# Patient Record
Sex: MALE | Race: BLACK OR AFRICAN AMERICAN | Employment: OTHER | URBAN - METROPOLITAN AREA
[De-identification: names, ages, dates, MRNs, and addresses within clinical notes are randomized per-mention and may not be internally consistent; named-entity substitution may affect disease eponyms.]

---

## 2020-06-03 ENCOUNTER — HOSPITAL ENCOUNTER (EMERGENCY)
Age: 35
Discharge: HOME | End: 2020-06-03
Attending: EMERGENCY MEDICINE
Payer: COMMERCIAL

## 2020-06-03 ENCOUNTER — APPOINTMENT (OUTPATIENT)
Dept: GENERAL RADIOLOGY | Age: 35
End: 2020-06-03
Payer: COMMERCIAL

## 2020-06-03 VITALS
BODY MASS INDEX: 39.33 KG/M2 | SYSTOLIC BLOOD PRESSURE: 117 MMHG | WEIGHT: 296.74 LBS | HEART RATE: 106 BPM | TEMPERATURE: 99.9 F | HEIGHT: 73 IN | RESPIRATION RATE: 18 BRPM | OXYGEN SATURATION: 95 % | DIASTOLIC BLOOD PRESSURE: 75 MMHG

## 2020-06-03 LAB
A/G RATIO: 0.8 (ref 1.1–2.2)
ALBUMIN SERPL-MCNC: 2.8 G/DL (ref 3.4–5)
ALP BLD-CCNC: 91 U/L (ref 40–129)
ALT SERPL-CCNC: 17 U/L (ref 10–40)
ANION GAP SERPL CALCULATED.3IONS-SCNC: 13 MMOL/L (ref 3–16)
AST SERPL-CCNC: 30 U/L (ref 15–37)
BASOPHILS ABSOLUTE: 0 K/UL (ref 0–0.2)
BASOPHILS RELATIVE PERCENT: 0.4 %
BILIRUB SERPL-MCNC: 0.3 MG/DL (ref 0–1)
BUN BLDV-MCNC: 14 MG/DL (ref 7–20)
C-REACTIVE PROTEIN: 62.4 MG/L (ref 0–5.1)
CALCIUM SERPL-MCNC: 8.2 MG/DL (ref 8.3–10.6)
CHLORIDE BLD-SCNC: 99 MMOL/L (ref 99–110)
CO2: 21 MMOL/L (ref 21–32)
CREAT SERPL-MCNC: 1.5 MG/DL (ref 0.9–1.3)
EKG ATRIAL RATE: 128 BPM
EKG DIAGNOSIS: NORMAL
EKG P AXIS: 55 DEGREES
EKG P-R INTERVAL: 168 MS
EKG Q-T INTERVAL: 300 MS
EKG QRS DURATION: 84 MS
EKG QTC CALCULATION (BAZETT): 438 MS
EKG R AXIS: -30 DEGREES
EKG T AXIS: 42 DEGREES
EKG VENTRICULAR RATE: 128 BPM
EOSINOPHILS ABSOLUTE: 0 K/UL (ref 0–0.6)
EOSINOPHILS RELATIVE PERCENT: 0 %
FERRITIN: 1288 NG/ML (ref 30–400)
GFR AFRICAN AMERICAN: >60
GFR NON-AFRICAN AMERICAN: 54
GLOBULIN: 3.7 G/DL
GLUCOSE BLD-MCNC: 335 MG/DL (ref 70–99)
HCT VFR BLD CALC: 45.4 % (ref 40.5–52.5)
HEMOGLOBIN: 14.7 G/DL (ref 13.5–17.5)
LACTATE DEHYDROGENASE: 533 U/L (ref 100–190)
LACTIC ACID: 1.3 MMOL/L (ref 0.4–2)
LYMPHOCYTES ABSOLUTE: 1.1 K/UL (ref 1–5.1)
LYMPHOCYTES RELATIVE PERCENT: 21.2 %
MCH RBC QN AUTO: 25.9 PG (ref 26–34)
MCHC RBC AUTO-ENTMCNC: 32.4 G/DL (ref 31–36)
MCV RBC AUTO: 79.9 FL (ref 80–100)
MONOCYTES ABSOLUTE: 0.5 K/UL (ref 0–1.3)
MONOCYTES RELATIVE PERCENT: 9.9 %
NEUTROPHILS ABSOLUTE: 3.4 K/UL (ref 1.7–7.7)
NEUTROPHILS RELATIVE PERCENT: 68.5 %
PDW BLD-RTO: 12.4 % (ref 12.4–15.4)
PLATELET # BLD: 170 K/UL (ref 135–450)
PMV BLD AUTO: 9.2 FL (ref 5–10.5)
POTASSIUM REFLEX MAGNESIUM: 4.8 MMOL/L (ref 3.5–5.1)
PROCALCITONIN: 0.12 NG/ML (ref 0–0.15)
RBC # BLD: 5.67 M/UL (ref 4.2–5.9)
SODIUM BLD-SCNC: 133 MMOL/L (ref 136–145)
TOTAL PROTEIN: 6.5 G/DL (ref 6.4–8.2)
TROPONIN: 0.03 NG/ML
WBC # BLD: 5 K/UL (ref 4–11)

## 2020-06-03 PROCEDURE — 80053 COMPREHEN METABOLIC PANEL: CPT

## 2020-06-03 PROCEDURE — 83615 LACTATE (LD) (LDH) ENZYME: CPT

## 2020-06-03 PROCEDURE — 86140 C-REACTIVE PROTEIN: CPT

## 2020-06-03 PROCEDURE — 2580000003 HC RX 258: Performed by: PHYSICIAN ASSISTANT

## 2020-06-03 PROCEDURE — 83605 ASSAY OF LACTIC ACID: CPT

## 2020-06-03 PROCEDURE — 6360000002 HC RX W HCPCS: Performed by: PHYSICIAN ASSISTANT

## 2020-06-03 PROCEDURE — 93005 ELECTROCARDIOGRAM TRACING: CPT | Performed by: PHYSICIAN ASSISTANT

## 2020-06-03 PROCEDURE — 96365 THER/PROPH/DIAG IV INF INIT: CPT

## 2020-06-03 PROCEDURE — 84145 PROCALCITONIN (PCT): CPT

## 2020-06-03 PROCEDURE — 6370000000 HC RX 637 (ALT 250 FOR IP): Performed by: PHYSICIAN ASSISTANT

## 2020-06-03 PROCEDURE — 87040 BLOOD CULTURE FOR BACTERIA: CPT

## 2020-06-03 PROCEDURE — 84484 ASSAY OF TROPONIN QUANT: CPT

## 2020-06-03 PROCEDURE — 82728 ASSAY OF FERRITIN: CPT

## 2020-06-03 PROCEDURE — 85025 COMPLETE CBC W/AUTO DIFF WBC: CPT

## 2020-06-03 PROCEDURE — 71045 X-RAY EXAM CHEST 1 VIEW: CPT

## 2020-06-03 PROCEDURE — 99283 EMERGENCY DEPT VISIT LOW MDM: CPT

## 2020-06-03 PROCEDURE — 93010 ELECTROCARDIOGRAM REPORT: CPT | Performed by: INTERNAL MEDICINE

## 2020-06-03 RX ORDER — SODIUM CHLORIDE, SODIUM LACTATE, POTASSIUM CHLORIDE, CALCIUM CHLORIDE 600; 310; 30; 20 MG/100ML; MG/100ML; MG/100ML; MG/100ML
500 INJECTION, SOLUTION INTRAVENOUS ONCE
Status: COMPLETED | OUTPATIENT
Start: 2020-06-03 | End: 2020-06-03

## 2020-06-03 RX ORDER — AZITHROMYCIN 250 MG/1
250 TABLET, FILM COATED ORAL SEE ADMIN INSTRUCTIONS
Qty: 6 TABLET | Refills: 0 | Status: SHIPPED | OUTPATIENT
Start: 2020-06-03 | End: 2020-06-08

## 2020-06-03 RX ORDER — IBUPROFEN 600 MG/1
600 TABLET ORAL ONCE
Status: COMPLETED | OUTPATIENT
Start: 2020-06-03 | End: 2020-06-03

## 2020-06-03 RX ORDER — ACETAMINOPHEN 325 MG/1
650 TABLET ORAL EVERY 6 HOURS PRN
Qty: 60 TABLET | Refills: 0 | Status: SHIPPED | OUTPATIENT
Start: 2020-06-03 | End: 2022-05-17

## 2020-06-03 RX ORDER — ACETAMINOPHEN 325 MG/1
650 TABLET ORAL ONCE
Status: COMPLETED | OUTPATIENT
Start: 2020-06-03 | End: 2020-06-03

## 2020-06-03 RX ADMIN — IBUPROFEN 600 MG: 600 TABLET, FILM COATED ORAL at 17:45

## 2020-06-03 RX ADMIN — AZITHROMYCIN MONOHYDRATE 500 MG: 500 INJECTION, POWDER, LYOPHILIZED, FOR SOLUTION INTRAVENOUS at 15:24

## 2020-06-03 RX ADMIN — SODIUM CHLORIDE, SODIUM LACTATE, POTASSIUM CHLORIDE, AND CALCIUM CHLORIDE 500 ML: .6; .31; .03; .02 INJECTION, SOLUTION INTRAVENOUS at 17:45

## 2020-06-03 RX ADMIN — ACETAMINOPHEN 650 MG: 325 TABLET ORAL at 14:18

## 2020-06-03 RX ADMIN — SODIUM CHLORIDE, POTASSIUM CHLORIDE, SODIUM LACTATE AND CALCIUM CHLORIDE 500 ML: 600; 310; 30; 20 INJECTION, SOLUTION INTRAVENOUS at 14:31

## 2020-06-03 ASSESSMENT — PAIN DESCRIPTION - PAIN TYPE: TYPE: ACUTE PAIN

## 2020-06-03 ASSESSMENT — PAIN SCALES - GENERAL
PAINLEVEL_OUTOF10: 0
PAINLEVEL_OUTOF10: 6

## 2020-06-03 ASSESSMENT — ENCOUNTER SYMPTOMS
VOMITING: 0
COUGH: 1
ABDOMINAL PAIN: 0
NAUSEA: 1
SHORTNESS OF BREATH: 0

## 2020-06-03 ASSESSMENT — PAIN DESCRIPTION - DESCRIPTORS: DESCRIPTORS: ACHING

## 2020-06-03 ASSESSMENT — PAIN DESCRIPTION - LOCATION: LOCATION: NECK

## 2020-06-03 NOTE — ED TRIAGE NOTES
Pt states he is unable to get fever down, Pt is COVID +. last dose tylenol 0100 today.  no ibuprofen. +cough

## 2020-06-03 NOTE — ED PROVIDER NOTES
with a low-grade temp at 99.5. Labs as above. Chest x-ray showed findings consistent with COVID-19. Patient given IV azithromycin in the ED. Patient well-appearing. He is talking complete sentences. He has no evidence of respiratory distress on exam at any point. He denies any shortness of breath, chest pain. He is only complaining of fever, body aches and mild nausea. Given tachycardia is improving, he is not hypoxic and he is well-appearing with no evidence of respiratory distress or chest pain/shortness of breath to believe he is safe for discharge home. Patient understands should he have any worsening of symptoms, trouble breathing, chest pain is to return promptly to the ED for reevaluation. He voiced understanding. He was educated extensively on contact precautions. Management decisions relating to this patient encounter were made during the FISTT-20 public health emergency. I engaged in a shared decision-making conversation with the patient. The patient agrees and wishes to go home. The patient was counseled to closely monitor their symptoms, and to return immediately to the Emergency Department for any difficulty breathing, confusion, dizziness or passing out, vomiting, chest pain, high fevers, weakness, or any other new or concerning symptoms. There is no evidence of emergent medical condition at this time, and the patient will be discharged in good condition. Patient understand strict return precautions. He is comfortable plan. Patient discharged home. The patient tolerated their visit well. They were seen and evaluated by the attending physician, Dr. Robby Parker who agreed with the assessment and plan. The patient and / or the family were informed of the results of any tests, a time was given to answer questions, a plan was proposed and they agreed with plan.           CONSULTS:  None    PROCEDURES:  Procedures    FINAL IMPRESSION      1. COVID-19          DISPOSITION/PLAN

## 2020-06-03 NOTE — ED PROVIDER NOTES
I independently evaluated and obtained a history and physical on Bill Pederson. All diagnostic, treatment, and disposition assistants were made to myself in conjunction the advanced practice provider. For further details of this patient's emergency department encounter, please see the advanced practice provider's documentation. History: Patient is a 29-year-old came by private vehicle to the ER with positive COVID-19. Was tested last Sunday. Patient has a dry cough body aches fever and nausea. Denies any shortness of breath chest pain. Presents with fever and some tachycardia. Is generalized body ache. No other exposure noted. Has past history includes hypertension patient is obese. Physician Exam: Examination reveals tachycardia but vital signs normal with a pulse ox in the 94 percentile. Lungs were clear to auscultation neurologically is intact. Moving all extremities. Heart some tachycardia but regular rhythm.     Labs Reviewed   CBC WITH AUTO DIFFERENTIAL - Abnormal; Notable for the following components:       Result Value    MCV 79.9 (*)     MCH 25.9 (*)     All other components within normal limits    Narrative:     Performed at:  [facility]  [address], [place] [facility] [employer] [address]   Phone [phone]   COMPREHENSIVE METABOLIC PANEL W/ REFLEX TO MG FOR LOW K - Abnormal; Notable for the following components:    Sodium 133 (*)     Glucose 335 (*)     CREATININE 1.5 (*)     GFR Non-African American 54 (*)     Calcium 8.2 (*)     Alb 2.8 (*)     Albumin/Globulin Ratio 0.8 (*)     All other components within normal limits    Narrative:     Performed at:  [facility]  [address], [place] [facility] [employer] [address]   Phone [phone]   LACTATE DEHYDROGENASE - Abnormal; Notable for the following components:     (*)     All other components within normal limits    Narrative:     Performed at:  [facility]  [address], [facility] [employer] [address]   Phone [phone]   TROPONIN - Abnormal; Notable for the following components:    Troponin 0.03 (*)     All other components within normal limits    Narrative:     Performed at:  [facility]  [address] [facility] [employer] [address]   Phone [phone]   C-REACTIVE PROTEIN - Abnormal; Notable for the following components:    CRP 62.4 (*)     All other components within normal limits    Narrative:     Performed at:  [facility]  [address] [facility] [employer] [address]   Phone [phone]   FERRITIN - Abnormal; Notable for the following components:    Ferritin 1,288.0 (*)     All other components within normal limits    Narrative:     Performed at:  [facility]  [address] [facility] [employer] [address]   Phone [phone]   CULTURE, BLOOD 1    Narrative:     ORDER#: [id number]                          ORDERED BY: BERTRAM STEIN  SOURCE: Blood                              COLLECTED:  06/03/20 14:37  ANTIBIOTICS AT MONIE.:                      RECEIVED :  06/03/20 14:47  If child <=2 yrs old please draw pediatric bottle. ~Blood Culture #1  Performed at:  [facility]  [address] [facility] [employer] [address]   Phone [phone]   LACTIC ACID, PLASMA    Narrative:     Performed at:  [facility]  [address] [facility] [employer] [address]   Phone [phone]   PROCALCITONIN    Narrative:     Performed at:  [facility]  [address] [place] [address] [employer] [address]   Phone [phone]          XR CHEST PORTABLE   Final Result   Ground-glass opacities and interstitial thickening within the mid to lower   lungs bilaterally compatible with provided history of viral pneumonitis. No   pleural effusion or pneumothorax. Treated in the ED.

## 2020-06-04 ENCOUNTER — CARE COORDINATION (OUTPATIENT)
Dept: CARE COORDINATION | Age: 35
End: 2020-06-04

## 2020-06-05 NOTE — CARE COORDINATION
for [facility] Loop and agrees to enroll patient declined. Patient's preferred e-mail:    Patient's preferred phone number:   Based on Loop alert triggers, patient will be contacted by nurse care manager for worsening symptoms. Plan for follow-up call in 5-7 days based on severity of symptoms and risk factors.

## 2020-06-07 LAB — BLOOD CULTURE, ROUTINE: NORMAL

## 2020-06-11 ENCOUNTER — CARE COORDINATION (OUTPATIENT)
Dept: CARE COORDINATION | Age: 35
End: 2020-06-11

## 2020-06-11 NOTE — CARE COORDINATION
symptoms and risk factors. ACM offered to assist patient with scheduling an appointment with a [facility] provider. Patient declines at this time.

## 2020-06-15 ENCOUNTER — NURSE TRIAGE (OUTPATIENT)
Dept: OTHER | Facility: CLINIC | Age: 35
End: 2020-06-15

## 2020-06-18 ENCOUNTER — CARE COORDINATION (OUTPATIENT)
Dept: CARE COORDINATION | Age: 35
End: 2020-06-18

## 2020-06-24 ENCOUNTER — CARE COORDINATION (OUTPATIENT)
Dept: CARE COORDINATION | Age: 35
End: 2020-06-24

## 2021-06-28 ENCOUNTER — HOSPITAL ENCOUNTER (OUTPATIENT)
Dept: ULTRASOUND IMAGING | Age: 36
Discharge: HOME | End: 2021-06-28

## 2021-06-28 DIAGNOSIS — R80.9 PROTEINURIA, UNSPECIFIED TYPE: ICD-10-CM

## 2021-06-28 DIAGNOSIS — E55.9 AVITAMINOSIS D: ICD-10-CM

## 2021-06-28 DIAGNOSIS — I10 HYPERTENSION, ESSENTIAL: ICD-10-CM

## 2021-06-28 DIAGNOSIS — N18.31 ANEMIA IN STAGE 3A CHRONIC KIDNEY DISEASE (HCC): ICD-10-CM

## 2021-06-28 DIAGNOSIS — D63.1 ANEMIA IN STAGE 3A CHRONIC KIDNEY DISEASE (HCC): ICD-10-CM

## 2021-06-28 DIAGNOSIS — N18.31 STAGE 3A CHRONIC KIDNEY DISEASE (HCC): ICD-10-CM

## 2021-06-28 PROCEDURE — 76770 US EXAM ABDO BACK WALL COMP: CPT

## 2022-04-20 ENCOUNTER — HOSPITAL ENCOUNTER (EMERGENCY)
Age: 37
Discharge: HOME | End: 2022-04-20
Payer: MEDICAID

## 2022-04-20 VITALS
RESPIRATION RATE: 18 BRPM | DIASTOLIC BLOOD PRESSURE: 106 MMHG | HEART RATE: 95 BPM | WEIGHT: 275.35 LBS | BODY MASS INDEX: 36.49 KG/M2 | OXYGEN SATURATION: 99 % | SYSTOLIC BLOOD PRESSURE: 166 MMHG | TEMPERATURE: 97.3 F | HEIGHT: 73 IN

## 2022-04-20 DIAGNOSIS — B35.6 TINEA CRURIS: Primary | ICD-10-CM

## 2022-04-20 DIAGNOSIS — I10 ELEVATED BLOOD PRESSURE READING WITH DIAGNOSIS OF HYPERTENSION: ICD-10-CM

## 2022-04-20 PROCEDURE — 99283 EMERGENCY DEPT VISIT LOW MDM: CPT

## 2022-04-20 PROCEDURE — 6370000000 HC RX 637 (ALT 250 FOR IP): Performed by: PHYSICIAN ASSISTANT

## 2022-04-20 RX ORDER — KETOCONAZOLE 20 MG/G
CREAM TOPICAL
Qty: 30 G | Refills: 1 | Status: SHIPPED | OUTPATIENT
Start: 2022-04-20 | End: 2022-06-06

## 2022-04-20 RX ORDER — LISINOPRIL 5 MG/1
10 TABLET ORAL ONCE
Status: COMPLETED | OUTPATIENT
Start: 2022-04-20 | End: 2022-04-20

## 2022-04-20 RX ORDER — HYDROXYZINE HYDROCHLORIDE 25 MG/1
25 TABLET, FILM COATED ORAL EVERY 8 HOURS PRN
Qty: 15 TABLET | Refills: 0 | Status: SHIPPED | OUTPATIENT
Start: 2022-04-20 | End: 2022-04-28

## 2022-04-20 RX ADMIN — LISINOPRIL 10 MG: 5 TABLET ORAL at 08:12

## 2022-04-20 ASSESSMENT — ENCOUNTER SYMPTOMS
SHORTNESS OF BREATH: 0
BACK PAIN: 0
NAUSEA: 0
ABDOMINAL PAIN: 0
COUGH: 0
SORE THROAT: 0
EYE PAIN: 0
VOMITING: 0

## 2022-04-20 ASSESSMENT — PAIN DESCRIPTION - PAIN TYPE: TYPE: ACUTE PAIN

## 2022-04-20 ASSESSMENT — PAIN DESCRIPTION - ORIENTATION: ORIENTATION: RIGHT;LEFT;UPPER

## 2022-04-20 ASSESSMENT — PAIN SCALES - GENERAL: PAINLEVEL_OUTOF10: 7

## 2022-04-20 ASSESSMENT — PAIN - FUNCTIONAL ASSESSMENT: PAIN_FUNCTIONAL_ASSESSMENT: 0-10

## 2022-04-20 NOTE — ED PROVIDER NOTES
[address] [facility]        Pt Name: Kole Alvarez  MRN: [id number]  [place] 1985  Date of evaluation: 4/20/2022  Provider: Kim Ascencio PA-C  PCP: Gama Koehler MD, MD  Note Started: 7:49 AM EDT     This patient was seen and evaluated by ABIGAIL only. CHIEF COMPLAINT       Chief Complaint   Patient presents with    Rash     upper bilateral inner thighs with rash that started about 3 days ago. HISTORY OF PRESENT ILLNESS   (Location, Timing/Onset, Context/Setting, Quality, Duration, Modifying Factors, Severity, Associated Signs and Symptoms)  Note limiting factors. Chief Complaint: Rash in his inguinal area. Been very itchy. He has been scratching at it. Started about 2 to 3 days ago. Denies fever, he has been using some antibiotic ointment over the area. No other complaint. Denies any new environmental exposure. Kole Alvarez is a 29 y.o. male who presents to emergency room with the above complaint. Nursing Notes were all reviewed and agreed with or any disagreements were addressed in the HPI. REVIEW OF SYSTEMS    (2-9 systems for level 4, 10 or more for level 5)     Review of Systems   Constitutional: Negative for chills and fever. HENT: Negative for congestion and sore throat. Eyes: Negative for pain and visual disturbance. Respiratory: Negative for cough and shortness of breath. Cardiovascular: Negative for chest pain and leg swelling. Gastrointestinal: Negative for abdominal pain, nausea and vomiting. Genitourinary: Negative for dysuria and frequency. Musculoskeletal: Negative for back pain and neck pain. Skin: Positive for rash. Negative for wound. Neurological: Negative for dizziness and light-headedness. Positives and Pertinent negatives as per HPI. Except as noted above in the ROS, all other systems were reviewed and negative.        PAST MEDICAL HISTORY Past Medical History:   Diagnosis Date    COVID-19     Diabetes mellitus, type II ([facility] [address]     Hypertension     Protein in urine     Seizure ([facility] [address]          SURGICAL HISTORY   History reviewed. No pertinent surgical history. CURRENTMEDICATIONS       Previous Medications    ACETAMINOPHEN (AMINOFEN) 325 MG TABLET    Take 2 tablets by mouth every 6 hours as needed for Pain    LISINOPRIL (PRINIVIL;ZESTRIL) 10 MG TABLET    Take 10 mg by mouth daily          ALLERGIES     Patient has no known allergies. FAMILYHISTORY     History reviewed. No pertinent family history. SOCIAL HISTORY       Social History     Tobacco Use    Smoking status: Never Smoker    Smokeless tobacco: Never Used   Substance Use Topics    Alcohol use: Yes     Comment: rare    Drug use: Not Currently       SCREENINGS    [place] Coma Scale  Eye Opening: Spontaneous  Best Verbal Response: Oriented  Best Motor Response: Obeys commands  Jannie Coma Scale Score: 15        PHYSICAL EXAM    (up to 7 for level 4, 8 or more for level 5)     ED Triage Vitals    BP Temp Temp Source Pulse Resp SpO2 Height Weight   (!) 180/120 97.3 °F (36.3 °C) Temporal 89 18 99 % 6' 1\" (1.854 m) 275 lb 5.7 oz (124.9 kg)       Physical Exam  Vitals and nursing note reviewed. Cardiovascular:      Rate and Rhythm: Normal rate and regular rhythm. Heart sounds: Normal heart sounds. No murmur heard. No friction rub. No gallop. Pulmonary:      Effort: Pulmonary effort is normal. No respiratory distress. Breath sounds: Normal breath sounds. No wheezing or rales. Chest:      Chest wall: No tenderness. Abdominal:      General: Bowel sounds are normal. There is no distension. Palpations: There is no mass. Tenderness: There is no abdominal tenderness. There is no guarding or rebound. Skin:     Findings: Rash present. Rash is macular. Neurological:      General: No focal deficit present.          DIAGNOSTIC RESULTS   LABS:    Labs Reviewed - No data to display    When ordered only abnormal lab results are displayed. All other labs were within normal range or not returned as of this dictation. EKG: When ordered, EKG's are interpreted by the Emergency Department Physician in the absence of a cardiologist.  Please see their note for interpretation of EKG. RADIOLOGY:   Non-plain film images such as CT, Ultrasound and MRI are read by the radiologist. Plain radiographic images are visualized and preliminarily interpreted by the ED Provider with the below findings:        Interpretation per the Radiologist below, if available at the time of this note:    No orders to display     No results found. PROCEDURES   Unless otherwise noted below, none     Procedures    CRITICAL CARE TIME       CONSULTS:  None      EMERGENCY DEPARTMENT COURSE and DIFFERENTIAL DIAGNOSIS/MDM:   Vitals:    Vitals:    04/20/22 0714 04/20/22 0812   BP: (!) 180/120 (!) 166/106   Pulse: 89 95   Resp: 18 18   Temp: 97.3 °F (36.3 °C)    TempSrc: Temporal    SpO2: 99%    Weight: 275 lb 5.7 oz (124.9 kg)    Height: 6' 1\" (1.854 m)        Patient was given the following medications:  Medications   lisinopril (PRINIVIL;ZESTRIL) tablet 10 mg (10 mg Oral Given 4/20/22 8942)         Emergency records: Patient on exam cardiovascular regular rhythm, lungs are clear. No wheeze rales or rhonchi noted. Patient abdomen is soft nontender. Normal bowel sounds all 4 quadrants. Suprapubically patient has a hyperpigmented macular rash consistent with tinea cruris. There is some hypopigmented areas scattered around the scrotum. No swelling noted. Full range of motion all extremities. Alert oriented x4. Does not appear to be in acute distress. Discussed patient discharge plan. His blood pressure is elevated. Has not taken his blood pressure medication last 2 days. Said he does have it at home. I offered to give it to him here and he was okay with it.   He is on lisinopril 10 mg p.o.      Patient will be placed on ketoconazole cream and shampoo. Use as directed. Also given dermatology referral if worsens or no improvement. Otherwise follow-up primary care physician for blood pressure. He was given his blood pressure medication here in the ED. He will be discharged stable condition. He was okay with this plan. FINAL IMPRESSION      1. Tinea cruris    2. Elevated blood pressure reading with diagnosis of hypertension          DISPOSITION/PLAN   DISPOSITION  DISPOSITION Decision To Discharge 04/20/2022 08:17:11 AM          PATIENT REFERRED TO:  MD Kulwinder Bennettjob [facility] [address]  [phone]    Call in 1 day      [facility]  [address] [facility]. [address] [place] [address]  [phone]  Call   If symptoms worsen      DISCHARGE MEDICATIONS:  New Prescriptions    KETOCONAZOLE (NIZORAL) 2 % CREAM    Apply topically daily. KETOCONAZOLE, TOPICAL, 1 % SHAM    Apply quarter size amount and lather in affected area. Leave on for 5 minutes. Then shower off.        DISCONTINUED MEDICATIONS:  Discontinued Medications    No medications on file              (Please note that portions of this note were completed with a voice recognition program.  Efforts were made to edit the dictations but occasionally words are mis-transcribed.)    Davon Rodarte PA-C (electronically signed)           Davon Rodarte PA-C  04/20/22 0829       Davon Rodarte PA-C  04/20/22 0830

## 2022-04-20 NOTE — ED NOTES
Discharge and education instructions reviewed. Patient verbalized understanding, teach-back successful. Patient denied questions at this time. No acute distress noted. Patient instructed to follow-up as noted - return to emergency department if symptoms worsen. Patient verbalized understanding. Discharged per EDMD with discharge instructions.           Cale Coronel RN  04/20/22 9508

## 2022-05-05 ENCOUNTER — HOSPITAL ENCOUNTER (EMERGENCY)
Age: 37
Discharge: HOME | End: 2022-05-05
Attending: EMERGENCY MEDICINE
Payer: MEDICAID

## 2022-05-05 VITALS
HEIGHT: 73 IN | HEART RATE: 112 BPM | WEIGHT: 275.57 LBS | BODY MASS INDEX: 36.52 KG/M2 | TEMPERATURE: 99.1 F | SYSTOLIC BLOOD PRESSURE: 151 MMHG | RESPIRATION RATE: 18 BRPM | DIASTOLIC BLOOD PRESSURE: 106 MMHG | OXYGEN SATURATION: 97 %

## 2022-05-05 DIAGNOSIS — I10 ESSENTIAL HYPERTENSION: ICD-10-CM

## 2022-05-05 DIAGNOSIS — H20.00 ACUTE ANTERIOR UVEITIS OF LEFT EYE: Primary | ICD-10-CM

## 2022-05-05 PROCEDURE — 6370000000 HC RX 637 (ALT 250 FOR IP): Performed by: EMERGENCY MEDICINE

## 2022-05-05 PROCEDURE — 99283 EMERGENCY DEPT VISIT LOW MDM: CPT

## 2022-05-05 RX ORDER — INSULIN LISPRO 100 [IU]/ML
20-30 INJECTION, SOLUTION INTRAVENOUS; SUBCUTANEOUS
Status: ON HOLD | COMMUNITY
Start: 2021-03-09 | End: 2022-05-19 | Stop reason: SDUPTHER

## 2022-05-05 RX ORDER — CARVEDILOL 25 MG/1
25 TABLET ORAL 2 TIMES DAILY
COMMUNITY
Start: 2021-01-13 | End: 2022-07-19 | Stop reason: SDUPTHER

## 2022-05-05 RX ORDER — ASPIRIN 81 MG/1
81 TABLET, CHEWABLE ORAL DAILY
COMMUNITY
Start: 2021-06-01 | End: 2022-07-19 | Stop reason: SDUPTHER

## 2022-05-05 RX ORDER — ACETAMINOPHEN 500 MG
1000 TABLET ORAL ONCE
Status: COMPLETED | OUTPATIENT
Start: 2022-05-05 | End: 2022-05-05

## 2022-05-05 RX ORDER — INSULIN DEGLUDEC 200 U/ML
55 INJECTION, SOLUTION SUBCUTANEOUS DAILY
Qty: 1 PEN | Refills: 2 | Status: ON HOLD | OUTPATIENT
Start: 2022-05-05 | End: 2022-05-19

## 2022-05-05 RX ORDER — DULOXETIN HYDROCHLORIDE 30 MG/1
30 CAPSULE, DELAYED RELEASE ORAL DAILY
COMMUNITY
Start: 2021-04-07 | End: 2022-07-19 | Stop reason: SDUPTHER

## 2022-05-05 RX ORDER — GABAPENTIN 300 MG/1
300 CAPSULE ORAL 3 TIMES DAILY
COMMUNITY
Start: 2022-05-03 | End: 2022-07-19 | Stop reason: SDUPTHER

## 2022-05-05 RX ORDER — DICYCLOMINE HYDROCHLORIDE 10 MG/1
10 CAPSULE ORAL
COMMUNITY
Start: 2021-01-13 | End: 2022-07-19 | Stop reason: SDUPTHER

## 2022-05-05 RX ORDER — CHLORTHALIDONE 25 MG/1
25 TABLET ORAL DAILY
COMMUNITY
Start: 2021-04-07 | End: 2022-07-19 | Stop reason: SDUPTHER

## 2022-05-05 RX ORDER — IBUPROFEN 400 MG/1
400 TABLET ORAL ONCE
Status: COMPLETED | OUTPATIENT
Start: 2022-05-05 | End: 2022-05-05

## 2022-05-05 RX ORDER — ERYTHROMYCIN 5 MG/G
OINTMENT OPHTHALMIC
Qty: 3.5 G | Refills: 0 | Status: SHIPPED | OUTPATIENT
Start: 2022-05-05 | End: 2022-05-15

## 2022-05-05 RX ORDER — TETRACAINE HYDROCHLORIDE 5 MG/ML
2 SOLUTION OPHTHALMIC ONCE
Status: COMPLETED | OUTPATIENT
Start: 2022-05-05 | End: 2022-05-05

## 2022-05-05 RX ADMIN — FLUORESCEIN SODIUM 1 MG: 1 STRIP OPHTHALMIC at 15:16

## 2022-05-05 RX ADMIN — TETRACAINE HYDROCHLORIDE 2 DROP: 5 SOLUTION OPHTHALMIC at 15:14

## 2022-05-05 RX ADMIN — ACETAMINOPHEN 1000 MG: 500 TABLET ORAL at 15:15

## 2022-05-05 RX ADMIN — IBUPROFEN 400 MG: 400 TABLET, FILM COATED ORAL at 15:15

## 2022-05-05 ASSESSMENT — PAIN DESCRIPTION - FREQUENCY
FREQUENCY: CONTINUOUS
FREQUENCY: CONTINUOUS

## 2022-05-05 ASSESSMENT — PAIN SCALES - GENERAL
PAINLEVEL_OUTOF10: 3
PAINLEVEL_OUTOF10: 9

## 2022-05-05 ASSESSMENT — PAIN DESCRIPTION - DESCRIPTORS
DESCRIPTORS: THROBBING
DESCRIPTORS: THROBBING

## 2022-05-05 ASSESSMENT — VISUAL ACUITY
OU: 20/13
OS: 20/15
OD: 20/13

## 2022-05-05 ASSESSMENT — PAIN - FUNCTIONAL ASSESSMENT: PAIN_FUNCTIONAL_ASSESSMENT: 0-10

## 2022-05-05 ASSESSMENT — PAIN DESCRIPTION - ORIENTATION
ORIENTATION: LEFT
ORIENTATION: LEFT

## 2022-05-05 ASSESSMENT — PAIN DESCRIPTION - PAIN TYPE
TYPE: ACUTE PAIN
TYPE: ACUTE PAIN

## 2022-05-05 ASSESSMENT — PAIN DESCRIPTION - LOCATION
LOCATION: EYE
LOCATION: EYE

## 2022-05-05 NOTE — ED PROVIDER NOTES
EMERGENCY DEPARTMENT PROVIDER NOTE    Patient Identification  Pt Name: Jessica Robledo  MRN: [id number]  [place] 1985  Date of evaluation: 5/5/2022  Provider: Rui Huitron DO  PCP: Mariangel Gomez MD, MD    Chief Complaint  Eye Pain (Left eye pain x24hr. No injury to eye. No swelling. )      HPI  (History provided by patient)  This is a 29 y.o. male with pertinent past medical history of CKD, HTN, DM who was brought in by self for left eye pain ongoing for the past 24 hours. Pain is worsened with light, patient also states light in his right eye worsens his left eye pain. Nothing seems to make it any better. 10 out of 10 in severity. He denies any trauma or injury to the eye. Denies any vision changes. No fevers. Wears glasses, never wears contact lenses. No history of similar symptoms. .     ROS    Const:  No fevers, no chills, no generalized weakness  Skin:  No rash, no lesions  Eyes:  No visual changes, no blurry or double vision, +pain, +redness  ENT:  No sore throat, no difficulty swallowing, no ear pain, no sinus pain or congestion  Card:  No chest pain, no palpitations, no edema  Resp:  No shortness of breath, no cough, no wheezing  Abd:  No abdominal pain, no nausea, no vomiting, no diarrhea  MSK:  No joint pain, no myalgia  Neuro:  No focal weakness, no headache, no paresthesia    All other systems reviewed and negative unless otherwise noted in HPI        I have reviewed the following nursing documentation:  Allergies: Patient has no known allergies. Past medical history:   Past Medical History:   Diagnosis Date    COVID-19     Diabetes mellitus, type II ([facility] [address]     Hypertension     Protein in urine     Seizure [facility])      Past surgical history: History reviewed. No pertinent surgical history.     Home medications:   Discharge Medication List as of 5/5/2022  5:03 PM      CONTINUE these medications which have NOT CHANGED    Details   gabapentin (NEURONTIN) 300 MG capsule Take 300 mg by mouth 3 times daily. Historical Med      insulin lispro, 1 Unit Dial, (HUMALOG KWIKPEN) 100 UNIT/ML SOPN Historical Med      metFORMIN (GLUCOPHAGE) 500 MG tablet Historical Med      DULoxetine (CYMBALTA) 30 MG extended release capsule Take 30 mg by mouth dailyHistorical Med      dicyclomine (BENTYL) 10 MG capsule Historical Med      carvedilol (COREG) 25 MG tablet Historical Med      chlorthalidone (HYGROTON) 25 MG tablet Take 25 mg by mouth dailyHistorical Med      aspirin 81 MG chewable tablet Historical Med      ketoconazole (NIZORAL) 2 % cream Apply topically daily. , Disp-30 g, R-1, Print      KETOCONAZOLE, TOPICAL, 1 % SHAM Apply quarter size amount and lather in affected area. Leave on for 5 minutes. Then shower off., Disp-200 mL, R-0Print      acetaminophen (AMINOFEN) 325 MG tablet Take 2 tablets by mouth every 6 hours as needed for Pain, Disp-60 tablet, R-0Print      lisinopril (PRINIVIL;ZESTRIL) 10 MG tablet Take 10 mg by mouth daily Historical Med             Social history:  reports that he has never smoked. He has never used smokeless tobacco. He reports current alcohol use. He reports previous drug use. Family history:  History reviewed. No pertinent family history. Exam  ED Triage Vitals [05/05/22 1404]   BP Temp Temp Source Pulse Resp SpO2 Height Weight   (!) 172/117 99 °F (37.2 °C) Oral 118 18 98 % 6' 1\" (1.854 m) 275 lb 9.2 oz (125 kg)     Nursing note and vitals reviewed. Constitutional: Well developed, well nourished. Non-toxic in appearance. Appears uncomfortable however in no catherine distress. BMI 36.36. HENT:      Head: Normocephalic and atraumatic. Ears: External ears normal.      Nose: Nose normal.     Mouth: Membrane mucosa moist and pink. Eyes: Anicteric sclera. No discharge. Left sclera and conjunctiva injected. Corneas clear. PERRL. No obvious hyphema or hypopyon. EOMIx6 without worsening pain or vision changes. No periorbital edema or erythema. Fluorescein exam without obvious uptake, negative [place]. IOP OS 15-16-15. Neck: Supple. Trachea midline. Cardiovascular: Tachycardia, regular rhythm; no murmurs, rubs, or gallops. Pulmonary/Chest: Effort normal. No respiratory distress. CTAB. No stridor. No wheezes. No rales. Abdominal: Soft. No distension. Musculoskeletal: Moves all extremities. No gross deformity. Neurological: Alert and oriented. Face symmetric. Speech is clear. Skin: Warm and dry. No rash. Psychiatric: Normal mood and affect. Behavior is normal.        Radiology  No orders to display       Labs  No results found for this visit on 05/05/22. Screenings   [place]annie Coma Scale  Eye Opening: Spontaneous  Best Verbal Response: Oriented  Best Motor Response: Obeys commands  [place] Coma Scale Score: 15       MDM and ED Course    Patient afebrile and nontoxic. He appears uncomfortable from left eye pain however in no catherine distress. Nothing to suggest orbital cellulitis or preseptal cellulitis on exam.  Visual acuity normal.  Fluorescein exam without obvious corneal abrasion or ulcer, no evidence of globe rupture. Intraocular pressure normal, not consistent with glaucoma. No foreign bodies are appreciated. Patient did report about 50% pain improvement after tetracaine. Suspect patient's symptoms most likely related to iritis/uveitis. I discussed case with Dr. Arely Wilhelm for ophthalmology at [facility], agrees with current management plan, recommends to defer any steroid eyedrops until further evaluation. May use ibuprofen and Tylenol as needed for pain. Will be able to follow-up in ophthalmology clinic tomorrow morning and Dr. Arely Wilhelm reached out to patient to schedule this appointment. Patient requested refills of his insulin which were provided. Will also prescribe erythromycin ointment out of abundance of caution and low risk of adverse effect.   Of note, patient mildly tachycardic to emergency department, states this is typical for him, on review of past healthcare encounters over the past 2 years patient has had multiple instances of tachycardia noted. Tachycardia was modestly improved after pain control. Patient felt safe for discharge to self-care with very close PCP and ophthalmology follow-up. He is agreeable with plan and feels comfortable returning to home. Strict return precautions were discussed at length. Final Impression  1. Acute anterior uveitis of left eye    2. Essential hypertension        Blood pressure (!) 151/106, pulse 112, temperature 99.1 °F (37.3 °C), temperature source Oral, resp. rate 18, height 6' 1\" (1.854 m), weight 275 lb 9.2 oz (125 kg), SpO2 97 %. Disposition:  DISPOSITION Decision To Discharge 05/05/2022 05:01:28 PM      Patient Referrals:  Dimitris Bond MD  [place] [id number]    In 3 days      [address]    On 5/6/2022        Discharge Medications:  Discharge Medication List as of 5/5/2022  5:03 PM      START taking these medications    Details   erythromycin (ROMYCIN) 5 MG/GM ophthalmic ointment Apply thin ribbon to inside of left lower eyelid every 4-6 hours while awake, Disp-3.5 g, R-0, Print      Insulin Degludec (TRESIBA FLEXTOUCH) 200 UNIT/ML SOPN Inject 55 Units into the skin daily, Disp-1 pen, R-2Print             Discontinued Medications:  Discharge Medication List as of 5/5/2022  5:03 PM          This chart was generated using the [address] [place] [address] dictation system. I created this record but it may contain dictation errors given the limitations of this technology.     Socorro Rodriguez DO (electronically signed)  Attending Emergency Physician       Socorro Rodriguez DO  05/06/22 8974

## 2022-05-17 ENCOUNTER — APPOINTMENT (OUTPATIENT)
Dept: GENERAL RADIOLOGY | Age: 37
DRG: 420 | End: 2022-05-17
Payer: MEDICAID

## 2022-05-17 ENCOUNTER — HOSPITAL ENCOUNTER (INPATIENT)
Age: 37
LOS: 2 days | Discharge: HOME | DRG: 420 | End: 2022-05-19
Attending: EMERGENCY MEDICINE | Admitting: STUDENT IN AN ORGANIZED HEALTH CARE EDUCATION/TRAINING PROGRAM
Payer: MEDICAID

## 2022-05-17 DIAGNOSIS — R77.8 ELEVATED TROPONIN: ICD-10-CM

## 2022-05-17 DIAGNOSIS — R73.9 HYPERGLYCEMIA: ICD-10-CM

## 2022-05-17 DIAGNOSIS — R07.9 CHEST PAIN, UNSPECIFIED TYPE: ICD-10-CM

## 2022-05-17 DIAGNOSIS — N18.9 ACUTE KIDNEY INJURY SUPERIMPOSED ON CHRONIC KIDNEY DISEASE (HCC): Primary | ICD-10-CM

## 2022-05-17 DIAGNOSIS — N17.9 ACUTE KIDNEY INJURY SUPERIMPOSED ON CHRONIC KIDNEY DISEASE (HCC): Primary | ICD-10-CM

## 2022-05-17 PROBLEM — E11.9 DM2 (DIABETES MELLITUS, TYPE 2) (HCC): Status: ACTIVE | Noted: 2022-05-17

## 2022-05-17 PROBLEM — R79.89 ELEVATED TROPONIN: Status: ACTIVE | Noted: 2022-05-17

## 2022-05-17 LAB
A/G RATIO: 1.1 (ref 1.1–2.2)
ALBUMIN SERPL-MCNC: 3.7 G/DL (ref 3.4–5)
ALP BLD-CCNC: 121 U/L (ref 40–129)
ALT SERPL-CCNC: 26 U/L (ref 10–40)
ANION GAP SERPL CALCULATED.3IONS-SCNC: 15 MMOL/L (ref 3–16)
AST SERPL-CCNC: 16 U/L (ref 15–37)
BACTERIA: NORMAL /HPF
BASE EXCESS VENOUS: 0.2 MMOL/L
BASOPHILS ABSOLUTE: 0.1 K/UL (ref 0–0.2)
BASOPHILS RELATIVE PERCENT: 1.3 %
BETA-HYDROXYBUTYRATE: 0.19 MMOL/L (ref 0–0.27)
BILIRUB SERPL-MCNC: <0.2 MG/DL (ref 0–1)
BILIRUBIN URINE: NEGATIVE
BLOOD, URINE: NEGATIVE
BUN BLDV-MCNC: 30 MG/DL (ref 7–20)
CALCIUM SERPL-MCNC: 9.6 MG/DL (ref 8.3–10.6)
CARBOXYHEMOGLOBIN: 1.2 %
CHLORIDE BLD-SCNC: 97 MMOL/L (ref 99–110)
CLARITY: CLEAR
CO2: 21 MMOL/L (ref 21–32)
COLOR: YELLOW
CREAT SERPL-MCNC: 1.8 MG/DL (ref 0.9–1.3)
EOSINOPHILS ABSOLUTE: 0.2 K/UL (ref 0–0.6)
EOSINOPHILS RELATIVE PERCENT: 2.9 %
EPITHELIAL CELLS, UA: 2 /HPF (ref 0–5)
GFR AFRICAN AMERICAN: 52
GFR NON-AFRICAN AMERICAN: 43
GLUCOSE BLD-MCNC: 285 MG/DL (ref 70–99)
GLUCOSE BLD-MCNC: 348 MG/DL (ref 70–99)
GLUCOSE BLD-MCNC: 418 MG/DL (ref 70–99)
GLUCOSE URINE: >=1000 MG/DL
HCO3 VENOUS: 26 MMOL/L (ref 23–29)
HCT VFR BLD CALC: 46.6 % (ref 40.5–52.5)
HEMOGLOBIN: 15.1 G/DL (ref 13.5–17.5)
HYALINE CASTS: 3 /LPF (ref 0–8)
KETONES, URINE: ABNORMAL MG/DL
LEUKOCYTE ESTERASE, URINE: NEGATIVE
LYMPHOCYTES ABSOLUTE: 2.2 K/UL (ref 1–5.1)
LYMPHOCYTES RELATIVE PERCENT: 37.7 %
MCH RBC QN AUTO: 25.5 PG (ref 26–34)
MCHC RBC AUTO-ENTMCNC: 32.5 G/DL (ref 31–36)
MCV RBC AUTO: 78.6 FL (ref 80–100)
METHEMOGLOBIN VENOUS: 0.5 %
MICROSCOPIC EXAMINATION: YES
MONOCYTES ABSOLUTE: 0.5 K/UL (ref 0–1.3)
MONOCYTES RELATIVE PERCENT: 9.3 %
NEUTROPHILS ABSOLUTE: 2.9 K/UL (ref 1.7–7.7)
NEUTROPHILS RELATIVE PERCENT: 48.8 %
NITRITE, URINE: NEGATIVE
O2 SAT, VEN: 77 %
O2 THERAPY: NORMAL
PCO2, VEN: 44.3 MMHG (ref 40–50)
PDW BLD-RTO: 12.4 % (ref 12.4–15.4)
PERFORMED ON: ABNORMAL
PERFORMED ON: ABNORMAL
PH UA: 5 (ref 5–8)
PH VENOUS: 7.38 (ref 7.35–7.45)
PLATELET # BLD: 295 K/UL (ref 135–450)
PMV BLD AUTO: 9.1 FL (ref 5–10.5)
PO2, VEN: 41 MMHG
POTASSIUM REFLEX MAGNESIUM: 4 MMOL/L (ref 3.5–5.1)
PROTEIN UA: 300 MG/DL
RAPID INFLUENZA  B AGN: NEGATIVE
RAPID INFLUENZA A AGN: NEGATIVE
RBC # BLD: 5.93 M/UL (ref 4.2–5.9)
RBC UA: 1 /HPF (ref 0–4)
SARS-COV-2, NAAT: NOT DETECTED
SODIUM BLD-SCNC: 133 MMOL/L (ref 136–145)
SPECIFIC GRAVITY UA: 1.03 (ref 1–1.03)
TCO2 CALC VENOUS: 27 MMOL/L
TOTAL PROTEIN: 7 G/DL (ref 6.4–8.2)
TROPONIN: 0.04 NG/ML
TROPONIN: 0.05 NG/ML
URINE REFLEX TO CULTURE: ABNORMAL
URINE TYPE: ABNORMAL
UROBILINOGEN, URINE: 1 E.U./DL
WBC # BLD: 5.9 K/UL (ref 4–11)
WBC UA: 1 /HPF (ref 0–5)

## 2022-05-17 PROCEDURE — 80053 COMPREHEN METABOLIC PANEL: CPT

## 2022-05-17 PROCEDURE — 87804 INFLUENZA ASSAY W/OPTIC: CPT

## 2022-05-17 PROCEDURE — 82803 BLOOD GASES ANY COMBINATION: CPT

## 2022-05-17 PROCEDURE — 2580000003 HC RX 258: Performed by: EMERGENCY MEDICINE

## 2022-05-17 PROCEDURE — 93005 ELECTROCARDIOGRAM TRACING: CPT | Performed by: EMERGENCY MEDICINE

## 2022-05-17 PROCEDURE — 82570 ASSAY OF URINE CREATININE: CPT

## 2022-05-17 PROCEDURE — 36415 COLL VENOUS BLD VENIPUNCTURE: CPT

## 2022-05-17 PROCEDURE — 99285 EMERGENCY DEPT VISIT HI MDM: CPT

## 2022-05-17 PROCEDURE — 6370000000 HC RX 637 (ALT 250 FOR IP): Performed by: EMERGENCY MEDICINE

## 2022-05-17 PROCEDURE — 1200000000 HC SEMI PRIVATE

## 2022-05-17 PROCEDURE — 81001 URINALYSIS AUTO W/SCOPE: CPT

## 2022-05-17 PROCEDURE — 84484 ASSAY OF TROPONIN QUANT: CPT

## 2022-05-17 PROCEDURE — 84156 ASSAY OF PROTEIN URINE: CPT

## 2022-05-17 PROCEDURE — 85025 COMPLETE CBC W/AUTO DIFF WBC: CPT

## 2022-05-17 PROCEDURE — 82010 KETONE BODYS QUAN: CPT

## 2022-05-17 PROCEDURE — 71046 X-RAY EXAM CHEST 2 VIEWS: CPT

## 2022-05-17 PROCEDURE — 87635 SARS-COV-2 COVID-19 AMP PRB: CPT

## 2022-05-17 RX ORDER — 0.9 % SODIUM CHLORIDE 0.9 %
1000 INTRAVENOUS SOLUTION INTRAVENOUS ONCE
Status: COMPLETED | OUTPATIENT
Start: 2022-05-17 | End: 2022-05-17

## 2022-05-17 RX ORDER — NIFEDIPINE 60 MG/1
60 TABLET, EXTENDED RELEASE ORAL DAILY PRN
COMMUNITY
End: 2022-09-14 | Stop reason: ALTCHOICE

## 2022-05-17 RX ORDER — ASPIRIN 81 MG/1
324 TABLET, CHEWABLE ORAL ONCE
Status: COMPLETED | OUTPATIENT
Start: 2022-05-17 | End: 2022-05-17

## 2022-05-17 RX ORDER — ACETAMINOPHEN 500 MG
1000 TABLET ORAL ONCE
Status: COMPLETED | OUTPATIENT
Start: 2022-05-17 | End: 2022-05-17

## 2022-05-17 RX ORDER — ASPIRIN 81 MG/1
81 TABLET, CHEWABLE ORAL DAILY
Status: CANCELLED | OUTPATIENT
Start: 2022-05-18

## 2022-05-17 RX ORDER — ROSUVASTATIN CALCIUM 20 MG/1
20 TABLET, COATED ORAL DAILY
Status: ON HOLD | COMMUNITY
End: 2022-05-19 | Stop reason: HOSPADM

## 2022-05-17 RX ORDER — IBUPROFEN 400 MG/1
400 TABLET ORAL ONCE
Status: COMPLETED | OUTPATIENT
Start: 2022-05-17 | End: 2022-05-17

## 2022-05-17 RX ADMIN — IBUPROFEN 400 MG: 400 TABLET, FILM COATED ORAL at 17:48

## 2022-05-17 RX ADMIN — SODIUM CHLORIDE 1000 ML: 9 INJECTION, SOLUTION INTRAVENOUS at 17:50

## 2022-05-17 RX ADMIN — ACETAMINOPHEN 1000 MG: 500 TABLET ORAL at 17:48

## 2022-05-17 RX ADMIN — ASPIRIN 81 MG 324 MG: 81 TABLET ORAL at 20:31

## 2022-05-17 RX ADMIN — SODIUM CHLORIDE 1000 ML: 9 INJECTION, SOLUTION INTRAVENOUS at 18:53

## 2022-05-17 ASSESSMENT — PAIN - FUNCTIONAL ASSESSMENT: PAIN_FUNCTIONAL_ASSESSMENT: NONE - DENIES PAIN

## 2022-05-17 ASSESSMENT — PAIN DESCRIPTION - LOCATION
LOCATION: SHOULDER
LOCATION: SHOULDER

## 2022-05-17 ASSESSMENT — PAIN SCALES - GENERAL
PAINLEVEL_OUTOF10: 7
PAINLEVEL_OUTOF10: 7

## 2022-05-17 ASSESSMENT — PAIN DESCRIPTION - ORIENTATION
ORIENTATION: LEFT
ORIENTATION: LEFT

## 2022-05-17 NOTE — ED NOTES
Pt BP dropped to 84/56, repositioned and switched arms and went back up to 114/78. Dr Cheatham advised.       Skye Steele, RN  05/17/22 8072

## 2022-05-17 NOTE — ED TRIAGE NOTES
Pt arrives to ED with c/o High BS. States he had blood sugar checked today and was over 600 at [address] . was given 40 units of humalog there and sent here for eval. Pt states he has not been able to take his meds correctly for over a year caro to loosing his insurance.  BS checked in

## 2022-05-17 NOTE — ED PROVIDER NOTES
[address] [facility]      Pt Name: Aiyana Alanis  MRN: [id number]  [place] 1985  Date of evaluation: 5/17/2022  Provider: Wali Solis, [address] [place] [address]       Chief Complaint   Patient presents with    Hyperglycemia     had blood sugar checked today and was over 600 at [facility]. was given 40 units of humalog         HISTORY OF PRESENT ILLNESS   (Location/Symptom, Timing/Onset, Context/Setting, Quality, Duration, Modifying Factors, Severity)  Note limiting factors. Aiyana Alanis is a 29 y.o. male who presents to the emergency department with a complaint of elevated blood sugar. The patient states that he went to [address] [place] [address] today for an appointment to discuss obtaining his medications free of charge. He states that he has not been able to afford his medications and therefore has not been taking his insulin and medicines for a few weeks. While there, his blood sugar was checked and found to read \"high\". He was given a total of Humalog 40 units subcutaneously and his blood sugar still read high. He was advised to come to the emergency department for further treatment and evaluation. He states that he had a little bit of shortness of breath over the last few days. He reported some slight left upper chest discomfort. No current chest pain at the time of exam.  He said a slight cough but no productive sputum. He denies any current chest pain heaviness pressure or tightness. He denies any diaphoresis. He denies any nausea vomiting or diarrhea. He denies any abdominal pain back pain or flank pain. He does report some polyuria and polydipsia. He denies any exposure to illness. He denies any documented fever or chills. He denies any headache earache sore throat or sinus drainage. Nursing Notes were reviewed.     HPI        REVIEW OF SYSTEMS    (2-9 systems for level 4, 10 or more for level 5) Constitutional: Negative for fever or chills. HENT: Negative for rhinorrhea and sore throat. Eyes: Negative for redness or drainage. Respiratory: Negative for shortness of breath or dyspnea on exertion. Cardiovascular: Negative for chest pain. Gastrointestinal: Negative for abdominal pain. Negative for vomiting or diarrhea. Genitourinary: Negative for flank pain. Negative for dysuria. Negative for hematuria. Neurological: Negative for headache. Musculoskeletal:  Negative edema. Hematological: Negative for adenopathy. All systems are reviewed and are negative except for those listed above in the history of present illness and ROS. PAST MEDICAL HISTORY     Past Medical History:   Diagnosis Date    COVID-19     Diabetes mellitus, type II ([facility] [address]     Hypertension     Protein in urine     [facility]          SURGICAL HISTORY     History reviewed. No pertinent surgical history. CURRENT MEDICATIONS       Previous Medications    ACETAMINOPHEN (AMINOFEN) 325 MG TABLET    Take 2 tablets by mouth every 6 hours as needed for Pain    ASPIRIN 81 MG CHEWABLE TABLET        CARVEDILOL (COREG) 25 MG TABLET        CHLORTHALIDONE (HYGROTON) 25 MG TABLET    Take 25 mg by mouth daily    DICYCLOMINE (BENTYL) 10 MG CAPSULE        DULOXETINE (CYMBALTA) 30 MG EXTENDED RELEASE CAPSULE    Take 30 mg by mouth daily    GABAPENTIN (NEURONTIN) 300 MG CAPSULE    Take 300 mg by mouth 3 times daily. INSULIN DEGLUDEC (TRESIBA FLEXTOUCH) 200 UNIT/ML SOPN    Inject 55 Units into the skin daily    INSULIN LISPRO, 1 UNIT DIAL, (HUMALOG KWIKPEN) 100 UNIT/ML SOPN        KETOCONAZOLE (NIZORAL) 2 % CREAM    Apply topically daily. KETOCONAZOLE, TOPICAL, 1 % SHAM    Apply quarter size amount and lather in affected area. Leave on for 5 minutes. Then shower off.     LISINOPRIL (PRINIVIL;ZESTRIL) 10 MG TABLET    Take 10 mg by mouth daily     METFORMIN (GLUCOPHAGE) 500 MG TABLET           ALLERGIES Patient has no known allergies. FAMILY HISTORY     History reviewed. No pertinent family history. SOCIAL HISTORY       Social History     Socioeconomic History    Marital status: Single     Spouse name: None    Number of children: None    Years of education: None    Highest education level: None   Occupational History    None   Tobacco Use    Smoking status: Never Smoker    Smokeless tobacco: Never Used   Substance and Sexual Activity    Alcohol use: Yes     Comment: rare    Drug use: Not Currently    Sexual activity: Yes     Partners: Female   Other Topics Concern    None   Social History Narrative    None     Social Determinants of Health     Financial Resource Strain:     Difficulty of Paying Living Expenses: Not on file   Food Insecurity:     Worried About Running Out of Food in the Last Year: Not on file    Justin of Food in the Last Year: Not on file   Transportation Needs:     Lack of Transportation (Medical): Not on file    Lack of Transportation (Non-Medical):  Not on file   Physical Activity:     Days of Exercise per Week: Not on file    Minutes of Exercise per Session: Not on file   Stress:     Feeling of Stress : Not on file   Social Connections:     Frequency of Communication with Friends and Family: Not on file    Frequency of Social Gatherings with Friends and Family: Not on file    Attends Jewish Services: Not on file    Active Member of [address] [place] [address] or Organizations: Not on file    Attends Club or Organization Meetings: Not on file    Marital Status: Not on file   Intimate Partner Violence:     Fear of Current or Ex-Partner: Not on file    Emotionally Abused: Not on file    Physically Abused: Not on file    Sexually Abused: Not on file   Housing Stability:     Unable to Pay for Housing in the Last Year: Not on file    Number of [place] in the Last Year: Not on file    Unstable Housing in the Last Year: Not on file       SCREENINGS    Jannie Coma Scale  Eye Opening: Spontaneous  Best Verbal Response: Oriented  Best Motor Response: Obeys commands  Jannie Coma Scale Score: 15        PHYSICAL EXAM    (up to 7 for level 4, 8 or more for level 5)     ED Triage Vitals [05/17/22 1620]   BP Temp Temp Source Pulse Resp SpO2 Height Weight   105/70 100 °F (37.8 °C) Oral 115 16 96 % 6' 1\" (1.854 m) 268 lb (121.6 kg)         Physical Exam   Constitutional: Awake and alert. Nontoxic. Not ill-appearing. Head: No visible evidence of trauma. Normocephalic. Eyes: Pupils equal and reactive. No photophobia. Conjunctiva normal.    HENT: Oral mucosa moist.  Airway patent. Pharynx without erythema. Nares were clear. Neck:  Soft and supple. Nontender. Heart: Sinus tachycardia. No murmur. Rate 100. Lungs:  Clear to auscultation. No wheezes, rales, or ronchi. No conversational dyspnea or accessory muscle use. No tachypnea. Chest: Chest wall non-tender. No evidence of trauma. Abdomen:  Soft, nondistended, bowel sounds present. Nontender. No guarding rigidity or rebound. No masses. Musculoskeletal: Extremities non-tender with full range of motion. Radial and dorsalis pedis pulses were intact. No calf tenderness erythema or edema. Neurological: Alert and oriented x 3. Speech clear. Cranial nerves II-XII intact. No facial droop. No acute focal motor or sensory deficits. Skin: Skin is warm and dry. No rash. Lymphatic:  No lympadenopathy. Psychiatric: Normal mood and affect. Behavior is normal.         DIAGNOSTIC RESULTS     EKG: All EKG's are interpreted by the Emergency Department Physician who either signs or Co-signs this chart in the absence of a cardiologist.    Sinus tachycardia. Rate 112. LA interval 188 ms. QRS duration 88 ms. QTc 458 ms. R axis -30 degrees. No ST elevation. EKG is unchanged from Meghan 3, 2020.     RADIOLOGY:   Non-plain film images such as CT, Ultrasound and MRI are read by the radiologist. Plain radiographic images are visualized and preliminarily interpreted by the emergency physician with the below findings:        Interpretation per the Radiologist below, if available at the time of this note:    XR CHEST (2 VW)   Final Result   No acute cardiopulmonary disease. ED BEDSIDE ULTRASOUND:   Performed by ED Physician - none    LABS:  Labs Reviewed   CBC WITH AUTO DIFFERENTIAL - Abnormal; Notable for the following components:       Result Value    RBC 5.93 (*)     MCV 78.6 (*)     MCH 25.5 (*)     All other components within normal limits   COMPREHENSIVE METABOLIC PANEL W/ REFLEX TO MG FOR LOW K - Abnormal; Notable for the following components:    Sodium 133 (*)     Chloride 97 (*)     Glucose 285 (*)     BUN 30 (*)     CREATININE 1.8 (*)     GFR Non- 43 (*)     GFR  52 (*)     All other components within normal limits   URINALYSIS WITH REFLEX TO CULTURE - Abnormal; Notable for the following components:    Glucose, Ur >=1000 (*)     Ketones, Urine TRACE (*)     All other components within normal limits   TROPONIN - Abnormal; Notable for the following components:    Troponin 0.05 (*)     All other components within normal limits   POCT GLUCOSE - Abnormal; Notable for the following components:    POC Glucose 418 (*)     All other components within normal limits   RAPID INFLUENZA A/B ANTIGENS   COVID-19, RAPID   BLOOD GAS, VENOUS   BETA-HYDROXYBUTYRATE   MICROSCOPIC URINALYSIS       All other labs were within normal range or not returned as of this dictation. EMERGENCY DEPARTMENT COURSE and DIFFERENTIAL DIAGNOSIS/MDM:   Vitals:    Vitals:    05/17/22 1842 05/17/22 1844 05/17/22 1906 05/17/22 1921   BP: (!) 84/56 114/78 115/86 109/85   Pulse: 108 107 102 98   Resp: 19 14 24 17   Temp:       TempSrc:       SpO2: 96% 97% 99% 99%   Weight:       Height:             MDM      The patient presents with hyperglycemia. Accu-Chek is 418.   He did receive 40 units of Humalog subcutaneously earlier this afternoon at [facility]. He is mildly tachycardic with a heart rate of 115 and is mildly febrile with a temp of 100.0. He was given Tylenol 1000 mg p.o. He was given normal saline 2 L bolus. Laboratory studies were obtained as well as chest x-ray. EKG reveals sinus tachycardia. REASSESSMENT          7:45 PM: White blood cell count is normal.  COVID and influenza screens are negative. Chest x-ray is negative. No radiographic evidence of pneumonia. Creatinine is elevated at 1.8. This is increased from baseline. BUN is 30. He may be somewhat volume depleted. Glucose is now 285 with a bicarb of 21 and an anion gap of 15. No evidence of diabetic ketoacidosis. Urine is positive for trace ketones. No evidence of urinary tract infection. pH is normal at 7.37. Beta hydroxybutyrate is normal at 0.19. The patient's troponin is 0.05, slightly elevated. EKG reveals no acute findings. Given the presence of some chest discomfort and elevated troponin he will be admitted for further treatment and evaluation. A call was placed to the hospitalist on-call for admission. CRITICAL CARE TIME   Total Critical Care time was 0 minutes, excluding separately reportable procedures. There was a high probability of clinically significant/life threatening deterioration in the patient's condition which required my urgent intervention. CONSULTS:  None    PROCEDURES:  Unless otherwise noted below, none     Procedures        FINAL IMPRESSION      1. Acute kidney injury superimposed on chronic kidney disease ([facility] [address]    2. Hyperglycemia    3. Chest pain, unspecified type    4. Elevated troponin          DISPOSITION/PLAN   DISPOSITION        PATIENT REFERRED TO:  No follow-up provider specified. DISCHARGE MEDICATIONS:  New Prescriptions    No medications on file     Controlled Substances Monitoring:     No flowsheet data found.     (Please note that portions of this note were completed with a voice recognition program.  Efforts were made to edit the dictations but occasionally words are mis-transcribed. )    1859 Abhinav Cartagena DO (electronically signed)  Attending Emergency Physician          Gilma Nevarez DO  05/17/22 1956

## 2022-05-18 PROBLEM — N18.9 CKD (CHRONIC KIDNEY DISEASE): Status: ACTIVE | Noted: 2022-05-18

## 2022-05-18 PROBLEM — R06.02 SOB (SHORTNESS OF BREATH): Status: ACTIVE | Noted: 2022-05-18

## 2022-05-18 LAB
ANION GAP SERPL CALCULATED.3IONS-SCNC: 13 MMOL/L (ref 3–16)
BASOPHILS ABSOLUTE: 0 K/UL (ref 0–0.2)
BASOPHILS RELATIVE PERCENT: 0.7 %
BUN BLDV-MCNC: 22 MG/DL (ref 7–20)
CALCIUM SERPL-MCNC: 8.5 MG/DL (ref 8.3–10.6)
CHLORIDE BLD-SCNC: 101 MMOL/L (ref 99–110)
CO2: 19 MMOL/L (ref 21–32)
CREAT SERPL-MCNC: 1.1 MG/DL (ref 0.9–1.3)
CREATININE URINE: 155.8 MG/DL (ref 39–259)
EKG ATRIAL RATE: 110 BPM
EKG ATRIAL RATE: 112 BPM
EKG DIAGNOSIS: NORMAL
EKG DIAGNOSIS: NORMAL
EKG P AXIS: 52 DEGREES
EKG P AXIS: 57 DEGREES
EKG P-R INTERVAL: 188 MS
EKG P-R INTERVAL: 192 MS
EKG Q-T INTERVAL: 336 MS
EKG Q-T INTERVAL: 350 MS
EKG QRS DURATION: 88 MS
EKG QRS DURATION: 88 MS
EKG QTC CALCULATION (BAZETT): 458 MS
EKG QTC CALCULATION (BAZETT): 473 MS
EKG R AXIS: -38 DEGREES
EKG R AXIS: -43 DEGREES
EKG T AXIS: 24 DEGREES
EKG T AXIS: 55 DEGREES
EKG VENTRICULAR RATE: 110 BPM
EKG VENTRICULAR RATE: 112 BPM
EOSINOPHILS ABSOLUTE: 0.2 K/UL (ref 0–0.6)
EOSINOPHILS RELATIVE PERCENT: 2.9 %
GFR AFRICAN AMERICAN: >60
GFR NON-AFRICAN AMERICAN: >60
GLUCOSE BLD-MCNC: 126 MG/DL (ref 70–99)
GLUCOSE BLD-MCNC: 284 MG/DL (ref 70–99)
GLUCOSE BLD-MCNC: 291 MG/DL (ref 70–99)
GLUCOSE BLD-MCNC: 295 MG/DL (ref 70–99)
GLUCOSE BLD-MCNC: 296 MG/DL (ref 70–99)
GLUCOSE BLD-MCNC: 325 MG/DL (ref 70–99)
GLUCOSE BLD-MCNC: 398 MG/DL (ref 70–99)
HCT VFR BLD CALC: 41 % (ref 40.5–52.5)
HEMOGLOBIN: 13.4 G/DL (ref 13.5–17.5)
LV EF: 43 %
LVEF MODALITY: NORMAL
LYMPHOCYTES ABSOLUTE: 2.1 K/UL (ref 1–5.1)
LYMPHOCYTES RELATIVE PERCENT: 39.1 %
MAGNESIUM: 2.2 MG/DL (ref 1.8–2.4)
MCH RBC QN AUTO: 25.6 PG (ref 26–34)
MCHC RBC AUTO-ENTMCNC: 32.8 G/DL (ref 31–36)
MCV RBC AUTO: 78.3 FL (ref 80–100)
MONOCYTES ABSOLUTE: 0.4 K/UL (ref 0–1.3)
MONOCYTES RELATIVE PERCENT: 8.4 %
NEUTROPHILS ABSOLUTE: 2.6 K/UL (ref 1.7–7.7)
NEUTROPHILS RELATIVE PERCENT: 48.9 %
PDW BLD-RTO: 12.4 % (ref 12.4–15.4)
PERFORMED ON: ABNORMAL
PLATELET # BLD: 249 K/UL (ref 135–450)
PMV BLD AUTO: 8.7 FL (ref 5–10.5)
POTASSIUM SERPL-SCNC: 3.7 MMOL/L (ref 3.5–5.1)
PROTEIN PROTEIN: 189 MG/DL
PROTEIN/CREAT RATIO: 1.2 MG/DL
RBC # BLD: 5.23 M/UL (ref 4.2–5.9)
SODIUM BLD-SCNC: 133 MMOL/L (ref 136–145)
TROPONIN: 0.04 NG/ML
WBC # BLD: 5.3 K/UL (ref 4–11)

## 2022-05-18 PROCEDURE — 83735 ASSAY OF MAGNESIUM: CPT

## 2022-05-18 PROCEDURE — 84484 ASSAY OF TROPONIN QUANT: CPT

## 2022-05-18 PROCEDURE — 2580000003 HC RX 258: Performed by: STUDENT IN AN ORGANIZED HEALTH CARE EDUCATION/TRAINING PROGRAM

## 2022-05-18 PROCEDURE — 6370000000 HC RX 637 (ALT 250 FOR IP): Performed by: STUDENT IN AN ORGANIZED HEALTH CARE EDUCATION/TRAINING PROGRAM

## 2022-05-18 PROCEDURE — 93005 ELECTROCARDIOGRAM TRACING: CPT | Performed by: STUDENT IN AN ORGANIZED HEALTH CARE EDUCATION/TRAINING PROGRAM

## 2022-05-18 PROCEDURE — 80048 BASIC METABOLIC PNL TOTAL CA: CPT

## 2022-05-18 PROCEDURE — 1200000000 HC SEMI PRIVATE

## 2022-05-18 PROCEDURE — 36415 COLL VENOUS BLD VENIPUNCTURE: CPT

## 2022-05-18 PROCEDURE — 83036 HEMOGLOBIN GLYCOSYLATED A1C: CPT

## 2022-05-18 PROCEDURE — 6360000002 HC RX W HCPCS: Performed by: STUDENT IN AN ORGANIZED HEALTH CARE EDUCATION/TRAINING PROGRAM

## 2022-05-18 PROCEDURE — 93306 TTE W/DOPPLER COMPLETE: CPT

## 2022-05-18 PROCEDURE — 93010 ELECTROCARDIOGRAM REPORT: CPT | Performed by: INTERNAL MEDICINE

## 2022-05-18 PROCEDURE — 85025 COMPLETE CBC W/AUTO DIFF WBC: CPT

## 2022-05-18 RX ORDER — DEXTROSE MONOHYDRATE 50 MG/ML
100 INJECTION, SOLUTION INTRAVENOUS PRN
Status: DISCONTINUED | OUTPATIENT
Start: 2022-05-18 | End: 2022-05-19 | Stop reason: HOSPADM

## 2022-05-18 RX ORDER — SODIUM CHLORIDE 0.9 % (FLUSH) 0.9 %
5-40 SYRINGE (ML) INJECTION EVERY 12 HOURS SCHEDULED
Status: DISCONTINUED | OUTPATIENT
Start: 2022-05-18 | End: 2022-05-19 | Stop reason: HOSPADM

## 2022-05-18 RX ORDER — INSULIN LISPRO 100 [IU]/ML
4 INJECTION, SOLUTION INTRAVENOUS; SUBCUTANEOUS ONCE
Status: COMPLETED | OUTPATIENT
Start: 2022-05-18 | End: 2022-05-18

## 2022-05-18 RX ORDER — INSULIN LISPRO 100 [IU]/ML
0-12 INJECTION, SOLUTION INTRAVENOUS; SUBCUTANEOUS
Status: DISCONTINUED | OUTPATIENT
Start: 2022-05-18 | End: 2022-05-19 | Stop reason: HOSPADM

## 2022-05-18 RX ORDER — ACETAMINOPHEN 650 MG/1
650 SUPPOSITORY RECTAL EVERY 6 HOURS PRN
Status: DISCONTINUED | OUTPATIENT
Start: 2022-05-18 | End: 2022-05-19 | Stop reason: HOSPADM

## 2022-05-18 RX ORDER — ACETAMINOPHEN 325 MG/1
650 TABLET ORAL EVERY 6 HOURS PRN
Status: DISCONTINUED | OUTPATIENT
Start: 2022-05-18 | End: 2022-05-19 | Stop reason: HOSPADM

## 2022-05-18 RX ORDER — ENOXAPARIN SODIUM 100 MG/ML
40 INJECTION SUBCUTANEOUS DAILY
Status: DISCONTINUED | OUTPATIENT
Start: 2022-05-18 | End: 2022-05-18 | Stop reason: DRUGHIGH

## 2022-05-18 RX ORDER — ENOXAPARIN SODIUM 100 MG/ML
30 INJECTION SUBCUTANEOUS 2 TIMES DAILY
Status: DISCONTINUED | OUTPATIENT
Start: 2022-05-18 | End: 2022-05-19 | Stop reason: HOSPADM

## 2022-05-18 RX ORDER — INSULIN LISPRO 100 [IU]/ML
0-3 INJECTION, SOLUTION INTRAVENOUS; SUBCUTANEOUS NIGHTLY
Status: DISCONTINUED | OUTPATIENT
Start: 2022-05-18 | End: 2022-05-18

## 2022-05-18 RX ORDER — INSULIN LISPRO 100 [IU]/ML
0-6 INJECTION, SOLUTION INTRAVENOUS; SUBCUTANEOUS NIGHTLY
Status: DISCONTINUED | OUTPATIENT
Start: 2022-05-18 | End: 2022-05-19 | Stop reason: HOSPADM

## 2022-05-18 RX ORDER — ROSUVASTATIN CALCIUM 40 MG/1
40 TABLET, COATED ORAL NIGHTLY
Status: DISCONTINUED | OUTPATIENT
Start: 2022-05-18 | End: 2022-05-19 | Stop reason: HOSPADM

## 2022-05-18 RX ORDER — ONDANSETRON 2 MG/ML
4 INJECTION INTRAMUSCULAR; INTRAVENOUS EVERY 6 HOURS PRN
Status: DISCONTINUED | OUTPATIENT
Start: 2022-05-18 | End: 2022-05-19 | Stop reason: HOSPADM

## 2022-05-18 RX ORDER — SODIUM CHLORIDE 9 MG/ML
INJECTION, SOLUTION INTRAVENOUS PRN
Status: DISCONTINUED | OUTPATIENT
Start: 2022-05-18 | End: 2022-05-19 | Stop reason: HOSPADM

## 2022-05-18 RX ORDER — SODIUM CHLORIDE 0.9 % (FLUSH) 0.9 %
5-40 SYRINGE (ML) INJECTION PRN
Status: DISCONTINUED | OUTPATIENT
Start: 2022-05-18 | End: 2022-05-19 | Stop reason: HOSPADM

## 2022-05-18 RX ORDER — LISINOPRIL 20 MG/1
20 TABLET ORAL DAILY
Status: DISCONTINUED | OUTPATIENT
Start: 2022-05-18 | End: 2022-05-19 | Stop reason: HOSPADM

## 2022-05-18 RX ORDER — NIFEDIPINE 60 MG/1
60 TABLET, EXTENDED RELEASE ORAL DAILY
Status: DISCONTINUED | OUTPATIENT
Start: 2022-05-18 | End: 2022-05-19 | Stop reason: HOSPADM

## 2022-05-18 RX ORDER — INSULIN LISPRO 100 [IU]/ML
0-6 INJECTION, SOLUTION INTRAVENOUS; SUBCUTANEOUS
Status: DISCONTINUED | OUTPATIENT
Start: 2022-05-18 | End: 2022-05-18

## 2022-05-18 RX ORDER — DULOXETIN HYDROCHLORIDE 30 MG/1
30 CAPSULE, DELAYED RELEASE ORAL DAILY
Status: DISCONTINUED | OUTPATIENT
Start: 2022-05-18 | End: 2022-05-19 | Stop reason: HOSPADM

## 2022-05-18 RX ORDER — ASPIRIN 81 MG/1
81 TABLET, CHEWABLE ORAL DAILY
Status: DISCONTINUED | OUTPATIENT
Start: 2022-05-18 | End: 2022-05-19 | Stop reason: HOSPADM

## 2022-05-18 RX ORDER — INSULIN GLARGINE 100 [IU]/ML
15 INJECTION, SOLUTION SUBCUTANEOUS DAILY
Status: DISCONTINUED | OUTPATIENT
Start: 2022-05-18 | End: 2022-05-19 | Stop reason: HOSPADM

## 2022-05-18 RX ORDER — GABAPENTIN 300 MG/1
300 CAPSULE ORAL 3 TIMES DAILY
Status: DISCONTINUED | OUTPATIENT
Start: 2022-05-18 | End: 2022-05-19 | Stop reason: HOSPADM

## 2022-05-18 RX ADMIN — LISINOPRIL 20 MG: 20 TABLET ORAL at 09:58

## 2022-05-18 RX ADMIN — INSULIN LISPRO 4 UNITS: 100 INJECTION, SOLUTION INTRAVENOUS; SUBCUTANEOUS at 02:55

## 2022-05-18 RX ADMIN — DULOXETINE HYDROCHLORIDE 30 MG: 30 CAPSULE, DELAYED RELEASE ORAL at 09:58

## 2022-05-18 RX ADMIN — ROSUVASTATIN CALCIUM 40 MG: 40 TABLET, FILM COATED ORAL at 01:17

## 2022-05-18 RX ADMIN — ROSUVASTATIN CALCIUM 40 MG: 40 TABLET, FILM COATED ORAL at 21:39

## 2022-05-18 RX ADMIN — NIFEDIPINE 60 MG: 60 TABLET, EXTENDED RELEASE ORAL at 09:58

## 2022-05-18 RX ADMIN — INSULIN LISPRO 10 UNITS: 100 INJECTION, SOLUTION INTRAVENOUS; SUBCUTANEOUS at 10:00

## 2022-05-18 RX ADMIN — INSULIN LISPRO 6 UNITS: 100 INJECTION, SOLUTION INTRAVENOUS; SUBCUTANEOUS at 12:30

## 2022-05-18 RX ADMIN — ASPIRIN 81 MG 81 MG: 81 TABLET ORAL at 09:58

## 2022-05-18 RX ADMIN — GABAPENTIN 300 MG: 300 CAPSULE ORAL at 09:58

## 2022-05-18 RX ADMIN — SODIUM CHLORIDE, PRESERVATIVE FREE 10 ML: 5 INJECTION INTRAVENOUS at 21:39

## 2022-05-18 RX ADMIN — GABAPENTIN 300 MG: 300 CAPSULE ORAL at 16:51

## 2022-05-18 RX ADMIN — GABAPENTIN 300 MG: 300 CAPSULE ORAL at 21:39

## 2022-05-18 RX ADMIN — INSULIN LISPRO 2 UNITS: 100 INJECTION, SOLUTION INTRAVENOUS; SUBCUTANEOUS at 01:18

## 2022-05-18 RX ADMIN — INSULIN GLARGINE 15 UNITS: 100 INJECTION, SOLUTION SUBCUTANEOUS at 10:00

## 2022-05-18 RX ADMIN — INSULIN LISPRO 4 UNITS: 100 INJECTION, SOLUTION INTRAVENOUS; SUBCUTANEOUS at 21:39

## 2022-05-18 RX ADMIN — ENOXAPARIN SODIUM 30 MG: 100 INJECTION SUBCUTANEOUS at 01:17

## 2022-05-18 RX ADMIN — SODIUM CHLORIDE, PRESERVATIVE FREE 10 ML: 5 INJECTION INTRAVENOUS at 09:58

## 2022-05-18 ASSESSMENT — PAIN SCALES - GENERAL
PAINLEVEL_OUTOF10: 0
PAINLEVEL_OUTOF10: 0

## 2022-05-18 ASSESSMENT — LIFESTYLE VARIABLES: HOW OFTEN DO YOU HAVE A DRINK CONTAINING ALCOHOL: NEVER

## 2022-05-18 NOTE — CONSULTS
[facility]  Phone: [phone]  Fax: [phone] [address] [facility]         Reason for Consult:  CKD   Requesting Physician:  Dr. Michaela Antunez    History Obtained From:  patient, electronic medical record    History of Present Ilness: 30 y/o AAM with h/o CKD stage 3 , proteinuria suspected to have D Nephropathy follows with Dr Sabas Browne but last office visit was in 6/21  His Cr has been ~ 1.7 mg   He had proteinuria and underwent renal BX at age 26  Admitted with SOB, weakness  and having run out of his meds  He apparently lost his medical insurance and has been stretching his meds including insulin He has been unable to see Dr Sabas Browne since 6/21  Noted to have BS of 285 on admission  Notes SOB has been going on for 2 years since he had Covid pneumonia but lately has been getting worse Denies cough, fever chills, CP or palpitations  Denies dysuria hematuria but has foaming of urine  Denies swelling LE's        Past Medical History:        Diagnosis Date    COVID-19     Diabetes mellitus, type II ([facility] [address] [facility]     History of blood transfusion     Hypertension     Protein in urine     Seizure [facility])        Past Surgical History:    History reviewed. No pertinent surgical history. Home Medications:    No current facility-administered medications on file prior to encounter. Current Outpatient Medications on File Prior to Encounter   Medication Sig Dispense Refill    empagliflozin (JARDIANCE) 10 MG tablet Take 10 mg by mouth daily      NIFEdipine (PROCARDIA XL) 60 MG extended release tablet Take 60 mg by mouth daily as needed      rosuvastatin (CRESTOR) 20 MG tablet Take 20 mg by mouth daily (Patient not taking: Reported on 5/17/2022)      gabapentin (NEURONTIN) 300 MG capsule Take 300 mg by mouth 3 times daily.  2-3 QD      insulin lispro, 1 Unit Dial, (HUMALOG KWIKPEN) 100 UNIT/ML SOPN Inject 20-30 Units into the skin 3 times daily (before meals)       metFORMIN (GLUCOPHAGE) 500 MG tablet Take 1,000 mg by mouth       DULoxetine (CYMBALTA) 30 MG extended release capsule Take 30 mg by mouth daily      dicyclomine (BENTYL) 10 MG capsule Take 10 mg by mouth 4 times daily (before meals and nightly)       carvedilol (COREG) 25 MG tablet Take 25 mg by mouth 2 times daily       chlorthalidone (HYGROTON) 25 MG tablet Take 25 mg by mouth daily      aspirin 81 MG chewable tablet Take 81 mg by mouth daily       Insulin Degludec (TRESIBA FLEXTOUCH) 200 UNIT/ML SOPN Inject 55 Units into the skin daily 1 pen 2    ketoconazole (NIZORAL) 2 % cream Apply topically daily. 30 g 1    KETOCONAZOLE, TOPICAL, 1 % SHAM Apply quarter size amount and lather in affected area. Leave on for 5 minutes. Then shower off. 200 mL 0    lisinopril (PRINIVIL;ZESTRIL) 10 MG tablet Take 20 mg by mouth daily          Allergies:  Patient has no known allergies. Social History:    Social History     Socioeconomic History    Marital status: Single     Spouse name: Not on file    Number of children: Not on file    Years of education: Not on file    Highest education level: Not on file   Occupational History    Not on file   Tobacco Use    Smoking status: Never Smoker    Smokeless tobacco: Never Used   Substance and Sexual Activity    Alcohol use: Yes     Comment: rare    Drug use: Not Currently    Sexual activity: Yes     Partners: Female   Other Topics Concern    Not on file   Social History Narrative    Not on file     Social Determinants of Health     Financial Resource Strain:     Difficulty of Paying Living Expenses: Not on file   Food Insecurity:     Worried About Running Out of Food in the Last Year: Not on file    Justin of Food in the Last Year: Not on file   Transportation Needs:     Lack of Transportation (Medical): Not on file    Lack of Transportation (Non-Medical):  Not on file   Physical Activity:     Days of Exercise per Week: Not on file    Minutes of Exercise per Session: Not on file   Stress:     Feeling of Stress : Not on file   Social Connections:     Frequency of Communication with Friends and Family: Not on file    Frequency of Social Gatherings with Friends and Family: Not on file    Attends Nondenominational Services: Not on file    Active Member of Clubs or Organizations: Not on file    Attends Club or Organization Meetings: Not on file    Marital Status: Not on file   Intimate Partner Violence:     Fear of Current or Ex-Partner: Not on file    Emotionally Abused: Not on file    Physically Abused: Not on file    Sexually Abused: Not on file   Housing Stability:     Unable to Pay for Housing in the Last Year: Not on file    Number of [place] in the Last Year: Not on file    Unstable Housing in the Last Year: Not on file       Family History:   History reviewed. No pertinent family history. Review of Systems:   Pertinent positives stated above in HPI. All other systems were reviewed and were negative.     Physical exam:   Constitutional:  VITALS:  BP (!) 131/94   Pulse 120   Temp 98 °F (36.7 °C) (Oral)   Resp 16   Ht 6' 1\" (1.854 m)   Wt 272 lb 14.9 oz (123.8 kg)   SpO2 97%   BMI 36.01 kg/m²   Gen: alert, awake, nad  Skin: no rash, turgor wnl  Heent:  eomi, mmm  Neck: no bruits or jvd noted, thyroid normal  Cardiovascular:  S1, S2 without m/r/g  Respiratory: CTA B without w/r/r; respiratory effort normal  Abdomen:  +bs, soft, nt, nd, no hepatosplenomegaly  Ext: no lower extremity edema  Psychiatric: mood and affect appropriate; judgement and insight intact  Musculoskeletal:  Rom, muscular strength intact; digits, nails normal    Data/  CBC:   Lab Results   Component Value Date    WBC 5.3 05/18/2022    RBC 5.23 05/18/2022    HGB 13.4 05/18/2022    HCT 41.0 05/18/2022    MCV 78.3 05/18/2022    MCH 25.6 05/18/2022    MCHC 32.8 05/18/2022    RDW 12.4 05/18/2022     05/18/2022    MPV 8.7 05/18/2022     BMP:    Lab Results   Component Value Date     05/18/2022    K 3.7 05/18/2022    K 4.0 05/17/2022     05/18/2022    CO2 19 05/18/2022    BUN 22 05/18/2022    LABALBU 3.7 05/17/2022    CREATININE 1.1 05/18/2022    CALCIUM 8.5 05/18/2022    GFRAA >60 05/18/2022    GFRAA >60 01/02/2011    LABGLOM >60 05/18/2022    GLUCOSE 291 05/18/2022         Assessment/  1-CKD stage 3 baseline Cr ~ 1.7 mg as of 2021 follows with Dr Justice Bright suspected D Nephropathy Cr improved to 1.1 mg today  2-Proteinuria sub nephrotic suspected to be D Nephropathy S/P Renal BX at age 26 No records available UPC ratio 1.2 had been on Lisinopril  3-HTN initially hypotensive BP higher now  4 DM uncontrolled  5 SOB no evidence of fluid overload ECHO pending Troponin flat      Plan/  1 Resume BP meds including ACE-I Monitor renal function  2-Avoid NSAID's (received Ibuprofen)  3-OK to continue Jardiance  4 Monitor BP response on coreg Nifedipine and Lisinopril  5 DM per primary team     Thank you for the consultation. Please do not hesitate to call with questions.     Sydney Malik MD, [address]

## 2022-05-18 NOTE — DISCHARGE SUMMARY
Hospital Medicine Discharge Summary    Patient ID: Dolph Siemens      Patient's PCP: Christina Hayes MD, MD    Admit Date: 5/17/2022     Discharge Date:   05/19/22    Admitting Physician: Ian Suh DO     Discharge Physician: Tai Sosa NP     Discharge Diagnoses: Active Hospital Problems    Diagnosis     SOB (shortness of breath) [R06.02]      Priority: Medium    CKD (chronic kidney disease) [N18.9]      Priority: Medium    DM2 (diabetes mellitus, type 2) ([address] [E11.9]      Priority: Medium    Elevated troponin [R77.8]      Priority: Medium    Hypertension [I10]      Priority: Medium       The patient was seen and examined on day of discharge and this discharge summary is in conjunction with any daily progress note from day of discharge. Hospital Course: The patient is a 35 y. o. male with past [place] of type 2 diabetes, previous COVID-19 virus infection about a year ago, CKD with some component of diabetic nephropathy with significant proteinuria previously diagnosed and did see a nephrologist in the past, and hypertension who presents to [facility] with main concern was initially to come here because he was having difficulty with managing getting his insulins as outpatient and unfortunately was stretching multiple of his medications including his diabetic and hypertensive medications over the course of months since he was becoming unable to care for himself and keep one of his main jobs for income. Castillo Qureshi was seeing an outpatient medical facility to help get his medications obtained and unfortunately he was noted to have very high blood sugar at that facility and was given 40 units of subcutaneous insulin and sent to the ED here for further evaluation. Castillo Qureshi does also remark however for the last week to at least 2 weeks he has had increasing dyspnea on exertion and has had increasing fatigue from this and unfortunately his sugars have continued to run high in the 200s to 300s but sometimes even in the low 200s even when he is managing to take his insulin and he does try to keep a strict tab on his diet and blood glucose checks but he has limited resources to do so.  Apart from the shortness of breath mainly on exertion, patient denies other recent symptoms of chest pain, dizziness, syncope, dysuria, blood in urine/stool/sputum, nausea/vomiting/diarrhea/abdominal pain, headache, fever, chills. Hyperglycemia in setting of DM2. Noncompliance due to inability to obtain medications outpatient. - HgbA1c >18  - Adjusted patient's dosing with help of DM educator - will have patient begin taking 30 units of lantus nightly starting tomorrow. Also take humalog 10 units with each meal.   - Close follow up with PCP at discharge  - Carb control diet  - Diabetes educator consulted, appreciate input     Elevated troponin, normal LHC. 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 Cristian Rincon - normal coronaries  - Follow up with Dr. Cristian Rincon in 2-4 weeks     CHELE on CKD, likely prerenal, baseline Cr ~1.5. Improved  - Nephrology consulted  - Resolving  - Follow up with them     Essential HTN, well controlled. - Continue home meds     Obesity -  With Body mass index is 36.01 kg/m². Complicating assessment and treatment. Placing patient at risk for multiple co-morbidities as well as early death and contributing to the patient's presentation. Counseled on weight loss      Physical Exam Performed:     BP (!) 134/96   Pulse 130   Temp 98.1 °F (36.7 °C) (Oral)   Resp 14   Ht 6' 1\" (1.854 m)   Wt 272 lb 11.3 oz (123.7 kg)   SpO2 98%   BMI 35.98 kg/m²       General appearance:  No apparent distress, appears stated age and cooperative. HEENT:  Normal cephalic, atraumatic without obvious deformity. Pupils equal, round, and reactive to light. Extra ocular muscles intact.  Conjunctivae/corneas clear.  Neck: Supple, with full range of motion. No jugular venous distention. Trachea midline. Respiratory:  Normal respiratory effort. Clear to auscultation, bilaterally without Rales/Wheezes/Rhonchi. Cardiovascular:  Regular rate and rhythm with normal S1/S2 without murmurs, rubs or gallops. Abdomen: Soft, non-tender, non-distended with normal bowel sounds. Musculoskeletal:  No clubbing, cyanosis or edema bilaterally. Full range of motion without deformity. Skin: Skin color, texture, turgor normal.  No rashes or lesions. Neurologic:  Neurovascularly intact without any focal sensory/motor deficits. Cranial nerves: II-XII intact, grossly non-focal.  Psychiatric:  Alert and oriented, thought content appropriate, normal insight  Capillary Refill: Brisk,< 3 seconds   Peripheral Pulses: +2 palpable, equal bilaterally       Labs: For convenience and continuity at follow-up the following most recent labs are provided:      CBC:    Lab Results   Component Value Date    WBC 5.2 05/19/2022    HGB 13.6 05/19/2022    HCT 41.5 05/19/2022     05/19/2022       Renal:    Lab Results   Component Value Date     05/19/2022    K 4.1 05/19/2022    K 4.0 05/17/2022     05/19/2022    CO2 19 05/19/2022    BUN 16 05/19/2022    CREATININE 1.2 05/19/2022    CALCIUM 8.9 05/19/2022    PHOS 3.8 06/24/2021         Significant Diagnostic Studies    Radiology:   XR CHEST (2 VW)   Final Result   No acute cardiopulmonary disease. Consults:     IP CONSULT TO SOCIAL WORK  IP CONSULT TO CARDIOLOGY  IP CONSULT TO NEPHROLOGY  IP CONSULT TO SOCIAL WORK  IP CONSULT TO DIABETES EDUCATOR    Disposition:  Home     Condition at Discharge: Stable    Discharge Instructions/Follow-up:      Follow up with PCP in 1 week for hospital follow up    Follow up with Dr. Nola Russ in 2-4 weeks    Follow up with Dr. Abena Diaz     Take medications as prescribed until cardiology appointment. Weigh yourself daily.     Lantus 30 units nightly, Humalog 10 units with every meal - follow up closely with PCP as A1C was >18%    Code Status:  Full Code     Activity: activity as tolerated    Diet: diabetic diet      Discharge Medications:     Current Discharge Medication List           Details   spironolactone (ALDACTONE) 25 MG tablet Take 1 tablet by mouth daily  Qty: 30 tablet, Refills: 3              Details   insulin lispro, 1 Unit Dial, (HUMALOG KWIKPEN) 100 UNIT/ML SOPN Inject 10 Units into the skin 3 times daily (before meals)  Qty: 1 pen, Refills: 1      rosuvastatin (CRESTOR) 40 MG tablet Take 1 tablet by mouth nightly  Qty: 30 tablet, Refills: 3      insulin glargine (LANTUS SOLOSTAR) 100 UNIT/ML injection pen Inject 30 Units into the skin at bedtime  Qty: 5 pen, Refills: 3              Details   empagliflozin (JARDIANCE) 10 MG tablet Take 10 mg by mouth daily      NIFEdipine (PROCARDIA XL) 60 MG extended release tablet Take 60 mg by mouth daily as needed      gabapentin (NEURONTIN) 300 MG capsule Take 300 mg by mouth 3 times daily. 2-3 QD      metFORMIN (GLUCOPHAGE) 500 MG tablet Take 1,000 mg by mouth       DULoxetine (CYMBALTA) 30 MG extended release capsule Take 30 mg by mouth daily      dicyclomine (BENTYL) 10 MG capsule Take 10 mg by mouth 4 times daily (before meals and nightly)       carvedilol (COREG) 25 MG tablet Take 25 mg by mouth 2 times daily       chlorthalidone (HYGROTON) 25 MG tablet Take 25 mg by mouth daily      aspirin 81 MG chewable tablet Take 81 mg by mouth daily       ketoconazole (NIZORAL) 2 % cream Apply topically daily. Qty: 30 g, Refills: 1      KETOCONAZOLE, TOPICAL, 1 % SHAM Apply quarter size amount and lather in affected area. Leave on for 5 minutes. Then shower off.   Qty: 200 mL, Refills: 0      lisinopril (PRINIVIL;ZESTRIL) 10 MG tablet Take 20 mg by mouth daily              Time Spent on discharge is more than 30 minutes in the examination, evaluation, counseling and review of medications and discharge plan.      Signed:    Agustin Spence. Tim Roque - JOY   5/19/2022      Thank you Choco Ruiz MD, MD for the opportunity to be involved in this patient's care. If you have any questions or concerns please feel free to contact me at [phone].

## 2022-05-18 NOTE — ED NOTES
Keiko LEE unavailable for report, states to call back in 15 minutes     Magdiel Jacques RN  05/17/22 7209

## 2022-05-18 NOTE — PLAN OF CARE
Problem: Discharge Planning  Goal: Discharge to home or other facility with appropriate resources  Outcome: Progressing  Flowsheets  Taken 5/18/2022 0301  Discharge to home or other facility with appropriate resources:   Identify barriers to discharge with patient and caregiver   Arrange for needed discharge resources and transportation as appropriate   Identify discharge learning needs (meds, wound care, etc)   Refer to discharge planning if patient needs post-hospital services based on physician order or complex needs related to functional status, cognitive ability or social support system  Taken 5/18/2022 0014  Discharge to home or other facility with appropriate resources:   Identify barriers to discharge with patient and caregiver   Identify discharge learning needs (meds, wound care, etc)   Arrange for needed discharge resources and transportation as appropriate   Refer to discharge planning if patient needs post-hospital services based on physician order or complex needs related to functional status, cognitive ability or social support system     Problem: Pain  Goal: Verbalizes/displays adequate comfort level or baseline comfort level  Outcome: Progressing  Flowsheets  Taken 5/18/2022 0301  Verbalizes/displays adequate comfort level or baseline comfort level:   Encourage patient to monitor pain and request assistance   Assess pain using appropriate pain scale   Notify Licensed Independent Practitioner if interventions unsuccessful or patient reports new pain   Implement non-pharmacological measures as appropriate and evaluate response   Administer analgesics based on type and severity of pain and evaluate response  Taken 5/17/2022 2339  Verbalizes/displays adequate comfort level or baseline comfort level:   Encourage patient to monitor pain and request assistance   Assess pain using appropriate pain scale  Taken 5/17/2022 2334  Verbalizes/displays adequate comfort level or baseline comfort level: Encourage patient to monitor pain and request assistance   Assess pain using appropriate pain scale     Problem: Safety - Adult  Goal: Free from fall injury  Outcome: Progressing  Flowsheets (Taken 5/18/2022 0301)  Free From Fall Injury: Instruct family/caregiver on patient safety     Problem: ABCDS Injury Assessment  Goal: Absence of physical injury  Outcome: Progressing  Flowsheets (Taken 5/18/2022 0301)  Absence of Physical Injury: Implement safety measures based on patient assessment

## 2022-05-18 NOTE — CONSULTS
Cardiology Consultation     Samantha Sanchez  1985    PCP: Marcial Salgado MD, MD  Referring Physician: ***  Reason for Referral: ***  Chief Complaint:   Chief Complaint   Patient presents with    Hyperglycemia     had blood sugar checked today and was over 600 at [facility]. was given 40 units of humalog       Subjective:   {Location/Quality/Severity/Duration/Timing/Modifying factors/Associated signs and symptoms:[id number]}  History of Present Illness: The patient is 29 y.o. male with a past medical history significant for *** who presents with ***          Past Medical History:   Diagnosis Date    COVID-19     Diabetes mellitus, type II ([facility] [address]     History of blood transfusion     Hypertension     Protein in urine     [facility]      History reviewed. No pertinent surgical history. History reviewed. No pertinent family history.   Social History     Tobacco Use    Smoking status: Never Smoker    Smokeless tobacco: Never Used   Substance Use Topics    Alcohol use: Yes     Comment: rare    Drug use: Not Currently       No Known Allergies  Current Facility-Administered Medications   Medication Dose Route Frequency Provider Last Rate Last Admin    aspirin chewable tablet 81 mg  81 mg Oral Daily [facility]   81 mg at 05/18/22 0958    DULoxetine (CYMBALTA) extended release capsule 30 mg  30 mg Oral Daily [facility]   30 mg at 05/18/22 0958    empagliflozin (JARDIANCE) tablet 10 mg  10 mg Oral Daily [facility]   10 mg at 05/18/22 0959    gabapentin (NEURONTIN) capsule 300 mg  300 mg Oral TID [facility]   300 mg at 05/18/22 1651    lisinopril (PRINIVIL;ZESTRIL) tablet 20 mg  20 mg Oral Daily [facility]   20 mg at 05/18/22 0958    NIFEdipine (PROCARDIA XL) extended release tablet 60 mg  60 mg Oral Daily [facility], DO   60 mg at 05/18/22 0958    sodium chloride flush 0.9 % injection 5-40 mL  5-40 mL IntraVENous 2 times per day [facility]   10 mL at 05/18/22 0958    sodium chloride flush 0.9 % injection 5-40 mL  5-40 mL IntraVENous PRN [facility], DO        0.9 % sodium chloride infusion   IntraVENous PRN [facility], [facility]        acetaminophen (TYLENOL) tablet 650 mg  650 mg Oral Q6H [facility]N [facility]        Or    acetaminophen (TYLENOL) suppository 650 mg  650 mg Rectal Q6H PRN [facility], DO        perflutren lipid microspheres (DEFINITY) injection 1.65 mg  1.5 mL IntraVENous ONCE PRN [facility], DO        ondansetron (ZOFRAN) injection 4 mg  4 mg IntraVENous Q6H PRN [facility], DO        rosuvastatin (CRESTOR) tablet 40 mg  40 mg Oral Nightly [facility]   40 mg at 05/18/22 0117    glucose chewable tablet 16 g  4 tablet Oral PRN [facility], DO        dextrose bolus 10% 125 mL  125 mL IntraVENous PRN [facility], DO        Or    dextrose bolus 10% 250 mL  250 mL IntraVENous PRN [facility], DO        glucagon (rDNA) injection 1 mg  1 mg IntraMUSCular PRN [facility], [facility]        dextrose 5 % solution  100 mL/hr IntraVENous PRN [facility], DO        enoxaparin Sodium (LOVENOX) injection 30 mg  30 mg SubCUTAneous BID [facility], DO   30 mg at 05/18/22 0117    insulin lispro (HUMALOG) injection vial 0-12 Units  0-12 Units SubCUTAneous TID WC [facility], DO   6 Units at 05/18/22 1230    insulin lispro (HUMALOG) injection vial 0-6 Units  0-6 Units SubCUTAneous Nightly [facility], DO        insulin glargine (LANTUS) injection vial 15 Units  15 Units SubCUTAneous Daily [facility], DO   15 Units at 05/18/22 1000       Review of Systems:  · Constitutional: No unanticipated weight loss. There's been no change in energy level, sleep pattern, or activity level. No fevers, chills. · Eyes: No visual changes or diplopia. No scleral icterus. · ENT: No Headaches, hearing loss or vertigo.  No mouth sores or sore throat. · Cardiovascular: as reviewed in HPI  · Respiratory: No cough or wheezing, no sputum production. No hemoptysis. · Gastrointestinal: No abdominal pain, appetite loss, blood in stools. No change in bowel or bladder habits. · Genitourinary: No dysuria, trouble voiding, or hematuria. · Musculoskeletal:  No gait disturbance, no joint complaints. · Integumentary: No rash or pruritis. · Neurological: No headache, diplopia, change in muscle strength, numbness or tingling. · Psychiatric: No anxiety or depression. · Endocrine: No temperature intolerance. No excessive thirst, fluid intake, or urination. No tremor. · Hematologic/Lymphatic: No abnormal bruising or bleeding, blood clots or swollen lymph nodes. · Allergic/Immunologic: No nasal congestion or hives. Physical Exam:   /87   Pulse 106   Temp 98.1 °F (36.7 °C) (Oral)   Resp 16   Ht 6' 1\" (1.854 m)   Wt 272 lb 14.9 oz (123.8 kg)   SpO2 97%   BMI 36.01 kg/m²   Wt Readings from Last 3 Encounters:   05/18/22 272 lb 14.9 oz (123.8 kg)   05/05/22 275 lb 9.2 oz (125 kg)   04/20/22 275 lb 5.7 oz (124.9 kg)     Constitutional: He is oriented to person, place, and time. He appears well-developed and well-nourished. In no acute distress. Head: Normocephalic and atraumatic. Pupils equal and round. Neck: Neck supple. No JVP or carotid bruit appreciated. No mass and no thyromegaly present. No lymphadenopathy present. Cardiovascular: Normal rate. Normal heart sounds. Exam reveals no gallop and no friction rub. No murmur heard. Pulmonary/Chest: Effort normal and breath sounds normal. No respiratory distress. He has no wheezes, rhonchi or rales. Abdominal: Soft, non-tender. Bowel sounds are normal. He exhibits no organomegaly, mass or bruit. Extremities: No edema. No cyanosis or clubbing. Pulses are 2+ radial/carotid bilaterally. Neurological: No gross cranial nerve deficit. Coordination normal.   Skin: Skin is warm and dry.  There is no rash or diaphoresis. Psychiatric: He has a normal mood and affect. His speech is normal and behavior is normal.     Lab Review:   FLP:  No results found for: TRIG, HDL, LDLCALC, LDLDIRECT, LABVLDL  BUN/Creatinine:    Lab Results   Component Value Date    BUN 22 05/18/2022    CREATININE 1.1 05/18/2022     PT/INR, TNI, HGB A1C:   Lab Results   Component Value Date/Time    TROPONINI 0.04 (H) 05/18/2022 11:18 AM      No results found for: CBCAUTODIF    EKG Interpretation: ***    Echo:     Stress Test:     Cath:     CT:    Doppler:     All above diagnostic testing and laboratory data was independently visualized and reviewed by me (not simply review of report)       Assessment and Plan   1)         Overall, the problems requiring hospitalization are high in severity     Thank you very much for allowing me to participate in the care of your patient. Please do not hesitate to contact me if you have any questions.       Deepa Delgado MD [address] [facility], Interventional Cardiology, and Peripheral Vascular Disease   [facility]   Ph: [phone]  Fax: [phone]

## 2022-05-18 NOTE — PROGRESS NOTES
Seen and examined   +troponin   Echo with reduced systolic function   Recommend coronary angiogram tomorrow afternoon   Liquid breakfast, NPO after     Full consult to follow    Jese Hough MD [address], Interventional Cardiology, and Peripheral Vascular [address]   (O): [phone]  (F): [phone]

## 2022-05-18 NOTE — PROGRESS NOTES
Spoke to Dr. Luis Miguel Fernando with regards to patient, updated him on patient's status, sugar checked- 295 mg/dl. New orders made and carried out.  Electronically signed by Lacho Wooten RN on 5/18/2022 at 2:17 AM

## 2022-05-18 NOTE — PROGRESS NOTES
[facility]  Diabetes Education   Progress Note       NAME:  Conner Valera RECORD NUMBER:  [id number]  AGE: 29 y.o. GENDER: male  : 1985  TODAY'S DATE:  2022    Subjective   Reason for Diabetic Education Evaluation and Assessment: general diabetes support    Virgie Odell reports difficulty with consistent medication taking including insulin access issues. He has established with [facility] yesterday and new PCP on 5/3. He is frustrated that his blood sugars remain high even with a limited meal intake. Visit Type: evaluation      Maicol Ponce is a 29 y.o. male referred by:     [x] [de-identified]   [] Nursing  [] Chart Review   [] Other:     PAST MEDICAL HISTORY        Diagnosis Date    COVID-19     Diabetes mellitus, type II ([facility] [address]     History of blood transfusion     Hypertension     Protein in urine     Seizure ([facility] [address]        PAST SURGICAL HISTORY    History reviewed. No pertinent surgical history. FAMILY HISTORY    History reviewed. No pertinent family history.     SOCIAL HISTORY    Social History     Tobacco Use    Smoking status: Never Smoker    Smokeless tobacco: Never Used   Substance Use Topics    Alcohol use: Yes     Comment: rare    Drug use: Not Currently       ALLERGIES    No Known Allergies    MEDICATIONS     aspirin  81 mg Oral Daily    DULoxetine  30 mg Oral Daily    empagliflozin  10 mg Oral Daily    gabapentin  300 mg Oral TID    lisinopril  20 mg Oral Daily    NIFEdipine  60 mg Oral Daily    sodium chloride flush  5-40 mL IntraVENous 2 times per day    rosuvastatin  40 mg Oral Nightly    enoxaparin  30 mg SubCUTAneous BID    insulin lispro  0-12 Units SubCUTAneous TID WC    insulin lispro  0-6 Units SubCUTAneous Nightly    insulin glargine  15 Units SubCUTAneous Daily       Objective        Patient Active Problem List   Diagnosis Code    DM2 (diabetes mellitus, type 2) ([facility]) E11.9    Elevated troponin R77.8    Hypertension I10    SOB (shortness of breath) R06.02    CKD (chronic kidney disease) N18.9        BP (!) 131/94   Pulse 120   Temp 98 °F (36.7 °C) (Oral)   Resp 16   Ht 6' 1\" (1.854 m)   Wt 272 lb 14.9 oz (123.8 kg)   SpO2 97%   BMI 36.01 kg/m²     HgBA1c:  No results found for: LABA1C    Recent Labs     05/18/22  0211 05/18/22  0538 05/18/22  0900 05/18/22  1143   POCGLU 295* 296* 398* 284*       BUN/Creatinine:    Lab Results   Component Value Date    BUN 22 05/18/2022    CREATININE 1.1 05/18/2022       Assessment        Diabetes Management and Education    Does the patient have a Primary Care Physician? Yes, Marcial Salgado MD, MD       Does the patient require new medication instruction? Yes  Reviewed basal, prandial and correction insulin concepts. Person responsible for administration of Insulin/Medication:        [x] Self     [] Caregiver       [] Spouse       [] Other Family Member   []  Other    Level of patient/caregiver understanding able to:       [] Verbalized Understanding   [] Demonstrated Understanding       [] Teach Back       [x] Needs Reinforcement     []  Other:        Does the patient/caregiver monitor Blood Glucoses? Yes - Received meter and supplies from [facility] yesterday. Reviewed glycemic control targets, testing frequency and when to call PCP. Encouraged blood sugar logging. Level of patient/caregiver understanding able to:        [x] Verbalized Understanding   [] Demonstrated Understanding       [] Teach Back       [] Needs Reinforcement     []  Other:      Does the patient/caregiver follow a Meal Plan? No: He is able to identify common carbs. Skips meals. Recommend making water, unsweetened tea or coffee primary drinks. Reinforced common carbs and portion sizes. Reviewed importance of eating three meals per day and plate method for consistent carb intake.       Level of patient/caregiver understanding able to:       [x] Verbalized Understanding   [] Demonstrated Understanding       [] Teach Back       [] Needs Reinforcement     []  Other:      Does the patient/caregiver understand S/S of Hypoglycemia? No:   Reviewed symptoms, prevention and treatment. Level of patient/caregiver understanding able to:       [x] Verbalized Understanding   [] Demonstrated Understanding       [] Teach Back       [] Needs Reinforcement     [x]  Other:  Agrees to notify staff if he has symptoms. Does the patient/caregiver understand S/S of Hyperglycemia? No    Reviewed symptoms, prevention and treatment. Level of patient/caregiver understanding able to:        [] Verbalized Understanding   [] Demonstrated Understanding       [] Teach Back       [x] Needs Reinforcement     []  Other:           Plan        Ongoing diabetes education and blood glucose monitoring. Social Service Consult Made:  Yes    Recommend increasing Lantus and adding Humalog when eating. He is requesting his diet advanced. Notified CHICO Brower.       The following educational and support materials were provided:  · My contact information     · The Diabetes Education Program:  Planning Healthy Meals   · Academy of Nutrition and Dietetics handout - Carbohydrate Counting for People with Diabetes  · Blood Glucose Log Book                                           Discharge Plan:  Discharge needs: no prescription needs identified       Teaching Time Diabetes Education:  40 minutes     Electronically signed by Sarthak Craig on 5/18/2022 at 12:30 PM

## 2022-05-18 NOTE — PROGRESS NOTES
Physician Progress Note      PATIENT:               Teresa Snyder  CSN #:                  [id number]  :                       1985  ADMIT DATE:       2022 4:35 PM  [address] DATE:  RESPONDING  PROVIDER #:        Santos Sosa NP        QUERY TEXT:    Stage of Chronic Kidney Disease: Please provide further specificity, if known. Clinical indicators include: bun, creatinine, gfr, chronic kidney disease, ckd  Options provided:  -- Chronic kidney disease stage 1  -- Chronic kidney disease stage 2  -- Chronic kidney disease stage 3  -- Chronic kidney disease stage 3a  -- Chronic kidney disease stage 3b  -- Chronic kidney disease stage 4  -- Chronic kidney disease stage 5  -- Chronic kidney disease stage 5, requiring dialysis  -- End stage renal disease  -- Other - I will add my own diagnosis  -- Disagree - Not applicable / Not valid  -- Disagree - Clinically Unable to determine / Unknown        PROVIDER RESPONSE TEXT:    The patient has chronic kidney disease stage 3.       Electronically signed by:  Santos Sosa NP 2022 12:25 PM

## 2022-05-18 NOTE — PROGRESS NOTES
Pharmacy Medication Reconciliation Note     List of medications Adis Spear is currently taking is complete. Source of information:   1. Conversation with patient and family at bedside  2. EMR    Notes regarding home medications:   1. Patient reports taking most of his AM meds PTA in the ED. Reports he is in process of getting all meds switched to SVDP and has been taking a few QOD or Q couple of days. 2. Patient reports Tresiba 55 units is taken QOD or Q few days as well as duloxetine. Reports all other medications are being taken daily. 3. Reports bolus insulin is usually around 90 units total (30 units TID) and is adjusted based on sliding scale. Patient also reports only using 1-2X QD and not TID. 4. Nifedipine is PRN per patient, and he does not take 20 mg.       Patient denies taking any OTC or herbal medications    Janet Au, Pharmacy Intern  5/17/2022  8:17 PM

## 2022-05-18 NOTE — H&P
Hospital Medicine History & Physical      PCP: Mariangel Gomez MD, MD    Date of Admission: 5/17/2022    Date of Service: Pt seen/examined on 5/17/2022 and admitted to inpatient    Chief Complaint: Hyperglycemia, shortness of breath mainly on exertion      History Of Present Illness: The patient is a 29 y.o. male with past [place] of type 2 diabetes, previous COVID-19 virus infection about a year ago, CKD with some component of diabetic nephropathy with significant proteinuria previously diagnosed and did see a nephrologist in the past, and hypertension who presents to [facility] with main concern was initially to come here because he was having difficulty with managing getting his insulins as outpatient and unfortunately was stretching multiple of his medications including his diabetic and hypertensive medications over the course of months since he was becoming unable to care for himself and keep one of his main jobs for income. He was seeing an outpatient medical facility to help get his medications obtained and unfortunately he was noted to have very high blood sugar at that facility and was given 40 units of subcutaneous insulin and sent to the ED here for further evaluation. He does also remark however for the last week to at least 2 weeks he has had increasing dyspnea on exertion and has had increasing fatigue from this and unfortunately his sugars have continued to run high in the 200s to 300s but sometimes even in the low 200s even when he is managing to take his insulin and he does try to keep a strict tab on his diet and blood glucose checks but he has limited resources to do so.   Apart from the shortness of breath mainly on exertion, patient denies other recent symptoms of chest pain, dizziness, syncope, dysuria, blood in urine/stool/sputum, nausea/vomiting/diarrhea/abdominal pain, headache, fever, chills. Past Medical History:        Diagnosis Date    COVID-19     Diabetes mellitus, type II ([address]     History of blood transfusion     Hypertension     Protein in urine     Seizure [facility])        Past Surgical History:    History reviewed. No pertinent surgical history. Medications Prior to Admission:    Prior to Admission medications    Medication Sig Start Date End Date Taking? Authorizing Provider   empagliflozin (JARDIANCE) 10 MG tablet Take 10 mg by mouth daily   Yes Historical Provider, MD   NIFEdipine (PROCARDIA XL) 60 MG extended release tablet Take 60 mg by mouth daily as needed   Yes Historical Provider, MD   rosuvastatin (CRESTOR) 20 MG tablet Take 20 mg by mouth daily  Patient not taking: Reported on 5/17/2022    Historical Provider, MD   gabapentin (NEURONTIN) 300 MG capsule Take 300 mg by mouth 3 times daily. 2-3 QD 5/3/22 8/1/22  Historical Provider, MD   insulin lispro, 1 Unit Dial, (HUMALOG KWIKPEN) 100 UNIT/ML SOPN Inject 20-30 Units into the skin 3 times daily (before meals)  3/9/21   Historical Provider, MD   metFORMIN (GLUCOPHAGE) 500 MG tablet Take 1,000 mg by mouth  1/13/21   Historical Provider, MD   DULoxetine (CYMBALTA) 30 MG extended release capsule Take 30 mg by mouth daily 4/7/21   Historical Provider, MD   dicyclomine (BENTYL) 10 MG capsule Take 10 mg by mouth 4 times daily (before meals and nightly)  1/13/21   Historical Provider, MD   carvedilol (COREG) 25 MG tablet Take 25 mg by mouth 2 times daily  1/13/21   Historical Provider, MD   chlorthalidone (HYGROTON) 25 MG tablet Take 25 mg by mouth daily 4/7/21   Historical Provider, MD   aspirin 81 MG chewable tablet Take 81 mg by mouth daily  6/1/21   Historical Provider, MD   Insulin Degludec (TRESIBA FLEXTOUCH) 200 UNIT/ML SOPN Inject 55 Units into the skin daily 5/5/22   Vishal Gonsalez,    ketoconazole (NIZORAL) 2 % cream Apply topically daily.  4/20/22   Ami Hollins HELEN Guo   KETOCONAZOLE, TOPICAL, 1 % SHAM Apply quarter size amount and lather in affected area. Leave on for 5 minutes. Then shower off. 4/20/22   Raffaele Hernandez PA-C   lisinopril (PRINIVIL;ZESTRIL) 10 MG tablet Take 20 mg by mouth daily     Historical Provider, MD       Allergies:  Patient has no known allergies. Social History:  The patient currently lives home    TOBACCO:   reports that he has never smoked. He has never used smokeless tobacco.  ETOH:   reports current alcohol use. Family History:  Reviewed in detail and negative for DM, Early CAD, Cancer, CVA. Positive as follows:    History reviewed. No pertinent family history. REVIEW OF SYSTEMS:    as noted in the HPI. All other systems reviewed and negative. PHYSICAL EXAM:    BP (!) 159/104   Pulse 110   Temp 98.6 °F (37 °C) (Oral)   Resp 25   Ht 6' 1\" (1.854 m)   Wt 272 lb 14.9 oz (123.8 kg)   SpO2 92%   BMI 36.01 kg/m²     General appearance: No acute respiratory stress, pleasant and conversational, alert and oriented x4, somewhat fatigued appearing  HEENT Normal cephalic, atraumatic without obvious deformity. Pupils equal, round, and reactive to light. Extra ocular muscles intact. Conjunctivae/corneas clear. Dry mucous membrane  Neck: Supple, no JVD  Lungs: Diminished breath sounds but overall clear  Heart: Regular rate and rhythm, no murmurs noted  Abdomen: Soft, nontender, nondistended, and active bowel sounds  Extremities: No edema noted bilaterally to lower extremities  Skin: No rashes  Neurologic: Grossly intact neurologically, strength is 5 of 5 to all extremities  Mental status: Alert, oriented, thought content appropriate. Capillary Refill: Acceptable  < 3 seconds  Peripheral Pulses: +3 Easily felt, not easily obliterated with pressure      05/17/22 1913  XR CHEST (2 VW)   Performed: 05/17/22 1830  Final  Specimen: Chest        Impression: No acute cardiopulmonary disease.              CBC   Recent Labs 05/17/22 1741 05/18/22  0715   WBC 5.9 5.3   HGB 15.1 13.4*   HCT 46.6 41.0    249      RENAL  Recent Labs     05/17/22 1741 05/18/22  0714   * 133*   K 4.0 3.7   CL 97* 101   CO2 21 19*   BUN 30* 22*   CREATININE 1.8* 1.1     LFT'S  Recent Labs     05/17/22 1741   AST 16   ALT 26   BILITOT <0.2   ALKPHOS 121     COAG  No results for input(s): INR in the last 72 hours.   CARDIAC ENZYMES  Recent Labs     05/17/22 2044 05/18/22  0132 05/18/22  0714   TROPONINI 0.04* 0.04* 0.04*       U/A:    Lab Results   Component Value Date    COLORU Yellow 05/17/2022    WBCUA 1 05/17/2022    RBCUA 1 05/17/2022    BACTERIA None Seen 05/17/2022    CLARITYU Clear 05/17/2022    SPECGRAV 1.029 05/17/2022    LEUKOCYTESUR Negative 05/17/2022    BLOODU Negative 05/17/2022    GLUCOSEU >=1000 05/17/2022    GLUCOSEU NEGATIVE 01/02/2011       ABG  No results found for: DAQ9RRT, BEART, M3ZIUQWW, PHART, THGBART, RDW7ITS, PO2ART, AOO3MVC        Active Hospital Problems    Diagnosis Date Noted    SOB (shortness of breath) [R06.02] 05/18/2022     Priority: Medium    CKD (chronic kidney disease) [N18.9] 05/18/2022     Priority: Medium    DM2 (diabetes mellitus, type 2) ([facility] [address] [E11.9] 05/17/2022     Priority: Medium    Elevated troponin [R77.8] 05/17/2022     Priority: Medium    Hypertension [I10]      Priority: Medium         PHYSICIANS CERTIFICATION:    I certify that Gearline Gitelman is expected to be hospitalized for greater than 2 midnights based on the following assessment and plan:      ASSESSMENT/PLAN:  · Shortness of breath  · Elevated troponin  · Type 2 diabetes  · CKD  · Hypertension    Plan:  · Mainly patient came for concern of elevated blood sugars and has had difficulty getting his insulins as outpatient but patient also does have elevated troponin of uncertain etiology and uncertain if there is a true cardiac process at play but he does remark as to having intermittent increasing shortness of breath mainly on exertion recently. · Initial troponin 0.05, repeat troponin came down to 0.04, will continue to trend  · Treating patient for hyperglycemia with sliding scale insulin as well as Lantus, may need more aggressive blood sugar control  · Patient currently kept on clear liquid diet  · Cardiology consult for elevated troponin some component for high cardiac risk score  · Obtaining echo for further cardiac work-up  · Nephrology consult for CKD  · Restart patient's Procardia and lisinopril, holding thiazide and beta-blocker, possible need for stress test if cardiology deems necessary  · Repeat labs in the morning and trending troponins    DVT Prophylaxis: Heparin DVT prophylaxis  Diet: ADULT DIET; Clear Liquid; No Caffeine  Code Status: Full Code  PT/OT Eval Status: Ambulatory    Dispo -pending clinical course       Brown Bruno,     Thank you Martine Sauceda MD, MD for the opportunity to be involved in this patient's care. If you have any questions or concerns please feel free to contact me at [phone].

## 2022-05-18 NOTE — ED NOTES
Blood work collected and sent to lab     [place]  05/17/22 2049       Barrett Shearer RN  05/17/22 2049

## 2022-05-18 NOTE — PLAN OF CARE
Problem: Discharge Planning  Goal: Discharge to home or other facility with appropriate resources  5/18/2022 1338 by Eloise Muir RN  Outcome: Progressing  5/18/2022 0301 by Korina King RN  Outcome: Progressing  Flowsheets  Taken 5/18/2022 0301  Discharge to home or other facility with appropriate resources:   Identify barriers to discharge with patient and caregiver   Arrange for needed discharge resources and transportation as appropriate   Identify discharge learning needs (meds, wound care, etc)   Refer to discharge planning if patient needs post-hospital services based on physician order or complex needs related to functional status, cognitive ability or social support system  Taken 5/18/2022 0014  Discharge to home or other facility with appropriate resources:   Identify barriers to discharge with patient and caregiver   Identify discharge learning needs (meds, wound care, etc)   Arrange for needed discharge resources and transportation as appropriate   Refer to discharge planning if patient needs post-hospital services based on physician order or complex needs related to functional status, cognitive ability or social support system  Taken 5/17/2022 2330  Discharge to home or other facility with appropriate resources:   Identify barriers to discharge with patient and caregiver   Arrange for needed discharge resources and transportation as appropriate   Identify discharge learning needs (meds, wound care, etc)   Refer to discharge planning if patient needs post-hospital services based on physician order or complex needs related to functional status, cognitive ability or social support system     Problem: Pain  Goal: Verbalizes/displays adequate comfort level or baseline comfort level  5/18/2022 1338 by Eloise Muir RN  Outcome: Progressing  5/18/2022 0301 by Korina King RN  Outcome: Progressing  Flowsheets  Taken 5/18/2022 0301  Verbalizes/displays adequate comfort level or baseline comfort level:   Encourage patient to monitor pain and request assistance   Assess pain using appropriate pain scale   Notify Licensed Independent Practitioner if interventions unsuccessful or patient reports new pain   Implement non-pharmacological measures as appropriate and evaluate response   Administer analgesics based on type and severity of pain and evaluate response  Taken 5/17/2022 2339  Verbalizes/displays adequate comfort level or baseline comfort level:   Encourage patient to monitor pain and request assistance   Assess pain using appropriate pain scale  Taken 5/17/2022 2334  Verbalizes/displays adequate comfort level or baseline comfort level:   Encourage patient to monitor pain and request assistance   Assess pain using appropriate pain scale     Problem: Safety - Adult  Goal: Free from fall injury  5/18/2022 1338 by Justin Trujillo RN  Outcome: Progressing  5/18/2022 0301 by Sergey Hu RN  Outcome: Progressing  Flowsheets (Taken 5/18/2022 0301)  Free From Fall Injury: Instruct family/caregiver on patient safety     Problem: ABCDS Injury Assessment  Goal: Absence of physical injury  5/18/2022 1338 by Justin Trujillo RN  Outcome: Progressing  Flowsheets (Taken 5/18/2022 [address] by Sergey Hu RN)  Absence of Physical Injury: Implement safety measures based on patient assessment  5/18/2022 0301 by Sergey Hu RN  Outcome: Progressing  Flowsheets (Taken 5/18/2022 0301)  Absence of Physical Injury: Implement safety measures based on patient assessment

## 2022-05-18 NOTE — PROGRESS NOTES
Patient asleep, new order insulin lispro 4 units administered once due to high blood sugar 295 on Lt arm per order  Dr Fausto Franco .call light within reach .

## 2022-05-18 NOTE — PROGRESS NOTES
Hospitalist Progress Note      PCP: Oumou Shannon MD, MD    Date of Admission: 5/17/2022    Chief Complaint:   Chief Complaint   Patient presents with    Hyperglycemia     had blood sugar checked today and was over 600 at [facility]. was given 40 units of humalog     Hospital Course: The patient is a 29 y.o. male with past [place] of type 2 diabetes, previous COVID-19 virus infection about a year ago, CKD with some component of diabetic nephropathy with significant proteinuria previously diagnosed and did see a nephrologist in the past, and hypertension who presents to [facility] with main concern was initially to come here because he was having difficulty with managing getting his insulins as outpatient and unfortunately was stretching multiple of his medications including his diabetic and hypertensive medications over the course of months since he was becoming unable to care for himself and keep one of his main jobs for income. He was seeing an outpatient medical facility to help get his medications obtained and unfortunately he was noted to have very high blood sugar at that facility and was given 40 units of subcutaneous insulin and sent to the ED here for further evaluation. He does also remark however for the last week to at least 2 weeks he has had increasing dyspnea on exertion and has had increasing fatigue from this and unfortunately his sugars have continued to run high in the 200s to 300s but sometimes even in the low 200s even when he is managing to take his insulin and he does try to keep a strict tab on his diet and blood glucose checks but he has limited resources to do so. Apart from the shortness of breath mainly on exertion, patient denies other recent symptoms of chest pain, dizziness, syncope, dysuria, blood in urine/stool/sputum, nausea/vomiting/diarrhea/abdominal pain, headache, fever, chills.     Subjective:     Patient is sitting up on the side of the bed during my exam with a friend at bedside. He tells me that yesterday, he was at [facility] at an outpatient clinic when he suddenly became very short of breath and experienced some pain in his left shoulder. He was at this facility to try and get his medications that he had previously been prescribed. He states that during this episode, they checked his blood sugar which was noted to be extremely high, he was given 40 units of Humalog, and was told to go straight to the ER for further evaluation. He states that he has not been able to get all of his insulin as prescribed due to financial difficulties. Today, the patient still endorses having some shortness of breath. He denies any chest pain. Denies any headache, nausea, vomiting, fever, chills, dysuria, or dizziness.     Medications:  Reviewed    Infusion Medications    sodium chloride      dextrose       Scheduled Medications    aspirin  81 mg Oral Daily    DULoxetine  30 mg Oral Daily    empagliflozin  10 mg Oral Daily    gabapentin  300 mg Oral TID    lisinopril  20 mg Oral Daily    NIFEdipine  60 mg Oral Daily    sodium chloride flush  5-40 mL IntraVENous 2 times per day    rosuvastatin  40 mg Oral Nightly    enoxaparin  30 mg SubCUTAneous BID    insulin lispro  0-12 Units SubCUTAneous TID WC    insulin lispro  0-6 Units SubCUTAneous Nightly    insulin glargine  15 Units SubCUTAneous Daily     PRN Meds: sodium chloride flush, sodium chloride, acetaminophen **OR** acetaminophen, perflutren lipid microspheres, ondansetron, glucose, dextrose bolus **OR** dextrose bolus, glucagon (rDNA), dextrose      Intake/Output Summary (Last 24 hours) at 5/18/2022 0903  Last data filed at 5/18/2022 0857  Gross per 24 hour   Intake 200 ml   Output --   Net 200 ml       Physical Exam Performed:    BP (!) 159/104   Pulse 110   Temp 98.6 °F (37 °C) (Oral)   Resp 25   Ht 6' 1\" (1.854 m)   Wt 272 lb 14.9 oz (123.8 kg)   SpO2 92%   BMI 36.01 kg/m²     General appearance: No apparent distress, appears stated age and cooperative. HEENT: Pupils equal, round, and reactive to light. Conjunctivae/corneas clear. Neck: Supple, with full range of motion. No jugular venous distention. Trachea midline. Respiratory:  Normal respiratory effort. Clear to auscultation, bilaterally without Rales/Wheezes/Rhonchi. Cardiovascular: Regular rate and rhythm with normal S1/S2 without murmurs, rubs or gallops. Abdomen: Soft, non-tender, non-distended with normal bowel sounds. Musculoskeletal: No clubbing, cyanosis or edema bilaterally. Full range of motion without deformity. Skin: Skin color, texture, turgor normal.  No rashes or lesions. Neurologic:  Neurovascularly intact without any focal sensory/motor deficits. Cranial nerves: II-XII intact, grossly non-focal.  Psychiatric: Alert and oriented, thought content appropriate, normal insight  Capillary Refill: Brisk,< 3 seconds   Peripheral Pulses: +2 palpable, equal bilaterally       Labs:   Recent Labs     05/17/22  1741 05/18/22  0715   WBC 5.9 5.3   HGB 15.1 13.4*   HCT 46.6 41.0    249     Recent Labs     05/17/22  1741 05/18/22  0714   * 133*   K 4.0 3.7   CL 97* 101   CO2 21 19*   BUN 30* 22*   CREATININE 1.8* 1.1   CALCIUM 9.6 8.5     Recent Labs     05/17/22  1741   AST 16   ALT 26   BILITOT <0.2   ALKPHOS 121     No results for input(s): INR in the last 72 hours. Recent Labs     05/17/22  2044 05/18/22  0132 05/18/22  0714   TROPONINI 0.04* 0.04* 0.04*       Urinalysis:      Lab Results   Component Value Date    NITRU Negative 05/17/2022    WBCUA 1 05/17/2022    BACTERIA None Seen 05/17/2022    RBCUA 1 05/17/2022    BLOODU Negative 05/17/2022    SPECGRAV 1.029 05/17/2022    GLUCOSEU >=1000 05/17/2022    GLUCOSEU NEGATIVE 01/02/2011       Radiology:  XR CHEST (2 VW)   Final Result   No acute cardiopulmonary disease.                  Assessment/Plan:    Active Hospital Problems    Diagnosis     SOB (shortness of breath) [R06.02]      Priority: Medium    CKD (chronic kidney disease) [N18.9]      Priority: Medium    DM2 (diabetes mellitus, type 2) (HCC) [E11.9]      Priority: Medium    Elevated troponin [R77.8]      Priority: Medium    Hypertension [I10]      Priority: Medium     Hyperglycemia in setting of DM2. Noncompliance due to inability to obtain medications outpatient. - HgbA1c pending  - MDSSI  - Carb control diet  - Hypoglycemia protocol pending  - Diabetes educator consulted, appreciate input    Elevated troponin, unclear etiology. Suspect r/t CKD. - Initial 0.05 on admission --> 0.04 --> 0.04  - TTE pending  - EKG without ischemia  - Cardiology consult pending  - Heart score 5    CHELE on CKD, likely prerenal, baseline Cr ~1.5. Improved  - Nephrology consult pending    Essential HTN, well controlled. - Continue home meds  - Telemetry monitoring    Obesity -  With Body mass index is 36.01 kg/m². Complicating assessment and treatment. Placing patient at risk for multiple co-morbidities as well as early death and contributing to the patient's presentation. Counseled on weight loss      DVT Prophylaxis: lovenox  Diet: ADULT DIET; Clear Liquid; No Caffeine  Code Status: Full Code    PT/OT Eval Status: not indicated    Dispo - pending clinical improvement    Tuyet N. [place] - JOY

## 2022-05-19 ENCOUNTER — APPOINTMENT (OUTPATIENT)
Dept: CARDIAC CATH/INVASIVE PROCEDURES | Age: 37
DRG: 420 | End: 2022-05-19
Payer: MEDICAID

## 2022-05-19 VITALS
DIASTOLIC BLOOD PRESSURE: 72 MMHG | HEART RATE: 105 BPM | OXYGEN SATURATION: 97 % | SYSTOLIC BLOOD PRESSURE: 118 MMHG | HEIGHT: 73 IN | BODY MASS INDEX: 36.14 KG/M2 | RESPIRATION RATE: 14 BRPM | WEIGHT: 272.71 LBS | TEMPERATURE: 98.4 F

## 2022-05-19 LAB
ANION GAP SERPL CALCULATED.3IONS-SCNC: 14 MMOL/L (ref 3–16)
BASOPHILS ABSOLUTE: 0.1 K/UL (ref 0–0.2)
BASOPHILS RELATIVE PERCENT: 1.2 %
BUN BLDV-MCNC: 16 MG/DL (ref 7–20)
CALCIUM SERPL-MCNC: 8.9 MG/DL (ref 8.3–10.6)
CHLORIDE BLD-SCNC: 101 MMOL/L (ref 99–110)
CO2: 19 MMOL/L (ref 21–32)
CREAT SERPL-MCNC: 1.2 MG/DL (ref 0.9–1.3)
EOSINOPHILS ABSOLUTE: 0.2 K/UL (ref 0–0.6)
EOSINOPHILS RELATIVE PERCENT: 2.9 %
ESTIMATED AVERAGE GLUCOSE: 469.9 MG/DL
GFR AFRICAN AMERICAN: >60
GFR NON-AFRICAN AMERICAN: >60
GLUCOSE BLD-MCNC: 206 MG/DL (ref 70–99)
GLUCOSE BLD-MCNC: 239 MG/DL (ref 70–99)
HBA1C MFR BLD: >18 %
HCT VFR BLD CALC: 41.5 % (ref 40.5–52.5)
HEMOGLOBIN: 13.6 G/DL (ref 13.5–17.5)
LYMPHOCYTES ABSOLUTE: 2 K/UL (ref 1–5.1)
LYMPHOCYTES RELATIVE PERCENT: 39.3 %
MAGNESIUM: 2.3 MG/DL (ref 1.8–2.4)
MCH RBC QN AUTO: 25.9 PG (ref 26–34)
MCHC RBC AUTO-ENTMCNC: 32.8 G/DL (ref 31–36)
MCV RBC AUTO: 78.8 FL (ref 80–100)
MONOCYTES ABSOLUTE: 0.5 K/UL (ref 0–1.3)
MONOCYTES RELATIVE PERCENT: 10.1 %
NEUTROPHILS ABSOLUTE: 2.4 K/UL (ref 1.7–7.7)
NEUTROPHILS RELATIVE PERCENT: 46.5 %
PDW BLD-RTO: 12.2 % (ref 12.4–15.4)
PERFORMED ON: ABNORMAL
PLATELET # BLD: 254 K/UL (ref 135–450)
PMV BLD AUTO: 8.5 FL (ref 5–10.5)
POTASSIUM SERPL-SCNC: 4.1 MMOL/L (ref 3.5–5.1)
RBC # BLD: 5.26 M/UL (ref 4.2–5.9)
SODIUM BLD-SCNC: 134 MMOL/L (ref 136–145)
WBC # BLD: 5.2 K/UL (ref 4–11)

## 2022-05-19 PROCEDURE — 6360000004 HC RX CONTRAST MEDICATION: Performed by: HOSPITALIST

## 2022-05-19 PROCEDURE — 4A023N7 MEASUREMENT OF CARDIAC SAMPLING AND PRESSURE, LEFT HEART, PERCUTANEOUS APPROACH: ICD-10-PCS | Performed by: INTERNAL MEDICINE

## 2022-05-19 PROCEDURE — B2151ZZ FLUOROSCOPY OF LEFT HEART USING LOW OSMOLAR CONTRAST: ICD-10-PCS | Performed by: INTERNAL MEDICINE

## 2022-05-19 PROCEDURE — 6360000002 HC RX W HCPCS

## 2022-05-19 PROCEDURE — 93458 L HRT ARTERY/VENTRICLE ANGIO: CPT | Performed by: INTERNAL MEDICINE

## 2022-05-19 PROCEDURE — 99153 MOD SED SAME PHYS/QHP EA: CPT

## 2022-05-19 PROCEDURE — 93458 L HRT ARTERY/VENTRICLE ANGIO: CPT

## 2022-05-19 PROCEDURE — 2500000003 HC RX 250 WO HCPCS

## 2022-05-19 PROCEDURE — 6370000000 HC RX 637 (ALT 250 FOR IP): Performed by: STUDENT IN AN ORGANIZED HEALTH CARE EDUCATION/TRAINING PROGRAM

## 2022-05-19 PROCEDURE — 85025 COMPLETE CBC W/AUTO DIFF WBC: CPT

## 2022-05-19 PROCEDURE — 80048 BASIC METABOLIC PNL TOTAL CA: CPT

## 2022-05-19 PROCEDURE — 2580000003 HC RX 258: Performed by: STUDENT IN AN ORGANIZED HEALTH CARE EDUCATION/TRAINING PROGRAM

## 2022-05-19 PROCEDURE — B2111ZZ FLUOROSCOPY OF MULTIPLE CORONARY ARTERIES USING LOW OSMOLAR CONTRAST: ICD-10-PCS | Performed by: INTERNAL MEDICINE

## 2022-05-19 PROCEDURE — 83735 ASSAY OF MAGNESIUM: CPT

## 2022-05-19 PROCEDURE — C1894 INTRO/SHEATH, NON-LASER: HCPCS

## 2022-05-19 PROCEDURE — 99152 MOD SED SAME PHYS/QHP 5/>YRS: CPT | Performed by: INTERNAL MEDICINE

## 2022-05-19 PROCEDURE — C1769 GUIDE WIRE: HCPCS

## 2022-05-19 PROCEDURE — 2709999900 HC NON-CHARGEABLE SUPPLY

## 2022-05-19 PROCEDURE — 36415 COLL VENOUS BLD VENIPUNCTURE: CPT

## 2022-05-19 PROCEDURE — 99152 MOD SED SAME PHYS/QHP 5/>YRS: CPT

## 2022-05-19 RX ORDER — INSULIN LISPRO 100 [IU]/ML
10 INJECTION, SOLUTION INTRAVENOUS; SUBCUTANEOUS
Qty: 1 PEN | Refills: 1 | Status: SHIPPED | OUTPATIENT
Start: 2022-05-19 | End: 2022-07-19 | Stop reason: SDUPTHER

## 2022-05-19 RX ORDER — SODIUM CHLORIDE 0.9 % (FLUSH) 0.9 %
5-40 SYRINGE (ML) INJECTION PRN
Status: DISCONTINUED | OUTPATIENT
Start: 2022-05-19 | End: 2022-05-19 | Stop reason: HOSPADM

## 2022-05-19 RX ORDER — SODIUM CHLORIDE 9 MG/ML
INJECTION, SOLUTION INTRAVENOUS PRN
Status: DISCONTINUED | OUTPATIENT
Start: 2022-05-19 | End: 2022-05-19 | Stop reason: HOSPADM

## 2022-05-19 RX ORDER — INSULIN GLARGINE 100 [IU]/ML
30 INJECTION, SOLUTION SUBCUTANEOUS NIGHTLY
Qty: 5 PEN | Refills: 3 | Status: SHIPPED | OUTPATIENT
Start: 2022-05-19 | End: 2022-06-06

## 2022-05-19 RX ORDER — SPIRONOLACTONE 25 MG/1
25 TABLET ORAL DAILY
Status: DISCONTINUED | OUTPATIENT
Start: 2022-05-19 | End: 2022-05-19 | Stop reason: HOSPADM

## 2022-05-19 RX ORDER — SPIRONOLACTONE 25 MG/1
25 TABLET ORAL DAILY
Qty: 30 TABLET | Refills: 3 | Status: SHIPPED | OUTPATIENT
Start: 2022-05-19 | End: 2022-09-14

## 2022-05-19 RX ORDER — SODIUM CHLORIDE 0.9 % (FLUSH) 0.9 %
5-40 SYRINGE (ML) INJECTION EVERY 12 HOURS SCHEDULED
Status: DISCONTINUED | OUTPATIENT
Start: 2022-05-19 | End: 2022-05-19 | Stop reason: HOSPADM

## 2022-05-19 RX ORDER — ROSUVASTATIN CALCIUM 40 MG/1
40 TABLET, COATED ORAL NIGHTLY
Qty: 30 TABLET | Refills: 3 | Status: SHIPPED | OUTPATIENT
Start: 2022-05-19 | End: 2022-07-19 | Stop reason: SDUPTHER

## 2022-05-19 RX ORDER — INSULIN GLARGINE 100 [IU]/ML
30 INJECTION, SOLUTION SUBCUTANEOUS NIGHTLY
Qty: 10 ML | Refills: 3 | Status: CANCELLED | OUTPATIENT
Start: 2022-05-19

## 2022-05-19 RX ORDER — INSULIN GLARGINE 100 [IU]/ML
15 INJECTION, SOLUTION SUBCUTANEOUS NIGHTLY
Status: ON HOLD | COMMUNITY
Start: 2021-05-03 | End: 2022-05-19 | Stop reason: SDUPTHER

## 2022-05-19 RX ADMIN — IOPAMIDOL 40 ML: 755 INJECTION, SOLUTION INTRAVENOUS at 10:57

## 2022-05-19 RX ADMIN — NIFEDIPINE 60 MG: 60 TABLET, EXTENDED RELEASE ORAL at 08:53

## 2022-05-19 RX ADMIN — SODIUM CHLORIDE, PRESERVATIVE FREE 10 ML: 5 INJECTION INTRAVENOUS at 08:54

## 2022-05-19 RX ADMIN — LISINOPRIL 20 MG: 20 TABLET ORAL at 08:54

## 2022-05-19 RX ADMIN — ASPIRIN 81 MG 81 MG: 81 TABLET ORAL at 08:53

## 2022-05-19 NOTE — PROGRESS NOTES
Pharmacy Note     Pt's home meds were being stored in Pharmacy: Tresiba and Humalog were returned to patient prior to discharge.     Sin Lieebrman, [facility]

## 2022-05-19 NOTE — PROGRESS NOTES
Pt back in room from procedure. Pt A&Ox4, VSS. Pt denies pain at cath site. Cath site clean, dry, intact with no signs of hematoma. Pt with no other complaints at this time.     Electronically signed by Geno Pacheco RN on 5/19/22 at 1:34 PM EDT

## 2022-05-19 NOTE — PLAN OF CARE
Problem: Discharge Planning  Goal: Discharge to home or other facility with appropriate resources  5/19/2022 1518 by Pawan Pierre RN  Outcome: Completed  5/19/2022 1028 by Pawan Pierre RN  Outcome: Progressing     Problem: Pain  Goal: Verbalizes/displays adequate comfort level or baseline comfort level  5/19/2022 1518 by Pawan Pierre RN  Outcome: Completed  5/19/2022 1028 by Pawan Pierre RN  Outcome: Progressing     Problem: Safety - Adult  Goal: Free from fall injury  5/19/2022 1518 by Pawan Pierre RN  Outcome: Completed  5/19/2022 1028 by Pawan Pierre RN  Outcome: Progressing     Problem: ABCDS Injury Assessment  Goal: Absence of physical injury  5/19/2022 1518 by Pawan Pierre RN  Outcome: Completed  5/19/2022 1028 by Pawan Pierre RN  Outcome: Progressing

## 2022-05-19 NOTE — CARE COORDINATION
SW consult noted for assistance with medications and possible disabilities. SW will meet with patient momentarily, complete a full assessment and offer resources based on eligibility. Respectfully submitted,    AMY Renae-S  [facility]   [phone]    Electronically signed by Arcelia Baumgarten, LISW-S on 5/19/2022 at 9:32 AM

## 2022-05-19 NOTE — PROGRESS NOTES
[facility]  Phone: [phone]  Fax: [phone]  [email or web address]       We are following the patient for CKD  28 y/o AAM with h/o CKD stage 3 , proteinuria suspected to have D Nephropathy follows with Dr Valorie Browning but last office visit was in   His Cr has been ~ 1.7 mg   He had proteinuria and underwent renal BX at age 26  Admitted with SOB, weakness  and having run out of his meds  He apparently lost his medical insurance and has been stretching his meds including insulin He has been unable to see Dr Valorie Browning since   Noted to have BS of 285 on admission  Notes SOB has been going on for 2 years since he had Covid pneumonia but lately has been getting worse Denies cough, fever chills, CP or palpitations  Denies dysuria hematuria but has foaming of urine  SUBJECTIVE:  FEELS WELL SOB better   S/P LHC findings noted LVEDP 12  Normal coronaries 40 ml dye used    OBJECTIVE:     PHYSICAL:    TEMPERATURE:  Current - Temp: 98.4 °F (36.9 °C);  Max - Temp  Av.2 °F (36.8 °C)  Min: 97.8 °F (36.6 °C)  Max: 98.4 °F (36.9 °C)  RESPIRATIONS RANGE: Resp  Avg: 15.3  Min: 14  Max: 18  PULSE RANGE: Pulse  Av.2  Min: 95  Max: 130  BLOOD PRESSURE RANGE:  Systolic (49GEQ), YAW:517 , Min:115 , QJL:600   ; Diastolic (54QKC), PYS:89, Min:72, Max:96    PULSE OXIMETRY RANGE: SpO2  Av.2 %  Min: 96 %  Max: 98 %  24HR INTAKE/OUTPUT:  No intake or output data in the 24 hours ending 22 1442    CONSTITUTIONAL:  awake, alert, cooperative, no apparent distress, and appears stated age  HEENT:  Lids and lashes normal, pupils equal, round and reactive to light  NECK:  Supple, symmetrical, trachea midline, no adenopathy  LUNGS:  No increased work of breathing, good air exchange, clear to auscultation bilaterally, no crackles or wheezing  CARDIOVASCULAR:  Normal apical impulse, regular rate and rhythm, normal S1 and S2  ABDOMEN:  No scars, normal bowel sounds, soft, non-distended, non-tender, no masses palpated, no hepatosplenomegally  NEUROLOGIC:  alert, oriented, normal speech, no focal findings or movement disorder noted  SKIN: no bruising or bleeding  EXTREMITIES: trace edema     Medications     sodium chloride      sodium chloride      dextrose          Data      CBC:   Recent Labs     05/17/22  1741 05/18/22  0715 05/19/22  0808   WBC 5.9 5.3 5.2   RBC 5.93* 5.23 5.26   HGB 15.1 13.4* 13.6   HCT 46.6 41.0 41.5    249 254     BMP:    Recent Labs     05/17/22 1741 05/18/22  0714 05/19/22  0808   * 133* 134*   K 4.0 3.7 4.1   CL 97* 101 101   CO2 21 19* 19*   BUN 30* 22* 16   CREATININE 1.8* 1.1 1.2   CALCIUM 9.6 8.5 8.9   GLUCOSE 285* 291* 206*     Phosphorus:  No results for input(s): PHOS in the last 72 hours.   Magnesium:    Recent Labs     05/18/22  0714 05/19/22  0808   MG 2.20 2.30         ASSESSMENT     Patient Active Problem List   Diagnosis    DM2 (diabetes mellitus, type 2) (HCC)    Elevated troponin    Hypertension    SOB (shortness of breath)    CKD (chronic kidney disease)       PLAN    1-CKD stage 3 baseline Cr ~ 1.7 mg as of 2021 follows with Dr Sabas Browne suspected D Nephropathy Cr stable at 1.2mg today  2-Proteinuria sub nephrotic suspected to be D Nephropathy S/P Renal BX at age 26 No records available UPC ratio 1.2 had been on Lisinopril continue Needs f/u with Dr Sabas Browne D/W pt   3-HTN initially hypotensive BP well controlled now  4 DM uncontrolled A1C > 18   5 SOB no evidence of fluid overload EF 45% LHC findings noted Normal coronaries     Roula Treviño MD, [facility]

## 2022-05-19 NOTE — CARE COORDINATION
INITIAL CASE MANAGEMENT ASSESSMENT     Reviewed chart, met with patient to assess possible discharge needs. Explained Case Management role/services. SW interviewed patient alone at bedside today.      Living Situation: Patient resides in a single family home alone with one dog. There is one stair leading up to the front door. Prior to medical admission patient reports that he had no trouble getting in and out of the property.      ADLs: Prior to medical admission patient was reportedly independent. He stated that he doesn't anticipate any needs at discharge.      DME: Prior to medical admission patient reports that he used a cane from time to time to ambulate. He stated that he doesn't anticipate any needs at discharge.      PT/OT Recs: Not ordered.      Active Services: Prior to medical admission patient reports that he had no active services in place. He stated that doesn't anticipate any discharge needs.        Transportation: Prior to medical admission patient reports that he was an active . He stated that his friend will transport him home at discharge.      Medications: Patient receives medications from [address] [place] [address]. He is also pending for medicaid. At this time there are no issues with access or affordability.      PCP: Sae Garcia MD, [phone] (phone) and [phone] (fax number).        HD/PD: N/A     PLAN/COMMENTS:   1) Cardiology clearance. 2) Divina care meds. 3) Discharge to home with family.      SW provided contact information for patient or family to call with any questions. SW will follow and assist as needed.     Respectfully submitted,     HODAN Oleary  [facility]   [phone]     Electronically signed by HODAN Rodriguez on 5/19/2022 at 2:08 PM

## 2022-05-19 NOTE — CARE COORDINATION
SW attempted to meet with patient at bedside today, however, he was away for testing. ALYX will try again later if time permits. Respectfully submitted,    HODAN Wilson  [facility]   [phone]    Electronically signed by HODAN Garcia on 5/19/2022 at 10:09 AM

## 2022-05-19 NOTE — CARE COORDINATION
SW attempted to meet with patient but he is still away for testing. ALYX will try again momentarily. Respectfully submitted,    Jaylene MCLAUGHLIN, AMY-S  [facility]   [phone]    Electronically signed by HODAN Palm on 5/19/2022 at 11:57 AM

## 2022-05-19 NOTE — PRE SEDATION
Sedation Pre-Procedure Note    Patient Name: Inocencio Vela   YOB: 1985  Room/Bed: J2N-9476/4484-94  Medical Record Number: [id number]  Date: 5/19/2022   Time: 10:59 AM       Indication:  NSTEMI    Consent: I have discussed with the patient and/or the patient representative the indication, alternatives, and the possible risks and/or complications of the planned procedure and the anesthesia methods. The patient and/or patient representative appear to understand and agree to proceed. Vital Signs:   Vitals:    05/19/22 0822   BP: (!) 134/96   Pulse: 130   Resp: 14   Temp: 98.1 °F (36.7 °C)   SpO2: 98%       Past Medical History:   has a past medical history of COVID-19, Diabetes mellitus, type II ([facility] [place] [address] History of blood transfusion, Hypertension, Protein in urine, and Seizure ([facility] [place] [address] Past Surgical History:   has no past surgical history on file. Medications:   Scheduled Meds:    sodium chloride flush  5-40 mL IntraVENous 2 times per day    spironolactone  25 mg Oral Daily    aspirin  81 mg Oral Daily    DULoxetine  30 mg Oral Daily    empagliflozin  10 mg Oral Daily    gabapentin  300 mg Oral TID    lisinopril  20 mg Oral Daily    NIFEdipine  60 mg Oral Daily    sodium chloride flush  5-40 mL IntraVENous 2 times per day    rosuvastatin  40 mg Oral Nightly    enoxaparin  30 mg SubCUTAneous BID    insulin lispro  0-12 Units SubCUTAneous TID WC    insulin lispro  0-6 Units SubCUTAneous Nightly    insulin glargine  15 Units SubCUTAneous Daily     Continuous Infusions:    sodium chloride      sodium chloride      dextrose       PRN Meds: sodium chloride flush, sodium chloride, sodium chloride flush, sodium chloride, acetaminophen **OR** acetaminophen, perflutren lipid microspheres, ondansetron, glucose, dextrose bolus **OR** dextrose bolus, glucagon (rDNA), dextrose  Home Meds:   Prior to Admission medications    Medication Sig Start Date End Date Taking?  Authorizing Provider empagliflozin (JARDIANCE) 10 MG tablet Take 10 mg by mouth daily   Yes Historical Provider, MD   NIFEdipine (PROCARDIA XL) 60 MG extended release tablet Take 60 mg by mouth daily as needed   Yes Historical Provider, MD   rosuvastatin (CRESTOR) 20 MG tablet Take 20 mg by mouth daily  Patient not taking: Reported on 5/17/2022    Historical Provider, MD   gabapentin (NEURONTIN) 300 MG capsule Take 300 mg by mouth 3 times daily. 2-3 QD 5/3/22 8/1/22  Historical Provider, MD   insulin lispro, 1 Unit Dial, (HUMALOG KWIKPEN) 100 UNIT/ML SOPN Inject 20-30 Units into the skin 3 times daily (before meals)  3/9/21   Historical Provider, MD   metFORMIN (GLUCOPHAGE) 500 MG tablet Take 1,000 mg by mouth  1/13/21   Historical Provider, MD   DULoxetine (CYMBALTA) 30 MG extended release capsule Take 30 mg by mouth daily 4/7/21   Historical Provider, MD   dicyclomine (BENTYL) 10 MG capsule Take 10 mg by mouth 4 times daily (before meals and nightly)  1/13/21   Historical Provider, MD   carvedilol (COREG) 25 MG tablet Take 25 mg by mouth 2 times daily  1/13/21   Historical Provider, MD   chlorthalidone (HYGROTON) 25 MG tablet Take 25 mg by mouth daily 4/7/21   Historical Provider, MD   aspirin 81 MG chewable tablet Take 81 mg by mouth daily  6/1/21   Historical Provider, MD   Insulin Degludec (TRESIBA FLEXTOUCH) 200 UNIT/ML SOPN Inject 55 Units into the skin daily 5/5/22   Jacquelyn CAMPO Pendtyrone,    ketoconazole (NIZORAL) 2 % cream Apply topically daily. 4/20/22   Mariluz Morris PA-C   KETOCONAZOLE, TOPICAL, 1 % SHAM Apply quarter size amount and lather in affected area. Leave on for 5 minutes.   Then shower off. 4/20/22   Mariluz Morris PA-C   lisinopril (PRINIVIL;ZESTRIL) 10 MG tablet Take 20 mg by mouth daily     Historical Provider, MD     Coumadin Use Last 7 Days:  no  Antiplatelet drug therapy use last 7 days: yes -  Other anticoagulant use last 7 days: no  Additional Medication Information:  n/a      Pre-Sedation Documentation and Exam:   I have personally completed a history, physical exam & review of systems for this patient (see notes).     Mallampati Airway Assessment:  Mallampati Class I - (soft palate, fauces, uvula & anterior/posterior tonsillar pillars are visible)    Prior History of Anesthesia Complications:   none    ASA Classification:  Class 3 - A patient with severe systemic disease that limits activity but is not incapacitating    Sedation/ Anesthesia Plan:   intravenous sedation    Medications Planned:   midazolam (Versed) intravenously    Patient is an appropriate candidate for plan of sedation: yes    Electronically signed by Cookie Sandhoff, MD on 5/19/2022 at 10:59 AM

## 2022-05-19 NOTE — OP NOTE
[address]   Procedure Note    CLINICAL HISTORY:       Libertad Brown is a 29 y.o. male with a history of type II diabetes. He was admitted with complaints of dyspnea. Cardiac markers were positive. EKG showed nonspecific ST-T wave abnormality. Patient Active Problem List   Diagnosis    DM2 (diabetes mellitus, type 2) ([facility] [address]    Elevated troponin    Hypertension    SOB (shortness of breath)    CKD (chronic kidney disease)       Prior to Admission medications    Medication Sig Start Date End Date Taking? Authorizing Provider   empagliflozin (JARDIANCE) 10 MG tablet Take 10 mg by mouth daily   Yes Historical Provider, MD   NIFEdipine (PROCARDIA XL) 60 MG extended release tablet Take 60 mg by mouth daily as needed   Yes Historical Provider, MD   rosuvastatin (CRESTOR) 20 MG tablet Take 20 mg by mouth daily  Patient not taking: Reported on 5/17/2022    Historical Provider, MD   gabapentin (NEURONTIN) 300 MG capsule Take 300 mg by mouth 3 times daily.  2-3 QD 5/3/22 8/1/22  Historical Provider, MD   insulin lispro, 1 Unit Dial, (HUMALOG KWIKPEN) 100 UNIT/ML SOPN Inject 20-30 Units into the skin 3 times daily (before meals)  3/9/21   Historical Provider, MD   metFORMIN (GLUCOPHAGE) 500 MG tablet Take 1,000 mg by mouth  1/13/21   Historical Provider, MD   DULoxetine (CYMBALTA) 30 MG extended release capsule Take 30 mg by mouth daily 4/7/21   Historical Provider, MD   dicyclomine (BENTYL) 10 MG capsule Take 10 mg by mouth 4 times daily (before meals and nightly)  1/13/21   Historical Provider, MD   carvedilol (COREG) 25 MG tablet Take 25 mg by mouth 2 times daily  1/13/21   Historical Provider, MD   chlorthalidone (HYGROTON) 25 MG tablet Take 25 mg by mouth daily 4/7/21   Historical Provider, MD   aspirin 81 MG chewable tablet Take 81 mg by mouth daily  6/1/21   Historical Provider, MD   Insulin Degludec (TRESIBA FLEXTOUCH) 200 UNIT/ML SOPN Inject 55 Units into the skin daily 5/5/22   Charmayne Coward Pendl, DO   ketoconazole (NIZORAL) 2 % cream Apply topically daily. 4/20/22   Kong Rucker PA-C   KETOCONAZOLE, TOPICAL, 1 % SHAM Apply quarter size amount and lather in affected area. Leave on for 5 minutes. Then shower off. 4/20/22   Kong Rucker PA-C   lisinopril (PRINIVIL;ZESTRIL) 10 MG tablet Take 20 mg by mouth daily     Historical Provider, MD        The risks, benefits, and details of the procedure were explained to the patient. The patient verbalized understanding and wanted to proceed. Informed written consent was obtained. INDICATION:  NSTEMI  Systolic heart failure     PROCEDURES PERFORMED:   [facility]     PROCEDURE TECHNIQUE:  The patient was approached from the right radial artery using a 5-6  Persian slender sheath. Left coronary angiography was done using a Bonita L3.5 diagnostic catheter. Right coronary angiography was done using a Bonita R4 guide catheter. Left ventriculography was done using a pigtail catheter. CONTRAST:  Total of 40 cc. COMPLICATIONS:  None. At the end of the procedure a TR device was used for hemostasis. EBL: 5 cc    Moderate Sedation:  Start time: 1039  Stop time: 1055  2 mg versed   100 mcg fentanyl   An independent trained observer pushed medications at my direction. We monitored the patient's level of consciousness and vital signs/physiologic status throughout the procedure duration (see start and start times above). HEMODYNAMICS:  Aortic pressure was 120/74;  LVEDP 12. There was no gradient between the left ventricle and aorta. ANGIOGRAM/CORONARY ARTERIOGRAM:       The left main coronary artery is normal .    The left anterior descending artery is normal .    The left circumflex artery is normal .    The right coronary artery is normal .    INTERVENTION  1.  None     SUMMARY:   Normal coronary arteries       RECOMMENDATION:    - medical therapy for nonischemic CMP   - outpatient follow up in 2-4 weeks   - cleared for d/c from cardiology Lyssa Vaca MD [address] [facility], Interventional Cardiology, and Peripheral Vascular Disease   [facility]   (C): [phone]  Efraín Dozier): [phone]

## 2022-05-29 ENCOUNTER — HOSPITAL ENCOUNTER (EMERGENCY)
Age: 37
Discharge: HOME | End: 2022-05-29
Payer: MEDICAID

## 2022-05-29 ENCOUNTER — APPOINTMENT (OUTPATIENT)
Dept: CT IMAGING | Age: 37
End: 2022-05-29
Payer: MEDICAID

## 2022-05-29 ENCOUNTER — APPOINTMENT (OUTPATIENT)
Dept: GENERAL RADIOLOGY | Age: 37
End: 2022-05-29
Payer: MEDICAID

## 2022-05-29 VITALS
WEIGHT: 266.54 LBS | HEART RATE: 89 BPM | HEIGHT: 73 IN | RESPIRATION RATE: 16 BRPM | OXYGEN SATURATION: 98 % | DIASTOLIC BLOOD PRESSURE: 81 MMHG | SYSTOLIC BLOOD PRESSURE: 105 MMHG | TEMPERATURE: 97.2 F | BODY MASS INDEX: 35.33 KG/M2

## 2022-05-29 DIAGNOSIS — N17.0 ACUTE KIDNEY INJURY (AKI) WITH ACUTE TUBULAR NECROSIS (ATN) (HCC): ICD-10-CM

## 2022-05-29 DIAGNOSIS — E86.0 DEHYDRATION: ICD-10-CM

## 2022-05-29 DIAGNOSIS — J18.9 PNEUMONIA OF RIGHT UPPER LOBE DUE TO INFECTIOUS ORGANISM: Primary | ICD-10-CM

## 2022-05-29 LAB
ANION GAP SERPL CALCULATED.3IONS-SCNC: 18 MMOL/L (ref 3–16)
BASOPHILS ABSOLUTE: 0.1 K/UL (ref 0–0.2)
BASOPHILS RELATIVE PERCENT: 1.3 %
BUN BLDV-MCNC: 34 MG/DL (ref 7–20)
CALCIUM SERPL-MCNC: 9.5 MG/DL (ref 8.3–10.6)
CHLORIDE BLD-SCNC: 97 MMOL/L (ref 99–110)
CO2: 19 MMOL/L (ref 21–32)
CREAT SERPL-MCNC: 1.8 MG/DL (ref 0.9–1.3)
EOSINOPHILS ABSOLUTE: 0.1 K/UL (ref 0–0.6)
EOSINOPHILS RELATIVE PERCENT: 1.3 %
GFR AFRICAN AMERICAN: 52
GFR NON-AFRICAN AMERICAN: 43
GLUCOSE BLD-MCNC: 164 MG/DL (ref 70–99)
GLUCOSE BLD-MCNC: 167 MG/DL (ref 70–99)
HCT VFR BLD CALC: 43.3 % (ref 40.5–52.5)
HEMOGLOBIN: 14.2 G/DL (ref 13.5–17.5)
LYMPHOCYTES ABSOLUTE: 1.6 K/UL (ref 1–5.1)
LYMPHOCYTES RELATIVE PERCENT: 23.1 %
MCH RBC QN AUTO: 25.8 PG (ref 26–34)
MCHC RBC AUTO-ENTMCNC: 32.8 G/DL (ref 31–36)
MCV RBC AUTO: 78.8 FL (ref 80–100)
MONOCYTES ABSOLUTE: 0.8 K/UL (ref 0–1.3)
MONOCYTES RELATIVE PERCENT: 11.5 %
NEUTROPHILS ABSOLUTE: 4.4 K/UL (ref 1.7–7.7)
NEUTROPHILS RELATIVE PERCENT: 62.8 %
PDW BLD-RTO: 12.1 % (ref 12.4–15.4)
PERFORMED ON: ABNORMAL
PLATELET # BLD: 356 K/UL (ref 135–450)
PMV BLD AUTO: 8.6 FL (ref 5–10.5)
POTASSIUM SERPL-SCNC: 4.5 MMOL/L (ref 3.5–5.1)
RBC # BLD: 5.5 M/UL (ref 4.2–5.9)
SODIUM BLD-SCNC: 134 MMOL/L (ref 136–145)
WBC # BLD: 7.1 K/UL (ref 4–11)

## 2022-05-29 PROCEDURE — 70450 CT HEAD/BRAIN W/O DYE: CPT

## 2022-05-29 PROCEDURE — 71260 CT THORAX DX C+: CPT

## 2022-05-29 PROCEDURE — 85025 COMPLETE CBC W/AUTO DIFF WBC: CPT

## 2022-05-29 PROCEDURE — 6360000004 HC RX CONTRAST MEDICATION: Performed by: PHYSICIAN ASSISTANT

## 2022-05-29 PROCEDURE — 80048 BASIC METABOLIC PNL TOTAL CA: CPT

## 2022-05-29 PROCEDURE — 2500000003 HC RX 250 WO HCPCS: Performed by: PHYSICIAN ASSISTANT

## 2022-05-29 PROCEDURE — 96361 HYDRATE IV INFUSION ADD-ON: CPT

## 2022-05-29 PROCEDURE — 2580000003 HC RX 258: Performed by: PHYSICIAN ASSISTANT

## 2022-05-29 PROCEDURE — 36415 COLL VENOUS BLD VENIPUNCTURE: CPT

## 2022-05-29 PROCEDURE — 6360000002 HC RX W HCPCS: Performed by: PHYSICIAN ASSISTANT

## 2022-05-29 PROCEDURE — 99285 EMERGENCY DEPT VISIT HI MDM: CPT

## 2022-05-29 PROCEDURE — 6370000000 HC RX 637 (ALT 250 FOR IP): Performed by: PHYSICIAN ASSISTANT

## 2022-05-29 PROCEDURE — 71046 X-RAY EXAM CHEST 2 VIEWS: CPT

## 2022-05-29 PROCEDURE — 96374 THER/PROPH/DIAG INJ IV PUSH: CPT

## 2022-05-29 RX ORDER — ONDANSETRON 2 MG/ML
4 INJECTION INTRAMUSCULAR; INTRAVENOUS ONCE
Status: COMPLETED | OUTPATIENT
Start: 2022-05-29 | End: 2022-05-29

## 2022-05-29 RX ORDER — PROPARACAINE HYDROCHLORIDE 5 MG/ML
1 SOLUTION/ DROPS OPHTHALMIC ONCE
Status: DISCONTINUED | OUTPATIENT
Start: 2022-05-29 | End: 2022-05-29 | Stop reason: HOSPADM

## 2022-05-29 RX ORDER — ACETAMINOPHEN 500 MG
1000 TABLET ORAL ONCE
Status: COMPLETED | OUTPATIENT
Start: 2022-05-29 | End: 2022-05-29

## 2022-05-29 RX ORDER — 0.9 % SODIUM CHLORIDE 0.9 %
1000 INTRAVENOUS SOLUTION INTRAVENOUS ONCE
Status: COMPLETED | OUTPATIENT
Start: 2022-05-29 | End: 2022-05-29

## 2022-05-29 RX ORDER — AZITHROMYCIN 250 MG/1
TABLET, FILM COATED ORAL
Qty: 6 TABLET | Refills: 0 | Status: SHIPPED | OUTPATIENT
Start: 2022-05-29 | End: 2022-06-06

## 2022-05-29 RX ORDER — BALANCED SALT SOLUTION ENRICHED WITH BICARBONATE, DEXTROSE, AND GLUTATHIONE
KIT INTRAOCULAR ONCE
Status: DISCONTINUED | OUTPATIENT
Start: 2022-05-29 | End: 2022-05-29 | Stop reason: HOSPADM

## 2022-05-29 RX ADMIN — ONDANSETRON 4 MG: 2 INJECTION INTRAMUSCULAR; INTRAVENOUS at 10:49

## 2022-05-29 RX ADMIN — IOPAMIDOL 75 ML: 755 INJECTION, SOLUTION INTRAVENOUS at 12:17

## 2022-05-29 RX ADMIN — ACETAMINOPHEN 1000 MG: 500 TABLET ORAL at 12:02

## 2022-05-29 RX ADMIN — SODIUM CHLORIDE 1000 ML: 9 INJECTION, SOLUTION INTRAVENOUS at 14:08

## 2022-05-29 ASSESSMENT — VISUAL ACUITY
OS: 20/50
OU: 20/40
OD: 20/30

## 2022-05-29 ASSESSMENT — ENCOUNTER SYMPTOMS
BACK PAIN: 0
NAUSEA: 0
EYE ITCHING: 0
ABDOMINAL PAIN: 0
EYE REDNESS: 0
VOMITING: 0
SORE THROAT: 0
SHORTNESS OF BREATH: 0
EYE DISCHARGE: 0
EYE PAIN: 0
COUGH: 0

## 2022-05-29 ASSESSMENT — PAIN SCALES - GENERAL
PAINLEVEL_OUTOF10: 8
PAINLEVEL_OUTOF10: 4

## 2022-05-29 ASSESSMENT — PAIN DESCRIPTION - LOCATION
LOCATION: HEAD
LOCATION: HEAD;NECK

## 2022-05-29 ASSESSMENT — LIFESTYLE VARIABLES: HOW OFTEN DO YOU HAVE A DRINK CONTAINING ALCOHOL: NEVER

## 2022-05-29 ASSESSMENT — PAIN - FUNCTIONAL ASSESSMENT: PAIN_FUNCTIONAL_ASSESSMENT: 0-10

## 2022-05-29 NOTE — ED PROVIDER NOTES
**ADVANCED PRACTICE PROVIDER, I HAVE EVALUATED THIS PATIENT**        [address]      Pt Name: Ender Flores  [facility]:[id number]  Birthdate 1985  Date of evaluation: 5/29/2022  Provider: Royal Stoner PA-C      Chief Complaint:    Chief Complaint   Patient presents with    Eye Problem     pt states that over the past two weeks he has a multitude of complaints states he had a left corneal abrasion now states both eyes are hurting with a head ache no new injury states that the eyes hurt, his vision goes blurry just like it did wtith the abrasion and he loses balance.  Neck Pain     over the past two weeks, no new injury    Other     states feels like he has chest congestion, with intermittent coughing wheezing and feeling like he cant get phlegm up out of chest          Nursing Notes, Past Medical Hx, Past Surgical Hx, Social Hx, Allergies, and Family Hx were all reviewed and agreed with or any disagreements were addressed in the HPI.    HPI: (Location, Duration, Timing, Severity, Quality, Assoc Sx, Context, Modifying factors)    Chief Complaint of eye problem. Patient states he has had some blurred vision in his eyes. He was seen by Samantha Johnson. And this was since he had a scratch to his. And they noticed that he had a little bleeding on the retina or low spot and they wanted to take a look at he has an appointment on Meghan 3 for follow-up. Also complaining of his neuropathy pain. Is on gabapentin for. He has an appointment with his neurologist June 6 as well. He complain of left side neck pain goes into his left shoulder. Denies injury. Sherry Nelsonu has been going on for a while but is never gotten better. Is been taking Tylenol with no relief. He does use a cane for walking. He has no other motor or sensory deficit noted alert oriented x4. Does not appear to be in acute distress.   Complain of cough or congestion in his chest.  He does have a history of asthma said he has not had asthma in a long time. He is not on any inhalers at this time. This is a  29 y.o. male who presents to the emergency room with the above complaint. PastMedical/Surgical History:      Diagnosis Date    COVID-19     Diabetes mellitus, type II ([facility]     History of blood transfusion     Hypertension     Protein in urine     Seizure ([facility]      History reviewed. No pertinent surgical history. Medications:  Previous Medications    ASPIRIN 81 MG CHEWABLE TABLET    Take 81 mg by mouth daily     CARVEDILOL (COREG) 25 MG TABLET    Take 25 mg by mouth 2 times daily     CHLORTHALIDONE (HYGROTON) 25 MG TABLET    Take 25 mg by mouth daily    DICYCLOMINE (BENTYL) 10 MG CAPSULE    Take 10 mg by mouth 4 times daily (before meals and nightly)     DULOXETINE (CYMBALTA) 30 MG EXTENDED RELEASE CAPSULE    Take 30 mg by mouth daily    EMPAGLIFLOZIN (JARDIANCE) 10 MG TABLET    Take 10 mg by mouth daily    GABAPENTIN (NEURONTIN) 300 MG CAPSULE    Take 300 mg by mouth 3 times daily. 2-3 QD    INSULIN GLARGINE (LANTUS SOLOSTAR) 100 UNIT/ML INJECTION PEN    Inject 30 Units into the skin at bedtime    INSULIN LISPRO, 1 UNIT DIAL, (HUMALOG KWIKPEN) 100 UNIT/ML SOPN    Inject 10 Units into the skin 3 times daily (before meals)    KETOCONAZOLE (NIZORAL) 2 % CREAM    Apply topically daily. KETOCONAZOLE, TOPICAL, 1 % SHAM    Apply quarter size amount and lather in affected area. Leave on for 5 minutes. Then shower off.     LISINOPRIL (PRINIVIL;ZESTRIL) 10 MG TABLET    Take 20 mg by mouth daily     METFORMIN (GLUCOPHAGE) 500 MG TABLET    Take 1,000 mg by mouth     NIFEDIPINE (PROCARDIA XL) 60 MG EXTENDED RELEASE TABLET    Take 60 mg by mouth daily as needed    ROSUVASTATIN (CRESTOR) 40 MG TABLET    Take 1 tablet by mouth nightly    SPIRONOLACTONE (ALDACTONE) 25 MG TABLET    Take 1 tablet by mouth daily         Review of Systems:  (2-9 systems needed)  Review of Systems   Constitutional: Negative for chills and fever. HENT: Negative for congestion and sore throat. Eyes: Positive for visual disturbance. Negative for pain, discharge, redness and itching. Respiratory: Negative for cough and shortness of breath. Cardiovascular: Negative for chest pain and leg swelling. Gastrointestinal: Negative for abdominal pain, nausea and vomiting. Genitourinary: Negative for dysuria and frequency. Musculoskeletal: Negative for back pain and neck pain. Skin: Negative for rash and wound. Neurological: Positive for headaches. Negative for dizziness and light-headedness. \"Positives and Pertinent negatives as per HPI\"    Physical Exam:  Physical Exam  Vitals and nursing note reviewed. Constitutional:       Appearance: He is well-developed. He is not diaphoretic. HENT:      Head: Normocephalic and atraumatic. Nose: Nose normal.      Mouth/Throat:      Mouth: Mucous membranes are moist.      Pharynx: Oropharynx is clear. No oropharyngeal exudate or posterior oropharyngeal erythema. Eyes:      General: No scleral icterus. Right eye: No discharge. Left eye: No discharge. Extraocular Movements: Extraocular movements intact. Conjunctiva/sclera: Conjunctivae normal.      Pupils: Pupils are equal, round, and reactive to light. Neck:        Comments: No nuchal rigidity  Cardiovascular:      Rate and Rhythm: Normal rate and regular rhythm. Heart sounds: Normal heart sounds. No murmur heard. No friction rub. No gallop. Pulmonary:      Effort: Pulmonary effort is normal. No respiratory distress. Breath sounds: Normal breath sounds. No wheezing or rales. Chest:      Chest wall: No tenderness. Abdominal:      General: Abdomen is flat. Bowel sounds are normal. There is no distension. Palpations: Abdomen is soft. There is no mass. Tenderness: There is no abdominal tenderness. There is no guarding or rebound. Musculoskeletal:         General: Normal range of motion. Cervical back: Normal range of motion and neck supple. Pain with movement and muscular tenderness present. No spinous process tenderness. Normal range of motion. Skin:     General: Skin is warm and dry. Neurological:      General: No focal deficit present. Mental Status: He is alert and oriented to person, place, and time. He is not disoriented. GCS: GCS eye subscore is 4. GCS verbal subscore is 5. GCS motor subscore is 6. Cranial Nerves: No cranial nerve deficit. Sensory: No sensory deficit. Motor: No tremor, abnormal muscle tone or seizure activity. Coordination: Coordination normal.      Comments: Finger to nose normal   Psychiatric:         Behavior: Behavior normal.         MEDICAL DECISION MAKING    Vitals:    Vitals:    05/29/22 1207 05/29/22 1252 05/29/22 1300 05/29/22 1400   BP:  105/74 98/71 98/70   Pulse:  93 93 90   Resp: 16      Temp:       SpO2:  97% 92% 98%   Weight:       Height:           LABS:  Labs Reviewed   CBC WITH AUTO DIFFERENTIAL - Abnormal; Notable for the following components:       Result Value    MCV 78.8 (*)     MCH 25.8 (*)     RDW 12.1 (*)     All other components within normal limits   BASIC METABOLIC PANEL - Abnormal; Notable for the following components:    Sodium 134 (*)     Chloride 97 (*)     CO2 19 (*)     Anion Gap 18 (*)     Glucose 167 (*)     BUN 34 (*)     CREATININE 1.8 (*)     GFR Non- 43 (*)     GFR  52 (*)     All other components within normal limits   POCT GLUCOSE - Abnormal; Notable for the following components:    POC Glucose 164 (*)     All other components within normal limits        Remainder of labs reviewed and were negative at this time or not returned at the time of this note.     RADIOLOGY:   Non-plain film images such as CT, Ultrasound and MRI are read by the radiologist. Megan Conklin PA-C have directly visualized the radiologic plain film image(s) with the below findings:      Interpretation per the Radiologist below, if available at the time of this note:    CT CHEST W CONTRAST   Final Result   1. Dense opacification of the perihilar right upper lobe suspected to   represent acute pneumonia. Correlate with any clinical signs of infection. 2. Probable right hilar lymphadenopathy in association. 3. Malignancy is considered less likely given appearance and patient age/lack   of history. Recommend follow-up imaging after a course of therapy to ensure   resolution. XR CHEST (2 VW)   Final Result   Ill-defined opacity in the perihilar right upper lobe potentially due to   pneumonia or malignancy. Recommend further evaluation with a   contrast-enhanced chest CT. CT HEAD WO CONTRAST   Final Result   No acute intracranial abnormality. Echo Complete    Result Date: 5/18/2022  Transthoracic Echocardiography Report (TTE)  Demographics   Patient Name        Yvan Aguayo   Date of Study       05/18/2022  Gender                 Male   Patient Number      [id number]  Date of Birth          1985   Visit Number        [id number]   Age                    29 year(s)   Accession Number    [id number]  Room Number            6417   Corporate ID        [id number]     Valerio Humphrey RD,                                                         [facility]   Ordering Physician              Interpreting Physician Ari Chandler MD  Procedure Type of Study   TTE procedure:ECHOCARDIOGRAM COMPLETE 2D W DOPPLER W COLOR. Procedure Date Date: 05/18/2022 Start: 01:17 PM Study Location: [facility] Lab Technical Quality: Adequate visualization Indications:Chest pain. Additional Indications:SOB.  Patient Status: Routine Height: 73 inches Weight: 268.01 pounds BSA: 2.44 m2 BMI: 35.36 kg/m2 BP: 131/94 mmHg  Conclusions Summary  Left ventricle size is normal.  Normal left ventricle wall thickness is present. Global left ventricular function is mildly decreased with ejection fraction  estimated from 40 % to 45 %. Indeterminate diastolic function. The mitral valve leaflets are mildly thickened with normal opening. The right ventricle is normal in size and function. Signature   ------------------------------------------------------------------  Electronically signed by Ry Hernández MD (Interpreting  physician) on 05/18/2022 at 02:43 PM  ------------------------------------------------------------------   Findings   Left Ventricle  Left ventricle size is normal.  Normal left ventricle wall thickness is present. Global left ventricular function is mildly decreased with ejection fraction  estimated from 40 % to 45 %. Indeterminate diastolic function. Mitral Valve  The mitral valve leaflets are mildly thickened with normal opening. No evidence of mitral regurgitation or stenosis. Left Atrium  The left atrium is normal in size. Aortic Valve  The aortic valve is structurally normal. There is no significant aortic  valve regurgitation or stenosis. Aorta  The aortic root is normal in size. Right Ventricle  The right ventricle is normal in size and function. Tricuspid Valve  The tricuspid valve is normal in structure and function. There is no  significant tricuspid valve regurgitation or stenosis. Right Atrium  The right atrial size is normal.   Pulmonic Valve  The pulmonic valve is not well visualized. There is no evidence of pulmonic valve regurgitation or stenosis. Pericardial Effusion  No pericardial effusion noted. Pleural Effusion  There is a possible pleural effusion seen. Miscellaneous  The inferior vena cava appears normal in size with normal respiratory  variation.   M-Mode/2D Measurements (cm)   LV Diastolic Dimension: 4.85 cm LV Systolic Dimension: 2.16 cm  LV Septum Diastolic: 4.71 cm  LV PW Diastolic: 1.2 cm         AO Root Dimension: 3.3 cm                                   LA Area: 19.1 cm2  LVOT: 2.8 cm                    LA volume/Index: 58.3 ml /24 ml/m2  Doppler Measurements   AV Peak Velocity: 113 cm/s     MV Peak A-Wave: 84 cm/s  AV Peak Gradient: 5.11 mmHg  LVOT Peak Velocity: 84.8 cm/s   E' Septal Velocity: 7.4 cm/s  E' Lateral Velocity: 6.85 cm/s  PV Peak Velocity: 73.7 cm/s    A' Septal Velocity: 12.1 cm/s  PV Peak Gradient: 2.17 mmHg    A' Lateral Velocity: 9.46 cm/s   Aortic Valve   Peak Velocity: 113 cm/s  Peak Gradient: 5.11 mmHg  Aorta   Aortic Root: 3.3 cm  Ascending Aorta: 3.3 cm  LVOT Diameter: 2.8 cm      XR CHEST (2 VW)    Result Date: 5/17/2022  EXAMINATION: TWO XRAY VIEWS OF THE CHEST 5/17/2022 6:29 pm COMPARISON: 06/03/2020 HISTORY: ORDERING SYSTEM PROVIDED HISTORY: fever TECHNOLOGIST PROVIDED HISTORY: Reason for exam:->fever FINDINGS: Heart size and configuration are normal.  The lungs are clear. No pneumothorax or pleural fluid. No acute bone finding. No acute cardiopulmonary disease. MEDICAL DECISION MAKING / ED COURSE:      PROCEDURES:   Procedures    None    Patient was given:  Medications   balanced salts plus (BSS) ophthalmic solution (300 drops Ophthalmic Not Given 5/29/22 1049)   fluorescein ophthalmic strip 1 mg (1 mg Ophthalmic Not Given 5/29/22 1049)   proparacaine (ALCAINE) 0.5 % ophthalmic solution 1 drop (1 drop Ophthalmic Not Given 5/29/22 1049)   0.9 % sodium chloride bolus (1,000 mLs IntraVENous New Bag 5/29/22 1408)   ondansetron (ZOFRAN) injection 4 mg (4 mg IntraVENous Given 5/29/22 1049)   acetaminophen (TYLENOL) tablet 1,000 mg (1,000 mg Oral Given 5/29/22 1202)   iopamidol (ISOVUE-370) 76 % injection 75 mL (75 mLs IntraVENous Given 5/29/22 1217)     Emergency room course: Patient on exam pupils are equal round and reactive to light extraocular movement is intact. There is no erythema noted. There is no hyphema noted.   Cannot appreciate any photophobia when using the right. Neck shows no midline tenderness cervical, thoracic or lumbar spine. Does have some mild tenderness off to the left lateral side extended to the left shoulder. Full range of motion left shoulder. Clavicle scapular region show no tenderness. Cardiovascular regular rate rhythm, lungs are clear. No wheeze rales or rhonchi noted. Patient has full range of motion all extremity. Alert oriented x4. Does not appear to be in acute distress. Lab result today shows:  CBC within normal limits with a white count 7.1. BMP sodium 134, potassium 4.5, chloride 97, glucose 167, BUN 34 creatinine 1.8. CT head showed no acute intracranial abnormality     X-ray chest still ill-defined opacity in the perihilar right upper lobe posterior due to pneumonia or mass    CT of chest with contrast shows dense opacification of the perihilar right upper lobe suspected to represent acute pneumonia. Malignancy is considered less likely. See above. Discussed patient CT and lab results from today with him. Discussed treatment plan. He is still had no cough no fever. I will put him on a Z-Balbir. He has appointment to see his eye doctor [id number] and informed him to keep that appointment. I explained to him that his creatinine is elevated. He was given a liter of fluid here in the ED. Informed him that he needs to drink more water. Follow back up with his primary care physician to have his renal function reevaluated within the next week or so. Him and his mom are okay with this plan. Patient will be discharged stable condition instructed return for any worsening. The patient tolerated their visit well. I evaluated the patient. The physician was available for consultation as needed. The patient and / or the family were informed of the results of any tests, a time was given to answer questions, a plan was proposed and they agreed with plan.       CLINICAL IMPRESSION:  1. Pneumonia of right upper lobe due to infectious organism    2.  Acute kidney injury (CHELE) with acute tubular necrosis (ATN) (HCC)    3. Dehydration        DISPOSITION  DISPOSITION Decision To Discharge 05/29/2022 02:29:01 PM      PATIENT REFERRED TO:  Sae Garcia, [address] [place] [address]  [phone]    Call in 2 days        DISCHARGE MEDICATIONS:  New Prescriptions    AZITHROMYCIN (ZITHROMAX) 250 MG TABLET    2 TABS DAY 1 THEN 1 TAB DAYS 2-5       DISCONTINUED MEDICATIONS:  Discontinued Medications    No medications on file              (Please note the MDM and HPI sections of this note were completed with a voice recognition program.  Efforts were made to edit the dictations but occasionally words are mis-transcribed.)    Electronically signed, Ludwin Osman PA-C,          Ludwin Osman PA-C  05/30/22 1932

## 2022-05-29 NOTE — ED TRIAGE NOTES
Pt states now that he has neuropathy and his vision also has changed since his recent admission with his diabetes.   States since that admission his readings have been normal.

## 2022-06-06 ENCOUNTER — TELEPHONE (OUTPATIENT)
Dept: OTHER | Facility: CLINIC | Age: 37
End: 2022-06-06

## 2022-06-06 ENCOUNTER — APPOINTMENT (OUTPATIENT)
Dept: GENERAL RADIOLOGY | Age: 37
DRG: 137 | End: 2022-06-06
Payer: MEDICAID

## 2022-06-06 ENCOUNTER — HOSPITAL ENCOUNTER (INPATIENT)
Age: 37
LOS: 17 days | Discharge: HOME | DRG: 137 | End: 2022-06-23
Attending: INTERNAL MEDICINE | Admitting: INTERNAL MEDICINE
Payer: MEDICAID

## 2022-06-06 DIAGNOSIS — E66.09 CLASS 1 OBESITY DUE TO EXCESS CALORIES WITH SERIOUS COMORBIDITY AND BODY MASS INDEX (BMI) OF 34.0 TO 34.9 IN ADULT: ICD-10-CM

## 2022-06-06 DIAGNOSIS — J18.9 COMMUNITY ACQUIRED PNEUMONIA OF RIGHT UPPER LOBE OF LUNG: ICD-10-CM

## 2022-06-06 DIAGNOSIS — Z79.4 TYPE 2 DIABETES MELLITUS TREATED WITH INSULIN (HCC): ICD-10-CM

## 2022-06-06 DIAGNOSIS — G40.909 SEIZURE DISORDER (HCC): ICD-10-CM

## 2022-06-06 DIAGNOSIS — E11.65 POORLY CONTROLLED TYPE 2 DIABETES MELLITUS (HCC): ICD-10-CM

## 2022-06-06 DIAGNOSIS — R06.02 SOB (SHORTNESS OF BREATH): ICD-10-CM

## 2022-06-06 DIAGNOSIS — J18.9 PNEUMONIA OF RIGHT UPPER LOBE DUE TO INFECTIOUS ORGANISM: ICD-10-CM

## 2022-06-06 DIAGNOSIS — A49.1 STREPTOCOCCAL INFECTION: ICD-10-CM

## 2022-06-06 DIAGNOSIS — Z71.89 ENCOUNTER FOR MEDICATION COUNSELING: ICD-10-CM

## 2022-06-06 DIAGNOSIS — R04.2 HEMOPTYSIS: ICD-10-CM

## 2022-06-06 DIAGNOSIS — I10 ESSENTIAL HYPERTENSION: ICD-10-CM

## 2022-06-06 DIAGNOSIS — J18.9 PNEUMONIA DUE TO INFECTIOUS ORGANISM, UNSPECIFIED LATERALITY, UNSPECIFIED PART OF LUNG: ICD-10-CM

## 2022-06-06 DIAGNOSIS — E11.9 TYPE 2 DIABETES MELLITUS TREATED WITH INSULIN (HCC): ICD-10-CM

## 2022-06-06 DIAGNOSIS — N17.9 ACUTE RENAL FAILURE, UNSPECIFIED ACUTE RENAL FAILURE TYPE (HCC): Primary | ICD-10-CM

## 2022-06-06 DIAGNOSIS — R93.89 ABNORMAL CT OF THE CHEST: ICD-10-CM

## 2022-06-06 DIAGNOSIS — Z98.890 S/P BRONCHOSCOPY: ICD-10-CM

## 2022-06-06 DIAGNOSIS — R91.8 ENDOBRONCHIAL MASS: ICD-10-CM

## 2022-06-06 LAB
A/G RATIO: 0.9 (ref 1.1–2.2)
ALBUMIN SERPL-MCNC: 3.7 G/DL (ref 3.4–5)
ALP BLD-CCNC: 120 U/L (ref 40–129)
ALT SERPL-CCNC: 16 U/L (ref 10–40)
ANION GAP SERPL CALCULATED.3IONS-SCNC: 14 MMOL/L (ref 3–16)
AST SERPL-CCNC: 13 U/L (ref 15–37)
BASE EXCESS VENOUS: -0.4 MMOL/L
BASOPHILS ABSOLUTE: 0.1 K/UL (ref 0–0.2)
BASOPHILS RELATIVE PERCENT: 1.6 %
BETA-HYDROXYBUTYRATE: 0.23 MMOL/L (ref 0–0.27)
BILIRUB SERPL-MCNC: <0.2 MG/DL (ref 0–1)
BUN BLDV-MCNC: 43 MG/DL (ref 7–20)
CALCIUM SERPL-MCNC: 9.9 MG/DL (ref 8.3–10.6)
CARBOXYHEMOGLOBIN: 1.1 %
CHLORIDE BLD-SCNC: 98 MMOL/L (ref 99–110)
CO2: 22 MMOL/L (ref 21–32)
CREAT SERPL-MCNC: 2.4 MG/DL (ref 0.9–1.3)
EOSINOPHILS ABSOLUTE: 0.1 K/UL (ref 0–0.6)
EOSINOPHILS RELATIVE PERCENT: 2.2 %
GFR AFRICAN AMERICAN: 37
GFR NON-AFRICAN AMERICAN: 31
GLUCOSE BLD-MCNC: 156 MG/DL (ref 70–99)
GLUCOSE BLD-MCNC: 158 MG/DL (ref 70–99)
HCO3 VENOUS: 26 MMOL/L (ref 23–29)
HCT VFR BLD CALC: 44.5 % (ref 40.5–52.5)
HEMOGLOBIN: 14.5 G/DL (ref 13.5–17.5)
LIPASE: 43 U/L (ref 13–60)
LYMPHOCYTES ABSOLUTE: 1.2 K/UL (ref 1–5.1)
LYMPHOCYTES RELATIVE PERCENT: 20.6 %
MCH RBC QN AUTO: 25.5 PG (ref 26–34)
MCHC RBC AUTO-ENTMCNC: 32.5 G/DL (ref 31–36)
MCV RBC AUTO: 78.4 FL (ref 80–100)
METHEMOGLOBIN VENOUS: 0.4 %
MONOCYTES ABSOLUTE: 0.6 K/UL (ref 0–1.3)
MONOCYTES RELATIVE PERCENT: 10.6 %
NEUTROPHILS ABSOLUTE: 3.9 K/UL (ref 1.7–7.7)
NEUTROPHILS RELATIVE PERCENT: 65 %
O2 SAT, VEN: 61 %
O2 THERAPY: ABNORMAL
PCO2, VEN: 47.3 MMHG (ref 40–50)
PDW BLD-RTO: 12 % (ref 12.4–15.4)
PERFORMED ON: ABNORMAL
PH VENOUS: 7.35 (ref 7.35–7.45)
PLATELET # BLD: 386 K/UL (ref 135–450)
PMV BLD AUTO: 8.2 FL (ref 5–10.5)
PO2, VEN: 33 MMHG
POTASSIUM REFLEX MAGNESIUM: 4.9 MMOL/L (ref 3.5–5.1)
RAPID INFLUENZA  B AGN: NEGATIVE
RAPID INFLUENZA A AGN: NEGATIVE
RBC # BLD: 5.68 M/UL (ref 4.2–5.9)
SARS-COV-2, NAAT: NOT DETECTED
SODIUM BLD-SCNC: 134 MMOL/L (ref 136–145)
TCO2 CALC VENOUS: 27 MMOL/L
TOTAL PROTEIN: 7.8 G/DL (ref 6.4–8.2)
TROPONIN: 0.05 NG/ML
WBC # BLD: 6 K/UL (ref 4–11)

## 2022-06-06 PROCEDURE — 80053 COMPREHEN METABOLIC PANEL: CPT

## 2022-06-06 PROCEDURE — 1200000000 HC SEMI PRIVATE

## 2022-06-06 PROCEDURE — 2580000003 HC RX 258: Performed by: INTERNAL MEDICINE

## 2022-06-06 PROCEDURE — 82803 BLOOD GASES ANY COMBINATION: CPT

## 2022-06-06 PROCEDURE — 6370000000 HC RX 637 (ALT 250 FOR IP): Performed by: INTERNAL MEDICINE

## 2022-06-06 PROCEDURE — 2580000003 HC RX 258: Performed by: NURSE PRACTITIONER

## 2022-06-06 PROCEDURE — 87804 INFLUENZA ASSAY W/OPTIC: CPT

## 2022-06-06 PROCEDURE — 87635 SARS-COV-2 COVID-19 AMP PRB: CPT

## 2022-06-06 PROCEDURE — 96361 HYDRATE IV INFUSION ADD-ON: CPT

## 2022-06-06 PROCEDURE — 71045 X-RAY EXAM CHEST 1 VIEW: CPT

## 2022-06-06 PROCEDURE — 93005 ELECTROCARDIOGRAM TRACING: CPT | Performed by: NURSE PRACTITIONER

## 2022-06-06 PROCEDURE — 96375 TX/PRO/DX INJ NEW DRUG ADDON: CPT

## 2022-06-06 PROCEDURE — 87040 BLOOD CULTURE FOR BACTERIA: CPT

## 2022-06-06 PROCEDURE — 6360000002 HC RX W HCPCS: Performed by: NURSE PRACTITIONER

## 2022-06-06 PROCEDURE — 99285 EMERGENCY DEPT VISIT HI MDM: CPT

## 2022-06-06 PROCEDURE — 83690 ASSAY OF LIPASE: CPT

## 2022-06-06 PROCEDURE — 83036 HEMOGLOBIN GLYCOSYLATED A1C: CPT

## 2022-06-06 PROCEDURE — 85025 COMPLETE CBC W/AUTO DIFF WBC: CPT

## 2022-06-06 PROCEDURE — 84484 ASSAY OF TROPONIN QUANT: CPT

## 2022-06-06 PROCEDURE — 36415 COLL VENOUS BLD VENIPUNCTURE: CPT

## 2022-06-06 PROCEDURE — 82010 KETONE BODYS QUAN: CPT

## 2022-06-06 PROCEDURE — 96365 THER/PROPH/DIAG IV INF INIT: CPT

## 2022-06-06 RX ORDER — ONDANSETRON 2 MG/ML
4 INJECTION INTRAMUSCULAR; INTRAVENOUS EVERY 6 HOURS PRN
Status: DISCONTINUED | OUTPATIENT
Start: 2022-06-06 | End: 2022-06-23 | Stop reason: HOSPADM

## 2022-06-06 RX ORDER — GABAPENTIN 300 MG/1
300 CAPSULE ORAL 3 TIMES DAILY
Status: DISCONTINUED | OUTPATIENT
Start: 2022-06-06 | End: 2022-06-23 | Stop reason: HOSPADM

## 2022-06-06 RX ORDER — SODIUM CHLORIDE 9 MG/ML
INJECTION, SOLUTION INTRAVENOUS PRN
Status: DISCONTINUED | OUTPATIENT
Start: 2022-06-06 | End: 2022-06-23 | Stop reason: HOSPADM

## 2022-06-06 RX ORDER — ROSUVASTATIN CALCIUM 40 MG/1
40 TABLET, COATED ORAL NIGHTLY
Status: DISCONTINUED | OUTPATIENT
Start: 2022-06-06 | End: 2022-06-23 | Stop reason: HOSPADM

## 2022-06-06 RX ORDER — INSULIN LISPRO 100 [IU]/ML
0-12 INJECTION, SOLUTION INTRAVENOUS; SUBCUTANEOUS
Status: DISCONTINUED | OUTPATIENT
Start: 2022-06-07 | End: 2022-06-23 | Stop reason: HOSPADM

## 2022-06-06 RX ORDER — ALBUTEROL SULFATE 90 UG/1
2 AEROSOL, METERED RESPIRATORY (INHALATION) EVERY 6 HOURS PRN
Status: DISCONTINUED | OUTPATIENT
Start: 2022-06-06 | End: 2022-06-23 | Stop reason: HOSPADM

## 2022-06-06 RX ORDER — ACETAMINOPHEN 325 MG/1
650 TABLET ORAL EVERY 6 HOURS PRN
Status: DISCONTINUED | OUTPATIENT
Start: 2022-06-06 | End: 2022-06-23 | Stop reason: HOSPADM

## 2022-06-06 RX ORDER — CARVEDILOL 25 MG/1
25 TABLET ORAL 2 TIMES DAILY WITH MEALS
Status: DISCONTINUED | OUTPATIENT
Start: 2022-06-06 | End: 2022-06-23 | Stop reason: HOSPADM

## 2022-06-06 RX ORDER — LEVOFLOXACIN 5 MG/ML
750 INJECTION, SOLUTION INTRAVENOUS EVERY 24 HOURS
Status: COMPLETED | OUTPATIENT
Start: 2022-06-07 | End: 2022-06-11

## 2022-06-06 RX ORDER — SODIUM CHLORIDE 0.9 % (FLUSH) 0.9 %
10 SYRINGE (ML) INJECTION EVERY 12 HOURS SCHEDULED
Status: DISCONTINUED | OUTPATIENT
Start: 2022-06-06 | End: 2022-06-23 | Stop reason: HOSPADM

## 2022-06-06 RX ORDER — ACETAMINOPHEN 650 MG/1
650 SUPPOSITORY RECTAL EVERY 6 HOURS PRN
Status: DISCONTINUED | OUTPATIENT
Start: 2022-06-06 | End: 2022-06-23 | Stop reason: HOSPADM

## 2022-06-06 RX ORDER — INSULIN LISPRO 100 [IU]/ML
0-6 INJECTION, SOLUTION INTRAVENOUS; SUBCUTANEOUS NIGHTLY
Status: DISCONTINUED | OUTPATIENT
Start: 2022-06-06 | End: 2022-06-23 | Stop reason: HOSPADM

## 2022-06-06 RX ORDER — PROMETHAZINE HYDROCHLORIDE 25 MG/1
12.5 TABLET ORAL EVERY 6 HOURS PRN
Status: DISCONTINUED | OUTPATIENT
Start: 2022-06-06 | End: 2022-06-23 | Stop reason: HOSPADM

## 2022-06-06 RX ORDER — SODIUM CHLORIDE, SODIUM LACTATE, POTASSIUM CHLORIDE, AND CALCIUM CHLORIDE .6; .31; .03; .02 G/100ML; G/100ML; G/100ML; G/100ML
1000 INJECTION, SOLUTION INTRAVENOUS ONCE
Status: COMPLETED | OUTPATIENT
Start: 2022-06-06 | End: 2022-06-06

## 2022-06-06 RX ORDER — SODIUM CHLORIDE 9 MG/ML
INJECTION, SOLUTION INTRAVENOUS CONTINUOUS
Status: DISCONTINUED | OUTPATIENT
Start: 2022-06-06 | End: 2022-06-09

## 2022-06-06 RX ORDER — ENOXAPARIN SODIUM 100 MG/ML
30 INJECTION SUBCUTANEOUS 2 TIMES DAILY
Status: DISCONTINUED | OUTPATIENT
Start: 2022-06-07 | End: 2022-06-23 | Stop reason: HOSPADM

## 2022-06-06 RX ORDER — DEXTROSE MONOHYDRATE 50 MG/ML
100 INJECTION, SOLUTION INTRAVENOUS PRN
Status: DISCONTINUED | OUTPATIENT
Start: 2022-06-06 | End: 2022-06-23 | Stop reason: HOSPADM

## 2022-06-06 RX ORDER — ENOXAPARIN SODIUM 100 MG/ML
40 INJECTION SUBCUTANEOUS DAILY
Status: DISCONTINUED | OUTPATIENT
Start: 2022-06-06 | End: 2022-06-06 | Stop reason: DRUGHIGH

## 2022-06-06 RX ORDER — INSULIN GLARGINE 100 [IU]/ML
35 INJECTION, SOLUTION SUBCUTANEOUS DAILY
Status: DISCONTINUED | OUTPATIENT
Start: 2022-06-07 | End: 2022-06-13

## 2022-06-06 RX ORDER — AMOXICILLIN AND CLAVULANATE POTASSIUM 875; 125 MG/1; MG/1
1 TABLET, FILM COATED ORAL 2 TIMES DAILY
Status: ON HOLD | COMMUNITY
Start: 2022-06-06 | End: 2022-06-23 | Stop reason: HOSPADM

## 2022-06-06 RX ORDER — DULOXETIN HYDROCHLORIDE 30 MG/1
30 CAPSULE, DELAYED RELEASE ORAL DAILY
Status: DISCONTINUED | OUTPATIENT
Start: 2022-06-07 | End: 2022-06-23 | Stop reason: HOSPADM

## 2022-06-06 RX ORDER — ONDANSETRON 2 MG/ML
4 INJECTION INTRAMUSCULAR; INTRAVENOUS ONCE
Status: COMPLETED | OUTPATIENT
Start: 2022-06-06 | End: 2022-06-06

## 2022-06-06 RX ORDER — ASPIRIN 81 MG/1
81 TABLET, CHEWABLE ORAL DAILY
Status: DISCONTINUED | OUTPATIENT
Start: 2022-06-07 | End: 2022-06-23 | Stop reason: HOSPADM

## 2022-06-06 RX ORDER — ALBUTEROL SULFATE 90 UG/1
2 AEROSOL, METERED RESPIRATORY (INHALATION) EVERY 6 HOURS PRN
COMMUNITY

## 2022-06-06 RX ORDER — NIFEDIPINE 60 MG/1
60 TABLET, EXTENDED RELEASE ORAL DAILY
Status: DISCONTINUED | OUTPATIENT
Start: 2022-06-07 | End: 2022-06-23 | Stop reason: HOSPADM

## 2022-06-06 RX ORDER — LEVOFLOXACIN 5 MG/ML
500 INJECTION, SOLUTION INTRAVENOUS ONCE
Status: COMPLETED | OUTPATIENT
Start: 2022-06-06 | End: 2022-06-06

## 2022-06-06 RX ORDER — SODIUM CHLORIDE 0.9 % (FLUSH) 0.9 %
10 SYRINGE (ML) INJECTION PRN
Status: DISCONTINUED | OUTPATIENT
Start: 2022-06-06 | End: 2022-06-23 | Stop reason: HOSPADM

## 2022-06-06 RX ORDER — INSULIN DEGLUDEC 200 U/ML
30 INJECTION, SOLUTION SUBCUTANEOUS DAILY
COMMUNITY
End: 2022-07-19 | Stop reason: SDUPTHER

## 2022-06-06 RX ORDER — DICYCLOMINE HYDROCHLORIDE 10 MG/1
10 CAPSULE ORAL
Status: DISCONTINUED | OUTPATIENT
Start: 2022-06-06 | End: 2022-06-23 | Stop reason: HOSPADM

## 2022-06-06 RX ADMIN — CARVEDILOL 25 MG: 25 TABLET, FILM COATED ORAL at 21:50

## 2022-06-06 RX ADMIN — ROSUVASTATIN CALCIUM 40 MG: 40 TABLET, FILM COATED ORAL at 21:50

## 2022-06-06 RX ADMIN — SODIUM CHLORIDE, POTASSIUM CHLORIDE, SODIUM LACTATE AND CALCIUM CHLORIDE 1000 ML: 600; 310; 30; 20 INJECTION, SOLUTION INTRAVENOUS at 15:17

## 2022-06-06 RX ADMIN — ONDANSETRON 4 MG: 2 INJECTION INTRAMUSCULAR; INTRAVENOUS at 15:16

## 2022-06-06 RX ADMIN — SODIUM CHLORIDE: 9 INJECTION, SOLUTION INTRAVENOUS at 21:50

## 2022-06-06 RX ADMIN — INSULIN LISPRO 1 UNITS: 100 INJECTION, SOLUTION INTRAVENOUS; SUBCUTANEOUS at 21:50

## 2022-06-06 RX ADMIN — SODIUM CHLORIDE, PRESERVATIVE FREE 10 ML: 5 INJECTION INTRAVENOUS at 21:50

## 2022-06-06 RX ADMIN — LEVOFLOXACIN 500 MG: 5 INJECTION, SOLUTION INTRAVENOUS at 17:35

## 2022-06-06 RX ADMIN — DICYCLOMINE HYDROCHLORIDE 10 MG: 10 CAPSULE ORAL at 21:50

## 2022-06-06 RX ADMIN — GABAPENTIN 300 MG: 300 CAPSULE ORAL at 21:50

## 2022-06-06 ASSESSMENT — ENCOUNTER SYMPTOMS
COUGH: 0
COLOR CHANGE: 0
BACK PAIN: 0
ABDOMINAL PAIN: 0
DIARRHEA: 0
VOMITING: 0
NAUSEA: 1
WHEEZING: 0
SHORTNESS OF BREATH: 0

## 2022-06-06 ASSESSMENT — PAIN - FUNCTIONAL ASSESSMENT: PAIN_FUNCTIONAL_ASSESSMENT: 0-10

## 2022-06-06 ASSESSMENT — PAIN SCALES - GENERAL
PAINLEVEL_OUTOF10: 0
PAINLEVEL_OUTOF10: 0

## 2022-06-06 NOTE — TELEPHONE ENCOUNTER
Writer contacted [facility] Kovacs to inform of 30 day readmission risk. Writer's attempt to contact [facility] Kovacs was unsuccessful.      Call Back: If you need to call back to inform of disposition you can contact me at [phone]

## 2022-06-06 NOTE — ED NOTES
This RN attempted to call report on pt, receiving RN states she is not ready for report at this time and will call back.       Semaj Pimentel RN  06/06/22 [id number]

## 2022-06-06 NOTE — ED PROVIDER NOTES
**ADVANCED PRACTICE PROVIDER, I HAVE EVALUATED THIS PATIENT**        [address]      Pt Name: Amber Baron  YTM:[id number]  Briannagfsherie 1985  Date of evaluation: 6/6/2022  Provider: HANS Underwood CNP      Chief Complaint:    Chief Complaint   Patient presents with    Dizziness     x 2 weeks. evaluated here, referred to eye doctor. has not followed up yet. saw pcp today, stated it was due to flu. Nursing Notes, Past Medical Hx, Past Surgical Hx, Social Hx, Allergies, and Family Hx were all reviewed and agreed with or any disagreements were addressed in the HPI.    HPI: (Location, Duration, Timing, Severity, Quality, Assoc Sx, Context, Modifying factors)    Chief Complaint of dizziness, nausea and not feeling well    This is a  29 y.o. male who presents today with dizziness that he has been dealing with over two weeks, when he was seen in the ED on 5/29/22 he was diagnosed with pneumonia and completed all his antibiotics, was seen by his family doctor today and then sent to the ER to start a new round of antibiotics however he states that he is not feeling any better, he still nauseous, he still feeling dizzy. He states that he has a headache in the frontal region of his head that started this morning, has been dealing with off-and-on headaches for the past 2 weeks. He does report being type II diabetic however he is on insulin, his blood sugars have been in the 200s which she states is normal for him. He denies any abdominal pain, no diarrhea or blood in stool, no urinary symptoms. He is not having any chest pain pleuritic chest pain or shortness of breath. He states he feels rundown, fatigue, lack of energy, dizzy however, he has not fallen or had any trauma or injury. He does report that for the past 2 weeks has been walking with a cane to help with his balance.   He denies any specific pain on exam, no additional complaints. No additional aggravating relieving factors. Patient presents awake, alert and in no acute respiratory distress or toxic appearance. PastMedical/Surgical History:      Diagnosis Date    COVID-19     Diabetes mellitus, type II ([facility]     History of blood transfusion     Hypertension     Protein in urine     [facility]      History reviewed. No pertinent surgical history. Medications:  Previous Medications    ALBUTEROL SULFATE  (90 BASE) MCG/ACT INHALER    Inhale 2 puffs into the lungs every 6 hours as needed for Wheezing    AMOXICILLIN-CLAVULANATE (AUGMENTIN) 875-125 MG PER TABLET    Take 1 tablet by mouth 2 times daily    ASPIRIN 81 MG CHEWABLE TABLET    Take 81 mg by mouth daily     CARVEDILOL (COREG) 25 MG TABLET    Take 25 mg by mouth 2 times daily     CHLORTHALIDONE (HYGROTON) 25 MG TABLET    Take 25 mg by mouth daily    DICYCLOMINE (BENTYL) 10 MG CAPSULE    Take 10 mg by mouth 4 times daily (before meals and nightly)     DULOXETINE (CYMBALTA) 30 MG EXTENDED RELEASE CAPSULE    Take 30 mg by mouth daily    EMPAGLIFLOZIN (JARDIANCE) 10 MG TABLET    Take 10 mg by mouth daily    GABAPENTIN (NEURONTIN) 300 MG CAPSULE    Take 300 mg by mouth 3 times daily.  2-3 QD    INSULIN DEGLUDEC (TRESIBA FLEXTOUCH) 200 UNIT/ML SOPN    Inject 55 Units into the skin daily    INSULIN LISPRO, 1 UNIT DIAL, (HUMALOG KWIKPEN) 100 UNIT/ML SOPN    Inject 10 Units into the skin 3 times daily (before meals)    LISINOPRIL (PRINIVIL;ZESTRIL) 10 MG TABLET    Take 20 mg by mouth daily     METFORMIN (GLUCOPHAGE) 500 MG TABLET    Take 1,000 mg by mouth 2 times daily     NIFEDIPINE (PROCARDIA XL) 60 MG EXTENDED RELEASE TABLET    Take 60 mg by mouth daily as needed    ROSUVASTATIN (CRESTOR) 40 MG TABLET    Take 1 tablet by mouth nightly    SPIRONOLACTONE (ALDACTONE) 25 MG TABLET    Take 1 tablet by mouth daily         Review of Systems:  (2-9 systems needed)  Review of Systems   Constitutional: Negative for chills and fever. HENT: Negative for congestion. Respiratory: Negative for cough, shortness of breath and wheezing. Cardiovascular: Negative for chest pain. Gastrointestinal: Positive for nausea. Negative for abdominal pain, diarrhea and vomiting. He has been feeling nauseated however he only vomiting or diarrhea, no abdominal pain or tenderness on exam today   Genitourinary: Negative for dysuria, frequency and hematuria. Musculoskeletal: Positive for myalgias. Negative for back pain. Patient does complain of some generalized body aches and a slight headache, denies worst headache of life, states she just feels overall rundown   Skin: Negative for color change. Neurological: Positive for dizziness, weakness and headaches. Negative for numbness. Patient complains of dizziness that he has been dealing with over two weeks, when he was seen in the ED on 5/29/22 he was diagnosed with pneumonia and completed all his antibiotics, was seen by his family doctor today and then sent to the ER to start a new round of antibiotics however he states that he is not feeling any better, he still nauseous, he still feeling dizzy.   He states he feels like in general he is just weak       \"Positives and Pertinent negatives as per HPI\"    Physical Exam:  Physical Exam    MEDICAL DECISION MAKING    Vitals:    Vitals:    06/06/22 1657 06/06/22 1749 06/06/22 1759 06/06/22 1819   BP: 118/87 94/79 120/80 (!) 129/95   Pulse: 87 78 80 84   Resp: 18 16 16 16   Temp:       TempSrc:       SpO2: 98% 94% 98% 99%       LABS:  Labs Reviewed   CBC WITH AUTO DIFFERENTIAL - Abnormal; Notable for the following components:       Result Value    MCV 78.4 (*)     MCH 25.5 (*)     RDW 12.0 (*)     All other components within normal limits   COMPREHENSIVE METABOLIC PANEL W/ REFLEX TO MG FOR LOW K - Abnormal; Notable for the following components:    Sodium 134 (*)     Chloride 98 (*)     Glucose 156 (*)     BUN 43 (*) CREATININE 2.4 (*)     GFR Non- 31 (*)     GFR African American 37 (*)     Albumin/Globulin Ratio 0.9 (*)     AST 13 (*)     All other components within normal limits   TROPONIN - Abnormal; Notable for the following components:    Troponin 0.05 (*)     All other components within normal limits   BLOOD GAS, VENOUS - Abnormal; Notable for the following components:    pH, Romel 7.347 (*)     All other components within normal limits   RAPID INFLUENZA A/B ANTIGENS   COVID-19, RAPID   CULTURE, BLOOD 1   CULTURE, BLOOD 2   LIPASE   BETA-HYDROXYBUTYRATE   URINALYSIS WITH REFLEX TO CULTURE   URINE DRUG SCREEN        Remainder of labs reviewed and were negative at this time or not returned at the time of this note. RADIOLOGY:   Non-plain film images such as CT, Ultrasound and MRI are read by the radiologist. Julianna CAMACHO, HANS - CNP have directly visualized the radiologic plain film image(s) with the below findings:      Interpretation per the Radiologist below, if available at the time of this note:    XR CHEST PORTABLE   Final Result   Redemonstration of right upper lobe infiltrate which is likely pneumonic. It   is slightly larger compared to the previous evaluation. MEDICAL DECISION MAKING / ED COURSE:    Because of high probability of sudden clinical deterioration of the patient's condition and risk of further deterioration, critical care time required my full attention to the patient's condition; which included chart data review, documentation, medication ordering, reviewing the patient's old records, reevaluation patient's cardiac, pulmonary and neurological status. Reevaluation of vital signs. Consultations with ED attending and admitting physician. Ordering, interpreting reviewing diagnostic testing. Therefore a critical care time was 35 minutes of direct attention to the patient's condition did not include time spent on procedures.     Is this patient to be included in the SEP-1 Core Measure due to severe sepsis or septic shock? No   Exclusion criteria - the patient is NOT to be included for SEP-1 Core Measure due to:  May have criteria for sepsis, but does not meet criteria for severe sepsis or septic shock    PROCEDURES:   Procedures    None    Patient was given:  Medications   levoFLOXacin (LEVAQUIN) 500 MG/100ML infusion 500 mg (500 mg IntraVENous New Bag 6/6/22 1735)   lactated ringers bolus (0 mLs IntraVENous Stopped 6/6/22 1736)   ondansetron (ZOFRAN) injection 4 mg (4 mg IntraVENous Given 6/6/22 1516)     Patient presents with dizziness that he has been dealing with over two weeks, when he was seen in the ED on 5/29/22 he was diagnosed with pneumonia and completed all his antibiotics, was seen by his family doctor today and then sent to the ER to start a new round of antibiotics however he states that he is not feeling any better, he still nauseous, he still feeling dizzy. He states that he has a headache in the frontal region of his head that started this morning, has been dealing with off-and-on headaches for the past 2 weeks. After evaluation and examination patient I do believe he could still have pneumonia also considered with his diabetes that he is having some metabolic disturbances, I ordered IV access, blood work, urinalysis, antiemetics, chest x-ray, EKG and fluids. EKG shows sinus rhythm rate of 87 bpm.  No acute ST elevation, please see attending physician documentation for EKG interpretation note. I did evaluate the patient's chart since he has been in the emergency department a few times over the past several weeks. On 5/29/22 CT Head was unremarkable no acute intracranial abnormality. 5/29/22 CT Chest concerns for right UL pneumonia    He has had ECHO on 5/18/22 which showed:   Left Ventricle  Left ventricle size is normal.  Normal left ventricle wall thickness is present.   Global left ventricular function is mildly decreased with ejection fraction estimated from 40 % to 45 %. Indeterminate diastolic function. Mitral Valve  The mitral valve leaflets are mildly thickened with normal opening. No evidence of mitral regurgitation or stenosis. Left Atrium  The left atrium is normal in size. Aortic Valve  The aortic valve is structurally normal. There is no significant aortic  valve regurgitation or stenosis. Aorta  The aortic root is normal in size. Right Ventricle  The right ventricle is normal in size and function. Tricuspid Valve  The tricuspid valve is normal in structure and function. There is no  significant tricuspid valve regurgitation or stenosis. Right Atrium  The right atrial size is normal.   Pulmonic Valve  The pulmonic valve is not well visualized. There is no evidence of pulmonic valve regurgitation or stenosis. Pericardial Effusion  No pericardial effusion noted. Pleural Effusion  There is a possible pleural effusion seen. However, today his CBC shows no acute sepsis or anemia, metabolic panel shows a new acute renal failure versus CHELE as his BUN is not elevated at 43, creatinine 2.4 and GFR of 37, this all has gotten worse since last month when he was seen in the department however his liver functions are normal.  Troponin is 0.05 however, I do believe this is related to his renal function as his EKG is sinus rhythm, he is not having any chest pain, he trends relatively elevated when I compare to previous blood work. Liver functions and lipase are normal.  COVID and flu are negative. Chest x-ray shows redemonstration of right upper lobe infiltrate most likely pneumonic which is slightly larger compared to previous study, patient has already been on antibiotics, started a second antibiotic, I do believe he is failing outpatient treatment. Wound IV antibiotics. I spoke with the patient and his mother at length about being admitted to the hospital secondary to failing outpatient treatment.     Therefore, shared medical decision was made between the patient, his mother and myself and we agreed that he should be admitted to the hospital, hospitalist was paged via [facility] for admission. The patient tolerated their visit well. I evaluated the patient. The physician was available for consultation as needed. The patient and / or the family were informed of the results of any tests, a time was given to answer questions, a plan was proposed and they agreed with plan. Patient will be admitted to hospital for further evaluation management care. CLINICAL IMPRESSION:  1. Acute renal failure, unspecified acute renal failure type ([facility] [address]    2. Pneumonia of right upper lobe due to infectious organism    3. Type 2 diabetes mellitus treated with insulin ([facility]        DISPOSITION Decision To Admit 06/06/2022 05:12:55 PM      PATIENT REFERRED TO:  No follow-up provider specified. DISCHARGE MEDICATIONS:  New Prescriptions    No medications on file       DISCONTINUED MEDICATIONS:  Discontinued Medications    AZITHROMYCIN (ZITHROMAX) 250 MG TABLET    2 TABS DAY 1 THEN 1 TAB DAYS 2-5    INSULIN GLARGINE (LANTUS SOLOSTAR) 100 UNIT/ML INJECTION PEN    Inject 30 Units into the skin at bedtime    KETOCONAZOLE (NIZORAL) 2 % CREAM    Apply topically daily. KETOCONAZOLE, TOPICAL, 1 % SHAM    Apply quarter size amount and lather in affected area. Leave on for 5 minutes. Then shower off.               (Please note the MDM and HPI sections of this note were completed with a voice recognition program.  Efforts were made to edit the dictations but occasionally words are mis-transcribed.)    Electronically signed, HANS Underwood CNP,          HANS Underwood CNP  06/06/22 3738

## 2022-06-06 NOTE — ED NOTES
Report given to [facility] receiving RN.      Chichi Bowser, RN  06/06/22 0405

## 2022-06-06 NOTE — PROGRESS NOTES
Pt arrived to room 4277. Gait steady. States uses a cane only for dizziness while he is outside of his home. A&OX4. Oriented to room and call light. Bed wheels locked, in lowest position, call light within reach.     Electronically signed by Drew Burks RN on 6/6/2022 at 8:11 PM

## 2022-06-06 NOTE — H&P
Hospital Medicine History & Physical      PCP: Shirin Back MD, MD    Date of Admission: 6/6/2022    Chief Complaint: Dizziness, not feeling well    History Of Present Illness:    Patient is a 60-year-old male with past medical history of diabetes mellitus hypertension seizure disorder who presents to the hospital for nausea, dizziness and not feeling well. According to patient his symptoms has been the for around 2 weeks, not improving, he has been seen by his PCP today who started initially him on antibiotics, patient mentions he has completed course of antibiotics without much improvement. He also denies fevers chills chest pain. On further evaluation in the emergency department he was found to have elevated creatinine and was admitted for further evaluation. Past Medical History:          Diagnosis Date    COVID-19     Diabetes mellitus, type II ([facility]     History of blood transfusion     Hypertension     Protein in urine     Seizure [facility])        Past Surgical History:      History reviewed. No pertinent surgical history. Medications Prior to Admission:      Prior to Admission medications    Medication Sig Start Date End Date Taking?  Authorizing Provider   albuterol sulfate  (90 Base) MCG/ACT inhaler Inhale 2 puffs into the lungs every 6 hours as needed for Wheezing   Yes Historical Provider, MD   amoxicillin-clavulanate (AUGMENTIN) 875-125 MG per tablet Take 1 tablet by mouth 2 times daily 6/6/22 6/11/22 Yes Historical Provider, MD   Insulin Degludec (TRESIBA FLEXTOUCH) 200 UNIT/ML SOPN Inject 55 Units into the skin daily   Yes Historical Provider, MD   insulin lispro, 1 Unit Dial, (HUMALOG KWIKPEN) 100 UNIT/ML SOPN Inject 10 Units into the skin 3 times daily (before meals) 5/19/22   HANS Urban NP   spironolactone (ALDACTONE) 25 MG tablet Take 1 tablet by mouth daily 5/19/22   HANS Urban NP   rosuvastatin (CRESTOR) 40 MG tablet Take 1 tablet by mouth nightly 5/19/22   Joy Yang, APRN - NP   empagliflozin (JARDIANCE) 10 MG tablet Take 10 mg by mouth daily    Historical Provider, MD   NIFEdipine (PROCARDIA XL) 60 MG extended release tablet Take 60 mg by mouth daily as needed    Historical Provider, MD   gabapentin (NEURONTIN) 300 MG capsule Take 300 mg by mouth 3 times daily. 2-3 QD 5/3/22 8/1/22  Historical Provider, MD   metFORMIN (GLUCOPHAGE) 500 MG tablet Take 1,000 mg by mouth 2 times daily  1/13/21   Historical Provider, MD   DULoxetine (CYMBALTA) 30 MG extended release capsule Take 30 mg by mouth daily 4/7/21   Historical Provider, MD   dicyclomine (BENTYL) 10 MG capsule Take 10 mg by mouth 4 times daily (before meals and nightly)  1/13/21   Historical Provider, MD   carvedilol (COREG) 25 MG tablet Take 25 mg by mouth 2 times daily  1/13/21   Historical Provider, MD   chlorthalidone (HYGROTON) 25 MG tablet Take 25 mg by mouth daily 4/7/21   Historical Provider, MD   aspirin 81 MG chewable tablet Take 81 mg by mouth daily  6/1/21   Historical Provider, MD   lisinopril (PRINIVIL;ZESTRIL) 10 MG tablet Take 20 mg by mouth daily     Historical Provider, MD       Allergies:  Patient has no known allergies. Social History:      TOBACCO:   reports that he has never smoked. He has never used smokeless tobacco.  ETOH:   reports current alcohol use. Family History:       Reviewed in detail and non contributory      History reviewed. No pertinent family history. REVIEW OF SYSTEMS:   Pertinent positives as noted in the HPI. All other systems reviewed and negative. PHYSICAL EXAM PERFORMED:    BP (!) 129/95   Pulse 84   Temp 96.9 °F (36.1 °C) (Temporal)   Resp 16   SpO2 99%     General appearance:  No apparent distress, cooperative. HEENT:  Normal cephalic, atraumatic without obvious deformity. Conjunctivae/corneas clear. Neck: Supple, with full range of motion. No cervical lymphadenopathy  Respiratory:  Normal respiratory effort.  Clear to He also denies fevers chills chest pain. On further evaluation in the emergency department he was found to have elevated creatinine and was admitted for further evaluation. Assessment  Acute kidney injury likely prerenal  Dizziness likely secondary to hypovolemia  Right upper lung pneumonia-cannot rule out bacterial pneumonia-unspecified organism  Mild hyponatremia  Elevated troponin-patient is chest pain-free, likely due to CHELE  Diabetes mellitus  Hypertension  Seizure disorder    Plan  Gentle IV fluid therapy with normal saline  Consult nephrology  Check orthostatic vitals  We will start IV Levaquin  Check procalcitonin  Check blood cultures  Monitor and replace electrolytes  Insulin sliding scale  Resume home medications  DVT prophylaxis-on Lovenox  Diet: No diet orders on file  Code Status: Prior    PT/OT Eval Status: ordered    Dispo - pending clinical improvement       Lou Shultz MD    The note was completed using EMR and Dragon dictation system. Every effort was made to ensure accuracy; however, inadvertent computerized transcription errors may be present. Thank you Yesenia Lennon MD, MD for the opportunity to be involved in this patient's care. If you have any questions or concerns please feel free to contact me at [phone].     Lou Shultz MD

## 2022-06-06 NOTE — PROGRESS NOTES
Medication Reconciliation     List of medications patient is currently taking is complete. Source of information: 1. Conversation with patient at bedside                                      2. EPIC records      Allergies  Patient has no known allergies. Notes regarding home medications:  1. Patient has received most of his morning medications prior to arrival. Still is working on switching prescriptions to [facility]P. 2. Pt reports meds have not changed since last visit on 5/17. 3. MD added albuterol inhaler and Augmentin during an office visit earlier today. He has not started either of these meds.      Dennise Haywood, Pharmacy Intern  6/6/2022 6:20 PM

## 2022-06-06 NOTE — TELEPHONE ENCOUNTER
Writer contacted ED provider to inform of 30 day readmission risk. No Decision on disposition at this time. Pt in the process of being worked up. Still awaiting lab/test results. Unable to determine at this time.      Call Back: If you need to call back to inform of disposition you can contact me at [phone]

## 2022-06-07 ENCOUNTER — APPOINTMENT (OUTPATIENT)
Dept: CT IMAGING | Age: 37
DRG: 137 | End: 2022-06-07
Payer: MEDICAID

## 2022-06-07 LAB
AMPHETAMINE SCREEN, URINE: NORMAL
ANION GAP SERPL CALCULATED.3IONS-SCNC: 12 MMOL/L (ref 3–16)
BACTERIA: NORMAL /HPF
BARBITURATE SCREEN URINE: NORMAL
BASOPHILS ABSOLUTE: 0.1 K/UL (ref 0–0.2)
BASOPHILS RELATIVE PERCENT: 2 %
BENZODIAZEPINE SCREEN, URINE: NORMAL
BILIRUBIN URINE: NEGATIVE
BLOOD, URINE: ABNORMAL
BUN BLDV-MCNC: 39 MG/DL (ref 7–20)
CALCIUM SERPL-MCNC: 9.2 MG/DL (ref 8.3–10.6)
CANNABINOID SCREEN URINE: NORMAL
CHLORIDE BLD-SCNC: 102 MMOL/L (ref 99–110)
CLARITY: CLEAR
CO2: 21 MMOL/L (ref 21–32)
COCAINE METABOLITE SCREEN URINE: NORMAL
COLOR: ABNORMAL
CREAT SERPL-MCNC: 1.9 MG/DL (ref 0.9–1.3)
EKG ATRIAL RATE: 87 BPM
EKG DIAGNOSIS: NORMAL
EKG P AXIS: 60 DEGREES
EKG P-R INTERVAL: 198 MS
EKG Q-T INTERVAL: 378 MS
EKG QRS DURATION: 84 MS
EKG QTC CALCULATION (BAZETT): 454 MS
EKG R AXIS: -12 DEGREES
EKG T AXIS: 33 DEGREES
EKG VENTRICULAR RATE: 87 BPM
EOSINOPHILS ABSOLUTE: 0.2 K/UL (ref 0–0.6)
EOSINOPHILS RELATIVE PERCENT: 2.5 %
EPITHELIAL CELLS, UA: 3 /HPF (ref 0–5)
ESTIMATED AVERAGE GLUCOSE: 438.3 MG/DL
GFR AFRICAN AMERICAN: 49
GFR NON-AFRICAN AMERICAN: 40
GLUCOSE BLD-MCNC: 146 MG/DL (ref 70–99)
GLUCOSE BLD-MCNC: 148 MG/DL (ref 70–99)
GLUCOSE BLD-MCNC: 184 MG/DL (ref 70–99)
GLUCOSE BLD-MCNC: 188 MG/DL (ref 70–99)
GLUCOSE BLD-MCNC: 238 MG/DL (ref 70–99)
GLUCOSE URINE: >=1000 MG/DL
HBA1C MFR BLD: 16.9 %
HCT VFR BLD CALC: 39 % (ref 40.5–52.5)
HEMOGLOBIN: 12.8 G/DL (ref 13.5–17.5)
HYALINE CASTS: 2 /LPF (ref 0–8)
KETONES, URINE: NEGATIVE MG/DL
LEUKOCYTE ESTERASE, URINE: NEGATIVE
LYMPHOCYTES ABSOLUTE: 1.4 K/UL (ref 1–5.1)
LYMPHOCYTES RELATIVE PERCENT: 22.6 %
Lab: NORMAL
MCH RBC QN AUTO: 25.4 PG (ref 26–34)
MCHC RBC AUTO-ENTMCNC: 32.8 G/DL (ref 31–36)
MCV RBC AUTO: 77.5 FL (ref 80–100)
METHADONE SCREEN, URINE: NORMAL
MICROSCOPIC EXAMINATION: YES
MONOCYTES ABSOLUTE: 0.5 K/UL (ref 0–1.3)
MONOCYTES RELATIVE PERCENT: 9 %
NEUTROPHILS ABSOLUTE: 3.9 K/UL (ref 1.7–7.7)
NEUTROPHILS RELATIVE PERCENT: 63.9 %
NITRITE, URINE: NEGATIVE
OPIATE SCREEN URINE: NORMAL
OXYCODONE URINE: NORMAL
PDW BLD-RTO: 12 % (ref 12.4–15.4)
PERFORMED ON: ABNORMAL
PH UA: 5
PH UA: 5.5 (ref 5–8)
PHENCYCLIDINE SCREEN URINE: NORMAL
PLATELET # BLD: 325 K/UL (ref 135–450)
PMV BLD AUTO: 7.8 FL (ref 5–10.5)
POTASSIUM REFLEX MAGNESIUM: 4.5 MMOL/L (ref 3.5–5.1)
PROPOXYPHENE SCREEN: NORMAL
PROTEIN UA: 100 MG/DL
RBC # BLD: 5.04 M/UL (ref 4.2–5.9)
RBC UA: 1 /HPF (ref 0–4)
SODIUM BLD-SCNC: 135 MMOL/L (ref 136–145)
SPECIFIC GRAVITY UA: >=1.03 (ref 1–1.03)
TROPONIN: 0.03 NG/ML
TROPONIN: 0.04 NG/ML
URINE REFLEX TO CULTURE: ABNORMAL
URINE TYPE: ABNORMAL
UROBILINOGEN, URINE: 0.2 E.U./DL
WBC # BLD: 6.1 K/UL (ref 4–11)
WBC UA: 1 /HPF (ref 0–5)

## 2022-06-07 PROCEDURE — 2580000003 HC RX 258: Performed by: INTERNAL MEDICINE

## 2022-06-07 PROCEDURE — 97530 THERAPEUTIC ACTIVITIES: CPT

## 2022-06-07 PROCEDURE — 6360000002 HC RX W HCPCS: Performed by: INTERNAL MEDICINE

## 2022-06-07 PROCEDURE — 36415 COLL VENOUS BLD VENIPUNCTURE: CPT

## 2022-06-07 PROCEDURE — 71250 CT THORAX DX C-: CPT

## 2022-06-07 PROCEDURE — 97161 PT EVAL LOW COMPLEX 20 MIN: CPT

## 2022-06-07 PROCEDURE — 6370000000 HC RX 637 (ALT 250 FOR IP): Performed by: INTERNAL MEDICINE

## 2022-06-07 PROCEDURE — 1200000000 HC SEMI PRIVATE

## 2022-06-07 PROCEDURE — 80048 BASIC METABOLIC PNL TOTAL CA: CPT

## 2022-06-07 PROCEDURE — 84484 ASSAY OF TROPONIN QUANT: CPT

## 2022-06-07 PROCEDURE — 85025 COMPLETE CBC W/AUTO DIFF WBC: CPT

## 2022-06-07 PROCEDURE — 97165 OT EVAL LOW COMPLEX 30 MIN: CPT

## 2022-06-07 PROCEDURE — 93010 ELECTROCARDIOGRAM REPORT: CPT | Performed by: INTERNAL MEDICINE

## 2022-06-07 PROCEDURE — 80307 DRUG TEST PRSMV CHEM ANLYZR: CPT

## 2022-06-07 PROCEDURE — 94760 N-INVAS EAR/PLS OXIMETRY 1: CPT

## 2022-06-07 PROCEDURE — 81001 URINALYSIS AUTO W/SCOPE: CPT

## 2022-06-07 RX ADMIN — DICYCLOMINE HYDROCHLORIDE 10 MG: 10 CAPSULE ORAL at 21:58

## 2022-06-07 RX ADMIN — GABAPENTIN 300 MG: 300 CAPSULE ORAL at 14:52

## 2022-06-07 RX ADMIN — ROSUVASTATIN CALCIUM 40 MG: 40 TABLET, FILM COATED ORAL at 21:58

## 2022-06-07 RX ADMIN — ENOXAPARIN SODIUM 30 MG: 100 INJECTION SUBCUTANEOUS at 21:58

## 2022-06-07 RX ADMIN — DULOXETINE HYDROCHLORIDE 30 MG: 30 CAPSULE, DELAYED RELEASE ORAL at 08:44

## 2022-06-07 RX ADMIN — DICYCLOMINE HYDROCHLORIDE 10 MG: 10 CAPSULE ORAL at 16:33

## 2022-06-07 RX ADMIN — DICYCLOMINE HYDROCHLORIDE 10 MG: 10 CAPSULE ORAL at 11:13

## 2022-06-07 RX ADMIN — DICYCLOMINE HYDROCHLORIDE 10 MG: 10 CAPSULE ORAL at 06:31

## 2022-06-07 RX ADMIN — SODIUM CHLORIDE: 9 INJECTION, SOLUTION INTRAVENOUS at 06:32

## 2022-06-07 RX ADMIN — CARVEDILOL 25 MG: 25 TABLET, FILM COATED ORAL at 08:44

## 2022-06-07 RX ADMIN — INSULIN LISPRO 2 UNITS: 100 INJECTION, SOLUTION INTRAVENOUS; SUBCUTANEOUS at 08:47

## 2022-06-07 RX ADMIN — NIFEDIPINE 60 MG: 60 TABLET, EXTENDED RELEASE ORAL at 08:44

## 2022-06-07 RX ADMIN — SODIUM CHLORIDE: 9 INJECTION, SOLUTION INTRAVENOUS at 22:02

## 2022-06-07 RX ADMIN — INSULIN LISPRO 2 UNITS: 100 INJECTION, SOLUTION INTRAVENOUS; SUBCUTANEOUS at 16:33

## 2022-06-07 RX ADMIN — ENOXAPARIN SODIUM 30 MG: 100 INJECTION SUBCUTANEOUS at 08:46

## 2022-06-07 RX ADMIN — ACETAMINOPHEN 650 MG: 325 TABLET ORAL at 22:03

## 2022-06-07 RX ADMIN — ASPIRIN 81 MG: 81 TABLET, CHEWABLE ORAL at 08:44

## 2022-06-07 RX ADMIN — SODIUM CHLORIDE, PRESERVATIVE FREE 10 ML: 5 INJECTION INTRAVENOUS at 22:00

## 2022-06-07 RX ADMIN — INSULIN LISPRO 4 UNITS: 100 INJECTION, SOLUTION INTRAVENOUS; SUBCUTANEOUS at 12:29

## 2022-06-07 RX ADMIN — GABAPENTIN 300 MG: 300 CAPSULE ORAL at 21:58

## 2022-06-07 RX ADMIN — CARVEDILOL 25 MG: 25 TABLET, FILM COATED ORAL at 16:33

## 2022-06-07 RX ADMIN — INSULIN LISPRO 1 UNITS: 100 INJECTION, SOLUTION INTRAVENOUS; SUBCUTANEOUS at 22:03

## 2022-06-07 RX ADMIN — INSULIN GLARGINE 35 UNITS: 100 INJECTION, SOLUTION SUBCUTANEOUS at 08:47

## 2022-06-07 RX ADMIN — LEVOFLOXACIN 750 MG: 5 INJECTION, SOLUTION INTRAVENOUS at 16:36

## 2022-06-07 RX ADMIN — GABAPENTIN 300 MG: 300 CAPSULE ORAL at 08:44

## 2022-06-07 ASSESSMENT — PAIN DESCRIPTION - PAIN TYPE: TYPE: ACUTE PAIN

## 2022-06-07 ASSESSMENT — PAIN DESCRIPTION - DESCRIPTORS: DESCRIPTORS: ACHING

## 2022-06-07 ASSESSMENT — PAIN SCALES - GENERAL
PAINLEVEL_OUTOF10: 3
PAINLEVEL_OUTOF10: 0
PAINLEVEL_OUTOF10: 1

## 2022-06-07 ASSESSMENT — PAIN DESCRIPTION - FREQUENCY: FREQUENCY: INTERMITTENT

## 2022-06-07 ASSESSMENT — PAIN DESCRIPTION - LOCATION: LOCATION: HEAD

## 2022-06-07 ASSESSMENT — PAIN - FUNCTIONAL ASSESSMENT: PAIN_FUNCTIONAL_ASSESSMENT: ACTIVITIES ARE NOT PREVENTED

## 2022-06-07 ASSESSMENT — PAIN DESCRIPTION - ONSET: ONSET: GRADUAL

## 2022-06-07 NOTE — PROGRESS NOTES
Physical Therapy  Facility/Department: [facility]  Physical Therapy Initial Assessment    Name: Marlene Rodarte  : 1985  MRN: [id number]  Date of Service: 2022    Discharge Recommendations:  Home with assist PRN,Outpatient PT     Marlene Rodarte scored a 23/24 on the AM-PAC short mobility form. Current research shows that an AM-PAC score of 18 or greater is typically associated with a discharge to the patient's home setting. Based on the patient's AM-PAC score and their current functional mobility deficits, it is recommended that the patient have 2-3 sessions per week of Physical Therapy at d/c to increase the patient's independence. At this time, this patient demonstrates the endurance and safety to discharge home with OPPT and a follow up treatment frequency of 2-3x/wk. Please see assessment section for further patient specific details. If patient discharges prior to next session this note will serve as a discharge summary. Please see below for the latest assessment towards goals. Patient Diagnosis(es): The primary encounter diagnosis was Acute renal failure, unspecified acute renal failure type ([address] Diagnoses of Pneumonia of right upper lobe due to infectious organism and Type 2 diabetes mellitus treated with insulin ([address] were also pertinent to this visit. Past Medical History:  has a past medical history of COVID-19, Diabetes mellitus, type II ([address] History of blood transfusion, Hypertension, Protein in urine, and Seizure ([address] Past Surgical History:  has no past surgical history on file. Assessment   Assessment: Patient is a 64-year-old male with past medical history of diabetes mellitus hypertension seizure disorder who presents to the hospital on 22 for nausea, dizziness and not feeling well. Prior to admission, pt living with friend in home setting, independent with ADLs and ambulation with SPC. Pt currently functioning near baseline.  Anticipate return home Within Functional Limits  Cognition  Overall Cognitive Status: WFL     Objective   Gross Assessment  Strength: Generally decreased, functional     Bed mobility  Supine to Sit: Unable to assess  Sit to Supine: Unable to assess  Bed Mobility Comments: in recliner at start and end of session  Transfers  Sit to Stand: Supervision  Stand to sit: Supervision  Comment: Initial LOB that the pt reaches for arm rest of chair to steady self.   Ambulation  Surface: level tile  Device:  (holding onto IV pole)  Assistance: Supervision  Gait Deviations: Slow Donya  Distance: 50'  Comments: no overt LOB     Balance  Posture: Good  Sitting - Static: Good  Sitting - Dynamic: Good  Standing - Static: Good  Standing - Dynamic: Fair           AM-PAC Score  AM-PAC Inpatient Mobility Raw Score : 23 (06/07/22 0822)  AM-PAC Inpatient T-Scale Score : 56.93 (06/07/22 2896)  Mobility Inpatient CMS 0-100% Score: 11.2 (06/07/22 9554)  Mobility Inpatient CMS G-Code Modifier : CI (06/07/22 6987)          Education  Patient Education  Education Given To: Patient  Education Provided: Role of Therapy;Plan of Care  Education Method: Verbal  Barriers to Learning: None  Education Outcome: Verbalized understanding      Therapy Time   Individual Concurrent Group Co-treatment   Time In 0750         Time Out 0815         Minutes 25         Timed Code Treatment Minutes: 10 Minutes       Selma Shipman PT

## 2022-06-07 NOTE — CONSULTS
Neurology Consult Note  Reason for Consult: dizziness, light sensitivity    Chief complaint: multiple complaints    Dr Dayna Kramer MD asked me to see Michelle Bonds in consultation for evaluation of dizziness, light sensitivity    History of Present Illness:  Michelle Bonds is a 29 y.o. male who presents with multiple complaints. I obtained my information via interview w/ the patient and his mother at the bedside, supplemented by chart review. The patient tells me that 3-4 weeks ago he felt like he got a scratch in his L eye. Around the time though he had been having some blurry vision of both of his eyes. He says that he saw [facility] and was told that he had either some fluid/blood leaking from his retina and has follow up planned on the 14th of this month. He doesn't really endorse eye pain they just feel sore to touch. He has been having headaches which is not common over the past month or so. They usually involved his temples and mid occipital region. He has been taking Tylenol PRN. Over the past 2.5 weeks or so he says that his eyes have been very sensitive to light. For example, if he were to go outside in the sun this would make his feel sort of dizzy and off balance. For at least the past 6 months he has been more and more off balance. He attributes this to his uncontrolled diabetes and neuropathy (HgA1c last month was 19.4). He has been using a can for stability over the past 3-4 months. He was having trouble w/ insurance recently and was trying to stretch his medication out. He was evaluated in the ED on 5/29 and found to have pneumonia. CT head at the time was w/out any acute findings. He came back to the ED for ongoing dizziness, shortness of breath, and still feeling unwell despite treatment for his pneumonia.       Medical History:  Past Medical History:   Diagnosis Date    COVID-19     Diabetes mellitus, type II ([address]     History of blood transfusion  Hypertension     Protein in urine     Seizure [facility])      History reviewed. No pertinent surgical history. Scheduled Meds:   sodium chloride flush  10 mL IntraVENous 2 times per day    aspirin  81 mg Oral Daily    carvedilol  25 mg Oral BID WC    dicyclomine  10 mg Oral 4x Daily AC & HS    DULoxetine  30 mg Oral Daily    gabapentin  300 mg Oral TID    insulin glargine  35 Units SubCUTAneous Daily    NIFEdipine  60 mg Oral Daily    rosuvastatin  40 mg Oral Nightly    levofloxacin  750 mg IntraVENous Q24H    insulin lispro  0-12 Units SubCUTAneous TID WC    insulin lispro  0-6 Units SubCUTAneous Nightly    enoxaparin  30 mg SubCUTAneous BID     Continuous Infusions:   sodium chloride 100 mL/hr at 06/07/22 3209     Medications Prior to Admission:   albuterol sulfate  (90 Base) MCG/ACT inhaler, Inhale 2 puffs into the lungs every 6 hours as needed for Wheezing  amoxicillin-clavulanate (AUGMENTIN) 875-125 MG per tablet, Take 1 tablet by mouth 2 times daily  Insulin Degludec (TRESIBA FLEXTOUCH) 200 UNIT/ML SOPN, Inject 55 Units into the skin daily  insulin lispro, 1 Unit Dial, (HUMALOG KWIKPEN) 100 UNIT/ML SOPN, Inject 10 Units into the skin 3 times daily (before meals)  spironolactone (ALDACTONE) 25 MG tablet, Take 1 tablet by mouth daily  rosuvastatin (CRESTOR) 40 MG tablet, Take 1 tablet by mouth nightly  empagliflozin (JARDIANCE) 10 MG tablet, Take 10 mg by mouth daily  NIFEdipine (PROCARDIA XL) 60 MG extended release tablet, Take 60 mg by mouth daily as needed  gabapentin (NEURONTIN) 300 MG capsule, Take 300 mg by mouth 3 times daily.  2-3 QD  metFORMIN (GLUCOPHAGE) 500 MG tablet, Take 1,000 mg by mouth 2 times daily   DULoxetine (CYMBALTA) 30 MG extended release capsule, Take 30 mg by mouth daily  dicyclomine (BENTYL) 10 MG capsule, Take 10 mg by mouth 4 times daily (before meals and nightly)   carvedilol (COREG) 25 MG tablet, Take 25 mg by mouth 2 times daily   chlorthalidone (HYGROTON) 25 MG tablet, Take 25 mg by mouth daily  aspirin 81 MG chewable tablet, Take 81 mg by mouth daily   lisinopril (PRINIVIL;ZESTRIL) 10 MG tablet, Take 20 mg by mouth daily     No Known Allergies    History reviewed. No pertinent family history. Social History     Tobacco Use   Smoking Status Never Smoker   Smokeless Tobacco Never Used     Social History     Substance and Sexual Activity   Drug Use Not Currently     Social History     Substance and Sexual Activity   Alcohol Use Yes    Comment: rare     ROS  Constitutional- No weight loss or fevers  Eyes- No diplopia. + photophobia. Ears/nose/throat- No dysphagia. No Dysarthria  Cardiovascular- No palpitations. No chest pain  Respiratory- No dyspnea. No Cough  Gastrointestinal- No Abdominal pain. No Vomiting. Genitourinary- No incontinence. No urinary retention  Musculoskeletal- No myalgia. No arthralgia  Skin- No rash. No easy bruising. Psychiatric- No depression. No anxiety  Endocrine- + diabetes. No thyroid issues. Hematologic- No bleeding difficulty. No fatigue  Neurologic- No weakness. + Headache. Exam  Blood pressure 100/61, pulse 96, temperature 98.8 °F (37.1 °C), temperature source Oral, resp. rate 18, weight 263 lb 7.2 oz (119.5 kg), SpO2 96 %. Constitutional    Vital signs: BP, HR, and RR reviewed   General alert, no distress. Mild bilateral temporal tenderness. Eyes: unable to visualize the fundi  Cardiovascular: no peripheral edema. Psychiatric: cooperative with examination, no psychotic behavior noted. Neurologic  Mental status:   orientation to person, place, time. General fund of knowledge grossly intact   Memory grossly intact   Attention intact as able to attend well to the exam     Language fluent in conversation   Comprehension intact; follows simple commands  Cranial nerves:   CN2: visual fields full  CN 3,4,6: extraocular muscles intact. Pupils are equal, round, reactive bilaterally.     CN5: facial sensation

## 2022-06-07 NOTE — PROGRESS NOTES
Clinical Pharmacy Note  Subcutaneous Anticoagulant Adjustment     Enoxaparin has been adjusted to 30 mg BID based on [facility] policy. Recent Labs     06/06/22  1506   CREATININE 2.4*     Recent Labs     06/06/22  1506   HGB 14.5   HCT 44.5        Pharmacist Review of Appropriate Use and Automatic Dose Adjustment of Subcutaneous Anticoagulants (Adult)    The guidance below is to provide initial recommendations for dosing. If recommended dose does not align well with patient's current clinical picture, communications with the care team will occur to determine most appropriate medication and dose. TABLE 1.   ENOXAPARIN ROUTINE PROPHYLAXIS DOSING (Medically ill, routine surgery)   Patient Weight (kg)     50.9 and below 51 - 100.9 101 - 150.9 151 - 174.9 175 or greater         Estimated CrCl  (ml/min) 30 or greater   30 mg SUBQ daily   40 mg SUBQ daily 30 mg SUBQ BID  40 mg SUBQ BID 60mg SUBQ BID      15-29 UFH 5000 units SUBQ BID   30 mg SUBQ daily 30 mg SUBQ daily 40 mg SUBQ daily   60 mg SUBQ daily      Less than 15 or Dialysis UFH 5000 units SUBQ BID   UFH 5000 units SUBQ TID UFH 7500 units SUBQ TID     LEONORA [facility] 6/6/2022 8:08 PM

## 2022-06-07 NOTE — CARE COORDINATION
06/07/22 1518   Readmission Assessment   Number of Days since last admission? 8-30 days   Previous Disposition Home with Family   Who is being Ermias Parry   (per chart review)   What was the patient's/caregiver's perception as to why they think they needed to return back to the hospital? Other (Comment)  (Acute kidney injury, dizziness)   Did you visit your Primary Care Physician after you left the hospital, before you returned this time? Yes   Did you see a specialist, such as Cardiac, Pulmonary, Orthopedic Physician, etc. after you left the hospital? No   Who advised the patient to return to the hospital? Self-referral   Does the patient report anything that got in the way of taking their medications? No   In our efforts to provide the best possible care to you and others like you, can you think of anything that we could have done to help you after you left the hospital the first time, so that you might not have needed to return so soon?  Arrange for more help when leaving the hospital  (MD has recommended patient follow up with an eye doctor.)

## 2022-06-07 NOTE — PLAN OF CARE
Problem: Discharge Planning  Goal: Discharge to home or other facility with appropriate resources  Outcome: Progressing  Flowsheets (Taken 6/7/2022 0138)  Discharge to home or other facility with appropriate resources:   Identify discharge learning needs (meds, wound care, etc)   Refer to discharge planning if patient needs post-hospital services based on physician order or complex needs related to functional status, cognitive ability or social support system   Identify barriers to discharge with patient and caregiver   Arrange for needed discharge resources and transportation as appropriate     Problem: Pain  Goal: Verbalizes/displays adequate comfort level or baseline comfort level  Outcome: Progressing

## 2022-06-07 NOTE — PROGRESS NOTES
Hospitalist Progress Note      PCP: Dave Fitzgerald MD, MD    Chief Complaint. Presented to hospital for dizziness    Date of Admission: 6/6/2022    Subjective:  He felt dizzy again today and had headache, denies chest pain, nausea, vomiting, shortness of breath, fever or chills. mention feels overall better    Medications:  Reviewed    Infusion Medications    sodium chloride      sodium chloride 100 mL/hr at 06/07/22 6709    dextrose       Scheduled Medications    sodium chloride flush  10 mL IntraVENous 2 times per day    aspirin  81 mg Oral Daily    carvedilol  25 mg Oral BID WC    dicyclomine  10 mg Oral 4x Daily AC & HS    DULoxetine  30 mg Oral Daily    gabapentin  300 mg Oral TID    insulin glargine  35 Units SubCUTAneous Daily    NIFEdipine  60 mg Oral Daily    rosuvastatin  40 mg Oral Nightly    levofloxacin  750 mg IntraVENous Q24H    insulin lispro  0-12 Units SubCUTAneous TID WC    insulin lispro  0-6 Units SubCUTAneous Nightly    enoxaparin  30 mg SubCUTAneous BID     PRN Meds: sodium chloride flush, sodium chloride, promethazine **OR** ondansetron, magnesium hydroxide, acetaminophen **OR** acetaminophen, albuterol sulfate HFA, glucose, dextrose bolus **OR** dextrose bolus, glucagon (rDNA), dextrose      Intake/Output Summary (Last 24 hours) at 6/7/2022 1718  Last data filed at 6/7/2022 1048  Gross per 24 hour   Intake 300 ml   Output --   Net 300 ml       Physical Exam Performed:    /72   Pulse 96   Temp 98.4 °F (36.9 °C) (Oral)   Resp 18   Wt 263 lb 7.2 oz (119.5 kg)   SpO2 95%   BMI 34.76 kg/m²     General appearance: No apparent distress,   HEENT:  Conjunctivae/corneas clear. Neck: Supple, with full range of motion. Respiratory:  Normal respiratory effort. Clear to auscultation, bilaterally without Rales/Wheezes/Rhonchi.   Cardiovascular: Regular rate and rhythm with normal S1/S2 without murmurs or rubs  Abdomen: Soft, non-tender, non-distended, normal bowel sounds. Musculoskeletal: No cyanosis or edema bilaterally  Neurologic:  without any focal sensory/motor deficits. grossly non-focal.  Psychiatric: Alert and oriented, Normal mood  Peripheral Pulses: +2 palpable, equal bilaterally       Labs:   Recent Labs     06/06/22  1506 06/07/22  0726   WBC 6.0 6.1   HGB 14.5 12.8*   HCT 44.5 39.0*    325     Recent Labs     06/06/22  1506 06/07/22  0726   * 135*   K 4.9 4.5   CL 98* 102   CO2 22 21   BUN 43* 39*   CREATININE 2.4* 1.9*   CALCIUM 9.9 9.2     Recent Labs     06/06/22  1506   AST 13*   ALT 16   BILITOT <0.2   ALKPHOS 120     No results for input(s): INR in the last 72 hours. Recent Labs     06/06/22  1506   TROPONINI 0.05*       Urinalysis:      Lab Results   Component Value Date    NITRU Negative 06/07/2022    WBCUA 1 06/07/2022    BACTERIA None Seen 06/07/2022    RBCUA 1 06/07/2022    BLOODU TRACE-INTACT 06/07/2022    SPECGRAV >=1.030 06/07/2022    GLUCOSEU >=1000 06/07/2022    GLUCOSEU NEGATIVE 01/02/2011       Radiology:  XR CHEST PORTABLE   Final Result   Redemonstration of right upper lobe infiltrate which is likely pneumonic. It   is slightly larger compared to the previous evaluation. MRI BRAIN WO CONTRAST    (Results Pending)   CT CHEST WO CONTRAST    (Results Pending)         Assessment/Plan:    Active Hospital Problems    Diagnosis     CHELE (acute kidney injury) ([facility] [address] [N17.9]      Priority: Medium     Patient is a 50-year-old male with past medical history of diabetes mellitus hypertension seizure disorder who presents to the hospital for nausea, dizziness and not feeling well. According to patient his symptoms has been the for around 2 weeks, not improving, he has been seen by his PCP today who started initially him on antibiotics, patient mentions he has completed course of antibiotics without much improvement. He also denies fevers chills chest pain.   On further evaluation in the emergency department he was found to have elevated creatinine and was admitted for further evaluation.     Assessment  Acute kidney injury likely prerenal  Dizziness likely secondary to hypovolemia  Right upper lung pneumonia-cannot rule out bacterial pneumonia-unspecified organism  Mild hyponatremia  Elevated troponin-patient is chest pain-free, likely due to CHELE  Diabetes mellitus  Hypertension  Seizure disorder     Plan  IV fluid therapy with normal saline  Consult nephrology - following, monitor BMP  Check orthostatic vitals - ordered  Order CT chest to get better lung pathology  We will start IV Levaquin  Check procalcitonin  Check blood cultures  Monitor and replace electrolytes  Insulin sliding scale  Resume home medications  DVT prophylaxis-on Lovenox  Diet: ADULT DIET;  Regular; 4 carb choices (60 gm/meal)  Code Status: Full Code    PT/OT Eval Status: ordered    Dispo/Plan of care - CT chest today, orthostatic vitals, neurology consulted for Zuleyka Aguilera MD

## 2022-06-07 NOTE — PROGRESS NOTES
Occupational Therapy  Facility/Department: [facility] MED SURG  Occupational Therapy Initial Assessment/Discharge Summary    Name: Kylah Valderrama  : 1985  MRN: [id number]  Date of Service: 2022    Discharge Recommendations:  Home with assist PRN     Kylah Valderrama scored a 23/24 on the AM-PAC ADL Inpatient form. At this time, no further OT is recommended upon discharge due to pt near baseline. Recommend patient returns to prior setting with prior services. Patient Diagnosis(es): The primary encounter diagnosis was Acute renal failure, unspecified acute renal failure type ([facility] [address] Diagnoses of Pneumonia of right upper lobe due to infectious organism and Type 2 diabetes mellitus treated with insulin ([facility] [address] were also pertinent to this visit. Past Medical History:  has a past medical history of COVID-19, Diabetes mellitus, type II ([facility] [address] History of blood transfusion, Hypertension, Protein in urine, and Seizure ([facility] [address] Past Surgical History:  has no past surgical history on file. Assessment   Assessment: Pt is a 28 yo M admitted with nausea, dizziness; found to have CHELE. Pt has also been experiencing dizziness for past 2 weeks. PTA, pt lives at home w/ a roommate where he is independent in self-care, fxl mobility using SPC PRN (d/t dizziness), and homemaking. He is near his baseline at this time. He completed sit<>stand transfers independently and fxl mobility w/ supervision while using IV pole for balance. He demonstrated ability to reach his feet to manage LB ADLs. Will sign off at this time. Anticipate safe return home w/ assist PRN when medically stable.   Decision Making: Low Complexity  REQUIRES OT FOLLOW-UP: No  Activity Tolerance  Activity Tolerance: Patient Tolerated treatment well        Plan   Plan  Times per Week: d/c OT     Restrictions  Restrictions/Precautions  Restrictions/Precautions: Fall Risk    Subjective   General  Chart Reviewed: Yes  Patient assessed for rehabilitation services?: Yes  Additional Pertinent Hx: per H&P: \"Patient is a 18-year-old male with past medical history of diabetes mellitus hypertension seizure disorder who presents to the hospital for nausea, dizziness and not feeling well. According to patient his symptoms has been the for around 2 weeks, not improving, he has been seen by his PCP today who started initially him on antibiotics, patient mentions he has completed course of antibiotics without much improvement. He also denies fevers chills chest pain. On further evaluation in the emergency department he was found to have elevated creatinine and was admitted for further evaluation. \"  Family / Caregiver Present: No  Referring Practitioner: Jermaine  Diagnosis: CHELE  Subjective  Subjective: Pt met b/s for OT eval/tx w/ PT. Pt in recliner, agreeable to therapy. C/o pain in calves with weight bearing, but did not rate  General Comment  Comments: Per RN ok to see     Social/Functional History  Social/Functional History  Lives With: Friend(s)  Type of Home: House  Home Layout: One level  Home Access: Stairs to enter with rails  Entrance Stairs - Number of Steps: 1  Bathroom Shower/Tub: Tub/Shower unit  Bathroom Toilet: Standard  Home Equipment: Cane  Has the patient had two or more falls in the past year or any fall with injury in the past year?: No  ADL Assistance: [facility] [address]: Independent (friend or mom does grocery shopping)  Ambulation Assistance: Independent (uses SPC PRN)  Transfer Assistance: Independent  Active : Yes (only when needing to, friends/family usually drive)       Objective   Vision Exceptions: Wears glasses for distance  Hearing: Within functional limits          Safety Devices  Type of Devices: Call light within reach; Left in chair;Patient at risk for falls  Balance  Sitting: Intact  Standing: Intact  Gait  Overall Level of Assistance: Supervision (Pt completed fxl mobility several laps in room w/ supervision, pushed IV pole for balance. No overt LOB)     AROM: Within functional limits  PROM: Within functional limits  Strength: Within functional limits  Coordination: Within functional limits  ADL  LE Dressing Skilled Clinical Factors: Demonstrated ability to reach feet to manage socks  Additional Comments: Anticipate pt would be independent w/ ADLs based on ROM, strength, endurance this session     Activity Tolerance  Activity Tolerance: Patient tolerated treatment well  Bed mobility  Bed Mobility Comments: in recliner at start and end of session  Transfers  Sit to stand: Independent  Stand to sit:  Independent     Cognition  Overall Cognitive Status: WFL                  Education Given To: Patient  Education Provided: Role of Therapy  Barriers to Learning: None  Education Outcome: Verbalized understanding  LUE AROM (degrees)  LUE AROM : WFL  RUE AROM (degrees)  RUE AROM : [facility]              AM-PAC Score  AM-Located within [facility] Inpatient Daily Activity Raw Score: 23 (06/07/22 0824)  AM-PAC Inpatient ADL T-Scale Score : 51.12 (06/07/22 0824)  ADL Inpatient CMS 0-100% Score: 15.86 (06/07/22 0824)  ADL Inpatient CMS G-Code Modifier : CI (06/07/22 0824)    Goals  Short Term Goals  Time Frame for Short term goals: No acute care OT goals identified, pt functioning near baseline  Patient Goals   Patient goals : did not state       Therapy Time   Individual Concurrent Group Co-treatment   Time In 0750         Time Out 0815         Minutes 25         Timed Code Treatment Minutes: 10 Minutes (15 min eval)       Yamileth Rhodes OTR/L [id number]

## 2022-06-07 NOTE — CONSULTS
Nephrology ([facility]) Consult Note    Esthela Moscoso is a 29 y.o. male whom we were asked to see for CHELE on CKD. He was just discharged from the hospital ~ 3 weeks ago. He now has Medicaid and has been taking his medications; he resumed his jardiance. He presents with N/V and not feeling well. He was given a course of azithromycin by his PCP. He had CXR and chest CT (with contrast) on 05/29/22 that showed a RUL infiltrate. He reports a 50 lbs weight loss over 6 months. He was noted to have elevated Cr in the ER. He was given IVF overnight with improvement. Past Medical History:  CKD   - sees Dr. Mendez Riff   - 05/17/22 Cr 1.2   - kidney biopsy showed DMN  DM2  HTN  seizure disorder    Review of System:  Otherwise unremarkable    Allergies:  Patient has no known allergies. Medications:  Current medications reviewed. Social History:  no tobacco  Family Medical History:  Negative for kidney disase    Physical Exam:  Blood pressure 100/61, pulse 96, temperature 98.8 °F (37.1 °C), temperature source Oral, resp. rate 18, weight 263 lb 7.2 oz (119.5 kg), SpO2 96 %.     General:  NAD, A+Ox3, ill-appearing, normal body habitus  HEENT:  PERRL, EOMI  Neck:  Supple, normal range of movement, thyroid unremarkable  Chest:  CTAB, good respiratory effort, good air movement  CV:  RRR, no murmurs or rubs, no carotid bruit, no abdominal bruits  Abdomen:  NTND, soft, +BS, no hepatosplenomegaly  Extremities:  No peripheral edema  Neurological:  Moving all four extremities, CN II-XII grossly intact  Lymphatics:  No palpable lymph nodes  Skin:  No rash, no jaundice  Psychiatric:  moderate insight and judgement, moderate recall    Laboratory Studies:  Lab Results   Component Value Date/Time     (L) 06/07/2022 07:26 AM    K 4.5 06/07/2022 07:26 AM     06/07/2022 07:26 AM    CO2 21 06/07/2022 07:26 AM    BUN 39 (H) 06/07/2022 07:26 AM    CREATININE 1.9 (H) 06/07/2022 07:26 AM    CALCIUM

## 2022-06-08 ENCOUNTER — APPOINTMENT (OUTPATIENT)
Dept: MRI IMAGING | Age: 37
DRG: 137 | End: 2022-06-08
Payer: MEDICAID

## 2022-06-08 LAB
ANION GAP SERPL CALCULATED.3IONS-SCNC: 13 MMOL/L (ref 3–16)
BUN BLDV-MCNC: 26 MG/DL (ref 7–20)
CALCIUM SERPL-MCNC: 7.8 MG/DL (ref 8.3–10.6)
CHLORIDE BLD-SCNC: 102 MMOL/L (ref 99–110)
CO2: 21 MMOL/L (ref 21–32)
CREAT SERPL-MCNC: 1.6 MG/DL (ref 0.9–1.3)
CREATININE URINE: 84.8 MG/DL (ref 39–259)
GFR AFRICAN AMERICAN: 60
GFR NON-AFRICAN AMERICAN: 49
GLUCOSE BLD-MCNC: 146 MG/DL (ref 70–99)
GLUCOSE BLD-MCNC: 151 MG/DL (ref 70–99)
GLUCOSE BLD-MCNC: 156 MG/DL (ref 70–99)
GLUCOSE BLD-MCNC: 169 MG/DL (ref 70–99)
GLUCOSE BLD-MCNC: 174 MG/DL (ref 70–99)
PERFORMED ON: ABNORMAL
POTASSIUM REFLEX MAGNESIUM: 4.3 MMOL/L (ref 3.5–5.1)
PROCALCITONIN: 0.11 NG/ML (ref 0–0.15)
SODIUM BLD-SCNC: 136 MMOL/L (ref 136–145)
SODIUM URINE: 78 MMOL/L
UREA NITROGEN, UR: 559 MG/DL (ref 800–1666)

## 2022-06-08 PROCEDURE — 2580000003 HC RX 258: Performed by: INTERNAL MEDICINE

## 2022-06-08 PROCEDURE — 1200000000 HC SEMI PRIVATE

## 2022-06-08 PROCEDURE — 6370000000 HC RX 637 (ALT 250 FOR IP): Performed by: INTERNAL MEDICINE

## 2022-06-08 PROCEDURE — 82570 ASSAY OF URINE CREATININE: CPT

## 2022-06-08 PROCEDURE — 84300 ASSAY OF URINE SODIUM: CPT

## 2022-06-08 PROCEDURE — 94760 N-INVAS EAR/PLS OXIMETRY 1: CPT

## 2022-06-08 PROCEDURE — 99223 1ST HOSP IP/OBS HIGH 75: CPT | Performed by: INTERNAL MEDICINE

## 2022-06-08 PROCEDURE — 84540 ASSAY OF URINE/UREA-N: CPT

## 2022-06-08 PROCEDURE — 36415 COLL VENOUS BLD VENIPUNCTURE: CPT

## 2022-06-08 PROCEDURE — 6360000002 HC RX W HCPCS: Performed by: INTERNAL MEDICINE

## 2022-06-08 PROCEDURE — 70551 MRI BRAIN STEM W/O DYE: CPT

## 2022-06-08 PROCEDURE — 94640 AIRWAY INHALATION TREATMENT: CPT

## 2022-06-08 PROCEDURE — 84145 PROCALCITONIN (PCT): CPT

## 2022-06-08 PROCEDURE — 80048 BASIC METABOLIC PNL TOTAL CA: CPT

## 2022-06-08 RX ORDER — ACETAMINOPHEN 500 MG
TABLET ORAL
Status: DISPENSED
Start: 2022-06-08 | End: 2022-06-08

## 2022-06-08 RX ADMIN — NIFEDIPINE 60 MG: 60 TABLET, EXTENDED RELEASE ORAL at 08:40

## 2022-06-08 RX ADMIN — GABAPENTIN 300 MG: 300 CAPSULE ORAL at 20:51

## 2022-06-08 RX ADMIN — DICYCLOMINE HYDROCHLORIDE 10 MG: 10 CAPSULE ORAL at 20:52

## 2022-06-08 RX ADMIN — ACETAMINOPHEN 650 MG: 325 TABLET ORAL at 20:56

## 2022-06-08 RX ADMIN — DULOXETINE HYDROCHLORIDE 30 MG: 30 CAPSULE, DELAYED RELEASE ORAL at 08:40

## 2022-06-08 RX ADMIN — CARVEDILOL 25 MG: 25 TABLET, FILM COATED ORAL at 08:40

## 2022-06-08 RX ADMIN — GABAPENTIN 300 MG: 300 CAPSULE ORAL at 08:40

## 2022-06-08 RX ADMIN — ALBUTEROL SULFATE 2 PUFF: 90 AEROSOL, METERED RESPIRATORY (INHALATION) at 00:39

## 2022-06-08 RX ADMIN — GABAPENTIN 300 MG: 300 CAPSULE ORAL at 15:59

## 2022-06-08 RX ADMIN — INSULIN LISPRO 2 UNITS: 100 INJECTION, SOLUTION INTRAVENOUS; SUBCUTANEOUS at 17:27

## 2022-06-08 RX ADMIN — INSULIN GLARGINE 35 UNITS: 100 INJECTION, SOLUTION SUBCUTANEOUS at 08:42

## 2022-06-08 RX ADMIN — SODIUM CHLORIDE: 9 INJECTION, SOLUTION INTRAVENOUS at 05:52

## 2022-06-08 RX ADMIN — DICYCLOMINE HYDROCHLORIDE 10 MG: 10 CAPSULE ORAL at 17:25

## 2022-06-08 RX ADMIN — ROSUVASTATIN CALCIUM 40 MG: 40 TABLET, FILM COATED ORAL at 20:51

## 2022-06-08 RX ADMIN — DICYCLOMINE HYDROCHLORIDE 10 MG: 10 CAPSULE ORAL at 12:00

## 2022-06-08 RX ADMIN — INSULIN LISPRO 1 UNITS: 100 INJECTION, SOLUTION INTRAVENOUS; SUBCUTANEOUS at 21:48

## 2022-06-08 RX ADMIN — INSULIN LISPRO 2 UNITS: 100 INJECTION, SOLUTION INTRAVENOUS; SUBCUTANEOUS at 12:02

## 2022-06-08 RX ADMIN — ASPIRIN 81 MG: 81 TABLET, CHEWABLE ORAL at 08:40

## 2022-06-08 RX ADMIN — INSULIN LISPRO 2 UNITS: 100 INJECTION, SOLUTION INTRAVENOUS; SUBCUTANEOUS at 08:42

## 2022-06-08 RX ADMIN — DICYCLOMINE HYDROCHLORIDE 10 MG: 10 CAPSULE ORAL at 05:51

## 2022-06-08 RX ADMIN — LEVOFLOXACIN 750 MG: 5 INJECTION, SOLUTION INTRAVENOUS at 17:27

## 2022-06-08 RX ADMIN — CARVEDILOL 25 MG: 25 TABLET, FILM COATED ORAL at 17:25

## 2022-06-08 RX ADMIN — ENOXAPARIN SODIUM 30 MG: 100 INJECTION SUBCUTANEOUS at 08:40

## 2022-06-08 ASSESSMENT — PAIN SCALES - GENERAL
PAINLEVEL_OUTOF10: 0
PAINLEVEL_OUTOF10: 6

## 2022-06-08 ASSESSMENT — PAIN DESCRIPTION - DESCRIPTORS: DESCRIPTORS: ACHING

## 2022-06-08 ASSESSMENT — PAIN DESCRIPTION - LOCATION: LOCATION: HEAD

## 2022-06-08 NOTE — CARE COORDINATION
INITIAL CASE MANAGEMENT ASSESSMENT     Reviewed chart, spoke with patient to assess possible discharge needs. Explained Case Management role/services. Living Situation: verified demographics. Patient reports they live alone in a house. Patient reports no concerns regarding mobility in the home. ADLs: Independent     DME: None; no anticipated needs. PT/OT Recs: Home with assist PRN,Outpatient PT   Discussed with patient. Patient agreeable and requested [address] [facility] near [facility]. Per review, [facility] is the closest location. Active Services: none      Transportation: Family      Medications: [address]     PCP: Michael Yates MD      HD/PD: n/a     PLAN/COMMENTS: Return home alone with family support. 1) will need outpatient PT orders for [facility] location. SW/CM provided contact information for patient or family to call with any questions. SW/CM will follow and assist as needed.     LISSETTE Russell, JORGE, Social Work/Case Management   [phone]  Electronically signed by LISSETTE Russell LSW on 6/8/2022 at 1:49 PM

## 2022-06-08 NOTE — PROGRESS NOTES
Neurology Progress Note    Updates  No significant changes clinically from yesterday. Primarily complaining of his head feeling heavy and his vision lagging. Medical History:  Past Medical History:   Diagnosis Date    COVID-19     Diabetes mellitus, type II ([facility] [address]     History of blood transfusion     Hypertension     Protein in urine     [facility] ([email or web address]      History reviewed. No pertinent surgical history.      Current Facility-Administered Medications:   sodium chloride flush 0.9 % injection 10 mL, 10 mL, IntraVENous, 2 times per day  sodium chloride flush 0.9 % injection 10 mL, 10 mL, IntraVENous, PRN  0.9 % sodium chloride infusion, , IntraVENous, PRN  promethazine (PHENERGAN) tablet 12.5 mg, 12.5 mg, Oral, Q6H PRN **OR** ondansetron (ZOFRAN) injection 4 mg, 4 mg, IntraVENous, Q6H PRN  magnesium hydroxide (MILK OF MAGNESIA) 400 MG/5ML suspension 30 mL, 30 mL, Oral, Daily PRN  acetaminophen (TYLENOL) tablet 650 mg, 650 mg, Oral, Q6H PRN **OR** acetaminophen (TYLENOL) suppository 650 mg, 650 mg, Rectal, Q6H PRN  albuterol sulfate  (90 Base) MCG/ACT inhaler 2 puff, 2 puff, Inhalation, Q6H PRN  aspirin chewable tablet 81 mg, 81 mg, Oral, Daily  carvedilol (COREG) tablet 25 mg, 25 mg, Oral, BID WC  dicyclomine (BENTYL) capsule 10 mg, 10 mg, Oral, 4x Daily AC & HS  DULoxetine (CYMBALTA) extended release capsule 30 mg, 30 mg, Oral, Daily  gabapentin (NEURONTIN) capsule 300 mg, 300 mg, Oral, TID  insulin glargine (LANTUS) injection vial 35 Units, 35 Units, SubCUTAneous, Daily  NIFEdipine (PROCARDIA XL) extended release tablet 60 mg, 60 mg, Oral, Daily  rosuvastatin (CRESTOR) tablet 40 mg, 40 mg, Oral, Nightly  levoFLOXacin (LEVAQUIN) 750 MG/150ML infusion 750 mg, 750 mg, IntraVENous, Q24H  0.9 % sodium chloride infusion, , IntraVENous, Continuous  glucose chewable tablet 16 g, 4 tablet, Oral, PRN  dextrose bolus 10% 125 mL, 125 mL, IntraVENous, PRN **OR** dextrose bolus 10% 250 mL, 250 mL, IntraVENous, PRN  glucagon (rDNA) injection 1 mg, 1 mg, IntraMUSCular, PRN  dextrose 5 % solution, 100 mL/hr, IntraVENous, PRN  insulin lispro (HUMALOG) injection vial 0-12 Units, 0-12 Units, SubCUTAneous, TID WC  insulin lispro (HUMALOG) injection vial 0-6 Units, 0-6 Units, SubCUTAneous, Nightly  enoxaparin Sodium (LOVENOX) injection 30 mg, 30 mg, SubCUTAneous, BID    Medications Prior to Admission:   albuterol sulfate  (90 Base) MCG/ACT inhaler, Inhale 2 puffs into the lungs every 6 hours as needed for Wheezing  amoxicillin-clavulanate (AUGMENTIN) 875-125 MG per tablet, Take 1 tablet by mouth 2 times daily  Insulin Degludec (TRESIBA FLEXTOUCH) 200 UNIT/ML SOPN, Inject 55 Units into the skin daily  insulin lispro, 1 Unit Dial, (HUMALOG KWIKPEN) 100 UNIT/ML SOPN, Inject 10 Units into the skin 3 times daily (before meals)  spironolactone (ALDACTONE) 25 MG tablet, Take 1 tablet by mouth daily  rosuvastatin (CRESTOR) 40 MG tablet, Take 1 tablet by mouth nightly  empagliflozin (JARDIANCE) 10 MG tablet, Take 10 mg by mouth daily  NIFEdipine (PROCARDIA XL) 60 MG extended release tablet, Take 60 mg by mouth daily as needed  gabapentin (NEURONTIN) 300 MG capsule, Take 300 mg by mouth 3 times daily. 2-3 QD  metFORMIN (GLUCOPHAGE) 500 MG tablet, Take 1,000 mg by mouth 2 times daily   DULoxetine (CYMBALTA) 30 MG extended release capsule, Take 30 mg by mouth daily  dicyclomine (BENTYL) 10 MG capsule, Take 10 mg by mouth 4 times daily (before meals and nightly)   carvedilol (COREG) 25 MG tablet, Take 25 mg by mouth 2 times daily   chlorthalidone (HYGROTON) 25 MG tablet, Take 25 mg by mouth daily  aspirin 81 MG chewable tablet, Take 81 mg by mouth daily   lisinopril (PRINIVIL;ZESTRIL) 10 MG tablet, Take 20 mg by mouth daily     No Known Allergies    ROS  Constitutional- No weight loss or fevers  Eyes- No diplopia. + photophobia. Ears/nose/throat- No dysphagia. No Dysarthria  Cardiovascular- No palpitations.  No chest pain  Respiratory- No dyspnea. No Cough  Gastrointestinal- No Abdominal pain. No Vomiting. Genitourinary- No incontinence. No urinary retention  Musculoskeletal- No myalgia. No arthralgia  Skin- No rash. No easy bruising. Psychiatric- No depression. No anxiety  Endocrine- + diabetes. No thyroid issues. Hematologic- No bleeding difficulty. No fatigue  Neurologic- No weakness. + Headache. Exam  Blood pressure 101/64, pulse 92, temperature 99 °F (37.2 °C), temperature source Oral, resp. rate 18, weight 263 lb 7.2 oz (119.5 kg), SpO2 94 %. Constitutional    Vital signs: BP, HR, and RR reviewed   General alert, no distress. Eyes: unable to visualize the fundi  Cardiovascular: no peripheral edema. Psychiatric: cooperative with examination, no psychotic behavior noted. Neurologic  Mental status:   orientation to person, place   Attention intact as able to attend well to the exam     Language fluent in conversation   Comprehension intact; follows simple commands  Cranial nerves:   CN2: visual fields full  CN 3,4,6: extraocular muscles intact. Pupils are equal, round, reactive bilaterally. CN7: face symmetric without dysarthria  CN8: hearing grossly intact  CN12: tongue midline with protrusion  Strength: good strength in all 4 extremities   Sensory: light touch intact in all 4 extremities. + peripheral neuropathy. Cerebellar/coordination: finger nose finger normal without ataxia  Tone: normal in all 4 extremities  Gait: deferred at this time for safety. Labs  Glucose 156  Na 136  K 4.3  BUN 26  Cr 1.6    ALT 16  AST 13    WBC 6.1K  Hg 12.8  Platelets 260    DZP6G 16.9    Drug screen negative    Flu negative  COVID negative  UA negative  Blood cultures NG    Studies  CT head w/o 5/29/22  No acute findings. Impression  Multiple complaints - imbalance, chronic peripheral neuropathy, subacute retinopathy, photophobia, headache. It's likely that many of his issues stem from his poorly controlled DM.       He is currently being treated for CHELE and pneumonia as well. Recommendations  1. We will follow brain MRI results and comment PRN. 2.  Management of other medical issues underway. 3.  PT/OT. Please call w/ any additional questions or concerns. Thank you.      Breanne Donahue NP  [address] [place] [address] [id number] Neurology    A copy of this note was provided for Dr Lona Rinne, MD

## 2022-06-08 NOTE — PROGRESS NOTES
Patient is alert and oriented x4, UAL, call light within reach, no c/o dizziness this AM.  AM meds complete, patient tolerated well. VSS and WDL. No s/s of distress, no further needs noted at this time.  Electronically signed by Carolyn Michaels RN on 6/8/2022 at 11:47 AM

## 2022-06-08 NOTE — PLAN OF CARE
Problem: Discharge Planning  Goal: Discharge to home or other facility with appropriate resources  6/8/2022 1146 by Donna Guo RN  Outcome: Progressing  Flowsheets (Taken 6/8/2022 0840)  Discharge to home or other facility with appropriate resources: Identify barriers to discharge with patient and caregiver     Problem: Pain  Goal: Verbalizes/displays adequate comfort level or baseline comfort level  6/8/2022 1146 by Donna Guo RN  Outcome: Progressing   Pain /discomfort being managed with PRN analgesics per MD orders. Patient able to express presence and absence of pain and rate pain appropriately using numerical scale.      Problem: Metabolic/Fluid and Electrolytes - Adult  Goal: Electrolytes maintained within normal limits  6/8/2022 1146 by Donna Guo RN  Outcome: Progressing  Flowsheets (Taken 6/8/2022 0840)  Electrolytes maintained within normal limits: Monitor labs and assess patient for signs and symptoms of electrolyte imbalances     Problem: Safety - Adult  Goal: Free from fall injury  6/8/2022 1146 by Donna Guo RN  Outcome: Progressing  Flowsheets (Taken 6/8/2022 1145)  Free From Fall Injury: Instruct family/caregiver on patient safety     Problem: ABCDS Injury Assessment  Goal: Absence of physical injury  6/8/2022 1146 by Donna Guo RN  Outcome: Progressing  Flowsheets (Taken 6/8/2022 1145)  Absence of Physical Injury: Implement safety measures based on patient assessment

## 2022-06-08 NOTE — CONSULTS
REASON FOR CONSULTATION/CC: Abnormal radiograph      Consult at request of Nidhi Minaya MD for abnormal radiograph  PCP: Elizabeth Kumar MD, MD  Established Pulmonologist: None    HISTORY OF PRESENT ILLNESS: Julee Martinez is a 29y.o. year old male with a history of uncontrolled diabetes who presents with     Cough with shortness of breath for the last several days its been progressing with vision changes    This note may have been  transcribed using [id number] [employer]. Please disregard any translational errors. Assessment:     Acute on chronic kidney disease  Diabetes, poorly controlled hemoglobin A1c 16.5    Plan:      Hospital Day 2     Abnormal radiograph  *Agree with strong concern for pneumonia given appearance. Procalcitonin 0.12. Given his immunosuppression from poorly controlled diabetes, fungal infection of strong consideration. Planning for bronchoscopy with transbronchial biopsies tomorrow. Send for fungal antibodies and 1 3 beta glucan. *Sputum culture if able  *Send for HIV screen and QuantiFERON gold despite no known risk factor  *Continue Levaquin for now. Failed azithromycin. *Plan for biopsy from right upper lobe, anterior segment    Vision changes  *Vision changes is likely secondary to hyperglycemia with diabetic retinopathy. However, if fungal infection is confirmed, need to consider further ocular exam.      Acute on chronic kidney injury  *Per nephrology. Improving. This note was transcribed using [id number] [employer]. Please disregard any translational errors. Thank you for the consult    N[facility]  [phone]             Data:     PAST MEDICAL HISTORY:  Past Medical History:   Diagnosis Date    COVID-19     Diabetes mellitus, type II ([facility] [address]     History of blood transfusion     Hypertension     Protein in urine     Seizure ([facility] [address]        PAST SURGICAL HISTORY:  History reviewed.  No pertinent surgical history. FAMILY HISTORY:  family history is not on file. SOCIAL HISTORY:   reports that he has never smoked. He has never used smokeless tobacco.    Scheduled Meds:   acetaminophen        sodium chloride flush  10 mL IntraVENous 2 times per day    aspirin  81 mg Oral Daily    carvedilol  25 mg Oral BID WC    dicyclomine  10 mg Oral 4x Daily AC & HS    DULoxetine  30 mg Oral Daily    gabapentin  300 mg Oral TID    insulin glargine  35 Units SubCUTAneous Daily    NIFEdipine  60 mg Oral Daily    rosuvastatin  40 mg Oral Nightly    levofloxacin  750 mg IntraVENous Q24H    insulin lispro  0-12 Units SubCUTAneous TID WC    insulin lispro  0-6 Units SubCUTAneous Nightly    [Held by provider] enoxaparin  30 mg SubCUTAneous BID       Continuous Infusions:   sodium chloride      sodium chloride 100 mL/hr at 06/08/22 0552    dextrose         PRN Meds:  sodium chloride flush, sodium chloride, promethazine **OR** ondansetron, magnesium hydroxide, acetaminophen **OR** acetaminophen, albuterol sulfate HFA, glucose, dextrose bolus **OR** dextrose bolus, glucagon (rDNA), dextrose    ALLERGIES:  Patient has No Known Allergies. REVIEW OF SYSTEMS:  Constitutional: Negative for fever    HENT: Negative for sore throat  Eyes: Negative for redness   Respiratory: Negative for dyspnea, + cough  Cardiovascular: Negative for chest pain  Gastrointestinal: Negative for vomiting, diarrhea    Genitourinary: Negative for hematuria   Musculoskeletal: Negative for arthralgias   Skin: Negative for rash  Neurological: Negative for syncope  Hematological: Negative for adenopathy  Psychiatric/Behavorial: Negative for anxiety    Objective:   PHYSICAL EXAM:  Blood pressure 116/82, pulse 98, temperature 99.3 °F (37.4 °C), temperature source Oral, resp. rate 18, weight 263 lb 7.2 oz (119.5 kg), SpO2 94 %.'  Gen: No distress. Eyes: PERRL. No sclera icterus. No conjunctival injection. ENT: No discharge.  Pharynx clear. External appearance of ears and nose normal.  Neck: Trachea midline. No obvious mass. Resp: No accessory muscle use. No crackles. No wheezes. No rhonchi. CV: Regular rate. Regular rhythm. No murmur or rub. No edema. GI: Non-tender. Non-distended. No hernia. Skin: Warm, dry, normal texture and turgor. No nodule on exposed extremities. Lymph: No cervical LAD. No supraclavicular LAD. M/S: No cyanosis. No clubbing. No joint deformity. Neuro: Moves all four extremities. Psych: Oriented x 3. No anxiety. Awake. Alert. Intact judgement and insight. Data Reviewed:   LABS:  CBC:   Recent Labs     06/06/22  1506 06/07/22  0726   WBC 6.0 6.1   HGB 14.5 12.8*   HCT 44.5 39.0*   MCV 78.4* 77.5*    325     BMP:   Recent Labs     06/06/22  1506 06/07/22  0726 06/08/22  0816   * 135* 136   K 4.9 4.5 4.3   CL 98* 102 102   CO2 22 21 21   BUN 43* 39* 26*   CREATININE 2.4* 1.9* 1.6*     LIVER PROFILE:   Recent Labs     06/06/22  1506   AST 13*   ALT 16   LIPASE 43.0   BILITOT <0.2   ALKPHOS 120     PT/INR: No results for input(s): PROTIME, INR in the last 72 hours. APTT: No results for input(s): APTT in the last 72 hours. UA:  Recent Labs     06/07/22  1120   COLORU Straw   PHUR 5.0  5.5   WBCUA 1   RBCUA 1   BACTERIA None Seen   CLARITYU Clear   SPECGRAV >=1.030   LEUKOCYTESUR Negative   UROBILINOGEN 0.2   BILIRUBINUR Negative   BLOODU TRACE-INTACT*   GLUCOSEU >=1000*     No results for input(s): PHART, OVF8SYQ, PO2ART in the last 72 hours. Radiology Review:  Pertinent images / reports were reviewed as a part of this visit. CT Chest w/ contrast: Results for orders placed during the hospital encounter of 05/29/22    CT CHEST W CONTRAST    Narrative  EXAMINATION:  CT OF THE CHEST WITH CONTRAST 5/29/2022 10:26 am    TECHNIQUE:  CT of the chest was performed with the administration of intravenous  contrast. Multiplanar reformatted images are provided for review. Automated  exposure control, iterative reconstruction, and/or weight based adjustment of  the mA/kV was utilized to reduce the radiation dose to as low as reasonably  achievable. COMPARISON:  Chest radiograph on the same date    HISTORY:  ORDERING SYSTEM PROVIDED HISTORY: Abnormal x-ray. Pneumonia versus mass  TECHNOLOGIST PROVIDED HISTORY:  Reason for exam:->Abnormal x-ray. Pneumonia versus mass  Decision Support Exception - unselect if not a suspected or confirmed  emergency medical condition->Emergency Medical Condition (MA)  Reason for Exam: Abnormal x-ray. Pneumonia versus mass    FINDINGS:  Mediastinum: The heart, aorta and pulmonary arteries are normal.  Thyroid and  esophagus normal.  No mediastinal lymph node enlargement. Bulky soft tissue  is observed in the right pulmonary hilum extending into the right upper lobe. Lymphadenopathy is suspected. Measurements approximately 2.7 x 2.7 cm. Lungs/pleura: The airways are patent. No pleural fluid. Dense airspace  opacification in the anterior segment of the right upper lobe peripheral to  hilar findings above. Scattered mild nodular and ground-glass opacities  peripherally suggesting pneumonitis. The lungs appear clear otherwise. Upper Abdomen: Adrenal glands are normal.  No significant findings in the  visualized upper abdomen. Soft Tissues/Bones: No skeletal abnormalities are appreciated within the  chest.    Impression  1. Dense opacification of the perihilar right upper lobe suspected to  represent acute pneumonia. Correlate with any clinical signs of infection. 2. Probable right hilar lymphadenopathy in association. 3. Malignancy is considered less likely given appearance and patient age/lack  of history. Recommend follow-up imaging after a course of therapy to ensure  resolution.       CT Chest w/o contrast: Results for orders placed during the hospital encounter of 06/06/22    CT CHEST WO CONTRAST    Narrative  EXAMINATION:  CT OF THE CHEST WITHOUT CONTRAST 6/7/2022 6:29 pm    TECHNIQUE:  CT of the chest was performed without the administration of intravenous  contrast. Multiplanar reformatted images are provided for review. Automated  exposure control, iterative reconstruction, and/or weight based adjustment of  the mA/kV was utilized to reduce the radiation dose to as low as reasonably  achievable. COMPARISON:  CT 05/29/2022. Chest x-ray 06/06/2022. HISTORY:  ORDERING SYSTEM PROVIDED HISTORY: cavity? PNA, TB? viral PNA? TECHNOLOGIST PROVIDED HISTORY:  Reason for exam:->cavity? PNA, TB? viral PNA? Reason for Exam: cavity? PNA, TB? viral PNA? FINDINGS:  Mediastinum: The thoracic aorta is normal in caliber. Mildly enlarged main  pulmonary artery at 3.6 cm, stable. The heart is not enlarged with no  pericardial effusion. Multiple small mediastinal nodes are stable. The  enlarged right hilar node is not evaluated due to the lack of intravenous  contrast.  The esophagus is unremarkable. Lungs/pleura: There is progressive consolidation in the anterior segment of  the right upper lobe consistent with pneumonia. There is no cavitation. No  significant extension to other portions of the right upper lobe. The  remainder of the lungs are clear. There is no pleural fluid. The central  airways are patent. Upper Abdomen: The visualized upper abdominal viscera are unremarkable. Stable mild splenomegaly. Soft Tissues/Bones: No acute osseous findings. Impression  1. Progressive consolidation in the anterior segment of the right upper lobe  consistent with pneumonia. There is no evidence of cavitation. The findings  are nonspecific but most consistent with bacterial etiology. 2. The remainder of the lungs are clear. No pleural fluid. 3. Stable small mediastinal nodes.   The right hilar node noted previously is  not evaluated due to the lack of intravenous contrast.      CTPA: No results found for this or any previous

## 2022-06-08 NOTE — PROGRESS NOTES
Hospitalist Progress Note      PCP: David Nunez MD, MD    Date of Admission: 6/6/2022    Chief Complaint:   Dizziness, not feeling well    Hospital Course:    72-year-old male with past medical history of diabetes mellitus hypertension seizure disorder who presents to the hospital for nausea, dizziness and not feeling well. According to patient his symptoms has been the for around 2 weeks, not improving, he has been seen by his PCP today he has completed zpack from ER 5/30/22 without improvement. denies fevers chills chest pain. Was just hospitalized 5/17-5/19/22. Had nl [facility] 5/19/22. Seen ER 5/30/22 for eye pain. has had some blurred vision in his eyes. He was seen by [facility].  had a scratch to eye. And they noticed that he had a little bleeding on the retina had an appointment on Meghan 3 for follow-up. Has peripheral neuropathy walks with cane , takes gabapentin. office visit 6/6 PCP Rx augmentin 875mg filled it has it at bedside but hadnt started it yet and albuterol inhaler  . Instead sent to ER  On and off headaches x2 week as as well. 5/29/22 head CT wo NEG     On evaluation in the emergency department found to have elevated creatinine BUN 43, cr 2.4 , baseline about cr 1.5, and redomenstration /progression of RUL infiltrate. Flu/CV19 neg, WBC nl,  Bld cx neg, UA neg for infx was admitted for further evaluation. Started IV levaquin. Hydrating NS 100cc/hr. Holding spironolactone, nephrology consulted.      Subjective: up in chair fully dressed no distress on RA states occasional dry cough, lungs are clear, no leg edema afebrile all other systems neg       Medications:  Reviewed    Infusion Medications    sodium chloride      sodium chloride 100 mL/hr at 06/08/22 0552    dextrose       Scheduled Medications    sodium chloride flush  10 mL IntraVENous 2 times per day    aspirin  81 mg Oral Daily    carvedilol  25 mg Oral BID WC    dicyclomine  10 mg Oral 4x Daily AC & HS  DULoxetine  30 mg Oral Daily    gabapentin  300 mg Oral TID    insulin glargine  35 Units SubCUTAneous Daily    NIFEdipine  60 mg Oral Daily    rosuvastatin  40 mg Oral Nightly    levofloxacin  750 mg IntraVENous Q24H    insulin lispro  0-12 Units SubCUTAneous TID     insulin lispro  0-6 Units SubCUTAneous Nightly    enoxaparin  30 mg SubCUTAneous BID     PRN Meds: sodium chloride flush, sodium chloride, promethazine **OR** ondansetron, magnesium hydroxide, acetaminophen **OR** acetaminophen, albuterol sulfate HFA, glucose, dextrose bolus **OR** dextrose bolus, glucagon (rDNA), dextrose      Intake/Output Summary (Last 24 hours) at 6/8/2022 1111  Last data filed at 6/8/2022 0753  Gross per 24 hour   Intake 3171.95 ml   Output 500 ml   Net 2671.95 ml       Physical Exam Performed:    /64   Pulse 92   Temp 99 °F (37.2 °C) (Oral)   Resp 18   Wt 263 lb 7.2 oz (119.5 kg)   SpO2 94%   BMI 34.76 kg/m²     General appearance: No  Distress up in chair is dressed in street clothes speaks full sentences   HEENT: Pupils equal, round, and reactive to light. beard mucosa moist PERRL EOMI   Neck: Supple, full range of motion. No jugular venous distention. Trachea midline. Respiratory:  Normal  effort. Clear to auscultation  Cardiovascular: RRR S1/S2 without murmurs, rubs or gallops. Abdomen: Soft, non-tender, non-distended with normal bowel sounds. Musculoskeletal: No clubbing, cyanosis or edema bilaterally. Full range of motion without deformity. Skin: Skin color, texture, turgor normal.  No rashes or lesions. Neurologic:  Neurovascularly intact without any focal sensory/motor deficits.  Cranial nerves: II-XII intact, grossly non-focal.  Psychiatric: Alert and oriented, thought content appropriate  Peripheral Pulses: +2 palpable, equal bilaterally       Labs:   Recent Labs     06/06/22  1506 06/07/22  0726   WBC 6.0 6.1   HGB 14.5 12.8*   HCT 44.5 39.0*    325     Recent Labs 06/06/22  1506 06/07/22  0726 06/08/22  0816   * 135* 136   K 4.9 4.5 4.3   CL 98* 102 102   CO2 22 21 21   BUN 43* 39* 26*   CREATININE 2.4* 1.9* 1.6*   CALCIUM 9.9 9.2 7.8*     Recent Labs     06/06/22  1506   AST 13*   ALT 16   BILITOT <0.2   ALKPHOS 120     No results for input(s): INR in the last 72 hours. Recent Labs     06/06/22  1506 06/07/22  1724 06/07/22  2313   TROPONINI 0.05* 0.04* 0.03*       Urinalysis:      Lab Results   Component Value Date    NITRU Negative 06/07/2022    WBCUA 1 06/07/2022    BACTERIA None Seen 06/07/2022    RBCUA 1 06/07/2022    BLOODU TRACE-INTACT 06/07/2022    SPECGRAV >=1.030 06/07/2022    GLUCOSEU >=1000 06/07/2022    GLUCOSEU NEGATIVE 01/02/2011       Radiology:  CT CHEST WO CONTRAST   Final Result   1. Progressive consolidation in the anterior segment of the right upper lobe   consistent with pneumonia. There is no evidence of cavitation. The findings   are nonspecific but most consistent with bacterial etiology. 2. The remainder of the lungs are clear. No pleural fluid. 3. Stable small mediastinal nodes. The right hilar node noted previously is   not evaluated due to the lack of intravenous contrast.         XR CHEST PORTABLE   Final Result   Redemonstration of right upper lobe infiltrate which is likely pneumonic. It   is slightly larger compared to the previous evaluation. MRI BRAIN WO CONTRAST    (Results Pending)       Echo 5/18/22  Summary   Left ventricle size is normal.   Normal left ventricle wall thickness is present. Global left ventricular function is mildly decreased with ejection fraction   estimated from 40 % to 45 %. Indeterminate diastolic function. The mitral valve leaflets are mildly thickened with normal opening.    The right ventricle is normal in size and function    Assessment/Plan:    Active Hospital Problems    Diagnosis     CHELE (acute kidney injury) ([facility] [address] [N17.9]      Priority: Medium     RUL infiltrate  --completed zpack from ER 5/30  levaquin   CT chest wo (due to RF) progressive consolidation anterior segment RUL c/w pneumonia no cavitation most c/w bacterial etiology , stable mediatinal nodes   utox neg   Will consult pulmonology see if agree with tx plan, consider follow with imaging to resolution, ? Any need bronch     Acute renal failure on CKD stage III   Diabetic nephropathy   --seen by nephrology    Seizure disorder  -states had seizure as child, none recent     Persistent c/o dizziness, c/o off balance   -neurology consulted , FU MRI CT head wo neg   ? dehydration contributing   nifedpine can cause dizziness. .. ? Cause     DM type 2 with neuropathy  --gabapentin   --accucheck ac and HS med dose SS  lantus 35 units subq daily  Home med list:  jardiance 10mg daily/metformin 1000mg BID/glargine 30 units HS /tresiba 55units daily       DVT Prophylaxis: lovenox  Diet: ADULT DIET;  Regular; 4 carb choices (60 gm/meal)  Code Status: Full Code    PT/OT Eval Status: independent cane     Dispo - return to home setting     Velasquez Carson MD

## 2022-06-08 NOTE — PLAN OF CARE
Problem: Pain  Goal: Verbalizes/displays adequate comfort level or baseline comfort level  Outcome: Progressing    Pain/discomfort being managed with PRN analgesics per MD orders. Pt able to express presence and absence of pain and rate pain appropriately using numerical scale. Non-pharmacologic comfort measures implemented. Rest and comfort promoted. Problem: Safety - Adult  Goal: Free from fall injury  Outcome: Progressing     Problem: ABCDS Injury Assessment  Goal: Absence of physical injury  Outcome: Progressing     Patient uses call light appropriately to express needs. Bed to lowest position with door open and call light in reach. All fall precautions implemented at this time. Siderails up x2. Non skid footwear in place. Seen at intervals to ensure safety. All needs attended.       Problem: Metabolic/Fluid and Electrolytes - Adult  Goal: Electrolytes maintained within normal limits  Outcome: Progressing     Problem: Metabolic/Fluid and Electrolytes - Adult  Goal: Hemodynamic stability and optimal renal function maintained  Outcome: Progressing     Problem: Metabolic/Fluid and Electrolytes - Adult  Goal: Glucose maintained within prescribed range  Outcome: Progressing     Problem: Discharge Planning  Goal: Discharge to home or other facility with appropriate resources  Outcome: Progressing

## 2022-06-08 NOTE — PROGRESS NOTES
Nephrology ([facility]) Progress Note    CC: CHELE on CKD    Subjective:    HPI:  Breathing unchanged. No CP. Renal function better. Repeat chest CT noted. ROS:  In bed. No fever. [address]:  medications reviewed. Objective:  Blood pressure 116/82, pulse 98, temperature 99.3 °F (37.4 °C), temperature source Oral, resp. rate 18, weight 263 lb 7.2 oz (119.5 kg), SpO2 94 %. Intake/Output Summary (Last 24 hours) at 6/8/2022 1251  Last data filed at 6/8/2022 0753  Gross per 24 hour   Intake 3171.95 ml   Output 500 ml   Net 2671.95 ml     General:  NAD, A+OX3  Chest:  CTAB  CVS:  RRR  Abdominal:  NTND, soft, +BS  Extremities:  no edema  Skin:  no rash    Labs:  Renal panel:  Lab Results   Component Value Date/Time     06/08/2022 08:16 AM    K 4.3 06/08/2022 08:16 AM    CO2 21 06/08/2022 08:16 AM    BUN 26 (H) 06/08/2022 08:16 AM    CREATININE 1.6 (H) 06/08/2022 08:16 AM    CALCIUM 7.8 (L) 06/08/2022 08:16 AM    PHOS 3.8 06/24/2021 03:00 PM    MG 2.30 05/19/2022 08:08 AM     CBC:  Lab Results   Component Value Date/Time    WBC 6.1 06/07/2022 07:26 AM    HGB 12.8 (L) 06/07/2022 07:26 AM    HCT 39.0 (L) 06/07/2022 07:26 AM     06/07/2022 07:26 AM       Assessment/Plan:  Reviewed old records and labs.     1) CHELE on CKD              - ddx:  pre-renal vs. ATN vs. contrast nephropathy                          - I don't think Jardiance played a role   - renal function slightly better              - on empiric IVF     2) RUL infiltrate              - why would a 29 y.o. male have a RUL infiltrate that also failed a Z-gloria?              - on empiric levaquin   - repeat chest CT shows further consolidation of infiltrate              - patient agreeable to HIV screen              - less likely TB                          - no evidence of cavitation                          - no hemoptysis               - should we consider cancer as more likely?                          - he has no leukocytosis - suspected lymphadenopathy on chest CT                          - he has some possible neurologic symptoms - paraneoplastic?                          - borderline hyponatremia                          - biopsy?   - appreciate pulmonary's input with bronchoscopy and transbronchial biopsy tomorrow     3) neuro              - neurology consulted              - brain MRI pending     4) FEN              - hyponatremia                          - this has been persistent issue                          - monitor

## 2022-06-09 ENCOUNTER — APPOINTMENT (OUTPATIENT)
Dept: GENERAL RADIOLOGY | Age: 37
DRG: 137 | End: 2022-06-09
Payer: MEDICAID

## 2022-06-09 LAB
ANION GAP SERPL CALCULATED.3IONS-SCNC: 14 MMOL/L (ref 3–16)
BUN BLDV-MCNC: 25 MG/DL (ref 7–20)
CALCIUM SERPL-MCNC: 7.8 MG/DL (ref 8.3–10.6)
CHLORIDE BLD-SCNC: 103 MMOL/L (ref 99–110)
CO2: 21 MMOL/L (ref 21–32)
CREAT SERPL-MCNC: 1.5 MG/DL (ref 0.9–1.3)
GFR AFRICAN AMERICAN: >60
GFR NON-AFRICAN AMERICAN: 53
GLUCOSE BLD-MCNC: 120 MG/DL (ref 70–99)
GLUCOSE BLD-MCNC: 129 MG/DL (ref 70–99)
GLUCOSE BLD-MCNC: 129 MG/DL (ref 70–99)
GLUCOSE BLD-MCNC: 176 MG/DL (ref 70–99)
GLUCOSE BLD-MCNC: 94 MG/DL (ref 70–99)
HIV AG/AB: NORMAL
HIV ANTIGEN: NORMAL
HIV-1 ANTIBODY: NORMAL
HIV-2 AB: NORMAL
PERFORMED ON: ABNORMAL
PERFORMED ON: NORMAL
POTASSIUM REFLEX MAGNESIUM: 4.1 MMOL/L (ref 3.5–5.1)
PROCALCITONIN: 0.13 NG/ML (ref 0–0.15)
SODIUM BLD-SCNC: 138 MMOL/L (ref 136–145)

## 2022-06-09 PROCEDURE — 94760 N-INVAS EAR/PLS OXIMETRY 1: CPT

## 2022-06-09 PROCEDURE — 86606 ASPERGILLUS ANTIBODY: CPT

## 2022-06-09 PROCEDURE — 3609011300 HC BRONCHOSCOPY BRONCHIAL/ENDOBRNCL BX 1+ SITES: Performed by: INTERNAL MEDICINE

## 2022-06-09 PROCEDURE — 87102 FUNGUS ISOLATION CULTURE: CPT

## 2022-06-09 PROCEDURE — 86612 BLASTOMYCES ANTIBODY: CPT

## 2022-06-09 PROCEDURE — 6370000000 HC RX 637 (ALT 250 FOR IP): Performed by: INTERNAL MEDICINE

## 2022-06-09 PROCEDURE — 0BJ08ZZ INSPECTION OF TRACHEOBRONCHIAL TREE, VIA NATURAL OR ARTIFICIAL OPENING ENDOSCOPIC: ICD-10-PCS | Performed by: INTERNAL MEDICINE

## 2022-06-09 PROCEDURE — 87070 CULTURE OTHR SPECIMN AEROBIC: CPT

## 2022-06-09 PROCEDURE — 87390 HIV-1 AG IA: CPT

## 2022-06-09 PROCEDURE — 1200000000 HC SEMI PRIVATE

## 2022-06-09 PROCEDURE — 2580000003 HC RX 258: Performed by: INTERNAL MEDICINE

## 2022-06-09 PROCEDURE — 99152 MOD SED SAME PHYS/QHP 5/>YRS: CPT | Performed by: INTERNAL MEDICINE

## 2022-06-09 PROCEDURE — 36415 COLL VENOUS BLD VENIPUNCTURE: CPT

## 2022-06-09 PROCEDURE — 88312 SPECIAL STAINS GROUP 1: CPT

## 2022-06-09 PROCEDURE — 6360000002 HC RX W HCPCS: Performed by: INTERNAL MEDICINE

## 2022-06-09 PROCEDURE — 87185 SC STD ENZYME DETCJ PER NZM: CPT

## 2022-06-09 PROCEDURE — 86618 LYME DISEASE ANTIBODY: CPT

## 2022-06-09 PROCEDURE — 99232 SBSQ HOSP IP/OBS MODERATE 35: CPT | Performed by: INTERNAL MEDICINE

## 2022-06-09 PROCEDURE — 31623 DX BRONCHOSCOPE/BRUSH: CPT | Performed by: INTERNAL MEDICINE

## 2022-06-09 PROCEDURE — 88112 CYTOPATH CELL ENHANCE TECH: CPT

## 2022-06-09 PROCEDURE — 87077 CULTURE AEROBIC IDENTIFY: CPT

## 2022-06-09 PROCEDURE — 87186 SC STD MICRODIL/AGAR DIL: CPT

## 2022-06-09 PROCEDURE — 86635 COCCIDIOIDES ANTIBODY: CPT

## 2022-06-09 PROCEDURE — 87205 SMEAR GRAM STAIN: CPT

## 2022-06-09 PROCEDURE — 71045 X-RAY EXAM CHEST 1 VIEW: CPT

## 2022-06-09 PROCEDURE — 86702 HIV-2 ANTIBODY: CPT

## 2022-06-09 PROCEDURE — 86698 HISTOPLASMA ANTIBODY: CPT

## 2022-06-09 PROCEDURE — 84145 PROCALCITONIN (PCT): CPT

## 2022-06-09 PROCEDURE — 87116 MYCOBACTERIA CULTURE: CPT

## 2022-06-09 PROCEDURE — 99153 MOD SED SAME PHYS/QHP EA: CPT | Performed by: INTERNAL MEDICINE

## 2022-06-09 PROCEDURE — 31628 BRONCHOSCOPY/LUNG BX EACH: CPT | Performed by: INTERNAL MEDICINE

## 2022-06-09 PROCEDURE — 3609027000 HC BRONCHOSCOPY: Performed by: INTERNAL MEDICINE

## 2022-06-09 PROCEDURE — 88305 TISSUE EXAM BY PATHOLOGIST: CPT

## 2022-06-09 PROCEDURE — 87206 SMEAR FLUORESCENT/ACID STAI: CPT

## 2022-06-09 PROCEDURE — 3609011100 HC BRONCHOSCOPY BRUSHINGS: Performed by: INTERNAL MEDICINE

## 2022-06-09 PROCEDURE — 3209999900 FLUORO FOR SURGICAL PROCEDURES

## 2022-06-09 PROCEDURE — 2709999900 HC NON-CHARGEABLE SUPPLY: Performed by: INTERNAL MEDICINE

## 2022-06-09 PROCEDURE — 86701 HIV-1ANTIBODY: CPT

## 2022-06-09 PROCEDURE — 87449 NOS EACH ORGANISM AG IA: CPT

## 2022-06-09 PROCEDURE — 87015 SPECIMEN INFECT AGNT CONCNTJ: CPT

## 2022-06-09 PROCEDURE — 2500000003 HC RX 250 WO HCPCS: Performed by: INTERNAL MEDICINE

## 2022-06-09 PROCEDURE — 80048 BASIC METABOLIC PNL TOTAL CA: CPT

## 2022-06-09 RX ORDER — FENTANYL CITRATE 50 UG/ML
INJECTION, SOLUTION INTRAMUSCULAR; INTRAVENOUS PRN
Status: DISCONTINUED | OUTPATIENT
Start: 2022-06-09 | End: 2022-06-09 | Stop reason: ALTCHOICE

## 2022-06-09 RX ORDER — LIDOCAINE HYDROCHLORIDE 20 MG/ML
INJECTION, SOLUTION EPIDURAL; INFILTRATION; INTRACAUDAL; PERINEURAL PRN
Status: DISCONTINUED | OUTPATIENT
Start: 2022-06-09 | End: 2022-06-09 | Stop reason: ALTCHOICE

## 2022-06-09 RX ORDER — MIDAZOLAM HYDROCHLORIDE 1 MG/ML
INJECTION INTRAMUSCULAR; INTRAVENOUS PRN
Status: DISCONTINUED | OUTPATIENT
Start: 2022-06-09 | End: 2022-06-09 | Stop reason: ALTCHOICE

## 2022-06-09 RX ORDER — EPINEPHRINE 1 MG/ML(1)
AMPUL (ML) INJECTION PRN
Status: DISCONTINUED | OUTPATIENT
Start: 2022-06-09 | End: 2022-06-09 | Stop reason: ALTCHOICE

## 2022-06-09 RX ORDER — LIDOCAINE HYDROCHLORIDE 20 MG/ML
JELLY TOPICAL PRN
Status: DISCONTINUED | OUTPATIENT
Start: 2022-06-09 | End: 2022-06-09 | Stop reason: ALTCHOICE

## 2022-06-09 RX ADMIN — LEVOFLOXACIN 750 MG: 5 INJECTION, SOLUTION INTRAVENOUS at 17:50

## 2022-06-09 RX ADMIN — INSULIN LISPRO 1 UNITS: 100 INJECTION, SOLUTION INTRAVENOUS; SUBCUTANEOUS at 20:38

## 2022-06-09 RX ADMIN — SODIUM CHLORIDE, PRESERVATIVE FREE 10 ML: 5 INJECTION INTRAVENOUS at 20:37

## 2022-06-09 RX ADMIN — ENOXAPARIN SODIUM 30 MG: 100 INJECTION SUBCUTANEOUS at 20:36

## 2022-06-09 RX ADMIN — GABAPENTIN 300 MG: 300 CAPSULE ORAL at 20:36

## 2022-06-09 RX ADMIN — SODIUM CHLORIDE: 9 INJECTION, SOLUTION INTRAVENOUS at 17:49

## 2022-06-09 RX ADMIN — DICYCLOMINE HYDROCHLORIDE 10 MG: 10 CAPSULE ORAL at 20:36

## 2022-06-09 RX ADMIN — SODIUM CHLORIDE: 9 INJECTION, SOLUTION INTRAVENOUS at 05:33

## 2022-06-09 RX ADMIN — DICYCLOMINE HYDROCHLORIDE 10 MG: 10 CAPSULE ORAL at 17:46

## 2022-06-09 RX ADMIN — ROSUVASTATIN CALCIUM 40 MG: 40 TABLET, FILM COATED ORAL at 20:36

## 2022-06-09 RX ADMIN — CARVEDILOL 25 MG: 25 TABLET, FILM COATED ORAL at 17:46

## 2022-06-09 ASSESSMENT — PAIN SCALES - GENERAL: PAINLEVEL_OUTOF10: 0

## 2022-06-09 NOTE — PROGRESS NOTES
Physician Progress Note      PATIENT:               Dragan Vidal  CSN #:                  [id number]  :                       1985  ADMIT DATE:       2022 2:28 PM  [address] [place] DATE:  RESPONDING  PROVIDER #:        Chito Gómez MD          QUERY TEXT:    Pt admitted with CHELE. Pt noted to have pneumonia . If possible, please   document in the progress notes and discharge summary if you are evaluating   and/or treating any of the following: The medical record reflects the following:  Risk Factors: history of pneumonia  Clinical Indicators: Documentation per ED was seen in the ED on 22 he was   diagnosed with pneumonia and completed all his antibiotics, was seen by his   family doctor today and then sent to the ER to start a new round of   antibiotics however he states that he is not feeling any better, he still   nauseous, he still feeling dizzy. He states he feels like in general he is   just weak. CXR-Redemonstration of right upper lobe infiltrate which is likely   pneumonic. ? It  is slightly larger compared to the previous evaluation. Treatment: iv levaquin, monitor labs    Thank you ,Anatoly Baird RN CDS CRCR CCDS  [email or web address]  Options provided:  -- Treating a possible Gram negative pneumonia  -- Other - I will add my own diagnosis  -- Disagree - Not applicable / Not valid  -- Disagree - Clinically unable to determine / Unknown  -- Refer to Clinical Documentation Reviewer    PROVIDER RESPONSE TEXT:    This patient is being treated for a possible gram negative pneumonia. Query created by:  Maciej Baird on 2022 10:12 AM      Electronically signed by:  Chito Gómez MD 2022 7:26 AM

## 2022-06-09 NOTE — PROGRESS NOTES
Pulmonary Progress Note    Date of Admission: 6/6/2022   LOS: 3 days       CC:  pneumonia    Subjective:  No change in symptoms. Ready for bronchoscopy     ROS:   No nausea  No Vomiting  No chest pain      Assessment:     Acute on chronic kidney disease  Diabetes, poorly controlled hemoglobin A1c 16.5    Plan: This note may have been transcribed using [id number]. Please disregard any translational errors. Hospital Day: 3     Abnormal radiograph  *bronchoscopy today   *pending HIV screen and QuantiFERON gold despite no known risk factor  *Continue Levaquin for now. Failed azithromycin. *Plan for biopsy from right upper lobe, anterior segment    Bronchoscopy risk:  bronchoscopy was explained including benefit, planned procedure and risk including, bleeding, lung collapse and death ~ 1:10,000. Alternatives to bronchoscopy in the relative pros and cons discussed. Patient would like to proceed with the risk of bronchoscopy. The patient expressed understanding for all.        Vision changes  *Vision changes is likely secondary to hyperglycemia with diabetic retinopathy. However, if fungal infection is confirmed, need to consider further ocular exam.        Acute on chronic kidney injury  *Per nephrology. Improving. Data:        PHYSICAL EXAM:   Blood pressure 117/84, pulse 99, temperature 98.2 °F (36.8 °C), temperature source Oral, resp. rate 18, weight 264 lb 15.9 oz (120.2 kg), SpO2 98 %.'  Body mass index is 34.96 kg/m². Gen: No distress. ENT:   Resp: No accessory muscle use. No crackles. No wheezes. No rhonchi. CV: Regular rate. Regular rhythm. No murmur or rub. No edema. Skin: Warm, dry, normal texture and turgor. No nodule on exposed extremities. M/S: No cyanosis. No clubbing. No joint deformity. Psych: Oriented x 3. No anxiety. Awake. Alert. Intact judgement and insight. Good Mood / Affect.   Memory appears in tact       Medications:    Scheduled Meds:   sodium chloride flush  10 mL IntraVENous 2 times per day    aspirin  81 mg Oral Daily    carvedilol  25 mg Oral BID WC    dicyclomine  10 mg Oral 4x Daily AC & HS    DULoxetine  30 mg Oral Daily    gabapentin  300 mg Oral TID    insulin glargine  35 Units SubCUTAneous Daily    NIFEdipine  60 mg Oral Daily    rosuvastatin  40 mg Oral Nightly    levofloxacin  750 mg IntraVENous Q24H    insulin lispro  0-12 Units SubCUTAneous TID WC    insulin lispro  0-6 Units SubCUTAneous Nightly    [Held by provider] enoxaparin  30 mg SubCUTAneous BID       Continuous Infusions:   sodium chloride      sodium chloride 100 mL/hr at 06/09/22 0533    dextrose         PRN Meds:  sodium chloride flush, sodium chloride, promethazine **OR** ondansetron, magnesium hydroxide, acetaminophen **OR** acetaminophen, albuterol sulfate HFA, glucose, dextrose bolus **OR** dextrose bolus, glucagon (rDNA), dextrose    Labs reviewed:  CBC:   Recent Labs     06/06/22  1506 06/07/22  0726   WBC 6.0 6.1   HGB 14.5 12.8*   HCT 44.5 39.0*   MCV 78.4* 77.5*    325     BMP:   Recent Labs     06/06/22  1506 06/07/22  0726 06/08/22  0816   * 135* 136   K 4.9 4.5 4.3   CL 98* 102 102   CO2 22 21 21   BUN 43* 39* 26*   CREATININE 2.4* 1.9* 1.6*     LIVER PROFILE:   Recent Labs     06/06/22  1506   AST 13*   ALT 16   LIPASE 43.0   BILITOT <0.2   ALKPHOS 120     PT/INR: No results for input(s): PROTIME, INR in the last 72 hours. APTT: No results for input(s): APTT in the last 72 hours. UA:  Recent Labs     06/07/22  1120   COLORU Straw   PHUR 5.0  5.5   WBCUA 1   RBCUA 1   BACTERIA None Seen   CLARITYU Clear   SPECGRAV >=1.030   LEUKOCYTESUR Negative   UROBILINOGEN 0.2   BILIRUBINUR Negative   BLOODU TRACE-INTACT*   GLUCOSEU >=1000*     No results for input(s): PH, PCO2, PO2 in the last 72 hours. Cx:      Films:  Radiology Review:  Pertinent images / reports were reviewed as a part of this visit.     CT Chest w/ contrast: Results for orders placed during the hospital encounter of 05/29/22    CT CHEST W CONTRAST    Narrative  EXAMINATION:  CT OF THE CHEST WITH CONTRAST 5/29/2022 10:26 am    TECHNIQUE:  CT of the chest was performed with the administration of intravenous  contrast. Multiplanar reformatted images are provided for review. Automated  exposure control, iterative reconstruction, and/or weight based adjustment of  the mA/kV was utilized to reduce the radiation dose to as low as reasonably  achievable. COMPARISON:  Chest radiograph on the same date    HISTORY:  ORDERING SYSTEM PROVIDED HISTORY: Abnormal x-ray. Pneumonia versus mass  TECHNOLOGIST PROVIDED HISTORY:  Reason for exam:->Abnormal x-ray. Pneumonia versus mass  Decision Support Exception - unselect if not a suspected or confirmed  emergency medical condition->Emergency Medical Condition (MA)  Reason for Exam: Abnormal x-ray. Pneumonia versus mass    FINDINGS:  Mediastinum: The heart, aorta and pulmonary arteries are normal.  Thyroid and  esophagus normal.  No mediastinal lymph node enlargement. Bulky soft tissue  is observed in the right pulmonary hilum extending into the right upper lobe. Lymphadenopathy is suspected. Measurements approximately 2.7 x 2.7 cm. Lungs/pleura: The airways are patent. No pleural fluid. Dense airspace  opacification in the anterior segment of the right upper lobe peripheral to  hilar findings above. Scattered mild nodular and ground-glass opacities  peripherally suggesting pneumonitis. The lungs appear clear otherwise. Upper Abdomen: Adrenal glands are normal.  No significant findings in the  visualized upper abdomen. Soft Tissues/Bones: No skeletal abnormalities are appreciated within the  chest.    Impression  1. Dense opacification of the perihilar right upper lobe suspected to  represent acute pneumonia. Correlate with any clinical signs of infection. 2. Probable right hilar lymphadenopathy in association.   3. Malignancy is considered less likely given appearance and patient age/lack  of history. Recommend follow-up imaging after a course of therapy to ensure  resolution. CT Chest w/o contrast: Results for orders placed during the hospital encounter of 06/06/22    CT CHEST WO CONTRAST    Narrative  EXAMINATION:  CT OF THE CHEST WITHOUT CONTRAST 6/7/2022 6:29 pm    TECHNIQUE:  CT of the chest was performed without the administration of intravenous  contrast. Multiplanar reformatted images are provided for review. Automated  exposure control, iterative reconstruction, and/or weight based adjustment of  the mA/kV was utilized to reduce the radiation dose to as low as reasonably  achievable. COMPARISON:  CT 05/29/2022. Chest x-ray 06/06/2022. HISTORY:  ORDERING SYSTEM PROVIDED HISTORY: cavity? PNA, TB? viral PNA? TECHNOLOGIST PROVIDED HISTORY:  Reason for exam:->cavity? PNA, TB? viral PNA? Reason for Exam: cavity? PNA, TB? viral PNA? FINDINGS:  Mediastinum: The thoracic aorta is normal in caliber. Mildly enlarged main  pulmonary artery at 3.6 cm, stable. The heart is not enlarged with no  pericardial effusion. Multiple small mediastinal nodes are stable. The  enlarged right hilar node is not evaluated due to the lack of intravenous  contrast.  The esophagus is unremarkable. Lungs/pleura: There is progressive consolidation in the anterior segment of  the right upper lobe consistent with pneumonia. There is no cavitation. No  significant extension to other portions of the right upper lobe. The  remainder of the lungs are clear. There is no pleural fluid. The central  airways are patent. Upper Abdomen: The visualized upper abdominal viscera are unremarkable. Stable mild splenomegaly. Soft Tissues/Bones: No acute osseous findings. Impression  1. Progressive consolidation in the anterior segment of the right upper lobe  consistent with pneumonia. There is no evidence of cavitation.   The

## 2022-06-09 NOTE — PLAN OF CARE
Problem: Pain  Goal: Verbalizes/displays adequate comfort level or baseline comfort level  Outcome: Progressing     Problem: Metabolic/Fluid and Electrolytes - Adult  Goal: Glucose maintained within prescribed range  Outcome: Progressing     Problem: Safety - Adult  Goal: Free from fall injury  Outcome: Progressing     Problem: ABCDS Injury Assessment  Goal: Absence of physical injury  Outcome: Progressing     Problem: Chronic Conditions and Co-morbidities  Goal: Patient's chronic conditions and co-morbidity symptoms are monitored and maintained or improved  Outcome: Progressing

## 2022-06-09 NOTE — PROGRESS NOTES
mg Oral Daily    rosuvastatin  40 mg Oral Nightly    levofloxacin  750 mg IntraVENous Q24H    insulin lispro  0-12 Units SubCUTAneous TID     insulin lispro  0-6 Units SubCUTAneous Nightly    [Held by provider] enoxaparin  30 mg SubCUTAneous BID     PRN Meds: sodium chloride flush, sodium chloride, promethazine **OR** ondansetron, magnesium hydroxide, acetaminophen **OR** acetaminophen, albuterol sulfate HFA, glucose, dextrose bolus **OR** dextrose bolus, glucagon (rDNA), dextrose      Intake/Output Summary (Last 24 hours) at 6/9/2022 1132  Last data filed at 6/8/2022 1835  Gross per 24 hour   Intake 1215 ml   Output --   Net 1215 ml       Physical Exam Performed:    /84   Pulse 99   Temp 98.2 °F (36.8 °C) (Oral)   Resp 18   Wt 264 lb 15.9 oz (120.2 kg)   SpO2 97%   BMI 34.96 kg/m²     General appearance: No Distress up in chair is dressed speaks full sentences   HEENT: .beard mucosa moist PERRL EOMI   Neck: Supple, full range of motion. No JVD Trachea midline. Respiratory:  Normal  effort. Clear to auscultation  Cardiovascular: RRR S1/S2 without murmurs, rubs or gallops. Abdomen: Soft, non-tender, non-distended with normal bowel sounds. Musculoskeletal: No clubbing, cyanosis or edema bilaterally. Full range of motion without deformity. Skin: Skin color, texture, turgor normal.  No rashes or lesions.   Neurologic:  Cranial nerves: II-XII intact, grossly non-focal.  Psychiatric: Alert and oriented, thought content appropriate  Peripheral Pulses: +2 palpable, equal bilaterally       Labs:   Recent Labs     06/06/22  1506 06/07/22  0726   WBC 6.0 6.1   HGB 14.5 12.8*   HCT 44.5 39.0*    325     Recent Labs     06/07/22  0726 06/08/22  0816 06/09/22  0745   * 136 138   K 4.5 4.3 4.1    102 103   CO2 21 21 21   BUN 39* 26* 25*   CREATININE 1.9* 1.6* 1.5*   CALCIUM 9.2 7.8* 7.8*     Recent Labs     06/06/22  1506   AST 13*   ALT 16   BILITOT <0.2   ALKPHOS 120     No results for input(s): INR in the last 72 hours. Recent Labs     06/06/22  1506 06/07/22  1724 06/07/22  2313   TROPONINI 0.05* 0.04* 0.03*       Urinalysis:      Lab Results   Component Value Date    NITRU Negative 06/07/2022    WBCUA 1 06/07/2022    BACTERIA None Seen 06/07/2022    RBCUA 1 06/07/2022    BLOODU TRACE-INTACT 06/07/2022    SPECGRAV >=1.030 06/07/2022    GLUCOSEU >=1000 06/07/2022    GLUCOSEU NEGATIVE 01/02/2011       Radiology:  MRI BRAIN WO CONTRAST   Preliminary Result   There are few areas of high signal in the periventricular white matter. These are nonspecific. Demyelination could be considered in the appropriate   clinical setting. Chronic small vessel ischemic changes, vasculitis, or   migraines can cause a similar finding. No other brain parenchymal   abnormality. CT CHEST WO CONTRAST   Final Result   1. Progressive consolidation in the anterior segment of the right upper lobe   consistent with pneumonia. There is no evidence of cavitation. The findings   are nonspecific but most consistent with bacterial etiology. 2. The remainder of the lungs are clear. No pleural fluid. 3. Stable small mediastinal nodes. The right hilar node noted previously is   not evaluated due to the lack of intravenous contrast.         XR CHEST PORTABLE   Final Result   Redemonstration of right upper lobe infiltrate which is likely pneumonic. It   is slightly larger compared to the previous evaluation. Echo 5/18/22  Left ventricle size is normal.  Normal left ventricle wall thickness is present. Global left ventricular function is mildly decreased with ejection fraction estimated from 40 % to 45 %. Indeterminate diastolic function. The mitral valve leaflets are mildly thickened with normal opening.   The right ventricle is normal in size and function    Assessment/Plan:    Active Hospital Problems    Diagnosis     CHELE (acute kidney injury) ([facility] [address] [N17.9]      Priority: Medium     RUL infiltrate/pneumonia   completed zpack from ER 5/30  On levaquin   CT chest wo (due to RF) progressive consolidation anterior segment RUL c/w pneumonia no cavitation most c/w bacterial etiology , stable mediatinal nodes 9? Why would otherwise young male not have improved on abx for bacterial pneumonia)   utox neg   consult pulmonology plan bronchoscopy today     HIV neg  MRI brain: few punctate areas of high signal periventricular WM , nonspecific , r/o demyelination (he has no other signs of that physically) chronic small vessel ischemic changes, vasculitis, or migraines, or Lyme dz    Ordered Lyme ab )borrelia burgdorferi  Fungal ab   1,3 beta d-glucan   Quantiferon r/o TB     Headaches  -neuro saw, MRI abn    Acute renal failure on CKD stage III   Diabetic nephropathy   seen by nephrology  Prerenal vs ATN vs contrast nephropathy   Hydrated IVF -HL     Seizure disorder  -states had seizure as child, none recent     Persistent c/o dizziness, c/o off balance   -neurology consulted , FU MRI CT head wo neg   ? dehydration contributing   nifedpine can cause dizziness. .. ?  Cause     DM type 2 with neuropathy  --gabapentin   --accucheck ac and HS med dose SS  lantus 35 units subq daily  Home med list:  jardiance 10mg daily/metformin 1000mg BID/glargine 30 units HS /tresiba 55units daily       DVT Prophylaxis: lovenox  Diet: Diet NPO Exceptions are: Ice Chips  Code Status: Full Code    PT/OT Eval Status: independent cane     Dispo - return to home setting     Merari Ruvalcaba MD

## 2022-06-09 NOTE — PROCEDURES
lobe primarily of the apical segment. Bronchoscopy was unable to be passed through the anterior segment and attempt of transbronchial biopsy with fluoroscopy was difficult to pass more than 3 cm or so. Biopsy was completed of the airway with mild bleeding. .  Next, forceps was advanced to apical segment with more successful biopsy. After this biopsy, significant bleeding occurred. Saline was applied to continue bleeding. Subsequently, milligram of epinephrine was diluted and applied to anterior and apical segments. Bleeding slowed but continued. Another milligram of epinephrine was also applied. After this second round of epinephrine, bleeding stopped. This was monitored with bronchoscopy. Breast was subsequently completed of the posterior aspect of the right upper lobe     Images of right upper lobe prior to biopsy        Images after biopsy     Specimens:    1.    2. Wash of lungs  3. Trans-bronchial biopsy of right upper lobe x2  4. Brush Biopsy of right upper lobe    The Patient was taken to the Endoscopy Recovery area in satisfactory condition. Dennie Molt, MD      Type of sedation used:  Moderate Sedation  Physician/patient face-to-face sedation start time: 1245    Physician/patient face-to-face sedation stop time: 110 pm    Total moderate sedation time in minutes: > 15 minutes  Patient was monitored continuously 1:1 throughout the entire procedure while sedation was administered        [id number] - Moderate sedation services provided by the same physician or other qualified health care professional performing the diagnostic or therapeutic service that the sedation supports, requiring the presence of an independent trained observer to assist in the monitoring of the patients level of consciousness and physiological status; initial 15 minutes of intraservice time, patient age 11 years or older    5 - Moderate sedation services provided by the same physician or other qualified health care professional performing the diagnostic or therapeutic service that the sedation supports, requiring the presence of an independent trained observer to assist in the monitoring of the patients level of consciousness and physiological status; each additional 15 minutes intraservice time (List separately in addition to code for primary service)

## 2022-06-09 NOTE — PLAN OF CARE
Problem: Discharge Planning  Goal: Discharge to home or other facility with appropriate resources  6/9/2022 1039 by Margy Gilbert RN  Outcome: Progressing  6/9/2022 0205 by Kwan Espitia RN  Outcome: Progressing     Problem: Pain  Goal: Verbalizes/displays adequate comfort level or baseline comfort level  6/9/2022 1039 by Margy Gilbert RN  Outcome: Progressing  6/9/2022 0205 by Kwan Espitia RN  Outcome: Progressing     Problem: Metabolic/Fluid and Electrolytes - Adult  Goal: Electrolytes maintained within normal limits  6/9/2022 1039 by Margy Gilbert RN  Outcome: Progressing  6/9/2022 0205 by Kwan Espitia RN  Outcome: Progressing  Goal: Hemodynamic stability and optimal renal function maintained  6/9/2022 1039 by Margy Gilbert RN  Outcome: Progressing  6/9/2022 0205 by Kwan Espitia RN  Outcome: Progressing  Goal: Glucose maintained within prescribed range  6/9/2022 1039 by Margy Gilbert RN  Outcome: Progressing  6/9/2022 0205 by Kwan Espitia RN  Outcome: Progressing     Problem: Safety - Adult  Goal: Free from fall injury  6/9/2022 1039 by Margy Gilbert RN  Outcome: Progressing  Flowsheets (Taken 6/9/2022 1038)  Free From Fall Injury: Instruct family/caregiver on patient safety  6/9/2022 0205 by Kwan Espitia RN  Outcome: Progressing     Problem: ABCDS Injury Assessment  Goal: Absence of physical injury  6/9/2022 1039 by Margy Gilbert RN  Outcome: Progressing  Flowsheets (Taken 6/9/2022 1038)  Absence of Physical Injury: Implement safety measures based on patient assessment  6/9/2022 0205 by Kwan Espitia RN  Outcome: Progressing     Problem: Chronic Conditions and Co-morbidities  Goal: Patient's chronic conditions and co-morbidity symptoms are monitored and maintained or improved  6/9/2022 1039 by Margy Gilbert RN  Outcome: Progressing  6/9/2022 0205 by Kwan Espitia RN  Outcome: Progressing

## 2022-06-09 NOTE — PROGRESS NOTES
Nephrology ([facility]) Progress Note    CC: CHELE on CKD    Subjective:    HPI:  Breathing unchanged. No CP. Renal function better. Repeat chest CT noted. ROS:  In bed. No fever. [address]:  medications reviewed. Objective:  Blood pressure 137/88, pulse 98, temperature 98.8 °F (37.1 °C), temperature source Oral, resp. rate 18, weight 264 lb 15.9 oz (120.2 kg), SpO2 95 %. Intake/Output Summary (Last 24 hours) at 6/9/2022 1206  Last data filed at 6/8/2022 1835  Gross per 24 hour   Intake 1215 ml   Output --   Net 1215 ml     General:  NAD, A+OX3  Chest:  CTAB  CVS:  RRR  Abdominal:  NTND, soft, +BS  Extremities:  no edema  Skin:  no rash    Labs:  Renal panel:  Lab Results   Component Value Date/Time     06/09/2022 07:45 AM    K 4.1 06/09/2022 07:45 AM    CO2 21 06/09/2022 07:45 AM    BUN 25 (H) 06/09/2022 07:45 AM    CREATININE 1.5 (H) 06/09/2022 07:45 AM    CALCIUM 7.8 (L) 06/09/2022 07:45 AM    PHOS 3.8 06/24/2021 03:00 PM    MG 2.30 05/19/2022 08:08 AM     CBC:  Lab Results   Component Value Date/Time    WBC 6.1 06/07/2022 07:26 AM    HGB 12.8 (L) 06/07/2022 07:26 AM    HCT 39.0 (L) 06/07/2022 07:26 AM     06/07/2022 07:26 AM       Assessment/Plan:  Reviewed old records and labs.     1) CHELE on CKD              - ddx:  pre-renal vs. ATN vs. contrast nephropathy                          - I don't think Jardiance played a role   - renal function slightly better              - d/c IVF     2) RUL infiltrate              - why would a 29 y.o. male have a RUL infiltrate that also failed a Z-gloria?              - on empiric levaquin   - repeat chest CT shows further consolidation of infiltrate              - patient agreeable to HIV screen              - less likely TB                          - no evidence of cavitation                          - no hemoptysis               - should we consider cancer as more likely?                          - he has no leukocytosis - suspected lymphadenopathy on chest CT                          - he has some possible neurologic symptoms - paraneoplastic?                          - borderline hyponatremia                          - biopsy?   - appreciate pulmonary's input with bronchoscopy and transbronchial biopsy today    3) neuro              - neurology consulted              - brain MRI shows non-specific high signal in periventricular white matter     4) FEN              - hyponatremia                          - this has been persistent issue                          - monitor

## 2022-06-10 LAB
ANION GAP SERPL CALCULATED.3IONS-SCNC: 14 MMOL/L (ref 3–16)
BLOOD CULTURE, ROUTINE: NORMAL
BUN BLDV-MCNC: 23 MG/DL (ref 7–20)
CALCIUM SERPL-MCNC: 9.2 MG/DL (ref 8.3–10.6)
CHLORIDE BLD-SCNC: 104 MMOL/L (ref 99–110)
CO2: 19 MMOL/L (ref 21–32)
CREAT SERPL-MCNC: 1.4 MG/DL (ref 0.9–1.3)
CULTURE, BLOOD 2: NORMAL
GFR AFRICAN AMERICAN: >60
GFR NON-AFRICAN AMERICAN: 58
GLUCOSE BLD-MCNC: 131 MG/DL (ref 70–99)
GLUCOSE BLD-MCNC: 167 MG/DL (ref 70–99)
GLUCOSE BLD-MCNC: 179 MG/DL (ref 70–99)
GLUCOSE BLD-MCNC: 181 MG/DL (ref 70–99)
GLUCOSE BLD-MCNC: 197 MG/DL (ref 70–99)
PERFORMED ON: ABNORMAL
POTASSIUM REFLEX MAGNESIUM: 4.1 MMOL/L (ref 3.5–5.1)
PROCALCITONIN: 0.13 NG/ML (ref 0–0.15)
SODIUM BLD-SCNC: 137 MMOL/L (ref 136–145)

## 2022-06-10 PROCEDURE — 99233 SBSQ HOSP IP/OBS HIGH 50: CPT | Performed by: INTERNAL MEDICINE

## 2022-06-10 PROCEDURE — 6360000002 HC RX W HCPCS: Performed by: INTERNAL MEDICINE

## 2022-06-10 PROCEDURE — 2580000003 HC RX 258: Performed by: INTERNAL MEDICINE

## 2022-06-10 PROCEDURE — 80048 BASIC METABOLIC PNL TOTAL CA: CPT

## 2022-06-10 PROCEDURE — 1200000000 HC SEMI PRIVATE

## 2022-06-10 PROCEDURE — 6370000000 HC RX 637 (ALT 250 FOR IP): Performed by: INTERNAL MEDICINE

## 2022-06-10 PROCEDURE — 84145 PROCALCITONIN (PCT): CPT

## 2022-06-10 PROCEDURE — 36415 COLL VENOUS BLD VENIPUNCTURE: CPT

## 2022-06-10 RX ADMIN — INSULIN LISPRO 1 UNITS: 100 INJECTION, SOLUTION INTRAVENOUS; SUBCUTANEOUS at 21:01

## 2022-06-10 RX ADMIN — ENOXAPARIN SODIUM 30 MG: 100 INJECTION SUBCUTANEOUS at 09:41

## 2022-06-10 RX ADMIN — ASPIRIN 81 MG: 81 TABLET, CHEWABLE ORAL at 09:42

## 2022-06-10 RX ADMIN — DICYCLOMINE HYDROCHLORIDE 10 MG: 10 CAPSULE ORAL at 17:57

## 2022-06-10 RX ADMIN — GABAPENTIN 300 MG: 300 CAPSULE ORAL at 14:22

## 2022-06-10 RX ADMIN — NIFEDIPINE 60 MG: 60 TABLET, EXTENDED RELEASE ORAL at 09:42

## 2022-06-10 RX ADMIN — DICYCLOMINE HYDROCHLORIDE 10 MG: 10 CAPSULE ORAL at 09:42

## 2022-06-10 RX ADMIN — LEVOFLOXACIN 750 MG: 5 INJECTION, SOLUTION INTRAVENOUS at 18:05

## 2022-06-10 RX ADMIN — ENOXAPARIN SODIUM 30 MG: 100 INJECTION SUBCUTANEOUS at 21:01

## 2022-06-10 RX ADMIN — CARVEDILOL 25 MG: 25 TABLET, FILM COATED ORAL at 09:42

## 2022-06-10 RX ADMIN — GABAPENTIN 300 MG: 300 CAPSULE ORAL at 09:42

## 2022-06-10 RX ADMIN — DICYCLOMINE HYDROCHLORIDE 10 MG: 10 CAPSULE ORAL at 05:57

## 2022-06-10 RX ADMIN — ROSUVASTATIN CALCIUM 40 MG: 40 TABLET, FILM COATED ORAL at 21:00

## 2022-06-10 RX ADMIN — DEXTROSE MONOHYDRATE 200 MG: 50 INJECTION, SOLUTION INTRAVENOUS at 11:49

## 2022-06-10 RX ADMIN — ACETAMINOPHEN 650 MG: 325 TABLET ORAL at 03:27

## 2022-06-10 RX ADMIN — SODIUM CHLORIDE: 9 INJECTION, SOLUTION INTRAVENOUS at 11:48

## 2022-06-10 RX ADMIN — SODIUM CHLORIDE, PRESERVATIVE FREE 10 ML: 5 INJECTION INTRAVENOUS at 09:43

## 2022-06-10 RX ADMIN — INSULIN LISPRO 2 UNITS: 100 INJECTION, SOLUTION INTRAVENOUS; SUBCUTANEOUS at 12:26

## 2022-06-10 RX ADMIN — DULOXETINE HYDROCHLORIDE 30 MG: 30 CAPSULE, DELAYED RELEASE ORAL at 09:42

## 2022-06-10 RX ADMIN — INSULIN LISPRO 2 UNITS: 100 INJECTION, SOLUTION INTRAVENOUS; SUBCUTANEOUS at 09:15

## 2022-06-10 RX ADMIN — INSULIN GLARGINE 35 UNITS: 100 INJECTION, SOLUTION SUBCUTANEOUS at 09:15

## 2022-06-10 RX ADMIN — DICYCLOMINE HYDROCHLORIDE 10 MG: 10 CAPSULE ORAL at 21:00

## 2022-06-10 RX ADMIN — ACETAMINOPHEN 650 MG: 325 TABLET ORAL at 21:00

## 2022-06-10 RX ADMIN — GABAPENTIN 300 MG: 300 CAPSULE ORAL at 21:00

## 2022-06-10 RX ADMIN — CARVEDILOL 25 MG: 25 TABLET, FILM COATED ORAL at 17:57

## 2022-06-10 ASSESSMENT — PAIN DESCRIPTION - FREQUENCY
FREQUENCY: INTERMITTENT

## 2022-06-10 ASSESSMENT — PAIN SCALES - GENERAL
PAINLEVEL_OUTOF10: 3
PAINLEVEL_OUTOF10: 0
PAINLEVEL_OUTOF10: 0
PAINLEVEL_OUTOF10: 8
PAINLEVEL_OUTOF10: 0

## 2022-06-10 ASSESSMENT — PAIN - FUNCTIONAL ASSESSMENT
PAIN_FUNCTIONAL_ASSESSMENT: ACTIVITIES ARE NOT PREVENTED

## 2022-06-10 ASSESSMENT — PAIN DESCRIPTION - PAIN TYPE
TYPE: ACUTE PAIN

## 2022-06-10 ASSESSMENT — PAIN DESCRIPTION - ORIENTATION
ORIENTATION: MID
ORIENTATION: ANTERIOR
ORIENTATION: MID

## 2022-06-10 ASSESSMENT — PAIN DESCRIPTION - ONSET
ONSET: ON-GOING
ONSET: ON-GOING
ONSET: OTHER (COMMENT)

## 2022-06-10 ASSESSMENT — PAIN DESCRIPTION - DESCRIPTORS
DESCRIPTORS: ACHING;DISCOMFORT;DULL
DESCRIPTORS: OTHER (COMMENT)
DESCRIPTORS: ACHING
DESCRIPTORS: OTHER (COMMENT)
DESCRIPTORS: ACHING;DISCOMFORT;DULL

## 2022-06-10 ASSESSMENT — PAIN DESCRIPTION - DIRECTION: RADIATING_TOWARDS: MID

## 2022-06-10 ASSESSMENT — PAIN DESCRIPTION - LOCATION
LOCATION: HEAD
LOCATION: HEAD
LOCATION: ABDOMEN
LOCATION: HEAD
LOCATION: HEAD

## 2022-06-10 NOTE — PLAN OF CARE
Problem: Discharge Planning  Goal: Discharge to home or other facility with appropriate resources  Outcome: Progressing   Continuing to work with patient and health care team on discharge plan. Discharge instructions and medication management will be reviewed prior to discharge. Problem: Pain  Goal: Verbalizes/displays adequate comfort level or baseline comfort level  Outcome: Progressing   Pt able to express presence/absence of pain and rate pain appropriately using numerical scale. Pain/discomfort being managed with PRN analgesics per MD orders (see MAR). Pain assessed every shift and after interventions. Problem: Metabolic/Fluid and Electrolytes - Adult  Goal: Electrolytes maintained within normal limits  Outcome: Progressing  Goal: Hemodynamic stability and optimal renal function maintained  Outcome: Progressing  Goal: Glucose maintained within prescribed range  Outcome: Progressing     Problem: ABCDS Injury Assessment  Goal: Absence of physical injury  Outcome: Progressing     Problem: Safety - Adult  Goal: Free from fall injury  Outcome: Progressing   Pt able to express presence/absence of pain and rate pain appropriately using numerical scale. Pain/discomfort being managed with PRN analgesics per MD orders (see MAR). Pain assessed every shift and after interventions.     Problem: Chronic Conditions and Co-morbidities  Goal: Patient's chronic conditions and co-morbidity symptoms are monitored and maintained or improved  Outcome: Progressing

## 2022-06-10 NOTE — PROGRESS NOTES
Pulmonary Progress Note    Date of Admission: 6/6/2022   LOS: 4 days       CC:  pneumonia    Subjective:  No hemoptysis yesterday. Status post bronchoscopy with bleeding    ROS:   No nausea  No Vomiting  No chest pain      Assessment:     Acute on chronic kidney disease  Diabetes, poorly controlled hemoglobin A1c 16.5    Plan: This note may have been transcribed using [id number] [employer]. Please disregard any translational errors. Hospital Day: 4     Abnormal radiograph  *bronchoscopy today   *Negative HIV screen   * QuantiFERON gold pending. AFB smears negative x2 agree with removal of airborne isolation. *Procalcitonin negative. Bronchoscopy abnormal with biopsies pending. *Fungal antibodies pending  *Start empiric Eraxis. If planning on discharge, consider fluconazole until pathology/cultures return  *Significant abnormality of the right upper lobe area. S-10 bronchoscopy report, significant bleeding but able to stop with epinephrine. No further bleeding.         Vision changes  *Vision changes is likely secondary to hyperglycemia with diabetic retinopathy. However, if fungal infection is confirmed, need to consider further ocular exam.        Acute on chronic kidney injury  *Per nephrology. Improving. Data:        PHYSICAL EXAM:   Blood pressure (!) 143/96, pulse (!) 109, temperature 98.5 °F (36.9 °C), temperature source Oral, resp. rate 16, weight 265 lb (120.2 kg), SpO2 94 %.'  Body mass index is 34.96 kg/m². Gen: No distress. ENT:   Resp: No accessory muscle use. No crackles. No wheezes. No rhonchi. CV: Regular rate. Regular rhythm. No murmur or rub. No edema. Skin: Warm, dry, normal texture and turgor. No nodule on exposed extremities. M/S: No cyanosis. No clubbing. No joint deformity. Psych: Oriented x 3. No anxiety. Awake. Alert. Intact judgement and insight. Good Mood / Affect.   Memory appears in tact       Medications:    Scheduled Meds:   sodium chloride flush  10 mL IntraVENous 2 times per day    aspirin  81 mg Oral Daily    carvedilol  25 mg Oral BID WC    dicyclomine  10 mg Oral 4x Daily AC & HS    DULoxetine  30 mg Oral Daily    gabapentin  300 mg Oral TID    insulin glargine  35 Units SubCUTAneous Daily    NIFEdipine  60 mg Oral Daily    rosuvastatin  40 mg Oral Nightly    levofloxacin  750 mg IntraVENous Q24H    insulin lispro  0-12 Units SubCUTAneous TID WC    insulin lispro  0-6 Units SubCUTAneous Nightly    enoxaparin  30 mg SubCUTAneous BID       Continuous Infusions:   sodium chloride 10 mL/hr at 06/09/22 1749    dextrose         PRN Meds:  sodium chloride flush, sodium chloride, promethazine **OR** ondansetron, magnesium hydroxide, acetaminophen **OR** acetaminophen, albuterol sulfate HFA, glucose, dextrose bolus **OR** dextrose bolus, glucagon (rDNA), dextrose    Labs reviewed:  CBC:   No results for input(s): WBC, HGB, HCT, MCV, PLT in the last 72 hours. BMP:   Recent Labs     06/08/22  0816 06/09/22  0745 06/10/22  0722    138 137   K 4.3 4.1 4.1    103 104   CO2 21 21 19*   BUN 26* 25* 23*   CREATININE 1.6* 1.5* 1.4*     LIVER PROFILE:   No results for input(s): AST, ALT, LIPASE, BILIDIR, BILITOT, ALKPHOS in the last 72 hours. Invalid input(s): AMYLASE,  ALB  PT/INR: No results for input(s): PROTIME, INR in the last 72 hours. APTT: No results for input(s): APTT in the last 72 hours. UA:  Recent Labs     06/07/22  1120   COLORU Straw   PHUR 5.0  5.5   WBCUA 1   RBCUA 1   BACTERIA None Seen   CLARITYU Clear   SPECGRAV >=1.030   LEUKOCYTESUR Negative   UROBILINOGEN 0.2   BILIRUBINUR Negative   BLOODU TRACE-INTACT*   GLUCOSEU >=1000*     No results for input(s): PH, PCO2, PO2 in the last 72 hours. Cx:      Films: This note was transcribed using [id number] [employer]. Please disregard any translational errors.       Nidia Vidal [facility] [phone]

## 2022-06-10 NOTE — PROGRESS NOTES
Nephrology ([facility]) Progress Note    CC: CHELE on CKD    Subjective:    HPI:  Breathing unchanged. No CP. Renal function better. Repeat chest CT noted. ROS:  In bed. No fever. [address]:  medications reviewed. Objective:  Blood pressure (!) 131/90, pulse 94, temperature 99.1 °F (37.3 °C), temperature source Oral, resp. rate 16, weight 265 lb (120.2 kg), SpO2 97 %. Intake/Output Summary (Last 24 hours) at 6/10/2022 1618  Last data filed at 6/10/2022 0951  Gross per 24 hour   Intake 1995 ml   Output --   Net 1995 ml     General:  NAD, A+OX3  Chest:  CTAB  CVS:  RRR  Abdominal:  NTND, soft, +BS  Extremities:  no edema  Skin:  no rash    Labs:  Renal panel:  Lab Results   Component Value Date/Time     06/10/2022 07:22 AM    K 4.1 06/10/2022 07:22 AM    CO2 19 (L) 06/10/2022 07:22 AM    BUN 23 (H) 06/10/2022 07:22 AM    CREATININE 1.4 (H) 06/10/2022 07:22 AM    CALCIUM 9.2 06/10/2022 07:22 AM    PHOS 3.8 06/24/2021 03:00 PM    MG 2.30 05/19/2022 08:08 AM     CBC:  Lab Results   Component Value Date/Time    WBC 6.1 06/07/2022 07:26 AM    HGB 12.8 (L) 06/07/2022 07:26 AM    HCT 39.0 (L) 06/07/2022 07:26 AM     06/07/2022 07:26 AM       Assessment/Plan:  Reviewed old records and labs.     1) CHELE on CKD   - baseline 05/17/22 Cr 1.2, sees Dr. España March              - ddx:  pre-renal vs. ATN vs. contrast nephropathy                          - I don't think Jardiance played a role   - renal function stable     2) RUL infiltrate              - why would a 29 y.o. male have a RUL infiltrate that also failed a Z-gloria?              - on empiric levaquin and eraxis   - repeat chest CT shows further consolidation of infiltrate              - HIV negative              - less likely TB                          - no evidence of cavitation                          - no hemoptysis    - s/p bronchoscopy with RUL biopsy with some bleeding 06/09/22    3) neuro              - neurology consulted - brain MRI shows non-specific high signal in periventricular white matter     4) FEN              - hyponatremia                          - this has been persistent issue                          - monitor

## 2022-06-10 NOTE — PROGRESS NOTES
Hospitalist Progress Note      PCP: Dwain Barr MD, MD    Date of Admission: 6/6/2022    Chief Complaint:   Dizziness, not feeling well    Hospital Course:    71-year-old male PMH  diabetes mellitus, HTN seizure disorder  presents to hospital for nausea, dizziness and not feeling well x 2 weeks, not improving, seen by PCP today he has completed zpack from ER 5/30/22 without improvement. denies fevers chills chest pain. Was just hospitalized 5/17-5/19/22. Had nl [facility] 5/19/22. Seen ER 5/30/22 for eye pain. has had some blurred vision in his eyes. He was seen by [facility].  had a scratch to eye. And they noticed that he had a little bleeding on the retina had an appointment on Meghan 3 for follow-up. Has peripheral neuropathy walks with cane , takes gabapentin. office visit 6/6 PCP Rx augmentin 875mg ( filled it has it at bedside but hadnt started it yet) and albuterol inhaler  Instead sent to ER  On and off headaches x2 week as as well. 5/29/22 head CT wo NEG     in the emergency department found acute renal failure BUN 43, cr 2.4 , baseline cr 1.5, and redomenstration/progression of RUL infiltrate. Flu/CV19 neg, WBC nl,  Bld cx neg, UA neg for infx was admitted for further evaluation. Started IV levaquin. Hydrating NS 100cc/hr. Holding spironolactone, nephrology and pulmonology consulted. Underwent bronchoscopy 6/9/22 with RUL mass biopsied minor bleeding after biopsy treated with epinephrine.  Started eraxis antifungal      Subjective: T 100.1  ->180 BP borderline 141/81 yesterday was 161/111 c/o dizziness and headache still although is not requiring oxygen all other systems neg       Medications:  Reviewed    Infusion Medications    sodium chloride 10 mL/hr at 06/09/22 1749    dextrose       Scheduled Medications    sodium chloride flush  10 mL IntraVENous 2 times per day    aspirin  81 mg Oral Daily    carvedilol  25 mg Oral BID WC    dicyclomine  10 mg Oral 4x Daily AC & HS    DULoxetine  30 mg Oral Daily    gabapentin  300 mg Oral TID    insulin glargine  35 Units SubCUTAneous Daily    NIFEdipine  60 mg Oral Daily    rosuvastatin  40 mg Oral Nightly    levofloxacin  750 mg IntraVENous Q24H    insulin lispro  0-12 Units SubCUTAneous TID WC    insulin lispro  0-6 Units SubCUTAneous Nightly    enoxaparin  30 mg SubCUTAneous BID     PRN Meds: sodium chloride flush, sodium chloride, promethazine **OR** ondansetron, magnesium hydroxide, acetaminophen **OR** acetaminophen, albuterol sulfate HFA, glucose, dextrose bolus **OR** dextrose bolus, glucagon (rDNA), dextrose      Intake/Output Summary (Last 24 hours) at 6/10/2022 0731  Last data filed at 6/10/2022 0039  Gross per 24 hour   Intake 1865 ml   Output --   Net 1865 ml       Physical Exam Performed:    BP (!) 141/81   Pulse (!) 104   Temp 100.1 °F (37.8 °C) (Oral)   Resp 18   Wt 265 lb (120.2 kg)   SpO2 97%   BMI 34.96 kg/m²     General appearance: No Distress  HEENT: beard mucosa moist PERRL EOMI   Neck: Supple, full range of motion. No JVD Trachea midline. Respiratory:  Normal  effort. Clear to auscultation  Cardiovascular: RRR S1/S2 without murmurs, rubs or gallops. Abdomen: Soft, non-tender, non-distended with normal bowel sounds. Musculoskeletal: No clubbing, cyanosis or edema bilaterally. Full range of motion without deformity. Skin: Skin color, texture, turgor normal.  No rashes or lesions. Neurologic:  Cranial nerves: II-XII intact, grossly non-focal.  Psychiatric: Alert and oriented, thought content appropriate  Peripheral Pulses: +2 palpable, equal bilaterally       Labs:   No results for input(s): WBC, HGB, HCT, PLT in the last 72 hours. Recent Labs     06/08/22  0816 06/09/22  0745    138   K 4.3 4.1    103   CO2 21 21   BUN 26* 25*   CREATININE 1.6* 1.5*   CALCIUM 7.8* 7.8*     No results for input(s): AST, ALT, BILIDIR, BILITOT, ALKPHOS in the last 72 hours.   No results for input(s): INR in the last 72 hours. Recent Labs     06/07/22  1724 06/07/22  2313   TROPONINI 0.04* 0.03*       Urinalysis:      Lab Results   Component Value Date    NITRU Negative 06/07/2022    WBCUA 1 06/07/2022    BACTERIA None Seen 06/07/2022    RBCUA 1 06/07/2022    BLOODU TRACE-INTACT 06/07/2022    SPECGRAV >=1.030 06/07/2022    GLUCOSEU >=1000 06/07/2022    GLUCOSEU NEGATIVE 01/02/2011       Radiology:  XR CHEST 1 VIEW   Final Result      FLUORO FOR SURGICAL PROCEDURES   Final Result      MRI BRAIN WO CONTRAST   Preliminary Result   There are few areas of high signal in the periventricular white matter. These are nonspecific. Demyelination could be considered in the appropriate   clinical setting. Chronic small vessel ischemic changes, vasculitis, or   migraines can cause a similar finding. No other brain parenchymal   abnormality. CT CHEST WO CONTRAST   Final Result   1. Progressive consolidation in the anterior segment of the right upper lobe   consistent with pneumonia. There is no evidence of cavitation. The findings   are nonspecific but most consistent with bacterial etiology. 2. The remainder of the lungs are clear. No pleural fluid. 3. Stable small mediastinal nodes. The right hilar node noted previously is   not evaluated due to the lack of intravenous contrast.         XR CHEST PORTABLE   Final Result   Redemonstration of right upper lobe infiltrate which is likely pneumonic. It   is slightly larger compared to the previous evaluation. Echo 5/18/22  Left ventricle size is normal.  Normal left ventricle wall thickness is present. Global left ventricular function is mildly decreased with ejection fraction estimated from 40 % to 45 %. Indeterminate diastolic function. The mitral valve leaflets are mildly thickened with normal opening.   The right ventricle is normal in size and function    Assessment/Plan:    Active Hospital Problems    Diagnosis     Community acquired pneumonia of right upper lobe of lung [J18.9]      Priority: Medium    CHELE (acute kidney injury) ([facility] [address] [N17.9]      Priority: Medium     RUL infiltrate/pneumonia   Bronchoscopy 6/9/22   RUL mass  completed zpack from ER 5/30,( ? Why 37yo would fail zpack and why would have RUL pneumonia)  On levaquin   CT chest wo (due to RF) progressive consolidation anterior segment RUL c/w pneumonia no cavitation most c/w bacterial etiology, stable mediatinal nodes  utox neg   consulted pulmonology now s/p bronch      HIV neg  MRI brain: few punctate areas of high signal periventricular WM , nonspecific, r/o demyelination (he has no other signs of that physically) chronic small vessel ischemic changes, vasculitis, or migraines, or Lyme dz    Ordered Lyme ab borrelia burgdorferi  Fungal ab   1,3 beta d-glucan   Quantiferon r/o TB   Follow up [employer],  Follow up cytology pathology     Started Eraxis antifungal 6/9/22    Headaches  -neuro saw, MRI abn    Acute renal failure on CKD stage III   Diabetic nephropathy   seen by nephrology  Prerenal vs ATN vs contrast nephropathy   Hydrated IVF -HL   Cr 2.4->1.5    Seizure disorder  -states had seizure as child, none recent     Persistent c/o dizziness, c/o off balance   neurology consulted, CT head wo neg   nifedpine can cause dizziness  MRI few areas high signal periventricular WM    DM type 2 with neuropathy  -gabapentin   -accucheck ac and HS med dose SS  lantus 35 units subq daily  Home med list:  jardiance 10mg daily/metformin 1000mg BID/glargine 30 units HS /tresiba 55units daily   hgbA1C 16.9    Hypertension  coreg 25mg po BID  Nifedipine XL 60mg po daily     DVT Prophylaxis: lovenox  Diet: ADULT DIET; Regular; 4 carb choices (60 gm/meal);  Low Fat/Low Chol/High Fiber/BREE  Code Status: Full Code    PT/OT Eval Status: independent, cane     Dispo - return to home setting     Kevin Palacio MD

## 2022-06-11 LAB
(1,3)-BETA-D-GLUCAN (FUNGITELL) INTERPRETATION: POSITIVE
(1,3)-BETA-D-GLUCAN (FUNGITELL): 156 PG/ML
ANION GAP SERPL CALCULATED.3IONS-SCNC: 14 MMOL/L (ref 3–16)
BUN BLDV-MCNC: 18 MG/DL (ref 7–20)
CALCIUM SERPL-MCNC: 9.4 MG/DL (ref 8.3–10.6)
CHLORIDE BLD-SCNC: 102 MMOL/L (ref 99–110)
CO2: 19 MMOL/L (ref 21–32)
CREAT SERPL-MCNC: 1.3 MG/DL (ref 0.9–1.3)
CULTURE, RESPIRATORY: NORMAL
GFR AFRICAN AMERICAN: >60
GFR NON-AFRICAN AMERICAN: >60
GLUCOSE BLD-MCNC: 102 MG/DL (ref 70–99)
GLUCOSE BLD-MCNC: 136 MG/DL (ref 70–99)
GLUCOSE BLD-MCNC: 193 MG/DL (ref 70–99)
GLUCOSE BLD-MCNC: 257 MG/DL (ref 70–99)
GLUCOSE BLD-MCNC: 99 MG/DL (ref 70–99)
GRAM STAIN RESULT: NORMAL
LYME, EIA: 0.2 LIV (ref 0–1.2)
PERFORMED ON: ABNORMAL
POTASSIUM REFLEX MAGNESIUM: 4.1 MMOL/L (ref 3.5–5.1)
PROCALCITONIN: 0.13 NG/ML (ref 0–0.15)
SEDIMENTATION RATE, ERYTHROCYTE: >130 MM/HR (ref 0–15)
SODIUM BLD-SCNC: 135 MMOL/L (ref 136–145)

## 2022-06-11 PROCEDURE — 2580000003 HC RX 258: Performed by: INTERNAL MEDICINE

## 2022-06-11 PROCEDURE — 99233 SBSQ HOSP IP/OBS HIGH 50: CPT | Performed by: INTERNAL MEDICINE

## 2022-06-11 PROCEDURE — 1200000000 HC SEMI PRIVATE

## 2022-06-11 PROCEDURE — 80048 BASIC METABOLIC PNL TOTAL CA: CPT

## 2022-06-11 PROCEDURE — 84145 PROCALCITONIN (PCT): CPT

## 2022-06-11 PROCEDURE — 6370000000 HC RX 637 (ALT 250 FOR IP): Performed by: INTERNAL MEDICINE

## 2022-06-11 PROCEDURE — 36415 COLL VENOUS BLD VENIPUNCTURE: CPT

## 2022-06-11 PROCEDURE — 6360000002 HC RX W HCPCS: Performed by: INTERNAL MEDICINE

## 2022-06-11 PROCEDURE — 85652 RBC SED RATE AUTOMATED: CPT

## 2022-06-11 PROCEDURE — 94760 N-INVAS EAR/PLS OXIMETRY 1: CPT

## 2022-06-11 RX ORDER — METHYLPREDNISOLONE SODIUM SUCCINATE 40 MG/ML
40 INJECTION, POWDER, LYOPHILIZED, FOR SOLUTION INTRAMUSCULAR; INTRAVENOUS EVERY 8 HOURS
Status: COMPLETED | OUTPATIENT
Start: 2022-06-11 | End: 2022-06-13

## 2022-06-11 RX ADMIN — DICYCLOMINE HYDROCHLORIDE 10 MG: 10 CAPSULE ORAL at 12:35

## 2022-06-11 RX ADMIN — DICYCLOMINE HYDROCHLORIDE 10 MG: 10 CAPSULE ORAL at 06:22

## 2022-06-11 RX ADMIN — GABAPENTIN 300 MG: 300 CAPSULE ORAL at 14:38

## 2022-06-11 RX ADMIN — INSULIN GLARGINE 35 UNITS: 100 INJECTION, SOLUTION SUBCUTANEOUS at 08:45

## 2022-06-11 RX ADMIN — DICYCLOMINE HYDROCHLORIDE 10 MG: 10 CAPSULE ORAL at 20:31

## 2022-06-11 RX ADMIN — DEXTROSE MONOHYDRATE 100 MG: 50 INJECTION, SOLUTION INTRAVENOUS at 08:40

## 2022-06-11 RX ADMIN — ACETAMINOPHEN 650 MG: 325 TABLET ORAL at 08:38

## 2022-06-11 RX ADMIN — CARVEDILOL 25 MG: 25 TABLET, FILM COATED ORAL at 17:25

## 2022-06-11 RX ADMIN — GABAPENTIN 300 MG: 300 CAPSULE ORAL at 20:31

## 2022-06-11 RX ADMIN — METHYLPREDNISOLONE SODIUM SUCCINATE 40 MG: 40 INJECTION, POWDER, FOR SOLUTION INTRAMUSCULAR; INTRAVENOUS at 20:32

## 2022-06-11 RX ADMIN — CARVEDILOL 25 MG: 25 TABLET, FILM COATED ORAL at 08:38

## 2022-06-11 RX ADMIN — ENOXAPARIN SODIUM 30 MG: 100 INJECTION SUBCUTANEOUS at 08:38

## 2022-06-11 RX ADMIN — ASPIRIN 81 MG: 81 TABLET, CHEWABLE ORAL at 08:38

## 2022-06-11 RX ADMIN — ACETAMINOPHEN 650 MG: 325 TABLET ORAL at 20:32

## 2022-06-11 RX ADMIN — DICYCLOMINE HYDROCHLORIDE 10 MG: 10 CAPSULE ORAL at 17:25

## 2022-06-11 RX ADMIN — ROSUVASTATIN CALCIUM 40 MG: 40 TABLET, FILM COATED ORAL at 20:31

## 2022-06-11 RX ADMIN — DULOXETINE HYDROCHLORIDE 30 MG: 30 CAPSULE, DELAYED RELEASE ORAL at 08:37

## 2022-06-11 RX ADMIN — INSULIN LISPRO 3 UNITS: 100 INJECTION, SOLUTION INTRAVENOUS; SUBCUTANEOUS at 22:38

## 2022-06-11 RX ADMIN — ACETAMINOPHEN 650 MG: 325 TABLET ORAL at 14:38

## 2022-06-11 RX ADMIN — METHYLPREDNISOLONE SODIUM SUCCINATE 40 MG: 40 INJECTION, POWDER, FOR SOLUTION INTRAMUSCULAR; INTRAVENOUS at 12:35

## 2022-06-11 RX ADMIN — ENOXAPARIN SODIUM 30 MG: 100 INJECTION SUBCUTANEOUS at 20:32

## 2022-06-11 RX ADMIN — INSULIN LISPRO 2 UNITS: 100 INJECTION, SOLUTION INTRAVENOUS; SUBCUTANEOUS at 17:28

## 2022-06-11 RX ADMIN — LEVOFLOXACIN 750 MG: 5 INJECTION, SOLUTION INTRAVENOUS at 17:28

## 2022-06-11 RX ADMIN — NIFEDIPINE 60 MG: 60 TABLET, EXTENDED RELEASE ORAL at 08:37

## 2022-06-11 RX ADMIN — SODIUM CHLORIDE, PRESERVATIVE FREE 10 ML: 5 INJECTION INTRAVENOUS at 20:33

## 2022-06-11 RX ADMIN — GABAPENTIN 300 MG: 300 CAPSULE ORAL at 08:38

## 2022-06-11 ASSESSMENT — PAIN SCALES - GENERAL
PAINLEVEL_OUTOF10: 0
PAINLEVEL_OUTOF10: 0
PAINLEVEL_OUTOF10: 4
PAINLEVEL_OUTOF10: 6
PAINLEVEL_OUTOF10: 7
PAINLEVEL_OUTOF10: 0
PAINLEVEL_OUTOF10: 4
PAINLEVEL_OUTOF10: 3
PAINLEVEL_OUTOF10: 4

## 2022-06-11 ASSESSMENT — PAIN DESCRIPTION - DESCRIPTORS
DESCRIPTORS: ACHING
DESCRIPTORS: ACHING

## 2022-06-11 ASSESSMENT — PAIN DESCRIPTION - ORIENTATION
ORIENTATION: MID
ORIENTATION: MID

## 2022-06-11 ASSESSMENT — PAIN DESCRIPTION - LOCATION
LOCATION: HEAD

## 2022-06-11 ASSESSMENT — PAIN SCALES - WONG BAKER: WONGBAKER_NUMERICALRESPONSE: 0

## 2022-06-11 ASSESSMENT — PAIN - FUNCTIONAL ASSESSMENT: PAIN_FUNCTIONAL_ASSESSMENT: ACTIVITIES ARE NOT PREVENTED

## 2022-06-11 NOTE — PROGRESS NOTES
Hospitalist Progress Note      PCP: Julio Cesar Marcos MD, MD    Date of Admission: 6/6/2022    Chief Complaint:   Dizziness, not feeling well    Hospital Course:    28-year-old male PMH  diabetes mellitus, HTN seizure disorder  presents to hospital for nausea, dizziness and not feeling well x 2 weeks, not improving, seen by PCP today he has completed zpack from ER 5/30/22 without improvement. denies fevers chills chest pain. Was just hospitalized 5/17-5/19/22. Had nl C 5/19/22. Seen ER 5/30/22 for eye pain. has had some blurred vision in his eyes. He was seen by [facility].  had a scratch to eye. And they noticed that he had a little bleeding on the retina had an appointment on Meghan 3 for follow-up. Has peripheral neuropathy walks with cane , takes gabapentin. office visit 6/6 PCP Rx augmentin 875mg ( filled it has it at bedside but hadnt started it yet) and albuterol inhaler  Instead sent to ER  On and off headaches x2 week as as well. 5/29/22 head CT wo NEG     in the emergency department found acute renal failure BUN 43, cr 2.4 , baseline cr 1.5, and redomenstration/progression of RUL infiltrate. Flu/CV19 neg, WBC nl,  Bld cx neg, UA neg for infx was admitted for further evaluation. Started IV levaquin. Hydrating NS 100cc/hr. Holding spironolactone, nephrology and pulmonology consulted. Underwent bronchoscopy 6/9/22 with RUL mass biopsied minor bleeding after biopsy treated with epinephrine. Started eraxis antifungal      Subjective: T 100.1  early this morning. On RA. Beta-glucan came back positive. Started on Eraxis. Will likely need ID consult.        Medications:  Reviewed    Infusion Medications    sodium chloride 20 mL/hr at 06/10/22 1148    dextrose       Scheduled Medications    anidulafungin (ERAXIS) 100 mg IVPB  100 mg IntraVENous Daily    sodium chloride flush  10 mL IntraVENous 2 times per day    aspirin  81 mg Oral Daily    carvedilol  25 mg Oral BID WC    dicyclomine  10 mg Oral 4x Daily AC & HS    DULoxetine  30 mg Oral Daily    gabapentin  300 mg Oral TID    insulin glargine  35 Units SubCUTAneous Daily    NIFEdipine  60 mg Oral Daily    rosuvastatin  40 mg Oral Nightly    levofloxacin  750 mg IntraVENous Q24H    insulin lispro  0-12 Units SubCUTAneous TID WC    insulin lispro  0-6 Units SubCUTAneous Nightly    enoxaparin  30 mg SubCUTAneous BID     PRN Meds: sodium chloride flush, sodium chloride, promethazine **OR** ondansetron, magnesium hydroxide, acetaminophen **OR** acetaminophen, albuterol sulfate HFA, glucose, dextrose bolus **OR** dextrose bolus, glucagon (rDNA), dextrose      Intake/Output Summary (Last 24 hours) at 6/11/2022 0952  Last data filed at 6/10/2022 2100  Gross per 24 hour   Intake 240 ml   Output --   Net 240 ml       Physical Exam Performed:    /76   Pulse (!) 103   Temp 100.1 °F (37.8 °C) (Oral)   Resp 18   Wt 265 lb 3.4 oz (120.3 kg)   SpO2 96%   BMI 34.99 kg/m²     General appearance: No Distress  HEENT: beard mucosa moist PERRL EOMI   Neck: Supple, full range of motion. No JVD Trachea midline. Respiratory:  Normal  effort. Clear to auscultation  Cardiovascular: RRR S1/S2 without murmurs, rubs or gallops. Abdomen: Soft, non-tender, non-distended with normal bowel sounds. Musculoskeletal: No clubbing, cyanosis or edema bilaterally. Full range of motion without deformity. Skin: Skin color, texture, turgor normal.  No rashes or lesions. Neurologic:  Cranial nerves: II-XII intact, grossly non-focal.  Psychiatric: Alert and oriented, thought content appropriate  Peripheral Pulses: +2 palpable, equal bilaterally       Labs:   No results for input(s): WBC, HGB, HCT, PLT in the last 72 hours.   Recent Labs     06/09/22  0745 06/10/22  0722 06/11/22  0739    137 135*   K 4.1 4.1 4.1    104 102   CO2 21 19* 19*   BUN 25* 23* 18   CREATININE 1.5* 1.4* 1.3   CALCIUM 7.8* 9.2 9.4     No results for input(s): AST, ALT, BILIDIR, BILITOT, ALKPHOS in the last 72 hours. No results for input(s): INR in the last 72 hours. No results for input(s): Jerrica Mak in the last 72 hours. Urinalysis:      Lab Results   Component Value Date    NITRU Negative 06/07/2022    WBCUA 1 06/07/2022    BACTERIA None Seen 06/07/2022    RBCUA 1 06/07/2022    BLOODU TRACE-INTACT 06/07/2022    SPECGRAV >=1.030 06/07/2022    GLUCOSEU >=1000 06/07/2022    GLUCOSEU NEGATIVE 01/02/2011       Radiology:  XR CHEST 1 VIEW   Final Result      FLUORO FOR SURGICAL PROCEDURES   Final Result      MRI BRAIN WO CONTRAST   Final Result   There are few areas of high signal in the periventricular white matter. These are nonspecific. Demyelination could be considered in the appropriate   clinical setting. Chronic small vessel ischemic changes, vasculitis, or   migraines can cause a similar finding. No other brain parenchymal   abnormality. CT CHEST WO CONTRAST   Final Result   1. Progressive consolidation in the anterior segment of the right upper lobe   consistent with pneumonia. There is no evidence of cavitation. The findings   are nonspecific but most consistent with bacterial etiology. 2. The remainder of the lungs are clear. No pleural fluid. 3. Stable small mediastinal nodes. The right hilar node noted previously is   not evaluated due to the lack of intravenous contrast.         XR CHEST PORTABLE   Final Result   Redemonstration of right upper lobe infiltrate which is likely pneumonic. It   is slightly larger compared to the previous evaluation. Echo 5/18/22  Left ventricle size is normal.  Normal left ventricle wall thickness is present. Global left ventricular function is mildly decreased with ejection fraction estimated from 40 % to 45 %. Indeterminate diastolic function. The mitral valve leaflets are mildly thickened with normal opening.   The right ventricle is normal in size and

## 2022-06-11 NOTE — PROGRESS NOTES
Pulmonary Progress Note    CC:  Follow up abnormal chest imaging, bronchoscopy    Subjective:  Room air  Low grade temps  Positive beta glucan   Pathology only showed inflammation   He is still having fevers and dry cough  Not much better than when he was admitted  + beta glucan     ROS  Headache  Cough  Fevers     Intake/Output Summary (Last 24 hours) at 6/11/2022 0926  Last data filed at 6/10/2022 2100  Gross per 24 hour   Intake 720 ml   Output --   Net 720 ml         PHYSICAL EXAM:  Blood pressure 114/76, pulse (!) 103, temperature 100.1 °F (37.8 °C), temperature source Oral, resp. rate 18, weight 265 lb 3.4 oz (120.3 kg), SpO2 96 %.'  Gen: No distress. Eyes: PERRL. No sclera icterus. No conjunctival injection. ENT: No discharge. Pharynx clear. External appearance of ears and nose normal.  Neck: Trachea midline. No obvious mass. Resp: Diminished   CV: Regular rate. Regular rhythm. No murmur or rub. GI: Non-tender. Non-distended. No hernia. Skin: Warm, dry, normal texture and turgor. No nodule on exposed extremities. Lymph: No cervical LAD. No supraclavicular LAD. M/S: No cyanosis. No clubbing. No joint deformity. Neuro: Moves all four extremities. CN 2-12 tested, no defect noted.   Ext:   no edema    Medications:    Scheduled Meds:   anidulafungin (ERAXIS) 100 mg IVPB  100 mg IntraVENous Daily    sodium chloride flush  10 mL IntraVENous 2 times per day    aspirin  81 mg Oral Daily    carvedilol  25 mg Oral BID WC    dicyclomine  10 mg Oral 4x Daily AC & HS    DULoxetine  30 mg Oral Daily    gabapentin  300 mg Oral TID    insulin glargine  35 Units SubCUTAneous Daily    NIFEdipine  60 mg Oral Daily    rosuvastatin  40 mg Oral Nightly    levofloxacin  750 mg IntraVENous Q24H    insulin lispro  0-12 Units SubCUTAneous TID WC    insulin lispro  0-6 Units SubCUTAneous Nightly    enoxaparin  30 mg SubCUTAneous BID       Continuous Infusions:   sodium chloride 20 mL/hr at 06/10/22 7380  dextrose         PRN Meds:  sodium chloride flush, sodium chloride, promethazine **OR** ondansetron, magnesium hydroxide, acetaminophen **OR** acetaminophen, albuterol sulfate HFA, glucose, dextrose bolus **OR** dextrose bolus, glucagon (rDNA), dextrose    Labs:  CBC: No results for input(s): WBC, HGB, HCT, MCV, PLT in the last 72 hours. BMP:   Recent Labs     06/09/22  0745 06/10/22  0722 06/11/22  0739    137 135*   K 4.1 4.1 4.1    104 102   CO2 21 19* 19*   BUN 25* 23* 18   CREATININE 1.5* 1.4* 1.3     LIVER PROFILE: No results for input(s): AST, ALT, LIPASE, BILIDIR, BILITOT, ALKPHOS in the last 72 hours. Invalid input(s): AMYLASE,  ALB  PT/INR: No results for input(s): PROTIME, INR in the last 72 hours. APTT: No results for input(s): APTT in the last 72 hours. UA:No results for input(s): NITRITE, COLORU, PHUR, LABCAST, WBCUA, RBCUA, MUCUS, TRICHOMONAS, YEAST, BACTERIA, CLARITYU, SPECGRAV, LEUKOCYTESUR, UROBILINOGEN, BILIRUBINUR, BLOODU, GLUCOSEU, AMORPHOUS in the last 72 hours. Invalid input(s): Kathleen Lety  No results for input(s): PH, PCO2, PO2 in the last 72 hours. Films:  Chest imaging reports were reviewed and imaging was reviewed by me and showed no new films     ABG:  None    Cultures:  Negative to date   Beta glucan positive     I reviewed the labs and images listed above    Assessment/Plan:    Abnormal CT Chest with endobronchial lesion (?aspergillus)  o Pathology negative  o Possible fungal etiology   o Continue Eraxis  o Await fungal Ab and fungal cultures   o Will add on solumedrol for symptomatic improvement.   I warned him about increasing his BS etc.    o Check sed rate   o Discontinue procal draws since they have all been normal    Uncontrolled DM        DVT prophylaxis  Lovenox     Didi Mercedes DO  [facility]

## 2022-06-11 NOTE — PROGRESS NOTES
Nephrology ([facility]) Progress Note    CC: CHELE on CKD    Subjective:    HPI:  Breathing unchanged. No CP. Renal function better. ROS:  In bed. No fever. [address]:  medications reviewed. Objective:  Blood pressure 107/71, pulse (!) 103, temperature 98.6 °F (37 °C), temperature source Oral, resp. rate 18, weight 265 lb 3.4 oz (120.3 kg), SpO2 96 %, peak flow (!) 18 L/min. Intake/Output Summary (Last 24 hours) at 6/11/2022 1458  Last data filed at 6/11/2022 1234  Gross per 24 hour   Intake 480 ml   Output --   Net 480 ml     General:  NAD, A+OX3  Chest:  CTAB  CVS:  RRR  Abdominal:  NTND, soft, +BS  Extremities:  no edema  Skin:  no rash    Labs:  Renal panel:  Lab Results   Component Value Date/Time     (L) 06/11/2022 07:39 AM    K 4.1 06/11/2022 07:39 AM    CO2 19 (L) 06/11/2022 07:39 AM    BUN 18 06/11/2022 07:39 AM    CREATININE 1.3 06/11/2022 07:39 AM    CALCIUM 9.4 06/11/2022 07:39 AM    PHOS 3.8 06/24/2021 03:00 PM    MG 2.30 05/19/2022 08:08 AM     CBC:  Lab Results   Component Value Date/Time    WBC 6.1 06/07/2022 07:26 AM    HGB 12.8 (L) 06/07/2022 07:26 AM    HCT 39.0 (L) 06/07/2022 07:26 AM     06/07/2022 07:26 AM       Assessment/Plan:  Reviewed old records and labs.     1) CHELE on CKD   - baseline 05/17/22 Cr 1.2, sees Dr. Casandra Mann              - ddx:  pre-renal vs. ATN vs. contrast nephropathy                          - I don't think Jardiance played a role   - renal function approaching baseline     2) RUL infiltrate              - why would a 29 y.o. male have a RUL infiltrate that also failed a Z-gloria?              - on empiric levaquin and eraxis   - repeat chest CT shows further consolidation of infiltrate              - HIV negative              - less likely TB                          - no evidence of cavitation                          - no hemoptysis    - s/p bronchoscopy with RUL biopsy with some bleeding 06/09/22    - pathology neative    - fungal

## 2022-06-12 LAB
ANION GAP SERPL CALCULATED.3IONS-SCNC: 15 MMOL/L (ref 3–16)
BUN BLDV-MCNC: 26 MG/DL (ref 7–20)
CALCIUM SERPL-MCNC: 9.7 MG/DL (ref 8.3–10.6)
CHLORIDE BLD-SCNC: 99 MMOL/L (ref 99–110)
CO2: 20 MMOL/L (ref 21–32)
CREAT SERPL-MCNC: 1.3 MG/DL (ref 0.9–1.3)
GFR AFRICAN AMERICAN: >60
GFR NON-AFRICAN AMERICAN: >60
GLUCOSE BLD-MCNC: 205 MG/DL (ref 70–99)
GLUCOSE BLD-MCNC: 234 MG/DL (ref 70–99)
GLUCOSE BLD-MCNC: 240 MG/DL (ref 70–99)
GLUCOSE BLD-MCNC: 262 MG/DL (ref 70–99)
GLUCOSE BLD-MCNC: 271 MG/DL (ref 70–99)
PERFORMED ON: ABNORMAL
POTASSIUM REFLEX MAGNESIUM: 4.3 MMOL/L (ref 3.5–5.1)
SODIUM BLD-SCNC: 134 MMOL/L (ref 136–145)

## 2022-06-12 PROCEDURE — 6370000000 HC RX 637 (ALT 250 FOR IP): Performed by: INTERNAL MEDICINE

## 2022-06-12 PROCEDURE — 6360000002 HC RX W HCPCS: Performed by: INTERNAL MEDICINE

## 2022-06-12 PROCEDURE — 36415 COLL VENOUS BLD VENIPUNCTURE: CPT

## 2022-06-12 PROCEDURE — 94761 N-INVAS EAR/PLS OXIMETRY MLT: CPT

## 2022-06-12 PROCEDURE — 99233 SBSQ HOSP IP/OBS HIGH 50: CPT | Performed by: INTERNAL MEDICINE

## 2022-06-12 PROCEDURE — 2580000003 HC RX 258: Performed by: INTERNAL MEDICINE

## 2022-06-12 PROCEDURE — 80048 BASIC METABOLIC PNL TOTAL CA: CPT

## 2022-06-12 PROCEDURE — 1200000000 HC SEMI PRIVATE

## 2022-06-12 RX ORDER — INSULIN LISPRO 100 [IU]/ML
8 INJECTION, SOLUTION INTRAVENOUS; SUBCUTANEOUS
Status: DISCONTINUED | OUTPATIENT
Start: 2022-06-12 | End: 2022-06-23 | Stop reason: HOSPADM

## 2022-06-12 RX ORDER — CEFDINIR 300 MG/1
300 CAPSULE ORAL EVERY 12 HOURS SCHEDULED
Status: DISCONTINUED | OUTPATIENT
Start: 2022-06-12 | End: 2022-06-15

## 2022-06-12 RX ADMIN — INSULIN LISPRO 8 UNITS: 100 INJECTION, SOLUTION INTRAVENOUS; SUBCUTANEOUS at 17:19

## 2022-06-12 RX ADMIN — DICYCLOMINE HYDROCHLORIDE 10 MG: 10 CAPSULE ORAL at 16:39

## 2022-06-12 RX ADMIN — GABAPENTIN 300 MG: 300 CAPSULE ORAL at 16:39

## 2022-06-12 RX ADMIN — METHYLPREDNISOLONE SODIUM SUCCINATE 40 MG: 40 INJECTION, POWDER, FOR SOLUTION INTRAMUSCULAR; INTRAVENOUS at 04:13

## 2022-06-12 RX ADMIN — CEFDINIR 300 MG: 300 CAPSULE ORAL at 21:43

## 2022-06-12 RX ADMIN — DULOXETINE HYDROCHLORIDE 30 MG: 30 CAPSULE, DELAYED RELEASE ORAL at 08:36

## 2022-06-12 RX ADMIN — CARVEDILOL 25 MG: 25 TABLET, FILM COATED ORAL at 08:37

## 2022-06-12 RX ADMIN — ENOXAPARIN SODIUM 30 MG: 100 INJECTION SUBCUTANEOUS at 08:37

## 2022-06-12 RX ADMIN — METHYLPREDNISOLONE SODIUM SUCCINATE 40 MG: 40 INJECTION, POWDER, FOR SOLUTION INTRAMUSCULAR; INTRAVENOUS at 21:43

## 2022-06-12 RX ADMIN — GABAPENTIN 300 MG: 300 CAPSULE ORAL at 08:36

## 2022-06-12 RX ADMIN — NIFEDIPINE 60 MG: 60 TABLET, EXTENDED RELEASE ORAL at 08:36

## 2022-06-12 RX ADMIN — DICYCLOMINE HYDROCHLORIDE 10 MG: 10 CAPSULE ORAL at 06:53

## 2022-06-12 RX ADMIN — DEXTROSE MONOHYDRATE 100 MG: 50 INJECTION, SOLUTION INTRAVENOUS at 09:29

## 2022-06-12 RX ADMIN — SODIUM CHLORIDE, PRESERVATIVE FREE 10 ML: 5 INJECTION INTRAVENOUS at 09:26

## 2022-06-12 RX ADMIN — DICYCLOMINE HYDROCHLORIDE 10 MG: 10 CAPSULE ORAL at 12:32

## 2022-06-12 RX ADMIN — INSULIN LISPRO 8 UNITS: 100 INJECTION, SOLUTION INTRAVENOUS; SUBCUTANEOUS at 12:34

## 2022-06-12 RX ADMIN — METHYLPREDNISOLONE SODIUM SUCCINATE 40 MG: 40 INJECTION, POWDER, FOR SOLUTION INTRAMUSCULAR; INTRAVENOUS at 12:32

## 2022-06-12 RX ADMIN — GABAPENTIN 300 MG: 300 CAPSULE ORAL at 21:43

## 2022-06-12 RX ADMIN — INSULIN LISPRO 2 UNITS: 100 INJECTION, SOLUTION INTRAVENOUS; SUBCUTANEOUS at 21:50

## 2022-06-12 RX ADMIN — SODIUM CHLORIDE, PRESERVATIVE FREE 10 ML: 5 INJECTION INTRAVENOUS at 21:44

## 2022-06-12 RX ADMIN — INSULIN LISPRO 4 UNITS: 100 INJECTION, SOLUTION INTRAVENOUS; SUBCUTANEOUS at 17:19

## 2022-06-12 RX ADMIN — INSULIN GLARGINE 35 UNITS: 100 INJECTION, SOLUTION SUBCUTANEOUS at 08:37

## 2022-06-12 RX ADMIN — INSULIN LISPRO 6 UNITS: 100 INJECTION, SOLUTION INTRAVENOUS; SUBCUTANEOUS at 12:34

## 2022-06-12 RX ADMIN — INSULIN LISPRO 4 UNITS: 100 INJECTION, SOLUTION INTRAVENOUS; SUBCUTANEOUS at 08:37

## 2022-06-12 RX ADMIN — CEFDINIR 300 MG: 300 CAPSULE ORAL at 12:32

## 2022-06-12 RX ADMIN — ENOXAPARIN SODIUM 30 MG: 100 INJECTION SUBCUTANEOUS at 21:30

## 2022-06-12 RX ADMIN — ACETAMINOPHEN 650 MG: 325 TABLET ORAL at 21:44

## 2022-06-12 RX ADMIN — ROSUVASTATIN CALCIUM 40 MG: 40 TABLET, FILM COATED ORAL at 21:43

## 2022-06-12 RX ADMIN — CARVEDILOL 25 MG: 25 TABLET, FILM COATED ORAL at 16:39

## 2022-06-12 RX ADMIN — DICYCLOMINE HYDROCHLORIDE 10 MG: 10 CAPSULE ORAL at 21:44

## 2022-06-12 RX ADMIN — ASPIRIN 81 MG: 81 TABLET, CHEWABLE ORAL at 08:37

## 2022-06-12 ASSESSMENT — PAIN SCALES - GENERAL: PAINLEVEL_OUTOF10: 3

## 2022-06-12 NOTE — PROGRESS NOTES
Pulmonary Progress Note    CC:  Follow up abnormal chest imaging, bronchoscopy    Subjective:  Room air  Sed rate high   Less fevers  Still coughing etc.  Does not feel that different    ROS  Still with cough  Still not improved      Intake/Output Summary (Last 24 hours) at 6/12/2022 1017  Last data filed at 6/11/2022 2030  Gross per 24 hour   Intake 480 ml   Output --   Net 480 ml         PHYSICAL EXAM:  Blood pressure (!) 156/77, pulse (!) 111, temperature 98.5 °F (36.9 °C), temperature source Oral, resp. rate 18, height 6' 1\" (1.854 m), weight 258 lb 9.6 oz (117.3 kg), SpO2 96 %, peak flow (!) 18 L/min.'  Gen: No distress. Eyes: PERRL. No sclera icterus. No conjunctival injection. ENT: No discharge. Pharynx clear. External appearance of ears and nose normal.  Neck: Trachea midline. No obvious mass. Resp: Diminished with some crackles. CV: Regular rate. Regular rhythm. No murmur or rub. GI: Non-tender. Non-distended. No hernia. Skin: Warm, dry, normal texture and turgor. No nodule on exposed extremities. Lymph: No cervical LAD. No supraclavicular LAD. M/S: No cyanosis. No clubbing. No joint deformity. Neuro: Moves all four extremities. CN 2-12 tested, no defect noted.   Ext:   no edema    Medications:    Scheduled Meds:   methylPREDNISolone  40 mg IntraVENous Q8H    anidulafungin (ERAXIS) 100 mg IVPB  100 mg IntraVENous Daily    sodium chloride flush  10 mL IntraVENous 2 times per day    aspirin  81 mg Oral Daily    carvedilol  25 mg Oral BID WC    dicyclomine  10 mg Oral 4x Daily AC & HS    DULoxetine  30 mg Oral Daily    gabapentin  300 mg Oral TID    insulin glargine  35 Units SubCUTAneous Daily    NIFEdipine  60 mg Oral Daily    rosuvastatin  40 mg Oral Nightly    insulin lispro  0-12 Units SubCUTAneous TID WC    insulin lispro  0-6 Units SubCUTAneous Nightly    enoxaparin  30 mg SubCUTAneous BID       Continuous Infusions:   sodium chloride 20 mL/hr at 06/10/22 2450    dextrose         PRN Meds:  sodium chloride flush, sodium chloride, promethazine **OR** ondansetron, magnesium hydroxide, acetaminophen **OR** acetaminophen, albuterol sulfate HFA, glucose, dextrose bolus **OR** dextrose bolus, glucagon (rDNA), dextrose    Labs:  CBC: No results for input(s): WBC, HGB, HCT, MCV, PLT in the last 72 hours. BMP:   Recent Labs     06/10/22  0722 06/11/22  0739 06/12/22  0720    135* 134*   K 4.1 4.1 4.3    102 99   CO2 19* 19* 20*   BUN 23* 18 26*   CREATININE 1.4* 1.3 1.3     LIVER PROFILE: No results for input(s): AST, ALT, LIPASE, BILIDIR, BILITOT, ALKPHOS in the last 72 hours. Invalid input(s): AMYLASE,  ALB  PT/INR: No results for input(s): PROTIME, INR in the last 72 hours. APTT: No results for input(s): APTT in the last 72 hours. UA:No results for input(s): NITRITE, COLORU, PHUR, LABCAST, WBCUA, RBCUA, MUCUS, TRICHOMONAS, YEAST, BACTERIA, CLARITYU, SPECGRAV, LEUKOCYTESUR, UROBILINOGEN, BILIRUBINUR, BLOODU, GLUCOSEU, AMORPHOUS in the last 72 hours. Invalid input(s): [place]  No results for input(s): PH, PCO2, PO2 in the last 72 hours. Films:  Chest imaging reports were reviewed and imaging was reviewed by me and showed no new films     ABG:  None    Cultures:  Negative to date   Beta glucan positive   Strep parasanguinis in tissue culture    I reviewed the labs and images listed above    Assessment/Plan:    Abnormal CT Chest with endobronchial lesion (?aspergillus)  o Pathology negative  o Possible fungal etiology   o Continue Eraxis  o Await fungal Ab and fungal cultures   o Continue with solumedrol  o Add omnicef for 7 days due to strep in tissue culture (although could be normal debra)    Uncontrolled DM    Would await fungal serology (not cultures) before discharged. Would need proof of a fungal infection to get meds covered.       DVT prophylaxis  Lovenox     Juliette Carpenter, DO  [place] [facility]

## 2022-06-12 NOTE — PROGRESS NOTES
Nephrology ([facility]) Progress Note    CC: CHELE on CKD    Subjective:    HPI:  Breathing unchanged. No CP. Renal function better. ROS:  In bed. No fever. [address]:  medications reviewed. Objective:  Blood pressure 128/86, pulse 92, temperature 97.8 °F (36.6 °C), temperature source Oral, resp. rate 18, height 6' 1\" (1.854 m), weight 258 lb 9.6 oz (117.3 kg), SpO2 95 %, peak flow (!) 18 L/min. Intake/Output Summary (Last 24 hours) at 6/12/2022 1658  Last data filed at 6/12/2022 0830  Gross per 24 hour   Intake 2544.67 ml   Output --   Net 2544.67 ml     General:  NAD, A+OX3  Chest:  CTAB  CVS:  RRR  Abdominal:  NTND, soft, +BS  Extremities:  no edema  Skin:  no rash    Labs:  Renal panel:  Lab Results   Component Value Date/Time     (L) 06/12/2022 07:20 AM    K 4.3 06/12/2022 07:20 AM    CO2 20 (L) 06/12/2022 07:20 AM    BUN 26 (H) 06/12/2022 07:20 AM    CREATININE 1.3 06/12/2022 07:20 AM    CALCIUM 9.7 06/12/2022 07:20 AM    PHOS 3.8 06/24/2021 03:00 PM    MG 2.30 05/19/2022 08:08 AM     CBC:  Lab Results   Component Value Date/Time    WBC 6.1 06/07/2022 07:26 AM    HGB 12.8 (L) 06/07/2022 07:26 AM    HCT 39.0 (L) 06/07/2022 07:26 AM     06/07/2022 07:26 AM       Assessment/Plan:  Reviewed old records and labs.     1) CHELE on CKD   - baseline 05/17/22 Cr 1.2, sees Dr. Phill Mejia              - ddx:  pre-renal vs. ATN vs. contrast nephropathy                          - I don't think Ann Candelaria played a role   - renal function approaching baseline     2) RUL infiltrate              - failed outpatient Z-gloria              - on empiric omnicef, levaquin, and eraxis   - repeat chest CT shows further consolidation of infiltrate              - HIV negative              - less likely TB                          - no evidence of cavitation                          - no hemoptysis    - s/p bronchoscopy with RUL biopsy with some bleeding 06/09/22    - pathology neative    - fungal cultures pending     3) neuro              - neurology consulted              - brain MRI shows non-specific high signal in periventricular white matter     4) FEN              - hyponatremia                          - this has been persistent issue                          - monitor

## 2022-06-12 NOTE — PLAN OF CARE
Problem: Discharge Planning  Goal: Discharge to home or other facility with appropriate resources  Outcome: Progressing     Problem: Pain  Goal: Verbalizes/displays adequate comfort level or baseline comfort level  Outcome: Progressing     Problem: Metabolic/Fluid and Electrolytes - Adult  Goal: Electrolytes maintained within normal limits  Outcome: Progressing  Goal: Hemodynamic stability and optimal renal function maintained  Outcome: Progressing  Goal: Glucose maintained within prescribed range  Outcome: Progressing     Problem: Safety - Adult  Goal: Free from fall injury  Outcome: Progressing  Flowsheets (Taken 6/12/2022 1411)  Free From Fall Injury:   Instruct family/caregiver on patient safety   Based on caregiver fall risk screen, instruct family/caregiver to ask for assistance with transferring infant if caregiver noted to have fall risk factors     Problem: ABCDS Injury Assessment  Goal: Absence of physical injury  Outcome: Progressing  Flowsheets (Taken 6/12/2022 1411)  Absence of Physical Injury: Implement safety measures based on patient assessment     Problem: Chronic Conditions and Co-morbidities  Goal: Patient's chronic conditions and co-morbidity symptoms are monitored and maintained or improved  Outcome: Progressing     Problem: Gastrointestinal - Adult  Goal: Minimal or absence of nausea and vomiting  Outcome: Progressing  Goal: Maintains or returns to baseline bowel function  Outcome: Progressing  Goal: Maintains adequate nutritional intake  Outcome: Progressing

## 2022-06-12 NOTE — PROGRESS NOTES
Progress Note  Admit Date: 6/6/2022      PCP: Saint Jakes, MD, MD     CC: F/U for dizziness not feeling well    Days in hospital:  6    SUBJECTIVE / Interval History:  Does not feels much better, still has a lot of cough   No hemoptysis         Allergies  Patient has no known allergies. Medications    Scheduled Meds:   methylPREDNISolone  40 mg IntraVENous Q8H    anidulafungin (ERAXIS) 100 mg IVPB  100 mg IntraVENous Daily    sodium chloride flush  10 mL IntraVENous 2 times per day    aspirin  81 mg Oral Daily    carvedilol  25 mg Oral BID WC    dicyclomine  10 mg Oral 4x Daily AC & HS    DULoxetine  30 mg Oral Daily    gabapentin  300 mg Oral TID    insulin glargine  35 Units SubCUTAneous Daily    NIFEdipine  60 mg Oral Daily    rosuvastatin  40 mg Oral Nightly    insulin lispro  0-12 Units SubCUTAneous TID WC    insulin lispro  0-6 Units SubCUTAneous Nightly    enoxaparin  30 mg SubCUTAneous BID     Continuous Infusions:   sodium chloride 20 mL/hr at 06/10/22 1148    dextrose         PRN Meds:  sodium chloride flush, sodium chloride, promethazine **OR** ondansetron, magnesium hydroxide, acetaminophen **OR** acetaminophen, albuterol sulfate HFA, glucose, dextrose bolus **OR** dextrose bolus, glucagon (rDNA), dextrose    Vitals    BP (!) 156/77   Pulse (!) 111   Temp 98.5 °F (36.9 °C) (Oral)   Resp 18   Ht 6' 1\" (1.854 m)   Wt 258 lb 9.6 oz (117.3 kg)   SpO2 96%   PF (!) 18 L/min   BMI 34.12 kg/m²     Exam:    Gen: No distress. Eyes: PERRL. No sclera icterus. No conjunctival injection. ENT: No discharge. Pharynx clear. External appearance of ears and nose normal.  Neck: Trachea midline. No obvious mass. Resp: No accessory muscle use. No crackles. No wheezes. No rhonchi. No dullness on percussion. CV: Regular rate. Regular rhythm. No murmur or rub. No edema. GI: Non-tender. Non-distended. No hernia. Skin: Warm, dry, normal texture and turgor.  No nodule on exposed extremities. Lymph: No cervical LAD. No supraclavicular LAD. M/S: No cyanosis. No clubbing. No joint deformity. Neuro: Moves all four extremities. CN 2-12 tested, no defect noted. Psych: Oriented x 3. No anxiety. Awake. Alert. Intact judgement and insight. Data    LABS  CBC: No results for input(s): WBC, HGB, HCT, MCV, PLT in the last 72 hours. BMP:   Recent Labs     06/10/22  0722 06/11/22  0739 06/12/22  0720    135* 134*   K 4.1 4.1 4.3    102 99   CO2 19* 19* 20*   BUN 23* 18 26*   CREATININE 1.4* 1.3 1.3   GLUCOSE 179* 99 262*     POC GLUCOSE:    Recent Labs     06/11/22  0805 06/11/22  1115 06/11/22  1635 06/11/22  2126 06/12/22  0731   POCGLU 102* 136* 193* 257* 240*     LIVER PROFILE: No results for input(s): AST, ALT, LIPASE, AMYLASE, LABALBU, BILIDIR, BILITOT, ALKPHOS in the last 72 hours. PT/INR: No results for input(s): PROTIME, INR in the last 72 hours. APTT: No results for input(s): APTT in the last 72 hours. UA:No results for input(s): NITRITE, COLORU, PHUR, LABCAST, WBCUA, RBCUA, MUCUS, TRICHOMONAS, YEAST, BACTERIA, CLARITYU, SPECGRAV, LEUKOCYTESUR, UROBILINOGEN, BILIRUBINUR, BLOODU, GLUCOSEU, KETUA, AMORPHOUS in the last 72 hours. Microbiology:  Wound Culture:   Recent Labs     06/09/22  1335   WNDABS Rare growth   ORG Streptococcus parasanguinis*     Nasal Culture:   Recent Labs     06/09/22  1335   ORG Streptococcus parasanguinis*     Blood Culture: No results for input(s): BC, BLOODCULT2 in the last 72 hours. Fungal Culture:     Recent Labs     06/09/22  1335   FUNGSM No Fungal elements seen     No results for input(s): FUNCXBLD in the last 72 hours. CSF Culture:  No results for input(s): COLORCSF, APPEARCSF, CFTUBE, CLOTCSF, WBCCSF, RBCCSF, NEUTCSF, NUMCELLSCSF, LYMPHSCSF, MONOCSF, GLUCCSF, VOLCSF in the last 72 hours.   Respiratory Culture:  Recent Labs     06/09/22  1328 06/09/22  1328 06/09/22  1335   CULTRESP Normal respiratory debra  --   --    LABGRAM 1+ WBC's (Mononuclear)  No Epithelial Cells seen  No organisms seen     < > 1+ WBC's (Mononuclear)  No Epithelial Cells seen  No organisms seen      < > = values in this interval not displayed. AFB:  Recent Labs     06/09/22  1335   AFBSMEAR No AFB observed by Fluorescent stain     Urine Culture  No results for input(s): LABURIN in the last 72 hours. RADIOLOGY:    XR CHEST 1 VIEW   Final Result      FLUORO FOR SURGICAL PROCEDURES   Final Result      MRI BRAIN WO CONTRAST   Final Result   There are few areas of high signal in the periventricular white matter. These are nonspecific. Demyelination could be considered in the appropriate   clinical setting. Chronic small vessel ischemic changes, vasculitis, or   migraines can cause a similar finding. No other brain parenchymal   abnormality. CT CHEST WO CONTRAST   Final Result   1. Progressive consolidation in the anterior segment of the right upper lobe   consistent with pneumonia. There is no evidence of cavitation. The findings   are nonspecific but most consistent with bacterial etiology. 2. The remainder of the lungs are clear. No pleural fluid. 3. Stable small mediastinal nodes. The right hilar node noted previously is   not evaluated due to the lack of intravenous contrast.         XR CHEST PORTABLE   Final Result   Redemonstration of right upper lobe infiltrate which is likely pneumonic. It   is slightly larger compared to the previous evaluation. CONSULTS:    IP CONSULT TO NEPHROLOGY  IP CONSULT TO NEUROLOGY  IP CONSULT TO PULMONOLOGY    ASSESSMENT AND PLAN:      Principal Problem:    CHELE (acute kidney injury) ([facility] [address]  Active Problems:    Community acquired pneumonia of right upper lobe of lung    Abnormal CT of the chest    Endobronchial mass  Resolved Problems:    * No resolved hospital problems. *    Patient is a 28-year-old male with uncontrolled diabetes who presented with cough shortness of breath dizziness not feeling well. In the ER he was found to have CHELE with right l upper lobe pneumonia. Patient underwent bronchoscopy on 6/9. Pathology showed in the inflammation    Abnormal CT chest with  with progressive consolidation, with possible endobronchial lesion( on bronchoscopy)  -S/p bronchoscopy on 6/9  -Pathology showed on inflammation  -Beta D glucan positive and was started on antifungal.  Antifungal cultures and antibodies pending  -Was on Levaquin, now on Omnicef  -HIV negative  -Tissue culture positive for Streptococcus    Weakness persistent dizziness and off-balance sensation  -Most likely balance issues due to neuropathy and uncontrolled diabetes  -Neurology consult appreciated  -MRI noted    CHELE on CKD stage III  -Improved with IV hydration, creatinine close to baseline    Diabetes type 2 with neuropathy  -Continue current regimen, add Premeal insulin(worsening hyperglycemia due to steroids)   -Last A1c greater than 16    Hypertension  -Continue Coreg and nifedipine  -Monitor blood pressure      DVT Prophylaxis: Had hemoptysis post bronchoscopy  Diet: ADULT DIET; Regular; 4 carb choices (60 gm/meal); Low Fat/Low Chol/High Fiber/BREE  Code Status: Full Code        Discharge plan -continue care, dc when ok with pulm    The patient and / or the family were informed of the results of any tests, a time was given to answer questions, a plan was proposed and they agreed with plan. Discussed with consulting physicians, nursing and social work     The note was completed using EMR. Every effort was made to ensure accuracy; however, inadvertent computerized transcription errors may be present.        Mila Espino MD

## 2022-06-13 ENCOUNTER — TELEPHONE (OUTPATIENT)
Dept: PULMONOLOGY | Age: 37
End: 2022-06-13

## 2022-06-13 LAB
ALBUMIN SERPL-MCNC: 3.2 G/DL (ref 3.4–5)
ALP BLD-CCNC: 106 U/L (ref 40–129)
ALT SERPL-CCNC: 13 U/L (ref 10–40)
ANION GAP SERPL CALCULATED.3IONS-SCNC: 12 MMOL/L (ref 3–16)
AST SERPL-CCNC: 10 U/L (ref 15–37)
BILIRUB SERPL-MCNC: <0.2 MG/DL (ref 0–1)
BILIRUBIN DIRECT: <0.2 MG/DL (ref 0–0.3)
BILIRUBIN, INDIRECT: ABNORMAL MG/DL (ref 0–1)
BUN BLDV-MCNC: 30 MG/DL (ref 7–20)
CALCIUM SERPL-MCNC: 9.2 MG/DL (ref 8.3–10.6)
CHLORIDE BLD-SCNC: 98 MMOL/L (ref 99–110)
CO2: 22 MMOL/L (ref 21–32)
CREAT SERPL-MCNC: 1.4 MG/DL (ref 0.9–1.3)
GFR AFRICAN AMERICAN: >60
GFR NON-AFRICAN AMERICAN: 58
GLUCOSE BLD-MCNC: 155 MG/DL (ref 70–99)
GLUCOSE BLD-MCNC: 195 MG/DL (ref 70–99)
GLUCOSE BLD-MCNC: 282 MG/DL (ref 70–99)
GLUCOSE BLD-MCNC: 324 MG/DL (ref 70–99)
GLUCOSE BLD-MCNC: 381 MG/DL (ref 70–99)
PERFORMED ON: ABNORMAL
POTASSIUM REFLEX MAGNESIUM: 4.4 MMOL/L (ref 3.5–5.1)
SODIUM BLD-SCNC: 132 MMOL/L (ref 136–145)
TOTAL PROTEIN: 7.1 G/DL (ref 6.4–8.2)

## 2022-06-13 PROCEDURE — 6360000002 HC RX W HCPCS: Performed by: INTERNAL MEDICINE

## 2022-06-13 PROCEDURE — 36415 COLL VENOUS BLD VENIPUNCTURE: CPT

## 2022-06-13 PROCEDURE — 2580000003 HC RX 258: Performed by: INTERNAL MEDICINE

## 2022-06-13 PROCEDURE — 1200000000 HC SEMI PRIVATE

## 2022-06-13 PROCEDURE — 6370000000 HC RX 637 (ALT 250 FOR IP): Performed by: INTERNAL MEDICINE

## 2022-06-13 PROCEDURE — 99232 SBSQ HOSP IP/OBS MODERATE 35: CPT | Performed by: INTERNAL MEDICINE

## 2022-06-13 PROCEDURE — 80048 BASIC METABOLIC PNL TOTAL CA: CPT

## 2022-06-13 PROCEDURE — 80076 HEPATIC FUNCTION PANEL: CPT

## 2022-06-13 RX ORDER — INSULIN GLARGINE 100 [IU]/ML
40 INJECTION, SOLUTION SUBCUTANEOUS DAILY
Status: DISCONTINUED | OUTPATIENT
Start: 2022-06-14 | End: 2022-06-23 | Stop reason: HOSPADM

## 2022-06-13 RX ORDER — INSULIN GLARGINE 100 [IU]/ML
5 INJECTION, SOLUTION SUBCUTANEOUS ONCE
Status: COMPLETED | OUTPATIENT
Start: 2022-06-13 | End: 2022-06-13

## 2022-06-13 RX ADMIN — INSULIN LISPRO 6 UNITS: 100 INJECTION, SOLUTION INTRAVENOUS; SUBCUTANEOUS at 13:51

## 2022-06-13 RX ADMIN — CARVEDILOL 25 MG: 25 TABLET, FILM COATED ORAL at 08:32

## 2022-06-13 RX ADMIN — ENOXAPARIN SODIUM 30 MG: 100 INJECTION SUBCUTANEOUS at 08:32

## 2022-06-13 RX ADMIN — SODIUM CHLORIDE, PRESERVATIVE FREE 10 ML: 5 INJECTION INTRAVENOUS at 21:16

## 2022-06-13 RX ADMIN — INSULIN LISPRO 8 UNITS: 100 INJECTION, SOLUTION INTRAVENOUS; SUBCUTANEOUS at 18:23

## 2022-06-13 RX ADMIN — INSULIN LISPRO 8 UNITS: 100 INJECTION, SOLUTION INTRAVENOUS; SUBCUTANEOUS at 08:26

## 2022-06-13 RX ADMIN — GABAPENTIN 300 MG: 300 CAPSULE ORAL at 21:16

## 2022-06-13 RX ADMIN — CARVEDILOL 25 MG: 25 TABLET, FILM COATED ORAL at 18:27

## 2022-06-13 RX ADMIN — ENOXAPARIN SODIUM 30 MG: 100 INJECTION SUBCUTANEOUS at 21:16

## 2022-06-13 RX ADMIN — DICYCLOMINE HYDROCHLORIDE 10 MG: 10 CAPSULE ORAL at 18:27

## 2022-06-13 RX ADMIN — ASPIRIN 81 MG: 81 TABLET, CHEWABLE ORAL at 08:32

## 2022-06-13 RX ADMIN — GABAPENTIN 300 MG: 300 CAPSULE ORAL at 12:42

## 2022-06-13 RX ADMIN — METHYLPREDNISOLONE SODIUM SUCCINATE 40 MG: 40 INJECTION, POWDER, FOR SOLUTION INTRAMUSCULAR; INTRAVENOUS at 04:37

## 2022-06-13 RX ADMIN — NIFEDIPINE 60 MG: 60 TABLET, EXTENDED RELEASE ORAL at 08:32

## 2022-06-13 RX ADMIN — ROSUVASTATIN CALCIUM 40 MG: 40 TABLET, FILM COATED ORAL at 21:16

## 2022-06-13 RX ADMIN — DICYCLOMINE HYDROCHLORIDE 10 MG: 10 CAPSULE ORAL at 12:41

## 2022-06-13 RX ADMIN — DICYCLOMINE HYDROCHLORIDE 10 MG: 10 CAPSULE ORAL at 07:48

## 2022-06-13 RX ADMIN — INSULIN LISPRO 2 UNITS: 100 INJECTION, SOLUTION INTRAVENOUS; SUBCUTANEOUS at 18:23

## 2022-06-13 RX ADMIN — INSULIN GLARGINE 5 UNITS: 100 INJECTION, SOLUTION SUBCUTANEOUS at 15:31

## 2022-06-13 RX ADMIN — CEFDINIR 300 MG: 300 CAPSULE ORAL at 08:32

## 2022-06-13 RX ADMIN — SODIUM CHLORIDE, PRESERVATIVE FREE 10 ML: 5 INJECTION INTRAVENOUS at 11:37

## 2022-06-13 RX ADMIN — INSULIN GLARGINE 35 UNITS: 100 INJECTION, SOLUTION SUBCUTANEOUS at 08:26

## 2022-06-13 RX ADMIN — GABAPENTIN 300 MG: 300 CAPSULE ORAL at 08:32

## 2022-06-13 RX ADMIN — DICYCLOMINE HYDROCHLORIDE 10 MG: 10 CAPSULE ORAL at 21:16

## 2022-06-13 RX ADMIN — CEFDINIR 300 MG: 300 CAPSULE ORAL at 21:16

## 2022-06-13 RX ADMIN — INSULIN LISPRO 1 UNITS: 100 INJECTION, SOLUTION INTRAVENOUS; SUBCUTANEOUS at 21:17

## 2022-06-13 RX ADMIN — INSULIN LISPRO 8 UNITS: 100 INJECTION, SOLUTION INTRAVENOUS; SUBCUTANEOUS at 13:51

## 2022-06-13 RX ADMIN — DULOXETINE HYDROCHLORIDE 30 MG: 30 CAPSULE, DELAYED RELEASE ORAL at 08:32

## 2022-06-13 RX ADMIN — DEXTROSE MONOHYDRATE 100 MG: 50 INJECTION, SOLUTION INTRAVENOUS at 09:35

## 2022-06-13 ASSESSMENT — PAIN - FUNCTIONAL ASSESSMENT: PAIN_FUNCTIONAL_ASSESSMENT: ACTIVITIES ARE NOT PREVENTED

## 2022-06-13 ASSESSMENT — PAIN SCALES - GENERAL
PAINLEVEL_OUTOF10: 0
PAINLEVEL_OUTOF10: 0

## 2022-06-13 ASSESSMENT — PAIN DESCRIPTION - LOCATION: LOCATION: ABDOMEN

## 2022-06-13 NOTE — PROGRESS NOTES
Nephrology ([facility] Progress Note    CC: CHELE on CKD    Subjective:    HPI:  Breathing unchanged. No CP + cough . ROS:  In bed. No fever. [address]:  medications reviewed. Objective:  Blood pressure (!) 147/87, pulse 98, temperature 98.1 °F (36.7 °C), temperature source Oral, resp. rate 16, height 6' 1\" (1.854 m), weight 258 lb 14.4 oz (117.4 kg), SpO2 95 %, peak flow (!) 18 L/min. Intake/Output Summary (Last 24 hours) at 6/13/2022 1743  Last data filed at 6/13/2022 0830  Gross per 24 hour   Intake 240 ml   Output --   Net 240 ml     General:  NAD, A+OX3  Chest:  CTAB  CVS:  RRR  Abdominal:  NTND, soft, +BS  Extremities:  no edema  Skin:  no rash    Labs:  Renal panel:  Lab Results   Component Value Date/Time     (L) 06/13/2022 08:27 AM    K 4.4 06/13/2022 08:27 AM    CO2 22 06/13/2022 08:27 AM    BUN 30 (H) 06/13/2022 08:27 AM    CREATININE 1.4 (H) 06/13/2022 08:27 AM    CALCIUM 9.2 06/13/2022 08:27 AM    PHOS 3.8 06/24/2021 03:00 PM    MG 2.30 05/19/2022 08:08 AM     CBC:  Lab Results   Component Value Date/Time    WBC 6.1 06/07/2022 07:26 AM    HGB 12.8 (L) 06/07/2022 07:26 AM    HCT 39.0 (L) 06/07/2022 07:26 AM     06/07/2022 07:26 AM       Assessment/Plan:  Reviewed old records and labs.     1) CHELE on CKD   - baseline 05/17/22 Cr 1.2, sees Dr. Diony Fiore              - ddx:  pre-renal vs. ATN vs. contrast nephropathy   - renal function at baseline Cr 1.4 mg monitor     2) RUL infiltrate              - failed outpatient Z-gloria              - on empiric omnicef, levaquin, and eraxis   - repeat chest CT shows further consolidation of infiltrate              - HIV negative              - less likely TB                          - no evidence of cavitation                          - no hemoptysis    - s/p bronchoscopy with RUL biopsy with some bleeding 06/09/22    - pathology neative    - fungal cultures pending     3) neuro              - neurology consulted              - brain MRI shows non-specific high signal in periventricular white matter     4) FEN              - hyponatremia                          -mild Na+ 132 meq                          - monitor

## 2022-06-13 NOTE — PLAN OF CARE
Problem: Discharge Planning  Goal: Discharge to home or other facility with appropriate resources  6/13/2022 1058 by Steffen Head RN  Outcome: Progressing  6/12/2022 2325 by Shira Hinds RN  Outcome: Progressing     Problem: Pain  Goal: Verbalizes/displays adequate comfort level or baseline comfort level  6/13/2022 1058 by Steffen Head RN  Outcome: Progressing  6/12/2022 2325 by Shira Hinds RN  Outcome: Progressing     Problem: Metabolic/Fluid and Electrolytes - Adult  Goal: Electrolytes maintained within normal limits  6/13/2022 1058 by Steffen Head RN  Outcome: Progressing  6/12/2022 2325 by Shira Hinds RN  Outcome: Progressing  Goal: Hemodynamic stability and optimal renal function maintained  6/13/2022 1058 by Steffen Head RN  Outcome: Progressing  6/12/2022 2325 by Shira Hinds RN  Outcome: Progressing  Goal: Glucose maintained within prescribed range  6/13/2022 1058 by Steffen Head RN  Outcome: Progressing  6/12/2022 2325 by Shira Hinds RN  Outcome: Progressing     Problem: Gastrointestinal - Adult  Goal: Minimal or absence of nausea and vomiting  6/13/2022 1058 by Steffen Head RN  Outcome: Progressing  6/12/2022 2325 by Shira Hinds RN  Outcome: Progressing  Goal: Maintains or returns to baseline bowel function  6/13/2022 1058 by Steffen Head RN  Outcome: Progressing  6/12/2022 2325 by Shira Hinds RN  Outcome: Progressing  Goal: Maintains adequate nutritional intake  6/13/2022 1058 by Steffen Head RN  Outcome: Progressing  6/12/2022 2325 by Shira Hinds RN  Outcome: Progressing     Problem: Safety - Adult  Goal: Free from fall injury  6/13/2022 1058 by Steffen Head RN  Outcome: Progressing  6/12/2022 2325 by Shira Hinds RN  Outcome: Progressing

## 2022-06-13 NOTE — PROGRESS NOTES
Nutrition Assessment     Type and Reason for Visit: Initial    Nutrition Recommendations/Plan:   1. Continue current diet     Malnutrition Assessment:  Malnutrition Status: No malnutrition    Nutrition Assessment:  LOS assessment. Pt with uncontrolled diabetes, CHELE on CKD, and abnormal chest CT with endobronchial lesion. Pt has lost some weight over the past two months although insignificant. On 4 carb, cardiac diet with intakes %. Skin intact. No nutrition concerns at this time as intakes are adequate. Nutrition Related Findings:   Glucose 381. +BM 6/10. +10 liters. Wound Type: None    Current Nutrition Therapies:    ADULT DIET; Regular; 4 carb choices (60 gm/meal);  Low Fat/Low Chol/High Fiber/BREE    Anthropometric Measures:  · Height: 6' 1\" (185.4 cm)  · Current Body Wt: 258 lb (117 kg)   · BMI: 34    Nutrition Diagnosis:   No nutrition diagnosis at this time    Nutrition Interventions:   Food and/or Nutrient Delivery: Continue Current Diet  Nutrition Education/Counseling: Education not indicated  Coordination of Nutrition Care: Continue to monitor while inpatient     Goals:  Goals: PO intake 50% or greater,prior to discharge     Nutrition Monitoring and Evaluation:   Behavioral-Environmental Outcomes: None Identified  Food/Nutrient Intake Outcomes: Diet Advancement/Tolerance,Food and Nutrient Intake  Physical Signs/Symptoms Outcomes: Biochemical Data,Fluid Status or Edema,Weight    Discharge Planning:    Continue current diet     Leticia Pardo RD, LD  Contact: [phone]

## 2022-06-13 NOTE — PROGRESS NOTES
Pulmonary Progress Note    CC:  Follow up abnormal chest imaging, bronchoscopy    Subjective:  Room air  Completed 48 hours of solumedrol  Slightly better        Intake/Output Summary (Last 24 hours) at 6/13/2022 0733  Last data filed at 6/12/2022 1734  Gross per 24 hour   Intake 2664.67 ml   Output --   Net 2664.67 ml         PHYSICAL EXAM:  Blood pressure (!) 148/98, pulse 99, temperature 98.2 °F (36.8 °C), temperature source Oral, resp. rate 18, height 6' 1\" (1.854 m), weight 258 lb 14.4 oz (117.4 kg), SpO2 98 %, peak flow (!) 18 L/min.'  Gen: No distress. Eyes: PERRL. No sclera icterus. No conjunctival injection. ENT: No discharge. Pharynx clear. External appearance of ears and nose normal.  Neck: Trachea midline. No obvious mass. Resp: Clearer than yesterday   CV: Regular rate. Regular rhythm. No murmur or rub. GI: Non-tender. Non-distended. No hernia. Skin: Warm, dry, normal texture and turgor. No nodule on exposed extremities. Lymph: No cervical LAD. No supraclavicular LAD. M/S: No cyanosis. No clubbing. No joint deformity. Neuro: Moves all four extremities. CN 2-12 tested, no defect noted.   Ext:   no edema    Medications:    Scheduled Meds:   cefdinir  300 mg Oral 2 times per day    insulin lispro  8 Units SubCUTAneous TID WC    anidulafungin (ERAXIS) 100 mg IVPB  100 mg IntraVENous Daily    sodium chloride flush  10 mL IntraVENous 2 times per day    aspirin  81 mg Oral Daily    carvedilol  25 mg Oral BID WC    dicyclomine  10 mg Oral 4x Daily AC & HS    DULoxetine  30 mg Oral Daily    gabapentin  300 mg Oral TID    insulin glargine  35 Units SubCUTAneous Daily    NIFEdipine  60 mg Oral Daily    rosuvastatin  40 mg Oral Nightly    insulin lispro  0-12 Units SubCUTAneous TID WC    insulin lispro  0-6 Units SubCUTAneous Nightly    enoxaparin  30 mg SubCUTAneous BID       Continuous Infusions:   sodium chloride Stopped (06/11/22 1904)    dextrose         PRN Meds:  sodium chloride flush, sodium chloride, promethazine **OR** ondansetron, magnesium hydroxide, acetaminophen **OR** acetaminophen, albuterol sulfate HFA, glucose, dextrose bolus **OR** dextrose bolus, glucagon (rDNA), dextrose    Labs:  CBC: No results for input(s): WBC, HGB, HCT, MCV, PLT in the last 72 hours. BMP:   Recent Labs     06/11/22  0739 06/12/22  0720   * 134*   K 4.1 4.3    99   CO2 19* 20*   BUN 18 26*   CREATININE 1.3 1.3     LIVER PROFILE: No results for input(s): AST, ALT, LIPASE, BILIDIR, BILITOT, ALKPHOS in the last 72 hours. Invalid input(s): AMYLASE,  ALB  PT/INR: No results for input(s): PROTIME, INR in the last 72 hours. APTT: No results for input(s): APTT in the last 72 hours. UA:No results for input(s): NITRITE, COLORU, PHUR, LABCAST, WBCUA, RBCUA, MUCUS, TRICHOMONAS, YEAST, BACTERIA, CLARITYU, SPECGRAV, LEUKOCYTESUR, UROBILINOGEN, BILIRUBINUR, BLOODU, GLUCOSEU, AMORPHOUS in the last 72 hours. Invalid input(s): [place]  No results for input(s): PH, PCO2, PO2 in the last 72 hours. Films:  Chest imaging reports were reviewed and imaging was reviewed by me and showed no new films     ABG:  None    Cultures:  Negative to date   Beta glucan positive   Strep parasanguinis in tissue culture    I reviewed the labs and images listed above    Assessment/Plan:    Abnormal CT Chest with endobronchial lesion (?aspergillus)  o Pathology negative  o Fungal Antibodies still pending   o Continue Eraxis. Check hepatic function panel as may need to go home on itraconazole and follow up for cultures with Arcadio Villareal   o Completed 48 hours of solumedrol   o Omnicef for 7 days    Uncontrolled DM    Discharged once fungal Ab results.   If Abs are negative would send home on itraconazole until seen by Arcadio Villareal in follow up     DVT prophylaxis  Lovenox     DO Beatriz Peralta Pulmonary

## 2022-06-13 NOTE — PROGRESS NOTES
Hospitalist Progress Note      PCP: Roman Lindquist MD, MD    Date of Admission: 6/6/2022        Subjective: Feels okay, no chest pain fever or chills still having cough still. Still feeling weak      Medications:  Reviewed    Infusion Medications    sodium chloride Stopped (06/11/22 1904)    dextrose       Scheduled Medications    cefdinir  300 mg Oral 2 times per day    insulin lispro  8 Units SubCUTAneous TID WC    anidulafungin (ERAXIS) 100 mg IVPB  100 mg IntraVENous Daily    sodium chloride flush  10 mL IntraVENous 2 times per day    aspirin  81 mg Oral Daily    carvedilol  25 mg Oral BID WC    dicyclomine  10 mg Oral 4x Daily AC & HS    DULoxetine  30 mg Oral Daily    gabapentin  300 mg Oral TID    insulin glargine  35 Units SubCUTAneous Daily    NIFEdipine  60 mg Oral Daily    rosuvastatin  40 mg Oral Nightly    insulin lispro  0-12 Units SubCUTAneous TID WC    insulin lispro  0-6 Units SubCUTAneous Nightly    enoxaparin  30 mg SubCUTAneous BID     PRN Meds: sodium chloride flush, sodium chloride, promethazine **OR** ondansetron, magnesium hydroxide, acetaminophen **OR** acetaminophen, albuterol sulfate HFA, glucose, dextrose bolus **OR** dextrose bolus, glucagon (rDNA), dextrose      Intake/Output Summary (Last 24 hours) at 6/13/2022 1251  Last data filed at 6/13/2022 0830  Gross per 24 hour   Intake 600 ml   Output --   Net 600 ml       Physical Exam Performed:    /86   Pulse 92   Temp 98.2 °F (36.8 °C) (Oral)   Resp 16   Ht 6' 1\" (1.854 m)   Wt 258 lb 14.4 oz (117.4 kg)   SpO2 96%   PF (!) 18 L/min   BMI 34.16 kg/m²     General appearance: No apparent distress  Neck: Supple  Respiratory:  Normal respiratory effort. Clear to auscultation, bilaterally without Rales/Wheezes/Rhonchi. Cardiovascular: Regular rate and rhythm with normal S1/S2 without murmurs, rubs or gallops.   Abdomen: Soft, non-tender, non-distended  Musculoskeletal: No clubbing, cyanosis  Skin: Skin color, texture, turgor normal.  No rashes or lesions. Neurologic:  No focal weakness   Psychiatric: Alert and oriented  Capillary Refill: Brisk,3 seconds, normal   Peripheral Pulses: +2 palpable, equal bilaterally       Labs:   No results for input(s): WBC, HGB, HCT, PLT in the last 72 hours. Recent Labs     06/11/22  0739 06/12/22  0720 06/13/22  0827   * 134* 132*   K 4.1 4.3 4.4    99 98*   CO2 19* 20* 22   BUN 18 26* 30*   CREATININE 1.3 1.3 1.4*   CALCIUM 9.4 9.7 9.2     Recent Labs     06/13/22  0827   AST 10*   ALT 13   BILIDIR <0.2   BILITOT <0.2   ALKPHOS 106     No results for input(s): INR in the last 72 hours. No results for input(s): Deepti Shanks in the last 72 hours. Urinalysis:      Lab Results   Component Value Date    NITRU Negative 06/07/2022    WBCUA 1 06/07/2022    BACTERIA None Seen 06/07/2022    RBCUA 1 06/07/2022    BLOODU TRACE-INTACT 06/07/2022    SPECGRAV >=1.030 06/07/2022    GLUCOSEU >=1000 06/07/2022    GLUCOSEU NEGATIVE 01/02/2011       Radiology:  XR CHEST 1 VIEW   Final Result      FLUORO FOR SURGICAL PROCEDURES   Final Result      MRI BRAIN WO CONTRAST   Final Result   There are few areas of high signal in the periventricular white matter. These are nonspecific. Demyelination could be considered in the appropriate   clinical setting. Chronic small vessel ischemic changes, vasculitis, or   migraines can cause a similar finding. No other brain parenchymal   abnormality. CT CHEST WO CONTRAST   Final Result   1. Progressive consolidation in the anterior segment of the right upper lobe   consistent with pneumonia. There is no evidence of cavitation. The findings   are nonspecific but most consistent with bacterial etiology. 2. The remainder of the lungs are clear. No pleural fluid. 3. Stable small mediastinal nodes.   The right hilar node noted previously is   not evaluated due to the lack of intravenous contrast.         XR CHEST PORTABLE Final Result   Redemonstration of right upper lobe infiltrate which is likely pneumonic. It   is slightly larger compared to the previous evaluation. Assessment/Plan:    Active Hospital Problems    Diagnosis     Abnormal CT of the chest [R93.89]      Priority: Medium    Endobronchial mass [R91.8]      Priority: Medium    Community acquired pneumonia of right upper lobe of lung [J18.9]      Priority: Medium    CHELE (acute kidney injury) ([facility] [address] [N17.9]      Priority: Medium     1. Abnormal imaging on CT scan likely aspergillosis. Fungal antibodies still pending. Pulmonology following. 2.  Streptococcal pneumonia currently on Omnicef. 3. Generalized weakness, improving  4. CHELE on top of CKD stage III improved  5. Diabetes mellitus, sliding scale. 6.  Essential hypertension, p.o. medications          Diet: ADULT DIET; Regular; 4 carb choices (60 gm/meal);  Low Fat/Low Chol/High Fiber/BREE  Code Status: Full Code    Jeanine Steele MD

## 2022-06-13 NOTE — PLAN OF CARE
Problem: Pain  Goal: Verbalizes/displays adequate comfort level or baseline comfort level  6/12/2022 2325 by Monie Huynh RN  Outcome: Progressing  6/12/2022 1734 by Shar Vizcarra RN  Outcome: Progressing     Problem: Metabolic/Fluid and Electrolytes - Adult  Goal: Electrolytes maintained within normal limits  6/12/2022 2325 by Monie Huynh RN  Outcome: Progressing  6/12/2022 1734 by Shar Vizcarra RN  Outcome: Progressing     Problem: Metabolic/Fluid and Electrolytes - Adult  Goal: Glucose maintained within prescribed range  6/12/2022 2325 by Monie Huynh RN  Outcome: Progressing  6/12/2022 1734 by Shar Vizcarra RN  Outcome: Progressing     Problem: Chronic Conditions and Co-morbidities  Goal: Patient's chronic conditions and co-morbidity symptoms are monitored and maintained or improved  6/12/2022 2325 by Monie Huynh RN  Outcome: Progressing  6/12/2022 1734 by Shar Vizcarra RN  Outcome: Progressing

## 2022-06-13 NOTE — TELEPHONE ENCOUNTER
Received message that this patient will need a follow up appointment with you in 1-3 weeks following bronchoscopy. Unfortunately I do not see any openings. Please advise when we can schedule this patient.    Message routed to Dr. Rebeca Lowe

## 2022-06-14 ENCOUNTER — APPOINTMENT (OUTPATIENT)
Dept: GENERAL RADIOLOGY | Age: 37
DRG: 137 | End: 2022-06-14
Payer: MEDICAID

## 2022-06-14 PROBLEM — E11.65 POORLY CONTROLLED TYPE 2 DIABETES MELLITUS (HCC): Status: ACTIVE | Noted: 2022-05-17

## 2022-06-14 LAB
ANION GAP SERPL CALCULATED.3IONS-SCNC: 10 MMOL/L (ref 3–16)
APTT: 32.9 SEC (ref 23–34.3)
ASPERGILLUS ANTIBODY ID: ABNORMAL
BLASTOMYCES ANTIBODY ID: ABNORMAL
BUN BLDV-MCNC: 32 MG/DL (ref 7–20)
CALCIUM SERPL-MCNC: 9.2 MG/DL (ref 8.3–10.6)
CHLORIDE BLD-SCNC: 103 MMOL/L (ref 99–110)
CO2: 23 MMOL/L (ref 21–32)
COCCIDIOIDES ANTIBODY ID: NOT DETECTED
CREAT SERPL-MCNC: 1.3 MG/DL (ref 0.9–1.3)
GFR AFRICAN AMERICAN: >60
GFR NON-AFRICAN AMERICAN: >60
GLUCOSE BLD-MCNC: 100 MG/DL (ref 70–99)
GLUCOSE BLD-MCNC: 133 MG/DL (ref 70–99)
GLUCOSE BLD-MCNC: 136 MG/DL (ref 70–99)
GLUCOSE BLD-MCNC: 178 MG/DL (ref 70–99)
GLUCOSE BLD-MCNC: 208 MG/DL (ref 70–99)
GLUCOSE BLD-MCNC: 222 MG/DL (ref 70–99)
HCT VFR BLD CALC: 42.4 % (ref 40.5–52.5)
HEMOGLOBIN: 13.9 G/DL (ref 13.5–17.5)
HISTOPLASMA ABS, ID: DETECTED
INR BLD: 1.01 (ref 0.87–1.14)
PERFORMED ON: ABNORMAL
POTASSIUM REFLEX MAGNESIUM: 4.3 MMOL/L (ref 3.5–5.1)
PROTHROMBIN TIME: 13.2 SEC (ref 11.7–14.5)
SODIUM BLD-SCNC: 136 MMOL/L (ref 136–145)

## 2022-06-14 PROCEDURE — 99255 IP/OBS CONSLTJ NEW/EST HI 80: CPT | Performed by: INTERNAL MEDICINE

## 2022-06-14 PROCEDURE — 85610 PROTHROMBIN TIME: CPT

## 2022-06-14 PROCEDURE — 7100000011 HC PHASE II RECOVERY - ADDTL 15 MIN: Performed by: INTERNAL MEDICINE

## 2022-06-14 PROCEDURE — 99152 MOD SED SAME PHYS/QHP 5/>YRS: CPT | Performed by: INTERNAL MEDICINE

## 2022-06-14 PROCEDURE — 86698 HISTOPLASMA ANTIBODY: CPT

## 2022-06-14 PROCEDURE — 85014 HEMATOCRIT: CPT

## 2022-06-14 PROCEDURE — 6370000000 HC RX 637 (ALT 250 FOR IP): Performed by: INTERNAL MEDICINE

## 2022-06-14 PROCEDURE — 71045 X-RAY EXAM CHEST 1 VIEW: CPT

## 2022-06-14 PROCEDURE — 85730 THROMBOPLASTIN TIME PARTIAL: CPT

## 2022-06-14 PROCEDURE — 36415 COLL VENOUS BLD VENIPUNCTURE: CPT

## 2022-06-14 PROCEDURE — 7100000010 HC PHASE II RECOVERY - FIRST 15 MIN: Performed by: INTERNAL MEDICINE

## 2022-06-14 PROCEDURE — 80048 BASIC METABOLIC PNL TOTAL CA: CPT

## 2022-06-14 PROCEDURE — 87385 HISTOPLASMA CAPSUL AG IA: CPT

## 2022-06-14 PROCEDURE — 3609027000 HC BRONCHOSCOPY: Performed by: INTERNAL MEDICINE

## 2022-06-14 PROCEDURE — 85018 HEMOGLOBIN: CPT

## 2022-06-14 PROCEDURE — 31622 DX BRONCHOSCOPE/WASH: CPT | Performed by: INTERNAL MEDICINE

## 2022-06-14 PROCEDURE — 2709999900 HC NON-CHARGEABLE SUPPLY: Performed by: INTERNAL MEDICINE

## 2022-06-14 PROCEDURE — 94760 N-INVAS EAR/PLS OXIMETRY 1: CPT

## 2022-06-14 PROCEDURE — 2580000003 HC RX 258: Performed by: INTERNAL MEDICINE

## 2022-06-14 PROCEDURE — 1200000000 HC SEMI PRIVATE

## 2022-06-14 PROCEDURE — 99233 SBSQ HOSP IP/OBS HIGH 50: CPT | Performed by: INTERNAL MEDICINE

## 2022-06-14 PROCEDURE — 6360000002 HC RX W HCPCS: Performed by: INTERNAL MEDICINE

## 2022-06-14 PROCEDURE — 2500000003 HC RX 250 WO HCPCS: Performed by: INTERNAL MEDICINE

## 2022-06-14 RX ORDER — ITRACONAZOLE 100 MG/1
200 CAPSULE ORAL 3 TIMES DAILY
Status: COMPLETED | OUTPATIENT
Start: 2022-06-14 | End: 2022-06-17

## 2022-06-14 RX ORDER — MIDAZOLAM HYDROCHLORIDE 1 MG/ML
INJECTION INTRAMUSCULAR; INTRAVENOUS PRN
Status: DISCONTINUED | OUTPATIENT
Start: 2022-06-14 | End: 2022-06-14 | Stop reason: ALTCHOICE

## 2022-06-14 RX ORDER — FENTANYL CITRATE 50 UG/ML
INJECTION, SOLUTION INTRAMUSCULAR; INTRAVENOUS PRN
Status: DISCONTINUED | OUTPATIENT
Start: 2022-06-14 | End: 2022-06-14 | Stop reason: ALTCHOICE

## 2022-06-14 RX ORDER — EPINEPHRINE 1 MG/ML(1)
AMPUL (ML) INJECTION PRN
Status: DISCONTINUED | OUTPATIENT
Start: 2022-06-14 | End: 2022-06-14 | Stop reason: ALTCHOICE

## 2022-06-14 RX ORDER — ITRACONAZOLE 100 MG/1
200 CAPSULE ORAL SEE ADMIN INSTRUCTIONS
Status: DISCONTINUED | OUTPATIENT
Start: 2022-06-14 | End: 2022-06-14 | Stop reason: DRUGHIGH

## 2022-06-14 RX ORDER — ITRACONAZOLE 100 MG/1
200 CAPSULE ORAL 2 TIMES DAILY
Status: DISCONTINUED | OUTPATIENT
Start: 2022-06-17 | End: 2022-06-23 | Stop reason: HOSPADM

## 2022-06-14 RX ORDER — LIDOCAINE HYDROCHLORIDE 20 MG/ML
INJECTION, SOLUTION EPIDURAL; INFILTRATION; INTRACAUDAL; PERINEURAL PRN
Status: DISCONTINUED | OUTPATIENT
Start: 2022-06-14 | End: 2022-06-14 | Stop reason: ALTCHOICE

## 2022-06-14 RX ORDER — CODEINE PHOSPHATE AND GUAIFENESIN 10; 100 MG/5ML; MG/5ML
5 SOLUTION ORAL EVERY 4 HOURS
Status: DISCONTINUED | OUTPATIENT
Start: 2022-06-14 | End: 2022-06-23 | Stop reason: HOSPADM

## 2022-06-14 RX ADMIN — ACETAMINOPHEN 650 MG: 325 TABLET ORAL at 16:51

## 2022-06-14 RX ADMIN — INSULIN LISPRO 8 UNITS: 100 INJECTION, SOLUTION INTRAVENOUS; SUBCUTANEOUS at 13:56

## 2022-06-14 RX ADMIN — GABAPENTIN 300 MG: 300 CAPSULE ORAL at 13:52

## 2022-06-14 RX ADMIN — INSULIN GLARGINE 40 UNITS: 100 INJECTION, SOLUTION SUBCUTANEOUS at 13:56

## 2022-06-14 RX ADMIN — ROSUVASTATIN CALCIUM 40 MG: 40 TABLET, FILM COATED ORAL at 20:03

## 2022-06-14 RX ADMIN — SODIUM CHLORIDE, PRESERVATIVE FREE 10 ML: 5 INJECTION INTRAVENOUS at 20:05

## 2022-06-14 RX ADMIN — CARVEDILOL 25 MG: 25 TABLET, FILM COATED ORAL at 16:52

## 2022-06-14 RX ADMIN — DICYCLOMINE HYDROCHLORIDE 10 MG: 10 CAPSULE ORAL at 16:52

## 2022-06-14 RX ADMIN — GUAIFENESIN AND CODEINE PHOSPHATE 5 ML: 10; 100 LIQUID ORAL at 16:51

## 2022-06-14 RX ADMIN — NIFEDIPINE 60 MG: 60 TABLET, EXTENDED RELEASE ORAL at 13:52

## 2022-06-14 RX ADMIN — DULOXETINE HYDROCHLORIDE 30 MG: 30 CAPSULE, DELAYED RELEASE ORAL at 18:22

## 2022-06-14 RX ADMIN — CEFDINIR 300 MG: 300 CAPSULE ORAL at 20:03

## 2022-06-14 RX ADMIN — GUAIFENESIN AND CODEINE PHOSPHATE 5 ML: 10; 100 LIQUID ORAL at 20:04

## 2022-06-14 RX ADMIN — INSULIN LISPRO 2 UNITS: 100 INJECTION, SOLUTION INTRAVENOUS; SUBCUTANEOUS at 13:56

## 2022-06-14 RX ADMIN — GUAIFENESIN AND CODEINE PHOSPHATE 5 ML: 10; 100 LIQUID ORAL at 13:53

## 2022-06-14 RX ADMIN — ITRACONAZOLE 200 MG: 100 CAPSULE, COATED PELLETS ORAL at 18:20

## 2022-06-14 RX ADMIN — DICYCLOMINE HYDROCHLORIDE 10 MG: 10 CAPSULE ORAL at 06:15

## 2022-06-14 RX ADMIN — ACETAMINOPHEN 650 MG: 325 TABLET ORAL at 22:17

## 2022-06-14 RX ADMIN — INSULIN LISPRO 8 UNITS: 100 INJECTION, SOLUTION INTRAVENOUS; SUBCUTANEOUS at 17:29

## 2022-06-14 RX ADMIN — DICYCLOMINE HYDROCHLORIDE 10 MG: 10 CAPSULE ORAL at 20:03

## 2022-06-14 RX ADMIN — GABAPENTIN 300 MG: 300 CAPSULE ORAL at 20:03

## 2022-06-14 RX ADMIN — ITRACONAZOLE 200 MG: 100 CAPSULE, COATED PELLETS ORAL at 22:18

## 2022-06-14 ASSESSMENT — PAIN - FUNCTIONAL ASSESSMENT
PAIN_FUNCTIONAL_ASSESSMENT: 0-10
PAIN_FUNCTIONAL_ASSESSMENT: ACTIVITIES ARE NOT PREVENTED

## 2022-06-14 ASSESSMENT — PAIN DESCRIPTION - PAIN TYPE
TYPE: ACUTE PAIN
TYPE: ACUTE PAIN;SURGICAL PAIN

## 2022-06-14 ASSESSMENT — PAIN DESCRIPTION - ONSET
ONSET: ON-GOING
ONSET: ON-GOING

## 2022-06-14 ASSESSMENT — PAIN SCALES - GENERAL
PAINLEVEL_OUTOF10: 5
PAINLEVEL_OUTOF10: 2
PAINLEVEL_OUTOF10: 7
PAINLEVEL_OUTOF10: 5
PAINLEVEL_OUTOF10: 4
PAINLEVEL_OUTOF10: 5

## 2022-06-14 ASSESSMENT — PAIN DESCRIPTION - ORIENTATION: ORIENTATION: MID

## 2022-06-14 ASSESSMENT — PAIN DESCRIPTION - DESCRIPTORS
DESCRIPTORS: DISCOMFORT;SORE
DESCRIPTORS: ACHING
DESCRIPTORS: DISCOMFORT
DESCRIPTORS: DISCOMFORT;SORE

## 2022-06-14 ASSESSMENT — PAIN SCALES - WONG BAKER: WONGBAKER_NUMERICALRESPONSE: 2

## 2022-06-14 ASSESSMENT — PAIN DESCRIPTION - LOCATION
LOCATION: THROAT
LOCATION: HEAD
LOCATION: HEAD
LOCATION: THROAT
LOCATION: HEAD

## 2022-06-14 ASSESSMENT — PAIN DESCRIPTION - FREQUENCY
FREQUENCY: CONTINUOUS
FREQUENCY: INTERMITTENT

## 2022-06-14 NOTE — PROGRESS NOTES
Bronchoscopy showed bleeding from previous biopsy site. Will hold all anticoagulants and schedule cough medicine to allow time to heal.  Updated mother.   Will consult ID due to + histo Ab    Kaitlin Chavez DO  [facility]  [phone]

## 2022-06-14 NOTE — PROGRESS NOTES
Hospitalist Progress Note      PCP: Ken Mcneal MD, MD    Date of Admission: 6/6/2022      Subjective: improved hemoptysis, no chest pain, still having some cough, no fever, chills, abdominal pain or diarrhea. Medications:  Reviewed    Infusion Medications    sodium chloride Stopped (06/11/22 1904)    dextrose       Scheduled Medications    guaiFENesin-codeine  5 mL Oral Q4H    insulin glargine  40 Units SubCUTAneous Daily    cefdinir  300 mg Oral 2 times per day    insulin lispro  8 Units SubCUTAneous TID WC    anidulafungin (ERAXIS) 100 mg IVPB  100 mg IntraVENous Daily    sodium chloride flush  10 mL IntraVENous 2 times per day    [Held by provider] aspirin  81 mg Oral Daily    carvedilol  25 mg Oral BID WC    dicyclomine  10 mg Oral 4x Daily AC & HS    DULoxetine  30 mg Oral Daily    gabapentin  300 mg Oral TID    NIFEdipine  60 mg Oral Daily    rosuvastatin  40 mg Oral Nightly    insulin lispro  0-12 Units SubCUTAneous TID WC    insulin lispro  0-6 Units SubCUTAneous Nightly    [Held by provider] enoxaparin  30 mg SubCUTAneous BID     PRN Meds: sodium chloride flush, sodium chloride, promethazine **OR** ondansetron, magnesium hydroxide, acetaminophen **OR** acetaminophen, albuterol sulfate HFA, glucose, dextrose bolus **OR** dextrose bolus, glucagon (rDNA), dextrose      Intake/Output Summary (Last 24 hours) at 6/14/2022 1302  Last data filed at 6/14/2022 0957  Gross per 24 hour   Intake 390 ml   Output --   Net 390 ml       Physical Exam Performed:    BP (!) 135/92   Pulse 93   Temp 98.3 °F (36.8 °C) (Oral)   Resp 16   Ht 6' 1\" (1.854 m)   Wt 261 lb 12.8 oz (118.8 kg)   SpO2 98%   PF (!) 18 L/min   BMI 34.54 kg/m²     General appearance: No apparent distress  Neck: Supple  Respiratory:  Normal respiratory effort. Clear to auscultation, bilaterally without Rales/Wheezes/Rhonchi.   Cardiovascular: Regular rate and rhythm with normal S1/S2 without murmurs, rubs or gallops. Abdomen: Soft, non-tender, non-distended with normal bowel sounds. Musculoskeletal: No clubbing, cyanosis  Skin: Skin color, texture, turgor normal.  No rashes or lesions. Neurologic:  No focal weakness   Psychiatric: Alert and oriented  Capillary Refill: Brisk,3 seconds, normal   Peripheral Pulses: +2 palpable, equal bilaterally       Labs:   Recent Labs     06/14/22  0753   HGB 13.9   HCT 42.4     Recent Labs     06/12/22  0720 06/13/22  0827 06/14/22  0753   * 132* 136   K 4.3 4.4 4.3   CL 99 98* 103   CO2 20* 22 23   BUN 26* 30* 32*   CREATININE 1.3 1.4* 1.3   CALCIUM 9.7 9.2 9.2     Recent Labs     06/13/22  0827   AST 10*   ALT 13   BILIDIR <0.2   BILITOT <0.2   ALKPHOS 106     Recent Labs     06/14/22  0753   INR 1.01     No results for input(s): CKTOTAL, TROPONINI in the last 72 hours. Urinalysis:      Lab Results   Component Value Date    NITRU Negative 06/07/2022    WBCUA 1 06/07/2022    BACTERIA None Seen 06/07/2022    RBCUA 1 06/07/2022    BLOODU TRACE-INTACT 06/07/2022    SPECGRAV >=1.030 06/07/2022    GLUCOSEU >=1000 06/07/2022    GLUCOSEU NEGATIVE 01/02/2011       Radiology:  XR CHEST PORTABLE   Final Result   Stable chest.  Persistent right upper lobe infiltrate consistent with   pneumonia. Continued plain film follow-up recommended. XR CHEST 1 VIEW   Final Result      FLUORO FOR SURGICAL PROCEDURES   Final Result      MRI BRAIN WO CONTRAST   Final Result   There are few areas of high signal in the periventricular white matter. These are nonspecific. Demyelination could be considered in the appropriate   clinical setting. Chronic small vessel ischemic changes, vasculitis, or   migraines can cause a similar finding. No other brain parenchymal   abnormality. CT CHEST WO CONTRAST   Final Result   1. Progressive consolidation in the anterior segment of the right upper lobe   consistent with pneumonia. There is no evidence of cavitation.   The findings   are nonspecific but most consistent with bacterial etiology. 2. The remainder of the lungs are clear. No pleural fluid. 3. Stable small mediastinal nodes. The right hilar node noted previously is   not evaluated due to the lack of intravenous contrast.         XR CHEST PORTABLE   Final Result   Redemonstration of right upper lobe infiltrate which is likely pneumonic. It   is slightly larger compared to the previous evaluation. Assessment/Plan:    Active Hospital Problems    Diagnosis     Hemoptysis [R04.2]      Priority: Medium    Abnormal CT of the chest [R93.89]      Priority: Medium    Endobronchial mass [R91.8]      Priority: Medium    Community acquired pneumonia of right upper lobe of lung [J18.9]      Priority: Medium    CHELE (acute kidney injury) ([facility] [address] [N17.9]      Priority: Medium     1. Abnormal imaging on CT scan along with positive histoplasma antibody, ID consulted status post bronchoscopy as patient had hemoptysis. 2.  Streptococcal pneumonia currently on Omnicef. 3.   Hemoptysis, status post bronchoscopy, likely hemorrhage from previous site of biopsy. Monitor for now pulmonology following. 4. Generalized weakness, improving  5. CHELE on top of CKD stage III improved  6. Diabetes mellitus, sliding scale. 7.  Essential hypertension, p.o. medications      Diet: ADULT DIET; Regular; 4 carb choices (60 gm/meal);  Low Fat/Low Chol/High Fiber/BREE  Code Status: Full Code        Derrick Warner MD

## 2022-06-14 NOTE — PROGRESS NOTES
PM Assessment and Mediations completed. /75   Pulse 96   Temp 98.8 °F (37.1 °C) (Oral)   Resp 18   Ht 6' 1\" (1.854 m)   Wt 261 lb 12.8 oz (118.8 kg)   SpO2 95%   PF (!) 18 L/min   BMI 34.54 kg/m²     Alert and oriented x4. C/o headache. Declines tylenol at this time. Respirations easy unlabored. Calls appropriately. Plan of care and safety measures reviewed with the patient. Needed items including call light in reach.        Electronically signed by Moreno Moy RN on 6/14/2022 at 7:37 PM

## 2022-06-14 NOTE — FLOWSHEET NOTE
Pt cont to have copious amounts of bloody sputum with clots. MD in to see pt. Advised to call pulmonary.

## 2022-06-14 NOTE — PROGRESS NOTES
Patient received from Endo, alert, oriented, VSS. C/o sore throat, will monitor. Call light within reach. Endo RN states she will call the unit with report.

## 2022-06-14 NOTE — PROGRESS NOTES
Pulmonary Progress Note    CC:  Follow up abnormal chest imaging, bronchoscopy    Subjective:  Hemoptysis this am   Said it started at 3:30 am.  Produced a clot and then fresh blood. Breathing is unchanged for the most part. Coughing has slowed down    ROS  Coughing up blood  Unchanged SOB      Intake/Output Summary (Last 24 hours) at 6/14/2022 0706  Last data filed at 6/13/2022 2107  Gross per 24 hour   Intake 480 ml   Output --   Net 480 ml         PHYSICAL EXAM:  Blood pressure 135/81, pulse 89, temperature 98.1 °F (36.7 °C), temperature source Oral, resp. rate 18, height 6' 1\" (1.854 m), weight 261 lb 12.8 oz (118.8 kg), SpO2 95 %, peak flow (!) 18 L/min.'  Gen: No distress. Eyes: PERRL. No sclera icterus. No conjunctival injection. ENT: No discharge. Pharynx clear. External appearance of ears and nose normal.  Neck: Trachea midline. No obvious mass. Resp: Diminished  CV: Regular rate. Regular rhythm. No murmur or rub. GI: Non-tender. Non-distended. No hernia. Skin: Warm, dry, normal texture and turgor. No nodule on exposed extremities. Lymph: No cervical LAD. No supraclavicular LAD. M/S: No cyanosis. No clubbing. No joint deformity. Neuro: Moves all four extremities. CN 2-12 tested, no defect noted.   Ext:   no edema    Medications:    Scheduled Meds:   insulin glargine  40 Units SubCUTAneous Daily    cefdinir  300 mg Oral 2 times per day    insulin lispro  8 Units SubCUTAneous TID WC    anidulafungin (ERAXIS) 100 mg IVPB  100 mg IntraVENous Daily    sodium chloride flush  10 mL IntraVENous 2 times per day    [Held by provider] aspirin  81 mg Oral Daily    carvedilol  25 mg Oral BID WC    dicyclomine  10 mg Oral 4x Daily AC & HS    DULoxetine  30 mg Oral Daily    gabapentin  300 mg Oral TID    NIFEdipine  60 mg Oral Daily    rosuvastatin  40 mg Oral Nightly    insulin lispro  0-12 Units SubCUTAneous TID WC    insulin lispro  0-6 Units SubCUTAneous Nightly    enoxaparin  30 mg SubCUTAneous BID       Continuous Infusions:   sodium chloride Stopped (06/11/22 1904)    dextrose         PRN Meds:  sodium chloride flush, sodium chloride, promethazine **OR** ondansetron, magnesium hydroxide, acetaminophen **OR** acetaminophen, albuterol sulfate HFA, glucose, dextrose bolus **OR** dextrose bolus, glucagon (rDNA), dextrose    Labs:  CBC: No results for input(s): WBC, HGB, HCT, MCV, PLT in the last 72 hours. BMP:   Recent Labs     06/11/22  0739 06/12/22  0720 06/13/22  0827   * 134* 132*   K 4.1 4.3 4.4    99 98*   CO2 19* 20* 22   BUN 18 26* 30*   CREATININE 1.3 1.3 1.4*     LIVER PROFILE:   Recent Labs     06/13/22  0827   AST 10*   ALT 13   BILIDIR <0.2   BILITOT <0.2   ALKPHOS 106     PT/INR: No results for input(s): PROTIME, INR in the last 72 hours. APTT: No results for input(s): APTT in the last 72 hours. UA:No results for input(s): NITRITE, COLORU, PHUR, LABCAST, WBCUA, RBCUA, MUCUS, TRICHOMONAS, YEAST, BACTERIA, CLARITYU, SPECGRAV, LEUKOCYTESUR, UROBILINOGEN, BILIRUBINUR, BLOODU, GLUCOSEU, AMORPHOUS in the last 72 hours. Invalid input(s): [place]  No results for input(s): PH, PCO2, PO2 in the last 72 hours. Films:  Chest imaging reports were reviewed and imaging was reviewed by me and showed no new films     ABG:  None    Cultures:  Negative to date   Beta glucan positive   Strep parasanguinis in tissue culture  Fungal\"  + yeast     I reviewed the labs and images listed above    Assessment/Plan:    Abnormal CT Chest with endobronchial lesion (?aspergillus)  o Pathology negative  o Fungal Antibodies still pending   o Continue Eraxis. Check hepatic function panel as may need to go home on itraconazole and follow up for cultures with Jacey Burton   o Completed 48 hours of solumedrol   o Omnicef for 7 days    Hemoptysis, suspect from previous endobronchial biopsy site (new)  o H&H, CXR  o Bronchoscopy today.   Plan to instill epinephrine and cold saline  o Hold aspirin and lovenox.    o Check coags    Uncontrolled DM    D/W RN    DVT prophylaxis  SCD     Beto Dominguez, DO Beatriz Elena

## 2022-06-14 NOTE — CONSULTS
[facility]). with following problems:    · Streptococcus parasanguinis infection of the lungs  · Positive histoplasma serology by immunodiffusion  · Positive 1, 3 beta D glucan in the blood on 6/20/2022  · Negative HIV screen on 6/9/2022  · S/p bronchoscopy on 6/9/2022 and 6/14/22  · Right upper lobe endobronchial lesion  · History of COVID-19  · Poorly controlled type 2 diabetes mellitus  · Essential hypertension  · Seizure disorder  · Chronic kidney disease stage III  · Obesity Class 1 due to excess calorie intake : Body mass index is 34.54 kg/m². Discussion:      The patient had a histoplasma serology done on 6/9/2022 which was positive by immunodiffusion. While that indicates exposure to histoplasma, it is unclear if patient does have active histoplasma infection. He does have some mediastinal lymphadenopathy and has a right upper lobe endobronchial lesion that was biopsied on 6/9/2022. The biopsy tissue would did not show any granulomatous inflammation and showed acute and chronic inflammation    The patient is immunocompromised from his poorly controlled type 2 diabetes mellitus. Plan:     Diagnostic Workup:    · Repeat histoplasma serology again  · Will order urine and serum histoplasma antigen  · Continue to follow fever curve, WBC count and blood cultures  · Follow up on liverand renal functions closely    Antimicrobials:    · Okay to continue empiric cefdinir 300 mg every 12 hours Streptococcus parasanguinous coverage  · I will discontinue IV Eraxis  · Will order empiric itraconazole 200 mg 3 times daily for first 3 days for loading dose followed by 200 mg twice daily afterwards for maintenance dose  · We will follow up on the culture results and clinical progress and will make further recommendations accordingly. · Continue close vitals monitoring. · Maintain good glycemic control. · Fall precautions. Aspiration precautions. · Continue to watch for new fever or diarrhea.   · DVT prophylaxis. · Discussed all above with patient and RN. Drug Monitoring:    · Continue serial monitoring for antibiotic toxicity as follows: CBC, CMP, QTC interval  · Continue to watch for following: new or worsening fever, hypotension, hives, lip swelling and redness or purulence at vascular access sites. I/v access Management:    · Continue to monitor i.v access sites for erythema, induration, discharge or tenderness. · As always, continue efforts to minimizetubes/lines/drains as clinically appropriate to reduce chances of line associated infections. Current isolation precautions: There are no current isolations documented for this patient. Level of complexity of consult: High     Risk of Complications/Morbidity: High     · Illness(es)/ Infection present that pose threat to life/bodily function. · There is potential for severe exacerbation of infection/side effects of treatment. · Therapy requires intensive monitoring for antimicrobial agent toxicity. Thank you for involving me in the care of your patient. I will continue to follow. If you have any additional questions, please do not hesitate to contact me. Subjective:     Presenting complaint in ER:     Chief Complaint   Patient presents with    Dizziness     x 2 weeks. evaluated here, referred to eye doctor. has not followed up yet. saw pcp today, stated it was due to flu. HPI: Tash Fox is a 29 y.o. male patient, who was seen at the request of Dr. Jeanie Boles MD.    History was obtained from chart review and the patient. The patient was admitted on 6/6/2022. I have been consulted to see the patient for above mentioned reason(s). The patient has multiple medical comorbidities, and presented to the ER originally for concern for flulike symptoms like headache, dizziness, nausea and not feeling well in general.    He was hospitalized.   He was given an empiric course of 6 days of levofloxacin by primary team for concerns for community-acquired pneumonia. His procalcitonin was 0.12. The patient has poorly controlled type 2 diabetes mellitus. Pulmonology was consulted on 6/8/2022. He was  placed on empiric IV Eraxis on 6/10/2022 due to possibility of an undiagnosed fungal infection and was placed on cefdinir on 6/12/2022 by primary team.    He underwent flexible diagnostic bronchoscopy on 6/9/2022, which showed narrowness of the anterior segment of the right upper lobe with an endobronchial lesion in the apical segment. The area was biopsied. He underwent a repeat bronchoscopy on 6/14/2022 for hemoptysis and the previously biopsied endobronchial lesion showed some purulence with retained clot. His biopsy culture from 6/5/2022 in the meanwhile has grown Streptococcus para sanguinous. Surgical pathology showed small detached fragments of acute and chronic inflammation. There were no changes granulomatous inflammation or neoplasm    I have been asked for my opinion for management for this patient. Past Medical History: All past medical history reviewed today. Past Medical History:   Diagnosis Date    COVID-19     Diabetes mellitus, type II ([facility] [address]     History of blood transfusion     Hypertension     Protein in urine     Seizure [facility])          Past Surgical History: All pastsurgical history was reviewed today. Past Surgical History:   Procedure Laterality Date    BRONCHOSCOPY  6/9/2022    BRONCHOSCOPY BRUSHINGS performed by Marie Dejesus MD at [address] [place] [address]  6/9/2022    BRONCHOSCOPY DIAGNOSTIC OR CELL [address] [place] [facility]  6/9/2022    BRONCHOSCOPY BIOPSY BRONCHUS [place] [facility] [place]/A 6/14/2022    BRONCHOSCOPY DIAGNOSTIC OR CELL [address] ONLY performed by Rosa Sheldon DO at [facility] ENDOSCOPY         Family History:  All family history was reviewed today. History reviewed. No pertinent family history. Medications: All current and past medications were reviewed. Medications Prior to Admission: albuterol sulfate  (90 Base) MCG/ACT inhaler, Inhale 2 puffs into the lungs every 6 hours as needed for Wheezing  [] amoxicillin-clavulanate (AUGMENTIN) 875-125 MG per tablet, Take 1 tablet by mouth 2 times daily  Insulin Degludec (TRESIBA FLEXTOUCH) 200 UNIT/ML SOPN, Inject 55 Units into the skin daily  insulin lispro, 1 Unit Dial, (HUMALOG KWIKPEN) 100 UNIT/ML SOPN, Inject 10 Units into the skin 3 times daily (before meals)  spironolactone (ALDACTONE) 25 MG tablet, Take 1 tablet by mouth daily  rosuvastatin (CRESTOR) 40 MG tablet, Take 1 tablet by mouth nightly  empagliflozin (JARDIANCE) 10 MG tablet, Take 10 mg by mouth daily  NIFEdipine (PROCARDIA XL) 60 MG extended release tablet, Take 60 mg by mouth daily as needed  gabapentin (NEURONTIN) 300 MG capsule, Take 300 mg by mouth 3 times daily.  2-3 QD  metFORMIN (GLUCOPHAGE) 500 MG tablet, Take 1,000 mg by mouth 2 times daily   DULoxetine (CYMBALTA) 30 MG extended release capsule, Take 30 mg by mouth daily  dicyclomine (BENTYL) 10 MG capsule, Take 10 mg by mouth 4 times daily (before meals and nightly)   carvedilol (COREG) 25 MG tablet, Take 25 mg by mouth 2 times daily   chlorthalidone (HYGROTON) 25 MG tablet, Take 25 mg by mouth daily  aspirin 81 MG chewable tablet, Take 81 mg by mouth daily   lisinopril (PRINIVIL;ZESTRIL) 10 MG tablet, Take 20 mg by mouth daily      guaiFENesin-codeine  5 mL Oral Q4H    itraconazole  200 mg Oral TID    Followed by   Mary Beth Amaral ON 2022] itraconazole  200 mg Oral BID    insulin glargine  40 Units SubCUTAneous Daily    cefdinir  300 mg Oral 2 times per day    insulin lispro  8 Units SubCUTAneous TID WC    sodium chloride flush  10 mL IntraVENous 2 times per day    [Held by provider] aspirin  81 mg Oral Daily    carvedilol  25 mg Mucous membranes are moist.      Pharynx: No oropharyngeal exudate or posterior oropharyngeal erythema. Eyes:      General: No scleral icterus. Right eye: No discharge. Left eye: No discharge. Conjunctiva/sclera: Conjunctivae normal.      Pupils: Pupils are equal, round, and reactive to light. Cardiovascular:      Rate and Rhythm: Normal rate and regular rhythm. Pulses: Normal pulses. Heart sounds: No murmur heard. No friction rub. Pulmonary:      Effort: Pulmonary effort is normal. No respiratory distress. Breath sounds: Normal breath sounds. No stridor. No wheezing, rhonchi or rales. Abdominal:      General: Bowel sounds are normal.      Palpations: Abdomen is soft. Tenderness: There is no abdominal tenderness. There is no right CVA tenderness, left CVA tenderness, guarding or rebound. Musculoskeletal:         General: No swelling or tenderness. Normal range of motion. Cervical back: Normal range of motion and neck supple. No rigidity. No muscular tenderness. Lymphadenopathy:      Cervical: No cervical adenopathy. Skin:     General: Skin is warm and dry. Coloration: Skin is not jaundiced. Findings: No erythema or rash. Neurological:      General: No focal deficit present. Mental Status: He is alert and oriented to person, place, and time. Mental status is at baseline. Motor: No abnormal muscle tone. Psychiatric:         Mood and Affect: Mood normal.         Behavior: Behavior normal.         Thought Content: Thought content normal.          Lines and drains: All vascular access sites are healthy with no local erythema, discharge or tenderness. Intake and output:     I/O last 3 completed shifts: In: 480 [P.O.:480]  Out: -     Lab Data:   All available labs were reviewed by me today.      CBC:   Recent Labs     06/14/22  0753   HGB 13.9   HCT 42.4        BMP:  Recent Labs     06/12/22  0720 06/13/22  0827 06/14/22  0753   NA Redemonstration of right upper lobe infiltrate which is likely pneumonic. It   is slightly larger compared to the previous evaluation. Outside records:    Labs, Microbiology, Radiology and pertinent results from Care everywhere, if available, were reviewed as a part ofthe consultation. Problem list:       Patient Active Problem List   Diagnosis Code    Poorly controlled type 2 diabetes mellitus (HCC) E11.65    Elevated troponin R77.8    Essential hypertension I10    SOB (shortness of breath) R06.02    CKD (chronic kidney disease) N18.9    Acute renal failure ([address] N17.9    Community acquired pneumonia of right upper lobe of lung J18.9    Abnormal CT of the chest R93.89    Endobronchial mass R91.8    Hemoptysis R04.2    Streptococcal infection A49.1    S/P bronchoscopy Z98.890    Seizure disorder (HCC) G40.909    Class 1 obesity due to excess calories with body mass index (BMI) of 34.0 to 34.9 in adult E66.09, Z68.34         Please note that this chart was generated using Dragon dictation software. Although every effort was made to ensure the accuracy of this automated transcription, some errors in transcription may have occurred inadvertently. If you may need any clarification, please do not hesitate to contact me through EPIC or at the phone number provided below with my electronic signature. Any pictures or media included in this note were obtained after taking informed verbal consent from the patient and with their approval to include those in the patient's medical record.         Blair Boone MD, MPH, FACP, [facility]  6/14/2022, 3:58 PM  [facility]   [address] [place] [address], [address] [place] [address]  Office: [phone]  Fax: [phone]  Clinic days:  Tuesday & Thursday

## 2022-06-14 NOTE — FLOWSHEET NOTE
Pt having new onset bloody sputum with clots mixed in. Pt in bathroom sitting over toilet and spitting bloody sputum.   Will notify MD.

## 2022-06-14 NOTE — PROGRESS NOTES
Nephrology ([facility]) Progress Note    CC: CHELE on CKD    Subjective:    HPI:  Breathing unchanged. No CP + cough . S/P bronch findings noted   ROS:  In bed. No fever. [address]:  medications reviewed. Objective:  Blood pressure (!) 135/92, pulse 93, temperature 98.3 °F (36.8 °C), temperature source Oral, resp. rate 16, height 6' 1\" (1.854 m), weight 261 lb 12.8 oz (118.8 kg), SpO2 98 %, peak flow (!) 18 L/min. Intake/Output Summary (Last 24 hours) at 6/14/2022 1239  Last data filed at 6/14/2022 0957  Gross per 24 hour   Intake 390 ml   Output --   Net 390 ml     General:  NAD, A+OX3  Chest:  CTAB  CVS:  RRR  Abdominal:  NTND, soft, +BS  Extremities:  no edema  Skin:  no rash    Labs:  Renal panel:  Lab Results   Component Value Date/Time     06/14/2022 07:53 AM    K 4.3 06/14/2022 07:53 AM    CO2 23 06/14/2022 07:53 AM    BUN 32 (H) 06/14/2022 07:53 AM    CREATININE 1.3 06/14/2022 07:53 AM    CALCIUM 9.2 06/14/2022 07:53 AM    PHOS 3.8 06/24/2021 03:00 PM    MG 2.30 05/19/2022 08:08 AM     CBC:  Lab Results   Component Value Date/Time    WBC 6.1 06/07/2022 07:26 AM    HGB 13.9 06/14/2022 07:53 AM    HCT 42.4 06/14/2022 07:53 AM     06/07/2022 07:26 AM       Assessment/Plan:  Reviewed old records and labs.     1) CHELE on CKD   - baseline 05/17/22 Cr 1.2, sees Dr. Abimbola Becker              - ddx:  pre-renal vs. ATN vs. contrast nephropathy   - renal function at baseline Cr 1.3 mg monitor     2) RUL infiltrate              - failed outpatient Z-gloria              - on empiric omnicef, levaquin, and eraxis   - repeat chest CT shows further consolidation of infiltrate              - HIV negative              - less likely TB   - s/p bronchoscopy with RUL biopsy with some bleeding 06/09/22                S/p bronchoscopy today Had post procedure bleeding     -       3) FEN              - hyponatremia                          - Na+ improved to 136 meq                          - monitor

## 2022-06-14 NOTE — PROCEDURES
DIAGNOSTIC BRONCHOSCOPY    Indications:  Hemoptysis, endobronchial lesion  Consent:  Signed on chart  Pre-op diagnosis:  Post-procedure bleeding    A Time Out was performed to identify the correct patient and the correct procedure. Type of sedation used: Moderate Sedation  Sedation:  Fentanyl 50 mcg, Versed 2 mg  Anesthetic:  Lidocaine 9 mL    Physician/patient face-to-face sedation start time: 0841   Physician/patient face-to-face sedation stop time: 0856  Total moderate sedation time in minutes: 15 minutes    Patient was monitored continuously 1:1 throughout the entire procedure while sedation was administered    Mallampati Class:  IV    ASA Class 2 - A patient with mild systemic disease    Narrative: The patient was anesthestized with 4 % viscous lidocaine. I entered the right nare. I instilled 2 ml of 2% lidocaine on the the vocal cords, and then intubated the patient. The vocal cords were normal but there was some fresh blood noted posteriorly. I then anesthestized the entire bronchial tree with 9 mL of 2% lidocaine. The right upper lobe demonstrated abnormal anatomy. The previously biopsied RUL apical segment showed some purulence with retained clot. There was some fresh bleeding noted but organized clot was present. The middle lobe demonstrated normal anatomy. The right lower lobe demonstrated normal anatomy. The left upper lobe demonstrated normal anatomy. The left lower lobe demonstrated normal anatomy. I directed my attention to the apical segment. I first washed it several times with iced saline to see if there was any active bleeding. There was some. After the washings I applied topical epinephrine. This may him cough much more vigorously and then he started bleeding more than at onset. Several more aliquots of iced saline were applied and then I instilled another 4 ml (total of 5 ml) of epinephrine.   He still had mild oozing at site but his forceful coughing seemed to make matters worse. Estimated blood loss:  5 ml  Specimens:  none  Post-op diagnosis:  Post-procedural bleeding likely due to anticoagulation  Complications: none    DO VIKKI Son [facility] Pulmonology, Sleep and Critical Care. I have presented reasonable alternatives to the patient's proposed care, treatment, and services.  The discussion I have done encompassed risks, benefits, and side effects related to the alternatives and the risks related to not receiving the proposed care, treatment, and services

## 2022-06-14 NOTE — PLAN OF CARE
Problem: Discharge Planning  Goal: Discharge to home or other facility with appropriate resources  6/14/2022 1132 by Kamryn Huynh RN  Outcome: Progressing  6/14/2022 0109 by Niya Cherry RN  Outcome: Progressing  Flowsheets (Taken 6/13/2022 2115)  Discharge to home or other facility with appropriate resources: Identify barriers to discharge with patient and caregiver     Problem: Pain  Goal: Verbalizes/displays adequate comfort level or baseline comfort level  6/14/2022 1132 by Kamryn Huynh RN  Outcome: Progressing  6/14/2022 0109 by Niya Cherry RN  Outcome: Progressing     Problem: Metabolic/Fluid and Electrolytes - Adult  Goal: Electrolytes maintained within normal limits  6/14/2022 1132 by Kamryn Huynh RN  Outcome: Progressing  6/14/2022 0109 by Niya Cherry RN  Outcome: Progressing  Flowsheets (Taken 6/13/2022 2115)  Electrolytes maintained within normal limits: Monitor labs and assess patient for signs and symptoms of electrolyte imbalances  Goal: Hemodynamic stability and optimal renal function maintained  6/14/2022 1132 by Kamryn Huynh RN  Outcome: Progressing  6/14/2022 0109 by Niya Cherry RN  Outcome: Progressing  Flowsheets (Taken 6/13/2022 2115)  Hemodynamic stability and optimal renal function maintained: Monitor labs and assess for signs and symptoms of volume excess or deficit  Goal: Glucose maintained within prescribed range  6/14/2022 1132 by Kamryn Huynh RN  Outcome: Progressing  6/14/2022 0109 by Niya Cherry RN  Outcome: Progressing

## 2022-06-14 NOTE — PLAN OF CARE
Problem: Discharge Planning  Goal: Discharge to home or other facility with appropriate resources  Outcome: Progressing   Continuing to work with patient and health care team on discharge plan. Discharge instructions and medication management will be reviewed prior to discharge. Problem: Pain  Goal: Verbalizes/displays adequate comfort level or baseline comfort level  Outcome: Progressing   Pt able to express presence/absence of pain and rate pain appropriately using numerical scale. Pain/discomfort being managed with PRN analgesics per MD orders (see MAR). Pain assessed every shift and after interventions. Problem: Metabolic/Fluid and Electrolytes - Adult  Goal: Electrolytes maintained within normal limits  Outcome: Progressing  Goal: Hemodynamic stability and optimal renal function maintained  Outcome: Progressing  Goal: Glucose maintained within prescribed range  Outcome: Progressing     Problem: Gastrointestinal - Adult  Goal: Minimal or absence of nausea and vomiting  Outcome: Progressing  Goal: Maintains or returns to baseline bowel function  Outcome: Progressing  Goal: Maintains adequate nutritional intake  Outcome: Progressing     Problem: Safety - Adult  Goal: Free from fall injury  Outcome: Progressing   Pt free from falls this shift. Fall precautions in place at all times. Call light always within reach. Pt able and agreeable to contact for safety appropriately.     Problem: ABCDS Injury Assessment  Goal: Absence of physical injury  Outcome: Progressing     Problem: Chronic Conditions and Co-morbidities  Goal: Patient's chronic conditions and co-morbidity symptoms are monitored and maintained or improved  Outcome: Progressing

## 2022-06-14 NOTE — PROGRESS NOTES
Sedation Medications to be administered by this nurse per MD verbal order for Bronchoscopy.     Electronically signed by Sharlene Tompkins RN on 6/14/2022 at 8:25 AM

## 2022-06-15 LAB
ANION GAP SERPL CALCULATED.3IONS-SCNC: 13 MMOL/L (ref 3–16)
BUN BLDV-MCNC: 30 MG/DL (ref 7–20)
CALCIUM SERPL-MCNC: 9 MG/DL (ref 8.3–10.6)
CHLORIDE BLD-SCNC: 101 MMOL/L (ref 99–110)
CO2: 22 MMOL/L (ref 21–32)
CREAT SERPL-MCNC: 1.3 MG/DL (ref 0.9–1.3)
GFR AFRICAN AMERICAN: >60
GFR NON-AFRICAN AMERICAN: >60
GLUCOSE BLD-MCNC: 102 MG/DL (ref 70–99)
GLUCOSE BLD-MCNC: 126 MG/DL (ref 70–99)
GLUCOSE BLD-MCNC: 165 MG/DL (ref 70–99)
GLUCOSE BLD-MCNC: 168 MG/DL (ref 70–99)
GLUCOSE BLD-MCNC: 78 MG/DL (ref 70–99)
GLUCOSE BLD-MCNC: 91 MG/DL (ref 70–99)
GLUCOSE BLD-MCNC: 97 MG/DL (ref 70–99)
PERFORMED ON: ABNORMAL
PERFORMED ON: NORMAL
POTASSIUM REFLEX MAGNESIUM: 3.9 MMOL/L (ref 3.5–5.1)
PROCALCITONIN: 0.1 NG/ML (ref 0–0.15)
SODIUM BLD-SCNC: 136 MMOL/L (ref 136–145)

## 2022-06-15 PROCEDURE — 6360000002 HC RX W HCPCS: Performed by: INTERNAL MEDICINE

## 2022-06-15 PROCEDURE — 94760 N-INVAS EAR/PLS OXIMETRY 1: CPT

## 2022-06-15 PROCEDURE — 36415 COLL VENOUS BLD VENIPUNCTURE: CPT

## 2022-06-15 PROCEDURE — 99233 SBSQ HOSP IP/OBS HIGH 50: CPT | Performed by: INTERNAL MEDICINE

## 2022-06-15 PROCEDURE — 6370000000 HC RX 637 (ALT 250 FOR IP): Performed by: INTERNAL MEDICINE

## 2022-06-15 PROCEDURE — 84145 PROCALCITONIN (PCT): CPT

## 2022-06-15 PROCEDURE — 2580000003 HC RX 258: Performed by: INTERNAL MEDICINE

## 2022-06-15 PROCEDURE — 1200000000 HC SEMI PRIVATE

## 2022-06-15 PROCEDURE — 80048 BASIC METABOLIC PNL TOTAL CA: CPT

## 2022-06-15 RX ORDER — FAMOTIDINE 10 MG/ML
INJECTION, SOLUTION INTRAVENOUS
Status: DISPENSED
Start: 2022-06-15 | End: 2022-06-16

## 2022-06-15 RX ORDER — AMOXICILLIN AND CLAVULANATE POTASSIUM 875; 125 MG/1; MG/1
1 TABLET, FILM COATED ORAL EVERY 12 HOURS SCHEDULED
Status: DISCONTINUED | OUTPATIENT
Start: 2022-06-15 | End: 2022-06-18

## 2022-06-15 RX ADMIN — ROSUVASTATIN CALCIUM 40 MG: 40 TABLET, FILM COATED ORAL at 20:40

## 2022-06-15 RX ADMIN — INSULIN LISPRO 8 UNITS: 100 INJECTION, SOLUTION INTRAVENOUS; SUBCUTANEOUS at 09:43

## 2022-06-15 RX ADMIN — DICYCLOMINE HYDROCHLORIDE 10 MG: 10 CAPSULE ORAL at 09:12

## 2022-06-15 RX ADMIN — AMOXICILLIN AND CLAVULANATE POTASSIUM 1 TABLET: 875; 125 TABLET, FILM COATED ORAL at 20:40

## 2022-06-15 RX ADMIN — DULOXETINE HYDROCHLORIDE 30 MG: 30 CAPSULE, DELAYED RELEASE ORAL at 09:01

## 2022-06-15 RX ADMIN — SODIUM CHLORIDE, PRESERVATIVE FREE 10 ML: 5 INJECTION INTRAVENOUS at 09:02

## 2022-06-15 RX ADMIN — GABAPENTIN 300 MG: 300 CAPSULE ORAL at 20:40

## 2022-06-15 RX ADMIN — GUAIFENESIN AND CODEINE PHOSPHATE 5 ML: 10; 100 LIQUID ORAL at 09:01

## 2022-06-15 RX ADMIN — ITRACONAZOLE 200 MG: 100 CAPSULE, COATED PELLETS ORAL at 20:53

## 2022-06-15 RX ADMIN — ACETAMINOPHEN 650 MG: 325 TABLET ORAL at 16:37

## 2022-06-15 RX ADMIN — GUAIFENESIN AND CODEINE PHOSPHATE 5 ML: 10; 100 LIQUID ORAL at 20:40

## 2022-06-15 RX ADMIN — INSULIN LISPRO 8 UNITS: 100 INJECTION, SOLUTION INTRAVENOUS; SUBCUTANEOUS at 12:58

## 2022-06-15 RX ADMIN — INSULIN GLARGINE 40 UNITS: 100 INJECTION, SOLUTION SUBCUTANEOUS at 09:00

## 2022-06-15 RX ADMIN — CARVEDILOL 25 MG: 25 TABLET, FILM COATED ORAL at 09:01

## 2022-06-15 RX ADMIN — ITRACONAZOLE 200 MG: 100 CAPSULE, COATED PELLETS ORAL at 11:32

## 2022-06-15 RX ADMIN — DICYCLOMINE HYDROCHLORIDE 10 MG: 10 CAPSULE ORAL at 20:40

## 2022-06-15 RX ADMIN — ITRACONAZOLE 200 MG: 100 CAPSULE, COATED PELLETS ORAL at 14:00

## 2022-06-15 RX ADMIN — ACETAMINOPHEN 650 MG: 325 TABLET ORAL at 06:20

## 2022-06-15 RX ADMIN — GABAPENTIN 300 MG: 300 CAPSULE ORAL at 09:01

## 2022-06-15 RX ADMIN — NIFEDIPINE 60 MG: 60 TABLET, EXTENDED RELEASE ORAL at 09:01

## 2022-06-15 RX ADMIN — GUAIFENESIN AND CODEINE PHOSPHATE 5 ML: 10; 100 LIQUID ORAL at 16:36

## 2022-06-15 RX ADMIN — GUAIFENESIN AND CODEINE PHOSPHATE 5 ML: 10; 100 LIQUID ORAL at 02:20

## 2022-06-15 RX ADMIN — GUAIFENESIN AND CODEINE PHOSPHATE 5 ML: 10; 100 LIQUID ORAL at 06:20

## 2022-06-15 RX ADMIN — DICYCLOMINE HYDROCHLORIDE 10 MG: 10 CAPSULE ORAL at 06:20

## 2022-06-15 RX ADMIN — GABAPENTIN 300 MG: 300 CAPSULE ORAL at 14:00

## 2022-06-15 RX ADMIN — SODIUM CHLORIDE, PRESERVATIVE FREE 10 ML: 5 INJECTION INTRAVENOUS at 20:59

## 2022-06-15 RX ADMIN — CARVEDILOL 25 MG: 25 TABLET, FILM COATED ORAL at 16:36

## 2022-06-15 RX ADMIN — INSULIN LISPRO 2 UNITS: 100 INJECTION, SOLUTION INTRAVENOUS; SUBCUTANEOUS at 09:43

## 2022-06-15 RX ADMIN — DICYCLOMINE HYDROCHLORIDE 10 MG: 10 CAPSULE ORAL at 16:36

## 2022-06-15 RX ADMIN — ONDANSETRON 4 MG: 2 INJECTION INTRAMUSCULAR; INTRAVENOUS at 18:25

## 2022-06-15 RX ADMIN — CEFDINIR 300 MG: 300 CAPSULE ORAL at 09:01

## 2022-06-15 RX ADMIN — GUAIFENESIN AND CODEINE PHOSPHATE 5 ML: 10; 100 LIQUID ORAL at 14:00

## 2022-06-15 RX ADMIN — INSULIN LISPRO 8 UNITS: 100 INJECTION, SOLUTION INTRAVENOUS; SUBCUTANEOUS at 17:59

## 2022-06-15 ASSESSMENT — ENCOUNTER SYMPTOMS
WHEEZING: 0
TROUBLE SWALLOWING: 0
BACK PAIN: 0
EYE REDNESS: 0
EYE DISCHARGE: 0
COUGH: 1
DIARRHEA: 0
RHINORRHEA: 0
CONSTIPATION: 0
ABDOMINAL PAIN: 0
COUGH: 0
SORE THROAT: 0
SHORTNESS OF BREATH: 0
NAUSEA: 0

## 2022-06-15 ASSESSMENT — PAIN DESCRIPTION - LOCATION
LOCATION: HEAD
LOCATION: GENERALIZED

## 2022-06-15 ASSESSMENT — PAIN DESCRIPTION - DESCRIPTORS
DESCRIPTORS: ACHING
DESCRIPTORS: ACHING

## 2022-06-15 ASSESSMENT — PAIN SCALES - GENERAL
PAINLEVEL_OUTOF10: 0
PAINLEVEL_OUTOF10: 0
PAINLEVEL_OUTOF10: 6
PAINLEVEL_OUTOF10: 7

## 2022-06-15 ASSESSMENT — PAIN - FUNCTIONAL ASSESSMENT: PAIN_FUNCTIONAL_ASSESSMENT: ACTIVITIES ARE NOT PREVENTED

## 2022-06-15 ASSESSMENT — PAIN DESCRIPTION - ORIENTATION: ORIENTATION: RIGHT;LEFT

## 2022-06-15 NOTE — PROGRESS NOTES
Patient has c/o nausea and his blood sugar is now 78. Patient did eat all of his dinner tray. Patient is a/ox4 with his mother at his bedside. Patient given PRN zofran.

## 2022-06-15 NOTE — PROGRESS NOTES
Hospitalist Progress Note      PCP: Brent Drew MD, MD    Date of Admission: 6/6/2022        Subjective: Still having cough, nonproductive, denies fever no abdominal pain or diarrhea. Medications:  Reviewed    Infusion Medications    sodium chloride Stopped (06/11/22 1904)    dextrose       Scheduled Medications    guaiFENesin-codeine  5 mL Oral Q4H    itraconazole  200 mg Oral TID    Followed by   Gavino Davenport ON 6/17/2022] itraconazole  200 mg Oral BID    insulin glargine  40 Units SubCUTAneous Daily    cefdinir  300 mg Oral 2 times per day    insulin lispro  8 Units SubCUTAneous TID WC    sodium chloride flush  10 mL IntraVENous 2 times per day    [Held by provider] aspirin  81 mg Oral Daily    carvedilol  25 mg Oral BID WC    dicyclomine  10 mg Oral 4x Daily AC & HS    DULoxetine  30 mg Oral Daily    gabapentin  300 mg Oral TID    NIFEdipine  60 mg Oral Daily    rosuvastatin  40 mg Oral Nightly    insulin lispro  0-12 Units SubCUTAneous TID WC    insulin lispro  0-6 Units SubCUTAneous Nightly    [Held by provider] enoxaparin  30 mg SubCUTAneous BID     PRN Meds: sodium chloride flush, sodium chloride, promethazine **OR** ondansetron, magnesium hydroxide, acetaminophen **OR** acetaminophen, albuterol sulfate HFA, glucose, dextrose bolus **OR** dextrose bolus, glucagon (rDNA), dextrose      Intake/Output Summary (Last 24 hours) at 6/15/2022 1208  Last data filed at 6/14/2022 2040  Gross per 24 hour   Intake 240 ml   Output --   Net 240 ml       Physical Exam Performed:    BP (!) 147/98   Pulse (!) 108   Temp 98.9 °F (37.2 °C) (Oral)   Resp 16   Ht 6' 1\" (1.854 m)   Wt 263 lb 7.2 oz (119.5 kg)   SpO2 95%   PF (!) 18 L/min   BMI 34.76 kg/m²     General appearance: No apparent distress  Neck: Supple  Respiratory:  Normal respiratory effort. Clear to auscultation, bilaterally without Rales/Wheezes/Rhonchi.   Cardiovascular: Regular rate and rhythm with normal S1/S2 without murmurs, rubs or gallops. Abdomen: Soft, non-tender, non-distended  Musculoskeletal: No clubbing, cyanosis   Skin: Skin color, texture, turgor normal.  No rashes or lesions. Neurologic:  No focal weakness   Psychiatric: Alert and oriented  Capillary Refill: Brisk,3 seconds, normal   Peripheral Pulses: +2 palpable, equal bilaterally       Labs:   Recent Labs     06/14/22  0753   HGB 13.9   HCT 42.4     Recent Labs     06/13/22  0827 06/14/22  0753 06/15/22  0720   * 136 136   K 4.4 4.3 3.9   CL 98* 103 101   CO2 22 23 22   BUN 30* 32* 30*   CREATININE 1.4* 1.3 1.3   CALCIUM 9.2 9.2 9.0     Recent Labs     06/13/22  0827   AST 10*   ALT 13   BILIDIR <0.2   BILITOT <0.2   ALKPHOS 106     Recent Labs     06/14/22  0753   INR 1.01     No results for input(s): CKTOTAL, TROPONINI in the last 72 hours. Urinalysis:      Lab Results   Component Value Date    NITRU Negative 06/07/2022    WBCUA 1 06/07/2022    BACTERIA None Seen 06/07/2022    RBCUA 1 06/07/2022    BLOODU TRACE-INTACT 06/07/2022    SPECGRAV >=1.030 06/07/2022    GLUCOSEU >=1000 06/07/2022    GLUCOSEU NEGATIVE 01/02/2011       Radiology:  XR CHEST PORTABLE   Final Result   Stable chest.  Persistent right upper lobe infiltrate consistent with   pneumonia. Continued plain film follow-up recommended. XR CHEST 1 VIEW   Final Result      FLUORO FOR SURGICAL PROCEDURES   Final Result      MRI BRAIN WO CONTRAST   Final Result   There are few areas of high signal in the periventricular white matter. These are nonspecific. Demyelination could be considered in the appropriate   clinical setting. Chronic small vessel ischemic changes, vasculitis, or   migraines can cause a similar finding. No other brain parenchymal   abnormality. CT CHEST WO CONTRAST   Final Result   1. Progressive consolidation in the anterior segment of the right upper lobe   consistent with pneumonia. There is no evidence of cavitation.   The findings   are nonspecific but most consistent with bacterial etiology. 2. The remainder of the lungs are clear. No pleural fluid. 3. Stable small mediastinal nodes. The right hilar node noted previously is   not evaluated due to the lack of intravenous contrast.         XR CHEST PORTABLE   Final Result   Redemonstration of right upper lobe infiltrate which is likely pneumonic. It   is slightly larger compared to the previous evaluation. Assessment/Plan:    Active Hospital Problems    Diagnosis     Hemoptysis [R04.2]      Priority: Medium    Streptococcal infection [A49.1]      Priority: Medium    S/P bronchoscopy [Z98.890]      Priority: Medium    Seizure disorder ([facility] [address] [G40.909]      Priority: Medium    Class 1 obesity due to excess calories with body mass index (BMI) of 34.0 to 34.9 in adult [E66.09, Z68.34]      Priority: Medium    Abnormal CT of the chest [R93.89]      Priority: Medium    Endobronchial mass [R91.8]      Priority: Medium    Community acquired pneumonia of right upper lobe of lung [J18.9]      Priority: Medium    Acute renal failure ([facility] [address] [N17.9]      Priority: Medium    Poorly controlled type 2 diabetes mellitus ([facility] [address] [E11.65]      Priority: Medium    Essential hypertension [I10]      Priority: Medium     1.  Abnormal imaging on CT scan along with positive histoplasma antibody, ID consulted status post bronchoscopy as patient had hemoptysis. ID ordered serum histoplasma antigen. Started on empiric itraconazole. 2.  Streptococcal pneumonia currently on Omnicef. 3.   Hemoptysis, status post bronchoscopy, likely hemorrhage from previous site of biopsy. Monitor for now pulmonology following. 4. Generalized weakness, improving  5.  CHELE on top of CKD stage III improved  6.  Diabetes mellitus, sliding scale. Much better controlled  7.  Essential hypertension, p.o. medications      Diet: ADULT DIET; Regular; 4 carb choices (60 gm/meal);  Low Fat/Low Chol/High Fiber/BREE  Code Status: Full Jairo Rome MD

## 2022-06-15 NOTE — PLAN OF CARE
Problem: Discharge Planning  Goal: Discharge to home or other facility with appropriate resources  6/14/2022 2159 by Mariola Ron RN  Outcome: Progressing     Problem: Pain  Goal: Verbalizes/displays adequate comfort level or baseline comfort level  6/14/2022 2159 by Mariola Ron RN  Outcome: Progressing

## 2022-06-15 NOTE — CARE COORDINATION
Per chart review, bronch showed bleeding from previous biopsy site, sheduled cough meds to allow healing, will change to prn tomorrow. CM to follow for dc needs; plan is to dc home with outpt PT at [facility] (need order at dc).     Judith Cardoso RN, BSN,   [phone]  Electronically signed by Judith Cardoso RN on 6/15/2022 at 2:36 PM

## 2022-06-15 NOTE — PROGRESS NOTES
Pulmonary Progress Note    CC:  Follow up abnormal chest imaging, bronchoscopy    Subjective:  Bronchoscopy showed bleeding from previous biopsy site and epinephrine and iced saline were applied. Did not really result in significant hemostasis   He continues to not feel well  Coughing has decreased due to cough medicine  No further hemoptysis     ROS  Resolved coughing up of blood  Feel tired and off       Intake/Output Summary (Last 24 hours) at 6/15/2022 0752  Last data filed at 6/14/2022 2040  Gross per 24 hour   Intake 390 ml   Output --   Net 390 ml         PHYSICAL EXAM:  Blood pressure (!) 155/87, pulse (!) 113, temperature 99.1 °F (37.3 °C), temperature source Oral, resp. rate 18, height 6' 1\" (1.854 m), weight 263 lb 7.2 oz (119.5 kg), SpO2 92 %, peak flow (!) 18 L/min.'  Gen: No distress. Eyes: PERRL. No sclera icterus. No conjunctival injection. ENT: No discharge. Pharynx clear. External appearance of ears and nose normal.  Neck: Trachea midline. No obvious mass. Resp: Diminished with some scattered wheezing   CV: Regular rate. Regular rhythm. No murmur or rub. GI: Non-tender. Non-distended. No hernia. Skin: Warm, dry, normal texture and turgor. No nodule on exposed extremities. Lymph: No cervical LAD. No supraclavicular LAD. M/S: No cyanosis. No clubbing. No joint deformity. Neuro: Moves all four extremities. CN 2-12 tested, no defect noted.   Ext:   no edema    Medications:    Scheduled Meds:   guaiFENesin-codeine  5 mL Oral Q4H    itraconazole  200 mg Oral TID    Followed by   Darin  ON 6/17/2022] itraconazole  200 mg Oral BID    insulin glargine  40 Units SubCUTAneous Daily    cefdinir  300 mg Oral 2 times per day    insulin lispro  8 Units SubCUTAneous TID WC    sodium chloride flush  10 mL IntraVENous 2 times per day    [Held by provider] aspirin  81 mg Oral Daily    carvedilol  25 mg Oral BID WC    dicyclomine  10 mg Oral 4x Daily AC & HS    DULoxetine  30 mg Oral Daily    gabapentin  300 mg Oral TID    NIFEdipine  60 mg Oral Daily    rosuvastatin  40 mg Oral Nightly    insulin lispro  0-12 Units SubCUTAneous TID WC    insulin lispro  0-6 Units SubCUTAneous Nightly    [Held by provider] enoxaparin  30 mg SubCUTAneous BID       Continuous Infusions:   sodium chloride Stopped (06/11/22 1904)    dextrose         PRN Meds:  sodium chloride flush, sodium chloride, promethazine **OR** ondansetron, magnesium hydroxide, acetaminophen **OR** acetaminophen, albuterol sulfate HFA, glucose, dextrose bolus **OR** dextrose bolus, glucagon (rDNA), dextrose    Labs:  CBC:   Recent Labs     06/14/22 0753   HGB 13.9   HCT 42.4     BMP:   Recent Labs     06/13/22 0827 06/14/22 0753   * 136   K 4.4 4.3   CL 98* 103   CO2 22 23   BUN 30* 32*   CREATININE 1.4* 1.3     LIVER PROFILE:   Recent Labs     06/13/22 0827   AST 10*   ALT 13   BILIDIR <0.2   BILITOT <0.2   ALKPHOS 106     PT/INR:   Recent Labs     06/14/22 0753   PROTIME 13.2   INR 1.01     APTT:   Recent Labs     06/14/22 0753   APTT 32.9     UA:No results for input(s): NITRITE, COLORU, PHUR, LABCAST, WBCUA, RBCUA, MUCUS, TRICHOMONAS, YEAST, BACTERIA, CLARITYU, SPECGRAV, LEUKOCYTESUR, UROBILINOGEN, BILIRUBINUR, BLOODU, GLUCOSEU, AMORPHOUS in the last 72 hours. Invalid input(s): Elease Schiller  No results for input(s): PH, PCO2, PO2 in the last 72 hours. Films:  Chest imaging reports were reviewed and imaging was reviewed by me and showed continued RUL airspace disease     ABG:  None    Cultures:  Negative to date   Beta glucan positive   Strep parasanguinis in tissue culture  Fungal Ab:  + histo  Fungal cultures:  Yeast     I reviewed the labs and images listed above    Assessment/Plan:    Abnormal CT Chest with endobronchial lesion possibly related to histoplasmosis infection  o ID following.   On itraconazole   o Omnicef for strep isolation   o Check procal    Hemoptysis, from previous bronchoscopy biopsy  o Hold anticoagulants  o Scheduled cough medicine to allow clot formation etc.  Will change to PRN tomorrow    Uncontrolled DM      DVT prophylaxis  SCD     Alta Zaldivar,   [facility]

## 2022-06-15 NOTE — PROGRESS NOTES
This writer perfect served the doctor about patient not feeling well. Patient BS at this time is now  80.

## 2022-06-15 NOTE — PROGRESS NOTES
Nephrology ([facility]) Progress Note    CC: CHELE on CKD    Subjective:    HPI:  Breathing OK Does not feel good today . No CP + cough no hemoptysis  ROS:  In bed. No fever. [address] [place]:  medications reviewed. Objective:  Blood pressure (!) 147/98, pulse (!) 108, temperature 98.9 °F (37.2 °C), temperature source Oral, resp. rate 16, height 6' 1\" (1.854 m), weight 263 lb 7.2 oz (119.5 kg), SpO2 95 %, peak flow (!) 18 L/min. Intake/Output Summary (Last 24 hours) at 6/15/2022 1547  Last data filed at 6/14/2022 2040  Gross per 24 hour   Intake 240 ml   Output --   Net 240 ml     General:  NAD, A+OX3  Chest:  CTAB  CVS:  RRR  Abdominal:  NTND, soft, +BS  Extremities:  no edema  Skin:  no rash    Labs:  Renal panel:  Lab Results   Component Value Date/Time     06/15/2022 07:20 AM    K 3.9 06/15/2022 07:20 AM    CO2 22 06/15/2022 07:20 AM    BUN 30 (H) 06/15/2022 07:20 AM    CREATININE 1.3 06/15/2022 07:20 AM    CALCIUM 9.0 06/15/2022 07:20 AM    PHOS 3.8 06/24/2021 03:00 PM    MG 2.30 05/19/2022 08:08 AM     CBC:  Lab Results   Component Value Date/Time    WBC 6.1 06/07/2022 07:26 AM    HGB 13.9 06/14/2022 07:53 AM    HCT 42.4 06/14/2022 07:53 AM     06/07/2022 07:26 AM       Assessment/Plan:  Reviewed old records and labs.     1) CHELE on CKD   - baseline 05/17/22 Cr 1.2, sees Dr. Christine Jones              - ddx:  pre-renal vs. ATN vs. contrast nephropathy   - renal function at baseline Cr 1.3 mg monitor     2) RUL infiltrate              - failed outpatient Z-gloria              - on empiric omnicef, levaquin, and eraxis   - repeat chest CT shows further consolidation of infiltrate              - HIV negative on Itraconazole per ID   - s/p bronchoscopy with RUL biopsy with some bleeding 06/09/22                S/p bronchoscopy 6/14/22 Findings noted    -       3) FEN              - hyponatremia                          - Na+ improved to 136 meq                          - monitor

## 2022-06-15 NOTE — PROGRESS NOTES
Infectious Diseases   Progress Note      Admission Date: 6/6/2022  Hospital Day: Hospital Day: 10   Attending: Abiola Caballero MD  Date of service: 6/15/2022     Chief complaint/ Reason for consult:     · Streptococcus parasanguinis infection of the lungs  · Positive histoplasma serology by immunodiffusion  · Positive 1, 3 beta D glucan in the blood on 6/20/2022  · Negative HIV screen on 6/9/2022  · S/p bronchoscopy on 6/9/2022 and 6/14/22  · Right upper lobe endobronchial lesion    Microbiology:        I have reviewed allavailable micro lab data and cultures    · Lung tissue biopsy culture  - collected on 6/9/2022, Streptococcus parasanguinis      Antibiotics and immunizations:       Current antibiotics: All antibiotics and their doses were reviewed by me    Recent Abx Admin                   itraconazole (SPORANOX) capsule 200 mg (mg) 200 mg Given 06/15/22 1400     200 mg Given  1132     200 mg Given 06/14/22 2218     200 mg Given  1820    cefdinir (OMNICEF) capsule 300 mg (mg) 300 mg Given 06/15/22 0901     300 mg Given 06/14/22 2003                  Immunization History: All immunization history was reviewed by me today. There is no immunization history on file for this patient. Known drug allergies: All allergies were reviewed and updated    No Known Allergies    Social history:     Social History:  All social andepidemiologic history was reviewed and updated by me today as needed. · Tobacco use:   reports that he has never smoked. He has never used smokeless tobacco.  · Alcohol use:   reports current alcohol use. · Currently lives in: [address] [place] [address] Road  ·  reports previous drug use.      COVID VACCINATION AND LAB RESULT RECORDS:     Internal Administration   First Dose      Second Dose           Last COVID Lab SARS-CoV-2, NAAT (no units)   Date Value   06/06/2022 Not Detected            Assessment:     The patient is a 29 y.o. old male who  has a past medical history of COVID-19, Diabetes mellitus, type II ([facility] [address] History of blood transfusion, Hypertension, Protein in urine, and Seizure ([facility] [address] with following problems:    · Streptococcus parasanguinis infection of the lungs-has been on cefdinir  · High fever-continue development  · Positive histoplasma serology by immunodiffusion-only indicates exposure to histoplasma  · Positive 1, 3 beta D glucan in the blood on 6/20/2022  · Negative HIV screen on 6/9/2022  · S/p bronchoscopy on 6/9/2022 and 6/14/22  · Right upper lobe endobronchial lesion  · History of COVID-19  · Poorly controlled type 2 diabetes mellitus-maintain good glycemic control  · Essential hypertension  · Seizure disorder  · Chronic kidney disease stage III  · Obesity Class 1 due to excess calorie intake : Body mass index is 34.54 kg/m².         Discussion:      The patient is on cefdinir for streptococcal coverage. I had started him on oral itraconazole yesterday due to concern for histoplasmosis. The patient spiked a fever 100.9 yesterday and 1 of 1.7 today. The source of fever is unclear. Serum creatinine is 1.3. Plan:     Diagnostic Workup:    · Follow-up on serum and urine histoplasma antigens  · Continue to follow  fever curve, WBC count and blood cultures. · Continue to monitor blood counts, liver and renal function. Antimicrobials:    · Will stop cefdinir today  · Will order p.o. Augmentin 875 mg every 12 hour for streptococcal coverage  · Continue oral itraconazole at a dose of 200 mg every 8 hour for first 3 days for loading dose followed by 200 mg every 12 hour for maintenance dose afterwards for suspected pulmonary histoplasmosis  · We will follow up on the culture results and clinical progress and will make further recommendations accordingly. · Continue close vitals monitoring. · Maintain good glycemic control. · Fall precautions. Aspiration precautions. · Continue to watch for new fever or diarrhea. · DVT prophylaxis.   · Discussed all above with patient and RN. · Discussed with patient's mother at bedside      Drug Monitoring:    · Continue monitoring for antibiotic toxicity as follows: CBC, CMP, QTc interval  · Continue to watch for following: new or worsening fever, new hypotension, hives, lip swelling and redness or purulence at vascular access sites. I/v access Management:    · Continue to monitor i.v access sites for erythema, induration, discharge or tenderness. · As always, continue efforts to minimize tubes/lines/drains as clinically appropriate to reduce chances of line associated infections. Patient education and counseling:        · The patient was educated in detail about the side-effects of various antibiotics and things to watch for like new rashes, lip swelling, severe reaction, worsening diarrhea, break through fever etc.  · Discussed patient's condition and what to expect. All of the patient's questions were addressed in a satisfactory manner and patient verbalized understanding all instructions. Level of complexity of visit: High     Risk of Complications/Morbidity: High     · Illness(es)/ Infection present that pose threat to life/bodily function. · There is potential for severe exacerbation of infection/side effects of treatment. · Therapy requires intensive monitoring for antimicrobial agent toxicity. Thank you for involving me in the care of your patient. I will continue to follow. If you have anyadditional questions, please do not hesitate to contact me. Subjective: Interval history: Interval history was obtained from chart review and patient/ RN. The patient had a fever of 102. Feels tired. Tolerating antibiotics okay. REVIEW OF SYSTEMS:     Review of Systems   Constitutional: Positive for fatigue and fever. Negative for chills and diaphoresis. HENT: Negative for ear discharge, ear pain, rhinorrhea, sore throat and trouble swallowing. Eyes: Negative for discharge and redness. Respiratory: Positive for cough. Negative for shortness of breath and wheezing. Cardiovascular: Negative for chest pain and leg swelling. Gastrointestinal: Negative for abdominal pain, constipation, diarrhea and nausea. Endocrine: Negative for polyuria. Genitourinary: Negative for dysuria, flank pain, frequency, hematuria and urgency. Musculoskeletal: Negative for back pain and myalgias. Skin: Negative for rash. Neurological: Negative for dizziness, seizures and headaches. Hematological: Does not bruise/bleed easily. Psychiatric/Behavioral: Negative for hallucinations and suicidal ideas. All other systems reviewed and are negative. Past Medical History: All past medical history reviewed today. Past Medical History:   Diagnosis Date    COVID-19     Diabetes mellitus, type II ([facility] [address]     History of blood transfusion     Hypertension     Protein in urine     Seizure [facility])        Past Surgical History: All past surgical history was reviewed today. Past Surgical History:   Procedure Laterality Date    BRONCHOSCOPY  6/9/2022    BRONCHOSCOPY BRUSHINGS performed by Nikolas Madsen MD at [address] [place] [address] Rd  6/9/2022    BRONCHOSCOPY DIAGNOSTIC OR CELL [address] performed by Nikolas Madsen MD at [address] [place] [address]  6/9/2022    BRONCHOSCOPY BIOPSY BRONCHUS performed by Nikolas Madsen MD at [address] [place] [address] Rd N/A 6/14/2022    BRONCHOSCOPY DIAGNOSTIC OR CELL [address] ONLY performed by Gerardo Hernandez DO at [facility] ENDOSCOPY       Family History: All family history was reviewed today. History reviewed. No pertinent family history.     Objective:       PHYSICAL EXAM:      Vitals:   Vitals:    06/15/22 0615 06/15/22 0743 06/15/22 1143 06/15/22 1603   BP: (!) 155/87 (!) 151/87 (!) 147/98 121/62   Pulse: (!) 113 (!) 109 (!) 108 (!) 111   Resp: 18 18 16 16   Temp: 99.1 °F (37.3 °C) 98.9 °F (37.2 °C) 98.9 °F (37.2 °C) (!) 101.7 °F (38.7 °C)   TempSrc: Oral Oral Oral Oral   SpO2: 92% 95% 95% 94%   Weight:       Height:       PF:           Physical Exam  Vitals and nursing note reviewed. Constitutional:       Appearance: Normal appearance. He is well-developed. HENT:      Head: Normocephalic and atraumatic. Right Ear: External ear normal.      Left Ear: External ear normal.      Nose: Nose normal. No congestion or rhinorrhea. Mouth/Throat:      Mouth: Mucous membranes are moist.      Pharynx: No oropharyngeal exudate or posterior oropharyngeal erythema. Eyes:      General: No scleral icterus. Right eye: No discharge. Left eye: No discharge. Conjunctiva/sclera: Conjunctivae normal.      Pupils: Pupils are equal, round, and reactive to light. Cardiovascular:      Rate and Rhythm: Normal rate and regular rhythm. Pulses: Normal pulses. Heart sounds: No murmur heard. No friction rub. Pulmonary:      Effort: Pulmonary effort is normal. No respiratory distress. Breath sounds: No stridor. Rales (  Bilateral, patchy) present. No wheezing or rhonchi. Abdominal:      General: Bowel sounds are normal.      Palpations: Abdomen is soft. Tenderness: There is no abdominal tenderness. There is no right CVA tenderness, left CVA tenderness, guarding or rebound. Musculoskeletal:         General: No swelling or tenderness. Normal range of motion. Cervical back: Normal range of motion and neck supple. No rigidity. No muscular tenderness. Lymphadenopathy:      Cervical: No cervical adenopathy. Skin:     General: Skin is warm and dry. Coloration: Skin is not jaundiced. Findings: No erythema or rash. Neurological:      General: No focal deficit present. Mental Status: He is alert and oriented to person, place, and time. Mental status is at baseline. Motor: No abnormal muscle tone.    Psychiatric:         Mood and Affect: Mood normal.         Behavior: Behavior normal. Thought Content: Thought content normal.            Lines and drains: All vascular access sites are healthy with no local erythema, discharge or tenderness. Intake and output:    I/O last 3 completed shifts: In: 630 [P.O.:530; I.V.:100]  Out: -     Lab Data:   All available labs and old records have been reviewed by me. CBC:  Recent Labs     06/14/22  0753   HGB 13.9   HCT 42.4        BMP:  Recent Labs     06/13/22  0827 06/14/22  0753 06/15/22  0720   * 136 136   K 4.4 4.3 3.9   CL 98* 103 101   CO2 22 23 22   BUN 30* 32* 30*   CREATININE 1.4* 1.3 1.3   CALCIUM 9.2 9.2 9.0   GLUCOSE 381* 222* 165*        Hepatic Function Panel:   Lab Results   Component Value Date    ALKPHOS 106 06/13/2022    ALT 13 06/13/2022    AST 10 06/13/2022    PROT 7.1 06/13/2022    BILITOT <0.2 06/13/2022    BILIDIR <0.2 06/13/2022    IBILI see below 06/13/2022    LABALBU 3.2 06/13/2022       CPK: No results found for: CKTOTAL  ESR:   Lab Results   Component Value Date    SEDRATE >130 (H) 06/11/2022     CRP:   Lab Results   Component Value Date    CRP 62.4 (H) 06/03/2020           Imaging: All pertinent images and reports for the current visit were reviewed by me during this visit. I reviewed the chest x-ray/CT scan/MRI images and independently interpreted the findings and results today. XR CHEST PORTABLE   Final Result   Stable chest.  Persistent right upper lobe infiltrate consistent with   pneumonia. Continued plain film follow-up recommended. XR CHEST 1 VIEW   Final Result      FLUORO FOR SURGICAL PROCEDURES   Final Result      MRI BRAIN WO CONTRAST   Final Result   There are few areas of high signal in the periventricular white matter. These are nonspecific. Demyelination could be considered in the appropriate   clinical setting. Chronic small vessel ischemic changes, vasculitis, or   migraines can cause a similar finding. No other brain parenchymal   abnormality.          CT CHEST WO CONTRAST   Final Result   1. Progressive consolidation in the anterior segment of the right upper lobe   consistent with pneumonia. There is no evidence of cavitation. The findings   are nonspecific but most consistent with bacterial etiology. 2. The remainder of the lungs are clear. No pleural fluid. 3. Stable small mediastinal nodes. The right hilar node noted previously is   not evaluated due to the lack of intravenous contrast.         XR CHEST PORTABLE   Final Result   Redemonstration of right upper lobe infiltrate which is likely pneumonic. It   is slightly larger compared to the previous evaluation. Medications: All current and past medications were reviewed.      famotidine        guaiFENesin-codeine  5 mL Oral Q4H    itraconazole  200 mg Oral TID    Followed by   Tyshawn White ON 6/17/2022] itraconazole  200 mg Oral BID    insulin glargine  40 Units SubCUTAneous Daily    cefdinir  300 mg Oral 2 times per day    insulin lispro  8 Units SubCUTAneous TID WC    sodium chloride flush  10 mL IntraVENous 2 times per day    [Held by provider] aspirin  81 mg Oral Daily    carvedilol  25 mg Oral BID WC    dicyclomine  10 mg Oral 4x Daily AC & HS    DULoxetine  30 mg Oral Daily    gabapentin  300 mg Oral TID    NIFEdipine  60 mg Oral Daily    rosuvastatin  40 mg Oral Nightly    insulin lispro  0-12 Units SubCUTAneous TID WC    insulin lispro  0-6 Units SubCUTAneous Nightly    [Held by provider] enoxaparin  30 mg SubCUTAneous BID        sodium chloride Stopped (06/11/22 1904)    dextrose         sodium chloride flush, sodium chloride, promethazine **OR** ondansetron, magnesium hydroxide, acetaminophen **OR** acetaminophen, albuterol sulfate HFA, glucose, dextrose bolus **OR** dextrose bolus, glucagon (rDNA), dextrose      Problem list:       Patient Active Problem List   Diagnosis Code    Poorly controlled type 2 diabetes mellitus ([facility] [address] E11.65    Elevated troponin R77.8    Essential hypertension I10  SOB (shortness of breath) R06.02    CKD (chronic kidney disease) N18.9    Acute renal failure (HCC) N17.9    Community acquired pneumonia of right upper lobe of lung J18.9    Abnormal CT of the chest R93.89    Endobronchial mass R91.8    Hemoptysis R04.2    Streptococcal infection A49.1    S/P bronchoscopy Z98.890    Seizure disorder (HCC) G40.909    Class 1 obesity due to excess calories with body mass index (BMI) of 34.0 to 34.9 in adult E66.09, Z68.34       Please note that this chart was generated using Dragon dictation software. Although every effort was made to ensure the accuracy of this automated transcription, some errors in transcription may have occurred inadvertently. If you may need any clarification, please do not hesitate to contact me through EPIC or at the phone number provided below with my electronic signature. Any pictures or media included in this note were obtained after taking informed verbal consent from the patient and with their approval to include those in the patient's medical record.       Beatriz Weinberg MD, MPH, [address]  6/15/2022, 4:56 PM  [facility]   [address] [facility] [address], [address] [place] [address]  Office: [phone]  Fax: [phone]  Clinic days:  Tuesday & Thursday

## 2022-06-15 NOTE — PLAN OF CARE
Problem: Discharge Planning  Goal: Discharge to home or other facility with appropriate resources  6/15/2022 1107 by Andrea Garcia RN  Outcome: Progressing  6/14/2022 2159 by Lulu Bartlett RN  Outcome: Progressing     Problem: Pain  Goal: Verbalizes/displays adequate comfort level or baseline comfort level  6/15/2022 1107 by Andrea Garcia RN  Outcome: Progressing  6/14/2022 2159 by Lulu Bartlett RN  Outcome: Progressing     Problem: Metabolic/Fluid and Electrolytes - Adult  Goal: Electrolytes maintained within normal limits  Outcome: Progressing  Goal: Hemodynamic stability and optimal renal function maintained  Outcome: Progressing  Goal: Glucose maintained within prescribed range  Outcome: Progressing     Problem: Gastrointestinal - Adult  Goal: Minimal or absence of nausea and vomiting  Outcome: Progressing  Goal: Maintains or returns to baseline bowel function  Outcome: Progressing  Goal: Maintains adequate nutritional intake  Outcome: Progressing     Problem: Safety - Adult  Goal: Free from fall injury  Outcome: Progressing     Problem: ABCDS Injury Assessment  Goal: Absence of physical injury  Outcome: Progressing     Problem: Chronic Conditions and Co-morbidities  Goal: Patient's chronic conditions and co-morbidity symptoms are monitored and maintained or improved  Outcome: Progressing

## 2022-06-16 LAB
ANION GAP SERPL CALCULATED.3IONS-SCNC: 13 MMOL/L (ref 3–16)
BUN BLDV-MCNC: 24 MG/DL (ref 7–20)
CALCIUM SERPL-MCNC: 9 MG/DL (ref 8.3–10.6)
CHLORIDE BLD-SCNC: 97 MMOL/L (ref 99–110)
CO2: 21 MMOL/L (ref 21–32)
CREAT SERPL-MCNC: 1.5 MG/DL (ref 0.9–1.3)
GFR AFRICAN AMERICAN: >60
GFR NON-AFRICAN AMERICAN: 53
GLUCOSE BLD-MCNC: 111 MG/DL (ref 70–99)
GLUCOSE BLD-MCNC: 111 MG/DL (ref 70–99)
GLUCOSE BLD-MCNC: 123 MG/DL (ref 70–99)
GLUCOSE BLD-MCNC: 151 MG/DL (ref 70–99)
GLUCOSE BLD-MCNC: 99 MG/DL (ref 70–99)
PERFORMED ON: ABNORMAL
PERFORMED ON: NORMAL
POTASSIUM REFLEX MAGNESIUM: 4.4 MMOL/L (ref 3.5–5.1)
SODIUM BLD-SCNC: 131 MMOL/L (ref 136–145)

## 2022-06-16 PROCEDURE — 6370000000 HC RX 637 (ALT 250 FOR IP): Performed by: INTERNAL MEDICINE

## 2022-06-16 PROCEDURE — 36415 COLL VENOUS BLD VENIPUNCTURE: CPT

## 2022-06-16 PROCEDURE — 94760 N-INVAS EAR/PLS OXIMETRY 1: CPT

## 2022-06-16 PROCEDURE — 2580000003 HC RX 258: Performed by: INTERNAL MEDICINE

## 2022-06-16 PROCEDURE — 99233 SBSQ HOSP IP/OBS HIGH 50: CPT | Performed by: INTERNAL MEDICINE

## 2022-06-16 PROCEDURE — 99232 SBSQ HOSP IP/OBS MODERATE 35: CPT | Performed by: INTERNAL MEDICINE

## 2022-06-16 PROCEDURE — 1200000000 HC SEMI PRIVATE

## 2022-06-16 PROCEDURE — 80048 BASIC METABOLIC PNL TOTAL CA: CPT

## 2022-06-16 RX ORDER — SODIUM CHLORIDE 9 MG/ML
INJECTION, SOLUTION INTRAVENOUS CONTINUOUS
Status: DISCONTINUED | OUTPATIENT
Start: 2022-06-16 | End: 2022-06-18

## 2022-06-16 RX ORDER — PREDNISONE 20 MG/1
20 TABLET ORAL DAILY
Status: DISCONTINUED | OUTPATIENT
Start: 2022-06-16 | End: 2022-06-21

## 2022-06-16 RX ADMIN — GUAIFENESIN AND CODEINE PHOSPHATE 5 ML: 10; 100 LIQUID ORAL at 16:57

## 2022-06-16 RX ADMIN — GUAIFENESIN AND CODEINE PHOSPHATE 5 ML: 10; 100 LIQUID ORAL at 01:43

## 2022-06-16 RX ADMIN — AMOXICILLIN AND CLAVULANATE POTASSIUM 1 TABLET: 875; 125 TABLET, FILM COATED ORAL at 08:44

## 2022-06-16 RX ADMIN — ITRACONAZOLE 200 MG: 100 CAPSULE, COATED PELLETS ORAL at 13:35

## 2022-06-16 RX ADMIN — GUAIFENESIN AND CODEINE PHOSPHATE 5 ML: 10; 100 LIQUID ORAL at 13:27

## 2022-06-16 RX ADMIN — DICYCLOMINE HYDROCHLORIDE 10 MG: 10 CAPSULE ORAL at 16:57

## 2022-06-16 RX ADMIN — ITRACONAZOLE 200 MG: 100 CAPSULE, COATED PELLETS ORAL at 08:50

## 2022-06-16 RX ADMIN — ACETAMINOPHEN 650 MG: 325 TABLET ORAL at 21:11

## 2022-06-16 RX ADMIN — GUAIFENESIN AND CODEINE PHOSPHATE 5 ML: 10; 100 LIQUID ORAL at 10:02

## 2022-06-16 RX ADMIN — NIFEDIPINE 60 MG: 60 TABLET, EXTENDED RELEASE ORAL at 08:51

## 2022-06-16 RX ADMIN — INSULIN LISPRO 8 UNITS: 100 INJECTION, SOLUTION INTRAVENOUS; SUBCUTANEOUS at 13:17

## 2022-06-16 RX ADMIN — GABAPENTIN 300 MG: 300 CAPSULE ORAL at 21:11

## 2022-06-16 RX ADMIN — INSULIN LISPRO 8 UNITS: 100 INJECTION, SOLUTION INTRAVENOUS; SUBCUTANEOUS at 17:18

## 2022-06-16 RX ADMIN — GUAIFENESIN AND CODEINE PHOSPHATE 5 ML: 10; 100 LIQUID ORAL at 21:11

## 2022-06-16 RX ADMIN — CARVEDILOL 25 MG: 25 TABLET, FILM COATED ORAL at 16:57

## 2022-06-16 RX ADMIN — DICYCLOMINE HYDROCHLORIDE 10 MG: 10 CAPSULE ORAL at 06:07

## 2022-06-16 RX ADMIN — DICYCLOMINE HYDROCHLORIDE 10 MG: 10 CAPSULE ORAL at 21:11

## 2022-06-16 RX ADMIN — GABAPENTIN 300 MG: 300 CAPSULE ORAL at 08:44

## 2022-06-16 RX ADMIN — CARVEDILOL 25 MG: 25 TABLET, FILM COATED ORAL at 08:44

## 2022-06-16 RX ADMIN — AMOXICILLIN AND CLAVULANATE POTASSIUM 1 TABLET: 875; 125 TABLET, FILM COATED ORAL at 21:11

## 2022-06-16 RX ADMIN — INSULIN LISPRO 8 UNITS: 100 INJECTION, SOLUTION INTRAVENOUS; SUBCUTANEOUS at 08:58

## 2022-06-16 RX ADMIN — DICYCLOMINE HYDROCHLORIDE 10 MG: 10 CAPSULE ORAL at 12:01

## 2022-06-16 RX ADMIN — SODIUM CHLORIDE, PRESERVATIVE FREE 10 ML: 5 INJECTION INTRAVENOUS at 08:45

## 2022-06-16 RX ADMIN — PREDNISONE 20 MG: 20 TABLET ORAL at 18:21

## 2022-06-16 RX ADMIN — ITRACONAZOLE 200 MG: 100 CAPSULE, COATED PELLETS ORAL at 21:11

## 2022-06-16 RX ADMIN — DULOXETINE HYDROCHLORIDE 30 MG: 30 CAPSULE, DELAYED RELEASE ORAL at 08:44

## 2022-06-16 RX ADMIN — INSULIN GLARGINE 40 UNITS: 100 INJECTION, SOLUTION SUBCUTANEOUS at 08:58

## 2022-06-16 RX ADMIN — ACETAMINOPHEN 650 MG: 325 TABLET ORAL at 13:28

## 2022-06-16 RX ADMIN — GUAIFENESIN AND CODEINE PHOSPHATE 5 ML: 10; 100 LIQUID ORAL at 06:07

## 2022-06-16 RX ADMIN — ACETAMINOPHEN 650 MG: 325 TABLET ORAL at 06:36

## 2022-06-16 RX ADMIN — SODIUM CHLORIDE: 9 INJECTION, SOLUTION INTRAVENOUS at 16:54

## 2022-06-16 RX ADMIN — ROSUVASTATIN CALCIUM 40 MG: 40 TABLET, FILM COATED ORAL at 21:11

## 2022-06-16 RX ADMIN — GABAPENTIN 300 MG: 300 CAPSULE ORAL at 13:27

## 2022-06-16 ASSESSMENT — ENCOUNTER SYMPTOMS
TROUBLE SWALLOWING: 0
RHINORRHEA: 0
DIARRHEA: 0
BACK PAIN: 0
EYE DISCHARGE: 0
WHEEZING: 0
NAUSEA: 0
ABDOMINAL PAIN: 0
CONSTIPATION: 0
SORE THROAT: 0
EYE REDNESS: 0
SHORTNESS OF BREATH: 0
COUGH: 0

## 2022-06-16 ASSESSMENT — PAIN DESCRIPTION - LOCATION
LOCATION: BACK;HEAD
LOCATION: HEAD

## 2022-06-16 ASSESSMENT — PAIN SCALES - GENERAL
PAINLEVEL_OUTOF10: 4
PAINLEVEL_OUTOF10: 4

## 2022-06-16 ASSESSMENT — PAIN DESCRIPTION - DESCRIPTORS: DESCRIPTORS: ACHING

## 2022-06-16 NOTE — PROGRESS NOTES
This writer wasted Gabapentin 300mg for this patient r/t dropping this medication on the floor. eli another RN.     Electronically signed by Whit Palacios RN on 6/16/2022 at 4:27 PM     Electronically signed by Adebayo Tabares RN on 6/16/2022 at 5:45 PM

## 2022-06-16 NOTE — PLAN OF CARE
Problem: Discharge Planning  Goal: Discharge to home or other facility with appropriate resources  Outcome: Progressing     Problem: Pain  Goal: Verbalizes/displays adequate comfort level or baseline comfort level  Outcome: Progressing     Problem: Metabolic/Fluid and Electrolytes - Adult  Goal: Electrolytes maintained within normal limits  Outcome: Progressing  Goal: Hemodynamic stability and optimal renal function maintained  Outcome: Progressing  Goal: Glucose maintained within prescribed range  Outcome: Progressing     Problem: Gastrointestinal - Adult  Goal: Minimal or absence of nausea and vomiting  Outcome: Progressing  Goal: Maintains or returns to baseline bowel function  Outcome: Progressing  Goal: Maintains adequate nutritional intake  Outcome: Progressing     Problem: Safety - Adult  Goal: Free from fall injury  Outcome: Progressing  Flowsheets (Taken 6/16/2022 1502)  Free From Fall Injury:   Instruct family/caregiver on patient safety   Based on caregiver fall risk screen, instruct family/caregiver to ask for assistance with transferring infant if caregiver noted to have fall risk factors     Problem: ABCDS Injury Assessment  Goal: Absence of physical injury  Outcome: Progressing     Problem: Chronic Conditions and Co-morbidities  Goal: Patient's chronic conditions and co-morbidity symptoms are monitored and maintained or improved  Outcome: Progressing

## 2022-06-16 NOTE — PROGRESS NOTES
Pulmonary Progress Note    CC:  Follow up abnormal chest imaging, bronchoscopy    Subjective:  Room air  Still does not feel well  No hemoptysis. Still coughing        No intake or output data in the 24 hours ending 06/16/22 0722      PHYSICAL EXAM:  Blood pressure 119/73, pulse (!) 116, temperature (!) 100.6 °F (38.1 °C), temperature source Oral, resp. rate 18, height 6' 1\" (1.854 m), weight 261 lb 3.9 oz (118.5 kg), SpO2 93 %, peak flow (!) 18 L/min.'  Gen: No distress. Eyes: PERRL. No sclera icterus. No conjunctival injection. ENT: No discharge. Pharynx clear. External appearance of ears and nose normal.  Neck: Trachea midline. No obvious mass. Resp: Scattered wheezing   CV: Regular rate. Regular rhythm. No murmur or rub. GI: Non-tender. Non-distended. No hernia. Skin: Warm, dry, normal texture and turgor. No nodule on exposed extremities. Lymph: No cervical LAD. No supraclavicular LAD. M/S: No cyanosis. No clubbing. No joint deformity. Neuro: Moves all four extremities. CN 2-12 tested, no defect noted.   Ext:   no edema    Medications:    Scheduled Meds:   amoxicillin-clavulanate  1 tablet Oral 2 times per day    guaiFENesin-codeine  5 mL Oral Q4H    itraconazole  200 mg Oral TID    Followed by   [facility] 6/17/2022] itraconazole  200 mg Oral BID    insulin glargine  40 Units SubCUTAneous Daily    insulin lispro  8 Units SubCUTAneous TID     sodium chloride flush  10 mL IntraVENous 2 times per day    [Held by provider] aspirin  81 mg Oral Daily    carvedilol  25 mg Oral BID WC    dicyclomine  10 mg Oral 4x Daily AC & HS    DULoxetine  30 mg Oral Daily    gabapentin  300 mg Oral TID    NIFEdipine  60 mg Oral Daily    rosuvastatin  40 mg Oral Nightly    insulin lispro  0-12 Units SubCUTAneous TID     insulin lispro  0-6 Units SubCUTAneous Nightly    [Held by provider] enoxaparin  30 mg SubCUTAneous BID       Continuous Infusions:   sodium chloride Stopped (06/11/22 1904)  dextrose         PRN Meds:  sodium chloride flush, sodium chloride, promethazine **OR** ondansetron, magnesium hydroxide, acetaminophen **OR** acetaminophen, albuterol sulfate HFA, glucose, dextrose bolus **OR** dextrose bolus, glucagon (rDNA), dextrose    Labs:  CBC:   Recent Labs     06/14/22  0753   HGB 13.9   HCT 42.4     BMP:   Recent Labs     06/13/22  0827 06/14/22  0753 06/15/22  0720   * 136 136   K 4.4 4.3 3.9   CL 98* 103 101   CO2 22 23 22   BUN 30* 32* 30*   CREATININE 1.4* 1.3 1.3     LIVER PROFILE:   Recent Labs     06/13/22  0827   AST 10*   ALT 13   BILIDIR <0.2   BILITOT <0.2   ALKPHOS 106     PT/INR:   Recent Labs     06/14/22  0753   PROTIME 13.2   INR 1.01     APTT:   Recent Labs     06/14/22 0753   APTT 32.9     UA:No results for input(s): NITRITE, COLORU, PHUR, LABCAST, WBCUA, RBCUA, MUCUS, TRICHOMONAS, YEAST, BACTERIA, CLARITYU, SPECGRAV, LEUKOCYTESUR, UROBILINOGEN, BILIRUBINUR, BLOODU, GLUCOSEU, AMORPHOUS in the last 72 hours. Invalid input(s): Dimple Stefan  No results for input(s): PH, PCO2, PO2 in the last 72 hours. Films:  Chest imaging reports were reviewed and imaging was reviewed by me and showed continued RUL airspace disease     ABG:  None    Cultures:  Negative to date   Beta glucan positive   Strep parasanguinis in tissue culture  Fungal Ab:  + histo  Fungal cultures:  Candida     I reviewed the labs and images listed above    Assessment/Plan:    Abnormal CT Chest with endobronchial lesion possibly related to histoplasmosis infection  o ID following. On itraconazole   o Omnicef for strep isolation    Hemoptysis, from previous bronchoscopy biopsy  o Hold anticoagulants  o He wants to continue with scheduled cough medicine. I am not sure if he feels off due to codeine or not.      Uncontrolled DM      DVT prophylaxis  SCD     Beto Dominguez, DO [facility]

## 2022-06-16 NOTE — PROGRESS NOTES
Hospitalist Progress Note      PCP: Jamal Tate MD, MD    Date of Admission: 6/6/2022      Subjective: Feels better now, felt weak yesterday afternoon, still spiking fever, no abdominal pain nausea vomiting and no diarrhea denies any blurry or double vision no eye pain. Medications:  Reviewed    Infusion Medications    sodium chloride Stopped (06/11/22 1904)    dextrose       Scheduled Medications    amoxicillin-clavulanate  1 tablet Oral 2 times per day    guaiFENesin-codeine  5 mL Oral Q4H    itraconazole  200 mg Oral TID    Followed by   Darryle Norris ON 6/17/2022] itraconazole  200 mg Oral BID    insulin glargine  40 Units SubCUTAneous Daily    insulin lispro  8 Units SubCUTAneous TID WC    sodium chloride flush  10 mL IntraVENous 2 times per day    [Held by provider] aspirin  81 mg Oral Daily    carvedilol  25 mg Oral BID WC    dicyclomine  10 mg Oral 4x Daily AC & HS    DULoxetine  30 mg Oral Daily    gabapentin  300 mg Oral TID    NIFEdipine  60 mg Oral Daily    rosuvastatin  40 mg Oral Nightly    insulin lispro  0-12 Units SubCUTAneous TID WC    insulin lispro  0-6 Units SubCUTAneous Nightly    [Held by provider] enoxaparin  30 mg SubCUTAneous BID     PRN Meds: sodium chloride flush, sodium chloride, promethazine **OR** ondansetron, magnesium hydroxide, acetaminophen **OR** acetaminophen, albuterol sulfate HFA, glucose, dextrose bolus **OR** dextrose bolus, glucagon (rDNA), dextrose      Intake/Output Summary (Last 24 hours) at 6/16/2022 1012  Last data filed at 6/16/2022 0829  Gross per 24 hour   Intake 240 ml   Output --   Net 240 ml       Physical Exam Performed:    /76   Pulse (!) 114   Temp 99 °F (37.2 °C) (Oral)   Resp 17   Ht 6' 1\" (1.854 m)   Wt 261 lb 3.9 oz (118.5 kg)   SpO2 93%   PF (!) 18 L/min   BMI 34.47 kg/m²     General appearance: No apparent distress  Neck: Supple  Respiratory:  Normal respiratory effort.  Clear to auscultation, bilaterally There is no evidence of cavitation. The findings   are nonspecific but most consistent with bacterial etiology. 2. The remainder of the lungs are clear. No pleural fluid. 3. Stable small mediastinal nodes. The right hilar node noted previously is   not evaluated due to the lack of intravenous contrast.         XR CHEST PORTABLE   Final Result   Redemonstration of right upper lobe infiltrate which is likely pneumonic. It   is slightly larger compared to the previous evaluation. Assessment/Plan:    Active Hospital Problems    Diagnosis     Hemoptysis [R04.2]      Priority: Medium    Streptococcal infection [A49.1]      Priority: Medium    S/P bronchoscopy [Z98.890]      Priority: Medium    Seizure disorder ([facility] [G40.909]      Priority: Medium    Class 1 obesity due to excess calories with body mass index (BMI) of 34.0 to 34.9 in adult [E66.09, Z68.34]      Priority: Medium    Abnormal CT of the chest [R93.89]      Priority: Medium    Endobronchial mass [R91.8]      Priority: Medium    Community acquired pneumonia of right upper lobe of lung [J18.9]      Priority: Medium    Acute renal failure ([facility] [N17.9]      Priority: Medium    Poorly controlled type 2 diabetes mellitus ([facility] [address] [E11.65]      Priority: Medium    Essential hypertension [I10]      Priority: Medium     1.  Abnormal imaging on CT scan along with positive histoplasma antibody, ID consulted status post bronchoscopy as patient had hemoptysis. ID ordered serum histoplasma antigen. Started on empiric itraconazole. Still spiking fever  2.  Streptococcal pneumonia, was on Omnicef changed to Augmentin per ID. 3.   Hemoptysis, status post bronchoscopy, likely hemorrhage from previous site of biopsy.  Monitor for now pulmonology following. 4. Generalized weakness, improving  5.  CHELE on top of CKD stage III, creatinine 1.5 this morning  6.  Diabetes mellitus, sliding scale.   Much better controlled  7.  Essential hypertension, p.o. medications    Diet: ADULT DIET; Regular; 4 carb choices (60 gm/meal);  Low Fat/Low Chol/High Fiber/BREE  Code Status: Full Code        Raquel Jarquin MD

## 2022-06-16 NOTE — PROGRESS NOTES
Nephrology ([facility]) Progress Note    CC: CHELE on CKD    Subjective:    HPI:  Breathing OK Does not feel good today . No CP + cough fever to 101.7 yesterday   ROS:  In bed. No fever. [address]:  medications reviewed. Objective:  Blood pressure 126/76, pulse (!) 114, temperature 99 °F (37.2 °C), temperature source Oral, resp. rate 17, height 6' 1\" (1.854 m), weight 261 lb 3.9 oz (118.5 kg), SpO2 93 %, peak flow (!) 18 L/min. Intake/Output Summary (Last 24 hours) at 6/16/2022 1447  Last data filed at 6/16/2022 0829  Gross per 24 hour   Intake 240 ml   Output --   Net 240 ml     General:  NAD, A+OX3  Chest:  CTAB  CVS:  RRR  Abdominal:  NTND, soft, +BS  Extremities:  no edema  Skin:  no rash    Labs:  Renal panel:  Lab Results   Component Value Date/Time     (L) 06/16/2022 08:27 AM    K 4.4 06/16/2022 08:27 AM    CO2 21 06/16/2022 08:27 AM    BUN 24 (H) 06/16/2022 08:27 AM    CREATININE 1.5 (H) 06/16/2022 08:27 AM    CALCIUM 9.0 06/16/2022 08:27 AM    PHOS 3.8 06/24/2021 03:00 PM    MG 2.30 05/19/2022 08:08 AM     CBC:  Lab Results   Component Value Date/Time    WBC 6.1 06/07/2022 07:26 AM    HGB 13.9 06/14/2022 07:53 AM    HCT 42.4 06/14/2022 07:53 AM     06/07/2022 07:26 AM       Assessment/Plan:  Reviewed old records and labs.     1) CHELE on CKD   - baseline 05/17/22 Cr 1.2, sees Dr. Efraín Burkett              - ddx:  pre-renal vs. ATN vs. contrast nephropathy   - Cr  Slightly increased today at 1.5 mg Na+ 131 meq suspect volume depletion Will give gentle IVF's and monitor     2) RUL infiltrate              - failed outpatient Z-gloria   - repeat chest CT shows further consolidation of infiltrate              - HIV negative on Itraconazole per ID   - s/p bronchoscopy with RUL biopsy with some bleeding 06/09/22                S/p bronchoscopy 6/14/22 possible pulmonary Histoplasmosis on Itraconazole Started on Augmentin after fever yesterday ID following    -       3) FEN              - hyponatremia                          - Na+ down to 131  Meq today Give gentle IVF's and                           monitor

## 2022-06-16 NOTE — PROGRESS NOTES
Infectious Diseases   Progress Note      Admission Date: 6/6/2022  Hospital Day: Hospital Day: 11   Attending: Celine Hoover MD  Date of service: 6/16/2022     Chief complaint/ Reason for consult:     · Streptococcus parasanguinis infection of the lungs  · Positive histoplasma serology by immunodiffusion  · Positive 1, 3 beta D glucan in the blood on 6/20/2022  · Negative HIV screen on 6/9/2022  · S/p bronchoscopy on 6/9/2022 and 6/14/22  · Right upper lobe endobronchial lesion    Microbiology:        I have reviewed allavailable micro lab data and cultures    · Lung tissue biopsy culture  - collected on 6/9/2022, Streptococcus parasanguinis      Antibiotics and immunizations:       Current antibiotics: All antibiotics and their doses were reviewed by me    Recent Abx Admin                   itraconazole (SPORANOX) capsule 200 mg (mg) 200 mg Given 06/16/22 1335     200 mg Given  0850     200 mg Given 06/15/22 2053    amoxicillin-clavulanate (AUGMENTIN) 875-125 MG per tablet 1 tablet (tablet) 1 tablet Given 06/16/22 0844     1 tablet Given 06/15/22 2040                  Immunization History: All immunization history was reviewed by me today. There is no immunization history on file for this patient. Known drug allergies: All allergies were reviewed and updated    No Known Allergies    Social history:     Social History:  All social andepidemiologic history was reviewed and updated by me today as needed. · Tobacco use:   reports that he has never smoked. He has never used smokeless tobacco.  · Alcohol use:   reports current alcohol use. · Currently lives in: [address] [place] [address] Road  ·  reports previous drug use.      COVID VACCINATION AND LAB RESULT RECORDS:     Internal Administration   First Dose      Second Dose           Last COVID Lab SARS-CoV-2, NAAT (no units)   Date Value   06/06/2022 Not Detected            Assessment:     The patient is a 29 y.o. old male who  has a past medical history of COVID-19, Diabetes mellitus, type II ([facility] [address] History of blood transfusion, Hypertension, Protein in urine, and Seizure ([facility] [address] with following problems:    · Streptococcus parasanguinis infection of the lungs-covered with Augmentin  · High fever--likely inflammatory response, which was previously being controlled with steroids  · Positive histoplasma serology by immunodiffusion-only indicates exposure to histoplasma  - currently on empiric itraconazole  · Positive 1, 3 beta D glucan in the blood on 6/20/2022  · Negative HIV screen on 6/9/2022  · S/p bronchoscopy on 6/9/2022 and 6/14/22  · Right upper lobe endobronchial lesion  · History of COVID-19  · Poorly controlled type 2 diabetes mellitus-counseling done  · Essential hypertension  · Seizure disorder  · Chronic kidney disease stage III  · Obesity Class 1 due to excess calorie intake : Body mass index is 34.54 kg/m².         Discussion:      I had switched him from oral cefdinir to oral Augmentin yesterday for streptococcal coverage. He is on itraconazole for empiric coverage against histoplasma. The patient has a T-max of 100.4 today. Serum creatinine is 1.5 today. Interestingly, there is no leukocytosis. Plan:     Diagnostic Workup:    · Follow-up on urine and urine histoplasma antigen and other fungal serologies and fungal cultures,  · Continue to follow  fever curve, WBC count and blood cultures. · Continue to monitor blood counts, liver and renal function. Antimicrobials:    · Continue oral Augmentin 875 mg every 12 hours  · Continue oral itraconazole 200 mg every 8 hours for first 3 days followed by 20 mg twice daily  · Patient 's steroids were stopped on 6/13/2022 and looks like fever has started afterwards  · We will start oral prednisone 20 mg daily and see if that helps with the fevers  · We will follow up on the culture results and clinical progress and will make further recommendations accordingly.   · Continue close vitals monitoring. · Maintain good glycemic control. · Fall precautions. · Aspiration precautions. · Continue to watch for new fever or diarrhea. · DVT prophylaxis. · Discussed all above with patient and RN. Drug Monitoring:    · Continue monitoring for antibiotic toxicity as follows: CBC, CMP   · Continue to watch for following: new or worsening fever, new hypotension, hives, lip swelling and redness or purulence at vascular access sites. I/v access Management:    · Continue to monitor i.v access sites for erythema, induration, discharge or tenderness. · As always, continue efforts to minimize tubes/lines/drains as clinically appropriate to reduce chances of line associated infections. Patient education and counseling:        · The patient was educated in detail about the side-effects of various antibiotics and things to watch for like new rashes, lip swelling, severe reaction, worsening diarrhea, break through fever etc.  · Discussed patient's condition and what to expect. All of the patient's questions were addressed in a satisfactory manner and patient verbalized understanding all instructions. Level of complexity of visit: High       Diabetes mellitus education and counseling:    Patient was educated in detail about the importance of keeping diabetes under control to improve the cure rate of infection and prevent future infections. Patient was advised to check blood glucose level regularly and to stay compliant with the diabetes medications. Patient was advised to the trim the toe nails carefully, wear shoes or slippers at all times and check both feet everyday before going to bed to look for any cuts, blisters, swelling or redness. Importance of washing the feet everyday with soap and water and keeping them dry, and seeking prompt medical attention in case of a non-healing wound or ulcer on the feet was also highlighted.        TIME SPENT TODAY:     - Spent over  36 minutes on visit (including interval history, physical exam, review of data including labs, cultures, imaging, development and implementation of treatment plan and coordination of complex care). More than 50 percent of this includes face-to-face time spent with the patient for counseling and coordination of care. Thank you for involving me in the care of your patient. I will continue to follow. If you have anyadditional questions, please do not hesitate to contact me. Subjective: Interval history: Interval history was obtained from chart review and patient/ RN. He had a fever 200.9 today. He is tolerating antibiotics okay. No diarrhea     REVIEW OF SYSTEMS:     Review of Systems   Constitutional: Positive for fatigue and fever. Negative for chills and diaphoresis. HENT: Negative for ear discharge, ear pain, rhinorrhea, sore throat and trouble swallowing. Eyes: Negative for discharge and redness. Respiratory: Negative for cough, shortness of breath and wheezing. Cardiovascular: Negative for chest pain and leg swelling. Gastrointestinal: Negative for abdominal pain, constipation, diarrhea and nausea. Endocrine: Negative for polyuria. Genitourinary: Negative for dysuria, flank pain, frequency, hematuria and urgency. Musculoskeletal: Negative for back pain and myalgias. Skin: Negative for rash. Neurological: Negative for dizziness, seizures and headaches. Hematological: Does not bruise/bleed easily. Psychiatric/Behavioral: Negative for hallucinations and suicidal ideas. All other systems reviewed and are negative. Past Medical History: All past medical history reviewed today. Past Medical History:   Diagnosis Date    COVID-19     Diabetes mellitus, type II ([facility] [address]     History of blood transfusion     Hypertension     Protein in urine     Seizure [facility])        Past Surgical History: All past surgical history was reviewed today.     Past Surgical History:   Procedure Laterality Date    BRONCHOSCOPY  6/9/2022    BRONCHOSCOPY BRUSHINGS performed by Leonardo Mims MD at [address] [place] [address]  6/9/2022    BRONCHOSCOPY DIAGNOSTIC OR CELL [address] performed by Leonardo Mims MD at [address] [place] [address]  6/9/2022    BRONCHOSCOPY BIOPSY BRONCHUS performed by Leonardo Mims MD at [address] [place] [address] N/A 6/14/2022    BRONCHOSCOPY DIAGNOSTIC OR CELL [address] ONLY performed by Duke Durbin DO at [facility] ENDOSCOPY       Family History: All family history was reviewed today. History reviewed. No pertinent family history. Objective:       PHYSICAL EXAM:      Vitals:   Vitals:    06/16/22 0625 06/16/22 0741 06/16/22 0750 06/16/22 1630   BP: 119/73 (!) 149/95 126/76 119/73   Pulse: (!) 116 (!) 213 (!) 114 (!) 104   Resp: 18 16 17 16   Temp: (!) 100.6 °F (38.1 °C) (!) 100.9 °F (38.3 °C) 99 °F (37.2 °C) 100.4 °F (38 °C)   TempSrc: Oral Oral Oral Oral   SpO2: 93% 94% 93% 97%   Weight: 261 lb 3.9 oz (118.5 kg)      Height:       PF:           Physical Exam  Vitals and nursing note reviewed. Constitutional:       Appearance: Normal appearance. He is well-developed. Comments: Appears tired   HENT:      Head: Normocephalic and atraumatic. Right Ear: External ear normal.      Left Ear: External ear normal.      Nose: Nose normal. No congestion or rhinorrhea. Mouth/Throat:      Mouth: Mucous membranes are moist.      Pharynx: No oropharyngeal exudate or posterior oropharyngeal erythema. Eyes:      General: No scleral icterus. Right eye: No discharge. Left eye: No discharge. Conjunctiva/sclera: Conjunctivae normal.      Pupils: Pupils are equal, round, and reactive to light. Cardiovascular:      Rate and Rhythm: Normal rate and regular rhythm. Pulses: Normal pulses. Heart sounds: No murmur heard. No friction rub. Pulmonary:      Effort: Pulmonary effort is normal. No respiratory distress. Breath sounds: Normal breath sounds. No stridor. No wheezing, rhonchi or rales. Abdominal:      General: Bowel sounds are normal.      Palpations: Abdomen is soft. Tenderness: There is no abdominal tenderness. There is no right CVA tenderness, left CVA tenderness, guarding or rebound. Musculoskeletal:         General: No swelling or tenderness. Normal range of motion. Cervical back: Normal range of motion and neck supple. No rigidity. No muscular tenderness. Lymphadenopathy:      Cervical: No cervical adenopathy. Skin:     General: Skin is warm and dry. Coloration: Skin is not jaundiced. Findings: No erythema or rash. Neurological:      General: No focal deficit present. Mental Status: He is alert and oriented to person, place, and time. Mental status is at baseline. Motor: No abnormal muscle tone. Psychiatric:         Mood and Affect: Mood normal.         Behavior: Behavior normal.         Thought Content: Thought content normal.            Lines and drains: All vascular access sites are healthy with no local erythema, discharge or tenderness. Intake and output:    I/O last 3 completed shifts: In: 240 [P.O.:240]  Out: -     Lab Data:   All available labs and old records have been reviewed by me.     CBC:  Recent Labs     06/14/22  0753   HGB 13.9   HCT 42.4        BMP:  Recent Labs     06/14/22  0753 06/15/22  0720 06/16/22  0827    136 131*   K 4.3 3.9 4.4    101 97*   CO2 23 22 21   BUN 32* 30* 24*   CREATININE 1.3 1.3 1.5*   CALCIUM 9.2 9.0 9.0   GLUCOSE 222* 165* 151*        Hepatic Function Panel:   Lab Results   Component Value Date    ALKPHOS 106 06/13/2022    ALT 13 06/13/2022    AST 10 06/13/2022    PROT 7.1 06/13/2022    BILITOT <0.2 06/13/2022    BILIDIR <0.2 06/13/2022    IBILI see below 06/13/2022    LABALBU 3.2 06/13/2022       CPK: No results found for: CKTOTAL  ESR:   Lab Results   Component Value Date    SEDRATE >130 (H) 06/11/2022     CRP: Lab Results   Component Value Date    CRP 62.4 (H) 06/03/2020           Imaging: All pertinent images and reports for the current visit were reviewed by me during this visit. I reviewed the chest x-ray/CT scan/MRI images and independently interpreted the findings and results today. XR CHEST PORTABLE   Final Result   Stable chest.  Persistent right upper lobe infiltrate consistent with   pneumonia. Continued plain film follow-up recommended. XR CHEST 1 VIEW   Final Result      FLUORO FOR SURGICAL PROCEDURES   Final Result      MRI BRAIN WO CONTRAST   Final Result   There are few areas of high signal in the periventricular white matter. These are nonspecific. Demyelination could be considered in the appropriate   clinical setting. Chronic small vessel ischemic changes, vasculitis, or   migraines can cause a similar finding. No other brain parenchymal   abnormality. CT CHEST WO CONTRAST   Final Result   1. Progressive consolidation in the anterior segment of the right upper lobe   consistent with pneumonia. There is no evidence of cavitation. The findings   are nonspecific but most consistent with bacterial etiology. 2. The remainder of the lungs are clear. No pleural fluid. 3. Stable small mediastinal nodes. The right hilar node noted previously is   not evaluated due to the lack of intravenous contrast.         XR CHEST PORTABLE   Final Result   Redemonstration of right upper lobe infiltrate which is likely pneumonic. It   is slightly larger compared to the previous evaluation. Medications: All current and past medications were reviewed.      amoxicillin-clavulanate  1 tablet Oral 2 times per day    guaiFENesin-codeine  5 mL Oral Q4H    itraconazole  200 mg Oral TID    Followed by   Luis Daniel Swift ON 6/17/2022] itraconazole  200 mg Oral BID    insulin glargine  40 Units SubCUTAneous Daily    insulin lispro  8 Units SubCUTAneous TID     sodium chloride flush  10 mL IntraVENous 2 times per day    [Held by provider] aspirin  81 mg Oral Daily    carvedilol  25 mg Oral BID WC    dicyclomine  10 mg Oral 4x Daily AC & HS    DULoxetine  30 mg Oral Daily    gabapentin  300 mg Oral TID    NIFEdipine  60 mg Oral Daily    rosuvastatin  40 mg Oral Nightly    insulin lispro  0-12 Units SubCUTAneous TID WC    insulin lispro  0-6 Units SubCUTAneous Nightly    [Held by provider] enoxaparin  30 mg SubCUTAneous BID        sodium chloride 75 mL/hr at 06/16/22 1654    sodium chloride Stopped (06/11/22 1904)    dextrose         sodium chloride flush, sodium chloride, promethazine **OR** ondansetron, magnesium hydroxide, acetaminophen **OR** acetaminophen, albuterol sulfate HFA, glucose, dextrose bolus **OR** dextrose bolus, glucagon (rDNA), dextrose      Problem list:       Patient Active Problem List   Diagnosis Code    Poorly controlled type 2 diabetes mellitus ([facility] [address] E11.65    Elevated troponin R77.8    Essential hypertension I10    SOB (shortness of breath) R06.02    CKD (chronic kidney disease) N18.9    Acute renal failure (HCC) N17.9    Community acquired pneumonia of right upper lobe of lung J18.9    Abnormal CT of the chest R93.89    Endobronchial mass R91.8    Hemoptysis R04.2    Streptococcal infection A49.1    S/P bronchoscopy Z98.890    Seizure disorder (HCC) G40.909    Class 1 obesity due to excess calories with body mass index (BMI) of 34.0 to 34.9 in adult E66.09, Z68.34       Please note that this chart was generated using Dragon dictation software. Although every effort was made to ensure the accuracy of this automated transcription, some errors in transcription may have occurred inadvertently. If you may need any clarification, please do not hesitate to contact me through EPIC or at the phone number provided below with my electronic signature.   Any pictures or media included in this note were obtained after taking informed verbal consent from the patient

## 2022-06-16 NOTE — PLAN OF CARE
Problem: Discharge Planning  Goal: Discharge to home or other facility with appropriate resources  6/16/2022 0106 by Michaela Lynn RN  Outcome: Progressing  6/15/2022 1107 by Carmelo Wall RN  Outcome: Progressing     Problem: Pain  Goal: Verbalizes/displays adequate comfort level or baseline comfort level  6/16/2022 0106 by Michaela Lynn RN  Outcome: Progressing  6/15/2022 1107 by Carmelo Wall RN  Outcome: Progressing     Problem: Metabolic/Fluid and Electrolytes - Adult  Goal: Electrolytes maintained within normal limits  6/16/2022 0106 by Michaela Lynn RN  Outcome: Progressing  6/15/2022 1107 by Carmelo Wall RN  Outcome: Progressing  Goal: Hemodynamic stability and optimal renal function maintained  6/16/2022 0106 by Michaela Lynn RN  Outcome: Progressing  6/15/2022 1107 by Carmelo Wall RN  Outcome: Progressing  Goal: Glucose maintained within prescribed range  6/16/2022 0106 by Michaela Lynn RN  Outcome: Progressing  6/15/2022 1107 by Carmelo Wall RN  Outcome: Progressing     Problem: Gastrointestinal - Adult  Goal: Minimal or absence of nausea and vomiting  6/16/2022 0106 by Michaela Lynn RN  Outcome: Progressing  6/15/2022 1107 by Carmelo Wall RN  Outcome: Progressing  Goal: Maintains or returns to baseline bowel function  6/16/2022 0106 by Michaela Lynn RN  Outcome: Progressing  6/15/2022 1107 by Carmelo Wall RN  Outcome: Progressing  Goal: Maintains adequate nutritional intake  6/16/2022 0106 by Michaela Lynn RN  Outcome: Progressing  6/15/2022 1107 by Carmelo Wall RN  Outcome: Progressing     Problem: Safety - Adult  Goal: Free from fall injury  6/16/2022 0106 by Michaela Lynn RN  Outcome: Progressing  Flowsheets (Taken 6/15/2022 2326)  Free From Fall Injury: Instruct family/caregiver on patient safety  6/15/2022 1107 by Carmelo Wall RN  Outcome: Progressing     Problem: ABCDS Injury Assessment  Goal: Absence of physical injury  6/16/2022 0106 by Michaela Lynn RN  Outcome: Progressing  6/15/2022 1107 by Wally Dudley RN  Outcome: Progressing     Problem: Chronic Conditions and Co-morbidities  Goal: Patient's chronic conditions and co-morbidity symptoms are monitored and maintained or improved  6/16/2022 0106 by Sebastián Valdez RN  Outcome: Progressing  6/15/2022 1107 by Wally Dudley RN  Outcome: Progressing

## 2022-06-17 LAB
ANAEROBIC CULTURE: ABNORMAL
ANION GAP SERPL CALCULATED.3IONS-SCNC: 13 MMOL/L (ref 3–16)
BASOPHILS ABSOLUTE: 0 K/UL (ref 0–0.2)
BASOPHILS RELATIVE PERCENT: 0.7 %
BUN BLDV-MCNC: 24 MG/DL (ref 7–20)
CALCIUM SERPL-MCNC: 9 MG/DL (ref 8.3–10.6)
CHLORIDE BLD-SCNC: 98 MMOL/L (ref 99–110)
CO2: 22 MMOL/L (ref 21–32)
CREAT SERPL-MCNC: 1.4 MG/DL (ref 0.9–1.3)
EOSINOPHILS ABSOLUTE: 0 K/UL (ref 0–0.6)
EOSINOPHILS RELATIVE PERCENT: 0.1 %
GFR AFRICAN AMERICAN: >60
GFR NON-AFRICAN AMERICAN: 58
GLUCOSE BLD-MCNC: 144 MG/DL (ref 70–99)
GLUCOSE BLD-MCNC: 156 MG/DL (ref 70–99)
GLUCOSE BLD-MCNC: 157 MG/DL (ref 70–99)
GLUCOSE BLD-MCNC: 165 MG/DL (ref 70–99)
GLUCOSE BLD-MCNC: 186 MG/DL (ref 70–99)
GRAM STAIN RESULT: ABNORMAL
HCT VFR BLD CALC: 38.9 % (ref 40.5–52.5)
HEMOGLOBIN: 12.5 G/DL (ref 13.5–17.5)
LYMPHOCYTES ABSOLUTE: 0.8 K/UL (ref 1–5.1)
LYMPHOCYTES RELATIVE PERCENT: 13.9 %
MCH RBC QN AUTO: 25.1 PG (ref 26–34)
MCHC RBC AUTO-ENTMCNC: 32.2 G/DL (ref 31–36)
MCV RBC AUTO: 77.9 FL (ref 80–100)
MONOCYTES ABSOLUTE: 0.4 K/UL (ref 0–1.3)
MONOCYTES RELATIVE PERCENT: 6.5 %
NEUTROPHILS ABSOLUTE: 4.7 K/UL (ref 1.7–7.7)
NEUTROPHILS RELATIVE PERCENT: 78.8 %
ORGANISM: ABNORMAL
ORGANISM: ABNORMAL
PDW BLD-RTO: 12.3 % (ref 12.4–15.4)
PERFORMED ON: ABNORMAL
PLATELET # BLD: 275 K/UL (ref 135–450)
PMV BLD AUTO: 7.5 FL (ref 5–10.5)
POTASSIUM REFLEX MAGNESIUM: 4.6 MMOL/L (ref 3.5–5.1)
RBC # BLD: 5 M/UL (ref 4.2–5.9)
SODIUM BLD-SCNC: 133 MMOL/L (ref 136–145)
WBC # BLD: 6 K/UL (ref 4–11)
WOUND/ABSCESS: ABNORMAL

## 2022-06-17 PROCEDURE — 6370000000 HC RX 637 (ALT 250 FOR IP): Performed by: INTERNAL MEDICINE

## 2022-06-17 PROCEDURE — 80048 BASIC METABOLIC PNL TOTAL CA: CPT

## 2022-06-17 PROCEDURE — 36415 COLL VENOUS BLD VENIPUNCTURE: CPT

## 2022-06-17 PROCEDURE — 94760 N-INVAS EAR/PLS OXIMETRY 1: CPT

## 2022-06-17 PROCEDURE — 99232 SBSQ HOSP IP/OBS MODERATE 35: CPT | Performed by: INTERNAL MEDICINE

## 2022-06-17 PROCEDURE — 2580000003 HC RX 258: Performed by: INTERNAL MEDICINE

## 2022-06-17 PROCEDURE — 1200000000 HC SEMI PRIVATE

## 2022-06-17 PROCEDURE — 99233 SBSQ HOSP IP/OBS HIGH 50: CPT | Performed by: INTERNAL MEDICINE

## 2022-06-17 PROCEDURE — 6360000002 HC RX W HCPCS: Performed by: INTERNAL MEDICINE

## 2022-06-17 PROCEDURE — 85025 COMPLETE CBC W/AUTO DIFF WBC: CPT

## 2022-06-17 RX ADMIN — ROSUVASTATIN CALCIUM 40 MG: 40 TABLET, FILM COATED ORAL at 20:34

## 2022-06-17 RX ADMIN — INSULIN GLARGINE 40 UNITS: 100 INJECTION, SOLUTION SUBCUTANEOUS at 08:54

## 2022-06-17 RX ADMIN — GUAIFENESIN AND CODEINE PHOSPHATE 5 ML: 10; 100 LIQUID ORAL at 01:46

## 2022-06-17 RX ADMIN — GABAPENTIN 300 MG: 300 CAPSULE ORAL at 14:08

## 2022-06-17 RX ADMIN — DICYCLOMINE HYDROCHLORIDE 10 MG: 10 CAPSULE ORAL at 17:07

## 2022-06-17 RX ADMIN — DICYCLOMINE HYDROCHLORIDE 10 MG: 10 CAPSULE ORAL at 12:00

## 2022-06-17 RX ADMIN — DICYCLOMINE HYDROCHLORIDE 10 MG: 10 CAPSULE ORAL at 06:34

## 2022-06-17 RX ADMIN — ITRACONAZOLE 200 MG: 100 CAPSULE, COATED PELLETS ORAL at 20:34

## 2022-06-17 RX ADMIN — GUAIFENESIN AND CODEINE PHOSPHATE 5 ML: 10; 100 LIQUID ORAL at 06:34

## 2022-06-17 RX ADMIN — GUAIFENESIN AND CODEINE PHOSPHATE 5 ML: 10; 100 LIQUID ORAL at 20:35

## 2022-06-17 RX ADMIN — INSULIN LISPRO 8 UNITS: 100 INJECTION, SOLUTION INTRAVENOUS; SUBCUTANEOUS at 17:07

## 2022-06-17 RX ADMIN — GABAPENTIN 300 MG: 300 CAPSULE ORAL at 08:54

## 2022-06-17 RX ADMIN — ITRACONAZOLE 200 MG: 100 CAPSULE, COATED PELLETS ORAL at 09:03

## 2022-06-17 RX ADMIN — GUAIFENESIN AND CODEINE PHOSPHATE 5 ML: 10; 100 LIQUID ORAL at 17:07

## 2022-06-17 RX ADMIN — INSULIN LISPRO 8 UNITS: 100 INJECTION, SOLUTION INTRAVENOUS; SUBCUTANEOUS at 12:01

## 2022-06-17 RX ADMIN — SODIUM CHLORIDE, PRESERVATIVE FREE 10 ML: 5 INJECTION INTRAVENOUS at 20:35

## 2022-06-17 RX ADMIN — AMOXICILLIN AND CLAVULANATE POTASSIUM 1 TABLET: 875; 125 TABLET, FILM COATED ORAL at 20:34

## 2022-06-17 RX ADMIN — INSULIN LISPRO 8 UNITS: 100 INJECTION, SOLUTION INTRAVENOUS; SUBCUTANEOUS at 08:54

## 2022-06-17 RX ADMIN — NIFEDIPINE 60 MG: 60 TABLET, EXTENDED RELEASE ORAL at 08:54

## 2022-06-17 RX ADMIN — INSULIN LISPRO 1 UNITS: 100 INJECTION, SOLUTION INTRAVENOUS; SUBCUTANEOUS at 20:35

## 2022-06-17 RX ADMIN — GUAIFENESIN AND CODEINE PHOSPHATE 5 ML: 10; 100 LIQUID ORAL at 09:04

## 2022-06-17 RX ADMIN — DICYCLOMINE HYDROCHLORIDE 10 MG: 10 CAPSULE ORAL at 20:34

## 2022-06-17 RX ADMIN — SODIUM CHLORIDE: 9 INJECTION, SOLUTION INTRAVENOUS at 01:50

## 2022-06-17 RX ADMIN — DULOXETINE HYDROCHLORIDE 30 MG: 30 CAPSULE, DELAYED RELEASE ORAL at 08:54

## 2022-06-17 RX ADMIN — INSULIN LISPRO 2 UNITS: 100 INJECTION, SOLUTION INTRAVENOUS; SUBCUTANEOUS at 12:01

## 2022-06-17 RX ADMIN — PREDNISONE 20 MG: 20 TABLET ORAL at 08:54

## 2022-06-17 RX ADMIN — CARVEDILOL 25 MG: 25 TABLET, FILM COATED ORAL at 17:07

## 2022-06-17 RX ADMIN — INSULIN LISPRO 2 UNITS: 100 INJECTION, SOLUTION INTRAVENOUS; SUBCUTANEOUS at 08:54

## 2022-06-17 RX ADMIN — ONDANSETRON 4 MG: 2 INJECTION INTRAMUSCULAR; INTRAVENOUS at 12:15

## 2022-06-17 RX ADMIN — SODIUM CHLORIDE, PRESERVATIVE FREE 10 ML: 5 INJECTION INTRAVENOUS at 08:55

## 2022-06-17 RX ADMIN — GUAIFENESIN AND CODEINE PHOSPHATE 5 ML: 10; 100 LIQUID ORAL at 14:08

## 2022-06-17 RX ADMIN — INSULIN LISPRO 2 UNITS: 100 INJECTION, SOLUTION INTRAVENOUS; SUBCUTANEOUS at 17:07

## 2022-06-17 RX ADMIN — GABAPENTIN 300 MG: 300 CAPSULE ORAL at 20:34

## 2022-06-17 RX ADMIN — CARVEDILOL 25 MG: 25 TABLET, FILM COATED ORAL at 08:54

## 2022-06-17 RX ADMIN — AMOXICILLIN AND CLAVULANATE POTASSIUM 1 TABLET: 875; 125 TABLET, FILM COATED ORAL at 08:54

## 2022-06-17 ASSESSMENT — ENCOUNTER SYMPTOMS
BACK PAIN: 0
SHORTNESS OF BREATH: 0
WHEEZING: 0
RHINORRHEA: 0
SORE THROAT: 0
CONSTIPATION: 0
EYE REDNESS: 0
COUGH: 0
ABDOMINAL PAIN: 0
TROUBLE SWALLOWING: 0
NAUSEA: 0
DIARRHEA: 0
EYE DISCHARGE: 0

## 2022-06-17 ASSESSMENT — PAIN SCALES - GENERAL
PAINLEVEL_OUTOF10: 3
PAINLEVEL_OUTOF10: 5
PAINLEVEL_OUTOF10: 3
PAINLEVEL_OUTOF10: 2
PAINLEVEL_OUTOF10: 0
PAINLEVEL_OUTOF10: 3
PAINLEVEL_OUTOF10: 0

## 2022-06-17 ASSESSMENT — PAIN SCALES - WONG BAKER
WONGBAKER_NUMERICALRESPONSE: 2
WONGBAKER_NUMERICALRESPONSE: 2

## 2022-06-17 ASSESSMENT — PAIN DESCRIPTION - LOCATION
LOCATION: HEAD
LOCATION: HEAD;FOOT

## 2022-06-17 ASSESSMENT — PAIN DESCRIPTION - PAIN TYPE: TYPE: ACUTE PAIN

## 2022-06-17 ASSESSMENT — PAIN DESCRIPTION - ORIENTATION: ORIENTATION: MID

## 2022-06-17 ASSESSMENT — PAIN - FUNCTIONAL ASSESSMENT
PAIN_FUNCTIONAL_ASSESSMENT: ACTIVITIES ARE NOT PREVENTED
PAIN_FUNCTIONAL_ASSESSMENT: ACTIVITIES ARE NOT PREVENTED

## 2022-06-17 ASSESSMENT — PAIN DESCRIPTION - DESCRIPTORS
DESCRIPTORS: ACHING
DESCRIPTORS: ACHING

## 2022-06-17 ASSESSMENT — PAIN DESCRIPTION - FREQUENCY: FREQUENCY: INTERMITTENT

## 2022-06-17 ASSESSMENT — PAIN DESCRIPTION - ONSET: ONSET: GRADUAL

## 2022-06-17 NOTE — PROGRESS NOTES
Pt is alert and oriented x4. Sitting in chair resting comfortably. Pt tolerated 100% of breakfast and fluids this morning. Has a peripheral IV in left antecubital. Complaints of H/A at 1 on a 0-10 scale. Call light is within reach, able to make needs known, fall precautions in place, will monitor.  Electronically signed by Erik Cortez on 6/17/2022 at 10:25 AM

## 2022-06-17 NOTE — PLAN OF CARE
Problem: Discharge Planning  Goal: Discharge to home or other facility with appropriate resources  6/17/2022 1134 by Hedy Bernal RN  Outcome: Progressing  Flowsheets (Taken 6/17/2022 0831 by Otilio [facility])  Discharge to home or other facility with appropriate resources: Identify barriers to discharge with patient and caregiver     Problem: Pain  Goal: Verbalizes/displays adequate comfort level or baseline comfort level  6/17/2022 1134 by Hedy Bernal RN  Outcome: Progressing     Problem: Metabolic/Fluid and Electrolytes - Adult  Goal: Electrolytes maintained within normal limits  6/17/2022 1134 by Hedy Bernal RN  Outcome: Progressing  Flowsheets (Taken 6/17/2022 0831 by Otilio Party)  Electrolytes maintained within normal limits: Monitor labs and assess patient for signs and symptoms of electrolyte imbalances     Problem: Metabolic/Fluid and Electrolytes - Adult  Goal: Hemodynamic stability and optimal renal function maintained  6/17/2022 1134 by Hedy Bernal RN  Outcome: Progressing  Flowsheets (Taken 6/17/2022 0831 by Otilio Party)  Hemodynamic stability and optimal renal function maintained: Monitor labs and assess for signs and symptoms of volume excess or deficit     Problem: Metabolic/Fluid and Electrolytes - Adult  Goal: Glucose maintained within prescribed range  6/17/2022 1134 by Hedy Bernal RN  Outcome: Progressing  Flowsheets (Taken 6/17/2022 0831 by Otilio Party)  Glucose maintained within prescribed range: Monitor blood glucose as ordered     Problem: Gastrointestinal - Adult  Goal: Minimal or absence of nausea and vomiting  6/17/2022 1134 by Hedy Bernal RN  Outcome: Progressing     Problem: Gastrointestinal - Adult  Goal: Maintains or returns to baseline bowel function  6/17/2022 1134 by Hedy Bernal RN  Outcome: Progressing     Problem: Gastrointestinal - Adult  Goal: Maintains adequate nutritional intake  6/17/2022 1134 by Hedy Bernal RN  Outcome: Progressing  Flowsheets (Taken 6/17/2022 0831 by Nellie Alejo)  Maintains adequate nutritional intake: Monitor percentage of each meal consumed     Problem: Safety - Adult  Goal: Free from fall injury  6/17/2022 1134 by Judith Mulligan RN  Outcome: Progressing     Problem: ABCDS Injury Assessment  Goal: Absence of physical injury  6/17/2022 1134 by Judith Mulligan RN  Outcome: Progressing

## 2022-06-17 NOTE — PROGRESS NOTES
Hospitalist Progress Note      PCP: Shirin Back MD, MD    Date of Admission: 6/6/2022        Subjective: Is okay, his weakness is getting better, had some hemoptysis minimal though. No abdominal pain no diarrhea      Medications:  Reviewed    Infusion Medications    sodium chloride 75 mL/hr at 06/17/22 0636    sodium chloride Stopped (06/11/22 1904)    dextrose       Scheduled Medications    predniSONE  20 mg Oral Daily    amoxicillin-clavulanate  1 tablet Oral 2 times per day    guaiFENesin-codeine  5 mL Oral Q4H    itraconazole  200 mg Oral BID    insulin glargine  40 Units SubCUTAneous Daily    insulin lispro  8 Units SubCUTAneous TID WC    sodium chloride flush  10 mL IntraVENous 2 times per day    [Held by provider] aspirin  81 mg Oral Daily    carvedilol  25 mg Oral BID WC    dicyclomine  10 mg Oral 4x Daily AC & HS    DULoxetine  30 mg Oral Daily    gabapentin  300 mg Oral TID    NIFEdipine  60 mg Oral Daily    rosuvastatin  40 mg Oral Nightly    insulin lispro  0-12 Units SubCUTAneous TID WC    insulin lispro  0-6 Units SubCUTAneous Nightly    [Held by provider] enoxaparin  30 mg SubCUTAneous BID     PRN Meds: sodium chloride flush, sodium chloride, promethazine **OR** ondansetron, magnesium hydroxide, acetaminophen **OR** acetaminophen, albuterol sulfate HFA, glucose, dextrose bolus **OR** dextrose bolus, glucagon (rDNA), dextrose      Intake/Output Summary (Last 24 hours) at 6/17/2022 1455  Last data filed at 6/17/2022 0636  Gross per 24 hour   Intake 1485.72 ml   Output --   Net 1485.72 ml       Physical Exam Performed:    BP (!) 138/93   Pulse (!) 103   Temp 97.9 °F (36.6 °C) (Oral)   Resp 16   Ht 6' 1\" (1.854 m)   Wt 262 lb 5.6 oz (119 kg)   SpO2 95%   PF (!) 18 L/min   BMI 34.61 kg/m²     General appearance: No apparent distress  Neck: Supple  Respiratory:  Normal respiratory effort.  Clear to auscultation, bilaterally without Rales/Wheezes/Rhonchi. Cardiovascular: Regular rate and rhythm with normal S1/S2 without murmurs, rubs or gallops. Abdomen: Soft, non-tender, non-distended   Musculoskeletal: No clubbing, cyanosis   Skin: Skin color, texture, turgor normal.  No rashes or lesions. Neurologic:  No focal weakness   Psychiatric: Alert and oriented  Capillary Refill: Brisk,3 seconds, normal   Peripheral Pulses: +2 palpable, equal bilaterally       Labs:   Recent Labs     06/17/22  0711   WBC 6.0   HGB 12.5*   HCT 38.9*        Recent Labs     06/15/22  0720 06/16/22  0827 06/17/22  0711    131* 133*   K 3.9 4.4 4.6    97* 98*   CO2 22 21 22   BUN 30* 24* 24*   CREATININE 1.3 1.5* 1.4*   CALCIUM 9.0 9.0 9.0     No results for input(s): AST, ALT, BILIDIR, BILITOT, ALKPHOS in the last 72 hours. No results for input(s): INR in the last 72 hours. No results for input(s): Curlie Lat in the last 72 hours. Urinalysis:      Lab Results   Component Value Date    NITRU Negative 06/07/2022    WBCUA 1 06/07/2022    BACTERIA None Seen 06/07/2022    RBCUA 1 06/07/2022    BLOODU TRACE-INTACT 06/07/2022    SPECGRAV >=1.030 06/07/2022    GLUCOSEU >=1000 06/07/2022    GLUCOSEU NEGATIVE 01/02/2011       Radiology:  XR CHEST PORTABLE   Final Result   Stable chest.  Persistent right upper lobe infiltrate consistent with   pneumonia. Continued plain film follow-up recommended. XR CHEST 1 VIEW   Final Result      FLUORO FOR SURGICAL PROCEDURES   Final Result      MRI BRAIN WO CONTRAST   Final Result   There are few areas of high signal in the periventricular white matter. These are nonspecific. Demyelination could be considered in the appropriate   clinical setting. Chronic small vessel ischemic changes, vasculitis, or   migraines can cause a similar finding. No other brain parenchymal   abnormality. CT CHEST WO CONTRAST   Final Result   1.  Progressive consolidation in the anterior segment of the right upper lobe   consistent with pneumonia. There is no evidence of cavitation. The findings   are nonspecific but most consistent with bacterial etiology. 2. The remainder of the lungs are clear. No pleural fluid. 3. Stable small mediastinal nodes. The right hilar node noted previously is   not evaluated due to the lack of intravenous contrast.         XR CHEST PORTABLE   Final Result   Redemonstration of right upper lobe infiltrate which is likely pneumonic. It   is slightly larger compared to the previous evaluation. Assessment/Plan:    Active Hospital Problems    Diagnosis     Fever [R50.9]      Priority: Medium    Hemoptysis [R04.2]      Priority: Medium    Streptococcal infection [A49.1]      Priority: Medium    S/P bronchoscopy [Z98.890]      Priority: Medium    Seizure disorder ([facility] [address] [G40.909]      Priority: Medium    Class 1 obesity due to excess calories with body mass index (BMI) of 34.0 to 34.9 in adult [E66.09, Z68.34]      Priority: Medium    Abnormal CT of the chest [R93.89]      Priority: Medium    Endobronchial mass [R91.8]      Priority: Medium    Community acquired pneumonia of right upper lobe of lung [J18.9]      Priority: Medium    Acute renal failure ([facility] [address] [N17.9]      Priority: Medium    Poorly controlled type 2 diabetes mellitus ([facility] [address] [E11.65]      Priority: Medium    Essential hypertension [I10]      Priority: Medium     1.  Abnormal imaging on CT scan along with positive histoplasma antibody, ID consulted status post bronchoscopy as patient had hemoptysis.  ID ordered serum histoplasma antigen.  Started on empiric itraconazole. Added steroids per ID  2.  Streptococcal pneumonia, was on Omnicef changed to Augmentin per ID. 3.   Hemoptysis, status post bronchoscopy, likely hemorrhage from previous site of biopsy.  Monitor for now pulmonology following.   4. Generalized weakness, improving  5.  CHELE on top of CKD stage III, creatinine 1.5 this morning  6.  Diabetes mellitus, sliding scale.  Much better controlled  7.  Essential hypertension, p.o. medications    Diet: ADULT DIET; Regular; 4 carb choices (60 gm/meal);  Low Fat/Low Chol/High Fiber/BREE  Code Status: Full Code        Oliev Fabian MD

## 2022-06-17 NOTE — PROGRESS NOTES
Infectious Diseases   Progress Note      Admission Date: 6/6/2022  Hospital Day: Hospital Day: 12   Attending: Trevon Boyle MD  Date of service: 6/17/2022     Chief complaint/ Reason for consult:     · Streptococcus parasanguinis infection of the lungs  · Positive histoplasma serology by immunodiffusion  · Positive 1, 3 beta D glucan in the blood on 6/20/2022  · Negative HIV screen on 6/9/2022  · S/p bronchoscopy on 6/9/2022 and 6/14/22  · Right upper lobe endobronchial lesion    Microbiology:        I have reviewed allavailable micro lab data and cultures    · Lung tissue biopsy culture  - collected on 6/9/2022, Streptococcus parasanguinis      Antibiotics and immunizations:       Current antibiotics: All antibiotics and their doses were reviewed by me    Recent Abx Admin                   itraconazole (SPORANOX) capsule 200 mg (mg) 200 mg Given 06/17/22 0903     200 mg Given 06/16/22 2111    amoxicillin-clavulanate (AUGMENTIN) 875-125 MG per tablet 1 tablet (tablet) 1 tablet Given 06/17/22 0854     1 tablet Given 06/16/22 2111                  Immunization History: All immunization history was reviewed by me today. There is no immunization history on file for this patient. Known drug allergies: All allergies were reviewed and updated    No Known Allergies    Social history:     Social History:  All social andepidemiologic history was reviewed and updated by me today as needed. · Tobacco use:   reports that he has never smoked. He has never used smokeless tobacco.  · Alcohol use:   reports current alcohol use. · Currently lives in: [address] [place] [address] Road  ·  reports previous drug use.      COVID VACCINATION AND LAB RESULT RECORDS:     Internal Administration   First Dose      Second Dose           Last COVID Lab SARS-CoV-2, NAAT (no units)   Date Value   06/06/2022 Not Detected            Assessment:     The patient is a 29 y.o. old male who  has a past medical history of COVID-19, Diabetes mellitus, type II ([facility] [address] History of blood transfusion, Hypertension, Protein in urine, and Seizure ([facility] [address] with following problems:    · Streptococcus parasanguinis infection of the lungs-lung tissue culture has also grown Streptococcus parasanguinis and Prevotella-both are covered with Augmentin  · High fever--likely inflammatory response, which was previously being controlled with steroids  · Positive histoplasma serology by immunodiffusion-only indicates exposure to histoplasma  - currently on empiric itraconazole -histoplasma antigens and cultures have been requested  · Positive 1, 3 beta D glucan in the blood on 6/20/2022  · Negative HIV screen on 6/9/2022  · S/p bronchoscopy on 6/9/2022 and 6/14/22  · Right upper lobe endobronchial lesion  · History of COVID-19  · Poorly controlled type 2 diabetes mellitus-maintain good glycemic control  · Essential hypertension  · Seizure disorder  · Chronic kidney disease stage III  · Obesity Class 1 due to excess calorie intake : Body mass index is 34.54 kg/m².-Counseling done         Discussion:      The patient is afebrile. He is on oral Augmentin. The lung tissue culture from 6/9/2022 have also grown Streptococcus parasanguinis and Prevotella. This would be covered with Augmentin. Candida isolated from the bronchial washing represents oral debra and does not need coverage. He is also on itraconazole due to concern for possibility of histoplasmosis. I had started a low-dose oral prednisone yesterday and the fever curve is starting to come down as expected. Serum creatinine is 1.4 today. White cell count is 6000. Plan:     Diagnostic Workup:    · Follow-up on pending histoplasma serologies and antigens  · Continue to follow  fever curve, WBC count and blood cultures. · Continue to monitor blood counts, liver and renal function.     Antimicrobials:    · Will continue oral Augmentin 875 mg every 12 hour for 10 more days until 6/27/2022  · Continue oral prednisone 20 mg daily for 5 more days  · Continue oral itraconazole 200 mg every 12 hour for at least 3 more weeks  · If the patient gets discharged, recommend outpatient follow-up with Dr. Cristina Tracy in 3 weeks to assess the need for further continuation of antifungal medications depending upon the pending work-up for histoplasmosis  · Cough and deep reaccessing  · Aspiration precautions  · DVT prophylaxis  · Continue close vitals monitoring. · Maintain good glycemic control. · Fall precautions. · Aspiration precautions. · Continue to watch for new fever or diarrhea. · DVT prophylaxis. · Discussed all above with patient and RN. Drug Monitoring:    · Continue monitoring for antibiotic toxicity as follows: CBC, CMP   · Continue to watch for following: new or worsening fever, new hypotension, hives, lip swelling and redness or purulence at vascular access sites. I/v access Management:    · Continue to monitor i.v access sites for erythema, induration, discharge or tenderness. · As always, continue efforts to minimize tubes/lines/drains as clinically appropriate to reduce chances of line associated infections. Patient education and counseling:        · The patient was educated in detail about the side-effects of various antibiotics and things to watch for like new rashes, lip swelling, severe reaction, worsening diarrhea, break through fever etc.  · Discussed patient's condition and what to expect. All of the patient's questions were addressed in a satisfactory manner and patient verbalized understanding all instructions. Level of complexity of visit: High     Important: The patient  is on an azole antifungal (Fluconazole, Ketoconazole, Itraconazole etc ) based regimen.  These drugs are potent inhibitors of Cytochrome P450 3A4 system and can potentially INCREASE the levels of multiple drugs in the body including other antifungals like ketoconazole and itraconazole, rifamycins (Rifampin/Rifabutin/Rifapentine), anticonvulsants, statins, cyclosporine, tacrolimus, digoxin, blood thinners like warfarin, benzodiazepines, ergotamines, steroids like methylprednisolone, Orin's Wort as well as certain HIV classes including NNRTIs and fusion inhibitors (maraviroc). So, dosage adjustements in these medications may be needed. Also, itraconazole and ketoconazole are highly dependent on a low gastric pH for adequate absorption. Consequently, concurrent use of antacids, H2 blockers, or proton pump inhibitors must be avoided, or serum concentrations of itraconazole and ketoconazole may be greatly reduced. TIME SPENT TODAY:     - Spent over  36 minutes on visit (including interval history, physical exam, review of data including labs, cultures, imaging, development and implementation of treatment plan and coordination of complex care). More than 50 percent of this includes face-to-face time spent with the patient for counseling and coordination of care. Thank you for involving me in the care of your patient. I will continue to follow. If you have anyadditional questions, please do not hesitate to contact me. Subjective: Interval history: Interval history was obtained from chart review and patient/ RN. He is afebrile. Fever curve is coming down. No diarrhea     REVIEW OF SYSTEMS:     Review of Systems   Constitutional: Positive for fatigue. Negative for chills, diaphoresis and fever. HENT: Negative for ear discharge, ear pain, rhinorrhea, sore throat and trouble swallowing. Eyes: Negative for discharge and redness. Respiratory: Negative for cough, shortness of breath and wheezing. Cardiovascular: Negative for chest pain and leg swelling. Gastrointestinal: Negative for abdominal pain, constipation, diarrhea and nausea. Endocrine: Negative for polyuria. Genitourinary: Negative for dysuria, flank pain, frequency, hematuria and urgency.    Musculoskeletal: Negative for back pain and myalgias. Skin: Negative for rash. Neurological: Negative for dizziness, seizures and headaches. Hematological: Does not bruise/bleed easily. Psychiatric/Behavioral: Negative for hallucinations and suicidal ideas. All other systems reviewed and are negative. Past Medical History: All past medical history reviewed today. Past Medical History:   Diagnosis Date    COVID-19     Diabetes mellitus, type II ([address]     History of blood transfusion     Hypertension     Protein in urine     Seizure [facility])        Past Surgical History: All past surgical history was reviewed today. Past Surgical History:   Procedure Laterality Date    BRONCHOSCOPY  6/9/2022    BRONCHOSCOPY BRUSHINGS performed by Rogelio Otto MD at [facility]  6/9/2022    BRONCHOSCOPY DIAGNOSTIC OR CELL [address] performed by Rogelio Otto MD at [address] [place] [address]  6/9/2022    BRONCHOSCOPY BIOPSY BRONCHUS performed by Rogelio Otto MD at [address] [place] [address] N/A 6/14/2022    BRONCHOSCOPY DIAGNOSTIC OR CELL [address] ONLY performed by Audrey Mancilla DO at [facility] ENDOSCOPY       Family History: All family history was reviewed today. History reviewed. No pertinent family history. Objective:       PHYSICAL EXAM:      Vitals:   Vitals:    06/17/22 0715 06/17/22 0910 06/17/22 1130 06/17/22 1652   BP: (!) 141/88  (!) 138/93 113/74   Pulse: 90  (!) 103 93   Resp: 16  16 18   Temp: 97.6 °F (36.4 °C)  97.9 °F (36.6 °C) 98.7 °F (37.1 °C)   TempSrc: Oral  Oral Oral   SpO2: 93% 95%  94%   Weight:       Height:       PF:           Physical Exam  Vitals and nursing note reviewed. Constitutional:       Appearance: Normal appearance. He is well-developed. HENT:      Head: Normocephalic and atraumatic. Right Ear: External ear normal.      Left Ear: External ear normal.      Nose: Nose normal. No congestion or rhinorrhea.       Mouth/Throat: Mouth: Mucous membranes are moist.      Pharynx: No oropharyngeal exudate or posterior oropharyngeal erythema. Eyes:      General: No scleral icterus. Right eye: No discharge. Left eye: No discharge. Conjunctiva/sclera: Conjunctivae normal.      Pupils: Pupils are equal, round, and reactive to light. Cardiovascular:      Rate and Rhythm: Normal rate and regular rhythm. Pulses: Normal pulses. Heart sounds: No murmur heard. No friction rub. Pulmonary:      Effort: Pulmonary effort is normal. No respiratory distress. Breath sounds: Normal breath sounds. No stridor. No wheezing, rhonchi or rales. Abdominal:      General: Bowel sounds are normal.      Palpations: Abdomen is soft. Tenderness: There is no abdominal tenderness. There is no right CVA tenderness, left CVA tenderness, guarding or rebound. Musculoskeletal:         General: No swelling or tenderness. Normal range of motion. Cervical back: Normal range of motion and neck supple. No rigidity. No muscular tenderness. Lymphadenopathy:      Cervical: No cervical adenopathy. Skin:     General: Skin is warm and dry. Coloration: Skin is not jaundiced. Findings: No erythema or rash. Neurological:      General: No focal deficit present. Mental Status: He is alert and oriented to person, place, and time. Mental status is at baseline. Motor: No abnormal muscle tone. Psychiatric:         Mood and Affect: Mood normal.         Behavior: Behavior normal.         Thought Content: Thought content normal.          *    Lines and drains: All vascular access sites are healthy with no local erythema, discharge or tenderness. Intake and output:    I/O last 3 completed shifts: In: 1725.7 [P.O.:720; I.V.:1005.7]  Out: -     Lab Data:   All available labs and old records have been reviewed by me.     CBC:  Recent Labs     06/17/22  0711   WBC 6.0   RBC 5.00   HGB 12.5*   HCT 38.9*    MCV 77.9*   MCH 25.1*   MCHC 32.2   RDW 12.3*        BMP:  Recent Labs     06/15/22  0720 06/16/22  0827 06/17/22  0711    131* 133*   K 3.9 4.4 4.6    97* 98*   CO2 22 21 22   BUN 30* 24* 24*   CREATININE 1.3 1.5* 1.4*   CALCIUM 9.0 9.0 9.0   GLUCOSE 165* 151* 186*        Hepatic Function Panel:   Lab Results   Component Value Date    ALKPHOS 106 06/13/2022    ALT 13 06/13/2022    AST 10 06/13/2022    PROT 7.1 06/13/2022    BILITOT <0.2 06/13/2022    BILIDIR <0.2 06/13/2022    IBILI see below 06/13/2022    LABALBU 3.2 06/13/2022       CPK: No results found for: CKTOTAL  ESR:   Lab Results   Component Value Date    SEDRATE >130 (H) 06/11/2022     CRP:   Lab Results   Component Value Date    CRP 62.4 (H) 06/03/2020           Imaging: All pertinent images and reports for the current visit were reviewed by me during this visit. I reviewed the chest x-ray/CT scan/MRI images and independently interpreted the findings and results today. XR CHEST PORTABLE   Final Result   Stable chest.  Persistent right upper lobe infiltrate consistent with   pneumonia. Continued plain film follow-up recommended. XR CHEST 1 VIEW   Final Result      FLUORO FOR SURGICAL PROCEDURES   Final Result      MRI BRAIN WO CONTRAST   Final Result   There are few areas of high signal in the periventricular white matter. These are nonspecific. Demyelination could be considered in the appropriate   clinical setting. Chronic small vessel ischemic changes, vasculitis, or   migraines can cause a similar finding. No other brain parenchymal   abnormality. CT CHEST WO CONTRAST   Final Result   1. Progressive consolidation in the anterior segment of the right upper lobe   consistent with pneumonia. There is no evidence of cavitation. The findings   are nonspecific but most consistent with bacterial etiology. 2. The remainder of the lungs are clear. No pleural fluid. 3. Stable small mediastinal nodes.   The right hilar node noted previously is   not evaluated due to the lack of intravenous contrast.         XR CHEST PORTABLE   Final Result   Redemonstration of right upper lobe infiltrate which is likely pneumonic. It   is slightly larger compared to the previous evaluation. Medications: All current and past medications were reviewed.      predniSONE  20 mg Oral Daily    amoxicillin-clavulanate  1 tablet Oral 2 times per day    guaiFENesin-codeine  5 mL Oral Q4H    itraconazole  200 mg Oral BID    insulin glargine  40 Units SubCUTAneous Daily    insulin lispro  8 Units SubCUTAneous TID WC    sodium chloride flush  10 mL IntraVENous 2 times per day    [Held by provider] aspirin  81 mg Oral Daily    carvedilol  25 mg Oral BID WC    dicyclomine  10 mg Oral 4x Daily AC & HS    DULoxetine  30 mg Oral Daily    gabapentin  300 mg Oral TID    NIFEdipine  60 mg Oral Daily    rosuvastatin  40 mg Oral Nightly    insulin lispro  0-12 Units SubCUTAneous TID WC    insulin lispro  0-6 Units SubCUTAneous Nightly    [Held by provider] enoxaparin  30 mg SubCUTAneous BID        sodium chloride 75 mL/hr at 06/17/22 0636    sodium chloride Stopped (06/11/22 1904)    dextrose         sodium chloride flush, sodium chloride, promethazine **OR** ondansetron, magnesium hydroxide, acetaminophen **OR** acetaminophen, albuterol sulfate HFA, glucose, dextrose bolus **OR** dextrose bolus, glucagon (rDNA), dextrose      Problem list:       Patient Active Problem List   Diagnosis Code    Poorly controlled type 2 diabetes mellitus ([facility] [address] E11.65    Elevated troponin R77.8    Essential hypertension I10    SOB (shortness of breath) R06.02    CKD (chronic kidney disease) N18.9    Acute renal failure ([facility] [address] N17.9    Community acquired pneumonia of right upper lobe of lung J18.9    Abnormal CT of the chest R93.89    Endobronchial mass R91.8    Hemoptysis R04.2    Streptococcal infection A49.1    S/P bronchoscopy Z98.890    Seizure disorder ([facility] [address] G40.909    Class 1 obesity due to excess calories with body mass index (BMI) of 34.0 to 34.9 in adult E66.09, Z68.34    Fever R50.9       Please note that this chart was generated using Dragon dictation software. Although every effort was made to ensure the accuracy of this automated transcription, some errors in transcription may have occurred inadvertently. If you may need any clarification, please do not hesitate to contact me through EPIC or at the phone number provided below with my electronic signature. Any pictures or media included in this note were obtained after taking informed verbal consent from the patient and with their approval to include those in the patient's medical record.       Blair Boone MD, MPH, [facility] CLAYTON David  6/17/2022, 7:21 PM  [facility] Infectious Disease   [address] [place] [address], [address] [place] [address]  Office: [phone]  Fax: [phone]  Clinic days:  Tuesday & Thursday

## 2022-06-17 NOTE — PROGRESS NOTES
Pulmonary Progress Note    CC:  Follow up abnormal chest imaging, bronchoscopy    Subjective:  Room air  Might be feeling better finally   No further hemoptysis         Intake/Output Summary (Last 24 hours) at 6/17/2022 0753  Last data filed at 6/17/2022 0636  Gross per 24 hour   Intake 1725.72 ml   Output --   Net 1725.72 ml         PHYSICAL EXAM:  Blood pressure (!) 141/88, pulse 90, temperature 97.6 °F (36.4 °C), temperature source Oral, resp. rate 16, height 6' 1\" (1.854 m), weight 262 lb 5.6 oz (119 kg), SpO2 92 %, peak flow (!) 18 L/min.'  Gen: No distress. Eyes: PERRL. No sclera icterus. No conjunctival injection. ENT: No discharge. Pharynx clear. External appearance of ears and nose normal.  Neck: Trachea midline. No obvious mass. Resp: Better aeration   CV: Regular rate. Regular rhythm. No murmur or rub. GI: Non-tender. Non-distended. No hernia. Skin: Warm, dry, normal texture and turgor. No nodule on exposed extremities. Lymph: No cervical LAD. No supraclavicular LAD. M/S: No cyanosis. No clubbing. No joint deformity. Neuro: Moves all four extremities. CN 2-12 tested, no defect noted.   Ext:   no edema    Medications:    Scheduled Meds:   predniSONE  20 mg Oral Daily    amoxicillin-clavulanate  1 tablet Oral 2 times per day    guaiFENesin-codeine  5 mL Oral Q4H    itraconazole  200 mg Oral TID    Followed by   Marie Christianson itraconazole  200 mg Oral BID    insulin glargine  40 Units SubCUTAneous Daily    insulin lispro  8 Units SubCUTAneous TID     sodium chloride flush  10 mL IntraVENous 2 times per day    [Held by provider] aspirin  81 mg Oral Daily    carvedilol  25 mg Oral BID WC    dicyclomine  10 mg Oral 4x Daily AC & HS    DULoxetine  30 mg Oral Daily    gabapentin  300 mg Oral TID    NIFEdipine  60 mg Oral Daily    rosuvastatin  40 mg Oral Nightly    insulin lispro  0-12 Units SubCUTAneous TID     insulin lispro  0-6 Units SubCUTAneous Nightly    [Held by provider] enoxaparin  30 mg SubCUTAneous BID       Continuous Infusions:   sodium chloride 75 mL/hr at 06/17/22 0636    sodium chloride Stopped (06/11/22 1904)    dextrose         PRN Meds:  sodium chloride flush, sodium chloride, promethazine **OR** ondansetron, magnesium hydroxide, acetaminophen **OR** acetaminophen, albuterol sulfate HFA, glucose, dextrose bolus **OR** dextrose bolus, glucagon (rDNA), dextrose    Labs:  CBC:   No results for input(s): WBC, HGB, HCT, MCV, PLT in the last 72 hours. BMP:   Recent Labs     06/15/22  0720 06/16/22  0827    131*   K 3.9 4.4    97*   CO2 22 21   BUN 30* 24*   CREATININE 1.3 1.5*     LIVER PROFILE:   No results for input(s): AST, ALT, LIPASE, BILIDIR, BILITOT, ALKPHOS in the last 72 hours. Invalid input(s): AMYLASE,  ALB  PT/INR:   No results for input(s): PROTIME, INR in the last 72 hours. APTT:   No results for input(s): APTT in the last 72 hours. UA:No results for input(s): NITRITE, COLORU, PHUR, LABCAST, WBCUA, RBCUA, MUCUS, TRICHOMONAS, YEAST, BACTERIA, CLARITYU, SPECGRAV, LEUKOCYTESUR, UROBILINOGEN, BILIRUBINUR, BLOODU, GLUCOSEU, AMORPHOUS in the last 72 hours. Invalid input(s): Violet Lopez  No results for input(s): PH, PCO2, PO2 in the last 72 hours. Films:  Chest imaging reports were reviewed and imaging was reviewed by me and showed continued RUL airspace disease     ABG:  None    Cultures:  Negative to date   Beta glucan positive   Strep parasanguinis/Prevotella in tissue culture  Fungal Ab:  + histo  Fungal cultures:  Candida     I reviewed the labs and images listed above    Assessment/Plan:    Abnormal CT Chest with endobronchial lesion possibly related to histoplasmosis infection  o Antimicrobials per ID   Hemoptysis, from previous bronchoscopy biopsy  o Hold anticoagulants  o Cough medicine    Uncontrolled DM      DVT prophylaxis  SCD     Ok to DC.   Has follow up with Butch Hernandez on 7/12    [place] [facility] Pulmonary

## 2022-06-17 NOTE — PROGRESS NOTES
Nephrology ([facility]) Progress Note    CC: CHELE on CKD    Subjective:    HPI:  Breathing OK has coughed up some blood today  Started IVF's yesterday   ROS:  In bed. No feve at this time Had . Low grade fever last night  PMFSH:  medications reviewed. Family present    Objective:  Blood pressure (!) 138/93, pulse (!) 103, temperature 97.9 °F (36.6 °C), temperature source Oral, resp. rate 16, height 6' 1\" (1.854 m), weight 262 lb 5.6 oz (119 kg), SpO2 95 %, peak flow (!) 18 L/min. Intake/Output Summary (Last 24 hours) at 6/17/2022 1610  Last data filed at 6/17/2022 0636  Gross per 24 hour   Intake 1485.72 ml   Output --   Net 1485.72 ml     General:  NAD, A+OX3  Chest:  CTAB  CVS:  RRR  Abdominal:  NTND, soft, +BS  Extremities:  no edema  Skin:  no rash    Labs:  Renal panel:  Lab Results   Component Value Date/Time     (L) 06/17/2022 07:11 AM    K 4.6 06/17/2022 07:11 AM    CO2 22 06/17/2022 07:11 AM    BUN 24 (H) 06/17/2022 07:11 AM    CREATININE 1.4 (H) 06/17/2022 07:11 AM    CALCIUM 9.0 06/17/2022 07:11 AM    PHOS 3.8 06/24/2021 03:00 PM    MG 2.30 05/19/2022 08:08 AM     CBC:  Lab Results   Component Value Date/Time    WBC 6.0 06/17/2022 07:11 AM    HGB 12.5 (L) 06/17/2022 07:11 AM    HCT 38.9 (L) 06/17/2022 07:11 AM     06/17/2022 07:11 AM       Assessment/Plan:  Reviewed old records and labs.     1) CHELE on CKD   - baseline 05/17/22 Cr 1.2, sees Dr. Onel Clayton               likely  pre-renal Started IVF's yesterday    - Cr  Improved to 1.4mg Na+ 133 meq improving Will continue gentle IVF's and monitor     2) RUL infiltrate   - repeat chest CT shows further consolidation of infiltrate              - HIV negative   - s/p bronchoscopy with RUL biopsy with some bleeding 06/09/22                S/p bronchoscopy 6/14/22 possible pulmonary Histoplasmosis on empiric Itraconazole Started on Augmentin for strep  ID following    -       3) FEN              - hyponatremia

## 2022-06-17 NOTE — PLAN OF CARE
Problem: Discharge Planning  Goal: Discharge to home or other facility with appropriate resources  6/16/2022 2258 by Milton Henderson RN  Outcome: Progressing  Flowsheets (Taken 6/16/2022 1509 by Dash Atkins RN)  Discharge to home or other facility with appropriate resources:   Identify barriers to discharge with patient and caregiver   Arrange for needed discharge resources and transportation as appropriate  6/16/2022 1506 by Dash Atkins RN  Outcome: Progressing     Problem: Pain  Goal: Verbalizes/displays adequate comfort level or baseline comfort level  6/16/2022 2258 by Milton Henderson RN  Outcome: Progressing  6/16/2022 1506 by Dash Atkins RN  Outcome: Progressing     Problem: Metabolic/Fluid and Electrolytes - Adult  Goal: Electrolytes maintained within normal limits  6/16/2022 2258 by Milton Henderson RN  Outcome: Progressing  Flowsheets (Taken 6/16/2022 1509 by Dash Atkins RN)  Electrolytes maintained within normal limits: Monitor labs and assess patient for signs and symptoms of electrolyte imbalances  6/16/2022 1506 by Dash Atkins RN  Outcome: Progressing  Goal: Hemodynamic stability and optimal renal function maintained  6/16/2022 2258 by Milton Henderson RN  Outcome: Progressing  Flowsheets (Taken 6/16/2022 1509 by Dash Atkins RN)  Hemodynamic stability and optimal renal function maintained: Monitor labs and assess for signs and symptoms of volume excess or deficit  6/16/2022 1506 by Dash Atkins RN  Outcome: Progressing  Goal: Glucose maintained within prescribed range  6/16/2022 2258 by Milton Henderson RN  Outcome: Progressing  Flowsheets (Taken 6/16/2022 1509 by Dash Atkins RN)  Glucose maintained within prescribed range: Monitor blood glucose as ordered  6/16/2022 1506 by Dash Atikns RN  Outcome: Progressing     Problem: Gastrointestinal - Adult  Goal: Minimal or absence of nausea and vomiting  6/16/2022 2258 by Milton Henderson RN  Outcome: Progressing  6/16/2022 1506 by Marianna Jeff RN  Outcome: Progressing  Goal: Maintains or returns to baseline bowel function  6/16/2022 2258 by Shanita Mueller RN  Outcome: Progressing  6/16/2022 1506 by Marianna Jeff RN  Outcome: Progressing  Goal: Maintains adequate nutritional intake  6/16/2022 2258 by Shanita Mueller RN  Outcome: Progressing  6/16/2022 1506 by Marianna Jeff RN  Outcome: Progressing     Problem: Safety - Adult  Goal: Free from fall injury  6/16/2022 2258 by Shanita Mueller RN  Outcome: Progressing  Flowsheets (Taken 6/16/2022 2251)  Free From Fall Injury: Instruct family/caregiver on patient safety  6/16/2022 1506 by Marianna Jeff RN  Outcome: Progressing  Flowsheets (Taken 6/16/2022 1506)  Free From Fall Injury:   Instruct family/caregiver on patient safety   Based on caregiver fall risk screen, instruct family/caregiver to ask for assistance with transferring infant if caregiver noted to have fall risk factors     Problem: ABCDS Injury Assessment  Goal: Absence of physical injury  6/16/2022 2258 by Shanita Mueller RN  Outcome: Progressing  Flowsheets (Taken 6/16/2022 2251)  Absence of Physical Injury: Implement safety measures based on patient assessment  6/16/2022 1506 by Marianna Jeff RN  Outcome: Progressing     Problem: Chronic Conditions and Co-morbidities  Goal: Patient's chronic conditions and co-morbidity symptoms are monitored and maintained or improved  6/16/2022 2258 by Shanita Mueller RN  Outcome: Progressing  Flowsheets (Taken 6/16/2022 1509 by Marianna Jeff RN)  Care Plan - Patient's Chronic Conditions and Co-Morbidity Symptoms are Monitored and Maintained or Improved: Monitor and assess patient's chronic conditions and comorbid symptoms for stability, deterioration, or improvement  6/16/2022 1506 by Marianna Jeff RN  Outcome: Progressing

## 2022-06-18 PROBLEM — R79.89 ELEVATED TROPONIN: Status: RESOLVED | Noted: 2022-05-17 | Resolved: 2022-06-18

## 2022-06-18 PROBLEM — R77.8 ELEVATED TROPONIN: Status: RESOLVED | Noted: 2022-05-17 | Resolved: 2022-06-18

## 2022-06-18 LAB
ANION GAP SERPL CALCULATED.3IONS-SCNC: 13 MMOL/L (ref 3–16)
BUN BLDV-MCNC: 26 MG/DL (ref 7–20)
CALCIUM SERPL-MCNC: 8.7 MG/DL (ref 8.3–10.6)
CHLORIDE BLD-SCNC: 102 MMOL/L (ref 99–110)
CO2: 21 MMOL/L (ref 21–32)
CREAT SERPL-MCNC: 1.2 MG/DL (ref 0.9–1.3)
GFR AFRICAN AMERICAN: >60
GFR NON-AFRICAN AMERICAN: >60
GLUCOSE BLD-MCNC: 156 MG/DL (ref 70–99)
GLUCOSE BLD-MCNC: 168 MG/DL (ref 70–99)
GLUCOSE BLD-MCNC: 202 MG/DL (ref 70–99)
GLUCOSE BLD-MCNC: 213 MG/DL (ref 70–99)
GLUCOSE BLD-MCNC: 231 MG/DL (ref 70–99)
HISTOPLASMA ANTIGEN, SERUM: NOT DETECTED
HISTOPLASMA INTERPETATION: NOT DETECTED
PERFORMED ON: ABNORMAL
POTASSIUM REFLEX MAGNESIUM: 4.2 MMOL/L (ref 3.5–5.1)
SODIUM BLD-SCNC: 136 MMOL/L (ref 136–145)

## 2022-06-18 PROCEDURE — 6370000000 HC RX 637 (ALT 250 FOR IP): Performed by: INTERNAL MEDICINE

## 2022-06-18 PROCEDURE — U0005 INFEC AGEN DETEC AMPLI PROBE: HCPCS

## 2022-06-18 PROCEDURE — 1200000000 HC SEMI PRIVATE

## 2022-06-18 PROCEDURE — 94760 N-INVAS EAR/PLS OXIMETRY 1: CPT

## 2022-06-18 PROCEDURE — 2580000003 HC RX 258: Performed by: INTERNAL MEDICINE

## 2022-06-18 PROCEDURE — U0003 INFECTIOUS AGENT DETECTION BY NUCLEIC ACID (DNA OR RNA); SEVERE ACUTE RESPIRATORY SYNDROME CORONAVIRUS 2 (SARS-COV-2) (CORONAVIRUS DISEASE [COVID-19]), AMPLIFIED PROBE TECHNIQUE, MAKING USE OF HIGH THROUGHPUT TECHNOLOGIES AS DESCRIBED BY CMS-2020-01-R: HCPCS

## 2022-06-18 PROCEDURE — 80048 BASIC METABOLIC PNL TOTAL CA: CPT

## 2022-06-18 PROCEDURE — 36415 COLL VENOUS BLD VENIPUNCTURE: CPT

## 2022-06-18 RX ORDER — AMOXICILLIN AND CLAVULANATE POTASSIUM 875; 125 MG/1; MG/1
1 TABLET, FILM COATED ORAL EVERY 12 HOURS SCHEDULED
Status: DISCONTINUED | OUTPATIENT
Start: 2022-06-18 | End: 2022-06-23 | Stop reason: HOSPADM

## 2022-06-18 RX ADMIN — GABAPENTIN 300 MG: 300 CAPSULE ORAL at 20:57

## 2022-06-18 RX ADMIN — SODIUM CHLORIDE, PRESERVATIVE FREE 10 ML: 5 INJECTION INTRAVENOUS at 08:55

## 2022-06-18 RX ADMIN — PREDNISONE 20 MG: 20 TABLET ORAL at 08:55

## 2022-06-18 RX ADMIN — ITRACONAZOLE 200 MG: 100 CAPSULE, COATED PELLETS ORAL at 20:57

## 2022-06-18 RX ADMIN — AMOXICILLIN AND CLAVULANATE POTASSIUM 1 TABLET: 875; 125 TABLET, FILM COATED ORAL at 08:55

## 2022-06-18 RX ADMIN — DICYCLOMINE HYDROCHLORIDE 10 MG: 10 CAPSULE ORAL at 05:55

## 2022-06-18 RX ADMIN — INSULIN LISPRO 8 UNITS: 100 INJECTION, SOLUTION INTRAVENOUS; SUBCUTANEOUS at 16:57

## 2022-06-18 RX ADMIN — CARVEDILOL 25 MG: 25 TABLET, FILM COATED ORAL at 08:55

## 2022-06-18 RX ADMIN — SODIUM CHLORIDE: 9 INJECTION, SOLUTION INTRAVENOUS at 04:37

## 2022-06-18 RX ADMIN — ROSUVASTATIN CALCIUM 40 MG: 40 TABLET, FILM COATED ORAL at 20:57

## 2022-06-18 RX ADMIN — GUAIFENESIN AND CODEINE PHOSPHATE 5 ML: 10; 100 LIQUID ORAL at 05:55

## 2022-06-18 RX ADMIN — ITRACONAZOLE 200 MG: 100 CAPSULE, COATED PELLETS ORAL at 09:07

## 2022-06-18 RX ADMIN — GABAPENTIN 300 MG: 300 CAPSULE ORAL at 08:55

## 2022-06-18 RX ADMIN — INSULIN LISPRO 2 UNITS: 100 INJECTION, SOLUTION INTRAVENOUS; SUBCUTANEOUS at 12:15

## 2022-06-18 RX ADMIN — AMOXICILLIN AND CLAVULANATE POTASSIUM 1 TABLET: 875; 125 TABLET, FILM COATED ORAL at 20:57

## 2022-06-18 RX ADMIN — INSULIN GLARGINE 40 UNITS: 100 INJECTION, SOLUTION SUBCUTANEOUS at 08:59

## 2022-06-18 RX ADMIN — INSULIN LISPRO 8 UNITS: 100 INJECTION, SOLUTION INTRAVENOUS; SUBCUTANEOUS at 08:59

## 2022-06-18 RX ADMIN — INSULIN LISPRO 8 UNITS: 100 INJECTION, SOLUTION INTRAVENOUS; SUBCUTANEOUS at 12:14

## 2022-06-18 RX ADMIN — GUAIFENESIN AND CODEINE PHOSPHATE 5 ML: 10; 100 LIQUID ORAL at 14:17

## 2022-06-18 RX ADMIN — CARVEDILOL 25 MG: 25 TABLET, FILM COATED ORAL at 16:56

## 2022-06-18 RX ADMIN — GABAPENTIN 300 MG: 300 CAPSULE ORAL at 14:18

## 2022-06-18 RX ADMIN — SODIUM CHLORIDE, PRESERVATIVE FREE 10 ML: 5 INJECTION INTRAVENOUS at 20:58

## 2022-06-18 RX ADMIN — DICYCLOMINE HYDROCHLORIDE 10 MG: 10 CAPSULE ORAL at 16:56

## 2022-06-18 RX ADMIN — GUAIFENESIN AND CODEINE PHOSPHATE 5 ML: 10; 100 LIQUID ORAL at 20:58

## 2022-06-18 RX ADMIN — INSULIN LISPRO 2 UNITS: 100 INJECTION, SOLUTION INTRAVENOUS; SUBCUTANEOUS at 20:58

## 2022-06-18 RX ADMIN — DULOXETINE HYDROCHLORIDE 30 MG: 30 CAPSULE, DELAYED RELEASE ORAL at 08:55

## 2022-06-18 RX ADMIN — GUAIFENESIN AND CODEINE PHOSPHATE 5 ML: 10; 100 LIQUID ORAL at 01:42

## 2022-06-18 RX ADMIN — NIFEDIPINE 60 MG: 60 TABLET, EXTENDED RELEASE ORAL at 08:55

## 2022-06-18 RX ADMIN — DICYCLOMINE HYDROCHLORIDE 10 MG: 10 CAPSULE ORAL at 11:05

## 2022-06-18 RX ADMIN — GUAIFENESIN AND CODEINE PHOSPHATE 5 ML: 10; 100 LIQUID ORAL at 16:56

## 2022-06-18 RX ADMIN — INSULIN LISPRO 2 UNITS: 100 INJECTION, SOLUTION INTRAVENOUS; SUBCUTANEOUS at 16:57

## 2022-06-18 RX ADMIN — DICYCLOMINE HYDROCHLORIDE 10 MG: 10 CAPSULE ORAL at 20:57

## 2022-06-18 RX ADMIN — INSULIN LISPRO 4 UNITS: 100 INJECTION, SOLUTION INTRAVENOUS; SUBCUTANEOUS at 08:59

## 2022-06-18 RX ADMIN — GUAIFENESIN AND CODEINE PHOSPHATE 5 ML: 10; 100 LIQUID ORAL at 11:04

## 2022-06-18 ASSESSMENT — PAIN DESCRIPTION - ORIENTATION: ORIENTATION: MID

## 2022-06-18 ASSESSMENT — PAIN DESCRIPTION - DESCRIPTORS: DESCRIPTORS: ACHING

## 2022-06-18 ASSESSMENT — PAIN SCALES - GENERAL
PAINLEVEL_OUTOF10: 3
PAINLEVEL_OUTOF10: 0
PAINLEVEL_OUTOF10: 5

## 2022-06-18 ASSESSMENT — PAIN DESCRIPTION - PAIN TYPE: TYPE: ACUTE PAIN

## 2022-06-18 ASSESSMENT — PAIN DESCRIPTION - FREQUENCY: FREQUENCY: INTERMITTENT

## 2022-06-18 ASSESSMENT — PAIN SCALES - WONG BAKER: WONGBAKER_NUMERICALRESPONSE: 2

## 2022-06-18 ASSESSMENT — PAIN - FUNCTIONAL ASSESSMENT: PAIN_FUNCTIONAL_ASSESSMENT: ACTIVITIES ARE NOT PREVENTED

## 2022-06-18 ASSESSMENT — PAIN DESCRIPTION - LOCATION: LOCATION: HEAD

## 2022-06-18 ASSESSMENT — PAIN DESCRIPTION - ONSET: ONSET: GRADUAL

## 2022-06-18 NOTE — PLAN OF CARE
Problem: Discharge Planning  Goal: Discharge to home or other facility with appropriate resources  6/18/2022 0134 by Arvind Honeycutt RN  Outcome: Progressing  6/17/2022 1134 by Darci Riedel, RN  Outcome: Progressing  Flowsheets (Taken 6/17/2022 0831 by Merle Arteaga)  Discharge to home or other facility with appropriate resources: Identify barriers to discharge with patient and caregiver     Problem: Pain  Goal: Verbalizes/displays adequate comfort level or baseline comfort level  6/18/2022 0134 by Arvind Honeycutt RN  Outcome: Progressing  6/17/2022 1134 by Darci Riedel, RN  Outcome: Progressing     Problem: Metabolic/Fluid and Electrolytes - Adult  Goal: Electrolytes maintained within normal limits  6/18/2022 0134 by Arvind Honeycutt RN  Outcome: Progressing  6/17/2022 1134 by Darci Riedel, RN  Outcome: Progressing  Flowsheets (Taken 6/17/2022 0831 by Merle Arteaga)  Electrolytes maintained within normal limits: Monitor labs and assess patient for signs and symptoms of electrolyte imbalances  Goal: Hemodynamic stability and optimal renal function maintained  6/18/2022 0134 by Arvind Honeycutt RN  Outcome: Progressing  6/17/2022 1134 by Darci Riedel, RN  Outcome: Progressing  Flowsheets (Taken 6/17/2022 0831 by Merle Arteaga)  Hemodynamic stability and optimal renal function maintained: Monitor labs and assess for signs and symptoms of volume excess or deficit  Goal: Glucose maintained within prescribed range  6/18/2022 0134 by Arvind Honeycutt RN  Outcome: Progressing  6/17/2022 1134 by Darci Riedel, RN  Outcome: Progressing  Flowsheets (Taken 6/17/2022 0831 by Merle Arteaga)  Glucose maintained within prescribed range: Monitor blood glucose as ordered     Problem: Gastrointestinal - Adult  Goal: Minimal or absence of nausea and vomiting  6/18/2022 0134 by Arvind Honeycutt RN  Outcome: Progressing  6/17/2022 1134 by Darci Riedel, RN  Outcome: Progressing  Goal: Maintains or returns to baseline bowel function  6/18/2022 0134 by Bijal Sheehan Fatemeh Reno  Outcome: Progressing  6/17/2022 1134 by Monica Encinas RN  Outcome: Progressing  Goal: Maintains adequate nutritional intake  6/18/2022 0134 by Angélica Lynn RN  Outcome: Progressing  6/17/2022 1134 by Monica Encinas RN  Outcome: Progressing  Flowsheets (Taken 6/17/2022 0831 by Poonam Novak)  Maintains adequate nutritional intake: Monitor percentage of each meal consumed     Problem: ABCDS Injury Assessment  Goal: Absence of physical injury  6/18/2022 0134 by Angélica Lynn RN  Outcome: Progressing  6/17/2022 1134 by Monica Encinas RN  Outcome: Progressing     Problem: Chronic Conditions and Co-morbidities  Goal: Patient's chronic conditions and co-morbidity symptoms are monitored and maintained or improved  6/18/2022 0134 by Angélica Lynn RN  Outcome: Progressing  6/17/2022 1134 by Monica Encinas RN  Outcome: Progressing  Flowsheets (Taken 6/17/2022 0831 by Poonam Novak)  Care Plan - Patient's Chronic Conditions and Co-Morbidity Symptoms are Monitored and Maintained or Improved: Monitor and assess patient's chronic conditions and comorbid symptoms for stability, deterioration, or improvement

## 2022-06-18 NOTE — PROGRESS NOTES
Hospitalist Progress Note      PCP: Nikia Mace MD, MD    Date of Admission: 6/6/2022      Subjective: Up to chair, feels okay, but states that he is having decreasing in his taste, denies abdominal pain or diarrhea. Medications:  Reviewed    Infusion Medications    sodium chloride 75 mL/hr at 06/18/22 0437    sodium chloride Stopped (06/11/22 1904)    dextrose       Scheduled Medications    predniSONE  20 mg Oral Daily    amoxicillin-clavulanate  1 tablet Oral 2 times per day    guaiFENesin-codeine  5 mL Oral Q4H    itraconazole  200 mg Oral BID    insulin glargine  40 Units SubCUTAneous Daily    insulin lispro  8 Units SubCUTAneous TID WC    sodium chloride flush  10 mL IntraVENous 2 times per day    [Held by provider] aspirin  81 mg Oral Daily    carvedilol  25 mg Oral BID WC    dicyclomine  10 mg Oral 4x Daily AC & HS    DULoxetine  30 mg Oral Daily    gabapentin  300 mg Oral TID    NIFEdipine  60 mg Oral Daily    rosuvastatin  40 mg Oral Nightly    insulin lispro  0-12 Units SubCUTAneous TID WC    insulin lispro  0-6 Units SubCUTAneous Nightly    [Held by provider] enoxaparin  30 mg SubCUTAneous BID     PRN Meds: sodium chloride flush, sodium chloride, promethazine **OR** ondansetron, magnesium hydroxide, acetaminophen **OR** acetaminophen, albuterol sulfate HFA, glucose, dextrose bolus **OR** dextrose bolus, glucagon (rDNA), dextrose      Intake/Output Summary (Last 24 hours) at 6/18/2022 0857  Last data filed at 6/18/2022 0437  Gross per 24 hour   Intake 1521.46 ml   Output --   Net 1521.46 ml       Physical Exam Performed:    /70   Pulse (!) 49   Temp 97.6 °F (36.4 °C) (Oral)   Resp 16   Ht 6' 1\" (1.854 m)   Wt 261 lb 14.5 oz (118.8 kg)   SpO2 95%   PF (!) 18 L/min   BMI 34.55 kg/m²     General appearance: No apparent distress  Neck: Supple  Respiratory:  Normal respiratory effort.  Clear to auscultation, bilaterally without Rales/Wheezes/Rhonchi. Cardiovascular: Regular rate and rhythm with normal S1/S2 without murmurs, rubs or gallops. Abdomen: Soft, non-tender, non-distended with normal bowel sounds. Musculoskeletal: No clubbing, cyanosis  Skin: Skin color, texture, turgor normal.  No rashes or lesions. Neurologic:  No focal weakness  Psychiatric: Alert and oriented  Capillary Refill: Brisk,3 seconds, normal   Peripheral Pulses: +2 palpable, equal bilaterally       Labs:   Recent Labs     06/17/22  0711   WBC 6.0   HGB 12.5*   HCT 38.9*        Recent Labs     06/16/22  0827 06/17/22  0711 06/18/22  0555   * 133* 136   K 4.4 4.6 4.2   CL 97* 98* 102   CO2 21 22 21   BUN 24* 24* 26*   CREATININE 1.5* 1.4* 1.2   CALCIUM 9.0 9.0 8.7     No results for input(s): AST, ALT, BILIDIR, BILITOT, ALKPHOS in the last 72 hours. No results for input(s): INR in the last 72 hours. No results for input(s): Evelia Ashby in the last 72 hours. Urinalysis:      Lab Results   Component Value Date    NITRU Negative 06/07/2022    WBCUA 1 06/07/2022    BACTERIA None Seen 06/07/2022    RBCUA 1 06/07/2022    BLOODU TRACE-INTACT 06/07/2022    SPECGRAV >=1.030 06/07/2022    GLUCOSEU >=1000 06/07/2022    GLUCOSEU NEGATIVE 01/02/2011       Radiology:  XR CHEST PORTABLE   Final Result   Stable chest.  Persistent right upper lobe infiltrate consistent with   pneumonia. Continued plain film follow-up recommended. XR CHEST 1 VIEW   Final Result      FLUORO FOR SURGICAL PROCEDURES   Final Result      MRI BRAIN WO CONTRAST   Final Result   There are few areas of high signal in the periventricular white matter. These are nonspecific. Demyelination could be considered in the appropriate   clinical setting. Chronic small vessel ischemic changes, vasculitis, or   migraines can cause a similar finding. No other brain parenchymal   abnormality. CT CHEST WO CONTRAST   Final Result   1.  Progressive consolidation in the anterior segment of the right upper lobe   consistent with pneumonia. There is no evidence of cavitation. The findings   are nonspecific but most consistent with bacterial etiology. 2. The remainder of the lungs are clear. No pleural fluid. 3. Stable small mediastinal nodes. The right hilar node noted previously is   not evaluated due to the lack of intravenous contrast.         XR CHEST PORTABLE   Final Result   Redemonstration of right upper lobe infiltrate which is likely pneumonic. It   is slightly larger compared to the previous evaluation. Assessment/Plan:    Active Hospital Problems    Diagnosis     Encounter for medication counseling [Z71.89]      Priority: Medium    Fever [R50.9]      Priority: Medium    Hemoptysis [R04.2]      Priority: Medium    Streptococcal infection [A49.1]      Priority: Medium    S/P bronchoscopy [Z98.890]      Priority: Medium    Seizure disorder ([facility] [address] [G40.909]      Priority: Medium    Class 1 obesity due to excess calories with body mass index (BMI) of 34.0 to 34.9 in adult [E66.09, Z68.34]      Priority: Medium    Abnormal CT of the chest [R93.89]      Priority: Medium    Endobronchial mass [R91.8]      Priority: Medium    Community acquired pneumonia of right upper lobe of lung [J18.9]      Priority: Medium    Acute renal failure ([facility] [address] [N17.9]      Priority: Medium    Poorly controlled type 2 diabetes mellitus ([facility] [address] [E11.65]      Priority: Medium    Essential hypertension [I10]      Priority: Medium     1.  Abnormal imaging on CT scan along with positive histoplasma antibody, ID consulted status post bronchoscopy as patient had hemoptysis.  ID ordered serum histoplasma antigen.  Started on empiric itraconazole BID for 3 weeks per ID.   Added steroids per ID, continue for 5 days. Follow up with Dr Ramos Esteban on discharge. 2.  Streptococcal pneumonia, was on Omnicef changed to Augmentin per ID.  Stop date 06/27  3.   Hemoptysis, status post bronchoscopy, likely hemorrhage from previous site of biopsy.  Monitor for now pulmonology following. 4. Generalized weakness, improving  5.  CHELE on top of CKD stage III, creatinine 1.3 this morning, will discontinue iv fluids and check BMP in am.   6.  Diabetes mellitus, sliding scale.  Much better controlled  7.  Essential hypertension, p.o. medications    Diet: ADULT DIET; Regular; 4 carb choices (60 gm/meal);  Low Fat/Low Chol/High Fiber/BREE  Code Status: Full Code        Derrick Warner MD

## 2022-06-18 NOTE — PROGRESS NOTES
TBIL    Endocrine:   @[id number](VitD,PTH,TSH,aldosterone,renin activity,cortisol,metanephrine)@    CBC:   Recent Labs     06/17/22  0711   WBC 6.0   HGB 12.5*   HCT 38.9*   MCV 77.9*          Iron Panel:   @[id number](FERA,FE)@    Serology: @[id number](ANAS,ANCA,C3,C4,RNP,AGBM,DNA,SSA,SSB,SPEP,UPEP,ESR,HEPT)@    Urine studies: @[id number](UAPR,UCOL,UNAR,UUNR,UCRR,UCLR,UKR,UUAR,UOSM,EOS)@        ASSESSMENT and PLAN:     1. CHELE on CKD3a (baseline SCr ~1.2; followed by Dr. Demarcus Haque): CHELE is mostly pre-renal.   SCr improving with IVF.   - D/C IVF, encourage oral intake of fluids     2. RUL infiltrate: s/p bronchoscopy 6/14/22 possible pulmonary Histoplasmosis on empiric Itraconazole Started on Augmentin for strep. parasanguinus. Rx per ID team. HIV negative               3. Hyponatremia: hypovolemia hyponatremia.  Improved with IVF        Signed By: Selma Gonzalez MD

## 2022-06-19 LAB
ALBUMIN SERPL-MCNC: 3.3 G/DL (ref 3.4–5)
ALP BLD-CCNC: 86 U/L (ref 40–129)
ALT SERPL-CCNC: 14 U/L (ref 10–40)
ANION GAP SERPL CALCULATED.3IONS-SCNC: 12 MMOL/L (ref 3–16)
AST SERPL-CCNC: 10 U/L (ref 15–37)
BASOPHILS ABSOLUTE: 0 K/UL (ref 0–0.2)
BASOPHILS RELATIVE PERCENT: 1 %
BILIRUB SERPL-MCNC: <0.2 MG/DL (ref 0–1)
BILIRUBIN DIRECT: <0.2 MG/DL (ref 0–0.3)
BILIRUBIN, INDIRECT: ABNORMAL MG/DL (ref 0–1)
BUN BLDV-MCNC: 17 MG/DL (ref 7–20)
CALCIUM SERPL-MCNC: 9.2 MG/DL (ref 8.3–10.6)
CHLORIDE BLD-SCNC: 102 MMOL/L (ref 99–110)
CO2: 23 MMOL/L (ref 21–32)
CREAT SERPL-MCNC: 1.1 MG/DL (ref 0.9–1.3)
EOSINOPHILS ABSOLUTE: 0 K/UL (ref 0–0.6)
EOSINOPHILS RELATIVE PERCENT: 0.8 %
GFR AFRICAN AMERICAN: >60
GFR NON-AFRICAN AMERICAN: >60
GLUCOSE BLD-MCNC: 157 MG/DL (ref 70–99)
GLUCOSE BLD-MCNC: 164 MG/DL (ref 70–99)
GLUCOSE BLD-MCNC: 244 MG/DL (ref 70–99)
GLUCOSE BLD-MCNC: 250 MG/DL (ref 70–99)
GLUCOSE BLD-MCNC: 96 MG/DL (ref 70–99)
HCT VFR BLD CALC: 40.1 % (ref 40.5–52.5)
HEMOGLOBIN: 13.1 G/DL (ref 13.5–17.5)
HISTOPLASMA ABS, ID: DETECTED
LYMPHOCYTES ABSOLUTE: 1.1 K/UL (ref 1–5.1)
LYMPHOCYTES RELATIVE PERCENT: 24.7 %
MCH RBC QN AUTO: 25.4 PG (ref 26–34)
MCHC RBC AUTO-ENTMCNC: 32.7 G/DL (ref 31–36)
MCV RBC AUTO: 77.7 FL (ref 80–100)
MONOCYTES ABSOLUTE: 0.6 K/UL (ref 0–1.3)
MONOCYTES RELATIVE PERCENT: 12.6 %
NEUTROPHILS ABSOLUTE: 2.8 K/UL (ref 1.7–7.7)
NEUTROPHILS RELATIVE PERCENT: 60.9 %
PDW BLD-RTO: 12.4 % (ref 12.4–15.4)
PERFORMED ON: ABNORMAL
PERFORMED ON: NORMAL
PLATELET # BLD: 328 K/UL (ref 135–450)
PMV BLD AUTO: 7.6 FL (ref 5–10.5)
POTASSIUM REFLEX MAGNESIUM: 3.9 MMOL/L (ref 3.5–5.1)
RBC # BLD: 5.16 M/UL (ref 4.2–5.9)
SARS-COV-2: NOT DETECTED
SODIUM BLD-SCNC: 137 MMOL/L (ref 136–145)
TOTAL PROTEIN: 6.8 G/DL (ref 6.4–8.2)
WBC # BLD: 4.6 K/UL (ref 4–11)

## 2022-06-19 PROCEDURE — 80048 BASIC METABOLIC PNL TOTAL CA: CPT

## 2022-06-19 PROCEDURE — 6370000000 HC RX 637 (ALT 250 FOR IP): Performed by: INTERNAL MEDICINE

## 2022-06-19 PROCEDURE — 94760 N-INVAS EAR/PLS OXIMETRY 1: CPT

## 2022-06-19 PROCEDURE — 80076 HEPATIC FUNCTION PANEL: CPT

## 2022-06-19 PROCEDURE — 85025 COMPLETE CBC W/AUTO DIFF WBC: CPT

## 2022-06-19 PROCEDURE — 1200000000 HC SEMI PRIVATE

## 2022-06-19 PROCEDURE — 6360000002 HC RX W HCPCS: Performed by: INTERNAL MEDICINE

## 2022-06-19 PROCEDURE — 2580000003 HC RX 258: Performed by: INTERNAL MEDICINE

## 2022-06-19 PROCEDURE — 36415 COLL VENOUS BLD VENIPUNCTURE: CPT

## 2022-06-19 RX ADMIN — AMOXICILLIN AND CLAVULANATE POTASSIUM 1 TABLET: 875; 125 TABLET, FILM COATED ORAL at 21:19

## 2022-06-19 RX ADMIN — SODIUM CHLORIDE, PRESERVATIVE FREE 10 ML: 5 INJECTION INTRAVENOUS at 09:19

## 2022-06-19 RX ADMIN — INSULIN LISPRO 2 UNITS: 100 INJECTION, SOLUTION INTRAVENOUS; SUBCUTANEOUS at 21:21

## 2022-06-19 RX ADMIN — ITRACONAZOLE 200 MG: 100 CAPSULE, COATED PELLETS ORAL at 21:19

## 2022-06-19 RX ADMIN — ROSUVASTATIN CALCIUM 40 MG: 40 TABLET, FILM COATED ORAL at 21:19

## 2022-06-19 RX ADMIN — INSULIN LISPRO 2 UNITS: 100 INJECTION, SOLUTION INTRAVENOUS; SUBCUTANEOUS at 08:27

## 2022-06-19 RX ADMIN — GUAIFENESIN AND CODEINE PHOSPHATE 5 ML: 10; 100 LIQUID ORAL at 15:08

## 2022-06-19 RX ADMIN — ITRACONAZOLE 200 MG: 100 CAPSULE, COATED PELLETS ORAL at 08:24

## 2022-06-19 RX ADMIN — GUAIFENESIN AND CODEINE PHOSPHATE 5 ML: 10; 100 LIQUID ORAL at 10:59

## 2022-06-19 RX ADMIN — PREDNISONE 20 MG: 20 TABLET ORAL at 08:25

## 2022-06-19 RX ADMIN — GABAPENTIN 300 MG: 300 CAPSULE ORAL at 15:11

## 2022-06-19 RX ADMIN — DICYCLOMINE HYDROCHLORIDE 10 MG: 10 CAPSULE ORAL at 17:58

## 2022-06-19 RX ADMIN — DULOXETINE HYDROCHLORIDE 30 MG: 30 CAPSULE, DELAYED RELEASE ORAL at 08:24

## 2022-06-19 RX ADMIN — INSULIN LISPRO 6 UNITS: 100 INJECTION, SOLUTION INTRAVENOUS; SUBCUTANEOUS at 17:24

## 2022-06-19 RX ADMIN — GUAIFENESIN AND CODEINE PHOSPHATE 5 ML: 10; 100 LIQUID ORAL at 17:22

## 2022-06-19 RX ADMIN — GUAIFENESIN AND CODEINE PHOSPHATE 5 ML: 10; 100 LIQUID ORAL at 21:19

## 2022-06-19 RX ADMIN — SODIUM CHLORIDE, PRESERVATIVE FREE 10 ML: 5 INJECTION INTRAVENOUS at 21:21

## 2022-06-19 RX ADMIN — DICYCLOMINE HYDROCHLORIDE 10 MG: 10 CAPSULE ORAL at 21:19

## 2022-06-19 RX ADMIN — ONDANSETRON 4 MG: 2 INJECTION INTRAMUSCULAR; INTRAVENOUS at 06:37

## 2022-06-19 RX ADMIN — INSULIN GLARGINE 40 UNITS: 100 INJECTION, SOLUTION SUBCUTANEOUS at 08:27

## 2022-06-19 RX ADMIN — GUAIFENESIN AND CODEINE PHOSPHATE 5 ML: 10; 100 LIQUID ORAL at 06:37

## 2022-06-19 RX ADMIN — INSULIN LISPRO 8 UNITS: 100 INJECTION, SOLUTION INTRAVENOUS; SUBCUTANEOUS at 17:23

## 2022-06-19 RX ADMIN — GABAPENTIN 300 MG: 300 CAPSULE ORAL at 08:25

## 2022-06-19 RX ADMIN — DICYCLOMINE HYDROCHLORIDE 10 MG: 10 CAPSULE ORAL at 06:37

## 2022-06-19 RX ADMIN — CARVEDILOL 25 MG: 25 TABLET, FILM COATED ORAL at 08:25

## 2022-06-19 RX ADMIN — NIFEDIPINE 60 MG: 60 TABLET, EXTENDED RELEASE ORAL at 08:25

## 2022-06-19 RX ADMIN — CARVEDILOL 25 MG: 25 TABLET, FILM COATED ORAL at 17:22

## 2022-06-19 RX ADMIN — GUAIFENESIN AND CODEINE PHOSPHATE 5 ML: 10; 100 LIQUID ORAL at 03:19

## 2022-06-19 RX ADMIN — INSULIN LISPRO 8 UNITS: 100 INJECTION, SOLUTION INTRAVENOUS; SUBCUTANEOUS at 08:27

## 2022-06-19 RX ADMIN — DICYCLOMINE HYDROCHLORIDE 10 MG: 10 CAPSULE ORAL at 10:59

## 2022-06-19 RX ADMIN — GABAPENTIN 300 MG: 300 CAPSULE ORAL at 21:19

## 2022-06-19 RX ADMIN — AMOXICILLIN AND CLAVULANATE POTASSIUM 1 TABLET: 875; 125 TABLET, FILM COATED ORAL at 08:24

## 2022-06-19 ASSESSMENT — PAIN SCALES - GENERAL
PAINLEVEL_OUTOF10: 0

## 2022-06-19 NOTE — PLAN OF CARE
Problem: Discharge Planning  Goal: Discharge to home or other facility with appropriate resources  6/18/2022 2238 by Nicholas Fragoso RN  Outcome: Progressing  6/18/2022 1132 by Hedy Bernal RN  Outcome: Progressing     Problem: Pain  Goal: Verbalizes/displays adequate comfort level or baseline comfort level  6/18/2022 2238 by Nicholas Fragoso RN  Outcome: Progressing  6/18/2022 1132 by Hedy Bernal RN  Outcome: Progressing     Problem: Metabolic/Fluid and Electrolytes - Adult  Goal: Electrolytes maintained within normal limits  6/18/2022 2238 by Nicholas Fragoso RN  Outcome: Progressing  6/18/2022 1132 by Hedy Bernal RN  Outcome: Progressing     Problem: Gastrointestinal - Adult  Goal: Minimal or absence of nausea and vomiting  6/18/2022 2238 by Nicholas Fragoso RN  Outcome: Progressing  6/18/2022 1132 by Hedy Bernal RN  Outcome: Progressing  Goal: Maintains or returns to baseline bowel function  6/18/2022 2238 by Nicholas Fragoso RN  Outcome: Progressing  6/18/2022 1132 by Hedy Bernal RN  Outcome: Progressing  Goal: Maintains adequate nutritional intake  6/18/2022 2238 by Nicholas Fragoso RN  Outcome: Progressing  6/18/2022 1132 by Hedy Bernal RN  Outcome: Progressing     Problem: Safety - Adult  Goal: Free from fall injury  6/18/2022 2238 by Nicholas Fragoso RN  Outcome: Progressing  6/18/2022 1132 by Hedy Bernal RN  Outcome: Progressing     Problem: ABCDS Injury Assessment  Goal: Absence of physical injury  6/18/2022 2238 by Nicholas Fragoso RN  Outcome: Progressing  6/18/2022 1132 by Hedy Bernal RN  Outcome: Progressing     Problem: Chronic Conditions and Co-morbidities  Goal: Patient's chronic conditions and co-morbidity symptoms are monitored and maintained or improved  6/18/2022 2238 by Nicholas Fragoso RN  Outcome: Progressing  6/18/2022 1132 by Hedy Bernal RN  Outcome: Progressing

## 2022-06-19 NOTE — PROGRESS NOTES
WBC 6.0 4.6   HGB 12.5* 13.1*   HCT 38.9* 40.1*   MCV 77.9* 77.7*    328       Iron Panel:   @[id number](FERA,FE)@    Serology: @[id number](ANAS,ANCA,C3,C4,RNP,AGBM,DNA,SSA,SSB,SPEP,UPEP,ESR,HEPT)@    Urine studies: @[id number](UAPR,UCOL,UNAR,UUNR,UCRR,UCLR,UKR,UUAR,UOSM,EOS)@        ASSESSMENT and PLAN:     1. CHELE on CKD3a (baseline SCr ~1.2; followed by Dr. Patria Young): CHELE is mostly pre-renal.   SCr improved with IVF.   - D/C IVF, encourage oral intake of fluids     2. RUL infiltrate: s/p bronchoscopy 6/14/22 possible pulmonary Histoplasmosis on empiric Itraconazole Started on Augmentin for strep. parasanguinus. Rx per ID team. HIV negative. COVID neg. 3. Hyponatremia: hypovolemia hyponatremia. Improved with IVF    No further recs from the renal standpoint. Will sign off. Please call if further input is deemed necessary.         Signed By: Nyla Remy MD

## 2022-06-19 NOTE — PLAN OF CARE
Problem: Discharge Planning  Goal: Discharge to home or other facility with appropriate resources  6/19/2022 1039 by Magali Tamez RN  Outcome: Progressing     Problem: Pain  Goal: Verbalizes/displays adequate comfort level or baseline comfort level  6/19/2022 1039 by Magali Tamez RN  Outcome: Progressing     Problem: Metabolic/Fluid and Electrolytes - Adult  Goal: Electrolytes maintained within normal limits  6/19/2022 1039 by Magali Tamez RN  Outcome: Progressing     Problem: Metabolic/Fluid and Electrolytes - Adult  Goal: Hemodynamic stability and optimal renal function maintained  6/19/2022 1039 by Magali Tamez RN  Outcome: Progressing     Problem: Metabolic/Fluid and Electrolytes - Adult  Goal: Glucose maintained within prescribed range  6/19/2022 1039 by Magali Tamez RN  Outcome: Progressing     Problem: Gastrointestinal - Adult  Goal: Minimal or absence of nausea and vomiting  6/19/2022 1039 by Magali Tamez RN  Outcome: Progressing     Problem: Gastrointestinal - Adult  Goal: Maintains or returns to baseline bowel function  6/19/2022 1039 by Magali Tamez RN  Outcome: Progressing     Problem: Gastrointestinal - Adult  Goal: Maintains adequate nutritional intake  6/19/2022 1039 by Magali Tamez RN  Outcome: Progressing     Problem: Safety - Adult  Goal: Free from fall injury  6/19/2022 1039 by Pepito Garnett RN  Outcome: Progressing     Problem: Chronic Conditions and Co-morbidities  Goal: Patient's chronic conditions and co-morbidity symptoms are monitored and maintained or improved  6/19/2022 1039 by Magali Tamez RN  Outcome: Progressing

## 2022-06-19 NOTE — PROGRESS NOTES
Hospitalist Progress Note      PCP: Kelly Bangura MD, MD    Date of Admission: 6/6/2022      Subjective: Stated that now he is tasting only on the left side and not on the right side, denies abdominal pain fever chills blurry or double vision no headache. Medications:  Reviewed    Infusion Medications    sodium chloride Stopped (06/11/22 1904)    dextrose       Scheduled Medications    amoxicillin-clavulanate  1 tablet Oral 2 times per day    predniSONE  20 mg Oral Daily    guaiFENesin-codeine  5 mL Oral Q4H    itraconazole  200 mg Oral BID    insulin glargine  40 Units SubCUTAneous Daily    insulin lispro  8 Units SubCUTAneous TID WC    sodium chloride flush  10 mL IntraVENous 2 times per day    [Held by provider] aspirin  81 mg Oral Daily    carvedilol  25 mg Oral BID WC    dicyclomine  10 mg Oral 4x Daily AC & HS    DULoxetine  30 mg Oral Daily    gabapentin  300 mg Oral TID    NIFEdipine  60 mg Oral Daily    rosuvastatin  40 mg Oral Nightly    insulin lispro  0-12 Units SubCUTAneous TID WC    insulin lispro  0-6 Units SubCUTAneous Nightly    [Held by provider] enoxaparin  30 mg SubCUTAneous BID     PRN Meds: sodium chloride flush, sodium chloride, promethazine **OR** ondansetron, magnesium hydroxide, acetaminophen **OR** acetaminophen, albuterol sulfate HFA, glucose, dextrose bolus **OR** dextrose bolus, glucagon (rDNA), dextrose      Intake/Output Summary (Last 24 hours) at 6/19/2022 1147  Last data filed at 6/18/2022 1735  Gross per 24 hour   Intake 240 ml   Output --   Net 240 ml       Physical Exam Performed:    /76   Pulse 82   Temp 97.7 °F (36.5 °C) (Oral)   Resp 18   Ht 6' 1\" (1.854 m)   Wt 260 lb 8 oz (118.2 kg)   SpO2 98%   PF (!) 18 L/min   BMI 34.37 kg/m²     General appearance: No apparent distress  Neck: Supple  Respiratory:  Normal respiratory effort. Clear to auscultation, bilaterally without Rales/Wheezes/Rhonchi.   Cardiovascular: Regular rate and rhythm with normal S1/S2 without murmurs, rubs or gallops. Abdomen: Soft, non-tender  Musculoskeletal: No clubbing, cyanosis   Skin: Skin color, texture, turgor normal.  No rashes or lesions. Neurologic: Normal motor strength, 2 through 12 cranial nerves grossly normal  Psychiatric: Alert and oriented  Capillary Refill: Brisk,3 seconds, normal   Peripheral Pulses: +2 palpable, equal bilaterally       Labs:   Recent Labs     06/17/22  0711 06/19/22  0806   WBC 6.0 4.6   HGB 12.5* 13.1*   HCT 38.9* 40.1*    328     Recent Labs     06/17/22  0711 06/18/22  0555 06/19/22  0806   * 136 137   K 4.6 4.2 3.9   CL 98* 102 102   CO2 22 21 23   BUN 24* 26* 17   CREATININE 1.4* 1.2 1.1   CALCIUM 9.0 8.7 9.2     Recent Labs     06/19/22  0806   AST 10*   ALT 14   BILIDIR <0.2   BILITOT <0.2   ALKPHOS 86     No results for input(s): INR in the last 72 hours. No results for input(s): Dilma Comber in the last 72 hours. Urinalysis:      Lab Results   Component Value Date    NITRU Negative 06/07/2022    WBCUA 1 06/07/2022    BACTERIA None Seen 06/07/2022    RBCUA 1 06/07/2022    BLOODU TRACE-INTACT 06/07/2022    SPECGRAV >=1.030 06/07/2022    GLUCOSEU >=1000 06/07/2022    GLUCOSEU NEGATIVE 01/02/2011       Radiology:  XR CHEST PORTABLE   Final Result   Stable chest.  Persistent right upper lobe infiltrate consistent with   pneumonia. Continued plain film follow-up recommended. XR CHEST 1 VIEW   Final Result      FLUORO FOR SURGICAL PROCEDURES   Final Result      MRI BRAIN WO CONTRAST   Final Result   There are few areas of high signal in the periventricular white matter. These are nonspecific. Demyelination could be considered in the appropriate   clinical setting. Chronic small vessel ischemic changes, vasculitis, or   migraines can cause a similar finding. No other brain parenchymal   abnormality. CT CHEST WO CONTRAST   Final Result   1.  Progressive consolidation in the anterior segment of the right upper lobe   consistent with pneumonia. There is no evidence of cavitation. The findings   are nonspecific but most consistent with bacterial etiology. 2. The remainder of the lungs are clear. No pleural fluid. 3. Stable small mediastinal nodes. The right hilar node noted previously is   not evaluated due to the lack of intravenous contrast.         XR CHEST PORTABLE   Final Result   Redemonstration of right upper lobe infiltrate which is likely pneumonic. It   is slightly larger compared to the previous evaluation. Assessment/Plan:    Active Hospital Problems    Diagnosis     Encounter for medication counseling [Z71.89]      Priority: Medium    Fever [R50.9]      Priority: Medium    Hemoptysis [R04.2]      Priority: Medium    Streptococcal infection [A49.1]      Priority: Medium    S/P bronchoscopy [Z98.890]      Priority: Medium    Seizure disorder ([facility] [address] [G40.909]      Priority: Medium    Class 1 obesity due to excess calories with body mass index (BMI) of 34.0 to 34.9 in adult [E66.09, Z68.34]      Priority: Medium    Abnormal CT of the chest [R93.89]      Priority: Medium    Endobronchial mass [R91.8]      Priority: Medium    Community acquired pneumonia of right upper lobe of lung [J18.9]      Priority: Medium    Acute renal failure ([facility] [address] [N17.9]      Priority: Medium    Poorly controlled type 2 diabetes mellitus ([facility] [address] [E11.65]      Priority: Medium    Essential hypertension [I10]      Priority: Medium     1.  Abnormal imaging on CT scan along with positive histoplasma antibody, ID consulted status post bronchoscopy as patient had hemoptysis.  ID ordered serum histoplasma antigen with as not detected.  Started on empiric itraconazole BID for 3 weeks per ID.   Added steroids per ID, continue for 5 days. Follow up with Dr Ramos Esteban on discharge. 2.  Streptococcal pneumonia, was on Omnicef changed to Augmentin per ID.  Stop date 06/27  3.   Hemoptysis, status post bronchoscopy, likely hemorrhage from previous site of biopsy.  Monitor for now pulmonology following. 4. Generalized weakness, improving  5.  CHELE on top of CKD stage III, improved  6.  Diabetes mellitus, sliding scale.  Much better controlled  7.  Essential hypertension, p.o. medications  8. Stated that he lost his taste and yesterday, check COVID negative, today he is stating his decreased tasting is only 1 side on the right. We will ask neurology for opinion          Diet: ADULT DIET; Regular; 4 carb choices (60 gm/meal);  Low Fat/Low Chol/High Fiber/BREE  Code Status: Full Code      Kam Pete MD

## 2022-06-19 NOTE — CONSULTS
Neurology Follow Up Consult Note    S: John Wilkins states that since last night he's had absent taste on the right side of the tongue. He only feels it's intact on the left tongue. He denies any facial weakness or droop. He denies any tinnitus. He denies any change in skin sensation, change in hearing, or change in vision. He denies any change in upper or lower extremity or truncal strength. He denies any dysphagia. He denies any confusion. He denies any headaches or neck pain. He states the only other time he had loss of taste was when he had covid years ago, and that was total tongue taste loss.       Current Facility-Administered Medications:     amoxicillin-clavulanate (AUGMENTIN) 875-125 MG per tablet 1 tablet, 1 tablet, Oral, 2 times per day, Giorgio Garcia MD, 1 tablet at 06/19/22 0824    predniSONE (DELTASONE) tablet 20 mg, 20 mg, Oral, Daily, Rebel York MD, 20 mg at 06/19/22 0825    guaiFENesin-codeine (GUAIFENESIN AC) 100-10 MG/5ML liquid 5 mL, 5 mL, Oral, Q4H, Rosa Sheldon DO, 5 mL at 06/19/22 1059    [COMPLETED] itraconazole (SPORANOX) capsule 200 mg, 200 mg, Oral, TID, 200 mg at 06/17/22 0903 **FOLLOWED BY** itraconazole (SPORANOX) capsule 200 mg, 200 mg, Oral, BID, Rebel York MD, 200 mg at 06/19/22 0824    insulin glargine (LANTUS) injection vial 40 Units, 40 Units, SubCUTAneous, Daily, Giorgio Estrada MD, 40 Units at 06/19/22 0827    insulin lispro (HUMALOG) injection vial 8 Units, 8 Units, SubCUTAneous, TID WC, Monik Rodriguez MD, 8 Units at 06/19/22 0827    sodium chloride flush 0.9 % injection 10 mL, 10 mL, IntraVENous, 2 times per day, Marie Dejesus MD, 10 mL at 06/19/22 0919    sodium chloride flush 0.9 % injection 10 mL, 10 mL, IntraVENous, PRN, Marie Dejesus MD    0.9 % sodium chloride infusion, , IntraVENous, PRN, Marie Dejesus MD, Paused at 06/11/22 1904    promethazine (PHENERGAN) tablet 12.5 mg, 12.5 mg, Oral, Q6H PRN **OR** ondansetron (ZOFRAN) injection 4 mg, 4 mg, IntraVENous, Q6H PRN, Bruno Clinton MD, 4 mg at 06/19/22 8864    magnesium hydroxide (MILK OF MAGNESIA) 400 MG/5ML suspension 30 mL, 30 mL, Oral, Daily PRN, Bruno Clinton MD    acetaminophen (TYLENOL) tablet 650 mg, 650 mg, Oral, Q6H PRN, 650 mg at 06/16/22 2111 **OR** acetaminophen (TYLENOL) suppository 650 mg, 650 mg, Rectal, Q6H PRN, Bruno Clinton MD    albuterol sulfate  (90 Base) MCG/ACT inhaler 2 puff, 2 puff, Inhalation, Q6H PRN, Bruno Clinton MD, 2 puff at 06/08/22 0039    [Held by provider] aspirin chewable tablet 81 mg, 81 mg, Oral, Daily, Bruno Clinton MD, 81 mg at 06/13/22 9852    carvedilol (COREG) tablet 25 mg, 25 mg, Oral, BID WC, Bruno Clinton MD, 25 mg at 06/19/22 0825    dicyclomine (BENTYL) capsule 10 mg, 10 mg, Oral, 4x Daily AC & HS, Bruno Clinton MD, 10 mg at 06/19/22 1059    DULoxetine (CYMBALTA) extended release capsule 30 mg, 30 mg, Oral, Daily, Bruno Clinton MD, 30 mg at 06/19/22 9054    gabapentin (NEURONTIN) capsule 300 mg, 300 mg, Oral, TID, Bruno Clinton MD, 300 mg at 06/19/22 0825    NIFEdipine (PROCARDIA XL) extended release tablet 60 mg, 60 mg, Oral, Daily, Bruno Clinton MD, 60 mg at 06/19/22 0825    rosuvastatin (CRESTOR) tablet 40 mg, 40 mg, Oral, Nightly, Bruno Clinton MD, 40 mg at 06/18/22 2057    glucose chewable tablet 16 g, 4 tablet, Oral, PRN, Bruno Clinton MD    dextrose bolus 10% 125 mL, 125 mL, IntraVENous, PRN **OR** dextrose bolus 10% 250 mL, 250 mL, IntraVENous, PRN, Bruno Clinton MD    glucagon (rDNA) injection 1 mg, 1 mg, IntraMUSCular, PRBruno HERNANDEZ MD    dextrose 5 % solution, 100 mL/hr, IntraVENous, PRBruno HERNANDEZ MD    insulin lispro (HUMALOG) injection vial 0-12 Units, 0-12 Units, SubCUTAneous, TID Bruno ANTHONY MD, 2 Units at 06/19/22 0827    insulin lispro (HUMALOG) injection vial 0-6 Units, 0-6 Units, SubCUTAneous, Nightly, Laina Garrett MD, 2 Units at 06/18/22 2058    [Held by provider] enoxaparin Sodium (LOVENOX) injection 30 mg, 30 mg, SubCUTAneous, BID, Angel Duvall MD, 30 mg at 06/13/22 2116    Exam:  Vitals:    06/19/22 0847   BP:    Pulse:    Resp:    Temp:    SpO2: 98%         Constitutional   Vital signs: BP, HR, and RR reviewed   General Alert, no distress, well-nourished  Eyes: fundoscopic exam unable to be visualized  Cardiovascular: pulses symmetric in all 4 extremities. No peripheral edema. Psychiatric: cooperative with examination, no psychotic behavior noted. Neurologic  Mental status:  orientation to person and place  General fund of knowledge grossly intact  Memory grossly intact  Attention intact as able to attend well to the exam   Language fluent in conversation  Comprehension intact; follows simple commands       Cranial nerves:  CN2: Visual Fields full w/o extinction on confrontational testing,  CN 3,4,6: extraocular muscles intact,  CN5: facial sensation symmetric  CN7:face symmetric without dysarthria,  CN8: hearing grossly intact  CN9: palate elevated symmetrically  CN11: trap full strength on shoulder shrug  CN12: tongue midline with protrusion     Strength: No prontator drift. 5/5 strength in all 4 extremities  Deep tendon reflexes: 2/4 in all 4 extremities     Sensory: light touch intact in all 4 extremities. No sensory extinction on double simultaneous stimulation    Cerebellar/coordination: finger nose finger normal without ataxia     Tone: normal in all 4 extremities    Gait: gait was deferred for safety       I reviewed the following laboratory and imaging study reports, and I independently reviewed the following imaging studies.   Labs  Recent Results (from the past 36 hour(s))   Basic Metabolic Panel w/ Reflex to MG    Collection Time: 06/18/22  5:55 AM   Result Value Ref Range    Sodium 136 136 - 145 mmol/L    Potassium 328 135 - 450 K/uL    MPV 7.6 5.0 - 10.5 fL    Neutrophils % 60.9 %    Lymphocytes % 24.7 %    Monocytes % 12.6 %    Eosinophils % 0.8 %    Basophils % 1.0 %    Neutrophils Absolute 2.8 1.7 - 7.7 K/uL    Lymphocytes Absolute 1.1 1.0 - 5.1 K/uL    Monocytes Absolute 0.6 0.0 - 1.3 K/uL    Eosinophils Absolute 0.0 0.0 - 0.6 K/uL    Basophils Absolute 0.0 0.0 - 0.2 K/uL   Hepatic Function Panel    Collection Time: 06/19/22  8:06 AM   Result Value Ref Range    Total Protein 6.8 6.4 - 8.2 g/dL    Albumin 3.3 (L) 3.4 - 5.0 g/dL    Alkaline Phosphatase 86 40 - 129 U/L    ALT 14 10 - 40 U/L    AST 10 (L) 15 - 37 U/L    Total Bilirubin <0.2 0.0 - 1.0 mg/dL    Bilirubin, Direct <0.2 0.0 - 0.3 mg/dL    Bilirubin, Indirect see below 0.0 - 1.0 mg/dL   POCT Glucose    Collection Time: 06/19/22 11:16 AM   Result Value Ref Range    POC Glucose 96 70 - 99 mg/dl    Performed on ACCU-CHEK           Studies  XR CHEST PORTABLE   Final Result   Stable chest.  Persistent right upper lobe infiltrate consistent with   pneumonia. Continued plain film follow-up recommended. XR CHEST 1 VIEW   Final Result      FLUORO FOR SURGICAL PROCEDURES   Final Result      MRI BRAIN WO CONTRAST   Final Result   There are few areas of high signal in the periventricular white matter. These are nonspecific. Demyelination could be considered in the appropriate   clinical setting. Chronic small vessel ischemic changes, vasculitis, or   migraines can cause a similar finding. No other brain parenchymal   abnormality. CT CHEST WO CONTRAST   Final Result   1. Progressive consolidation in the anterior segment of the right upper lobe   consistent with pneumonia. There is no evidence of cavitation. The findings   are nonspecific but most consistent with bacterial etiology. 2. The remainder of the lungs are clear. No pleural fluid. 3. Stable small mediastinal nodes.   The right hilar node noted previously is   not evaluated due to the lack of

## 2022-06-20 LAB
ANION GAP SERPL CALCULATED.3IONS-SCNC: 9 MMOL/L (ref 3–16)
BUN BLDV-MCNC: 15 MG/DL (ref 7–20)
CALCIUM SERPL-MCNC: 9.1 MG/DL (ref 8.3–10.6)
CHLORIDE BLD-SCNC: 104 MMOL/L (ref 99–110)
CO2: 23 MMOL/L (ref 21–32)
CREAT SERPL-MCNC: 1.2 MG/DL (ref 0.9–1.3)
GFR AFRICAN AMERICAN: >60
GFR NON-AFRICAN AMERICAN: >60
GLUCOSE BLD-MCNC: 109 MG/DL (ref 70–99)
GLUCOSE BLD-MCNC: 137 MG/DL (ref 70–99)
GLUCOSE BLD-MCNC: 148 MG/DL (ref 70–99)
GLUCOSE BLD-MCNC: 226 MG/DL (ref 70–99)
GLUCOSE BLD-MCNC: 229 MG/DL (ref 70–99)
PERFORMED ON: ABNORMAL
POTASSIUM REFLEX MAGNESIUM: 4 MMOL/L (ref 3.5–5.1)
SARS-COV-2, PCR: NOT DETECTED
SODIUM BLD-SCNC: 136 MMOL/L (ref 136–145)

## 2022-06-20 PROCEDURE — 6370000000 HC RX 637 (ALT 250 FOR IP): Performed by: INTERNAL MEDICINE

## 2022-06-20 PROCEDURE — 1200000000 HC SEMI PRIVATE

## 2022-06-20 PROCEDURE — 80048 BASIC METABOLIC PNL TOTAL CA: CPT

## 2022-06-20 PROCEDURE — 36415 COLL VENOUS BLD VENIPUNCTURE: CPT

## 2022-06-20 PROCEDURE — U0003 INFECTIOUS AGENT DETECTION BY NUCLEIC ACID (DNA OR RNA); SEVERE ACUTE RESPIRATORY SYNDROME CORONAVIRUS 2 (SARS-COV-2) (CORONAVIRUS DISEASE [COVID-19]), AMPLIFIED PROBE TECHNIQUE, MAKING USE OF HIGH THROUGHPUT TECHNOLOGIES AS DESCRIBED BY CMS-2020-01-R: HCPCS

## 2022-06-20 PROCEDURE — U0005 INFEC AGEN DETEC AMPLI PROBE: HCPCS

## 2022-06-20 PROCEDURE — 94760 N-INVAS EAR/PLS OXIMETRY 1: CPT

## 2022-06-20 PROCEDURE — 99232 SBSQ HOSP IP/OBS MODERATE 35: CPT | Performed by: INTERNAL MEDICINE

## 2022-06-20 PROCEDURE — 2580000003 HC RX 258: Performed by: INTERNAL MEDICINE

## 2022-06-20 RX ADMIN — GUAIFENESIN AND CODEINE PHOSPHATE 5 ML: 10; 100 LIQUID ORAL at 14:39

## 2022-06-20 RX ADMIN — INSULIN LISPRO 8 UNITS: 100 INJECTION, SOLUTION INTRAVENOUS; SUBCUTANEOUS at 12:34

## 2022-06-20 RX ADMIN — AMOXICILLIN AND CLAVULANATE POTASSIUM 1 TABLET: 875; 125 TABLET, FILM COATED ORAL at 21:09

## 2022-06-20 RX ADMIN — ITRACONAZOLE 200 MG: 100 CAPSULE, COATED PELLETS ORAL at 21:15

## 2022-06-20 RX ADMIN — AMOXICILLIN AND CLAVULANATE POTASSIUM 1 TABLET: 875; 125 TABLET, FILM COATED ORAL at 08:34

## 2022-06-20 RX ADMIN — GUAIFENESIN AND CODEINE PHOSPHATE 5 ML: 10; 100 LIQUID ORAL at 03:12

## 2022-06-20 RX ADMIN — CARVEDILOL 25 MG: 25 TABLET, FILM COATED ORAL at 08:34

## 2022-06-20 RX ADMIN — GUAIFENESIN AND CODEINE PHOSPHATE 5 ML: 10; 100 LIQUID ORAL at 08:34

## 2022-06-20 RX ADMIN — DULOXETINE HYDROCHLORIDE 30 MG: 30 CAPSULE, DELAYED RELEASE ORAL at 08:48

## 2022-06-20 RX ADMIN — GABAPENTIN 300 MG: 300 CAPSULE ORAL at 14:39

## 2022-06-20 RX ADMIN — GUAIFENESIN AND CODEINE PHOSPHATE 5 ML: 10; 100 LIQUID ORAL at 18:02

## 2022-06-20 RX ADMIN — GUAIFENESIN AND CODEINE PHOSPHATE 5 ML: 10; 100 LIQUID ORAL at 06:25

## 2022-06-20 RX ADMIN — DICYCLOMINE HYDROCHLORIDE 10 MG: 10 CAPSULE ORAL at 18:02

## 2022-06-20 RX ADMIN — INSULIN LISPRO 8 UNITS: 100 INJECTION, SOLUTION INTRAVENOUS; SUBCUTANEOUS at 17:56

## 2022-06-20 RX ADMIN — SODIUM CHLORIDE, PRESERVATIVE FREE 10 ML: 5 INJECTION INTRAVENOUS at 21:10

## 2022-06-20 RX ADMIN — GABAPENTIN 300 MG: 300 CAPSULE ORAL at 21:09

## 2022-06-20 RX ADMIN — PREDNISONE 20 MG: 20 TABLET ORAL at 08:48

## 2022-06-20 RX ADMIN — CARVEDILOL 25 MG: 25 TABLET, FILM COATED ORAL at 18:02

## 2022-06-20 RX ADMIN — NIFEDIPINE 60 MG: 60 TABLET, EXTENDED RELEASE ORAL at 08:34

## 2022-06-20 RX ADMIN — INSULIN LISPRO 2 UNITS: 100 INJECTION, SOLUTION INTRAVENOUS; SUBCUTANEOUS at 12:31

## 2022-06-20 RX ADMIN — INSULIN GLARGINE 40 UNITS: 100 INJECTION, SOLUTION SUBCUTANEOUS at 09:30

## 2022-06-20 RX ADMIN — GUAIFENESIN AND CODEINE PHOSPHATE 5 ML: 10; 100 LIQUID ORAL at 21:09

## 2022-06-20 RX ADMIN — DICYCLOMINE HYDROCHLORIDE 10 MG: 10 CAPSULE ORAL at 21:09

## 2022-06-20 RX ADMIN — INSULIN LISPRO 2 UNITS: 100 INJECTION, SOLUTION INTRAVENOUS; SUBCUTANEOUS at 21:12

## 2022-06-20 RX ADMIN — ROSUVASTATIN CALCIUM 40 MG: 40 TABLET, FILM COATED ORAL at 21:09

## 2022-06-20 RX ADMIN — ITRACONAZOLE 200 MG: 100 CAPSULE, COATED PELLETS ORAL at 08:48

## 2022-06-20 RX ADMIN — DICYCLOMINE HYDROCHLORIDE 10 MG: 10 CAPSULE ORAL at 10:33

## 2022-06-20 RX ADMIN — GABAPENTIN 300 MG: 300 CAPSULE ORAL at 08:34

## 2022-06-20 RX ADMIN — DICYCLOMINE HYDROCHLORIDE 10 MG: 10 CAPSULE ORAL at 06:25

## 2022-06-20 RX ADMIN — SODIUM CHLORIDE, PRESERVATIVE FREE 10 ML: 5 INJECTION INTRAVENOUS at 08:49

## 2022-06-20 RX ADMIN — INSULIN LISPRO 4 UNITS: 100 INJECTION, SOLUTION INTRAVENOUS; SUBCUTANEOUS at 17:56

## 2022-06-20 ASSESSMENT — ENCOUNTER SYMPTOMS
NAUSEA: 0
BACK PAIN: 0
CONSTIPATION: 0
EYE DISCHARGE: 0
EYE REDNESS: 0
SORE THROAT: 0
SHORTNESS OF BREATH: 0
COUGH: 0
DIARRHEA: 0
TROUBLE SWALLOWING: 0
ABDOMINAL PAIN: 0
WHEEZING: 0
RHINORRHEA: 0

## 2022-06-20 ASSESSMENT — PAIN DESCRIPTION - PAIN TYPE: TYPE: ACUTE PAIN

## 2022-06-20 ASSESSMENT — PAIN DESCRIPTION - LOCATION: LOCATION: ABDOMEN

## 2022-06-20 ASSESSMENT — PAIN DESCRIPTION - FREQUENCY: FREQUENCY: INTERMITTENT

## 2022-06-20 ASSESSMENT — PAIN SCALES - GENERAL
PAINLEVEL_OUTOF10: 0
PAINLEVEL_OUTOF10: 5
PAINLEVEL_OUTOF10: 1
PAINLEVEL_OUTOF10: 3
PAINLEVEL_OUTOF10: 0

## 2022-06-20 ASSESSMENT — PAIN DESCRIPTION - DESCRIPTORS: DESCRIPTORS: DULL

## 2022-06-20 ASSESSMENT — PAIN - FUNCTIONAL ASSESSMENT: PAIN_FUNCTIONAL_ASSESSMENT: ACTIVITIES ARE NOT PREVENTED

## 2022-06-20 ASSESSMENT — PAIN DESCRIPTION - ORIENTATION: ORIENTATION: MID

## 2022-06-20 NOTE — PLAN OF CARE
Problem: Discharge Planning  Goal: Discharge to home or other facility with appropriate resources  6/19/2022 2355 by Dilia Harrison RN  Outcome: Progressing  6/19/2022 1039 by Hortencia Jose RN  Outcome: Progressing     Problem: Pain  Goal: Verbalizes/displays adequate comfort level or baseline comfort level  6/19/2022 2355 by Dilia Harrison RN  Outcome: Progressing  6/19/2022 1039 by Hortencia Jose RN  Outcome: Progressing     Problem: Metabolic/Fluid and Electrolytes - Adult  Goal: Electrolytes maintained within normal limits  6/19/2022 2355 by Dilia Harrison RN  Outcome: Progressing  6/19/2022 1039 by Hortencia Jose RN  Outcome: Progressing  Goal: Hemodynamic stability and optimal renal function maintained  6/19/2022 2355 by Dilia Harrison RN  Outcome: Progressing  6/19/2022 1039 by Hortencia Jose RN  Outcome: Progressing  Goal: Glucose maintained within prescribed range  6/19/2022 2355 by Dilia Harrison RN  Outcome: Progressing  6/19/2022 1039 by Hortencia Jose RN  Outcome: Progressing     Problem: Safety - Adult  Goal: Free from fall injury  6/19/2022 2355 by Dilia Harrison RN  Outcome: Progressing  6/19/2022 1039 by Hortencia Jose RN  Outcome: Progressing     Problem: ABCDS Injury Assessment  Goal: Absence of physical injury  6/19/2022 2355 by Dilia Harrison RN  Outcome: Progressing  6/19/2022 1039 by Hortencia Jose RN  Outcome: Progressing     Problem: Chronic Conditions and Co-morbidities  Goal: Patient's chronic conditions and co-morbidity symptoms are monitored and maintained or improved  6/19/2022 2355 by Dilia Harrison RN  Outcome: Progressing  6/19/2022 1039 by Hortecnia Jose RN  Outcome: Progressing     Problem: Gastrointestinal - Adult  Goal: Minimal or absence of nausea and vomiting  6/19/2022 2355 by Dilia Harrison RN  Outcome: Progressing  6/19/2022 1039 by Hortencia Jose RN  Outcome: Progressing  Goal: Maintains or returns to baseline bowel function  6/19/2022 2355 by Dilia Harrison RN  Outcome: Progressing  6/19/2022 1039 by Jj Fiore, RN  Outcome: Progressing  Goal: Maintains adequate nutritional intake  6/19/2022 2355 by Rosa M Oconnell RN  Outcome: Progressing  6/19/2022 1039 by Jj Fiore RN  Outcome: Progressing

## 2022-06-20 NOTE — PROGRESS NOTES
Infectious Diseases   Progress Note      Admission Date: 6/6/2022  Hospital Day: Hospital Day: 15   Attending: Shea Taveras MD  Date of service: 6/20/2022     Chief complaint/ Reason for consult:     · Streptococcus parasanguinis infection of the lungs  · Positive histoplasma serology by immunodiffusion  · Positive 1, 3 beta D glucan in the blood on 6/20/2022  · Negative HIV screen on 6/9/2022  · S/p bronchoscopy on 6/9/2022 and 6/14/22  · Right upper lobe endobronchial lesion    Microbiology:        I have reviewed allavailable micro lab data and cultures    · Lung tissue biopsy culture  - collected on 6/9/2022, Streptococcus parasanguinis      Antibiotics and immunizations:       Current antibiotics: All antibiotics and their doses were reviewed by me    Recent Abx Admin                   itraconazole (SPORANOX) capsule 200 mg (mg) 200 mg Given 06/20/22 0848     200 mg Given 06/19/22 2119    amoxicillin-clavulanate (AUGMENTIN) 875-125 MG per tablet 1 tablet (tablet) 1 tablet Given 06/20/22 0834     1 tablet Given 06/19/22 2119                  Immunization History: All immunization history was reviewed by me today. There is no immunization history on file for this patient. Known drug allergies: All allergies were reviewed and updated    No Known Allergies    Social history:     Social History:  All social andepidemiologic history was reviewed and updated by me today as needed. · Tobacco use:   reports that he has never smoked. He has never used smokeless tobacco.  · Alcohol use:   reports current alcohol use. · Currently lives in: [address] [place] [address]  ·  reports previous drug use.      COVID VACCINATION AND LAB RESULT RECORDS:     Internal Administration   First Dose      Second Dose           Last COVID Lab SARS-CoV-2 (no units)   Date Value   06/18/2022 Not Detected     SARS-CoV-2, PCR (no units)   Date Value   06/20/2022 Not Detected     SARS-CoV-2, NAAT (no units)   Date Value 06/06/2022 Not Detected            Assessment:     The patient is a 29 y.o. old male who  has a past medical history of COVID-19, Diabetes mellitus, type II ([facility] [address] History of blood transfusion, Hypertension, Protein in urine, and Seizure ([facility] [address] with following problems:    · Streptococcus parasanguinis infection of the lungs-lung tissue culture has also grown Streptococcus parasanguinis and Prevotella-this is on Augmentin  · High fever--likely inflammatory response, which was previously being controlled with steroids  · Positive histoplasma serology by immunodiffusion-only indicates exposure to histoplasma  - currently on empiric itraconazole -histoplasma antigens and cultures have been requested-covered with itraconazole  · Positive 1, 3 beta D glucan in the blood on 6/20/2022  · Negative HIV screen on 6/9/2022  · S/p bronchoscopy on 6/9/2022 and 6/14/22  · Right upper lobe endobronchial lesion  · History of COVID-19  · Poorly controlled type 2 diabetes mellitus-maintain good glucose control  · Essential hypertension  · Seizure disorder  · Chronic kidney disease stage III  · Obesity Class 1 due to excess calorie intake : Body mass index is 34.54 kg/m².-Counseling done         Discussion:      He is afebrile. He is tolerating antibiotics okay. No diarrhea. Tissue fungal culture is negative for 1 week. Serum creatinine is 1.2      Plan:       Diagnostic Workup:      · Continue to follow  fever curve, WBC count and blood cultures. · Continue to monitor blood counts, liver and renal function. Antimicrobials:    · Continue oral Augmentin until 6/27/2022  · Recommend taking oral probiotics daily while on antibiotic  · Plan to continue oral itraconazole until June 8 and see Dr. Jessenia Davis in the meanwhile to assess the need for continuation of further itraconazole  · Recommend tapering of steroids over the next few days  · Continue close vitals monitoring. · Maintain good glycemic control.   · Fall precautions. · Aspiration precautions. · Continue to watch for new fever or diarrhea. · DVT prophylaxis. · Discussed all above with patient and RN. Drug Monitoring:    · Continue monitoring for antibiotic toxicity as follows: CBC, CMP   · Continue to watch for following: new or worsening fever, new hypotension, hives, lip swelling and redness or purulence at vascular access sites. I/v access Management:    · Continue to monitor i.v access sites for erythema, induration, discharge or tenderness. · As always, continue efforts to minimize tubes/lines/drains as clinically appropriate to reduce chances of line associated infections. Patient education and counseling:        · The patient was educated in detail about the side-effects of various antibiotics and things to watch for like new rashes, lip swelling, severe reaction, worsening diarrhea, break through fever etc.  · Discussed patient's condition and what to expect. All of the patient's questions were addressed in a satisfactory manner and patient verbalized understanding all instructions. Level of complexity of visit: High     Thanks for allowing me to participate in your patient's care. I will sign off today, but will be available to answer any further questions or concerns that may arise during patient's stay in the hospital.          Subjective: Interval history: Interval history was obtained from chart review and patient/ RN. He is afebrile. He is tolerating antibiotics okay. No diarrhea     REVIEW OF SYSTEMS:     Review of Systems   Constitutional: Negative for chills, diaphoresis and fever. HENT: Negative for ear discharge, ear pain, rhinorrhea, sore throat and trouble swallowing. Eyes: Negative for discharge and redness. Respiratory: Negative for cough, shortness of breath and wheezing. Cardiovascular: Negative for chest pain and leg swelling.    Gastrointestinal: Negative for abdominal pain, constipation, diarrhea and nausea. Endocrine: Negative for polyuria. Genitourinary: Negative for dysuria, flank pain, frequency, hematuria and urgency. Musculoskeletal: Negative for back pain and myalgias. Skin: Negative for rash. Neurological: Negative for dizziness, seizures and headaches. Hematological: Does not bruise/bleed easily. Psychiatric/Behavioral: Negative for hallucinations and suicidal ideas. All other systems reviewed and are negative. Past Medical History: All past medical history reviewed today. Past Medical History:   Diagnosis Date    COVID-19     Diabetes mellitus, type II ([facility] [address]     History of blood transfusion     Hypertension     Protein in urine     Seizure [facility])        Past Surgical History: All past surgical history was reviewed today. Past Surgical History:   Procedure Laterality Date    BRONCHOSCOPY  6/9/2022    BRONCHOSCOPY BRUSHINGS performed by Judi Cardona MD at [facility]  6/9/2022    BRONCHOSCOPY DIAGNOSTIC OR CELL [address] performed by Judi Cardona MD at [address] [place] [address]  6/9/2022    BRONCHOSCOPY BIOPSY BRONCHUS performed by Judi Cardona MD at [address] [place] [address] N/A 6/14/2022    BRONCHOSCOPY DIAGNOSTIC OR CELL [address] ONLY performed by Billie Weaver DO at [facility] ENDOSCOPY       Family History: All family history was reviewed today. History reviewed. No pertinent family history. Objective:       PHYSICAL EXAM:      Vitals:   Vitals:    06/20/22 0313 06/20/22 0442 06/20/22 0833 06/20/22 1628   BP: 108/71  126/84 124/77   Pulse: 85  88 90   Resp: 18  16 18   Temp: 97.7 °F (36.5 °C)  98.1 °F (36.7 °C) 98.4 °F (36.9 °C)   TempSrc: Oral  Oral Oral   SpO2: 96%  97% 96%   Weight:  261 lb 12.8 oz (118.8 kg)     Height:       PF:           Physical Exam  Vitals and nursing note reviewed. Constitutional:       Appearance: Normal appearance. He is well-developed.    HENT:      Head: Normocephalic and atraumatic. Right Ear: External ear normal.      Left Ear: External ear normal.      Nose: Nose normal. No congestion or rhinorrhea. Mouth/Throat:      Mouth: Mucous membranes are moist.      Pharynx: No oropharyngeal exudate or posterior oropharyngeal erythema. Eyes:      General: No scleral icterus. Right eye: No discharge. Left eye: No discharge. Conjunctiva/sclera: Conjunctivae normal.      Pupils: Pupils are equal, round, and reactive to light. Cardiovascular:      Rate and Rhythm: Normal rate and regular rhythm. Pulses: Normal pulses. Heart sounds: No murmur heard. No friction rub. Pulmonary:      Effort: Pulmonary effort is normal. No respiratory distress. Breath sounds: Normal breath sounds. No stridor. No wheezing, rhonchi or rales. Abdominal:      General: Bowel sounds are normal.      Palpations: Abdomen is soft. Tenderness: There is no abdominal tenderness. There is no right CVA tenderness, left CVA tenderness, guarding or rebound. Musculoskeletal:         General: No swelling or tenderness. Normal range of motion. Cervical back: Normal range of motion and neck supple. No rigidity. No muscular tenderness. Lymphadenopathy:      Cervical: No cervical adenopathy. Skin:     General: Skin is warm and dry. Coloration: Skin is not jaundiced. Findings: No erythema or rash. Neurological:      General: No focal deficit present. Mental Status: He is alert and oriented to person, place, and time. Mental status is at baseline. Motor: No abnormal muscle tone. Psychiatric:         Mood and Affect: Mood normal.         Behavior: Behavior normal.         Thought Content: Thought content normal.          *        Lines and drains: All vascular access sites are healthy with no local erythema, discharge or tenderness. Intake and output:    I/O last 3 completed shifts: In: 3954 [P.O.:1555]  Out: -     Lab Data:    All available labs and old records have been reviewed by me. CBC:  Recent Labs     06/19/22  0806   WBC 4.6   RBC 5.16   HGB 13.1*   HCT 40.1*      MCV 77.7*   MCH 25.4*   MCHC 32.7   RDW 12.4        BMP:  Recent Labs     06/18/22  0555 06/19/22  0806 06/20/22  0829    137 136   K 4.2 3.9 4.0    102 104   CO2 21 23 23   BUN 26* 17 15   CREATININE 1.2 1.1 1.2   CALCIUM 8.7 9.2 9.1   GLUCOSE 231* 164* 137*        Hepatic Function Panel:   Lab Results   Component Value Date    ALKPHOS 86 06/19/2022    ALT 14 06/19/2022    AST 10 06/19/2022    PROT 6.8 06/19/2022    BILITOT <0.2 06/19/2022    BILIDIR <0.2 06/19/2022    IBILI see below 06/19/2022    LABALBU 3.3 06/19/2022       CPK: No results found for: CKTOTAL  ESR:   Lab Results   Component Value Date    SEDRATE >130 (H) 06/11/2022     CRP:   Lab Results   Component Value Date    CRP 62.4 (H) 06/03/2020           Imaging: All pertinent images and reports for the current visit were reviewed by me during this visit. I reviewed the chest x-ray/CT scan/MRI images and independently interpreted the findings and results today. XR CHEST PORTABLE   Final Result   Stable chest.  Persistent right upper lobe infiltrate consistent with   pneumonia. Continued plain film follow-up recommended. XR CHEST 1 VIEW   Final Result      FLUORO FOR SURGICAL PROCEDURES   Final Result      MRI BRAIN WO CONTRAST   Final Result   There are few areas of high signal in the periventricular white matter. These are nonspecific. Demyelination could be considered in the appropriate   clinical setting. Chronic small vessel ischemic changes, vasculitis, or   migraines can cause a similar finding. No other brain parenchymal   abnormality. CT CHEST WO CONTRAST   Final Result   1. Progressive consolidation in the anterior segment of the right upper lobe   consistent with pneumonia. There is no evidence of cavitation.   The findings   are nonspecific but most consistent with bacterial etiology. 2. The remainder of the lungs are clear. No pleural fluid. 3. Stable small mediastinal nodes. The right hilar node noted previously is   not evaluated due to the lack of intravenous contrast.         XR CHEST PORTABLE   Final Result   Redemonstration of right upper lobe infiltrate which is likely pneumonic. It   is slightly larger compared to the previous evaluation. MRI BRAIN W WO CONTRAST    (Results Pending)       Medications: All current and past medications were reviewed.      amoxicillin-clavulanate  1 tablet Oral 2 times per day    predniSONE  20 mg Oral Daily    guaiFENesin-codeine  5 mL Oral Q4H    itraconazole  200 mg Oral BID    insulin glargine  40 Units SubCUTAneous Daily    insulin lispro  8 Units SubCUTAneous TID WC    sodium chloride flush  10 mL IntraVENous 2 times per day    [Held by provider] aspirin  81 mg Oral Daily    carvedilol  25 mg Oral BID WC    dicyclomine  10 mg Oral 4x Daily AC & HS    DULoxetine  30 mg Oral Daily    gabapentin  300 mg Oral TID    NIFEdipine  60 mg Oral Daily    rosuvastatin  40 mg Oral Nightly    insulin lispro  0-12 Units SubCUTAneous TID WC    insulin lispro  0-6 Units SubCUTAneous Nightly    [Held by provider] enoxaparin  30 mg SubCUTAneous BID        sodium chloride Stopped (06/11/22 1904)    dextrose         sodium chloride flush, sodium chloride, promethazine **OR** ondansetron, magnesium hydroxide, acetaminophen **OR** acetaminophen, albuterol sulfate HFA, glucose, dextrose bolus **OR** dextrose bolus, glucagon (rDNA), dextrose      Problem list:       Patient Active Problem List   Diagnosis Code    Poorly controlled type 2 diabetes mellitus ([facility] [address] E11.65    Essential hypertension I10    SOB (shortness of breath) R06.02    CKD (chronic kidney disease) N18.9    Acute renal failure ([facility] [address] N17.9    Community acquired pneumonia of right upper lobe of lung J18.9    Abnormal CT of the chest

## 2022-06-21 ENCOUNTER — APPOINTMENT (OUTPATIENT)
Dept: MRI IMAGING | Age: 37
DRG: 137 | End: 2022-06-21
Payer: MEDICAID

## 2022-06-21 LAB
A/G RATIO: 1 (ref 1.1–2.2)
ALBUMIN SERPL-MCNC: 3 G/DL (ref 3.4–5)
ALP BLD-CCNC: 76 U/L (ref 40–129)
ALT SERPL-CCNC: 18 U/L (ref 10–40)
ANION GAP SERPL CALCULATED.3IONS-SCNC: 9 MMOL/L (ref 3–16)
AST SERPL-CCNC: 11 U/L (ref 15–37)
BASOPHILS ABSOLUTE: 0 K/UL (ref 0–0.2)
BASOPHILS RELATIVE PERCENT: 0.7 %
BILIRUB SERPL-MCNC: <0.2 MG/DL (ref 0–1)
BUN BLDV-MCNC: 19 MG/DL (ref 7–20)
CALCIUM SERPL-MCNC: 9.2 MG/DL (ref 8.3–10.6)
CHLORIDE BLD-SCNC: 103 MMOL/L (ref 99–110)
CO2: 24 MMOL/L (ref 21–32)
CREAT SERPL-MCNC: 1.2 MG/DL (ref 0.9–1.3)
EOSINOPHILS ABSOLUTE: 0 K/UL (ref 0–0.6)
EOSINOPHILS RELATIVE PERCENT: 0.8 %
GFR AFRICAN AMERICAN: >60
GFR NON-AFRICAN AMERICAN: >60
GLUCOSE BLD-MCNC: 142 MG/DL (ref 70–99)
GLUCOSE BLD-MCNC: 162 MG/DL (ref 70–99)
GLUCOSE BLD-MCNC: 252 MG/DL (ref 70–99)
GLUCOSE BLD-MCNC: 294 MG/DL (ref 70–99)
GLUCOSE BLD-MCNC: 83 MG/DL (ref 70–99)
HCT VFR BLD CALC: 37.5 % (ref 40.5–52.5)
HEMOGLOBIN: 12.4 G/DL (ref 13.5–17.5)
LYMPHOCYTES ABSOLUTE: 1.5 K/UL (ref 1–5.1)
LYMPHOCYTES RELATIVE PERCENT: 28.6 %
MCH RBC QN AUTO: 25.8 PG (ref 26–34)
MCHC RBC AUTO-ENTMCNC: 33 G/DL (ref 31–36)
MCV RBC AUTO: 78.1 FL (ref 80–100)
MONOCYTES ABSOLUTE: 0.7 K/UL (ref 0–1.3)
MONOCYTES RELATIVE PERCENT: 12.7 %
NEUTROPHILS ABSOLUTE: 3 K/UL (ref 1.7–7.7)
NEUTROPHILS RELATIVE PERCENT: 57.2 %
PDW BLD-RTO: 12.3 % (ref 12.4–15.4)
PERFORMED ON: ABNORMAL
PERFORMED ON: NORMAL
PLATELET # BLD: 300 K/UL (ref 135–450)
PMV BLD AUTO: 7.2 FL (ref 5–10.5)
POTASSIUM REFLEX MAGNESIUM: 4 MMOL/L (ref 3.5–5.1)
POTASSIUM SERPL-SCNC: 4 MMOL/L (ref 3.5–5.1)
RBC # BLD: 4.8 M/UL (ref 4.2–5.9)
SODIUM BLD-SCNC: 136 MMOL/L (ref 136–145)
TOTAL PROTEIN: 6.1 G/DL (ref 6.4–8.2)
WBC # BLD: 5.3 K/UL (ref 4–11)

## 2022-06-21 PROCEDURE — 6370000000 HC RX 637 (ALT 250 FOR IP): Performed by: INTERNAL MEDICINE

## 2022-06-21 PROCEDURE — A9577 INJ MULTIHANCE: HCPCS | Performed by: NURSE PRACTITIONER

## 2022-06-21 PROCEDURE — 6360000004 HC RX CONTRAST MEDICATION: Performed by: NURSE PRACTITIONER

## 2022-06-21 PROCEDURE — 1200000000 HC SEMI PRIVATE

## 2022-06-21 PROCEDURE — 2580000003 HC RX 258: Performed by: INTERNAL MEDICINE

## 2022-06-21 PROCEDURE — 85025 COMPLETE CBC W/AUTO DIFF WBC: CPT

## 2022-06-21 PROCEDURE — 94760 N-INVAS EAR/PLS OXIMETRY 1: CPT

## 2022-06-21 PROCEDURE — 80053 COMPREHEN METABOLIC PANEL: CPT

## 2022-06-21 PROCEDURE — 36415 COLL VENOUS BLD VENIPUNCTURE: CPT

## 2022-06-21 PROCEDURE — 70553 MRI BRAIN STEM W/O & W/DYE: CPT

## 2022-06-21 RX ORDER — LACTOBACILLUS RHAMNOSUS GG 10B CELL
1 CAPSULE ORAL
Status: DISCONTINUED | OUTPATIENT
Start: 2022-06-21 | End: 2022-06-23 | Stop reason: HOSPADM

## 2022-06-21 RX ORDER — PREDNISONE 10 MG/1
10 TABLET ORAL DAILY
Status: DISCONTINUED | OUTPATIENT
Start: 2022-06-21 | End: 2022-06-23 | Stop reason: HOSPADM

## 2022-06-21 RX ADMIN — GADOBENATE DIMEGLUMINE 20 ML: 529 INJECTION, SOLUTION INTRAVENOUS at 12:40

## 2022-06-21 RX ADMIN — AMOXICILLIN AND CLAVULANATE POTASSIUM 1 TABLET: 875; 125 TABLET, FILM COATED ORAL at 21:01

## 2022-06-21 RX ADMIN — ITRACONAZOLE 200 MG: 100 CAPSULE, COATED PELLETS ORAL at 09:31

## 2022-06-21 RX ADMIN — GUAIFENESIN AND CODEINE PHOSPHATE 5 ML: 10; 100 LIQUID ORAL at 13:54

## 2022-06-21 RX ADMIN — INSULIN LISPRO 8 UNITS: 100 INJECTION, SOLUTION INTRAVENOUS; SUBCUTANEOUS at 17:28

## 2022-06-21 RX ADMIN — GUAIFENESIN AND CODEINE PHOSPHATE 5 ML: 10; 100 LIQUID ORAL at 21:02

## 2022-06-21 RX ADMIN — PREDNISONE 10 MG: 10 TABLET ORAL at 09:24

## 2022-06-21 RX ADMIN — Medication 1 CAPSULE: at 09:31

## 2022-06-21 RX ADMIN — DICYCLOMINE HYDROCHLORIDE 10 MG: 10 CAPSULE ORAL at 09:24

## 2022-06-21 RX ADMIN — GABAPENTIN 300 MG: 300 CAPSULE ORAL at 13:53

## 2022-06-21 RX ADMIN — DICYCLOMINE HYDROCHLORIDE 10 MG: 10 CAPSULE ORAL at 21:02

## 2022-06-21 RX ADMIN — DICYCLOMINE HYDROCHLORIDE 10 MG: 10 CAPSULE ORAL at 17:22

## 2022-06-21 RX ADMIN — INSULIN LISPRO 3 UNITS: 100 INJECTION, SOLUTION INTRAVENOUS; SUBCUTANEOUS at 21:03

## 2022-06-21 RX ADMIN — INSULIN LISPRO 6 UNITS: 100 INJECTION, SOLUTION INTRAVENOUS; SUBCUTANEOUS at 17:27

## 2022-06-21 RX ADMIN — SODIUM CHLORIDE, PRESERVATIVE FREE 10 ML: 5 INJECTION INTRAVENOUS at 09:35

## 2022-06-21 RX ADMIN — DULOXETINE HYDROCHLORIDE 30 MG: 30 CAPSULE, DELAYED RELEASE ORAL at 09:24

## 2022-06-21 RX ADMIN — ROSUVASTATIN CALCIUM 40 MG: 40 TABLET, FILM COATED ORAL at 21:01

## 2022-06-21 RX ADMIN — INSULIN GLARGINE 40 UNITS: 100 INJECTION, SOLUTION SUBCUTANEOUS at 09:38

## 2022-06-21 RX ADMIN — CARVEDILOL 25 MG: 25 TABLET, FILM COATED ORAL at 17:23

## 2022-06-21 RX ADMIN — INSULIN LISPRO 2 UNITS: 100 INJECTION, SOLUTION INTRAVENOUS; SUBCUTANEOUS at 09:38

## 2022-06-21 RX ADMIN — GUAIFENESIN AND CODEINE PHOSPHATE 5 ML: 10; 100 LIQUID ORAL at 17:22

## 2022-06-21 RX ADMIN — SODIUM CHLORIDE, PRESERVATIVE FREE 10 ML: 5 INJECTION INTRAVENOUS at 21:02

## 2022-06-21 RX ADMIN — INSULIN LISPRO 8 UNITS: 100 INJECTION, SOLUTION INTRAVENOUS; SUBCUTANEOUS at 09:38

## 2022-06-21 RX ADMIN — NIFEDIPINE 60 MG: 60 TABLET, EXTENDED RELEASE ORAL at 09:24

## 2022-06-21 RX ADMIN — CARVEDILOL 25 MG: 25 TABLET, FILM COATED ORAL at 09:31

## 2022-06-21 RX ADMIN — GUAIFENESIN AND CODEINE PHOSPHATE 5 ML: 10; 100 LIQUID ORAL at 06:47

## 2022-06-21 RX ADMIN — GUAIFENESIN AND CODEINE PHOSPHATE 5 ML: 10; 100 LIQUID ORAL at 10:30

## 2022-06-21 RX ADMIN — DICYCLOMINE HYDROCHLORIDE 10 MG: 10 CAPSULE ORAL at 06:47

## 2022-06-21 RX ADMIN — ITRACONAZOLE 200 MG: 100 CAPSULE, COATED PELLETS ORAL at 21:01

## 2022-06-21 RX ADMIN — GABAPENTIN 300 MG: 300 CAPSULE ORAL at 21:02

## 2022-06-21 RX ADMIN — AMOXICILLIN AND CLAVULANATE POTASSIUM 1 TABLET: 875; 125 TABLET, FILM COATED ORAL at 09:24

## 2022-06-21 RX ADMIN — GABAPENTIN 300 MG: 300 CAPSULE ORAL at 09:24

## 2022-06-21 ASSESSMENT — PAIN DESCRIPTION - PAIN TYPE: TYPE: ACUTE PAIN

## 2022-06-21 ASSESSMENT — PAIN DESCRIPTION - LOCATION
LOCATION: ABDOMEN
LOCATION: ABDOMEN

## 2022-06-21 ASSESSMENT — PAIN SCALES - GENERAL
PAINLEVEL_OUTOF10: 4
PAINLEVEL_OUTOF10: 4

## 2022-06-21 ASSESSMENT — PAIN DESCRIPTION - DESCRIPTORS: DESCRIPTORS: ACHING

## 2022-06-21 ASSESSMENT — PAIN DESCRIPTION - ORIENTATION: ORIENTATION: MID

## 2022-06-21 ASSESSMENT — PAIN - FUNCTIONAL ASSESSMENT: PAIN_FUNCTIONAL_ASSESSMENT: ACTIVITIES ARE NOT PREVENTED

## 2022-06-21 ASSESSMENT — PAIN DESCRIPTION - FREQUENCY: FREQUENCY: INTERMITTENT

## 2022-06-21 NOTE — PLAN OF CARE
Problem: Discharge Planning  Goal: Discharge to home or other facility with appropriate resources  Outcome: Progressing  Flowsheets (Taken 6/20/2022 2111)  Discharge to home or other facility with appropriate resources: Identify barriers to discharge with patient and caregiver     Problem: Pain  Goal: Verbalizes/displays adequate comfort level or baseline comfort level  Outcome: Progressing     Problem: Metabolic/Fluid and Electrolytes - Adult  Goal: Electrolytes maintained within normal limits  Outcome: Progressing  Flowsheets (Taken 6/20/2022 2111)  Electrolytes maintained within normal limits: Monitor labs and assess patient for signs and symptoms of electrolyte imbalances  Goal: Hemodynamic stability and optimal renal function maintained  Outcome: Progressing  Flowsheets (Taken 6/20/2022 2111)  Hemodynamic stability and optimal renal function maintained:   Monitor labs and assess for signs and symptoms of volume excess or deficit   Monitor intake, output and patient weight  Goal: Glucose maintained within prescribed range  Outcome: Progressing  Flowsheets (Taken 6/20/2022 2111)  Glucose maintained within prescribed range:   Monitor blood glucose as ordered   Assess for signs and symptoms of hyperglycemia and hypoglycemia   Administer ordered medications to maintain glucose within target range     Problem: Safety - Adult  Goal: Free from fall injury  Outcome: Progressing     Problem: ABCDS Injury Assessment  Goal: Absence of physical injury  Outcome: Progressing     Problem: Chronic Conditions and Co-morbidities  Goal: Patient's chronic conditions and co-morbidity symptoms are monitored and maintained or improved  Outcome: Progressing  Flowsheets (Taken 6/20/2022 2111)  Care Plan - Patient's Chronic Conditions and Co-Morbidity Symptoms are Monitored and Maintained or Improved: Monitor and assess patient's chronic conditions and comorbid symptoms for stability, deterioration, or improvement     Problem: Gastrointestinal - Adult  Goal: Minimal or absence of nausea and vomiting  Outcome: Progressing  Flowsheets (Taken 6/20/2022 2111)  Minimal or absence of nausea and vomiting:   Administer IV fluids as ordered to ensure adequate hydration   Advance diet as tolerated, if ordered  Goal: Maintains or returns to baseline bowel function  Outcome: Progressing  Flowsheets (Taken 6/20/2022 2111)  Maintains or returns to baseline bowel function:   Assess bowel function   Encourage oral fluids to ensure adequate hydration   Administer ordered medications as needed   Encourage mobilization and activity  Goal: Maintains adequate nutritional intake  Outcome: Progressing  Flowsheets (Taken 6/20/2022 2111)  Maintains adequate nutritional intake: Monitor percentage of each meal consumed

## 2022-06-21 NOTE — PLAN OF CARE
Problem: Discharge Planning  Goal: Discharge to home or other facility with appropriate resources  6/21/2022 1530 by Clara Finch RN  Outcome: Progressing  6/21/2022 0218 by Dian Laura RN  Outcome: Progressing  Flowsheets (Taken 6/20/2022 2111)  Discharge to home or other facility with appropriate resources: Identify barriers to discharge with patient and caregiver     Problem: Pain  Goal: Verbalizes/displays adequate comfort level or baseline comfort level  6/21/2022 1530 by Clara Finch RN  Outcome: Progressing  6/21/2022 0218 by Dian Laura RN  Outcome: Progressing     Problem: Metabolic/Fluid and Electrolytes - Adult  Goal: Electrolytes maintained within normal limits  6/21/2022 1530 by Clara Finch RN  Outcome: Progressing  6/21/2022 0218 by Dian aLura RN  Outcome: Progressing  Flowsheets (Taken 6/20/2022 2111)  Electrolytes maintained within normal limits: Monitor labs and assess patient for signs and symptoms of electrolyte imbalances  Goal: Hemodynamic stability and optimal renal function maintained  6/21/2022 1530 by Clara Finch RN  Outcome: Progressing  6/21/2022 0218 by Dian Laura RN  Outcome: Progressing  Flowsheets (Taken 6/20/2022 2111)  Hemodynamic stability and optimal renal function maintained:   Monitor labs and assess for signs and symptoms of volume excess or deficit   Monitor intake, output and patient weight  Goal: Glucose maintained within prescribed range  6/21/2022 1530 by Clara Finch RN  Outcome: Progressing  6/21/2022 0218 by Dian Laura RN  Outcome: Progressing  Flowsheets (Taken 6/20/2022 2111)  Glucose maintained within prescribed range:   Monitor blood glucose as ordered   Assess for signs and symptoms of hyperglycemia and hypoglycemia   Administer ordered medications to maintain glucose within target range     Problem: Gastrointestinal - Adult  Goal: Minimal or absence of nausea and vomiting  6/21/2022 1530 by Clara Finch RN  Outcome: Progressing  6/21/2022 0218 by Boubacar Sorensen RN  Outcome: Progressing  Flowsheets (Taken 6/20/2022 2111)  Minimal or absence of nausea and vomiting:   Administer IV fluids as ordered to ensure adequate hydration   Advance diet as tolerated, if ordered  Goal: Maintains or returns to baseline bowel function  6/21/2022 1530 by Clara Finch RN  Outcome: Progressing  6/21/2022 0218 by Boubacar Sorensen RN  Outcome: Progressing  Flowsheets (Taken 6/20/2022 2111)  Maintains or returns to baseline bowel function:   Assess bowel function   Encourage oral fluids to ensure adequate hydration   Administer ordered medications as needed   Encourage mobilization and activity  Goal: Maintains adequate nutritional intake  6/21/2022 1530 by Clara Finch RN  Outcome: Progressing  6/21/2022 0218 by Boubacar Sorensen RN  Outcome: Progressing  Flowsheets (Taken 6/20/2022 2111)  Maintains adequate nutritional intake: Monitor percentage of each meal consumed     Problem: Safety - Adult  Goal: Free from fall injury  6/21/2022 1530 by Clara Finch RN  Outcome: Progressing  6/21/2022 0218 by Boubacar Sorensen RN  Outcome: Progressing     Problem: ABCDS Injury Assessment  Goal: Absence of physical injury  6/21/2022 1530 by Clara Finch RN  Outcome: Progressing  Flowsheets (Taken 6/21/2022 0225 by Boubacar Sorensen RN)  Absence of Physical Injury: Implement safety measures based on patient assessment  6/21/2022 0218 by Boubacar Sorensen RN  Outcome: Progressing     Problem: Chronic Conditions and Co-morbidities  Goal: Patient's chronic conditions and co-morbidity symptoms are monitored and maintained or improved  6/21/2022 1530 by Clara Finch RN  Outcome: Progressing  6/21/2022 0218 by Boubacar Sorensen RN  Outcome: Progressing  Flowsheets (Taken 6/20/2022 2111)  Care Plan - Patient's Chronic Conditions and Co-Morbidity Symptoms are Monitored and Maintained or Improved: Monitor and assess patient's chronic conditions and comorbid symptoms for stability, deterioration, or improvement

## 2022-06-21 NOTE — PROGRESS NOTES
without murmurs, rubs or gallops. Abdomen: Soft, non-tender, non-distended  Musculoskeletal: No clubbing, cyanosis  Skin: Skin color, texture, turgor normal.  No rashes or lesions. Neurologic:  No focal weakness  Psychiatric: Alert and oriented  Capillary Refill: Brisk,3 seconds, normal   Peripheral Pulses: +2 palpable, equal bilaterally       Labs:   Recent Labs     06/19/22  0806   WBC 4.6   HGB 13.1*   HCT 40.1*        Recent Labs     06/18/22  0555 06/19/22  0806 06/20/22  0829    137 136   K 4.2 3.9 4.0    102 104   CO2 21 23 23   BUN 26* 17 15   CREATININE 1.2 1.1 1.2   CALCIUM 8.7 9.2 9.1     Recent Labs     06/19/22  0806   AST 10*   ALT 14   BILIDIR <0.2   BILITOT <0.2   ALKPHOS 86     No results for input(s): INR in the last 72 hours. No results for input(s): Katie Mould in the last 72 hours. Urinalysis:      Lab Results   Component Value Date    NITRU Negative 06/07/2022    WBCUA 1 06/07/2022    BACTERIA None Seen 06/07/2022    RBCUA 1 06/07/2022    BLOODU TRACE-INTACT 06/07/2022    SPECGRAV >=1.030 06/07/2022    GLUCOSEU >=1000 06/07/2022    GLUCOSEU NEGATIVE 01/02/2011       Radiology:  XR CHEST PORTABLE   Final Result   Stable chest.  Persistent right upper lobe infiltrate consistent with   pneumonia. Continued plain film follow-up recommended. XR CHEST 1 VIEW   Final Result      FLUORO FOR SURGICAL PROCEDURES   Final Result      MRI BRAIN WO CONTRAST   Final Result   There are few areas of high signal in the periventricular white matter. These are nonspecific. Demyelination could be considered in the appropriate   clinical setting. Chronic small vessel ischemic changes, vasculitis, or   migraines can cause a similar finding. No other brain parenchymal   abnormality. CT CHEST WO CONTRAST   Final Result   1. Progressive consolidation in the anterior segment of the right upper lobe   consistent with pneumonia. There is no evidence of cavitation.   The findings   are nonspecific but most consistent with bacterial etiology. 2. The remainder of the lungs are clear. No pleural fluid. 3. Stable small mediastinal nodes. The right hilar node noted previously is   not evaluated due to the lack of intravenous contrast.         XR CHEST PORTABLE   Final Result   Redemonstration of right upper lobe infiltrate which is likely pneumonic. It   is slightly larger compared to the previous evaluation. MRI BRAIN W WO CONTRAST    (Results Pending)           Assessment/Plan:    Active Hospital Problems    Diagnosis     Encounter for medication counseling [Z71.89]      Priority: Medium    Fever [R50.9]      Priority: Medium    Hemoptysis [R04.2]      Priority: Medium    Streptococcal infection [A49.1]      Priority: Medium    S/P bronchoscopy [Z98.890]      Priority: Medium    Seizure disorder ([facility] [address] [G40.909]      Priority: Medium    Class 1 obesity due to excess calories with body mass index (BMI) of 34.0 to 34.9 in adult [E66.09, Z68.34]      Priority: Medium    Abnormal CT of the chest [R93.89]      Priority: Medium    Endobronchial mass [R91.8]      Priority: Medium    Community acquired pneumonia of right upper lobe of lung [J18.9]      Priority: Medium    Acute renal failure ([facility] [address] [N17.9]      Priority: Medium    Poorly controlled type 2 diabetes mellitus ([address] [E11.65]      Priority: Medium    Essential hypertension [I10]      Priority: Medium       1.  Abnormal imaging on CT scan along with positive histoplasma antibody, ID consulted status post bronchoscopy as patient had hemoptysis.  ID ordered serum histoplasma antigen with as not detected.  Started on empiric itraconazole BID for 3 weeks per ID, with stop date on July 8.   Added steroids per ID, ID recommended to taper, will decrease to 10 mg today. Follow up with Dr Thad Diehl on discharge further decision on the length of treatment.   2.  Streptococcal pneumonia, was on Omnicef changed to Augmentin per ID. Stop date 06/27  3.   Hemoptysis, status post bronchoscopy, likely hemorrhage from previous site of biopsy.  Monitor for now pulmonology following. 4. Generalized weakness, improving  5.  CHELE on top of CKD stage III, improved, morning labs pending. 6.  Diabetes mellitus, sliding scale.  Much better controlled  7.  Essential hypertension, p.o. medications  8.  Stated that he lost his taste, checked COVID negative, consulted neurology, apparently recommended MRI, contact neurology and discussed in details. Discussed with nephrology, okay with MRI with gadolinium, discussed back with neurology who ordered MRI brain with and without contrast and pending results at this time also neurology recommended to repeat COVID test which was done and negative. Diet: ADULT DIET; Regular; 4 carb choices (60 gm/meal);  Low Fat/Low Chol/High Fiber/BREE  Code Status: Full Code        Naima Vazquez MD

## 2022-06-22 LAB
ANION GAP SERPL CALCULATED.3IONS-SCNC: 11 MMOL/L (ref 3–16)
BUN BLDV-MCNC: 17 MG/DL (ref 7–20)
CALCIUM SERPL-MCNC: 9.4 MG/DL (ref 8.3–10.6)
CHLORIDE BLD-SCNC: 102 MMOL/L (ref 99–110)
CO2: 26 MMOL/L (ref 21–32)
CREAT SERPL-MCNC: 1.2 MG/DL (ref 0.9–1.3)
GFR AFRICAN AMERICAN: >60
GFR NON-AFRICAN AMERICAN: >60
GLUCOSE BLD-MCNC: 114 MG/DL (ref 70–99)
GLUCOSE BLD-MCNC: 149 MG/DL (ref 70–99)
GLUCOSE BLD-MCNC: 79 MG/DL (ref 70–99)
GLUCOSE BLD-MCNC: 88 MG/DL (ref 70–99)
GLUCOSE BLD-MCNC: 90 MG/DL (ref 70–99)
PERFORMED ON: ABNORMAL
PERFORMED ON: ABNORMAL
PERFORMED ON: NORMAL
PERFORMED ON: NORMAL
POTASSIUM REFLEX MAGNESIUM: 3.6 MMOL/L (ref 3.5–5.1)
SODIUM BLD-SCNC: 139 MMOL/L (ref 136–145)

## 2022-06-22 PROCEDURE — 1200000000 HC SEMI PRIVATE

## 2022-06-22 PROCEDURE — 6370000000 HC RX 637 (ALT 250 FOR IP): Performed by: HOSPITALIST

## 2022-06-22 PROCEDURE — 6370000000 HC RX 637 (ALT 250 FOR IP): Performed by: INTERNAL MEDICINE

## 2022-06-22 PROCEDURE — 80048 BASIC METABOLIC PNL TOTAL CA: CPT

## 2022-06-22 PROCEDURE — 36415 COLL VENOUS BLD VENIPUNCTURE: CPT

## 2022-06-22 PROCEDURE — 94760 N-INVAS EAR/PLS OXIMETRY 1: CPT

## 2022-06-22 PROCEDURE — 2580000003 HC RX 258: Performed by: INTERNAL MEDICINE

## 2022-06-22 RX ORDER — POLYETHYLENE GLYCOL 3350 17 G/17G
17 POWDER, FOR SOLUTION ORAL 2 TIMES DAILY
Status: DISCONTINUED | OUTPATIENT
Start: 2022-06-22 | End: 2022-06-23 | Stop reason: HOSPADM

## 2022-06-22 RX ORDER — BISACODYL 10 MG
10 SUPPOSITORY, RECTAL RECTAL DAILY PRN
Status: DISCONTINUED | OUTPATIENT
Start: 2022-06-22 | End: 2022-06-23 | Stop reason: HOSPADM

## 2022-06-22 RX ADMIN — GUAIFENESIN AND CODEINE PHOSPHATE 5 ML: 10; 100 LIQUID ORAL at 06:14

## 2022-06-22 RX ADMIN — POLYETHYLENE GLYCOL 3350 17 G: 17 POWDER, FOR SOLUTION ORAL at 21:16

## 2022-06-22 RX ADMIN — SODIUM CHLORIDE, PRESERVATIVE FREE 10 ML: 5 INJECTION INTRAVENOUS at 21:17

## 2022-06-22 RX ADMIN — AMOXICILLIN AND CLAVULANATE POTASSIUM 1 TABLET: 875; 125 TABLET, FILM COATED ORAL at 09:26

## 2022-06-22 RX ADMIN — Medication 1 CAPSULE: at 09:26

## 2022-06-22 RX ADMIN — INSULIN LISPRO 2 UNITS: 100 INJECTION, SOLUTION INTRAVENOUS; SUBCUTANEOUS at 16:53

## 2022-06-22 RX ADMIN — MAGNESIUM HYDROXIDE 30 ML: 400 SUSPENSION ORAL at 06:19

## 2022-06-22 RX ADMIN — INSULIN LISPRO 8 UNITS: 100 INJECTION, SOLUTION INTRAVENOUS; SUBCUTANEOUS at 09:29

## 2022-06-22 RX ADMIN — POLYETHYLENE GLYCOL 3350 17 G: 17 POWDER, FOR SOLUTION ORAL at 14:28

## 2022-06-22 RX ADMIN — CARVEDILOL 25 MG: 25 TABLET, FILM COATED ORAL at 09:27

## 2022-06-22 RX ADMIN — SODIUM CHLORIDE, PRESERVATIVE FREE 10 ML: 5 INJECTION INTRAVENOUS at 09:26

## 2022-06-22 RX ADMIN — DICYCLOMINE HYDROCHLORIDE 10 MG: 10 CAPSULE ORAL at 21:17

## 2022-06-22 RX ADMIN — GUAIFENESIN AND CODEINE PHOSPHATE 5 ML: 10; 100 LIQUID ORAL at 21:16

## 2022-06-22 RX ADMIN — AMOXICILLIN AND CLAVULANATE POTASSIUM 1 TABLET: 875; 125 TABLET, FILM COATED ORAL at 21:17

## 2022-06-22 RX ADMIN — GABAPENTIN 300 MG: 300 CAPSULE ORAL at 21:17

## 2022-06-22 RX ADMIN — DICYCLOMINE HYDROCHLORIDE 10 MG: 10 CAPSULE ORAL at 09:26

## 2022-06-22 RX ADMIN — ROSUVASTATIN CALCIUM 40 MG: 40 TABLET, FILM COATED ORAL at 21:17

## 2022-06-22 RX ADMIN — GABAPENTIN 300 MG: 300 CAPSULE ORAL at 14:27

## 2022-06-22 RX ADMIN — CARVEDILOL 25 MG: 25 TABLET, FILM COATED ORAL at 16:51

## 2022-06-22 RX ADMIN — NIFEDIPINE 60 MG: 60 TABLET, EXTENDED RELEASE ORAL at 09:26

## 2022-06-22 RX ADMIN — DICYCLOMINE HYDROCHLORIDE 10 MG: 10 CAPSULE ORAL at 06:14

## 2022-06-22 RX ADMIN — INSULIN LISPRO 8 UNITS: 100 INJECTION, SOLUTION INTRAVENOUS; SUBCUTANEOUS at 16:53

## 2022-06-22 RX ADMIN — GUAIFENESIN AND CODEINE PHOSPHATE 5 ML: 10; 100 LIQUID ORAL at 14:27

## 2022-06-22 RX ADMIN — GABAPENTIN 300 MG: 300 CAPSULE ORAL at 09:27

## 2022-06-22 RX ADMIN — GUAIFENESIN AND CODEINE PHOSPHATE 5 ML: 10; 100 LIQUID ORAL at 18:22

## 2022-06-22 RX ADMIN — ITRACONAZOLE 200 MG: 100 CAPSULE, COATED PELLETS ORAL at 21:22

## 2022-06-22 RX ADMIN — DICYCLOMINE HYDROCHLORIDE 10 MG: 10 CAPSULE ORAL at 16:51

## 2022-06-22 RX ADMIN — INSULIN GLARGINE 40 UNITS: 100 INJECTION, SOLUTION SUBCUTANEOUS at 09:29

## 2022-06-22 RX ADMIN — DULOXETINE HYDROCHLORIDE 30 MG: 30 CAPSULE, DELAYED RELEASE ORAL at 09:27

## 2022-06-22 RX ADMIN — GUAIFENESIN AND CODEINE PHOSPHATE 5 ML: 10; 100 LIQUID ORAL at 09:27

## 2022-06-22 RX ADMIN — PREDNISONE 10 MG: 10 TABLET ORAL at 09:26

## 2022-06-22 RX ADMIN — ITRACONAZOLE 200 MG: 100 CAPSULE, COATED PELLETS ORAL at 10:11

## 2022-06-22 ASSESSMENT — PAIN SCALES - GENERAL
PAINLEVEL_OUTOF10: 0
PAINLEVEL_OUTOF10: 0
PAINLEVEL_OUTOF10: 5

## 2022-06-22 ASSESSMENT — PAIN DESCRIPTION - DESCRIPTORS: DESCRIPTORS: ACHING;DULL

## 2022-06-22 ASSESSMENT — PAIN DESCRIPTION - LOCATION: LOCATION: ABDOMEN

## 2022-06-22 NOTE — PLAN OF CARE
Problem: Discharge Planning  Goal: Discharge to home or other facility with appropriate resources  6/22/2022 0110 by Patricio Snow RN  Outcome: Progressing  Flowsheets (Taken 6/21/2022 2105)  Discharge to home or other facility with appropriate resources:   Identify barriers to discharge with patient and caregiver   Arrange for needed discharge resources and transportation as appropriate     Problem: Pain  Goal: Verbalizes/displays adequate comfort level or baseline comfort level  6/22/2022 0110 by Patricio Snow RN  Outcome: Progressing     Problem: Metabolic/Fluid and Electrolytes - Adult  Goal: Electrolytes maintained within normal limits  6/22/2022 0110 by Patricio Snow RN  Outcome: Progressing  Flowsheets (Taken 6/21/2022 2105)  Electrolytes maintained within normal limits: Monitor labs and assess patient for signs and symptoms of electrolyte imbalances  Goal: Hemodynamic stability and optimal renal function maintained  6/22/2022 0110 by Patricio Snow RN  Outcome: Progressing  Flowsheets (Taken 6/21/2022 2105)  Hemodynamic stability and optimal renal function maintained:   Monitor labs and assess for signs and symptoms of volume excess or deficit   Monitor intake, output and patient weight  Goal: Glucose maintained within prescribed range  6/22/2022 0110 by Patricio Snow RN  Outcome: Progressing  Flowsheets (Taken 6/21/2022 2105)  Glucose maintained within prescribed range:   Monitor blood glucose as ordered   Assess for signs and symptoms of hyperglycemia and hypoglycemia   Administer ordered medications to maintain glucose within target range     Problem: Safety - Adult  Goal: Free from fall injury  6/22/2022 0110 by Patricio Snow RN  Outcome: Progressing  Flowsheets (Taken 6/22/2022 0109)  Free From Fall Injury: Instruct family/caregiver on patient safety     Problem: ABCDS Injury Assessment  Goal: Absence of physical injury  6/22/2022 0110 by Patricio Snow RN  Outcome: Progressing  Flowsheets (Taken 6/22/2022 0109)  Absence of Physical Injury: Implement safety measures based on patient assessment     Problem: Chronic Conditions and Co-morbidities  Goal: Patient's chronic conditions and co-morbidity symptoms are monitored and maintained or improved  6/22/2022 0110 by Lm Adames RN  Outcome: Progressing  Flowsheets (Taken 6/21/2022 2105)  Care Plan - Patient's Chronic Conditions and Co-Morbidity Symptoms are Monitored and Maintained or Improved:   Monitor and assess patient's chronic conditions and comorbid symptoms for stability, deterioration, or improvement   Collaborate with multidisciplinary team to address chronic and comorbid conditions and prevent exacerbation or deterioration     Problem: Gastrointestinal - Adult  Goal: Minimal or absence of nausea and vomiting  6/22/2022 0110 by Lm Adames RN  Outcome: Progressing  Goal: Maintains or returns to baseline bowel function  6/22/2022 0110 by Lm Adames RN  Outcome: Progressing  Flowsheets (Taken 6/21/2022 2105)  Maintains or returns to baseline bowel function:   Assess bowel function   Encourage oral fluids to ensure adequate hydration   Administer IV fluids as ordered to ensure adequate hydration   Administer ordered medications as needed   Encourage mobilization and activity  Goal: Maintains adequate nutritional intake  6/22/2022 0110 by Lm Adames RN  Outcome: Progressing  Flowsheets (Taken 6/21/2022 2105)  Maintains adequate nutritional intake:   Monitor percentage of each meal consumed   Monitor intake and output, weight and lab values

## 2022-06-22 NOTE — PLAN OF CARE
Problem: Discharge Planning  Goal: Discharge to home or other facility with appropriate resources  Outcome: Progressing     Problem: Pain  Goal: Verbalizes/displays adequate comfort level or baseline comfort level  Outcome: Progressing     Problem: Metabolic/Fluid and Electrolytes - Adult  Goal: Electrolytes maintained within normal limits  Outcome: Progressing     Problem: Metabolic/Fluid and Electrolytes - Adult  Goal: Hemodynamic stability and optimal renal function maintained  Outcome: Progressing     Problem: Metabolic/Fluid and Electrolytes - Adult  Goal: Glucose maintained within prescribed range  Outcome: Progressing     Problem: Safety - Adult  Goal: Free from fall injury  Outcome: Progressing     Problem: ABCDS Injury Assessment  Goal: Absence of physical injury  Outcome: Progressing     Problem: Chronic Conditions and Co-morbidities  Goal: Patient's chronic conditions and co-morbidity symptoms are monitored and maintained or improved  Outcome: Progressing     Problem: Gastrointestinal - Adult  Goal: Minimal or absence of nausea and vomiting  Outcome: Progressing     Problem: Gastrointestinal - Adult  Goal: Maintains or returns to baseline bowel function  Outcome: Progressing     Problem: Gastrointestinal - Adult  Goal: Maintains adequate nutritional intake  Outcome: Progressing

## 2022-06-22 NOTE — PROGRESS NOTES
Physician Progress Note      PATIENT:               Noble Jane  CSN #:                  [id number]  :                       1985  ADMIT DATE:       2022 2:28 PM  [address] [place] DATE:  RESPONDING  PROVIDER #:        Iantha Frankel MD          QUERY TEXT:    Pt admitted with pneumonia . Pt noted to have tachycardia, elevated temp . If   possible, please document in the progress notes and discharge summary if you   are evaluating and /or treating any of the following: The medical record reflects the following:  Risk Factors: pneumonia, ed,  + histoplasma antibody  Clinical Indicators: On   spiked temp 100.9, hr- 114.  and t max 101.7 on   6/15. CRP on admission 62.4,  CXR on admission-Redemonstration of right upper   lobe infiltrate which is likely pneumonic. CT chest-. Progressive   consolidation in the anterior segment of the right upper lobe  consistent with   pneumonia. ? There is no evidence of cavitation. ? The findings  are   nonspecific but most consistent with bacterial etiology. Per   ID-? Streptococcus parasanguinis infection of the lungs. Positive histoplasma   serology by immunodiffusion  Treatment: ID consult, augmentin po , eraxis iv, iv levaquin,  monitor labs    Thank you, Lord Blu Baird RN CDS CRCR CCDS  [email or web address]  Options provided:  -- Sepsis, not present on admission  -- Sepsis was ruled out  -- Other - I will add my own diagnosis  -- Disagree - Not applicable / Not valid  -- Disagree - Clinically unable to determine / Unknown  -- Refer to Clinical Documentation Reviewer    PROVIDER RESPONSE TEXT:    This patient has sepsis that was not present on admission. Query created by:  Richie Baird on 2022 9:44 AM      Electronically signed by:  Iantha Frankel MD 2022 1:09 PM

## 2022-06-22 NOTE — CARE COORDINATION
Pulm ok for dc, waiting until medically ready.     Frantz Slade RN, BSN,   [phone]  Electronically signed by Frantz Slade RN on 6/22/2022 at 3:36 PM

## 2022-06-22 NOTE — PROGRESS NOTES
Hospitalist Progress Note  6/22/2022 8:30 AM    PCP: Roman Lindquist MD, MD    [id number]     Date of Admission: 6/6/2022                                                                                                                     HOSPITAL COURSE    Patient demographics:  The patient  Sarah Beth Valdez is a 29 y.o. male     Significant past medical history:   Patient Active Problem List   Diagnosis    Poorly controlled type 2 diabetes mellitus ([facility] [address]    Essential hypertension    SOB (shortness of breath)    CKD (chronic kidney disease)    Acute renal failure ([facility] [address]    Community acquired pneumonia of right upper lobe of lung    Abnormal CT of the chest    Endobronchial mass    Hemoptysis    Streptococcal infection    S/P bronchoscopy    Seizure disorder (H[facility])    Class 1 obesity due to excess calories with body mass index (BMI) of 34.0 to 34.9 in adult    Fever    Encounter for medication counseling         Presenting symptoms:  Dizziness, not feeling well    Diagnostic workup:      CONSULTS DURING ADMISSION :   IP CONSULT TO NEPHROLOGY  IP CONSULT TO NEUROLOGY  IP CONSULT TO PULMONOLOGY  IP CONSULT TO INFECTIOUS DISEASES  IP CONSULT TO NEUROLOGY      Patient was diagnosed with:  Abnormal imaging on CT scan along with positive histoplasma   Streptococcal pneumonia,   Hemoptysis,  Generalized weakness  CHELE on top of CKD stage III,  Diabetes mellitus    Treatment while inpatient:  Patient is a 28-year-old male with past medical history of diabetes mellitus, peripheral neuropathy, CKD, hypertension seizure disorder who presents to the hospital for nausea, dizziness and not feeling well. Patient was diagnosed with acute on chronic renal failure, right upper lobe pneumonia, subacute retinopathy and photophobia. Patient underwent bronchoscopy on 6/9. Patient was noted to have endobronchial lesion.   Could be related to histoplasmosis infection since patient's serology was  insulin lispro  0-12 Units SubCUTAneous TID     insulin lispro  0-6 Units SubCUTAneous Nightly    [Held by provider] enoxaparin  30 mg SubCUTAneous BID     Continuous Infusions:   sodium chloride Stopped (06/11/22 1904)    dextrose       PRN Meds:  sodium chloride flush, sodium chloride, promethazine **OR** ondansetron, magnesium hydroxide, acetaminophen **OR** acetaminophen, albuterol sulfate HFA, glucose, dextrose bolus **OR** dextrose bolus, glucagon (rDNA), dextrose    Vitals   Vitals /wt   Patient Vitals for the past 8 hrs:   BP Temp Temp src Pulse Resp SpO2 Weight   06/22/22 0819 -- -- -- -- -- 95 % --   06/22/22 0418 -- -- -- -- -- -- 259 lb 7.7 oz (117.7 kg)   06/22/22 0416 124/85 97.8 °F (36.6 °C) Oral 88 16 95 % --        72HR INTAKE/OUTPUT:      Intake/Output Summary (Last 24 hours) at 6/22/2022 0830  Last data filed at 6/22/2022 0600  Gross per 24 hour   Intake 720 ml   Output --   Net 720 ml       Exam:    Gen:   Alert and oriented ×3  Eyes: PERRL. No sclera icterus. No conjunctival injection. ENT: No discharge. Pharynx clear. External appearance of ears and nose normal.  Neck: Trachea midline. No obvious mass. Resp: No accessory muscle use. No crackles. No wheezes. No rhonchi. CV: Regular rate. Regular rhythm. No murmur or rub. No edema. GI: Non-tender. Non-distended. No hernia. Skin: Warm, dry, normal texture and turgor. Lymph: No cervical LAD. No supraclavicular LAD. M/S: / Ext. No cyanosis. No clubbing. No joint deformity. Neuro: CN 2-12 are intact,  no neurologic deficits noted. PT/INR: No results for input(s): PROTIME, INR in the last 72 hours. APTT: No results for input(s): APTT in the last 72 hours.     CBC:   Recent Labs     06/21/22  0734   WBC 5.3   HGB 12.4*   HCT 37.5*   MCV 78.1*          BMP:   Recent Labs     06/20/22  0829 06/21/22  0734    136   K 4.0 4.0  4.0    103   CO2 23 24   BUN 15 19   CREATININE 1.2 1.2       LIVER PROFILE: Recent Labs     06/21/22  0734   ALKPHOS 76   AST 11*   ALT 18   BILITOT <0.2     No results for input(s): AMYLASE in the last 72 hours. No results for input(s): LIPASE in the last 72 hours. UA:No results for input(s): NITRITE, LABCAST, WBCUA, RBCUA, MUCUS in the last 72 hours. TROPONIN: No results for input(s): Curlie Lat in the last 72 hours. Lab Results   Component Value Date/Time    URRFLXCULT Not Indicated 06/07/2022 11:20 AM       No results for input(s): TSHREFLEX in the last 72 hours. No components found for: [id number]  POC GLUCOSE:    Recent Labs     06/21/22  0735 06/21/22  1138 06/21/22  1654 06/21/22  1931 06/22/22  0734   POCGLU 142* 83 252* 294* 88     No results for input(s): LABA1C in the last 72 hours. Lab Results   Component Value Date    LABA1C 16.9 06/06/2022     Global left ventricular function is mildly decreased with ejection fraction   estimated from 40 % to 45 %. (5/17/2022)      ASSESSMENT AND PLAN    Constipation   Start patient on MiraLAX        Fungal infection    Abnormal imaging on CT scan along with positive histoplasma antibody  , ID consulted status post bronchoscopy as patient had hemoptysis.    ID ordered serum histoplasma antigen with as not detected.     Started on empiric itraconazole BID for 3 weeks per ID, with stop date on July Added steroids per ID, ID recommended to taper, will decrease to 10 mg today.  Follow up with Dr Bar Sanderson on discharge further decision on the length of treatment.      Streptococcal pneumonia,    was on Emmons He changed to Augmentin per ID.   Stop date 06/27      Hemoptysis, status post bronchoscopy,     likely hemorrhage from previous site of biopsy.     Monitor for now pulmonology following.     Generalized weakness,   improving     CHELE on top of CKD stage III,   improved, morning labs pending.     Diabetes mellitus,   sliding scale.     Much better controlled     Essential hypertension,   p.o. medications      Stated that he lost his taste,   checked COVID negative, consulted neurology,   apparently recommended MRI, contact neurology and discussed in details. Discussed with   nephrology, okay with MRI with gadolinium, discussed back with neurology who ordered MRI brain with and without contrast and pending results at this time also neurology recommended to repeat COVID test which was done and negative        Code Status: Full Code        Dispo -cc        The patient and / or the family were informed of the results of any tests, a time was given to answer questions, a plan was proposed and they agreed with plan. Sudeep Choudhary MD    This note was transcribed using [id number] [facility]. Please disregard any translational errors.

## 2022-06-23 VITALS
RESPIRATION RATE: 16 BRPM | WEIGHT: 257.94 LBS | SYSTOLIC BLOOD PRESSURE: 118 MMHG | TEMPERATURE: 98.4 F | HEIGHT: 73 IN | HEART RATE: 84 BPM | OXYGEN SATURATION: 97 % | BODY MASS INDEX: 34.19 KG/M2 | DIASTOLIC BLOOD PRESSURE: 72 MMHG

## 2022-06-23 LAB
ANION GAP SERPL CALCULATED.3IONS-SCNC: 7 MMOL/L (ref 3–16)
BUN BLDV-MCNC: 17 MG/DL (ref 7–20)
CALCIUM SERPL-MCNC: 9.4 MG/DL (ref 8.3–10.6)
CHLORIDE BLD-SCNC: 103 MMOL/L (ref 99–110)
CO2: 28 MMOL/L (ref 21–32)
CREAT SERPL-MCNC: 1.3 MG/DL (ref 0.9–1.3)
GFR AFRICAN AMERICAN: >60
GFR NON-AFRICAN AMERICAN: >60
GLUCOSE BLD-MCNC: 115 MG/DL (ref 70–99)
GLUCOSE BLD-MCNC: 174 MG/DL (ref 70–99)
GLUCOSE BLD-MCNC: 98 MG/DL (ref 70–99)
HCT VFR BLD CALC: 37.5 % (ref 40.5–52.5)
HEMOGLOBIN: 12.5 G/DL (ref 13.5–17.5)
MCH RBC QN AUTO: 25.6 PG (ref 26–34)
MCHC RBC AUTO-ENTMCNC: 33.4 G/DL (ref 31–36)
MCV RBC AUTO: 76.7 FL (ref 80–100)
PDW BLD-RTO: 12.2 % (ref 12.4–15.4)
PERFORMED ON: ABNORMAL
PERFORMED ON: ABNORMAL
PLATELET # BLD: 293 K/UL (ref 135–450)
PMV BLD AUTO: 7.2 FL (ref 5–10.5)
POTASSIUM REFLEX MAGNESIUM: 3.9 MMOL/L (ref 3.5–5.1)
RBC # BLD: 4.9 M/UL (ref 4.2–5.9)
SODIUM BLD-SCNC: 138 MMOL/L (ref 136–145)
WBC # BLD: 5.7 K/UL (ref 4–11)

## 2022-06-23 PROCEDURE — 6370000000 HC RX 637 (ALT 250 FOR IP): Performed by: HOSPITALIST

## 2022-06-23 PROCEDURE — 94760 N-INVAS EAR/PLS OXIMETRY 1: CPT

## 2022-06-23 PROCEDURE — 6370000000 HC RX 637 (ALT 250 FOR IP): Performed by: INTERNAL MEDICINE

## 2022-06-23 PROCEDURE — 2580000003 HC RX 258: Performed by: INTERNAL MEDICINE

## 2022-06-23 PROCEDURE — 85027 COMPLETE CBC AUTOMATED: CPT

## 2022-06-23 PROCEDURE — 80048 BASIC METABOLIC PNL TOTAL CA: CPT

## 2022-06-23 PROCEDURE — 36415 COLL VENOUS BLD VENIPUNCTURE: CPT

## 2022-06-23 RX ORDER — BISACODYL 10 MG
10 SUPPOSITORY, RECTAL RECTAL DAILY PRN
Qty: 30 SUPPOSITORY | Refills: 0 | Status: SHIPPED | OUTPATIENT
Start: 2022-06-23 | End: 2022-07-25

## 2022-06-23 RX ORDER — AMOXICILLIN AND CLAVULANATE POTASSIUM 875; 125 MG/1; MG/1
1 TABLET, FILM COATED ORAL EVERY 12 HOURS SCHEDULED
Qty: 9 TABLET | Refills: 0 | Status: SHIPPED | OUTPATIENT
Start: 2022-06-23 | End: 2022-06-28

## 2022-06-23 RX ORDER — ITRACONAZOLE 100 MG/1
200 CAPSULE ORAL 2 TIMES DAILY
Qty: 60 CAPSULE | Refills: 0 | Status: SHIPPED | OUTPATIENT
Start: 2022-06-23 | End: 2022-07-05

## 2022-06-23 RX ORDER — POLYETHYLENE GLYCOL 3350 17 G/17G
17 POWDER, FOR SOLUTION ORAL 2 TIMES DAILY
Qty: 527 G | Refills: 1 | Status: SHIPPED | OUTPATIENT
Start: 2022-06-23 | End: 2022-07-19

## 2022-06-23 RX ORDER — PREDNISONE 1 MG/1
TABLET ORAL
Qty: 6 TABLET | Refills: 0 | Status: SHIPPED | OUTPATIENT
Start: 2022-06-23 | End: 2022-07-05

## 2022-06-23 RX ADMIN — SODIUM CHLORIDE, PRESERVATIVE FREE 10 ML: 5 INJECTION INTRAVENOUS at 08:27

## 2022-06-23 RX ADMIN — GUAIFENESIN AND CODEINE PHOSPHATE 5 ML: 10; 100 LIQUID ORAL at 13:07

## 2022-06-23 RX ADMIN — INSULIN LISPRO 2 UNITS: 100 INJECTION, SOLUTION INTRAVENOUS; SUBCUTANEOUS at 13:04

## 2022-06-23 RX ADMIN — PREDNISONE 10 MG: 10 TABLET ORAL at 08:26

## 2022-06-23 RX ADMIN — GUAIFENESIN AND CODEINE PHOSPHATE 5 ML: 10; 100 LIQUID ORAL at 06:32

## 2022-06-23 RX ADMIN — NIFEDIPINE 60 MG: 60 TABLET, EXTENDED RELEASE ORAL at 08:37

## 2022-06-23 RX ADMIN — Medication 1 CAPSULE: at 08:25

## 2022-06-23 RX ADMIN — GABAPENTIN 300 MG: 300 CAPSULE ORAL at 08:26

## 2022-06-23 RX ADMIN — DICYCLOMINE HYDROCHLORIDE 10 MG: 10 CAPSULE ORAL at 06:32

## 2022-06-23 RX ADMIN — DULOXETINE HYDROCHLORIDE 30 MG: 30 CAPSULE, DELAYED RELEASE ORAL at 08:26

## 2022-06-23 RX ADMIN — POLYETHYLENE GLYCOL 3350 17 G: 17 POWDER, FOR SOLUTION ORAL at 08:38

## 2022-06-23 RX ADMIN — ITRACONAZOLE 200 MG: 100 CAPSULE, COATED PELLETS ORAL at 08:25

## 2022-06-23 RX ADMIN — CARVEDILOL 25 MG: 25 TABLET, FILM COATED ORAL at 08:26

## 2022-06-23 RX ADMIN — GUAIFENESIN AND CODEINE PHOSPHATE 5 ML: 10; 100 LIQUID ORAL at 02:35

## 2022-06-23 RX ADMIN — AMOXICILLIN AND CLAVULANATE POTASSIUM 1 TABLET: 875; 125 TABLET, FILM COATED ORAL at 08:25

## 2022-06-23 RX ADMIN — GUAIFENESIN AND CODEINE PHOSPHATE 5 ML: 10; 100 LIQUID ORAL at 08:26

## 2022-06-23 RX ADMIN — INSULIN LISPRO 8 UNITS: 100 INJECTION, SOLUTION INTRAVENOUS; SUBCUTANEOUS at 13:05

## 2022-06-23 RX ADMIN — INSULIN GLARGINE 40 UNITS: 100 INJECTION, SOLUTION SUBCUTANEOUS at 08:40

## 2022-06-23 RX ADMIN — BISACODYL 10 MG: 10 SUPPOSITORY RECTAL at 13:12

## 2022-06-23 RX ADMIN — DICYCLOMINE HYDROCHLORIDE 10 MG: 10 CAPSULE ORAL at 13:07

## 2022-06-23 RX ADMIN — GABAPENTIN 300 MG: 300 CAPSULE ORAL at 13:07

## 2022-06-23 ASSESSMENT — PAIN SCALES - GENERAL: PAINLEVEL_OUTOF10: 0

## 2022-06-23 NOTE — PROGRESS NOTES
Pt discharged to home. All paperwork/prescriptions reviewed with patient, all questions answered. PIV removed per protocol without complication.

## 2022-06-23 NOTE — DISCHARGE INSTR - COC
Continuity of Care Form    Patient Name: Richard Ngo   :  1985  MRN:  [id number]    Admit date:  2022  Discharge date:  2022    Code Status Order: Full Code   Advance Directives:   [facility] Documentation       Date/Time Healthcare Directive Type of Healthcare Directive Copy in [address] Agent's Name Healthcare Agent's Phone Number    22 1041 No, patient does not have an advance directive for healthcare treatment -- -- -- -- --            Admitting Physician:  Miles Gates MD  PCP: Stefani Modi MD, MD    Discharging Nurse: [address] Unit/Room#: M0B-6793/8411-11  Discharging Unit Phone Number: [phone]      Emergency Contact:   Extended Emergency Contact Information  Primary Emergency Contact: [facility]  Address: [address] [place] [address]           MahinsJennifer Miller Do [address] [facility] Phone: [phone]  Relation: Domestic Partner  Secondary Emergency Contact: Mary Snow, [facility] Phone: [phone]  Relation: Aunt/Uncle    Past Surgical History:  Past Surgical History:   Procedure Laterality Date    BRONCHOSCOPY  2022    BRONCHOSCOPY BRUSHINGS performed by Dennie Molt, MD at [address]  2022    [address] performed by Dennie Molt, MD at [address]  2022    BRONCHOSCOPY BIOPSY BRONCHUS performed by Dennie Molt, MD at [address] N/A 2022    BRONCHOSCOPY DIAGNOSTIC OR CELL [address] ONLY performed by Caron Koyanagi, DO at [facility]       Immunization History: There is no immunization history on file for this patient.     Active Problems:  Patient Active Problem List   Diagnosis Code    Poorly controlled type 2 diabetes mellitus (HCC) E11.65    Essential hypertension I10    SOB (shortness of breath) R06.02    CKD (chronic kidney disease) N18.9    Acute renal failure ([facility] [address] N17.9    Community acquired pneumonia of right upper lobe of lung J18.9    Abnormal CT of the chest R93.89    Endobronchial mass R91.8    Hemoptysis R04.2    Streptococcal infection A49.1    S/P bronchoscopy Z98.890    Seizure disorder (HCC) G40.909    Class 1 obesity due to excess calories with body mass index (BMI) of 34.0 to 34.9 in adult E66.09, Z68.34    Fever R50.9    Encounter for medication counseling Z71.89       Isolation/Infection:   Isolation            No Isolation          Patient Infection Status       Infection Onset Added Last Indicated Last Indicated By Review Planned Expiration Resolved Resolved By    None active    Resolved    COVID-19 (Rule Out) 22 COVID-19 (Ordered)   22 Rule-Out Test Resulted    COVID-19 (Rule Out) 22 COVID-19 (Ordered)   22 Rule-Out Test Resulted    Pulmonary Tuberculosis (Rule Out) 22 Culture with Smear, Acid Fast Bacillius (Ordered)   22 Abdirashid Medina RN    COVID-19 (Rule Out) 22 COVID-19, Rapid (Ordered)   22 Rule-Out Test Resulted    COVID-19 (Rule Out) 22 COVID-19, Rapid (Ordered)   22 Rule-Out Test Resulted    COVID-19 (Rule Out) 20 COVID-19, PCR (Ordered)   20             Nurse Assessment:  Last Vital Signs: /71   Pulse 98   Temp 98 °F (36.7 °C) (Oral)   Resp 18   Ht 6' 1\" (1.854 m)   Wt 257 lb 15 oz (117 kg)   SpO2 96%   PF (!) 18 L/min   BMI 34.03 kg/m²     Last documented pain score (0-10 scale): Pain Level: 0  Last Weight:   Wt Readings from Last 1 Encounters:   22 257 lb 15 oz (117 kg)     Mental Status:  oriented and alert    IV Access:  - None    Nursing Mobility/ADLs:  Walking   Independent  Transfer  Independent  Bathing  Independent  Dressing  Independent  Toileting [address]  Independent  Med Delivery   none    Wound Care Documentation and Therapy:        Elimination:  Continence: Bowel: Yes  Bladder: Yes  Urinary Catheter: None   Colostomy/Ileostomy/Ileal Conduit: No       Date of Last BM: 06/23/2022    Intake/Output Summary (Last 24 hours) at 6/23/2022 1439  Last data filed at 6/23/2022 0823  Gross per 24 hour   Intake 540 ml   Output --   Net 540 ml     I/O last 3 completed shifts: In: 1260 [P.O.:1260]  Out: -     Safety Concerns:     None    Impairments/Disabilities:      None    Nutrition Therapy:  Current Nutrition Therapy:   - Oral Diet:  General    Routes of Feeding: Oral  Liquids: No Restrictions  Daily Fluid Restriction: no  Last Modified Barium Swallow with Video (Video Swallowing Test): not done    Treatments at the Time of Hospital Discharge:   Respiratory Treatments: none  Oxygen Therapy:  is not on home oxygen therapy. Ventilator:    - No ventilator support    Rehab Therapies: Physical Therapy and Occupational Therapy  Weight Bearing Status/Restrictions: No weight bearing restrictions  Other Medical Equipment (for information only, NOT a DME order):  none  Other Treatments: none    Patient's personal belongings (please select all that are sent with patient):  None    RN SIGNATURE:  Electronically signed by Jame Valderrama RN on 6/23/22 at 4:38 PM EDT    CASE MANAGEMENT/SOCIAL WORK SECTION    Inpatient Status Date: ***    Readmission Risk Assessment Score:  Readmission Risk              Risk of Unplanned Readmission:  26           Discharging to Facility/ Agency   Name:   Address:  Phone:  Fax:    Dialysis Facility (if applicable)   Name:  Address:  Dialysis Schedule:  Phone:  Fax:    / signature: {Esignature:[id number]}    PHYSICIAN SECTION    Prognosis: Fair    Condition at Discharge: Stable    Rehab Potential (if transferring to Rehab):  Fair    Recommended Labs or Other Treatments After Discharge: pt    Physician Certification: I certify the above information and transfer of Sue Galo  is necessary for the continuing treatment of the diagnosis listed and that he requires [address] for less 30 days.      Update Admission H&P: No change in H&P    PHYSICIAN SIGNATURE:  Electronically signed by Sudeep Choudhary MD on 6/23/22 at 2:54 PM EDT

## 2022-06-23 NOTE — PLAN OF CARE
Problem: Discharge Planning  Goal: Discharge to home or other facility with appropriate resources  Outcome: Completed     Problem: Pain  Goal: Verbalizes/displays adequate comfort level or baseline comfort level  Outcome: Completed     Problem: Metabolic/Fluid and Electrolytes - Adult  Goal: Electrolytes maintained within normal limits  Outcome: Completed  Goal: Hemodynamic stability and optimal renal function maintained  Outcome: Completed  Goal: Glucose maintained within prescribed range  Outcome: Completed     Problem: Gastrointestinal - Adult  Goal: Minimal or absence of nausea and vomiting  Outcome: Completed  Goal: Maintains or returns to baseline bowel function  Outcome: Completed  Goal: Maintains adequate nutritional intake  Outcome: Completed     Problem: Safety - Adult  Goal: Free from fall injury  Outcome: Completed     Problem: ABCDS Injury Assessment  Goal: Absence of physical injury  Outcome: Completed     Problem: Chronic Conditions and Co-morbidities  Goal: Patient's chronic conditions and co-morbidity symptoms are monitored and maintained or improved  Outcome: Completed

## 2022-06-23 NOTE — CARE COORDINATION
CASE MANAGEMENT DISCHARGE SUMMARY:    DISCHARGE DATE: 06/23/22    DISCHARGED TO HOME     TRANSPORTATION: has a ride             TIME: on call     Wants to resume outpt PT at [facility], provider notified of needed order.            Electronically signed by Stanley Spencer RN on 6/23/2022 at 2:43 PM

## 2022-06-25 NOTE — DISCHARGE SUMMARY
Hospital Medicine Discharge Summary      Patient ID: Marlene Rodarte , [id number]     Patient's PCP: Sarah Rodriguez MD, MD    Admit Date: 6/6/2022     Discharge Date: 6/23/2022      Admitting Physician: Marva Emanuel MD    Discharge Physician: Darryle Pickle, MD     Discharge Diagnoses: Active Hospital Problems    Diagnosis Date Noted    Encounter for medication counseling [Z71.89]      Priority: Medium    Fever [R50.9]      Priority: Medium    Hemoptysis [R04.2]      Priority: Medium    Streptococcal infection [A49.1]      Priority: Medium    S/P bronchoscopy [Z98.890]      Priority: Medium    Seizure disorder ([address] [G40.909]      Priority: Medium    Class 1 obesity due to excess calories with body mass index (BMI) of 34.0 to 34.9 in adult [E66.09, Z68.34]      Priority: Medium    Abnormal CT of the chest [R93.89]      Priority: Medium    Endobronchial mass [R91.8]      Priority: Medium    Community acquired pneumonia of right upper lobe of lung [J18.9]      Priority: Medium    Acute renal failure ([address] [N17.9] 06/06/2022     Priority: Medium    Poorly controlled type 2 diabetes mellitus ([address] [E11.65] 05/17/2022     Priority: Medium    Essential hypertension [I10]      Priority: Medium         The patient was seen and examined on the day of discharge and this discharge summary is in conjunction with any daily progress note from day of discharge.     HOSPITAL COURSE    Patient demographics:  The patient  Marlene Rodarte is a 29 y.o. male      Significant past medical history:       Patient Active Problem List   Diagnosis    Poorly controlled type 2 diabetes mellitus ([address]    Essential hypertension    SOB (shortness of breath)    CKD (chronic kidney disease)    Acute renal failure ([address]    Community acquired pneumonia of right upper lobe of lung    Abnormal CT of the chest    Endobronchial mass    Hemoptysis    Streptococcal infection    S/P bronchoscopy    [facility]    Class 1 obesity due to excess calories with body mass index (BMI) of 34.0 to 34.9 in adult    Fever    Encounter for medication counseling            Presenting symptoms:  Dizziness, not feeling well     Diagnostic workup:   MRI BRAIN W WO CONTRAST         CONSULTS DURING ADMISSION :   IP CONSULT TO NEPHROLOGY  IP CONSULT TO NEUROLOGY  IP CONSULT TO PULMONOLOGY  IP CONSULT TO INFECTIOUS DISEASES  IP CONSULT TO NEUROLOGY        Patient was diagnosed with:  Abnormal imaging on CT scan along with positive histoplasma   Streptococcal pneumonia,   Hemoptysis,  Generalized weakness  CHELE on top of CKD stage III,  Diabetes mellitus     Treatment while inpatient:  Patient is a 52-year-old male with past medical history of diabetes mellitus, peripheral neuropathy, CKD, hypertension seizure disorder who presents to the hospital for nausea, dizziness and not feeling well. Patient was diagnosed with acute on chronic renal failure, right upper lobe pneumonia, subacute retinopathy and photophobia. Patient underwent bronchoscopy on 6/9. Patient was noted to have endobronchial lesion. Could be related to histoplasmosis infection since patient's serology was positive for histoplasma. Beta D glucan was positive and patient was started on antifungal medication. Patient was also treated with steroids. Pulmonary infectious disease and nephrology followed the patient. Patient was also treated for bacterial pneumonia likely streptococcal pneumonia. Stop date for Augmentin is 6/27. Patient's creatinine improved close to baseline.     Patient will continue itraconazole until June 8, 2022, he will then follow-up with Dr. Abdifatah presley an outpatient. Patient will taper steroids in the next few days. Discharge Condition:  stable      Discharged to:  Home      Activity:   as tolerated:     Follow Up:   Follow-up with PCP in 1-2 weeks                  Labs:  For convenience and continuity at follow-up the following most recent labs are provided:      CBC:   Lab Results   Component Value Date    WBC 5.7 06/23/2022    HGB 12.5 06/23/2022    HCT 37.5 06/23/2022     06/23/2022       RENAL:   Lab Results   Component Value Date     06/23/2022    K 3.9 06/23/2022     06/23/2022    CO2 28 06/23/2022    BUN 17 06/23/2022    CREATININE 1.3 06/23/2022           Discharge Medications:      Medication List      START taking these medications    bisacodyl 10 MG suppository  Commonly known as: DULCOLAX  Place 1 suppository rectally daily as needed for Constipation     itraconazole 100 MG capsule  Commonly known as: SPORANOX  Take 2 capsules by mouth 2 times daily for 15 days     polyethylene glycol 17 g packet  Commonly known as: GLYCOLAX  Take 17 g by mouth 2 times daily     predniSONE 5 MG tablet  Commonly known as: DELTASONE  2 tablets for 2 days  One tablet for 2 days        CHANGE how you take these medications    amoxicillin-clavulanate 875-125 MG per tablet  Commonly known as: AUGMENTIN  Take 1 tablet by mouth every 12 hours for 9 doses  What changed: when to take this        CONTINUE taking these medications    albuterol sulfate  (90 Base) MCG/ACT inhaler  Commonly known as: PROVENTIL;VENTOLIN;PROAIR     aspirin 81 MG chewable tablet     carvedilol 25 MG tablet  Commonly known as: COREG     chlorthalidone 25 MG tablet  Commonly known as: HYGROTON     dicyclomine 10 MG capsule  Commonly known as: BENTYL     DULoxetine 30 MG extended release capsule  Commonly known as: CYMBALTA     gabapentin 300 MG capsule  Commonly known as: NEURONTIN     insulin lispro (1 Unit Dial) 100 UNIT/ML Sopn  Commonly known as: HumaLOG KwikPen  Inject 10 Units into the skin 3 times daily (before meals)     Jardiance 10 MG tablet  Generic drug: empagliflozin     lisinopril 10 MG tablet  Commonly known as: PRINIVIL;ZESTRIL     metFORMIN 500 MG tablet  Commonly known as: GLUCOPHAGE     NIFEdipine 60 MG extended release tablet  Commonly known as: PROCARDIA XL     rosuvastatin 40 MG tablet  Commonly known as: CRESTOR  Take 1 tablet by mouth nightly     spironolactone 25 MG tablet  Commonly known as: ALDACTONE  Take 1 tablet by mouth daily     Tresiba FlexTouch 200 UNIT/ML Sopn  Generic drug: Insulin Degludec           Where to Get Your Medications      These medications were sent to [address] [facility], [address] [facility] [phone] [facility] [phone]  [address] [place] [address]    Phone: [phone]   · amoxicillin-clavulanate 875-125 MG per tablet  · bisacodyl 10 MG suppository  · itraconazole 100 MG capsule  · polyethylene glycol 17 g packet     You can get these medications from any pharmacy    Bring a paper prescription for each of these medications  · predniSONE 5 MG tablet            Time Spent on discharge is more than 30 min in the examination, evaluation, counseling and review of medications and discharge plan. Signed:  Gardenia Bailey MD   6/25/2022      Thank you Julio Cesar Marcos MD, MD for the opportunity to be involved in this patient's care. If you have any questions or concerns please feel free to contact me at [phone]. This note was transcribed using [employer].. Please disregard any translational errors.

## 2022-06-28 ENCOUNTER — APPOINTMENT (OUTPATIENT)
Dept: GENERAL RADIOLOGY | Age: 37
End: 2022-06-28
Payer: MEDICAID

## 2022-06-28 ENCOUNTER — HOSPITAL ENCOUNTER (EMERGENCY)
Age: 37
Discharge: HOME | End: 2022-06-28
Payer: MEDICAID

## 2022-06-28 VITALS
WEIGHT: 257 LBS | OXYGEN SATURATION: 99 % | HEIGHT: 73 IN | RESPIRATION RATE: 16 BRPM | DIASTOLIC BLOOD PRESSURE: 74 MMHG | SYSTOLIC BLOOD PRESSURE: 128 MMHG | BODY MASS INDEX: 34.06 KG/M2 | HEART RATE: 84 BPM | TEMPERATURE: 99.1 F

## 2022-06-28 DIAGNOSIS — J18.9 PNEUMONIA OF RIGHT UPPER LOBE DUE TO INFECTIOUS ORGANISM: ICD-10-CM

## 2022-06-28 DIAGNOSIS — S92.255A CLOSED NONDISPLACED FRACTURE OF NAVICULAR BONE OF LEFT FOOT, INITIAL ENCOUNTER: Primary | ICD-10-CM

## 2022-06-28 PROCEDURE — 73590 X-RAY EXAM OF LOWER LEG: CPT

## 2022-06-28 PROCEDURE — 99283 EMERGENCY DEPT VISIT LOW MDM: CPT

## 2022-06-28 PROCEDURE — 71101 X-RAY EXAM UNILAT RIBS/CHEST: CPT

## 2022-06-28 PROCEDURE — 73130 X-RAY EXAM OF HAND: CPT

## 2022-06-28 PROCEDURE — 6370000000 HC RX 637 (ALT 250 FOR IP): Performed by: NURSE PRACTITIONER

## 2022-06-28 PROCEDURE — 73630 X-RAY EXAM OF FOOT: CPT

## 2022-06-28 PROCEDURE — 73090 X-RAY EXAM OF FOREARM: CPT

## 2022-06-28 RX ORDER — ACETAMINOPHEN 500 MG
500 TABLET ORAL 4 TIMES DAILY PRN
Qty: 28 TABLET | Refills: 0 | Status: SHIPPED | OUTPATIENT
Start: 2022-06-28 | End: 2022-07-19

## 2022-06-28 RX ORDER — FLUCONAZOLE 100 MG/1
400 TABLET ORAL ONCE
Status: COMPLETED | OUTPATIENT
Start: 2022-06-28 | End: 2022-06-28

## 2022-06-28 RX ORDER — LIDOCAINE 4 G/G
1 PATCH TOPICAL ONCE
Status: DISCONTINUED | OUTPATIENT
Start: 2022-06-28 | End: 2022-06-28 | Stop reason: HOSPADM

## 2022-06-28 RX ORDER — ACETAMINOPHEN 500 MG
1000 TABLET ORAL ONCE
Status: COMPLETED | OUTPATIENT
Start: 2022-06-28 | End: 2022-06-28

## 2022-06-28 RX ORDER — IBUPROFEN 400 MG/1
800 TABLET ORAL ONCE
Status: COMPLETED | OUTPATIENT
Start: 2022-06-28 | End: 2022-06-28

## 2022-06-28 RX ORDER — IBUPROFEN 600 MG/1
600 TABLET ORAL 3 TIMES DAILY PRN
Qty: 15 TABLET | Refills: 0 | Status: SHIPPED | OUTPATIENT
Start: 2022-06-28 | End: 2022-07-05

## 2022-06-28 RX ORDER — FLUCONAZOLE 200 MG/1
400 TABLET ORAL DAILY
Qty: 22 TABLET | Refills: 0 | Status: ON HOLD | OUTPATIENT
Start: 2022-06-28 | End: 2022-07-11 | Stop reason: HOSPADM

## 2022-06-28 RX ORDER — AMOXICILLIN AND CLAVULANATE POTASSIUM 875; 125 MG/1; MG/1
1 TABLET, FILM COATED ORAL EVERY 12 HOURS SCHEDULED
Status: DISCONTINUED | OUTPATIENT
Start: 2022-06-28 | End: 2022-06-28

## 2022-06-28 RX ADMIN — FLUCONAZOLE 400 MG: 100 TABLET ORAL at 14:36

## 2022-06-28 RX ADMIN — ACETAMINOPHEN 1000 MG: 500 TABLET ORAL at 10:44

## 2022-06-28 RX ADMIN — IBUPROFEN 800 MG: 400 TABLET, FILM COATED ORAL at 10:45

## 2022-06-28 ASSESSMENT — PAIN - FUNCTIONAL ASSESSMENT
PAIN_FUNCTIONAL_ASSESSMENT: ACTIVITIES ARE NOT PREVENTED
PAIN_FUNCTIONAL_ASSESSMENT: ACTIVITIES ARE NOT PREVENTED
PAIN_FUNCTIONAL_ASSESSMENT: 0-10

## 2022-06-28 ASSESSMENT — PAIN DESCRIPTION - DESCRIPTORS
DESCRIPTORS: ACHING
DESCRIPTORS: ACHING;THROBBING
DESCRIPTORS: ACHING;THROBBING

## 2022-06-28 ASSESSMENT — PAIN DESCRIPTION - LOCATION
LOCATION: GENERALIZED

## 2022-06-28 ASSESSMENT — PAIN SCALES - GENERAL
PAINLEVEL_OUTOF10: 8
PAINLEVEL_OUTOF10: 5
PAINLEVEL_OUTOF10: 8
PAINLEVEL_OUTOF10: 8

## 2022-06-28 ASSESSMENT — PAIN DESCRIPTION - PAIN TYPE: TYPE: ACUTE PAIN

## 2022-06-28 ASSESSMENT — PAIN DESCRIPTION - FREQUENCY: FREQUENCY: CONTINUOUS
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
## 2022-06-28 NOTE — CARE COORDINATION
ALYX consult:  Patient in need RX's. Discussed with patient- process to get medication. Patient's mother called and advised that she has concerns and wants to talk to provider prior to patient's discharge. Usha Crain [phone].

## 2022-07-05 ENCOUNTER — APPOINTMENT (OUTPATIENT)
Dept: GENERAL RADIOLOGY | Age: 37
DRG: 139 | End: 2022-07-05
Payer: MEDICAID

## 2022-07-05 ENCOUNTER — APPOINTMENT (OUTPATIENT)
Dept: CT IMAGING | Age: 37
DRG: 139 | End: 2022-07-05
Payer: MEDICAID

## 2022-07-05 ENCOUNTER — HOSPITAL ENCOUNTER (INPATIENT)
Age: 37
LOS: 6 days | Discharge: HOME | DRG: 139 | End: 2022-07-11
Attending: EMERGENCY MEDICINE | Admitting: INTERNAL MEDICINE
Payer: MEDICAID

## 2022-07-05 DIAGNOSIS — N18.9 ACUTE RENAL FAILURE SUPERIMPOSED ON CHRONIC KIDNEY DISEASE, UNSPECIFIED CKD STAGE, UNSPECIFIED ACUTE RENAL FAILURE TYPE (HCC): Primary | ICD-10-CM

## 2022-07-05 DIAGNOSIS — N17.9 ACUTE RENAL FAILURE SUPERIMPOSED ON CHRONIC KIDNEY DISEASE, UNSPECIFIED CKD STAGE, UNSPECIFIED ACUTE RENAL FAILURE TYPE (HCC): Primary | ICD-10-CM

## 2022-07-05 DIAGNOSIS — J18.9 PNEUMONIA DUE TO INFECTIOUS ORGANISM, UNSPECIFIED LATERALITY, UNSPECIFIED PART OF LUNG: ICD-10-CM

## 2022-07-05 DIAGNOSIS — R53.1 GENERAL WEAKNESS: ICD-10-CM
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
## 2022-07-05 LAB
A/G RATIO: 1 (ref 1.1–2.2)
ALBUMIN SERPL-MCNC: 3.8 G/DL (ref 3.4–5)
ALP BLD-CCNC: 120 U/L (ref 40–129)
ALT SERPL-CCNC: 11 U/L (ref 10–40)
ANION GAP SERPL CALCULATED.3IONS-SCNC: 14 MMOL/L (ref 3–16)
AST SERPL-CCNC: 10 U/L (ref 15–37)
BACTERIA: ABNORMAL /HPF
BASE EXCESS VENOUS: -0.8 MMOL/L
BASOPHILS ABSOLUTE: 0.1 K/UL (ref 0–0.2)
BASOPHILS RELATIVE PERCENT: 3 %
BILIRUB SERPL-MCNC: 0.4 MG/DL (ref 0–1)
BILIRUBIN URINE: NEGATIVE
BLOOD, URINE: NEGATIVE
BUN BLDV-MCNC: 22 MG/DL (ref 7–20)
CALCIUM SERPL-MCNC: 9.9 MG/DL (ref 8.3–10.6)
CARBOXYHEMOGLOBIN: 0.9 %
CHLORIDE BLD-SCNC: 100 MMOL/L (ref 99–110)
CLARITY: CLEAR
CO2: 20 MMOL/L (ref 21–32)
COLOR: YELLOW
COMMENT UA: ABNORMAL
CREAT SERPL-MCNC: 1.7 MG/DL (ref 0.9–1.3)
EKG ATRIAL RATE: 102 BPM
EKG DIAGNOSIS: NORMAL
EKG P AXIS: 59 DEGREES
EKG P-R INTERVAL: 196 MS
EKG Q-T INTERVAL: 356 MS
EKG QRS DURATION: 86 MS
EKG QTC CALCULATION (BAZETT): 463 MS
EKG R AXIS: -36 DEGREES
EKG T AXIS: 28 DEGREES
EKG VENTRICULAR RATE: 102 BPM
EOSINOPHILS ABSOLUTE: 0.1 K/UL (ref 0–0.6)
EOSINOPHILS RELATIVE PERCENT: 2.7 %
EPITHELIAL CELLS, UA: 3 /HPF (ref 0–5)
GFR AFRICAN AMERICAN: 56
GFR NON-AFRICAN AMERICAN: 46
GLUCOSE BLD-MCNC: 130 MG/DL (ref 70–99)
GLUCOSE BLD-MCNC: 244 MG/DL (ref 70–99)
GLUCOSE BLD-MCNC: 280 MG/DL (ref 70–99)
GLUCOSE URINE: 500 MG/DL
HCO3 VENOUS: 24 MMOL/L (ref 23–29)
HCT VFR BLD CALC: 39 % (ref 40.5–52.5)
HEMOGLOBIN: 12.7 G/DL (ref 13.5–17.5)
HYALINE CASTS: 10 /LPF (ref 0–8)
KETONES, URINE: ABNORMAL MG/DL
LACTIC ACID, SEPSIS: 1.4 MMOL/L (ref 0.4–1.9)
LACTIC ACID: 1.1 MMOL/L (ref 0.4–2)
LEUKOCYTE ESTERASE, URINE: NEGATIVE
LIPASE: 27 U/L (ref 13–60)
LYMPHOCYTES ABSOLUTE: 0.8 K/UL (ref 1–5.1)
LYMPHOCYTES RELATIVE PERCENT: 21.3 %
MCH RBC QN AUTO: 25.6 PG (ref 26–34)
MCHC RBC AUTO-ENTMCNC: 32.7 G/DL (ref 31–36)
MCV RBC AUTO: 78.5 FL (ref 80–100)
METHEMOGLOBIN VENOUS: 0.6 %
MICROSCOPIC EXAMINATION: YES
MONOCYTES ABSOLUTE: 0.4 K/UL (ref 0–1.3)
MONOCYTES RELATIVE PERCENT: 10.7 %
MUCUS: PRESENT
NEUTROPHILS ABSOLUTE: 2.5 K/UL (ref 1.7–7.7)
NEUTROPHILS RELATIVE PERCENT: 62.3 %
NITRITE, URINE: NEGATIVE
O2 SAT, VEN: 73 %
O2 THERAPY: NORMAL
PCO2, VEN: 40.9 MMHG (ref 40–50)
PDW BLD-RTO: 12.7 % (ref 12.4–15.4)
PERFORMED ON: ABNORMAL
PERFORMED ON: ABNORMAL
PH UA: 5 (ref 5–8)
PH VENOUS: 7.38 (ref 7.35–7.45)
PLATELET # BLD: 299 K/UL (ref 135–450)
PMV BLD AUTO: 8.1 FL (ref 5–10.5)
PO2, VEN: 39 MMHG
POTASSIUM SERPL-SCNC: 4.4 MMOL/L (ref 3.5–5.1)
PROCALCITONIN: 0.12 NG/ML (ref 0–0.15)
PROTEIN UA: 300 MG/DL
RBC # BLD: 4.96 M/UL (ref 4.2–5.9)
RBC UA: 1 /HPF (ref 0–4)
SODIUM BLD-SCNC: 134 MMOL/L (ref 136–145)
SPECIFIC GRAVITY UA: 1.02 (ref 1–1.03)
TCO2 CALC VENOUS: 26 MMOL/L
TOTAL PROTEIN: 7.6 G/DL (ref 6.4–8.2)
URINE REFLEX TO CULTURE: ABNORMAL
URINE TYPE: ABNORMAL
UROBILINOGEN, URINE: 2 E.U./DL
WBC # BLD: 4 K/UL (ref 4–11)
WBC UA: 1 /HPF (ref 0–5)

## 2022-07-05 PROCEDURE — 87449 NOS EACH ORGANISM AG IA: CPT

## 2022-07-05 PROCEDURE — 93005 ELECTROCARDIOGRAM TRACING: CPT | Performed by: EMERGENCY MEDICINE

## 2022-07-05 PROCEDURE — 96365 THER/PROPH/DIAG IV INF INIT: CPT

## 2022-07-05 PROCEDURE — 93010 ELECTROCARDIOGRAM REPORT: CPT | Performed by: INTERNAL MEDICINE

## 2022-07-05 PROCEDURE — 96366 THER/PROPH/DIAG IV INF ADDON: CPT

## 2022-07-05 PROCEDURE — 1200000000 HC SEMI PRIVATE

## 2022-07-05 PROCEDURE — 80053 COMPREHEN METABOLIC PANEL: CPT

## 2022-07-05 PROCEDURE — 99285 EMERGENCY DEPT VISIT HI MDM: CPT

## 2022-07-05 PROCEDURE — 6360000004 HC RX CONTRAST MEDICATION: Performed by: EMERGENCY MEDICINE

## 2022-07-05 PROCEDURE — 71046 X-RAY EXAM CHEST 2 VIEWS: CPT

## 2022-07-05 PROCEDURE — 36415 COLL VENOUS BLD VENIPUNCTURE: CPT

## 2022-07-05 PROCEDURE — 85025 COMPLETE CBC W/AUTO DIFF WBC: CPT

## 2022-07-05 PROCEDURE — 83605 ASSAY OF LACTIC ACID: CPT

## 2022-07-05 PROCEDURE — 6360000002 HC RX W HCPCS: Performed by: INTERNAL MEDICINE

## 2022-07-05 PROCEDURE — 84145 PROCALCITONIN (PCT): CPT

## 2022-07-05 PROCEDURE — 2580000003 HC RX 258: Performed by: EMERGENCY MEDICINE

## 2022-07-05 PROCEDURE — 6360000002 HC RX W HCPCS: Performed by: EMERGENCY MEDICINE

## 2022-07-05 PROCEDURE — 2580000003 HC RX 258: Performed by: INTERNAL MEDICINE

## 2022-07-05 PROCEDURE — 87040 BLOOD CULTURE FOR BACTERIA: CPT

## 2022-07-05 PROCEDURE — 71260 CT THORAX DX C+: CPT | Performed by: EMERGENCY MEDICINE

## 2022-07-05 PROCEDURE — 83690 ASSAY OF LIPASE: CPT

## 2022-07-05 PROCEDURE — 6370000000 HC RX 637 (ALT 250 FOR IP): Performed by: INTERNAL MEDICINE

## 2022-07-05 PROCEDURE — 82803 BLOOD GASES ANY COMBINATION: CPT

## 2022-07-05 PROCEDURE — 96367 TX/PROPH/DG ADDL SEQ IV INF: CPT

## 2022-07-05 PROCEDURE — 81001 URINALYSIS AUTO W/SCOPE: CPT

## 2022-07-05 RX ORDER — SODIUM CHLORIDE 9 MG/ML
INJECTION, SOLUTION INTRAVENOUS PRN
Status: DISCONTINUED | OUTPATIENT
Start: 2022-07-05 | End: 2022-07-11 | Stop reason: HOSPADM

## 2022-07-05 RX ORDER — PROMETHAZINE HYDROCHLORIDE 25 MG/1
12.5 TABLET ORAL EVERY 6 HOURS PRN
Status: DISCONTINUED | OUTPATIENT
Start: 2022-07-05 | End: 2022-07-11 | Stop reason: HOSPADM

## 2022-07-05 RX ORDER — ACETAMINOPHEN 650 MG/1
650 SUPPOSITORY RECTAL EVERY 6 HOURS PRN
Status: DISCONTINUED | OUTPATIENT
Start: 2022-07-05 | End: 2022-07-11 | Stop reason: HOSPADM

## 2022-07-05 RX ORDER — 0.9 % SODIUM CHLORIDE 0.9 %
30 INTRAVENOUS SOLUTION INTRAVENOUS ONCE
Status: COMPLETED | OUTPATIENT
Start: 2022-07-05 | End: 2022-07-05

## 2022-07-05 RX ORDER — SODIUM CHLORIDE 0.9 % (FLUSH) 0.9 %
5-40 SYRINGE (ML) INJECTION PRN
Status: DISCONTINUED | OUTPATIENT
Start: 2022-07-05 | End: 2022-07-11 | Stop reason: HOSPADM

## 2022-07-05 RX ORDER — ACETAMINOPHEN 325 MG/1
650 TABLET ORAL EVERY 6 HOURS PRN
Status: DISCONTINUED | OUTPATIENT
Start: 2022-07-05 | End: 2022-07-11 | Stop reason: HOSPADM

## 2022-07-05 RX ORDER — SODIUM CHLORIDE 0.9 % (FLUSH) 0.9 %
10 SYRINGE (ML) INJECTION PRN
Status: DISCONTINUED | OUTPATIENT
Start: 2022-07-05 | End: 2022-07-11 | Stop reason: HOSPADM

## 2022-07-05 RX ORDER — SODIUM CHLORIDE 0.9 % (FLUSH) 0.9 %
10 SYRINGE (ML) INJECTION EVERY 12 HOURS SCHEDULED
Status: DISCONTINUED | OUTPATIENT
Start: 2022-07-05 | End: 2022-07-11 | Stop reason: HOSPADM

## 2022-07-05 RX ORDER — ENOXAPARIN SODIUM 100 MG/ML
30 INJECTION SUBCUTANEOUS 2 TIMES DAILY
Status: DISCONTINUED | OUTPATIENT
Start: 2022-07-05 | End: 2022-07-11 | Stop reason: HOSPADM

## 2022-07-05 RX ORDER — FLUCONAZOLE 2 MG/ML
200 INJECTION, SOLUTION INTRAVENOUS EVERY 24 HOURS
Status: DISCONTINUED | OUTPATIENT
Start: 2022-07-05 | End: 2022-07-06

## 2022-07-05 RX ORDER — SODIUM CHLORIDE 0.9 % (FLUSH) 0.9 %
5-40 SYRINGE (ML) INJECTION EVERY 12 HOURS SCHEDULED
Status: DISCONTINUED | OUTPATIENT
Start: 2022-07-05 | End: 2022-07-11 | Stop reason: HOSPADM

## 2022-07-05 RX ORDER — 0.9 % SODIUM CHLORIDE 0.9 %
1000 INTRAVENOUS SOLUTION INTRAVENOUS ONCE
Status: DISCONTINUED | OUTPATIENT
Start: 2022-07-05 | End: 2022-07-05

## 2022-07-05 RX ORDER — ONDANSETRON 2 MG/ML
4 INJECTION INTRAMUSCULAR; INTRAVENOUS EVERY 6 HOURS PRN
Status: DISCONTINUED | OUTPATIENT
Start: 2022-07-05 | End: 2022-07-11 | Stop reason: HOSPADM

## 2022-07-05 RX ADMIN — VANCOMYCIN HYDROCHLORIDE 1750 MG: 1 INJECTION, POWDER, LYOPHILIZED, FOR SOLUTION INTRAVENOUS at 10:45

## 2022-07-05 RX ADMIN — IOPAMIDOL 75 ML: 755 INJECTION, SOLUTION INTRAVENOUS at 14:02

## 2022-07-05 RX ADMIN — ENOXAPARIN SODIUM 30 MG: 100 INJECTION SUBCUTANEOUS at 20:30

## 2022-07-05 RX ADMIN — PIPERACILLIN AND TAZOBACTAM 4500 MG: 4; .5 INJECTION, POWDER, LYOPHILIZED, FOR SOLUTION INTRAVENOUS at 10:06

## 2022-07-05 RX ADMIN — SODIUM CHLORIDE: 9 INJECTION, SOLUTION INTRAVENOUS at 22:44

## 2022-07-05 RX ADMIN — SODIUM CHLORIDE, PRESERVATIVE FREE 10 ML: 5 INJECTION INTRAVENOUS at 20:36

## 2022-07-05 RX ADMIN — SODIUM CHLORIDE, PRESERVATIVE FREE 10 ML: 5 INJECTION INTRAVENOUS at 11:40

## 2022-07-05 RX ADMIN — SODIUM CHLORIDE 3261 ML: 9 INJECTION, SOLUTION INTRAVENOUS at 09:46

## 2022-07-05 RX ADMIN — FLUCONAZOLE 200 MG: 2 INJECTION, SOLUTION INTRAVENOUS at 22:46

## 2022-07-05 RX ADMIN — SODIUM CHLORIDE, PRESERVATIVE FREE 10 ML: 5 INJECTION INTRAVENOUS at 20:39

## 2022-07-05 RX ADMIN — ACETAMINOPHEN 650 MG: 325 TABLET ORAL at 20:35

## 2022-07-05 ASSESSMENT — PAIN - FUNCTIONAL ASSESSMENT
PAIN_FUNCTIONAL_ASSESSMENT: ACTIVITIES ARE NOT PREVENTED
PAIN_FUNCTIONAL_ASSESSMENT: PREVENTS OR INTERFERES SOME ACTIVE ACTIVITIES AND ADLS
PAIN_FUNCTIONAL_ASSESSMENT: 0-10
PAIN_FUNCTIONAL_ASSESSMENT: 0-10

## 2022-07-05 ASSESSMENT — PAIN DESCRIPTION - LOCATION
LOCATION: LEG
LOCATION: LEG
LOCATION: FOOT
LOCATION: ANKLE;FOOT
LOCATION: HEAD

## 2022-07-05 ASSESSMENT — PAIN SCALES - GENERAL
PAINLEVEL_OUTOF10: 3
PAINLEVEL_OUTOF10: 7
PAINLEVEL_OUTOF10: 0
PAINLEVEL_OUTOF10: 7
PAINLEVEL_OUTOF10: 5
PAINLEVEL_OUTOF10: 0

## 2022-07-05 ASSESSMENT — PAIN DESCRIPTION - ORIENTATION
ORIENTATION: LEFT

## 2022-07-05 ASSESSMENT — PAIN DESCRIPTION - FREQUENCY
FREQUENCY: CONTINUOUS
FREQUENCY: CONTINUOUS

## 2022-07-05 ASSESSMENT — PAIN DESCRIPTION - PAIN TYPE
TYPE: ACUTE PAIN

## 2022-07-05 ASSESSMENT — PAIN DESCRIPTION - DESCRIPTORS
DESCRIPTORS: ACHING
DESCRIPTORS: ACHING
DESCRIPTORS: ACHING;THROBBING

## 2022-07-05 ASSESSMENT — PAIN DESCRIPTION - ONSET: ONSET: ON-GOING

## 2022-07-05 NOTE — PROGRESS NOTES
Clinical Pharmacy Note  Vancomycin Consult    Pharmacy consult received for one-time dose of vancomycin in the Emergency Department per Dr. Jeffrey Castillo. Ht Readings from Last 1 Encounters:   07/05/22 6' 1\" (1.854 m)        Wt Readings from Last 1 Encounters:   07/05/22 239 lb 10.2 oz (108.7 kg)         Assessment/Plan:  Vancomycin 1750 x 1 in ED. If Vancomycin is to continue on admission and pharmacy is to manage dosing, please re-consult with admission orders.

## 2022-07-05 NOTE — ED PROVIDER NOTES
[facility]  eMERGENCY dEPARTMENT eNCOUnter      Pt Name: Cliff Bloom  MRN: [id number]  [place] 1985  Date of evaluation: 7/5/2022  Provider: Brandie Koenig MD    [address]       Chief Complaint   Patient presents with    Nausea     states that he can't eat anything because it makes him gag    Pneumonia     was not able to take the medication he was prescribed, until one week ago; feels that he is getting worse    Leg Pain     left; in a splint         CRITICAL CARE TIME   Total Critical Care time was 0 minutes, excluding separately reportable procedures. There was a high probability of clinically significant/life threatening deterioration in the patient's condition which required my urgent intervention. HISTORY OF PRESENT ILLNESS  (Location/Symptom, Timing/Onset, Context/Setting, Quality, Duration, Modifying Factors, Severity.)   Cliff Bloom is a 29 y.o. male who presents to the emergency department complaining of nausea. He states he can eat anything. His medication makes him gag. Recent hospitalization for pneumonia. He feels like he is getting worse. He has a fracture in his ankle. His leg is in a splint. He is complaining of leg pain. He has a navicular fracture in his left foot. It was diagnosed on 6/28/2022. His hospitalization was from 6/6/2022 until 6/23/2022. He was treated for pneumonia. He had an endobronchial mass. 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 He had CHELE. Review of his renal consult shows that they felt it was primarily prerenal.  No chest pain. He feels little short of breath. He is still coughing some. He complains of pain in the left leg on the side that he had a foot fracture. Nursing Notes were reviewed and I agree. REVIEW OF SYSTEMS    (2-9 systems for level 4, 10 or more for level 5)     Dental: No fever or chills.   ENT: No nasal congestion sore throat or earache. Cardiovascular: No chest pain. Pulmonary: Cough, he feels somewhat short of breath. GI: Nausea, dry heaving. No abdominal pain. Neuro: No dizziness or passing out. : He feels like he is not urinating as much as usual.  No dysuria. Except as noted above the remainder of the review of systems was reviewed and negative.        PAST MEDICAL HISTORY     Past Medical History:   Diagnosis Date    COVID-19     Diabetes mellitus, type II ([facility] [address]     History of blood transfusion     Hypertension     Protein in urine     Seizure [facility])          SURGICAL HISTORY       Past Surgical History:   Procedure Laterality Date    BRONCHOSCOPY  6/9/2022    BRONCHOSCOPY BRUSHINGS performed by Rogelio Otto MD at [address] [facility]  6/9/2022    BRONCHOSCOPY DIAGNOSTIC OR CELL [address] performed by Rogelio Otto MD at [address] [facility]  6/9/2022    BRONCHOSCOPY BIOPSY BRONCHUS performed by Rogelio Otto MD at [facility] 6/14/2022    BRONCHOSCOPY DIAGNOSTIC OR CELL [address] ONLY performed by Audrey [facility]ancilla DO [facility]       Previous Medications    ACETAMINOPHEN (TYLENOL) 500 MG TABLET    Take 1 tablet by mouth 4 times daily as needed for Pain    ALBUTEROL SULFATE  (90 BASE) MCG/ACT INHALER    Inhale 2 puffs into the lungs every 6 hours as needed for Wheezing    ASPIRIN 81 MG CHEWABLE TABLET    Take 81 mg by mouth daily     BISACODYL (DULCOLAX) 10 MG SUPPOSITORY    Place 1 suppository rectally daily as needed for Constipation    CARVEDILOL (COREG) 25 MG TABLET    Take 25 mg by mouth 2 times daily     CHLORTHALIDONE (HYGROTON) 25 MG TABLET    Take 25 mg by mouth daily    DICYCLOMINE (BENTYL) 10 MG CAPSULE    Take 10 mg by mouth 4 times daily (before meals and nightly)     DULOXETINE (CYMBALTA) 30 MG EXTENDED RELEASE CAPSULE    Take 30 mg by mouth daily    EMPAGLIFLOZIN (JARDIANCE) 10 MG TABLET Take 10 mg by mouth daily    FLUCONAZOLE (DIFLUCAN) 200 MG TABLET    Take 2 tablets by mouth daily for 11 days    GABAPENTIN (NEURONTIN) 300 MG CAPSULE    Take 300 mg by mouth 3 times daily. 2-3 QD    INSULIN DEGLUDEC (TRESIBA FLEXTOUCH) 200 UNIT/ML SOPN    Inject 30 Units into the skin daily     INSULIN LISPRO, 1 UNIT DIAL, (HUMALOG KWIKPEN) 100 UNIT/ML SOPN    Inject 10 Units into the skin 3 times daily (before meals)    LISINOPRIL (PRINIVIL;ZESTRIL) 10 MG TABLET    Take 20 mg by mouth daily     METFORMIN (GLUCOPHAGE) 500 MG TABLET    Take 1,000 mg by mouth 2 times daily     NIFEDIPINE (PROCARDIA XL) 60 MG EXTENDED RELEASE TABLET    Take 60 mg by mouth daily as needed (Persistently high BP / SBP > 160)     POLYETHYLENE GLYCOL (GLYCOLAX) 17 G PACKET    Take 17 g by mouth 2 times daily    ROSUVASTATIN (CRESTOR) 40 MG TABLET    Take 1 tablet by mouth nightly    SPIRONOLACTONE (ALDACTONE) 25 MG TABLET    Take 1 tablet by mouth daily       ALLERGIES     Patient has no known allergies. FAMILY HISTORY     History reviewed. No pertinent family history.        SOCIAL HISTORY       Social History     Socioeconomic History    Marital status: Single     Spouse name: None    Number of children: None    Years of education: None    Highest education level: None   Occupational History    None   Tobacco Use    Smoking status: Never Smoker    Smokeless tobacco: Never Used   Vaping Use    Vaping Use: Never used   Substance and Sexual Activity    Alcohol use: Yes     Comment: rare    Drug use: Not Currently    Sexual activity: Yes     Partners: Female   Other Topics Concern    None   Social History Narrative    None     Social Determinants of Health     Financial Resource Strain:     Difficulty of Paying Living Expenses: Not on file   Food Insecurity:     Worried About Running Out of Food in the Last Year: Not on file    Justin of Food in the Last Year: Not on file   Transportation Needs:     Lack of Transportation (Medical): Not on file    Lack of Transportation (Non-Medical): Not on file   Physical Activity:     Days of Exercise per Week: Not on file    Minutes of Exercise per Session: Not on file   Stress:     Feeling of Stress : Not on file   Social Connections:     Frequency of Communication with Friends and Family: Not on file    Frequency of Social Gatherings with Friends and Family: Not on file    Attends Latter-day Services: Not on file    Active Member of [address] [place] [address] or Organizations: Not on file    Attends Club or Organization Meetings: Not on file    Marital Status: Not on file   Intimate Partner Violence:     Fear of Current or Ex-Partner: Not on file    Emotionally Abused: Not on file    Physically Abused: Not on file    Sexually Abused: Not on file   Housing Stability:     Unable to Pay for Housing in the Last Year: Not on file    Number of [place] in the Last Year: Not on file    Unstable Housing in the Last Year: Not on file         PHYSICAL EXAM    (up to 7 for level 4, 8 or more for level 5)     ED Triage Vitals [07/05/22 0853]   BP Temp Temp Source Heart Rate Resp SpO2 Height Weight   97/73 97.2 °F (36.2 °C) Oral (!) 120 20 97 % 6' 1\" (1.854 m) 239 lb 10.2 oz (108.7 kg)       General: Alert black male in no acute distress. Head: Atraumatic and normocephalic. Eyes: No conjunctival injection. Pupils equal round reactive. No pallor. ENT: Nose clear. Oropharynx moist without erythema. Neck: Supple, nontender, no adenopathy. Heart: Regular rate and rhythm. No murmurs or gallops noted. Lungs: Breath sounds equal bilaterally and clear. Abdomen: Soft, nondistended, nontender. Musculoskeletal: Short leg splint on the left lower extremity. No edema in the toes. Intact capillary refill and sensation. No right lower extremity swelling. No calf tenderness. Skin: Warm and dry, good turgor. No pallor or cyanosis. No diaphoresis. Neuro: Awake, alert, oriented.   No focal motor deficits. DIFFERENTIAL DIAGNOSIS   Differential includes but is not limited to CHELE, hyperkalemia, worsening pneumonia, pulmonary embolism, dehydration, other. DIAGNOSTIC RESULTS     EKG: All EKG's are interpreted by Susan Koehler MD in the absence of a cardiologist.    Sinus tachycardia, rate 102, left axis deviation, age-indeterminate septal infarct. Rhythm strip shows sinus tachycardia with a rate of 102, KS interval 196 ms, QRS of 86 ms with no other ectopy as interpreted by me. This compared to an EKG dated 6/6/2022, no significant change noted. RADIOLOGY:   Non-plain film images such as CT, Ultrasound and MRI are read by the radiologist. Plain radiographic images are visualized and preliminarily interpreted Susan Koehler MD with the below findings:      Interpretation per the Radiologist below, if available at the time of this note:    CT CHEST PULMONARY EMBOLISM W CONTRAST   Preliminary Result   1. No evidence of pulmonary embolic disease. 2. Mild progression of patchy multifocal right upper lobe opacification   consistent with pneumonia. There is new ground-glass opacification in the   right lower lobe concerning for pneumonia as well. 3. Progression of right hilar adenopathy with increased size of central node   and new node laterally within the right hilum. Note is made of negative   bronchoscopy 06/09/2022. However, findings remain concerning for malignancy. Close follow-up recommended. XR CHEST (2 VW)   Final Result   Persistent patchy right upper lobe airspace disease most consistent with   pneumonia. Chest CT is recommended to evaluate for an obstructing process.                ED BEDSIDE ULTRASOUND:   Performed by ED Physician - none    LABS:  Labs Reviewed   CBC WITH AUTO DIFFERENTIAL - Abnormal; Notable for the following components:       Result Value    Hemoglobin 12.7 (*)     Hematocrit 39.0 (*)     MCV 78.5 (*)     MCH 25.6 (*) Lymphocytes Absolute 0.8 (*)     All other components within normal limits   COMPREHENSIVE METABOLIC PANEL - Abnormal; Notable for the following components:    Sodium 134 (*)     CO2 20 (*)     Glucose 280 (*)     BUN 22 (*)     CREATININE 1.7 (*)     GFR Non- 46 (*)     GFR African American 56 (*)     Albumin/Globulin Ratio 1.0 (*)     AST 10 (*)     All other components within normal limits   URINALYSIS WITH REFLEX TO CULTURE - Abnormal; Notable for the following components:    Glucose, Ur 500 (*)     Ketones, Urine TRACE (*)     Urobilinogen, Urine 2.0 (*)     All other components within normal limits   MICROSCOPIC URINALYSIS - Abnormal; Notable for the following components:    Hyaline Casts, UA 10 (*)     Mucus, UA Present (*)     All other components within normal limits   POCT GLUCOSE - Abnormal; Notable for the following components:    POC Glucose 244 (*)     All other components within normal limits   CULTURE, BLOOD 1   CULTURE, BLOOD 2   LIPASE   LACTATE, SEPSIS   BLOOD GAS, VENOUS       All other labs were within normal range or not returned as of this dictation. EMERGENCY DEPARTMENT COURSE and DIFFERENTIAL DIAGNOSIS/MDM:   Vitals:    Vitals:    07/05/22 1329 07/05/22 1448 07/05/22 1510 07/05/22 1518   BP: (!) 129/95 (!) 125/91 (!) 125/91 (!) 128/93   Pulse: 98 86 93 92   Resp: 18 15 14 16   Temp:   98.9 °F (37.2 °C) 99.1 °F (37.3 °C)   TempSrc:   [place]   SpO2: 99% 98% 99% 100%   Weight:       Height: This patient presents with symptoms as above. Recent hospitalization as noted above for pneumonia. Had a bronchoscopy. Diagnosed with histoplasmosis. Additional information from his mother and the patient is that apparently he was not able to get the medication prescribed for his histoplasmosis. His insurance would not pay for it. It basically sounds like he is gotten progressively worse since he was discharged. He is felt weak. Apparently he has been falling.   He

## 2022-07-05 NOTE — PROGRESS NOTES
Medication Reconciliation    List of medications patient is currently taking is complete. Source of information: 1. Conversation with patient at bedside                                      2. EPIC records      Allergies  Patient has no known allergies. Notes regarding home medications:   1. Patient received all of his morning home medications prior to arrival to the emergency department. 2. On 06/23/22, patient was prescribed Sporanox 200 mg po BID x 15 days for concerns of histoplasmosis. He was unable to get this filled due to insurance/cost.  On 06/28/22, patient was prescribed Fluconazole 400 mg po daily x 11 days - he received his dose today - there are 3 doses remaining in this 11 day course. Consider extending Fluconazole regimen to a full 15 days as initially recommended at recent hospital discharge.     Conrado Jolley RPH, PharmD, BCPS  7/5/2022 1:00 PM

## 2022-07-05 NOTE — ED NOTES
ED SBAR report provider to Jimmy hugo, [facility]. Patient to be transported to Room 4115 via stretcher by ED tech. Patient transported with bedside cardiac monitor and with IV medications infusing. IV site clean, dry, and intact. MEWS score and pain assessed and documented. Updated patient and family on plan of care.      Otoniel Chavez RN  07/05/22 6270

## 2022-07-05 NOTE — ED NOTES
Pt lying in bed reports that he has increased sob, dry cough nausea and unable to eat or drink for a few days now. Pt also reports pain in left foot is not getting any better.       Clara Ruby RN  07/05/22 6375

## 2022-07-06 ENCOUNTER — OFFICE VISIT (OUTPATIENT)
Dept: ORTHOPEDIC SURGERY | Age: 37
End: 2022-07-06
Payer: MEDICAID

## 2022-07-06 VITALS — BODY MASS INDEX: 32.34 KG/M2 | HEIGHT: 73 IN | WEIGHT: 244 LBS

## 2022-07-06 DIAGNOSIS — S92.252A CLOSED DISPLACED FRACTURE OF NAVICULAR BONE OF LEFT FOOT, INITIAL ENCOUNTER: Primary | ICD-10-CM

## 2022-07-06 LAB
ANION GAP SERPL CALCULATED.3IONS-SCNC: 9 MMOL/L (ref 3–16)
BASOPHILS ABSOLUTE: 0 K/UL (ref 0–0.2)
BASOPHILS RELATIVE PERCENT: 1.2 %
BUN BLDV-MCNC: 19 MG/DL (ref 7–20)
CALCIUM SERPL-MCNC: 9.1 MG/DL (ref 8.3–10.6)
CHLORIDE BLD-SCNC: 102 MMOL/L (ref 99–110)
CO2: 23 MMOL/L (ref 21–32)
CREAT SERPL-MCNC: 1.4 MG/DL (ref 0.9–1.3)
EOSINOPHILS ABSOLUTE: 0.1 K/UL (ref 0–0.6)
EOSINOPHILS RELATIVE PERCENT: 3.8 %
GFR AFRICAN AMERICAN: >60
GFR NON-AFRICAN AMERICAN: 57
GLUCOSE BLD-MCNC: 156 MG/DL (ref 70–99)
GLUCOSE BLD-MCNC: 181 MG/DL (ref 70–99)
GLUCOSE BLD-MCNC: 190 MG/DL (ref 70–99)
GLUCOSE BLD-MCNC: 206 MG/DL (ref 70–99)
GLUCOSE BLD-MCNC: 210 MG/DL (ref 70–99)
HCT VFR BLD CALC: 36.5 % (ref 40.5–52.5)
HEMOGLOBIN: 11.9 G/DL (ref 13.5–17.5)
L. PNEUMOPHILA SEROGP 1 UR AG: NORMAL
LYMPHOCYTES ABSOLUTE: 1 K/UL (ref 1–5.1)
LYMPHOCYTES RELATIVE PERCENT: 28.9 %
MCH RBC QN AUTO: 25.5 PG (ref 26–34)
MCHC RBC AUTO-ENTMCNC: 32.7 G/DL (ref 31–36)
MCV RBC AUTO: 78.1 FL (ref 80–100)
MONOCYTES ABSOLUTE: 0.5 K/UL (ref 0–1.3)
MONOCYTES RELATIVE PERCENT: 13.4 %
NEUTROPHILS ABSOLUTE: 1.9 K/UL (ref 1.7–7.7)
NEUTROPHILS RELATIVE PERCENT: 52.7 %
PDW BLD-RTO: 13.1 % (ref 12.4–15.4)
PERFORMED ON: ABNORMAL
PLATELET # BLD: 256 K/UL (ref 135–450)
PMV BLD AUTO: 8 FL (ref 5–10.5)
POTASSIUM REFLEX MAGNESIUM: 3.9 MMOL/L (ref 3.5–5.1)
RBC # BLD: 4.67 M/UL (ref 4.2–5.9)
SODIUM BLD-SCNC: 134 MMOL/L (ref 136–145)
STREP PNEUMONIAE ANTIGEN, URINE: NORMAL
VANCOMYCIN RANDOM: 6.8 UG/ML
WBC # BLD: 3.6 K/UL (ref 4–11)

## 2022-07-06 PROCEDURE — 2580000003 HC RX 258: Performed by: INTERNAL MEDICINE

## 2022-07-06 PROCEDURE — 97530 THERAPEUTIC ACTIVITIES: CPT

## 2022-07-06 PROCEDURE — 28450 TX TARSAL B1 FX W/O MNPJ EA: CPT | Performed by: ORTHOPAEDIC SURGERY

## 2022-07-06 PROCEDURE — 87385 HISTOPLASMA CAPSUL AG IA: CPT

## 2022-07-06 PROCEDURE — 6360000002 HC RX W HCPCS: Performed by: INTERNAL MEDICINE

## 2022-07-06 PROCEDURE — 36415 COLL VENOUS BLD VENIPUNCTURE: CPT

## 2022-07-06 PROCEDURE — 2580000003 HC RX 258: Performed by: EMERGENCY MEDICINE

## 2022-07-06 PROCEDURE — 97162 PT EVAL MOD COMPLEX 30 MIN: CPT

## 2022-07-06 PROCEDURE — 6370000000 HC RX 637 (ALT 250 FOR IP): Performed by: INTERNAL MEDICINE

## 2022-07-06 PROCEDURE — 97116 GAIT TRAINING THERAPY: CPT

## 2022-07-06 PROCEDURE — 80202 ASSAY OF VANCOMYCIN: CPT

## 2022-07-06 PROCEDURE — 87449 NOS EACH ORGANISM AG IA: CPT

## 2022-07-06 PROCEDURE — 94760 N-INVAS EAR/PLS OXIMETRY 1: CPT

## 2022-07-06 PROCEDURE — 87641 MR-STAPH DNA AMP PROBE: CPT

## 2022-07-06 PROCEDURE — 97166 OT EVAL MOD COMPLEX 45 MIN: CPT

## 2022-07-06 PROCEDURE — 99203 OFFICE O/P NEW LOW 30 MIN: CPT | Performed by: ORTHOPAEDIC SURGERY

## 2022-07-06 PROCEDURE — 80048 BASIC METABOLIC PNL TOTAL CA: CPT

## 2022-07-06 PROCEDURE — L4360 PNEUMAT WALKING BOOT PRE CST: HCPCS | Performed by: ORTHOPAEDIC SURGERY

## 2022-07-06 PROCEDURE — 97535 SELF CARE MNGMENT TRAINING: CPT

## 2022-07-06 PROCEDURE — 99255 IP/OBS CONSLTJ NEW/EST HI 80: CPT | Performed by: INTERNAL MEDICINE

## 2022-07-06 PROCEDURE — 1200000000 HC SEMI PRIVATE

## 2022-07-06 PROCEDURE — 85025 COMPLETE CBC W/AUTO DIFF WBC: CPT

## 2022-07-06 RX ORDER — INSULIN LISPRO 100 [IU]/ML
0-12 INJECTION, SOLUTION INTRAVENOUS; SUBCUTANEOUS
Status: DISCONTINUED | OUTPATIENT
Start: 2022-07-06 | End: 2022-07-11 | Stop reason: HOSPADM

## 2022-07-06 RX ORDER — ITRACONAZOLE 100 MG/1
200 CAPSULE ORAL 3 TIMES DAILY
Status: COMPLETED | OUTPATIENT
Start: 2022-07-06 | End: 2022-07-09

## 2022-07-06 RX ORDER — ITRACONAZOLE 100 MG/1
200 CAPSULE ORAL 2 TIMES DAILY
Status: DISCONTINUED | OUTPATIENT
Start: 2022-07-09 | End: 2022-07-11 | Stop reason: HOSPADM

## 2022-07-06 RX ORDER — INSULIN LISPRO 100 [IU]/ML
0-6 INJECTION, SOLUTION INTRAVENOUS; SUBCUTANEOUS NIGHTLY
Status: DISCONTINUED | OUTPATIENT
Start: 2022-07-06 | End: 2022-07-11 | Stop reason: HOSPADM

## 2022-07-06 RX ORDER — INSULIN LISPRO 100 [IU]/ML
0-12 INJECTION, SOLUTION INTRAVENOUS; SUBCUTANEOUS ONCE
Status: COMPLETED | OUTPATIENT
Start: 2022-07-06 | End: 2022-07-06

## 2022-07-06 RX ORDER — DEXTROSE MONOHYDRATE 50 MG/ML
100 INJECTION, SOLUTION INTRAVENOUS PRN
Status: DISCONTINUED | OUTPATIENT
Start: 2022-07-06 | End: 2022-07-11 | Stop reason: HOSPADM

## 2022-07-06 RX ADMIN — ACETAMINOPHEN 650 MG: 325 TABLET ORAL at 17:18

## 2022-07-06 RX ADMIN — SODIUM CHLORIDE, PRESERVATIVE FREE 10 ML: 5 INJECTION INTRAVENOUS at 09:34

## 2022-07-06 RX ADMIN — INSULIN LISPRO 2 UNITS: 100 INJECTION, SOLUTION INTRAVENOUS; SUBCUTANEOUS at 12:22

## 2022-07-06 RX ADMIN — PIPERACILLIN AND TAZOBACTAM 3375 MG: 3; .375 INJECTION, POWDER, LYOPHILIZED, FOR SOLUTION INTRAVENOUS at 06:30

## 2022-07-06 RX ADMIN — ENOXAPARIN SODIUM 30 MG: 100 INJECTION SUBCUTANEOUS at 09:32

## 2022-07-06 RX ADMIN — INSULIN LISPRO 4 UNITS: 100 INJECTION, SOLUTION INTRAVENOUS; SUBCUTANEOUS at 09:35

## 2022-07-06 RX ADMIN — ENOXAPARIN SODIUM 30 MG: 100 INJECTION SUBCUTANEOUS at 22:11

## 2022-07-06 RX ADMIN — INSULIN LISPRO 2 UNITS: 100 INJECTION, SOLUTION INTRAVENOUS; SUBCUTANEOUS at 16:48

## 2022-07-06 RX ADMIN — ITRACONAZOLE 200 MG: 100 CAPSULE, COATED PELLETS ORAL at 22:09

## 2022-07-06 RX ADMIN — SODIUM CHLORIDE, PRESERVATIVE FREE 10 ML: 5 INJECTION INTRAVENOUS at 22:11

## 2022-07-06 RX ADMIN — PIPERACILLIN AND TAZOBACTAM 3375 MG: 3; .375 INJECTION, POWDER, LYOPHILIZED, FOR SOLUTION INTRAVENOUS at 00:11

## 2022-07-06 RX ADMIN — PIPERACILLIN AND TAZOBACTAM 3375 MG: 3; .375 INJECTION, POWDER, LYOPHILIZED, FOR SOLUTION INTRAVENOUS at 17:16

## 2022-07-06 RX ADMIN — ITRACONAZOLE 200 MG: 100 CAPSULE, COATED PELLETS ORAL at 11:25

## 2022-07-06 RX ADMIN — INSULIN LISPRO 2 UNITS: 100 INJECTION, SOLUTION INTRAVENOUS; SUBCUTANEOUS at 22:12

## 2022-07-06 RX ADMIN — Medication 1250 MG: at 09:44

## 2022-07-06 RX ADMIN — ACETAMINOPHEN 650 MG: 325 TABLET ORAL at 09:31

## 2022-07-06 ASSESSMENT — PAIN SCALES - GENERAL
PAINLEVEL_OUTOF10: 5
PAINLEVEL_OUTOF10: 5

## 2022-07-06 ASSESSMENT — PAIN DESCRIPTION - LOCATION: LOCATION: HEAD

## 2022-07-06 NOTE — CARE COORDINATION
07/06/22 1503   Readmission Assessment   Number of Days since last admission? 8-30 days   Previous Disposition Home Alone   Who is being Interviewed Patient  (Chart review)   What was the patient's/caregiver's perception as to why they think they needed to return back to the hospital? Other (Comment)  (Pt was unable to obtain the medication he needed upon his discharge. Pt was concerned about his pneumonia.)   Did you visit your Primary Care Physician after you left the hospital, before you returned this time? No   Why weren't you able to visit your PCP? Did not have an appointment   Did you see a specialist, such as Cardiac, Pulmonary, Orthopedic Physician, etc. after you left the hospital? No   Who advised the patient to return to the hospital? Self-referral   Does the patient report anything that got in the way of taking their medications? Yes   What reasons did they give? Inadequate medication insurance  (Pt was prescribed Sporanox for concern of histoplasmosis. This was not covered by Medicaid. The cost under [facility] is $55.00.)   In our efforts to provide the best possible care to you and others like you, can you think of anything that we could have done to help you after you left the hospital the first time, so that you might not have needed to return so soon?  Other (Comment)  (Ensure the patient has the medications needed.)   LISSETTE Dang [facility]  [phone]  Electronically signed by Darcie Goode on 7/6/2022 at 3:07 PM

## 2022-07-06 NOTE — PROGRESS NOTES
Occupational Therapy  Facility/Department: [facility]  Occupational Therapy Initial Assessment    Name: Richard Ngo  : 1985  MRN: [id number]  Date of Service: 2022    Discharge Recommendations:  Home with assist PRN  OT Equipment Recommendations  Equipment Needed: Yes  Mobility Devices: ADL Assistive Devices  ADL Assistive Devices: Transfer Tub Bench     Richard Ngo scored a 21/24 on the AM-PAC ADL Inpatient form. Anticipate pt will be safe to return home with assist prn at d/c. Patient Diagnosis(es): The primary encounter diagnosis was Acute renal failure superimposed on chronic kidney disease, unspecified CKD stage, unspecified acute renal failure type ([facility] [address] Diagnoses of Pneumonia due to infectious organism, unspecified laterality, unspecified part of lung, General weakness, and Fall, initial encounter were also pertinent to this visit. Past Medical History:  has a past medical history of COVID-19, Diabetes mellitus, type II ([facility] [address] History of blood transfusion, Hypertension, Protein in urine, and [facility] Past Surgical History:  has a past surgical history that includes bronchoscopy (2022); bronchoscopy (2022); bronchoscopy (2022); and bronchoscopy (N/A, 2022). Assessment   Performance deficits / Impairments: Decreased high-level IADLs;Decreased sensation;Decreased balance  Assessment: Pt is a 29 y.o. male admitted with PNA. Pt with recent hx of L ankle fx on 2022. At baseline, pt lives with friend and independent ADLs and fxl mobility using cane prn, has been using crutches since ankle fx. Pt currently functioning below baseline, limited in self-care by NWB status L LE affecting pt's balance, fxl mobility, fxl activity tolerance, and ADL status. Will cont to see on acute to address the above limitations and maximize pt's safety and independence. Anticipate pt will be safe to return home with assist prn at d/c.   Prognosis: Good  Decision Making: Medium Complexity  History: see above  No Skilled OT: Safe to return home  REQUIRES OT FOLLOW-UP: No  Activity Tolerance  Activity Tolerance: Patient Tolerated treatment well        Plan   Plan  Times per Week: 3-5  Current Treatment Recommendations: Strengthening,Balance training,Functional mobility training,Endurance training,Safety education & training,Self-Care / ADL,Equipment evaluation, education, & procurement     Restrictions  Restrictions/Precautions  Restrictions/Precautions: Weight Bearing,Fall Risk  Lower Extremity Weight Bearing Restrictions  Left Lower Extremity Weight Bearing: Non Weight Bearing  Position Activity Restriction  Other position/activity restrictions: NWB L foot    Subjective   General  Chart Reviewed: Yes  Additional Pertinent Hx: Per Yojana Nayak MD's H&P: \"Patient is a 23-year-old male with past medical history of diabetes mellitus hypertension seizure COVID-19 who presents to the hospital for feeling nauseated and he is concerned about pneumonia because he could not get his medications for pneumonia. Patient mentions that he is appetite is down and also he feels nauseated even by watching the food. Patient also mentions he has cough, he is producing phlegm, he was recently diagnosed with fungal pneumonia he was discharged on antibiotics however he could not receive his antibiotics through the pharmacy because insurance will not approve it. Patient did not follow-up with PCP. Has future appointment with ID. He also reports falling recently and broke his ankle. \"  Family / Caregiver Present: No  Referring Practitioner: Yojana Nayak MD  Diagnosis: PNA  Subjective  Subjective: Pt met b/s for OT eval/tx. Pt seated EOB on arrival, agreeable to participate in therapy. Pt reports 4/10 pain L ankle.      Social/Functional History  Social/Functional History  Lives With: Friend(s)  Type of Home: House  Home Layout: One level  Home Access: Stairs to enter with rails  Entrance Stairs - Number of Steps: 1  Bathroom Shower/Tub: Tub/Shower unit  Bathroom Toilet: Standard  Bathroom Accessibility: Not accessible  Home Equipment: Cane,Crutches  Has the patient had two or more falls in the past year or any fall with injury in the past year?: Yes (last fall 6- when he broke L ankle)  ADL Assistance: [address]: Needs assistance (friend or mother does grocery shopping; friend can do laundry if the pt cannot; the pt tries to assist with yard work)  Ambulation Assistance: Independent (with crutches)  Transfer Assistance: Independent (sleeps in regular bed)  Active : Yes (friend or family usually drive)  Occupation: Unemployed  Additional Comments: the pt has been functioning at a crutch level for the past week since ankle fx       Objective   Observation/Palpation  Observation: the pt with splint and ace wrap on the L lower leg     Safety Devices  Type of Devices: Call light within reach;Gait belt;Patient at risk for falls; Left in bed (left seated on the EOB waiting for transportation)     Balance  Sitting: Intact  Standing: With support (SBA with crutches)  Gait  Overall Level of Assistance: Stand-by assistance  Distance (ft):  (~10 ft x2, ~20 ft)  Assistive Device: Crutches     Transfers  Sit to stand: Stand by assistance  Stand to sit: Stand by assistance  Transfer Comments: with crutches, VC's for technique with crutches   Toilet Transfers  Toilet - Technique: Ambulating  Equipment Used: Grab bars  Toilet Transfer: Stand by assistance    AROM: Within functional limits  Strength: Within functional limits  Sensation: Impaired (neuropathy gabino feet)     ADL  UE Bathing Skilled Clinical Factors: pt reports sponge bathed independently seated at sink earlier this date  LE Bathing Skilled Clinical Factors: pt educated on TTB and transfer technique with sit>swing method to NWB L LE. Pt shown pictures of TTB.  Pt interested in acquiring for home if covered by insurance  Toileting Skilled Clinical Factors: pt demo'd toilet transfer with SBA, reports UAL to BR managing toileting with mod I. Additional Comments: Anticipate pt is independent feeding, SBA/supervision bathing, dressing, toileting based on ROM/strength, balance, endurance. Activity Tolerance  Activity Tolerance: Patient tolerated evaluation without incident     Bed mobility  Supine to Sit: Modified independent  Sit to Supine: Modified independent  Scooting: Independent       Cognition  Overall Cognitive Status: WFL  Orientation  Overall Orientation Status: Within Functional Limits  Orientation Level: Oriented to person;Oriented to time;Oriented to place;Oriented to situation     Education Given To: Patient  Education Provided: Role of Therapy;Plan of Care;ADL Adaptive Strategies;Transfer Training;Equipment  Barriers to Learning: Other (Comment)                          AM-PAC Score        AM-[facility] Inpatient Daily Activity Raw Score: 21 (07/06/22 1333)  AM-PAC Inpatient ADL T-Scale Score : 44.27 (07/06/22 1333)  ADL Inpatient CMS 0-100% Score: 32.79 (07/06/22 1333)  ADL Inpatient CMS G-Code Modifier : CJ (07/06/22 1333)    Goals  Short Term Goals  Time Frame for Short term goals: Prior to d/c:  Short Term Goal 1: Pt will bathe with mod I. Short Term Goal 2: Pt will dress with mod I. Short Term Goal 3: Pt will toilet with mod I. Short Term Goal 4: Pt will complete fxl mobility and fxl transfers to/from ADL surfaces with mod I using LRAD. Short Term Goal 5: Pt will maintain NWB status L LE throughout session with no VC's. Long Term Goals  Time Frame for Long term goals : STGs=LTGs  Patient Goals   Patient goals : to return home. Therapy Time   Individual Concurrent Group Co-treatment   Time In 1259         Time Out 1339         Minutes 40         Timed Code Treatment Minutes: 25 Minutes    This note to serve as OT d/c summary if pt is d/c-ed prior to next therapy session.       [facility] Nadia Miranda, [address] [facility]

## 2022-07-06 NOTE — PROGRESS NOTES
Physical Therapy  Facility/Department: [address] MED SURG  Physical Therapy Initial Assessment  If pt. is D/C'd prior to next visit please refer back to last daily progress note for D/C status. Name: Sue Galo  : 1985  MRN: [id number]  Date of Service: 2022    Discharge Recommendations:  Home with assist PRN   Sue Galo scored a 20/24 on the AM-PAC short mobility form. At this time, no further PT is recommended upon discharge. Recommend patient returns to prior setting with prior services. PT Equipment Recommendations  Equipment Needed: No  Other: he has crutches      Patient Diagnosis(es): The primary encounter diagnosis was Acute renal failure superimposed on chronic kidney disease, unspecified CKD stage, unspecified acute renal failure type ([address] Diagnoses of Pneumonia due to infectious organism, unspecified laterality, unspecified part of lung, General weakness, and Fall, initial encounter were also pertinent to this visit. Past Medical History:  has a past medical history of COVID-19, Diabetes mellitus, type II ([address] History of blood transfusion, Hypertension, Protein in urine, and Seizure ([address] Past Surgical History:  has a past surgical history that includes bronchoscopy (2022); bronchoscopy (2022); bronchoscopy (2022); and bronchoscopy (N/A, 2022). Assessment   Assessment: The pt is a 28 yo male who presented to the ED with reports of nausea, decreased appetitie, and a productive cough. He was recently dx with fungal pna and has not been able to get meds for it. The pt also with a recent fx ankle. The pt lives with a friend in a one story house. He has been using crutches for the past week due to ankle fx and he does have the assist of his friend and mother to assist with homemaking if needed.       PMHx: covid-19, DM, HTN, seizures, ankle fx       Today, the pt demonstrated that he is functioning slightly below his most current baseline of needing crutches for mobility. He is limited by his NWB status and his pre-morbid h/o dizziness. He did demonstrate that he is able to maintain his NWB on the L foot during ambulation but did need some cues when transferring. The pt able to ambulate in the room setting with the crutches and CGA, he had no LOB. Anticipate that he will be safe for home with current family assist; he has all needed equipment. Will con't to follow while on the acute care floor but do not anticipate PT being needed at d/c. Therapy Prognosis: Good  Decision Making: Medium Complexity  Barriers to Learning: none  Requires PT Follow-Up: Yes  Activity Tolerance  Activity Tolerance: Patient tolerated evaluation without incident     Plan   Plan  Plan: 2-3 times per week  Current Treatment Recommendations: Functional mobility training,Transfer training,Gait training,Safety education & training,Patient/Caregiver education & training,Equipment evaluation, education, & procurement  Safety Devices  Type of Devices: Call light within reach,Gait belt,Patient at risk for falls,Left in bed (left seated on the EOB waiting for transportation)     Restrictions  Restrictions/Precautions  Restrictions/Precautions: Weight Bearing,Fall Risk  Lower Extremity Weight Bearing Restrictions  Left Lower Extremity Weight Bearing: Non Weight Bearing  Position Activity Restriction  Other position/activity restrictions: NWB L foot     Subjective   General  Chart Reviewed: Yes  Additional Pertinent Hx: Per Lu Kehr, MD H&P on 7-5-2022: The pt is a 30 yo male who presented to the ED with reports of nausea, decreased appetitie, and a productive cough. He was recently dx with fungal pna and has not been able to get meds for it. The pt also with a recent fx ankle.         PMHx: covid-19, DM, HTN, seizures, ankle fx  Response To Previous Treatment: Not applicable  Family / Caregiver Present: No  Referring Practitioner: Lu Kehr, MD  Referral Date : 07/05/22  Diagnosis: pna, CHELE on CKD, generalized weakness  Subjective  Subjective: reporting 4-5/10 pain L foot         Social/Functional History  Social/Functional History  Lives With: Friend(s)  Type of Home: House  Home Layout: One level  Home Access: Stairs to enter with rails  Entrance Stairs - Number of Steps: 1  Bathroom Shower/Tub: Tub/Shower unit  Bathroom Toilet: Standard  Bathroom Accessibility: Not accessible  Home Equipment: Cane,Crutches  Has the patient had two or more falls in the past year or any fall with injury in the past year?: Yes (last fall 6- when he broke L ankle)  ADL Assistance: [address] [place] [address] Avenue: Needs assistance (friend or mother does grocery shopping; friend can do laundry if the pt cannot; the pt tries to assist with yard work)  Ambulation Assistance: Independent (with crutches)  Transfer Assistance: Independent (sleeps in regular bed)  Active : Yes (friend or family usually drive)  Occupation: Unemployed  Additional Comments: the pt has been functioning at a crutch level for the past week since ankle fx  Vision/Hearing  Vision  Vision: Impaired  Vision Exceptions: Wears glasses for distance  Hearing  Hearing: Within functional limits    Cognition   Orientation  Overall Orientation Status: Within Functional Limits  Orientation Level: Oriented to person;Oriented to time;Oriented to place;Oriented to situation  Cognition  Overall Cognitive Status: WFL     Objective        Observation/Palpation  Observation: the pt with splint and ace wrap on the L lower leg        AROM RLE (degrees)  RLE AROM: WFL  AROM LLE (degrees)  LLE General AROM: hip and knee WFL; ankle n/a due to splint  Strength RLE  Strength RLE: WFL  Strength LLE  Comment: n/a formally but hip and knee apear functionally good; ankle n/a due to fx          Bed mobility  Supine to Sit: Modified independent  Sit to Supine: Modified independent  Scooting: Independent  Transfers  Sit to Stand: Contact guard assistance;Stand by assistance  Stand to sit: Contact guard assistance;Stand by assistance  Comment: vc's for crutch placement during transfers  Ambulation  Surface: level tile  Device: Axillary Crutches  Assistance: Contact guard assistance  Quality of Gait: slowed pace, steady and w/o LOB; appeared able to maintain NWB L foot thru-out  Distance: 10 feet x 2, 20 feet x 1  More Ambulation?: No  Stairs/Curb  Stairs?: No     Balance  Sitting - Static: Good  Sitting - Dynamic: Good  Standing - Static: Good; - (with crutches)  Standing - Dynamic: Good;-;Fair;+ (with crutches)           AM-PAC Score  AM-PAC Inpatient Mobility Raw Score : 20 (07/06/22 1330)  AM-PAC Inpatient T-Scale Score : 47.67 (07/06/22 1330)  Mobility Inpatient CMS 0-100% Score: 35.83 (07/06/22 1330)  Mobility Inpatient CMS G-Code Modifier : CJ (07/06/22 1330)          Goals  Short Term Goals  Time Frame for Short term goals: upon d/c  Short term goal 1: Bed mobility indep. Short term goal 2: Transfers sit <> stand with crutches with Sup. Short term goal 3: Ambulate with crutches 100 feet with SBA, NWB L foot.   Patient Goals   Patient goals : to go home       Education  Patient Education  Education Given To: Patient  Education Provided: Role of Therapy;Plan of Care;Transfer Training;Equipment  Education Provided Comments: weightbearing status  Education Method: Demonstration;Verbal  Barriers to Learning: None  Education Outcome: Verbalized understanding;Demonstrated understanding;Continued education needed      Therapy Time   Individual Concurrent Group Co-treatment   Time In 1259         Time Out 1339         Minutes 40         Timed Code Treatment Minutes: 25 Minutes       Electronically signed by Angie Grimaldo, PT [id number] on 7/6/2022 at 1:41 PM

## 2022-07-06 NOTE — PLAN OF CARE
Problem: Discharge Planning  Goal: Discharge to home or other facility with appropriate resources  7/6/2022 1103 by Rachelle López RN  Outcome: Progressing  7/6/2022 0046 by Junie Dutton RN  Outcome: Progressing  Flowsheets (Taken 7/5/2022 1827 by Nanci Durbin RN)  Discharge to home or other facility with appropriate resources:   Arrange for needed discharge resources and transportation as appropriate   Identify barriers to discharge with patient and caregiver   Identify discharge learning needs (meds, wound care, etc)   Refer to discharge planning if patient needs post-hospital services based on physician order or complex needs related to functional status, cognitive ability or social support system   Arrange for interpreters to assist at discharge as needed     Problem: Pain  Goal: Verbalizes/displays adequate comfort level or baseline comfort level  7/6/2022 1103 by Rachelle López RN  Outcome: Progressing  7/6/2022 0046 by Junie Dutton RN  Outcome: Progressing     Problem: Safety - Adult  Goal: Free from fall injury  7/6/2022 1103 by Rachelle López RN  Outcome: Progressing  7/6/2022 0046 by Junie Dutton RN  Outcome: Progressing  Flowsheets  Taken 7/6/2022 0046 by Junie Dutton RN  Free From Fall Injury: Based on caregiver fall risk screen, instruct family/caregiver to ask for assistance with transferring infant if caregiver noted to have fall risk factors  Taken 7/5/2022 1811 by Nanci Durbin RN  Free From Fall Injury:   Based on caregiver fall risk screen, instruct family/caregiver to ask for assistance with transferring infant if caregiver noted to have fall risk factors   Instruct family/caregiver on patient safety     Problem: ABCDS Injury Assessment  Goal: Absence of physical injury  7/6/2022 1103 by Rachelle López RN  Outcome: Progressing  7/6/2022 0046 by Junie Dutton RN  Outcome: Progressing  Flowsheets  Taken 7/6/2022 0046 by Junie Dutton RN  Absence of Physical Injury: Implement safety measures based on patient assessment  Taken 7/5/2022 1811 by Siddhartha Tang RN  Absence of Physical Injury: Implement safety measures based on patient assessment     Problem: Respiratory - Adult  Goal: Achieves optimal ventilation and oxygenation  7/6/2022 1103 by Meghan Bradshaw RN  Outcome: Progressing  7/6/2022 0046 by Dian Laura RN  Outcome: Progressing  Flowsheets (Taken 7/5/2022 2105)  Achieves optimal ventilation and oxygenation:   Assess for changes in respiratory status   Assess for changes in mentation and behavior   Position to facilitate oxygenation and minimize respiratory effort   Oxygen supplementation based on oxygen saturation or arterial blood gases   Encourage broncho-pulmonary hygiene including cough, deep breathe, incentive spirometry     Problem: Musculoskeletal - Adult  Goal: Return mobility to safest level of function  7/6/2022 1103 by Meghan Bradhsaw RN  Outcome: Progressing  7/6/2022 0046 by Dian Laura RN  Outcome: Progressing  Flowsheets (Taken 7/5/2022 2105)  Return Mobility to Safest Level of Function:   Assess patient stability and activity tolerance for standing, transferring and ambulating with or without assistive devices   Assist with transfers and ambulation using safe patient handling equipment as needed   Obtain physical therapy/occupational therapy consults as needed   Instruct patient/family in ordered activity level  Goal: Maintain proper alignment of affected body part  7/6/2022 1103 by Meghan Bradshaw RN  Outcome: Progressing  7/6/2022 0046 by Dian Laura RN  Outcome: Progressing  Flowsheets (Taken 7/5/2022 2105)  Maintain proper alignment of affected body part:   Support and protect limb and body alignment per provider's orders   Instruct and reinforce with patient and family use of appropriate assistive device and precautions (e.g. spinal or hip dislocation precautions)  Goal: Return ADL status to a safe level of function  7/6/2022 1103 by Ryan Arguelles RN  Outcome: Progressing  7/6/2022 0046 by Sahra Bradley RN  Outcome: Progressing  Flowsheets (Taken 7/5/2022 2105)  Return ADL Status to a Safe Level of Function:   Administer medication as ordered   Assess activities of daily living deficits and provide assistive devices as needed   Obtain physical therapy/occupational therapy consults as needed   Assist and instruct patient to increase activity and self care as tolerated

## 2022-07-06 NOTE — PROGRESS NOTES
Hospitalist Progress Note      PCP: Grant Awan MD, MD    Date of Admission: 7/5/2022    Chief Complaint:   Cough/Nausea/Fall    Hospital Course:   Patient is a 69-year-old male with past medical history of diabetes mellitus hypertension seizure COVID-19 who presents to the hospital for feeling nauseated and he is concerned about pneumonia because he could not get his medications for pneumonia. Patient mentions that he is appetite is down and also he feels nauseated even by watching the food. Patient also mentions he has cough, he is producing phlegm, he was recently diagnosed with fungal pneumonia he was discharged on antibiotics however he could not receive his antibiotics through the pharmacy because insurance will not approve it. Patient did not follow-up with PCP. Has future appointment with ID. He also reports following recently and broke his ankle.     Subjective:   Better        Medications:  Reviewed    Infusion Medications    dextrose      sodium chloride      sodium chloride 10 mL/hr at 07/06/22 0600     Scheduled Medications    insulin lispro  0-12 Units SubCUTAneous TID WC    insulin lispro  0-6 Units SubCUTAneous Nightly    itraconazole  200 mg Oral TID    [START ON 7/9/2022] itraconazole  200 mg Oral BID    sodium chloride flush  5-40 mL IntraVENous 2 times per day    sodium chloride flush  10 mL IntraVENous 2 times per day    enoxaparin  30 mg SubCUTAneous BID    piperacillin-tazobactam  3,375 mg IntraVENous Q8H     PRN Meds: glucose, dextrose bolus **OR** dextrose bolus, glucagon (rDNA), dextrose, sodium chloride flush, sodium chloride, sodium chloride flush, sodium chloride, promethazine **OR** ondansetron, magnesium hydroxide, acetaminophen **OR** acetaminophen      Intake/Output Summary (Last 24 hours) at 7/6/2022 1130  Last data filed at 7/6/2022 0600  Gross per 24 hour   Intake 1152.41 ml   Output 2000 ml   Net -847.59 ml       Physical Exam Performed:    BP 119/74   Pulse (!) 117   Temp 100 °F (37.8 °C) (Oral)   Resp 18   Ht 6' 1\" (1.854 m)   Wt 244 lb 7.8 oz (110.9 kg)   SpO2 95%   BMI 32.26 kg/m²     General appearance: No apparent distress, appears stated age and cooperative. HEENT: Pupils equal, round, and reactive to light. Conjunctivae/corneas clear. Neck: Supple, with full range of motion. No jugular venous distention. Trachea midline. Respiratory:  Normal respiratory effort. Clear to auscultation, bilaterally without Rales/Wheezes/Rhonchi. Cardiovascular: Regular rate and rhythm with normal S1/S2 without murmurs, rubs or gallops. Abdomen: Soft, non-tender, non-distended with normal bowel sounds. Musculoskeletal: No clubbing, cyanosis or edema bilaterally. Full range of motion without deformity. Skin: Skin color, texture, turgor normal.  No rashes or lesions. Neurologic:  Neurovascularly intact without any focal sensory/motor deficits. Cranial nerves: II-XII intact, grossly non-focal.  Psychiatric: Alert and oriented, thought content appropriate, normal insight  Capillary Refill: Brisk,3 seconds, normal   Peripheral Pulses: +2 palpable, equal bilaterally       Labs:   Recent Labs     07/05/22  0954 07/06/22  0538   WBC 4.0 3.6*   HGB 12.7* 11.9*   HCT 39.0* 36.5*    256     Recent Labs     07/05/22  0954 07/06/22  0538   * 134*   K 4.4 3.9    102   CO2 20* 23   BUN 22* 19   CREATININE 1.7* 1.4*   CALCIUM 9.9 9.1     Recent Labs     07/05/22  0954   AST 10*   ALT 11   BILITOT 0.4   ALKPHOS 120     No results for input(s): INR in the last 72 hours. No results for input(s): Shauna Zarate in the last 72 hours.     Urinalysis:      Lab Results   Component Value Date/Time    NITRU Negative 07/05/2022 11:49 AM    WBCUA 1 07/05/2022 11:49 AM    BACTERIA None Seen 07/05/2022 11:49 AM    RBCUA 1 07/05/2022 11:49 AM    BLOODU Negative 07/05/2022 11:49 AM    SPECGRAV 1.024 07/05/2022 11:49 AM    GLUCOSEU 500 07/05/2022 11:49 AM GLUCOSEU NEGATIVE 01/02/2011 02:44 PM       Radiology:  CT CHEST PULMONARY EMBOLISM W CONTRAST   Final Result   1. No evidence of pulmonary embolic disease. 2. Mild progression of patchy multifocal right upper lobe opacification   consistent with pneumonia. There is new ground-glass opacification in the   right lower lobe concerning for pneumonia as well. 3. Progression of right hilar adenopathy with increased size of central node   and new node laterally within the right hilum. Note is made of negative   bronchoscopy 06/09/2022. However, findings remain concerning for malignancy. Close follow-up recommended. XR CHEST (2 VW)   Final Result   Persistent patchy right upper lobe airspace disease most consistent with   pneumonia. Chest CT is recommended to evaluate for an obstructing process. Assessment/Plan:    Assessment  CHELE on CKD  Pneumonia due to infectious organism, cannot rule out bacterial pneumonia, fungal pneumonia-unspecified organism  Generalized weakness  Diabetes mellitus  Hypertension  Seizure        Plan  Cont vancomycin, Zosyn, fluconazole  Check procalcitonin, mycoplasma, Legionella urine antigen, urine strep antigen  Consult infectious disease  Gentle IV fluid therapy normal saline  Insulin sliding scale resume home medications  Holding nephrotoxic medications  DVT prophylaxis-Lovenox  Diet: ADULT DIET;  Regular; 4 carb choices (60 gm/meal)  Code Status: Full Code     PT/OT Eval Status: ordered     Dispo - pending clinical improvement             Douglas Beckwith MD

## 2022-07-06 NOTE — CARE COORDINATION
Discharge Planning:  SW attempted to meet with pt, pt was off the floor for an appointment. SW will attempt to meet with pt later as time permits.   LISSETTE López LSW  [phone]  Electronically signed by [facility] on 7/6/2022 at 3:02 PM

## 2022-07-06 NOTE — PROGRESS NOTES
Pharmacy Note       Patient was discharged 6/23 with script for Sporanox 200 mg dose BID x 15 days. It was not covered by his insurance, Medicaid so he did not start. Now readmitted. Dr Arlene Morrow has restarted it here. Asked by [facility] to check pricing. Manuel Vickers ran pricing; for # 60- 100 mg caps would be $384.98. Nelson Aragon was kind enough to check if a GOOD RX card is available; if used at [facility], would run $55.66    Yady Brooks, [facility] [place].

## 2022-07-06 NOTE — PROGRESS NOTES
Physician Progress Note      PATIENT:               Deondre Tovar  CSN #:                  [id number]  :                       1985  ADMIT DATE:       2022 8:51 AM  DISCH DATE:  RESPONDING  PROVIDER #:        Holly Olson MD        QUERY TEXT:    Stage of Chronic Kidney Disease: Please provide further specificity, if known. Clinical indicators include:  Options provided:  -- Chronic kidney disease stage 1  -- Chronic kidney disease stage 2  -- Chronic kidney disease stage 3  -- Chronic kidney disease stage 3a  -- Chronic kidney disease stage 3b  -- Chronic kidney disease stage 4  -- Chronic kidney disease stage 5  -- Chronic kidney disease stage 5, requiring dialysis  -- End stage renal disease  -- Other - I will add my own diagnosis  -- Disagree - Not applicable / Not valid  -- Disagree - Clinically Unable to determine / Unknown        PROVIDER RESPONSE TEXT:    The patient has chronic kidney disease stage 1.       Electronically signed by:  Holly Olson MD 2022 3:21 PM

## 2022-07-06 NOTE — CONSULTS
Infectious Diseases Inpatient Consult Note      Reason for Consult:  Worsening PNA and H/o Histoplasmosis      Requesting Physician:       Primary Care Physician:  Michael Yates MD, MD    History Obtained From:  Epic and Patient     CHIEF COMPLAINT:     Chief Complaint   Patient presents with    Nausea     states that he can't eat anything because it makes him gag    Pneumonia     was not able to take the medication he was prescribed, until one week ago; feels that he is getting worse    Leg Pain     left; in a splint         HISTORY OF PRESENT ILLNESS:  29 y.o. man with significant history for diabetes poor control, neuropathy, retinopathy, chronic kidney disease, hypertension, seizure disorder was recently admitted to [facility] [place] from  6//6/25 to 6/23/22-on that admission he was noted to have very abnormal CT chest concerning Rt hilar mass and PNA . He underwent Bronch and  biopsy. He also underwent a serological studies and came back +Ve Histoplasma by antibody but antigen was negative and serum Beta D glucan +Ve BAL with Streptococcus and prevotella anaerobes was treated with Augmentin at d/c and was placed on Itraconazole and follow up in ID clinic. He did not get oral Antifungal as planned was place on Fluconazole pending his clinic visit and now admitted with worsening rep symptoms dizziness and fatigue. He is HIV -ve CT chest on this admit with with  mild progression of patchy multifocal right upper lobe opacification with present pacification the right lower lobe concerning for pneumonia with progression of right hilar adenopathy. Due to worsening symptoms and CT scan we are consulted for recommendations.       Past Medical History:    Past Medical History:   Diagnosis Date    COVID-19     Diabetes mellitus, type II ([facility] [address]     History of blood transfusion     Hypertension     Protein in urine     Seizure [facility])        Past Surgical History:    Past Surgical History: Procedure Laterality Date    BRONCHOSCOPY  6/9/2022    BRONCHOSCOPY BRUSHINGS performed by Marlan Cranker, MD at [address]  6/9/2022    BRONCHOSCOPY DIAGNOSTIC OR CELL [address] performed by Marlan Cranker, MD at [address]  6/9/2022    BRONCHOSCOPY BIOPSY BRONCHUS performed by Marlan Cranker, MD at [address] N/A 6/14/2022    BRONCHOSCOPY DIAGNOSTIC OR CELL [address] ONLY performed by Heidy Ramos DO at [address] [facility]       Current Medications:    No outpatient medications have been marked as taking for the 7/5/22 encounter [facility] Encounter). Allergies:  Patient has no known allergies. Immunizations : There is no immunization history on file for this patient.       Social History:     Social History     Tobacco Use    Smoking status: Never Smoker    Smokeless tobacco: Never Used   Vaping Use    Vaping Use: Never used   Substance Use Topics    Alcohol use: Yes     Comment: rare    Drug use: Not Currently     Social History     Tobacco Use   Smoking Status Never Smoker   Smokeless Tobacco Never Used      Family History : no DVT no COPD       REVIEW OF SYSTEMS:      Constitutional:  fevers+ , chills + , night sweats  Eyes:  negative for blurred vision, eye discharge, visual disturbance   HEENT:  negative for hearing loss, ear drainage,nasal congestion  Respiratory:    cough + , shortness of breath + or hemoptysis   Cardiovascular:  negative for chest pain, palpitations, syncope  Gastrointestinal:  negative for nausea, vomiting, diarrhea, constipation, abdominal pain  Genitourinary:  negative for frequency, dysuria, urinary incontinence, hematuria  Hematologic/Lymphatic:  negative for easy bruising, bleeding and lymphadenopathy  Allergic/Immunologic:  negative for recurrent infections, angioedema, anaphylaxis   Endocrine:  negative for weight changes, polyuria, polydipsia and polyphagia  Musculoskeletal: negative for joint  pain, swelling, decreased range of motion  Integumentary: No rashes, skin lesions  Neurological:  negative for headaches, slurred speech, unilateral weakness  Psychiatric: negative for hallucinations,confusion,agitation.      PHYSICAL EXAM:      Vitals:    /74   Pulse (!) 117   Temp 100 °F (37.8 °C) (Oral)   Resp 18   Ht 6' 1\" (1.854 m)   Wt 244 lb 7.8 oz (110.9 kg)   SpO2 95%   BMI 32.26 kg/m²     General Appearance: alert,in some acute distress,++ pallor, no icterus chronic ill appearing man ++  Skin: warm and dry, no rash or erythema  Head: normocephalic and atraumatic  Eyes: pupils equal, round, and reactive to light, conjunctivae normal  ENT: tympanic membrane, external ear and ear canal normal bilaterally, nose without deformity, nasal mucosa and turbinates normal without polyps  Neck: supple and non-tender without mass, no thyromegaly  no cervical lymphadenopathy  Pulmonary/Chest: clear to auscultation bilaterally- no wheezes, rales or rhonchi, normal air movement, no respiratory distress  Cardiovascular: normal rate, regular rhythm, normal S1 and S2, no murmurs, rubs, clicks, or gallops, no carotid bruits  Abdomen: soft, non-tender, non-distended, normal bowel sounds, no masses or organomegaly  Extremities: no cyanosis, clubbing or edema  Musculoskeletal: normal range of motion, no joint swelling, deformity or tenderness  Integumentary: No rashes, no abnormal skin lesions, no petechiae  Neurologic: reflexes normal and symmetric, no cranial nerve deficit  Psych:  Orientation, sensorium, mood normal   Lines: IV    DATA:    CBC:   Lab Results   Component Value Date    WBC 3.6 (L) 07/06/2022    HGB 11.9 (L) 07/06/2022    HCT 36.5 (L) 07/06/2022    MCV 78.1 (L) 07/06/2022     07/06/2022     RENAL:   Lab Results   Component Value Date    CREATININE 1.4 (H) 07/06/2022    BUN 19 07/06/2022     (L) 07/06/2022    K 3.9 07/06/2022     07/06/2022    CO2 23 07/06/2022 SED RATE:   Lab Results   Component Value Date/Time    SEDRATE >130 06/11/2022 07:39 AM     CK: No results found for: CKTOTAL  CRP:   Lab Results   Component Value Date/Time    CRP 62.4 06/03/2020 02:37 PM     Hepatic Function Panel:   Lab Results   Component Value Date/Time    ALKPHOS 120 07/05/2022 09:54 AM    ALT 11 07/05/2022 09:54 AM    AST 10 07/05/2022 09:54 AM    PROT 7.6 07/05/2022 09:54 AM    BILITOT 0.4 07/05/2022 09:54 AM    BILIDIR <0.2 06/19/2022 08:06 AM    IBILI see below 06/19/2022 08:06 AM    LABALBU 3.8 07/05/2022 09:54 AM     UA:  Lab Results   Component Value Date/Time    COLORU Yellow 07/05/2022 11:49 AM    CLARITYU Clear 07/05/2022 11:49 AM    GLUCOSEU 500 07/05/2022 11:49 AM    GLUCOSEU NEGATIVE 01/02/2011 02:44 PM    BILIRUBINUR Negative 07/05/2022 11:49 AM    BILIRUBINUR NEGATIVE 01/02/2011 02:44 PM    KETUA TRACE 07/05/2022 11:49 AM    SPECGRAV 1.024 07/05/2022 11:49 AM    BLOODU Negative 07/05/2022 11:49 AM    PHUR 5.0 07/05/2022 11:49 AM    PROTEINU 300 07/05/2022 11:49 AM    UROBILINOGEN 2.0 07/05/2022 11:49 AM    NITRU Negative 07/05/2022 11:49 AM    LEUKOCYTESUR Negative 07/05/2022 11:49 AM    LABMICR YES 07/05/2022 11:49 AM    URINETYPE NotGiven 07/05/2022 11:49 AM      Urine Microscopic:   Lab Results   Component Value Date/Time    BACTERIA None Seen 07/05/2022 11:49 AM    COMU SEE COMMENT 07/05/2022 11:49 AM    HYALCAST 10 07/05/2022 11:49 AM    WBCUA 1 07/05/2022 11:49 AM    RBCUA 1 07/05/2022 11:49 AM    EPIU 3 07/05/2022 11:49 AM     Urine Reflex to Culture:   Lab Results   Component Value Date/Time    URRFLXCULT Not Indicated 07/05/2022 11:49 AM     Histoplasma Abs, Qnt DID  Histoplasma Antibodies  Collected: 06/14/22 1708   Result status: Final   Resulting lab: [facility] LABORATORY   Reference range: None Detected   Value: Detected Abnormal     Comment: Detected, M band(s) observed. INTERPRETIVE INFORMATION: Histoplasma spp.  Antibodies by                                 Visible to patient: No (not released)     Next appt: Today at 02:00 PM in Orthopedic Surgery (Kieran Pinedo MD)     0 Result Notes     1 HM Topic     Ref Range & Units 6/9/22 0745   HIV Ag/Ab Non-reactive Non-Reactive    HIV-1 Antibody Non-reactive Non-Reactive    HIV ANTIGEN Non-reactive Non-Reactive    HIV-2 Ab Non-reactive Non-Reactive    Resulting Agency  [address] [facility]          FINAL DIAGNOSIS:     A: Bronchial Washing, Right Upper Lobe:   -  No malignant cells identified   -  GMS stain is negative for fungal organisms or pneumocystis. B: Bronchial Brush Tip, Right Upper Lobe:   -  No malignant cells identified      JIAJA/JIAJA       CLINICAL DIAGNOSIS:    pre-op diagnosis: pneumonia   Viral Culture:    Lab Results   Component Value Date/Time    COVID19 Not Detected 06/20/2022 10:50 AM     Urine Culture: No results for input(s): Veronika Rendon in the last 72 hours. Scheduled Meds:   vancomycin  1,250 mg IntraVENous Once    insulin lispro  0-12 Units SubCUTAneous TID WC    insulin lispro  0-6 Units SubCUTAneous Nightly    sodium chloride flush  5-40 mL IntraVENous 2 times per day    sodium chloride flush  10 mL IntraVENous 2 times per day    enoxaparin  30 mg SubCUTAneous BID    fluconazole  200 mg IntraVENous Q24H    piperacillin-tazobactam  3,375 mg IntraVENous Q8H    vancomycin (VANCOCIN) intermittent dosing (placeholder)   Other RX Placeholder       Continuous Infusions:   dextrose      sodium chloride      sodium chloride 10 mL/hr at 07/06/22 0600       PRN Meds:  glucose, dextrose bolus **OR** dextrose bolus, glucagon (rDNA), dextrose, sodium chloride flush, sodium chloride, sodium chloride flush, sodium chloride, promethazine **OR** ondansetron, magnesium hydroxide, acetaminophen **OR** acetaminophen      FINAL DIAGNOSIS:     Right upper lobe lung biopsy:      - Small, detached fragments of benign bronchial mucosal with acute and        chronic inflammation        and blood clot.    - Negative for granulomatous inflammation or neoplasm.      - No diagnostic transbronchial lung parenchyma present.      PHIAB/PHIAB       HISTOPLASMA INTERPETATION  Histoplasma Antigen, Serum  Collected: 06/14/22 1708   Result status: Final   Resulting lab: [facility] LABORATORY   Reference range: Not Detected   Value: Not Detected   Comment: INTERPRETIVE INFORMATION: Histoplasma Antigen Quantitative by EIA,   Serum   Less than 0.19 ng/mL = Not Detected      Hemoglobin A1C  Hemoglobin A1c  Collected: 06/06/22 1506   Result status: Final   Resulting lab: [facility] LAB   Reference range: See comment %   Value: 16.9   Comment: Comment:   Diagnosis of Diabetes: > or = 6.5%   Increased risk of diabetes (Prediabetes): 5.7-6.4%   Glycemic Control: Nonpregnant Adults: <7.0%                     Pregnant: <6.0%          Imaging:   CT CHEST PULMONARY EMBOLISM W CONTRAST   Final Result   1. No evidence of pulmonary embolic disease. 2. Mild progression of patchy multifocal right upper lobe opacification   consistent with pneumonia. There is new ground-glass opacification in the   right lower lobe concerning for pneumonia as well. 3. Progression of right hilar adenopathy with increased size of central node   and new node laterally within the right hilum. Note is made of negative   bronchoscopy 06/09/2022. However, findings remain concerning for malignancy. Close follow-up recommended. XR CHEST (2 VW)   Final Result   Persistent patchy right upper lobe airspace disease most consistent with   pneumonia. Chest CT is recommended to evaluate for an obstructing process. All pertinent images and reports for the current Hospitalization were reviewed by me.     IMPRESSION:    Patient Active Problem List   Diagnosis    Poorly controlled type 2 diabetes mellitus ([facility] [address]    Essential hypertension    SOB (shortness of breath)    CKD (chronic kidney disease)    Acute renal failure ([address]    Community acquired pneumonia of right upper lobe of lung    Abnormal CT of the chest    Endobronchial mass    Hemoptysis    Streptococcal infection    S/P bronchoscopy    Seizure disorder (HCC)    Class 1 obesity due to excess calories with body mass index (BMI) of 34.0 to 34.9 in adult    Fever    Encounter for medication counseling    PNA (pneumonia)     Rt side Pneumonia  CT with worsening and progression  Hilar adenopathy on Chest CT  Recent admit at [facility] for PNA  S/p Bronch and BAL cx thus far negative  Histoplasma Antibody+  Serum Beta D glucan +  DM poor control   BMI at 32  CKD stage 3  Rt hilar adenopahty  HIV -ve  Abnormal CT chest     Given his resp symptoms will cont IV abx and start Itraconazole as he was unable to get this med as out patient will d/w  about options This all could be related to Histoplasmosis he is immune suppressed from uncontrolled DM+             Labs, Microbiology, Radiology and pertinent results from current hospitalization and care every where were reviewed by me as a part of the consultation. PLAN :  1. Cont IV Zosyn as before  2. D/C IV Vancomycin   3. Oral Itraconazole x 200 mg Q 8 hr x  3  Days followed by twice a day dosing for Lung infection  4. Contol DM   5. Watch creat   6. Ref social service  To help with medications -  7. cHECK urine Histoplasma antigen      Discussed with patient/Family and Nursing   Risk of Complications/Morbidity: High      · Illness(es)/ Infection present that pose threat to bodily function. · There is potential for severe exacerbation of infection/side effects of treatment. · Therapy requires intensive monitoring for antimicrobial agent toxicity. Thanks for allowing me to participate in your patient's care please call me with any questions or concerns.     Dr. Marva Potter MD  [address] [place] [address] Physician  Phone: [phone]   Fax : [phone]

## 2022-07-06 NOTE — CONSULTS
Clinical Pharmacy Note  Vancomycin Consult    Cliff Bloom is a 29 y.o. male ordered Vancomycin for pneumonia; consult received from Dr. Kathi Edmonds to manage therapy. Also receiving Zosyn, fluconazole. Allergies:  Patient has no known allergies. Temp max:  Temp (24hrs), Av.7 °F (37.1 °C), Min:97.2 °F (36.2 °C), Max:99.4 °F (37.4 °C)      Recent Labs     22  0954 22  0538   WBC 4.0 3.6*       Recent Labs     22  0954 22  0538   BUN 22* 19   CREATININE 1.7* 1.4*         Intake/Output Summary (Last 24 hours) at 2022 0711  Last data filed at 2022 0600  Gross per 24 hour   Intake 1252.41 ml   Output 2000 ml   Net -747.59 ml       Culture Results:  Pending  MRSA swab ordered; pt is refusing collection at this time. Will f/u with him as we may be able to de escalate Vanc if negative    Ht Readings from Last 1 Encounters:   22 6' 1\" (1.854 m)        Wt Readings from Last 1 Encounters:   22 244 lb 7.8 oz (110.9 kg)         Estimated Creatinine Clearance: 96 mL/min (A) (based on SCr of 1.4 mg/dL (H)). Assessment/Plan:  Vancomycin 1750 mg IV x 1 given in ER. Level this morning ~ 19 hrs = 6.8 ug/mL  Pt admitted with CHELE on CK. Scr 1.7->1. 4  Will give a one time dose of 1250 mg now and get a vanc level 12 hours post dose to ensure he's clearing drug. Nephrology has been consulted    Regimen projects a trough level of 15-20 mg/L. Level ordered for 22 at 1900. Thank you for the consult.    Og Kennedy, [facility], 2022 7:11 AM

## 2022-07-06 NOTE — PLAN OF CARE
Problem: Discharge Planning  Goal: Discharge to home or other facility with appropriate resources  Outcome: Progressing  Flowsheets (Taken 7/5/2022 1827 by Nila Mace, RN)  Discharge to home or other facility with appropriate resources:   Arrange for needed discharge resources and transportation as appropriate   Identify barriers to discharge with patient and caregiver   Identify discharge learning needs (meds, wound care, etc)   Refer to discharge planning if patient needs post-hospital services based on physician order or complex needs related to functional status, cognitive ability or social support system   Arrange for interpreters to assist at discharge as needed     Problem: Pain  Goal: Verbalizes/displays adequate comfort level or baseline comfort level  Outcome: Progressing     Problem: Safety - Adult  Goal: Free from fall injury  Outcome: Progressing  Flowsheets (Taken 7/5/2022 1811 by Nila Mace, RN)  Free From Fall Injury:   Based on caregiver fall risk screen, instruct family/caregiver to ask for assistance with transferring infant if caregiver noted to have fall risk factors   Instruct family/caregiver on patient safety     Problem: ABCDS Injury Assessment  Goal: Absence of physical injury  Outcome: Progressing  Flowsheets (Taken 7/5/2022 1811 by Nila Mace, RN)  Absence of Physical Injury: Implement safety measures based on patient assessment     Problem: Respiratory - Adult  Goal: Achieves optimal ventilation and oxygenation  Outcome: Progressing  Flowsheets (Taken 7/5/2022 2105)  Achieves optimal ventilation and oxygenation:   Assess for changes in respiratory status   Assess for changes in mentation and behavior   Position to facilitate oxygenation and minimize respiratory effort   Oxygen supplementation based on oxygen saturation or arterial blood gases   Encourage broncho-pulmonary hygiene including cough, deep breathe, incentive spirometry     Problem: Musculoskeletal - Adult  Goal: Return mobility to safest level of function  Outcome: Progressing  Flowsheets (Taken 7/5/2022 2105)  Return Mobility to Safest Level of Function:   Assess patient stability and activity tolerance for standing, transferring and ambulating with or without assistive devices   Assist with transfers and ambulation using safe patient handling equipment as needed   Obtain physical therapy/occupational therapy consults as needed   Instruct patient/family in ordered activity level  Goal: Maintain proper alignment of affected body part  Outcome: Progressing  Flowsheets (Taken 7/5/2022 2105)  Maintain proper alignment of affected body part:   Support and protect limb and body alignment per provider's orders   Instruct and reinforce with patient and family use of appropriate assistive device and precautions (e.g. spinal or hip dislocation precautions)  Goal: Return ADL status to a safe level of function  Outcome: Progressing  Flowsheets (Taken 7/5/2022 2105)  Return ADL Status to a Safe Level of Function:   Administer medication as ordered   Assess activities of daily living deficits and provide assistive devices as needed   Obtain physical therapy/occupational therapy consults as needed   Assist and instruct patient to increase activity and self care as tolerated

## 2022-07-06 NOTE — H&P
Hospital Medicine History & Physical      PCP: Debra Hernandez MD, MD    Date of Admission: 7/5/2022    Chief Complaint:  PNA, Fall, Nausea    History Of Present Illness:  Patient is a 41-year-old male with past medical history of diabetes mellitus hypertension seizure COVID-19 who presents to the hospital for feeling nauseated and he is concerned about pneumonia because he could not get his medications for pneumonia. Patient mentions that he is appetite is down and also he feels nauseated even by watching the food. Patient also mentions he has cough, he is producing phlegm, he was recently diagnosed with fungal pneumonia he was discharged on antibiotics however he could not receive his antibiotics through the pharmacy because insurance will not approve it. Patient did not follow-up with PCP. Has future appointment with ID. He also reports following recently and broke his ankle. Past Medical History:          Diagnosis Date    COVID-19     Diabetes mellitus, type II ([facility] [address]     History of blood transfusion     Hypertension     Protein in urine     Seizure [facility])        Past Surgical History:          Procedure Laterality Date    BRONCHOSCOPY  6/9/2022    BRONCHOSCOPY BRUSHINGS performed by Keri Cordoba MD at [address] [place] [address] Rd  6/9/2022    BRONCHOSCOPY DIAGNOSTIC OR CELL [address] performed by Keri Cordoba MD at [address] [place] [address] Rd  6/9/2022    BRONCHOSCOPY BIOPSY BRONCHUS performed by Keri Cordoba MD at [facility] 6/14/2022    BRONCHOSCOPY DIAGNOSTIC OR CELL [address] ONLY performed by Angelia Holter, DO at [facility]       Medications Prior to Admission:      Prior to Admission medications    Medication Sig Start Date End Date Taking?  Authorizing Provider   acetaminophen (TYLENOL) 500 MG tablet Take 1 tablet by mouth 4 times daily as needed for Pain 6/28/22 7/5/22  Caridad Cuellar APRN - CNP fluconazole (DIFLUCAN) 200 MG tablet Take 2 tablets by mouth daily for 11 days 6/28/22 7/9/22  HANS Black CNP   bisacodyl (DULCOLAX) 10 MG suppository Place 1 suppository rectally daily as needed for Constipation 6/23/22 7/23/22  Leandro Robles MD   polyethylene glycol [facility]) 17 g packet Take 17 g by mouth 2 times daily 6/23/22 7/23/22  Leandro Robles MD   albuterol sulfate  (90 Base) MCG/ACT inhaler Inhale 2 puffs into the lungs every 6 hours as needed for Wheezing    Historical Provider, MD   Insulin Degludec (TRESIBA FLEXTOUCH) 200 UNIT/ML SOPN Inject 30 Units into the skin daily     Historical Provider, MD   insulin lispro, 1 Unit Dial, (HUMALOG KWIKPEN) 100 UNIT/ML SOPN Inject 10 Units into the skin 3 times daily (before meals) 5/19/22   HANS Abel NP   spironolactone (ALDACTONE) 25 MG tablet Take 1 tablet by mouth daily 5/19/22   HANS Abel NP   rosuvastatin (CRESTOR) 40 MG tablet Take 1 tablet by mouth nightly 5/19/22   HANS Abel NP   empagliflozin (JARDIANCE) 10 MG tablet Take 10 mg by mouth daily    Historical Provider, MD   NIFEdipine (PROCARDIA XL) 60 MG extended release tablet Take 60 mg by mouth daily as needed (Persistently high BP / SBP > 160)     Historical Provider, MD   gabapentin (NEURONTIN) 300 MG capsule Take 300 mg by mouth 3 times daily.  2-3 QD 5/3/22 8/1/22  Historical Provider, MD   metFORMIN (GLUCOPHAGE) 500 MG tablet Take 1,000 mg by mouth 2 times daily  1/13/21   Historical Provider, MD   DULoxetine (CYMBALTA) 30 MG extended release capsule Take 30 mg by mouth daily 4/7/21   Historical Provider, MD   dicyclomine (BENTYL) 10 MG capsule Take 10 mg by mouth 4 times daily (before meals and nightly)  1/13/21   Historical Provider, MD   carvedilol (COREG) 25 MG tablet Take 25 mg by mouth 2 times daily  1/13/21   Historical Provider, MD   chlorthalidone (HYGROTON) 25 MG tablet Take 25 mg by mouth daily 4/7/21   Historical Provider, MD   aspirin 81 MG chewable tablet Take 81 mg by mouth daily  6/1/21   Historical Provider, MD   lisinopril (PRINIVIL;ZESTRIL) 10 MG tablet Take 20 mg by mouth daily     Historical Provider, MD       Allergies:  Patient has no known allergies. Social History:      TOBACCO:   reports that he has never smoked. He has never used smokeless tobacco.  ETOH:   reports current alcohol use. Family History:       Reviewed in detail and non contributory      History reviewed. No pertinent family history. REVIEW OF SYSTEMS:   Pertinent positives as noted in the HPI. All other systems reviewed and negative. PHYSICAL EXAM PERFORMED:    /81   Pulse 98   Temp 98.2 °F (36.8 °C) (Oral)   Resp 18   Ht 6' 1\" (1.854 m)   Wt 239 lb 10.2 oz (108.7 kg)   SpO2 96%   BMI 31.62 kg/m²     General appearance:  NAD  HEENT:  Normal cephalic, atraumatic without obvious deformity. Conjunctivae/corneas clear. Neck: Supple, with full range of motion. No cervical lymphadenopathy  Respiratory:  Normal respiratory effort. Clear to auscultation, bilaterally without Rales/Wheezes/Rhonchi. Cardiovascular:  Regular rate and rhythm with normal S1/S2 without murmurs, rubs or gallops. Abdomen: Soft, non-tender, non-distended, normal bowel sounds. Musculoskeletal:  No edema noted bilaterally. No tenderness on palpation   Skin: no rash visible  Neurologic:  Neurologically intact without any focal sensory/motor deficits. grossly non-focal.  Psychiatric:  Alert and oriented, normal mood  Peripheral Pulses: +2 palpable, equal bilaterally     Labs:     Recent Labs     07/05/22  0954   WBC 4.0   HGB 12.7*   HCT 39.0*        Recent Labs     07/05/22  0954   *   K 4.4      CO2 20*   BUN 22*   CREATININE 1.7*   CALCIUM 9.9     Recent Labs     07/05/22  0954   AST 10*   ALT 11   BILITOT 0.4   ALKPHOS 120     No results for input(s): INR in the last 72 hours.   No results for input(s): Isidra Jay in the last 72 hours. Urinalysis:      Lab Results   Component Value Date/Time    NITRU Negative 07/05/2022 11:49 AM    WBCUA 1 07/05/2022 11:49 AM    BACTERIA None Seen 07/05/2022 11:49 AM    RBCUA 1 07/05/2022 11:49 AM    BLOODU Negative 07/05/2022 11:49 AM    SPECGRAV 1.024 07/05/2022 11:49 AM    GLUCOSEU 500 07/05/2022 11:49 AM    GLUCOSEU NEGATIVE 01/02/2011 02:44 PM       Radiology:       CT CHEST PULMONARY EMBOLISM W CONTRAST   Final Result   1. No evidence of pulmonary embolic disease. 2. Mild progression of patchy multifocal right upper lobe opacification   consistent with pneumonia. There is new ground-glass opacification in the   right lower lobe concerning for pneumonia as well. 3. Progression of right hilar adenopathy with increased size of central node   and new node laterally within the right hilum. Note is made of negative   bronchoscopy 06/09/2022. However, findings remain concerning for malignancy. Close follow-up recommended. XR CHEST (2 VW)   Final Result   Persistent patchy right upper lobe airspace disease most consistent with   pneumonia. Chest CT is recommended to evaluate for an obstructing process. Active Hospital Problems    Diagnosis Date Noted    PNA (pneumonia) [J18.9] 07/05/2022     Priority: Medium       Patient is a 28-year-old male with past medical history of diabetes mellitus hypertension seizure COVID-19 who presents to the hospital for feeling nauseated and he is concerned about pneumonia because he could not get his medications for pneumonia. Patient mentions that he is appetite is down and also he feels nauseated even by watching the food. Patient also mentions he has cough, he is producing phlegm, he was recently diagnosed with fungal pneumonia he was discharged on antibiotics however he could not receive his antibiotics through the pharmacy because insurance will not approve it. Patient did not follow-up with PCP.   Has future appointment with ID. He also reports following recently and broke his ankle. Assessment  CHELE on CKD  Pneumonia due to infectious organism, cannot rule out bacterial pneumonia, fungal pneumonia-unspecified organism  Generalized weakness  Diabetes mellitus  Hypertension  Seizure      Plan  Start vancomycin, Zosyn, fluconazole  Check procalcitonin, mycoplasma, Legionella urine antigen, urine strep antigen  Consult infectious disease  Gentle IV fluid therapy normal saline  Insulin sliding scale resume home medications  Holding nephrotoxic medications  DVT prophylaxis-Lovenox  Diet: ADULT DIET; Regular; 4 carb choices (60 gm/meal)  Code Status: Full Code    PT/OT Eval Status: ordered    Dispo - pending clinical improvement       Vic Ryan MD    The note was completed using EMR and Dragon dictation system. Every effort was made to ensure accuracy; however, inadvertent computerized transcription errors may be present. Thank you Snehal Carmona MD, MD for the opportunity to be involved in this patient's care. If you have any questions or concerns please feel free to contact me at [phone].     Vic Ryan MD

## 2022-07-07 PROBLEM — E44.0 MODERATE MALNUTRITION (HCC): Chronic | Status: ACTIVE | Noted: 2022-07-07

## 2022-07-07 LAB
(1,3)-BETA-D-GLUCAN (FUNGITELL) INTERPRETATION: NEGATIVE
(1,3)-BETA-D-GLUCAN (FUNGITELL): 54 PG/ML
ANION GAP SERPL CALCULATED.3IONS-SCNC: 14 MMOL/L (ref 3–16)
BUN BLDV-MCNC: 14 MG/DL (ref 7–20)
CALCIUM SERPL-MCNC: 9.6 MG/DL (ref 8.3–10.6)
CHLORIDE BLD-SCNC: 99 MMOL/L (ref 99–110)
CO2: 20 MMOL/L (ref 21–32)
CREAT SERPL-MCNC: 1.3 MG/DL (ref 0.9–1.3)
GFR AFRICAN AMERICAN: >60
GFR NON-AFRICAN AMERICAN: >60
GLUCOSE BLD-MCNC: 171 MG/DL (ref 70–99)
GLUCOSE BLD-MCNC: 195 MG/DL (ref 70–99)
GLUCOSE BLD-MCNC: 197 MG/DL (ref 70–99)
GLUCOSE BLD-MCNC: 212 MG/DL (ref 70–99)
GLUCOSE BLD-MCNC: 231 MG/DL (ref 70–99)
MRSA SCREEN RT-PCR: NORMAL
PERFORMED ON: ABNORMAL
POTASSIUM REFLEX MAGNESIUM: 4.1 MMOL/L (ref 3.5–5.1)
SODIUM BLD-SCNC: 133 MMOL/L (ref 136–145)

## 2022-07-07 PROCEDURE — 80048 BASIC METABOLIC PNL TOTAL CA: CPT

## 2022-07-07 PROCEDURE — 94760 N-INVAS EAR/PLS OXIMETRY 1: CPT

## 2022-07-07 PROCEDURE — 1200000000 HC SEMI PRIVATE

## 2022-07-07 PROCEDURE — 97116 GAIT TRAINING THERAPY: CPT

## 2022-07-07 PROCEDURE — 2580000003 HC RX 258: Performed by: EMERGENCY MEDICINE

## 2022-07-07 PROCEDURE — 36415 COLL VENOUS BLD VENIPUNCTURE: CPT

## 2022-07-07 PROCEDURE — 6360000002 HC RX W HCPCS: Performed by: INTERNAL MEDICINE

## 2022-07-07 PROCEDURE — 99233 SBSQ HOSP IP/OBS HIGH 50: CPT | Performed by: INTERNAL MEDICINE

## 2022-07-07 PROCEDURE — 2580000003 HC RX 258: Performed by: INTERNAL MEDICINE

## 2022-07-07 PROCEDURE — 6370000000 HC RX 637 (ALT 250 FOR IP): Performed by: INTERNAL MEDICINE

## 2022-07-07 RX ORDER — ITRACONAZOLE 100 MG/1
200 CAPSULE ORAL 2 TIMES DAILY
Qty: 120 CAPSULE | Refills: 6 | Status: SHIPPED | OUTPATIENT
Start: 2022-07-07 | End: 2022-07-11 | Stop reason: SDUPTHER

## 2022-07-07 RX ADMIN — PIPERACILLIN AND TAZOBACTAM 3375 MG: 3; .375 INJECTION, POWDER, LYOPHILIZED, FOR SOLUTION INTRAVENOUS at 17:31

## 2022-07-07 RX ADMIN — PIPERACILLIN AND TAZOBACTAM 3375 MG: 3; .375 INJECTION, POWDER, LYOPHILIZED, FOR SOLUTION INTRAVENOUS at 02:11

## 2022-07-07 RX ADMIN — INSULIN LISPRO 2 UNITS: 100 INJECTION, SOLUTION INTRAVENOUS; SUBCUTANEOUS at 09:46

## 2022-07-07 RX ADMIN — ENOXAPARIN SODIUM 30 MG: 100 INJECTION SUBCUTANEOUS at 20:12

## 2022-07-07 RX ADMIN — INSULIN LISPRO 2 UNITS: 100 INJECTION, SOLUTION INTRAVENOUS; SUBCUTANEOUS at 20:13

## 2022-07-07 RX ADMIN — ITRACONAZOLE 200 MG: 100 CAPSULE, COATED PELLETS ORAL at 20:13

## 2022-07-07 RX ADMIN — PIPERACILLIN AND TAZOBACTAM 3375 MG: 3; .375 INJECTION, POWDER, LYOPHILIZED, FOR SOLUTION INTRAVENOUS at 09:51

## 2022-07-07 RX ADMIN — ENOXAPARIN SODIUM 30 MG: 100 INJECTION SUBCUTANEOUS at 09:51

## 2022-07-07 RX ADMIN — ITRACONAZOLE 200 MG: 100 CAPSULE, COATED PELLETS ORAL at 15:16

## 2022-07-07 RX ADMIN — ACETAMINOPHEN 650 MG: 325 TABLET ORAL at 09:45

## 2022-07-07 RX ADMIN — SODIUM CHLORIDE, PRESERVATIVE FREE 10 ML: 5 INJECTION INTRAVENOUS at 09:52

## 2022-07-07 RX ADMIN — ACETAMINOPHEN 650 MG: 325 TABLET ORAL at 20:12

## 2022-07-07 RX ADMIN — INSULIN LISPRO 2 UNITS: 100 INJECTION, SOLUTION INTRAVENOUS; SUBCUTANEOUS at 12:41

## 2022-07-07 RX ADMIN — ITRACONAZOLE 200 MG: 100 CAPSULE, COATED PELLETS ORAL at 09:45

## 2022-07-07 RX ADMIN — INSULIN LISPRO 2 UNITS: 100 INJECTION, SOLUTION INTRAVENOUS; SUBCUTANEOUS at 17:31

## 2022-07-07 ASSESSMENT — PAIN DESCRIPTION - LOCATION
LOCATION: HEAD
LOCATION: HEAD

## 2022-07-07 ASSESSMENT — PAIN SCALES - GENERAL
PAINLEVEL_OUTOF10: 7
PAINLEVEL_OUTOF10: 7
PAINLEVEL_OUTOF10: 0

## 2022-07-07 ASSESSMENT — PAIN DESCRIPTION - DESCRIPTORS
DESCRIPTORS: ACHING
DESCRIPTORS: ACHING

## 2022-07-07 ASSESSMENT — PAIN DESCRIPTION - PAIN TYPE: TYPE: ACUTE PAIN

## 2022-07-07 NOTE — PROGRESS NOTES
Infectious Disease Follow up Notes  Admit Date: 7/5/2022  Hospital Day: 3    Antibiotics :   IV ZOsyn  Itraconazole      CHIEF COMPLAINT:     Pneumonia  Histoplasmosis  DM+  Abnormal ct chest     Subjective interval History :  29 y.o. man with significant history for diabetes poor control, neuropathy, retinopathy, chronic kidney disease, hypertension, seizure disorder was recently admitted to [facility] [place] from  6//6/25 to 6/23/22-on that admission he was noted to have very abnormal CT chest concerning Rt hilar mass and PNA . He underwent Bronch and  biopsy. He also underwent a serological studies and came back +Ve Histoplasma by antibody but antigen was negative and serum Beta D glucan +Ve BAL with Streptococcus and prevotella anaerobes was treated with Augmentin at d/c and was placed on Itraconazole and follow up in ID clinic. He did not get oral Antifungal as planned was place on Fluconazole pending his clinic visit and now admitted with worsening rep symptoms dizziness and fatigue. He is HIV -ve CT chest on this admit with with  mild progression of patchy multifocal right upper lobe opacification with present pacification the right lower lobe concerning for pneumonia with progression of right hilar adenopathy. Due to worsening symptoms and CT scan we are consulted for recommendations.      Interval History : cough + no sob no fevers tolerating IV abx ok and feels some thing in the food pipe when he swallows   CT chest images reviewed        Past Medical History:    Past Medical History:   Diagnosis Date    COVID-19     Diabetes mellitus, type II ([facility] [address]     History of blood transfusion     Hypertension     Protein in urine     Seizure [facility])        Past Surgical History:    Past Surgical History:   Procedure Laterality Date    BRONCHOSCOPY  6/9/2022    BRONCHOSCOPY BRUSHINGS performed by Kyle Espinoza MD at [facility] ENDOSCOPY    BRONCHOSCOPY  6/9/2022    BRONCHOSCOPY DIAGNOSTIC OR CELL [facility] ONLY performed by Boston Marks MD at [address]  6/9/2022    BRONCHOSCOPY BIOPSY BRONCHUS performed by Boston Marks MD at [address] N/A 6/14/2022    BRONCHOSCOPY DIAGNOSTIC OR CELL [facility] ONLY performed by Uriel Strickland DO at [address] [facility]       Current Medications:    No outpatient medications have been marked as taking for the 7/5/22 encounter [facility] Encounter). Allergies:  Patient has no known allergies. Immunizations : There is no immunization history on file for this patient.     Social History:    Social History     Tobacco Use    Smoking status: Never Smoker    Smokeless tobacco: Never Used   Vaping Use    Vaping Use: Never used   Substance Use Topics    Alcohol use: Yes     Comment: rare    Drug use: Not Currently     Social History     Tobacco Use   Smoking Status Never Smoker   Smokeless Tobacco Never Used      Family History : no DVT no COPD       REVIEW OF SYSTEMS:      Constitutional:  negative for fevers, chills, night sweats  Eyes:  negative for blurred vision, eye discharge, visual disturbance   HEENT:  negative for hearing loss, ear drainage,nasal congestion  Respiratory:  cough+ , shortness of breath or hemoptysis   Cardiovascular:  negative for chest pain, palpitations, syncope  Gastrointestinal:  negative for nausea, vomiting, diarrhea, constipation, abdominal pain  Genitourinary:  negative for frequency, dysuria, urinary incontinence, hematuria  Hematologic/Lymphatic:  negative for easy bruising, bleeding and lymphadenopathy  Allergic/Immunologic:  negative for recurrent infections, angioedema, anaphylaxis   Endocrine:  negative for weight changes, polyuria, polydipsia and polyphagia  Musculoskeletal:  negative for joint  pain, swelling, decreased range of motion  Integumentary: No rashes, skin lesions  Neurological:  negative for headaches, slurred speech, unilateral weakness  Psychiatric: negative for hallucinations,confusion,agitation.                 PHYSICAL EXAM:      Vitals:    BP (!) 131/90   Pulse (!) 119   Temp 99.1 °F (37.3 °C) (Oral)   Resp 17   Ht 6' 1\" (1.854 m)   Wt 242 lb 8.1 oz (110 kg)   SpO2 98%   BMI 31.99 kg/m²     General Appearance: alert,in some acute distress,++ pallor, no icterus chronic ill appearing man ++  Skin: warm and dry, no rash or erythema  Head: normocephalic and atraumatic  Eyes: pupils equal, round, and reactive to light, conjunctivae normal  ENT: tympanic membrane, external ear and ear canal normal bilaterally, nose without deformity, nasal mucosa and turbinates normal without polyps  Neck: supple and non-tender without mass, no thyromegaly  no cervical lymphadenopathy  Pulmonary/Chest: clear to auscultation bilaterally- no wheezes, rales or rhonchi, normal air movement, no respiratory distress  Cardiovascular: normal rate, regular rhythm, normal S1 and S2, no murmurs, rubs, clicks, or gallops, no carotid bruits  Abdomen: soft, non-tender, non-distended, normal bowel sounds, no masses or organomegaly  Extremities: no cyanosis, clubbing or edema  Musculoskeletal: normal range of motion, no joint swelling, deformity or tenderness  Integumentary: No rashes, no abnormal skin lesions, no petechiae  Neurologic: reflexes normal and symmetric, no cranial nerve deficit  Psych:  Orientation, sensorium, mood normal            Lines: IV        Data Review:    CBC:   Lab Results   Component Value Date    WBC 3.6 (L) 07/06/2022    HGB 11.9 (L) 07/06/2022    HCT 36.5 (L) 07/06/2022    MCV 78.1 (L) 07/06/2022     07/06/2022     RENAL:   Lab Results   Component Value Date    CREATININE 1.3 07/07/2022    BUN 14 07/07/2022     (L) 07/07/2022    K 4.1 07/07/2022    CL 99 07/07/2022    CO2 20 (L) 07/07/2022     SED RATE:   Lab Results   Component Value Date/Time    SEDRATE >130 06/11/2022 07:39 AM     CK: No results found for: CKTOTAL  CRP:   Lab Results   Component Value Date/Time    CRP 62.4 06/03/2020 02:37 PM     Hepatic Function Panel:   Lab Results   Component Value Date/Time    ALKPHOS 120 07/05/2022 09:54 AM    ALT 11 07/05/2022 09:54 AM    AST 10 07/05/2022 09:54 AM    PROT 7.6 07/05/2022 09:54 AM    BILITOT 0.4 07/05/2022 09:54 AM    BILIDIR <0.2 06/19/2022 08:06 AM    IBILI see below 06/19/2022 08:06 AM    LABALBU 3.8 07/05/2022 09:54 AM     UA:  Lab Results   Component Value Date/Time    COLORU Yellow 07/05/2022 11:49 AM    CLARITYU Clear 07/05/2022 11:49 AM    GLUCOSEU 500 07/05/2022 11:49 AM    GLUCOSEU NEGATIVE 01/02/2011 02:44 PM    BILIRUBINUR Negative 07/05/2022 11:49 AM    BILIRUBINUR NEGATIVE 01/02/2011 02:44 PM    KETUA TRACE 07/05/2022 11:49 AM    SPECGRAV 1.024 07/05/2022 11:49 AM    BLOODU Negative 07/05/2022 11:49 AM    PHUR 5.0 07/05/2022 11:49 AM    PROTEINU 300 07/05/2022 11:49 AM    UROBILINOGEN 2.0 07/05/2022 11:49 AM    NITRU Negative 07/05/2022 11:49 AM    LEUKOCYTESUR Negative 07/05/2022 11:49 AM    LABMICR YES 07/05/2022 11:49 AM    URINETYPE NotGiven 07/05/2022 11:49 AM      Urine Microscopic:   Lab Results   Component Value Date/Time    BACTERIA None Seen 07/05/2022 11:49 AM    COMU SEE COMMENT 07/05/2022 11:49 AM    HYALCAST 10 07/05/2022 11:49 AM    WBCUA 1 07/05/2022 11:49 AM    RBCUA 1 07/05/2022 11:49 AM    EPIU 3 07/05/2022 11:49 AM     Urine Reflex to Culture:   Lab Results   Component Value Date/Time    URRFLXCULT Not Indicated 07/05/2022 11:49 AM     Contains abnormal data 1,3 Beta-D-Glucan  Order: [id number]   Status: Final result     Visible to patient: No (not released)     Next appt:  Today at 02:00 PM in Orthopedic Surgery (Silva Harvey MD)     0 Result Notes       Ref Range & Units 6/9/22 0745   (1,3)-Beta-D-Glucan (Fungitell) Interpretation Negative Positive Abnormal     Comment: INTERPRETIVE INFORMATION: (1,3)-beta-D-glucan (Fungitell)     Less than 31 pg/mL Maggi Ayers ................ Negative     31-59 pg/mL . ......................... Negative     60-79 pg/mL . ......................... Indeterminate             Blood Culture:         Lab Results   Component Value Date/Time     BC No Growth after 4 days of incubation. 06/06/2022 05:30 PM     BLOODCULT2 No Growth after 4 days of incubation. 06/06/2022          MICRO: cultures reviewed and updated by me   Blood Culture:   Lab Results   Component Value Date/Time    [facility]  07/05/2022 09:54 AM     No Growth to date. Any change in status will be called. [place] [place]  07/05/2022 10:58 AM     No Growth to date. Any change in status will be called. Respiratory Culture:  Lab Results   Component Value Date/Time    CULTRESP Normal respiratory debra 06/09/2022 01:28 PM    LABGRAM  06/09/2022 01:35 PM     1+ WBC's (Mononuclear)  No Epithelial Cells seen  No organisms seen       AFB:  Lab Results   Component Value Date/Time    AFBSMEAR No AFB observed by Fluorescent stain 06/09/2022 01:35 PM     Viral Culture:  Lab Results   Component Value Date/Time    COVID19 Not Detected 06/20/2022 10:50 AM     Urine Culture: No results for input(s): LABURIN in the last 72 hours.       FINAL DIAGNOSIS:     Right upper lobe lung biopsy:      - Small, detached fragments of benign bronchial mucosal with acute and        chronic inflammation        and blood clot.      - Negative for granulomatous inflammation or neoplasm.      - No diagnostic transbronchial lung parenchyma present.      PHIAB/PHIAB         HISTOPLASMA INTERPETATION  Histoplasma Antigen, Serum  Collected: 06/14/22 1708   Result status: Final   Resulting lab: [facility]   Reference range: Not Detected   Value: Not Detected   Comment: INTERPRETIVE INFORMATION: Histoplasma Antigen Quantitative by EIA,   Serum   Less than 0.19 ng/mL = Not Detected       Hemoglobin A1C  Hemoglobin A1c  Collected: 06/06/22 1506   Result status: Final   Resulting lab: [facility] Reference range: See comment %   Value: 16.9   Comment: Comment:   Diagnosis of Diabetes: > or = 6.5%   Increased risk of diabetes (Prediabetes): 5.7-6.4%   Glycemic Control: Nonpregnant Adults: <7.0%                     Pregnant: <6.0%             Imaging:   CT CHEST PULMONARY EMBOLISM W CONTRAST   Final Result   1. No evidence of pulmonary embolic disease. 2. Mild progression of patchy multifocal right upper lobe opacification   consistent with pneumonia. There is new ground-glass opacification in the   right lower lobe concerning for pneumonia as well. 3. Progression of right hilar adenopathy with increased size of central node   and new node laterally within the right hilum. Note is made of negative   bronchoscopy 06/09/2022. However, findings remain concerning for malignancy. Close follow-up recommended.           XR CHEST (2 VW)   Final Result   Persistent patchy right upper lobe airspace disease most consistent with   pneumonia. Chest CT is recommended to evaluate for an obstructing process.           IMAGING:    CT CHEST PULMONARY EMBOLISM W CONTRAST   Final Result   1. No evidence of pulmonary embolic disease. 2. Mild progression of patchy multifocal right upper lobe opacification   consistent with pneumonia. There is new ground-glass opacification in the   right lower lobe concerning for pneumonia as well. 3. Progression of right hilar adenopathy with increased size of central node   and new node laterally within the right hilum. Note is made of negative   bronchoscopy 06/09/2022. However, findings remain concerning for malignancy. Close follow-up recommended. XR CHEST (2 VW)   Final Result   Persistent patchy right upper lobe airspace disease most consistent with   pneumonia. Chest CT is recommended to evaluate for an obstructing process.                All the pertinent images and reports for the current Hospitalization were reviewed by me     Scheduled Meds:   insulin follow up Beta D glucan          Labs, Microbiology, Radiology and all the pertinent results from current hospitalization and  care every where were reviewed  by me as a part of the evaluation   Plan:   1. Cont IV Zosyn as before  2. CT chest images reviewed    3. Oral Itraconazole x 200 mg Q 8 hr x  3  Days followed by twice a day dosing for Lung infection  4. Contol DM   5. Watch creat   6. Ref social service  To help with medications -  7. cHECK urine Histoplasma antigen        Discussed with patient/Family and Nursing   Risk of Complications/Morbidity: High      · Illness(es)/ Infection present that pose threat to bodily function. · There is potential for severe exacerbation of infection/side effects of treatment. · Therapy requires intensive monitoring for antimicrobial agent toxicity. Discussed with patient/Family and Nursing staff     Thanks for allowing me to participate in your patient's care and please call me with any questions or concerns.     Brandy Myrick MD  Infectious Disease  [facility]) Physician  Phone: [phone]   Fax : [phone]

## 2022-07-07 NOTE — CARE COORDINATION
INITIAL CASE MANAGEMENT ASSESSMENT    Reviewed chart, met with patient to assess possible discharge needs. Explained Case Management role/services. Living Situation: Patient lives with a friend in a house with 1 step to enter. ADLs: Independent      DME: [address], Cr[place]ches    PT/OT Recs: Discharge Recommendations:  Home with assist PRN   PT Equipment Recommendations  Equipment Needed: No  Other: he has crutches    Lauren Palacios scored a 22/24 on the AM-PAC short mobility form. At this time, no further PT is recommended upon discharge due to pt is safe for home. Recommend patient returns to prior setting with prior services. Active Services: None     Transportation: Active /Family will transport     Medications: [employer] and [employer]/Patient is having an issue with a prescription that he needs, Sporanox. He states that his insurance doesn't cover it and the doctor told him that he needs it. Physician request to send Rx to retail pharmacy to request coverage for this medicine. PCP: Nikia Mace MD      HD/PD: N/A    PLAN/COMMENTS: Plan is to return to home. SW/CM provided contact information for patient or family to call with any questions. SW/CM will follow and assist as needed.   Electronically signed by Desiree Dominguez RN on 7/7/2022 at 2:59 PM

## 2022-07-07 NOTE — PROGRESS NOTES
Hospitalist Progress Note      PCP: Yandel Soto MD, MD    Date of Admission: 7/5/2022    Chief Complaint:   Cough/Nausea/Fall    Hospital Course:   Patient is a 81-year-old male with past medical history of diabetes mellitus hypertension seizure COVID-19 who presents to the hospital for feeling nauseated and he is concerned about pneumonia because he could not get his medications for pneumonia. Patient mentions that he is appetite is down and also he feels nauseated even by watching the food. Patient also mentions he has cough, he is producing phlegm, he was recently diagnosed with fungal pneumonia he was discharged on antibiotics however he could not receive his antibiotics through the pharmacy because insurance will not approve it. Patient did not follow-up with PCP. Has future appointment with ID. He also reports following recently and broke his ankle.     Subjective:   Better        Medications:  Reviewed    Infusion Medications    dextrose      sodium chloride      sodium chloride Stopped (07/06/22 1716)     Scheduled Medications    insulin lispro  0-12 Units SubCUTAneous TID WC    insulin lispro  0-6 Units SubCUTAneous Nightly    itraconazole  200 mg Oral TID    [START ON 7/9/2022] itraconazole  200 mg Oral BID    sodium chloride flush  5-40 mL IntraVENous 2 times per day    sodium chloride flush  10 mL IntraVENous 2 times per day    enoxaparin  30 mg SubCUTAneous BID    piperacillin-tazobactam  3,375 mg IntraVENous Q8H     PRN Meds: glucose, dextrose bolus **OR** dextrose bolus, glucagon (rDNA), dextrose, sodium chloride flush, sodium chloride, sodium chloride flush, sodium chloride, promethazine **OR** ondansetron, magnesium hydroxide, acetaminophen **OR** acetaminophen      Intake/Output Summary (Last 24 hours) at 7/7/2022 1503  Last data filed at 7/7/2022 0612  Gross per 24 hour   Intake 1461.76 ml   Output 1450 ml   Net 11.76 ml       Physical Exam Performed:    BP (!) 138/93   Pulse (!) 114   Temp 98.4 °F (36.9 °C) (Oral)   Resp 17   Ht 6' 1\" (1.854 m)   Wt 242 lb 8.1 oz (110 kg)   SpO2 96%   BMI 31.99 kg/m²     General appearance: No apparent distress, appears stated age and cooperative. HEENT: Pupils equal, round, and reactive to light. Conjunctivae/corneas clear. Neck: Supple, with full range of motion. No jugular venous distention. Trachea midline. Respiratory:  Normal respiratory effort. Clear to auscultation, bilaterally without Rales/Wheezes/Rhonchi. Cardiovascular: Regular rate and rhythm with normal S1/S2 without murmurs, rubs or gallops. Abdomen: Soft, non-tender, non-distended with normal bowel sounds. Musculoskeletal: No clubbing, cyanosis or edema bilaterally. Full range of motion without deformity. Skin: Skin color, texture, turgor normal.  No rashes or lesions. Neurologic:  Neurovascularly intact without any focal sensory/motor deficits. Cranial nerves: II-XII intact, grossly non-focal.  Psychiatric: Alert and oriented, thought content appropriate, normal insight  Capillary Refill: Brisk,3 seconds, normal   Peripheral Pulses: +2 palpable, equal bilaterally       Labs:   Recent Labs     07/05/22  0954 07/06/22  0538   WBC 4.0 3.6*   HGB 12.7* 11.9*   HCT 39.0* 36.5*    256     Recent Labs     07/05/22  0954 07/06/22  0538 07/07/22  0823   * 134* 133*   K 4.4 3.9 4.1    102 99   CO2 20* 23 20*   BUN 22* 19 14   CREATININE 1.7* 1.4* 1.3   CALCIUM 9.9 9.1 9.6     Recent Labs     07/05/22  0954   AST 10*   ALT 11   BILITOT 0.4   ALKPHOS 120     No results for input(s): INR in the last 72 hours. No results for input(s): Amy Height in the last 72 hours.     Urinalysis:      Lab Results   Component Value Date/Time    NITRU Negative 07/05/2022 11:49 AM    WBCUA 1 07/05/2022 11:49 AM    BACTERIA None Seen 07/05/2022 11:49 AM    RBCUA 1 07/05/2022 11:49 AM    BLOODU Negative 07/05/2022 11:49 AM    SPECGRAV 1.024 07/05/2022 11:49 AM GLUCOSEU 500 07/05/2022 11:49 AM    GLUCOSEU NEGATIVE 01/02/2011 02:44 PM       Radiology:  CT CHEST PULMONARY EMBOLISM W CONTRAST   Final Result   1. No evidence of pulmonary embolic disease. 2. Mild progression of patchy multifocal right upper lobe opacification   consistent with pneumonia. There is new ground-glass opacification in the   right lower lobe concerning for pneumonia as well. 3. Progression of right hilar adenopathy with increased size of central node   and new node laterally within the right hilum. Note is made of negative   bronchoscopy 06/09/2022. However, findings remain concerning for malignancy. Close follow-up recommended. XR CHEST (2 VW)   Final Result   Persistent patchy right upper lobe airspace disease most consistent with   pneumonia. Chest CT is recommended to evaluate for an obstructing process. Assessment/Plan:    Assessment  CHELE resolved  Pneumonia-likely fungal, Procl-0.12, +Fungitell  Histoplasmosis+  Generalized weakness  Diabetes mellitus  Hypertension  Seizure        Plan  Cont vancomycin, Zosyn  Fluconazole DCd and started on Itraconazole  Check procalcitonin, mycoplasma, Legionella urine antigen, urine strep antigen  Consult infectious disease  Gentle IV fluid therapy normal saline  Insulin sliding scale resume home medications  Holding nephrotoxic medications  DVT prophylaxis-Lovenox  Diet: ADULT DIET;  Regular; 4 carb choices (60 gm/meal)  Code Status: Full Code     PT/OT Eval Status: ordered     Dispo - pending clinical improvement             [facility] MD HARLAN

## 2022-07-07 NOTE — PROGRESS NOTES
Comprehensive Nutrition Assessment    Type and Reason for Visit:  Initial,Positive Nutrition Screen    Nutrition Recommendations/Plan:   1. Continue current diet  2. Begin Ensure HP BID     Malnutrition Assessment:  Malnutrition Status: Moderate malnutrition (07/07/22 1352)    Context:  Acute Illness     Findings of the 6 clinical characteristics of malnutrition:  Energy Intake:  75% or less of estimated energy requirements for 7 or more days  Weight Loss:  Greater than 5% over 1 month     Body Fat Loss:  No significant body fat loss     Muscle Mass Loss:  No significant muscle mass loss    Fluid Accumulation:  Mild Extremities   Strength:  Not Performed    Nutrition Assessment:    Positive nutrition screen. Hx diabetes. Pt with poor appetite and nausea prior to admission. Admitted with pneumonia. On 4 carb diet. Pt has lost 33lbs (12%) over the last three months per EMR. Will trial Ensure HP supplement and monitor for acceptance. Nutrition Related Findings:    Glucose 171. +BM 7/5. +1 BLE edema. Wound Type: None       Current Nutrition Intake & Therapies:    Average Meal Intake: Unable to assess  Average Supplements Intake: None Ordered  ADULT DIET; Regular; 4 carb choices (60 gm/meal)    Anthropometric Measures:  Height: 6' 1\" (185.4 cm)  Ideal Body Weight (IBW): 184 lbs (84 kg)    Admission Body Weight: 239 lb (108.4 kg)  Current Body Weight: 242 lb (109.8 kg), 131.5 % IBW. Weight Source: Bed Scale  Current BMI (kg/m2): 31.9  Weight Adjustment For: No Adjustment  BMI Categories: Obese Class 1 (BMI 30.0-34. 9)    Estimated Daily Nutrient Needs:  Energy Requirements Based On: Kcal/kg  Weight Used for Energy Requirements: Current  Energy (kcal/day): 2557-1368 (15-18kcal/110kg)  Weight Used for Protein Requirements: Ideal  Protein (g/day): 101-118 (1.2-1.4g/84kg)  Method Used for Fluid Requirements: 1 ml/kcal  Fluid (ml/day): 1 ml/kcal    Nutrition Diagnosis:   · Moderate malnutrition related to inadequate

## 2022-07-07 NOTE — PLAN OF CARE
Problem: Discharge Planning  Goal: Discharge to home or other facility with appropriate resources  Outcome: Progressing  Flowsheets (Taken 7/6/2022 2215)  Discharge to home or other facility with appropriate resources:   Identify barriers to discharge with patient and caregiver   Arrange for needed discharge resources and transportation as appropriate   Identify discharge learning needs (meds, wound care, etc)     Problem: Pain  Goal: Verbalizes/displays adequate comfort level or baseline comfort level  Outcome: Progressing     Problem: Safety - Adult  Goal: Free from fall injury  Outcome: Progressing     Problem: ABCDS Injury Assessment  Goal: Absence of physical injury  Outcome: Progressing     Problem: Respiratory - Adult  Goal: Achieves optimal ventilation and oxygenation  Outcome: Progressing  Flowsheets (Taken 7/6/2022 2215)  Achieves optimal ventilation and oxygenation:   Assess for changes in respiratory status   Assess for changes in mentation and behavior   Position to facilitate oxygenation and minimize respiratory effort     Problem: Musculoskeletal - Adult  Goal: Return mobility to safest level of function  Outcome: Progressing  Flowsheets (Taken 7/6/2022 2215)  Return Mobility to Safest Level of Function:   Assess patient stability and activity tolerance for standing, transferring and ambulating with or without assistive devices   Assist with transfers and ambulation using safe patient handling equipment as needed  Goal: Maintain proper alignment of affected body part  Outcome: Progressing  Flowsheets (Taken 7/6/2022 2215)  Maintain proper alignment of affected body part:  Instruct and reinforce with patient and family use of appropriate assistive device and precautions (e.g. spinal or hip dislocation precautions)  Goal: Return ADL status to a safe level of function  Outcome: Progressing  Flowsheets (Taken 7/6/2022 2215)  Return ADL Status to a Safe Level of Function: Assess activities of daily living deficits and provide assistive devices as needed

## 2022-07-07 NOTE — PLAN OF CARE
Problem: Discharge Planning  Goal: Discharge to home or other facility with appropriate resources  7/7/2022 0953 by Cheryl Vazquez RN  Outcome: Progressing  7/7/2022 0248 by Dakota Silverio RN  Outcome: Progressing  Flowsheets (Taken 7/6/2022 2215)  Discharge to home or other facility with appropriate resources:   Identify barriers to discharge with patient and caregiver   Arrange for needed discharge resources and transportation as appropriate   Identify discharge learning needs (meds, wound care, etc)     Problem: Pain  Goal: Verbalizes/displays adequate comfort level or baseline comfort level  7/7/2022 0953 by Cheryl Vazquez RN  Outcome: Progressing  7/7/2022 0248 by Dakota Silverio RN  Outcome: Progressing     Problem: Safety - Adult  Goal: Free from fall injury  7/7/2022 0953 by Cheryl Vazquez RN  Outcome: Progressing  Flowsheets (Taken 7/7/2022 0249 by Dakota Silverio RN)  Free From Fall Injury: Based on caregiver fall risk screen, instruct family/caregiver to ask for assistance with transferring infant if caregiver noted to have fall risk factors  7/7/2022 0248 by Dakota Silverio RN  Outcome: Progressing     Problem: ABCDS Injury Assessment  Goal: Absence of physical injury  7/7/2022 0953 by Cheryl Vazquez RN  Outcome: Progressing  Flowsheets (Taken 7/7/2022 0249 by Dakota Silverio RN)  Absence of Physical Injury: Implement safety measures based on patient assessment  7/7/2022 0248 by Dakota Silverio RN  Outcome: Progressing     Problem: Respiratory - Adult  Goal: Achieves optimal ventilation and oxygenation  7/7/2022 0953 by Cheryl Vazquez RN  Outcome: Progressing  7/7/2022 0248 by Dakota Silverio RN  Outcome: Progressing  Flowsheets (Taken 7/6/2022 2215)  Achieves optimal ventilation and oxygenation:   Assess for changes in respiratory status   Assess for changes in mentation and behavior   Position to facilitate oxygenation and minimize respiratory effort     Problem: Musculoskeletal - Adult  Goal: Return mobility to safest level of function  7/7/2022 0953 by Yonathan Camacho RN  Outcome: Progressing  7/7/2022 0248 by Og Francis RN  Outcome: Progressing  Flowsheets (Taken 7/6/2022 2215)  Return Mobility to Safest Level of Function:   Assess patient stability and activity tolerance for standing, transferring and ambulating with or without assistive devices   Assist with transfers and ambulation using safe patient handling equipment as needed  Goal: Maintain proper alignment of affected body part  7/7/2022 0953 by Yonathan Camacho RN  Outcome: Progressing  7/7/2022 0248 by Og Francis RN  Outcome: Progressing  Flowsheets (Taken 7/6/2022 2215)  Maintain proper alignment of affected body part:  Instruct and reinforce with patient and family use of appropriate assistive device and precautions (e.g. spinal or hip dislocation precautions)  Goal: Return ADL status to a safe level of function  7/7/2022 0953 by Yonathan Camacho RN  Outcome: Progressing  7/7/2022 0248 by Og Francis RN  Outcome: Progressing  Flowsheets (Taken 7/6/2022 2215)  Return ADL Status to a Safe Level of Function: Assess activities of daily living deficits and provide assistive devices as needed

## 2022-07-07 NOTE — ACP (ADVANCE CARE PLANNING)
Rescitate prepared for Provider review and signature  [] POLST/POST/MOLST/MOST prepared for Provider review and signature      Follow-up plan:    [] Schedule follow-up conversation to continue planning  [] Referred individual to Provider for additional questions/concerns   [] Advised patient/agent/surrogate to review completed ACP document and update if needed with changes in condition, patient preferences or care setting    [x] This note routed to one or more involved healthcare providers

## 2022-07-07 NOTE — PROGRESS NOTES
Physical Therapy  Facility/Department: [facility] SUR[facility]  Physical Therapy Daily Progress Note  Name: Cliff Bloom  : 1985  MRN: [id number]  Date of Service: 2022    Discharge Recommendations:  Home with assist PRN   PT Equipment Recommendations  Equipment Needed: No  Other: he has crutches    Cliff Bloom scored a 22/24 on the AM-PAC short mobility form. At this time, no further PT is recommended upon discharge due to pt is safe for home. Recommend patient returns to prior setting with prior services. Patient Diagnosis(es): The primary encounter diagnosis was Acute renal failure superimposed on chronic kidney disease, unspecified CKD stage, unspecified acute renal failure type ([facility] [facility] Diagnoses of Pneumonia due to infectious organism, unspecified laterality, unspecified part of lung, General weakness, and Fall, initial encounter were also pertinent to this visit. Past Medical History:  has a past medical history of COVID-19, Diabetes mellitus, type II ([facility] [address] History of blood transfusion, Hypertension, Protein in urine, and [facility] Past Surgical History:  has a past surgical history that includes bronchoscopy (2022); bronchoscopy (2022); bronchoscopy (2022); and bronchoscopy (N/A, 2022). Assessment   Body Structures, Functions, Activity Limitations Requiring Skilled Therapeutic Intervention: Decreased functional mobility   Assessment: The pt is a 30 yo male who presented to the ED with reports of nausea, decreased appetitie, and a productive cough. He was recently dx with fungal pna and has not been able to get meds for it. The pt also with a recent fx ankle. The pt lives with a friend in a one story house. He has been using crutches for the past week due to ankle fx and he does have the assist of his friend and mother to assist with homemaking if needed.  Pt went to Dr. Saucedo Laser office yesterday, came back with a walking boot and reports he is weight bearing as tolerated, PT cannot find note in chart from Dr. Ricarda Beaulieu but pt is A and O x 4 so ambulation performed with crutch on R, walking boot on L, WBAT. PMHx: covid-19, DM, HTN, seizures, ankle fx. Today, the pt demonstrated that he is functioning slightly below his most current baseline of needing crutches for mobility. He had no c/o dizziness. The pt able to ambulate in the ch with one crutche and SBA/S, he had no LOB. Anticipate that he will be safe for home with current family assist; he has all needed equipment. Will con't to follow while on the acute care floor but do not anticipate PT being needed at d/c. Therapy Prognosis: Good  Activity Tolerance  Activity Tolerance: Patient tolerated treatment well     Plan   Plan  Plan: 2-3 times per week  Current Treatment Recommendations: Functional mobility training,Transfer training,Gait training,Safety education & training,Patient/Caregiver education & training,Equipment evaluation, education, & procurement  Safety Devices  Type of Devices: Call light within reach,Gait belt,Patient at risk for falls,Left in chair (left seated on the EOB waiting for transportation)     Restrictions  Restrictions/Precautions  Restrictions/Precautions: Weight Bearing,Fall Risk  Lower Extremity Weight Bearing Restrictions  Left Lower Extremity Weight Bearing: Non Weight Bearing  Position Activity Restriction  Other position/activity restrictions: Pt went to Dr. Marina Bearden office yesterday, came back with a walking boot and reports he is weight bearing as tolerated, PT cannot find note in chart from Dr. Ricarda Beaulieu but pt is A and O x 4 so ambulation performed with cruth on R, walking boot on L, WBAT     Subjective   General  Chart Reviewed: Yes  Additional Pertinent Hx: Per Shola Garcia MD H&P on 7-5-2022: The pt is a 28 yo male who presented to the ED with reports of nausea, decreased appetitie, and a productive cough.  He was recently dx with fungal pna and has not been able to get meds for it. The pt also with a recent fx ankle. PMHx: covid-19, DM, HTN, seizures, ankle fx  Response To Previous Treatment: Patient with no complaints from previous session. Family / Caregiver Present: No  Referring Practitioner: Jake Nguyne MD  Subjective  Subjective: Pt went to Dr. Galina Pittman office yesterday, came back with a walking boot and reports he is weight bearing as tolerated, PT cannot find note in chart from Dr. Lorraine Vela but pt is A and O x 4 so ambulation performed with cruth on R, walking boot on L, WBAT. Pt reports very minimal pain medial aspect of L ankle.          Social/Functional History  Social/Functional History  Lives With: Friend(s)  Type of Home: House  Home Layout: One level  Home Access: Stairs to enter with rails  Entrance Stairs - Number of Steps: 1  Bathroom Shower/Tub: Tub/Shower unit  Bathroom Toilet: Standard  Bathroom Accessibility: Not accessible  Home Equipment: Cane,Crutches  Has the patient had two or more falls in the past year or any fall with injury in the past year?: Yes (last fall 6- when he broke L ankle)  ADL Assistance: [facility]: Needs assistance (friend or mother does grocery shopping; friend can do laundry if the pt cannot; the pt tries to assist with yard work)  Ambulation Assistance: Independent (with crutches)  Transfer Assistance: Independent (sleeps in regular bed)  Active : Yes (friend or family usually drive)  Occupation: Unemployed  Additional Comments: the pt has been functioning at a crutch level for the past week since ankle fx  Vision/Hearing       Cognition   Orientation  Overall Orientation Status: Within Functional Limits  Orientation Level: Oriented to person;Oriented to time;Oriented to place;Oriented to situation     Objective      Bed mobility  Rolling to Left: Independent  Supine to Sit: Modified independent  Scooting: Modified independent  Transfers  Sit to Stand: Modified independent  Stand to sit: Modified independent  Ambulation  Surface: level tile  Device: Axillary Crutches (one crutch on R)  Assistance: Supervision  Quality of Gait: slowed pace, steady and w/o LOB  Gait Deviations: Slow Donya  Distance: 350'  More Ambulation?: No  Stairs/Curb  Stairs?: No     Balance  Posture: Good  Sitting - Static: Good  Sitting - Dynamic: Good  Standing - Static: Good  Standing - Dynamic: Good   AM-PAC Score  AM-PAC Inpatient Mobility Raw Score : 22 (07/07/22 1108)  AM-PAC Inpatient T-Scale Score : 53.28 (07/07/22 1108)  Mobility Inpatient CMS 0-100% Score: 20.91 (07/07/22 1108)  Mobility Inpatient CMS G-Code Modifier : CJ (07/07/22 1108)     Goals  Short Term Goals  Time Frame for Short term goals: upon d/c  Short term goal 1: Bed mobility indep. met 7-7  Short term goal 2: Transfers sit <> stand with crutches with Sup. met 7-7 new goal modif I  Short term goal 3: Ambulate with crutches 100 feet with SBA, NWB L foot.: updated 7-7 to amb 350' modif I with walking boot L and with or without crutch R  Patient Goals   Patient goals : to go home   Therapy Time   Individual Concurrent Group Co-treatment   Time In 1032         Time Out 1056         Minutes 24         Timed Code Treatment Minutes: 24 Minutes   charges = 24 min gt  Anna Mi, [address] [place] [address]

## 2022-07-08 LAB
ANION GAP SERPL CALCULATED.3IONS-SCNC: 15 MMOL/L (ref 3–16)
BUN BLDV-MCNC: 16 MG/DL (ref 7–20)
CALCIUM SERPL-MCNC: 9.3 MG/DL (ref 8.3–10.6)
CHLORIDE BLD-SCNC: 99 MMOL/L (ref 99–110)
CO2: 21 MMOL/L (ref 21–32)
CREAT SERPL-MCNC: 1.2 MG/DL (ref 0.9–1.3)
GFR AFRICAN AMERICAN: >60
GFR NON-AFRICAN AMERICAN: >60
GLUCOSE BLD-MCNC: 186 MG/DL (ref 70–99)
GLUCOSE BLD-MCNC: 203 MG/DL (ref 70–99)
GLUCOSE BLD-MCNC: 214 MG/DL (ref 70–99)
GLUCOSE BLD-MCNC: 214 MG/DL (ref 70–99)
GLUCOSE BLD-MCNC: 216 MG/DL (ref 70–99)
PERFORMED ON: ABNORMAL
POTASSIUM REFLEX MAGNESIUM: 3.9 MMOL/L (ref 3.5–5.1)
SODIUM BLD-SCNC: 135 MMOL/L (ref 136–145)

## 2022-07-08 PROCEDURE — 97535 SELF CARE MNGMENT TRAINING: CPT

## 2022-07-08 PROCEDURE — 6370000000 HC RX 637 (ALT 250 FOR IP): Performed by: INTERNAL MEDICINE

## 2022-07-08 PROCEDURE — 97530 THERAPEUTIC ACTIVITIES: CPT

## 2022-07-08 PROCEDURE — 6360000002 HC RX W HCPCS: Performed by: INTERNAL MEDICINE

## 2022-07-08 PROCEDURE — 2580000003 HC RX 258: Performed by: INTERNAL MEDICINE

## 2022-07-08 PROCEDURE — 80048 BASIC METABOLIC PNL TOTAL CA: CPT

## 2022-07-08 PROCEDURE — 36415 COLL VENOUS BLD VENIPUNCTURE: CPT

## 2022-07-08 PROCEDURE — 2580000003 HC RX 258: Performed by: NURSE PRACTITIONER

## 2022-07-08 PROCEDURE — 99233 SBSQ HOSP IP/OBS HIGH 50: CPT | Performed by: INTERNAL MEDICINE

## 2022-07-08 PROCEDURE — 2580000003 HC RX 258: Performed by: EMERGENCY MEDICINE

## 2022-07-08 PROCEDURE — 1200000000 HC SEMI PRIVATE

## 2022-07-08 PROCEDURE — 94760 N-INVAS EAR/PLS OXIMETRY 1: CPT

## 2022-07-08 PROCEDURE — 6370000000 HC RX 637 (ALT 250 FOR IP): Performed by: NURSE PRACTITIONER

## 2022-07-08 PROCEDURE — 6370000000 HC RX 637 (ALT 250 FOR IP): Performed by: PEDIATRICS

## 2022-07-08 PROCEDURE — 87581 M.PNEUMON DNA AMP PROBE: CPT

## 2022-07-08 RX ORDER — DIAPER,BRIEF,INFANT-TODD,DISP
EACH MISCELLANEOUS 2 TIMES DAILY
Status: DISCONTINUED | OUTPATIENT
Start: 2022-07-08 | End: 2022-07-11 | Stop reason: HOSPADM

## 2022-07-08 RX ORDER — ITRACONAZOLE 100 MG/1
200 CAPSULE ORAL 2 TIMES DAILY
Qty: 30 CAPSULE | Refills: 0 | Status: SHIPPED | OUTPATIENT
Start: 2022-07-09 | End: 2022-07-11 | Stop reason: HOSPADM

## 2022-07-08 RX ORDER — GUAIFENESIN 100 MG/5ML
200 SOLUTION ORAL EVERY 4 HOURS PRN
Status: DISCONTINUED | OUTPATIENT
Start: 2022-07-08 | End: 2022-07-11 | Stop reason: HOSPADM

## 2022-07-08 RX ORDER — ITRACONAZOLE 100 MG/1
200 CAPSULE ORAL DAILY
Qty: 28 CAPSULE | Refills: 0 | Status: SHIPPED | OUTPATIENT
Start: 2022-07-08 | End: 2022-07-11 | Stop reason: HOSPADM

## 2022-07-08 RX ORDER — 0.9 % SODIUM CHLORIDE 0.9 %
500 INTRAVENOUS SOLUTION INTRAVENOUS ONCE
Status: COMPLETED | OUTPATIENT
Start: 2022-07-08 | End: 2022-07-08

## 2022-07-08 RX ORDER — BENZONATATE 100 MG/1
100 CAPSULE ORAL 3 TIMES DAILY PRN
Status: DISCONTINUED | OUTPATIENT
Start: 2022-07-08 | End: 2022-07-11 | Stop reason: HOSPADM

## 2022-07-08 RX ORDER — CARVEDILOL 25 MG/1
25 TABLET ORAL 2 TIMES DAILY WITH MEALS
Status: DISCONTINUED | OUTPATIENT
Start: 2022-07-08 | End: 2022-07-11 | Stop reason: HOSPADM

## 2022-07-08 RX ADMIN — CARVEDILOL 25 MG: 25 TABLET, FILM COATED ORAL at 16:58

## 2022-07-08 RX ADMIN — ITRACONAZOLE 200 MG: 100 CAPSULE, COATED PELLETS ORAL at 22:25

## 2022-07-08 RX ADMIN — ACETAMINOPHEN 650 MG: 325 TABLET ORAL at 06:47

## 2022-07-08 RX ADMIN — PIPERACILLIN AND TAZOBACTAM 3375 MG: 3; .375 INJECTION, POWDER, LYOPHILIZED, FOR SOLUTION INTRAVENOUS at 16:58

## 2022-07-08 RX ADMIN — INSULIN LISPRO 4 UNITS: 100 INJECTION, SOLUTION INTRAVENOUS; SUBCUTANEOUS at 12:35

## 2022-07-08 RX ADMIN — HYDROCORTISONE: 0.5 CREAM TOPICAL at 09:46

## 2022-07-08 RX ADMIN — ITRACONAZOLE 200 MG: 100 CAPSULE, COATED PELLETS ORAL at 15:20

## 2022-07-08 RX ADMIN — INSULIN LISPRO 2 UNITS: 100 INJECTION, SOLUTION INTRAVENOUS; SUBCUTANEOUS at 21:26

## 2022-07-08 RX ADMIN — ENOXAPARIN SODIUM 30 MG: 100 INJECTION SUBCUTANEOUS at 22:31

## 2022-07-08 RX ADMIN — INSULIN LISPRO 4 UNITS: 100 INJECTION, SOLUTION INTRAVENOUS; SUBCUTANEOUS at 16:58

## 2022-07-08 RX ADMIN — SODIUM CHLORIDE: 9 INJECTION, SOLUTION INTRAVENOUS at 01:58

## 2022-07-08 RX ADMIN — CARVEDILOL 25 MG: 25 TABLET, FILM COATED ORAL at 12:38

## 2022-07-08 RX ADMIN — ITRACONAZOLE 200 MG: 100 CAPSULE, COATED PELLETS ORAL at 09:04

## 2022-07-08 RX ADMIN — SODIUM CHLORIDE 500 ML: 9 INJECTION, SOLUTION INTRAVENOUS at 21:14

## 2022-07-08 RX ADMIN — ENOXAPARIN SODIUM 30 MG: 100 INJECTION SUBCUTANEOUS at 09:04

## 2022-07-08 RX ADMIN — INSULIN LISPRO 4 UNITS: 100 INJECTION, SOLUTION INTRAVENOUS; SUBCUTANEOUS at 09:06

## 2022-07-08 RX ADMIN — ONDANSETRON 4 MG: 2 INJECTION INTRAMUSCULAR; INTRAVENOUS at 19:57

## 2022-07-08 RX ADMIN — PIPERACILLIN AND TAZOBACTAM 3375 MG: 3; .375 INJECTION, POWDER, LYOPHILIZED, FOR SOLUTION INTRAVENOUS at 01:59

## 2022-07-08 RX ADMIN — SODIUM CHLORIDE, PRESERVATIVE FREE 10 ML: 5 INJECTION INTRAVENOUS at 09:04

## 2022-07-08 RX ADMIN — PIPERACILLIN AND TAZOBACTAM 3375 MG: 3; .375 INJECTION, POWDER, LYOPHILIZED, FOR SOLUTION INTRAVENOUS at 09:06

## 2022-07-08 RX ADMIN — BENZONATATE 100 MG: 100 CAPSULE ORAL at 21:31

## 2022-07-08 ASSESSMENT — PAIN DESCRIPTION - DESCRIPTORS: DESCRIPTORS: ACHING

## 2022-07-08 ASSESSMENT — PAIN SCALES - GENERAL
PAINLEVEL_OUTOF10: 8
PAINLEVEL_OUTOF10: 0

## 2022-07-08 ASSESSMENT — PAIN DESCRIPTION - ORIENTATION: ORIENTATION: POSTERIOR

## 2022-07-08 ASSESSMENT — PAIN DESCRIPTION - LOCATION: LOCATION: HEAD;NECK

## 2022-07-08 NOTE — PROGRESS NOTES
1010  Gross per 24 hour   Intake 250 ml   Output --   Net 250 ml       Physical Exam Performed:    /86   Pulse (!) 112   Temp 98.3 °F (36.8 °C)   Resp 18   Ht 6' 1\" (1.854 m)   Wt 246 lb 14.6 oz (112 kg)   SpO2 99%   BMI 32.58 kg/m²     General appearance: No apparent distress, appears stated age and cooperative. HEENT: Pupils equal, round, and reactive to light. Conjunctivae/corneas clear. Neck: Supple, with full range of motion. No jugular venous distention. Trachea midline. Respiratory:  Normal respiratory effort. Clear to auscultation, bilaterally without Rales/Wheezes/Rhonchi. Cardiovascular: Regular rate and rhythm with normal S1/S2 without murmurs, rubs or gallops. Abdomen: Soft, non-tender, non-distended with normal bowel sounds. Musculoskeletal: No clubbing, cyanosis or edema bilaterally. Full range of motion without deformity. Skin: Skin color, texture, turgor normal.  No rashes or lesions. Neurologic:  Neurovascularly intact without any focal sensory/motor deficits. Cranial nerves: II-XII intact, grossly non-focal.  Psychiatric: Alert and oriented, thought content appropriate, normal insight  Capillary Refill: Brisk,3 seconds, normal   Peripheral Pulses: +2 palpable, equal bilaterally       Labs:   Recent Labs     07/06/22  0538   WBC 3.6*   HGB 11.9*   HCT 36.5*        Recent Labs     07/06/22  0538 07/07/22  0823 07/08/22  0523   * 133* 135*   K 3.9 4.1 3.9    99 99   CO2 23 20* 21   BUN 19 14 16   CREATININE 1.4* 1.3 1.2   CALCIUM 9.1 9.6 9.3     No results for input(s): AST, ALT, BILIDIR, BILITOT, ALKPHOS in the last 72 hours. No results for input(s): INR in the last 72 hours. No results for input(s): Kathyrn Belch in the last 72 hours.     Urinalysis:      Lab Results   Component Value Date/Time    NITRU Negative 07/05/2022 11:49 AM    WBCUA 1 07/05/2022 11:49 AM    BACTERIA None Seen 07/05/2022 11:49 AM    RBCUA 1 07/05/2022 11:49 AM    BLOODU Negative 07/05/2022 11:49 AM    SPECGRAV 1.024 07/05/2022 11:49 AM    GLUCOSEU 500 07/05/2022 11:49 AM    GLUCOSEU NEGATIVE 01/02/2011 02:44 PM       Radiology:  CT CHEST PULMONARY EMBOLISM W CONTRAST   Final Result   1. No evidence of pulmonary embolic disease. 2. Mild progression of patchy multifocal right upper lobe opacification   consistent with pneumonia. There is new ground-glass opacification in the   right lower lobe concerning for pneumonia as well. 3. Progression of right hilar adenopathy with increased size of central node   and new node laterally within the right hilum. Note is made of negative   bronchoscopy 06/09/2022. However, findings remain concerning for malignancy. Close follow-up recommended. XR CHEST (2 VW)   Final Result   Persistent patchy right upper lobe airspace disease most consistent with   pneumonia. Chest CT is recommended to evaluate for an obstructing process. Assessment:    1. Pneumonia-likely fungal, Procl-0.12, +Fungitel--> Histoplasmosis+  2. Generalized weakness  3. Diabetes mellitus  4. Hypertension  5. Seizure   6. CHELE resolved       Plan  -Cont vancomycin, Zosyn  -Fluconazole DCd and started on Itraconazole  -FU mycoplasma, Legionella urine antigen, urine strep antigen  -Gentle IV fluid therapy withnormal saline  -Insulin sliding scale resume home medications  -Holding nephrotoxic medications  -Restart Coreg 25mg poqd        DVT prophylaxis-Lovenox  Diet: ADULT DIET;  Regular; 4 carb choices (60 gm/meal)  Code Status: Full Code     PT/OT Eval Status: ordered     Dispo - pending clinical improvement             Carly Baeza MD

## 2022-07-08 NOTE — PROGRESS NOTES
"I, Aiden Weston D.O., have personally seen and examined the patient and discussed the management with the resident.   I performed a substantial portion of the EM visit face-to-face on the same date of service as the resident note.  I reviewed the resident's note and agree with the documented findings and plan of care, amended as necessary.     Date of Service 06/21/21:  ---  79M p/w GLF when walker fell from under him.  Noted in aFib+RVR at scene by EMS.    6/21- On my assumption of his care he endorses feeling well, fairly stoic and mostly denies any complaints, though when pressed on ROS endorses L foot pain /swelling /redness for \"past few days\". Denies fever, N/V, SOB - though states he has baseling BRIDGES \"for years\" but has found coping strategies in his day-to-day to accommodate.  Overall, he appears in no distress and other than suppl O2 requirement states he feels at his baseline.    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 Apprec cardio and vascular surgery for consultations. \"Aortic findings are chronic and non-concerning at this time. No additional imaging or intervention needed at this time.    Patient can follow-up with me as an outpatient for routine annual surveillance imaging.\"     W/R/T aFib, plan rate-ctrl strategy per cardio recs and anticoagulation. Will consult with pharmacy about options given elevated BMI. Ideally a DOAC will be possibly for ease-of use and logistical burden with warfarin, though that is always an option.  Pending Echo, possibly MPI depending on echo findings.    DISPO: inpatient, pending further evaluation for presenting illness and incidentally-found possible (?subacute) ao dissection.    PROBLEMS:     # Acute hypoxic respiratory failure   # Ao Dissection - unclear acuity, asymptomatic at this time   # suspect PE - CTA:   # Atrial fibrillation, paroxysmal - new onset  # " Pt awake and AAO sitting up in bed using his cellphone. Pt denies pain. Pt admitted with general  weakness and pneumonia and CKD. Lungs clear. Occasional moist nonproductive cough. On RA. No sob. Belly round and soft with active BS. Continent B and B. 1+ edema to lower legs. Pt has L ankle fracture from June, nonsurgical. Wears boot when OOB and uses crutches. On telemetry. PIV to R hand with intermittent IV ATB. Call light in reach. Sepsis - unclear source, poss cellulitis vs +SIRS 2/2 aFib or ?PE  # ANGIE  # Ground-level mechanical fall  # eHTN  # type 2 MI  # Morbid Obesity- BMI 47  # Hperglycemia    ~ Aiden Weston D.O.  Chief Resident  [facility] Internal Medicine.  [email or web address].

## 2022-07-08 NOTE — CARE COORDINATION
7/8 From home. Needs Sporanox Rx Prior Auth if it's an option or [employer] in Pharmacy has offered to pay the Good Rx price of $55 at [facility] for patient to obtain medication. Plan is home. Has transport.  Electronically signed by Jaki Ruiz RN on 7/8/2022 at 12:16 PM

## 2022-07-08 NOTE — PROGRESS NOTES
Pt requesting Tylenol for H/A and hydrocortisone for his facial itching. Secure message sent to hospitalist for order. Medicated with Tylenol per prn orders.

## 2022-07-08 NOTE — PROGRESS NOTES
Occupational Therapy  Facility/Department: [facility]  Occupational Therapy Discharge Note    Name: Randa Calloway  : 1985  MRN: [id number]  Date of Service: 2022    Discharge Recommendations:  Home with assist PRN  OT Equipment Recommendations  Equipment Needed: Yes  Mobility Devices: ADL Assistive Devices  ADL Assistive Devices: Transfer Tub Bench     Randa Calloway scored a 24/24 on the AM-PAC ADL Inpatient form. At this time, no further OT is recommended upon discharge due to pt functioning at independent/modified independent level. Recommend patient returns to prior setting with prior services. Patient Diagnosis(es): The primary encounter diagnosis was Acute renal failure superimposed on chronic kidney disease, unspecified CKD stage, unspecified acute renal failure type ([facility] [address] Diagnoses of Pneumonia due to infectious organism, unspecified laterality, unspecified part of lung, General weakness, and Fall, initial encounter were also pertinent to this visit. Past Medical History:  has a past medical history of COVID-19, Diabetes mellitus, type II ([facility] [address] History of blood transfusion, Hypertension, Protein in urine, and Seizure ([facility] [address] Past Surgical History:  has a past surgical history that includes bronchoscopy (2022); bronchoscopy (2022); bronchoscopy (2022); and bronchoscopy (N/A, 2022). Assessment   Assessment: Pt reports now WBAT L LE in walking boot and UAL in room completing ADLs and fxl mobility independently using crutch prn. No further acute OT services indicated, will discharge. Anticipate pt will be safe to return home with assist prn.   Prognosis: Good  REQUIRES OT FOLLOW-UP: No  Activity Tolerance  Activity Tolerance: Patient Tolerated treatment well        Plan   Plan  Times per Week: d/c acute OT  Current Treatment Recommendations: Strengthening,Balance training,Functional mobility training,Endurance training,Safety education & training,Self-Care / ADL,Equipment evaluation, education, & procurement     Restrictions  Restrictions/Precautions  Restrictions/Precautions: Up Ad Joanne  Lower Extremity Weight Bearing Restrictions  Left Lower Extremity Weight Bearing: Non Weight Bearing  Position Activity Restriction  Other position/activity restrictions: Pt went to Dr. Tarah Lindquist office 7/6, came back with a walking boot and reports he is weight bearing as tolerated, PT/OT cannot find note in chart from Dr. Charleston Callow but pt is A and O x 4 so ambulation performed with cruth on R, walking boot on L, WBAT    Subjective   General  Chart Reviewed: Yes  Additional Pertinent Hx: Per Erin Halsted, MD's H&P: \"Patient is a 81-year-old male with past medical history of diabetes mellitus hypertension seizure COVID-19 who presents to the hospital for feeling nauseated and he is concerned about pneumonia because he could not get his medications for pneumonia. Patient mentions that he is appetite is down and also he feels nauseated even by watching the food. Patient also mentions he has cough, he is producing phlegm, he was recently diagnosed with fungal pneumonia he was discharged on antibiotics however he could not receive his antibiotics through the pharmacy because insurance will not approve it. Patient did not follow-up with PCP. Has future appointment with ID. He also reports falling recently and broke his ankle. \"  Family / Caregiver Present: No  Referring Practitioner: Erin Halsted, MD  Diagnosis: PNA  Subjective  Subjective: Pt met b/s for OT tx. Pt in bed combing hair on arrival, agreeable to participate in therapy. Pt reports received walking boot L LE, and moving around much easier.      Social/Functional History  Social/Functional History  Lives With: Friend(s)  Type of Home: House  Home Layout: One level  Home Access: Stairs to enter with rails  Entrance Stairs - Number of Steps: 1  Bathroom Shower/Tub: Tub/Shower unit  Bathroom Toilet: Standard  Bathroom Accessibility: Not accessible  Home Equipment: Cane,Crutches  Has the patient had two or more falls in the past year or any fall with injury in the past year?: Yes (last fall 6- when he broke L ankle)  ADL Assistance: [address]: Needs assistance (friend or mother does grocery shopping; friend can do laundry if the pt cannot; the pt tries to assist with yard work)  Ambulation Assistance: Independent (with crutches)  Transfer Assistance: Independent (sleeps in regular bed)  Active : Yes (friend or family usually drive)  Occupation: Unemployed  Additional Comments: the pt has been functioning at a crutch level for the past week since ankle fx       Objective   Safety Devices  Type of Devices: Call light within reach; Left in bed (pt UAL)     Balance  Sitting: Intact  Standing: Intact (Pt independently retrived personal belongings and transported to closed, opened closet door and to place and  items from floor)  Gait  Overall Level of Assistance: Independent; Modified independent (Pt completed fxl mobility around room with crutch and L walking boot independently.)     ADL  Grooming: Independent (to comb hair)  LE Bathing Skilled Clinical Factors: pt reports showered independently with use of shower chair just prior to OT arrival. Pt educated on recommendation for TTB and shower  LE Dressing: Independent  LE Dressing Skilled Clinical Factors: to don L walking boot    Bed mobility  Supine to Sit: Modified independent  Sit to Supine: Modified independent     Transfers  Sit to stand: Independent  Stand to sit:  Independent     Exercise: Chair push-up x10    Orientation  Overall Orientation Status: Within Functional Limits     Education Given To: Patient  Education Provided: Role of Therapy;IADL Safety;Transfer Training;ADL Adaptive Strategies  Barriers to Learning: None                          AM-PAC Score        AM-PAC Inpatient Daily Activity Raw Score: 24 (07/08/22 1349)  AM-PAC Inpatient ADL T-Scale Score : 57.54 (07/08/22 1349)  ADL Inpatient CMS 0-100% Score: 0 (07/08/22 1349)  ADL Inpatient CMS G-Code Modifier : CH (07/08/22 1349)    Goals  Short Term Goals  Time Frame for Short term goals: Prior to d/c: status goals 7/8  Short Term Goal 1: Pt will bathe with mod I. per pt report, goal met  Short Term Goal 2: Pt will dress with mod I. goal met. Short Term Goal 3: Pt will toilet with mod I. per pt report goal met. Short Term Goal 4: Pt will complete fxl mobility and fxl transfers to/from ADL surfaces with mod I using LRAD. goal met. Short Term Goal 5: Pt will maintain NWB status L LE throughout session with no VC's.-goal d/c'd as pt now WBAT L LE in walking boot. Long Term Goals  Time Frame for Long term goals : STGs=LTGs  Patient Goals   Patient goals : to return home.        Therapy Time   Individual Concurrent Group Co-treatment   Time In 1300         Time Out 1325         Minutes Tyree Lamb OTR/L [id number]

## 2022-07-08 NOTE — PROGRESS NOTES
Infectious Disease Follow up Notes  Admit Date: 7/5/2022  Hospital Day: 4    Antibiotics :   IV ZOsyn  Itraconazole      CHIEF COMPLAINT:     Pneumonia  Histoplasmosis  DM+  Abnormal ct chest     Subjective interval History :  29 y.o. man with significant history for diabetes poor control, neuropathy, retinopathy, chronic kidney disease, hypertension, seizure disorder was recently admitted to [facility] [place] from  6//6/25 to 6/23/22-on that admission he was noted to have very abnormal CT chest concerning Rt hilar mass and PNA . He underwent Bronch and  biopsy. He also underwent a serological studies and came back +Ve Histoplasma by antibody but antigen was negative and serum Beta D glucan +Ve BAL with Streptococcus and prevotella anaerobes was treated with Augmentin at d/c and was placed on Itraconazole and follow up in ID clinic. He did not get oral Antifungal as planned was place on Fluconazole pending his clinic visit and now admitted with worsening rep symptoms dizziness and fatigue. He is HIV -ve CT chest on this admit with with  mild progression of patchy multifocal right upper lobe opacification with present pacification the right lower lobe concerning for pneumonia with progression of right hilar adenopathy. Due to worsening symptoms and CT scan we are consulted for recommendations.      Interval History : cough + no sob no fevers tolerating IV abx ok and medications being arranged no chills       Past Medical History:    Past Medical History:   Diagnosis Date    COVID-19     Diabetes mellitus, type II ([facility] [address]     History of blood transfusion     Hypertension     Protein in urine     Seizure [facility])        Past Surgical History:    Past Surgical History:   Procedure Laterality Date    BRONCHOSCOPY  6/9/2022    BRONCHOSCOPY BRUSHINGS performed by Yeyo Coreas MD at [address] [facility]  6/9/2022 BRONCHOSCOPY DIAGNOSTIC OR CELL [address] ONLY performed by Niall Boykin MD at [address]  6/9/2022    BRONCHOSCOPY BIOPSY BRONCHUS performed by Niall Boykin MD at [address] N/A 6/14/2022    BRONCHOSCOPY DIAGNOSTIC OR CELL [address] ONLY performed by Casey Phillips DO at [address] [facility]       Current Medications:    Outpatient Medications Marked as Taking for the 7/5/22 encounter [facility] Encounter)   Medication Sig Dispense Refill    itraconazole (SPORANOX) 100 MG capsule Take 2 capsules by mouth 2 times daily 120 capsule 6       Allergies:  Patient has no known allergies. Immunizations : There is no immunization history on file for this patient.     Social History:    Social History     Tobacco Use    Smoking status: Never Smoker    Smokeless tobacco: Never Used   Vaping Use    Vaping Use: Never used   Substance Use Topics    Alcohol use: Yes     Comment: rare    Drug use: Not Currently     Social History     Tobacco Use   Smoking Status Never Smoker   Smokeless Tobacco Never Used      Family History : no DVT no COPD       REVIEW OF SYSTEMS:      Constitutional:  negative for fevers, chills, night sweats  Eyes:  negative for blurred vision, eye discharge, visual disturbance   HEENT:  negative for hearing loss, ear drainage,nasal congestion  Respiratory:  cough+ , shortness of breath or hemoptysis   Cardiovascular:  negative for chest pain, palpitations, syncope  Gastrointestinal:  negative for nausea, vomiting, diarrhea, constipation, abdominal pain  Genitourinary:  negative for frequency, dysuria, urinary incontinence, hematuria  Hematologic/Lymphatic:  negative for easy bruising, bleeding and lymphadenopathy  Allergic/Immunologic:  negative for recurrent infections, angioedema, anaphylaxis   Endocrine:  negative for weight changes, polyuria, polydipsia and polyphagia  Musculoskeletal:  negative for joint  pain, swelling, decreased range of motion  Integumentary: No rashes, skin lesions  Neurological:  negative for headaches, slurred speech, unilateral weakness  Psychiatric: negative for hallucinations,confusion,agitation.                 PHYSICAL EXAM:      Vitals:    /86   Pulse (!) 112   Temp 98.3 °F (36.8 °C)   Resp 18   Ht 6' 1\" (1.854 m)   Wt 246 lb 14.6 oz (112 kg)   SpO2 99%   BMI 32.58 kg/m²     General Appearance: alert,in some acute distress,++ pallor, no icterus chronic ill appearing man ++  Skin: warm and dry, no rash or erythema  Head: normocephalic and atraumatic  Eyes: pupils equal, round, and reactive to light, conjunctivae normal  ENT: tympanic membrane, external ear and ear canal normal bilaterally, nose without deformity, nasal mucosa and turbinates normal without polyps  Neck: supple and non-tender without mass, no thyromegaly  no cervical lymphadenopathy  Pulmonary/Chest: Rt base crepts++  wheezes, rales or rhonchi, normal air movement, no respiratory distress  Cardiovascular: normal rate, regular rhythm, normal S1 and S2, no murmurs, rubs, clicks, or gallops, no carotid bruits  Abdomen: soft, non-tender, non-distended, normal bowel sounds, no masses or organomegaly  Extremities: no cyanosis, clubbing or edema  Musculoskeletal: normal range of motion, no joint swelling, deformity or tenderness  Integumentary: No rashes, no abnormal skin lesions, no petechiae  Neurologic: reflexes normal and symmetric, no cranial nerve deficit  Psych:  Orientation, sensorium, mood normal            Lines: IV  Gynecomastia+          Data Review:    CBC:   Lab Results   Component Value Date    WBC 3.6 (L) 07/06/2022    HGB 11.9 (L) 07/06/2022    HCT 36.5 (L) 07/06/2022    MCV 78.1 (L) 07/06/2022     07/06/2022     RENAL:   Lab Results   Component Value Date    CREATININE 1.2 07/08/2022    BUN 16 07/08/2022     (L) 07/08/2022    K 3.9 07/08/2022    CL 99 07/08/2022    CO2 21 07/08/2022     SED RATE:   Lab Results   Component Value Date/Time    SEDRATE >130 06/11/2022 07:39 AM     CK: No results found for: CKTOTAL  CRP:   Lab Results   Component Value Date/Time    CRP 62.4 06/03/2020 02:37 PM     Hepatic Function Panel:   Lab Results   Component Value Date/Time    ALKPHOS 120 07/05/2022 09:54 AM    ALT 11 07/05/2022 09:54 AM    AST 10 07/05/2022 09:54 AM    PROT 7.6 07/05/2022 09:54 AM    BILITOT 0.4 07/05/2022 09:54 AM    BILIDIR <0.2 06/19/2022 08:06 AM    IBILI see below 06/19/2022 08:06 AM    LABALBU 3.8 07/05/2022 09:54 AM     UA:  Lab Results   Component Value Date/Time    COLORU Yellow 07/05/2022 11:49 AM    CLARITYU Clear 07/05/2022 11:49 AM    GLUCOSEU 500 07/05/2022 11:49 AM    GLUCOSEU NEGATIVE 01/02/2011 02:44 PM    BILIRUBINUR Negative 07/05/2022 11:49 AM    BILIRUBINUR NEGATIVE 01/02/2011 02:44 PM    KETUA TRACE 07/05/2022 11:49 AM    SPECGRAV 1.024 07/05/2022 11:49 AM    BLOODU Negative 07/05/2022 11:49 AM    PHUR 5.0 07/05/2022 11:49 AM    PROTEINU 300 07/05/2022 11:49 AM    UROBILINOGEN 2.0 07/05/2022 11:49 AM    NITRU Negative 07/05/2022 11:49 AM    LEUKOCYTESUR Negative 07/05/2022 11:49 AM    LABMICR YES 07/05/2022 11:49 AM    URINETYPE NotGiven 07/05/2022 11:49 AM      Urine Microscopic:   Lab Results   Component Value Date/Time    BACTERIA None Seen 07/05/2022 11:49 AM    COMU SEE COMMENT 07/05/2022 11:49 AM    HYALCAST 10 07/05/2022 11:49 AM    WBCUA 1 07/05/2022 11:49 AM    RBCUA 1 07/05/2022 11:49 AM    EPIU 3 07/05/2022 11:49 AM     Urine Reflex to Culture:   Lab Results   Component Value Date/Time    URRFLXCULT Not Indicated 07/05/2022 11:49 AM     Contains abnormal data 1,3 Beta-D-Glucan  Order: [id number]   Status: Final result     Visible to patient: No (not released)     Next appt:  Today at 02:00 PM in Orthopedic Surgery (Melony Duncan MD)     0 Result Notes       Ref Range & Units 6/9/22 0745   (1,3)-Beta-D-Glucan (Fungitell) Interpretation Negative Positive Abnormal     Comment: INTERPRETIVE INFORMATION: (1,3)-beta-D-glucan (Fungitell)     Less than 31 pg/mL . .................. Negative     31-59 pg/mL . ......................... Negative     60-79 pg/mL . ......................... Indeterminate             Blood Culture:         Lab Results   Component Value Date/Time     BC No Growth after 4 days of incubation. 06/06/2022 05:30 PM     BLOODCULT2 No Growth after 4 days of incubation. 06/06/2022          MICRO: cultures reviewed and updated by me   Blood Culture:   Lab Results   Component Value Date/Time    [facility]  07/05/2022 09:54 AM     No Growth to date. Any change in status will be called. Bibi Maderamins  07/05/2022 10:58 AM     No Growth to date. Any change in status will be called. Respiratory Culture:  Lab Results   Component Value Date/Time    CULTRESP Normal respiratory debra 06/09/2022 01:28 PM    LABGRAM  06/09/2022 01:35 PM     1+ WBC's (Mononuclear)  No Epithelial Cells seen  No organisms seen       AFB:  Lab Results   Component Value Date/Time    AFBSMEAR No AFB observed by Fluorescent stain 06/09/2022 01:35 PM     Viral Culture:  Lab Results   Component Value Date/Time    COVID19 Not Detected 06/20/2022 10:50 AM     Urine Culture: No results for input(s): LABURIN in the last 72 hours.       FINAL DIAGNOSIS:     Right upper lobe lung biopsy:      - Small, detached fragments of benign bronchial mucosal with acute and        chronic inflammation        and blood clot.      - Negative for granulomatous inflammation or neoplasm.      - No diagnostic transbronchial lung parenchyma present.      PHIAB/PHIAB         HISTOPLASMA INTERPETATION  Histoplasma Antigen, Serum  Collected: 06/14/22 1708   Result status: Final   Resulting lab: [facility] LABORATORY   Reference range: Not Detected   Value: Not Detected   Comment: INTERPRETIVE INFORMATION: Histoplasma Antigen Quantitative by EIA,   Serum   Less than 0.19 ng/mL = Not Detected       Hemoglobin A1C  Hemoglobin A1c  Collected: 06/06/22 1506   Result status: Final Resulting lab: [address] [place] [address] LAB   Reference range: See comment %   Value: 16.9   Comment: Comment:   Diagnosis of Diabetes: > or = 6.5%   Increased risk of diabetes (Prediabetes): 5.7-6.4%   Glycemic Control: Nonpregnant Adults: <7.0%                     Pregnant: <6.0%             Imaging:   CT CHEST PULMONARY EMBOLISM W CONTRAST   Final Result   1. No evidence of pulmonary embolic disease. 2. Mild progression of patchy multifocal right upper lobe opacification   consistent with pneumonia. There is new ground-glass opacification in the   right lower lobe concerning for pneumonia as well. 3. Progression of right hilar adenopathy with increased size of central node   and new node laterally within the right hilum. Note is made of negative   bronchoscopy 06/09/2022. However, findings remain concerning for malignancy. Close follow-up recommended.           XR CHEST (2 VW)   Final Result   Persistent patchy right upper lobe airspace disease most consistent with   pneumonia. Chest CT is recommended to evaluate for an obstructing process.           IMAGING:    CT CHEST PULMONARY EMBOLISM W CONTRAST   Final Result   1. No evidence of pulmonary embolic disease. 2. Mild progression of patchy multifocal right upper lobe opacification   consistent with pneumonia. There is new ground-glass opacification in the   right lower lobe concerning for pneumonia as well. 3. Progression of right hilar adenopathy with increased size of central node   and new node laterally within the right hilum. Note is made of negative   bronchoscopy 06/09/2022. However, findings remain concerning for malignancy. Close follow-up recommended. XR CHEST (2 VW)   Final Result   Persistent patchy right upper lobe airspace disease most consistent with   pneumonia. Chest CT is recommended to evaluate for an obstructing process.                All the pertinent images and reports for the current Hospitalization were reviewed by me     Scheduled Meds:   hydrocortisone   Topical BID    carvedilol  25 mg Oral BID WC    insulin lispro  0-12 Units SubCUTAneous TID WC    insulin lispro  0-6 Units SubCUTAneous Nightly    itraconazole  200 mg Oral TID    [START ON 7/9/2022] itraconazole  200 mg Oral BID    sodium chloride flush  5-40 mL IntraVENous 2 times per day    sodium chloride flush  10 mL IntraVENous 2 times per day    enoxaparin  30 mg SubCUTAneous BID    piperacillin-tazobactam  3,375 mg IntraVENous Q8H       Continuous Infusions:   dextrose      sodium chloride 100 mL/hr at 07/08/22 0158    sodium chloride Stopped (07/06/22 1716)       PRN Meds:  glucose, dextrose bolus **OR** dextrose bolus, glucagon (rDNA), dextrose, sodium chloride flush, sodium chloride, sodium chloride flush, sodium chloride, promethazine **OR** ondansetron, magnesium hydroxide, acetaminophen **OR** acetaminophen      Assessment:     Patient Active Problem List   Diagnosis    Poorly controlled type 2 diabetes mellitus ([address]    Essential hypertension    SOB (shortness of breath)    CKD (chronic kidney disease)    Acute renal failure ([address]    Community acquired pneumonia of right upper lobe of lung    Abnormal CT of the chest    Endobronchial mass    Hemoptysis    Streptococcal infection    S/P bronchoscopy    Seizure disorder ([address]    Class 1 obesity due to excess calories with body mass index (BMI) of 34.0 to 34.9 in adult    Fever    Encounter for medication counseling    PNA (pneumonia)    Moderate malnutrition ([address]     Rt side Pneumonia  CT with worsening and progression  Hilar adenopathy on Chest CT  [facility] for PNA  S/p Bronch and BAL cx thus far negative  Histoplasma Antibody+  Serum Beta D glucan +  DM poor control   BMI at 32  CKD stage 3  Rt hilar adenopahty  HIV -ve  Abnormal CT chest      Given his resp symptoms will cont IV abx and start Itraconazole as he was unable to get this med as out patient will d/w  about options This all could be related to Histoplasmosis he is immune suppressed from uncontrolled DM+     Will check Urine Histo antigen in process and follow up Beta D glucan -ve likely from antifungal use           Labs, Microbiology, Radiology and all the pertinent results from current hospitalization and  care every where were reviewed  by me as a part of the evaluation   Plan:   1. Cont IV Zosyn as before  2. CT chest images reviewed    3. Oral Itraconazole x 200 mg  twice a day after loading  dosing for Lung infection anticipate x 6 months treatment   4. Contol DM   5. Watch creat   6. Ref social service  To help with medications -script done  7. cHECK urine Histoplasma antigen in process  8. Oral Augmentin x 875 mg twice a day x 7 days   9. Follow up with me in 6 weeks         Discussed with patient/Family and Nursing   Risk of Complications/Morbidity: High      · Illness(es)/ Infection present that pose threat to bodily function. · There is potential for severe exacerbation of infection/side effects of treatment. · Therapy requires intensive monitoring for antimicrobial agent toxicity. Discussed with patient/Family and Nursing staff     Thanks for allowing me to participate in your patient's care and please call me with any questions or concerns.     Stephanie Stein MD  Infectious Disease  [facility] Physician  Phone: [phone]   Fax : [phone]

## 2022-07-08 NOTE — PLAN OF CARE
Problem: Discharge Planning  Goal: Discharge to home or other facility with appropriate resources  Outcome: Progressing  Flowsheets (Taken 7/8/2022 0943)  Discharge to home or other facility with appropriate resources: Identify barriers to discharge with patient and caregiver     Problem: Pain  Goal: Verbalizes/displays adequate comfort level or baseline comfort level  Outcome: Progressing  Flowsheets (Taken 7/8/2022 0943)  Verbalizes/displays adequate comfort level or baseline comfort level:   Encourage patient to monitor pain and request assistance   Administer analgesics based on type and severity of pain and evaluate response   Assess pain using appropriate pain scale   Implement non-pharmacological measures as appropriate and evaluate response  Note: Pt able to express presence and absence of pain using numerical pain scale. Pt pain is managed by PRN analgesics as ordered by MD. Pain reassess after each interventions. Will continue to monitor as needed. Problem: Safety - Adult  Goal: Free from fall injury  Outcome: Progressing  Flowsheets (Taken 7/8/2022 0943)  Free From Fall Injury: Instruct family/caregiver on patient safety  Note: Pt free from falls this shift. Pt educated on use of call light as needed for assistance. Call light always within reach. Pt able and agreeable to contact for safety appropriately.        Problem: ABCDS Injury Assessment  Goal: Absence of physical injury  Outcome: Progressing     Problem: Musculoskeletal - Adult  Goal: Return mobility to safest level of function  Outcome: Progressing  Flowsheets (Taken 7/8/2022 0943)  Return Mobility to Safest Level of Function: Assess patient stability and activity tolerance for standing, transferring and ambulating with or without assistive devices

## 2022-07-09 PROBLEM — S92.252A CLOSED DISPLACED FRACTURE OF NAVICULAR BONE OF LEFT FOOT: Status: ACTIVE | Noted: 2022-07-09

## 2022-07-09 LAB
ANION GAP SERPL CALCULATED.3IONS-SCNC: 12 MMOL/L (ref 3–16)
BLOOD CULTURE, ROUTINE: NORMAL
BUN BLDV-MCNC: 17 MG/DL (ref 7–20)
CALCIUM SERPL-MCNC: 9.3 MG/DL (ref 8.3–10.6)
CHLORIDE BLD-SCNC: 103 MMOL/L (ref 99–110)
CO2: 23 MMOL/L (ref 21–32)
CREAT SERPL-MCNC: 1.4 MG/DL (ref 0.9–1.3)
CULTURE, BLOOD 2: NORMAL
GFR AFRICAN AMERICAN: >60
GFR NON-AFRICAN AMERICAN: 57
GLUCOSE BLD-MCNC: 172 MG/DL (ref 70–99)
GLUCOSE BLD-MCNC: 184 MG/DL (ref 70–99)
GLUCOSE BLD-MCNC: 193 MG/DL (ref 70–99)
GLUCOSE BLD-MCNC: 211 MG/DL (ref 70–99)
GLUCOSE BLD-MCNC: 248 MG/DL (ref 70–99)
HISTOPLASMA ANTIGEN URINE INTERP: NOT DETECTED
HISTOPLASMA ANTIGEN URINE: NOT DETECTED NG/ML
PERFORMED ON: ABNORMAL
POTASSIUM REFLEX MAGNESIUM: 4 MMOL/L (ref 3.5–5.1)
SODIUM BLD-SCNC: 138 MMOL/L (ref 136–145)

## 2022-07-09 PROCEDURE — 36415 COLL VENOUS BLD VENIPUNCTURE: CPT

## 2022-07-09 PROCEDURE — 6370000000 HC RX 637 (ALT 250 FOR IP): Performed by: INTERNAL MEDICINE

## 2022-07-09 PROCEDURE — 80048 BASIC METABOLIC PNL TOTAL CA: CPT

## 2022-07-09 PROCEDURE — 2580000003 HC RX 258: Performed by: INTERNAL MEDICINE

## 2022-07-09 PROCEDURE — 6360000002 HC RX W HCPCS: Performed by: INTERNAL MEDICINE

## 2022-07-09 PROCEDURE — 94760 N-INVAS EAR/PLS OXIMETRY 1: CPT

## 2022-07-09 PROCEDURE — 99233 SBSQ HOSP IP/OBS HIGH 50: CPT | Performed by: INTERNAL MEDICINE

## 2022-07-09 PROCEDURE — 6370000000 HC RX 637 (ALT 250 FOR IP): Performed by: NURSE PRACTITIONER

## 2022-07-09 PROCEDURE — 1200000000 HC SEMI PRIVATE

## 2022-07-09 PROCEDURE — 2580000003 HC RX 258: Performed by: EMERGENCY MEDICINE

## 2022-07-09 RX ADMIN — ENOXAPARIN SODIUM 30 MG: 100 INJECTION SUBCUTANEOUS at 20:30

## 2022-07-09 RX ADMIN — CARVEDILOL 25 MG: 25 TABLET, FILM COATED ORAL at 08:22

## 2022-07-09 RX ADMIN — HYDROCORTISONE: 0.5 CREAM TOPICAL at 08:22

## 2022-07-09 RX ADMIN — PIPERACILLIN AND TAZOBACTAM 3375 MG: 3; .375 INJECTION, POWDER, LYOPHILIZED, FOR SOLUTION INTRAVENOUS at 18:10

## 2022-07-09 RX ADMIN — INSULIN LISPRO 2 UNITS: 100 INJECTION, SOLUTION INTRAVENOUS; SUBCUTANEOUS at 08:26

## 2022-07-09 RX ADMIN — PIPERACILLIN AND TAZOBACTAM 3375 MG: 3; .375 INJECTION, POWDER, LYOPHILIZED, FOR SOLUTION INTRAVENOUS at 01:20

## 2022-07-09 RX ADMIN — INSULIN LISPRO 2 UNITS: 100 INJECTION, SOLUTION INTRAVENOUS; SUBCUTANEOUS at 18:12

## 2022-07-09 RX ADMIN — ITRACONAZOLE 200 MG: 100 CAPSULE, COATED PELLETS ORAL at 08:26

## 2022-07-09 RX ADMIN — SODIUM CHLORIDE, PRESERVATIVE FREE 10 ML: 5 INJECTION INTRAVENOUS at 20:32

## 2022-07-09 RX ADMIN — HYDROCORTISONE: 0.5 CREAM TOPICAL at 20:29

## 2022-07-09 RX ADMIN — INSULIN LISPRO 4 UNITS: 100 INJECTION, SOLUTION INTRAVENOUS; SUBCUTANEOUS at 12:23

## 2022-07-09 RX ADMIN — INSULIN LISPRO 4 UNITS: 100 INJECTION, SOLUTION INTRAVENOUS; SUBCUTANEOUS at 21:26

## 2022-07-09 RX ADMIN — PIPERACILLIN AND TAZOBACTAM 3375 MG: 3; .375 INJECTION, POWDER, LYOPHILIZED, FOR SOLUTION INTRAVENOUS at 08:25

## 2022-07-09 RX ADMIN — SODIUM CHLORIDE, PRESERVATIVE FREE 10 ML: 5 INJECTION INTRAVENOUS at 08:23

## 2022-07-09 RX ADMIN — GUAIFENESIN 200 MG: 200 SOLUTION ORAL at 18:08

## 2022-07-09 RX ADMIN — ENOXAPARIN SODIUM 30 MG: 100 INJECTION SUBCUTANEOUS at 08:21

## 2022-07-09 RX ADMIN — ITRACONAZOLE 200 MG: 100 CAPSULE, COATED PELLETS ORAL at 20:30

## 2022-07-09 ASSESSMENT — PAIN SCALES - GENERAL
PAINLEVEL_OUTOF10: 0

## 2022-07-09 NOTE — PROGRESS NOTES
Pt awake in bed. Pt denies any pain , nausea, dizziness, and SOB. Pt reports occassional non-productive dry cough. Pt respirations are unlabored on room air. Pt denies other needs at present. Call light and item need in reach.  Electronically signed by Bautista Sofia RN on 7/9/2022 at 11:15 AM

## 2022-07-09 NOTE — PROGRESS NOTES
Hospitalist Progress Note      PCP: Julio Cesar Marcos MD, MD    Date of Admission: 7/5/2022    Chief Complaint:   Cough/Nausea/Fall    Hospital Course:   Patient is a 50-year-old male with past medical history of diabetes mellitus hypertension seizure COVID-19 who presents to the hospital for feeling nauseated and he is concerned about pneumonia because he could not get his medications for pneumonia. Patient mentions that he is appetite is down and also he feels nauseated even by watching the food. Patient also mentions he has cough, he is producing phlegm, he was recently diagnosed with fungal pneumonia he was discharged on antibiotics however he could not receive his antibiotics through the pharmacy because insurance will not approve it. Patient did not follow-up with PCP. Has future appointment with ID. He also reports following recently and broke his ankle. Subjective:   Better  No HA/N/V/F/C  Pt not willing to be DCd. Mother wants him to stay in hospital until he is ready to go home- when asked about it, she didn't have any parameters to go by. Mother not happy with the care as she is wondering why he is not on iv fluids when he wa sdehydrated on admission. She also says she found him with a BP of 80/60mmhg yesterday and asked nursing staff- someone gave him a bolus. I told her the lowest BP on the chart is 90/65mmhg and at times we cant be chasing numbers alone. I told her the ID doctor has also advised on a DC and given his discharge recommendations. She says he has no income when I told her [employer] does itraconazole at $40=00. He cant afford. Pt also thinks he should stay because while he is in hospital his insurance pays for all his medications and stay. I told him that may not always be true especially that he has no good reason to be an inpatient. Patient said I was being rude and was not willing to discuss. During all this interaction, his nurse Cayden Casarez was present.   Mother has asked to have another doctor be seeing her son. I told her she had the right to choose a physician of her choice and told her to ask the charge nurse to help facilitate finding another doctor. Medications:  Reviewed    Infusion Medications    dextrose      sodium chloride 100 mL/hr at 07/08/22 0158    sodium chloride Stopped (07/06/22 1716)     Scheduled Medications    hydrocortisone   Topical BID    carvedilol  25 mg Oral BID WC    insulin lispro  0-12 Units SubCUTAneous TID WC    insulin lispro  0-6 Units SubCUTAneous Nightly    itraconazole  200 mg Oral BID    sodium chloride flush  5-40 mL IntraVENous 2 times per day    sodium chloride flush  10 mL IntraVENous 2 times per day    enoxaparin  30 mg SubCUTAneous BID    piperacillin-tazobactam  3,375 mg IntraVENous Q8H     PRN Meds: benzonatate, guaiFENesin, glucose, dextrose bolus **OR** dextrose bolus, glucagon (rDNA), dextrose, sodium chloride flush, sodium chloride, sodium chloride flush, sodium chloride, promethazine **OR** ondansetron, magnesium hydroxide, acetaminophen **OR** acetaminophen      Intake/Output Summary (Last 24 hours) at 7/9/2022 1217  Last data filed at 7/9/2022 6176  Gross per 24 hour   Intake 730 ml   Output 500 ml   Net 230 ml       Physical Exam Performed:    /84   Pulse 89   Temp 98.4 °F (36.9 °C) (Oral)   Resp 16   Ht 6' 1\" (1.854 m)   Wt 245 lb 9.5 oz (111.4 kg)   SpO2 97%   BMI 32.40 kg/m²     General appearance: No apparent distress, appears stated age and cooperative. HEENT: Pupils equal, round, and reactive to light. Conjunctivae/corneas clear. Neck: Supple, with full range of motion. No jugular venous distention. Trachea midline. Respiratory:  Normal respiratory effort. Clear to auscultation, bilaterally without Rales/Wheezes/Rhonchi. Cardiovascular: Regular rate and rhythm with normal S1/S2 without murmurs, rubs or gallops.   Abdomen: Soft, non-tender, non-distended with normal bowel sounds. Musculoskeletal: No clubbing, cyanosis or edema bilaterally. Full range of motion without deformity. Skin: Skin color, texture, turgor normal.  No rashes or lesions. Neurologic:  Neurovascularly intact without any focal sensory/motor deficits. Cranial nerves: II-XII intact, grossly non-focal.  Psychiatric: Alert and oriented, thought content appropriate, normal insight  Capillary Refill: Brisk,3 seconds, normal   Peripheral Pulses: +2 palpable, equal bilaterally       Labs:   No results for input(s): WBC, HGB, HCT, PLT in the last 72 hours. Recent Labs     07/07/22  0823 07/08/22  0523 07/09/22  0629   * 135* 138   K 4.1 3.9 4.0   CL 99 99 103   CO2 20* 21 23   BUN 14 16 17   CREATININE 1.3 1.2 1.4*   CALCIUM 9.6 9.3 9.3     No results for input(s): AST, ALT, BILIDIR, BILITOT, ALKPHOS in the last 72 hours. No results for input(s): INR in the last 72 hours. No results for input(s): Hartley Horsfall in the last 72 hours. Urinalysis:      Lab Results   Component Value Date/Time    NITRU Negative 07/05/2022 11:49 AM    WBCUA 1 07/05/2022 11:49 AM    BACTERIA None Seen 07/05/2022 11:49 AM    RBCUA 1 07/05/2022 11:49 AM    BLOODU Negative 07/05/2022 11:49 AM    SPECGRAV 1.024 07/05/2022 11:49 AM    GLUCOSEU 500 07/05/2022 11:49 AM    GLUCOSEU NEGATIVE 01/02/2011 02:44 PM       Radiology:  CT CHEST PULMONARY EMBOLISM W CONTRAST   Final Result   1. No evidence of pulmonary embolic disease. 2. Mild progression of patchy multifocal right upper lobe opacification   consistent with pneumonia. There is new ground-glass opacification in the   right lower lobe concerning for pneumonia as well. 3. Progression of right hilar adenopathy with increased size of central node   and new node laterally within the right hilum. Note is made of negative   bronchoscopy 06/09/2022. However, findings remain concerning for malignancy. Close follow-up recommended.          XR CHEST (2 VW)   Final Result Persistent patchy right upper lobe airspace disease most consistent with   pneumonia. Chest CT is recommended to evaluate for an obstructing process. Assessment:    1. Pneumonia-fungal, Procl-0.12, +Fungitel--> Histoplasmosis+  2. Generalized weakness  3. Diabetes mellitus  4. Hypertension  5. Seizure   6. CHELE resolved       Plan  -Cont vancomycin, Zosyn till DC time  -ID rec Augmentin at DC  -Itraconazole po as per ID  -will not be seeing this pt a they will seek another doctor  -encourage po fluids as patient able to eat/ drink.  -Insulin sliding scale resume home medications  -Holding nephrotoxic medications  -Restart Coreg 25mg poqd        DVT prophylaxis-Lovenox  Diet: ADULT DIET;  Regular; 4 carb choices (60 gm/meal)  Code Status: Full Code     PT/OT Eval Status: ordered     Dispo - pending clinical improvement             Eunice Martinez MD

## 2022-07-09 NOTE — PROGRESS NOTES
CHIEF COMPLAINT: Left ankle pain / navicular dorsal avulsion fracture. DATE OF INJURY: 6/28/2022, DOT 7/6/2022    HISTORY:  Mr. Randal Mccurdy 29 y.o.  male presents today for the first visit for evaluation of a left ankle injury which occurred when he was walking down steps and missed a step. He was first seen and evaluated in [facility] , where he was x-rayed, splinted and asked to f/u with Orthopedics. He is complaining of anterior and dorsal ankle pain and swelling 6/10. This is better with elevation and worse with bearing any wt. The pain is sharp and not radiating. No numbness or tingling sensation. Alleviating factors: elevation and rest. No other complaint.      Past Medical History:   Diagnosis Date    COVID-19     Diabetes mellitus, type II ([facility] [address]     History of blood transfusion     Hypertension     Protein in urine     Seizure [facility])        Past Surgical History:   Procedure Laterality Date    BRONCHOSCOPY  6/9/2022    BRONCHOSCOPY BRUSHINGS performed by Bruno Clinton MD at [address] [place] [address] Rd  6/9/2022    BRONCHOSCOPY DIAGNOSTIC OR CELL [address] performed by Bruno Clinton MD at [address] [place] [address] Rd  6/9/2022    BRONCHOSCOPY BIOPSY BRONCHUS performed by Bruno Clinton MD at [address] [place] [address] Rd N/A 6/14/2022    BRONCHOSCOPY DIAGNOSTIC OR CELL [address] ONLY performed by Dandre Perkins DO at [facility] History     Socioeconomic History    Marital status: Single     Spouse name: Not on file    Number of children: Not on file    Years of education: Not on file    Highest education level: Not on file   Occupational History    Not on file   Tobacco Use    Smoking status: Never Smoker    Smokeless tobacco: Never Used   Vaping Use    Vaping Use: Never used   Substance and Sexual Activity    Alcohol use: Yes     Comment: rare    Drug use: Not Currently    Sexual activity: Yes     Partners: Female   Other Topics Concern (HUMALOG) injection vial 0-12 Units  0-12 Units SubCUTAneous TID WC Kerri Esquivel MD   4 Units at 07/09/22 1223    insulin lispro (HUMALOG) injection vial 0-6 Units  0-6 Units SubCUTAneous Nightly Kerri Esquivel MD   2 Units at 07/08/22 2126    glucose chewable tablet 16 g  4 tablet Oral PRN Kerri Esquivel MD        dextrose bolus 10% 125 mL  125 mL IntraVENous PRN Kerri Esquivel MD        Or    dextrose bolus 10% 250 mL  250 mL IntraVENous PRN Kerri Esquivel MD        glucagon (rDNA) injection 1 mg  1 mg IntraMUSCular PRN Kerri Esquivel MD        dextrose 5 % solution  100 mL/hr IntraVENous PRN Kerri Esquivel MD        itraconazole (SPORANOX) capsule 200 mg  200 mg Oral BID Jd Molina MD        sodium chloride flush 0.9 % injection 5-40 mL  5-40 mL IntraVENous 2 times per day Alvina Dennis MD   10 mL at 07/07/22 0952    sodium chloride flush 0.9 % injection 5-40 mL  5-40 mL IntraVENous PRN Alvina Dennis MD        0.9 % sodium chloride infusion   IntraVENous YOGESHN Alvina Dennis  mL/hr at 07/08/22 0158 New Bag at 07/08/22 0158    sodium chloride flush 0.9 % injection 10 mL  10 mL IntraVENous 2 times per day Kerri Esquivel MD   10 mL at 07/09/22 0823    sodium chloride flush 0.9 % injection 10 mL  10 mL IntraVENous PRDAVID Esquivel MD        0.9 % sodium chloride infusion   IntraVENous PRN Kerri Esquivel MD   Stopped at 07/06/22 1716    enoxaparin Sodium (LOVENOX) injection 30 mg  30 mg SubCUTAneous BID Kerri Esquivel MD   30 mg at 07/09/22 0821    promethazine (PHENERGAN) tablet 12.5 mg  12.5 mg Oral Q6H PRN Kerri Esquivel MD        Or    ondansetron (ZOFRAN) injection 4 mg  4 mg IntraVENous Q6H PRN Kerri Esquivel MD   4 mg at 07/08/22 1957    magnesium hydroxide (MILK OF MAGNESIA) 400 MG/5ML suspension 30 mL  30 mL Oral Daily PRN Kerri Esquivel MD        acetaminophen (TYLENOL) tablet 650 mg  650 mg Oral Q6H PRN Kerri Esquivel MD   650 mg at 07/08/22 0647    Or    acetaminophen (TYLENOL) suppository 650 mg  650 mg Rectal Q6H PRN Marva Emanuel MD        piperacillin-tazobactam (ZOSYN) 3,375 mg in dextrose 5 % 50 mL IVPB (mini-bag)  3,375 mg IntraVENous Cali Chandler MD   Stopped at 07/09/22 1225     Current Outpatient Medications on File Prior to Visit   Medication Sig Dispense Refill    itraconazole (SPORANOX) 100 MG capsule Take 2 capsules by mouth 2 times daily for 14 days 30 capsule 0    itraconazole (SPORANOX PULSEPAK) 100 MG capsule Take 2 capsules by mouth daily for 14 days 28 capsule 0    itraconazole (SPORANOX) 100 MG capsule Take 2 capsules by mouth 2 times daily 120 capsule 6    acetaminophen (TYLENOL) 500 MG tablet Take 1 tablet by mouth 4 times daily as needed for Pain 28 tablet 0    fluconazole (DIFLUCAN) 200 MG tablet Take 2 tablets by mouth daily for 11 days 22 tablet 0    bisacodyl (DULCOLAX) 10 MG suppository Place 1 suppository rectally daily as needed for Constipation 30 suppository 0    polyethylene glycol (GLYCOLAX) 17 g packet Take 17 g by mouth 2 times daily 527 g 1    albuterol sulfate  (90 Base) MCG/ACT inhaler Inhale 2 puffs into the lungs every 6 hours as needed for Wheezing      Insulin Degludec (TRESIBA FLEXTOUCH) 200 UNIT/ML SOPN Inject 30 Units into the skin daily       insulin lispro, 1 Unit Dial, (HUMALOG KWIKPEN) 100 UNIT/ML SOPN Inject 10 Units into the skin 3 times daily (before meals) 1 pen 1    spironolactone (ALDACTONE) 25 MG tablet Take 1 tablet by mouth daily 30 tablet 3    rosuvastatin (CRESTOR) 40 MG tablet Take 1 tablet by mouth nightly 30 tablet 3    empagliflozin (JARDIANCE) 10 MG tablet Take 10 mg by mouth daily      NIFEdipine (PROCARDIA XL) 60 MG extended release tablet Take 60 mg by mouth daily as needed (Persistently high BP / SBP > 160)       gabapentin (NEURONTIN) 300 MG capsule Take 300 mg by mouth 3 times daily.  2-3 QD      metFORMIN (GLUCOPHAGE) 500 MG tablet Take 1,000 mg by mouth 2 times back in 6 weeks at which time we will get a new xray of the left ankle. Procedures    [place] CLOSED RX TARSAL FX,EACH    Breg / [place] Jackie Short Walking Boot     Patient was prescribed a Short Walking Boot. The left foot will require stabilization / immobilization from this semi-rigid / rigid orthosis to improve their function. The orthosis will assist in protecting the affected area, provide functional support and facilitate healing. Patient was instructed to progress ambulation weight bearing as tolerated in the device. The patient was educated and fit by a healthcare professional with expert knowledge and specialization in brace application while under the direct supervision of the physician. Verbal and written instructions for the use of and application of this item were provided. They were instructed to contact the office immediately should the brace result in increased pain, decreased sensation, increased swelling or worsening of the condition.        Milady Navarro MD

## 2022-07-09 NOTE — PROGRESS NOTES
Cora Cerda MD at [address]  6/9/2022    BRONCHOSCOPY DIAGNOSTIC OR CELL [address] ONLY performed by Norberto Moore MD at [address] [place] [address] Rd  6/9/2022    BRONCHOSCOPY BIOPSY BRONCHUS performed by Norberto Moore MD at [address] [place] [address] Rd N/A 6/14/2022    BRONCHOSCOPY DIAGNOSTIC OR CELL [address] ONLY performed by Christophe Lee DO at [facility] ENDOSCOPY       Current Medications:    Outpatient Medications Marked as Taking for the 7/5/22 encounter [facility] Encounter)   Medication Sig Dispense Refill    itraconazole (SPORANOX) 100 MG capsule Take 2 capsules by mouth 2 times daily for 14 days 30 capsule 0    itraconazole (SPORANOX PULSEPAK) 100 MG capsule Take 2 capsules by mouth daily for 14 days 28 capsule 0    itraconazole (SPORANOX) 100 MG capsule Take 2 capsules by mouth 2 times daily 120 capsule 6       Allergies:  Patient has no known allergies. Immunizations : There is no immunization history on file for this patient.     Social History:    Social History     Tobacco Use    Smoking status: Never Smoker    Smokeless tobacco: Never Used   Vaping Use    Vaping Use: Never used   Substance Use Topics    Alcohol use: Yes     Comment: rare    Drug use: Not Currently     Social History     Tobacco Use   Smoking Status Never Smoker   Smokeless Tobacco Never Used      Family History : no DVT no COPD       REVIEW OF SYSTEMS:      Constitutional:  negative for fevers, chills, night sweats  Eyes:  negative for blurred vision, eye discharge, visual disturbance   HEENT:  negative for hearing loss, ear drainage,nasal congestion  Respiratory:  cough+ , shortness of breath or hemoptysis   Cardiovascular:  negative for chest pain, palpitations, syncope  Gastrointestinal:  negative for nausea, vomiting, diarrhea, constipation, abdominal pain  Genitourinary:  negative for frequency, dysuria, urinary incontinence, hematuria  Hematologic/Lymphatic:  negative for easy bruising, bleeding and lymphadenopathy  Allergic/Immunologic:  negative for recurrent infections, angioedema, anaphylaxis   Endocrine:  negative for weight changes, polyuria, polydipsia and polyphagia  Musculoskeletal:  negative for joint  pain, swelling, decreased range of motion  Integumentary: No rashes, skin lesions  Neurological:  negative for headaches, slurred speech, unilateral weakness  Psychiatric: negative for hallucinations,confusion,agitation.                 PHYSICAL EXAM:      Vitals:    /84   Pulse 89   Temp 98.4 °F (36.9 °C) (Oral)   Resp 16   Ht 6' 1\" (1.854 m)   Wt 245 lb 9.5 oz (111.4 kg)   SpO2 97%   BMI 32.40 kg/m²     General Appearance: alert,in some acute distress,++ pallor, no icterus chronic ill appearing man ++  Skin: warm and dry, no rash or erythema  Head: normocephalic and atraumatic  Eyes: pupils equal, round, and reactive to light, conjunctivae normal  ENT: tympanic membrane, external ear and ear canal normal bilaterally, nose without deformity, nasal mucosa and turbinates normal without polyps  Neck: supple and non-tender without mass, no thyromegaly  no cervical lymphadenopathy  Pulmonary/Chest: Rt base crepts++  wheezes, rales or rhonchi, normal air movement, no respiratory distress  Cardiovascular: normal rate, regular rhythm, normal S1 and S2, no murmurs, rubs, clicks, or gallops, no carotid bruits  Abdomen: soft, non-tender, non-distended, normal bowel sounds, no masses or organomegaly  Extremities: no cyanosis, clubbing or edema  Musculoskeletal: normal range of motion, no joint swelling, deformity or tenderness  Integumentary: No rashes, no abnormal skin lesions, no petechiae  Neurologic: reflexes normal and symmetric, no cranial nerve deficit  Psych:  Orientation, sensorium, mood normal            Lines: IV  Gynecomastia+          Data Review:    CBC:   Lab Results   Component Value Date    WBC 3.6 (L) 07/06/2022    HGB 11.9 (L) 07/06/2022    HCT 36.5 (L) 07/06/2022    MCV 78.1 (L) 07/06/2022     07/06/2022     RENAL:   Lab Results   Component Value Date    CREATININE 1.4 (H) 07/09/2022    BUN 17 07/09/2022     07/09/2022    K 4.0 07/09/2022     07/09/2022    CO2 23 07/09/2022     SED RATE:   Lab Results   Component Value Date/Time    SEDRATE >130 06/11/2022 07:39 AM     CK: No results found for: CKTOTAL  CRP:   Lab Results   Component Value Date/Time    CRP 62.4 06/03/2020 02:37 PM     Hepatic Function Panel:   Lab Results   Component Value Date/Time    ALKPHOS 120 07/05/2022 09:54 AM    ALT 11 07/05/2022 09:54 AM    AST 10 07/05/2022 09:54 AM    PROT 7.6 07/05/2022 09:54 AM    BILITOT 0.4 07/05/2022 09:54 AM    BILIDIR <0.2 06/19/2022 08:06 AM    IBILI see below 06/19/2022 08:06 AM    LABALBU 3.8 07/05/2022 09:54 AM     UA:  Lab Results   Component Value Date/Time    COLORU Yellow 07/05/2022 11:49 AM    CLARITYU Clear 07/05/2022 11:49 AM    GLUCOSEU 500 07/05/2022 11:49 AM    GLUCOSEU NEGATIVE 01/02/2011 02:44 PM    BILIRUBINUR Negative 07/05/2022 11:49 AM    BILIRUBINUR NEGATIVE 01/02/2011 02:44 PM    KETUA TRACE 07/05/2022 11:49 AM    SPECGRAV 1.024 07/05/2022 11:49 AM    BLOODU Negative 07/05/2022 11:49 AM    PHUR 5.0 07/05/2022 11:49 AM    PROTEINU 300 07/05/2022 11:49 AM    UROBILINOGEN 2.0 07/05/2022 11:49 AM    NITRU Negative 07/05/2022 11:49 AM    LEUKOCYTESUR Negative 07/05/2022 11:49 AM    LABMICR YES 07/05/2022 11:49 AM    URINETYPE NotGiven 07/05/2022 11:49 AM      Urine Microscopic:   Lab Results   Component Value Date/Time    BACTERIA None Seen 07/05/2022 11:49 AM    COMU SEE COMMENT 07/05/2022 11:49 AM    HYALCAST 10 07/05/2022 11:49 AM    WBCUA 1 07/05/2022 11:49 AM    RBCUA 1 07/05/2022 11:49 AM    EPIU 3 07/05/2022 11:49 AM     Urine Reflex to Culture:   Lab Results   Component Value Date/Time    URRFLXCULT Not Indicated 07/05/2022 11:49 AM     Contains abnormal data 1,3 Beta-D-Glucan  Order: [id number]   Status: Final result Reference range: Not Detected   Value: Not Detected   Comment: INTERPRETIVE INFORMATION: Histoplasma Antigen Quantitative by EIA,   Serum   Less than 0.19 ng/mL = Not Detected       Hemoglobin A1C  Hemoglobin A1c  Collected: 06/06/22 1506   Result status: Final   Resulting lab: [address] [place] [address] LAB   Reference range: See comment %   Value: 16.9   Comment: Comment:   Diagnosis of Diabetes: > or = 6.5%   Increased risk of diabetes (Prediabetes): 5.7-6.4%   Glycemic Control: Nonpregnant Adults: <7.0%                     Pregnant: <6.0%             Imaging:   CT CHEST PULMONARY EMBOLISM W CONTRAST   Final Result   1. No evidence of pulmonary embolic disease. 2. Mild progression of patchy multifocal right upper lobe opacification   consistent with pneumonia. There is new ground-glass opacification in the   right lower lobe concerning for pneumonia as well. 3. Progression of right hilar adenopathy with increased size of central node   and new node laterally within the right hilum. Note is made of negative   bronchoscopy 06/09/2022. However, findings remain concerning for malignancy. Close follow-up recommended.           XR CHEST (2 VW)   Final Result   Persistent patchy right upper lobe airspace disease most consistent with   pneumonia. Chest CT is recommended to evaluate for an obstructing process.           IMAGING:    CT CHEST PULMONARY EMBOLISM W CONTRAST   Final Result   1. No evidence of pulmonary embolic disease. 2. Mild progression of patchy multifocal right upper lobe opacification   consistent with pneumonia. There is new ground-glass opacification in the   right lower lobe concerning for pneumonia as well. 3. Progression of right hilar adenopathy with increased size of central node   and new node laterally within the right hilum. Note is made of negative   bronchoscopy 06/09/2022. However, findings remain concerning for malignancy. Close follow-up recommended.          XR CHEST (2 Antibody+  Serum Beta D glucan +  DM poor control   BMI at 32  CKD stage 3  Rt hilar adenopahty  HIV -ve  Abnormal CT chest      Given his resp symptoms will cont IV abx and start Itraconazole as he was unable to get this med as out patient will d/w  about options This all could be related to Histoplasmosis he is immune suppressed from uncontrolled DM+     Will check Urine Histo antigen in process and follow up Beta D glucan -ve likely from antifungal use      He will need antifungal therapy at discharge this is being arranged we discussed about diabetes control which is important to achieve cure        Labs, Microbiology, Radiology and all the pertinent results from current hospitalization and  care every where were reviewed  by me as a part of the evaluation   Plan:   1. Cont IV Zosyn as before will d/c in 48  Hrs   2. CT chest images reviewed    3. Oral Itraconazole x 200 mg  twice a day after loading  dosing for Lung infection anticipate x 6 months treatment   4. Contol DM  BG still high    5. Watch creat   6. Ref social service  To help with medications -script done  7. cHECK urine Histoplasma antigen reported to be -ve  8. Oral Augmentin x 875 mg twice a day x 7 days   9. Follow up with me in 6 weeks   10. He will need repeat CT chest in 2 months   11. Check TB Quantiferon  , HIV -ve        Discussed with patient/Family and Nursing   Risk of Complications/Morbidity: High      · Illness(es)/ Infection present that pose threat to bodily function. · There is potential for severe exacerbation of infection/side effects of treatment. · Therapy requires intensive monitoring for antimicrobial agent toxicity. Discussed with patient/Family and Nursing staff     Thanks for allowing me to participate in your patient's care and please call me with any questions or concerns.     Shaye Rose MD  Infectious Disease  [facility] ([facility]) Physician  Phone: [phone]   Fax : [phone]

## 2022-07-10 LAB
ANION GAP SERPL CALCULATED.3IONS-SCNC: 9 MMOL/L (ref 3–16)
BUN BLDV-MCNC: 19 MG/DL (ref 7–20)
CALCIUM SERPL-MCNC: 9.2 MG/DL (ref 8.3–10.6)
CHLORIDE BLD-SCNC: 102 MMOL/L (ref 99–110)
CO2: 24 MMOL/L (ref 21–32)
CREAT SERPL-MCNC: 1.4 MG/DL (ref 0.9–1.3)
GFR AFRICAN AMERICAN: >60
GFR NON-AFRICAN AMERICAN: 57
GLUCOSE BLD-MCNC: 209 MG/DL (ref 70–99)
GLUCOSE BLD-MCNC: 211 MG/DL (ref 70–99)
GLUCOSE BLD-MCNC: 213 MG/DL (ref 70–99)
GLUCOSE BLD-MCNC: 219 MG/DL (ref 70–99)
GLUCOSE BLD-MCNC: 265 MG/DL (ref 70–99)
PERFORMED ON: ABNORMAL
POTASSIUM REFLEX MAGNESIUM: 4.1 MMOL/L (ref 3.5–5.1)
SODIUM BLD-SCNC: 135 MMOL/L (ref 136–145)

## 2022-07-10 PROCEDURE — 6370000000 HC RX 637 (ALT 250 FOR IP): Performed by: INTERNAL MEDICINE

## 2022-07-10 PROCEDURE — 94760 N-INVAS EAR/PLS OXIMETRY 1: CPT

## 2022-07-10 PROCEDURE — 6360000002 HC RX W HCPCS: Performed by: INTERNAL MEDICINE

## 2022-07-10 PROCEDURE — 80048 BASIC METABOLIC PNL TOTAL CA: CPT

## 2022-07-10 PROCEDURE — 36415 COLL VENOUS BLD VENIPUNCTURE: CPT

## 2022-07-10 PROCEDURE — 1200000000 HC SEMI PRIVATE

## 2022-07-10 PROCEDURE — 2580000003 HC RX 258: Performed by: EMERGENCY MEDICINE

## 2022-07-10 PROCEDURE — 99232 SBSQ HOSP IP/OBS MODERATE 35: CPT | Performed by: INTERNAL MEDICINE

## 2022-07-10 PROCEDURE — 2580000003 HC RX 258: Performed by: INTERNAL MEDICINE

## 2022-07-10 PROCEDURE — 86480 TB TEST CELL IMMUN MEASURE: CPT

## 2022-07-10 RX ADMIN — PIPERACILLIN AND TAZOBACTAM 3375 MG: 3; .375 INJECTION, POWDER, LYOPHILIZED, FOR SOLUTION INTRAVENOUS at 08:39

## 2022-07-10 RX ADMIN — CARVEDILOL 25 MG: 25 TABLET, FILM COATED ORAL at 08:37

## 2022-07-10 RX ADMIN — PIPERACILLIN AND TAZOBACTAM 3375 MG: 3; .375 INJECTION, POWDER, LYOPHILIZED, FOR SOLUTION INTRAVENOUS at 17:32

## 2022-07-10 RX ADMIN — ENOXAPARIN SODIUM 30 MG: 100 INJECTION SUBCUTANEOUS at 08:38

## 2022-07-10 RX ADMIN — ENOXAPARIN SODIUM 30 MG: 100 INJECTION SUBCUTANEOUS at 20:53

## 2022-07-10 RX ADMIN — SODIUM CHLORIDE, PRESERVATIVE FREE 10 ML: 5 INJECTION INTRAVENOUS at 20:58

## 2022-07-10 RX ADMIN — ACETAMINOPHEN 650 MG: 325 TABLET ORAL at 06:02

## 2022-07-10 RX ADMIN — INSULIN LISPRO 4 UNITS: 100 INJECTION, SOLUTION INTRAVENOUS; SUBCUTANEOUS at 12:52

## 2022-07-10 RX ADMIN — INSULIN LISPRO 3 UNITS: 100 INJECTION, SOLUTION INTRAVENOUS; SUBCUTANEOUS at 20:54

## 2022-07-10 RX ADMIN — ITRACONAZOLE 200 MG: 100 CAPSULE, COATED PELLETS ORAL at 20:53

## 2022-07-10 RX ADMIN — ITRACONAZOLE 200 MG: 100 CAPSULE, COATED PELLETS ORAL at 08:42

## 2022-07-10 RX ADMIN — INSULIN LISPRO 4 UNITS: 100 INJECTION, SOLUTION INTRAVENOUS; SUBCUTANEOUS at 08:42

## 2022-07-10 RX ADMIN — PIPERACILLIN AND TAZOBACTAM 3375 MG: 3; .375 INJECTION, POWDER, LYOPHILIZED, FOR SOLUTION INTRAVENOUS at 01:41

## 2022-07-10 RX ADMIN — SODIUM CHLORIDE, PRESERVATIVE FREE 10 ML: 5 INJECTION INTRAVENOUS at 08:38

## 2022-07-10 RX ADMIN — HYDROCORTISONE: 0.5 CREAM TOPICAL at 08:38

## 2022-07-10 RX ADMIN — ACETAMINOPHEN 650 MG: 325 TABLET ORAL at 20:51

## 2022-07-10 RX ADMIN — INSULIN LISPRO 4 UNITS: 100 INJECTION, SOLUTION INTRAVENOUS; SUBCUTANEOUS at 17:34

## 2022-07-10 RX ADMIN — CARVEDILOL 25 MG: 25 TABLET, FILM COATED ORAL at 17:31

## 2022-07-10 ASSESSMENT — PAIN SCALES - GENERAL
PAINLEVEL_OUTOF10: 6
PAINLEVEL_OUTOF10: 0
PAINLEVEL_OUTOF10: 6

## 2022-07-10 ASSESSMENT — PAIN DESCRIPTION - LOCATION: LOCATION: HEAD

## 2022-07-10 ASSESSMENT — PAIN DESCRIPTION - ORIENTATION: ORIENTATION: ANTERIOR

## 2022-07-10 ASSESSMENT — PAIN SCALES - WONG BAKER: WONGBAKER_NUMERICALRESPONSE: 2

## 2022-07-10 NOTE — PLAN OF CARE
Problem: Discharge Planning  Goal: Discharge to home or other facility with appropriate resources  7/10/2022 0057 by Mathew Collier RN  Outcome: Progressing  7/9/2022 1121 by Jyothi Levin RN  Outcome: Progressing     Problem: Pain  Goal: Verbalizes/displays adequate comfort level or baseline comfort level  7/10/2022 0057 by Mathew Collier RN  Outcome: Progressing  7/9/2022 1121 by Jyothi Levin RN  Outcome: Progressing  Flowsheets (Taken 7/9/2022 1121)  Verbalizes/displays adequate comfort level or baseline comfort level:   Encourage patient to monitor pain and request assistance   Assess pain using appropriate pain scale   Administer analgesics based on type and severity of pain and evaluate response  Note: Pt able to express presence and absence of pain using numerical pain scale. Pt pain is managed by PRN analgesics as ordered by MD. Pain reassess after each interventions. Will continue to monitor as needed. Problem: Safety - Adult  Goal: Free from fall injury  7/10/2022 0057 by Mathew Collier RN  Outcome: Progressing  7/9/2022 1121 by Jyothi Levin RN  Outcome: Progressing  Flowsheets (Taken 7/9/2022 1121)  Free From Fall Injury: Instruct family/caregiver on patient safety  Note: Pt free from falls this shift. Non skid socks provided . Pt independent with crutches . Pt educated on use of call light as needed for assistance. Call light always within reach. Pt able and agreeable to contact for safety appropriately.        Problem: ABCDS Injury Assessment  Goal: Absence of physical injury  7/10/2022 0057 by Mathew Collier RN  Outcome: Progressing  7/9/2022 1121 by Jyothi Levin RN  Outcome: Progressing     Problem: Respiratory - Adult  Goal: Achieves optimal ventilation and oxygenation  7/10/2022 0057 by Mathew Collier RN  Outcome: Progressing  7/9/2022 1121 by Jyothi Levin RN  Outcome: Progressing     Problem: Musculoskeletal - Adult  Goal: Return mobility to safest level of function  7/10/2022 0057 by Yary Guallpa RN  Outcome: Progressing  7/9/2022 1121 by Wai Kevin RN  Outcome: Progressing  Goal: Maintain proper alignment of affected body part  7/10/2022 0057 by Yary Guallpa RN  Outcome: Progressing  7/9/2022 1121 by Wai Kevin RN  Outcome: Progressing  Goal: Return ADL status to a safe level of function  7/10/2022 0057 by Yary Guallpa RN  Outcome: Progressing  7/9/2022 1121 by Wai Kevin RN  Outcome: Progressing     Problem: Nutrition Deficit:  Goal: Optimize nutritional status  Outcome: Progressing

## 2022-07-10 NOTE — PLAN OF CARE
Problem: Discharge Planning  Goal: Discharge to home or other facility with appropriate resources  7/10/2022 0919 by Alice Chen RN  Outcome: Progressing  Flowsheets (Taken 7/10/2022 0258 by Brianne Wilkins RN)  Discharge to home or other facility with appropriate resources: Identify barriers to discharge with patient and caregiver  7/10/2022 0057 by Brianne Wilkins RN  Outcome: Progressing     Problem: Pain  Goal: Verbalizes/displays adequate comfort level or baseline comfort level  7/10/2022 0919 by Alice Chen RN  Outcome: Progressing  Flowsheets (Taken 7/10/2022 0919)  Verbalizes/displays adequate comfort level or baseline comfort level:   Encourage patient to monitor pain and request assistance   Assess pain using appropriate pain scale  Note: Pt able to express presence and absence of pain using numerical pain scale. Pt pain is managed by PRN analgesics as ordered by MD. Pain reassess after each interventions. Will continue to monitor as needed. 7/10/2022 0057 by Brianne Wilkins RN  Outcome: Progressing     Problem: Safety - Adult  Goal: Free from fall injury  7/10/2022 0919 by Alice Chen RN  Outcome: Progressing  7/10/2022 0057 by Brianne Wilkins RN  Outcome: Progressing     Problem: ABCDS Injury Assessment  Goal: Absence of physical injury  7/10/2022 0919 by Alice Chen RN  Outcome: Progressing  Note: Pt free from falls this shift. Non skid socks provided. Pt educated on use of call light as needed for assistance. Call light always within reach. Pt able and agreeable to contact for safety appropriately.        Problem: Respiratory - Adult  Goal: Achieves optimal ventilation and oxygenation  7/10/2022 0919 by Alice Chen RN  Outcome: Progressing  Flowsheets (Taken 7/10/2022 0258 by Brianne Wilkins RN)  Achieves optimal ventilation and oxygenation: Assess for changes in respiratory status  7/10/2022 0057 by Brianne Wilkins RN  Outcome: Progressing     Problem: Musculoskeletal - Adult  Goal: Return mobility to safest level of function  7/10/2022 0919 by Ramses Gutierrez RN  Outcome: Progressing  Flowsheets (Taken 7/10/2022 0258 by Harvinder An RN)  Return Mobility to Safest Level of Function: Assess patient stability and activity tolerance for standing, transferring and ambulating with or without assistive devices  7/10/2022 0057 by Harvinder An RN  Outcome: Progressing     Problem: Musculoskeletal - Adult  Goal: Return ADL status to a safe level of function  7/10/2022 0919 by Ramses Gutierrez RN  Outcome: Progressing  Flowsheets (Taken 7/10/2022 0258 by Harvinder An RN)  Return ADL Status to a Safe Level of Function: Assess activities of daily living deficits and provide assistive devices as needed

## 2022-07-10 NOTE — PROGRESS NOTES
Infectious Disease Follow up Notes  Admit Date: 7/5/2022  Hospital Day: 6    Antibiotics :   IV ZOsyn  Itraconazole      CHIEF COMPLAINT:     Pneumonia  Histoplasmosis  DM+  Abnormal ct chest     Subjective interval History :  29 y.o. man with significant history for diabetes poor control, neuropathy, retinopathy, chronic kidney disease, hypertension, seizure disorder was recently admitted to [facility] [place] from  6//6/25 to 6/23/22-on that admission he was noted to have very abnormal CT chest concerning Rt hilar mass and PNA . He underwent Bronch and  biopsy. He also underwent a serological studies and came back +Ve Histoplasma by antibody but antigen was negative and serum Beta D glucan +Ve BAL with Streptococcus and prevotella anaerobes was treated with Augmentin at d/c and was placed on Itraconazole and follow up in ID clinic. He did not get oral Antifungal as planned was place on Fluconazole pending his clinic visit and now admitted with worsening rep symptoms dizziness and fatigue. He is HIV -ve CT chest on this admit with with  mild progression of patchy multifocal right upper lobe opacification with present pacification the right lower lobe concerning for pneumonia with progression of right hilar adenopathy. Due to worsening symptoms and CT scan we are consulted for recommendations.      Interval History : Remains on room air intermittent cough chest pain is better and tolerating the meds ok no sweats     Past Medical History:    Past Medical History:   Diagnosis Date    COVID-19     Diabetes mellitus, type II ([facility] [place] [address]     History of blood transfusion     Hypertension     Protein in urine     Seizure [facility])        Past Surgical History:    Past Surgical History:   Procedure Laterality Date    BRONCHOSCOPY  6/9/2022    BRONCHOSCOPY BRUSHINGS performed by Rogelio Otto MD at [address] [place] [address]  6/9/2022 BRONCHOSCOPY DIAGNOSTIC OR CELL [address] ONLY performed by Roula Elkins MD at [address]  6/9/2022    BRONCHOSCOPY BIOPSY BRONCHUS performed by Roula Elkins MD at [address] [place] [address] Rd N/A 6/14/2022    BRONCHOSCOPY DIAGNOSTIC OR CELL [address] ONLY performed by Yang House DO at [facility] ENDOSCOPY       Current Medications:    Outpatient Medications Marked as Taking for the 7/5/22 encounter [facility] Encounter)   Medication Sig Dispense Refill    itraconazole (SPORANOX) 100 MG capsule Take 2 capsules by mouth 2 times daily for 14 days 30 capsule 0    itraconazole (SPORANOX PULSEPAK) 100 MG capsule Take 2 capsules by mouth daily for 14 days 28 capsule 0    itraconazole (SPORANOX) 100 MG capsule Take 2 capsules by mouth 2 times daily 120 capsule 6       Allergies:  Patient has no known allergies. Immunizations : There is no immunization history on file for this patient.     Social History:    Social History     Tobacco Use    Smoking status: Never Smoker    Smokeless tobacco: Never Used   Vaping Use    Vaping Use: Never used   Substance Use Topics    Alcohol use: Yes     Comment: rare    Drug use: Not Currently     Social History     Tobacco Use   Smoking Status Never Smoker   Smokeless Tobacco Never Used      Family History : no DVT no COPD       REVIEW OF SYSTEMS:      Constitutional:  negative for fevers, chills, night sweats  Eyes:  negative for blurred vision, eye discharge, visual disturbance   HEENT:  negative for hearing loss, ear drainage,nasal congestion  Respiratory:  cough+ , shortness of breath or hemoptysis   Cardiovascular:  negative for chest pain, palpitations, syncope  Gastrointestinal:  negative for nausea, vomiting, diarrhea, constipation, abdominal pain  Genitourinary:  negative for frequency, dysuria, urinary incontinence, hematuria  Hematologic/Lymphatic:  negative for easy bruising, bleeding and 07/06/2022     07/06/2022     RENAL:   Lab Results   Component Value Date    CREATININE 1.4 (H) 07/10/2022    BUN 19 07/10/2022     (L) 07/10/2022    K 4.1 07/10/2022     07/10/2022    CO2 24 07/10/2022     SED RATE:   Lab Results   Component Value Date/Time    SEDRATE >130 06/11/2022 07:39 AM     CK: No results found for: CKTOTAL  CRP:   Lab Results   Component Value Date/Time    CRP 62.4 06/03/2020 02:37 PM     Hepatic Function Panel:   Lab Results   Component Value Date/Time    ALKPHOS 120 07/05/2022 09:54 AM    ALT 11 07/05/2022 09:54 AM    AST 10 07/05/2022 09:54 AM    PROT 7.6 07/05/2022 09:54 AM    BILITOT 0.4 07/05/2022 09:54 AM    BILIDIR <0.2 06/19/2022 08:06 AM    IBILI see below 06/19/2022 08:06 AM    LABALBU 3.8 07/05/2022 09:54 AM     UA:  Lab Results   Component Value Date/Time    COLORU Yellow 07/05/2022 11:49 AM    CLARITYU Clear 07/05/2022 11:49 AM    GLUCOSEU 500 07/05/2022 11:49 AM    GLUCOSEU NEGATIVE 01/02/2011 02:44 PM    BILIRUBINUR Negative 07/05/2022 11:49 AM    BILIRUBINUR NEGATIVE 01/02/2011 02:44 PM    KETUA TRACE 07/05/2022 11:49 AM    SPECGRAV 1.024 07/05/2022 11:49 AM    BLOODU Negative 07/05/2022 11:49 AM    PHUR 5.0 07/05/2022 11:49 AM    PROTEINU 300 07/05/2022 11:49 AM    UROBILINOGEN 2.0 07/05/2022 11:49 AM    NITRU Negative 07/05/2022 11:49 AM    LEUKOCYTESUR Negative 07/05/2022 11:49 AM    LABMICR YES 07/05/2022 11:49 AM    URINETYPE NotGiven 07/05/2022 11:49 AM      Urine Microscopic:   Lab Results   Component Value Date/Time    BACTERIA None Seen 07/05/2022 11:49 AM    COMU SEE COMMENT 07/05/2022 11:49 AM    HYALCAST 10 07/05/2022 11:49 AM    WBCUA 1 07/05/2022 11:49 AM    RBCUA 1 07/05/2022 11:49 AM    EPIU 3 07/05/2022 11:49 AM     Urine Reflex to Culture:   Lab Results   Component Value Date/Time    URRFLXCULT Not Indicated 07/05/2022 11:49 AM     Contains abnormal data 1,3 Beta-D-Glucan  Order: [id number]   Status: Final result     Visible to patient: Detected   Comment: INTERPRETIVE INFORMATION: Histoplasma Antigen Quantitative by EIA,   Serum   Less than 0.19 ng/mL = Not Detected       Hemoglobin A1C  Hemoglobin A1c  Collected: 06/06/22 1506   Result status: Final   Resulting lab: [address] [place] [address] LAB   Reference range: See comment %   Value: 16.9   Comment: Comment:   Diagnosis of Diabetes: > or = 6.5%   Increased risk of diabetes (Prediabetes): 5.7-6.4%   Glycemic Control: Nonpregnant Adults: <7.0%                     Pregnant: <6.0%             Imaging:   CT CHEST PULMONARY EMBOLISM W CONTRAST   Final Result   1. No evidence of pulmonary embolic disease. 2. Mild progression of patchy multifocal right upper lobe opacification   consistent with pneumonia. There is new ground-glass opacification in the   right lower lobe concerning for pneumonia as well. 3. Progression of right hilar adenopathy with increased size of central node   and new node laterally within the right hilum. Note is made of negative   bronchoscopy 06/09/2022. However, findings remain concerning for malignancy. Close follow-up recommended.           XR CHEST (2 VW)   Final Result   Persistent patchy right upper lobe airspace disease most consistent with   pneumonia. Chest CT is recommended to evaluate for an obstructing process.           IMAGING:    CT CHEST PULMONARY EMBOLISM W CONTRAST   Final Result   1. No evidence of pulmonary embolic disease. 2. Mild progression of patchy multifocal right upper lobe opacification   consistent with pneumonia. There is new ground-glass opacification in the   right lower lobe concerning for pneumonia as well. 3. Progression of right hilar adenopathy with increased size of central node   and new node laterally within the right hilum. Note is made of negative   bronchoscopy 06/09/2022. However, findings remain concerning for malignancy. Close follow-up recommended.          XR CHEST (2 VW)   Final Result   Persistent patchy right negative  Histoplasma Antibody+  Serum Beta D glucan +  DM poor control   BMI at 32  CKD stage 3  Rt hilar adenopahty  HIV -ve  Abnormal CT chest      Given his resp symptoms will cont IV abx and start Itraconazole as he was unable to get this med as out patient will d/w  about options This all could be related to Histoplasmosis he is immune suppressed from uncontrolled DM+     Will check Urine Histo antigen in process and follow up Beta D glucan -ve likely from antifungal use      He will need antifungal therapy at discharge this is being arranged we discussed about diabetes control which is important to achieve cure        Labs, Microbiology, Radiology and all the pertinent results from current hospitalization and  care every where were reviewed  by me as a part of the evaluation   Plan:   1. D/c  IV Zosyn in AM  2. CT chest images reviewed    3. Oral Itraconazole x 200 mg  twice a day after loading  dosing for Lung infection anticipate x 6 months treatment   4. Contol DM  BG still high    5. Watch creat   6. Ref social service  To help with medications -script done  7.  urine Histoplasma antigen reported to be -ve  8. START  Oral Augmentin x 875 mg twice a day x5 days   9. Follow up with me in 6 weeks   10. He will need repeat CT chest in 2 months   11. Check TB Quantiferon  , HIV -ve              Discussed with patient/Family and Nursing staff     Thanks for allowing me to participate in your patient's care and please call me with any questions or concerns.     Eve Torres MD  Infectious Disease  [facility] [place] ([facility]) Physician  Phone: [phone]   Fax : [phone]

## 2022-07-11 VITALS
TEMPERATURE: 98.2 F | DIASTOLIC BLOOD PRESSURE: 87 MMHG | WEIGHT: 246.91 LBS | OXYGEN SATURATION: 96 % | SYSTOLIC BLOOD PRESSURE: 124 MMHG | RESPIRATION RATE: 17 BRPM | HEIGHT: 73 IN | BODY MASS INDEX: 32.72 KG/M2 | HEART RATE: 93 BPM

## 2022-07-11 LAB
ANION GAP SERPL CALCULATED.3IONS-SCNC: 11 MMOL/L (ref 3–16)
BUN BLDV-MCNC: 14 MG/DL (ref 7–20)
CALCIUM SERPL-MCNC: 9.3 MG/DL (ref 8.3–10.6)
CHLORIDE BLD-SCNC: 100 MMOL/L (ref 99–110)
CO2: 24 MMOL/L (ref 21–32)
CREAT SERPL-MCNC: 1.2 MG/DL (ref 0.9–1.3)
FUNGUS (MYCOLOGY) CULTURE: ABNORMAL
FUNGUS (MYCOLOGY) CULTURE: NORMAL
FUNGUS STAIN: ABNORMAL
FUNGUS STAIN: NORMAL
GFR AFRICAN AMERICAN: >60
GFR NON-AFRICAN AMERICAN: >60
GLUCOSE BLD-MCNC: 225 MG/DL (ref 70–99)
GLUCOSE BLD-MCNC: 238 MG/DL (ref 70–99)
GLUCOSE BLD-MCNC: 243 MG/DL (ref 70–99)
ORGANISM: ABNORMAL
PERFORMED ON: ABNORMAL
PERFORMED ON: ABNORMAL
POTASSIUM REFLEX MAGNESIUM: 4 MMOL/L (ref 3.5–5.1)
SODIUM BLD-SCNC: 135 MMOL/L (ref 136–145)

## 2022-07-11 PROCEDURE — 99232 SBSQ HOSP IP/OBS MODERATE 35: CPT | Performed by: INTERNAL MEDICINE

## 2022-07-11 PROCEDURE — 6370000000 HC RX 637 (ALT 250 FOR IP): Performed by: INTERNAL MEDICINE

## 2022-07-11 PROCEDURE — 36415 COLL VENOUS BLD VENIPUNCTURE: CPT

## 2022-07-11 PROCEDURE — 2580000003 HC RX 258: Performed by: INTERNAL MEDICINE

## 2022-07-11 PROCEDURE — 80048 BASIC METABOLIC PNL TOTAL CA: CPT

## 2022-07-11 PROCEDURE — 6360000002 HC RX W HCPCS: Performed by: INTERNAL MEDICINE

## 2022-07-11 RX ORDER — AMOXICILLIN AND CLAVULANATE POTASSIUM 875; 125 MG/1; MG/1
1 TABLET, FILM COATED ORAL EVERY 12 HOURS SCHEDULED
Qty: 10 TABLET | Refills: 0 | Status: SHIPPED | OUTPATIENT
Start: 2022-07-11 | End: 2022-07-16

## 2022-07-11 RX ORDER — ITRACONAZOLE 100 MG/1
200 CAPSULE ORAL 2 TIMES DAILY
Qty: 120 CAPSULE | Refills: 6 | Status: SHIPPED | OUTPATIENT
Start: 2022-07-11 | End: 2022-08-10

## 2022-07-11 RX ORDER — AMOXICILLIN AND CLAVULANATE POTASSIUM 875; 125 MG/1; MG/1
1 TABLET, FILM COATED ORAL EVERY 12 HOURS SCHEDULED
Status: DISCONTINUED | OUTPATIENT
Start: 2022-07-11 | End: 2022-07-11 | Stop reason: HOSPADM

## 2022-07-11 RX ADMIN — ENOXAPARIN SODIUM 30 MG: 100 INJECTION SUBCUTANEOUS at 10:44

## 2022-07-11 RX ADMIN — CARVEDILOL 25 MG: 25 TABLET, FILM COATED ORAL at 10:43

## 2022-07-11 RX ADMIN — HYDROCORTISONE: 0.5 CREAM TOPICAL at 10:43

## 2022-07-11 RX ADMIN — INSULIN LISPRO 4 UNITS: 100 INJECTION, SOLUTION INTRAVENOUS; SUBCUTANEOUS at 10:44

## 2022-07-11 RX ADMIN — INSULIN LISPRO 4 UNITS: 100 INJECTION, SOLUTION INTRAVENOUS; SUBCUTANEOUS at 13:37

## 2022-07-11 RX ADMIN — ITRACONAZOLE 200 MG: 100 CAPSULE, COATED PELLETS ORAL at 10:43

## 2022-07-11 RX ADMIN — PIPERACILLIN AND TAZOBACTAM 3375 MG: 3; .375 INJECTION, POWDER, LYOPHILIZED, FOR SOLUTION INTRAVENOUS at 00:37

## 2022-07-11 NOTE — CARE COORDINATION
DISCHARGE SUMMARY     DATE OF DISCHARGE: 7/11/22    DISCHARGE DESTINATION: Home    HOME CARE: No    HEMODIALYSIS: No    TRANSPORTATION: Private Car    NEW DME ORDERED: no    COMMENTS: Pharmacy obtained prior auth for Sporanox. Family will transport.    Electronically signed by Norma Prieto RN on 7/11/2022 at 1:47 PM

## 2022-07-11 NOTE — PROGRESS NOTES
in the last 72 hours.     Urinalysis:            Lab Results   Component Value Date/Time     NITRU Negative 07/05/2022 11:49 AM     WBCUA 1 07/05/2022 11:49 AM     BACTERIA None Seen 07/05/2022 11:49 AM     RBCUA 1 07/05/2022 11:49 AM     BLOODU Negative 07/05/2022 11:49 AM     SPECGRAV 1.024 07/05/2022 11:49 AM     GLUCOSEU 500 07/05/2022 11:49 AM     GLUCOSEU NEGATIVE 01/02/2011 02:44 PM         Radiology:  CT CHEST PULMONARY EMBOLISM W CONTRAST   Final Result   1. No evidence of pulmonary embolic disease. 2. Mild progression of patchy multifocal right upper lobe opacification   consistent with pneumonia. There is new ground-glass opacification in the   right lower lobe concerning for pneumonia as well. 3. Progression of right hilar adenopathy with increased size of central node   and new node laterally within the right hilum. Note is made of negative   bronchoscopy 06/09/2022. However, findings remain concerning for malignancy. Close follow-up recommended.           XR CHEST (2 VW)   Final Result   Persistent patchy right upper lobe airspace disease most consistent with   pneumonia. Chest CT is recommended to evaluate for an obstructing process.                       Assessment:     1. Pneumonia-fungal, Procl-0.12, +Fungitel--> Histoplasmosis+  2. Generalized weakness  3. Diabetes mellitus  4. Hypertension  5. Seizure   6. CHELE resolved        Plan  -Cont vancomycin, Zosyn till DC time  -ID rec Augmentin at DC  -Itraconazole po as per ID  -will not be seeing this pt a they will seek another doctor  -encourage po fluids as patient able to eat/ drink.  -Insulin sliding scale resume home medications  -Holding nephrotoxic medications  -Restart Coreg 25mg poqd           DVT prophylaxis-Lovenox  Diet: ADULT DIET;  Regular; 4 carb choices (60 gm/meal)  Code Status: Full Code     PT/OT Eval Status: ordered     Dispo - pending clinical improvement    Electronically signed by José Miguel Jane MD on 7/11/2022 at 11:23 AM

## 2022-07-11 NOTE — PLAN OF CARE
Problem: Discharge Planning  Goal: Discharge to home or other facility with appropriate resources  7/10/2022 2116 by Jamal Rock RN  Outcome: Progressing  Flowsheets (Taken 7/10/2022 2109)  Discharge to home or other facility with appropriate resources: Identify barriers to discharge with patient and caregiver  7/10/2022 0919 by Jarocho Brooks RN  Outcome: Progressing  Flowsheets (Taken 7/10/2022 0258 by Jamal Rock RN)  Discharge to home or other facility with appropriate resources: Identify barriers to discharge with patient and caregiver     Problem: Pain  Goal: Verbalizes/displays adequate comfort level or baseline comfort level  7/10/2022 2116 by Jamal Rock RN  Outcome: Progressing  7/10/2022 0919 by Jarocho Brooks RN  Outcome: Progressing  Flowsheets (Taken 7/10/2022 0919)  Verbalizes/displays adequate comfort level or baseline comfort level:   Encourage patient to monitor pain and request assistance   Assess pain using appropriate pain scale  Note: Pt able to express presence and absence of pain using numerical pain scale. Pt pain is managed by PRN analgesics as ordered by MD. Pain reassess after each interventions. Will continue to monitor as needed. Problem: Safety - Adult  Goal: Free from fall injury  7/10/2022 2116 by Jamal Rock RN  Outcome: Progressing  7/10/2022 0919 by Jarocho Brooks RN  Outcome: Progressing     Problem: ABCDS Injury Assessment  Goal: Absence of physical injury  7/10/2022 2116 by Jamal Rock RN  Outcome: Progressing  7/10/2022 0919 by Jarocho Brooks RN  Outcome: Progressing  Note: Pt free from falls this shift. Non skid socks provided. Pt educated on use of call light as needed for assistance. Call light always within reach. Pt able and agreeable to contact for safety appropriately.        Problem: Respiratory - Adult  Goal: Achieves optimal ventilation and oxygenation  7/10/2022 2116 by Jamal Rock RN  Outcome: Progressing  7/10/2022 0919 by Velia Gutierrez RN  Outcome: Progressing  Flowsheets (Taken 7/10/2022 0258 by Rajan Ching RN)  Achieves optimal ventilation and oxygenation: Assess for changes in respiratory status     Problem: Musculoskeletal - Adult  Goal: Return mobility to safest level of function  7/10/2022 2116 by Rajan Ching RN  Outcome: Progressing  Flowsheets (Taken 7/10/2022 2109)  Return Mobility to Safest Level of Function: Assess patient stability and activity tolerance for standing, transferring and ambulating with or without assistive devices  7/10/2022 0919 by Velia Gutierrez RN  Outcome: Progressing  Flowsheets (Taken 7/10/2022 0258 by Rajan Ching RN)  Return Mobility to Safest Level of Function: Assess patient stability and activity tolerance for standing, transferring and ambulating with or without assistive devices  Goal: Maintain proper alignment of affected body part  7/10/2022 2116 by Rajan Ching RN  Outcome: Progressing  Flowsheets (Taken 7/10/2022 2109)  Maintain proper alignment of affected body part: Support and protect limb and body alignment per provider's orders  7/10/2022 0919 by Velia Gutierrez RN  Outcome: Progressing  Flowsheets  Taken 7/10/2022 0919 by Velia Gutierrez RN  Maintain proper alignment of affected body part: Support and protect limb and body alignment per provider's orders  Taken 7/10/2022 0258 by Rajan Ching RN  Maintain proper alignment of affected body part:  Instruct and reinforce with patient and family use of appropriate assistive device and precautions (e.g. spinal or hip dislocation precautions)  Goal: Return ADL status to a safe level of function  7/10/2022 2116 by Rajan Ching RN  Outcome: Progressing  Flowsheets (Taken 7/10/2022 2109)  Return ADL Status to a Safe Level of Function: Administer medication as ordered  7/10/2022 0919 by Velia Gutierrez RN  Outcome: Progressing  Flowsheets (Taken 7/10/2022 0258 by Rajan Ching RN)  Return ADL Status to a Safe Level of Function: Assess activities of daily living deficits and provide assistive devices as needed     Problem: Neurosensory - Adult  Goal: Achieves stable or improved neurological status  Outcome: Progressing  Flowsheets (Taken 7/10/2022 2109)  Achieves stable or improved neurological status: Assess for and report changes in neurological status  Goal: Absence of seizures  Outcome: Progressing  Flowsheets (Taken 7/10/2022 2109)  Absence of seizures: Monitor for seizure activity.   If seizure occurs, document type and location of movements and any associated apnea  Goal: Remains free of injury related to seizures activity  Outcome: Progressing  Flowsheets (Taken 7/10/2022 2109)  Remains free of injury related to seizure activity: Maintain airway, patient safety  and administer oxygen as ordered  Goal: Achieves maximal functionality and self care  Outcome: Progressing  Flowsheets (Taken 7/10/2022 2109)  Achieves maximal functionality and self care: Monitor swallowing and airway patency with patient fatigue and changes in neurological status     Problem: Skin/Tissue Integrity - Adult  Goal: Skin integrity remains intact  Outcome: Progressing  Goal: Incisions, wounds, or drain sites healing without S/S of infection  Outcome: Progressing  Goal: Oral mucous membranes remain intact  Outcome: Progressing     Problem: Gastrointestinal - Adult  Goal: Minimal or absence of nausea and vomiting  Outcome: Progressing  Goal: Maintains or returns to baseline bowel function  Outcome: Progressing  Goal: Maintains adequate nutritional intake  Outcome: Progressing  Goal: Establish and maintain optimal ostomy function  Outcome: Progressing     Problem: Genitourinary - Adult  Goal: Absence of urinary retention  Outcome: Progressing  Flowsheets (Taken 7/10/2022 2109)  Absence of urinary retention: Assess patients ability to void and empty bladder  Goal: Urinary catheter remains patent  Outcome: Progressing     Problem: Infection - Adult  Goal: Absence of infection at discharge  Outcome: Progressing  Flowsheets (Taken 7/10/2022 2109)  Absence of infection at discharge: Assess and monitor for signs and symptoms of infection  Goal: Absence of infection during hospitalization  Outcome: Progressing  Flowsheets (Taken 7/10/2022 2109)  Absence of infection during hospitalization: Administer medications as ordered  Goal: Absence of fever/infection during anticipated neutropenic period  Outcome: Progressing     Problem: Metabolic/Fluid and Electrolytes - Adult  Goal: Electrolytes maintained within normal limits  Outcome: Progressing  Flowsheets (Taken 7/10/2022 2109)  Electrolytes maintained within normal limits: Monitor labs and assess patient for signs and symptoms of electrolyte imbalances  Goal: Hemodynamic stability and optimal renal function maintained  Outcome: Progressing  Flowsheets (Taken 7/10/2022 2109)  Hemodynamic stability and optimal renal function maintained: Monitor intake, output and patient weight  Goal: Glucose maintained within prescribed range  Outcome: Progressing  Flowsheets (Taken 7/10/2022 2109)  Glucose maintained within prescribed range: Monitor blood glucose as ordered     Problem: Hematologic - Adult  Goal: Maintains hematologic stability  Outcome: Progressing  Flowsheets (Taken 7/10/2022 2109)  Maintains hematologic stability: Assess for signs and symptoms of bleeding or hemorrhage     Problem: Nutrition Deficit:  Goal: Optimize nutritional status  Outcome: Progressing

## 2022-07-11 NOTE — PLAN OF CARE
Problem: Discharge Planning  Goal: Discharge to home or other facility with appropriate resources  Outcome: Progressing     Problem: Pain  Goal: Verbalizes/displays adequate comfort level or baseline comfort level  Outcome: Progressing     Problem: Safety - Adult  Goal: Free from fall injury  Outcome: Progressing     Problem: ABCDS Injury Assessment  Goal: Absence of physical injury  Outcome: Progressing     Problem: Respiratory - Adult  Goal: Achieves optimal ventilation and oxygenation  Outcome: Progressing     Problem: Musculoskeletal - Adult  Goal: Return mobility to safest level of function  Outcome: Progressing  Goal: Maintain proper alignment of affected body part  Outcome: Progressing  Goal: Return ADL status to a safe level of function  Outcome: Progressing     Problem: Nutrition Deficit:  Goal: Optimize nutritional status  Outcome: Progressing     Problem: Neurosensory - Adult  Goal: Achieves stable or improved neurological status  Outcome: Progressing  Goal: Absence of seizures  Outcome: Progressing  Goal: Remains free of injury related to seizures activity  Outcome: Progressing  Goal: Achieves maximal functionality and self care  Outcome: Progressing     Problem: Cardiovascular - Adult  Goal: Maintains optimal cardiac output and hemodynamic stability  Outcome: Progressing  Goal: Absence of cardiac dysrhythmias or at baseline  Outcome: Progressing     Problem: Skin/Tissue Integrity - Adult  Goal: Skin integrity remains intact  Outcome: Progressing  Goal: Incisions, wounds, or drain sites healing without S/S of infection  Outcome: Progressing  Goal: Oral mucous membranes remain intact  Outcome: Progressing     Problem: Gastrointestinal - Adult  Goal: Minimal or absence of nausea and vomiting  Outcome: Progressing  Goal: Maintains or returns to baseline bowel function  Outcome: Progressing  Goal: Maintains adequate nutritional intake  Outcome: Progressing  Goal: Establish and maintain optimal ostomy function  Outcome: Progressing     Problem: Genitourinary - Adult  Goal: Absence of urinary retention  Outcome: Progressing  Goal: Urinary catheter remains patent  Outcome: Progressing     Problem: Infection - Adult  Goal: Absence of infection at discharge  Outcome: Progressing  Goal: Absence of infection during hospitalization  Outcome: Progressing  Goal: Absence of fever/infection during anticipated neutropenic period  Outcome: Progressing     Problem: Metabolic/Fluid and Electrolytes - Adult  Goal: Electrolytes maintained within normal limits  Outcome: Progressing  Goal: Hemodynamic stability and optimal renal function maintained  Outcome: Progressing  Goal: Glucose maintained within prescribed range  Outcome: Progressing     Problem: Hematologic - Adult  Goal: Maintains hematologic stability  Outcome: Progressing     Problem: Chronic Conditions and Co-morbidities  Goal: Patient's chronic conditions and co-morbidity symptoms are monitored and maintained or improved  Outcome: Progressing

## 2022-07-11 NOTE — PROGRESS NOTES
BRONCHOSCOPY DIAGNOSTIC OR CELL [address] ONLY performed by Ariel Nageotte, MD at [address] [place] [address]  6/9/2022    BRONCHOSCOPY BIOPSY BRONCHUS performed by Ariel Nageotte, MD at [address] [place] [address] Rd N/A 6/14/2022    BRONCHOSCOPY DIAGNOSTIC OR CELL [address] ONLY performed by Tanisha Rg DO at [facility] ENDOSCOPY       Current Medications:    Outpatient Medications Marked as Taking for the 7/5/22 encounter [facility] Encounter)   Medication Sig Dispense Refill    itraconazole (SPORANOX) 100 MG capsule Take 2 capsules by mouth 2 times daily for 14 days 30 capsule 0    itraconazole (SPORANOX PULSEPAK) 100 MG capsule Take 2 capsules by mouth daily for 14 days 28 capsule 0    itraconazole (SPORANOX) 100 MG capsule Take 2 capsules by mouth 2 times daily 120 capsule 6       Allergies:  Patient has no known allergies. Immunizations : There is no immunization history on file for this patient.     Social History:    Social History     Tobacco Use    Smoking status: Never Smoker    Smokeless tobacco: Never Used   Vaping Use    Vaping Use: Never used   Substance Use Topics    Alcohol use: Yes     Comment: rare    Drug use: Not Currently     Social History     Tobacco Use   Smoking Status Never Smoker   Smokeless Tobacco Never Used      Family History : no DVT no COPD       REVIEW OF SYSTEMS:      Constitutional:  negative for fevers, chills, night sweats  Eyes:  negative for blurred vision, eye discharge, visual disturbance   HEENT:  negative for hearing loss, ear drainage,nasal congestion  Respiratory:  cough+ , shortness of breath or hemoptysis   Cardiovascular:  negative for chest pain, palpitations, syncope  Gastrointestinal:  negative for nausea, vomiting, diarrhea, constipation, abdominal pain  Genitourinary:  negative for frequency, dysuria, urinary incontinence, hematuria  Hematologic/Lymphatic:  negative for easy bruising, bleeding and lymphadenopathy  Allergic/Immunologic: negative for recurrent infections, angioedema, anaphylaxis   Endocrine:  negative for weight changes, polyuria, polydipsia and polyphagia  Musculoskeletal:  negative for joint  pain, swelling, decreased range of motion  Integumentary: No rashes, skin lesions  Neurological:  negative for headaches, slurred speech, unilateral weakness  Psychiatric: negative for hallucinations,confusion,agitation.                 PHYSICAL EXAM:      Vitals:    /87   Pulse 93   Temp 98.2 °F (36.8 °C) (Oral)   Resp 17   Ht 6' 1\" (1.854 m)   Wt 246 lb 14.6 oz (112 kg)   SpO2 96%   BMI 32.58 kg/m²     General Appearance: alert,in some acute distress,++ pallor, no icterus chronic ill appearing man ++  Skin: warm and dry, no rash or erythema  Head: normocephalic and atraumatic  Eyes: pupils equal, round, and reactive to light, conjunctivae normal  ENT: tympanic membrane, external ear and ear canal normal bilaterally, nose without deformity, nasal mucosa and turbinates normal without polyps  Neck: supple and non-tender without mass, no thyromegaly  no cervical lymphadenopathy  Pulmonary/Chest: Rt base crepts++  wheezes, rales or rhonchi, normal air movement, no respiratory distress  Cardiovascular: normal rate, regular rhythm, normal S1 and S2, no murmurs, rubs, clicks, or gallops, no carotid bruits  Abdomen: soft, non-tender, non-distended, normal bowel sounds, no masses or organomegaly  Extremities: no cyanosis, clubbing or edema  Musculoskeletal: normal range of motion, no joint swelling, deformity or tenderness  Integumentary: No rashes, no abnormal skin lesions, no petechiae  Neurologic: reflexes normal and symmetric, no cranial nerve deficit  Psych:  Orientation, sensorium, mood normal            Lines: IV  Gynecomastia+          Data Review:    CBC:   Lab Results   Component Value Date    WBC 3.6 (L) 07/06/2022    HGB 11.9 (L) 07/06/2022    HCT 36.5 (L) 07/06/2022    MCV 78.1 (L) 07/06/2022     07/06/2022     RENAL: Lab Results   Component Value Date    CREATININE 1.2 07/11/2022    BUN 14 07/11/2022     (L) 07/11/2022    K 4.0 07/11/2022     07/11/2022    CO2 24 07/11/2022     SED RATE:   Lab Results   Component Value Date/Time    SEDRATE >130 06/11/2022 07:39 AM     CK: No results found for: CKTOTAL  CRP:   Lab Results   Component Value Date/Time    CRP 62.4 06/03/2020 02:37 PM     Hepatic Function Panel:   Lab Results   Component Value Date/Time    ALKPHOS 120 07/05/2022 09:54 AM    ALT 11 07/05/2022 09:54 AM    AST 10 07/05/2022 09:54 AM    PROT 7.6 07/05/2022 09:54 AM    BILITOT 0.4 07/05/2022 09:54 AM    BILIDIR <0.2 06/19/2022 08:06 AM    IBILI see below 06/19/2022 08:06 AM    LABALBU 3.8 07/05/2022 09:54 AM     UA:  Lab Results   Component Value Date/Time    COLORU Yellow 07/05/2022 11:49 AM    CLARITYU Clear 07/05/2022 11:49 AM    GLUCOSEU 500 07/05/2022 11:49 AM    GLUCOSEU NEGATIVE 01/02/2011 02:44 PM    BILIRUBINUR Negative 07/05/2022 11:49 AM    BILIRUBINUR NEGATIVE 01/02/2011 02:44 PM    KETUA TRACE 07/05/2022 11:49 AM    SPECGRAV 1.024 07/05/2022 11:49 AM    BLOODU Negative 07/05/2022 11:49 AM    PHUR 5.0 07/05/2022 11:49 AM    PROTEINU 300 07/05/2022 11:49 AM    UROBILINOGEN 2.0 07/05/2022 11:49 AM    NITRU Negative 07/05/2022 11:49 AM    LEUKOCYTESUR Negative 07/05/2022 11:49 AM    LABMICR YES 07/05/2022 11:49 AM    URINETYPE NotGiven 07/05/2022 11:49 AM      Urine Microscopic:   Lab Results   Component Value Date/Time    BACTERIA None Seen 07/05/2022 11:49 AM    COMU SEE COMMENT 07/05/2022 11:49 AM    HYALCAST 10 07/05/2022 11:49 AM    WBCUA 1 07/05/2022 11:49 AM    RBCUA 1 07/05/2022 11:49 AM    EPIU 3 07/05/2022 11:49 AM     Urine Reflex to Culture:   Lab Results   Component Value Date/Time    URRFLXCULT Not Indicated 07/05/2022 11:49 AM     Contains abnormal data 1,3 Beta-D-Glucan  Order: [id number]   Status: Final result     Visible to patient: No (not released)     Next appt:  Today at 02:00 PM in Orthopedic Surgery (Brent Argueta MD)     0 Result Notes       Ref Range & Units 6/9/22 0745   (1,3)-Beta-D-Glucan (Fungitell) Interpretation Negative Positive Abnormal     Comment: INTERPRETIVE INFORMATION: (1,3)-beta-D-glucan (Fungitell)     Less than 31 pg/mL . .................. Negative     31-59 pg/mL . ......................... Negative     60-79 pg/mL . ......................... Indeterminate             Blood Culture:         Lab Results   Component Value Date/Time     BC No Growth after 4 days of incubation. 06/06/2022 05:30 PM     BLOODCULT2 No Growth after 4 days of incubation. 06/06/2022          MICRO: cultures reviewed and updated by me   Blood Culture:   Lab Results   Component Value Date/Time    BC No Growth after 4 days of incubation. 07/05/2022 09:54 AM    BLOODCULT2 No Growth after 4 days of incubation. 07/05/2022 10:58 AM       Respiratory Culture:  Lab Results   Component Value Date/Time    CULTRESP Normal respiratory debra 06/09/2022 01:28 PM    LABGRAM  06/09/2022 01:35 PM     1+ WBC's (Mononuclear)  No Epithelial Cells seen  No organisms seen       AFB:  Lab Results   Component Value Date/Time    AFBSMEAR No AFB observed by Fluorescent stain 06/09/2022 01:35 PM     Viral Culture:  Lab Results   Component Value Date/Time    COVID19 Not Detected 06/20/2022 10:50 AM     Urine Culture: No results for input(s): LABURIN in the last 72 hours.       FINAL DIAGNOSIS:     Right upper lobe lung biopsy:      - Small, detached fragments of benign bronchial mucosal with acute and        chronic inflammation        and blood clot.      - Negative for granulomatous inflammation or neoplasm.      - No diagnostic transbronchial lung parenchyma present.      PHIAB/PHIAB         HISTOPLASMA INTERPETATION  Histoplasma Antigen, Serum  Collected: 06/14/22 7806   Result status: Final   Resulting lab: [facility] LABORATORY   Reference range: Not Detected   Value: Not Detected   Comment: INTERPRETIVE INFORMATION: Histoplasma Antigen Quantitative by EIA,   Serum   Less than 0.19 ng/mL = Not Detected       Hemoglobin A1C  Hemoglobin A1c  Collected: 06/06/22 1506   Result status: Final   Resulting lab: [facility] LAB   Reference range: See comment %   Value: 16.9   Comment: Comment:   Diagnosis of Diabetes: > or = 6.5%   Increased risk of diabetes (Prediabetes): 5.7-6.4%   Glycemic Control: Nonpregnant Adults: <7.0%                     Pregnant: <6.0%             Imaging:   CT CHEST PULMONARY EMBOLISM W CONTRAST   Final Result   1. No evidence of pulmonary embolic disease. 2. Mild progression of patchy multifocal right upper lobe opacification   consistent with pneumonia. There is new ground-glass opacification in the   right lower lobe concerning for pneumonia as well. 3. Progression of right hilar adenopathy with increased size of central node   and new node laterally within the right hilum. Note is made of negative   bronchoscopy 06/09/2022. However, findings remain concerning for malignancy. Close follow-up recommended.           XR CHEST (2 VW)   Final Result   Persistent patchy right upper lobe airspace disease most consistent with   pneumonia. Chest CT is recommended to evaluate for an obstructing process.           IMAGING:    CT CHEST PULMONARY EMBOLISM W CONTRAST   Final Result   1. No evidence of pulmonary embolic disease. 2. Mild progression of patchy multifocal right upper lobe opacification   consistent with pneumonia. There is new ground-glass opacification in the   right lower lobe concerning for pneumonia as well. 3. Progression of right hilar adenopathy with increased size of central node   and new node laterally within the right hilum. Note is made of negative   bronchoscopy 06/09/2022. However, findings remain concerning for malignancy. Close follow-up recommended.          XR CHEST (2 VW)   Final Result   Persistent patchy right upper lobe airspace disease most consistent with   pneumonia. Chest CT is recommended to evaluate for an obstructing process.                All the pertinent images and reports for the current Hospitalization were reviewed by me     Scheduled Meds:   amoxicillin-clavulanate  1 tablet Oral 2 times per day    hydrocortisone   Topical BID    carvedilol  25 mg Oral BID WC    insulin lispro  0-12 Units SubCUTAneous TID WC    insulin lispro  0-6 Units SubCUTAneous Nightly    itraconazole  200 mg Oral BID    sodium chloride flush  5-40 mL IntraVENous 2 times per day    sodium chloride flush  10 mL IntraVENous 2 times per day    enoxaparin  30 mg SubCUTAneous BID       Continuous Infusions:   dextrose      sodium chloride 100 mL/hr at 07/08/22 0158    sodium chloride Stopped (07/06/22 1716)       PRN Meds:  benzonatate, guaiFENesin, glucose, dextrose bolus **OR** dextrose bolus, glucagon (rDNA), dextrose, sodium chloride flush, sodium chloride, sodium chloride flush, sodium chloride, promethazine **OR** ondansetron, magnesium hydroxide, acetaminophen **OR** acetaminophen      Assessment:     Patient Active Problem List   Diagnosis    Poorly controlled type 2 diabetes mellitus ([address]    Essential hypertension    SOB (shortness of breath)    CKD (chronic kidney disease)    Acute renal failure ([address]    Community acquired pneumonia of right upper lobe of lung    Abnormal CT of the chest    Endobronchial mass    Hemoptysis    Streptococcal infection    S/P bronchoscopy    Seizure disorder ([address]    Class 1 obesity due to excess calories with body mass index (BMI) of 34.0 to 34.9 in adult    Fever    Encounter for medication counseling    PNA (pneumonia)    Moderate malnutrition ([address]    Closed displaced fracture of navicular bone of left foot     Rt side Pneumonia  CT with worsening and progression  Hilar adenopathy on Chest CT  Recent admit at [facility] for PNA  S/p Bronch and BAL cx thus far negative  Histoplasma Antibody+  Serum Beta D glucan +  DM poor control   BMI at 32  CKD stage 3  Rt hilar adenopahty  HIV -ve  Abnormal CT chest      Given his resp symptoms will cont IV abx and start Itraconazole as he was unable to get this med as out patient will d/w  about options This all could be related to Histoplasmosis he is immune suppressed from uncontrolled DM+     Will check Urine Histo antigen in process and follow up Beta D glucan -ve likely from antifungal use      He will need antifungal therapy at discharge this is being arranged we discussed about diabetes control which is important to achieve cure        Labs, Microbiology, Radiology and all the pertinent results from current hospitalization and  care every where were reviewed  by me as a part of the evaluation   Plan:   1. D/c  IV Zosyn   2. CT chest images reviewed    3. Oral Itraconazole x 200 mg  twice a day  anticipate x 6 months treatment   4. Contol DM  BG still high    5. Watch creat   6. Ref social service  To help with medications -script done  7.  urine Histoplasma antigen reported to be -ve  8. START  Oral Augmentin x 875 mg twice a day x5 days   9. Follow up with me in 6 weeks   10. He will need repeat CT chest in 2 months   11. Check TB Quantiferon         Discussed with patient/Family and Nursing staff     Thanks for allowing me to participate in your patient's care and please call me with any questions or concerns.     Isabel Kulkarni MD  Infectious Disease  [facility] ([facility]) Physician  Phone: [phone]   Fax : [phone]

## 2022-07-11 NOTE — PLAN OF CARE
Problem: Discharge Planning  Goal: Discharge to home or other facility with appropriate resources  7/11/2022 1341 by Hugo Hartman RN  Outcome: Completed  7/11/2022 1341 by Hugo Hartman RN  Outcome: Progressing     Problem: Pain  Goal: Verbalizes/displays adequate comfort level or baseline comfort level  7/11/2022 1341 by Hugo Hartman RN  Outcome: Completed  7/11/2022 1341 by Hugo Hartman RN  Outcome: Progressing     Problem: Safety - Adult  Goal: Free from fall injury  7/11/2022 1341 by Hugo Hartman RN  Outcome: Completed  7/11/2022 1341 by Hugo Hartman RN  Outcome: Progressing     Problem: ABCDS Injury Assessment  Goal: Absence of physical injury  7/11/2022 1341 by Hugo Hartman RN  Outcome: Completed  7/11/2022 1341 by Hugo Hartman RN  Outcome: Progressing     Problem: Respiratory - Adult  Goal: Achieves optimal ventilation and oxygenation  7/11/2022 1341 by Hugo Hartman RN  Outcome: Completed  7/11/2022 1341 by Hugo Hartman RN  Outcome: Progressing     Problem: Musculoskeletal - Adult  Goal: Return mobility to safest level of function  7/11/2022 1341 by Hugo Hartman RN  Outcome: Completed  7/11/2022 1341 by Hugo Hartman RN  Outcome: Progressing  Goal: Maintain proper alignment of affected body part  7/11/2022 1341 by Hugo Hartman RN  Outcome: Completed  7/11/2022 1341 by Hugo Hartman RN  Outcome: Progressing  Goal: Return ADL status to a safe level of function  7/11/2022 1341 by Hugo Hartman RN  Outcome: Completed  7/11/2022 1341 by Hugo Hartman RN  Outcome: Progressing     Problem: Nutrition Deficit:  Goal: Optimize nutritional status  7/11/2022 1341 by Hugo Hartman RN  Outcome: Completed  7/11/2022 1341 by Hugo Hartman RN  Outcome: Progressing     Problem: Neurosensory - Adult  Goal: Achieves stable or improved neurological status  7/11/2022 1341 by Hugo Hartman RN  Outcome: Completed  7/11/2022 1341 by Hugo Hartman RN  Outcome: Progressing  Goal: Absence of seizures  7/11/2022 1341 by Hugo Hartman Completed  7/11/2022 1341 by Emeka Vasquez RN  Outcome: Progressing     Problem: Genitourinary - Adult  Goal: Absence of urinary retention  7/11/2022 1341 by Emeka Vasquez RN  Outcome: Completed  7/11/2022 1341 by Emeka Vasquez RN  Outcome: Progressing  Goal: Urinary catheter remains patent  7/11/2022 1341 by Emeka Vasquez RN  Outcome: Completed  7/11/2022 1341 by Emeka Vasquez RN  Outcome: Progressing     Problem: Infection - Adult  Goal: Absence of infection at discharge  7/11/2022 1341 by Emeka Vasquez RN  Outcome: Completed  7/11/2022 1341 by Emeka Vasquez RN  Outcome: Progressing  Goal: Absence of infection during hospitalization  7/11/2022 1341 by Emeka Vasquez RN  Outcome: Completed  7/11/2022 1341 by Emeka Vasquez RN  Outcome: Progressing  Goal: Absence of fever/infection during anticipated neutropenic period  7/11/2022 1341 by Emeka Vasquez RN  Outcome: Completed  7/11/2022 1341 by Emeka Vasquez RN  Outcome: Progressing     Problem: Metabolic/Fluid and Electrolytes - Adult  Goal: Electrolytes maintained within normal limits  7/11/2022 1341 by Emeka Vasquez RN  Outcome: Completed  7/11/2022 1341 by Emeka Vasquez RN  Outcome: Progressing  Goal: Hemodynamic stability and optimal renal function maintained  7/11/2022 1341 by Emeka Vasquez RN  Outcome: Completed  7/11/2022 1341 by Emeka Vasquez RN  Outcome: Progressing  Goal: Glucose maintained within prescribed range  7/11/2022 1341 by Emeka Vasquez RN  Outcome: Completed  7/11/2022 1341 by Emeka Vasquez RN  Outcome: Progressing     Problem: Hematologic - Adult  Goal: Maintains hematologic stability  7/11/2022 1341 by Emeka Vasquez RN  Outcome: Completed  7/11/2022 1341 by Emeka Vasquez RN  Outcome: Progressing     Problem: Chronic Conditions and Co-morbidities  Goal: Patient's chronic conditions and co-morbidity symptoms are monitored and maintained or improved  7/11/2022 1341 by Emeka Vasquez RN  Outcome: Completed  7/11/2022 1341 by Emeka Vasquez RN  Outcome: Progressing

## 2022-07-11 NOTE — PROGRESS NOTES
Pt discharged home. Family transported him. Explained to him to  abx at [facility]. IV removed without complications. All questions answered at this time.

## 2022-07-12 LAB
MYCOPLASMA PNEUMO SOURCE: NORMAL
MYCOPLASMA PNEUMONIAE PCR: NOT DETECTED

## 2022-07-13 LAB
QUANTI TB GOLD PLUS: NEGATIVE
QUANTI TB1 MINUS NIL: 0.01 IU/ML (ref 0–0.34)
QUANTI TB2 MINUS NIL: 0 IU/ML (ref 0–0.34)
QUANTIFERON MITOGEN: >10 IU/ML
QUANTIFERON NIL: 0.03 IU/ML

## 2022-07-19 ENCOUNTER — OFFICE VISIT (OUTPATIENT)
Dept: PRIMARY CARE CLINIC | Age: 37
End: 2022-07-19
Payer: MEDICAID

## 2022-07-19 VITALS
WEIGHT: 246 LBS | OXYGEN SATURATION: 98 % | HEIGHT: 73 IN | DIASTOLIC BLOOD PRESSURE: 71 MMHG | TEMPERATURE: 97.3 F | SYSTOLIC BLOOD PRESSURE: 110 MMHG | HEART RATE: 112 BPM | BODY MASS INDEX: 32.6 KG/M2

## 2022-07-19 DIAGNOSIS — N18.9 CHRONIC KIDNEY DISEASE, UNSPECIFIED CKD STAGE: ICD-10-CM

## 2022-07-19 DIAGNOSIS — Z09 HOSPITAL DISCHARGE FOLLOW-UP: ICD-10-CM

## 2022-07-19 DIAGNOSIS — E11.65 POORLY CONTROLLED TYPE 2 DIABETES MELLITUS (HCC): ICD-10-CM

## 2022-07-19 DIAGNOSIS — I10 ESSENTIAL HYPERTENSION: ICD-10-CM

## 2022-07-19 DIAGNOSIS — B39.2 HISTOPLASMOSIS PNEUMONIA (HCC): Primary | ICD-10-CM

## 2022-07-19 PROCEDURE — 99204 OFFICE O/P NEW MOD 45 MIN: CPT | Performed by: NURSE PRACTITIONER

## 2022-07-19 PROCEDURE — 3046F HEMOGLOBIN A1C LEVEL >9.0%: CPT | Performed by: NURSE PRACTITIONER

## 2022-07-19 PROCEDURE — 1111F DSCHRG MED/CURRENT MED MERGE: CPT | Performed by: NURSE PRACTITIONER

## 2022-07-19 RX ORDER — GABAPENTIN 400 MG/1
400 CAPSULE ORAL 3 TIMES DAILY
Qty: 90 CAPSULE | Refills: 2 | Status: SHIPPED | OUTPATIENT
Start: 2022-07-19 | End: 2022-08-16 | Stop reason: SDUPTHER

## 2022-07-19 RX ORDER — INSULIN LISPRO 100 [IU]/ML
10 INJECTION, SOLUTION INTRAVENOUS; SUBCUTANEOUS
Qty: 5 PEN | Refills: 1 | Status: SHIPPED | OUTPATIENT
Start: 2022-07-19 | End: 2022-09-27 | Stop reason: ALTCHOICE

## 2022-07-19 RX ORDER — DICYCLOMINE HYDROCHLORIDE 10 MG/1
10 CAPSULE ORAL
Qty: 120 CAPSULE | Refills: 1 | Status: SHIPPED | OUTPATIENT
Start: 2022-07-19

## 2022-07-19 RX ORDER — INSULIN DEGLUDEC 200 U/ML
30 INJECTION, SOLUTION SUBCUTANEOUS DAILY
Qty: 5 PEN | Refills: 1 | Status: SHIPPED | OUTPATIENT
Start: 2022-07-19 | End: 2022-09-27 | Stop reason: SDUPTHER

## 2022-07-19 RX ORDER — ROSUVASTATIN CALCIUM 40 MG/1
40 TABLET, COATED ORAL NIGHTLY
Qty: 30 TABLET | Refills: 3 | Status: SHIPPED | OUTPATIENT
Start: 2022-07-19

## 2022-07-19 RX ORDER — CARVEDILOL 25 MG/1
25 TABLET ORAL 2 TIMES DAILY
Qty: 60 TABLET | Refills: 3 | Status: SHIPPED | OUTPATIENT
Start: 2022-07-19

## 2022-07-19 RX ORDER — DULOXETIN HYDROCHLORIDE 30 MG/1
30 CAPSULE, DELAYED RELEASE ORAL DAILY
Qty: 30 CAPSULE | Refills: 3 | Status: SHIPPED | OUTPATIENT
Start: 2022-07-19

## 2022-07-19 RX ORDER — ASPIRIN 81 MG/1
81 TABLET, CHEWABLE ORAL DAILY
Qty: 30 TABLET | Refills: 3 | Status: SHIPPED | OUTPATIENT
Start: 2022-07-19

## 2022-07-19 RX ORDER — CHLORTHALIDONE 25 MG/1
25 TABLET ORAL DAILY
Qty: 30 TABLET | Refills: 3 | Status: SHIPPED | OUTPATIENT
Start: 2022-07-19

## 2022-07-19 SDOH — ECONOMIC STABILITY: FOOD INSECURITY: WITHIN THE PAST 12 MONTHS, THE FOOD YOU BOUGHT JUST DIDN'T LAST AND YOU DIDN'T HAVE MONEY TO GET MORE.: NEVER TRUE

## 2022-07-19 SDOH — ECONOMIC STABILITY: FOOD INSECURITY: WITHIN THE PAST 12 MONTHS, YOU WORRIED THAT YOUR FOOD WOULD RUN OUT BEFORE YOU GOT MONEY TO BUY MORE.: NEVER TRUE

## 2022-07-19 ASSESSMENT — PATIENT HEALTH QUESTIONNAIRE - PHQ9
SUM OF ALL RESPONSES TO PHQ QUESTIONS 1-9: 0
SUM OF ALL RESPONSES TO PHQ QUESTIONS 1-9: 0
SUM OF ALL RESPONSES TO PHQ9 QUESTIONS 1 & 2: 0
SUM OF ALL RESPONSES TO PHQ QUESTIONS 1-9: 0
SUM OF ALL RESPONSES TO PHQ QUESTIONS 1-9: 0
1. LITTLE INTEREST OR PLEASURE IN DOING THINGS: 0
2. FEELING DOWN, DEPRESSED OR HOPELESS: 0

## 2022-07-19 ASSESSMENT — SOCIAL DETERMINANTS OF HEALTH (SDOH): HOW HARD IS IT FOR YOU TO PAY FOR THE VERY BASICS LIKE FOOD, HOUSING, MEDICAL CARE, AND HEATING?: HARD

## 2022-07-19 NOTE — PROGRESS NOTES
[address] PRIMARY CARE  [address] [place] [id number]  Dept: [phone]  Dept Fax: [phone]     7/19/2022      Dav Anderson   1985     Chief Complaint   Patient presents with    Follow-Up from [facility], pneumonia-histoplasmosis       HPI     Patient presents to get established as a new patient. Recently in [facility] for histoplasmosis pneumonia. He is currently taking Sporanox which he will be on for 6 weeks total. He has appointment upcoming with ID. He also has a closed displaced fracture of navicular bone of lhis left foot; he is in a boot and following with Ortho. He has a history of uncontrolled DMII, HTN, Hyperlipidemia, CKD. He does see Dr. Roque Bernard at [facility] for DM. Last Ha1c was 16.9 in June. It was 19.4 in May. He reports he didn't have insurance for a year and was not taking his medications. He is now back on his medications and will set up follow up appt with Dr. Roque Bernard.     [facility] Scores 7/19/2022   PHQ2 Score 0   PHQ9 Score 0     Interpretation of Total Score Depression Severity: 1-4 = Minimal depression, 5-9 = Mild depression, 10-14 = Moderate depression, 15-19 = Moderately severe depression, 20-27 = Severe depression     Prior to Visit Medications    Medication Sig Taking?  Authorizing Provider   itraconazole (SPORANOX) 100 MG capsule Take 2 capsules by mouth 2 times daily Yes Fernando Thomas MD   albuterol sulfate  (90 Base) MCG/ACT inhaler Inhale 2 puffs into the lungs every 6 hours as needed for Wheezing Yes Historical Provider, MD   Insulin Degludec (TRESIBA FLEXTOUCH) 200 UNIT/ML SOPN Inject 30 Units into the skin daily  Yes Historical Provider, MD   insulin lispro, 1 Unit Dial, (HUMALOG KWIKPEN) 100 UNIT/ML SOPN Inject 10 Units into the skin 3 times daily (before meals) Yes HANS Elaine NP   rosuvastatin (CRESTOR) 40 MG tablet Take 1 tablet by mouth nightly Yes HANS Page NP empagliflozin (JARDIANCE) 10 MG tablet Take 10 mg by mouth daily Yes Historical Provider, MD   NIFEdipine (PROCARDIA XL) 60 MG extended release tablet Take 60 mg by mouth daily as needed (Persistently high BP / SBP > 160)  Yes Historical Provider, MD   gabapentin (NEURONTIN) 300 MG capsule Take 300 mg by mouth 3 times daily.  2-3 QD Yes Historical Provider, MD   metFORMIN (GLUCOPHAGE) 500 MG tablet Take 1,000 mg by mouth 2 times daily  Yes Historical Provider, MD   DULoxetine (CYMBALTA) 30 MG extended release capsule Take 30 mg by mouth daily Yes Historical Provider, MD   dicyclomine (BENTYL) 10 MG capsule Take 10 mg by mouth 4 times daily (before meals and nightly)  Yes Historical Provider, MD   carvedilol (COREG) 25 MG tablet Take 25 mg by mouth 2 times daily  Yes Historical Provider, MD   chlorthalidone (HYGROTON) 25 MG tablet Take 25 mg by mouth daily Yes Historical Provider, MD   aspirin 81 MG chewable tablet Take 81 mg by mouth daily  Yes Historical Provider, MD   lisinopril (PRINIVIL;ZESTRIL) 10 MG tablet Take 20 mg by mouth daily  Yes Historical Provider, MD   acetaminophen (TYLENOL) 500 MG tablet Take 1 tablet by mouth 4 times daily as needed for Pain  Kristel Louis APRN - CNP   bisacodyl (DULCOLAX) 10 MG suppository Place 1 suppository rectally daily as needed for Constipation  Supriya Galarza MD   polyethylene glycol (GLYCOLAX) 17 g packet Take 17 g by mouth 2 times daily  Supriya Galarza MD   spironolactone (ALDACTONE) 25 MG tablet Take 1 tablet by mouth daily  Neena Bridges APRN - NP       Past Medical History:   Diagnosis Date    COVID-19     Diabetes mellitus, type II ([facility] [address]     History of blood transfusion     Hypertension     Protein in urine     [facility]         Social History     Tobacco Use    Smoking status: Never    Smokeless tobacco: Never   Vaping Use    Vaping Use: Never used   Substance Use Topics    Alcohol use: Yes     Comment: rare    Drug use: Not Currently Past Surgical History:   Procedure Laterality Date    BRONCHOSCOPY  6/9/2022    BRONCHOSCOPY BRUSHINGS performed by Rajinder Diallo MD at [address]  6/9/2022    BRONCHOSCOPY DIAGNOSTIC OR CELL [address] performed by Rajinder Diallo MD at [address]  6/9/2022    BRONCHOSCOPY BIOPSY BRONCHUS performed by Rajinder Diallo MD at [address] N/A 6/14/2022    BRONCHOSCOPY DIAGNOSTIC OR CELL [address] ONLY performed by Dae House DO at [facility]        No Known Allergies     No family history on file. Patient's past medical history, surgical history, family history, medications, and allergies  were all reviewed and updated as appropriate today. Review of Systems   Constitutional:  Negative for fever. HENT:  Negative for congestion and sore throat. Respiratory:  Positive for shortness of breath (improving). Negative for cough. Cardiovascular:  Negative for chest pain, palpitations and leg swelling. Gastrointestinal:  Negative for abdominal pain. Genitourinary:  Negative for difficulty urinating. Musculoskeletal:  Positive for arthralgias. Neurological:  Negative for dizziness and weakness. Psychiatric/Behavioral: Negative. /71 (Cuff Size: Large Adult)   Pulse (!) 112   Temp 97.3 °F (36.3 °C) (Temporal)   Ht 6' 1\" (1.854 m)   Wt 246 lb (111.6 kg)   SpO2 98% Comment: room air  BMI 32.46 kg/m²      Physical Exam  Constitutional:       Appearance: Normal appearance. He is obese. HENT:      Head: Normocephalic. Eyes:      Extraocular Movements: Extraocular movements intact. Pupils: Pupils are equal, round, and reactive to light. Cardiovascular:      Rate and Rhythm: Normal rate and regular rhythm. Heart sounds: Normal heart sounds. No murmur heard. Pulmonary:      Effort: Pulmonary effort is normal. No respiratory distress. Breath sounds: Normal breath sounds. No wheezing. Musculoskeletal:      Comments: Orthotic boot on left foot   Skin:     General: Skin is warm and dry. Neurological:      Mental Status: He is alert and oriented to person, place, and time. Psychiatric:         Mood and Affect: Mood normal.         Behavior: Behavior normal.       Assessment:  Encounter Diagnoses   Name Primary? Histoplasmosis pneumonia ([facility] [address] Yes    Poorly controlled type 2 diabetes mellitus ([facility] [address]     Chronic kidney disease, unspecified CKD stage     Essential hypertension     Hospital discharge follow-up        Plan:  1. Histoplasmosis pneumonia ([facility] [address]  -Appt with ID 7/27 and with pulmonology in Sept. Continue current medications. Follow up with any new or worsening symptoms. 2. Poorly controlled type 2 diabetes mellitus ([facility] [address]  3. Chronic kidney disease, unspecified CKD stage  4. Essential hypertension  -Following with Endocrine at [facility]. Needs to set up follow-up appt as he did not have insurance for awhile and needs to get re-established. Reviewed labs and it appears he also has CKD. States he does not have a nephrologist. I will place referral today. He also has history of HTN that is currently controlled. He is taking Lisinopril, Nifedipine, Spironolactone, Chlorthalidone, and Carvedilol.   - [facility] - Katalina Soria MD, Nephrology, [facility] SPECIALTY [facility]    5. Hospital discharge follow-up  - [place] DISCHARGE MEDS RECONCILED W/ CURRENT OUTPATIENT MED LIST    Return in 1 month (on 8/19/2022).              Fermin Tommy, APRN - CNP

## 2022-07-20 NOTE — PROGRESS NOTES
Physician Progress Note      PATIENT:               Chet Calderon  CSN #:                  [id number]  :                       1985  ADMIT DATE:       2022 8:51 AM  DISCH DATE:        2022 4:24 PM  RESPONDING  PROVIDER #:        BELLO NOVOA MD          QUERY TEXT:    Pt admitted with Pneumonia-fungal, Histoplasmosis. Noted documentation of   Moderate Malnutrition  on 22 Dietitian  consultant. If possible, please   document in progress notes and discharge summary:    The medical record reflects the following:  Risk Factors:  Acute Illness Pneumonia-fungal, Histoplasmosis  Clinical Indicators: 22 per Dietitian consult \"Malnutrition Status: Moderate malnutrition (22 1352)  Context:  Acute Illness  Findings of the 6 clinical characteristics of malnutrition:  Energy Intake:  75% or less of estimated energy requirements for 7 or more   days  Weight Loss:  Greater than 5% over 1 month  Body Fat Loss:  No significant body fat loss  Muscle Mass Loss:  No significant muscle mass loss  Fluid Accumulation:  Mild Extremities   Strength:  Not Performed\"  Treatment: Dietitian consult and dietary supplements  Options provided:  -- Moderate Malnutrition confirmed present on admission  -- Moderate Malnutrition ruled out  -- Other - I will add my own diagnosis  -- Disagree - Not applicable / Not valid  -- Disagree - Clinically unable to determine / Unknown  -- Refer to Clinical Documentation Reviewer    PROVIDER RESPONSE TEXT:    The diagnosis of Moderate Malnutrition was confirmed as present on admission.     Query created by: King Rosemarie on 7/10/2022 2:14 PM      Electronically signed by:  BELLO NOVOA MD 2022 12:02 PM

## 2022-07-21 ENCOUNTER — TELEPHONE (OUTPATIENT)
Dept: ORTHOPEDIC SURGERY | Age: 37
End: 2022-07-21

## 2022-07-24 PROBLEM — B39.2 HISTOPLASMOSIS PNEUMONIA (HCC): Status: ACTIVE | Noted: 2022-07-24

## 2022-07-24 ASSESSMENT — ENCOUNTER SYMPTOMS
COUGH: 0
ABDOMINAL PAIN: 0
SORE THROAT: 0
SHORTNESS OF BREATH: 1

## 2022-07-26 LAB
AFB CULTURE (MYCOBACTERIA): NORMAL
AFB CULTURE (MYCOBACTERIA): NORMAL
AFB SMEAR: NORMAL
AFB SMEAR: NORMAL

## 2022-07-27 ENCOUNTER — TELEMEDICINE (OUTPATIENT)
Dept: INFECTIOUS DISEASES | Age: 37
End: 2022-07-27
Payer: MEDICAID

## 2022-07-27 DIAGNOSIS — R91.8 ENDOBRONCHIAL MASS: ICD-10-CM

## 2022-07-27 DIAGNOSIS — Z71.89 ENCOUNTER FOR MEDICATION COUNSELING: ICD-10-CM

## 2022-07-27 DIAGNOSIS — E66.09 CLASS 1 OBESITY DUE TO EXCESS CALORIES WITH SERIOUS COMORBIDITY AND BODY MASS INDEX (BMI) OF 34.0 TO 34.9 IN ADULT: ICD-10-CM

## 2022-07-27 DIAGNOSIS — B39.2 HISTOPLASMOSIS PNEUMONIA (HCC): Primary | ICD-10-CM

## 2022-07-27 DIAGNOSIS — R93.89 ABNORMAL CT OF THE CHEST: ICD-10-CM

## 2022-07-27 DIAGNOSIS — E11.65 POORLY CONTROLLED DIABETES MELLITUS (HCC): ICD-10-CM

## 2022-07-27 PROCEDURE — 3046F HEMOGLOBIN A1C LEVEL >9.0%: CPT | Performed by: INTERNAL MEDICINE

## 2022-07-27 PROCEDURE — 99214 OFFICE O/P EST MOD 30 MIN: CPT | Performed by: INTERNAL MEDICINE

## 2022-07-27 NOTE — PROGRESS NOTES
Infectious Diseases Outpatient Follow-up Note Virtual Visit        Primary Care Physician:  HANS Stark - CNP       History Obtained From:   Patient, EPIC    CHIEF COMPLAINT / ID problem:    Chief Complaint   Patient presents with    Follow-up     Histoplasma Pneumonia -     HISTORY OF PRESENT ILLNESS / Interval history:  29 y.o. man with significant history for diabetes poor control, neuropathy, retinopathy, chronic kidney disease, hypertension, seizure disorder was recently admitted to [facility] [place] from  6//6/25 to 6/23/22-on that admission he was noted to have very abnormal CT chest concerning Rt hilar mass and PNA . He underwent Bronch and  biopsy. He also underwent a serological studies and came back +Ve Histoplasma by antibody but Histoplasma antigen was negative and serum Beta D glucan +Ve. BAL with Streptococcus and prevotella anaerobes was treated with Augmentin at d/c and was placed on Itraconazole and follow up in ID clinic. He is tolerating Itraconazole well and no side effects but DM control still poor. He was able to get the meds ok previously had trouble getting the meds. Resp status slowly improving no fevers no chills.          Past Medical History:    Past Medical History:   Diagnosis Date    COVID-19     Diabetes mellitus, type II ([facility] [address]     History of blood transfusion     Hypertension     Protein in urine     Seizure [facility])        Past Surgical History:    Past Surgical History:   Procedure Laterality Date    BRONCHOSCOPY  6/9/2022    BRONCHOSCOPY BRUSHINGS performed by Wai Burgos MD at [address]  6/9/2022    BRONCHOSCOPY DIAGNOSTIC OR CELL [address] performed by Wai Burgos MD at [address]  6/9/2022    BRONCHOSCOPY BIOPSY BRONCHUS performed by Wai Burgos MD at [address] N/A 6/14/2022    BRONCHOSCOPY DIAGNOSTIC OR CELL [address] ONLY performed by Felix Stanford DO at [facility] ENDOSCOPY       Current Medications:    Current Outpatient Medications   Medication Sig Dispense Refill    Insulin Degludec (TRESIBA FLEXTOUCH) 200 UNIT/ML SOPN Inject 30 Units into the skin daily 5 pen 1    gabapentin (NEURONTIN) 400 MG capsule Take 1 capsule by mouth in the morning and 1 capsule at noon and 1 capsule before bedtime. Do all this for 90 days. 2-3 QD. 90 capsule 2    empagliflozin (JARDIANCE) 10 MG tablet Take 1 tablet by mouth in the morning. 30 tablet 3    DULoxetine (CYMBALTA) 30 MG extended release capsule Take 1 capsule by mouth in the morning. 30 capsule 3    dicyclomine (BENTYL) 10 MG capsule Take 1 capsule by mouth 4 times daily (before meals and nightly) 120 capsule 1    chlorthalidone (HYGROTON) 25 MG tablet Take 1 tablet by mouth in the morning. 30 tablet 3    carvedilol (COREG) 25 MG tablet Take 1 tablet by mouth in the morning and 1 tablet before bedtime. 60 tablet 3    aspirin 81 MG chewable tablet Take 1 tablet by mouth in the morning. 30 tablet 3    rosuvastatin (CRESTOR) 40 MG tablet Take 1 tablet by mouth nightly 30 tablet 3    insulin lispro, 1 Unit Dial, (HUMALOG KWIKPEN) 100 UNIT/ML SOPN Inject 10 Units into the skin in the morning and 10 Units at noon and 10 Units in the evening. Inject before meals. 5 pen 1    itraconazole (SPORANOX) 100 MG capsule Take 2 capsules by mouth 2 times daily 120 capsule 6    albuterol sulfate  (90 Base) MCG/ACT inhaler Inhale 2 puffs into the lungs every 6 hours as needed for Wheezing      spironolactone (ALDACTONE) 25 MG tablet Take 1 tablet by mouth daily 30 tablet 3    NIFEdipine (PROCARDIA XL) 60 MG extended release tablet Take 60 mg by mouth daily as needed (Persistently high BP / SBP > 160)       metFORMIN (GLUCOPHAGE) 500 MG tablet Take 1,000 mg by mouth 2 times daily       lisinopril (PRINIVIL;ZESTRIL) 10 MG tablet Take 20 mg by mouth daily        No current facility-administered medications for this visit.        Allergies:  Patient has no known allergies. Immunizations :   Immunization History   Administered Date(s) Administered    Influenza Virus Vaccine 12/26/2012, 10/28/2013, 10/17/2017    Influenza, Triv, 3 Years and older, IM (Afluria (5 yrs and older) 10/02/2018, 10/21/2019, 10/29/2020    Pneumococcal Polysaccharide ([id number]) 12/26/2012, 03/01/2014    Tdap (Boostrix, Adacel) 07/08/2020           Social History:     Social History     Socioeconomic History    Marital status: Single   Tobacco Use    Smoking status: Never    Smokeless tobacco: Never   Vaping Use    Vaping Use: Never used   Substance and Sexual Activity    Alcohol use: Yes     Comment: rare    Drug use: Not Currently    Sexual activity: Yes     Partners: Female     Social Determinants of Health     Financial Resource Strain: High Risk    Difficulty of Paying Living Expenses: Hard   Food Insecurity: No Food Insecurity    Worried About Running Out of Food in the Last Year: Never true    Ran Out of Food in the Last Year: Never true     Social History     Tobacco Use   Smoking Status Never   Smokeless Tobacco Never        Family History \" no DVT NO copd      REVIEW OF SYSTEMS:      CONSTITUTIONAL:  negative for fevers, chills, diaphoresis, activity change, appetite change, fatigue, night sweats and unexpected weight change.    EYES:  negative for blurred vision, eye discharge, visual disturbance and icterus  HEENT:  negative for hearing loss, tinnitus, ear drainage, sinus pressure, nasal congestion, epistaxis and snoring  RESPIRATORY: cough ++ , + sob, no sputum production , no wheeze ,no hemoptysis   CARDIOVASCULAR:  negative for chest pain, palpitations, exertional chest pressure/discomfort, edema, syncope  GASTROINTESTINAL:  negative for nausea, vomiting, diarrhea, constipation, blood in stool and abdominal pain  GENITOURINARY:  negative for frequency, dysuria, urinary incontinence, decreased urine volume, and hematuria  HEMATOLOGIC/LYMPHATIC:  negative for easy bruising, bleeding and lymphadenopathy  ALLERGIC/IMMUNOLOGIC:  negative for recurrent infections, angioedema, anaphylaxis and drug reactions  ENDOCRINE:  negative for weight changes and diabetic symptoms including polyuria, polydipsia and polyphagia  MUSCULOSKELETAL:  negative for  pain, joint swelling, decreased range of motion and muscle weakness  INTEGUMENTARY: negative for skin color change, rashes, blisters  NEUROLOGICAL:  negative for headaches, slurred speech, unilateral weakness  PSYCHIATRIC/BEHAVIORAL: negative for hallucinations, behavioral problems, confusion and agitation. PHYSICAL EXAM:    Vitals: There were no vitals filed for this visit.   Not possible due to virtual visit      DATA:    CBC:   Lab Results   Component Value Date    WBC 3.6 (L) 07/06/2022    HGB 11.9 (L) 07/06/2022    HCT 36.5 (L) 07/06/2022    MCV 78.1 (L) 07/06/2022     07/06/2022     RENAL:   Lab Results   Component Value Date    CREATININE 1.2 07/11/2022    BUN 14 07/11/2022     (L) 07/11/2022    K 4.0 07/11/2022     07/11/2022    CO2 24 07/11/2022     SED RATE:   Lab Results   Component Value Date/Time    SEDRATE >130 06/11/2022 07:39 AM     CK: No results found for: CKTOTAL  CRP:   Lab Results   Component Value Date/Time    CRP 62.4 06/03/2020 02:37 PM     Hepatic Function Panel:   Lab Results   Component Value Date/Time    ALKPHOS 120 07/05/2022 09:54 AM    ALT 11 07/05/2022 09:54 AM    AST 10 07/05/2022 09:54 AM    PROT 7.6 07/05/2022 09:54 AM    BILITOT 0.4 07/05/2022 09:54 AM    BILIDIR <0.2 06/19/2022 08:06 AM    IBILI see below 06/19/2022 08:06 AM    LABALBU 3.8 07/05/2022 09:54 AM     UA:  Lab Results   Component Value Date/Time    COLORU Yellow 07/05/2022 11:49 AM    CLARITYU Clear 07/05/2022 11:49 AM    GLUCOSEU 500 07/05/2022 11:49 AM    GLUCOSEU NEGATIVE 01/02/2011 02:44 PM    BILIRUBINUR Negative 07/05/2022 11:49 AM    BILIRUBINUR NEGATIVE 01/02/2011 02:44 PM    KETUA TRACE 07/05/2022 11:49 AM SPECGRAV 1.024 07/05/2022 11:49 AM    BLOODU Negative 07/05/2022 11:49 AM    PHUR 5.0 07/05/2022 11:49 AM    PROTEINU 300 07/05/2022 11:49 AM    UROBILINOGEN 2.0 07/05/2022 11:49 AM    NITRU Negative 07/05/2022 11:49 AM    LEUKOCYTESUR Negative 07/05/2022 11:49 AM    LABMICR YES 07/05/2022 11:49 AM    URINETYPE NotGiven 07/05/2022 11:49 AM      Urine Microscopic:   Lab Results   Component Value Date/Time    BACTERIA None Seen 07/05/2022 11:49 AM    COMU SEE COMMENT 07/05/2022 11:49 AM    HYALCAST 10 07/05/2022 11:49 AM    WBCUA 1 07/05/2022 11:49 AM    RBCUA 1 07/05/2022 11:49 AM    EPIU 3 07/05/2022 11:49 AM     Urine Reflex to Culture:   Lab Results   Component Value Date/Time    URRFLXCULT Not Indicated 07/05/2022 11:49 AM     Histoplasma Abs, Qnt DID  Histoplasma Antibodies  Collected: 06/14/22 1708   Result status: Final   Resulting lab: [facility] LABORATORY   Reference range: None Detected   Value: Detected Abnormal     Comment: Detected, M band(s) observed. INTERPRETIVE INFORMATION: Histoplasma spp. Antibodies by                             Immunodiffusion   The immunodiffusion test can detect precipitins to specific Histoplasma   protein antigens (M and H). The M band often appears first and may   occur   without the H band. M precipitin is found in about 70 percent of  both   acute   and chronic histoplasmosis cases.  Both M and H occur together in only   about      MICRO: cultures reviewed and updated by me   Histoplasma Ag Interp  Histoplasma Antigen, Urine  Collected: 07/06/22 1820   Result status: Final   Resulting lab: [facility] LABORATORY   Reference range: Not Detected   Value: Not Detected   Comment: INTERPRETIVE DATA: Histoplasma Galactomannan Antigen                      Quantitative by EIA, Urine   Less than 0.4 ng/ml = Not Detected      Respiratory Culture:  Lab Results   Component Value Date/Time    CULTRESP Normal respiratory debra 06/09/2022 01:28 PM    LABGRAM  06/09/2022 01:35 PM     1+ WBC's (Mononuclear)  No Epithelial Cells seen  No organisms seen       AFB:  Lab Results   Component Value Date/Time    AFBSMEAR No AFB observed by Fluorescent stain 06/09/2022 01:35 PM       Urine Culture: No results for input(s): LABURIN in the last 72 hours. Imaging:  No orders to display     CT chest  :  7/5/22      Impression   1. No evidence of pulmonary embolic disease. 2. Mild progression of patchy multifocal right upper lobe opacification   consistent with pneumonia. There is new ground-glass opacification in the   right lower lobe concerning for pneumonia as well. 3. Progression of right hilar adenopathy with increased size of central node   and new node laterally within the right hilum. Note is made of negative   bronchoscopy 06/09/2022. However, findings remain concerning for malignancy. Close follow-up recommended. Labs, Microbiology and  Radiology results pertinent to this visit and from care every where  reviewed in detail by me as part of the consultation     IMPRESSION:     Diagnosis Orders   1. Histoplasmosis pneumonia ([facility] [address]        2. Encounter for medication counseling        3. Class 1 obesity due to excess calories with serious comorbidity and body mass index (BMI) of 34.0 to 34.9 in adult        4. Endobronchial mass        5. Abnormal CT of the chest        6. Poorly controlled diabetes mellitus ([address]          He is tolerating the oral Itraconazole well and has to cont therapy for at least x 3 months, will check drug level next visit. Needs safety labs on  next visit, will check CT chest before stopping Antifungal therapy. He needs to control DM d/w pt and counseling done.  Side effects d/w pt    PLAN:    Cont oral Itraconazole x 200 mg q12 hrs  Check labs CBC with diff,CMP  Itraconazole level next visit  CT chest in  3 months  DM control d/w  pt  Side effects d/w pt  Follow up 2-3 months         Brenda Tapia is a 29 y.o. male being evaluated by a Virtual Visit (video visit) encounter to address concerns as mentioned above. A caregiver was present when appropriate. Due to this being a TeleHealth encounter (During CDFTF-17 public health emergency), evaluation of the following organ systems was limited: Vitals/Constitutional/EENT/Resp/CV/GI//MS/Neuro/Skin/Heme-Lymph-Imm. Pursuant to the emergency declaration under the [address] [place] [address] and the [employer] Act, this Virtual Visit was conducted with patient's (and/or legal guardian's) consent, to reduce the patient's risk of exposure to COVID-19 and provide necessary medical care. The patient (and/or legal guardian) has also been advised to contact this office for worsening conditions or problems, and seek emergency medical treatment and/or call 911 if deemed necessary. Patient identification was verified at the start of the visit: Yes     Total time spent for this encounter: 40 min   on visit (including interval history,review of data including labs, cultures, imaging,  ordering labs, development and implementation of treatment plan, co ordination of care, counseling and education ). Services were provided through a video synchronous discussion virtually to substitute for in-person clinic visit. Patient and provider were located at their individual homes. --Jina Swanson MD on 8/8/2022 at 12:43 PM    An electronic signature was used to authenticate this note. D/w Patient in detail at  the time of consultation. Thanks for allowing me to participate in your patient's care and please do not hesitate to  call me with any questions or concerns.     Kendy Chong MD  Infectious Disease  [facility]) Physician  Phone: [phone]   Fax : [phone]

## 2022-08-11 NOTE — DISCHARGE SUMMARY
Hospital Medicine Discharge Summary    Patient ID: Sue Galo      Patient's PCP: Lluvia Orlando, APRN - CNP    Admit Date: 7/5/2022     Discharge Date: 7/11/2022      Admitting Provider: Kerri Esquivel MD     Discharge Provider: José Miguel Jane MD     Discharge Diagnoses:  Histoplasmosis  Generalized weakness  Dm type 2  HTN  Seizure     Active Hospital Problems    Diagnosis     PNA (pneumonia) [J18.9]      Priority: Medium       The patient was seen and examined on day of discharge and this discharge summary is in conjunction with any daily progress note from day of discharge. Hospital Course:   Patient is a 81-year-old male with past medical history of diabetes mellitus hypertension seizure COVID-19 who presents to the hospital for feeling nauseated and he is concerned about pneumonia because he could not get his medications for pneumonia. Patient mentions that he is appetite is down and also he feels nauseated even by watching the food. Patient also mentions he has cough, he is producing phlegm, he was recently diagnosed with fungal pneumonia he was discharged on antibiotics however he could not receive his antibiotics through the pharmacy because insurance will not approve it. Patient did not follow-up with PCP. Has future appointment with ID. He also reports following recently and broke his ankle. Receivd Zosyn iv and was switched to oral itraconazole at dc  Sent home on Augmentin  Follow up with ID. Physical Exam Performed:     /87   Pulse 93   Temp 98.2 °F (36.8 °C) (Oral)   Resp 17   Ht 6' 1\" (1.854 m)   Wt 246 lb 14.6 oz (112 kg)   SpO2 96%   BMI 32.58 kg/m²       General appearance:  No apparent distress, appears stated age and cooperative. HEENT:  Normal cephalic, atraumatic without obvious deformity. Pupils equal, round, and reactive to light. Extra ocular muscles intact. Conjunctivae/corneas clear. Neck: Supple, with full range of motion.  No jugular venous distention. Trachea midline. Respiratory:  Normal respiratory effort. Clear to auscultation, bilaterally without Rales/Wheezes/Rhonchi. Cardiovascular:  Regular rate and rhythm with normal S1/S2 without murmurs, rubs or gallops. Abdomen: Soft, non-tender, non-distended with normal bowel sounds. Musculoskeletal:  No clubbing, cyanosis or edema bilaterally. Full range of motion without deformity. Skin: Skin color, texture, turgor normal.  No rashes or lesions. Neurologic:  Neurovascularly intact without any focal sensory/motor deficits. Cranial nerves: II-XII intact, grossly non-focal.  Psychiatric:  Alert and oriented, thought content appropriate, normal insight  Capillary Refill: Brisk,< 3 seconds   Peripheral Pulses: +2 palpable, equal bilaterally       Labs: For convenience and continuity at follow-up the following most recent labs are provided:      CBC:    Lab Results   Component Value Date/Time    WBC 3.6 07/06/2022 05:38 AM    HGB 11.9 07/06/2022 05:38 AM    HCT 36.5 07/06/2022 05:38 AM     07/06/2022 05:38 AM       Renal:    Lab Results   Component Value Date/Time     07/11/2022 06:18 AM    K 4.0 07/11/2022 06:18 AM     07/11/2022 06:18 AM    CO2 24 07/11/2022 06:18 AM    BUN 14 07/11/2022 06:18 AM    CREATININE 1.2 07/11/2022 06:18 AM    CALCIUM 9.3 07/11/2022 06:18 AM    PHOS 3.8 06/24/2021 03:00 PM         Significant Diagnostic Studies    Radiology:   CT CHEST PULMONARY EMBOLISM W CONTRAST   Final Result   1. No evidence of pulmonary embolic disease. 2. Mild progression of patchy multifocal right upper lobe opacification   consistent with pneumonia. There is new ground-glass opacification in the   right lower lobe concerning for pneumonia as well. 3. Progression of right hilar adenopathy with increased size of central node   and new node laterally within the right hilum. Note is made of negative   bronchoscopy 06/09/2022. However, findings remain concerning for malignancy. Close follow-up recommended. XR CHEST (2 VW)   Final Result   Persistent patchy right upper lobe airspace disease most consistent with   pneumonia. Chest CT is recommended to evaluate for an obstructing process.                 Consults:     IP CONSULT TO NEPHROLOGY  IP CONSULT TO INFECTIOUS DISEASES    Disposition:  home     Condition at Discharge: Stable    Discharge Instructions/Follow-up:  Dr Demi Davies    Code Status:  Prior     Activity: activity as tolerated    Diet: cardiac diet      Discharge Medications:     Discharge Medication List as of 7/11/2022  1:57 PM             Details   amoxicillin-clavulanate (AUGMENTIN) 875-125 MG per tablet Take 1 tablet by mouth every 12 hours for 10 doses, Disp-10 tablet, R-0Normal                Details   itraconazole (SPORANOX) 100 MG capsule Take 2 capsules by mouth 2 times daily, Disp-120 capsule, R-6Normal                Details   acetaminophen (TYLENOL) 500 MG tablet Take 1 tablet by mouth 4 times daily as needed for Pain, Disp-28 tablet, R-0Print      bisacodyl (DULCOLAX) 10 MG suppository Place 1 suppository rectally daily as needed for Constipation, Disp-30 suppository, R-0Normal      polyethylene glycol (GLYCOLAX) 17 g packet Take 17 g by mouth 2 times daily, Disp-527 g, R-1Normal      albuterol sulfate  (90 Base) MCG/ACT inhaler Inhale 2 puffs into the lungs every 6 hours as needed for WheezingHistorical Med      Insulin Degludec (TRESIBA FLEXTOUCH) 200 UNIT/ML SOPN Inject 30 Units into the skin daily Historical Med      spironolactone (ALDACTONE) 25 MG tablet Take 1 tablet by mouth daily, Disp-30 tablet, R-3Normal      insulin lispro, 1 Unit Dial, (HUMALOG KWIKPEN) 100 UNIT/ML SOPN Inject 10 Units into the skin 3 times daily (before meals), Disp-1 pen, R-1Print      rosuvastatin (CRESTOR) 40 MG tablet Take 1 tablet by mouth nightly, Disp-30 tablet, R-3Normal      NIFEdipine (PROCARDIA XL) 60 MG extended release tablet Take 60 mg by mouth daily as needed (Persistently high BP / SBP > 160) Historical Med      empagliflozin (JARDIANCE) 10 MG tablet Take 10 mg by mouth dailyHistorical Med      metFORMIN (GLUCOPHAGE) 500 MG tablet Take 1,000 mg by mouth 2 times daily Historical Med      gabapentin (NEURONTIN) 300 MG capsule Take 300 mg by mouth 3 times daily. 2-3 QDHistorical Med      DULoxetine (CYMBALTA) 30 MG extended release capsule Take 30 mg by mouth dailyHistorical Med      dicyclomine (BENTYL) 10 MG capsule Take 10 mg by mouth 4 times daily (before meals and nightly) Historical Med      carvedilol (COREG) 25 MG tablet Take 25 mg by mouth 2 times daily Historical Med      chlorthalidone (HYGROTON) 25 MG tablet Take 25 mg by mouth dailyHistorical Med      aspirin 81 MG chewable tablet Take 81 mg by mouth daily Historical Med      lisinopril (PRINIVIL;ZESTRIL) 10 MG tablet Take 20 mg by mouth daily Historical Med             Time Spent on discharge is more than 45 minutes in the examination, evaluation, counseling and review of medications and discharge plan. Signed:    Julius Honeycutt MD   8/10/2022      Thank you HANS Myers - LOLI for the opportunity to be involved in this patient's care. If you have any questions or concerns, please feel free to contact me at [phone].

## 2022-08-16 ENCOUNTER — OFFICE VISIT (OUTPATIENT)
Dept: PRIMARY CARE CLINIC | Age: 37
End: 2022-08-16
Payer: MEDICAID

## 2022-08-16 VITALS
DIASTOLIC BLOOD PRESSURE: 84 MMHG | HEART RATE: 75 BPM | WEIGHT: 249 LBS | HEIGHT: 73 IN | SYSTOLIC BLOOD PRESSURE: 133 MMHG | OXYGEN SATURATION: 98 % | BODY MASS INDEX: 33 KG/M2 | TEMPERATURE: 98.1 F

## 2022-08-16 DIAGNOSIS — E11.65 POORLY CONTROLLED TYPE 2 DIABETES MELLITUS (HCC): Primary | ICD-10-CM

## 2022-08-16 DIAGNOSIS — G47.00 INSOMNIA, UNSPECIFIED TYPE: ICD-10-CM

## 2022-08-16 DIAGNOSIS — G57.93 NEUROPATHY INVOLVING BOTH LOWER EXTREMITIES: ICD-10-CM

## 2022-08-16 DIAGNOSIS — N52.9 ERECTILE DYSFUNCTION, UNSPECIFIED ERECTILE DYSFUNCTION TYPE: ICD-10-CM

## 2022-08-16 LAB — HBA1C MFR BLD: 13.2 %

## 2022-08-16 PROCEDURE — 3046F HEMOGLOBIN A1C LEVEL >9.0%: CPT | Performed by: NURSE PRACTITIONER

## 2022-08-16 PROCEDURE — 83036 HEMOGLOBIN GLYCOSYLATED A1C: CPT | Performed by: NURSE PRACTITIONER

## 2022-08-16 PROCEDURE — 99214 OFFICE O/P EST MOD 30 MIN: CPT | Performed by: NURSE PRACTITIONER

## 2022-08-16 RX ORDER — GABAPENTIN 400 MG/1
800 CAPSULE ORAL 3 TIMES DAILY
Qty: 180 CAPSULE | Refills: 2 | Status: SHIPPED | OUTPATIENT
Start: 2022-08-16 | End: 2022-11-14

## 2022-08-16 NOTE — PROGRESS NOTES
[address] [facility] [address] PRIMARY CARE  [address] [place] [id number]  Dept: [phone]  Dept Fax: [phone]     8/16/2022      Tash Fox   1985     Chief Complaint   Patient presents with    Check-Up       HPI     Patient presents for follow-up. He has history of uncontrolled DMII. Reports he is checking his sugars TID. He reports sugars are ranging from 200-300. He is taking his medications; denies missing any doses. Has attempted to get reestablished with Dr. Swapnil Solis (endo at [facility] [place] operated by [facility]) but first available appt is in January; he states he did take that appointment but was hoping to be seen sooner. He has follow up with Ortho tomorrow for the closed displaced fracture of navicular bone of lhis left foot. He is still in a boot. Reports tomorrow he will find out if he is able to take the boot off or if he needs to continue wearing it. He is also being treated for histoplasmosis pneumonia. Following with Dr. Radha French- ID. He has follow up appt on 8/31. He is still taking itraconazole for histoplasmosis. Reports she still feels like his lungs are not back to normal- gets shortness of breath easily. He has an albuterol inhaler he uses as needed. Reports he has neuropathy in his bilateral feet. He is taking Gabapentin 400mg TID but does not feel like it is helping. He states he has difficulty sleeping due to the pain. He is wondering if there is something he can take for sleep. He also reports he has trouble with ED; unsure if it is related to the gabapentin. PHQ Scores 7/19/2022   PHQ2 Score 0   PHQ9 Score 0     Interpretation of Total Score Depression Severity: 1-4 = Minimal depression, 5-9 = Mild depression, 10-14 = Moderate depression, 15-19 = Moderately severe depression, 20-27 = Severe depression     Prior to Visit Medications    Medication Sig Taking?  Authorizing Provider   Insulin Degludec (TRESIBA FLEXTOUCH) 200 UNIT/ML SOPN Inject 30 Units into the skin daily Yes HANS Stark CNP   gabapentin (NEURONTIN) 400 MG capsule Take 1 capsule by mouth in the morning and 1 capsule at noon and 1 capsule before bedtime. Do all this for 90 days. 2-3 QD. Yes HANS Stark CNP   empagliflozin (JARDIANCE) 10 MG tablet Take 1 tablet by mouth in the morning. Yes HANS Stark CNP   DULoxetine (CYMBALTA) 30 MG extended release capsule Take 1 capsule by mouth in the morning. Yes HANS Stark CNP   dicyclomine (BENTYL) 10 MG capsule Take 1 capsule by mouth 4 times daily (before meals and nightly) Yes HANS Stark CNP   chlorthalidone (HYGROTON) 25 MG tablet Take 1 tablet by mouth in the morning. Yes HANS Stark CNP   carvedilol (COREG) 25 MG tablet Take 1 tablet by mouth in the morning and 1 tablet before bedtime. Yes HANS Stark CNP   aspirin 81 MG chewable tablet Take 1 tablet by mouth in the morning. Yes HANS Stark CNP   rosuvastatin (CRESTOR) 40 MG tablet Take 1 tablet by mouth nightly Yes HANS Stark CNP   insulin lispro, 1 Unit Dial, (HUMALOG KWIKPEN) 100 UNIT/ML SOPN Inject 10 Units into the skin in the morning and 10 Units at noon and 10 Units in the evening. Inject before meals.  Yes HANS Stark CNP   albuterol sulfate  (90 Base) MCG/ACT inhaler Inhale 2 puffs into the lungs every 6 hours as needed for Wheezing Yes Historical Provider, MD   spironolactone (ALDACTONE) 25 MG tablet Take 1 tablet by mouth daily Yes HANS Brown NP   NIFEdipine (PROCARDIA XL) 60 MG extended release tablet Take 60 mg by mouth daily as needed (Persistently high BP / SBP > 160)  Yes Historical Provider, MD   metFORMIN (GLUCOPHAGE) 500 MG tablet Take 1,000 mg by mouth 2 times daily  Yes Historical Provider, MD   lisinopril (PRINIVIL;ZESTRIL) 10 MG tablet Take 20 mg by mouth daily  Yes Historical Provider, MD       Past Medical History: Diagnosis Date    COVID-19     Diabetes mellitus, type II ([facility] [address]     History of blood transfusion     Hypertension         Social History     Tobacco Use    Smoking status: Never    Smokeless tobacco: Never   Vaping Use    Vaping Use: Never used   Substance Use Topics    Alcohol use: Yes     Comment: rare    Drug use: Not Currently        Past Surgical History:   Procedure Laterality Date    BRONCHOSCOPY  6/9/2022    BRONCHOSCOPY BRUSHINGS performed by Richelle Flowers MD at [address]  6/9/2022    [address] performed by Richelle Flowers MD at [address]  6/9/2022    BRONCHOSCOPY BIOPSY BRONCHUS performed by Richelle Flowers MD at [address] N/A 6/14/2022    BRONCHOSCOPY DIAGNOSTIC OR CELL [address] ONLY performed by Amber Boyle DO at [address] [facility]        No Known Allergies     No family history on file. Patient's past medical history, surgical history, family history, medications, and allergies  were all reviewed and updated as appropriate today. Review of Systems   Constitutional:  Negative for fever. HENT:  Negative for congestion and sore throat. Respiratory:  Positive for shortness of breath. Negative for cough. Cardiovascular:  Negative for chest pain, palpitations and leg swelling. Gastrointestinal:  Negative for abdominal pain. Genitourinary:  Negative for difficulty urinating. Musculoskeletal:  Positive for arthralgias. Neurological:  Positive for numbness (neuropathy). Negative for dizziness and weakness. Psychiatric/Behavioral: Negative. /84   Pulse 75   Temp 98.1 °F (36.7 °C)   Ht 6' 1\" (1.854 m)   Wt 249 lb (112.9 kg)   SpO2 98%   BMI 32.85 kg/m²      Physical Exam  Constitutional:       Appearance: Normal appearance. He is obese. HENT:      Head: Normocephalic. Eyes:      Extraocular Movements: Extraocular movements intact.       Pupils: Pupils are equal, round, and reactive to light. Cardiovascular:      Rate and Rhythm: Normal rate and regular rhythm. Heart sounds: Normal heart sounds. No murmur heard. Pulmonary:      Effort: Pulmonary effort is normal. No respiratory distress. Breath sounds: Normal breath sounds. No wheezing. Musculoskeletal:      Comments: Orthotic boot on left foot   Skin:     General: Skin is warm and dry. Neurological:      Mental Status: He is alert and oriented to person, place, and time. Psychiatric:         Mood and Affect: Mood normal.         Behavior: Behavior normal.       Assessment:  Encounter Diagnoses   Name Primary? Poorly controlled type 2 diabetes mellitus (HCC) Yes    Neuropathy involving both lower extremities     Insomnia, unspecified type     Erectile dysfunction, unspecified erectile dysfunction type        Plan:  1. Poorly controlled type 2 diabetes mellitus ([address]  2. Neuropathy involving both lower extremities  -Patient unable to get in to see his endocrinologist until January. I will place referral for him to see a pharmacist to look at his meds and possibly make adjustments to better control diabetes. His A1c is trending down; 13.2 today, down from 16.9 in June. He states he has only been taking 500mg Metformin bid; instructed to increase to 1000mg BID. Continue Digna Gillian, and Humalog at current doses until he can see pharmacist. For his neuropathy, we will increase gabapentin to 800mg TID at this time. I have recommended he follow up in 4-6 weeks. - [facility]) Medication Mgmt (Diabetes - Clinical Pharmacy) - [facility]  - POCT glycosylated hemoglobin (Hb A1C)  - gabapentin (NEURONTIN) 400 MG capsule; Take 2 capsules by mouth in the morning and 2 capsules at noon and 2 capsules before bedtime. Do all this for 90 days. 2-3 QD. Dispense: 180 capsule; Refill: 2  - metFORMIN (GLUCOPHAGE) 1000 MG tablet; Take 1 tablet by mouth in the morning and 1 tablet before bedtime. Dispense: 180 tablet; Refill: 1      Return in about 4 weeks (around 9/13/2022).              HANS Harvey - CNP

## 2022-08-16 NOTE — PATIENT INSTRUCTIONS
Melatonin nightly. (9 or 10pm). Take same time every night. Start with 3-5mg. May take up to 10mg nightly. Gabapentin- 800mg three times a day. Get labs between Aug 25 and 29.

## 2022-08-17 ENCOUNTER — OFFICE VISIT (OUTPATIENT)
Dept: ORTHOPEDIC SURGERY | Age: 37
End: 2022-08-17

## 2022-08-17 VITALS — WEIGHT: 249 LBS | HEIGHT: 73 IN | BODY MASS INDEX: 33 KG/M2

## 2022-08-17 DIAGNOSIS — S92.252D CLOSED DISPLACED FRACTURE OF NAVICULAR BONE OF LEFT FOOT WITH ROUTINE HEALING, SUBSEQUENT ENCOUNTER: Primary | ICD-10-CM

## 2022-08-17 PROBLEM — J18.9 PNA (PNEUMONIA): Status: RESOLVED | Noted: 2022-07-05 | Resolved: 2022-08-17

## 2022-08-17 PROCEDURE — 99024 POSTOP FOLLOW-UP VISIT: CPT | Performed by: NURSE PRACTITIONER

## 2022-08-17 PROCEDURE — APPNB15 APP NON BILLABLE TIME 0-15 MINS: Performed by: NURSE PRACTITIONER

## 2022-08-17 ASSESSMENT — ENCOUNTER SYMPTOMS
COUGH: 0
SHORTNESS OF BREATH: 1
SORE THROAT: 0
ABDOMINAL PAIN: 0

## 2022-08-18 NOTE — PROGRESS NOTES
CHIEF COMPLAINT: Left ankle pain / navicular dorsal avulsion fracture. DATE OF INJURY: 6/28/2022, DOT 7/6/2022    HISTORY:  Mr. Kristan Mei 29 y.o.  male presents today for follow up visit for evaluation of a left ankle injury which occurred when he was walking down steps and missed a step. He was first seen and evaluated in [facility] , where he was x-rayed, splinted and asked to f/u with Orthopedics. He has been WB in a boot. He reports his pain is starting to improve and rates 7/10. This is better with elevation and worse with bearing any wt. The pain is mild achy intermittent and not radiating. He reports chronic numbness and tingling sensation is he has diabetic neuropathy. No other complaint. Denies smoking.     Past Medical History:   Diagnosis Date    COVID-19     Diabetes mellitus, type II ([facility] [address]     History of blood transfusion     Hypertension        Past Surgical History:   Procedure Laterality Date    BRONCHOSCOPY  6/9/2022    BRONCHOSCOPY BRUSHINGS performed by Laina Garrett MD at [address]  6/9/2022    BRONCHOSCOPY DIAGNOSTIC OR CELL [address] performed by Laina Garrett MD at [address]  6/9/2022    BRONCHOSCOPY BIOPSY BRONCHUS performed by Laina Garrett MD at [address] N/A 6/14/2022    BRONCHOSCOPY DIAGNOSTIC OR CELL [address] ONLY performed by Ez Hu DO at [address] [facility] [address] History     Socioeconomic History    Marital status: Single     Spouse name: Not on file    Number of children: Not on file    Years of education: Not on file    Highest education level: Not on file   Occupational History    Not on file   Tobacco Use    Smoking status: Never    Smokeless tobacco: Never   Vaping Use    Vaping Use: Never used   Substance and Sexual Activity    Alcohol use: Yes     Comment: rare    Drug use: Not Currently    Sexual activity: Yes     Partners: Female   Other Topics Concern    Not on file Social History Narrative    Not on file     Social Determinants of Health     Financial Resource Strain: High Risk    Difficulty of Paying Living Expenses: Hard   Food Insecurity: No Food Insecurity    Worried About Running Out of Food in the Last Year: Never true    Ran Out of Food in the Last Year: Never true   Transportation Needs: Not on file   Physical Activity: Not on file   Stress: Not on file   Social Connections: Not on file   Intimate Partner Violence: Not on file   Housing Stability: Not on file       No family history on file. Current Outpatient Medications on File Prior to Visit   Medication Sig Dispense Refill    gabapentin (NEURONTIN) 400 MG capsule Take 2 capsules by mouth in the morning and 2 capsules at noon and 2 capsules before bedtime. Do all this for 90 days. 2-3 QD. 180 capsule 2    metFORMIN (GLUCOPHAGE) 1000 MG tablet Take 1 tablet by mouth in the morning and 1 tablet before bedtime. 180 tablet 1    Insulin Degludec (TRESIBA FLEXTOUCH) 200 UNIT/ML SOPN Inject 30 Units into the skin daily 5 pen 1    empagliflozin (JARDIANCE) 10 MG tablet Take 1 tablet by mouth in the morning. 30 tablet 3    DULoxetine (CYMBALTA) 30 MG extended release capsule Take 1 capsule by mouth in the morning. 30 capsule 3    dicyclomine (BENTYL) 10 MG capsule Take 1 capsule by mouth 4 times daily (before meals and nightly) 120 capsule 1    chlorthalidone (HYGROTON) 25 MG tablet Take 1 tablet by mouth in the morning. 30 tablet 3    carvedilol (COREG) 25 MG tablet Take 1 tablet by mouth in the morning and 1 tablet before bedtime. 60 tablet 3    aspirin 81 MG chewable tablet Take 1 tablet by mouth in the morning. 30 tablet 3    rosuvastatin (CRESTOR) 40 MG tablet Take 1 tablet by mouth nightly 30 tablet 3    insulin lispro, 1 Unit Dial, (HUMALOG KWIKPEN) 100 UNIT/ML SOPN Inject 10 Units into the skin in the morning and 10 Units at noon and 10 Units in the evening. Inject before meals.  5 pen 1    albuterol sulfate HFA 108 (90 Base) MCG/ACT inhaler Inhale 2 puffs into the lungs every 6 hours as needed for Wheezing      spironolactone (ALDACTONE) 25 MG tablet Take 1 tablet by mouth daily 30 tablet 3    NIFEdipine (PROCARDIA XL) 60 MG extended release tablet Take 60 mg by mouth daily as needed (Persistently high BP / SBP > 160)       lisinopril (PRINIVIL;ZESTRIL) 10 MG tablet Take 20 mg by mouth daily        No current facility-administered medications on file prior to visit. Pertinent items are noted in HPI  Review of systems reviewed from Patient History Form and available in the patient's chart under the Media tab. No change. PHYSICAL EXAMINATION:  Mr. Robert Wooten is a very pleasant 29 y.o.  male who presents today in no acute distress, awake, alert, and oriented. He is well dressed, nourished and  groomed. Patient with normal affect. Height is  6' 1\" (1.854 m), weight is 249 lb (112.9 kg), Body mass index is 32.85 kg/m². Resting respiratory rate is 16. Examination of the gait, showed that the patient walks WB left leg and in a boot. Examination of both ankles showing a decreased range of motion of the left ankle compare to the other side. There is mild swelling that can be seen, no ecchymosis. He has chronic decreased sensation and good pedal pulses. He has mild tenderness on deep palpation over the navicular dorsally of the left ankle. IMAGING: Xrays, 3 views of the left ankle dated today in office,  were reviewed, and showed a minimally displaced navicular dorsal avulsion fracture. IMPRESSION: Left ankle minimally displaced navicular dorsal avulsion fracture. PLAN: He will be WBAT in the boot, and start aggressive ROM and peroneal strengthening exercise. Off the boot in 1 weeks. No heavy impact activities. The patient will come back for a follow up in 6 weeks. At that time, we will take 3 views of the left ankle standing.         HANS Aiken - CNP

## 2022-08-25 DIAGNOSIS — B39.2 HISTOPLASMOSIS PNEUMONIA (HCC): ICD-10-CM

## 2022-08-25 LAB
A/G RATIO: 1.3 (ref 1.1–2.2)
ALBUMIN SERPL-MCNC: 3.7 G/DL (ref 3.4–5)
ALP BLD-CCNC: 130 U/L (ref 40–129)
ALT SERPL-CCNC: 11 U/L (ref 10–40)
ANION GAP SERPL CALCULATED.3IONS-SCNC: 14 MMOL/L (ref 3–16)
AST SERPL-CCNC: 11 U/L (ref 15–37)
BASOPHILS ABSOLUTE: 0.1 K/UL (ref 0–0.2)
BASOPHILS RELATIVE PERCENT: 1.9 %
BILIRUB SERPL-MCNC: 0.3 MG/DL (ref 0–1)
BUN BLDV-MCNC: 11 MG/DL (ref 7–20)
CALCIUM SERPL-MCNC: 9.1 MG/DL (ref 8.3–10.6)
CHLORIDE BLD-SCNC: 95 MMOL/L (ref 99–110)
CO2: 22 MMOL/L (ref 21–32)
CREAT SERPL-MCNC: 1.1 MG/DL (ref 0.9–1.3)
EOSINOPHILS ABSOLUTE: 0.1 K/UL (ref 0–0.6)
EOSINOPHILS RELATIVE PERCENT: 1.9 %
GFR AFRICAN AMERICAN: >60
GFR NON-AFRICAN AMERICAN: >60
GLUCOSE BLD-MCNC: 558 MG/DL (ref 70–99)
HCT VFR BLD CALC: 45.4 % (ref 40.5–52.5)
HEMOGLOBIN: 14.7 G/DL (ref 13.5–17.5)
LYMPHOCYTES ABSOLUTE: 1.5 K/UL (ref 1–5.1)
LYMPHOCYTES RELATIVE PERCENT: 26.2 %
MCH RBC QN AUTO: 25.9 PG (ref 26–34)
MCHC RBC AUTO-ENTMCNC: 32.4 G/DL (ref 31–36)
MCV RBC AUTO: 80.1 FL (ref 80–100)
MONOCYTES ABSOLUTE: 0.4 K/UL (ref 0–1.3)
MONOCYTES RELATIVE PERCENT: 6.4 %
NEUTROPHILS ABSOLUTE: 3.7 K/UL (ref 1.7–7.7)
NEUTROPHILS RELATIVE PERCENT: 63.6 %
PDW BLD-RTO: 14.3 % (ref 12.4–15.4)
PLATELET # BLD: 321 K/UL (ref 135–450)
PMV BLD AUTO: 9.9 FL (ref 5–10.5)
POTASSIUM SERPL-SCNC: 4.3 MMOL/L (ref 3.5–5.1)
RBC # BLD: 5.67 M/UL (ref 4.2–5.9)
SODIUM BLD-SCNC: 131 MMOL/L (ref 136–145)
TOTAL PROTEIN: 6.6 G/DL (ref 6.4–8.2)
WBC # BLD: 5.8 K/UL (ref 4–11)

## 2022-08-31 ENCOUNTER — TELEPHONE (OUTPATIENT)
Dept: INFECTIOUS DISEASES | Age: 37
End: 2022-08-31

## 2022-08-31 ENCOUNTER — TELEMEDICINE (OUTPATIENT)
Dept: INFECTIOUS DISEASES | Age: 37
End: 2022-08-31
Payer: MEDICAID

## 2022-08-31 DIAGNOSIS — Z71.89 ENCOUNTER FOR MEDICATION COUNSELING: ICD-10-CM

## 2022-08-31 DIAGNOSIS — E11.49 OTHER DIABETIC NEUROLOGICAL COMPLICATION ASSOCIATED WITH TYPE 2 DIABETES MELLITUS (HCC): ICD-10-CM

## 2022-08-31 DIAGNOSIS — E11.65 POORLY CONTROLLED DIABETES MELLITUS (HCC): ICD-10-CM

## 2022-08-31 DIAGNOSIS — R93.89 ABNORMAL CT OF THE CHEST: ICD-10-CM

## 2022-08-31 DIAGNOSIS — B39.2 HISTOPLASMOSIS PNEUMONIA (HCC): Primary | ICD-10-CM

## 2022-08-31 PROCEDURE — 3046F HEMOGLOBIN A1C LEVEL >9.0%: CPT | Performed by: INTERNAL MEDICINE

## 2022-08-31 PROCEDURE — 99214 OFFICE O/P EST MOD 30 MIN: CPT | Performed by: INTERNAL MEDICINE

## 2022-08-31 NOTE — PROGRESS NOTES
Addended by: TIERNEY TORRES on: 5/16/2018 10:17 AM     Modules accepted: Orders     Infectious Diseases Outpatient Follow-up Note Virtual Visit        Primary Care Physician:  HANS Ac CNP       History Obtained From:   Patient, EPIC    CHIEF COMPLAINT / ID problem:    Chief Complaint   Patient presents with    Follow-up     Histoplasma Pneumonia        HISTORY OF PRESENT ILLNESS / Interval history:  29 y.o. man  here for follow up on PNA. He has  significant history for diabetes poor control, neuropathy, retinopathy, chronic kidney disease, hypertension, seizure disorder was recently admitted to [facility] AT [place] from  6//6/25 to 6/23/22-on that admission he was noted to have very abnormal CT chest concerning Rt hilar mass and PNA. He underwent Bronch and  biopsy. He also underwent a serological studies and came back +Ve Histoplasma by antibody but Histoplasma antigen was negative and serum Beta D glucan +Ve. BAL with Streptococcus and prevotella anaerobes was treated with Augmentin at d/c and was placed on Itraconazole. He had problems obtaining meds in the past  but now approved and taking meds ok per patient. His resp symptoms are better. CT chest last imaging was from  7/5/22-he needs repeat CT chest. He says he is compliant with meds and no side effects on therapy.          Past Medical History:    Past Medical History:   Diagnosis Date    COVID-19     Diabetes mellitus, type II ([address]     History of blood transfusion     Hyperlipidemia     Hypertension     Neuropathy     Seizures ([address]     as a child       Past Surgical History:    Past Surgical History:   Procedure Laterality Date    BRONCHOSCOPY  06/09/2022    BRONCHOSCOPY BRUSHINGS performed by Eneida Hwang MD at [address]  06/09/2022    BRONCHOSCOPY DIAGNOSTIC OR CELL [address] ONLY performed by Eneida Hwang MD at [address]  06/09/2022    BRONCHOSCOPY BIOPSY BRONCHUS performed by Eneida Hwang MD at [address] N/A 06/14/2022    BRONCHOSCOPY DIAGNOSTIC OR CELL [address] ONLY performed by Ez Hu DO at [facility] ENDOSCOPY       Current Medications:    Current Outpatient Medications   Medication Sig Dispense Refill    gabapentin (NEURONTIN) 400 MG capsule Take 2 capsules by mouth in the morning and 2 capsules at noon and 2 capsules before bedtime. Do all this for 90 days. 2-3 QD. 180 capsule 2    metFORMIN (GLUCOPHAGE) 1000 MG tablet Take 1 tablet by mouth in the morning and 1 tablet before bedtime. 180 tablet 1    Insulin Degludec (TRESIBA FLEXTOUCH) 200 UNIT/ML SOPN Inject 30 Units into the skin daily 5 pen 1    empagliflozin (JARDIANCE) 10 MG tablet Take 1 tablet by mouth in the morning. 30 tablet 3    DULoxetine (CYMBALTA) 30 MG extended release capsule Take 1 capsule by mouth in the morning. 30 capsule 3    dicyclomine (BENTYL) 10 MG capsule Take 1 capsule by mouth 4 times daily (before meals and nightly) 120 capsule 1    chlorthalidone (HYGROTON) 25 MG tablet Take 1 tablet by mouth in the morning. 30 tablet 3    carvedilol (COREG) 25 MG tablet Take 1 tablet by mouth in the morning and 1 tablet before bedtime. 60 tablet 3    aspirin 81 MG chewable tablet Take 1 tablet by mouth in the morning. 30 tablet 3    rosuvastatin (CRESTOR) 40 MG tablet Take 1 tablet by mouth nightly 30 tablet 3    insulin lispro, 1 Unit Dial, (HUMALOG KWIKPEN) 100 UNIT/ML SOPN Inject 10 Units into the skin in the morning and 10 Units at noon and 10 Units in the evening. Inject before meals.  5 pen 1    albuterol sulfate  (90 Base) MCG/ACT inhaler Inhale 2 puffs into the lungs every 6 hours as needed for Wheezing      spironolactone (ALDACTONE) 25 MG tablet Take 1 tablet by mouth daily 30 tablet 3    NIFEdipine (PROCARDIA XL) 60 MG extended release tablet Take 60 mg by mouth daily as needed (Persistently high BP / SBP > 160)       lisinopril (PRINIVIL;ZESTRIL) 10 MG tablet Take 20 mg by mouth daily        No current facility-administered medications for this visit. Allergies:  Patient has no known allergies. Immunizations :   Immunization History   Administered Date(s) Administered    Influenza Virus Vaccine 12/26/2012, 10/28/2013, 10/17/2017    Influenza, Triv, 3 Years and older, IM (Afluria (5 yrs and older) 10/02/2018, 10/21/2019, 10/29/2020    Pneumococcal Polysaccharide ([id number]) 12/26/2012, 03/01/2014    Tdap (Boostrix, Adacel) 07/08/2020           Social History:     Social History     Socioeconomic History    Marital status: Single   Tobacco Use    Smoking status: Never    Smokeless tobacco: Never   Vaping Use    Vaping Use: Never used   Substance and Sexual Activity    Alcohol use: Yes     Comment: rare    Drug use: Not Currently    Sexual activity: Yes     Partners: Female     Social Determinants of Health     Financial Resource Strain: High Risk    Difficulty of Paying Living Expenses: Hard   Food Insecurity: No Food Insecurity    Worried About Running Out of Food in the Last Year: Never true    Ran Out of Food in the Last Year: Never true     Social History     Tobacco Use   Smoking Status Never   Smokeless Tobacco Never      Family History : no DVT no COPD      REVIEW OF SYSTEMS:       CONSTITUTIONAL:  negative for fevers, chills, diaphoresis, activity change, appetite change, fatigue, night sweats and unexpected weight change.    EYES:  negative for blurred vision, eye discharge, visual disturbance and icterus  HEENT:  negative for hearing loss, tinnitus, ear drainage, sinus pressure, nasal congestion, epistaxis and snoring  RESPIRATORY:  cough+  , +  sob, no sputum production , no wheeze ,no hemoptysis   CARDIOVASCULAR:  negative for chest pain, palpitations, exertional chest pressure/discomfort, edema, syncope  GASTROINTESTINAL:  negative for nausea, vomiting, diarrhea, constipation, blood in stool and abdominal pain  GENITOURINARY:  negative for frequency, dysuria, urinary incontinence, decreased urine volume, and hematuria  HEMATOLOGIC/LYMPHATIC:  negative for easy bruising, bleeding and lymphadenopathy  ALLERGIC/IMMUNOLOGIC:  negative for recurrent infections, angioedema, anaphylaxis and drug reactions  ENDOCRINE:  negative for weight changes and diabetic symptoms including polyuria, polydipsia and polyphagia  MUSCULOSKELETAL:  negative for  pain, joint swelling, decreased range of motion and muscle weakness  INTEGUMENTARY: negative for skin color change, rashes, blisters  NEUROLOGICAL:  negative for headaches, slurred speech, unilateral weakness  PSYCHIATRIC/BEHAVIORAL: negative for hallucinations, behavioral problems, confusion and agitation. PHYSICAL EXAM:    Vitals: There were no vitals filed for this visit.   Not possible due to virtual visit l       DATA:    CBC:   Lab Results   Component Value Date    WBC 5.8 08/25/2022    HGB 14.7 08/25/2022    HCT 45.4 08/25/2022    MCV 80.1 08/25/2022     08/25/2022     RENAL:   Lab Results   Component Value Date    CREATININE 1.1 08/25/2022    BUN 11 08/25/2022     (L) 08/25/2022    K 4.3 08/25/2022    CL 95 (L) 08/25/2022    CO2 22 08/25/2022     SED RATE:   Lab Results   Component Value Date/Time    SEDRATE >130 06/11/2022 07:39 AM     CK: No results found for: CKTOTAL  CRP:   Lab Results   Component Value Date/Time    CRP 62.4 06/03/2020 02:37 PM     Hepatic Function Panel:   Lab Results   Component Value Date/Time    ALKPHOS 130 08/25/2022 01:08 PM    ALT 11 08/25/2022 01:08 PM    AST 11 08/25/2022 01:08 PM    PROT 6.6 08/25/2022 01:08 PM    BILITOT 0.3 08/25/2022 01:08 PM    BILIDIR <0.2 06/19/2022 08:06 AM    IBILI see below 06/19/2022 08:06 AM    LABALBU 3.7 08/25/2022 01:08 PM     UA:  Lab Results   Component Value Date/Time    COLORU Yellow 07/05/2022 11:49 AM    CLARITYU Clear 07/05/2022 11:49 AM    GLUCOSEU 500 07/05/2022 11:49 AM    GLUCOSEU NEGATIVE 01/02/2011 02:44 PM    BILIRUBINUR Negative 07/05/2022 11:49 AM    BILIRUBINUR NEGATIVE 01/02/2011 02:44 PM    KETUA TRACE 07/05/2022 11:49 AM    SPECGRAV 1.024 07/05/2022 11:49 AM    BLOODU Negative 07/05/2022 11:49 AM    PHUR 5.0 07/05/2022 11:49 AM    PROTEINU 300 07/05/2022 11:49 AM    UROBILINOGEN 2.0 07/05/2022 11:49 AM    NITRU Negative 07/05/2022 11:49 AM    LEUKOCYTESUR Negative 07/05/2022 11:49 AM    LABMICR YES 07/05/2022 11:49 AM    URINETYPE NotGiven 07/05/2022 11:49 AM      Urine Microscopic:   Lab Results   Component Value Date/Time    BACTERIA None Seen 07/05/2022 11:49 AM    COMU SEE COMMENT 07/05/2022 11:49 AM    HYALCAST 10 07/05/2022 11:49 AM    WBCUA 1 07/05/2022 11:49 AM    RBCUA 1 07/05/2022 11:49 AM    EPIU 3 07/05/2022 11:49 AM     Urine Reflex to Culture:   Lab Results   Component Value Date/Time    URRFLXCULT Not Indicated 07/05/2022 11:49 AM     Component Ref Range & Units 8/16/22 1307 6/6/22 1506 5/18/22 0715   Hemoglobin A1C % 13.2  16.9 R, CM  >18.0 R, CM    Resulting Agency   [facility]     MICRO: cultures reviewed and updated by me     Respiratory Culture:  Lab Results   Component Value Date/Time    CULTRESP Normal respiratory debra 06/09/2022 01:28 PM    LABGRAM  06/09/2022 01:35 PM     1+ WBC's (Mononuclear)  No Epithelial Cells seen  No organisms seen       AFB:  Lab Results   Component Value Date/Time    AFBSMEAR No AFB observed by Fluorescent stain 06/09/2022 01:35 PM       Urine Culture: No results for input(s): LABURIN in the last 72 hours.   0 Result Notes  Component Ref Range & Units 7/6/22 1820    Histoplasma Ag Interp Not Detected Not Detected    Comment: INTERPRETIVE DATA: Histoplasma Galactomannan Antigen                      Quantitative by EIA, Urine   Less than 0.4 ng/ml = Not Detected   0.4-0.7 ng/mL = Detected (below the limit of quantification)   0.8-24.0 ng/mL = Detected          FINAL DIAGNOSIS:     Right upper lobe lung biopsy:      - Small, detached fragments of benign bronchial mucosal with acute and        chronic inflammation        and blood clot. - Negative for granulomatous inflammation or neoplasm.      - No diagnostic transbronchial lung parenchyma present. PHIAB/PHIAB       FINAL DIAGNOSIS:     A: Bronchial Washing, Right Upper Lobe:   -  No malignant cells identified   -  GMS stain is negative for fungal organisms or pneumocystis. B: Bronchial Brush Tip, Right Upper Lobe:   -  No malignant cells identified      LORRAINE/LORRAINE       CLINICAL DIAGNOSIS:    pre-op diagnosis: pneumonia     Preoperative Diagnosis:  Pneumonia   Postoperative Diagnosis:  See MD note   Imaging:  CT CHEST W CONTRAST    (Results Pending)       Ct chest from 7/5/22 \"   Impression   1. No evidence of pulmonary embolic disease. 2. Mild progression of patchy multifocal right upper lobe opacification   consistent with pneumonia. There is new ground-glass opacification in the   right lower lobe concerning for pneumonia as well. 3. Progression of right hilar adenopathy with increased size of central node   and new node laterally within the right hilum. Note is made of negative   bronchoscopy 06/09/2022. However, findings remain concerning for malignancy. Close follow-up recommended. Labs, Microbiology and  Radiology results pertinent to this visit and from care every where  reviewed in detail by me as part of the consultation     IMPRESSION:     Diagnosis Orders   1. Histoplasmosis pneumonia ([address]  CT CHEST W CONTRAST      2. Poorly controlled diabetes mellitus ([address]        3. Encounter for medication counseling        4. Abnormal CT of the chest        5.  Other diabetic neurological complication associated with type 2 diabetes mellitus ([address]          Suspected Histoplasmosis based on the CT chest and positive antibody serology but Antigen testing has been negative - clinically slowly improving on ITRACONAZOLE therapy - will need repeat CT chest for follow up to see if radiologically improving or not - DM control and Medication compliance d/w pt    PLAN:    Cont Itraconazole x 200 mg q 12 HRS AS BEFORE  Check CT chest non contrast   HbA1c LAST chest at  13.2   Labs reviewed  Follow up 2 months   Side effects d/w pt         Dakota Muller is a 29 y.o. male being evaluated by a Virtual Visit (video visit) encounter to address concerns as mentioned above. A caregiver was present when appropriate. Due to this being a TeleHealth encounter (During [employer] public [facility] emergency), evaluation of the following organ systems was limited: Vitals/Constitutional/EENT/Resp/CV/GI//MS/Neuro/Skin/Heme-Lymph-Imm. Pursuant to the emergency declaration under the [address] [place] [address] authority and the [employer] Act, this Virtual Visit was conducted with patient's (and/or legal guardian's) consent, to reduce the patient's risk of exposure to COVID-19 and provide necessary medical care. The patient (and/or legal guardian) has also been advised to contact this office for worsening conditions or problems, and seek emergency medical treatment and/or call 911 if deemed necessary. Patient identification was verified at the start of the visit: yES     Total time spent for this encounter: 45 min  on visit (including interval history, review of data including labs, cultures, imaging,  ordering labs, development and implementation of treatment plan, co ordination of care, counseling and education ). Services were provided through a video synchronous discussion virtually to substitute for in-person clinic visit. Patient and provider were located at their individual homes. --Cadence Henriquez MD on 9/10/2022 at 5:08 PM    An electronic signature was used to authenticate this note. D/w Patient in detail at  the time of consultation.     Thanks for allowing me to participate in your patient's care and please do not hesitate to  call me with any questions or concerns.     Barbara Qureshi MD  Infectious Disease  [facility] [place] ([facility]) Physician  Phone: [phone]   Fax : [phone]

## 2022-09-02 ENCOUNTER — TELEPHONE (OUTPATIENT)
Dept: PRIMARY CARE CLINIC | Age: 37
End: 2022-09-02

## 2022-09-02 NOTE — TELEPHONE ENCOUNTER
----- Message from Rivka Dexter sent at 8/31/2022  2:06 PM EDT -----  Subject: Message to Provider    QUESTIONS  Information for Provider? Pt called in and stated got a disconnection   paper for [employer] and want to know if provider can fill out paper   work for the company so it won;t get shut off.  ---------------------------------------------------------------------------  --------------  Jessica Brown [facility]  [phone]; OK to leave message on voicemail  ---------------------------------------------------------------------------  --------------  SCRIPT ANSWERS  Relationship to Patient?  Self

## 2022-09-06 ENCOUNTER — OFFICE VISIT (OUTPATIENT)
Dept: PRIMARY CARE CLINIC | Age: 37
End: 2022-09-06
Payer: MEDICAID

## 2022-09-06 VITALS
OXYGEN SATURATION: 97 % | WEIGHT: 248 LBS | HEART RATE: 123 BPM | BODY MASS INDEX: 32.87 KG/M2 | SYSTOLIC BLOOD PRESSURE: 137 MMHG | DIASTOLIC BLOOD PRESSURE: 87 MMHG | TEMPERATURE: 97.9 F | HEIGHT: 73 IN

## 2022-09-06 DIAGNOSIS — E11.49 OTHER DIABETIC NEUROLOGICAL COMPLICATION ASSOCIATED WITH TYPE 2 DIABETES MELLITUS (HCC): ICD-10-CM

## 2022-09-06 DIAGNOSIS — B39.2 HISTOPLASMOSIS PNEUMONIA (HCC): ICD-10-CM

## 2022-09-06 DIAGNOSIS — I10 ESSENTIAL HYPERTENSION: ICD-10-CM

## 2022-09-06 DIAGNOSIS — N18.30 STAGE 3 CHRONIC KIDNEY DISEASE, UNSPECIFIED WHETHER STAGE 3A OR 3B CKD (HCC): ICD-10-CM

## 2022-09-06 DIAGNOSIS — E11.65 POORLY CONTROLLED TYPE 2 DIABETES MELLITUS (HCC): Primary | ICD-10-CM

## 2022-09-06 DIAGNOSIS — G40.909 SEIZURE DISORDER (HCC): ICD-10-CM

## 2022-09-06 PROBLEM — S92.252A CLOSED DISPLACED FRACTURE OF NAVICULAR BONE OF LEFT FOOT: Status: RESOLVED | Noted: 2022-07-09 | Resolved: 2022-09-06

## 2022-09-06 PROBLEM — N18.9 CKD (CHRONIC KIDNEY DISEASE): Status: RESOLVED | Noted: 2022-05-18 | Resolved: 2022-09-06

## 2022-09-06 PROBLEM — E11.40 DIABETIC NEUROPATHY (HCC): Status: ACTIVE | Noted: 2022-09-06

## 2022-09-06 PROBLEM — N17.9 ACUTE RENAL FAILURE (HCC): Status: RESOLVED | Noted: 2022-06-06 | Resolved: 2022-09-06

## 2022-09-06 PROBLEM — E44.0 MODERATE MALNUTRITION (HCC): Chronic | Status: RESOLVED | Noted: 2022-07-07 | Resolved: 2022-09-06

## 2022-09-06 PROCEDURE — 99215 OFFICE O/P EST HI 40 MIN: CPT | Performed by: FAMILY MEDICINE

## 2022-09-06 PROCEDURE — 3046F HEMOGLOBIN A1C LEVEL >9.0%: CPT | Performed by: FAMILY MEDICINE

## 2022-09-06 NOTE — PATIENT INSTRUCTIONS
SEND [employer] MESSAGE WITH THE NAMES OF THE BLOOD PRESSURE MEDICATIONS YOU ARE TAKING. KEEP APPT WITH CLINICAL PHARMACIST NEXT WEEK. BRING A COPY OF YOUR BLOOD SUGAR READINGS AND YOUR METER ALONG WITH ALL OF YOUR MEDICATIONS TO THAT APPT. KEEP APPT WITH ENDOCRINOLOGIST.

## 2022-09-06 NOTE — PROGRESS NOTES
Procedure Laterality Date    BRONCHOSCOPY  06/09/2022    BRONCHOSCOPY BRUSHINGS performed by Mp Brewster MD at [address]  06/09/2022    BRONCHOSCOPY DIAGNOSTIC OR CELL [address] ONLY performed by Mp Brewster MD at [address]  06/09/2022    BRONCHOSCOPY BIOPSY BRONCHUS performed by Mp Brewster MD at [address] N/A 06/14/2022    BRONCHOSCOPY DIAGNOSTIC OR CELL [address] ONLY performed by Abisai Venegas DO at [facility] ENDOSCOPY        No Known Allergies     Family History   Problem Relation Age of Onset    Diabetes Father         Patient's past medical history, surgical history, family history, medications, and allergies  were all reviewed and updated as appropriate today. Review of Systems   Constitutional:  Negative for fever. Respiratory:  Negative for cough and shortness of breath. Musculoskeletal:  Positive for arthralgias and myalgias. Psychiatric/Behavioral:  Negative for dysphoric mood. /87   Pulse (!) 123   Temp 97.9 °F (36.6 °C)   Ht 6' 1\" (1.854 m)   Wt 248 lb (112.5 kg)   SpO2 97%   BMI 32.72 kg/m²      Physical Exam  Vitals reviewed. Constitutional:       General: He is not in acute distress. Appearance: He is well-developed. Eyes:      General: No scleral icterus. Conjunctiva/sclera: Conjunctivae normal.   Neck:      Thyroid: No thyromegaly. Cardiovascular:      Rate and Rhythm: Normal rate and regular rhythm. Heart sounds: No murmur heard. Pulmonary:      Effort: Pulmonary effort is normal. No respiratory distress. Breath sounds: Normal breath sounds. Abdominal:      General: There is no distension. Palpations: Abdomen is soft. Tenderness: There is no abdominal tenderness. Musculoskeletal:      Cervical back: Neck supple. Right lower leg: No edema. Left lower leg: No edema. Lymphadenopathy:      Cervical: No cervical adenopathy.    Skin: General: Skin is warm and dry. Capillary Refill: Capillary refill takes less than 2 seconds. Neurological:      General: No focal deficit present. Mental Status: He is alert and oriented to person, place, and time. Mental status is at baseline. Psychiatric:         Mood and Affect: Mood normal.       Assessment:  Encounter Diagnoses   Name Primary? Poorly controlled type 2 diabetes mellitus ([facility] Yes    Essential hypertension     Seizure disorder ([facility]     Other diabetic neurological complication associated with type 2 diabetes mellitus ([facility]     Histoplasmosis pneumonia ([facility]     Stage 3 chronic kidney disease, unspecified whether stage 3a or 3b CKD ([facility]        Plan:    - Extensive review of medical records. A1c is coming down from >19% to most recently 13.2%. Advised of the need for aggressive blood sugar mgmt to hopefully mitigate any further complications from his diabetes. He appears to have somewhat advanced neuropathy but has never been formally evaluated for this. Recommended Neuro referral. Remote h/o seizure disorder with no recent issues. Lengthy discussion re: the need for consistent follow up and compliance. He will see Clinical Pharm next week and Endocrinology in Jan. I have recommended he bring blood sugar log, meter and all of his medications to that appt. - Patient is unsure of what blood pressure meds he is taking. Asked him to verify and call us back later today with this information.   - Continue follow up with ID as scheduled. - Has not seen nephrology since 6/24/202. Brianna Morrow. Suspected diabetic nephropathy. Needs follow up scheduled ASAP. New Prescriptions    No medications on file        Orders Placed This Encounter   Procedures    Thelma Durbin MD, Neurology, Jazmyn Song        Return in about 3 months (around 12/6/2022) for FOLLOW UP DIABETES.     Total time spent with patient including direct consultation, evaluation, review of medical records and documentation = [id number] [place], [place]

## 2022-09-07 PROBLEM — N18.30 STAGE 3 CHRONIC KIDNEY DISEASE (HCC): Status: ACTIVE | Noted: 2022-05-18

## 2022-09-07 ASSESSMENT — ENCOUNTER SYMPTOMS
COUGH: 0
SHORTNESS OF BREATH: 0

## 2022-09-08 ENCOUNTER — TELEPHONE (OUTPATIENT)
Dept: PRIMARY CARE CLINIC | Age: 37
End: 2022-09-08

## 2022-09-08 NOTE — TELEPHONE ENCOUNTER
Still no form.   Informed patient, gave him our fax number again to give to [facility] energy

## 2022-09-08 NOTE — TELEPHONE ENCOUNTER
Patient states he takes all of the listed medications. He did not have the bottles when he was speaking with me. He was reminded to take medicine bottles to his appointment with Jay Macdonald. He was uncertain if he would be able to do that.

## 2022-09-13 ENCOUNTER — OFFICE VISIT (OUTPATIENT)
Dept: PHARMACY | Age: 37
End: 2022-09-13
Payer: MEDICAID

## 2022-09-13 VITALS — HEART RATE: 127 BPM | SYSTOLIC BLOOD PRESSURE: 129 MMHG | DIASTOLIC BLOOD PRESSURE: 96 MMHG

## 2022-09-13 DIAGNOSIS — E11.65 POORLY CONTROLLED TYPE 2 DIABETES MELLITUS (HCC): Primary | ICD-10-CM

## 2022-09-13 PROCEDURE — 99213 OFFICE O/P EST LOW 20 MIN: CPT

## 2022-09-13 RX ORDER — FLASH GLUCOSE SENSOR
KIT MISCELLANEOUS
Qty: 2 EACH | Refills: 11 | Status: SHIPPED | OUTPATIENT
Start: 2022-09-13

## 2022-09-13 RX ORDER — ITRACONAZOLE 100 MG/1
200 CAPSULE ORAL 2 TIMES DAILY
COMMUNITY

## 2022-09-13 RX ORDER — DULAGLUTIDE 0.75 MG/.5ML
0.75 INJECTION, SOLUTION SUBCUTANEOUS WEEKLY
Qty: 4 ADJUSTABLE DOSE PRE-FILLED PEN SYRINGE | Refills: 0 | Status: SHIPPED | OUTPATIENT
Start: 2022-09-13 | End: 2022-10-24 | Stop reason: SDUPTHER

## 2022-09-13 NOTE — PATIENT INSTRUCTIONS
Call when you get home to review meds over the phone. Check blood pressure every day at home, bring cuff/log in next visit  Stop drinking juice  Increase Tresiba to 36 units daily. If fasting blood sugar is over 150 for 3 days in a row, then increase to 40 units. Start Trulicity 1.06 mg once weekly. Stop humalog once you start Trulicity.    Scan karen 4x per day

## 2022-09-13 NOTE — PROGRESS NOTES
CLINICAL PHARMACY NOTE--Diabetes    Anushka Matthew is a 29 y.o. male with PMHX significant for CKD, diabetic neuropathy, HTN, HLD, Obesity, and Type 2 Diabetes referred by JONY Catherine for diabetes counseling and medication management. ASSESSMENT/PLAN:    Type 2 Diabetes  A1c above goal <7%  SMBG log reflects poor control 2/2 medication noncompliance. -Medications:    Metformin 1 g BID   Jardiance 10 mg QD (will switch to metformin combo at future visit)  Increase Tresiba to 36 units daily,  if FBG >150 for 3 days then increase to 40 units. Start Trulicity 6.43 mg once weekly, stop Humalog  -SMBG: Use karen to check glucose QID, RX sent, teaching and placement done today with sample. Pt had issues with sticking in the past. Consider dexcom if pt continues to have issues. Pt also inquired about insulin pump. Will discuss at future visit if unable to control.   -Labs: vit D, vit B12, urine ACR, FLP  -Lifestyle: stop drinking juice  -HM: COVID, PNA, and flu vax, eye exam    2. Hypertension  BP above goal <140/90   ?albuminuria? Check BP daily at home and bring log to f/u visit. Medications: on ACE + SGLT2   --msg sent to pcp whether to fill spironolactone (pt has not been taking), refill lisinopril and nifedipine? Labs: urine ACR    3. Hyperlipidemia  Continue high intensity statin. Labs: FLP    Health Maintenance Due   Topic Date Due    COVID-19 Vaccine (1) Never done    Varicella vaccine (1 of 2 - 2-dose childhood series) Never done    Diabetic foot exam  Never done    Diabetic retinal exam  Never done    Hepatitis C screen  Never done    Hepatitis B vaccine (1 of 3 - Risk 3-dose series) Never done    Pneumococcal 0-64 years Vaccine (2 - PCV) 03/01/2015    Flu vaccine (1) 09/01/2022       Education provided:  Discussed general issues about diabetes pathophysiology and management.   Reviewed A1c and blood sugar goals with patient  Counseled on changes to medication regimen and common side effects  Reviewed karen instruction/application and importance of scanning at least every 8 hours. Provided insulin and GLP1RA teaching   Discussed symptoms and management of hypoglycemic episodes. Encouraged weight loss and aerobic exercise   Educated on diet   Provided blood pressure log and instructed patient to bring to f/u appointment. Return in about 2 weeks (around 9/27/2022). Subjective   SUBJECTIVE/OBJECTIVE:    Treatment Adherence:  Diet:  10a- usually skips, eggs/sausage  4p- turkey/chx, salmon, sometimes 1 slice wheat bread, baked or air fried salmon/chicken, brussels, broccoli, corn  Snack/dessert- crackers, fruit/veggie maybe 3 servings of these per day  Drinks- 16 bottles water/d, 2c juice per day (willing to stop this)  Exercise: no regular exercise due to pain, balance issues; waiting on neuro and PT  Weight trend: decreasing due to PNA and uncontrolled DM  Barriers: impairment:  physical, overwhelmed by complexity of regimen, and time constraints    Type 2 Diabetes Mellitus  Year diagnosed ~2010. Related hospitalizations/ED visits: multiple ED visits for hyperglycemia. Comorbities: high risk ASCVD, HTN, obesity, and CKD   Considerations:  cost of medication ($0-3), GFR, simplify regimen/med compliance  Current symptoms/problems: neuropathy. Hypoglycemia? no   Eye exam within one year: no    Current diabetes therapy:    Metformin 1 g BID    Tresiba 30 units daily    Jardiance 10 mg QD    Humalog 10 units TIDAC (takes 1-3 times per day, will take if not eating only if BG is high)  Compliance: forgets at least 2 doses of all meds (including insulin) per week. Doesn't sleep well, needs to get kids ready for bed, then falls asleep and forgets. Pt states he was noncompliant with meds when he lost his job due to COVID PNA. LF for most meds was 7/19/22 for 30 ds, so he is likely missing 50% of the time.  He did get chlorthalidone, lisinopril, and metformin from [facility]. Side effects: none  Cost: $3 copay  Previous regimens: ? SMBG:   FBGs ~220. Pre-Lunch ~ 240s. Pre-dinner ~ 280-300. No hypoglycemia. Issues with karen device staying on in the past. Willing to try again with sticky tac. Hypertension:    Patient denies chest pain, shortness of breath, headache, and blurred vision. On Ace- lisinopril 20 mg  Medication side effects: no medication side effects noted. Use of agents associated with hypertension: none. Caffeine: none  Home blood pressure monitoring: No.  But has BP cuff    Hyperlipidemia:  On high intensity statin-- No new myalgias or GI upset   On aspirin 81 mg  Cardiovascular risk factors: diabetes mellitus, dyslipidemia, hypertension, male gender, and obesity (BMI >= 30 kg/m2)    The ASCVD Risk score (Joseph Downing, et al., 2013) failed to calculate for the following reasons: The 2013 ASCVD risk score is only valid for ages 36 to 78         Objective     Medication list reviewed and up to date. Notable discrepancies include:   Pt not taking spironolactone. Pt needs refills on lisinopril and nifedipine. Added itraconazole    Physical exam     BP (!) 129/96 (Site: Right Upper Arm, Position: Sitting, Cuff Size: Large Adult)   Pulse (!) 127    There is no height or weight on file to calculate BMI.   Wt Readings from Last 3 Encounters:   09/06/22 248 lb (112.5 kg)   08/17/22 249 lb (112.9 kg)   08/16/22 249 lb (112.9 kg)      BP Readings from Last 3 Encounters:   09/13/22 (!) 129/96   09/06/22 137/87   08/16/22 133/84        Pertinent Labs:  Lab Results   Component Value Date    LABA1C 13.2 08/16/2022    LABA1C 16.9 06/06/2022    LABA1C >18.0 05/18/2022      No results found for: MALBCR  No results found for: CRCLEARANCE   No results found for: LDLCALC, LDLDIRECT, CHOL, TRIG, HDL  Lab Results   Component Value Date    CREATININE 1.1 08/25/2022    BUN 11 08/25/2022     (L) 08/25/2022    K 4.3 08/25/2022    CL 95 (L) 08/25/2022

## 2022-09-14 RX ORDER — LISINOPRIL 20 MG/1
20 TABLET ORAL DAILY
Qty: 90 TABLET | Refills: 3 | Status: SHIPPED | OUTPATIENT
Start: 2022-09-14

## 2022-09-24 ENCOUNTER — HOSPITAL ENCOUNTER (EMERGENCY)
Age: 37
Discharge: HOME | End: 2022-09-24
Attending: EMERGENCY MEDICINE
Payer: MEDICAID

## 2022-09-24 VITALS
RESPIRATION RATE: 15 BRPM | HEART RATE: 121 BPM | BODY MASS INDEX: 31.67 KG/M2 | TEMPERATURE: 98.6 F | SYSTOLIC BLOOD PRESSURE: 111 MMHG | DIASTOLIC BLOOD PRESSURE: 87 MMHG | OXYGEN SATURATION: 98 % | HEIGHT: 73 IN | WEIGHT: 238.98 LBS

## 2022-09-24 DIAGNOSIS — K02.9 PAIN DUE TO DENTAL CARIES: Primary | ICD-10-CM

## 2022-09-24 PROCEDURE — 99283 EMERGENCY DEPT VISIT LOW MDM: CPT

## 2022-09-24 RX ORDER — HYDROCODONE BITARTRATE AND ACETAMINOPHEN 5; 325 MG/1; MG/1
1 TABLET ORAL EVERY 8 HOURS PRN
Qty: 7 TABLET | Refills: 0 | Status: SHIPPED | OUTPATIENT
Start: 2022-09-24 | End: 2022-09-27

## 2022-09-24 RX ORDER — NAPROXEN 250 MG/1
250 TABLET ORAL 2 TIMES DAILY PRN
Qty: 20 TABLET | Refills: 0 | Status: SHIPPED | OUTPATIENT
Start: 2022-09-24 | End: 2022-10-24 | Stop reason: ALTCHOICE

## 2022-09-24 RX ORDER — PENICILLIN V POTASSIUM 500 MG/1
500 TABLET ORAL 4 TIMES DAILY
Qty: 40 TABLET | Refills: 0 | Status: SHIPPED | OUTPATIENT
Start: 2022-09-24 | End: 2022-09-27

## 2022-09-24 ASSESSMENT — PAIN - FUNCTIONAL ASSESSMENT: PAIN_FUNCTIONAL_ASSESSMENT: 0-10

## 2022-09-24 ASSESSMENT — PAIN SCALES - GENERAL: PAINLEVEL_OUTOF10: 9

## 2022-09-24 NOTE — ED PROVIDER NOTES
Emergency Physician Note        Note Open Time: 8:26 AM EDT    Chief Complaint  Dental Pain (Right lower)       History of Present Illness  Cliff Bloom is a 29 y.o. male who presents to the ED for tooth ache. Patient reports right lower tooth ache for the last several days. No fevers, chills or sweats. No trouble breathing or trouble swallowing. He actually went to an urgent dental clinic this morning but they only took the first 10 patients and he was being on the 10th person in line. He denies any other complaints. The pain is moderate to severe and constant and nonradiating    10 systems reviewed, pertinent positives per HPI otherwise noted to be negative    I have reviewed the following from the nursing documentation:      Prior to Admission medications    Medication Sig Start Date End Date Taking? Authorizing Provider   lisinopril (PRINIVIL;ZESTRIL) 20 MG tablet Take 1 tablet by mouth daily 9/14/22   Nikki Love, DO   Continuous Blood Gluc Sensor (FREESTYLE HOME 2 SENSOR) MISC Use to check blood sugar 4 times per day (before each meal and at bedtime). 9/13/22   HANS Delaney CNP   Dulaglutide (TRULICITY) 0.62 VG/5.8EL SOPN Inject 0.75 mg into the skin once a week 9/13/22   HANS Delaney CNP   itraconazole (SPORANOX) 100 MG capsule Take 200 mg by mouth 2 times daily    Historical Provider, MD   gabapentin (NEURONTIN) 400 MG capsule Take 2 capsules by mouth in the morning and 2 capsules at noon and 2 capsules before bedtime. Do all this for 90 days. 2-3 QD. 8/16/22 11/14/22  HANS Delaney CNP   metFORMIN (GLUCOPHAGE) 1000 MG tablet Take 1 tablet by mouth in the morning and 1 tablet before bedtime. 8/16/22   HANS Delaney CNP   Insulin Degludec (TRESIBA FLEXTOUCH) 200 UNIT/ML SOPN Inject 30 Units into the skin daily 7/19/22   HANS Delaney CNP   empagliflozin (JARDIANCE) 10 MG tablet Take 1 tablet by mouth in the morning. 7/19/22   Mikie Platt, APRN - CNP   DULoxetine (CYMBALTA) 30 MG extended release capsule Take 1 capsule by mouth in the morning. 7/19/22   Mikie Platt, APRN - CNP   dicyclomine (BENTYL) 10 MG capsule Take 1 capsule by mouth 4 times daily (before meals and nightly) 7/19/22   Mikie Platt, APRN - CNP   chlorthalidone (HYGROTON) 25 MG tablet Take 1 tablet by mouth in the morning. 7/19/22   Mikie Platt, APRN - CNP   carvedilol (COREG) 25 MG tablet Take 1 tablet by mouth in the morning and 1 tablet before bedtime. 7/19/22   Mikie Platt, APRN - CNP   aspirin 81 MG chewable tablet Take 1 tablet by mouth in the morning. 7/19/22   Mikie Platt, APRN - CNP   rosuvastatin (CRESTOR) 40 MG tablet Take 1 tablet by mouth nightly 7/19/22   Mikie Platt APRN - CNP   insulin lispro, 1 Unit Dial, (HUMALOG KWIKPEN) 100 UNIT/ML SOPN Inject 10 Units into the skin in the morning and 10 Units at noon and 10 Units in the evening. Inject before meals.  7/19/22   Mikie Platt APRN - CNP   albuterol sulfate  (90 Base) MCG/ACT inhaler Inhale 2 puffs into the lungs every 6 hours as needed for Wheezing    Historical Provider, MD       Allergies as of 09/24/2022    (No Known Allergies)       Past Medical History:   Diagnosis Date    COVID-19     Diabetes mellitus, type II ([address]     History of blood transfusion     Hyperlipidemia     Hypertension     Neuropathy     Seizures ([address]     as a child        Surgical History:   Past Surgical History:   Procedure Laterality Date    BRONCHOSCOPY  06/09/2022    BRONCHOSCOPY BRUSHINGS performed by Umberto Olivas MD at [address]  06/09/2022    BRONCHOSCOPY DIAGNOSTIC OR CELL [address] ONLY performed by Umberto Olivas MD at [address]  06/09/2022    BRONCHOSCOPY BIOPSY BRONCHUS performed by Umberto Olivas MD at [address] 06/14/2022    BRONCHOSCOPY DIAGNOSTIC OR CELL [address] performed by Heidy Ramos DO at [address] [facility] History:    Family History   Problem Relation Age of Onset    Diabetes Father        Social History     Socioeconomic History    Marital status: Single     Spouse name: Not on file    Number of children: Not on file    Years of education: Not on file    Highest education level: Not on file   Occupational History    Not on file   Tobacco Use    Smoking status: Never    Smokeless tobacco: Never   Vaping Use    Vaping Use: Never used   Substance and Sexual Activity    Alcohol use: Yes     Comment: rare    Drug use: Not Currently    Sexual activity: Yes     Partners: Female   Other Topics Concern    Not on file   Social History Narrative    Not on file     Social Determinants of Health     Financial Resource Strain: High Risk    Difficulty of Paying Living Expenses: Hard   Food Insecurity: No Food Insecurity    Worried About Running Out of Food in the Last Year: Never true    Ran Out of Food in the Last Year: Never true   Transportation Needs: Not on file   Physical Activity: Not on file   Stress: Not on file   Social Connections: Not on file   Intimate Partner Violence: Not on file   Housing Stability: Not on file       Nursing notes reviewed. ED Triage Vitals [09/24/22 0824]   Enc Vitals Group      /87      Heart Rate (!) 121      Resp 15      Temp 98.6 °F (37 °C)      Temp src       SpO2 98 %      Weight 238 lb 15.7 oz (108.4 kg)      Height 6' 1\" (1.854 m)      Head Circumference       Peak Flow       Pain Score       Pain Loc       Pain Edu? Excl. in [address]? GENERAL:  Awake, alert. Well developed, well nourished with no apparent distress. HENT:  Normocephalic, Atraumatic, moist mucous membranes. Right lower premolar tender to palpation and carious. No dental abscess. Floor mouth is soft. No stridor.   EYES:  Pupils equal round and reactive to light, Conjunctiva normal, extraocular movements normal.  NECK:  No meningeal signs, Supple. SKIN: Warm, dry and intact. NEUROLOGIC: Normal mental status. Moving all extremities to command. MEDICAL DECISION MAKING        Patient declined my offer for a dental block. I advised the patient to return to the emergency department immediately for any new or worsening symptoms, such as fever, trouble breathing or trouble swallowing. The patient voiced agreement and understanding of the treatment plan. No results found for this visit on 09/24/22. I estimate there is LOW risk for a ANAPHYLAXIS, DEEP SPACE INFECTION (e.g., WEIS ANGINA OR RETROPHARYNGEAL ABSCESS), EPIGLOTTITIS, MENINGITIS, or AIRWAY COMPROMISE, thus I consider the discharge disposition reasonable. Also, there is no evidence or peritonitis, sepsis, or toxicity. Klyah Valderrama and I have discussed the diagnosis and risks, and we agree with discharging home to follow-up with their primary doctor. We also discussed returning to the Emergency Department immediately if new or worsening symptoms occur. We have discussed the symptoms which are most concerning (e.g., changing or worsening pain, trouble swallowing or breathing, neck stiffness or fever) that necessitate immediate return. Final Impression    1. Pain due to dental caries        Discharge Vital Signs:  Blood pressure 111/87, pulse (!) 121, temperature 98.6 °F (37 °C), resp. rate 15, height 6' 1\" (1.854 m), weight 238 lb 15.7 oz (108.4 kg), SpO2 98 %. Patient was given scripts for the following medications. I counseled patient how to take these medications. Discharge Medication List as of 9/24/2022  8:30 AM        START taking these medications    Details   penicillin v potassium (VEETID) 500 MG tablet Take 1 tablet by mouth 4 times daily for 10 days, Disp-40 tablet, R-0Normal      HYDROcodone-acetaminophen (NORCO) 5-325 MG per tablet Take 1 tablet by mouth every 8 hours as needed for Pain for up to 3 days. , Disp-7 tablet, R-0Normal      naproxen (NAPROSYN) 250 MG tablet Take 1 tablet by mouth 2 times daily as needed for Pain, Disp-20 tablet, R-0Normal             Disposition  Pt is in good condition upon Discharge to home. This chart was generated using the [address] [place] [address] [address]  dictation system. I created this record but it may contain dictation errors.           Amie Brito MD  09/24/22 [id number]

## 2022-09-24 NOTE — DISCHARGE INSTRUCTIONS
[facility]   [phone] or [facility] Jone: [address] [place] [address]  Up to age 14  46 Miesha Schulte [phone]

## 2022-09-25 ENCOUNTER — HOSPITAL ENCOUNTER (EMERGENCY)
Age: 37
Discharge: HOME | End: 2022-09-25
Payer: MEDICAID

## 2022-09-25 VITALS
HEART RATE: 117 BPM | WEIGHT: 235.45 LBS | SYSTOLIC BLOOD PRESSURE: 127 MMHG | BODY MASS INDEX: 31.06 KG/M2 | TEMPERATURE: 99.4 F | OXYGEN SATURATION: 96 % | DIASTOLIC BLOOD PRESSURE: 94 MMHG | RESPIRATION RATE: 18 BRPM

## 2022-09-25 DIAGNOSIS — L29.9 ITCHING: ICD-10-CM

## 2022-09-25 DIAGNOSIS — T50.905A MEDICATION ADVERSE EFFECT, INITIAL ENCOUNTER: Primary | ICD-10-CM

## 2022-09-25 PROCEDURE — 6370000000 HC RX 637 (ALT 250 FOR IP): Performed by: PHYSICIAN ASSISTANT

## 2022-09-25 PROCEDURE — 99284 EMERGENCY DEPT VISIT MOD MDM: CPT

## 2022-09-25 PROCEDURE — 6360000002 HC RX W HCPCS: Performed by: PHYSICIAN ASSISTANT

## 2022-09-25 PROCEDURE — 96372 THER/PROPH/DIAG INJ SC/IM: CPT

## 2022-09-25 RX ORDER — CLINDAMYCIN HYDROCHLORIDE 300 MG/1
300 CAPSULE ORAL 3 TIMES DAILY
Qty: 21 CAPSULE | Refills: 0 | Status: SHIPPED | OUTPATIENT
Start: 2022-09-25 | End: 2022-10-02

## 2022-09-25 RX ORDER — DEXAMETHASONE SODIUM PHOSPHATE 10 MG/ML
6 INJECTION, SOLUTION INTRAMUSCULAR; INTRAVENOUS ONCE
Status: COMPLETED | OUTPATIENT
Start: 2022-09-25 | End: 2022-09-25

## 2022-09-25 RX ORDER — DICYCLOMINE HYDROCHLORIDE 10 MG/1
20 CAPSULE ORAL ONCE
Status: COMPLETED | OUTPATIENT
Start: 2022-09-25 | End: 2022-09-25

## 2022-09-25 RX ORDER — DIPHENHYDRAMINE HCL 25 MG
25 CAPSULE ORAL EVERY 6 HOURS PRN
Qty: 10 CAPSULE | Refills: 0 | Status: SHIPPED | OUTPATIENT
Start: 2022-09-25 | End: 2022-10-24 | Stop reason: ALTCHOICE

## 2022-09-25 RX ADMIN — DEXAMETHASONE SODIUM PHOSPHATE 6 MG: 10 INJECTION, SOLUTION INTRAMUSCULAR; INTRAVENOUS at 17:01

## 2022-09-25 RX ADMIN — DICYCLOMINE HYDROCHLORIDE 20 MG: 10 CAPSULE ORAL at 17:01

## 2022-09-25 ASSESSMENT — PAIN SCALES - GENERAL
PAINLEVEL_OUTOF10: 0
PAINLEVEL_OUTOF10: 0

## 2022-09-25 ASSESSMENT — PAIN - FUNCTIONAL ASSESSMENT
PAIN_FUNCTIONAL_ASSESSMENT: 0-10
PAIN_FUNCTIONAL_ASSESSMENT: 0-10

## 2022-09-25 NOTE — ED NOTES
D/C: Order noted for d/c. Pt confirmed d/c paperwork have correct name. Discharge and education instructions reviewed with patient. Teach-back successful. Pt verbalized understanding and signed d/c papers. Pt denied questions at this time. No acute distress noted. Patient instructed to follow-up as noted - return to emergency department if symptoms worsen. Patient verbalized understanding. Discharged per EDMD with discharge instructions. Pt discharged to private vehicle. Patient stable upon departure. Thanked patient for choosing [address]  for [facility]. Provider aware of patient pain at time of discharge.        Isidoro Carmona RN  09/25/22 [id number]

## 2022-09-25 NOTE — ED PROVIDER NOTES
[facility]        Pt Name: Julee Martinez  MRN: [id number]  [place] 1985  Date of evaluation: 9/25/2022  Provider: CHARLI Knight  PCP: HANS Whyte CNP  Note Started: 4:53 PM EDT     The ED Attending Physician was available for consultation but did not see or evaluate this patient. CHIEF COMPLAINT       Chief Complaint   Patient presents with    Pruritis     Pt c/o all over itching that started approx four hours after taking a penicillin yesterday that was prescribed for a toothache. Denies any new SOB, or circumoral swelling. HISTORY OF PRESENT ILLNESS   (Location, Timing/Onset, Context/Setting, Quality, Duration, Modifying Factors, Severity, Associated Signs and Symptoms)  Note limiting factors. Julee Martinez is a 29 y.o. male who presents with complaint of itching, suspected allergic reaction to medication. Patient says he was prescribed penicillin yesterday for tooth pain and suspected infection, took the first dose of penicillin at 11 AM today, around 4:00 PM he began itching all over. Says he's still itchy all over. Denies any swelling, has not seen any rash. Denies any history of allergic reactions, cannot think of anything else today that might of triggered one. Denies any difficulty breathing. Says he has a little bit of dental pain presently but not a lot, no intraoral discharge or swelling. Nursing Notes were all reviewed and agreed with or any disagreements were addressed in the HPI. REVIEW OF SYSTEMS    (2-9 systems for level 4, 10 or more for level 5)     Positives and pertinent negatives as per HPI.      PAST MEDICAL HISTORY     Past Medical History:   Diagnosis Date    COVID-19     Diabetes mellitus, type II ([facility]     History of blood transfusion     Hyperlipidemia     Hypertension     Neuropathy     Seizures ([facility]     as a child       SURGICAL HISTORY     Past Surgical History:   Procedure Laterality Date    BRONCHOSCOPY  06/09/2022    BRONCHOSCOPY BRUSHINGS performed by Deneen Sarah MD at [address]  06/09/2022    BRONCHOSCOPY DIAGNOSTIC OR CELL [address] ONLY performed by Deneen Sarah MD at [address]  06/09/2022    BRONCHOSCOPY BIOPSY BRONCHUS performed by Deneen Sarah MD at [address] N/A 06/14/2022    BRONCHOSCOPY DIAGNOSTIC OR CELL [address] ONLY performed by Gladys Lezama DO at [facility]. [address]       Previous Medications    ALBUTEROL SULFATE  (90 BASE) MCG/ACT INHALER    Inhale 2 puffs into the lungs every 6 hours as needed for Wheezing    ASPIRIN 81 MG CHEWABLE TABLET    Take 1 tablet by mouth in the morning. CARVEDILOL (COREG) 25 MG TABLET    Take 1 tablet by mouth in the morning and 1 tablet before bedtime. CHLORTHALIDONE (HYGROTON) 25 MG TABLET    Take 1 tablet by mouth in the morning. CONTINUOUS BLOOD GLUC SENSOR (FREESTYLE HOME 2 SENSOR) MISC    Use to check blood sugar 4 times per day (before each meal and at bedtime). DICYCLOMINE (BENTYL) 10 MG CAPSULE    Take 1 capsule by mouth 4 times daily (before meals and nightly)    DULAGLUTIDE (TRULICITY) 1.77 FR/3.7PZ SOPN    Inject 0.75 mg into the skin once a week    DULOXETINE (CYMBALTA) 30 MG EXTENDED RELEASE CAPSULE    Take 1 capsule by mouth in the morning. EMPAGLIFLOZIN (JARDIANCE) 10 MG TABLET    Take 1 tablet by mouth in the morning. GABAPENTIN (NEURONTIN) 400 MG CAPSULE    Take 2 capsules by mouth in the morning and 2 capsules at noon and 2 capsules before bedtime. Do all this for 90 days. 2-3 QD. HYDROCODONE-ACETAMINOPHEN (NORCO) 5-325 MG PER TABLET    Take 1 tablet by mouth every 8 hours as needed for Pain for up to 3 days.     INSULIN DEGLUDEC (TRESIBA FLEXTOUCH) 200 UNIT/ML SOPN    Inject 30 Units into the skin daily    INSULIN LISPRO, 1 UNIT DIAL, (HUMALOG KWIKPEN) 100 UNIT/ML SOPN    Inject 10 Units into the skin in the morning and 10 Units at noon and 10 Units in the evening. Inject before meals. ITRACONAZOLE (SPORANOX) 100 MG CAPSULE    Take 200 mg by mouth 2 times daily    LISINOPRIL (PRINIVIL;ZESTRIL) 20 MG TABLET    Take 1 tablet by mouth daily    METFORMIN (GLUCOPHAGE) 1000 MG TABLET    Take 1 tablet by mouth in the morning and 1 tablet before bedtime. NAPROXEN (NAPROSYN) 250 MG TABLET    Take 1 tablet by mouth 2 times daily as needed for Pain    PENICILLIN V POTASSIUM (VEETID) 500 MG TABLET    Take 1 tablet by mouth 4 times daily for 10 days    ROSUVASTATIN (CRESTOR) 40 MG TABLET    Take 1 tablet by mouth nightly       ALLERGIES     Patient has no known allergies. FAMILYHISTORY       Family History   Problem Relation Age of Onset    Diabetes Father         SOCIAL HISTORY       Social History     Tobacco Use    Smoking status: Never    Smokeless tobacco: Never   Vaping Use    Vaping Use: Never used   Substance Use Topics    Alcohol use: Yes     Comment: rare    Drug use: Not Currently       SCREENINGS    Jannie Coma Scale  Eye Opening: Spontaneous  Best Verbal Response: Oriented  Best Motor Response: Obeys commands  [place] Coma Scale Score: 15      PHYSICAL EXAM    (up to 7 for level 4, 8 or more for level 5)     ED Triage Vitals [09/25/22 1638]   BP Temp Temp Source Heart Rate Resp SpO2 Height Weight   (!) 127/94 99.4 °F (37.4 °C) Oral (!) 117 18 96 % -- 235 lb 7.2 oz (106.8 kg)       Physical Exam  Vitals and nursing note reviewed. Constitutional:       General: He is not in acute distress. Appearance: Normal appearance. He is not ill-appearing. HENT:      Head: Normocephalic and atraumatic. Nose: Nose normal.      Mouth/Throat:      Comments: Oropharynx is clear and moist, negative for swelling. Uvula is midline, no edema. Eyes:      General:         Right eye: No discharge. Left eye: No discharge.    Pulmonary:      Effort: Pulmonary effort is normal. No respiratory distress. Musculoskeletal:         General: Normal range of motion. Cervical back: Normal range of motion. Skin:     General: Skin is warm and dry. Comments: Negative for rash or swelling. Neurological:      General: No focal deficit present. Mental Status: He is alert and oriented to person, place, and time. Psychiatric:         Mood and Affect: Mood normal.         Behavior: Behavior normal.       DIAGNOSTIC RESULTS   LABS:    Labs Reviewed - No data to display    When ordered only abnormal lab results are displayed. All other labs were within normal range or not returned as of this dictation. EKG: When ordered, EKG's are interpreted by the Emergency Department Physician in the absence of a cardiologist.  Please see their note for interpretation of EKG. RADIOLOGY:   All images such as plain radiographs, CT, Ultrasound and MRI are interpreted by a radiologist. Some images are visualized and preliminarily interpreted by me and/or the ED attending physician. Interpretation per the radiologist below, if available at the time of this note:    No orders to display       CONSULTS:  None    PROCEDURES   Unless otherwise noted below, none. Procedures    EMERGENCY DEPARTMENT COURSE and DIFFERENTIAL DIAGNOSIS/MDM:   Vitals:    Vitals:    09/25/22 1638   BP: (!) 127/94   Pulse: (!) 117   Resp: 18   Temp: 99.4 °F (37.4 °C)   TempSrc: Oral   SpO2: 96%   Weight: 235 lb 7.2 oz (106.8 kg)       Patient was given the following medications:  Medications   dicyclomine (BENTYL) capsule 20 mg (has no administration in time range)   dexamethasone (PF) (DECADRON) injection 6 mg (has no administration in time range)           Is this patient to be included in the SEP-1 Core Measure due to severe sepsis or septic shock? No   Exclusion criteria - the patient is NOT to be included for SEP-1 Core Measure due to:   Infection is not suspected    Patient reports itching, and

## 2022-09-27 ENCOUNTER — OFFICE VISIT (OUTPATIENT)
Dept: PHARMACY | Age: 37
End: 2022-09-27
Payer: MEDICAID

## 2022-09-27 VITALS — HEART RATE: 110 BPM | SYSTOLIC BLOOD PRESSURE: 151 MMHG | DIASTOLIC BLOOD PRESSURE: 107 MMHG

## 2022-09-27 DIAGNOSIS — E11.65 POORLY CONTROLLED TYPE 2 DIABETES MELLITUS (HCC): Primary | ICD-10-CM

## 2022-09-27 LAB
CHP ED QC CHECK: ABNORMAL
GLUCOSE BLD-MCNC: 373 MG/DL

## 2022-09-27 PROCEDURE — 99213 OFFICE O/P EST LOW 20 MIN: CPT

## 2022-09-27 RX ORDER — INSULIN DEGLUDEC 200 U/ML
46 INJECTION, SOLUTION SUBCUTANEOUS DAILY
Qty: 6 ML | Refills: 1 | Status: SHIPPED | OUTPATIENT
Start: 2022-09-27 | End: 2022-10-24 | Stop reason: SDUPTHER

## 2022-09-27 NOTE — PATIENT INSTRUCTIONS
Continue Trulicity every Tuesday, we will increase your dose in 4 weeks. Increase Tresiba to 46 units daily     Remember to scan karen at least every 8 hours    Contact customer service if your karen falls off before 2 weeks. They will send you a voucher for a new one.   [phone]    Call me if you have blood sugars that are consistently above 250 or below 70    Start checking blood pressure at home.    Skin tac-- [facility] or [facility]

## 2022-09-27 NOTE — PROGRESS NOTES
CLINICAL PHARMACY NOTE--Diabetes    Kylah Valderrama is a 29 y.o. male with PMHX significant for CKD, diabetic neuropathy, HTN, HLD, Obesity, and Type 2 Diabetes referred by JONY Malik for diabetes counseling and medication management. Interval update: Pt seen in ED on 9/24/22 for tooth infection. He was RX'd PCN, which he later had an allergic reaction to. He was itching so much that his karen fell off. He returned to the ED on 9/25 and states he was given a steroid injection. He has been taking an increased dose of Tresiba 36 units as directed, but did not start Trulicity until today. ASSESSMENT/PLAN:    Type 2 Diabetes  A1c above goal <7%  Ophelia report reflects poor control 2/2 medication noncompliance. Recent steroid injection will also contribute to elevated BGs.   -Medications: improve compliance   Metformin 1 g BID   Jardiance 10 mg QD (will switch to metformin combo at future visit)   Trulicity 0.58 mg every tuesday (started 9/27/22, plan to titrate every 4 wks as tolerated),  Increase Tresiba to 46 units daily. Stop Humalog  -SMBG: Use karen to check glucose QID. Call me if you have blood sugars that are consistently above 250 or below 70. Pt had issues with sticking in the past. Recommend pt obtain Skin Tac and provided sample. Consider dexcom if pt continues to have issues. Pt also inquired about insulin pump. Will discuss at future visit if unable to control.   -Labs: vit D, vit B12, urine ACR, FLP  -Lifestyle: stop drinking juice  -HM: COVID, PNA, and flu vax, eye exam    2. Hypertension  BP above goal <140/90   ?albuminuria? Check BP daily at home and bring log to f/u visit. Medications: on ACE + SGLT2   Labs: urine ACR    3. Hyperlipidemia  Continue high intensity statin.   Labs: FLP    Health Maintenance Due   Topic Date Due    COVID-19 Vaccine (1) Never done    Varicella vaccine (1 of 2 - 2-dose childhood series) Never done    Diabetic foot exam  Never done Diabetic retinal exam  Never done    Hepatitis C screen  Never done    Pneumococcal 0-64 years Vaccine (2 - PCV) 03/01/2015    Flu vaccine (1) 08/01/2022       Education provided:  Reviewed A1c and blood sugar goals with patient  Counseled on changes to medication regimen and common side effects  Reviewed karen instruction/application and importance of scanning at least every 8 hours. Discussed symptoms and management of hypoglycemic episodes. Encouraged weight loss and aerobic exercise   Educated on diet   Provided blood pressure log and instructed patient to bring to f/u appointment. No follow-ups on file. Subjective   SUBJECTIVE/OBJECTIVE:    Treatment Adherence:  Diet:  10a- usually skips, eggs/sausage  4p- turkey/chx, salmon, sometimes 1 slice wheat bread, baked or air fried salmon/chicken, brussels, broccoli, corn  Snack/dessert- crackers, fruit/veggie maybe 3 servings of these per day  Drinks- 16 bottles water/d, 2c juice per day (\"hit and miss\")  Exercise: no regular exercise due to pain, balance issues; waiting on neuro and PT  Weight trend: decreasing due to PNA and uncontrolled DM  Barriers: impairment:  physical, overwhelmed by complexity of regimen, and time constraints    Type 2 Diabetes Mellitus  Year diagnosed ~2010. Related hospitalizations/ED visits: multiple ED visits for hyperglycemia. Comorbities: high risk ASCVD, HTN, obesity, and CKD   Considerations:  cost of medication ($0-3), GFR, simplify regimen/med compliance  Current symptoms/problems: neuropathy. Hypoglycemia? no   Eye exam within one year: no    Current diabetes therapy:    Metformin 1 g BID    Tresiba 36 units daily    Jardiance 10 mg QD    Trulicity 0.14 mg weekly   Compliance: forgets at least 2 doses of all meds (including insulin) per week. Doesn't sleep well, needs to get kids ready for bed, then falls asleep and forgets. Pt states he was noncompliant with meds when he lost his job due to COVID PNA.   LF for most meds was 7/19/22 for 30 ds, so he is likely missing 50% of the time. He did get chlorthalidone, lisinopril, and metformin from [facility] pharmacy. Side effects: none  Cost: $3 copay  Previous regimens: Humalog 10 units TIDAC (takes 1-3 times per day, will take if not eating only if BG is high)    SMBG:   Libreview     Avg V high High target   9/9-9/22/22 307 77% 17% 6%   this AM- ate an egg sandwich a couple hours ago. Hypertension:    Patient denies chest pain, shortness of breath, headache, and blurred vision. On Ace- lisinopril 20 mg  Medication side effects: no medication side effects noted. Use of agents associated with hypertension: none. Caffeine: none  Home blood pressure monitoring: No.  But has BP cuff    Hyperlipidemia:  On high intensity statin-- No new myalgias or GI upset   On aspirin 81 mg  Cardiovascular risk factors: diabetes mellitus, dyslipidemia, hypertension, male gender, and obesity (BMI >= 30 kg/m2)    The ASCVD Risk score (Cody DK, et al., 2019) failed to calculate for the following reasons: The 2019 ASCVD risk score is only valid for ages 36 to 78         Objective     Medication list reviewed and up to date. Physical exam     There were no vitals taken for this visit. There is no height or weight on file to calculate BMI.   Wt Readings from Last 3 Encounters:   09/25/22 235 lb 7.2 oz (106.8 kg)   09/24/22 238 lb 15.7 oz (108.4 kg)   09/06/22 248 lb (112.5 kg)      BP Readings from Last 3 Encounters:   09/25/22 (!) 127/94   09/24/22 111/87   09/13/22 (!) 129/96        Pertinent Labs:  Lab Results   Component Value Date    LABA1C 13.2 08/16/2022    LABA1C 16.9 06/06/2022    LABA1C >18.0 05/18/2022      No results found for: MALBCR  No results found for: CRCLEARANCE   No results found for: LDLCALC, LDLDIRECT, CHOL, TRIG, HDL  Lab Results   Component Value Date    CREATININE 1.1 08/25/2022    BUN 11 08/25/2022     (L) 08/25/2022    K 4.3 08/25/2022 CL 95 (L) 2022    CO2 22 2022     Lab Results   Component Value Date    AST 11 (L) 2022    ALT 11 2022    BILIDIR <0.2 2022    BILITOT 0.3 2022    ALKPHOS 130 (H) 2022     No components found for: VITB12  No results found for: TSH, TSHREFLEX              Discussed with patient the Pharmacist Collaborative Practice Agreement. Patient provided verbal and/or electronic (ex. Paperless Posthart) consent to participate in the collaborative practice agreement between the pharmacist and referred patient. This is in lieu of paper consent due to COVID-19 precautions and the use of remote/virtual visits.      Kenya Jane, PharmD, [facility] Medication Management  Ph: [phone] in place:  Yes  Recommendation Provided To: Patient/Caregiver: 5 via In person and Pharmacy: 1  Intervention Detail: Adherence Monitorin, Discontinued Rx: 1, reason: Non-adherence, Dose Adjustment: 1, reason: Therapy Optimization, Patient Access Assistance/Sample Provided, Refill(s) Provided, and Scheduled Appointment  Gap Closed?: No   Intervention Accepted By: Patient/Caregiver: 5 and Pharmacy: 1  Time Spent (min): 45

## 2022-09-28 ENCOUNTER — HOSPITAL ENCOUNTER (OUTPATIENT)
Dept: CT IMAGING | Age: 37
Discharge: HOME | End: 2022-09-28
Payer: MEDICAID

## 2022-09-28 DIAGNOSIS — B39.2 HISTOPLASMOSIS PNEUMONIA (HCC): ICD-10-CM

## 2022-09-28 PROCEDURE — 6360000004 HC RX CONTRAST MEDICATION: Performed by: EMERGENCY MEDICINE

## 2022-09-28 PROCEDURE — 71260 CT THORAX DX C+: CPT

## 2022-09-28 RX ADMIN — IOPAMIDOL 75 ML: 755 INJECTION, SOLUTION INTRAVENOUS at 11:33

## 2022-09-30 ENCOUNTER — TELEPHONE (OUTPATIENT)
Dept: INFECTIOUS DISEASES | Age: 37
End: 2022-09-30

## 2022-09-30 NOTE — TELEPHONE ENCOUNTER
----- Message from Minerva Boone MD sent at 9/30/2022  1:03 AM EDT -----  CT chest improving and looking better cont with Histoplasma treatment as before  Let him know  -thanks

## 2022-09-30 NOTE — RESULT ENCOUNTER NOTE
CT chest improving and looking better cont with Histoplasma treatment as before  Let him know  -thanks

## 2022-10-03 ENCOUNTER — TELEPHONE (OUTPATIENT)
Dept: PRIMARY CARE CLINIC | Age: 37
End: 2022-10-03

## 2022-10-03 NOTE — PROGRESS NOTES
Can you find out if patient would still like script for bath chair? Is he having difficulty standing?

## 2022-10-03 NOTE — TELEPHONE ENCOUNTER
----- Message from HANS Pierson CNP sent at 10/3/2022  1:12 PM EDT -----        ----- Message -----  From: Adeola Aaron  Sent: 9/13/2022  11:11 AM EDT  To: HANS Pierson CNP    Pt is wondering if you can write an RX for a bath chair?

## 2022-10-03 NOTE — TELEPHONE ENCOUNTER
Per Ms Danielaayla Maier:   Can you find out if patient would still like script for bath chair? Is he having difficulty standing?

## 2022-10-04 ENCOUNTER — OFFICE VISIT (OUTPATIENT)
Dept: PULMONOLOGY | Age: 37
End: 2022-10-04
Payer: MEDICAID

## 2022-10-04 VITALS
DIASTOLIC BLOOD PRESSURE: 80 MMHG | SYSTOLIC BLOOD PRESSURE: 115 MMHG | RESPIRATION RATE: 23 BRPM | HEIGHT: 73 IN | WEIGHT: 239 LBS | BODY MASS INDEX: 31.68 KG/M2 | HEART RATE: 126 BPM | OXYGEN SATURATION: 97 % | TEMPERATURE: 98.2 F

## 2022-10-04 DIAGNOSIS — B39.2 HISTOPLASMOSIS PNEUMONIA (HCC): ICD-10-CM

## 2022-10-04 DIAGNOSIS — E11.65 POORLY CONTROLLED TYPE 2 DIABETES MELLITUS (HCC): ICD-10-CM

## 2022-10-04 DIAGNOSIS — R68.81 EARLY SATIETY: Primary | ICD-10-CM

## 2022-10-04 DIAGNOSIS — E66.9 OBESITY (BMI 30-39.9): ICD-10-CM

## 2022-10-04 PROCEDURE — 99214 OFFICE O/P EST MOD 30 MIN: CPT | Performed by: INTERNAL MEDICINE

## 2022-10-04 PROCEDURE — 3046F HEMOGLOBIN A1C LEVEL >9.0%: CPT | Performed by: INTERNAL MEDICINE

## 2022-10-04 NOTE — PROGRESS NOTES
REASON FOR CONSULTATION/CC: histo pneumonia  Chief Complaint   Patient presents with    Follow-up          PCP: HANS Gresham - CNP    HISTORY OF PRESENT ILLNESS: Tomas Aviles is a 29y.o. year old male with a history of dm who presents         History. Admitted for pneumonia dx with histoplasmaosis       DM  HgA1c 16. Still uncontrolle. d      complains of Weight loss / nausea with early satiety     Histo  Following up with ID    Continues to complains of weakness. Objective:   PHYSICAL EXAM:  Blood pressure 115/80, pulse (!) 126, temperature 98.2 °F (36.8 °C), resp. rate 23, height 6' 1\" (1.854 m), weight 239 lb (108.4 kg), SpO2 97 %.'  Gen: No distress. Eyes: PERRL. No sclera icterus. No conjunctival injection. ENT: No discharge. Pharynx clear. External appearance of ears and nose normal.  Neck: Trachea midline. No obvious mass. Resp: No accessory muscle use. No crackles. No wheezes. No rhonchi. CV: Regular rate. Regular rhythm. No murmur or rub. No edema. GI:    Skin: Warm, dry, normal texture and turgor. No nodule on exposed extremities. Lymph: No cervical LAD. No supraclavicular LAD. M/S: No cyanosis. No clubbing. No joint deformity. Neuro: Moves all four extremities. Psych: Oriented x 3. No anxiety. Awake. Alert. Intact judgement and insight. Data Reviewed:           Assessment:     Histo pneumonia  DM - poorly controlled with neuropathy and retinopathy      Plan:      Problem List Items Addressed This Visit       Poorly controlled type 2 diabetes mellitus ([address]     Discussed poor control and all the complication from this. Obesity (BMI 30-39. 9)     Weight loss and early satiety likely secondary to gastroporesis. Gastric emptying study ordered to further assess but needed DM control         Histoplasmosis pneumonia ([address]      Improving on antifungals. CT chest improving.      complains of weakness which is likely secondary to very poorly controlled DM. Pulmonary follow up as needed. Patient discussed disability. Not needed from a pulmonary pov but will defer to ID and primary care if needed from these stand points. Other Visit Diagnoses       Early satiety    -  Primary    Relevant Orders    NM GASTRIC EMPTYING                  This note was transcribed using [id number] [employer]. Please disregard any translational errors.     Nidia Vidal [facility] [phone]

## 2022-10-04 NOTE — ASSESSMENT & PLAN NOTE
Weight loss and early satiety likely secondary to gastroporesis.       Gastric emptying study ordered to further assess but needed DM control

## 2022-10-04 NOTE — ASSESSMENT & PLAN NOTE
Improving on antifungals. CT chest improving. complains of weakness which is likely secondary to very poorly controlled DM. Pulmonary follow up as needed. Patient discussed disability. Not needed from a pulmonary pov but will defer to ID and primary care if needed from these stand points.

## 2022-10-04 NOTE — TELEPHONE ENCOUNTER
Spoke with patient- he does still want the bath chair. He states he has peripheral neuropathy and this gives him trouble with standing up.    He will call two DME places near him to see who takes his insurance  Cal Pritchett [phone]  The "[employer]  [phone]

## 2022-10-06 ENCOUNTER — HOSPITAL ENCOUNTER (OUTPATIENT)
Dept: NUCLEAR MEDICINE | Age: 37
Discharge: HOME | End: 2022-10-06
Payer: MEDICAID

## 2022-10-06 DIAGNOSIS — R68.81 EARLY SATIETY: ICD-10-CM

## 2022-10-06 PROCEDURE — A9541 TC99M SULFUR COLLOID: HCPCS | Performed by: INTERNAL MEDICINE

## 2022-10-06 PROCEDURE — 3430000000 HC RX DIAGNOSTIC RADIOPHARMACEUTICAL: Performed by: INTERNAL MEDICINE

## 2022-10-06 PROCEDURE — 78264 GASTRIC EMPTYING IMG STUDY: CPT

## 2022-10-06 RX ADMIN — Medication 1 MILLICURIE: at 09:49

## 2022-10-10 ENCOUNTER — OFFICE VISIT (OUTPATIENT)
Dept: PHARMACY | Age: 37
End: 2022-10-10
Payer: MEDICAID

## 2022-10-10 DIAGNOSIS — E11.65 POORLY CONTROLLED TYPE 2 DIABETES MELLITUS (HCC): Primary | ICD-10-CM

## 2022-10-10 PROCEDURE — 99212 OFFICE O/P EST SF 10 MIN: CPT | Performed by: PHARMACIST

## 2022-10-10 NOTE — TELEPHONE ENCOUNTER
Spoke with patient to see where he would like the RX to go. He states he has already purchased one. No script needed.

## 2022-10-10 NOTE — PROGRESS NOTES
CLINICAL PHARMACY NOTE--Diabetes    Cecille Song is a 29 y.o. male with PMHX significant for CKD, diabetic neuropathy, HTN, HLD, Obesity, and Type 2 Diabetes referred by JONY Ching for diabetes counseling and medication management. Interval update: Patient reports significant nausea lately. It was difficult to discuss patient's diabetes at visit today as he was extremely nauseated / vomiting during visit and unable to talk. He reports that he has had trouble with nausea since February, but it has gotten worse in the past couple months. He is now seeing a GI physician for gastroparesis. He does not believe that nausea has gotten worse since starting Trulicity. Patient would like to continue on medication for now. Additionally, he has not placed his Freestyle yet so no recent BG levels to review. He has missed 4 doses of his Sue Picket since last visit two weeks ago. ASSESSMENT/PLAN:    Type 2 Diabetes  A1c above goal <7%  No data from [address] [place] [address] [employer] as patient does not have device on. BG today in clinic = 257 (fasting). However, patient reports two readings of 88 around lunch time. Patient to put Earnie Citron back on today. -Medications: improve compliance   Metformin 1 g BID   Jardiance 10 mg QD (will switch to metformin combo at future visit)   Trulicity 7.22 mg every tuesday (started 9/27/22, plan to titrate every 4 wks as tolerated)  Continue Tresiba 46 units daily for now, hesitant to increase with lack of BG data  -SMBG: Use karen to check glucose QID. Call me if you have blood sugars that are consistently above 250 or below 70. Pt had issues with sticking in the past. Recommend pt obtain Skin Tac and provided sample. Consider dexcom if pt continues to have issues. Pt also inquired about insulin pump.  Will discuss at future visit if unable to control.   -Recommended lidocaine cream to legs for neuropathy   -Labs: vit D, vit B12, urine ACR, FLP - will get next month with A1c   -Lifestyle: stop drinking juice  -HM: COVID, PNA, and flu vax, eye exam - patient declining all vaccinations     2. Hypertension  BP above goal <140/90   ?albuminuria? Check BP daily at home and bring log to f/u visit. Medications: on ACE + SGLT2   Labs: urine ACR    3. Hyperlipidemia  Continue high intensity statin. Labs: FLP    Health Maintenance Due   Topic Date Due    COVID-19 Vaccine (1) Never done    Varicella vaccine (1 of 2 - 2-dose childhood series) Never done    Diabetic foot exam  Never done    Diabetic retinal exam  Never done    Hepatitis C screen  Never done    Hepatitis B vaccine (1 of 3 - Risk 3-dose series) Never done    Pneumococcal 0-64 years Vaccine (2 - PCV) 03/01/2015    Flu vaccine (1) 08/01/2022       Education provided:  Reviewed A1c and blood sugar goals with patient  Counseled on changes to medication regimen and common side effects  Reviewed karen instruction/application and importance of scanning at least every 8 hours. Discussed symptoms and management of hypoglycemic episodes. Encouraged weight loss and aerobic exercise   Educated on diet   Provided blood pressure log and instructed patient to bring to f/u appointment. No follow-ups on file. Subjective   SUBJECTIVE/OBJECTIVE:    Treatment Adherence:  Diet:  10a- usually skips, eggs/sausage  4p- turkey/chx, salmon, sometimes 1 slice wheat bread, baked or air fried salmon/chicken, brussels, broccoli, corn  Snack/dessert- crackers, fruit/veggie maybe 3 servings of these per day  Drinks- 16 bottles water/d, 2c juice per day (\"hit and miss\")  Exercise: no regular exercise due to pain, balance issues; waiting on neuro and PT  Weight trend: decreasing due to PNA and uncontrolled DM  Barriers: impairment:  physical, overwhelmed by complexity of regimen, and time constraints    Type 2 Diabetes Mellitus  Year diagnosed ~2010. Related hospitalizations/ED visits: multiple ED visits for hyperglycemia.   Comorbities: high risk ASCVD, HTN, obesity, and CKD   Considerations:  cost of medication ($0-3), GFR, simplify regimen/med compliance  Current symptoms/problems: neuropathy. Hypoglycemia? no   Eye exam within one year: no    Current diabetes therapy:    Metformin 1 g BID    Tresiba 46 units daily    Jardiance 10 mg QD    Trulicity 7.30 mg weekly   Compliance: forgets at least 2 doses of all meds (including insulin) per week. Doesn't sleep well, needs to get kids ready for bed, then falls asleep and forgets. Pt states he was noncompliant with meds when he lost his job due to COVID PNA. LF for most meds was 7/19/22 for 30 ds, so he is likely missing 50% of the time. He did get chlorthalidone, lisinopril, and metformin from [facility] pharmacy. 10/10/22: reports 4 missed dose of Tresiba in last 2 weeks. Side effects: none  Cost: $3 copay  Previous regimens: Humalog 10 units TIDAC (takes 1-3 times per day, will take if not eating only if BG is high)    SMBG:   Libreview     Avg V high High target   9/9-9/22/22 307 77% 17% 6%  10/10/22: No Freestyle data. AM readings 180-200s. Later in day 230-300. Had two readings of 88 around lunch time. 257 in office this AM (fasting). Hypertension:    Patient denies chest pain, shortness of breath, headache, and blurred vision. On Ace- lisinopril 20 mg  Medication side effects: no medication side effects noted. Use of agents associated with hypertension: none. Caffeine: none  Home blood pressure monitoring: No.  But has BP cuff    Hyperlipidemia:  On high intensity statin-- No new myalgias or GI upset   On aspirin 81 mg  Cardiovascular risk factors: diabetes mellitus, dyslipidemia, hypertension, male gender, and obesity (BMI >= 30 kg/m2)    The ASCVD Risk score (Cody LOPES, et al., 2019) failed to calculate for the following reasons: The 2019 ASCVD risk score is only valid for ages 36 to 78       Objective     Medication list reviewed and up to date.     Physical exam     There were no vitals taken for this visit. There is no height or weight on file to calculate BMI. Wt Readings from Last 3 Encounters:   10/04/22 239 lb (108.4 kg)   22 235 lb 7.2 oz (106.8 kg)   22 238 lb 15.7 oz (108.4 kg)      BP Readings from Last 3 Encounters:   10/04/22 115/80   22 (!) 151/107   22 (!) 127/94        Pertinent Labs:  Lab Results   Component Value Date    LABA1C 13.2 2022    LABA1C 16.9 2022    LABA1C >18.0 2022      No results found for: MALBCR  No results found for: CRCLEARANCE   No results found for: LDLCALC, LDLDIRECT, CHOL, TRIG, HDL  Lab Results   Component Value Date    CREATININE 1.1 2022    BUN 11 2022     (L) 2022    K 4.3 2022    CL 95 (L) 2022    CO2 22 2022     Lab Results   Component Value Date    AST 11 (L) 2022    ALT 11 2022    BILIDIR <0.2 2022    BILITOT 0.3 2022    ALKPHOS 130 (H) 2022     No components found for: VITB12  No results found for: TSH, TSHREFLEX              Discussed with patient the Pharmacist Collaborative Practice Agreement. Patient provided verbal and/or electronic (ex. mychart) consent to participate in the collaborative practice agreement between the pharmacist and referred patient. This is in lieu of paper consent due to COVID-19 precautions and the use of remote/virtual visits.      Donna Celaya, PharmD, BCPS  [facility]: [phone]  [facility]: [phone]  10/10/2022 10:35 AM    For Pharmacy Admin Tracking Only    CPA in place:  Yes  Recommendation Provided To: Provider: 2 via Note to Provider and Patient/Caregiver: 2 via In person  Intervention Detail: Adherence Monitorin and Scheduled Appointment  Gap Closed?: No   Intervention Accepted By: Provider: 2 and Patient/Caregiver: 2  Time Spent (min): 45

## 2022-10-10 NOTE — PATIENT INSTRUCTIONS
Put Jayesh back on so we can see trends with you blood sugars   Continue Tresiba 46 units daily   Try lidocaine cream on your legs for tingling / pain

## 2022-10-11 ENCOUNTER — OFFICE VISIT (OUTPATIENT)
Dept: PRIMARY CARE CLINIC | Age: 37
End: 2022-10-11
Payer: MEDICAID

## 2022-10-11 VITALS
DIASTOLIC BLOOD PRESSURE: 97 MMHG | OXYGEN SATURATION: 98 % | TEMPERATURE: 98.5 F | BODY MASS INDEX: 32.17 KG/M2 | WEIGHT: 243.8 LBS | SYSTOLIC BLOOD PRESSURE: 127 MMHG | HEART RATE: 111 BPM

## 2022-10-11 DIAGNOSIS — R29.898 LEG WEAKNESS, BILATERAL: ICD-10-CM

## 2022-10-11 DIAGNOSIS — K31.84 GASTROPARESIS: ICD-10-CM

## 2022-10-11 DIAGNOSIS — G62.9 NEUROPATHY: ICD-10-CM

## 2022-10-11 DIAGNOSIS — E11.65 POORLY CONTROLLED TYPE 2 DIABETES MELLITUS (HCC): Primary | ICD-10-CM

## 2022-10-11 PROBLEM — N18.30 STAGE 3 CHRONIC KIDNEY DISEASE (HCC): Status: RESOLVED | Noted: 2022-05-18 | Resolved: 2022-10-11

## 2022-10-11 PROCEDURE — 3046F HEMOGLOBIN A1C LEVEL >9.0%: CPT | Performed by: NURSE PRACTITIONER

## 2022-10-11 PROCEDURE — 99214 OFFICE O/P EST MOD 30 MIN: CPT | Performed by: NURSE PRACTITIONER

## 2022-10-11 RX ORDER — METOCLOPRAMIDE 10 MG/1
10 TABLET ORAL
Qty: 90 TABLET | Refills: 1 | Status: SHIPPED | OUTPATIENT
Start: 2022-10-11

## 2022-10-11 ASSESSMENT — ENCOUNTER SYMPTOMS
COUGH: 0
SHORTNESS OF BREATH: 0
ABDOMINAL PAIN: 0
SORE THROAT: 0

## 2022-10-11 NOTE — PROGRESS NOTES
[address] [facility] [address] PRIMARY CARE  [address] [place] [id number]  Dept: [phone]  Dept Fax: [phone]     10/11/2022      Tata Nelson   1985     Chief Complaint   Patient presents with    Leg Pain     Gabapentin not working well, pain in feet to knees,   discuss gastro emptying test-not able to eat well. HPI     Patient presents for diabetes follow up. He wants to discuss the results of the gastric emptying study he recently had. His mother is on the phone during visit and has concerns that he has lost significant amount of weight and does not have an appetite. Also reports when he does eat he has early satiety and will often vomit food back up. Also reports his neuropathy is not well managed with gabapentin. They also report he is having to use a cane to get around. Walking is impaired and they are requesting he see PT. They think he may need a motorized wheelchair to get around. Also requesting social work/case management get in contact with patient. States he has some financial concerns and they are wanting to know what kind of assistance he may be able to get. PHQ Scores 7/19/2022   PHQ2 Score 0   PHQ9 Score 0     Interpretation of Total Score Depression Severity: 1-4 = Minimal depression, 5-9 = Mild depression, 10-14 = Moderate depression, 15-19 = Moderately severe depression, 20-27 = Severe depression     Prior to Visit Medications    Medication Sig Taking?  Authorizing Provider   metoclopramide (REGLAN) 10 MG tablet Take 1 tablet by mouth 3 times daily (with meals) Yes Kristine Ser, APRN - CNP   Insulin Degludec (TRESIBA FLEXTOUCH) 200 UNIT/ML SOPN Inject 46 Units into the skin daily Yes Kristine Ser, APRN - CNP   diphenhydrAMINE (BENADRYL) 25 MG capsule Take 1 capsule by mouth every 6 hours as needed for Itching Yes CHARLI Burr   naproxen (NAPROSYN) 250 MG tablet Take 1 tablet by mouth 2 times daily as needed for Pain Yes Valdez Vincent MD   lisinopril (PRINIVIL;ZESTRIL) 20 MG tablet Take 1 tablet by mouth daily Yes Sonia Kan, DO   Dulaglutide (TRULICITY) 8.10 RV/5.8QF SOPN Inject 0.75 mg into the skin once a week Yes Elex Comment, APRN - CNP   itraconazole (SPORANOX) 100 MG capsule Take 200 mg by mouth 2 times daily Yes Historical Provider, MD   gabapentin (NEURONTIN) 400 MG capsule Take 2 capsules by mouth in the morning and 2 capsules at noon and 2 capsules before bedtime. Do all this for 90 days. 2-3 QD. Yes Elex Comment, APRN - CNP   metFORMIN (GLUCOPHAGE) 1000 MG tablet Take 1 tablet by mouth in the morning and 1 tablet before bedtime. Yes Elex Comment, APRN - CNP   empagliflozin (JARDIANCE) 10 MG tablet Take 1 tablet by mouth in the morning. Yes Elex Comment, APRN - CNP   DULoxetine (CYMBALTA) 30 MG extended release capsule Take 1 capsule by mouth in the morning. Yes Elex Comment, APRN - CNP   dicyclomine (BENTYL) 10 MG capsule Take 1 capsule by mouth 4 times daily (before meals and nightly) Yes Elex Comment, APRN - CNP   chlorthalidone (HYGROTON) 25 MG tablet Take 1 tablet by mouth in the morning. Yes Elex Comment, APRN - CNP   carvedilol (COREG) 25 MG tablet Take 1 tablet by mouth in the morning and 1 tablet before bedtime. Yes Elex Comment, APRN - CNP   aspirin 81 MG chewable tablet Take 1 tablet by mouth in the morning. Yes Elex Comment, APRN - CNP   rosuvastatin (CRESTOR) 40 MG tablet Take 1 tablet by mouth nightly Yes Elex Comment, APRN - CNP   albuterol sulfate  (90 Base) MCG/ACT inhaler Inhale 2 puffs into the lungs every 6 hours as needed for Wheezing Yes Historical Provider, MD   Continuous Blood Gluc Sensor (FREESTYLE HOME 2 SENSOR) MISC Use to check blood sugar 4 times per day (before each meal and at bedtime).   Elex Comment, APRN - CNP       Past Medical History:   Diagnosis Date    COVID-19     History of blood transfusion Hyperlipidemia     Neuropathy         Social History     Tobacco Use    Smoking status: Never    Smokeless tobacco: Never   Vaping Use    Vaping Use: Never used   Substance Use Topics    Alcohol use: Yes     Comment: rare    Drug use: Not Currently        Past Surgical History:   Procedure Laterality Date    BRONCHOSCOPY  06/09/2022    BRONCHOSCOPY BRUSHINGS performed by Layton John MD at [address]  06/09/2022    BRONCHOSCOPY DIAGNOSTIC OR CELL [address] ONLY performed by Layton John MD at [address]  06/09/2022    BRONCHOSCOPY BIOPSY BRONCHUS performed by Layton John MD at [address] N/A 06/14/2022    BRONCHOSCOPY DIAGNOSTIC OR CELL [address] ONLY performed by Dipesh Moreno DO at [facility] ENDOSCOPY        Allergies   Allergen Reactions    Penicillins Rash        Family History   Problem Relation Age of Onset    Diabetes Father         Patient's past medical history, surgical history, family history, medications, and allergies  were all reviewed and updated as appropriate today. Review of Systems   Constitutional:  Negative for fever. HENT:  Negative for congestion and sore throat. Respiratory:  Negative for cough and shortness of breath. Cardiovascular:  Negative for chest pain, palpitations and leg swelling. Gastrointestinal:  Negative for abdominal pain. Genitourinary:  Negative for difficulty urinating. Musculoskeletal:  Positive for arthralgias. Neurological:  Positive for numbness (neuropathy). Negative for dizziness and weakness. Psychiatric/Behavioral: Negative. BP (!) 127/97 (Cuff Size: Large Adult)   Pulse (!) 111   Temp 98.5 °F (36.9 °C) (Oral)   Wt 243 lb 12.8 oz (110.6 kg)   SpO2 98% Comment: room air  BMI 32.17 kg/m²      Physical Exam  Vitals reviewed. Constitutional:       General: He is not in acute distress. Appearance: He is well-developed.    Eyes:      Conjunctiva/sclera: Conjunctivae normal.   Neck:      Thyroid: No thyromegaly. Cardiovascular:      Rate and Rhythm: Normal rate and regular rhythm. Heart sounds: No murmur heard. Pulmonary:      Effort: Pulmonary effort is normal. No respiratory distress. Breath sounds: Normal breath sounds. Abdominal:      General: There is no distension. Musculoskeletal:      Cervical back: Neck supple. Right lower leg: No edema. Left lower leg: No edema. Lymphadenopathy:      Cervical: No cervical adenopathy. Skin:     General: Skin is warm and dry. Capillary Refill: Capillary refill takes less than 2 seconds. Neurological:      General: No focal deficit present. Mental Status: He is alert and oriented to person, place, and time. Psychiatric:         Mood and Affect: Mood normal.         Behavior: Behavior normal.       Assessment:  Encounter Diagnoses   Name Primary? Poorly controlled type 2 diabetes mellitus (HCC) Yes    Gastroparesis     Leg weakness, bilateral     Neuropathy        Plan:  1. Poorly controlled type 2 diabetes mellitus ([facility] [address]  2. Gastroparesis  -Patient recently had a gastric emptying study that was ordered by his pulmonologist. Results showed mildly delayed emptying 4 hour time interval. He reports loss of appetite, early satiety, vomiting after eating. At this time will start on Reglan and refer to GI for further evaluation/management. Discussed he likely has some gastroparesis related to uncontrolled diabetes. Discussed importance of getting diabetes better controlled. He is following with pharmacist for diabetes management as well. He should follow up with me in 2 months. - JOELLE - Néstor Recio DO, Gastroenterology, [facility]  - metoclopramide (REGLAN) 10 MG tablet; Take 1 tablet by mouth 3 times daily (with meals)  Dispense: 90 tablet; Refill: 1    3. Leg weakness, bilateral  -Refer to PT for evaluation/treatment.  At this time I do not feel like patient needs a motorized wheelchair but may need assistive device if having difficulty ambulating. Also needs strengthening exercises to improve mobility. Patient agreeable to follow up with PT.   - MICHELLE Jay 114    4. Neuropathy  - Currently taking gabapentin 800mg TID but does not feel like it is effective. [facility] to try lidocaine cream to legs. Appt with neurology scheduled for February. Return in about 2 months (around 12/11/2022) for f/u diabetes.              HANS Hawk - CNP

## 2022-10-11 NOTE — PATIENT INSTRUCTIONS
[facility] Locations - No appointment necessary. @ indicates the location is open Saturdays in addition to Monday through Friday. Call your preferred location for test preparation, business hours and other information you need. [employer] accepts [employer]. [facility]    @ [place] [facility] S[facility] [address] [facility] Keshav. [place], [address]   Ph: [phone] [facility] Lab Svcs. [address]   Ph: [phone]  @ [facility] Lab Svcs. [address] [facility]   Ph: [phone]    [facility] Svcs. [address]   Ph: [phone] @ [place] [facility] Svcs. [address]  Ph: [phone] @ [facility] Sv[facility]. [address] [facility]. [address]   Ph: [phone]     Rui Strong cs. [place] [address]  Ph: [phone]    [place]   @ [place] [facility] Svcs. [address] [place], [place] [address]   Ph: [phone] [facility] [address] [employer] [place], [place] [address]eon Neph, [address] [facility]  Ph: [address]:  [address] Lab Svcs[address] 54 [facility]   Ph: [phone] [facility]. Lab Svcs.   [address] [facility], [address] [facility]   Ph: [phone]

## 2022-10-11 NOTE — Clinical Note
Theron Lamar,  Patient and his mother are requesting /case management services. Are you able to reach out to him? Thank you!   Clayton Bridges, CNP

## 2022-10-13 ENCOUNTER — CARE COORDINATION (OUTPATIENT)
Dept: CARE COORDINATION | Age: 37
End: 2022-10-13

## 2022-10-13 NOTE — CARE COORDINATION
Ambulatory Care Coordination Note  10/13/2022    ACC: Suzanne Estrada RN    [facility] outreach per PCP referral    Offered patient enrollment in the Remote Patient Monitoring (RPM) program for in-home monitoring: Patient is not eligible for RPM program.  Out of work 1-2 yrs s/p Covid dx in 2020 which he explains left him w/residual concerns - ie generalized weakness/lack of endurance; unsteady gait; dizziness. After missing 2 mos work on [employer], he was in danger of losing his job and had to file for disability. He has been denied SSDI 2x to date & is following w/ now on this. More recently, he has been dx histoplasmosis and d/t risk of dizziness, her no longer drives  He lives alone, with his mother and the mother of his children who both actively check in on him. Support of family/friends is how he accomplishes personal errands & appointments. He continues to try to maintain his own living space, both financially and as r/t homemaking. However, he shares that the generosity of jovita of family, and at times friends, is what has helped him remain in his home. He verbalizes concerns jovita is not a reliable means to maintain his current home. He is concerned about becoming homeless if unable to maintain financial independence. He notes that potential employers are unwilling to embrace an FMLA situation as a new hire. Due to multiple health concerns, he cannot attempt steady employment without FMLA in place, as exacerbations of condition frequently presents and places pt at risk for falls and/or further health decline. For this reason, he has been trying to pursue SSDI. He is uncertain when he might anticipate an update from his  on status of most recent appeal for SSDI.   Reviewed ACC support and with pt permission - will place referral to [place] for support in outreach to appropriate community-based resources which may prove helpful and/or provide pt with information which may prove beneficial to pt, given aforementioned barriers. Discussed ACP and that ACM will include ACP packet for pt review. Advised, if pt requires, team [address] Tyree Ramirez may also be of support as pt works to consider, review, & complete ACP. Advised pt once he has drafted ACP, he may be able to obtain notarized service through his local bank, as often [employer] will offer notary service as complimentary service to its customers. Once pt has notarized ACP, he should provide copy to PCP (or he may bring original to PCP & Registrar can copy original to pt's chart & immediately return original back to pt at same visit). Pt verbalized understanding of above and agreement with the following  Plan: Pt will anticipate & review mailing which will be sent in the coming week - including DM Zone Mgmt tool, ACP information packet, Diabetes Education information. SW referral for help accessing appropriate community-based resources & help completing ACP if needed. Initiate ACC support for health coaching, care collaboration, support w/community resources, and help with any newly presenting concerns as needed. Pt agrees to outreach direct to [facility], as needed, between routine follow up outreach initiated by [facility]  Ambulatory Care Coordination Assessment    Care Coordination Protocol  Referral from Primary Care Provider: Yes  Week 1 - Initial Assessment     Do you have all of your prescriptions and are they filled?: Yes (Comment: reviewed 10.13.22)  Barriers to medication adherence: None  Are you able to afford your medications?: Yes  How often do you have trouble taking your medications the way you have been told to take them?: I always take them as prescribed. Do you have Home O2 Therapy?: No      Ability to seek help/take action for Emergent Urgent situations i.e. fire, crime, inclement weather or health crisis. : Independent  Ability to ambulate to restroom: Independent  Ability handle personal hygeine needs (bathing/dressing/grooming): Independent  Ability to manage Medications: Needs Assistance  Ability to prepare Food Preparation: Needs Assistance  Ability to maintain home (clean home, laundry): Needs Assistance  Ability to drive and/or has transportation: Needs Assistance  Ability to do shopping: Needs Assistance  Ability to manage finances: Independent  Is patient able to live independently?: Yes     Current Housing: Private Residence        Per the Fall Risk Screening, did the patient have 2 or more falls or 1 fall with injury in the past year?: No     Frequent urination at night?: No  Do you use rails/bars?: Yes  Do you have a non-slip tub mat?: Yes     Are you experiencing loss of meaning?: No  Are you experiencing loss of hope and peace?: No     Thinking about your patient's physical health needs, are there any symptoms or problems (risk indicators) you are unsure about that require further investigation?: No identified areas of uncertainly or problems already being investigated   Are the patients physical health problems impacting on their mental well-being?: No identified areas of concern   Are there any problems with your patients lifestyle behaviors (alcohol, drugs, diet, exercise) that are impacting on physical or mental well-being?: No identified areas of concern   Do you have any other concerns about your patients mental well-being?  How would you rate their severity and impact on the patient?: No identified areas of concern   How would you rate their home environment in terms of safety and stability (including domestic violence, insecure housing, neighbor harassment)?: Consistently safe, supportive, stable, no identified problems   How do daily activities impact on the patient's well-being? (include current or anticipated unemployment, work, caregiving, access to transportation or other): No identified problems or perceived positive benefits   How would you rate their social network (family, work, (REGLAN) 10 MG tablet Take 1 tablet by mouth 3 times daily (with meals) 10/11/22   Roselind Bloch, APRN - CNP   Insulin Degludec (TRESIBA FLEXTOUCH) 200 UNIT/ML SOPN Inject 46 Units into the skin daily 9/27/22   Roselind Bloch, APRN - CNP   diphenhydrAMINE (BENADRYL) 25 MG capsule Take 1 capsule by mouth every 6 hours as needed for Itching 9/25/22   CHARLI Salinas   naproxen (NAPROSYN) 250 MG tablet Take 1 tablet by mouth 2 times daily as needed for Pain 9/24/22   Saulo Gilbert MD   lisinopril (PRINIVIL;ZESTRIL) 20 MG tablet Take 1 tablet by mouth daily 9/14/22   [address], DO   Continuous Blood Gluc Sensor (FREESTYLE HOME 2 SENSOR) MISC Use to check blood sugar 4 times per day (before each meal and at bedtime). 9/13/22   Roselind Bloch, APRN - CNP   Dulaglutide (TRULICITY) 1.81 VO/9.5KD SOPN Inject 0.75 mg into the skin once a week 9/13/22   Roselind Bloch, APRN - CNP   itraconazole (SPORANOX) 100 MG capsule Take 200 mg by mouth 2 times daily    Historical Provider, MD   gabapentin (NEURONTIN) 400 MG capsule Take 2 capsules by mouth in the morning and 2 capsules at noon and 2 capsules before bedtime. Do all this for 90 days. 2-3 QD. 8/16/22 11/14/22  Roselind Bloch, APRN - CNP   metFORMIN (GLUCOPHAGE) 1000 MG tablet Take 1 tablet by mouth in the morning and 1 tablet before bedtime. 8/16/22   Roselind Bloch, APRN - CNP   empagliflozin (JARDIANCE) 10 MG tablet Take 1 tablet by mouth in the morning. 7/19/22   Roselind Bloch, APRN - CNP   DULoxetine (CYMBALTA) 30 MG extended release capsule Take 1 capsule by mouth in the morning. 7/19/22   Roselind Bloch, APRN - CNP   dicyclomine (BENTYL) 10 MG capsule Take 1 capsule by mouth 4 times daily (before meals and nightly) 7/19/22   Roselind Bloch, APRN - CNP   chlorthalidone (HYGROTON) 25 MG tablet Take 1 tablet by mouth in the morning.  7/19/22   Roselind Bloch, APRN - CNP   carvedilol (COREG) 25 MG tablet Take 1 tablet by mouth in the morning and 1 tablet before bedtime. 7/19/22   Vivien CabreraHANS - CNP   aspirin 81 MG chewable tablet Take 1 tablet by mouth in the morning. 7/19/22   Vivien CabreraHANS - CNP   rosuvastatin (CRESTOR) 40 MG tablet Take 1 tablet by mouth nightly 7/19/22   Vivien Cabrera, APRN - CNP   albuterol sulfate  (90 Base) MCG/ACT inhaler Inhale 2 puffs into the lungs every 6 hours as needed for Wheezing    Historical Provider, MD       Future Appointments   Date Time Provider Castillo Riley   10/19/2022 10:30 AM Diana Laureano MD W ORTHO [facility]   10/24/2022 10:00 AM [facility] MEDICATION MANAGEMENT Linward Persons RW [facility]   10/27/2022 10:15 AM Garnetta Dense, PT [facility] OP PT [place] HOD   11/1/2022 10:15 AM Garnetta Dense, PT [facility] PT [place] HOD   11/3/2022 10:15 AM Garnetta Dense, PT [facility] PT [place] HOD   11/8/2022 10:15 AM Garnetta Dense, PT [facility] PT [place] HOD   11/9/2022 10:15 AM Zakia Baker MD [facility] DIS [facility]   11/10/2022 10:15 AM Garnetta Dense, PT [facility] OP PT [place] HOD   12/6/2022 10:00 AM Vivien Cabrera, APRN - CNP J[facility]   2/21/2023 10:45 AM Basim Nassar MD AND NEURO [facility]     -This patient is referred this date to [facility]'S for review, assessment, and consultation as needed regarding the following presenting concern(s). Please bold all that apply. N/A    [facility] assessment of patient's substance use disorder, if any, is indicated and requested. No   Patient has concerns about apparently deteriorating mental status. No   Patient desires assistance with locating an accessible behavioral health treatment provider. No   Patient has questions/concerns regarding application and/or eligibility for Medicaid.     Yes; however, pt is 29 y.o, so not eligible in traditional programs (ie COA) - if there are any support programs whereas pt would be eligible for homemaker support - he has identified functional needs which might potentially meet eligibility criteria for certain program(s), if any available given his age. Patient has questions/concerns regarding application and/or eligibility for a Medicaid Waiver program ([place], Home Care Waiver, Assisted Living Waiver, etc.). No   Patient has questions/concerns about the cost of prescription drugs. No   Patient has questions/concerns regarding Medicare coverage, Including Part D prescription drug coverage. Yes   Patient desires supportive services in the home regarding activities of daily living (homemaking, transferring, bathing, toileting, transportation, shopping, etc.). No   Patient desires information about long-term care in a skilled nursing facility (SNF). Yes   Patient has inadequate and/or unaffordable housing. Interested in looking into Section 8 housing, uncertain about criteria to apply    N/A   Patient desires assistance with smoking cessation (Note: select this option only if patient is reasonably unable to participate in smoking cessation support groups offered periodically at [facility]). Yes   Patient desires information about advance directives (Power of  [place], Living Will, DNR, Guardianship, etc.).     Please provide additional information if needed: (additional needs, household size, monthly income, source of income) household of 1  Unemployed (currently in application process for SSDI; denied x2 working w/ on resubmit)  Currently existing on jovita of family/friends but verbalizes concerns realizing this is not sustainable    Advance Care Planning   The patient has the following advanced directives on file:  Advance Directives       Power of [facility] Will ACP-Advance Directive ACP-Power of     Not on File Not on File Not on File Not on File            The patient has appointed the following active healthcare agents:    Primary Decision Maker: Vivian Eisenmenger - Parent - [phone]    The Patient has the following current code status:    Code Status: Prior    Visit Documentation:  I called and spoke with Ingrid Vera regarding Advance Care Planning and Advance Care Directives, including Living Will and [address]. I discussed Advance Care Planning with Ingrid Vera today which included the importance of making their choices for care and treatment in the case of a health event that adversely affects their decision-making abilities. He has not completed the Advance Care Directives. He does not have an active health care agent at this time. Ingrid Vera was encouraged to complete the declaration forms and provide a signed copy of his medical records. He was advised ACP information packet will be sent via mail to him and that [facility] team members are available to assist with completion/review, if needed. I advised patient we would continue this discussion at future visits.      Diony Carrasco RN  10/13/2022

## 2022-10-14 ENCOUNTER — CARE COORDINATION (OUTPATIENT)
Dept: CARE COORDINATION | Age: 37
End: 2022-10-14

## 2022-10-14 NOTE — CARE COORDINATION
Initial Contact Social Work Note - Ambulatory  10/14/2022      Date of referral: 10/13/22   Referral received from: [address] [place] [address],  [address]  Reason for referral: Shawn Clemente    Previous SW referral: No  If yes, brief summary of outcome: N/A    Two Identifiers Verified: Yes    Insurance Provider: [place] Medicaid     Support System:  Spouse/Partner and Parent     Status:  N/A    Community Providers:  HCMELI    ADL Assistance Needed: N/A    Housing/Living Concerns or Home Modification Needs: Affordable Housing Access     Transportation Concern: Yes    Medication Cost Concern: N/A    Medication Adherence Concern: N/A    Financial Concern(s): Yes    Income (only if applicable): None    Ability to Read/Write: Yes    Advance Care Plan:   Reviewed by ACM who will send documents to patient    SW reviewed chart and spoke to patient via phone to complete psychosocial assessment. SW introduced self, explained role, and verified demographic information. Patient is a 29year old single male who currently resides alone at his home located in [place], [place]. As noted by AC, patient is working with an  after two denials for [employer]. Patient has no income at this time. Patient reports he utilizes Medicaid transport when family and friends are unable to assist. SW advised that wait list for [place] [id number] is currently closed. Will e-mail subsidized housing options to patient. ALYX briefly discussed [facility] due to iADL and transportation needs. Informed patient of assessment process and he should be getting a call for an assessment. Patient stated he is interested in a scooter due to experiencing fatigue quickly when trying to walk in the community. SW encouraged patient to discuss with PT at upcoming appointment as they can evaluate necessity for equipment and provide documentation for insurance coverage if appropriate.      ALYX Plan of Care:   SW to e-mail patient a list of subsidized/low-income housing options. SW to refer patient to the [address] [place] [address] program.      SW to follow up with patient in 1 week regarding the aforementioned. SW provided contact information and will remain available for support as needed. This has been reviewed with the ACM and other applicable team members.     Dhaval MCLAUGHLIN, [facility]    [phone]

## 2022-10-15 ENCOUNTER — HOSPITAL ENCOUNTER (EMERGENCY)
Age: 37
Discharge: HOME | End: 2022-10-15
Payer: MEDICAID

## 2022-10-15 VITALS
OXYGEN SATURATION: 96 % | TEMPERATURE: 98.2 F | BODY MASS INDEX: 31.82 KG/M2 | DIASTOLIC BLOOD PRESSURE: 95 MMHG | SYSTOLIC BLOOD PRESSURE: 127 MMHG | RESPIRATION RATE: 18 BRPM | HEIGHT: 73 IN | HEART RATE: 116 BPM | WEIGHT: 240.08 LBS

## 2022-10-15 DIAGNOSIS — G62.9 NEUROPATHY: Primary | ICD-10-CM

## 2022-10-15 PROCEDURE — 96372 THER/PROPH/DIAG INJ SC/IM: CPT

## 2022-10-15 PROCEDURE — 99284 EMERGENCY DEPT VISIT MOD MDM: CPT

## 2022-10-15 PROCEDURE — 6370000000 HC RX 637 (ALT 250 FOR IP): Performed by: PHYSICIAN ASSISTANT

## 2022-10-15 PROCEDURE — 6360000002 HC RX W HCPCS: Performed by: PHYSICIAN ASSISTANT

## 2022-10-15 RX ORDER — OXYCODONE HYDROCHLORIDE 5 MG/1
5 TABLET ORAL EVERY 6 HOURS PRN
Qty: 6 TABLET | Refills: 0 | Status: SHIPPED | OUTPATIENT
Start: 2022-10-15 | End: 2022-10-18

## 2022-10-15 RX ORDER — HYDROCODONE BITARTRATE AND ACETAMINOPHEN 5; 325 MG/1; MG/1
1 TABLET ORAL ONCE
Status: COMPLETED | OUTPATIENT
Start: 2022-10-15 | End: 2022-10-15

## 2022-10-15 RX ORDER — KETOROLAC TROMETHAMINE 30 MG/ML
30 INJECTION, SOLUTION INTRAMUSCULAR; INTRAVENOUS ONCE
Status: COMPLETED | OUTPATIENT
Start: 2022-10-15 | End: 2022-10-15

## 2022-10-15 RX ORDER — ACETAMINOPHEN 500 MG
1000 TABLET ORAL EVERY 8 HOURS PRN
Qty: 42 TABLET | Refills: 0 | Status: SHIPPED | OUTPATIENT
Start: 2022-10-15 | End: 2022-10-24 | Stop reason: ALTCHOICE

## 2022-10-15 RX ORDER — KETOROLAC TROMETHAMINE 30 MG/ML
30 INJECTION, SOLUTION INTRAMUSCULAR; INTRAVENOUS ONCE
Status: DISCONTINUED | OUTPATIENT
Start: 2022-10-15 | End: 2022-10-15

## 2022-10-15 RX ORDER — CYCLOBENZAPRINE HCL 10 MG
10 TABLET ORAL 3 TIMES DAILY PRN
Qty: 21 TABLET | Refills: 0 | Status: SHIPPED | OUTPATIENT
Start: 2022-10-15 | End: 2022-10-24 | Stop reason: ALTCHOICE

## 2022-10-15 RX ADMIN — HYDROCODONE BITARTRATE AND ACETAMINOPHEN 1 TABLET: 5; 325 TABLET ORAL at 13:17

## 2022-10-15 RX ADMIN — KETOROLAC TROMETHAMINE 30 MG: 30 INJECTION, SOLUTION INTRAMUSCULAR at 13:27

## 2022-10-15 ASSESSMENT — PAIN DESCRIPTION - LOCATION
LOCATION: FOOT;LEG

## 2022-10-15 ASSESSMENT — PAIN SCALES - GENERAL
PAINLEVEL_OUTOF10: 10
PAINLEVEL_OUTOF10: 8
PAINLEVEL_OUTOF10: 10

## 2022-10-15 ASSESSMENT — PAIN - FUNCTIONAL ASSESSMENT
PAIN_FUNCTIONAL_ASSESSMENT: 0-10

## 2022-10-15 ASSESSMENT — PAIN DESCRIPTION - DESCRIPTORS: DESCRIPTORS: THROBBING;SHARP

## 2022-10-15 ASSESSMENT — PAIN DESCRIPTION - FREQUENCY: FREQUENCY: CONTINUOUS

## 2022-10-15 NOTE — ED NOTES
HR still elevated, PA aware. Pt ok to be discharge. D/C: Order noted for d/c. Pt confirmed d/c paperwork  have correct name. Discharge and education instructions reviewed with patient. Teach-back successful. Pt verbalized understanding and signed d/c papers. Pt denied questions at this time. No acute distress noted. Patient instructed to follow-up as noted - return to emergency department if symptoms worsen. Patient verbalized understanding. Discharged per EDMD with discharge instructions. Pt discharged to private vehicle. Patient stable upon departure. Thanked patient for choosing [facility]) for care. Provider aware of patient pain at time of discharge.        Majo Esposito, RN  10/15/22 5654

## 2022-10-16 ASSESSMENT — ENCOUNTER SYMPTOMS: BACK PAIN: 0

## 2022-10-16 NOTE — ED PROVIDER NOTES
[address] [facility]        Pt Name: Nikos Monroy  MRN: [id number]  [place] 1985  Date of evaluation: 10/15/2022  Provider: Kaylie Ventura PA-C  PCP: HANS Magallon CNP  Note Started: 8:08 AM EDT      ABIGAIL. I have evaluated this patient. My supervising physician was available for consultation. Triage CHIEF COMPLAINT       Chief Complaint   Patient presents with    Leg Pain     Pt states that he has been experiencing neuropathy pain in his feet radiating towards his knees for a while, but it has been severe for the past 2 weeks. Pt states that he has not been able to sleep due to the pain. Pt states that his legs go from hot to cold. Pt states that laying down and standing make pain worse. Pt states that warm showers alleviate the pain a little bit. Pain is in both feet/legs equally. HISTORY OF PRESENT ILLNESS   (Location/Symptom, Timing/Onset, Context/Setting, Quality, Duration, Modifying Factors, Severity)  Note limiting factors. Chief Complaint: neuropathy pain    Nikos Monroy is a 29 y.o. male who presents to the ED with complaint of neuropathy in bilateral lower extremities has been present for the past 3 weeks. He states that it has been this severe in the past.  He was under control with gabapentin. States he has missed a couple doses of his gabapentin, and his pain is worse again. He states that he feels as if they are getting hot and cold. He puts them in a warm bath and this helps alleviate the pain a little bit. It comes back. The pain waxes and wanes. He denies any swelling in his legs, loss of sensation. He denies any fever, chills, shortness of breath, chest pain. Nursing Notes were all reviewed and agreed with or any disagreements were addressed in the HPI.     REVIEW OF SYSTEMS    (2-9 systems for level 4, 10 or more for level 5)     Review of Systems   Constitutional: Negative for chills and fever. Musculoskeletal:  Negative for back pain and gait problem. Skin:  Negative for rash and wound. PAST MEDICAL HISTORY     Past Medical History:   Diagnosis Date    COVID-19     History of blood transfusion     Hyperlipidemia     Neuropathy        SURGICAL HISTORY     Past Surgical History:   Procedure Laterality Date    BRONCHOSCOPY  06/09/2022    BRONCHOSCOPY BRUSHINGS performed by Lynda Go MD at [address]  06/09/2022    BRONCHOSCOPY DIAGNOSTIC OR CELL [address] performed by Lynda Go MD at [address]  06/09/2022    BRONCHOSCOPY BIOPSY BRONCHUS performed by Lynda Go MD at [address] N/A 06/14/2022    BRONCHOSCOPY DIAGNOSTIC OR CELL [address] ONLY performed by Arnie Loera DO at [facility]. [address]       Discharge Medication List as of 10/15/2022  3:19 PM        CONTINUE these medications which have NOT CHANGED    Details   metoclopramide (REGLAN) 10 MG tablet Take 1 tablet by mouth 3 times daily (with meals), Disp-90 tablet, R-1Normal      Insulin Degludec (TRESIBA FLEXTOUCH) 200 UNIT/ML SOPN Inject 46 Units into the skin daily, Disp-6 mL, R-1Normal      diphenhydrAMINE (BENADRYL) 25 MG capsule Take 1 capsule by mouth every 6 hours as needed for Itching, Disp-10 capsule, R-0Normal      naproxen (NAPROSYN) 250 MG tablet Take 1 tablet by mouth 2 times daily as needed for Pain, Disp-20 tablet, R-0Normal      lisinopril (PRINIVIL;ZESTRIL) 20 MG tablet Take 1 tablet by mouth daily, Disp-90 tablet, R-3Normal      Continuous Blood Gluc Sensor (FREESTYLE HOME 2 SENSOR) MISC Use to check blood sugar 4 times per day (before each meal and at bedtime). , Disp-2 each, R-11Normal      Dulaglutide (TRULICITY) 1.84 SO/0.7XE SOPN Inject 0.75 mg into the skin once a week, Disp-4 Adjustable Dose Pre-filled Pen Syringe, R-0Normal      itraconazole (SPORANOX) 100 MG capsule Take 200 mg by mouth 2 times dailyHistorical Med      gabapentin (NEURONTIN) 400 MG capsule Take 2 capsules by mouth in the morning and 2 capsules at noon and 2 capsules before bedtime. Do all this for 90 days. 2-3 QD., Disp-180 capsule, R-2Normal      metFORMIN (GLUCOPHAGE) 1000 MG tablet Take 1 tablet by mouth in the morning and 1 tablet before bedtime. , Disp-180 tablet, R-1Normal      empagliflozin (JARDIANCE) 10 MG tablet Take 1 tablet by mouth in the morning., Disp-30 tablet, R-3Normal      DULoxetine (CYMBALTA) 30 MG extended release capsule Take 1 capsule by mouth in the morning., Disp-30 capsule, R-3Normal      dicyclomine (BENTYL) 10 MG capsule Take 1 capsule by mouth 4 times daily (before meals and nightly), Disp-120 capsule, R-1Normal      chlorthalidone (HYGROTON) 25 MG tablet Take 1 tablet by mouth in the morning., Disp-30 tablet, R-3Normal      carvedilol (COREG) 25 MG tablet Take 1 tablet by mouth in the morning and 1 tablet before bedtime. , Disp-60 tablet, R-3Normal      aspirin 81 MG chewable tablet Take 1 tablet by mouth in the morning., Disp-30 tablet, R-3Normal      rosuvastatin (CRESTOR) 40 MG tablet Take 1 tablet by mouth nightly, Disp-30 tablet, R-3Normal      albuterol sulfate  (90 Base) MCG/ACT inhaler Inhale 2 puffs into the lungs every 6 hours as needed for WheezingHistorical Med             ALLERGIES     Penicillins    FAMILYHISTORY       Family History   Problem Relation Age of Onset    Diabetes Father         SOCIAL HISTORY       Social History     Socioeconomic History    Marital status: Single     Spouse name: None    Number of children: None    Years of education: None    Highest education level: None   Tobacco Use    Smoking status: Never    Smokeless tobacco: Never   Vaping Use    Vaping Use: Never used   Substance and Sexual Activity    Alcohol use: Yes     Comment: rare    Drug use: Not Currently    Sexual activity: Yes     Partners: Female     Social Determinants of Health     Financial Resource Strain: High Risk    Difficulty of Paying Living Expenses: Hard   Food Insecurity: No Food Insecurity    Worried About Running Out of Food in the Last Year: Never true    Ran Out of Food in the Last Year: Never true       SCREENINGS    [place] Coma Scale  Eye Opening: Spontaneous  Best Verbal Response: Oriented  Best Motor Response: Obeys commands  [place] Coma Scale Score: 15        PHYSICAL EXAM    (up to 7 for level 4, 8 or more for level 5)     ED Triage Vitals   BP Temp Temp Source Heart Rate Resp SpO2 Height Weight   10/15/22 1209 10/15/22 1209 10/15/22 1209 10/15/22 1209 10/15/22 1209 10/15/22 1209 10/15/22 1210 10/15/22 1210   (!) 133/99 98.2 °F (36.8 °C) Oral (!) 118 18 97 % 6' 1\" (1.854 m) 240 lb 1.3 oz (108.9 kg)       Physical Exam  Constitutional:       General: He is not in acute distress. Appearance: Normal appearance. He is not ill-appearing, toxic-appearing or diaphoretic. HENT:      Head: Normocephalic and atraumatic. Right Ear: External ear normal.      Left Ear: External ear normal.      Nose: Nose normal.      Mouth/Throat:      Mouth: Mucous membranes are moist.      Pharynx: Oropharynx is clear. No oropharyngeal exudate. Eyes:      General:         Right eye: No discharge. Left eye: No discharge. Cardiovascular:      Rate and Rhythm: Normal rate and regular rhythm. Pulses: Normal pulses. Heart sounds: Normal heart sounds. No murmur heard. No gallop. Pulmonary:      Effort: Pulmonary effort is normal. No respiratory distress. Breath sounds: Normal breath sounds. No stridor. No wheezing, rhonchi or rales. Musculoskeletal:         General: Normal range of motion. Cervical back: Normal range of motion. Right lower leg: No edema. Left lower leg: No edema. Comments: Patient with normal active range of motion at his hips, knees, ankles as well as good strength against resistance.   Normal sensation in bilateral lower extremities. Skin:     General: Skin is warm and dry. Comments: Patient with good capillary refill bilateral lower extremities  Dorsalis pedis pulses 2+, intact bilaterally   Neurological:      General: No focal deficit present. Mental Status: He is alert and oriented to person, place, and time. Psychiatric:         Mood and Affect: Mood normal.         Behavior: Behavior normal.       DIAGNOSTIC RESULTS   LABS:    Labs Reviewed - No data to display    When ordered, only abnormal lab results are displayed. All other labs were within normal range or not returned as of this dictation. EKG: When ordered, EKG's are interpreted by the Emergency Department Physician in the absence of a cardiologist.  Please see their note for interpretation of EKG. RADIOLOGY:   Non-plain film images such as CT, Ultrasound and MRI are read by the radiologist. Plain radiographic images are visualized andpreliminarily interpreted by the  ED Provider with the below findings:        Interpretation perthe Radiologist below, if available at the time of this note:    No orders to display     No results found. PROCEDURES   Unless otherwise noted below, none     Procedures    CRITICAL CARE TIME   N/A    CONSULTS:  None      EMERGENCY DEPARTMENT COURSE and DIFFERENTIAL DIAGNOSIS/MDM:   Vitals:    Vitals:    10/15/22 1209 10/15/22 1210 10/15/22 1526   BP: (!) 133/99  (!) 127/95   Pulse: (!) 118  (!) 116   Resp: 18  18   Temp: 98.2 °F (36.8 °C)     TempSrc: Oral     SpO2: 97%  96%   Weight:  240 lb 1.3 oz (108.9 kg)    Height:  6' 1\" (1.854 m)        Patient was given thefollowing medications:  Medications   HYDROcodone-acetaminophen (NORCO) 5-325 MG per tablet 1 tablet (1 tablet Oral Given 10/15/22 1317)   ketorolac (TORADOL) injection 30 mg (30 mg IntraMUSCular Given 10/15/22 1327)         Is this patient to be included in the SEP-1 Core Measure due to severe sepsis or septic shock?    No   Exclusion criteria - the patient is NOT to be included for SEP-1 Core Measure due to: Infection is not suspected    This is a 29y.o. year old male with PMH of DM2, neuropathy who presents to the ED with complaint of neuropathy that has been present for the past 2 to 3 weeks. Vitals upon arrival show tachycardic, otherwise within normal limits. Patient states that his heart rate is always fast.   Physical exam shows as above. Using wells score, patient -2 for DVT--very unlikely, discussed with him    Medications given: torodol, norco--symptoms improved  Discussed with pharmacy the possibility of increasing his gabapentin to the highest dose. However this should be done by his endocrinologist.  Discussed this with patient. He is okay with keeping his dose as it is now. He has missed a couple doses. He will be more consistent with this. I did give him some pain medication to go home with as well as informing him to use Tylenol as well for pain. He was in agreement. He felt better. He would like to go home. Discharged in stable condition. .       I am the Primary Clinician of Record. FINAL IMPRESSION      1.  Neuropathy          DISPOSITION/PLAN   DISPOSITION Decision To Discharge 10/15/2022 03:15:33 PM      PATIENT REFERREDTO:  Valeria Sahni [address]  [phone]    Schedule an appointment as soon as possible for a visit in 1 day  for reevaluation    [facility] Emergency Department  [address] [address]  [phone]  Go to   If symptoms worsen      DISCHARGE MEDICATIONS:  Discharge Medication List as of 10/15/2022  3:19 PM        START taking these medications    Details   acetaminophen (TYLENOL) 500 MG tablet Take 2 tablets by mouth every 8 hours as needed for Pain, Disp-42 tablet, R-0Normal      cyclobenzaprine (FLEXERIL) 10 MG tablet Take 1 tablet by mouth 3 times daily as needed for Muscle spasms, Disp-21 tablet, R-0Normal      oxyCODONE (ROXICODONE) 5 MG immediate release tablet Take 1 tablet by mouth every 6 hours as needed for Pain for up to 3 days. Intended supply: 3 days.  Take lowest dose possible to manage pain, Disp-6 tablet, R-0Normal             DISCONTINUED MEDICATIONS:  Discharge Medication List as of 10/15/2022  3:19 PM                 (Please note that portions ofthis note were completed with a voice recognition program.  Efforts were made to edit the dictations but occasionally words are mis-transcribed.)    Anushka Garcia PA-C (electronically signed)             Anushka Garcia PA-C  10/16/22 4418

## 2022-10-17 ENCOUNTER — CARE COORDINATION (OUTPATIENT)
Dept: CARE COORDINATION | Age: 37
End: 2022-10-17

## 2022-10-17 NOTE — CARE COORDINATION
Situation: s/p ED visit 10.15.22     ED notes reflect inconsistencies how pt was taking gabapentin, which is noted as reviewed and that pt agreed to consistently take gabapentin as prescribed     Initial ACC outreach on 10.13.22 at which time comprehensive review of med mgmt finds pt denied any barriers r/t med mgmt. Per mention of inconsistencies how pt was taking gabapentin, (ED visit note 10.15.22), this needs to be further explored. Though pt denied any financial barriers and verbalized understanding of med regimen - may need to further investigate potential causes for inconsistencies how pt is taking/not taking medication(s) and reinforce availability of after hours' provider line when practice is closed. Action:  Left  requesting return callback. Provided & repeated direct callback to reach this ACM.     Plan: continue attempt to reach for Care Transition review s/p ED  - med mgmt review

## 2022-10-18 ENCOUNTER — CARE COORDINATION (OUTPATIENT)
Dept: CARE COORDINATION | Age: 37
End: 2022-10-18

## 2022-10-18 NOTE — CARE COORDINATION
Follow up call received from Harshal Alvares @ office of Dr Yeimy Layne who explains per Dr Yeimy Layne - apart from gabapentin or cymbalta to treat, endocrinologist is unable to offer any further Tx pt concern r/t peripheral neuropathy, at this time. Dr Yeimy Layne advises pt to follow w/PCP for potential referral to neurology for BLE pain unresolved by tx Rx gabapentin. Additionally- Harshal Alvares reports that pt cx'd last 2 follow up appts w/endocrinology 6.29.21  No show Nov 2021  Canceled appt June 2022  Pt risks to be cancelled from practice if pt does not show for scheduled appt Jan 2023. Lastly, as pt has not followed w/endocrinologist in over a year, he has been exited from enrollment for Diabetes mgmt support through the [facility] DM mgmt program, at this time. DM Mgmt support may be revisited once pt resumes routine care through endocrinology office again. Next visit scheduled w/endocrinologist mid Jan 2023. Reviewed above w/pt. Pt reports No Show appt November 2021 was d/t hospitalization at that time. Reviewed option for VV w/PCP for sooner follow up (currently scheduled 12.6.22)  Pt states preference to self outreach direct to PCP to review sooner return visit & will state appt need is to review for neurology referral for c/o unresolving/worsening BLE pain, per endocrinologist's recommendation.   Pt verbalized understanding of above and agreement wiht the following  Plan: per pt preference - pt will self outreach direct to PCP to schedule sooner/return visit w/PCP & review, per endocrinologist's recommendation - referral needed for  neurology consult

## 2022-10-18 NOTE — CARE COORDINATION
Ambulatory Care Coordination Note  10/18/2022    ACC: Dixie Fall RN  General Assessment    Do you have any symptoms that are causing concern?: Yes  Progression since Onset: Unchanged  Reported Symptoms: Other (Comment: peripheral pain rated 7 on 0-10 scale)        assisted pt in outreach direct to office of Dr Severiano Hughes [facility]) as pt reports they had advised unable to see pt until Jan 2023. Reviewed w/Livier RN to Dr Severiano Hughes that pt is in consideration of return to ED, as he feels his only option. Pt reports 'gabapentin isn't helping' & he reported to Bridger Burgess, this call, that \"the emergency was able to give me something, but that only helped for about 12 hours\". Reports pain disrupts sleep. ACM & endocrinology nurse mutually agreed for concerns for risk for fall. Bridger Burgess advised note will be forwarded to endocrinologist to review and pt to anticipate return call to advise. Offered patient enrollment in the Remote Patient Monitoring (RPM) program for in-home monitoring: Patient is not eligible for RPM program.  Pt denies going without medication. He maintains he is taking gabapentin as prescribed, but 'isn't touching the pain'.     Plan: ongoing [facility] support  Care Coordination Interventions    Referral from Primary Care Provider: Yes  Suggested Interventions and Community Resources  Diabetes Education: Completed  Fall Risk Prevention: Completed  Medi Set or Pill Pack: Completed (Comment: picks up Rx through [facility])  Social Work: In Process (Comment: 10.13.22 interest in applying for [employer])  Other Services: Completed (Comment: follows w/med mgmt clinic for DM mgmt)  Zone Management Tools: Completed (Comment: DM)  Other Services or Interventions: reviewed pt centered goals; self mgmt concepts; community-based resources          Goals Addressed                      This Visit's Progress      Community Resource Goal (pt-stated)   On track      I will engage w/Community Health Liaison on Ambulatory Care Coordination team to review needs/barriers and discuss potential community-based resources which may prove helpful. Barriers: impairment:  physical: dizziness/deconditioned, fear of failure, financial, and lack of support  Plan for overcoming my barriers: engage in Care Coordination for added care team support  Confidence: 10/10  Anticipated Goal Completion Date: 4.13.23        Conditions and Symptoms (pt-stated)   On track      I will schedule office visits, as directed by my provider. I will keep my appointment or reschedule if I have to cancel. I will notify my provider of any barriers to my plan of care. I will follow my Zone Management tool to seek urgent or emergent care. I will notify my provider of any symptoms that indicate a worsening of my condition. Barriers: impairment:  physical: deconditioned/chronic condition, fear of failure, and financial  Plan for overcoming my barriers: engage in Care Coordination for added care team support  Confidence: 10/10  Anticipated Goal Completion Date: 4.13.23                Prior to Admission medications    Medication Sig Start Date End Date Taking? Authorizing Provider   acetaminophen (TYLENOL) 500 MG tablet Take 2 tablets by mouth every 8 hours as needed for Pain 10/15/22 10/22/22 Yes Samantha Hough PA-C   cyclobenzaprine (FLEXERIL) 10 MG tablet Take 1 tablet by mouth 3 times daily as needed for Muscle spasms 10/15/22 10/25/22 Yes Samantha Hough PA-C   oxyCODONE (ROXICODONE) 5 MG immediate release tablet Take 1 tablet by mouth every 6 hours as needed for Pain for up to 3 days. Intended supply: 3 days.  Take lowest dose possible to manage pain 10/15/22 10/18/22 Yes Monique Montoya PA-C   metoclopramide (REGLAN) 10 MG tablet Take 1 tablet by mouth 3 times daily (with meals) 10/11/22  Yes HANS Rivas CNP   Insulin Degludec (TRESIBA FLEXTOUCH) 200 UNIT/ML SOPN Inject 46 Units into the skin daily 9/27/22  Yes Samantha Cespedes Julious Jauregui, APRN - CNP   diphenhydrAMINE (BENADRYL) 25 MG capsule Take 1 capsule by mouth every 6 hours as needed for Itching 9/25/22  Yes Juan Carlos Dozier, [id number]   naproxen (NAPROSYN) 250 MG tablet Take 1 tablet by mouth 2 times daily as needed for Pain 9/24/22  Yes Raheem Chavez MD   lisinopril (PRINIVIL;ZESTRIL) 20 MG tablet Take 1 tablet by mouth daily 9/14/22  Yes Nikki, DO   Continuous Blood Gluc Sensor (FREESTYLE HOME 2 SENSOR) MISC Use to check blood sugar 4 times per day (before each meal and at bedtime). 9/13/22  Yes HANS Dao CNP   Dulaglutide (TRULICITY) 4.99 BP/5.3LR SOPN Inject 0.75 mg into the skin once a week 9/13/22  Yes HANS Dao CNP   itraconazole (SPORANOX) 100 MG capsule Take 200 mg by mouth 2 times daily   Yes Geena Vogel MD   gabapentin (NEURONTIN) 400 MG capsule Take 2 capsules by mouth in the morning and 2 capsules at noon and 2 capsules before bedtime. Do all this for 90 days. 2-3 QD. 8/16/22 11/14/22 Yes HANS Dao CNP   metFORMIN (GLUCOPHAGE) 1000 MG tablet Take 1 tablet by mouth in the morning and 1 tablet before bedtime. 8/16/22  Yes HANS Dao CNP   empagliflozin (JARDIANCE) 10 MG tablet Take 1 tablet by mouth in the morning. 7/19/22  Yes HANS Dao CNP   DULoxetine (CYMBALTA) 30 MG extended release capsule Take 1 capsule by mouth in the morning. 7/19/22  Yes HANS Dao CNP   dicyclomine (BENTYL) 10 MG capsule Take 1 capsule by mouth 4 times daily (before meals and nightly) 7/19/22  Yes HANS Dao CNP   chlorthalidone (HYGROTON) 25 MG tablet Take 1 tablet by mouth in the morning. 7/19/22  Yes HANS Dao CNP   carvedilol (COREG) 25 MG tablet Take 1 tablet by mouth in the morning and 1 tablet before bedtime. 7/19/22  Yes HANS Dao CNP   aspirin 81 MG chewable tablet Take 1 tablet by mouth in the morning.  7/19/22  Yes Mikayla Rubi HANS Gunderson CNP   rosuvastatin (CRESTOR) 40 MG tablet Take 1 tablet by mouth nightly 7/19/22  Yes HANS Stephenson CNP   albuterol sulfate  (90 Base) MCG/ACT inhaler Inhale 2 puffs into the lungs every 6 hours as needed for Wheezing   Yes Historical Provider, MD       Future Appointments   Date Time Provider [place]   10/19/2022 10:30 AM Melina Julio Omer MD [facility]   10/24/2022 10:00 AM [facility]   10/27/2022 10:15 AM Shayy Chacon, PT [facility] [place] [facility]   11/1/2022 10:15 AM Jimial Oswaldo, PT [facility] [place] [facility]   11/3/2022 10:15 AM Shayy Chacon, PT [facility] [place] [facility]   11/8/2022 10:15 AM Shayy Chacon, PT [facility] [place] [facility]   11/9/2022 10:15 AM Marizol Agrawal MD [facility] [place]   11/10/2022 10:15 AM Shayy Chacon, PT [facility] [place] [facility]   12/6/2022 10:00 AM HANS Stephenson CNP [facility] [place]   2/21/2023 10:45 AM Basim Hernandez MD AND NEURO [facility] [place]

## 2022-10-19 ENCOUNTER — OFFICE VISIT (OUTPATIENT)
Dept: ORTHOPEDIC SURGERY | Age: 37
End: 2022-10-19
Payer: MEDICAID

## 2022-10-19 VITALS — HEIGHT: 73 IN | WEIGHT: 240 LBS | BODY MASS INDEX: 31.81 KG/M2

## 2022-10-19 DIAGNOSIS — S92.252D CLOSED DISPLACED FRACTURE OF NAVICULAR BONE OF LEFT FOOT WITH ROUTINE HEALING, SUBSEQUENT ENCOUNTER: Primary | ICD-10-CM

## 2022-10-19 DIAGNOSIS — G62.9 NEUROPATHY: ICD-10-CM

## 2022-10-19 PROCEDURE — 99214 OFFICE O/P EST MOD 30 MIN: CPT | Performed by: ORTHOPAEDIC SURGERY

## 2022-10-19 NOTE — PROGRESS NOTES
CHIEF COMPLAINT:   1- Left ankle pain / navicular dorsal avulsion fracture. 2- Diabetic neuropathy pain. DATE OF INJURY: 6/28/2022. HISTORY:  Mr. Earline Cabrera 29 y.o.  male presents today for f/u evaluation of a left ankle injury which occurred when he was walking down steps and missed a step. He was first seen and evaluated in [facility] , where he was x-rayed, splinted and asked to f/u with Orthopedics. He is complaining of anterior and dorsal ankle pain and swelling 8/10. This is better with elevation and worse with bearing any wt. The pain is sharp and not radiating. No numbness or tingling sensation. Alleviating factors: elevation and rest. No other complaint. He is diabetic with neuropathy and in the process of gritting SSI.     Past Medical History:   Diagnosis Date    COVID-19     History of blood transfusion     Hyperlipidemia     Neuropathy        Past Surgical History:   Procedure Laterality Date    BRONCHOSCOPY  06/09/2022    BRONCHOSCOPY BRUSHINGS performed by Lynda Go MD at [address]  06/09/2022    BRONCHOSCOPY DIAGNOSTIC OR CELL [address] ONLY performed by Lynda Go MD at [address]  06/09/2022    BRONCHOSCOPY BIOPSY BRONCHUS performed by Lynda Go MD at [address] N/A 06/14/2022    BRONCHOSCOPY DIAGNOSTIC OR CELL [address] ONLY performed by Arnie Loera DO at [facility] History     Socioeconomic History    Marital status: Single     Spouse name: Not on file    Number of children: Not on file    Years of education: Not on file    Highest education level: Not on file   Occupational History    Not on file   Tobacco Use    Smoking status: Never    Smokeless tobacco: Never   Vaping Use    Vaping Use: Never used   Substance and Sexual Activity    Alcohol use: Yes     Comment: rare    Drug use: Not Currently    Sexual activity: Yes     Partners: Female   Other Topics Concern    Not on file   Social History Narrative    Not on file     Social Determinants of Health     Financial Resource Strain: High Risk    Difficulty of Paying Living Expenses: Hard   Food Insecurity: No Food Insecurity    Worried About Running Out of Food in the Last Year: Never true    Ran Out of Food in the Last Year: Never true   Transportation Needs: Not on file   Physical Activity: Not on file   Stress: Not on file   Social Connections: Not on file   Intimate Partner Violence: Not on file   Housing Stability: Not on file       Family History   Problem Relation Age of Onset    Diabetes Father        Current Outpatient Medications on File Prior to Visit   Medication Sig Dispense Refill    acetaminophen (TYLENOL) 500 MG tablet Take 2 tablets by mouth every 8 hours as needed for Pain 42 tablet 0    cyclobenzaprine (FLEXERIL) 10 MG tablet Take 1 tablet by mouth 3 times daily as needed for Muscle spasms 21 tablet 0    metoclopramide (REGLAN) 10 MG tablet Take 1 tablet by mouth 3 times daily (with meals) 90 tablet 1    Insulin Degludec (TRESIBA FLEXTOUCH) 200 UNIT/ML SOPN Inject 46 Units into the skin daily 6 mL 1    diphenhydrAMINE (BENADRYL) 25 MG capsule Take 1 capsule by mouth every 6 hours as needed for Itching 10 capsule 0    lisinopril (PRINIVIL;ZESTRIL) 20 MG tablet Take 1 tablet by mouth daily 90 tablet 3    Continuous Blood Gluc Sensor (FREESTYLE HOME 2 SENSOR) MISC Use to check blood sugar 4 times per day (before each meal and at bedtime). 2 each 11    Dulaglutide (TRULICITY) 2.33 QH/3.0TV SOPN Inject 0.75 mg into the skin once a week 4 Adjustable Dose Pre-filled Pen Syringe 0    itraconazole (SPORANOX) 100 MG capsule Take 200 mg by mouth 2 times daily      gabapentin (NEURONTIN) 400 MG capsule Take 2 capsules by mouth in the morning and 2 capsules at noon and 2 capsules before bedtime. Do all this for 90 days. 2-3 QD.  180 capsule 2    metFORMIN (GLUCOPHAGE) 1000 MG tablet Take 1 tablet by mouth in the morning and 1 tablet before bedtime. 180 tablet 1    empagliflozin (JARDIANCE) 10 MG tablet Take 1 tablet by mouth in the morning. 30 tablet 3    DULoxetine (CYMBALTA) 30 MG extended release capsule Take 1 capsule by mouth in the morning. 30 capsule 3    dicyclomine (BENTYL) 10 MG capsule Take 1 capsule by mouth 4 times daily (before meals and nightly) 120 capsule 1    chlorthalidone (HYGROTON) 25 MG tablet Take 1 tablet by mouth in the morning. 30 tablet 3    carvedilol (COREG) 25 MG tablet Take 1 tablet by mouth in the morning and 1 tablet before bedtime. 60 tablet 3    aspirin 81 MG chewable tablet Take 1 tablet by mouth in the morning. 30 tablet 3    rosuvastatin (CRESTOR) 40 MG tablet Take 1 tablet by mouth nightly 30 tablet 3    albuterol sulfate  (90 Base) MCG/ACT inhaler Inhale 2 puffs into the lungs every 6 hours as needed for Wheezing      naproxen (NAPROSYN) 250 MG tablet Take 1 tablet by mouth 2 times daily as needed for Pain (Patient not taking: Reported on 10/19/2022) 20 tablet 0     No current facility-administered medications on file prior to visit. Pertinent items are noted in HPI  Review of systems reviewed from Patient History Form and available in the patient's chart under the Media tab. No change. PHYSICAL EXAMINATION:  Mr. Sam Cross is a very pleasant 29 y.o.  male who presents today in no acute distress, awake, alert, and oriented. He is well dressed, nourished and  groomed. Patient with normal affect. Height is  6' 1\" (1.854 m), weight is 240 lb (108.9 kg), Body mass index is 31.66 kg/m². Resting respiratory rate is 16. Examination of the gait, showed that the patient walks with a minimal limp, WB left leg. Examination of both ankles showing a decreased range of motion of the left ankle compare to the other side. There is moderate swelling that can be seen, as well as ecchymosis. He has intact sensation and good pedal pulses.   He has significant tenderness on deep palpation over the navicular dorsally of the left ankle. IMAGING: Xrays, 3 views of the left ankle taken today in the office, were reviewed, and showed a minimally displaced navicular dorsal avulsion fracture. IMPRESSION:    1- Left ankle pain / navicular dorsal avulsion fracture. 2- Diabetic neuropathy pain. PLAN:  I discussed that the overall alignment of this fracture is still good and healed well. He will be WBAT, and start aggressive ROM and peroneal strengthening exercise. He can go back to normal activity with no restrictions. He will be seen PRN. I told the patient that it is not unusual to have some achy pain and swelling for up to a year after a fracture. NSAIDs OTC. We will give him a referral to pain management.       Clara Rodarte MD

## 2022-10-21 ENCOUNTER — CARE COORDINATION (OUTPATIENT)
Dept: CARE COORDINATION | Age: 37
End: 2022-10-21

## 2022-10-21 NOTE — CARE COORDINATION
SW spoke to patient to follow up on housing list sent via e-mail and [address] [facility] Waiver referral. Patient confirmed receipt of e-mail and will review. Patient stated he was told he does not qualify for program after [facility] staff completed initial assessment over the phone. Patient stated he does not have any other questions or concerns at this time. SW encouraged him to call this worker should any arise.  Will sign off.      Vanessa Narvaez MSGREGG, [facility]     [phone]

## 2022-10-22 PROCEDURE — 99285 EMERGENCY DEPT VISIT HI MDM: CPT

## 2022-10-23 ENCOUNTER — HOSPITAL ENCOUNTER (EMERGENCY)
Age: 37
Discharge: HOME | End: 2022-10-23
Attending: EMERGENCY MEDICINE
Payer: MEDICAID

## 2022-10-23 ENCOUNTER — APPOINTMENT (OUTPATIENT)
Dept: GENERAL RADIOLOGY | Age: 37
End: 2022-10-23
Payer: MEDICAID

## 2022-10-23 ENCOUNTER — APPOINTMENT (OUTPATIENT)
Dept: CT IMAGING | Age: 37
End: 2022-10-23
Payer: MEDICAID

## 2022-10-23 VITALS
BODY MASS INDEX: 32.55 KG/M2 | TEMPERATURE: 98.3 F | WEIGHT: 245.59 LBS | RESPIRATION RATE: 15 BRPM | OXYGEN SATURATION: 96 % | HEIGHT: 73 IN | HEART RATE: 119 BPM | DIASTOLIC BLOOD PRESSURE: 118 MMHG | SYSTOLIC BLOOD PRESSURE: 153 MMHG

## 2022-10-23 DIAGNOSIS — R00.0 TACHYCARDIA: ICD-10-CM

## 2022-10-23 DIAGNOSIS — M79.672 BILATERAL FOOT PAIN: ICD-10-CM

## 2022-10-23 DIAGNOSIS — K08.89 PAIN, DENTAL: Primary | ICD-10-CM

## 2022-10-23 DIAGNOSIS — M79.671 BILATERAL FOOT PAIN: ICD-10-CM

## 2022-10-23 LAB
A/G RATIO: 1.4 (ref 1.1–2.2)
ALBUMIN SERPL-MCNC: 4 G/DL (ref 3.4–5)
ALP BLD-CCNC: 88 U/L (ref 40–129)
ALT SERPL-CCNC: 18 U/L (ref 10–40)
ANION GAP SERPL CALCULATED.3IONS-SCNC: 12 MMOL/L (ref 3–16)
AST SERPL-CCNC: 14 U/L (ref 15–37)
BASOPHILS ABSOLUTE: 0.1 K/UL (ref 0–0.2)
BASOPHILS RELATIVE PERCENT: 1.4 %
BILIRUB SERPL-MCNC: <0.2 MG/DL (ref 0–1)
BUN BLDV-MCNC: 12 MG/DL (ref 7–20)
CALCIUM SERPL-MCNC: 9.4 MG/DL (ref 8.3–10.6)
CHLORIDE BLD-SCNC: 101 MMOL/L (ref 99–110)
CO2: 24 MMOL/L (ref 21–32)
CREAT SERPL-MCNC: 1.1 MG/DL (ref 0.9–1.3)
EKG ATRIAL RATE: 134 BPM
EKG DIAGNOSIS: NORMAL
EKG P AXIS: 48 DEGREES
EKG P-R INTERVAL: 152 MS
EKG Q-T INTERVAL: 284 MS
EKG QRS DURATION: 78 MS
EKG QTC CALCULATION (BAZETT): 424 MS
EKG R AXIS: -34 DEGREES
EKG T AXIS: 40 DEGREES
EKG VENTRICULAR RATE: 134 BPM
EOSINOPHILS ABSOLUTE: 0.1 K/UL (ref 0–0.6)
EOSINOPHILS RELATIVE PERCENT: 2.8 %
GFR SERPL CREATININE-BSD FRML MDRD: >60 ML/MIN/{1.73_M2}
GLUCOSE BLD-MCNC: 242 MG/DL (ref 70–99)
HCT VFR BLD CALC: 45.5 % (ref 40.5–52.5)
HEMOGLOBIN: 14.9 G/DL (ref 13.5–17.5)
LYMPHOCYTES ABSOLUTE: 1.8 K/UL (ref 1–5.1)
LYMPHOCYTES RELATIVE PERCENT: 37.9 %
MCH RBC QN AUTO: 25.7 PG (ref 26–34)
MCHC RBC AUTO-ENTMCNC: 32.7 G/DL (ref 31–36)
MCV RBC AUTO: 78.7 FL (ref 80–100)
MONOCYTES ABSOLUTE: 0.4 K/UL (ref 0–1.3)
MONOCYTES RELATIVE PERCENT: 7.8 %
NEUTROPHILS ABSOLUTE: 2.4 K/UL (ref 1.7–7.7)
NEUTROPHILS RELATIVE PERCENT: 50.1 %
PDW BLD-RTO: 12.9 % (ref 12.4–15.4)
PLATELET # BLD: 268 K/UL (ref 135–450)
PMV BLD AUTO: 8.3 FL (ref 5–10.5)
POTASSIUM SERPL-SCNC: 3.6 MMOL/L (ref 3.5–5.1)
PRO-BNP: 79 PG/ML (ref 0–124)
RBC # BLD: 5.78 M/UL (ref 4.2–5.9)
SODIUM BLD-SCNC: 137 MMOL/L (ref 136–145)
TOTAL PROTEIN: 6.8 G/DL (ref 6.4–8.2)
TROPONIN: 0.02 NG/ML
WBC # BLD: 4.8 K/UL (ref 4–11)

## 2022-10-23 PROCEDURE — 85025 COMPLETE CBC W/AUTO DIFF WBC: CPT

## 2022-10-23 PROCEDURE — 93010 ELECTROCARDIOGRAM REPORT: CPT | Performed by: INTERNAL MEDICINE

## 2022-10-23 PROCEDURE — 71260 CT THORAX DX C+: CPT | Performed by: PHYSICIAN ASSISTANT

## 2022-10-23 PROCEDURE — 93005 ELECTROCARDIOGRAM TRACING: CPT | Performed by: PHYSICIAN ASSISTANT

## 2022-10-23 PROCEDURE — 84484 ASSAY OF TROPONIN QUANT: CPT

## 2022-10-23 PROCEDURE — 96360 HYDRATION IV INFUSION INIT: CPT

## 2022-10-23 PROCEDURE — 83880 ASSAY OF NATRIURETIC PEPTIDE: CPT

## 2022-10-23 PROCEDURE — 6370000000 HC RX 637 (ALT 250 FOR IP): Performed by: PHYSICIAN ASSISTANT

## 2022-10-23 PROCEDURE — 36415 COLL VENOUS BLD VENIPUNCTURE: CPT

## 2022-10-23 PROCEDURE — 80053 COMPREHEN METABOLIC PANEL: CPT

## 2022-10-23 PROCEDURE — 71046 X-RAY EXAM CHEST 2 VIEWS: CPT

## 2022-10-23 PROCEDURE — 6360000004 HC RX CONTRAST MEDICATION: Performed by: PHYSICIAN ASSISTANT

## 2022-10-23 PROCEDURE — 2580000003 HC RX 258: Performed by: PHYSICIAN ASSISTANT

## 2022-10-23 RX ORDER — 0.9 % SODIUM CHLORIDE 0.9 %
1000 INTRAVENOUS SOLUTION INTRAVENOUS ONCE
Status: COMPLETED | OUTPATIENT
Start: 2022-10-23 | End: 2022-10-23

## 2022-10-23 RX ORDER — HYDROCODONE BITARTRATE AND ACETAMINOPHEN 5; 325 MG/1; MG/1
1 TABLET ORAL EVERY 6 HOURS PRN
Qty: 10 TABLET | Refills: 0 | Status: SHIPPED | OUTPATIENT
Start: 2022-10-23 | End: 2022-10-26

## 2022-10-23 RX ORDER — CARVEDILOL 6.25 MG/1
25 TABLET ORAL ONCE
Status: COMPLETED | OUTPATIENT
Start: 2022-10-23 | End: 2022-10-23

## 2022-10-23 RX ORDER — CLINDAMYCIN HYDROCHLORIDE 300 MG/1
300 CAPSULE ORAL 4 TIMES DAILY
Qty: 40 CAPSULE | Refills: 0 | Status: SHIPPED | OUTPATIENT
Start: 2022-10-23 | End: 2022-11-02

## 2022-10-23 RX ORDER — OXYCODONE HYDROCHLORIDE AND ACETAMINOPHEN 5; 325 MG/1; MG/1
1 TABLET ORAL ONCE
Status: COMPLETED | OUTPATIENT
Start: 2022-10-23 | End: 2022-10-23

## 2022-10-23 RX ORDER — LISINOPRIL 10 MG/1
20 TABLET ORAL ONCE
Status: COMPLETED | OUTPATIENT
Start: 2022-10-23 | End: 2022-10-23

## 2022-10-23 RX ADMIN — SODIUM CHLORIDE 1000 ML: 9 INJECTION, SOLUTION INTRAVENOUS at 01:14

## 2022-10-23 RX ADMIN — LISINOPRIL 20 MG: 10 TABLET ORAL at 02:31

## 2022-10-23 RX ADMIN — OXYCODONE AND ACETAMINOPHEN 1 TABLET: 5; 325 TABLET ORAL at 02:31

## 2022-10-23 RX ADMIN — CARVEDILOL 25 MG: 6.25 TABLET, FILM COATED ORAL at 02:30

## 2022-10-23 RX ADMIN — IOPAMIDOL 75 ML: 755 INJECTION, SOLUTION INTRAVENOUS at 02:25

## 2022-10-23 ASSESSMENT — ENCOUNTER SYMPTOMS
TROUBLE SWALLOWING: 0
VOICE CHANGE: 0
COLOR CHANGE: 0
VOMITING: 0

## 2022-10-23 NOTE — ED TRIAGE NOTES
Keshia Ashley Hartman is a 29 y.o. male brought himself to the ER for eval of dental and bilateral leg pain. The patient has not been able to receive any follow up care since he was last seen here. The patient is unable to get in his endocrinologist until Jan. The patients pain is a 10/10. The patient is alert and oriented with an open and patent airway.

## 2022-10-23 NOTE — ED PROVIDER NOTES
ED Attending Attestation Note    This patient was seen by the advanced practice provider. I personally saw the patient and performed a substantive portion of the visit including all aspects of the medical decision making. Briefly, 29 y.o. male who  has a past medical history of COVID-19, History of blood transfusion, Hyperlipidemia, and Neuropathy. presents with complaints of right-sided dental pain that he states is 10 out of 10. States is worse since this morning. Has history of chronic dental pain. Patient states he did take antibiotics 1 month ago but never followed up outpatient after being seen in the ED. No trauma to the face or mouth. No fevers. No vomiting. Patient also complaining of bilateral lower extremity pain which is chronic as well. No falls or injuries. Focused exam:   Gen: 29 y.o. male, NAD  HEENT: NCAT. PERRL. EOMI., multiple dental caries throughout, no drainable abscess, no Johann angina, no trismus  CV: Tachycardic, regular rhythm w/o MRG  Lungs: CTAB. No incr WOB. Abdomen: Soft, nontender, nondistended. No rebound/guarding. MSK: DP pulse 2+ bilaterally, compartment soft bilateral lower extremities, forage motion bilateral knees and hips without pain, no erythema or warmth of bilateral feet. EKG interpreted by myself. Sinus tachycardia with rate of 134, left axis deviation, , QRS 78, QTc 424, no ST elevations, nonspecific ST changes, Q waves in the inferior leads, poor progression, no acute change compared EKG from 7/5/2022. MDM:   Patient seen and evaluated. History and physical as above. Nontoxic and afebrile. Patient presents for complaints of dental pain as well as leg pain. Plan for a course of antibiotics for patient's dental pain. Patient had a CT PE study performed because of his tachycardia which was unremarkable. Patient's troponin improved from previous at 0.02. Normal white count. Hemoglobin stable at 14.9. BNP 79.   Review of chart shows the patient has been persistently tachycardic in the 110s to 120s over the past several months in the emergency room. Possibly related to pain today. Plan for discharge with outpatient follow-up. Return instruction provided. All questions answered prior to discharge. I estimate there is LOW risk for a ANAPHYLAXIS, DEEP SPACE INFECTION (e.g., WEIS ANGINA OR RETROPHARYNGEAL ABSCESS), EPIGLOTTITIS, MENINGITIS, or AIRWAY COMPROMISE, thus I consider the discharge disposition reasonable. Also, there is no evidence or peritonitis, sepsis, or toxicity. Lianna Brown and I have discussed the diagnosis and risks, and we agree with discharging home to follow-up with their primary doctor. We also discussed returning to the Emergency Department immediately if new or worsening symptoms occur. We have discussed the symptoms which are most concerning (e.g., changing or worsening pain, trouble swallowing or breathing, neck stiffness or fever) that necessitate immediate return. Discharge Vital Signs:  Blood pressure (!) 153/118, pulse (!) 119, temperature 98.3 °F (36.8 °C), temperature source Oral, resp. rate 15, height 6' 1\" (1.854 m), weight 245 lb 9.5 oz (111.4 kg), SpO2 96 %. Impression:  1. Pain, dental    2. Bilateral foot pain    3. Tachycardia          DISPOSITION Decision To Discharge 10/23/2022 03:11:37 AM    see list of dentists to follow up with    Call in 1 day  For follow up    Vivien Rick, APRN - [address] [facility]  [phone]    Call   For a follow up appointment. For further details of the patient's emergency department visit, please see the advanced practice provider's documentation. Sabino Bumpers, MD     This report has been produced using speech recognition software and may contain errors related to that system including errors in grammar, punctuation, and spelling, as well as words and phrases that may be inappropriate.  If there are any questions or concerns please feel free to contact the dictating provider for clarification.          Colin Rizo MD  10/23/22 [id number]       Colin Rizo MD  10/23/22 0316

## 2022-10-23 NOTE — ED NOTES
Pt to DC home in stable condition. Pt given RX and DC instructions.  [facility] [employer] [address] [place] [address]  10/23/22 [id number]

## 2022-10-23 NOTE — ED PROVIDER NOTES
[address] [facility]      Pt Name: Nikos Monroy  MRN: [id number]  [place] 1985  Date of evaluation: 10/22/2022  Provider: CHARLI Duarte    This patient was seen and evaluated by the attending physician Kourtney Bustillos MD.    [address] [facility]       Chief Complaint   Patient presents with    Dental Pain     Right lower dental pain that got worse this morning 10/10    Leg Pain     Bilateral leg pain, the patient has not been able to get in to the endocrinologist till Jack, 10/10       [address] [place] [address]   I performed a total Critical Care time of 15 minutes, excluding separately reportable procedures. There was a high probability of clinically significant/life threatening deterioration in the patient's condition which required my urgent intervention. Not limited to multiple reexaminations, discussions with attending physician and consultants. HISTORY OF PRESENT ILLNESS  (Location/Symptom, Timing/Onset, Context/Setting, Quality, Duration, Modifying Factors, Severity.)   Nikos Monroy is a 29 y.o. male who presents to the emergency department complaining of continued right lower posterior dental pain. Started over a month ago he was seen in the emergency department about a month ago. He tried to follow-up with dentist per his report. He took antibiotics. Still having pain. No fevers. Complains of bilateral foot pain that has also been going on for some time he tells me he has follow-up with endocrinology. Past medical history of diabetes. Tells me his blood sugars around 2 . No chest pain currently but states that he intermittently has chest pain and shortness of breath. History of hypertension tells me at least once a week he misses his blood pressure medications and did not take his carvedilol and lisinopril today because he was having so much pain. Nursing Notes were reviewed and I agree.     REVIEW OF SYSTEMS (2-9 systems for level 4, 10 or more for level 5)     Review of Systems   Constitutional:  Negative for fever. HENT:  Positive for dental problem. Negative for drooling, trouble swallowing and voice change. Cardiovascular:  Negative for chest pain. Gastrointestinal:  Negative for vomiting. Musculoskeletal:  Positive for arthralgias. Negative for neck pain and neck stiffness. Skin:  Negative for color change and wound. Neurological:  Negative for weakness and numbness. Psychiatric/Behavioral:  Negative for agitation, behavioral problems and confusion. Except as noted above the remainder of the review of systems was reviewed and negative.        PAST MEDICAL HISTORY         Diagnosis Date    COVID-19     History of blood transfusion     Hyperlipidemia     Neuropathy        SURGICAL HISTORY           Procedure Laterality Date    BRONCHOSCOPY  06/09/2022    BRONCHOSCOPY BRUSHINGS performed by Herb Nelson MD at [address]  06/09/2022    BRONCHOSCOPY DIAGNOSTIC OR CELL [address] performed by Herb Nelson MD at [address]  06/09/2022    BRONCHOSCOPY BIOPSY BRONCHUS performed by Herb Nelson MD at [address] [place] [address] N/A 06/14/2022    BRONCHOSCOPY DIAGNOSTIC OR CELL [address] ONLY performed by Rossana Rivera DO at [address] [facility]       Discharge Medication List as of 10/23/2022  3:23 AM        CONTINUE these medications which have NOT CHANGED    Details   acetaminophen (TYLENOL) 500 MG tablet Take 2 tablets by mouth every 8 hours as needed for Pain, Disp-42 tablet, R-0Normal      cyclobenzaprine (FLEXERIL) 10 MG tablet Take 1 tablet by mouth 3 times daily as needed for Muscle spasms, Disp-21 tablet, R-0Normal      metoclopramide (REGLAN) 10 MG tablet Take 1 tablet by mouth 3 times daily (with meals), Disp-90 tablet, R-1Normal      Insulin Degludec (TRESIBA FLEXTOUCH) 200 UNIT/ML SOPN Inject 46 Units into the skin daily, Disp-6 mL, R-1Normal      diphenhydrAMINE (BENADRYL) 25 MG capsule Take 1 capsule by mouth every 6 hours as needed for Itching, Disp-10 capsule, R-0Normal      naproxen (NAPROSYN) 250 MG tablet Take 1 tablet by mouth 2 times daily as needed for Pain, Disp-20 tablet, R-0Normal      lisinopril (PRINIVIL;ZESTRIL) 20 MG tablet Take 1 tablet by mouth daily, Disp-90 tablet, R-3Normal      Continuous Blood Gluc Sensor (FREESTYLE HOME 2 SENSOR) MISC Use to check blood sugar 4 times per day (before each meal and at bedtime). , Disp-2 each, R-11Normal      Dulaglutide (TRULICITY) 4.10 LC/7.3EJ SOPN Inject 0.75 mg into the skin once a week, Disp-4 Adjustable Dose Pre-filled Pen Syringe, R-0Normal      itraconazole (SPORANOX) 100 MG capsule Take 200 mg by mouth 2 times dailyHistorical Med      gabapentin (NEURONTIN) 400 MG capsule Take 2 capsules by mouth in the morning and 2 capsules at noon and 2 capsules before bedtime. Do all this for 90 days. 2-3 QD., Disp-180 capsule, R-2Normal      metFORMIN (GLUCOPHAGE) 1000 MG tablet Take 1 tablet by mouth in the morning and 1 tablet before bedtime. , Disp-180 tablet, R-1Normal      empagliflozin (JARDIANCE) 10 MG tablet Take 1 tablet by mouth in the morning., Disp-30 tablet, R-3Normal      DULoxetine (CYMBALTA) 30 MG extended release capsule Take 1 capsule by mouth in the morning., Disp-30 capsule, R-3Normal      dicyclomine (BENTYL) 10 MG capsule Take 1 capsule by mouth 4 times daily (before meals and nightly), Disp-120 capsule, R-1Normal      chlorthalidone (HYGROTON) 25 MG tablet Take 1 tablet by mouth in the morning., Disp-30 tablet, R-3Normal      carvedilol (COREG) 25 MG tablet Take 1 tablet by mouth in the morning and 1 tablet before bedtime. , Disp-60 tablet, R-3Normal      aspirin 81 MG chewable tablet Take 1 tablet by mouth in the morning., Disp-30 tablet, R-3Normal      rosuvastatin (CRESTOR) 40 MG tablet Take 1 tablet by mouth nightly, Disp-30 tablet, R-3Normal      albuterol sulfate  (90 Base) MCG/ACT inhaler Inhale 2 puffs into the lungs every 6 hours as needed for WheezingHistorical Med             ALLERGIES     Penicillins    FAMILY HISTORY           Problem Relation Age of Onset    Diabetes Father      Family Status   Relation Name Status    Mother  Alive    Father  Alive        SOCIAL HISTORY      reports that he has never smoked. He has never used smokeless tobacco. He reports current alcohol use. He reports that he does not currently use drugs. PHYSICAL EXAM    (up to 7 for level 4, 8 or more for level 5)     ED Triage Vitals [10/23/22 0015]   BP Temp Temp Source Heart Rate Resp SpO2 Height Weight   (!) 152/110 98.3 °F (36.8 °C) Oral (!) 139 20 97 % 6' 1\" (1.854 m) 245 lb 9.5 oz (111.4 kg)       Physical Exam  Vitals and nursing note reviewed. Constitutional:       Appearance: Normal appearance. HENT:      Head: Normocephalic and atraumatic. Mouth/Throat:      Mouth: Mucous membranes are moist.     Eyes:      Pupils: Pupils are equal, round, and reactive to light. Cardiovascular:      Rate and Rhythm: Tachycardia present. Pulmonary:      Effort: Pulmonary effort is normal. No respiratory distress. Abdominal:      Tenderness: There is no abdominal tenderness. Musculoskeletal:         General: Tenderness (generalized feet) present. No swelling. Normal range of motion. Cervical back: Normal range of motion. Skin:     General: Skin is warm. Neurological:      General: No focal deficit present. Mental Status: He is alert and oriented to person, place, and time.    Psychiatric:         Mood and Affect: Mood normal.         Behavior: Behavior normal.       DIAGNOSTIC RESULTS     RADIOLOGY:   Non-plain film images such as CT, Ultrasound and MRI are read by the radiologist. Plain radiographic images are visualized and preliminarily interpreted by CHARLI Hutchins with the below findings:    Reviewed radiologist's interpretation. Interpretation per the Radiologist below, if available at the time of this note:    CT CHEST PULMONARY EMBOLISM W CONTRAST   Final Result   Suboptimal timing of the contrast bolus with lower than expected density of   contrast in the pulmonary arterial system, specially in the upper lobe   branches. No large or central pulmonary arterial embolism is identified but   with reduced sensitivity in the upper lobe branches. Stable cluster of nodules in the right upper lobe with opacified bronchial   segment in the region. The nodules and distribution are stable from the most   recent exam on 09/28/2022. The inflammatory alveolar opacities of 07/05/2022   have resolved. Residual increased lymphoid tissue in the right hilum is   stable from 09/28/2022 and improved from 07/05/2022. 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 No new/active pneumonia is identified. XR CHEST (2 VW)   Final Result   Some subtle residual opacities in the right upper lobe. See the interval CT   reports for greater details. No pleural effusion or pneumothorax. LABS:  Labs Reviewed   CBC WITH AUTO DIFFERENTIAL - Abnormal; Notable for the following components:       Result Value    MCV 78.7 (*)     MCH 25.7 (*)     All other components within normal limits   COMPREHENSIVE METABOLIC PANEL - Abnormal; Notable for the following components:    Glucose 242 (*)     AST 14 (*)     All other components within normal limits   TROPONIN - Abnormal; Notable for the following components:    Troponin 0.02 (*)     All other components within normal limits   BRAIN NATRIURETIC PEPTIDE       All other labs were within normal range or not returned as of this dictation.     EMERGENCY DEPARTMENT COURSE and DIFFERENTIAL DIAGNOSIS/MDM:   Vitals:    Vitals:    10/23/22 0015 10/23/22 0205 10/23/22 0230 10/23/22 0232   BP: (!) 152/110 (!) 155/117 (!) 153/118 (!) 153/118   Pulse: (!) 139 (!) 122 (!) 119 (!) 119   Resp: 20 15     Temp: 98.3 °F (36.8 °C)      TempSrc: Oral   Oral   SpO2: 97% 96%     Weight: 245 lb 9.5 oz (111.4 kg)      Height: 6' 1\" (1.854 m)        Patient tachycardic to 139 on arrival with elevated blood pressure however he has missed blood pressure medications including carvedilol and lisinopril today and tells me he misses at least once a week. He is having dental pain bilateral chronic feet pain has an appointment with endocrinology for his neuropathy history of diabetes. Lab work as above. At the end of my shift CT scan was pending however on review of the chart he appears to stay tachycardic. His tachycardia improved here with narcotics and his blood pressure medications. I wrote him prescriptions for pain medication and antibiotics and encouraged him to follow-up with dentist and endocrinology. CONSULTS:  None    PROCEDURES:  Procedures      FINAL IMPRESSION      1. Pain, dental    2. Bilateral foot pain    3. Tachycardia          DISPOSITION/PLAN   DISPOSITION Decision To Discharge 10/23/2022 03:11:37 AM      PATIENT REFERRED TO:  see list of dentists to follow up with    Call in 1 day  For follow up    Erika Villalta, APRN - [address] [facility] [address]  [phone]    Call   For a follow up appointment. DISCHARGE MEDICATIONS:  Discharge Medication List as of 10/23/2022  3:23 AM        START taking these medications    Details   clindamycin (CLEOCIN) 300 MG capsule Take 1 capsule by mouth 4 times daily for 10 days, Disp-40 capsule, R-0Print      HYDROcodone-acetaminophen (NORCO) 5-325 MG per tablet Take 1 tablet by mouth every 6 hours as needed for Pain for up to 3 days. Sedation precautions. Do not drive or operate machinery while taking this medication. Do not drink alcohol. This is an addictive medication and should be used sparingly. , Disp-10  tablet, R-0Print             (Please note that portions of this note were completed with a voice recognition program.  Efforts were made to edit the dictations but occasionally words are mis-transcribed.)    Charlene Mason [place]  10/23/22 8049

## 2022-10-23 NOTE — DISCHARGE INSTRUCTIONS
Dental Emergency Referrals    [address] [facility] [place] residents only)    [facility] [place]  [address] [phone]   [facility].  [phone]   [facility]  [address]  [phone]   [facility] [place]  (entrance on [address] [facility]. [address] [place] [address].)  [address]  [phone]   [facility] 167.  [phone]   [facility]  [address] [place] [address].  [phone]       [address] [facility]  [address] [facility].  [phone]   [facility]  [address]  [phone]     [facility] [place]  [address] [facility] 2nd floor  [phone]   [facility]  [address] [phone] ([address] [place])  [place]: [address] [facility] [address]: [address]  [phone]   [address] [facility] 128  [facility]  [phone]     [facility]  [address]  [phone]   [address]  [address] [facility].  Oral Surgery Dept: [phone]  Dental Clinic: [address] [facility]  [phone]     [address] [place] [address] Drive [phone]   Urgent [facility] Care   [address] [place], [address]  [place] : [address] : [phone]     [facility]  [address] [facility] [address]    Other [address] in the area    [address] [facility] (Dental Urgent Care)  Crossings of [facility]  (Across from [facility])  [facility], [address]  [phone]?       Pediatric Only Dentists    [address] Floor   Up to age 21  [address]  Up to age 24  [place]: [facility]   [phone] or [address] [facility] [place]: [address] [place] [address]  Up to age 14  [address] [phone]

## 2022-10-24 ENCOUNTER — CARE COORDINATION (OUTPATIENT)
Dept: CARE COORDINATION | Age: 37
End: 2022-10-24

## 2022-10-24 ENCOUNTER — OFFICE VISIT (OUTPATIENT)
Dept: PHARMACY | Age: 37
End: 2022-10-24
Payer: MEDICAID

## 2022-10-24 VITALS — SYSTOLIC BLOOD PRESSURE: 133 MMHG | DIASTOLIC BLOOD PRESSURE: 97 MMHG | HEART RATE: 114 BPM

## 2022-10-24 DIAGNOSIS — E11.65 POORLY CONTROLLED TYPE 2 DIABETES MELLITUS (HCC): Primary | ICD-10-CM

## 2022-10-24 PROCEDURE — 99213 OFFICE O/P EST LOW 20 MIN: CPT

## 2022-10-24 RX ORDER — INSULIN DEGLUDEC 200 U/ML
50 INJECTION, SOLUTION SUBCUTANEOUS DAILY
Qty: 6 ML | Refills: 1 | Status: SHIPPED | OUTPATIENT
Start: 2022-10-24

## 2022-10-24 RX ORDER — DULAGLUTIDE 0.75 MG/.5ML
0.75 INJECTION, SOLUTION SUBCUTANEOUS WEEKLY
Qty: 4 ADJUSTABLE DOSE PRE-FILLED PEN SYRINGE | Refills: 0 | Status: SHIPPED | OUTPATIENT
Start: 2022-10-24

## 2022-10-24 NOTE — PROGRESS NOTES
CLINICAL PHARMACY NOTE--Diabetes    Nikos Monroy is a 29 y.o. male with PMHX significant for CKD, diabetic neuropathy, HTN, HLD, Obesity, and Type 2 Diabetes referred by JONY Aguiar for diabetes counseling and medication management. Interval update: Patient reports ongoing nausea over the past several months (feb 2022). He is now seeing a GI physician for gastroparesis (next visit 11/6/22). No worsening since start of trulicity. Would like to continue trulicity, and needs refill on it. Interested in [place] but not available in pharmacies yet. Has not placed karen since last month, but is checking BGs. He continues to c/o severe neuropathy in LE, below his knees. He is trying to get in to see a neurologist, but they have not returned his calls. ASSESSMENT/PLAN:    Type 2 Diabetes  A1c above goal <7%  No Libreview data. -Medications: improve compliance   Metformin 1 g BID   Jardiance 10 mg QD (will switch to metformin combo at future visit)   Trulicity 6.34 mg every tuesday (started 9/27/22, plan to titrate every 4 wks as tolerated)  Increase Tresiba to 50 units daily  -SMBG: place karen today to check glucose QID. Call me if you have rdgs that are consistently > 250 or < 70. Recommend pt obtain Skin Tac and provided sample. Consider dexcom if pt continues to have issues. Pt also inquired about insulin pump. Will discuss at future visit if unable to control. Consider libre3 when available.  -Labs: vit D, vit B12, urine ACR, FLP, A1c next visit  -Lifestyle: stop drinking juice  -HM: COVID, PNA, and flu vax, eye exam - patient declining all vaccinations   -Other: call clinic when you get home to reconcile meds, bring all meds to f/u visits. Call PCP Re: ongoing issues with neuropathy. 2.  Hypertension  BP above goal <140/90   ?albuminuria? Check BP daily at home and bring log to f/u visit. Medications: on ACE + SGLT2   Labs: urine ACR    3.   Hyperlipidemia  Continue high Metformin 1 g BID    Tresiba 46 units daily    Jardiance 10 mg QD    Trulicity 3.83 mg weekly   Compliance: forgets at least 2 doses of all meds (including insulin) per week. Doesn't sleep well, needs to get kids ready for bed, then falls asleep and forgets. Pt states he was noncompliant with meds when he lost his job due to COVID PNA. LF for most meds was 7/19/22 for 30 ds, so he is likely missing 50% of the time. He did get chlorthalidone, lisinopril, and metformin from [facility] pharmacy. Side effects: none  Cost: $3 copay  Previous regimens: Humalog 10 units TIDAC (takes 1-3 times per day, will take if not eating only if BG is high)    SMBG:   Libreview     Avg V high High target   9/9-9/22/22 307 77% 17% 6%  10/10/22: No Freestyle data. AM readings 180-200s. Later in day 230-300. Had two readings of 88 around lunch time. 257 in office this AM (fasting). 10/24/22: No CGM data. AM readings 170-200 (outliers 99, 115, 105). Noon 200-230. Bedtime: 250-300. Hypertension:    Patient denies chest pain, shortness of breath, headache, and blurred vision. On Ace- lisinopril 20 mg  Medication side effects: no medication side effects noted. Use of agents associated with hypertension: none. Caffeine: none  Home blood pressure monitoring: No.  But has BP cuff    Hyperlipidemia:  On high intensity statin-- No new myalgias or GI upset   On aspirin 81 mg  Cardiovascular risk factors: diabetes mellitus, dyslipidemia, hypertension, male gender, and obesity (BMI >= 30 kg/m2)    The ASCVD Risk score (Cody DK, et al., 2019) failed to calculate for the following reasons: The 2019 ASCVD risk score is only valid for ages 36 to 78       Objective     Medication list reviewed and up to date. Physical exam     BP (!) 133/97 (Site: Right Upper Arm, Position: Sitting, Cuff Size: Large Adult)   Pulse (!) 114    There is no height or weight on file to calculate BMI.   Wt Readings from Last 3 Encounters:   10/23/22 245 lb 9.5 oz (111.4 kg)   10/19/22 240 lb (108.9 kg)   10/15/22 240 lb 1.3 oz (108.9 kg)      BP Readings from Last 3 Encounters:   10/24/22 (!) 133/97   10/23/22 (!) 153/118   10/15/22 (!) 127/95        Pertinent Labs:  Lab Results   Component Value Date    LABA1C 13.2 2022    LABA1C 16.9 2022    LABA1C >18.0 2022      No results found for: MALBCR  No results found for: CRCLEARANCE   No results found for: LDLCALC, LDLDIRECT, CHOL, TRIG, HDL  Lab Results   Component Value Date    CREATININE 1.1 10/23/2022    BUN 12 10/23/2022     10/23/2022    K 3.6 10/23/2022     10/23/2022    CO2 24 10/23/2022     Lab Results   Component Value Date    AST 14 (L) 10/23/2022    ALT 18 10/23/2022    BILIDIR <0.2 2022    BILITOT <0.2 10/23/2022    ALKPHOS 88 10/23/2022     No components found for: VITB12  No results found for: TSH, TSHREFLEX              Discussed with patient the Pharmacist Collaborative Practice Agreement. Patient provided verbal and/or electronic (ex. mychart) consent to participate in the collaborative practice agreement between the pharmacist and referred patient. This is in lieu of paper consent due to COVID-19 precautions and the use of remote/virtual visits.      [facility], PharmD, [facility]  Medication Management Clinic   Fabian Ibrahima Ph: [phone]  Sheebaisreal Downing Ph: [phone]  10/24/2022 3:44 PM    For Pharmacy Admin Tracking Only    CPA in place:  Yes  Recommendation Provided To: Patient/Caregiver: 4 via In person  Intervention Detail: Adherence Monitorin, Dose Adjustment: 1, reason: Therapy Optimization, Refill(s) Provided, and Scheduled Appointment  Gap Closed?: No   Intervention Accepted By: Patient/Caregiver: 4  Time Spent (min): 45

## 2022-10-24 NOTE — CARE COORDINATION
Ambulatory Care Coordination Note  10/24/2022    ACC: Sarah Roa RN  General Assessment    Do you have any symptoms that are causing concern?: Yes  Progression since Onset: Unchanged  Reported Symptoms: Other (Comment: c/o BLE & dental pain; unchanged following ED visit 10.23.22)        s/p ED visit 10.23.22 for c/o BLE & dental pain. Pt is not established w/dentist, but notes ED did provide him with listing of dental clinics in community. He states willingness to self outreach direct to PCP to schedule ED follow up, but requests SW support re [employer] notice for shut off. AC did review limitations of any such SW as means to remediate current/active service-disconnect notice from [address], but that referral to SW will be placed for support in navigating, & making pt aware of access, to programs to which pt may be advised to apply or make outreach that may prove helpful for pt situation as r/t affordability of utilities forward. Reviewed DC instructions & importance in scheduling w/PCP for ED follow up appt this week. Problems identified -  Had not been taking Rx Reglan, but located while ACM held phone & ascertained he has now resumed taking today. He 'got sensor' wet in shower for his Jayesh device, and states he needs refill of sensor pads, but has used a traditional glucometer over the weekend - blood sugars ranging 191 (this a.m) to 260. He has not been taking Rx gabapentin at all, stating 'wasn't working. .. they had given me the maximum dose they could and still wasn't helping'. ACM reviewed w/pt rationale to continue taking all Rx medications, including gabapentin, as prescribed, until such time prescriber discontinues or changes Rx. ACM educated pt on rationale to continue taking gabapentin as prescribed, noting at least for now, this medication may help to mitigate severity until such time pt/providers arrive at a different plan of care to treat BLE pain.   Pt does assert plan to follow w/pain mgmt for BLE pain. Pt verbalizes intent to continue taking all medications as prescribed. Pt verbalized understanding of above and agreement with the following  Plan: Pt will call PCP office direct to schedule ED follow up appt for this week. Renewal for SW referral via [facility]   Ongoing [facility] support for health coaching, care collaboration, support w/community resources, and help with any newly presenting concerns as needed. Pt agrees to outreach direct to ACM, as needed, between routine follow up outreach initiated by [employer] patient enrollment in the Remote Patient Monitoring (RPM) program for in-home monitoring: Patient is not eligible for RPM program.  Diabetes Assessment    Medic Alert ID: No  Meal Planning: Plate Method, Avoidance of concentrated sweets, Carb counting   How often do you test your blood sugar?: Daily, Meals   Do you have barriers with adherence to non-pharmacologic self-management interventions?  (Nutrition/Exercise/Self-Monitoring): No   Have you ever had to go to the ED for symptoms of low blood sugar?: No       No patient-reported symptoms   Do you have hyperglycemia symptoms?: Yes   Frequency of Episodes: 7 Per: Week   Do you have hypoglycemia symptoms?: No   Last Blood Sugar Value: 191   Blood Sugar Monitoring Regimen: Morning Fasting, Before Meals   Blood Sugar Trends: Fluctuating (Comment: 191-266)             Care Coordination Interventions    Referral from Primary Care Provider: Yes  Suggested Interventions and Community Resources  Diabetes Education: Completed  Fall Risk Prevention: Completed  Medi Set or Pill Pack: Completed (Comment: picks up Rx through [employer])  Social Work: In Process (Comment: 10.13.22 interest in applying for Section 8)  Other Services: Completed (Comment: follows w/med mgmt clinic for DM mgmt)  Zone Management Tools: Completed (Comment: DM)  Other Services or Interventions: reviewed pt centered goals; self mgmt concepts; community-based resources       Diabetes Assessment    Medic Alert ID: No  Meal Planning: Plate Method, Avoidance of concentrated sweets, Carb counting   How often do you test your blood sugar?: Daily, Meals   Do you have barriers with adherence to non-pharmacologic self-management interventions? (Nutrition/Exercise/Self-Monitoring): No   Have you ever had to go to the ED for symptoms of low blood sugar?: No       No patient-reported symptoms   Do you have hyperglycemia symptoms?: Yes   Frequency of Episodes: 7 Per: Week   Do you have hypoglycemia symptoms?: No   Last Blood Sugar Value: 191   Blood Sugar Monitoring Regimen: Morning Fasting, Before Meals   Blood Sugar Trends: Fluctuating (Comment: 191-266)            Goals Addressed                      This Visit's Progress      Community Resource Goal (pt-stated)   On track      I will engage /Community Health Liaison on Ambulatory Care Coordination team to review needs/barriers and discuss potential community-based resources which may prove helpful. Barriers: impairment:  physical: dizziness/deconditioned, fear of failure, financial, and lack of support  Plan for overcoming my barriers: engage in Care Coordination for added care team support  Confidence: 10/10  Anticipated Goal Completion Date: 4.13.23        Conditions and Symptoms (pt-stated)   On track      I will schedule office visits, as directed by my provider. I will keep my appointment or reschedule if I have to cancel. I will notify my provider of any barriers to my plan of care. I will follow my Zone Management tool to seek urgent or emergent care. I will notify my provider of any symptoms that indicate a worsening of my condition.     Barriers: impairment:  physical: deconditioned/chronic condition, fear of failure, and financial  Plan for overcoming my barriers: engage in Care Coordination for added care team support  Confidence: 10/10  Anticipated Goal Completion Date: 4.13.23                Prior to Admission medications    Medication Sig Start Date End Date Taking? Authorizing Provider   clindamycin (CLEOCIN) 300 MG capsule Take 1 capsule by mouth 4 times daily for 10 days 10/23/22 11/2/22 Yes CHARLI Hutchins   metoclopramide (REGLAN) 10 MG tablet Take 1 tablet by mouth 3 times daily (with meals) 10/11/22  Yes Castro Shell, APRN - CNP   Insulin Degludec (TRESIBA FLEXTOUCH) 200 UNIT/ML SOPN Inject 46 Units into the skin daily 9/27/22  Yes Castro Linker, APRN - CNP   lisinopril (PRINIVIL;ZESTRIL) 20 MG tablet Take 1 tablet by mouth daily 9/14/22  Yes Cong Ram DO   Dulaglutide (TRULICITY) 2.74 KS/9.3RV SOPN Inject 0.75 mg into the skin once a week 9/13/22  Yes Castro Linker, APRN - CNP   itraconazole (SPORANOX) 100 MG capsule Take 200 mg by mouth 2 times daily   Yes Historical Provider, MD   metFORMIN (GLUCOPHAGE) 1000 MG tablet Take 1 tablet by mouth in the morning and 1 tablet before bedtime. 8/16/22  Yes Castro Linker, APRN - CNP   empagliflozin (JARDIANCE) 10 MG tablet Take 1 tablet by mouth in the morning. 7/19/22  Yes Castro Linker, APRN - CNP   DULoxetine (CYMBALTA) 30 MG extended release capsule Take 1 capsule by mouth in the morning. 7/19/22  Yes Castro Linker, APRN - CNP   dicyclomine (BENTYL) 10 MG capsule Take 1 capsule by mouth 4 times daily (before meals and nightly) 7/19/22  Yes Castro Linker, APRN - CNP   chlorthalidone (HYGROTON) 25 MG tablet Take 1 tablet by mouth in the morning. 7/19/22  Yes Castro Linker, APRN - CNP   carvedilol (COREG) 25 MG tablet Take 1 tablet by mouth in the morning and 1 tablet before bedtime. 7/19/22  Yes Castro Linker, APRN - CNP   aspirin 81 MG chewable tablet Take 1 tablet by mouth in the morning.  7/19/22  Yes Castro Linker, APRN - CNP   rosuvastatin (CRESTOR) 40 MG tablet Take 1 tablet by mouth nightly 7/19/22  Yes Castro Linker, APRN - CNP   albuterol sulfate  (90 Base) MCG/ACT inhaler Inhale 2 puffs into the lungs every 6 hours as needed for Wheezing   Yes Historical Provider, MD   HYDROcodone-acetaminophen (NORCO) 5-325 MG per tablet Take 1 tablet by mouth every 6 hours as needed for Pain for up to 3 days. Sedation precautions. Do not drive or operate machinery while taking this medication. Do not drink alcohol. This is an addictive medication and should be used sparingly. Patient not taking: No sig reported 10/23/22 10/26/22  Darra Fothergill, PA   Continuous Blood Gluc Sensor (FREESTYLE HOME 2 SENSOR) MISC Use to check blood sugar 4 times per day (before each meal and at bedtime). Patient not taking: No sig reported 9/13/22   HANS Garrett CNP   gabapentin (NEURONTIN) 400 MG capsule Take 2 capsules by mouth in the morning and 2 capsules at noon and 2 capsules before bedtime. Do all this for 90 days. 2-3 QD. Patient not taking: Reported on 10/24/2022 8/16/22 11/14/22  HANS Garrett CNP       Future Appointments   Date Time Provider Castillo Riley   10/27/2022 10:15 AM Valeria Pollock, PT [facility] OP PT [facility]   11/1/2022 10:15 AM [place] Pollock, PT [facility] OP PT [facility]   11/2/2022 10:00 AM CHARLETTE MEDICATION MANAGEMENT [facility] RW [facility]   11/3/2022 10:15 AM Valeria Pollock, PT [facility] OP PT [facility]   11/8/2022 10:15 AM [place] Pollock, PT [facility] OP PT [facility]   11/9/2022 10:15 AM Trudi Soto MD [facility]   11/10/2022 10:15 AM Valeria Pollock, PT [facility] OP PT [facility]   12/6/2022 10:00 AM HANS Garrett CNP [facility]   2/21/2023 10:45 AM Basim Nunez MD AND NE[facility]O [facility]       -This patient is referred this date to [facility] for review, assessment, and consultation as needed regarding the following presenting concern(s). Please bold all that apply. N/A    [facility] assessment of patient's substance use disorder, if any, is indicated and requested. No   Patient has concerns about apparently deteriorating mental status.      No   Patient desires assistance with locating an accessible behavioral health treatment provider. No   Patient has questions/concerns regarding application and/or eligibility for Medicaid. Yes; note however, this concern has been previously addressed per prior recent  referral, which noted: pt is 29 y.o, so not eligible in traditional programs (ie COA) - if there are any support programs whereas pt would be eligible for homemaker support - he has identified functional needs which might potentially meet eligibility criteria for certain program(s), if any available given his age. Patient has questions/concerns regarding application and/or eligibility for a Medicaid Waiver program ([place], Home Care Waiver, Assisted Living Waiver, etc.). No   Patient has questions/concerns about the cost of prescription drugs. No   Patient has questions/concerns regarding Medicare coverage, Including Part D prescription drug coverage. Yes   Patient desires supportive services in the home regarding activities of daily living (homemaking, transferring, bathing, toileting, transportation, shopping, etc.). No   Patient desires information about long-term care in a skilled nursing facility (SNF). Yes; recently reviewed per  referral 10.13.22 pt understands ineligible for COA support. Section 8 (?) Patient has inadequate and/or unaffordable housing. Interested in looking into Section 8 housing, uncertain about criteria to apply     NEWLY IDENTIFIED CONCERN: pt requests review w/SW for concerns r/t notification from [address] [employer] threatening discontinuing utility service(s)   N/A   Patient desires assistance with smoking cessation (Note: select this option only if patient is reasonably unable to participate in smoking cessation support groups offered periodically at [facility]).      Yes - forms mailed to pt 10.13.22 potential need for review of ACP  Patient desires information about advance directives (Power of , Living Will, DNR, Guardianship, etc.).      Please provide additional information if needed: (additional needs, household size, monthly income, source of income) household of 1  Unemployed (currently in application process for SSDI; denied x2 working w/ on resubmit)  Currently existing on jovita of family/friends but verbalizes concerns realizing this is not sustainable      I agree with the Care Coordinator's Plan of Care

## 2022-10-24 NOTE — PATIENT INSTRUCTIONS
Call me with name of meds when you get home    Call Guanako Smisolis about pain     Call insurance to get a list of neurologists in network. Try tegaderm for karen. Apply karen today. Increase Tresiba to 50 units daily.

## 2022-10-25 ENCOUNTER — CARE COORDINATION (OUTPATIENT)
Dept: CARE COORDINATION | Age: 37
End: 2022-10-25

## 2022-10-25 NOTE — CARE COORDINATION
SW received message from [employer] stating patient has a disconnect notice from [address] and needs assistance with paying bill. SW spoke to patient and will e-mail him a list of area churches/agencies who can potentially help pay a portion or all of the bill. Also provided information for [employer] so that patient can apply for HEAP/PIPP programs in the future. SW encouraged patient to call this worker with any additional questions.     Information provided to patient via e-mail:  Terry [address]  [address] [address] [facility]    [address] [facility] [address] [employer] [phone]  [address], [address] [place] [address]    Karey Sarahy [phone]  Maira [address]    [address] [place] [address] [phone]  [address]    HEAP/PIPP [address]  [phone]  [address] [facility] [address]    Osito Gonzales MSW, [facility]     [phone]

## 2022-10-27 ENCOUNTER — HOSPITAL ENCOUNTER (OUTPATIENT)
Dept: PHYSICAL THERAPY | Age: 37
Setting detail: THERAPIES SERIES
Discharge: HOME | End: 2022-10-27
Payer: MEDICAID

## 2022-10-27 PROCEDURE — 97530 THERAPEUTIC ACTIVITIES: CPT

## 2022-10-27 PROCEDURE — 97162 PT EVAL MOD COMPLEX 30 MIN: CPT

## 2022-10-27 NOTE — FLOWSHEET NOTE
[facility] - Outpatient Rehabilitation & Therapy  [address] [facility]  [address]  Phone: [phone]   Fax:     [phone]      Date:  10/27/2022    Patient Name:  Sheri Newman    :  1985  MRN: [id number]    Pertinent Medical History:      Medical/Treatment Diagnosis Information:  Medical Diagnosis: Leg weakness, bilateral [R29.898]  Treatment Diagnosis: Gait and balance disturbance , decreased BLE strength, decreased bilateral ankle foot rom, decreased sensation at bilateral feet to light touch    Insurance/Certification information:  PT Insurance Information: MEDICAID [place]  Physician Information:  Chrystal Reading*  Plan of care signed (Y/N): routed 10/27/22    Date of Patient follow up with Physician:      Progress Report: []  Yes  [x]  No     Date Range for reporting period:  Beginning: 10/27/2022  Ending:     Progress report due (10 Rx/or 30 days whichever is less):       Recertification due (POC duration/ or 90 days whichever is less):      Visit # Insurance/POC Allowable Auth Needed   1  [place] / Mary Durbin / NO DED / Lala Estrada []Yes    [x]No       Latex Allergy:  [x]NO      []YES  Preferred Language for Healthcare:   [x]English       []Other:    Functional Scale:      Date assessed: at eval  Test: FOTO  Score:    Pain level:  5-10/10     History of Injury:Patient stated complaint:  c/o difficulties with balance for 3 years due to diabetic neuropathy, he feels balance, walking and running have all declined over the last 3 years, he no longer runs, reports constant sharp stabbing pain and burning sensation in bilateral feet and lower legs just below knees, also has T&N  hands ,  he can no longer work and is seeking disability, increased symptoms w/ standing walking too long ~ 20 minutes , cold weather ,  decreased symptoms sitting down , sleep is disturbed due to symptoms , denies loss of control of bowel/ bladder   X ray from 10/19/22 Orthopedic office visit revealed   a minimally displaced navicular dorsal avulsion fracture. Dr Cristobal Sigala  from 10/19/22 Plan indicated he will beWBAT, and start aggressive ROM and peroneal strengthening exercise. He can go back to normal activity with no restrictions. SUBJECTIVE:  See eval    OBJECTIVE:  Observation:   Test measurements:      RESTRICTIONS/PRECAUTIONS:   Poorly controlled diabetes    Exercises/Interventions:     Therapeutic Ex (29817)   Min: Reps/Resistance Notes/CUES                                 HEP     Manual Intervention (22148)  Min:     Knee mobs/PROM     Tib/Fem Mobs     Patella Mobs     Ankle mobs               NMR re-education (64672)  Min:  CUES NEEDED   Balance   Tandem stance/ walking  sls     Gluteal activation   Bridge  EOB  Lateral stepping vs P band      Ankle foot retrain  Toe towel curls    T band 4 way                Therapeutic Activity (04494)  Min:               Modalities  Min:     IFC with      CP after exercises     MH after exercises            Other Therapeutic Activities: 10/27/22 Pt was educated on PT POC, Diagnosis, Prognosis, pathomechanics relative to ankle hypomobility, and LE weakness  as well as frequency and duration of scheduling future physical therapy appointments. Time was also taken on this day to answer all patient questions and participation in PT. Reviewed appointment policy in detail with patient and patient verbalized understanding. Home Exercise Program: Patient was instructed in the following for HEP:     . Patient verbalized/demonstrated understanding and was issued written handout.       Therapeutic Exercise and NMR EXR  [] (96677) Provided verbal/tactile cueing for activities related to strengthening, flexibility, endurance, ROM for improvements in LE, proximal hip, and core control with self care, mobility, lifting, ambulation.  [] (85925) Provided verbal/tactile cueing for activities related to improving balance, coordination, kinesthetic sense, posture, motor skill, proprioception  to assist with LE, proximal hip, and core control in self care, mobility, lifting, ambulation and eccentric single leg control. NMR and Therapeutic Activities:    [] (95474 or 24594) Provided verbal/tactile cueing for activities related to improving balance, coordination, kinesthetic sense, posture, motor skill, proprioception and motor activation to allow for proper function of core, proximal hip and LE with self care and ADLs and functional mobility.   [] (42228) Gait Re-education- Provided training and instruction to the patient for proper LE, core and proximal hip recruitment and positioning and eccentric body weight control with ambulation re-education including up and down stairs     Home Exercise Program:    [x] (55519) Reviewed/Progressed HEP activities related to strengthening, flexibility, endurance, ROM of core, proximal hip and LE for functional self-care, mobility, lifting and ambulation/stair navigation   [] (41492)Reviewed/Progressed HEP activities related to improving balance, coordination, kinesthetic sense, posture, motor skill, proprioception of core, proximal hip and LE for self care, mobility, lifting, and ambulation/stair navigation      Manual Treatments:  PROM / STM / Oscillations-Mobs:  G-I, II, III, IV (PA's, Inf., Post.)  [] (23623) Provided manual therapy to mobilize LE, proximal hip and/or LS spine soft tissue/joints for the purpose of modulating pain, promoting relaxation,  increasing ROM, reducing/eliminating soft tissue swelling/inflammation/restriction, improving soft tissue extensibility and allowing for proper ROM for normal function with self care, mobility, lifting and ambulation.      If [facility] Please Indicate Time In/Out  CPT Code Time in Time out                         Approval Dates:  CPT Code Units Approved Units Used  Date Updated:                     Charges:  Timed Code Treatment Minutes: 35 Total Treatment Minutes: 45      [] EVAL (LOW) 39530 (typically 20 minutes face-to-face)  [x] EVAL (MOD) 84873 (typically 30 minutes face-to-face)  [] EVAL (HIGH) 78147 (typically 45 minutes face-to-face)  [] RE-EVAL     [] LO(52141) x     [] Dry needle 1 or 2 Muscles (96110)  [] NMR (44803) x     [] Dry needle 3+ Muscles (87589)  [] Manual (26448) x     [] Ultrasound (44134) x  [x] TA (20500) x     [] Mech Traction (27521)  [] ES(attended) (46478)     [] ES (un) (50808):   [] Vasopump (50281) [] Ionto (49392)   [] Other:    GOALS:  Patient stated goal: improve walking, return to running, decrease nerve pain  [] Progressing: [] Met: [] Not Met: [] Adjusted     Therapist goals for Patient:   Short Term Goals: To be achieved in: 2 weeks  1. Independent in HEP and progression per patient tolerance, in order to prevent re-injury. [] Progressing: [] Met: [] Not Met: [] Adjusted  2. Patient will have a decrease in pain and paresthesia 5/10  to facilitate improvement in movement, function, and ADLs as indicated by Functional Deficits. [] Progressing: [] Met: [] Not Met: [] Adjusted     Long Term Goals: To be achieved in: 6-8 weeks  1. Increase FOTO functional outcome score from 38 to 49  to assist with reaching prior level of function. [] Progressing: [] Met: [] Not Met: [] Adjusted  2. Patient will demonstrate increased AROM with DF 0-10 bilaterally  to allow for proper joint functioning as indicated by patients Functional Deficits. [] Progressing: [] Met: [] Not Met: [] Adjusted  3. Patient will demonstrate an increase in Strength to normal proximal hip, knee strength,and  4/5 at bilateral ankles/ feet and control,  in LE to allow for proper functional mobility as indicated by patients Functional Deficits. [] Progressing: [] Met: [] Not Met: [] Adjusted  4. Patient will return to normal gait pattern  functional activities without increased symptoms or restriction.    [] Progressing: [] Met: [] Not Met: [] Adjusted  5. Patient will have a decrease in pain and paresthesia 0-4/10  to facilitate improvement in movement, function, and ADLs as indicated by Functional Deficits. [] Progressing: [] Met: [] Not Met: [] Adjusted     ASSESSMENT:  See eval    Treatment/Activity Tolerance:  [x] Patient tolerated treatment well [] Patient limited by fatique  [] Patient limited by pain  [] Patient limited by other medical complications  [] Other:     Overall Progression Towards Functional goals/ Treatment Progress Update:  [] Patient is progressing as expected towards functional goals listed. [] Progression is slowed due to complexities/Impairments listed. [] Progression has been slowed due to co-morbidities. [x] Plan just implemented, too soon to assess goals progression <30days   [] Goals require adjustment due to lack of progress  [] Patient is not progressing as expected and requires additional follow up with physician  [] Other    Prognosis for POC: [x] Good [] Fair  [] Poor    Patient requires continued skilled intervention: [x] Yes  [] No        PLAN: Ankle mobs , muscle retraining to bilateral feet , build hip, core and thigh strength for balance , gait training   [] Continue per plan of care [] Alter current plan (see comments)  [x] Plan of care initiated [] Hold pending MD visit [] Discharge    Electronically signed by: Arturo Atkins PT    Note: If patient does not return for scheduled/recommended follow up visits, this note will serve as a discharge from care along with the most recent update on progress.

## 2022-10-27 NOTE — PLAN OF CARE
[address] [facility] [address] [place] [address] [address]  Phone: [phone]   Fax:     [phone]                                                     Physical Therapy Certification    Dear Job Carlos*,    We had the pleasure of evaluating the following patient for physical therapy services at [facility]. A summary of our findings can be found in the initial assessment below. This includes our plan of care. If you have any questions or concerns regarding these findings, please do not hesitate to contact me at the office phone number checked above.   Thank you for the referral.       Physician Signature:_______________________________Date:__________________  By signing above (or electronic signature), therapists plan is approved by physician      Patient: Johann Ayala   : 1985   MRN: [id number]  Referring Physician: Job Carlos*      Evaluation Date: 10/27/2022      Medical Diagnosis Information:  Medical Diagnosis: Leg weakness, bilateral [R29.898]   Treatment Diagnosis: Gait and balance disturbance , decreased BLE strength, decreased bilateral ankle foot rom, decreased sensation at bilateral feet to light touch                                     Insurance information: PT Insurance Information: [place]      Precautions/ Contra-indications:   Poorly controlled diabetes   Latex Allergy:  [x]NO      []YES  Preferred Language for Healthcare:   [x]English       []other:    C-SSRS Triggered by Intake questionnaire (Past 2 wk assessment ):   [x] No, Questionnaire did not trigger screening.   [] Yes, Patient intake triggered C-SSRS Screening      [] C-SSRS Screening completed  [] PCP notified via Epic     SUBJECTIVE: Patient stated complaint:  c/o difficulties with balance for 3 years due to diabetic neuropathy, he feels balance, walking and running have all declined over the last 3 years, he no longer runs, reports constant sharp stabbing pain and burning sensation in bilateral feet and lower legs just below knees, also has T&N  hands ,  he can no longer work and is seeking disability, increased symptoms w/ standing walking too long ~ 20 minutes , cold weather ,  decreased symptoms sitting down , sleep is disturbed due to symptoms , denies loss of control of bowel/ bladder   X ray from 10/19/22 Orthopedic office visit revealed   a minimally displaced navicular dorsal avulsion fracture. Dr Nona Luevano  from 10/19/22 Plan indicated he will beWBAT, and start aggressive ROM and peroneal strengthening exercise. He can go back to normal activity with no restrictions.   Relevant Medical History:   Functional Outcome Measure: FOTO  = 38    Pain Scale: 5-10/10      Living Status/Prior Level of Function: Independent with ADLs and IADLs,     OBJECTIVE:     Posture: decreased lumbar lordosis     Functional Mobility/Transfers: independent     Palpation: decreased sensation to light touch bilateral lower legs and feet    Bandages/Dressings/Incisions: n/a    Gait: walks w/ a spc , decreased step length, partial steppage pattern , decreased pace , he reaches for walls, tables  and counter top for balance   Balance partial tandem 10 seconds for each foot in lead , full tandem  3 with each foot in lead , sls 1-2 seconds ea LE seconds TUG 10 feet 22 seconds   ROM LEFT RIGHT   HIP Flex     HIP Abd     HIP Ext     HIP IR     HIP ER     Knee ext     Knee Flex     Ankle PF     Ankle DF 0-5 0   Ankle In     Ankle Ev     Strength  LEFT RIGHT   HIP Flexors 4+/5 4+/5   HIP Abductors 4/5 4/5   HIP Ext 4/5 4/5   Hip ER     Knee EXT (quad) 4-/5 4-/5   Knee Flex (HS) 4-/5 4-/5   Ankle DF 3/5 3/5   Ankle PF 3-/5 3-/5   1 st toe ext 3-/5 2+/5   Toe flexion 3-/5 3-/5        Circumference  Mid apex  7 cm prox             Reflexes/Sensation:    []Dermatomes/Myotomes intact    []Reflexes equal and normal bilaterally   [x]Other: decreased sensation to light touch below knee's bilateral , deep pressure is intact , dtr's decreased bilateral at quads and achilles     Joint mobility: ankles   []Normal    [x]Hypo   []Hyper    Orthopedic Special Tests:                        [x] Patient history, allergies, meds reviewed. Medical chart reviewed. See intake form. Review Of Systems (ROS):  [x]Performed Review of systems (Integumentary, CardioPulmonary, Neurological) by intake and observation. Intake form has been scanned into medical record. Patient has been instructed to contact their primary care physician regarding ROS issues if not already being addressed at this time.       Co-morbidities/Complexities (which will affect course of rehabilitation):   []None           Arthritic conditions   []Rheumatoid arthritis (M05.9)  []Osteoarthritis (M19.91)   Cardiovascular conditions   []Hypertension (I10)  []Hyperlipidemia (E78.5)  []Angina pectoris (I20)  []Atherosclerosis (I70)  []CVA Musculoskeletal conditions   []Disc pathology   []Congenital spine pathologies   []Prior surgical intervention  []Osteoporosis (M81.8)  []Osteopenia (M85.8)   Endocrine conditions   []Hypothyroid (E03.9)  []Hyperthyroid Gastrointestinal conditions   []Constipation (R17.40)   Metabolic conditions   []Morbid obesity (E66.01)  [x]Diabetes type 1(E10.65) or 2 (E11.65)   [x]Neuropathy (G60.9)     Pulmonary conditions   []Asthma (J45)  []Coughing   []COPD (J44.9)   Psychological Disorders  [x]Anxiety (F41.9)  [x]Depression (F32.9)   []Other:   []Other:          Barriers to/and or personal factors that will affect rehab potential:              []Age  []Sex    []Smoker              []Motivation/Lack of Motivation                        [x]Co-Morbidities              []Cognitive Function, education/learning barriers              []Environmental, home barriers              []profession/work barriers  []past PT/medical experience  []other:  Justification:     Falls Risk Assessment (30 days):   [x] Falls Risk assessed and no intervention required. [] Falls Risk assessed and Patient requires intervention due to being higher risk   TUG score (>12s at risk):     [] Falls education provided, including         ASSESSMENT: Skilled PT services are required to address the following impairments    Functional Impairments:     [x]Noted lumbar/proximal hip/LE hypomobility   [x]Decreased LE functional ROM   [x]Decreased core/proximal hip strength and neuromuscular control   [x]Decreased LE functional strength   [x]Reduced balance/proprioceptive control   []other:      Functional Activity Limitations (from functional questionnaire and intake)   [x]Reduced ability to tolerate prolonged functional positions   []Reduced ability or difficulty with changes of positions or transfers between positions   [x]Reduced ability to maintain good posture and demonstrate good body mechanics with sitting, bending, and lifting   [x]Reduced ability to sleep   [] Reduced ability or tolerance with driving and/or computer work   []Reduced ability to perform lifting, carrying tasks   [x]Reduced ability to squat   []Reduced ability to forward bend   [x]Reduced ability to ambulate prolonged functional periods/distances/surfaces   [x]Reduced ability to ascend/descend stairs   [x]Reduced ability to run, hop or jump   []other:     Participation Restrictions   []Reduced participation in self care activities   [x]Reduced participation in home management activities   [x]Reduced participation in work activities   [x]Reduced participation in social activities. [x]Reduced participation in sport activities. Classification :    []Signs/symptoms consistent with post-surgical status including decreased ROM, strength and function.    []Signs/symptoms consistent with joint sprain/strain   []Signs/symptoms consistent with patella-femoral syndrome   []Signs/symptoms consistent with knee OA/hip OA   []Signs/symptoms consistent with internal derangement of knee/Hip   [x]Signs/symptoms consistent with functional hip weakness/NMR control      []Signs/symptoms consistent with tendinitis/tendinosis    []signs/symptoms consistent with pathology which may benefit from Dry needling      [x]other:  gait and balance disturbance related to compromised sensation to light touch and BLE weakness     Prognosis/Rehab Potential:      []Excellent   []Good    [x]Fair   []Poor    Tolerance of evaluation/treatment:    []Excellent   []Good    [x]Fair   []Poor    Physical Therapy Evaluation Complexity Justification  [x] A history of present problem with:  [] no personal factors and/or comorbidities that impact the plan of care;  []1-2 personal factors and/or comorbidities that impact the plan of care  []3 personal factors and/or comorbidities that impact the plan of care  [x] An examination of body systems using standardized tests and measures addressing any of the following: body structures and functions (impairments), activity limitations, and/or participation restrictions;:  [] a total of 1-2 or more elements   [] a total of 3 or more elements   [x] a total of 4 or more elements   [x] A clinical presentation with:  [x] stable and/or uncomplicated characteristics   [] evolving clinical presentation with changing characteristics  [] unstable and unpredictable characteristics;   [x] Clinical decision making of [x] low, [] moderate, [] high complexity using standardized patient assessment instrument and/or measurable assessment of functional outcome.     [x] EVAL (LOW) 12966 (typically 20 minutes face-to-face)  [] EVAL (MOD) 29308 (typically 30 minutes face-to-face)  [] EVAL (HIGH) 03086 (typically 45 minutes face-to-face)  [] RE-EVAL     PLAN: see flow sheet   Frequency/Duration:  2 days per week for 6-8 Weeks:  Interventions:  [x]  Therapeutic exercise including: strength training, ROM, for Lower extremity and core [x]  NMR activation and proprioception for LE, Glutes and Core   [x]  Manual therapy as indicated for LE, Hip and spine to include: Dry Needling/IASTM, STM, PROM, Gr I-IV mobilizations, manipulation. [x] Modalities as needed that may include: thermal agents, E-stim, Biofeedback, US, iontophoresis as indicated  [x] Patient education on joint protection, postural re-education, activity modification, progression of HEP. HEP instruction: improve balance, walking and run again     GOALS:  Patient stated goal: improve walking, return to running, decrease nerve pain  [] Progressing: [] Met: [] Not Met: [] Adjusted    Therapist goals for Patient:   Short Term Goals: To be achieved in: 2 weeks  1. Independent in HEP and progression per patient tolerance, in order to prevent re-injury. [] Progressing: [] Met: [] Not Met: [] Adjusted  2. Patient will have a decrease in pain and paresthesia 5/10  to facilitate improvement in movement, function, and ADLs as indicated by Functional Deficits. [] Progressing: [] Met: [] Not Met: [] Adjusted    Long Term Goals: To be achieved in: 6-8 weeks  1. Increase FOTO functional outcome score from 38 to 49  to assist with reaching prior level of function. [] Progressing: [] Met: [] Not Met: [] Adjusted  2. Patient will demonstrate increased AROM with DF 0-10 bilaterally  to allow for proper joint functioning as indicated by patients Functional Deficits. [] Progressing: [] Met: [] Not Met: [] Adjusted  3. Patient will demonstrate an increase in Strength to normal proximal hip, knee strength,and  4/5 at bilateral ankles/ feet and control,  in LE to allow for proper functional mobility as indicated by patients Functional Deficits. [] Progressing: [] Met: [] Not Met: [] Adjusted  4. Patient will return to normal gait pattern  functional activities without increased symptoms or restriction. [] Progressing: [] Met: [] Not Met: [] Adjusted  5.  Patient will have a decrease in pain and paresthesia 0-4/10  to facilitate improvement in movement, function, and ADLs as indicated by Functional Deficits. [] Progressing: [] Met: [] Not Met: [] Adjusted     Electronically signed by:  Kylie Hernández PT      Note: If patient does not return for scheduled/recommended follow up visits, this note will serve as a discharge from care along with the most recent update on progress.

## 2022-11-01 ENCOUNTER — HOSPITAL ENCOUNTER (OUTPATIENT)
Dept: PHYSICAL THERAPY | Age: 37
Setting detail: THERAPIES SERIES
Discharge: HOME | End: 2022-11-01
Payer: MEDICAID

## 2022-11-01 ENCOUNTER — HOSPITAL ENCOUNTER (EMERGENCY)
Age: 37
Discharge: HOME | End: 2022-11-01
Attending: EMERGENCY MEDICINE
Payer: MEDICAID

## 2022-11-01 VITALS
BODY MASS INDEX: 32.47 KG/M2 | HEIGHT: 73 IN | HEART RATE: 99 BPM | TEMPERATURE: 98.6 F | DIASTOLIC BLOOD PRESSURE: 87 MMHG | OXYGEN SATURATION: 98 % | RESPIRATION RATE: 14 BRPM | SYSTOLIC BLOOD PRESSURE: 145 MMHG | WEIGHT: 245 LBS

## 2022-11-01 DIAGNOSIS — E86.1 HYPOTENSION DUE TO HYPOVOLEMIA: ICD-10-CM

## 2022-11-01 DIAGNOSIS — I95.89 HYPOTENSION DUE TO HYPOVOLEMIA: ICD-10-CM

## 2022-11-01 DIAGNOSIS — I95.1 ORTHOSTATIC HYPOTENSION: Primary | ICD-10-CM

## 2022-11-01 LAB
ANION GAP SERPL CALCULATED.3IONS-SCNC: 10 MMOL/L (ref 3–16)
BASOPHILS ABSOLUTE: 0.1 K/UL (ref 0–0.2)
BASOPHILS RELATIVE PERCENT: 1.2 %
BUN BLDV-MCNC: 23 MG/DL (ref 7–20)
CALCIUM SERPL-MCNC: 8.9 MG/DL (ref 8.3–10.6)
CHLORIDE BLD-SCNC: 99 MMOL/L (ref 99–110)
CHP ED QC CHECK: YES
CO2: 27 MMOL/L (ref 21–32)
CREAT SERPL-MCNC: 1.4 MG/DL (ref 0.9–1.3)
EKG ATRIAL RATE: 98 BPM
EKG DIAGNOSIS: NORMAL
EKG P AXIS: 50 DEGREES
EKG P-R INTERVAL: 194 MS
EKG Q-T INTERVAL: 380 MS
EKG QRS DURATION: 92 MS
EKG QTC CALCULATION (BAZETT): 485 MS
EKG R AXIS: -30 DEGREES
EKG T AXIS: 45 DEGREES
EKG VENTRICULAR RATE: 98 BPM
EOSINOPHILS ABSOLUTE: 0.1 K/UL (ref 0–0.6)
EOSINOPHILS RELATIVE PERCENT: 2 %
GFR SERPL CREATININE-BSD FRML MDRD: >60 ML/MIN/{1.73_M2}
GLUCOSE BLD-MCNC: 124 MG/DL (ref 70–99)
GLUCOSE BLD-MCNC: 131 MG/DL
GLUCOSE BLD-MCNC: 131 MG/DL (ref 70–99)
HCT VFR BLD CALC: 42.1 % (ref 40.5–52.5)
HEMOGLOBIN: 13.8 G/DL (ref 13.5–17.5)
LYMPHOCYTES ABSOLUTE: 1.9 K/UL (ref 1–5.1)
LYMPHOCYTES RELATIVE PERCENT: 36.9 %
MAGNESIUM: 2.3 MG/DL (ref 1.8–2.4)
MCH RBC QN AUTO: 25.9 PG (ref 26–34)
MCHC RBC AUTO-ENTMCNC: 32.7 G/DL (ref 31–36)
MCV RBC AUTO: 79.2 FL (ref 80–100)
MONOCYTES ABSOLUTE: 0.5 K/UL (ref 0–1.3)
MONOCYTES RELATIVE PERCENT: 10.3 %
NEUTROPHILS ABSOLUTE: 2.5 K/UL (ref 1.7–7.7)
NEUTROPHILS RELATIVE PERCENT: 49.6 %
PDW BLD-RTO: 13.1 % (ref 12.4–15.4)
PERFORMED ON: ABNORMAL
PLATELET # BLD: 287 K/UL (ref 135–450)
PMV BLD AUTO: 8.4 FL (ref 5–10.5)
POTASSIUM REFLEX MAGNESIUM: 3.5 MMOL/L (ref 3.5–5.1)
RBC # BLD: 5.32 M/UL (ref 4.2–5.9)
SODIUM BLD-SCNC: 136 MMOL/L (ref 136–145)
WBC # BLD: 5 K/UL (ref 4–11)

## 2022-11-01 PROCEDURE — 83735 ASSAY OF MAGNESIUM: CPT

## 2022-11-01 PROCEDURE — 97110 THERAPEUTIC EXERCISES: CPT

## 2022-11-01 PROCEDURE — 99284 EMERGENCY DEPT VISIT MOD MDM: CPT

## 2022-11-01 PROCEDURE — 80048 BASIC METABOLIC PNL TOTAL CA: CPT

## 2022-11-01 PROCEDURE — 6360000002 HC RX W HCPCS

## 2022-11-01 PROCEDURE — 2580000003 HC RX 258

## 2022-11-01 PROCEDURE — 97140 MANUAL THERAPY 1/> REGIONS: CPT

## 2022-11-01 PROCEDURE — 36415 COLL VENOUS BLD VENIPUNCTURE: CPT

## 2022-11-01 PROCEDURE — 85025 COMPLETE CBC W/AUTO DIFF WBC: CPT

## 2022-11-01 PROCEDURE — 96361 HYDRATE IV INFUSION ADD-ON: CPT

## 2022-11-01 PROCEDURE — 93005 ELECTROCARDIOGRAM TRACING: CPT

## 2022-11-01 PROCEDURE — 96374 THER/PROPH/DIAG INJ IV PUSH: CPT

## 2022-11-01 RX ORDER — METOCLOPRAMIDE HYDROCHLORIDE 5 MG/ML
10 INJECTION INTRAMUSCULAR; INTRAVENOUS ONCE
Status: COMPLETED | OUTPATIENT
Start: 2022-11-01 | End: 2022-11-01

## 2022-11-01 RX ORDER — SODIUM CHLORIDE, SODIUM LACTATE, POTASSIUM CHLORIDE, AND CALCIUM CHLORIDE .6; .31; .03; .02 G/100ML; G/100ML; G/100ML; G/100ML
1000 INJECTION, SOLUTION INTRAVENOUS ONCE
Status: COMPLETED | OUTPATIENT
Start: 2022-11-01 | End: 2022-11-01

## 2022-11-01 RX ADMIN — SODIUM CHLORIDE, SODIUM LACTATE, POTASSIUM CHLORIDE, AND CALCIUM CHLORIDE 1000 ML: .6; .31; .03; .02 INJECTION, SOLUTION INTRAVENOUS at 16:59

## 2022-11-01 RX ADMIN — METOCLOPRAMIDE HYDROCHLORIDE 10 MG: 5 INJECTION INTRAMUSCULAR; INTRAVENOUS at 18:12

## 2022-11-01 RX ADMIN — SODIUM CHLORIDE, POTASSIUM CHLORIDE, SODIUM LACTATE AND CALCIUM CHLORIDE 1000 ML: 600; 310; 30; 20 INJECTION, SOLUTION INTRAVENOUS at 14:50

## 2022-11-01 ASSESSMENT — ENCOUNTER SYMPTOMS
CONSTIPATION: 1
EYE PAIN: 0
DIARRHEA: 0
SHORTNESS OF BREATH: 0
NAUSEA: 1
CHEST TIGHTNESS: 0
COUGH: 0
ABDOMINAL PAIN: 0
WHEEZING: 0
VOMITING: 0

## 2022-11-01 NOTE — ED NOTES
Discharge instructions given. Verbalized understanding. Denies further questions or concerns. A&Ox4. Ambulates to lobby with steady gait using cane. Awaiting ride.       Maria Del Carmen Maravilla RN  11/01/22 1914

## 2022-11-01 NOTE — ED NOTES
Pt ambulated approximately 75 feet with steady gait. Pt tolerated well.       Ruven Aschoff, RN  11/01/22 4901

## 2022-11-01 NOTE — FLOWSHEET NOTE
[facility] - [facility]  [address] [facility] ([facility].  [place] [address]  Phone: [phone]   Fax:     [phone]      Date:  2022    Patient Name:  Mayur Neff    :  1985  MRN: [id number]    Pertinent Medical History:      Medical/Treatment Diagnosis Information:  Medical Diagnosis: Leg weakness, bilateral [R29.898]  Treatment Diagnosis: Gait and balance disturbance , decreased BLE strength, decreased bilateral ankle foot rom, decreased sensation at bilateral feet to light touch    Insurance/Certification information:  PT Insurance Information: [place]  Physician Information:  Jesus Aviles*  Plan of care signed (Y/N): routed 10/27/22    Date of Patient follow up with Physician:      Progress Report: []  Yes  [x]  No     Date Range for reporting period:  Beginnin2022  Ending:     Progress report due (10 Rx/or 30 days whichever is less):       Recertification due (POC duration/ or 90 days whichever is less):      Visit # Insurance/POC Allowable Auth Needed   2  [place] / Johnny Ka / NO DED / Albino Ventura []Yes    [x]No       Latex Allergy:  [x]NO      []YES  Preferred Language for Healthcare:   [x]English       []Other:    Functional Scale:      Date assessed: at eval  Test: FOTO  Score:    Pain level:  7/10     History of Injury:Patient stated complaint:  c/o difficulties with balance for 3 years due to diabetic neuropathy, he feels balance, walking and running have all declined over the last 3 years, he no longer runs, reports constant sharp stabbing pain and burning sensation in bilateral feet and lower legs just below knees, also has T&N  hands ,  he can no longer work and is seeking disability, increased symptoms w/ standing walking too long ~ 20 minutes , cold weather ,  decreased symptoms sitting down , sleep is disturbed due to symptoms , denies loss of control of bowel/ bladder   X ray from 10/19/22 Orthopedic office visit revealed   a minimally displaced navicular dorsal avulsion fracture. Dr Dragan Stubbs  from 10/19/22 Plan indicated he will beWBAT, and start aggressive ROM and peroneal strengthening exercise. He can go back to normal activity with no restrictions. SUBJECTIVE:    11/1/22 Pt reports some pain today. OBJECTIVE:  Observation:   Test measurements:      RESTRICTIONS/PRECAUTIONS:   Poorly controlled diabetes    Exercises/Interventions:     Therapeutic Ex (98516)   Min:25 Reps/Resistance Notes/CUES   Nu step  L4 x 5 min    Incline  20% 2 min     Mini squats  20 x    Bilat standing Heel raises  20 x          Seated toe towel curls  4 min left and right each  Rest    Seated bilat DF foot on floor     Seated left and right  4 min w/ rest of 3-5 seconds between reps   3 min ea                                     HEP     Manual Intervention (32648)  Min:15 min      Knee mobs/PROM     Tib/Fem Mobs     Patella Mobs     Ankle mobs STJ, TCJ distraction and AP glides ea gr 3 L and R each     stretch To achilles  and gastroc          NMR re-education (14123)  Min:  CUES NEEDED   Balance   Tandem stance/ walking  sls     Gluteal activation   Bridge  EOB  Lateral stepping vs P band      Ankle foot retrain  Toe towel curls    T band 4 way                Therapeutic Activity (48651)  Min:               Modalities  Min:     IFC with      CP after exercises     MH after exercises            Other Therapeutic Activities: 10/27/22 Pt was educated on PT POC, Diagnosis, Prognosis, pathomechanics relative to ankle hypomobility, and LE weakness  as well as frequency and duration of scheduling future physical therapy appointments. Time was also taken on this day to answer all patient questions and participation in PT. Reviewed appointment policy in detail with patient and patient verbalized understanding.      Home Exercise Program: Patient was instructed in the following for HEP:     . Patient verbalized/demonstrated understanding and was issued written handout. Therapeutic Exercise and NMR EXR  [] (71678) Provided verbal/tactile cueing for activities related to strengthening, flexibility, endurance, ROM for improvements in LE, proximal hip, and core control with self care, mobility, lifting, ambulation.  [] (74235) Provided verbal/tactile cueing for activities related to improving balance, coordination, kinesthetic sense, posture, motor skill, proprioception  to assist with LE, proximal hip, and core control in self care, mobility, lifting, ambulation and eccentric single leg control.      NMR and Therapeutic Activities:    [] (82722 or 60910) Provided verbal/tactile cueing for activities related to improving balance, coordination, kinesthetic sense, posture, motor skill, proprioception and motor activation to allow for proper function of core, proximal hip and LE with self care and ADLs and functional mobility.   [] (10835) Gait Re-education- Provided training and instruction to the patient for proper LE, core and proximal hip recruitment and positioning and eccentric body weight control with ambulation re-education including up and down stairs     Home Exercise Program:    [x] (52755) Reviewed/Progressed HEP activities related to strengthening, flexibility, endurance, ROM of core, proximal hip and LE for functional self-care, mobility, lifting and ambulation/stair navigation   [] (63910)Reviewed/Progressed HEP activities related to improving balance, coordination, kinesthetic sense, posture, motor skill, proprioception of core, proximal hip and LE for self care, mobility, lifting, and ambulation/stair navigation      Manual Treatments:  PROM / STM / Oscillations-Mobs:  G-I, II, III, IV (PA's, Inf., Post.)  [] (07181) Provided manual therapy to mobilize LE, proximal hip and/or LS spine soft tissue/joints for the purpose of modulating pain, promoting relaxation,  increasing ROM, reducing/eliminating soft tissue swelling/inflammation/restriction, improving soft tissue extensibility and allowing for proper ROM for normal function with self care, mobility, lifting and ambulation. If BW Please Indicate Time In/Out  CPT Code Time in Time out                         Approval Dates:  CPT Code Units Approved Units Used  Date Updated:                     Charges:  Timed Code Treatment Minutes: 40   Total Treatment Minutes: 45      [] EVAL (LOW) 67116 (typically 20 minutes face-to-face)  [] EVAL (MOD) 29531 (typically 30 minutes face-to-face)  [] EVAL (HIGH) 72846 (typically 45 minutes face-to-face)  [] RE-EVAL     [x] DY(77146) x  2   [] Dry needle 1 or 2 Muscles (91379)  [] NMR (78814) x     [] Dry needle 3+ Muscles (79459)  [x] Manual (59779) x     [] Ultrasound (72888) x  [] TA (43250) x     [] Mech Traction (27401)  [] ES(attended) (13585)     [] ES (un) (21930):   [] Vasopump (16624) [] Ionto (52779)   [] Other:    GOALS:  Patient stated goal: improve walking, return to running, decrease nerve pain  [] Progressing: [] Met: [] Not Met: [] Adjusted     Therapist goals for Patient:   Short Term Goals: To be achieved in: 2 weeks  1. Independent in HEP and progression per patient tolerance, in order to prevent re-injury. [] Progressing: [] Met: [] Not Met: [] Adjusted  2. Patient will have a decrease in pain and paresthesia 5/10  to facilitate improvement in movement, function, and ADLs as indicated by Functional Deficits. [] Progressing: [] Met: [] Not Met: [] Adjusted     Long Term Goals: To be achieved in: 6-8 weeks  1. Increase FOTO functional outcome score from 38 to 49  to assist with reaching prior level of function. [] Progressing: [] Met: [] Not Met: [] Adjusted  2. Patient will demonstrate increased AROM with DF 0-10 bilaterally  to allow for proper joint functioning as indicated by patients Functional Deficits. [] Progressing: [] Met: [] Not Met: [] Adjusted  3.  Patient will demonstrate an increase in Strength to normal proximal hip, knee strength,and  4/5 at bilateral ankles/ feet and control,  in LE to allow for proper functional mobility as indicated by patients Functional Deficits. [] Progressing: [] Met: [] Not Met: [] Adjusted  4. Patient will return to normal gait pattern  functional activities without increased symptoms or restriction. [] Progressing: [] Met: [] Not Met: [] Adjusted  5. Patient will have a decrease in pain and paresthesia 0-4/10  to facilitate improvement in movement, function, and ADLs as indicated by Functional Deficits. [] Progressing: [] Met: [] Not Met: [] Adjusted     ASSESSMENT:  See eval    Treatment/Activity Tolerance:  [x] Patient tolerated treatment well [] Patient limited by fatique  [] Patient limited by pain  [] Patient limited by other medical complications  [] Other:     Overall Progression Towards Functional goals/ Treatment Progress Update:  [] Patient is progressing as expected towards functional goals listed. [x] Progression is slowed due to complexities/Impairments listed. [] Progression has been slowed due to co-morbidities.   [] Plan just implemented, too soon to assess goals progression <30days   [] Goals require adjustment due to lack of progress  [] Patient is not progressing as expected and requires additional follow up with physician  [] Other    Prognosis for POC: [x] Good [] Fair  [] Poor    Patient requires continued skilled intervention: [x] Yes  [] No        PLAN: Ankle mobs , muscle retraining to bilateral feet , build hip, core and thigh strength for balance , gait training   [] Continue per plan of care [] Alter current plan (see comments)  [x] Plan of care initiated [] Hold pending MD visit [] Discharge    Electronically signed by: Gabriela Cheek PT    Note: If patient does not return for scheduled/recommended follow up visits, this note will serve as a discharge from care along with the most recent update on progress.

## 2022-11-01 NOTE — ED PROVIDER NOTES
ED Attending Attestation Note     Date of evaluation: 11/1/2022    This patient was seen by the resident. I have seen and examined the patient, agree with the workup, evaluation, management and diagnosis. The care plan has been discussed. I have reviewed the ECG and concur with interpretation. My assessment reveals patient with multiple medical problems sent from PCP with hypotension. Patient has gastroparesis and cannot each much as a result. PO intake has been poor for 2 months. IVF initiated in ED x2L with improvement of BP. Suspect slight CHELE and low BPs due to poor PO intake secondary to gastroparesis. Reglan given and once tolerating PO will be discharged home.      Juhi Wolf MD  11/01/22 1806       Juhi Wolf MD  11/11/22 1048

## 2022-11-01 NOTE — DISCHARGE INSTRUCTIONS
We saw you in the hospital for low blood pressure. You were treated with fluids and an anti-nausea medication called Reglan. Please try to improve food and fluid intake. You are already prescribed Reglan for nausea at home. We did not make any changes to your medications. You should return to the emergency department if your symptoms worsen or do not resolve. In addition, return if:  - you experience weakness, dizziness, lightheadedness, abdominal pain, nausea, vomiting  - You have a fever (greater than 101 degrees)  - You have chest pain, shortness of breath, abdominal pain, or uncontrollable vomiting  - You are unable to eat or drink  - You pass out  - You have difficulty moving your arms or legs   - You have difficulty speaking or slurred speech  - Or you have any concern that you feel needs acute physician evaluation.

## 2022-11-01 NOTE — ED PROVIDER NOTES
[address] [facility] RESIDENT NOTE       Date of evaluation: 11/1/2022    Chief Complaint     Hypotension (Orthostatic hypotension, from pcp )      History of Present Illness     Lianna Brown is a 29 y.o. male with Pmhx of DM2 (on home insulin) complicated by peripheral neuropathy and gastroparesis, CKD, HTN, HLD who presents from his PCP for hypotension, dizziness, and blurry vision. Pt states that he was seen at his PCP earlier today for a follow up visit and was found to have orthostatic hypotension. He was recommended to come to the ED for further evaluation. Pt states that he was diagnosed with histoplasmosis PNA a few months ago and has been feeling generally fatigued since then. He has decreased appetite and feels full even after eating a few bites of food. He also has decreased fluid intake. He endorses dizziness, blurry vision that he describes as wavy lines, and mild headache localized to his forehead. He states he is compliant with his diabetes medication regimen that includes metformin, jardiance, trulicity, and tresiba. However, per chart review, pt is noncompliant with medications. He takes his BG at home and usually runs in the 250s. Today, his BG was 137 at his PCP's office. Pt states he is making urine, however he has not had a BM in 3 days. Pt uses a cane to walk at home, however he is limited in his exercise/walking by peripheral neuropathy pains. Review of Systems     Review of Systems   Constitutional:  Positive for appetite change (decreased) and fatigue. Negative for chills and fever. Eyes:  Positive for visual disturbance. Negative for pain. Respiratory:  Negative for cough, chest tightness, shortness of breath and wheezing. Cardiovascular:  Negative for chest pain, palpitations and leg swelling. Gastrointestinal:  Positive for constipation and nausea. Negative for abdominal pain, diarrhea and vomiting.    Genitourinary:  Negative for difficulty urinating and frequency. Neurological:  Positive for dizziness, numbness (b/l feet) and headaches. Negative for weakness and light-headedness. Psychiatric/Behavioral:  Positive for confusion. Past Medical, Surgical, Family, and Social History     He has a past medical history of COVID-19, History of blood transfusion, Hyperlipidemia, and Neuropathy. He has a past surgical history that includes bronchoscopy (06/09/2022); bronchoscopy (06/09/2022); bronchoscopy (06/09/2022); and bronchoscopy (N/A, 06/14/2022). His family history includes Diabetes in his father. He reports that he has never smoked. He has never used smokeless tobacco. He reports current alcohol use. He reports that he does not currently use drugs. Medications     Discharge Medication List as of 11/1/2022  7:03 PM        CONTINUE these medications which have NOT CHANGED    Details   Dulaglutide (TRULICITY) 9.36 PX/4.1AX SOPN Inject 0.75 mg into the skin once a week, Disp-4 Adjustable Dose Pre-filled Pen Syringe, R-0Normal      Insulin Degludec (TRESIBA FLEXTOUCH) 200 UNIT/ML SOPN Inject 50 Units into the skin daily, Disp-6 mL, R-1Normal      clindamycin (CLEOCIN) 300 MG capsule Take 1 capsule by mouth 4 times daily for 10 days, Disp-40 capsule, R-0Print      metoclopramide (REGLAN) 10 MG tablet Take 1 tablet by mouth 3 times daily (with meals), Disp-90 tablet, R-1Normal      lisinopril (PRINIVIL;ZESTRIL) 20 MG tablet Take 1 tablet by mouth daily, Disp-90 tablet, R-3Normal      Continuous Blood Gluc Sensor (FREESTYLE HOME 2 SENSOR) MISC Use to check blood sugar 4 times per day (before each meal and at bedtime). , Disp-2 each, R-11Normal      itraconazole (SPORANOX) 100 MG capsule Take 200 mg by mouth 2 times dailyHistorical Med      gabapentin (NEURONTIN) 400 MG capsule Take 2 capsules by mouth in the morning and 2 capsules at noon and 2 capsules before bedtime. Do all this for 90 days.  2-3 QD., Disp-180 capsule, R-2Normal metFORMIN (GLUCOPHAGE) 1000 MG tablet Take 1 tablet by mouth in the morning and 1 tablet before bedtime. , Disp-180 tablet, R-1Normal      empagliflozin (JARDIANCE) 10 MG tablet Take 1 tablet by mouth in the morning., Disp-30 tablet, R-3Normal      DULoxetine (CYMBALTA) 30 MG extended release capsule Take 1 capsule by mouth in the morning., Disp-30 capsule, R-3Normal      dicyclomine (BENTYL) 10 MG capsule Take 1 capsule by mouth 4 times daily (before meals and nightly), Disp-120 capsule, R-1Normal      chlorthalidone (HYGROTON) 25 MG tablet Take 1 tablet by mouth in the morning., Disp-30 tablet, R-3Normal      carvedilol (COREG) 25 MG tablet Take 1 tablet by mouth in the morning and 1 tablet before bedtime. , Disp-60 tablet, R-3Normal      aspirin 81 MG chewable tablet Take 1 tablet by mouth in the morning., Disp-30 tablet, R-3Normal      rosuvastatin (CRESTOR) 40 MG tablet Take 1 tablet by mouth nightly, Disp-30 tablet, R-3Normal      albuterol sulfate  (90 Base) MCG/ACT inhaler Inhale 2 puffs into the lungs every 6 hours as needed for WheezingHistorical Med             Allergies     He is allergic to penicillins. Physical Exam     INITIAL VITALS: BP: 91/75, Temp: 98.6 °F (37 °C), Heart Rate: (!) 102, Resp: 18, SpO2: 96 %   Physical Exam  Constitutional:       General: He is not in acute distress. Appearance: Normal appearance. He is not ill-appearing. HENT:      Head: Normocephalic and atraumatic. Mouth/Throat:      Mouth: Mucous membranes are moist.   Eyes:      Pupils: Pupils are equal, round, and reactive to light. Cardiovascular:      Rate and Rhythm: Regular rhythm. Tachycardia present. Pulses: Normal pulses. Pulmonary:      Effort: Pulmonary effort is normal. No respiratory distress. Breath sounds: Normal breath sounds. Abdominal:      General: Bowel sounds are normal. There is no distension. Palpations: Abdomen is soft. There is no mass. Tenderness:  There is abdominal tenderness (mild TTP diffusely). Musculoskeletal:      Right lower leg: No edema. Left lower leg: No edema. Skin:     General: Skin is warm and dry. Neurological:      General: No focal deficit present. Mental Status: He is alert and oriented to person, place, and time. Sensory: Sensory deficit (distal BLE) present.    Psychiatric:         Mood and Affect: Mood normal.         Behavior: Behavior normal.       Diagnostic Results     EKG   Interpreted inconjunction with emergency department physician No att. providers found  Rhythm: normal sinus   Rate: borderline tachycardia  Axis: left  Ectopy: none  Conduction: normal  ST Segments: no acute change  T Waves: no acute change  Q Waves: none  Clinical Impression: no acute changes  Comparison:  10/23/2022    RADIOLOGY:  No orders to display       LABS:   Results for orders placed or performed during the hospital encounter of 11/01/22   BMP w/ Reflex to MG   Result Value Ref Range    Sodium 136 136 - 145 mmol/L    Potassium reflex Magnesium 3.5 3.5 - 5.1 mmol/L    Chloride 99 99 - 110 mmol/L    CO2 27 21 - 32 mmol/L    Anion Gap 10 3 - 16    Glucose 124 (H) 70 - 99 mg/dL    BUN 23 (H) 7 - 20 mg/dL    Creatinine 1.4 (H) 0.9 - 1.3 mg/dL    Est, Glom Filt Rate >60 >60    Calcium 8.9 8.3 - 10.6 mg/dL   CBC with Auto Differential   Result Value Ref Range    WBC 5.0 4.0 - 11.0 K/uL    RBC 5.32 4.20 - 5.90 M/uL    Hemoglobin 13.8 13.5 - 17.5 g/dL    Hematocrit 42.1 40.5 - 52.5 %    MCV 79.2 (L) 80.0 - 100.0 fL    MCH 25.9 (L) 26.0 - 34.0 pg    MCHC 32.7 31.0 - 36.0 g/dL    RDW 13.1 12.4 - 15.4 %    Platelets 799 134 - 630 K/uL    MPV 8.4 5.0 - 10.5 fL    Neutrophils % 49.6 %    Lymphocytes % 36.9 %    Monocytes % 10.3 %    Eosinophils % 2.0 %    Basophils % 1.2 %    Neutrophils Absolute 2.5 1.7 - 7.7 K/uL    Lymphocytes Absolute 1.9 1.0 - 5.1 K/uL    Monocytes Absolute 0.5 0.0 - 1.3 K/uL    Eosinophils Absolute 0.1 0.0 - 0.6 K/uL    Basophils Absolute 0.1 0.0 - 0.2 K/uL   Magnesium   Result Value Ref Range    Magnesium 2.30 1.80 - 2.40 mg/dL   POCT Glucose   Result Value Ref Range    Glucose 131 mg/dL    QC OK? Yes    POCT Glucose   Result Value Ref Range    POC Glucose 131 (H) 70 - 99 mg/dl    Performed on ACCU-CHEK    EKG 12 Lead   Result Value Ref Range    Ventricular Rate 98 BPM    Atrial Rate 98 BPM    P-R Interval 194 ms    QRS Duration 92 ms    Q-T Interval 380 ms    QTc Calculation (Bazett) 485 ms    P Axis 50 degrees    R Axis -30 degrees    T Axis 45 degrees    Diagnosis       EKG performed in ER and to be interpreted by ER physician. Confirmed by MD, ER ([id number]),  Leti Clemens ([id number]) on 11/1/2022 3:31:59 PM       ED BEDSIDE ULTRASOUND:  No results found. RECENT VITALS:  BP: (!) 145/87, Temp: 98.6 °F (37 °C),Heart Rate: 99, Resp: 14, SpO2: 98 %     Procedures     None    ED Course     Nursing Notes, Past Medical Hx, Past Surgical Hx, Social Hx, Allergies, and FamilyHx were reviewed. The patient was giventhe following medications:  Orders Placed This Encounter   Medications    lactated ringers bolus    lactated ringers bolus    metoclopramide (REGLAN) injection 10 mg         CONSULTS:  None    MEDICAL DECISION MAKING / ASSESSMENT / King Jamie is a 29 y.o. male with history of type 2 diabetes complicated by gastroparesis and peripheral neuropathy, CKD, HTN, HLD who presents with orthostatic hypotension. Patient was seen by his PCP earlier today who recommended that he come to the ED for further evaluation of hypotension. Upon arrival blood pressure was 90s/70s. Patient received 2 L LR bolus with improvement in blood pressure to 120s/80s. Labs significant for CHELE with Cr today 1.4 up from 1.1 (10/23). EKG borderline tachycardia, however no acute changes from prior. Labs otherwise unremarkable. He was able to tolerate p.o. intake while he was here with some nausea for which he was given 10 mg IV Reglan. Orthostatic hypotension likely secondary to poor p.o. intake at home as he often gets nauseous and has a small appetite secondary to gastroparesis. He does have prescription Reglan at home. Discussed use of Reglan regularly with meals to alleviate nausea, which could help him with his appetite. Patient was able to ambulate in the room prior to discharge today with no lightheadedness, dizziness. He is somewhat unsteady at baseline secondary to his peripheral neuropathy, however he does have a cane to ambulate. Recommended patient follow-up with PCP and he is amenable to plan upon discharge. This patient was also evaluated by the attending physician. All care plans were discussed and agreed upon. Clinical Impression     1. Orthostatic hypotension    2. Hypotension due to hypovolemia        Disposition     PATIENT REFERRED TO:  HANS Bell CNP [address] [facility]  [phone]    Call in 1 day  Please call to make follow up appointment.     DISCHARGE MEDICATIONS:  Discharge Medication List as of 11/1/2022  7:03 PM          DISPOSITION Decision To Discharge 11/01/2022 06:35:14 PM      Vanessa Camilo MD PGY1     Vanessa Camilo MD  11/01/22 [id number]       Vanessa Camilo MD  11/01/22 [id number]

## 2022-11-02 ENCOUNTER — OFFICE VISIT (OUTPATIENT)
Dept: PHARMACY | Age: 37
End: 2022-11-02
Payer: MEDICAID

## 2022-11-02 ENCOUNTER — CARE COORDINATION (OUTPATIENT)
Dept: CARE COORDINATION | Age: 37
End: 2022-11-02

## 2022-11-02 VITALS — SYSTOLIC BLOOD PRESSURE: 105 MMHG | HEART RATE: 105 BPM | DIASTOLIC BLOOD PRESSURE: 73 MMHG

## 2022-11-02 DIAGNOSIS — E11.65 POORLY CONTROLLED TYPE 2 DIABETES MELLITUS (HCC): Primary | ICD-10-CM

## 2022-11-02 DIAGNOSIS — E11.65 POORLY CONTROLLED TYPE 2 DIABETES MELLITUS (HCC): ICD-10-CM

## 2022-11-02 LAB
CHOLESTEROL, FASTING: 252 MG/DL (ref 0–199)
CREATININE URINE: 81.2 MG/DL (ref 39–259)
ESTIMATED AVERAGE GLUCOSE: 294.8 MG/DL
HBA1C MFR BLD: 11.9 %
HDLC SERPL-MCNC: 36 MG/DL (ref 40–60)
LDL CHOLESTEROL CALCULATED: 174 MG/DL
MICROALBUMIN UR-MCNC: 50.4 MG/DL
MICROALBUMIN/CREAT UR-RTO: 620.7 MG/G (ref 0–30)
TRIGLYCERIDE, FASTING: 210 MG/DL (ref 0–150)
TSH REFLEX FT4: 2.2 UIU/ML (ref 0.27–4.2)
VITAMIN B-12: 612 PG/ML (ref 211–911)
VITAMIN D 25-HYDROXY: 11.4 NG/ML
VLDLC SERPL CALC-MCNC: 42 MG/DL

## 2022-11-02 PROCEDURE — 99213 OFFICE O/P EST LOW 20 MIN: CPT

## 2022-11-02 NOTE — PATIENT INSTRUCTIONS
Increase Tresiba to 55 units every day. If fasting blood sugar stays above 130 after 3 days, then increase to 60 units every day.      Call Abdullahi Oconnell for refills on meds    [address]    [address] [facility] [address] [address]   Phone: [phone]   Fax: [phone]

## 2022-11-02 NOTE — PROGRESS NOTES
CLINICAL PHARMACY NOTE--Diabetes    Nikos Monroy is a 29 y.o. male with PMHX significant for CKD, diabetic neuropathy, HTN, HLD, Obesity, and Type 2 Diabetes referred by JONY Aguiar for diabetes counseling and medication management. Interval update: Patient reports ongoing nausea over the past several months (started feb 2022). ED yesterday for orthostatic hypotension due to decr fluids. He is seeing a GI physician (Dr Alonzo Barry) for gastroparesis (next visit 11/10/22). No worsening since start of trulicity. Would like to continue trulicity. Interested in [place], as he is having issues with libre2 sensor bending. Has not placed karen since last month, but is checking BGs before meals. He continues to c/o severe neuropathy in LE, below his knees. He is trying to get in to see a neurologist, but they have not returned his calls. ASSESSMENT/PLAN:    Type 2 Diabetes  A1c above goal <7%  No Libreview data. FBGs 190-230.  -Medications: improve compliance   Metformin 1 g BID   Jardiance 10 mg QD (will switch to metformin combo at future visit)   Trulicity 5.44 mg every tuesday (started 9/27/22, hesitate to titrate based on gastroparesis)  Increase Tresiba to 55 units daily. If fasting blood sugar stays above 130 after 3 days, then increase to 60 units. -SMBG: placed karen sample today to check glucose QID. Consider dexcom or libre3 if pt continues to have issues. Pt also inquired about insulin pump. Will discuss at future visit if unable to control.   -Labs: vit D, vit B12, urine ACR, FLP, A1c   -Lifestyle: stop drinking juice  -HM: COVID, PNA, and flu vax, eye exam - patient declining all vaccinations   -Other: bring all meds to f/u visits. Call PCP Re: ongoing issues with neuropathy. 2.  Hypertension  BP above goal <140/90   ? Albuminuria  Check BP daily at home and bring log to f/u visit. Medications: on ACE + SGLT2   Labs: urine ACR    3.   Hyperlipidemia  Continue high intensity statin. Labs: FLP    Health Maintenance Due   Topic Date Due    COVID-19 Vaccine (1) Never done    Varicella vaccine (1 of 2 - 2-dose childhood series) Never done    Diabetic foot exam  Never done    Diabetic retinal exam  Never done    Hepatitis C screen  Never done    Hepatitis B vaccine (1 of 3 - Risk 3-dose series) Never done    Pneumococcal 0-64 years Vaccine (2 - PCV) 03/01/2015    Flu vaccine (1) 08/01/2022    A1C test (Diabetic or Prediabetic)  11/16/2022       Education provided:  Reviewed A1c and blood sugar goals with patient  Counseled on changes to medication regimen and common side effects  Reviewed karen instruction/application and importance of scanning at least every 8 hours. Discussed symptoms and management of hypoglycemic episodes. Encouraged weight loss and aerobic exercise   Educated on diet   Provided blood pressure log and instructed patient to bring to f/u appointment. Return in about 1 week (around 11/9/2022). Subjective   SUBJECTIVE/OBJECTIVE:    Treatment Adherence:  Diet:  10a- usually skips, eggs/sausage  4p- turkey/chx, salmon, sometimes 1 slice wheat bread, baked or air fried salmon/chicken, brussels, broccoli, corn  Snack/dessert- crackers, fruit/veggie maybe 3 servings of these per day  Drinks- 16 bottles water/d, 1.5c juice per day (\"hit and miss\")  Exercise: no regular exercise due to pain, balance issues; waiting on neuro and PT  Weight trend: decreasing due to PNA and uncontrolled DM  Barriers: impairment:  physical, overwhelmed by complexity of regimen, and time constraints    Type 2 Diabetes Mellitus  Year diagnosed ~2010. Related hospitalizations/ED visits: multiple ED visits for hyperglycemia. Comorbities: high risk ASCVD, HTN, obesity, and CKD   Considerations:  cost of medication ($0-3), GFR, simplify regimen/med compliance  Current symptoms/problems: neuropathy.   Hypoglycemia? no   Eye exam within one year: no    Current diabetes therapy:    Metformin 1 g BID    Tresiba 50 units daily    Jardiance 10 mg QD    Trulicity 6.79 mg weekly   Compliance: forgets at least 2 doses of all meds (including insulin) per week. Doesn't sleep well, needs to get kids ready for bed, then falls asleep and forgets. Pt states he was noncompliant with meds when he lost his job due to COVID PNA. LF for most meds was 7/19/22 for 30 ds, so he is likely missing 50% of the time. He did get chlorthalidone, lisinopril, and metformin from [facility] pharmacy. Side effects: none  Cost: $3 copay  Previous regimens: Humalog 10 units TIDAC     SMBG:   Libreview     Avg V high High target   9/9-9/22/22 307 77% 17% 6%  10/10/22: No Freestyle data. AM readings 180-200s. Later in day 230-300. Had two readings of 88 around lunch time. 257 in office this AM (fasting). 10/24/22: No CGM data. AM readings 170-200 (outliers 99, 115, 105). Noon 200-230. Bedtime: 250-300.  11/2/22: karen sensor error, before meals 134-250, -230    Hypertension:    Patient denies chest pain, shortness of breath, headache, and blurred vision. On Ace- lisinopril 20 mg  Medication side effects: no medication side effects noted. Use of agents associated with hypertension: none. Caffeine: none  Home blood pressure monitoring: No.  But has BP cuff    Hyperlipidemia:  On high intensity statin-- No new myalgias or GI upset   On aspirin 81 mg  Cardiovascular risk factors: diabetes mellitus, dyslipidemia, hypertension, male gender, and obesity (BMI >= 30 kg/m2)    The ASCVD Risk score (Cody DK, et al., 2019) failed to calculate for the following reasons: The 2019 ASCVD risk score is only valid for ages 36 to 78       Objective     Medication list reviewed and up to date. Physical exam     /73 (Site: Right Upper Arm, Position: Sitting, Cuff Size: Large Adult)   Pulse (!) 105    There is no height or weight on file to calculate BMI.   Wt Readings from Last 3 Encounters:   11/01/22 245 lb (111.1 kg) 10/23/22 245 lb 9.5 oz (111.4 kg)   10/19/22 240 lb (108.9 kg)      BP Readings from Last 3 Encounters:   22 105/73   22 (!) 145/87   22 (!) 88/57        Pertinent Labs:  Lab Results   Component Value Date    LABA1C 13.2 2022    LABA1C 16.9 2022    LABA1C >18.0 2022      No results found for: MALBCR  No results found for: CRCLEARANCE   No results found for: LDLCALC, LDLDIRECT, CHOL, TRIG, HDL  Lab Results   Component Value Date    CREATININE 1.4 (H) 2022    BUN 23 (H) 2022     2022    K 3.5 2022    CL 99 2022    CO2 27 2022     Lab Results   Component Value Date    AST 14 (L) 10/23/2022    ALT 18 10/23/2022    BILIDIR <0.2 2022    BILITOT <0.2 10/23/2022    ALKPHOS 88 10/23/2022     No components found for: VITB12  No results found for: TSH, TSHREFLEX              Discussed with patient the Pharmacist Collaborative Practice Agreement. Patient provided verbal and/or electronic (ex. mychart) consent to participate in the collaborative practice agreement between the pharmacist and referred patient. This is in lieu of paper consent due to COVID-19 precautions and the use of remote/virtual visits.      Tess Mcintosh, PharmD, [facility]  [facility]   Jeri Mak Ph: [phone]  [address] Ph: [phone]  2022 10:13 AM    For Pharmacy Admin Tracking Only    CPA in place:  Yes  Recommendation Provided To: Patient/Caregiver: 5 via In person  Intervention Detail: Adherence Monitorin, Dose Adjustment: 1, reason: Therapy Optimization, Lab(s) Ordered, Patient Access Assistance/Sample Provided, and Scheduled Appointment  Gap Closed?: No   Intervention Accepted By: Patient/Caregiver: 5  Time Spent (min): 45

## 2022-11-02 NOTE — CARE COORDINATION
Pill Pack: Completed (Comment: picks up Rx through [employer])  Social Work: In Process (Comment: 10.13.22 interest in applying for [place] [employer])  Specialty Services Referral: Completed (Comment: follows w/Med Mgmt clinic @ [address] [place] [address])  Other Services: Completed (Comment: follows w/med mgmt clinic for DM mgmt)  Zone Management Tools: Completed (Comment: DM)  Other Services or Interventions: reviewed pt centered goals; self mgmt concepts; community-based resources          Goals Addressed                      This Visit's Progress      Community Resource Goal (pt-stated)   On track      I will engage w/Community Health Liaison on [facility] team to review needs/barriers and discuss potential community-based resources which may prove helpful. Barriers: impairment:  physical: dizziness/deconditioned, fear of failure, financial, and lack of support  Plan for overcoming my barriers: engage in Care Coordination for added care team support  Confidence: 10/10  Anticipated Goal Completion Date: 4.13.23        Conditions and Symptoms (pt-stated)   On track      I will schedule office visits, as directed by my provider. I will keep my appointment or reschedule if I have to cancel. I will notify my provider of any barriers to my plan of care. I will follow my Zone Management tool to seek urgent or emergent care. I will notify my provider of any symptoms that indicate a worsening of my condition. Barriers: impairment:  physical: deconditioned/chronic condition, fear of failure, and financial  Plan for overcoming my barriers: engage in Care Coordination for added care team support  Confidence: 10/10  Anticipated Goal Completion Date: 4.13.23                Prior to Admission medications    Medication Sig Start Date End Date Taking?  Authorizing Provider   Dulaglutide (TRULICITY) 0.11 FK/9.9MK SOPN Inject 0.75 mg into the skin once a week 10/24/22   John Banks, APRN - CNP   Insulin Degludec (TRESIBA FLEXTOUCH) 200 UNIT/ML SOPN Inject 50 Units into the skin daily 10/24/22   HANS Cuello CNP   clindamycin (CLEOCIN) 300 MG capsule Take 1 capsule by mouth 4 times daily for 10 days 10/23/22 11/2/22  CHARLI Alva   metoclopramide (REGLAN) 10 MG tablet Take 1 tablet by mouth 3 times daily (with meals) 10/11/22   HANS Cuello CNP   lisinopril (PRINIVIL;ZESTRIL) 20 MG tablet Take 1 tablet by mouth daily 9/14/22   Sonia Kan,    Continuous Blood Gluc Sensor (FREESTYLE HOME 2 SENSOR) MISC Use to check blood sugar 4 times per day (before each meal and at bedtime). Patient not taking: No sig reported 9/13/22   HANS Cuello CNP   itraconazole (SPORANOX) 100 MG capsule Take 200 mg by mouth 2 times daily    Historical Provider, MD   gabapentin (NEURONTIN) 400 MG capsule Take 2 capsules by mouth in the morning and 2 capsules at noon and 2 capsules before bedtime. Do all this for 90 days. 2-3 QD. 8/16/22 11/14/22  HANS Cuello CNP   metFORMIN (GLUCOPHAGE) 1000 MG tablet Take 1 tablet by mouth in the morning and 1 tablet before bedtime. 8/16/22   HANS Cuello CNP   empagliflozin (JARDIANCE) 10 MG tablet Take 1 tablet by mouth in the morning. 7/19/22   HANS Cuello CNP   DULoxetine (CYMBALTA) 30 MG extended release capsule Take 1 capsule by mouth in the morning. 7/19/22   HANS Cuello CNP   dicyclomine (BENTYL) 10 MG capsule Take 1 capsule by mouth 4 times daily (before meals and nightly) 7/19/22   HANS Cuello CNP   chlorthalidone (HYGROTON) 25 MG tablet Take 1 tablet by mouth in the morning. 7/19/22   HANS Cuello CNP   carvedilol (COREG) 25 MG tablet Take 1 tablet by mouth in the morning and 1 tablet before bedtime. 7/19/22   Lorelee Shadia, APRN - CNP   aspirin 81 MG chewable tablet Take 1 tablet by mouth in the morning.  7/19/22   Eleanor Reno, APRN - CNP   rosuvastatin (CRESTOR) 40 MG tablet Take 1 tablet by mouth nightly 7/19/22   Castro Goff APRN - CNP   albuterol sulfate  (90 Base) MCG/ACT inhaler Inhale 2 puffs into the lungs every 6 hours as needed for Wheezing    Historical Provider, MD       Future Appointments   Date Time Provider [place]   11/3/2022 10:15 AM [place], PT [facility] OP PT [facility]   11/8/2022 10:15 AM [place], PT [facility] OP PT [facility]   11/9/2022 10:15 AM Meka Scott MD [facility]   11/10/2022 10:15 AM [place], PT [facility] OP PT [facility]   11/14/2022  8:30 AM [facility] MEDICATION MANAGEMENT [facility]   11/15/2022 11:00 AM [place], PT [facility] OP PT [facility]   11/17/2022 11:00 AM [place], PT [facility] OP PT [facility]   11/22/2022 10:15 AM [place], PT [facility] OP PT [facility]   12/6/2022 10:00 AM Castro Goff APRN - CNP [facility]   2/21/2023 10:45 AM Basim Hinkle MD AND NEURO Neurology -

## 2022-11-03 ENCOUNTER — HOSPITAL ENCOUNTER (OUTPATIENT)
Dept: PHYSICAL THERAPY | Age: 37
Setting detail: THERAPIES SERIES
Discharge: HOME | End: 2022-11-03
Payer: MEDICAID

## 2022-11-03 PROCEDURE — 97140 MANUAL THERAPY 1/> REGIONS: CPT

## 2022-11-03 PROCEDURE — 97110 THERAPEUTIC EXERCISES: CPT

## 2022-11-03 PROCEDURE — 97112 NEUROMUSCULAR REEDUCATION: CPT

## 2022-11-03 NOTE — FLOWSHEET NOTE
[facility] - Outpatient Rehabilitation & Therapy  [address] [facility] ([facility] [place]  [facility], [place] [address]  Phone: [phone]   Fax:     [phone]      Date:  2022    Patient Name:  Ping Lin    :  1985  MRN: [id number]    Pertinent Medical History:      Medical/Treatment Diagnosis Information:  Medical Diagnosis: Leg weakness, bilateral [R29.898]  Treatment Diagnosis: Gait and balance disturbance , decreased BLE strength, decreased bilateral ankle foot rom, decreased sensation at bilateral feet to light touch    Insurance/Certification information:  PT Insurance Information: [place]ICA[place]  Physician Information:  Oddis Needs*  Plan of care signed (Y/N): routed 10/27/22    Date of Patient follow up with Physician:      Progress Report: []  Yes  [x]  No     Date Range for reporting period:  Beginnin/3/2022  Ending:     Progress report due (10 Rx/or 30 days whichever is less):       Recertification due (POC duration/ or 90 days whichever is less):      Visit # Insurance/POC Allowable Auth Needed   3  [place] / [facility] / NO DED / Conroy Peto []Yes    [x]No       Latex Allergy:  [x]NO      []YES  Preferred Language for Healthcare:   [x]English       []Other:    Functional Scale:      Date assessed: at eval  Test: FOTO  Score:    Pain level:  7/10     History of Injury:Patient stated complaint:  c/o difficulties with balance for 3 years due to diabetic neuropathy, he feels balance, walking and running have all declined over the last 3 years, he no longer runs, reports constant sharp stabbing pain and burning sensation in bilateral feet and lower legs just below knees, also has T&N  hands ,  he can no longer work and is seeking disability, increased symptoms w/ standing walking too long ~ 20 minutes , cold weather ,  decreased symptoms sitting down , sleep is disturbed due to symptoms , denies loss of control of bowel/ bladder   X ray from 10/19/22 Orthopedic office visit revealed   a minimally displaced navicular dorsal avulsion fracture. Dr Nikki Kline  from 10/19/22 Plan indicated he will beWBAT, and start aggressive ROM and peroneal strengthening exercise. He can go back to normal activity with no restrictions. SUBJECTIVE:    11/1/22 Pt reports some pain today. OBJECTIVE:  Observation:   Test measurements:      RESTRICTIONS/PRECAUTIONS:   Poorly controlled diabetes    Exercises/Interventions:     Therapeutic Ex (41857)   Min:25 Reps/Resistance Notes/CUES   Nu step  L4 x 5 min    Incline  20% 2 min     Mini squats  20 x    Bilat standing Heel raises  20 x          Seated toe towel curls  4 min left and right each  Rest    Seated bilat DF foot on floor     Seated left and right  4 min w/ rest of 3-5 seconds between reps   3 min ea                                     HEP     Manual Intervention (75772)  Min:15 min      Knee mobs/PROM     Tib/Fem Mobs     Patella Mobs     Ankle mobs STJ, TCJ distraction and AP glides ea gr 3 L and R each     stretch To achilles  and gastroc          NMR re-education (94960)  Min:15   CUES NEEDED   Balance   Tandem walking   Tandem stance 2 min ea   Sls add alt 10\"  4 min     Gluteal activation     EOB  Lateral stepping vs P band  Bridge 10\" x   Clam vs T band green 10 x 3 L/R ea     Ankle foot retrain  Toe towel curls    T band 4 way                Therapeutic Activity (84029)  Min:               Modalities  Min:     IFC with      CP after exercises     MH after exercises            Other Therapeutic Activities: 10/27/22 Pt was educated on PT POC, Diagnosis, Prognosis, pathomechanics relative to ankle hypomobility, and LE weakness  as well as frequency and duration of scheduling future physical therapy appointments. Time was also taken on this day to answer all patient questions and participation in PT. Reviewed appointment policy in detail with patient and patient verbalized understanding. Home Exercise Program: Patient was instructed in the following for HEP:     . Patient verbalized/demonstrated understanding and was issued written handout. Therapeutic Exercise and NMR EXR  [] (75747) Provided verbal/tactile cueing for activities related to strengthening, flexibility, endurance, ROM for improvements in LE, proximal hip, and core control with self care, mobility, lifting, ambulation.  [] (57152) Provided verbal/tactile cueing for activities related to improving balance, coordination, kinesthetic sense, posture, motor skill, proprioception  to assist with LE, proximal hip, and core control in self care, mobility, lifting, ambulation and eccentric single leg control.      NMR and Therapeutic Activities:    [] (01955 or 22477) Provided verbal/tactile cueing for activities related to improving balance, coordination, kinesthetic sense, posture, motor skill, proprioception and motor activation to allow for proper function of core, proximal hip and LE with self care and ADLs and functional mobility.   [] (25661) Gait Re-education- Provided training and instruction to the patient for proper LE, core and proximal hip recruitment and positioning and eccentric body weight control with ambulation re-education including up and down stairs     Home Exercise Program:    [x] (76774) Reviewed/Progressed HEP activities related to strengthening, flexibility, endurance, ROM of core, proximal hip and LE for functional self-care, mobility, lifting and ambulation/stair navigation   [] (52355)Reviewed/Progressed HEP activities related to improving balance, coordination, kinesthetic sense, posture, motor skill, proprioception of core, proximal hip and LE for self care, mobility, lifting, and ambulation/stair navigation      Manual Treatments:  PROM / STM / Oscillations-Mobs:  G-I, II, III, IV (PA's, Inf., Post.)  [] (32297) Provided manual therapy to mobilize LE, proximal hip and/or LS spine soft tissue/joints for the purpose of modulating pain, promoting relaxation,  increasing ROM, reducing/eliminating soft tissue swelling/inflammation/restriction, improving soft tissue extensibility and allowing for proper ROM for normal function with self care, mobility, lifting and ambulation. If [facility] Please Indicate Time In/Out  CPT Code Time in Time out                         Approval Dates:  CPT Code Units Approved Units Used  Date Updated:                     Charges:  Timed Code Treatment Minutes: 55   Total Treatment Minutes: 60      [] EVAL (LOW) 08369 (typically 20 minutes face-to-face)  [] EVAL (MOD) 53036 (typically 30 minutes face-to-face)  [] EVAL (HIGH) 49137 (typically 45 minutes face-to-face)  [] RE-EVAL     [x] YX(40312) x  2   [] Dry needle 1 or 2 Muscles (03423)  [x] NMR (19481) x     [] Dry needle 3+ Muscles (05599)  [x] Manual (38352) x     [] Ultrasound (54687) x  [] TA (57293) x     [] Mech Traction (33609)  [] ES(attended) (72477)     [] ES (un) (22 118470):   [] Vasopump (04387) [] Ionto (82902)   [] Other:    GOALS:  Patient stated goal: improve walking, return to running, decrease nerve pain  [] Progressing: [] Met: [] Not Met: [] Adjusted     Therapist goals for Patient:   Short Term Goals: To be achieved in: 2 weeks  1. Independent in HEP and progression per patient tolerance, in order to prevent re-injury. [] Progressing: [] Met: [] Not Met: [] Adjusted  2. Patient will have a decrease in pain and paresthesia 5/10  to facilitate improvement in movement, function, and ADLs as indicated by Functional Deficits. [] Progressing: [] Met: [] Not Met: [] Adjusted     Long Term Goals: To be achieved in: 6-8 weeks  1. Increase FOTO functional outcome score from 38 to 49  to assist with reaching prior level of function. [] Progressing: [] Met: [] Not Met: [] Adjusted  2.  Patient will demonstrate increased AROM with DF 0-10 bilaterally  to allow for proper joint functioning as indicated by patients Functional Deficits. [] Progressing: [] Met: [] Not Met: [] Adjusted  3. Patient will demonstrate an increase in Strength to normal proximal hip, knee strength,and  4/5 at bilateral ankles/ feet and control,  in LE to allow for proper functional mobility as indicated by patients Functional Deficits. [] Progressing: [] Met: [] Not Met: [] Adjusted  4. Patient will return to normal gait pattern  functional activities without increased symptoms or restriction. [] Progressing: [] Met: [] Not Met: [] Adjusted  5. Patient will have a decrease in pain and paresthesia 0-4/10  to facilitate improvement in movement, function, and ADLs as indicated by Functional Deficits. [] Progressing: [] Met: [] Not Met: [] Adjusted     ASSESSMENT:  See eval    Treatment/Activity Tolerance:  [x] Patient tolerated treatment well [] Patient limited by fatique  [] Patient limited by pain  [] Patient limited by other medical complications  [] Other:     Overall Progression Towards Functional goals/ Treatment Progress Update:  [] Patient is progressing as expected towards functional goals listed. [x] Progression is slowed due to complexities/Impairments listed. [] Progression has been slowed due to co-morbidities.   [] Plan just implemented, too soon to assess goals progression <30days   [] Goals require adjustment due to lack of progress  [] Patient is not progressing as expected and requires additional follow up with physician  [] Other    Prognosis for POC: [x] Good [] Fair  [] Poor    Patient requires continued skilled intervention: [x] Yes  [] No        PLAN: Ankle mobs , muscle retraining to bilateral feet , build hip, core and thigh strength for balance , gait training   [] Continue per plan of care [] Alter current plan (see comments)  [x] Plan of care initiated [] Hold pending MD visit [] Discharge    Electronically signed by: Arturo Atkins PT    Note: If patient does not return for scheduled/recommended follow up visits, this note will serve as a discharge from care along with the most recent update on progress.

## 2022-11-08 ENCOUNTER — HOSPITAL ENCOUNTER (OUTPATIENT)
Dept: PHYSICAL THERAPY | Age: 37
Setting detail: THERAPIES SERIES
Discharge: HOME | End: 2022-11-08
Payer: MEDICAID

## 2022-11-08 PROCEDURE — 97110 THERAPEUTIC EXERCISES: CPT

## 2022-11-08 PROCEDURE — 97140 MANUAL THERAPY 1/> REGIONS: CPT

## 2022-11-08 PROCEDURE — 97112 NEUROMUSCULAR REEDUCATION: CPT

## 2022-11-08 NOTE — FLOWSHEET NOTE
[facility] - Outpatient Rehabilitation & Therapy  [address] [facility]  [place] [address]  Phone: [phone]   Fax:     [phone]      Date:  2022    Patient Name:  Nannette Rock    :  1985  MRN: [id number]    Pertinent Medical History:      Medical/Treatment Diagnosis Information:  Medical Diagnosis: Leg weakness, bilateral [R29.898]  Treatment Diagnosis: Gait and balance disturbance , decreased BLE strength, decreased bilateral ankle foot rom, decreased sensation at bilateral feet to light touch    Insurance/Certification information:  PT Insurance Information: [place]  Physician Information:  Karin Brooks*  Plan of care signed (Y/N): routed 10/27/22    Date of Patient follow up with Physician:      Progress Report: []  Yes  [x]  No     Date Range for reporting period:  Beginnin2022  Ending:     Progress report due (10 Rx/or 30 days whichever is less):       Recertification due (POC duration/ or 90 days whichever is less):      Visit # Insurance/POC Allowable Auth Needed   4  [place] / [facility] /  DED / Faisal Negron []Yes    [x]No       Latex Allergy:  [x]NO      []YES  Preferred Language for Healthcare:   [x]English       []Other:    Functional Scale:      Date assessed: at eval  Test: FOTO  Score:    Pain level:  7/10     History of Injury:Patient stated complaint:  c/o difficulties with balance for 3 years due to diabetic neuropathy, he feels balance, walking and running have all declined over the last 3 years, he no longer runs, reports constant sharp stabbing pain and burning sensation in bilateral feet and lower legs just below knees, also has T&N  hands ,  he can no longer work and is seeking disability, increased symptoms w/ standing walking too long ~ 20 minutes , cold weather ,  decreased symptoms sitting down , sleep is disturbed due to symptoms , denies loss of control of bowel/ bladder   X ray from 10/19/22 Orthopedic office visit revealed   a minimally displaced navicular dorsal avulsion fracture. Dr Alex Gaines  from 10/19/22 Plan indicated he will beWBAT, and start aggressive ROM and peroneal strengthening exercise. He can go back to normal activity with no restrictions. SUBJECTIVE:    11/1/22 Pt reports some pain today. OBJECTIVE:  Observation:   Test measurements:      RESTRICTIONS/PRECAUTIONS:   Poorly controlled diabetes    Exercises/Interventions:     Therapeutic Ex (46122)   Min:30 Reps/Resistance Notes/CUES   Nu step  L4 x 5 min    Incline  20% 2 min     Leg press 90# 30 x and 20 x     Mini squats  20 x    Bilat standing Heel raises  20 x          Seated toe towel curls  2 min left and right each  Rest    Seated bilat DF foot on floor      2 min w/ rest of 3-5 seconds between reps                                       HEP     Manual Intervention (86611)  Min:10min      Knee mobs/PROM     Tib/Fem Mobs     Patella Mobs     Ankle mobs STJ, TCJ distraction and AP glides ea gr 3 L and R each     stretch T          NMR re-education (22690)  Min:8  CUES NEEDED   Balance   Tandem walking   Tandem stance 2 min ea   Sls add alt 10\"  4 min     Gluteal activation     EOB  Lateral stepping vs P band     Ankle foot retrain  Toe towel curls    T band 4 way                Therapeutic Activity (03233)  Min:               Modalities  Min:     IFC with      CP after exercises     MH after exercises            Other Therapeutic Activities: 10/27/22 Pt was educated on PT POC, Diagnosis, Prognosis, pathomechanics relative to ankle hypomobility, and LE weakness  as well as frequency and duration of scheduling future physical therapy appointments. Time was also taken on this day to answer all patient questions and participation in PT. Reviewed appointment policy in detail with patient and patient verbalized understanding.      Home Exercise Program: Patient was instructed in the following for HEP: . Patient verbalized/demonstrated understanding and was issued written handout. Therapeutic Exercise and NMR EXR  [] (07481) Provided verbal/tactile cueing for activities related to strengthening, flexibility, endurance, ROM for improvements in LE, proximal hip, and core control with self care, mobility, lifting, ambulation.  [] (91221) Provided verbal/tactile cueing for activities related to improving balance, coordination, kinesthetic sense, posture, motor skill, proprioception  to assist with LE, proximal hip, and core control in self care, mobility, lifting, ambulation and eccentric single leg control.      NMR and Therapeutic Activities:    [] (38168 or 79946) Provided verbal/tactile cueing for activities related to improving balance, coordination, kinesthetic sense, posture, motor skill, proprioception and motor activation to allow for proper function of core, proximal hip and LE with self care and ADLs and functional mobility.   [] (29478) Gait Re-education- Provided training and instruction to the patient for proper LE, core and proximal hip recruitment and positioning and eccentric body weight control with ambulation re-education including up and down stairs     Home Exercise Program:    [x] (95979) Reviewed/Progressed HEP activities related to strengthening, flexibility, endurance, ROM of core, proximal hip and LE for functional self-care, mobility, lifting and ambulation/stair navigation   [] (20131)Reviewed/Progressed HEP activities related to improving balance, coordination, kinesthetic sense, posture, motor skill, proprioception of core, proximal hip and LE for self care, mobility, lifting, and ambulation/stair navigation      Manual Treatments:  PROM / STM / Oscillations-Mobs:  G-I, II, III, IV (PA's, Inf., Post.)  [] (14826) Provided manual therapy to mobilize LE, proximal hip and/or LS spine soft tissue/joints for the purpose of modulating pain, promoting relaxation,  increasing ROM, reducing/eliminating soft tissue swelling/inflammation/restriction, improving soft tissue extensibility and allowing for proper ROM for normal function with self care, mobility, lifting and ambulation. If [facility] Please Indicate Time In/Out  CPT Code Time in Time out                         Approval Dates:  CPT Code Units Approved Units Used  Date Updated:                     Charges:  Timed Code Treatment Minutes: 48   Total Treatment Minutes: 50      [] EVAL (LOW) 35688 (typically 20 minutes face-to-face)  [] EVAL (MOD) 79498 (typically 30 minutes face-to-face)  [] EVAL (HIGH) 68677 (typically 45 minutes face-to-face)  [] RE-EVAL     [x] WA(37117) x    [] Dry needle 1 or 2 Muscles (64397)  [x] NMR (96209) x     [] Dry needle 3+ Muscles (65114)  [x] Manual (47926) x     [] Ultrasound (97280) x  [] TA (08001) x     [] Mech Traction (54154)  [] ES(attended) (78908)     [] ES (un) (74428):   [] Vasopump (85371) [] Ionto (68691)   [] Other:    GOALS:  Patient stated goal: improve walking, return to running, decrease nerve pain  [] Progressing: [] Met: [] Not Met: [] Adjusted     Therapist goals for Patient:   Short Term Goals: To be achieved in: 2 weeks  1. Independent in HEP and progression per patient tolerance, in order to prevent re-injury. [] Progressing: [] Met: [] Not Met: [] Adjusted  2. Patient will have a decrease in pain and paresthesia 5/10  to facilitate improvement in movement, function, and ADLs as indicated by Functional Deficits. [] Progressing: [] Met: [] Not Met: [] Adjusted     Long Term Goals: To be achieved in: 6-8 weeks  1. Increase FOTO functional outcome score from 38 to 49  to assist with reaching prior level of function. [] Progressing: [] Met: [] Not Met: [] Adjusted  2. Patient will demonstrate increased AROM with DF 0-10 bilaterally  to allow for proper joint functioning as indicated by patients Functional Deficits. [] Progressing: [] Met: [] Not Met: [] Adjusted  3.  Patient will demonstrate an increase in Strength to normal proximal hip, knee strength,and  4/5 at bilateral ankles/ feet and control,  in LE to allow for proper functional mobility as indicated by patients Functional Deficits. [] Progressing: [] Met: [] Not Met: [] Adjusted  4. Patient will return to normal gait pattern  functional activities without increased symptoms or restriction. [] Progressing: [] Met: [] Not Met: [] Adjusted  5. Patient will have a decrease in pain and paresthesia 0-4/10  to facilitate improvement in movement, function, and ADLs as indicated by Functional Deficits. [] Progressing: [] Met: [] Not Met: [] Adjusted     ASSESSMENT:  See eval    Treatment/Activity Tolerance:  [x] Patient tolerated treatment well [] Patient limited by fatique  [] Patient limited by pain  [] Patient limited by other medical complications  [] Other:     Overall Progression Towards Functional goals/ Treatment Progress Update:  [] Patient is progressing as expected towards functional goals listed. [x] Progression is slowed due to complexities/Impairments listed. [] Progression has been slowed due to co-morbidities. [] Plan just implemented, too soon to assess goals progression <30days   [] Goals require adjustment due to lack of progress  [] Patient is not progressing as expected and requires additional follow up with physician  [] Other    Prognosis for POC: [x] Good [] Fair  [] Poor    Patient requires continued skilled intervention: [x] Yes  [] No        PLAN:                Reassess bilat.  DF , Add towel curls, towel assist DF  and seated Bilat DF to HEP  Ankle mobs , muscle retraining to bilateral feet , build hip, core and thigh strength for balance , gait training   [] Continue per plan of care [] Alter current plan (see comments)  [x] Plan of care initiated [] Hold pending MD visit [] Discharge    Electronically signed by: Margi Olivier, PT    Note: If patient does not return for scheduled/recommended follow up visits, this note will serve as a discharge from care along with the most recent update on progress.

## 2022-11-09 ENCOUNTER — TELEMEDICINE (OUTPATIENT)
Dept: INFECTIOUS DISEASES | Age: 37
End: 2022-11-09
Payer: MEDICAID

## 2022-11-09 DIAGNOSIS — R93.89 ABNORMAL CT OF THE CHEST: ICD-10-CM

## 2022-11-09 DIAGNOSIS — E11.65 POORLY CONTROLLED DIABETES MELLITUS (HCC): ICD-10-CM

## 2022-11-09 DIAGNOSIS — B39.2 HISTOPLASMOSIS PNEUMONIA (HCC): Primary | ICD-10-CM

## 2022-11-09 PROCEDURE — 99214 OFFICE O/P EST MOD 30 MIN: CPT | Performed by: INTERNAL MEDICINE

## 2022-11-09 PROCEDURE — 3046F HEMOGLOBIN A1C LEVEL >9.0%: CPT | Performed by: INTERNAL MEDICINE

## 2022-11-09 NOTE — PROGRESS NOTES
Infectious Diseases Outpatient Follow-up Note Virtual Visit        Primary Care Physician:  HANS Mercer CNP       History Obtained From:   Patient, EPIC    CHIEF COMPLAINT / ID problem:    Chief Complaint   Patient presents with    Follow-up     Histoplasma Pneumonia        HISTORY OF PRESENT ILLNESS / Interval history:  Here for follow up on Histoplasmosis PNA and Abnormal  ct CHEST on Itraconazole therapy doing better and no fevers no chills and cough intermittent he has Poor DM control and encouraged compliance he was seen in ED recently and underwent repeat CT chest and showing improvement, he is tolerating the meds ok         Past Medical History:    Past Medical History:   Diagnosis Date    COVID-19     History of blood transfusion     Hyperlipidemia     Neuropathy        Past Surgical History:    Past Surgical History:   Procedure Laterality Date    BRONCHOSCOPY  06/09/2022    BRONCHOSCOPY BRUSHINGS performed by Robert Ruiz MD at [address] [facility]  06/09/2022    [address] performed by Robert Ruiz MD at [address]  06/09/2022    BRONCHOSCOPY BIOPSY BRONCHUS performed by Robert Ruiz MD at [facility] [address] N/A 06/14/2022    BRONCHOSCOPY DIAGNOSTIC OR CELL [address] ONLY performed by Ivonne Brito DO at [address] [facility]       Current Medications:    No current facility-administered medications for this visit. No current outpatient medications on file.      Facility-Administered Medications Ordered in Other Visits   Medication Dose Route Frequency Provider Last Rate Last Admin    insulin lispro (HUMALOG) injection vial 4 Units  4 Units SubCUTAneous Once Juno Chew MD        insulin glargine (LANTUS) injection vial 10 Units  10 Units SubCUTAneous Nightly David Dean MD        sodium chloride flush 0.9 % injection 5-40 mL  5-40 mL IntraVENous 2 times per day Juno Chew MD   10 mL at 11/12/22 2148    sodium chloride flush 0.9 % injection 5-40 mL  5-40 mL IntraVENous PRN Zuleika Nieves MD        0.9 % sodium chloride infusion   IntraVENous PRN Zuleika Nieves MD   Stopped at 11/12/22 1305    ondansetron (ZOFRAN-ODT) disintegrating tablet 4 mg  4 mg Oral Q8H PRN Zuleika Nieves MD   4 mg at 11/12/22 1356    Or    ondansetron (ZOFRAN) injection 4 mg  4 mg IntraVENous Q6H PRN Zuleika Nieves MD        acetaminophen (TYLENOL) tablet 650 mg  650 mg Oral Q6H PRN Zuleika Nieves MD   650 mg at 11/12/22 2147    Or    acetaminophen (TYLENOL) suppository 650 mg  650 mg Rectal Q6H PRN Zuleika Nieves MD        polyethylene glycol (GLYCOLAX) packet 17 g  17 g Oral Daily PRN Zuleika Nieves MD        aspirin chewable tablet 81 mg  81 mg Oral Daily David Dean MD   81 mg at 11/12/22 0837    atorvastatin (LIPITOR) tablet 80 mg  80 mg Oral Nightly Zuleika Nieves MD   80 mg at 11/12/22 2145    ipratropium-albuterol (DUONEB) nebulizer solution 1 ampule  1 ampule Inhalation Q4H PRN Zuleika Nieves MD   1 ampule at 11/11/22 2022    albuterol sulfate HFA (PROVENTIL;VENTOLIN;PROAIR) 108 (90 Base) MCG/ACT inhaler 2 puff  2 puff Inhalation Q6H PRN Zuleika Nieves MD   2 puff at 11/11/22 2022    carvedilol (COREG) tablet 25 mg  25 mg Oral BID WC Zuleika Nieves MD   25 mg at 11/12/22 0837    chlorthalidone (HYGROTON) tablet 25 mg  25 mg Oral Daily Zuleika Nieves MD   25 mg at 11/12/22 0837    DULoxetine (CYMBALTA) extended release capsule 30 mg  30 mg Oral Daily Zuleika Nieves MD   30 mg at 11/12/22 0837    gabapentin (NEURONTIN) capsule 800 mg  800 mg Oral TID Zuleika Nieves MD   800 mg at 11/12/22 2145    itraconazole (SPORANOX) capsule 200 mg  200 mg Oral BID Zuleika Nieves MD   200 mg at 11/12/22 2147    lisinopril (PRINIVIL;ZESTRIL) tablet 20 mg  20 mg Oral Daily Zuleika Nieves MD   20 mg at 11/12/22 0837    glucose chewable tablet 16 g  4 tablet Oral PRN Zuleika Nieves MD        dextrose bolus 10% 125 mL  125 mL IntraVENous PRN Irfan MD Jermaine        Or    dextrose bolus 10% 250 mL  250 mL IntraVENous PRN Keyla Palmer MD        glucagon (rDNA) injection 1 mg  1 mg SubCUTAneous PRN Keyla Palmer MD        dextrose 10 % infusion   IntraVENous Continuous PRN Keyla Palmer MD   Stopped at 11/12/22 1305    insulin lispro (HUMALOG) injection vial 0-8 Units  0-8 Units SubCUTAneous TID WC Keyla Palmer MD   8 Units at 11/12/22 1740    insulin lispro (HUMALOG) injection vial 0-4 Units  0-4 Units SubCUTAneous Nightly Keyla Palmer MD   4 Units at 11/12/22 0013    insulin glargine (LANTUS) injection vial 40 Units  40 Units SubCUTAneous Nightly Keyla Palmer MD   40 Units at 11/12/22 2149       Allergies:  Penicillins      Immunizations :   Immunization History   Administered Date(s) Administered    Influenza Virus Vaccine 12/26/2012, 10/28/2013, 10/17/2017    Influenza, Triv, 3 Years and older, IM (Afluria (5 yrs and older) 10/02/2018, 10/21/2019, 10/29/2020    Pneumococcal Polysaccharide ([id number]) 12/26/2012, 03/01/2014    Tdap (Boostrix, Adacel) 07/08/2020           Social History:    Social History     Socioeconomic History    Marital status: Single   Tobacco Use    Smoking status: Never    Smokeless tobacco: Never   Vaping Use    Vaping Use: Never used   Substance and Sexual Activity    Alcohol use: Yes     Comment: rare    Drug use: Not Currently    Sexual activity: Yes     Partners: Female     Social Determinants of Health     Financial Resource Strain: High Risk    Difficulty of Paying Living Expenses: Hard   Food Insecurity: No Food Insecurity    Worried About Running Out of Food in the Last Year: Never true    Ran Out of Food in the Last Year: Never true     Social History     Tobacco Use   Smoking Status Never   Smokeless Tobacco Never      Family History   Problem Relation Age of Onset    Diabetes Father         REVIEW OF SYSTEMS:      CONSTITUTIONAL:  negative for fevers, chills, diaphoresis, activity change, appetite change, fatigue, night sweats and unexpected weight change. EYES:  negative for blurred vision, eye discharge, visual disturbance and icterus  HEENT:  negative for hearing loss, tinnitus, ear drainage, sinus pressure, nasal congestion, epistaxis and snoring  RESPIRATORY:  + cough , + sob on exertion+  no sputum production , no wheeze ,no hemoptysis   CARDIOVASCULAR:  negative for chest pain, palpitations, exertional chest pressure/discomfort, edema, syncope  GASTROINTESTINAL:  negative for nausea, vomiting, diarrhea, constipation, blood in stool and abdominal pain  GENITOURINARY:  negative for frequency, dysuria, urinary incontinence, decreased urine volume, and hematuria  HEMATOLOGIC/LYMPHATIC:  negative for easy bruising, bleeding and lymphadenopathy  ALLERGIC/IMMUNOLOGIC:  negative for recurrent infections, angioedema, anaphylaxis and drug reactions  ENDOCRINE:  negative for weight changes and diabetic symptoms including polyuria, polydipsia and polyphagia  MUSCULOSKELETAL:  negative for  pain, joint swelling, decreased range of motion and muscle weakness  INTEGUMENTARY: negative for skin color change, rashes, blisters  NEUROLOGICAL:  negative for headaches, slurred speech, unilateral weakness  PSYCHIATRIC/BEHAVIORAL: negative for hallucinations, behavioral problems, confusion and agitation. PHYSICAL EXAM:    Vitals: There were no vitals filed for this visit.   NOT possible due to Virtual visit      DATA:    CBC:   Lab Results   Component Value Date    WBC 5.0 11/12/2022    HGB 13.6 11/12/2022    HCT 40.9 11/12/2022    MCV 78.0 (L) 11/12/2022     11/12/2022     RENAL:   Lab Results   Component Value Date    CREATININE 1.0 11/11/2022    BUN 21 (H) 11/11/2022     11/11/2022    K 3.4 (L) 11/11/2022    CL 98 (L) 11/11/2022    CO2 26 11/11/2022     SED RATE:   Lab Results   Component Value Date/Time    SEDRATE >130 06/11/2022 07:39 AM     CK: No results found for: CKTOTAL  CRP:   Lab Results   Component Value Date/Time    CRP 62.4 06/03/2020 02:37 PM     Hepatic Function Panel:   Lab Results   Component Value Date/Time    ALKPHOS 87 11/11/2022 11:56 AM    ALT 15 11/11/2022 11:56 AM    AST 15 11/11/2022 11:56 AM    PROT 7.5 11/11/2022 11:56 AM    BILITOT 0.3 11/11/2022 11:56 AM    BILIDIR <0.2 06/19/2022 08:06 AM    IBILI see below 06/19/2022 08:06 AM    LABALBU 4.0 11/11/2022 11:56 AM     UA:  Lab Results   Component Value Date/Time    COLORU Yellow 07/05/2022 11:49 AM    CLARITYU Clear 07/05/2022 11:49 AM    GLUCOSEU 500 07/05/2022 11:49 AM    GLUCOSEU NEGATIVE 01/02/2011 02:44 PM    BILIRUBINUR Negative 07/05/2022 11:49 AM    BILIRUBINUR NEGATIVE 01/02/2011 02:44 PM    KETUA TRACE 07/05/2022 11:49 AM    SPECGRAV 1.024 07/05/2022 11:49 AM    BLOODU Negative 07/05/2022 11:49 AM    PHUR 5.0 07/05/2022 11:49 AM    PROTEINU 300 07/05/2022 11:49 AM    UROBILINOGEN 2.0 07/05/2022 11:49 AM    NITRU Negative 07/05/2022 11:49 AM    LEUKOCYTESUR Negative 07/05/2022 11:49 AM    LABMICR 50.40 11/02/2022 10:35 AM    LABMICR YES 07/05/2022 11:49 AM    URINETYPE NotGiven 07/05/2022 11:49 AM      Urine Microscopic:   Lab Results   Component Value Date/Time    BACTERIA None Seen 07/05/2022 11:49 AM    COMU SEE COMMENT 07/05/2022 11:49 AM    HYALCAST 10 07/05/2022 11:49 AM    WBCUA 1 07/05/2022 11:49 AM    RBCUA 1 07/05/2022 11:49 AM    EPIU 3 07/05/2022 11:49 AM     Urine Reflex to Culture:   Lab Results   Component Value Date/Time    URRFLXCULT Not Indicated 07/05/2022 11:49 AM     Hemoglobin A1C  Hemoglobin A1C  Collected: 11/02/22 1033   Result status: Final   Resulting lab: [address] [place] [address] LAB   Reference range: See comment %   Value: 11.9   Comment: Comment:   Diagnosis of Diabetes: > or = 6.5%   Increased risk of diabetes (Prediabetes): 5.7-6.4%   Glycemic Control: Nonpregnant Adults: <7.0%                     Pregnant: <6.0%      *Additional information available - comment     MICRO: cultures reviewed and updated by me Respiratory Culture:  Lab Results   Component Value Date/Time    CULTRESP Normal respiratory debra 06/09/2022 01:28 PM    LABGRAM  06/09/2022 01:35 PM     1+ WBC's (Mononuclear)  No Epithelial Cells seen  No organisms seen       AFB:  Lab Results   Component Value Date/Time    AFBSMEAR No AFB observed by Fluorescent stain 06/09/2022 01:35 PM   FINAL DIAGNOSIS:     Right upper lobe lung biopsy:      - Small, detached fragments of benign bronchial mucosal with acute and        chronic inflammation        and blood clot. - Negative for granulomatous inflammation or neoplasm.      - No diagnostic transbronchial lung parenchyma present. PHIAB/PHIAB     Histoplasma Abs, Qnt DID  Histoplasma Antibodies  Collected: 06/14/22 1708   Result status: Final   Resulting lab: [facility] LABORATORY   Reference range: None Detected   Value: Detected Abnormal     Comment: Detected, M band(s) observed. INTERPRETIVE INFORMATION: Histoplasma spp. Antibodies by                             Immunodiffusion   The immunodiffusion test can detect precipitins to specific Histoplasma   protein antigens (M and H). The M band often appears first and may   occur   without the H band. M precipitin is found in about 70 percent of  both   acute   and chronic histoplasmosis cases. Both M and H occur together in only   about   10 percent of patients. Performed By: Saeed Hatfield        1,3)-Beta-D-Glucan (Fungitell) Interpretation  1,3 Beta-D-Glucan  Collected: 06/09/22 0745   Result status: Final   Resulting lab: [facility] LABORATORY   Reference range: Negative   Value: Positive Abnormal     Comment: INTERPRETIVE INFORMATION: (1,3)-beta-D-glucan (Fungitell)     Less than 31 pg/mL . .................. Negative     31-59 pg/mL . ......................... Negative     60-79 pg/mL . ......................... Indeterminate     Greater than or equal to 80 pg/mL . ...  Positive        Urine Culture: No results for input(s): LABURIN in the last 72 hours.    Imaging:  No orders to display       CT chest :  10/23/22   Lungs/pleura: Some limited scarring in the right apex. Cluster of pulmonary   nodules in the right upper lobe coursing down to the edge of the minor   fissure are redemonstrated. The larger nodule at the periphery of the right   upper lobe measures up to 6 mm and is stable from the most recent exam.   Smaller 2-4 mm nodules clustered in a band inferior to the larger nodule and   extending back towards the right hilum. This distribution is stable from the   most recent prior exam.  Partial opacification of a bronchial branch in this   region. No new airspace opacities in the right upper lobe. Only mild   subsegmental atelectatic changes in the lower lungs. There is no pleural   effusion or pneumothorax. Upper Abdomen: Moderate gas and food within the stomach. Some constipation   in the partially included colon. Soft Tissues/Bones: No soft tissue emphysema or focal fluid collection at the   chest wall. Axillary lymph nodes are not enlarged. No acute fracture or   destruction at the included bones. Impression   Suboptimal timing of the contrast bolus with lower than expected density of   contrast in the pulmonary arterial system, specially in the upper lobe   branches. No large or central pulmonary arterial embolism is identified but   with reduced sensitivity in the upper lobe branches. Stable cluster of nodules in the right upper lobe with opacified bronchial   segment in the region. The nodules and distribution are stable from the most   recent exam on 09/28/2022. The inflammatory alveolar opacities of 07/05/2022   have resolved. Residual increased lymphoid tissue in the right hilum is   stable from 09/28/2022 and improved from 07/05/2022. 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 No new/active pneumonia is identified.              Labs, Microbiology and  Radiology results pertinent to this visit and from care every where  reviewed in detail by me as part of the consultation     IMPRESSION:     Diagnosis Orders   1. Histoplasmosis pneumonia ([facility] [address]        2. Poorly controlled diabetes mellitus ([facility] [address]        3. Abnormal CT of the chest          Tolerating Itraconazole well and resp symptoms have improved , CT chest from  10/23/22 showing improvement does have changes from calcification and would finish the planned Itraconazole as before, he has labs on his recent ED visit and Lfts have been normal - DM control d/w pt in detail     PLAN:    Finish Itraconazole therapy as planned he has another x 8 weeks to completed - he started therapy in July 2022-  LABS reviewed  CT chest results noted and dw pt  DM control d/w pt  Risk for flare up   Follow  up PRDAVID Galan is a 29 y.o. male being evaluated by a Virtual Visit (video visit) encounter to address concerns as mentioned above. A caregiver was present when appropriate. Due to this being a TeleHealth encounter (During ASVSS-11 public health emergency), evaluation of the following organ systems was limited: Vitals/Constitutional/EENT/Resp/CV/GI//MS/Neuro/Skin/Heme-Lymph-Imm. Pursuant to the emergency declaration under the [facility] [address] authority and the [employer] Act, this Virtual Visit was conducted with patient's (and/or legal guardian's) consent, to reduce the patient's risk of exposure to COVID-19 and provide necessary medical care. The patient (and/or legal guardian) has also been advised to contact this office for worsening conditions or problems, and seek emergency medical treatment and/or call 911 if deemed necessary.      Patient identification was verified at the start of the visit: yES    Total time spent for this encounter: 35  min   on visit (including interval history,  review of data including labs, cultures, imaging,  ordering labs, development and implementation of treatment plan, co ordination of care, counseling and education ). Services were provided through a Audio/Video  synchronous discussion virtually to substitute for in-person clinic visit. Patient and provider were located at their individual homes. --Nikki Hinds MD on 11/13/2022 at 12:20 AM    An electronic signature was used to authenticate this note. D/w Patient in detail at  the time of consultation. Thanks for allowing me to participate in your patient's care and please do not hesitate to  call me with any questions or concerns.     Apoorva Jara MD  Infectious Disease  [facility] Physician  Phone: [phone]   Fax : [phone]

## 2022-11-10 ENCOUNTER — CARE COORDINATION (OUTPATIENT)
Dept: CARE COORDINATION | Age: 37
End: 2022-11-10

## 2022-11-10 ENCOUNTER — HOSPITAL ENCOUNTER (OUTPATIENT)
Dept: PHYSICAL THERAPY | Age: 37
Setting detail: THERAPIES SERIES
Discharge: HOME | End: 2022-11-10
Payer: MEDICAID

## 2022-11-10 PROCEDURE — 97110 THERAPEUTIC EXERCISES: CPT

## 2022-11-10 PROCEDURE — 97140 MANUAL THERAPY 1/> REGIONS: CPT

## 2022-11-10 PROCEDURE — 97112 NEUROMUSCULAR REEDUCATION: CPT

## 2022-11-10 NOTE — CARE COORDINATION
Situation: vm rec'd from Christiane Gross, pt's mother, who reports belief pt requires SW and also CM for business affairs. She adds that she, herself, does try to help pt but his support needs exceed that which she is able to provide. Action:  Due to nature of identified concern this vm, ACM did outreach during after hours  Outcome:  Christiane Doreenjagruti reviews pt reportedly overwhelmed in self mgmt. She describes past 2 years whereas pt has pursued SSDI and has been dealing w/, JFS, doctor appts - Christiane Gross reports belief pt is overwhelmed and though she is trying to help him, he requires more support that she can offer. ACM reviewed attempt to reach pt today, to see whether pt reconsidered ACM support to help w/scheduling (eg pain mgmt appt), but ACM had to leave vm. ACM further reviewed that pt had agreed to first schedule w/pain mgmt, denied need for ACM assistance with scheduling, and that he agreed he would follow up with PCP once he got scheduled w/pain mgmt first.  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 ACM reviewed support being offered via current ACC episode, to extent pt willing, and encouraged Christiane Gross to also review with pt the need to schedule w/[facility]. Grande Escort accepted and accurately recited number for pt to call to schedule ([phone]). Invited that ACM is available to support pt w/scheduling, but pt must be willing. Christiane Gross began requesting a number for a SW - and repeated this request multiple times. ACM explained process: once SW referral is placed, the SW accepting the assignment will directly outreach to pt/family, typically within 3-5 business days. Further offered that SW team is presently minus one SW, temporarily d/t 1 SW out on medical leave. Assured SW referrals are outreached quite promptly.  Lastly, ACM also explained SW on [facility] team is community health liaison and does not provide SW support such as Christiane Ginny is describing she requests (ie differentiated between SW support via [address] in comparison to that of eg JFS). Carefully explained that such SW referral via [address] is in seeking support or guidance for community-based resources which may prove helpful to pt to address identified barriers or resource needs - emphasized [address] team SW does not provide SW/CM guidance in 'managing personal business'. Rosa Savage proceeded to request a phone number to a SW several add'l times. ACM assured that referral will be placed and SW who accepts assignment will be in outreach direct to pt. ACM reviewed, once again, as assignment must first be established, it would not be appropriate for ACM to blindly provide contact information to any one particular SW, for this reason. Rosa Savage verbalized taking exception to ACM unwillingness to provide a phone number direct to any certain SW.  ACM apologized, explaining it is not this writer's intention to cause pt/family frustration - rather, only to assure that appropriate process is followed. Assured that it would not prove effective for pt/family to outreach to any SW, as professionals must be assigned as members of a care team before pt/family are provided any one specific contact/phone number. Invited pt/family to outreach ACM as needed, assuring that pt/family should anticipate outreach by SW.   Verbalized appreciation for pt/family understanding and again, assured referral will be placed this evening so that it is in workque before close of this work week. Anticipate outreach likely as soon as the coming week. Rosa Savage requested number to speak with office this writer represents. Provided number to [facility].     Plan: referral placed to SW        Please complete the questions below, and/or provide a narrative description of the identified patient need below and include this smart phrase in your patient encounter.     -This patient is referred this date to [facility] SW'S for review, assessment, and consultation as needed regarding the following presenting concern(s). Please bold all that apply. No    ACM  assessment of patient's substance use disorder, if any, is indicated and requested. No   Patient has concerns about apparently deteriorating mental status. No   Patient desires assistance with locating an accessible behavioral health treatment provider. No   Patient has questions/concerns regarding application and/or eligibility for Medicaid. Yes  Patient has questions/concerns regarding application and/or eligibility for a Medicaid Waiver program ([place], Home Care Waiver, Assisted Living Waiver, etc.). and/OR any additional community-based support which may be appropriate for identified need: Case Mgmt oversight for \"personal business\" (unspecified concerns r/t JFS, SSDI, potential advocacy needs in navigating JFS or SSA systems)    No   Patient has questions/concerns about the cost of prescription drugs. No   Patient has questions/concerns regarding Medicare coverage, Including Part D prescription drug coverage. 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 No   Patient desires information about long-term care in a skilled nursing facility (SNF). No   Patient has inadequate and/or unaffordable housing. N/A   Patient desires assistance with smoking cessation (Note: select this option only if patient is reasonably unable to participate in smoking cessation support groups offered periodically at [facility]). No   Patient desires information about advance directives (Power of  [place], Living Will, DNR, Guardianship, etc.).     Please provide additional information if needed: (additional needs, household size, monthly income, source of income) [facility] Coordination Note  11/14/2022    ACC: Harley Silverio, RN    see above    Offered patient enrollment in the Remote Patient Monitoring (RPM) program for in-home monitoring: Patient is not eligible for RPM program    Care Coordination Interventions    Referral from Primary Care Provider: Yes  Suggested Interventions and Community Resources  Diabetes Education: Completed (Comment: follows @ [facility] for DM mgmt)  Fall Risk Prevention: Completed  Medi Set or Pill Pack: Completed (Comment: picks up Rx through [facility])  Social Work: In Process (Comment: 10.13.22 interest in applying for Section 8)  Specialty Services Referral: Completed (Comment: follows w/Med Mgmt clinic @ [facility])  Other Services: Completed (Comment: follows w/med mgmt clinic for DM mgmt)  Zone Management Tools: Completed (Comment: DM)  Other Services or Interventions: reviewed pt centered goals; self mgmt concepts; community-based resources          Goals Addressed                      This Visit's Progress      Community Resource Goal (pt-stated)   On track      I will engage w/Community Health Liaison on [address] team to review needs/barriers and discuss potential community-based resources which may prove helpful. Barriers: impairment:  physical: dizziness/deconditioned, fear of failure, financial, and lack of support  Plan for overcoming my barriers: engage in Care Coordination for added care team support  Confidence: 10/10  Anticipated Goal Completion Date: 4.13.23        Conditions and Symptoms (pt-stated)   On track      I will schedule office visits, as directed by my provider. I will keep my appointment or reschedule if I have to cancel. I will notify my provider of any barriers to my plan of care. I will follow my Zone Management tool to seek urgent or emergent care. I will notify my provider of any symptoms that indicate a worsening of my condition.     Barriers: impairment:  physical: deconditioned/chronic condition, fear of failure, and financial  Plan for overcoming my barriers: engage in Care Coordination for added care team support  Confidence: 10/10  Anticipated Goal Completion Date: 4.13.23                Prior to Admission medications    Medication Sig Start Date End Date Taking? Authorizing Provider   Dulaglutide (TRULICITY) 8.03 FD/8.4LR SOPN Inject 0.75 mg into the skin once a week  Patient taking differently: Inject 0.75 mg into the skin once a week On Mondays 10/24/22   HANS Hawk CNP   Insulin Degludec (TRESIBA FLEXTOUCH) 200 UNIT/ML SOPN Inject 50 Units into the skin daily  Patient taking differently: Inject 60 Units into the skin daily 10/24/22   HANS Hawk CNP   metoclopramide (REGLAN) 10 MG tablet Take 1 tablet by mouth 3 times daily (with meals)  Patient taking differently: Take 10 mg by mouth 3 times daily as needed 10/11/22   HANS Hawk CNP   lisinopril (PRINIVIL;ZESTRIL) 20 MG tablet Take 1 tablet by mouth daily 9/14/22   Eli Mackey,    Continuous Blood Gluc Sensor (FREESTYLE HOME 2 SENSOR) MISC Use to check blood sugar 4 times per day (before each meal and at bedtime). 9/13/22   HANS Hawk CNP   itraconazole (SPORANOX) 100 MG capsule Take 200 mg by mouth 2 times daily    Historical Provider, MD   gabapentin (NEURONTIN) 400 MG capsule Take 2 capsules by mouth in the morning and 2 capsules at noon and 2 capsules before bedtime. Do all this for 90 days. 2-3 QD. 8/16/22 11/14/22  HANS Hawk CNP   metFORMIN (GLUCOPHAGE) 1000 MG tablet Take 1 tablet by mouth in the morning and 1 tablet before bedtime. 8/16/22   HANS Hawk CNP   empagliflozin (JARDIANCE) 10 MG tablet Take 1 tablet by mouth in the morning. 7/19/22   HANS Hawk CNP   DULoxetine (CYMBALTA) 30 MG extended release capsule Take 1 capsule by mouth in the morning.  7/19/22   HANS Hawk CNP dicyclomine (BENTYL) 10 MG capsule Take 1 capsule by mouth 4 times daily (before meals and nightly) 7/19/22   HANS Barker CNP   chlorthalidone (HYGROTON) 25 MG tablet Take 1 tablet by mouth in the morning. 7/19/22   HANS Barker CNP   carvedilol (COREG) 25 MG tablet Take 1 tablet by mouth in the morning and 1 tablet before bedtime. 7/19/22   HANS Barker CNP   aspirin 81 MG chewable tablet Take 1 tablet by mouth in the morning.  7/19/22   HANS Barker CNP   rosuvastatin (CRESTOR) 40 MG tablet Take 1 tablet by mouth nightly 7/19/22   HANS Barker CNP   albuterol sulfate  (90 Base) MCG/ACT inhaler Inhale 2 puffs into the lungs every 6 hours as needed for Wheezing    Historical Provider, MD       Future Appointments   Date Time Provider [place]   11/15/2022 11:00 AM Severo Brown, PT [facility] OP PT [facility]   11/17/2022 11:00 AM Severo Brown PT [facility] OP PT [facility]   11/22/2022 10:15 AM Severo Brown, PT [facility] OP PT [facility] HOD   12/6/2022 10:00 AM HANS Barker CNP [facility]   2/21/2023 10:45 AM Sintia Mejia MD AND NEURO Neurology -
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
## 2022-11-10 NOTE — CARE COORDINATION
Attempted return callback to Shivam Turner x2 - however, pt's mother unable to hear this writer on her end. Left vm explaining aforementioned barrier, inviting Eliazar Prior to call [facility] again if needed. Further advised [facility] has planned outreach to pt this week anyway, and will attempt pt today or tomorrow. Provided & repeated [facility] callback number.

## 2022-11-11 ENCOUNTER — APPOINTMENT (OUTPATIENT)
Dept: GENERAL RADIOLOGY | Age: 37
DRG: 142 | End: 2022-11-11
Payer: MEDICAID

## 2022-11-11 ENCOUNTER — HOSPITAL ENCOUNTER (INPATIENT)
Age: 37
LOS: 5 days | Discharge: HOME | DRG: 142 | End: 2022-11-16
Attending: EMERGENCY MEDICINE | Admitting: INTERNAL MEDICINE
Payer: MEDICAID

## 2022-11-11 DIAGNOSIS — R07.9 CHEST PAIN, UNSPECIFIED TYPE: Primary | ICD-10-CM

## 2022-11-11 DIAGNOSIS — R06.02 SHORTNESS OF BREATH: ICD-10-CM

## 2022-11-11 DIAGNOSIS — I42.9 CARDIOMYOPATHY, UNSPECIFIED TYPE (HCC): ICD-10-CM

## 2022-11-11 DIAGNOSIS — G62.9 NEUROPATHY: ICD-10-CM

## 2022-11-11 DIAGNOSIS — R00.0 TACHYCARDIA: ICD-10-CM

## 2022-11-11 LAB
A/G RATIO: 1.1 (ref 1.1–2.2)
ALBUMIN SERPL-MCNC: 4 G/DL (ref 3.4–5)
ALP BLD-CCNC: 87 U/L (ref 40–129)
ALT SERPL-CCNC: 15 U/L (ref 10–40)
ANION GAP SERPL CALCULATED.3IONS-SCNC: 13 MMOL/L (ref 3–16)
APTT: 36 SEC (ref 23–34.3)
AST SERPL-CCNC: 15 U/L (ref 15–37)
BASOPHILS ABSOLUTE: 0.1 K/UL (ref 0–0.2)
BASOPHILS RELATIVE PERCENT: 1.1 %
BILIRUB SERPL-MCNC: 0.3 MG/DL (ref 0–1)
BUN BLDV-MCNC: 21 MG/DL (ref 7–20)
CALCIUM SERPL-MCNC: 9.6 MG/DL (ref 8.3–10.6)
CHLORIDE BLD-SCNC: 98 MMOL/L (ref 99–110)
CO2: 26 MMOL/L (ref 21–32)
CREAT SERPL-MCNC: 1 MG/DL (ref 0.9–1.3)
D DIMER: <0.27 UG/ML FEU (ref 0–0.6)
EOSINOPHILS ABSOLUTE: 0.2 K/UL (ref 0–0.6)
EOSINOPHILS RELATIVE PERCENT: 3 %
GFR SERPL CREATININE-BSD FRML MDRD: >60 ML/MIN/{1.73_M2}
GLUCOSE BLD-MCNC: 204 MG/DL (ref 70–99)
GLUCOSE BLD-MCNC: 351 MG/DL (ref 70–99)
HCT VFR BLD CALC: 47.4 % (ref 40.5–52.5)
HCT VFR BLD CALC: 47.5 % (ref 40.5–52.5)
HEMOGLOBIN: 15.4 G/DL (ref 13.5–17.5)
HEMOGLOBIN: 15.6 G/DL (ref 13.5–17.5)
LACTIC ACID, SEPSIS: 1.2 MMOL/L (ref 0.4–1.9)
LACTIC ACID, SEPSIS: 1.4 MMOL/L (ref 0.4–1.9)
LYMPHOCYTES ABSOLUTE: 1.7 K/UL (ref 1–5.1)
LYMPHOCYTES RELATIVE PERCENT: 32.6 %
MAGNESIUM: 2.2 MG/DL (ref 1.8–2.4)
MCH RBC QN AUTO: 25.5 PG (ref 26–34)
MCH RBC QN AUTO: 26.1 PG (ref 26–34)
MCHC RBC AUTO-ENTMCNC: 32.4 G/DL (ref 31–36)
MCHC RBC AUTO-ENTMCNC: 33 G/DL (ref 31–36)
MCV RBC AUTO: 78.8 FL (ref 80–100)
MCV RBC AUTO: 79.2 FL (ref 80–100)
MONOCYTES ABSOLUTE: 0.4 K/UL (ref 0–1.3)
MONOCYTES RELATIVE PERCENT: 8.4 %
NEUTROPHILS ABSOLUTE: 2.8 K/UL (ref 1.7–7.7)
NEUTROPHILS RELATIVE PERCENT: 54.9 %
PDW BLD-RTO: 12.9 % (ref 12.4–15.4)
PDW BLD-RTO: 12.9 % (ref 12.4–15.4)
PERFORMED ON: ABNORMAL
PLATELET # BLD: 280 K/UL (ref 135–450)
PLATELET # BLD: 301 K/UL (ref 135–450)
PMV BLD AUTO: 8.3 FL (ref 5–10.5)
PMV BLD AUTO: 8.8 FL (ref 5–10.5)
POTASSIUM REFLEX MAGNESIUM: 3.4 MMOL/L (ref 3.5–5.1)
PRO-BNP: 23 PG/ML (ref 0–124)
RAPID INFLUENZA  B AGN: NEGATIVE
RAPID INFLUENZA A AGN: NEGATIVE
RBC # BLD: 5.98 M/UL (ref 4.2–5.9)
RBC # BLD: 6.03 M/UL (ref 4.2–5.9)
REASON FOR REJECTION: NORMAL
REJECTED TEST: NORMAL
SARS-COV-2, NAAT: NOT DETECTED
SODIUM BLD-SCNC: 137 MMOL/L (ref 136–145)
TOTAL PROTEIN: 7.5 G/DL (ref 6.4–8.2)
TROPONIN: 0.01 NG/ML
TROPONIN: 0.03 NG/ML
TROPONIN: 0.03 NG/ML
WBC # BLD: 5.1 K/UL (ref 4–11)
WBC # BLD: 5.7 K/UL (ref 4–11)

## 2022-11-11 PROCEDURE — 2500000003 HC RX 250 WO HCPCS: Performed by: INTERNAL MEDICINE

## 2022-11-11 PROCEDURE — 1200000000 HC SEMI PRIVATE

## 2022-11-11 PROCEDURE — 96374 THER/PROPH/DIAG INJ IV PUSH: CPT

## 2022-11-11 PROCEDURE — 87804 INFLUENZA ASSAY W/OPTIC: CPT

## 2022-11-11 PROCEDURE — 6370000000 HC RX 637 (ALT 250 FOR IP): Performed by: INTERNAL MEDICINE

## 2022-11-11 PROCEDURE — 87040 BLOOD CULTURE FOR BACTERIA: CPT

## 2022-11-11 PROCEDURE — 94640 AIRWAY INHALATION TREATMENT: CPT

## 2022-11-11 PROCEDURE — 6360000002 HC RX W HCPCS: Performed by: EMERGENCY MEDICINE

## 2022-11-11 PROCEDURE — 85025 COMPLETE CBC W/AUTO DIFF WBC: CPT

## 2022-11-11 PROCEDURE — 96375 TX/PRO/DX INJ NEW DRUG ADDON: CPT

## 2022-11-11 PROCEDURE — 6360000002 HC RX W HCPCS: Performed by: INTERNAL MEDICINE

## 2022-11-11 PROCEDURE — 6360000002 HC RX W HCPCS: Performed by: GENERAL ACUTE CARE HOSPITAL

## 2022-11-11 PROCEDURE — 87635 SARS-COV-2 COVID-19 AMP PRB: CPT

## 2022-11-11 PROCEDURE — 96361 HYDRATE IV INFUSION ADD-ON: CPT

## 2022-11-11 PROCEDURE — 6370000000 HC RX 637 (ALT 250 FOR IP): Performed by: GENERAL ACUTE CARE HOSPITAL

## 2022-11-11 PROCEDURE — 84484 ASSAY OF TROPONIN QUANT: CPT

## 2022-11-11 PROCEDURE — 99285 EMERGENCY DEPT VISIT HI MDM: CPT

## 2022-11-11 PROCEDURE — 85027 COMPLETE CBC AUTOMATED: CPT

## 2022-11-11 PROCEDURE — 93005 ELECTROCARDIOGRAM TRACING: CPT | Performed by: GENERAL ACUTE CARE HOSPITAL

## 2022-11-11 PROCEDURE — 83605 ASSAY OF LACTIC ACID: CPT

## 2022-11-11 PROCEDURE — 85730 THROMBOPLASTIN TIME PARTIAL: CPT

## 2022-11-11 PROCEDURE — 36415 COLL VENOUS BLD VENIPUNCTURE: CPT

## 2022-11-11 PROCEDURE — 6370000000 HC RX 637 (ALT 250 FOR IP): Performed by: EMERGENCY MEDICINE

## 2022-11-11 PROCEDURE — 2580000003 HC RX 258: Performed by: GENERAL ACUTE CARE HOSPITAL

## 2022-11-11 PROCEDURE — 83880 ASSAY OF NATRIURETIC PEPTIDE: CPT

## 2022-11-11 PROCEDURE — 71045 X-RAY EXAM CHEST 1 VIEW: CPT

## 2022-11-11 PROCEDURE — 80053 COMPREHEN METABOLIC PANEL: CPT

## 2022-11-11 PROCEDURE — 83735 ASSAY OF MAGNESIUM: CPT

## 2022-11-11 PROCEDURE — 85379 FIBRIN DEGRADATION QUANT: CPT

## 2022-11-11 PROCEDURE — 2580000003 HC RX 258: Performed by: EMERGENCY MEDICINE

## 2022-11-11 PROCEDURE — 83036 HEMOGLOBIN GLYCOSYLATED A1C: CPT

## 2022-11-11 PROCEDURE — 2580000003 HC RX 258: Performed by: INTERNAL MEDICINE

## 2022-11-11 RX ORDER — 0.9 % SODIUM CHLORIDE 0.9 %
2000 INTRAVENOUS SOLUTION INTRAVENOUS ONCE
Status: DISCONTINUED | OUTPATIENT
Start: 2022-11-11 | End: 2022-11-11

## 2022-11-11 RX ORDER — HEPARIN SODIUM 1000 [USP'U]/ML
4000 INJECTION, SOLUTION INTRAVENOUS; SUBCUTANEOUS ONCE
Status: COMPLETED | OUTPATIENT
Start: 2022-11-11 | End: 2022-11-11

## 2022-11-11 RX ORDER — ONDANSETRON 2 MG/ML
4 INJECTION INTRAMUSCULAR; INTRAVENOUS EVERY 6 HOURS PRN
Status: DISCONTINUED | OUTPATIENT
Start: 2022-11-11 | End: 2022-11-16 | Stop reason: HOSPADM

## 2022-11-11 RX ORDER — CARVEDILOL 25 MG/1
25 TABLET ORAL 2 TIMES DAILY WITH MEALS
Status: DISCONTINUED | OUTPATIENT
Start: 2022-11-11 | End: 2022-11-16 | Stop reason: HOSPADM

## 2022-11-11 RX ORDER — ACETAMINOPHEN 325 MG/1
650 TABLET ORAL EVERY 6 HOURS PRN
Status: DISCONTINUED | OUTPATIENT
Start: 2022-11-11 | End: 2022-11-16 | Stop reason: HOSPADM

## 2022-11-11 RX ORDER — ACETAMINOPHEN 650 MG/1
650 SUPPOSITORY RECTAL EVERY 6 HOURS PRN
Status: DISCONTINUED | OUTPATIENT
Start: 2022-11-11 | End: 2022-11-16 | Stop reason: HOSPADM

## 2022-11-11 RX ORDER — ENOXAPARIN SODIUM 100 MG/ML
40 INJECTION SUBCUTANEOUS DAILY
Status: DISCONTINUED | OUTPATIENT
Start: 2022-11-11 | End: 2022-11-11 | Stop reason: DRUGHIGH

## 2022-11-11 RX ORDER — ALBUTEROL SULFATE 90 UG/1
2 AEROSOL, METERED RESPIRATORY (INHALATION) EVERY 6 HOURS PRN
Status: DISCONTINUED | OUTPATIENT
Start: 2022-11-11 | End: 2022-11-16 | Stop reason: HOSPADM

## 2022-11-11 RX ORDER — ATORVASTATIN CALCIUM 80 MG/1
80 TABLET, FILM COATED ORAL NIGHTLY
Status: DISCONTINUED | OUTPATIENT
Start: 2022-11-11 | End: 2022-11-16 | Stop reason: HOSPADM

## 2022-11-11 RX ORDER — IPRATROPIUM BROMIDE AND ALBUTEROL SULFATE 2.5; .5 MG/3ML; MG/3ML
1 SOLUTION RESPIRATORY (INHALATION) ONCE
Status: COMPLETED | OUTPATIENT
Start: 2022-11-11 | End: 2022-11-11

## 2022-11-11 RX ORDER — SODIUM CHLORIDE 0.9 % (FLUSH) 0.9 %
5-40 SYRINGE (ML) INJECTION PRN
Status: DISCONTINUED | OUTPATIENT
Start: 2022-11-11 | End: 2022-11-16 | Stop reason: HOSPADM

## 2022-11-11 RX ORDER — HEPARIN SODIUM 1000 [USP'U]/ML
60 INJECTION, SOLUTION INTRAVENOUS; SUBCUTANEOUS PRN
Status: DISCONTINUED | OUTPATIENT
Start: 2022-11-11 | End: 2022-11-11

## 2022-11-11 RX ORDER — 0.9 % SODIUM CHLORIDE 0.9 %
1000 INTRAVENOUS SOLUTION INTRAVENOUS ONCE
Status: COMPLETED | OUTPATIENT
Start: 2022-11-11 | End: 2022-11-11

## 2022-11-11 RX ORDER — DEXTROSE MONOHYDRATE 100 MG/ML
INJECTION, SOLUTION INTRAVENOUS CONTINUOUS PRN
Status: DISCONTINUED | OUTPATIENT
Start: 2022-11-11 | End: 2022-11-16 | Stop reason: HOSPADM

## 2022-11-11 RX ORDER — DULOXETIN HYDROCHLORIDE 30 MG/1
30 CAPSULE, DELAYED RELEASE ORAL DAILY
Status: DISCONTINUED | OUTPATIENT
Start: 2022-11-11 | End: 2022-11-16 | Stop reason: HOSPADM

## 2022-11-11 RX ORDER — ASPIRIN 81 MG/1
81 TABLET, CHEWABLE ORAL DAILY
Status: DISCONTINUED | OUTPATIENT
Start: 2022-11-12 | End: 2022-11-16 | Stop reason: HOSPADM

## 2022-11-11 RX ORDER — INSULIN LISPRO 100 [IU]/ML
0-4 INJECTION, SOLUTION INTRAVENOUS; SUBCUTANEOUS NIGHTLY
Status: DISCONTINUED | OUTPATIENT
Start: 2022-11-11 | End: 2022-11-16 | Stop reason: HOSPADM

## 2022-11-11 RX ORDER — LISINOPRIL 20 MG/1
20 TABLET ORAL DAILY
Status: DISCONTINUED | OUTPATIENT
Start: 2022-11-11 | End: 2022-11-16 | Stop reason: HOSPADM

## 2022-11-11 RX ORDER — POLYETHYLENE GLYCOL 3350 17 G/17G
17 POWDER, FOR SOLUTION ORAL DAILY PRN
Status: DISCONTINUED | OUTPATIENT
Start: 2022-11-11 | End: 2022-11-16 | Stop reason: HOSPADM

## 2022-11-11 RX ORDER — GABAPENTIN 400 MG/1
800 CAPSULE ORAL 3 TIMES DAILY
Status: DISCONTINUED | OUTPATIENT
Start: 2022-11-11 | End: 2022-11-14

## 2022-11-11 RX ORDER — INSULIN GLARGINE 100 [IU]/ML
40 INJECTION, SOLUTION SUBCUTANEOUS NIGHTLY
Status: DISCONTINUED | OUTPATIENT
Start: 2022-11-11 | End: 2022-11-16 | Stop reason: HOSPADM

## 2022-11-11 RX ORDER — HEPARIN SODIUM 1000 [USP'U]/ML
30 INJECTION, SOLUTION INTRAVENOUS; SUBCUTANEOUS PRN
Status: DISCONTINUED | OUTPATIENT
Start: 2022-11-11 | End: 2022-11-11

## 2022-11-11 RX ORDER — SODIUM CHLORIDE 9 MG/ML
INJECTION, SOLUTION INTRAVENOUS PRN
Status: DISCONTINUED | OUTPATIENT
Start: 2022-11-11 | End: 2022-11-16 | Stop reason: HOSPADM

## 2022-11-11 RX ORDER — SODIUM CHLORIDE 0.9 % (FLUSH) 0.9 %
5-40 SYRINGE (ML) INJECTION EVERY 12 HOURS SCHEDULED
Status: DISCONTINUED | OUTPATIENT
Start: 2022-11-11 | End: 2022-11-16 | Stop reason: HOSPADM

## 2022-11-11 RX ORDER — CHLORTHALIDONE 25 MG/1
25 TABLET ORAL DAILY
Status: DISCONTINUED | OUTPATIENT
Start: 2022-11-12 | End: 2022-11-16 | Stop reason: HOSPADM

## 2022-11-11 RX ORDER — IPRATROPIUM BROMIDE AND ALBUTEROL SULFATE 2.5; .5 MG/3ML; MG/3ML
1 SOLUTION RESPIRATORY (INHALATION) EVERY 4 HOURS PRN
Status: DISCONTINUED | OUTPATIENT
Start: 2022-11-11 | End: 2022-11-13

## 2022-11-11 RX ORDER — ENOXAPARIN SODIUM 100 MG/ML
30 INJECTION SUBCUTANEOUS 2 TIMES DAILY
Status: DISCONTINUED | OUTPATIENT
Start: 2022-11-11 | End: 2022-11-11

## 2022-11-11 RX ORDER — ITRACONAZOLE 100 MG/1
200 CAPSULE ORAL 2 TIMES DAILY
Status: DISCONTINUED | OUTPATIENT
Start: 2022-11-11 | End: 2022-11-16 | Stop reason: HOSPADM

## 2022-11-11 RX ORDER — ONDANSETRON 4 MG/1
4 TABLET, ORALLY DISINTEGRATING ORAL EVERY 8 HOURS PRN
Status: DISCONTINUED | OUTPATIENT
Start: 2022-11-11 | End: 2022-11-16 | Stop reason: HOSPADM

## 2022-11-11 RX ORDER — HEPARIN SODIUM 10000 [USP'U]/100ML
0-4000 INJECTION, SOLUTION INTRAVENOUS CONTINUOUS
Status: DISCONTINUED | OUTPATIENT
Start: 2022-11-11 | End: 2022-11-12

## 2022-11-11 RX ORDER — INSULIN LISPRO 100 [IU]/ML
0-8 INJECTION, SOLUTION INTRAVENOUS; SUBCUTANEOUS
Status: DISCONTINUED | OUTPATIENT
Start: 2022-11-12 | End: 2022-11-16 | Stop reason: HOSPADM

## 2022-11-11 RX ORDER — ACETAMINOPHEN 500 MG
1000 TABLET ORAL ONCE
Status: COMPLETED | OUTPATIENT
Start: 2022-11-11 | End: 2022-11-11

## 2022-11-11 RX ORDER — ONDANSETRON 2 MG/ML
4 INJECTION INTRAMUSCULAR; INTRAVENOUS ONCE
Status: COMPLETED | OUTPATIENT
Start: 2022-11-11 | End: 2022-11-11

## 2022-11-11 RX ORDER — METHYLPREDNISOLONE SODIUM SUCCINATE 125 MG/2ML
125 INJECTION, POWDER, LYOPHILIZED, FOR SOLUTION INTRAMUSCULAR; INTRAVENOUS ONCE
Status: COMPLETED | OUTPATIENT
Start: 2022-11-11 | End: 2022-11-11

## 2022-11-11 RX ADMIN — HEPARIN SODIUM 1000 UNITS/HR: 10000 INJECTION, SOLUTION INTRAVENOUS at 22:35

## 2022-11-11 RX ADMIN — ONDANSETRON 4 MG: 2 INJECTION INTRAMUSCULAR; INTRAVENOUS at 12:17

## 2022-11-11 RX ADMIN — GABAPENTIN 800 MG: 400 CAPSULE ORAL at 22:28

## 2022-11-11 RX ADMIN — ACETAMINOPHEN 1000 MG: 500 TABLET ORAL at 12:18

## 2022-11-11 RX ADMIN — IPRATROPIUM BROMIDE AND ALBUTEROL SULFATE 1 AMPULE: .5; 2.5 SOLUTION RESPIRATORY (INHALATION) at 15:19

## 2022-11-11 RX ADMIN — METHYLPREDNISOLONE SODIUM SUCCINATE 125 MG: 125 INJECTION, POWDER, FOR SOLUTION INTRAMUSCULAR; INTRAVENOUS at 15:00

## 2022-11-11 RX ADMIN — LISINOPRIL 20 MG: 20 TABLET ORAL at 22:28

## 2022-11-11 RX ADMIN — ITRACONAZOLE 200 MG: 100 CAPSULE, COATED PELLETS ORAL at 22:42

## 2022-11-11 RX ADMIN — CARVEDILOL 25 MG: 25 TABLET, FILM COATED ORAL at 22:28

## 2022-11-11 RX ADMIN — ALBUTEROL SULFATE 2 PUFF: 90 AEROSOL, METERED RESPIRATORY (INHALATION) at 20:22

## 2022-11-11 RX ADMIN — IPRATROPIUM BROMIDE AND ALBUTEROL SULFATE 1 AMPULE: .5; 3 SOLUTION RESPIRATORY (INHALATION) at 20:22

## 2022-11-11 RX ADMIN — SODIUM CHLORIDE, PRESERVATIVE FREE 10 ML: 5 INJECTION INTRAVENOUS at 22:54

## 2022-11-11 RX ADMIN — SODIUM CHLORIDE 1000 ML: 9 INJECTION, SOLUTION INTRAVENOUS at 14:59

## 2022-11-11 RX ADMIN — ATORVASTATIN CALCIUM 80 MG: 80 TABLET, FILM COATED ORAL at 22:28

## 2022-11-11 RX ADMIN — DULOXETINE HYDROCHLORIDE 30 MG: 30 CAPSULE, DELAYED RELEASE ORAL at 22:28

## 2022-11-11 RX ADMIN — HEPARIN SODIUM 4000 UNITS: 1000 INJECTION INTRAVENOUS; SUBCUTANEOUS at 22:35

## 2022-11-11 RX ADMIN — SODIUM CHLORIDE 1000 ML: 9 INJECTION, SOLUTION INTRAVENOUS at 12:16

## 2022-11-11 ASSESSMENT — ENCOUNTER SYMPTOMS
BLOOD IN STOOL: 0
WHEEZING: 0
NAUSEA: 0
ABDOMINAL PAIN: 0
SHORTNESS OF BREATH: 1
COUGH: 1
BACK PAIN: 0
SORE THROAT: 0
CHEST TIGHTNESS: 0
VOMITING: 0
VOICE CHANGE: 0

## 2022-11-11 ASSESSMENT — PAIN SCALES - GENERAL: PAINLEVEL_OUTOF10: 8

## 2022-11-11 NOTE — ED PROVIDER NOTES
[facility]  eMERGENCY dEPARTMENT eNCOUnter   Physician Attestation    Pt Name: Mayur Neff  MRN: [id number]  [place] 1985  Date of evaluation: 11/11/22        Physician Note:    This patient was seen by the advanced practice provider. I have personally performed a face to face diagnostic evaluation on this patient and performed a substantive portion of the visit including all aspects of the medical decision making. has a past medical history of COVID-19, History of blood transfusion, Hyperlipidemia, and Neuropathy. History: Briefly, 29 y.o. male presents with generalized aches and pains fever chills shortness of breath. No cough. Patient is a diabetic and over the past year or so he stopped over 100 pounds. Initially it started out with him trying to lose weight but then it just started progressing without him attempting to lose weight. He does not smoke although rarely does he have any marijuana. Apparently did have some history of asthma as a child. Physical Exam  Constitutional:       Appearance: He is well-developed. He is not diaphoretic. HENT:      Head: Normocephalic and atraumatic. Right Ear: External ear normal.      Left Ear: External ear normal.   Eyes:      General: No scleral icterus. Right eye: No discharge. Left eye: No discharge. Neck:      Thyroid: No thyromegaly. Vascular: No JVD. Trachea: No tracheal deviation. Cardiovascular:      Rate and Rhythm: Regular rhythm. Tachycardia present. Heart sounds: Normal heart sounds. No murmur heard. No friction rub. No gallop. Pulmonary:      Effort: Pulmonary effort is normal. Prolonged expiration present. No respiratory distress. Breath sounds: Normal breath sounds. No stridor. No wheezing or rales. Abdominal:      General: There is no distension. Palpations: Abdomen is soft. Tenderness: There is no abdominal tenderness.  There is no guarding or rebound. Musculoskeletal:         General: No tenderness. Cervical back: Normal range of motion. Skin:     General: Skin is warm and dry. Findings: No rash (On exposed body surfaces). Neurological:      Mental Status: He is alert and oriented to person, place, and time. Coordination: Coordination normal.   Psychiatric:         Behavior: Behavior normal.         Thought Content: Thought content normal.       MDM:     EKG visualized preliminarily interpreted by myself shows sinus tachycardia. The rate is 117. Axis is -40. There is tremor artifact. 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 Tremor artifact affects interpretation. However, there is no obvious evidence of acute ST elevation or acute ischemic looking process. XR CHEST PORTABLE    Result Date: 11/11/2022  EXAMINATION: ONE XRAY VIEW OF THE CHEST 11/11/2022 11:46 am COMPARISON: 10/23/2022 radiograph HISTORY: ORDERING SYSTEM PROVIDED HISTORY: cp/sob TECHNOLOGIST PROVIDED HISTORY: Reason for exam:->cp/sob Reason for Exam: cp/sob FINDINGS: The heart, mediastinum and pulmonary vascularity are normal.  Lungs are well-expanded and clear.  No skeletal abnormalities are present in the chest.     No significant findings in the chest.        Results for orders placed or performed during the hospital encounter of 11/11/22   COVID-19, Rapid    Specimen: Nasopharyngeal Swab   Result Value Ref Range    SARS-CoV-2, NAAT Not Detected Not Detected   Rapid influenza A/B antigens    Specimen: Nasopharyngeal   Result Value Ref Range    Rapid Influenza A Ag Negative Negative    Rapid Influenza B Ag Negative Negative   Lactate, Sepsis   Result Value Ref Range    Lactic Acid, Sepsis 1.4 0.4 - 1.9 mmol/L   CBC with Auto Differential   Result Value Ref Range    WBC 5.1 4.0 - 11.0 K/uL    RBC 5.98 (H) 4.20 - 5.90 M/uL    Hemoglobin 15.6 13.5 - 17.5 g/dL    Hematocrit 47.4 40.5 - 52.5 %    MCV 79.2 (L) 80.0 - 100.0 fL    MCH 26.1 26.0 - 34.0 pg    MCHC 33.0 31.0 - 36.0 g/dL    RDW 12.9 12.4 - 15.4 %    Platelets 908 150 - 821 K/uL    MPV 8.8 5.0 - 10.5 fL    Neutrophils % 54.9 %    Lymphocytes % 32.6 %    Monocytes % 8.4 %    Eosinophils % 3.0 %    Basophils % 1.1 %    Neutrophils Absolute 2.8 1.7 - 7.7 K/uL    Lymphocytes Absolute 1.7 1.0 - 5.1 K/uL    Monocytes Absolute 0.4 0.0 - 1.3 K/uL    Eosinophils Absolute 0.2 0.0 - 0.6 K/uL    Basophils Absolute 0.1 0.0 - 0.2 K/uL   Comprehensive Metabolic Panel w/ Reflex to MG   Result Value Ref Range    Sodium 137 136 - 145 mmol/L    Potassium reflex Magnesium 3.4 (L) 3.5 - 5.1 mmol/L    Chloride 98 (L) 99 - 110 mmol/L    CO2 26 21 - 32 mmol/L    Anion Gap 13 3 - 16    Glucose 204 (H) 70 - 99 mg/dL    BUN 21 (H) 7 - 20 mg/dL    Creatinine 1.0 0.9 - 1.3 mg/dL    Est, Glom Filt Rate >60 >60    Calcium 9.6 8.3 - 10.6 mg/dL    Total Protein 7.5 6.4 - 8.2 g/dL    Albumin 4.0 3.4 - 5.0 g/dL    Albumin/Globulin Ratio 1.1 1.1 - 2.2    Total Bilirubin 0.3 0.0 - 1.0 mg/dL    Alkaline Phosphatase 87 40 - 129 U/L    ALT 15 10 - 40 U/L    AST 15 15 - 37 U/L   Troponin   Result Value Ref Range    Troponin 0.03 (H) <0.01 ng/mL   Brain Natriuretic Peptide   Result Value Ref Range    Pro-BNP 23 0 - 124 pg/mL   Magnesium   Result Value Ref Range    Magnesium 2.20 1.80 - 2.40 mg/dL   D-Dimer, Quantitative   Result Value Ref Range    D-Dimer, Quant <0.27 0.00 - 0.60 ug/mL FEU   EKG 12 Lead   Result Value Ref Range    Ventricular Rate 117 BPM    Atrial Rate 117 BPM    P-R Interval 194 ms    QRS Duration 86 ms    Q-T Interval 334 ms    QTc Calculation (Bazett) 465 ms    P Axis 54 degrees    R Axis -40 degrees    T Axis 41 degrees    Diagnosis       Sinus tachycardiaLeft axis deviationSeptal infarct (cited on or before 11-NOV-2022)Abnormal ECGWhen compared with ECG of 01-NOV-2022 14:57,Previous ECG has undetermined rhythm, needs reviewQuestionable change in initial forces of Septal leads CRITICAL CARE  I personally saw the patient and independently provided 0 minutes of non-concurrent critical care out of the total shared critical care time provided. This excludes seperately billable procedures. Critical care time was provided for 0 that required close evaluation and/or intervention with concern for potential patient decompensation. 1. Chest pain, unspecified type    2. Shortness of breath    3. Tachycardia          DISPOSITION/PLAN  PATIENT REFERRED TO:  No follow-up provider specified. DISCHARGE MEDICATIONS:  New Prescriptions    No medications on file         Dodie Guerra MD        This report has been produced using speech recognition software and may contain errors related to that system including errors in grammar, punctuation, and spelling, as well as words and phrases that may be inappropriate. If there are any questions or concerns please feel free to contact the dictating provider for clarification.         Dodie Guerra MD  11/11/22 8234

## 2022-11-11 NOTE — ED PROVIDER NOTES
[address] [facility]        Pt Name: Nannette Rock  MRN: [id number]  [place] 1985  Date of evaluation: 11/11/2022  Provider: HANS Gallardo CNP  PCP: HANS Salinas CNP  Note Started: 4:00 PM EST 11/11/2022         I have seen and evaluated this patient with my supervising physician Sara Marti. CHIEF COMPLAINT       Chief Complaint   Patient presents with    Chest Pain    Illness     Pt states hot and cold chill and chest pain        HISTORY OF PRESENT ILLNESS   (Location, Timing/Onset, Context/Setting, Quality, Duration, Modifying Factors, Severity, Associated Signs and Symptoms)  Note limiting factors. Chief Complaint: Chest pain, shortness of breath, chills    Nannette Rock is a 29 y.o. male who presents to the emergency department today from home reporting chest pain, shortness of breath, and chills. Patient states that symptoms began yesterday. There has been no recent travel or known sick contacts. Patient with history of hypertension, hyperlipidemia, diabetes, neuropathy, and seizures. Patient denies known history of coronary artery disease. He is a non-smoker. Patient points to his sternum when describing his discomfort. The pain is nonmigratory. He reports having a constant, dull, and heavy sensation across to his chest.  He has not taken anything for his symptoms. Patient states that his shortness of breath is worse with exertion. He denies orthopnea. Patient denies nasal congestion. He does report an intermittent nonproductive cough. He denies having any lower extremity pain or swelling. There is no history of DVTs or PEs. He denies recent mobilization or surgeries. There has been no documented fever. Nursing Notes were all reviewed and agreed with or any disagreements were addressed in the HPI.     REVIEW OF SYSTEMS    (2-9 systems for level 4, 10 or more for level 5)     Review of Systems   Constitutional:  Positive for chills, fatigue and unexpected weight change. Negative for fever. Reports an approximate 100 unintentional pound weight loss over the past year   HENT:  Negative for congestion, sore throat and voice change. Eyes:  Negative for visual disturbance. Respiratory:  Positive for cough and shortness of breath. Negative for chest tightness and wheezing. Cardiovascular:  Positive for chest pain. Negative for palpitations and leg swelling. Gastrointestinal:  Negative for abdominal pain, blood in stool, nausea and vomiting. Endocrine: Negative for polydipsia and polyuria. Genitourinary:  Negative for difficulty urinating, dysuria and flank pain. Musculoskeletal:  Negative for back pain, neck pain and neck stiffness. Skin:  Negative for rash and wound. Allergic/Immunologic: Negative for immunocompromised state. Neurological:  Negative for dizziness, weakness and light-headedness. Hematological:  Does not bruise/bleed easily. Psychiatric/Behavioral:  Negative for sleep disturbance and suicidal ideas. Positives and Pertinent negatives as per HPI. Except as noted above in the ROS, all other systems were reviewed and negative.        PAST MEDICAL HISTORY     Past Medical History:   Diagnosis Date    COVID-19     History of blood transfusion     Hyperlipidemia     Neuropathy          SURGICAL HISTORY     Past Surgical History:   Procedure Laterality Date    BRONCHOSCOPY  06/09/2022    BRONCHOSCOPY BRUSHINGS performed by Layton John MD at [address]  06/09/2022    BRONCHOSCOPY DIAGNOSTIC OR CELL [address] ONLY performed by Layton John MD at [address]  06/09/2022    BRONCHOSCOPY BIOPSY BRONCHUS performed by Layton John MD at [address] N/A 06/14/2022    BRONCHOSCOPY DIAGNOSTIC OR CELL [address] ONLY performed by Dipesh Moreno DO at [facility] CURRENTMEDICATIONS       Previous Medications    ALBUTEROL SULFATE  (90 BASE) MCG/ACT INHALER    Inhale 2 puffs into the lungs every 6 hours as needed for Wheezing    ASPIRIN 81 MG CHEWABLE TABLET    Take 1 tablet by mouth in the morning. CARVEDILOL (COREG) 25 MG TABLET    Take 1 tablet by mouth in the morning and 1 tablet before bedtime. CHLORTHALIDONE (HYGROTON) 25 MG TABLET    Take 1 tablet by mouth in the morning. CONTINUOUS BLOOD GLUC SENSOR (FREESTYLE HOME 2 SENSOR) MISC    Use to check blood sugar 4 times per day (before each meal and at bedtime). DICYCLOMINE (BENTYL) 10 MG CAPSULE    Take 1 capsule by mouth 4 times daily (before meals and nightly)    DULAGLUTIDE (TRULICITY) 9.32 YS/9.4HB SOPN    Inject 0.75 mg into the skin once a week    DULOXETINE (CYMBALTA) 30 MG EXTENDED RELEASE CAPSULE    Take 1 capsule by mouth in the morning. EMPAGLIFLOZIN (JARDIANCE) 10 MG TABLET    Take 1 tablet by mouth in the morning. GABAPENTIN (NEURONTIN) 400 MG CAPSULE    Take 2 capsules by mouth in the morning and 2 capsules at noon and 2 capsules before bedtime. Do all this for 90 days. 2-3 QD. INSULIN DEGLUDEC (TRESIBA FLEXTOUCH) 200 UNIT/ML SOPN    Inject 50 Units into the skin daily    ITRACONAZOLE (SPORANOX) 100 MG CAPSULE    Take 200 mg by mouth 2 times daily    LISINOPRIL (PRINIVIL;ZESTRIL) 20 MG TABLET    Take 1 tablet by mouth daily    METFORMIN (GLUCOPHAGE) 1000 MG TABLET    Take 1 tablet by mouth in the morning and 1 tablet before bedtime.     METOCLOPRAMIDE (REGLAN) 10 MG TABLET    Take 1 tablet by mouth 3 times daily (with meals)    ROSUVASTATIN (CRESTOR) 40 MG TABLET    Take 1 tablet by mouth nightly         ALLERGIES     Penicillins    FAMILYHISTORY       Family History   Problem Relation Age of Onset    Diabetes Father           SOCIAL HISTORY       Social History     Tobacco Use    Smoking status: Never    Smokeless tobacco: Never   Vaping Use    Vaping Use: Never used Substance Use Topics    Alcohol use: Yes     Comment: rare    Drug use: Not Currently       SCREENINGS             PHYSICAL EXAM    (up to 7 for level 4, 8 or more for level 5)     ED Triage Vitals [11/11/22 1100]   BP Temp Temp Source Heart Rate Resp SpO2 Height Weight   (!) 135/98 99.2 °F (37.3 °C) Oral (!) 133 18 97 % -- 242 lb 15.2 oz (110.2 kg)       Physical Exam  Vitals and nursing note reviewed. Constitutional:       General: He is not in acute distress. Appearance: Normal appearance. He is ill-appearing. Comments: Appears older than stated age, appears chronically ill   HENT:      Head: Normocephalic and atraumatic. Right Ear: External ear normal.      Left Ear: External ear normal.      Nose: Nose normal.      Mouth/Throat:      Mouth: Mucous membranes are dry. Pharynx: Oropharynx is clear. Eyes:      General:         Right eye: No discharge. Left eye: No discharge. Extraocular Movements: Extraocular movements intact. Conjunctiva/sclera: Conjunctivae normal.      Pupils: Pupils are equal, round, and reactive to light. Cardiovascular:      Rate and Rhythm: Normal rate and regular rhythm. Pulses: Normal pulses. Heart sounds: Normal heart sounds. Pulmonary:      Effort: Pulmonary effort is normal. No respiratory distress. Breath sounds: Normal breath sounds. Abdominal:      General: Bowel sounds are normal.      Palpations: Abdomen is soft. Tenderness: There is no abdominal tenderness. There is no right CVA tenderness or left CVA tenderness. Musculoskeletal:         General: No tenderness. Normal range of motion. Cervical back: Normal range of motion and neck supple. No rigidity or tenderness. Right lower leg: No edema. Left lower leg: No edema. Skin:     General: Skin is warm and dry. Capillary Refill: Capillary refill takes less than 2 seconds. Neurological:      General: No focal deficit present.       Mental Status: He is alert and oriented to person, place, and time. Psychiatric:         Mood and Affect: Mood normal.         Behavior: Behavior normal.         Thought Content: Thought content normal.         Judgment: Judgment normal.       DIAGNOSTIC RESULTS   LABS:    Labs Reviewed   CBC WITH AUTO DIFFERENTIAL - Abnormal; Notable for the following components:       Result Value    RBC 5.98 (*)     MCV 79.2 (*)     All other components within normal limits   COMPREHENSIVE METABOLIC PANEL W/ REFLEX TO MG FOR LOW K - Abnormal; Notable for the following components:    Potassium reflex Magnesium 3.4 (*)     Chloride 98 (*)     Glucose 204 (*)     BUN 21 (*)     All other components within normal limits   TROPONIN - Abnormal; Notable for the following components:    Troponin 0.03 (*)     All other components within normal limits   COVID-19, RAPID   RAPID INFLUENZA A/B ANTIGENS   CULTURE, BLOOD 1   CULTURE, BLOOD 2   LACTATE, SEPSIS   BRAIN NATRIURETIC PEPTIDE   MAGNESIUM   D-DIMER, QUANTITATIVE   LACTATE, SEPSIS   TROPONIN   POCT GLUCOSE       When ordered only abnormal lab results are displayed. All other labs were within normal range or not returned as of this dictation. EKG: When ordered, EKG's are interpreted by the Emergency Department Physician in the absence of a cardiologist.  Please see their note for interpretation of EKG.     RADIOLOGY:   Non-plain film images such as CT, Ultrasound and MRI are read by the radiologist. Plain radiographic images are visualized and preliminarily interpreted by the ED Provider with the below findings:        Interpretation per the Radiologist below, if available at the time of this note:    XR CHEST PORTABLE   Final Result   No significant findings in the chest.           XR CHEST PORTABLE    Result Date: 11/11/2022  EXAMINATION: ONE XRAY VIEW OF THE CHEST 11/11/2022 11:46 am COMPARISON: 10/23/2022 radiograph HISTORY: ORDERING SYSTEM PROVIDED HISTORY: cp/sob TECHNOLOGIST PROVIDED HISTORY: Reason for exam:->cp/sob Reason for Exam: cp/sob FINDINGS: The heart, mediastinum and pulmonary vascularity are normal.  Lungs are well-expanded and clear. No skeletal abnormalities are present in the chest.     No significant findings in the chest.           PROCEDURES   Unless otherwise noted below, none     Procedures    CRITICAL CARE TIME   I personally saw the patient and independently provided 21 minutes of non-concurrent critical care time out of the total critical care time provided. This excludes time spent doing separately billable procedures. This includes time at the bedside, data interpretation, medication management, obtaining critical history from collateral sources if the patient is unable to provide it directly, and physician consultation. Specifics of interventions taken and potentially life-threatening diagnostic considerations are listed above in the medical decision making. CONSULTS:  IP CONSULT TO HOSPITALIST  IP CONSULT TO CARDIOLOGY      EMERGENCY DEPARTMENT COURSE and DIFFERENTIAL DIAGNOSIS/MDM:   Vitals:    Vitals:    11/11/22 1100 11/11/22 1330   BP: (!) 135/98    Pulse: (!) 133 (!) 117   Resp: 18 10   Temp: 99.2 °F (37.3 °C)    TempSrc: Oral    SpO2: 97% 93%   Weight: 242 lb 15.2 oz (110.2 kg)        Patient was given the following medications:  Medications   acetaminophen (TYLENOL) tablet 1,000 mg (1,000 mg Oral Given 11/11/22 1218)   ondansetron (ZOFRAN) injection 4 mg (4 mg IntraVENous Given 11/11/22 1217)   0.9 % sodium chloride bolus (0 mLs IntraVENous Stopped 11/11/22 1455)   0.9 % sodium chloride bolus (1,000 mLs IntraVENous New Bag 11/11/22 1459)   ipratropium-albuterol (DUONEB) nebulizer solution 1 ampule (1 ampule Inhalation Given 11/11/22 1519)   methylPREDNISolone sodium (SOLU-MEDROL) injection 125 mg (125 mg IntraVENous Given 11/11/22 1500)         Is this patient to be included in the SEP-1 Core Measure due to severe sepsis or septic shock?    No Exclusion criteria - the patient is NOT to be included for SEP-1 Core Measure due to:  2+ SIRS criteria are not met    Previous records reviewed in order to gain further information regarding patient's PMH as well as his HPI. Nursing notes reviewed. This is a 28-year-old -American male with history of diabetes, seizure disorder, hypertension, and hyperlipidemia who presents to the emergency department today reporting 2-day history of chest pain and shortness of breath. Patient states that he is also been having chills and increased bilateral lower extremity neuropathy. Physical exam complete. Patient arrives nontoxic, afebrile, mildly hypertensive. He is tachycardic upon arrival with a heart rate in the 130s. EKG is interpreted by ED physician reviewed by myself is negative for acute ischemic changes. Chest x-ray interpreted by radiologist and reviewed by myself is negative for acute findings. Patient given IV normal saline 1 L bolus, IV Zofran, and Tylenol 1 g. Patient was given DuoNeb breathing treatment in addition to IV Solu-Medrol. He is negative for COVID and influenza. Remaining laboratory studies notable for a troponin of 0.03, repeat pending. Glucose is 204. BUN is 21. D-dimer is negative. At this time there is no evidence of any life-threatening or emergent conditions requiring immediate intervention however given patient's history and ED work-up I do feel he will benefit from admission for further evaluation and treatment. Patient does report improvement of chest pain but has remained tachycardic. Patient admitted under hospitalist service. Patient is a full code at the time of this admission. FINAL IMPRESSION      1. Chest pain, unspecified type    2. Shortness of breath    3. Tachycardia          DISPOSITION/PLAN   DISPOSITION Admitted 11/11/2022 03:44:42 PM      PATIENT REFERRED TO:  No follow-up provider specified.     DISCHARGE MEDICATIONS:  New Prescriptions    No medications on file       DISCONTINUED MEDICATIONS:  Discontinued Medications    No medications on file              (Please note that portions of this note were completed with a voice recognition program.  Efforts were made to edit the dictations but occasionally words are mis-transcribed.)    HANS Vincent CNP (electronically signed)           HANS Vincent CNP  11/11/22 6768

## 2022-11-11 NOTE — PROGRESS NOTES
4 Eyes Skin Assessment     NAME:  Narayan Troy  YOB: 1985  MEDICAL RECORD NUMBER:  [id number]    The patient is being assess for  Admission    I agree that 2 RN's have performed a thorough Head to Toe Skin Assessment on the patient. ALL assessment sites listed below have been assessed. Areas assessed by both nurses:    Head, Face, Ears, Shoulders, Back, Chest, Arms, Elbows, Hands, Sacrum. Buttock, Coccyx, Ischium, and Legs. Feet and Heels        Does the Patient have a Wound?  No noted wound(s)       Jeff Prevention initiated:  NA   Wound Care Orders initiated:  NA    Pressure Injury (Stage 3,4, Unstageable, DTI, NWPT, and Complex wounds) if present place referral/consult order under [de-identified] NA    New and Established Ostomies if present place consult order under : NA      Nurse 1 eSignature: Electronically signed by Lila Salgado RN on 11/11/22 at 5:49 PM EST    **SHARE this note so that the co-signing nurse is able to place an eSignature**    Nurse 2 eSignature: Electronically signed by Elsi Peña RN on 11/11/22 at 6:29 PM EST

## 2022-11-11 NOTE — PROGRESS NOTES
Medication Reconciliation    List of medications patient is currently taking is complete. Source of information: 1. Conversation with patient at bedside                                      2. EPIC records           Notes regarding home medications:   1. Patient did not receive any of his home medications prior to arrival to the emergency department.       Mirian Gonzalez [facility], PharmD, 11/11/2022 4:15 PM

## 2022-11-11 NOTE — PROGRESS NOTES
Patient received from ED in room 4267. Patient alert and oriented. Vitals checked and documented. Patient asked about dietary orders. This nurse explained that they have a clear liquid diet in the chart. Patient repeatedly asked about ''something to eat'' This nurse educated the patient regarding the diet. Patient understood but still adamant on eating.

## 2022-11-11 NOTE — PROGRESS NOTES
Pharmacist Review and Automatic Dose Adjustment of Prophylactic Enoxaparin    Reviewed reason(s) for admission/hospital problem list    The reviewing pharmacist has made an adjustment to the ordered enoxaparin dose or converted to UFH per the approved [facility] protocol and table as identified below. Tata Nelson is a 29 y.o. male. Recent Labs     11/11/22  1156   CREATININE 1.0       Estimated Creatinine Clearance: 134 mL/min (based on SCr of 1 mg/dL). Recent Labs     11/11/22  1202   HGB 15.6   HCT 47.4        No results for input(s): INR in the last 72 hours.     Height:   Ht Readings from Last 1 Encounters:   11/01/22 6' 1\" (1.854 m)     Weight:  Wt Readings from Last 1 Encounters:   11/11/22 242 lb 15.2 oz (110.2 kg)               Plan: Based upon the patient's weight and renal function    Ordered: Enoxaparin 40mg Daily    Changed/converted to    New Order: Enoxaparin 30mg SUBQ BID      Thank you,  Edward Wadsworth, [facility]  11/11/2022, 5:18 PM

## 2022-11-12 LAB
APTT: 38.3 SEC (ref 23–34.3)
APTT: 58 SEC (ref 23–34.3)
EKG ATRIAL RATE: 107 BPM
EKG ATRIAL RATE: 117 BPM
EKG DIAGNOSIS: NORMAL
EKG DIAGNOSIS: NORMAL
EKG P AXIS: 54 DEGREES
EKG P AXIS: 58 DEGREES
EKG P-R INTERVAL: 192 MS
EKG P-R INTERVAL: 194 MS
EKG Q-T INTERVAL: 334 MS
EKG Q-T INTERVAL: 350 MS
EKG QRS DURATION: 86 MS
EKG QRS DURATION: 92 MS
EKG QTC CALCULATION (BAZETT): 465 MS
EKG QTC CALCULATION (BAZETT): 467 MS
EKG R AXIS: -37 DEGREES
EKG R AXIS: -40 DEGREES
EKG T AXIS: 41 DEGREES
EKG T AXIS: 56 DEGREES
EKG VENTRICULAR RATE: 107 BPM
EKG VENTRICULAR RATE: 117 BPM
ESTIMATED AVERAGE GLUCOSE: 289.1 MG/DL
GLUCOSE BLD-MCNC: 133 MG/DL (ref 70–99)
GLUCOSE BLD-MCNC: 183 MG/DL (ref 70–99)
GLUCOSE BLD-MCNC: 200 MG/DL (ref 70–99)
GLUCOSE BLD-MCNC: 397 MG/DL (ref 70–99)
HBA1C MFR BLD: 11.7 %
HCT VFR BLD CALC: 40.9 % (ref 40.5–52.5)
HEMOGLOBIN: 13.6 G/DL (ref 13.5–17.5)
MCH RBC QN AUTO: 25.9 PG (ref 26–34)
MCHC RBC AUTO-ENTMCNC: 33.1 G/DL (ref 31–36)
MCV RBC AUTO: 78 FL (ref 80–100)
PDW BLD-RTO: 13.1 % (ref 12.4–15.4)
PERFORMED ON: ABNORMAL
PLATELET # BLD: 278 K/UL (ref 135–450)
PMV BLD AUTO: 8.7 FL (ref 5–10.5)
RBC # BLD: 5.25 M/UL (ref 4.2–5.9)
TROPONIN: 0.02 NG/ML
WBC # BLD: 5 K/UL (ref 4–11)

## 2022-11-12 PROCEDURE — 99254 IP/OBS CNSLTJ NEW/EST MOD 60: CPT | Performed by: STUDENT IN AN ORGANIZED HEALTH CARE EDUCATION/TRAINING PROGRAM

## 2022-11-12 PROCEDURE — 93010 ELECTROCARDIOGRAM REPORT: CPT | Performed by: INTERNAL MEDICINE

## 2022-11-12 PROCEDURE — 2580000003 HC RX 258: Performed by: INTERNAL MEDICINE

## 2022-11-12 PROCEDURE — 94760 N-INVAS EAR/PLS OXIMETRY 1: CPT

## 2022-11-12 PROCEDURE — 36415 COLL VENOUS BLD VENIPUNCTURE: CPT

## 2022-11-12 PROCEDURE — 6370000000 HC RX 637 (ALT 250 FOR IP): Performed by: INTERNAL MEDICINE

## 2022-11-12 PROCEDURE — 1200000000 HC SEMI PRIVATE

## 2022-11-12 PROCEDURE — 84484 ASSAY OF TROPONIN QUANT: CPT

## 2022-11-12 PROCEDURE — 6360000002 HC RX W HCPCS: Performed by: INTERNAL MEDICINE

## 2022-11-12 PROCEDURE — 85027 COMPLETE CBC AUTOMATED: CPT

## 2022-11-12 PROCEDURE — 85730 THROMBOPLASTIN TIME PARTIAL: CPT

## 2022-11-12 PROCEDURE — 93005 ELECTROCARDIOGRAM TRACING: CPT | Performed by: INTERNAL MEDICINE

## 2022-11-12 RX ORDER — INSULIN GLARGINE 100 [IU]/ML
10 INJECTION, SOLUTION SUBCUTANEOUS NIGHTLY
Status: DISCONTINUED | OUTPATIENT
Start: 2022-11-12 | End: 2022-11-16 | Stop reason: HOSPADM

## 2022-11-12 RX ORDER — INSULIN LISPRO 100 [IU]/ML
4 INJECTION, SOLUTION INTRAVENOUS; SUBCUTANEOUS ONCE
Status: DISCONTINUED | OUTPATIENT
Start: 2022-11-12 | End: 2022-11-16 | Stop reason: HOSPADM

## 2022-11-12 RX ORDER — 0.9 % SODIUM CHLORIDE 0.9 %
500 INTRAVENOUS SOLUTION INTRAVENOUS ONCE
Status: COMPLETED | OUTPATIENT
Start: 2022-11-12 | End: 2022-11-12

## 2022-11-12 RX ORDER — HEPARIN SODIUM 1000 [USP'U]/ML
4000 INJECTION, SOLUTION INTRAVENOUS; SUBCUTANEOUS ONCE
Status: COMPLETED | OUTPATIENT
Start: 2022-11-12 | End: 2022-11-12

## 2022-11-12 RX ORDER — HEPARIN SODIUM 1000 [USP'U]/ML
2000 INJECTION, SOLUTION INTRAVENOUS; SUBCUTANEOUS ONCE
Status: DISCONTINUED | OUTPATIENT
Start: 2022-11-12 | End: 2022-11-12

## 2022-11-12 RX ADMIN — ONDANSETRON 4 MG: 4 TABLET, ORALLY DISINTEGRATING ORAL at 13:56

## 2022-11-12 RX ADMIN — ASPIRIN 81 MG 81 MG: 81 TABLET ORAL at 08:37

## 2022-11-12 RX ADMIN — INSULIN LISPRO 8 UNITS: 100 INJECTION, SOLUTION INTRAVENOUS; SUBCUTANEOUS at 17:40

## 2022-11-12 RX ADMIN — CARVEDILOL 25 MG: 25 TABLET, FILM COATED ORAL at 08:37

## 2022-11-12 RX ADMIN — ATORVASTATIN CALCIUM 80 MG: 80 TABLET, FILM COATED ORAL at 21:45

## 2022-11-12 RX ADMIN — SODIUM CHLORIDE, PRESERVATIVE FREE 10 ML: 5 INJECTION INTRAVENOUS at 21:48

## 2022-11-12 RX ADMIN — CHLORTHALIDONE 25 MG: 25 TABLET ORAL at 08:37

## 2022-11-12 RX ADMIN — ITRACONAZOLE 200 MG: 100 CAPSULE, COATED PELLETS ORAL at 08:41

## 2022-11-12 RX ADMIN — SODIUM CHLORIDE 500 ML: 9 INJECTION, SOLUTION INTRAVENOUS at 17:00

## 2022-11-12 RX ADMIN — INSULIN LISPRO 2 UNITS: 100 INJECTION, SOLUTION INTRAVENOUS; SUBCUTANEOUS at 08:16

## 2022-11-12 RX ADMIN — GABAPENTIN 800 MG: 400 CAPSULE ORAL at 08:37

## 2022-11-12 RX ADMIN — INSULIN GLARGINE 40 UNITS: 100 INJECTION, SOLUTION SUBCUTANEOUS at 21:49

## 2022-11-12 RX ADMIN — INSULIN GLARGINE 40 UNITS: 100 INJECTION, SOLUTION SUBCUTANEOUS at 00:13

## 2022-11-12 RX ADMIN — GABAPENTIN 800 MG: 400 CAPSULE ORAL at 21:45

## 2022-11-12 RX ADMIN — LISINOPRIL 20 MG: 20 TABLET ORAL at 08:37

## 2022-11-12 RX ADMIN — ACETAMINOPHEN 650 MG: 325 TABLET ORAL at 21:47

## 2022-11-12 RX ADMIN — GABAPENTIN 800 MG: 400 CAPSULE ORAL at 13:56

## 2022-11-12 RX ADMIN — DULOXETINE HYDROCHLORIDE 30 MG: 30 CAPSULE, DELAYED RELEASE ORAL at 08:37

## 2022-11-12 RX ADMIN — INSULIN LISPRO 4 UNITS: 100 INJECTION, SOLUTION INTRAVENOUS; SUBCUTANEOUS at 00:13

## 2022-11-12 RX ADMIN — HEPARIN SODIUM 4000 UNITS: 1000 INJECTION INTRAVENOUS; SUBCUTANEOUS at 06:04

## 2022-11-12 RX ADMIN — ITRACONAZOLE 200 MG: 100 CAPSULE, COATED PELLETS ORAL at 21:47

## 2022-11-12 ASSESSMENT — PAIN SCALES - GENERAL: PAINLEVEL_OUTOF10: 7

## 2022-11-12 ASSESSMENT — PAIN DESCRIPTION - LOCATION: LOCATION: CHEST;HEAD

## 2022-11-12 ASSESSMENT — PAIN DESCRIPTION - DESCRIPTORS: DESCRIPTORS: ACHING;DISCOMFORT

## 2022-11-12 NOTE — PROGRESS NOTES
Clinical Pharmacy Note  Heparin Dosing Consult    Shannan Wood is a 29 y.o. male ordered heparin per low dose nomogram by Dr. Tony Dozier. Lab Results   Component Value Date/Time    APTT 32.9 06/14/2022 07:53 AM     Lab Results   Component Value Date/Time    HGB 15.4 11/11/2022 08:12 PM    HCT 47.5 11/11/2022 08:12 PM     11/11/2022 08:12 PM    INR 1.01 06/14/2022 07:53 AM       Ht Readings from Last 1 Encounters:   11/01/22 6' 1\" (1.854 m)        Wt Readings from Last 1 Encounters:   11/11/22 242 lb 15.2 oz (110.2 kg)        Assessment/Plan:  Initial bolus: 4000 units  Initial infusion rate: 1000 units/hr  Next aPTT: 0300    Pharmacy will continue to monitor adjust heparin based on aPTT results using nomogram below:     CAD/STEMI/NSTEMI/UA/AFIB Heparin Nomogram     Initial Bolus: 60 units/kg Max Bolus: 4,000 units       Initial Rate: 12 units/kg/hr Max Initial Rate: 1,000 units/hr     aPTT < 59    Heparin 60 units/kg bolus Increase infusion by 4 units/kg/hr        (maximum 4,000 units)   aPTT 59.1-72.9 Heparin 30 units/kg bolus Increase infusion by 2 units/kg/hr        (maximum 2,000 units)   aPTT     No bolus   No change   aPTT 102.1-109 No bolus   Decrease infusion by 1 units/kg/hr   aPTT 109.1-122.9 No bolus     Decrease infusion by 2 units/kg/hr   aPTT > 123    Hold heparin for 1 hour Decrease infusion by 3 units/kg/hr     Obtain aPTT 6 hours after initial bolus and 6 hours after any dose change until two consecutive therapeutic aPTTs are achieved - then daily.    Jean Saleh, [facility], 11/11/2022 8:27 PM 4 = No assist / stand by assistance

## 2022-11-12 NOTE — PROGRESS NOTES
This nurse went to patient's room along with ALYX/VICTORINO to discuss the discharge plan. Patient had their mother on call. This nurse answered the questions. Patient's mother was adamant on talking to a doctor. This nurse sent a [employer] to attending MD and updated regarding the patient and family concern. Mother's contact details were sent to the MD.    MD talked to the mother. MD sent this nurse a message on [employer] to contact the cardiologist.     This nurse contacted the on-call cardiologist and explained the situation. On-call cardiologist told this nurse the attending cardiologist will see the patient in the morning and will speak to the mother.

## 2022-11-12 NOTE — PROGRESS NOTES
Clinical Pharmacy Note  Heparin Dosing       Lab Results   Component Value Date/Time    APTT 58.0 11/12/2022 12:56 PM     Lab Results   Component Value Date/Time    HGB 13.6 11/12/2022 05:06 AM    HCT 40.9 11/12/2022 05:06 AM     11/12/2022 05:06 AM    INR 1.01 06/14/2022 07:53 AM       Current Infusion Rate: 1440  units/hr    Plan:  Bolus: 2000 units  Rate: 1660 units/hr  Next aPTT: 2100    Pharmacy will continue to monitor and adjust based on aPTT results.

## 2022-11-12 NOTE — DISCHARGE INSTR - COC
Continuity of Care Form    Patient Name: Sheri Newman   :  1985  MRN:  [id number]    Admit date:  2022  Discharge date:  ***    Code Status Order: Full Code   Advance Directives:     Admitting Physician:  Codie Irizarry MD  PCP: HANS Pierson - CNP    Discharging Nurse: [facility] Unit/Room#: [id number]/6315-08  Discharging Unit Phone Number: ***    Emergency Contact:   Extended Emergency Contact Information  Primary Emergency Contact: [address] Phone: [phone]  Mobile Phone: [phone]  Relation: Parent  Preferred language: English   needed? No    Past Surgical History:  Past Surgical History:   Procedure Laterality Date    BRONCHOSCOPY  2022    BRONCHOSCOPY BRUSHINGS performed by Susan Upton MD at [address]  2022    BRONCHOSCOPY DIAGNOSTIC OR CELL [address] performed by Susan Upton MD at [address]  2022    BRONCHOSCOPY BIOPSY BRONCHUS performed by Susan Upton MD at [address] N/A 2022    BRONCHOSCOPY DIAGNOSTIC OR CELL [address] ONLY performed by Marilyn Fitch DO at [facility] ENDOSCOPY       Immunization History:   Immunization History   Administered Date(s) Administered    Influenza Virus Vaccine 2012, 10/28/2013, 10/17/2017    Influenza, Triv, 3 Years and older, IM (Payne Cipro (5 yrs and older) 10/02/2018, 10/21/2019, 10/29/2020    Pneumococcal Polysaccharide (Exiqozcgp67) 2012, 2014    Tdap (Boostrix, Adacel) 2020       Active Problems:  Patient Active Problem List   Diagnosis Code    Poorly controlled type 2 diabetes mellitus ([facility] [address] E11.65    Essential hypertension I10    Seizure disorder ([facility] [address] G40.909    Displaced fracture of navicular bone of left foot with routine healing S92.252D    Histoplasmosis pneumonia ([facility] [address] B39.2    Obesity (BMI 30-39. 9) E66.9    Neuropathy G62.9    Chest pain R07.9 Isolation/Infection:   Isolation            No Isolation          Patient Infection Status       Infection Onset Added Last Indicated Last Indicated By Review Planned Expiration Resolved Resolved By    None active    Resolved    COVID-19 (Rule Out) 22 COVID-19, Rapid (Ordered)   22 Rule-Out Test Resulted    Pulmonary Tuberculosis (Rule Out) 07/10/22 07/10/22 07/10/22 Quantiferon, Incubated (Ordered)   22 Rule-Out Test Resulted    COVID-19 (Rule Out) 22 COVID-19 (Ordered)   22 Rule-Out Test Resulted    COVID-19 (Rule Out) 22 COVID-19 (Ordered)   22 Rule-Out Test Resulted    Pulmonary Tuberculosis (Rule Out) 22 Culture with Smear, Acid Fast Bacillius (Ordered)   22 Kristin Jackson RN    COVID-19 (Rule Out) 22 COVID-19, Rapid (Ordered)   22 Rule-Out Test Resulted    COVID-19 (Rule Out) 22 COVID-19, Rapid (Ordered)   22 Rule-Out Test Resulted    COVID-19 (Rule Out) 20 COVID-19, PCR (Ordered)   20             Nurse Assessment:  Last Vital Signs: BP 90/60   Pulse 99   Temp 97.6 °F (36.4 °C) (Oral)   Resp 18   Wt 241 lb 13.5 oz (109.7 kg)   SpO2 98%   BMI 31.91 kg/m²     Last documented pain score (0-10 scale): Pain Level: 8  Last Weight:   Wt Readings from Last 1 Encounters:   22 241 lb 13.5 oz (109.7 kg)     Mental Status:  {IP PT MENTAL STATUS:91113}    IV Access:  {[facility] [place]:739502794}    Nursing Mobility/ADLs:  Walking   {P DME OAUS:217518679}  Transfer  {P DME QWAX:882892039}  Bathing  {CHP DME XOTD:652385472}  Dressing  {CHP DME EGNY:073874938}  Toileting  {CHP DME HKCS:889901615}  Feeding  {CHP DME RLOP:748560246}  Med Admin  {CHP DME WEGT:028178407}  Med Delivery   { MINOO MED Delivery:629328483}    Wound Care Documentation and Therapy: Elimination:  Continence: Bowel: {YES / RD:48508}  Bladder: {YES / KF:75168}  Urinary Catheter: {Urinary Catheter:048276361}   Colostomy/Ileostomy/Ileal Conduit: {YES / AO:81061}       Date of Last BM: ***  No intake or output data in the 24 hours ending 22 1242  No intake/output data recorded.     Safety Concerns:     [address] Safety Concerns:395435671}    Impairments/Disabilities:      [address] Impairments/Disabilities:803185235}    Nutrition Therapy:  Current Nutrition Therapy:   [address] MINOO Diet List:079992505}    Routes of Feeding: {CHP DME Other Feedings:998779429}  Liquids: {Slp liquid thickness:89816}  Daily Fluid Restriction: {CHP DME Yes amt example:645063724}  Last Modified Barium Swallow with Video (Video Swallowing Test): {Done Not Done YFCS:476627482}    Treatments at the Time of Hospital Discharge:   Respiratory Treatments: ***  Oxygen Therapy:  {Therapy; copd oxygen:33189}  Ventilator:    { CC Vent HRKN:789402828}    Rehab Therapies: {THERAPEUTIC INTERVENTION:6196985500}  Weight Bearing Status/Restrictions: [address] [place] [address] Weight Bearin}  Other Medical Equipment (for information only, NOT a DME order):  {EQUIPMENT:469065365}  Other Treatments: ***    Patient's personal belongings (please select all that are sent with patient):  {CHP DME Belongings:619742329}    RN SIGNATURE:  {Esignature:641709580}    CASE MANAGEMENT/SOCIAL WORK SECTION    Inpatient Status Date: ***    Readmission Risk Assessment Score:  Readmission Risk              Risk of Unplanned Readmission:  36           Discharging to Facility/ Agency   Name:   Address:  Phone:  Fax:    Dialysis Facility (if applicable)   Name:  Address:  Dialysis Schedule:  Phone:  Fax:    / signature: {Esignature:243326845}    PHYSICIAN SECTION    Prognosis: Fair    Condition at Discharge: Stable    Rehab Potential (if transferring to Rehab): Good    Recommended Labs or Other Treatments After Discharge: follow up with  PT/OT come    Physician Certification: I certify the above information and transfer of Elisa Galan  is necessary for the continuing treatment of the diagnosis listed and that he requires [facility] for greater 30 days.      Update Admission H&P: No change in H&P    PHYSICIAN SIGNATURE:  Electronically signed by Sarah Kwok MD on 11/14/22 at 12:11 PM EST

## 2022-11-12 NOTE — CARE COORDINATION
Met with patient in room to introduce  role, intiate discussion regarding DC for today. He put his mother on his cell phone who expressed much concern for her son to be discharged when he is in for chest pain and he continues with chest pains. Asked bedside RN to come into the room to evaluate. She spoke with mother and patient and perfect served MD to have him speak with his mother. INITIAL CASE MANAGEMENT ASSESSMENT    Reviewed chart, met with patient to assess possible discharge needs. Explained Case Management role/services. Living Situation: pt lives with sign other and their two children aged 15and 5years old in a house with one step entry and first floor set up. He has not been able to work in about two years. He is being evicted from their house due to non payment of rent. He reports he can move in with his mother for some time. He feels a lot of stress not being able to work and would like mental health resources. He has applied for Disability two years ago when he became ill but one illness after another keeps coming. Provided him with Mental health resources. He asked about housing; provided him with resource for [employer]. ADLs: He reports to being very weak ever since he contracted covid and other illnesses. He does go to [facility] for Out Patient Physical Therapy to gain some strength back. DME: none    PT/OT Recs: TBD     Active Services: none     Transportation: family     Medications:   [employer]    PCP: Angie Del Rio NP      HD/PD: none    PLAN/COMMENTS: Goal is home.       [place], [place]     Case Management   [phone]    11/12/2022  5:13 PM

## 2022-11-12 NOTE — PLAN OF CARE
Problem: Discharge Planning  Goal: Discharge to home or other facility with appropriate resources  11/12/2022 0959 by Shelly Armstrong RN  Outcome: Adequate for Discharge  Flowsheets (Taken 11/12/2022 0718)  Discharge to home or other facility with appropriate resources:   Identify barriers to discharge with patient and caregiver   Arrange for needed discharge resources and transportation as appropriate   Arrange for interpreters to assist at discharge as needed   Identify discharge learning needs (meds, wound care, etc)  11/12/2022 0035 by Demarcus Rudd RN  Outcome: Progressing  [address] (Taken 11/11/2022 1723 by Shelly Armstrong, RN)  Discharge to home or other facility with appropriate resources:   Identify barriers to discharge with patient and caregiver   Arrange for needed discharge resources and transportation as appropriate   Identify discharge learning needs (meds, wound care, etc)   Arrange for interpreters to assist at discharge as needed   Refer to discharge planning if patient needs post-hospital services based on physician order or complex needs related to functional status, cognitive ability or social support system     Problem: Pain  Goal: Verbalizes/displays adequate comfort level or baseline comfort level  11/12/2022 0959 by Shelly Armstrong RN  Outcome: Adequate for Discharge  11/12/2022 0035 by Demarcus Rudd RN  Outcome: Progressing     Problem: Safety - Adult  Goal: Free from fall injury  11/12/2022 0959 by Shelly Armstrong RN  Outcome: Adequate for Discharge  11/12/2022 0035 by Demarcus Rudd RN  Outcome: Progressing

## 2022-11-12 NOTE — DISCHARGE INSTR - DIET

## 2022-11-12 NOTE — PROGRESS NOTES
Clinical Pharmacy Note  Heparin Dosing       Lab Results   Component Value Date/Time    APTT 38.3 11/12/2022 05:06 AM     Lab Results   Component Value Date/Time    HGB 13.6 11/12/2022 05:06 AM    HCT 40.9 11/12/2022 05:06 AM     11/12/2022 05:06 AM    INR 1.01 06/14/2022 07:53 AM       Current Infusion Rate: 1000 units/hr    Plan:  Bolus: 4000 units  Rate: increase to 1440 units/hr  Next aPTT: 1200 on 11/12    Pharmacy will continue to monitor and adjust based on aPTT results.     Juan Pool, PharmD  11/12/2022 6:00 AM

## 2022-11-12 NOTE — CONSULTS
[facility].        Chief Complaint   Patient presents with    Chest Pain    Illness     Pt states hot and cold chill and chest pain         History of Present Illness:  Mayur Neff is a 29 y.o. patient who presented to the hospital with complaints of Chest pain. I have been asked to provide consultation regarding further management and testing. Is a 78-year-old very pleasant gentleman had a history of ongoing atypical chest pain for few months now at least.  He presented in May 2022 with similar symptoms and ended up going for left heart catheterization and coronary angiogram similarly at that time he had mild biomarker elevation. Left heart cath was performed which did not show any significant coronary disease, however an echo performed at that same time showed a mildly reduced global dysfunction of his heart LVEF 40 to 45%. Similar presentation this time with a mild biomarker elevation 0.03 peak, EKG without acute ST-T changes however he does have a first-degree AV block. I reviewed her recent CTPA performed which did not show any evidence of pulmonary emboli, but it did however show residual increase of lymphoid tissue right hilum. Past Medical History:   has a past medical history of COVID-19, History of blood transfusion, Hyperlipidemia, and Neuropathy. Surgical History:   has a past surgical history that includes bronchoscopy (06/09/2022); bronchoscopy (06/09/2022); bronchoscopy (06/09/2022); and bronchoscopy (N/A, 06/14/2022). Social History:   reports that he has never smoked. He has never used smokeless tobacco. He reports current alcohol use. He reports that he does not currently use drugs. Family History:  No family history of premature coronary artery disease, aortic disease, or valve disease. Home Medications:  Were reviewed and are listed in nursing record.  and/or listed below  Prior to Admission medications    Medication Sig Start Date End Date Taking? Authorizing Provider   Dulaglutide (TRULICITY) 6.40 VM/7.6UO SOPN Inject 0.75 mg into the skin once a week  Patient taking differently: Inject 0.75 mg into the skin once a week On Mondays 10/24/22   HANS Viveros CNP   Insulin Degludec (TRESIBA FLEXTOUCH) 200 UNIT/ML SOPN Inject 50 Units into the skin daily  Patient taking differently: Inject 60 Units into the skin daily 10/24/22   HANS Viveros CNP   metoclopramide (REGLAN) 10 MG tablet Take 1 tablet by mouth 3 times daily (with meals)  Patient taking differently: Take 10 mg by mouth 3 times daily as needed 10/11/22   HANS Viveros CNP   lisinopril (PRINIVIL;ZESTRIL) 20 MG tablet Take 1 tablet by mouth daily 9/14/22   Amber Beck, DO   Continuous Blood Gluc Sensor (FREESTYLE HOME 2 SENSOR) MISC Use to check blood sugar 4 times per day (before each meal and at bedtime). 9/13/22   HANS Viveros CNP   itraconazole (SPORANOX) 100 MG capsule Take 200 mg by mouth 2 times daily    Historical Provider, MD   gabapentin (NEURONTIN) 400 MG capsule Take 2 capsules by mouth in the morning and 2 capsules at noon and 2 capsules before bedtime. Do all this for 90 days. 2-3 QD. 8/16/22 11/14/22  HANS Viveros CNP   metFORMIN (GLUCOPHAGE) 1000 MG tablet Take 1 tablet by mouth in the morning and 1 tablet before bedtime. 8/16/22   HANS Viveros CNP   empagliflozin (JARDIANCE) 10 MG tablet Take 1 tablet by mouth in the morning. 7/19/22   HANS Viveros CNP   DULoxetine (CYMBALTA) 30 MG extended release capsule Take 1 capsule by mouth in the morning. 7/19/22   HANS Viveros CNP   dicyclomine (BENTYL) 10 MG capsule Take 1 capsule by mouth 4 times daily (before meals and nightly) 7/19/22   HANS Viveros CNP   chlorthalidone (HYGROTON) 25 MG tablet Take 1 tablet by mouth in the morning.  7/19/22   Wanetta Ruffini, APRN - CNP   carvedilol (COREG) 25 MG tablet Take 1 tablet by mouth in the morning and 1 tablet before bedtime. 7/19/22   HANS Fraga CNP   aspirin 81 MG chewable tablet Take 1 tablet by mouth in the morning.  7/19/22   HANS Fraga CNP   rosuvastatin (CRESTOR) 40 MG tablet Take 1 tablet by mouth nightly 7/19/22   HANS Fraga CNP   albuterol sulfate  (90 Base) MCG/ACT inhaler Inhale 2 puffs into the lungs every 6 hours as needed for Wheezing    Historical Provider, MD        Current Medications:  Current Facility-Administered Medications   Medication Dose Route Frequency Provider Last Rate Last Admin    sodium chloride flush 0.9 % injection 5-40 mL  5-40 mL IntraVENous 2 times per day Charles Levi MD   10 mL at 11/11/22 2254    sodium chloride flush 0.9 % injection 5-40 mL  5-40 mL IntraVENous PRN Charles Levi MD        0.9 % sodium chloride infusion   IntraVENous PRN Charles Levi MD        ondansetron (ZOFRAN-ODT) disintegrating tablet 4 mg  4 mg Oral Q8H PRN Charles Levi MD        Or    ondansetron (ZOFRAN) injection 4 mg  4 mg IntraVENous Q6H PRN Charles Levi MD        acetaminophen (TYLENOL) tablet 650 mg  650 mg Oral Q6H PRN Charles Levi MD        Or    acetaminophen (TYLENOL) suppository 650 mg  650 mg Rectal Q6H PRN Charles Levi MD        polyethylene glycol (GLYCOLAX) packet 17 g  17 g Oral Daily PRN Charles Levi MD        aspirin chewable tablet 81 mg  81 mg Oral Daily David Dean MD   81 mg at 11/12/22 0837    atorvastatin (LIPITOR) tablet 80 mg  80 mg Oral Nightly Charles Levi MD   80 mg at 11/11/22 2228    ipratropium-albuterol (DUONEB) nebulizer solution 1 ampule  1 ampule Inhalation Q4H PRN Charles Levi MD   1 ampule at 11/11/22 2022    heparin 25,000 unit in sodium chloride 0.45% 250 mL (premix) infusion  0-4,000 Units/hr IntraVENous Continuous Charles Levi MD 14.4 mL/hr at 11/12/22 0611 1,440 Units/hr at 11/12/22 0611    albuterol sulfate HFA (PROVENTIL;VENTOLIN;PROAIR) 108 (90 Base) MCG/ACT inhaler 2 puff  2 puff Inhalation Q6H PRN Jacquelyn Arshad MD   2 puff at 11/11/22 2022    carvedilol (COREG) tablet 25 mg  25 mg Oral BID GABRIELLE Arshad MD   25 mg at 11/12/22 0837    chlorthalidone (HYGROTON) tablet 25 mg  25 mg Oral Daily Jacquelyn Arshad MD   25 mg at 11/12/22 0837    DULoxetine (CYMBALTA) extended release capsule 30 mg  30 mg Oral Daily Jacquelyn Arshad MD   30 mg at 11/12/22 0837    gabapentin (NEURONTIN) capsule 800 mg  800 mg Oral TID Jacquelyn Arshad MD   800 mg at 11/12/22 0837    itraconazole (SPORANOX) capsule 200 mg  200 mg Oral BID Jacquelyn Arshad MD   200 mg at 11/12/22 0841    lisinopril (PRINIVIL;ZESTRIL) tablet 20 mg  20 mg Oral Daily Jacquelyn Arshad MD   20 mg at 11/12/22 0837    glucose chewable tablet 16 g  4 tablet Oral PRN Jacquelyn Arshad MD        dextrose bolus 10% 125 mL  125 mL IntraVENous PRN Jacquelyn Arshad MD        Or    dextrose bolus 10% 250 mL  250 mL IntraVENous PRN Jacquelyn Arshad MD        glucagon (rDNA) injection 1 mg  1 mg SubCUTAneous PRN Jacquelyn Arshad MD        dextrose 10 % infusion   IntraVENous Continuous PRN Jacquelyn Arshad MD        insulin lispro (HUMALOG) injection vial 0-8 Units  0-8 Units SubCUTAneous TID GABRIELLE Arshad MD   2 Units at 11/12/22 0816    insulin lispro (HUMALOG) injection vial 0-4 Units  0-4 Units SubCUTAneous Nightly Jacquelyn Arshad MD   4 Units at 11/12/22 0013    insulin glargine (LANTUS) injection vial 40 Units  40 Units SubCUTAneous Nightly Jacquelyn Arshad MD   40 Units at 11/12/22 0013        Allergies:  Penicillins     Review of Systems:   Review of Systems - Negative except chest pain    Physical Examination:    Vitals:    11/12/22 1209   BP: 90/60   Pulse: 99   Resp: 18   Temp: 97.6 °F (36.4 °C)   SpO2: 98%      Weight: 241 lb 13.5 oz (109.7 kg)       Physical Exam  Vitals and nursing note reviewed. Constitutional:       Appearance: Normal appearance. He is not ill-appearing or diaphoretic. HENT:      Head: Normocephalic and atraumatic. Mouth/Throat:      Mouth: Mucous membranes are moist.   Eyes:      Pupils: Pupils are equal, round, and reactive to light. Cardiovascular:      Rate and Rhythm: Normal rate and regular rhythm. Heart sounds: No murmur heard. Pulmonary:      Effort: Pulmonary effort is normal.      Breath sounds: Normal breath sounds. Abdominal:      General: Abdomen is flat. There is no distension. Musculoskeletal:      Right lower leg: No edema. Left lower leg: No edema. Skin:     General: Skin is warm and dry. Neurological:      General: No focal deficit present. Mental Status: He is alert and oriented to person, place, and time. Psychiatric:         Mood and Affect: Mood normal.         Behavior: Behavior normal.        Labs  CBC:   Lab Results   Component Value Date/Time    WBC 5.0 11/12/2022 05:06 AM    RBC 5.25 11/12/2022 05:06 AM    HGB 13.6 11/12/2022 05:06 AM    HCT 40.9 11/12/2022 05:06 AM    MCV 78.0 11/12/2022 05:06 AM    RDW 13.1 11/12/2022 05:06 AM     11/12/2022 05:06 AM     CMP:    Lab Results   Component Value Date/Time     11/11/2022 11:56 AM    K 3.4 11/11/2022 11:56 AM    CL 98 11/11/2022 11:56 AM    CO2 26 11/11/2022 11:56 AM    BUN 21 11/11/2022 11:56 AM    CREATININE 1.0 11/11/2022 11:56 AM    GFRAA >60 08/25/2022 01:08 PM    GFRAA >60 01/02/2011 02:51 PM    AGRATIO 1.1 11/11/2022 11:56 AM    LABGLOM >60 11/11/2022 11:56 AM    GLUCOSE 204 11/11/2022 11:56 AM    PROT 7.5 11/11/2022 11:56 AM    CALCIUM 9.6 11/11/2022 11:56 AM    BILITOT 0.3 11/11/2022 11:56 AM    ALKPHOS 87 11/11/2022 11:56 AM    AST 15 11/11/2022 11:56 AM    ALT 15 11/11/2022 11:56 AM     PT/INR:  No results found for: PTINR  Lab Results   Component Value Date    TROPONINI 0.02 (H) 11/12/2022       CT PA:   Suboptimal timing of the contrast bolus with lower than expected density of   contrast in the pulmonary arterial system, specially in the upper lobe   branches.   No large or central pulmonary arterial embolism is identified but   with reduced sensitivity in the upper lobe branches. Stable cluster of nodules in the right upper lobe with opacified bronchial   segment in the region. The nodules and distribution are stable from the most   recent exam on 09/28/2022. The inflammatory alveolar opacities of 07/05/2022   have resolved. Residual increased lymphoid tissue in the right hilum is   stable from 09/28/2022 and improved from 07/05/2022. 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 EKG:  I have reviewed EKG with the following interpretation:  Impression: Normal sinus rhythm with first-degree heart block    Echo: 5/22   Left ventricle size is normal.   Normal left ventricle wall thickness is present. Global left ventricular function is mildly decreased with ejection fraction   estimated from 40 % to 45 %. Indeterminate diastolic function. The mitral valve leaflets are mildly thickened with normal opening. The right ventricle is normal in size and function. Cath: 5/22     ANGIOGRAM/CORONARY ARTERIOGRAM:        The left main coronary artery is normal .     The left anterior descending artery is normal .     The left circumflex artery is normal .     The right coronary artery is normal .    Old notes reviewed  Telemetry reviewed  Ekg personally reviewed  Chest xray personally reviewed  Echo, cath, and   Medications and labs reviewed  high complexity/medical decision making due to extensive data review, extensive history review, independent review of data    Assessment/Plan:  Mild troponin elevation  Chest pain  Nonischemic cardiomyopathy with mildly reduced EF    Assessment: This is a very pleasant 28-year-old gentleman is had ongoing atypical chest pain for several months duration dating back at least to May.   He has a constellation of symptoms that is concerning for sarcoidosis to me: Mild nonischemic cardiomyopathy with global dysfunction EF 40 to 45%, EKG with a first-degree heart block, CTPA did not show evidence of pulmonary emboli, however did show right hilar adenopathy which is also seen on a previous CT scan. Plan:  -Patient will need a cardiac MRI for evaluation of possible sarcoidosis, will have to be arranged as an outpatient  -No further coronary ischemic evaluation at this time  -No need to repeat TTE given plan for MRI  -Continue aspirin  -Continue high potency statin  -Continue GDMT for heart failure: Coreg 25 mg, lisinopril 20 mg daily  -No indication for MRI  -Continue SGLT2 as an outpatient    Patient is okay for discharge from a cardiovascular disease standpoint      ---    I will address the patient's cardiac risk factors and adjusted pharmacologic treatment as needed. In addition, I have reinforced the need for patient directed risk factor modification. Tobacco use was discussed with the patient and educated on the negative effects. I have asked the patient to not utilize these agents. Thank you for allowing to us to participate in the care or Anny Ellis. Further evaluation will be based upon the patient's clinical course and testing results. All questions and concerns were addressed to the patient/family. Alternatives to my treatment were discussed. The note was completed using EMR. Every effort was made to ensure accuracy; however, inadvertent computerized transcription errors may be present. Ebenezer Atkins MD  Interventional Cardiology  11/12/2022  12:19 PM    [facility] [address] [place] [address]  Ph: [phone]  Fax: [phone]    NOTE: This report was transcribed using voice recognition software. Every effort was made to ensure accuracy, however, inadvertent computerized transcription errors may be present.

## 2022-11-12 NOTE — PROGRESS NOTES
Hospitalist Progress Note      PCP: HANS Catalan - CNP    Chief Complaint. Patient is a 50-year-old male who presents to the hospital due to chest pain. According to patient he has been having chest pain especially with exertion, he also had shortness of breath as well as chills. He mentions his chest pain radiated across his chest felt heavy and constant it was there for around 30 minutes. Patient denied associated nausea vomiting diarrhea constipation dysuria. Date of Admission: 11/11/2022      Subjective:  tolerating diet, complains of chest discomfort. denies nausea, vomiting, shortness of breath, fever or chills. mention feels overall better    Medications:  Reviewed    Infusion Medications    sodium chloride Stopped (11/12/22 1305)    dextrose Stopped (11/12/22 1305)     Scheduled Medications    sodium chloride flush  5-40 mL IntraVENous 2 times per day    aspirin  81 mg Oral Daily    atorvastatin  80 mg Oral Nightly    carvedilol  25 mg Oral BID WC    chlorthalidone  25 mg Oral Daily    DULoxetine  30 mg Oral Daily    gabapentin  800 mg Oral TID    itraconazole  200 mg Oral BID    lisinopril  20 mg Oral Daily    insulin lispro  0-8 Units SubCUTAneous TID WC    insulin lispro  0-4 Units SubCUTAneous Nightly    insulin glargine  40 Units SubCUTAneous Nightly     PRN Meds: sodium chloride flush, sodium chloride, ondansetron **OR** ondansetron, acetaminophen **OR** acetaminophen, polyethylene glycol, ipratropium-albuterol, albuterol sulfate HFA, glucose, dextrose bolus **OR** dextrose bolus, glucagon (rDNA), dextrose    No intake or output data in the 24 hours ending 11/12/22 1646    Physical Exam Performed:    BP 90/60   Pulse 99   Temp 97.6 °F (36.4 °C) (Oral)   Resp 18   Wt 241 lb 13.5 oz (109.7 kg)   SpO2 98%   BMI 31.91 kg/m²     General appearance: No apparent distress,   HEENT:  Conjunctivae/corneas clear. Neck: Supple, with full range of motion.    Respiratory:  Normal respiratory effort. Clear to auscultation, bilaterally without Rales/Wheezes/Rhonchi. Cardiovascular: Regular rate and rhythm with normal S1/S2 without murmurs or rubs  Abdomen: Soft, non-tender, non-distended, normal bowel sounds. Musculoskeletal: No cyanosis or edema bilaterally  Neurologic:  without any focal sensory/motor deficits. grossly non-focal.  Psychiatric: Alert and oriented, Normal mood  Peripheral Pulses: +2 palpable, equal bilaterally       Labs:   Recent Labs     11/11/22  1202 11/11/22 2012 11/12/22  0506   WBC 5.1 5.7 5.0   HGB 15.6 15.4 13.6   HCT 47.4 47.5 40.9    280 278     Recent Labs     11/11/22  1156      K 3.4*   CL 98*   CO2 26   BUN 21*   CREATININE 1.0   CALCIUM 9.6     Recent Labs     11/11/22  1156   AST 15   ALT 15   BILITOT 0.3   ALKPHOS 87     No results for input(s): INR in the last 72 hours. Recent Labs     11/11/22  1626 11/11/22 2012 11/12/22  0506   TROPONINI 0.03* 0.01 0.02*       Urinalysis:      Lab Results   Component Value Date/Time    NITRU Negative 07/05/2022 11:49 AM    WBCUA 1 07/05/2022 11:49 AM    BACTERIA None Seen 07/05/2022 11:49 AM    RBCUA 1 07/05/2022 11:49 AM    BLOODU Negative 07/05/2022 11:49 AM    SPECGRAV 1.024 07/05/2022 11:49 AM    GLUCOSEU 500 07/05/2022 11:49 AM    GLUCOSEU NEGATIVE 01/02/2011 02:44 PM       Radiology:  XR CHEST PORTABLE   Final Result   No significant findings in the chest.         MRI CARDIAC W WO CONTRAST    (Results Pending)         Assessment/Plan:    Active Hospital Problems    Diagnosis     Chest pain [R07.9]      Priority: Medium       Patient is a 42-year-old male who presents to the hospital due to chest pain. According to patient he has been having chest pain especially with exertion, he also had shortness of breath as well as chills. He mentions his chest pain radiated across his chest felt heavy and constant it was there for around 30 minutes.   Patient denied associated nausea vomiting diarrhea constipation dysuria. Assessment  Chest pain, elevated troponin-cannot rule out NSTEMI rule out ACS  Sinus tachycardia  Hypokalemia  Elevated troponin likely demand ischemia  Diabetes mellitus  Hyperlipidemia     Plan  DC IV heparin, start lovenox for DVT PPx  Consult cardiology - Cardiac MRI as OP, ok to dc per cardiology  Monitor cardiac telemetry  Monitor troponin  Recently had echo 5/2022 did show EF 40 to 45% with mildly decreased ejection fraction  Monitor and replace electrolytes  DVT prophylaxis-on lovenox  Resume home medications  Monitor replace electrolytes  Diet: ADULT DIET; Regular; 4 carb choices (60 gm/meal);  No Caffeine  Code Status: Full Code    PT/OT Eval Status: ordered    Dispo/Plan of care - cardiac MRI as OP, patient mother wishes to complete cardiac w/u as inpt, answered all questions, explained to mother but adamant about getting work up as inpt  Dc order cancelled    Mary Lou Noonan MD

## 2022-11-12 NOTE — H&P
Hospital Medicine History & Physical      PCP: HANS Francis CNP    Date of Admission: 11/11/2022    Chief Complaint:  chest pain    History Of Present Illness:    Patient is a 50-year-old male who presents to the hospital due to chest pain. According to patient he has been having chest pain especially with exertion, he also had shortness of breath as well as chills. He mentions his chest pain radiated across his chest felt heavy and constant it was there for around 30 minutes. Patient denied associated nausea vomiting diarrhea constipation dysuria. Past Medical History:          Diagnosis Date    COVID-19     History of blood transfusion     Hyperlipidemia     Neuropathy        Past Surgical History:          Procedure Laterality Date    BRONCHOSCOPY  06/09/2022    BRONCHOSCOPY BRUSHINGS performed by Zoltan Romeo MD at [address] [place] [address]  06/09/2022    BRONCHOSCOPY DIAGNOSTIC OR CELL [address] ONLY performed by Zoltan Romeo MD at [address] [place] [address]  06/09/2022    BRONCHOSCOPY BIOPSY BRONCHUS performed by Zoltan Romeo MD at [address] [place] [address] N/A 06/14/2022    BRONCHOSCOPY DIAGNOSTIC OR CELL [address] ONLY performed by Miranda Atkins DO at [address] [place] [address]       Medications Prior to Admission:      Prior to Admission medications    Medication Sig Start Date End Date Taking?  Authorizing Provider   Dulaglutide (TRULICITY) 6.56 QZ/6.6JP SOPN Inject 0.75 mg into the skin once a week  Patient taking differently: Inject 0.75 mg into the skin once a week On Mondays 10/24/22   HANS Francis CNP   Insulin Degludec (TRESIBA FLEXTOUCH) 200 UNIT/ML SOPN Inject 50 Units into the skin daily  Patient taking differently: Inject 60 Units into the skin daily 10/24/22   HANS Francis CNP   metoclopramide (REGLAN) 10 MG tablet Take 1 tablet by mouth 3 times daily (with meals)  Patient taking differently: Take 10 mg by mouth 3 times daily as needed 10/11/22   HANS Montoya CNP   lisinopril (PRINIVIL;ZESTRIL) 20 MG tablet Take 1 tablet by mouth daily 9/14/22   [facility], DO   Continuous Blood Gluc Sensor (FREESTYLE HOME 2 SENSOR) MISC Use to check blood sugar 4 times per day (before each meal and at bedtime). 9/13/22   HANS Montoya CNP   itraconazole (SPORANOX) 100 MG capsule Take 200 mg by mouth 2 times daily    Historical Provider, MD   gabapentin (NEURONTIN) 400 MG capsule Take 2 capsules by mouth in the morning and 2 capsules at noon and 2 capsules before bedtime. Do all this for 90 days. 2-3 QD. 8/16/22 11/14/22  HANS Montoya CNP   metFORMIN (GLUCOPHAGE) 1000 MG tablet Take 1 tablet by mouth in the morning and 1 tablet before bedtime. 8/16/22   HANS Montoya CNP   empagliflozin (JARDIANCE) 10 MG tablet Take 1 tablet by mouth in the morning. 7/19/22   HANS Montoya CNP   DULoxetine (CYMBALTA) 30 MG extended release capsule Take 1 capsule by mouth in the morning. 7/19/22   HANS Montoya CNP   dicyclomine (BENTYL) 10 MG capsule Take 1 capsule by mouth 4 times daily (before meals and nightly) 7/19/22   HANS Montyoa CNP   chlorthalidone (HYGROTON) 25 MG tablet Take 1 tablet by mouth in the morning. 7/19/22   HANS Montoya CNP   carvedilol (COREG) 25 MG tablet Take 1 tablet by mouth in the morning and 1 tablet before bedtime. 7/19/22   HANS Montoya CNP   aspirin 81 MG chewable tablet Take 1 tablet by mouth in the morning. 7/19/22   HANS Montoya CNP   rosuvastatin (CRESTOR) 40 MG tablet Take 1 tablet by mouth nightly 7/19/22   HANS Montoya CNP   albuterol sulfate  (90 Base) MCG/ACT inhaler Inhale 2 puffs into the lungs every 6 hours as needed for Wheezing    Historical Provider, MD       Allergies:  Penicillins    Social History:      TOBACCO:   reports that he has never smoked.  He has never used smokeless tobacco.  ETOH:   reports current alcohol use. Family History:       Reviewed in detail and non contributory          Problem Relation Age of Onset    Diabetes Father        REVIEW OF SYSTEMS:   Pertinent positives as noted in the HPI. All other systems reviewed and negative. PHYSICAL EXAM PERFORMED:    BP (!) 138/96   Pulse (!) 121   Temp 98.1 °F (36.7 °C) (Oral)   Resp 16   Wt 242 lb 15.2 oz (110.2 kg)   SpO2 97%   BMI 32.05 kg/m²     General appearance:  No apparent distress, cooperative. HEENT:  Normal cephalic, atraumatic without obvious deformity. Conjunctivae/corneas clear. Neck: Supple, with full range of motion. No cervical lymphadenopathy  Respiratory:  Normal respiratory effort. Clear to auscultation, bilaterally without Rales/Wheezes/Rhonchi. Cardiovascular:  Regular rate and rhythm with normal S1/S2 without murmurs, rubs or gallops. Abdomen: Soft, non-tender, non-distended, normal bowel sounds. Musculoskeletal:  No edema noted bilaterally. No tenderness on palpation   Skin: no rash visible  Neurologic:  Neurologically intact without any focal sensory/motor deficits. grossly non-focal.  Psychiatric:  Alert and oriented, normal mood  Peripheral Pulses: +2 palpable, equal bilaterally       Labs:     Recent Labs     11/11/22  1202   WBC 5.1   HGB 15.6   HCT 47.4        Recent Labs     11/11/22  1156      K 3.4*   CL 98*   CO2 26   BUN 21*   CREATININE 1.0   CALCIUM 9.6     Recent Labs     11/11/22  1156   AST 15   ALT 15   BILITOT 0.3   ALKPHOS 87     No results for input(s): INR in the last 72 hours.   Recent Labs     11/11/22  1156 11/11/22  1626   TROPONINI 0.03* 0.03*       Urinalysis:      Lab Results   Component Value Date/Time    NITRU Negative 07/05/2022 11:49 AM    WBCUA 1 07/05/2022 11:49 AM    BACTERIA None Seen 07/05/2022 11:49 AM    RBCUA 1 07/05/2022 11:49 AM    BLOODU Negative 07/05/2022 11:49 AM    SPECGRAV 1.024 07/05/2022 11:49 AM GLUCOSEU 500 07/05/2022 11:49 AM    GLUCOSEU NEGATIVE 01/02/2011 02:44 PM       Radiology:       XR CHEST PORTABLE   Final Result   No significant findings in the chest.                 Active Hospital Problems    Diagnosis Date Noted    Chest pain [R07.9] 11/11/2022     Priority: Medium       Patient is a 29-year-old male who presents to the hospital due to chest pain. According to patient he has been having chest pain especially with exertion, he also had shortness of breath as well as chills. He mentions his chest pain radiated across his chest felt heavy and constant it was there for around 30 minutes. Patient denied associated nausea vomiting diarrhea constipation dysuria. Assessment  Chest pain, elevated troponin-cannot rule out NSTEMI rule out ACS  Sinus tachycardia  Hypokalemia  Elevated troponin likely demand ischemia  Diabetes mellitus  Hyperlipidemia    Plan  Start IV heparin  Consult cardiology  Monitor cardiac telemetry  Monitor troponin  Recently had echo 5/2022 did show EF 40 to 45% with mildly decreased ejection fraction  Monitor and replace electrolytes  DVT prophylaxis-on IV heparin  Resume home medications  Monitor replace electrolytes  Diet: ADULT DIET; Clear Liquid; No Caffeine  Code Status: Full Code    PT/OT Eval Status: ordered    Dispo - pending clinical improvement       Charles Levi MD    The note was completed using EMR and Dragon dictation system. Every effort was made to ensure accuracy; however, inadvertent computerized transcription errors may be present. Thank you HANS Fraga CNP for the opportunity to be involved in this patient's care. If you have any questions or concerns please feel free to contact me at [phone].     Charles Levi MD

## 2022-11-13 LAB
ANION GAP SERPL CALCULATED.3IONS-SCNC: 8 MMOL/L (ref 3–16)
BUN BLDV-MCNC: 33 MG/DL (ref 7–20)
CALCIUM SERPL-MCNC: 8.8 MG/DL (ref 8.3–10.6)
CHLORIDE BLD-SCNC: 99 MMOL/L (ref 99–110)
CO2: 27 MMOL/L (ref 21–32)
CREAT SERPL-MCNC: 1.4 MG/DL (ref 0.9–1.3)
GFR SERPL CREATININE-BSD FRML MDRD: >60 ML/MIN/{1.73_M2}
GLUCOSE BLD-MCNC: 146 MG/DL (ref 70–99)
GLUCOSE BLD-MCNC: 169 MG/DL (ref 70–99)
GLUCOSE BLD-MCNC: 180 MG/DL (ref 70–99)
GLUCOSE BLD-MCNC: 191 MG/DL (ref 70–99)
GLUCOSE BLD-MCNC: 215 MG/DL (ref 70–99)
HCT VFR BLD CALC: 42.8 % (ref 40.5–52.5)
HEMOGLOBIN: 14 G/DL (ref 13.5–17.5)
MAGNESIUM: 2.5 MG/DL (ref 1.8–2.4)
MCH RBC QN AUTO: 25.8 PG (ref 26–34)
MCHC RBC AUTO-ENTMCNC: 32.6 G/DL (ref 31–36)
MCV RBC AUTO: 79.1 FL (ref 80–100)
PDW BLD-RTO: 13.1 % (ref 12.4–15.4)
PERFORMED ON: ABNORMAL
PLATELET # BLD: 285 K/UL (ref 135–450)
PMV BLD AUTO: 8.3 FL (ref 5–10.5)
POTASSIUM REFLEX MAGNESIUM: 3.4 MMOL/L (ref 3.5–5.1)
RBC # BLD: 5.41 M/UL (ref 4.2–5.9)
SODIUM BLD-SCNC: 134 MMOL/L (ref 136–145)
TROPONIN: 0.02 NG/ML
WBC # BLD: 7 K/UL (ref 4–11)

## 2022-11-13 PROCEDURE — 83735 ASSAY OF MAGNESIUM: CPT

## 2022-11-13 PROCEDURE — 1200000000 HC SEMI PRIVATE

## 2022-11-13 PROCEDURE — 6370000000 HC RX 637 (ALT 250 FOR IP): Performed by: INTERNAL MEDICINE

## 2022-11-13 PROCEDURE — 85027 COMPLETE CBC AUTOMATED: CPT

## 2022-11-13 PROCEDURE — 99233 SBSQ HOSP IP/OBS HIGH 50: CPT | Performed by: STUDENT IN AN ORGANIZED HEALTH CARE EDUCATION/TRAINING PROGRAM

## 2022-11-13 PROCEDURE — 94640 AIRWAY INHALATION TREATMENT: CPT

## 2022-11-13 PROCEDURE — 36415 COLL VENOUS BLD VENIPUNCTURE: CPT

## 2022-11-13 PROCEDURE — 84484 ASSAY OF TROPONIN QUANT: CPT

## 2022-11-13 PROCEDURE — 2580000003 HC RX 258: Performed by: INTERNAL MEDICINE

## 2022-11-13 PROCEDURE — 80048 BASIC METABOLIC PNL TOTAL CA: CPT

## 2022-11-13 PROCEDURE — 94760 N-INVAS EAR/PLS OXIMETRY 1: CPT

## 2022-11-13 RX ORDER — POTASSIUM CHLORIDE 20 MEQ/1
40 TABLET, EXTENDED RELEASE ORAL PRN
Status: DISCONTINUED | OUTPATIENT
Start: 2022-11-13 | End: 2022-11-16 | Stop reason: HOSPADM

## 2022-11-13 RX ORDER — IPRATROPIUM BROMIDE AND ALBUTEROL SULFATE 2.5; .5 MG/3ML; MG/3ML
1 SOLUTION RESPIRATORY (INHALATION) 4 TIMES DAILY
Status: DISCONTINUED | OUTPATIENT
Start: 2022-11-13 | End: 2022-11-15

## 2022-11-13 RX ORDER — LABETALOL HYDROCHLORIDE 5 MG/ML
10 INJECTION, SOLUTION INTRAVENOUS EVERY 4 HOURS PRN
Status: DISCONTINUED | OUTPATIENT
Start: 2022-11-13 | End: 2022-11-16 | Stop reason: HOSPADM

## 2022-11-13 RX ORDER — HYDRALAZINE HYDROCHLORIDE 20 MG/ML
10 INJECTION INTRAMUSCULAR; INTRAVENOUS EVERY 6 HOURS PRN
Status: DISCONTINUED | OUTPATIENT
Start: 2022-11-13 | End: 2022-11-16 | Stop reason: HOSPADM

## 2022-11-13 RX ORDER — POTASSIUM CHLORIDE 7.45 MG/ML
10 INJECTION INTRAVENOUS PRN
Status: DISCONTINUED | OUTPATIENT
Start: 2022-11-13 | End: 2022-11-16 | Stop reason: HOSPADM

## 2022-11-13 RX ADMIN — IPRATROPIUM BROMIDE AND ALBUTEROL SULFATE 1 AMPULE: .5; 3 SOLUTION RESPIRATORY (INHALATION) at 17:22

## 2022-11-13 RX ADMIN — DULOXETINE HYDROCHLORIDE 30 MG: 30 CAPSULE, DELAYED RELEASE ORAL at 08:35

## 2022-11-13 RX ADMIN — SODIUM CHLORIDE, PRESERVATIVE FREE 10 ML: 5 INJECTION INTRAVENOUS at 08:41

## 2022-11-13 RX ADMIN — ITRACONAZOLE 200 MG: 100 CAPSULE, COATED PELLETS ORAL at 08:40

## 2022-11-13 RX ADMIN — SODIUM CHLORIDE, PRESERVATIVE FREE 10 ML: 5 INJECTION INTRAVENOUS at 22:15

## 2022-11-13 RX ADMIN — ATORVASTATIN CALCIUM 80 MG: 80 TABLET, FILM COATED ORAL at 22:14

## 2022-11-13 RX ADMIN — GABAPENTIN 800 MG: 400 CAPSULE ORAL at 15:06

## 2022-11-13 RX ADMIN — INSULIN LISPRO 2 UNITS: 100 INJECTION, SOLUTION INTRAVENOUS; SUBCUTANEOUS at 18:11

## 2022-11-13 RX ADMIN — IPRATROPIUM BROMIDE AND ALBUTEROL SULFATE 1 AMPULE: .5; 3 SOLUTION RESPIRATORY (INHALATION) at 09:12

## 2022-11-13 RX ADMIN — CARVEDILOL 25 MG: 25 TABLET, FILM COATED ORAL at 08:36

## 2022-11-13 RX ADMIN — CHLORTHALIDONE 25 MG: 25 TABLET ORAL at 08:36

## 2022-11-13 RX ADMIN — CARVEDILOL 25 MG: 25 TABLET, FILM COATED ORAL at 18:11

## 2022-11-13 RX ADMIN — IPRATROPIUM BROMIDE AND ALBUTEROL SULFATE 1 AMPULE: .5; 3 SOLUTION RESPIRATORY (INHALATION) at 21:37

## 2022-11-13 RX ADMIN — LISINOPRIL 20 MG: 20 TABLET ORAL at 08:35

## 2022-11-13 RX ADMIN — ASPIRIN 81 MG 81 MG: 81 TABLET ORAL at 08:36

## 2022-11-13 RX ADMIN — IPRATROPIUM BROMIDE AND ALBUTEROL SULFATE 1 AMPULE: .5; 3 SOLUTION RESPIRATORY (INHALATION) at 11:48

## 2022-11-13 RX ADMIN — ITRACONAZOLE 200 MG: 100 CAPSULE, COATED PELLETS ORAL at 22:18

## 2022-11-13 RX ADMIN — GABAPENTIN 800 MG: 400 CAPSULE ORAL at 08:35

## 2022-11-13 RX ADMIN — INSULIN GLARGINE 40 UNITS: 100 INJECTION, SOLUTION SUBCUTANEOUS at 22:19

## 2022-11-13 RX ADMIN — GABAPENTIN 800 MG: 400 CAPSULE ORAL at 22:13

## 2022-11-13 ASSESSMENT — PAIN SCALES - GENERAL
PAINLEVEL_OUTOF10: 0

## 2022-11-13 NOTE — PLAN OF CARE
Problem: Pain  Goal: Verbalizes/displays adequate comfort level or baseline comfort level  11/13/2022 0947 by Shelly Armstrong RN  Outcome: Progressing  11/13/2022 0438 by Molly Rico RN  Outcome: Progressing  Flowsheets (Taken 11/13/2022 0038)  Verbalizes/displays adequate comfort level or baseline comfort level:   Encourage patient to monitor pain and request assistance   Assess pain using appropriate pain scale   Administer analgesics based on type and severity of pain and evaluate response   Implement non-pharmacological measures as appropriate and evaluate response   Consider cultural and social influences on pain and pain management  11/12/2022 2200 by Shakeel Hansen RN  Outcome: Progressing     Problem: Safety - Adult  Goal: Free from fall injury  11/13/2022 0947 by Shelly Armstrong RN  Outcome: Progressing  11/13/2022 0438 by Molly Rico RN  Outcome: Progressing  11/12/2022 2200 by Shakeel Hansen RN  Outcome: Progressing     Problem: Skin/Tissue Integrity  Goal: Absence of new skin breakdown  Description: 1. Monitor for areas of redness and/or skin breakdown  2. Assess vascular access sites hourly  3. Every 4-6 hours minimum:  Change oxygen saturation probe site  4. Every 4-6 hours:  If on nasal continuous positive airway pressure, respiratory therapy assess nares and determine need for appliance change or resting period.   11/13/2022 0947 by Shelly Armstrong RN  Outcome: Progressing  11/13/2022 0438 by Molly Rico RN  Outcome: Progressing  11/12/2022 2200 by Shakeel Hansen RN  Outcome: Progressing

## 2022-11-13 NOTE — PROGRESS NOTES
Alan 82    Admit Date: 2022    PCP: HANS Mercer - [facility]I                  : 1985  MRN: [id number]    Subjective: Interval History:   Patient seen and examined. Clinical notes reviewed. Telemetry reviewed. No new complaint today. No major events overnight. Denies having chest pain, shortness of breath, PND at the time of this visit. Does endorse fatigue    Review of System:  Pertinent positive and negatives are in the HPI and interval above, the rest are negative. Data:   Scheduled Meds:   ipratropium-albuterol  1 ampule Inhalation 4x daily    insulin lispro  4 Units SubCUTAneous Once    insulin glargine  10 Units SubCUTAneous Nightly    sodium chloride flush  5-40 mL IntraVENous 2 times per day    aspirin  81 mg Oral Daily    atorvastatin  80 mg Oral Nightly    carvedilol  25 mg Oral BID WC    chlorthalidone  25 mg Oral Daily    DULoxetine  30 mg Oral Daily    gabapentin  800 mg Oral TID    itraconazole  200 mg Oral BID    lisinopril  20 mg Oral Daily    insulin lispro  0-8 Units SubCUTAneous TID WC    insulin lispro  0-4 Units SubCUTAneous Nightly    insulin glargine  40 Units SubCUTAneous Nightly     PRN Meds:sodium chloride flush, sodium chloride, ondansetron **OR** ondansetron, acetaminophen **OR** acetaminophen, polyethylene glycol, albuterol sulfate HFA, glucose, dextrose bolus **OR** dextrose bolus, glucagon (rDNA), dextrose  No intake/output data recorded. No intake/output data recorded. No intake or output data in the 24 hours ending 22 1300        Objective:     Vitals: /64   Pulse 97   Temp 98 °F (36.7 °C) (Oral)   Resp 18   Wt 243 lb 2.7 oz (110.3 kg)   SpO2 97%   BMI 32.08 kg/m²     Physical Exam  Vitals and nursing note reviewed. Constitutional:       Appearance: Normal appearance. He is not ill-appearing or diaphoretic. HENT:      Head: Normocephalic and atraumatic.       Mouth/Throat:      Mouth: Mucous membranes are moist. Eyes:      Pupils: Pupils are equal, round, and reactive to light. Cardiovascular:      Rate and Rhythm: Normal rate and regular rhythm. Heart sounds: No murmur heard. Pulmonary:      Effort: Pulmonary effort is normal.      Breath sounds: Normal breath sounds. Abdominal:      General: Abdomen is flat. There is no distension. Musculoskeletal:      Right lower leg: No edema. Left lower leg: No edema. Skin:     General: Skin is warm and dry. Neurological:      General: No focal deficit present. Mental Status: He is alert and oriented to person, place, and time. Psychiatric:         Mood and Affect: Mood normal.         Behavior: Behavior normal.       LABS:    CBC:   Recent Labs     11/11/22 2012 11/12/22  0506 11/13/22  1243   WBC 5.7 5.0 7.0   HGB 15.4 13.6 14.0   HCT 47.5 40.9 42.8   MCV 78.8* 78.0* 79.1*    278 285                                                                BMP:    Recent Labs     11/11/22  1156      K 3.4*   CL 98*   CO2 26   BUN 21*   CREATININE 1.0   GLUCOSE 204*       LFT's:   Recent Labs     11/11/22  1156   AST 15   ALT 15   BILITOT 0.3   ALKPHOS 87       Troponin:   Recent Labs     11/11/22  1626 11/11/22 2012 11/12/22  0506   TROPONINI 0.03* 0.01 0.02*       -----------------------------------------------------------------  RAD:   XR CHEST PORTABLE   Final Result   No significant findings in the chest.         MRI CARDIAC W WO CONTRAST    (Results Pending)       CT PA:   Suboptimal timing of the contrast bolus with lower than expected density of   contrast in the pulmonary arterial system, specially in the upper lobe   branches. No large or central pulmonary arterial embolism is identified but   with reduced sensitivity in the upper lobe branches. Stable cluster of nodules in the right upper lobe with opacified bronchial   segment in the region. The nodules and distribution are stable from the most   recent exam on 09/28/2022.   The inflammatory alveolar opacities of 07/05/2022   have resolved. Residual increased lymphoid tissue in the right hilum is   stable from 09/28/2022 and improved from 07/05/2022. 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 EKG:  I have reviewed EKG with the following interpretation:  Impression: Normal sinus rhythm with first-degree heart block     Echo: 5/22   Left ventricle size is normal.   Normal left ventricle wall thickness is present. Global left ventricular function is mildly decreased with ejection fraction   estimated from 40 % to 45 %. Indeterminate diastolic function. The mitral valve leaflets are mildly thickened with normal opening. The right ventricle is normal in size and function. Cath: 5/22     ANGIOGRAM/CORONARY ARTERIOGRAM:        The left main coronary artery is normal .     The left anterior descending artery is normal .     The left circumflex artery is normal .     The right coronary artery is normal .     Old notes reviewed  Telemetry reviewed  Ekg personally reviewed  Chest xray personally reviewed  Echo, cath, and   Medications and labs reviewed  high complexity/medical decision making due to extensive data review, extensive history review, independent review of data    Assessment/Plan:   Chest pain  Nonischemic cardiomyopathy with mildly reduced EF     Assessment: This is a very pleasant 67-year-old gentleman is had ongoing atypical chest pain for several months duration dating back at least to May. He has a constellation of symptoms that is concerning for sarcoidosis to me: Mild nonischemic cardiomyopathy with global dysfunction EF 40 to 45%, EKG with a first-degree heart block, CTPA did not show evidence of pulmonary emboli, however did show right hilar adenopathy which is also seen on a previous CT scan.     I had a long discussion today with his mother who had some concerns about the patient's ability to live independently currently, reports he has had excessive fatigue the point where he is unable to perform his ADLs. I discussed this with the patient as well and he reports some concern that he is not able to complete his ADLs at this time because of the level of fatigue he is having to endure. Plan:  -Patient will need a cardiac MRI for evaluation of possible sarcoidosis, will have to be arranged as an outpatient  -No further coronary ischemic evaluation at this time  -No need to repeat TTE given plan for MRI  -Continue aspirin  -Continue high potency statin  -Continue GDMT for heart failure: Coreg 25 mg, lisinopril 20 mg daily  -No indication for MRI  -Continue SGLT2 as an outpatient  -Given patient's fatigue and concern regarding ADLs and independent living I do think he merits a PT/OT evaluation possibly an acute rehab stay, during that period while he is in acute rehab he would also be able to have his outpatient MRI performed. This was discussed at length with both the patient and his mother. Cardiology will sign off at this time, please do not hesitate to reengage us if any new issues arise or significant clinical changes. Thank you so much for allowing us to participate in this consult. Austin Echavarria MD  Interventional Cardiology  11/13/2022  1:00 PM    [id number], [address] [place] [address] Emiliano Retana [facility]  Ph: [phone]  Fax: [phone]      NOTE: This report was transcribed using voice recognition software. Every effort was made to ensure accuracy, however, inadvertent computerized transcription errors may be present.

## 2022-11-13 NOTE — PLAN OF CARE
Problem: Pain  Goal: Verbalizes/displays adequate comfort level or baseline comfort level  11/13/2022 0438 by Eri Muller RN  Outcome: Progressing  Flowsheets (Taken 11/13/2022 0038)  Verbalizes/displays adequate comfort level or baseline comfort level:   Encourage patient to monitor pain and request assistance   Assess pain using appropriate pain scale   Administer analgesics based on type and severity of pain and evaluate response   Implement non-pharmacological measures as appropriate and evaluate response   Consider cultural and social influences on pain and pain management  11/12/2022 2200 by Linn Nyhan, RN  Outcome: Progressing     Problem: Safety - Adult  Goal: Free from fall injury  11/13/2022 0438 by Eri Muller RN  Outcome: Progressing  11/12/2022 2200 by Linn Nyhan, RN  Outcome: Progressing     Problem: Skin/Tissue Integrity  Goal: Absence of new skin breakdown  Description: 1. Monitor for areas of redness and/or skin breakdown  2. Assess vascular access sites hourly  3. Every 4-6 hours minimum:  Change oxygen saturation probe site  4. Every 4-6 hours:  If on nasal continuous positive airway pressure, respiratory therapy assess nares and determine need for appliance change or resting period.   11/13/2022 0438 by Eri Muller RN  Outcome: Progressing  11/12/2022 2200 by Linn Nyhan, RN  Outcome: Progressing

## 2022-11-14 ENCOUNTER — CARE COORDINATION (OUTPATIENT)
Dept: CARE COORDINATION | Age: 37
End: 2022-11-14

## 2022-11-14 ENCOUNTER — APPOINTMENT (OUTPATIENT)
Dept: CT IMAGING | Age: 37
DRG: 142 | End: 2022-11-14
Payer: MEDICAID

## 2022-11-14 ENCOUNTER — TELEPHONE (OUTPATIENT)
Dept: PHARMACY | Age: 37
End: 2022-11-14

## 2022-11-14 LAB
ANION GAP SERPL CALCULATED.3IONS-SCNC: 10 MMOL/L (ref 3–16)
BUN BLDV-MCNC: 31 MG/DL (ref 7–20)
CALCIUM SERPL-MCNC: 9 MG/DL (ref 8.3–10.6)
CHLORIDE BLD-SCNC: 99 MMOL/L (ref 99–110)
CO2: 27 MMOL/L (ref 21–32)
CREAT SERPL-MCNC: 1.4 MG/DL (ref 0.9–1.3)
GFR SERPL CREATININE-BSD FRML MDRD: >60 ML/MIN/{1.73_M2}
GLUCOSE BLD-MCNC: 123 MG/DL (ref 70–99)
GLUCOSE BLD-MCNC: 164 MG/DL (ref 70–99)
GLUCOSE BLD-MCNC: 176 MG/DL (ref 70–99)
GLUCOSE BLD-MCNC: 202 MG/DL (ref 70–99)
GLUCOSE BLD-MCNC: 224 MG/DL (ref 70–99)
HCT VFR BLD CALC: 43.1 % (ref 40.5–52.5)
HEMOGLOBIN: 13.9 G/DL (ref 13.5–17.5)
MCH RBC QN AUTO: 25.6 PG (ref 26–34)
MCHC RBC AUTO-ENTMCNC: 32.2 G/DL (ref 31–36)
MCV RBC AUTO: 79.4 FL (ref 80–100)
PDW BLD-RTO: 13 % (ref 12.4–15.4)
PERFORMED ON: ABNORMAL
PLATELET # BLD: 282 K/UL (ref 135–450)
PMV BLD AUTO: 8.9 FL (ref 5–10.5)
POTASSIUM REFLEX MAGNESIUM: 4 MMOL/L (ref 3.5–5.1)
RBC # BLD: 5.42 M/UL (ref 4.2–5.9)
SODIUM BLD-SCNC: 136 MMOL/L (ref 136–145)
TROPONIN: 0.01 NG/ML
WBC # BLD: 5.4 K/UL (ref 4–11)

## 2022-11-14 PROCEDURE — 6370000000 HC RX 637 (ALT 250 FOR IP): Performed by: INTERNAL MEDICINE

## 2022-11-14 PROCEDURE — 6360000002 HC RX W HCPCS: Performed by: INTERNAL MEDICINE

## 2022-11-14 PROCEDURE — 94761 N-INVAS EAR/PLS OXIMETRY MLT: CPT

## 2022-11-14 PROCEDURE — 2580000003 HC RX 258: Performed by: INTERNAL MEDICINE

## 2022-11-14 PROCEDURE — 36415 COLL VENOUS BLD VENIPUNCTURE: CPT

## 2022-11-14 PROCEDURE — 94640 AIRWAY INHALATION TREATMENT: CPT

## 2022-11-14 PROCEDURE — 97165 OT EVAL LOW COMPLEX 30 MIN: CPT

## 2022-11-14 PROCEDURE — 97162 PT EVAL MOD COMPLEX 30 MIN: CPT

## 2022-11-14 PROCEDURE — 85027 COMPLETE CBC AUTOMATED: CPT

## 2022-11-14 PROCEDURE — 97530 THERAPEUTIC ACTIVITIES: CPT

## 2022-11-14 PROCEDURE — 6360000004 HC RX CONTRAST MEDICATION: Performed by: INTERNAL MEDICINE

## 2022-11-14 PROCEDURE — 1200000000 HC SEMI PRIVATE

## 2022-11-14 PROCEDURE — 80048 BASIC METABOLIC PNL TOTAL CA: CPT

## 2022-11-14 PROCEDURE — 71260 CT THORAX DX C+: CPT | Performed by: INTERNAL MEDICINE

## 2022-11-14 RX ORDER — LANOLIN ALCOHOL/MO/W.PET/CERES
9 CREAM (GRAM) TOPICAL NIGHTLY PRN
Status: DISCONTINUED | OUTPATIENT
Start: 2022-11-15 | End: 2022-11-16 | Stop reason: HOSPADM

## 2022-11-14 RX ORDER — PREGABALIN 75 MG/1
75 CAPSULE ORAL 2 TIMES DAILY
Status: DISCONTINUED | OUTPATIENT
Start: 2022-11-14 | End: 2022-11-16 | Stop reason: HOSPADM

## 2022-11-14 RX ORDER — ENOXAPARIN SODIUM 100 MG/ML
30 INJECTION SUBCUTANEOUS 2 TIMES DAILY
Status: DISCONTINUED | OUTPATIENT
Start: 2022-11-14 | End: 2022-11-16 | Stop reason: HOSPADM

## 2022-11-14 RX ORDER — PREGABALIN 75 MG/1
75 CAPSULE ORAL 2 TIMES DAILY
Qty: 6 CAPSULE | Refills: 0 | Status: SHIPPED | OUTPATIENT
Start: 2022-11-14 | End: 2022-11-15 | Stop reason: SDUPTHER

## 2022-11-14 RX ORDER — ENOXAPARIN SODIUM 100 MG/ML
30 INJECTION SUBCUTANEOUS DAILY
Status: DISCONTINUED | OUTPATIENT
Start: 2022-11-14 | End: 2022-11-14 | Stop reason: DRUGHIGH

## 2022-11-14 RX ORDER — LANOLIN ALCOHOL/MO/W.PET/CERES
3 CREAM (GRAM) TOPICAL ONCE
Status: COMPLETED | OUTPATIENT
Start: 2022-11-14 | End: 2022-11-14

## 2022-11-14 RX ADMIN — ACETAMINOPHEN 650 MG: 325 TABLET ORAL at 12:55

## 2022-11-14 RX ADMIN — ATORVASTATIN CALCIUM 80 MG: 80 TABLET, FILM COATED ORAL at 20:15

## 2022-11-14 RX ADMIN — IPRATROPIUM BROMIDE AND ALBUTEROL SULFATE 1 AMPULE: .5; 3 SOLUTION RESPIRATORY (INHALATION) at 16:12

## 2022-11-14 RX ADMIN — CARVEDILOL 25 MG: 25 TABLET, FILM COATED ORAL at 09:16

## 2022-11-14 RX ADMIN — INSULIN LISPRO 2 UNITS: 100 INJECTION, SOLUTION INTRAVENOUS; SUBCUTANEOUS at 12:56

## 2022-11-14 RX ADMIN — IPRATROPIUM BROMIDE AND ALBUTEROL SULFATE 1 AMPULE: .5; 3 SOLUTION RESPIRATORY (INHALATION) at 19:56

## 2022-11-14 RX ADMIN — IPRATROPIUM BROMIDE AND ALBUTEROL SULFATE 1 AMPULE: .5; 3 SOLUTION RESPIRATORY (INHALATION) at 08:30

## 2022-11-14 RX ADMIN — INSULIN GLARGINE 40 UNITS: 100 INJECTION, SOLUTION SUBCUTANEOUS at 22:25

## 2022-11-14 RX ADMIN — IPRATROPIUM BROMIDE AND ALBUTEROL SULFATE 1 AMPULE: .5; 3 SOLUTION RESPIRATORY (INHALATION) at 11:54

## 2022-11-14 RX ADMIN — ITRACONAZOLE 200 MG: 100 CAPSULE, COATED PELLETS ORAL at 09:16

## 2022-11-14 RX ADMIN — DULOXETINE HYDROCHLORIDE 30 MG: 30 CAPSULE, DELAYED RELEASE ORAL at 09:16

## 2022-11-14 RX ADMIN — IOPAMIDOL 75 ML: 755 INJECTION, SOLUTION INTRAVENOUS at 12:31

## 2022-11-14 RX ADMIN — ASPIRIN 81 MG 81 MG: 81 TABLET ORAL at 09:16

## 2022-11-14 RX ADMIN — ENOXAPARIN SODIUM 30 MG: 100 INJECTION SUBCUTANEOUS at 12:55

## 2022-11-14 RX ADMIN — ITRACONAZOLE 200 MG: 100 CAPSULE, COATED PELLETS ORAL at 20:19

## 2022-11-14 RX ADMIN — SODIUM CHLORIDE, PRESERVATIVE FREE 10 ML: 5 INJECTION INTRAVENOUS at 09:17

## 2022-11-14 RX ADMIN — CARVEDILOL 25 MG: 25 TABLET, FILM COATED ORAL at 18:59

## 2022-11-14 RX ADMIN — GABAPENTIN 800 MG: 400 CAPSULE ORAL at 12:55

## 2022-11-14 RX ADMIN — PREGABALIN 75 MG: 75 CAPSULE ORAL at 20:14

## 2022-11-14 RX ADMIN — Medication 3 MG: at 20:15

## 2022-11-14 RX ADMIN — GABAPENTIN 800 MG: 400 CAPSULE ORAL at 09:16

## 2022-11-14 ASSESSMENT — PAIN DESCRIPTION - LOCATION: LOCATION: GENERALIZED

## 2022-11-14 ASSESSMENT — PAIN DESCRIPTION - PAIN TYPE: TYPE: OTHER (COMMENT)

## 2022-11-14 ASSESSMENT — PAIN SCALES - GENERAL
PAINLEVEL_OUTOF10: 0
PAINLEVEL_OUTOF10: 2
PAINLEVEL_OUTOF10: 8

## 2022-11-14 ASSESSMENT — PAIN DESCRIPTION - ORIENTATION: ORIENTATION: OTHER (COMMENT)

## 2022-11-14 ASSESSMENT — PAIN DESCRIPTION - DESCRIPTORS: DESCRIPTORS: OTHER (COMMENT)

## 2022-11-14 NOTE — CARE COORDINATION
Ambulatory Care Coordination Note  11/14/2022    ACC: Vladimir Mota RN    pt returned outreach to [employer] [facility] while IP. He is uncertain about anticipated DC at this time, but wanted to return call to [facility]. Pt adds that he is in review w/care team while IP re barriers as r/t following with community-based entities ([facility], [facility] administration). ACM did also review recent referral placed for community-health liaison outreach via [facility], but encouraged pt in continuing dialogue with IP care team to optimize appropriate referrals/resources in [address], and that ACM will resume ACC upon pt transition home once DC from hosp. Offered patient enrollment in the Remote Patient Monitoring (RPM) program for in-home monitoring: Patient is not eligible for RPM program.      Care Coordination Interventions    Referral from Primary Care Provider: Yes  Suggested Interventions and Community Resources  Diabetes Education: Completed (Comment: follows @ [facility] for DM mgmt)  Fall Risk Prevention: Completed  Medi Set or Pill Pack: Completed (Comment: picks up Rx through [facility])  Social Work: In Process (Comment: 10.13.22 interest in applying for Section [employer])  Specialty Services Referral: Completed (Comment: [facility]  Other Services: Completed (Comment: follows w/med mgmt clinic for DM mgmt)  Zone Management Tools: Completed (Comment: DM)  Other Services or Interventions: reviewed pt centered goals; self mgmt concepts; community-based resources          Goals Addressed                      This Visit's Progress      Community Resource Goal (pt-stated)   On track      I will engage w/Community Health Liaison on [address] team to review needs/barriers and discuss potential community-based resources which may prove helpful.     Barriers: impairment:  physical: dizziness/deconditioned, fear of failure, financial, and lack of support  Plan for overcoming my barriers: engage in Care Coordination for added care team support  Confidence: 10/10  Anticipated Goal Completion Date: 4.13.23        Conditions and Symptoms (pt-stated)   On track      I will schedule office visits, as directed by my provider. I will keep my appointment or reschedule if I have to cancel. I will notify my provider of any barriers to my plan of care. I will follow my Zone Management tool to seek urgent or emergent care. I will notify my provider of any symptoms that indicate a worsening of my condition. Barriers: impairment:  physical: deconditioned/chronic condition, fear of failure, and financial  Plan for overcoming my barriers: engage in Care Coordination for added care team support  Confidence: 10/10  Anticipated Goal Completion Date: 4.13.23                Prior to Admission medications    Medication Sig Start Date End Date Taking? Authorizing Provider   Dulaglutide (TRULICITY) 8.04 EU/4.8UV SOPN Inject 0.75 mg into the skin once a week  Patient taking differently: Inject 0.75 mg into the skin once a week On Mondays 10/24/22   HANS Catalan CNP   Insulin Degludec (TRESIBA FLEXTOUCH) 200 UNIT/ML SOPN Inject 50 Units into the skin daily  Patient taking differently: Inject 60 Units into the skin daily 10/24/22   HANS Catalan CNP   metoclopramide (REGLAN) 10 MG tablet Take 1 tablet by mouth 3 times daily (with meals)  Patient taking differently: Take 10 mg by mouth 3 times daily as needed 10/11/22   HANS Catalan CNP   lisinopril (PRINIVIL;ZESTRIL) 20 MG tablet Take 1 tablet by mouth daily 9/14/22   Alfreda Kehr, DO   Continuous Blood Gluc Sensor (FREESTYLE HOME 2 SENSOR) MISC Use to check blood sugar 4 times per day (before each meal and at bedtime).  9/13/22   HANS Catalan CNP   itraconazole (SPORANOX) 100 MG capsule Take 200 mg by mouth 2 times daily    Historical Provider, MD   gabapentin (NEURONTIN) 400 MG capsule Take 2 capsules by mouth in the morning and 2 capsules at noon and 2 capsules before bedtime. Do all this for 90 days. 2-3 QD. 8/16/22 11/14/22  Vivien CabreraHANS CNP   metFORMIN (GLUCOPHAGE) 1000 MG tablet Take 1 tablet by mouth in the morning and 1 tablet before bedtime. 8/16/22   Vivien RickHANS - CNP   empagliflozin (JARDIANCE) 10 MG tablet Take 1 tablet by mouth in the morning. 7/19/22   HANS Rivas - CNP   DULoxetine (CYMBALTA) 30 MG extended release capsule Take 1 capsule by mouth in the morning. 7/19/22   Ashleyeloisa RickHANS - CNP   dicyclomine (BENTYL) 10 MG capsule Take 1 capsule by mouth 4 times daily (before meals and nightly) 7/19/22   Vivien RickHANS - CNP   chlorthalidone (HYGROTON) 25 MG tablet Take 1 tablet by mouth in the morning. 7/19/22   HANS Rivas - CNP   carvedilol (COREG) 25 MG tablet Take 1 tablet by mouth in the morning and 1 tablet before bedtime. 7/19/22   Vivien Cabrera APRN - CNP   aspirin 81 MG chewable tablet Take 1 tablet by mouth in the morning.  7/19/22   Vivien Cabrera APRN - CNP   rosuvastatin (CRESTOR) 40 MG tablet Take 1 tablet by mouth nightly 7/19/22   Vivien Cabrera APRN - CNP   albuterol sulfate  (90 Base) MCG/ACT inhaler Inhale 2 puffs into the lungs every 6 hours as needed for Wheezing    Historical Provider, MD       Future Appointments   Date Time Provider [place]   11/15/2022 11:00 AM Ngozi Mcgill, PT [facility] OP PT [facility]   11/17/2022 11:00 AM Ngozi Mcgill, PT [facility] OP PT [facility]   11/22/2022 10:15 AM Ngozi Mcgill, PT [facility] OP PT [facility]   12/6/2022 10:00 AM Vivien Cabrera APRN - CNP [facility]   2/21/2023 10:45 AM Tory Tovar MD AND [facility] Neurology -

## 2022-11-14 NOTE — CARE COORDINATION
Met with pt re: dc order; mom states pt not ready to leave & wants him to go to acute rehab. Explained that pt is not appropriate for acute rehab due to the issues she mentioned (frequent hospitalizations, diabetic pain) are not acute rehab appropriate & pt is scoring a 24/24 on the AM-PAC score. Mom also stated that pt told her earlier today that he is 'ready to give it all up'; notified Dr. Tony Dozier. Mom also wants to talk to 'someone in charge'; charge nurse notified.     Jeremy Nails RN, BSN,   [phone]  Electronically signed by Jeremy Nails RN on 11/14/2022 at 2:36 PM

## 2022-11-14 NOTE — PROGRESS NOTES
ARU consult noted. No appropriate diagnosis noted for ARU admission. Therapy recommendations noted in chart and would encourage patient to participate in outpatient therapy.
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
## 2022-11-14 NOTE — PROGRESS NOTES
Hospitalist Progress Note      PCP: Ted Castorena APRN - LOL[facility]    Chief Complaint. Patient is a 58-year-old male who presents to the hospital due to chest pain. According to patient he has been having chest pain especially with exertion, he also had shortness of breath as well as chills. He mentions his chest pain radiated across his chest felt heavy and constant it was there for around 30 minutes. Patient denied associated nausea vomiting diarrhea constipation dysuria. Date of Admission: 11/11/2022    Subjective: tolerating diet, talking on the phone in the bed, he is requesting to go to in rehab. He do not have any complaints today. denies nausea, vomiting, shortness of breath, fever or chills.     Medications:  Reviewed    Infusion Medications    sodium chloride Stopped (11/12/22 1305)    dextrose Stopped (11/12/22 1305)     Scheduled Medications    enoxaparin  30 mg SubCUTAneous BID    pregabalin  75 mg Oral BID    melatonin  3 mg Oral Once    ipratropium-albuterol  1 ampule Inhalation 4x daily    insulin lispro  4 Units SubCUTAneous Once    insulin glargine  10 Units SubCUTAneous Nightly    sodium chloride flush  5-40 mL IntraVENous 2 times per day    aspirin  81 mg Oral Daily    atorvastatin  80 mg Oral Nightly    carvedilol  25 mg Oral BID WC    [Held by provider] chlorthalidone  25 mg Oral Daily    DULoxetine  30 mg Oral Daily    itraconazole  200 mg Oral BID    [Held by provider] lisinopril  20 mg Oral Daily    insulin lispro  0-8 Units SubCUTAneous TID WC    insulin lispro  0-4 Units SubCUTAneous Nightly    insulin glargine  40 Units SubCUTAneous Nightly     PRN Meds: potassium chloride **OR** potassium alternative oral replacement **OR** potassium chloride, labetalol, hydrALAZINE, sodium chloride flush, sodium chloride, ondansetron **OR** ondansetron, acetaminophen **OR** acetaminophen, polyethylene glycol, albuterol sulfate HFA, glucose, dextrose bolus **OR** dextrose bolus, glucagon (rDNA), dextrose    No intake or output data in the 24 hours ending 11/14/22 1632    Physical Exam Performed:    BP 95/63   Pulse 93   Temp 98 °F (36.7 °C) (Oral)   Resp 18   Wt 244 lb 0.8 oz (110.7 kg)   SpO2 97%   BMI 32.20 kg/m²     General appearance: NAD, on RA  HEENT:  Conjunctivae/corneas clear. Neck: Supple, with full range of motion. Respiratory:  Normal respiratory effort. Clear to auscultation, bilaterally without Rales/Wheezes/Rhonchi. Cardiovascular: Regular rate and rhythm with normal S1/S2 without murmurs or rubs  Abdomen: Soft, non-tender, non-distended, normal bowel sounds. Musculoskeletal: No cyanosis or edema bilaterally  Neurologic:  without any focal sensory/motor deficits. grossly non-focal.  Psychiatric: Alert and oriented, Normal mood  Peripheral Pulses: +2 palpable, equal bilaterally    Labs:   Recent Labs     11/12/22  0506 11/13/22  1243 11/14/22  1035   WBC 5.0 7.0 5.4   HGB 13.6 14.0 13.9   HCT 40.9 42.8 43.1    285 282       Recent Labs     11/13/22  1243 11/14/22  1035   * 136   K 3.4* 4.0   CL 99 99   CO2 27 27   BUN 33* 31*   CREATININE 1.4* 1.4*   CALCIUM 8.8 9.0       No results for input(s): AST, ALT, BILIDIR, BILITOT, ALKPHOS in the last 72 hours. No results for input(s): INR in the last 72 hours. Recent Labs     11/12/22  0506 11/13/22  1759 11/13/22  2308   TROPONINI 0.02* 0.02* 0.01         Urinalysis:      Lab Results   Component Value Date/Time    NITRU Negative 07/05/2022 11:49 AM    WBCUA 1 07/05/2022 11:49 AM    BACTERIA None Seen 07/05/2022 11:49 AM    RBCUA 1 07/05/2022 11:49 AM    BLOODU Negative 07/05/2022 11:49 AM    SPECGRAV 1.024 07/05/2022 11:49 AM    GLUCOSEU 500 07/05/2022 11:49 AM    GLUCOSEU NEGATIVE 01/02/2011 02:44 PM       Radiology:  CT CHEST PULMONARY EMBOLISM W CONTRAST   Final Result   No pulmonary embolus is identified.       Stable right hilar adenopathy and upper lobe nodules suggestive of   granulomatous disease, since 05/29/2022. since the patient has been followed   over 6 month interval, additional follow-up is recommended in 12 months. Additional pulmonary nodule management guidelines are inserted below for   reference. RECOMMENDATIONS:   Multiple pulmonary nodules. Most severe: 6 mm right solid pulmonary nodule   within the upper lobe. Recommend a non-contrast Chest CT at 3-6 months. If   patient is high risk for malignancy, recommend an additional non-contrast   Chest CT at 18-24 months; if patient is low risk for malignancy a   non-contrast Chest CT at 18-24 months is optional.   These guidelines do not apply to immunocompromised patients and patients with   cancer. Follow up in patients with significant comorbidities as clinically   warranted. For lung cancer screening, adhere to Lung-RADS guidelines. Reference: Radiology. 2017; 284(1):228-43. XR CHEST PORTABLE   Final Result   No significant findings in the chest.         MRI CARDIAC W WO CONTRAST    (Results Pending)         Assessment/Plan:    Active Hospital Problems    Diagnosis     Chest pain [R07.9]      Priority: Medium       Patient is a 81-year-old male who presents to the hospital due to chest pain. According to patient he has been having chest pain especially with exertion, he also had shortness of breath as well as chills. He mentions his chest pain radiated across his chest felt heavy and constant it was there for around 30 minutes. Patient denied associated nausea vomiting diarrhea constipation dysuria.      Assessment  Chest pain, elevated troponin-cannot rule out NSTEMI rule out ACS  Sinus tachycardia  Hypokalemia  Elevated troponin likely demand ischemia  Diabetes mellitus  Hyperlipidemia     Plan  DC IV heparin, start lovenox for DVT PPx  Consult cardiology - Cardiac MRI as OP, ok to dc per cardiology  Monitor cardiac telemetry  Monitor troponin  Recently had echo 5/2022 did show EF 40 to 45% with mildly decreased ejection fraction  Monitor and replace electrolytes  DVT prophylaxis-on lovenox  Resume home medications  Monitor replace electrolytes  Diet: ADULT DIET; Regular; 4 carb choices (60 gm/meal);  No Caffeine  Code Status: Full Code    PT/OT Eval Status: ordered    Dispo/Plan of care - consult PT/OT, cardiac MRI as OP, stable for discharge, patient expressed to him mom that \"I am ready to give up\"    Nataly Main MD

## 2022-11-14 NOTE — TELEPHONE ENCOUNTER
Pt unable to keep telephone visit for DM f/u today due to current hospitalization for chest pain. Pt instructed to call when d/c home as he may transition to rehab unit.      Tee Moreno, PharmD, [facility]  Medication Management Clinic   [facility] [address] Ph: [phone]  [facility] Ph: [phone]  11/14/2022 9:00 AM

## 2022-11-14 NOTE — PROGRESS NOTES
Clinical Pharmacy Note  Subcutaneous Anticoagulant Adjustment     Enoxaparin has been adjusted to 30mg twice daily based on [facility] policy. Recent Labs     11/11/22  1156 11/13/22  1243   CREATININE 1.0 1.4*     Recent Labs     11/13/22  1243   HGB 14.0   HCT 42.8        Estimated Creatinine Clearance: 96 mL/min (A) (based on SCr of 1.4 mg/dL (H)). Pharmacist Review of Appropriate Use and Automatic Dose Adjustment of Subcutaneous Anticoagulants (Adult)    The guidance below is to provide initial recommendations for dosing. If recommended dose does not align well with patient's current clinical picture, communications with the care team will occur to determine most appropriate medication and dose. TABLE 1. ENOXAPARIN ROUTINE PROPHYLAXIS DOSING (Medically ill, routine surgery)   Patient Weight (kg)     50.9 and below 51 - 100.9 101 - 150.9 151 - 174.9 175 or greater         Estimated CrCl  (ml/min) 30 or greater   30 mg SUBQ daily   40 mg SUBQ daily 30 mg SUBQ BID  40 mg SUBQ BID 60mg SUBQ BID      15-29 UFH 5000 units SUBQ BID   30 mg SUBQ daily 30 mg SUBQ daily 40 mg SUBQ daily   60 mg SUBQ daily      Less than 15 or Dialysis UFH 5000 units SUBQ BID   UFH 5000 units SUBQ TID UFH 7500 units SUBQ TID       TABLE 2. ENOXAPARIN TREATMENT DOSING   (Based on 1mg/kg BID for DVT/PE/AFib)   Patient Weight (kg)     50.9 and below .9 151-189.9 190 or greater         Estimated CrCl  (ml/min) 30 or greater Recommend [facility] standardized UFH infusion, apixaban or rivaroxaban 1mg/kg SUBQ BID 1mg/kg SUBQ BID if anti-Xa levels are feasible per institution. Alternatively,  recommend switch to [facility] standardized UFH infusion     Recommend switch to [facility] standardized UFH infusion. 15-29 Recommend [facility] standardized UFH infusion or apixaban 1mg/kg SUBQ daily Recommend switch to [facility] standardized UFH infusion     Less than 15 or Dialysis Recommend switch to [facility] standardized UFH infusion.      Александр Sheldon, [address] [facility] 11/14/2022 10:32 AM

## 2022-11-14 NOTE — CONSULTS
Mental health & housing resources given on admission.     Elizabeth Florian RN, BSN,   [phone]  Electronically signed by Elizabeth Florian RN on 11/14/2022 at 2:38 PM

## 2022-11-14 NOTE — PROGRESS NOTES
11/14/22 1802   Encounter Summary   Encounter Overview/Reason  Initial Encounter   Service Provided For: Patient   Referral/Consult From: Nurse   Support System Parent   Last Encounter  11/14/22  (declined conversation with  )   Complexity of Encounter Low   Begin Time 1801   End Time  1804   Total Time Calculated 3 min   Encounter    Type Initial Screen/Assessment   Assessment/Intervention/Outcome   Assessment Other (Comment)  (passive, declined conversation )   Intervention Explored/Affirmed feelings, thoughts, concerns;Prayer (assurance of)/[place]   Outcome Did not respond

## 2022-11-15 ENCOUNTER — HOSPITAL ENCOUNTER (OUTPATIENT)
Dept: PHYSICAL THERAPY | Age: 37
Setting detail: THERAPIES SERIES
Discharge: HOME | End: 2022-11-15
Payer: MEDICAID

## 2022-11-15 PROBLEM — F33.1 MDD (MAJOR DEPRESSIVE DISORDER), RECURRENT EPISODE, MODERATE (HCC): Status: ACTIVE | Noted: 2022-11-15

## 2022-11-15 PROBLEM — F41.9 ANXIETY: Status: ACTIVE | Noted: 2022-11-15

## 2022-11-15 LAB
ANION GAP SERPL CALCULATED.3IONS-SCNC: 9 MMOL/L (ref 3–16)
BLOOD CULTURE, ROUTINE: NORMAL
BUN BLDV-MCNC: 26 MG/DL (ref 7–20)
CALCIUM SERPL-MCNC: 8.9 MG/DL (ref 8.3–10.6)
CHLORIDE BLD-SCNC: 102 MMOL/L (ref 99–110)
CO2: 27 MMOL/L (ref 21–32)
CREAT SERPL-MCNC: 1.3 MG/DL (ref 0.9–1.3)
CULTURE, BLOOD 2: NORMAL
GFR SERPL CREATININE-BSD FRML MDRD: >60 ML/MIN/{1.73_M2}
GLUCOSE BLD-MCNC: 120 MG/DL (ref 70–99)
GLUCOSE BLD-MCNC: 126 MG/DL (ref 70–99)
GLUCOSE BLD-MCNC: 137 MG/DL (ref 70–99)
GLUCOSE BLD-MCNC: 219 MG/DL (ref 70–99)
PERFORMED ON: ABNORMAL
POTASSIUM SERPL-SCNC: 3.7 MMOL/L (ref 3.5–5.1)
SODIUM BLD-SCNC: 138 MMOL/L (ref 136–145)

## 2022-11-15 PROCEDURE — 6370000000 HC RX 637 (ALT 250 FOR IP): Performed by: NURSE PRACTITIONER

## 2022-11-15 PROCEDURE — 6370000000 HC RX 637 (ALT 250 FOR IP): Performed by: INTERNAL MEDICINE

## 2022-11-15 PROCEDURE — 36415 COLL VENOUS BLD VENIPUNCTURE: CPT

## 2022-11-15 PROCEDURE — 94760 N-INVAS EAR/PLS OXIMETRY 1: CPT

## 2022-11-15 PROCEDURE — 94640 AIRWAY INHALATION TREATMENT: CPT

## 2022-11-15 PROCEDURE — 6360000002 HC RX W HCPCS: Performed by: INTERNAL MEDICINE

## 2022-11-15 PROCEDURE — 97530 THERAPEUTIC ACTIVITIES: CPT

## 2022-11-15 PROCEDURE — 2580000003 HC RX 258: Performed by: INTERNAL MEDICINE

## 2022-11-15 PROCEDURE — 1200000000 HC SEMI PRIVATE

## 2022-11-15 PROCEDURE — 97116 GAIT TRAINING THERAPY: CPT

## 2022-11-15 PROCEDURE — 80048 BASIC METABOLIC PNL TOTAL CA: CPT

## 2022-11-15 RX ORDER — TRAZODONE HYDROCHLORIDE 50 MG/1
50 TABLET ORAL NIGHTLY
Status: DISCONTINUED | OUTPATIENT
Start: 2022-11-15 | End: 2022-11-16 | Stop reason: HOSPADM

## 2022-11-15 RX ORDER — IPRATROPIUM BROMIDE AND ALBUTEROL SULFATE 2.5; .5 MG/3ML; MG/3ML
1 SOLUTION RESPIRATORY (INHALATION) EVERY 4 HOURS PRN
Status: DISCONTINUED | OUTPATIENT
Start: 2022-11-15 | End: 2022-11-16 | Stop reason: HOSPADM

## 2022-11-15 RX ORDER — DULOXETIN HYDROCHLORIDE 30 MG/1
30 CAPSULE, DELAYED RELEASE ORAL DAILY
Qty: 30 CAPSULE | Refills: 3 | Status: SHIPPED | OUTPATIENT
Start: 2022-11-15 | End: 2022-12-15

## 2022-11-15 RX ORDER — TRAZODONE HYDROCHLORIDE 50 MG/1
50 TABLET ORAL NIGHTLY PRN
Qty: 30 TABLET | Refills: 1 | Status: SHIPPED | OUTPATIENT
Start: 2022-11-15

## 2022-11-15 RX ORDER — PREGABALIN 75 MG/1
75 CAPSULE ORAL 2 TIMES DAILY
Qty: 60 CAPSULE | Refills: 0 | Status: SHIPPED | OUTPATIENT
Start: 2022-11-15 | End: 2022-12-15

## 2022-11-15 RX ORDER — DULOXETIN HYDROCHLORIDE 30 MG/1
30 CAPSULE, DELAYED RELEASE ORAL DAILY
Qty: 30 CAPSULE | Refills: 3 | Status: SHIPPED | OUTPATIENT
Start: 2022-11-15 | End: 2022-11-15 | Stop reason: SDUPTHER

## 2022-11-15 RX ADMIN — ASPIRIN 81 MG 81 MG: 81 TABLET ORAL at 09:09

## 2022-11-15 RX ADMIN — PREGABALIN 75 MG: 75 CAPSULE ORAL at 21:33

## 2022-11-15 RX ADMIN — DULOXETINE HYDROCHLORIDE 30 MG: 30 CAPSULE, DELAYED RELEASE ORAL at 09:09

## 2022-11-15 RX ADMIN — INSULIN GLARGINE 40 UNITS: 100 INJECTION, SOLUTION SUBCUTANEOUS at 21:34

## 2022-11-15 RX ADMIN — CARVEDILOL 25 MG: 25 TABLET, FILM COATED ORAL at 17:41

## 2022-11-15 RX ADMIN — INSULIN GLARGINE 10 UNITS: 100 INJECTION, SOLUTION SUBCUTANEOUS at 03:49

## 2022-11-15 RX ADMIN — IPRATROPIUM BROMIDE AND ALBUTEROL SULFATE 1 AMPULE: .5; 3 SOLUTION RESPIRATORY (INHALATION) at 08:36

## 2022-11-15 RX ADMIN — TRAZODONE HYDROCHLORIDE 50 MG: 50 TABLET ORAL at 21:33

## 2022-11-15 RX ADMIN — INSULIN GLARGINE 10 UNITS: 100 INJECTION, SOLUTION SUBCUTANEOUS at 22:32

## 2022-11-15 RX ADMIN — INSULIN LISPRO 2 UNITS: 100 INJECTION, SOLUTION INTRAVENOUS; SUBCUTANEOUS at 17:48

## 2022-11-15 RX ADMIN — ATORVASTATIN CALCIUM 80 MG: 80 TABLET, FILM COATED ORAL at 21:33

## 2022-11-15 RX ADMIN — PREGABALIN 75 MG: 75 CAPSULE ORAL at 09:09

## 2022-11-15 RX ADMIN — ENOXAPARIN SODIUM 30 MG: 100 INJECTION SUBCUTANEOUS at 09:09

## 2022-11-15 RX ADMIN — SODIUM CHLORIDE, PRESERVATIVE FREE 10 ML: 5 INJECTION INTRAVENOUS at 09:10

## 2022-11-15 RX ADMIN — ITRACONAZOLE 200 MG: 100 CAPSULE, COATED PELLETS ORAL at 09:09

## 2022-11-15 RX ADMIN — CARVEDILOL 25 MG: 25 TABLET, FILM COATED ORAL at 09:09

## 2022-11-15 RX ADMIN — ITRACONAZOLE 200 MG: 100 CAPSULE, COATED PELLETS ORAL at 21:33

## 2022-11-15 ASSESSMENT — PAIN SCALES - GENERAL: PAINLEVEL_OUTOF10: 8

## 2022-11-15 NOTE — PROGRESS NOTES
Physical Therapy  Facility/Department: [address] MED SURG  Physical Therapy Daily Note  If patient discharges prior to next session this note will serve as a discharge summary. Please see below for the latest assessment towards goals. Name: Siva Gonzales  : 1985  MRN: [id number]  Date of Service: 11/15/2022    Discharge Recommendations:  Home with assist PRN, Outpatient PT, Patient would benefit from continued therapy after discharge   Siva Gonzales scored a 24/24 on the AM-PAC short mobility form. Current research shows that an AM-PAC score of 18 or greater is typically associated with a discharge to the patient's home setting. Based on the patient's AM-PAC score and their current functional mobility deficits, it is recommended that the patient have 2-3 sessions per week of Physical Therapy at d/c to increase the patient's independence. At this time, this patient demonstrates the endurance and safety to discharge home with prn assist and OP PT (home vs OP services) and a follow up treatment frequency of 2-3x/wk. Please see assessment section for further patient specific details. PT Equipment Recommendations  Equipment Needed: No      Patient Diagnosis(es): The primary encounter diagnosis was Chest pain, unspecified type. Diagnoses of Shortness of breath, Tachycardia, Cardiomyopathy, unspecified type ([address] and Neuropathy were also pertinent to this visit. Past Medical History:  has a past medical history of COVID-19, History of blood transfusion, Hyperlipidemia, and Neuropathy. Past Surgical History:  has a past surgical history that includes bronchoscopy (2022); bronchoscopy (2022); bronchoscopy (2022); and bronchoscopy (N/A, 2022). Assessment   Assessment: Today, the pt demonstrated that he is functioning at his most current baseline and anticipate that he will be safe for home at d/c.  The pt is limited at this time due to his severe neuropathy and the pain and weakness it is causing; the pt reporting his appetite at home is not good and he feels he does not get the right nutrition which also affects his strength. Discussed his overall medical condition with the pt and he reports he does have a PCP that manages his diabetes; he has seen a nutritionist re: his diet and he is getting outpatient PT for strengthening and balance. This therapist encouraged the pt to con't with all these resources as this will be his best option for improving his overall health and well being. The pt did ask about a scooter and this is something that his OP PT and his PCP can work on but due to his neuropathy and decreased activity tolerance, he may benefit from having a scooter for longer distances. At this time, anticipate that the pt will be safe for home when medically ready; recommend that the pt con't with outpatient PT at d/c. Will con't to follow while he is on the acute care floor. Therapy Prognosis: Good  Barriers to Learning: none  Requires PT Follow-Up: Yes  Activity Tolerance  Activity Tolerance: Patient tolerated treatment well     Plan   Physcial Therapy Plan  General Plan: 3-5 times per week  Current Treatment Recommendations: Strengthening, Balance training, Functional mobility training, Transfer training, Endurance training, Gait training, Neuromuscular re-education, Equipment evaluation, education, & procurement, Patient/Caregiver education & training, Safety education & training, Therapeutic activities  Safety Devices  Type of Devices: Call light within reach, Gait belt, Left in chair (the pt is up ad joanne in the room)     Restrictions  Restrictions/Precautions  Restrictions/Precautions: Up Ad Joanne     Subjective   General  Chart Reviewed: Yes  Patient assessed for rehabilitation services?: Yes  Additional Pertinent Hx: Patient is a 28-year-old male who presents to the hospital on 11/11/22 due to chest pain.   According to patient he has been having chest pain especially with exertion, he also had shortness of breath as well as chills. Cardiologist reports mild nonischemic cardiomyopathy with global dysfunction EF 40 to 45%, EKG with a first-degree heart block. Recent closed displaced fracture of navicular bone of left foot in June of 2022. Response To Previous Treatment: Patient with no complaints from previous session.   Family / Caregiver Present: No  Referring Practitioner: Marybeth Woods MD  Referral Date : 11/13/22  Diagnosis: chest pain  Follows Commands: Within Functional Limits  Subjective  Subjective: the pt was found to be in the bed, awake; he is pleasant and agreeable to therapy; he is reporting the new med, Lyrica, did not help with his pain in his legs and feet last night and this morning; he is reporting 9/10 pain         Social/Functional History  Social/Functional History  Lives With: Family (2 kids)  Type of Home: Apartment  Home Layout: One level  Home Access: Stairs to enter with rails  Bathroom Shower/Tub: Tub/Shower unit, Shower chair with back (TTB)  Bathroom Toilet: Standard (Holds onto cabinet next to toilet)  Bathroom Equipment: Tub transfer bench  Home Equipment: Ivet Ripper  Has the patient had two or more falls in the past year or any fall with injury in the past year?: Yes ([place] 3-4 months ago resulting in ankle fracture)  ADL Assistance: Independent  Homemaking Assistance: Independent (uses electric scooter at the grocery store)  [address]: Independent  Transfer Assistance: Independent  Active : Yes (only when [place] Brock")  Additional Comments: Has been doing OP PT recently d/t weakness  Vision/Hearing  Vision  Vision: Within Functional Limits  Hearing  Hearing: Within functional limits    Cognition   Orientation  Overall Orientation Status: Within Functional Limits  Cognition  Overall Cognitive Status: WFL     Objective           Gross Assessment  AROM: Generally decreased, functional (limited dorsiflexion bilaterally - L>R)  Strength: Generally decreased, functional (foot drop B)     Bed mobility  Supine to Sit: Modified independent (bed flat)  Sit to Supine: Unable to assess  Transfers  Sit to Stand: Modified independent  Stand to Sit: Modified independent  Comment: noted to have increased difficulty from lower surfaces but still manages safely  Ambulation  Surface: Level tile  Device: Single point cane  Assistance: Supervision  Quality of Gait: mild steppage to clear floor; slowed pace; tends to reach for objects at times  Gait Deviations: Slow Donya; Increased ADDY; Decreased step length  Distance: 60 feet x 1  More Ambulation?: Yes  Ambulation 2  Surface - 2: level tile  Device 2: Rolling Walker  Assistance 2: Supervision  Quality of Gait 2: con't slowed pace, difficulty managing the walker at times and the pt reported he felt the walker was going to tip on him  Distance: 60 feet x 1  Comments: after using the walker the pt appeared safest when using the cane and this was told to the pt; he agreed     Balance  Sitting - Static: Good  Sitting - Dynamic: Good  Standing - Static: Good  Standing - Dynamic: Good;-  Comments: the pt had no LOB noted with standing or walking with either the cane or the walker this session          AM-PAC Score  AM-PAC Inpatient Mobility Raw Score : 24 (11/15/22 0805)  AM-PAC Inpatient T-Scale Score : 61.14 (11/15/22 0805)  Mobility Inpatient CMS 0-100% Score: 0 (11/15/22 0805)  Mobility Inpatient CMS G-Code Modifier : [address] (11/15/22 0805)         Goals  Short Term Goals  Time Frame for Short Term Goals: by acute discharge  Short Term Goal 1: bed mobility mod I (met)  Short Term Goal 2: sit<>Stand independent (met at mod I) the pt is up ad luba in the room  Short Term Goal 3: ambulate > 50' mod I with SPC (almost met, still at supervision but is up ad luba in the room)  Patient Goals   Patient Goals : none stated       Education  Patient Education  Education Given To: Patient  Education Provided: Role of Therapy;Plan of Care  Education Method: Verbal;Demonstration  Barriers to Learning: None  Education Outcome: Verbalized understanding;Demonstrated understanding      Therapy Time   Individual Concurrent Group Co-treatment   Time In 0732         Time Out 0815         Minutes 43            Electronically signed by Del Doan, PT [id number] on 11/15/2022 at 8:27 AM

## 2022-11-15 NOTE — DISCHARGE SUMMARY
Hospital Medicine Discharge Summary    Patient ID: Ricky Dial      Patient's PCP: HANS Catalan CNP    Admit Date: 11/11/2022     Discharge Date:   11/16/22    Admitting Physician: Song Crawford MD     Discharge Physician: HANS Srivastava CNP       Discharge Diagnoses: Active Hospital Problems    Diagnosis Date Noted    MDD (major depressive disorder), recurrent episode, moderate ([address] [F33.1] 11/15/2022     Priority: Medium    Anxiety [F41.9] 11/15/2022     Priority: Medium    Chest pain [R07.9] 11/11/2022     Priority: Medium       The patient was seen and examined on day of discharge and this discharge summary is in conjunction with any daily progress note from day of discharge. Disposition:  [x] Home  [] Home with home health [] Rehab [] Psych [] SNF  [] LTAC  [] Long term nursing home or group home [] Transfer to ICU  [] Transfer to PCU [] Other:    Hospital Course: Patient is a 46-year-old male who presents to the hospital due to chest pain. According to patient he has been having chest pain especially with exertion, he also had shortness of breath as well as chills. He mentions his chest pain radiated across his chest felt heavy and constant it was there for around 30 minutes. Patient denied associated nausea vomiting diarrhea constipation dysuria. On my exam the patient patient still c/o with mild mid chest pain, feels stabbing in nature, no radiation. Occurs at rest and intermittently throughout the day . At times he reports it unbearable with it being so bad at the time. He reports swelling in ankles. 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 He denies stress at home. He denies suicidal plan for me or ideation, but states \"I just don't feel like he should continue to be here\" due to health issues which are \"overbearing. \" He denies a plan.   Psych evaluated the patient and does not feel he needs inpatient psych admission and that that would actually worsen his abiity to pursue mental health resources in the future and would not participate. Psych recs: cymbalta for depression. Can start now or discuss with PCP. Trazodone for sleep nightly. Outpatient talk therapy per social work resources. He can get outpatient cardiac MRI at [facility] per cardiology. He has an appt with his NP PCP 12/6/22  He will be referred to outpatient pain management for neuropathy and keep his PCP appt next month. Continue lyrica as dosed for now. Assessment/plan:     Chest pain, elevated troponin-cannot rule out NSTEMI rule out ACS. Concerning for sarcoidosis per cardiology   - somewhat reproducible with palpation and deep inspiration   - DC IV heparin, start lovenox for DVT PPx  - echo 40-45% EF mild nonischemic cardiomyopathy with global dysfunction  - EKG 1st degree HB  - CTPA neg for PE, + right hilar adenopathy which is also seen on previous CT scan. Elevated troponin likely demand ischemia  - mother expresses concerns about his ability to live independently reporting excessive fatigue where he is unable to perform ADLs. Patient reports same. his PT/OT evals were 24/24. Consult cardiology - Cardiac MRI as OP, ok to dc per cardiology  Monitor cardiac telemetry  Monitor troponin  Recently had echo 5/2022 did show EF 40 to 45% with mildly decreased ejection fraction  Monitor and replace electrolytes  - cont coreg 25mg and lisinopril 20mg daily   - PT/OT evals - ok for home d/c. Sinus tachycardia  - monitor on telemetry   - no arrhythmias         Hypokalemia  - improved        Diabetes mellitus  - SSI and lantus 50 units nightly substit for tresiba 50 units        Hyperlipidemia  - cont home statin     CHELE  - likely due to poor intake   - follow BMP   - hold chlorthalidone for now and ACEi- ok to restart now that renal function improved.     Exam:     BP (!) 131/94   Pulse 97 Temp 98.2 °F (36.8 °C) (Oral)   Resp 18   Wt 245 lb 6 oz (111.3 kg)   SpO2 96%   BMI 32.37 kg/m²   General appearance: No apparent distress, appears stated age and cooperative. HEENT: Pupils equal, round, and reactive to light. Conjunctivae/corneas clear. Neck: Supple, with full range of motion. No jugular venous distention. Trachea midline. Respiratory:  Normal respiratory effort. Clear to auscultation, bilaterally without Rales/Wheezes/Rhonchi. Cardiovascular: Regular rate and rhythm with normal S1/S2 without murmurs, rubs or gallops. Abdomen: Soft, non-tender, non-distended with normal bowel sounds. Musculoskelatal: No clubbing, cyanosis or edema bilaterally. Full range of motion without deformity. Skin: Skin color, texture, turgor normal.  No rashes or lesions. Neurologic:  Neurovascularly intact without any focal sensory/motor deficits. Cranial nerves: II-XII intact, grossly non-focal.  Psychiatric: Alert and oriented, thought content appropriate, normal insight      Consults:     IP CONSULT TO HOSPITALIST  IP CONSULT TO CARDIOLOGY  IP CONSULT TO SOCIAL WORK  IP CONSULT TO PHYSICAL MEDICINE REHAB  IP CONSULT TO SPIRITUAL SERVICES  IP CONSULT TO SOCIAL WORK  IP CONSULT TO CASE MANAGEMENT  IP CONSULT TO PSYCHIATRY  IP CONSULT TO SOCIAL WORK    Diagnostic tests: Imaging:  CT CHEST PULMONARY EMBOLISM W CONTRAST   Final Result   No pulmonary embolus is identified. Stable right hilar adenopathy and upper lobe nodules suggestive of   granulomatous disease, since 05/29/2022. since the patient has been followed   over 6 month interval, additional follow-up is recommended in 12 months. Additional pulmonary nodule management guidelines are inserted below for   reference. RECOMMENDATIONS:   Multiple pulmonary nodules. Most severe: 6 mm right solid pulmonary nodule   within the upper lobe. Recommend a non-contrast Chest CT at 3-6 months.  If   patient is high risk for malignancy, recommend an additional non-contrast   Chest CT at 18-24 months; if patient is low risk for malignancy a   non-contrast Chest CT at 18-24 months is optional.   These guidelines do not apply to immunocompromised patients and patients with   cancer. Follow up in patients with significant comorbidities as clinically   warranted. For lung cancer screening, adhere to Lung-RADS guidelines. Reference: Radiology. 2017; 284(1):228-43. XR CHEST PORTABLE   Final Result   No significant findings in the chest.               Labs: For convenience and continuity at follow-up the following most recent labs are provided:      CBC:    Lab Results   Component Value Date/Time    WBC 5.4 11/14/2022 10:35 AM    HGB 13.9 11/14/2022 10:35 AM    HCT 43.1 11/14/2022 10:35 AM     11/14/2022 10:35 AM       Renal:    Lab Results   Component Value Date/Time     11/15/2022 10:48 AM    K 3.7 11/15/2022 10:48 AM    K 4.0 11/14/2022 10:35 AM     11/15/2022 10:48 AM    CO2 27 11/15/2022 10:48 AM    BUN 26 11/15/2022 10:48 AM    CREATININE 1.3 11/15/2022 10:48 AM    CALCIUM 8.9 11/15/2022 10:48 AM    PHOS 3.8 06/24/2021 03:00 PM           Discharge Instructions/Follow-up:  start cymbalta ER daily for depression (He was on this previously according to med rec.) Discuss with PCP if you would like first before taking  Start trazodone for sleep. Lyrica bid for neuropathy. Discuss dosing increase with PCP     Cardiology outpatient follow up for cardiac MRI    PCP/SNF to follow up: pcp 1 wk or as scheduled. Patient states he thinks he has appt 12/6    D/C condition: stable    Code status: full          Discharge Medications:     Current Discharge Medication List             Details   traZODone (DESYREL) 50 MG tablet Take 1 tablet by mouth nightly as needed for Sleep  Qty: 30 tablet, Refills: 1      pregabalin (LYRICA) 75 MG capsule Take 1 capsule by mouth 2 times daily for 30 days.   Qty: 60 capsule, Refills: 0    Associated Diagnoses: Neuropathy                Details   DULoxetine (CYMBALTA) 30 MG extended release capsule Take 1 capsule by mouth daily  Qty: 30 capsule, Refills: 3                Details   Dulaglutide (TRULICITY) 1.12 IX/5.2LG SOPN Inject 0.75 mg into the skin once a week  Qty: 4 Adjustable Dose Pre-filled Pen Syringe, Refills: 0      Insulin Degludec (TRESIBA FLEXTOUCH) 200 UNIT/ML SOPN Inject 50 Units into the skin daily  Qty: 6 mL, Refills: 1      metoclopramide (REGLAN) 10 MG tablet Take 1 tablet by mouth 3 times daily (with meals)  Qty: 90 tablet, Refills: 1    Associated Diagnoses: Poorly controlled type 2 diabetes mellitus ([address] Gastroparesis      lisinopril (PRINIVIL;ZESTRIL) 20 MG tablet Take 1 tablet by mouth daily  Qty: 90 tablet, Refills: 3      Continuous Blood Gluc Sensor (FREESTYLE HOME 2 SENSOR) MISC Use to check blood sugar 4 times per day (before each meal and at bedtime). Qty: 2 each, Refills: 11      itraconazole (SPORANOX) 100 MG capsule Take 200 mg by mouth 2 times daily      metFORMIN (GLUCOPHAGE) 1000 MG tablet Take 1 tablet by mouth in the morning and 1 tablet before bedtime. Qty: 180 tablet, Refills: 1    Associated Diagnoses: Poorly controlled type 2 diabetes mellitus (HCC)      empagliflozin (JARDIANCE) 10 MG tablet Take 1 tablet by mouth in the morning. Qty: 30 tablet, Refills: 3      dicyclomine (BENTYL) 10 MG capsule Take 1 capsule by mouth 4 times daily (before meals and nightly)  Qty: 120 capsule, Refills: 1      chlorthalidone (HYGROTON) 25 MG tablet Take 1 tablet by mouth in the morning. Qty: 30 tablet, Refills: 3      carvedilol (COREG) 25 MG tablet Take 1 tablet by mouth in the morning and 1 tablet before bedtime. Qty: 60 tablet, Refills: 3      aspirin 81 MG chewable tablet Take 1 tablet by mouth in the morning.   Qty: 30 tablet, Refills: 3      rosuvastatin (CRESTOR) 40 MG tablet Take 1 tablet by mouth nightly  Qty: 30 tablet, Refills: 3      albuterol sulfate  (90 Base) MCG/ACT inhaler Inhale 2 puffs into the lungs every 6 hours as needed for Wheezing             Time Spent on discharge is more than 30 minutes in the examination, evaluation, counseling and review of medications and discharge plan. Signed:    HANS Diaz CNP   11/15/2022      Thank you HANS Mercer CNP for the opportunity to be involved in this patient's care. If you have any questions or concerns please feel free to contact me at [phone].

## 2022-11-15 NOTE — CARE COORDINATION
SW met with patient alone at bedside today regarding possible discharge needs. Patient stated that his primary issue was living with neuropathy after a diabetes diagnosis. He stated that it caused him a great deal of pain. He is working on getting connected to a pain management provider and informed that the appointments are always too far out. SW advised that he should get on a schedule with a provider and ensure that his PCP HANS Ferrera CNP is aware of this appointment. SW informed that physicians are easier to work with if they know that this is a barrier and he had a confirmed appointment. He stated that he was waiting to be seen by psych but does not have a history of being connected to psych providers including mental health or substance abuse as an adult or as a child. He stated that he's tried to apply for social security but was recently denied. He has an  working on an appeal. SW asked patient to consider reapplying for benefits in the near future as he now has a regular physician working with him the community. From a medical perspective it appears as if he is ready for discharge, he is just waiting for psychiatric clearance. Respectfully submitted  HODAN Monzon  [facility]   [phone]    Electronically signed by HODAN Zhang on 11/15/2022 at 1:47 PM

## 2022-11-15 NOTE — PROGRESS NOTES
Hospital Medicine Progress Note      Admit Date: 11/11/2022       CC: F/U for chest pain     HPI: Patient is a 29-year-old male who presents to the hospital due to chest pain. According to patient he has been having chest pain especially with exertion, he also had shortness of breath as well as chills. He mentions his chest pain radiated across his chest felt heavy and constant it was there for around 30 minutes. Patient denied associated nausea vomiting diarrhea constipation dysuria. Interval History/Subjective: patient still with mid chest pain, feels stabbing in nature, no radiation. Occurs at rest and intermittently throughout the day . At times he reports it unbearable with it being so bad at the time. He reports swelling in ankles. 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 He denies stress at home. Awaiting psych eval for concern for suicidal ideation. Patient states he \"just doesn't feel like he should continue to be here\" due to health issues which are overbearing. He denies a plan. He can get outpatient cardiac MRI at [facility]. per cardiology. He has an appt with his NP PCP 12/6/22  He will be referred to outpatient pain management for neuropathy and keep his PCP appt next month. Continue lyrica. Review of Systems:       The patient denied headaches, visual changes, LOC, SOB, CP, ABD pain, N/V/D, skin changes, new or worsening weakness or neuromuscular deficits. Comprehensive ROS negative except as mentioned above.     Past Medical History:        Diagnosis Date    COVID-19     History of blood transfusion     Hyperlipidemia     Neuropathy        Past Surgical History:        Procedure Laterality Date    BRONCHOSCOPY  06/09/2022    BRONCHOSCOPY BRUSHINGS performed by Usman De Paz MD at [address]  06/09/2022    BRONCHOSCOPY DIAGNOSTIC OR CELL [address] ONLY performed by Daniel Barnett Gab Kang MD at [address]  06/09/2022    BRONCHOSCOPY BIOPSY BRONCHUS performed by Zoltan Romeo MD at [address] N/A 06/14/2022    BRONCHOSCOPY DIAGNOSTIC OR CELL [address] ONLY performed by Miranda Atkins DO at [facility] ENDOSCOPY       Allergies:  Penicillins    Past medical and surgical history reviewed. Any changes have been noted. PHYSICAL EXAM:  /79   Pulse 98   Temp 98 °F (36.7 °C) (Oral)   Resp 20   Wt 245 lb 6 oz (111.3 kg)   SpO2 99%   BMI 32.37 kg/m²       Intake/Output Summary (Last 24 hours) at 11/15/2022 1032  Last data filed at 11/14/2022 2341  Gross per 24 hour   Intake 153.5 ml   Output --   Net 153.5 ml        General appearance:   No apparent distress, appears stated age. Cooperative. HEENT:  Normocephalic, atraumatic. PERRLA. EOMi. Conjunctivae/corneas clear, no icterus, non-injected. Neck: Supple, with full range of motion. No jugular venous distention. Trachea midline. Respiratory:  Normal respiratory effort. Clear to auscultation, bilaterally without Rales/Wheezes/Rhonchi. Cardiovascular:  Regular rate and rhythm without murmurs, rubs or gallops. Abdomen: Soft, non-tender, non-distended, without rebound or guarding. Normal bowel sounds. Musculoskeletal:  No clubbing, cyanosis or edema bilaterally. Full range of motion without deformity. Skin: Skin color, texture, turgor normal.  No rashes or lesions. Neurologic:  Neurovascularly intact without any focal sensory/motor deficits. Cranial nerves: II-XII intact, grossly intact. No facial asymmetry, tongue midline.    Psychiatric:  Alert and oriented, thought content appropriate  Capillary Refill: Brisk,< 3 seconds   Peripheral Pulses: +2 palpable, equal bilaterally       LABS:    Lab Results   Component Value Date    WBC 5.4 11/14/2022    HGB 13.9 11/14/2022    HCT 43.1 11/14/2022    MCV 79.4 (L) 11/14/2022     11/14/2022    LYMPHOPCT 32.6 11/11/2022    RBC 5.42 11/14/2022    MCH 25.6 (L) 11/14/2022    MCHC 32.2 11/14/2022    RDW 13.0 11/14/2022       Lab Results   Component Value Date    CREATININE 1.4 (H) 11/14/2022    BUN 31 (H) 11/14/2022     11/14/2022    K 4.0 11/14/2022    CL 99 11/14/2022    CO2 27 11/14/2022       Lab Results   Component Value Date/Time    MG 2.50 11/13/2022 12:43 PM       Lab Results   Component Value Date    ALT 15 11/11/2022    AST 15 11/11/2022    ALKPHOS 87 11/11/2022    BILITOT 0.3 11/11/2022        No flowsheet data found. Lab Results   Component Value Date    LABA1C 11.7 11/11/2022       Imaging:  CT CHEST PULMONARY EMBOLISM W CONTRAST   Final Result   No pulmonary embolus is identified. Stable right hilar adenopathy and upper lobe nodules suggestive of   granulomatous disease, since 05/29/2022. since the patient has been followed   over 6 month interval, additional follow-up is recommended in 12 months. Additional pulmonary nodule management guidelines are inserted below for   reference. RECOMMENDATIONS:   Multiple pulmonary nodules. Most severe: 6 mm right solid pulmonary nodule   within the upper lobe. Recommend a non-contrast Chest CT at 3-6 months. If   patient is high risk for malignancy, recommend an additional non-contrast   Chest CT at 18-24 months; if patient is low risk for malignancy a   non-contrast Chest CT at 18-24 months is optional.   These guidelines do not apply to immunocompromised patients and patients with   cancer. Follow up in patients with significant comorbidities as clinically   warranted. For lung cancer screening, adhere to Lung-RADS guidelines. Reference: Radiology. 2017; 284(1):228-43.          XR CHEST PORTABLE   Final Result   No significant findings in the chest.         MRI CARDIAC W WO CONTRAST    (Results Pending)       Scheduled and prn Medications:    Scheduled Meds:   enoxaparin  30 mg SubCUTAneous BID    pregabalin  75 mg Oral BID    insulin lispro  4 Units SubCUTAneous Once    insulin glargine  10 Units SubCUTAneous Nightly    sodium chloride flush  5-40 mL IntraVENous 2 times per day    aspirin  81 mg Oral Daily    atorvastatin  80 mg Oral Nightly    carvedilol  25 mg Oral BID WC    [Held by provider] chlorthalidone  25 mg Oral Daily    DULoxetine  30 mg Oral Daily    itraconazole  200 mg Oral BID    [Held by provider] lisinopril  20 mg Oral Daily    insulin lispro  0-8 Units SubCUTAneous TID WC    insulin lispro  0-4 Units SubCUTAneous Nightly    insulin glargine  40 Units SubCUTAneous Nightly     Continuous Infusions:   sodium chloride Stopped (11/12/22 1305)    dextrose Stopped (11/12/22 1305)     PRN Meds:.ipratropium-albuterol, melatonin, potassium chloride **OR** potassium alternative oral replacement **OR** potassium chloride, labetalol, hydrALAZINE, sodium chloride flush, sodium chloride, ondansetron **OR** ondansetron, acetaminophen **OR** acetaminophen, polyethylene glycol, albuterol sulfate HFA, glucose, dextrose bolus **OR** dextrose bolus, glucagon (rDNA), dextrose    Assessment & Plan:        Chest pain, elevated troponin-cannot rule out NSTEMI rule out ACS. Concerning for sarcoidosis per cardiology   - somewhat reproducible with palpation and deep inspiration   - DC IV heparin, start lovenox for DVT PPx  - echo 40-45% EF mild nonischemic cardiomyopathy with global dysfunction  - EKG 1st degree HB  - CTPA neg for PE, + right hilar adenopathy which is also seen on previous CT scan. - mother expresses concerns about his ability to live independently reporting excessive fatigue where he is unable to perform ADLs. Patient reports same. Consult cardiology - Cardiac MRI as OP, ok to dc per cardiology  Monitor cardiac telemetry  Monitor troponin  Recently had echo 5/2022 did show EF 40 to 45% with mildly decreased ejection fraction  Monitor and replace electrolytes  - cont coreg 25mg and lisinopril 20mg daily   - PT/OT evals - ok for home d/c.      Sinus tachycardia  - monitor on telemetry   - no arrhythmias       Hypokalemia  - improved       Elevated troponin likely demand ischemia      Diabetes mellitus  - SSI and lantus 50 units nightly substit for tresiba 50 units       Hyperlipidemia  - cont home statin    CHELE  - likely due to poor intake   - follow BMP   - hold chlorthalidone for now and ACEi       Continue current regimen/therapies. Monitor. Adjust medical regimen as appropriate. Body mass index is 32.37 kg/m². The patient and / or the family were informed of the results of any tests, a time was given to answer questions, a plan was proposed and they agreed with plan. DVT ppx: lovenox      Diet: ADULT DIET; Regular; 4 carb choices (60 gm/meal);  No Caffeine    Consults:  IP CONSULT TO HOSPITALIST  IP CONSULT TO CARDIOLOGY  IP CONSULT TO SOCIAL WORK  IP CONSULT TO PHYSICAL MEDICINE REHAB  IP CONSULT TO SPIRITUAL SERVICES  IP CONSULT TO SOCIAL WORK  IP CONSULT TO CASE MANAGEMENT  IP CONSULT TO PSYCHIATRY    DISPO/placement plan: pending     Code Status: Full Code      Agustín Brown, APRN - CNP  11/15/22

## 2022-11-15 NOTE — RT PROTOCOL NOTE
RT Inhaler-Nebulizer Bronchodilator Protocol Note    There is a bronchodilator order in the chart from a provider indicating to follow the RT Bronchodilator Protocol and there is an Initiate RT Inhaler-Nebulizer Bronchodilator Protocol order as well (see protocol at bottom of note). CXR Findings:  No results found. The findings from the last RT Protocol Assessment were as follows:   History Pulmonary Disease: None or smoker <15 pack years  Respiratory Pattern: Regular pattern and RR 12-20 bpm  Breath Sounds: Slightly diminished and/or crackles  Cough: Strong, spontaneous, non-productive  Indication for Bronchodilator Therapy: None  Bronchodilator Assessment Score: 2    Aerosolized bronchodilator medication orders have been revised according to the RT Inhaler-Nebulizer Bronchodilator Protocol below. Respiratory Therapist to perform RT Therapy Protocol Assessment initially then follow the protocol. Repeat RT Therapy Protocol Assessment PRN for score 0-3 or on second treatment, BID, and PRN for scores above 3. No Indications - adjust the frequency to every 6 hours PRN wheezing or bronchospasm, if no treatments needed after 48 hours then discontinue using Per Protocol order mode. If indication present, adjust the RT bronchodilator orders based on the Bronchodilator Assessment Score as indicated below. Use Inhaler orders unless patient has one or more of the following: on home nebulizer, not able to hold breath for 10 seconds, is not alert and oriented, cannot activate and use MDI correctly, or respiratory rate 25 breaths per minute or more, then use the equivalent nebulizer order(s) with same Frequency and PRN reasons based on the score. If a patient is on this medication at home then do not decrease Frequency below that used at home.     0-3 - enter or revise RT bronchodilator order(s) to equivalent RT Bronchodilator order with Frequency of every 4 hours PRN for wheezing or increased work of breathing using Per Protocol order mode. 4-6 - enter or revise RT Bronchodilator order(s) to two equivalent RT bronchodilator orders with one order with BID Frequency and one order with Frequency of every 4 hours PRN wheezing or increased work of breathing using Per Protocol order mode. 7-10 - enter or revise RT Bronchodilator order(s) to two equivalent RT bronchodilator orders with one order with TID Frequency and one order with Frequency of every 4 hours PRN wheezing or increased work of breathing using Per Protocol order mode. 11-13 - enter or revise RT Bronchodilator order(s) to one equivalent RT bronchodilator order with QID Frequency and an Albuterol order with Frequency of every 4 hours PRN wheezing or increased work of breathing using Per Protocol order mode. Greater than 13 - enter or revise RT Bronchodilator order(s) to one equivalent RT bronchodilator order with every 4 hours Frequency and an Albuterol order with Frequency of every 2 hours PRN wheezing or increased work of breathing using Per Protocol order mode. RT to enter RT Home Evaluation for COPD & MDI Assessment order using Per Protocol order mode.     Electronically signed by Taylor Joe RCP on 11/15/2022 at 8:41 AM

## 2022-11-15 NOTE — CONSULTS
Omarkrystin Hartman  11/15/2022  [id number]    Rehab Brief Consult:    Chart reviewed. Patient is a 30 yo M who initially presented 11/11/2022 with chest pain. Cardiology consulted. There is some concern for sarcoidosis and outpatient cardiac MRI has been recommended. Previously independent and now returning to baseline per PT/OT, overall modified independent. Recommendations:  Based on current level of function, do not feel patient requires intensive therapy in ARU setting. Recommend dc to home with home care vs outpatient therapies when medically ready. Thank you for the consultation. Please contact me with any questions.       Aurelia Andrews MD 11/15/2022 2:53 PM

## 2022-11-15 NOTE — PLAN OF CARE
Problem: Pain  Goal: Verbalizes/displays adequate comfort level or baseline comfort level  11/15/2022 0926 by Francisco Daniel RN  Outcome: Progressing  11/15/2022 0018 by Suleman Sutton RN  Outcome: Progressing     Problem: Safety - Adult  Goal: Free from fall injury  11/15/2022 0926 by Francisco Daniel RN  Outcome: Progressing  11/15/2022 0018 by Suleman Sutton RN  Outcome: Progressing     Problem: Skin/Tissue Integrity  Goal: Absence of new skin breakdown  Description: 1. Monitor for areas of redness and/or skin breakdown  2. Assess vascular access sites hourly  3. Every 4-6 hours minimum:  Change oxygen saturation probe site  4. Every 4-6 hours:  If on nasal continuous positive airway pressure, respiratory therapy assess nares and determine need for appliance change or resting period.   11/15/2022 0926 by Francisco Daniel RN  Outcome: Progressing  11/15/2022 0018 by Suleman Sutton RN  Outcome: Progressing     Problem: Chronic Conditions and Co-morbidities  Goal: Patient's chronic conditions and co-morbidity symptoms are monitored and maintained or improved  11/15/2022 0926 by Francisco Daniel RN  Outcome: Progressing  Flowsheets (Taken 11/15/2022 0845)  Care Plan - Patient's Chronic Conditions and Co-Morbidity Symptoms are Monitored and Maintained or Improved: Monitor and assess patient's chronic conditions and comorbid symptoms for stability, deterioration, or improvement  11/15/2022 0018 by Suleman Sutton RN  Outcome: Progressing

## 2022-11-15 NOTE — FLOWSHEET NOTE
[address] [facility].  [place] [address]  Phone: [phone]   Fax:     [phone]    Physical Therapy  Cancellation/No-show Note  Patient Name:  Nikos Monroy  :  1985   Date:  11/15/2022  Cancelled visits to date: 1  No-shows to date: 0    Patient status for today's appointment patient:  [x]  Cancelled  []  Rescheduled appointment  []  No-show     Reason given by patient:  [x]  Patient ill hospitalized  []  Conflicting appointment  []  No transportation    []  Conflict with work  []  No reason given  []  Other:     Comments:      Phone call information:   []  Phone call made today to patient at _ time at number provided:      []  Patient answered, conversation as follows:    []  Patient did not answer, message left as follows:  []  Phone call not made today    Electronically signed by:  Alina Vazquez, PT
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
## 2022-11-16 VITALS
RESPIRATION RATE: 18 BRPM | DIASTOLIC BLOOD PRESSURE: 80 MMHG | HEART RATE: 108 BPM | BODY MASS INDEX: 32.72 KG/M2 | SYSTOLIC BLOOD PRESSURE: 158 MMHG | OXYGEN SATURATION: 97 % | TEMPERATURE: 98 F | WEIGHT: 248.02 LBS

## 2022-11-16 LAB
GLUCOSE BLD-MCNC: 157 MG/DL (ref 70–99)
GLUCOSE BLD-MCNC: 164 MG/DL (ref 70–99)
GLUCOSE BLD-MCNC: 89 MG/DL (ref 70–99)
PERFORMED ON: ABNORMAL
PERFORMED ON: ABNORMAL
PERFORMED ON: NORMAL

## 2022-11-16 PROCEDURE — 2580000003 HC RX 258: Performed by: INTERNAL MEDICINE

## 2022-11-16 PROCEDURE — 6370000000 HC RX 637 (ALT 250 FOR IP): Performed by: INTERNAL MEDICINE

## 2022-11-16 PROCEDURE — 2500000003 HC RX 250 WO HCPCS: Performed by: NURSE PRACTITIONER

## 2022-11-16 PROCEDURE — 6360000002 HC RX W HCPCS: Performed by: INTERNAL MEDICINE

## 2022-11-16 PROCEDURE — 6370000000 HC RX 637 (ALT 250 FOR IP): Performed by: NURSE PRACTITIONER

## 2022-11-16 RX ORDER — BISACODYL 10 MG
10 SUPPOSITORY, RECTAL RECTAL ONCE
Status: COMPLETED | OUTPATIENT
Start: 2022-11-16 | End: 2022-11-16

## 2022-11-16 RX ADMIN — BISACODYL 10 MG: 10 SUPPOSITORY RECTAL at 12:09

## 2022-11-16 RX ADMIN — Medication 330 ML: at 15:51

## 2022-11-16 RX ADMIN — SODIUM CHLORIDE, PRESERVATIVE FREE 10 ML: 5 INJECTION INTRAVENOUS at 08:50

## 2022-11-16 RX ADMIN — ITRACONAZOLE 200 MG: 100 CAPSULE, COATED PELLETS ORAL at 10:09

## 2022-11-16 RX ADMIN — DULOXETINE HYDROCHLORIDE 30 MG: 30 CAPSULE, DELAYED RELEASE ORAL at 08:48

## 2022-11-16 RX ADMIN — ASPIRIN 81 MG 81 MG: 81 TABLET ORAL at 08:48

## 2022-11-16 RX ADMIN — CARVEDILOL 25 MG: 25 TABLET, FILM COATED ORAL at 17:03

## 2022-11-16 RX ADMIN — ENOXAPARIN SODIUM 30 MG: 100 INJECTION SUBCUTANEOUS at 08:48

## 2022-11-16 RX ADMIN — POLYETHYLENE GLYCOL 3350 17 G: 17 POWDER, FOR SOLUTION ORAL at 10:13

## 2022-11-16 RX ADMIN — CARVEDILOL 25 MG: 25 TABLET, FILM COATED ORAL at 08:48

## 2022-11-16 RX ADMIN — PREGABALIN 75 MG: 75 CAPSULE ORAL at 08:48

## 2022-11-16 NOTE — CARE COORDINATION
DISCHARGE SUMMARY     DATE OF DISCHARGE: 11/16/2022    DISCHARGE DESTINATION: Home      TRANSPORTATION: Friend will transport pt home at d/c.      COMMENTS: SW talked with pt about counseling resources. Pt accepted a list of counseling resources. No other needs noted at this time. Pt reported a friend will transport him home.    LISSETTE Chau LSGREGG  [phone]  Electronically signed by Lindsey Jane on 11/16/2022 at 12:16 PM

## 2022-11-16 NOTE — PROGRESS NOTES
Patient discharged per MD, Discharge paperwork reviewed with patient all questions answered. No concerns voiced. IV removed tolerated well. Patient chose to leave unit ambulatory with paperwork and medication. Family to  and transport home.

## 2022-11-16 NOTE — PROGRESS NOTES
Patient complained of not having a bowel movement since 11/11/22. Bowel sounds active. No distention noted. Patient given PRN Amber lax, Which patient said was ineffective. Patient given one time Dulcolax suppository per MD order. Patient said he had a small BM and requested an enema after suppository. Enema ordered per MD and administered. Patient verbalized he had a large BM.

## 2022-11-16 NOTE — PROGRESS NOTES
Clinical Pharmacy Note  Discharge Medication Counseling    Reviewed new medications started during hospital admission: Trazodone, Lyrica, Cymbalta. Indications and side effects were emphasized during counseling. All medication-related questions addressed. Patient verbalized understanding of education. Should the patient express any additional questions or concerns regarding their medications, please do not hesitate to contact the pharmacy department. Patient/caregiver aware they may refuse medications during hospital stay. Pt's script for Lyrica was in the chart. I called [facility] and sent it to them for filling. Appreciate staff getting it ready so that Jaqui Esparza can discharge. 15 minutes spent educating patient regarding medications.

## 2022-11-17 ENCOUNTER — APPOINTMENT (OUTPATIENT)
Dept: PHYSICAL THERAPY | Age: 37
End: 2022-11-17
Payer: MEDICAID

## 2022-11-17 DIAGNOSIS — E55.9 VITAMIN D DEFICIENCY: Primary | ICD-10-CM

## 2022-11-17 RX ORDER — ERGOCALCIFEROL 1.25 MG/1
50000 CAPSULE ORAL WEEKLY
Qty: 12 CAPSULE | Refills: 1 | Status: SHIPPED | OUTPATIENT
Start: 2022-11-17

## 2022-11-17 NOTE — RESULT ENCOUNTER NOTE
Please notify patient vit D level extremely low. I sent in prescription strength vit d for him to take once a week. I also see he was started on statin in hospital. He needs to continue this daily. I recommend he set up follow up appointment with Monik Camp (pharmacist) since he missed his appointment due to being in ER.

## 2022-11-19 ENCOUNTER — HOSPITAL ENCOUNTER (EMERGENCY)
Age: 37
Discharge: HOME | End: 2022-11-19
Attending: EMERGENCY MEDICINE
Payer: MEDICAID

## 2022-11-19 ENCOUNTER — APPOINTMENT (OUTPATIENT)
Dept: GENERAL RADIOLOGY | Age: 37
End: 2022-11-19
Payer: MEDICAID

## 2022-11-19 VITALS
BODY MASS INDEX: 32.87 KG/M2 | SYSTOLIC BLOOD PRESSURE: 153 MMHG | TEMPERATURE: 98.7 F | HEIGHT: 73 IN | DIASTOLIC BLOOD PRESSURE: 97 MMHG | RESPIRATION RATE: 17 BRPM | HEART RATE: 108 BPM | WEIGHT: 248 LBS | OXYGEN SATURATION: 95 %

## 2022-11-19 DIAGNOSIS — R00.0 TACHYCARDIA: ICD-10-CM

## 2022-11-19 DIAGNOSIS — N18.9 CHRONIC KIDNEY DISEASE, UNSPECIFIED CKD STAGE: ICD-10-CM

## 2022-11-19 DIAGNOSIS — R07.9 CHEST PAIN, UNSPECIFIED TYPE: Primary | ICD-10-CM

## 2022-11-19 LAB
A/G RATIO: 1.4 (ref 1.1–2.2)
ALBUMIN SERPL-MCNC: 3.8 G/DL (ref 3.4–5)
ALP BLD-CCNC: 99 U/L (ref 40–129)
ALT SERPL-CCNC: 28 U/L (ref 10–40)
ANION GAP SERPL CALCULATED.3IONS-SCNC: 9 MMOL/L (ref 3–16)
AST SERPL-CCNC: 17 U/L (ref 15–37)
BASOPHILS ABSOLUTE: 0.1 K/UL (ref 0–0.2)
BASOPHILS RELATIVE PERCENT: 1.4 %
BILIRUB SERPL-MCNC: <0.2 MG/DL (ref 0–1)
BUN BLDV-MCNC: 13 MG/DL (ref 7–20)
CALCIUM SERPL-MCNC: 9.1 MG/DL (ref 8.3–10.6)
CHLORIDE BLD-SCNC: 100 MMOL/L (ref 99–110)
CO2: 28 MMOL/L (ref 21–32)
CREAT SERPL-MCNC: 1.5 MG/DL (ref 0.9–1.3)
D DIMER: <0.27 UG/ML FEU (ref 0–0.6)
EKG ATRIAL RATE: 106 BPM
EKG ATRIAL RATE: 107 BPM
EKG DIAGNOSIS: NORMAL
EKG DIAGNOSIS: NORMAL
EKG P AXIS: 55 DEGREES
EKG P AXIS: 66 DEGREES
EKG P-R INTERVAL: 178 MS
EKG P-R INTERVAL: 196 MS
EKG Q-T INTERVAL: 338 MS
EKG Q-T INTERVAL: 340 MS
EKG QRS DURATION: 78 MS
EKG QRS DURATION: 82 MS
EKG QTC CALCULATION (BAZETT): 448 MS
EKG QTC CALCULATION (BAZETT): 453 MS
EKG R AXIS: -35 DEGREES
EKG R AXIS: -42 DEGREES
EKG T AXIS: 42 DEGREES
EKG T AXIS: 66 DEGREES
EKG VENTRICULAR RATE: 106 BPM
EKG VENTRICULAR RATE: 107 BPM
EOSINOPHILS ABSOLUTE: 0.2 K/UL (ref 0–0.6)
EOSINOPHILS RELATIVE PERCENT: 2.7 %
GFR SERPL CREATININE-BSD FRML MDRD: >60 ML/MIN/{1.73_M2}
GLUCOSE BLD-MCNC: 237 MG/DL (ref 70–99)
HCT VFR BLD CALC: 40.3 % (ref 40.5–52.5)
HEMOGLOBIN: 13.4 G/DL (ref 13.5–17.5)
LIPASE: 26 U/L (ref 13–60)
LYMPHOCYTES ABSOLUTE: 1.7 K/UL (ref 1–5.1)
LYMPHOCYTES RELATIVE PERCENT: 29 %
MCH RBC QN AUTO: 26.1 PG (ref 26–34)
MCHC RBC AUTO-ENTMCNC: 33.2 G/DL (ref 31–36)
MCV RBC AUTO: 78.7 FL (ref 80–100)
MONOCYTES ABSOLUTE: 0.6 K/UL (ref 0–1.3)
MONOCYTES RELATIVE PERCENT: 9.9 %
NEUTROPHILS ABSOLUTE: 3.3 K/UL (ref 1.7–7.7)
NEUTROPHILS RELATIVE PERCENT: 57 %
PDW BLD-RTO: 13.1 % (ref 12.4–15.4)
PLATELET # BLD: 240 K/UL (ref 135–450)
PMV BLD AUTO: 8.7 FL (ref 5–10.5)
POTASSIUM REFLEX MAGNESIUM: 4 MMOL/L (ref 3.5–5.1)
RBC # BLD: 5.12 M/UL (ref 4.2–5.9)
SODIUM BLD-SCNC: 137 MMOL/L (ref 136–145)
TOTAL PROTEIN: 6.6 G/DL (ref 6.4–8.2)
TROPONIN: 0.01 NG/ML
TROPONIN: 0.03 NG/ML
WBC # BLD: 5.8 K/UL (ref 4–11)

## 2022-11-19 PROCEDURE — 96360 HYDRATION IV INFUSION INIT: CPT

## 2022-11-19 PROCEDURE — 84484 ASSAY OF TROPONIN QUANT: CPT

## 2022-11-19 PROCEDURE — 71046 X-RAY EXAM CHEST 2 VIEWS: CPT

## 2022-11-19 PROCEDURE — 36415 COLL VENOUS BLD VENIPUNCTURE: CPT

## 2022-11-19 PROCEDURE — 85025 COMPLETE CBC W/AUTO DIFF WBC: CPT

## 2022-11-19 PROCEDURE — 93005 ELECTROCARDIOGRAM TRACING: CPT | Performed by: EMERGENCY MEDICINE

## 2022-11-19 PROCEDURE — 6370000000 HC RX 637 (ALT 250 FOR IP): Performed by: EMERGENCY MEDICINE

## 2022-11-19 PROCEDURE — 83690 ASSAY OF LIPASE: CPT

## 2022-11-19 PROCEDURE — 85379 FIBRIN DEGRADATION QUANT: CPT

## 2022-11-19 PROCEDURE — 99285 EMERGENCY DEPT VISIT HI MDM: CPT

## 2022-11-19 PROCEDURE — 2580000003 HC RX 258: Performed by: EMERGENCY MEDICINE

## 2022-11-19 PROCEDURE — 80053 COMPREHEN METABOLIC PANEL: CPT

## 2022-11-19 RX ORDER — LISINOPRIL 20 MG/1
20 TABLET ORAL ONCE
Status: COMPLETED | OUTPATIENT
Start: 2022-11-19 | End: 2022-11-19

## 2022-11-19 RX ORDER — CARVEDILOL 25 MG/1
25 TABLET ORAL 2 TIMES DAILY WITH MEALS
Status: DISCONTINUED | OUTPATIENT
Start: 2022-11-19 | End: 2022-11-19

## 2022-11-19 RX ORDER — SODIUM CHLORIDE, SODIUM LACTATE, POTASSIUM CHLORIDE, AND CALCIUM CHLORIDE .6; .31; .03; .02 G/100ML; G/100ML; G/100ML; G/100ML
500 INJECTION, SOLUTION INTRAVENOUS ONCE
Status: COMPLETED | OUTPATIENT
Start: 2022-11-19 | End: 2022-11-19

## 2022-11-19 RX ORDER — ACETAMINOPHEN 500 MG
1000 TABLET ORAL ONCE
Status: COMPLETED | OUTPATIENT
Start: 2022-11-19 | End: 2022-11-19

## 2022-11-19 RX ORDER — OXYCODONE HYDROCHLORIDE 5 MG/1
5 TABLET ORAL ONCE
Status: COMPLETED | OUTPATIENT
Start: 2022-11-19 | End: 2022-11-19

## 2022-11-19 RX ORDER — CARVEDILOL 25 MG/1
25 TABLET ORAL
Status: COMPLETED | OUTPATIENT
Start: 2022-11-19 | End: 2022-11-19

## 2022-11-19 RX ORDER — LIDOCAINE 4 G/G
1 PATCH TOPICAL ONCE
Status: DISCONTINUED | OUTPATIENT
Start: 2022-11-19 | End: 2022-11-19 | Stop reason: HOSPADM

## 2022-11-19 RX ADMIN — LISINOPRIL 20 MG: 20 TABLET ORAL at 04:36

## 2022-11-19 RX ADMIN — ACETAMINOPHEN 1000 MG: 500 TABLET ORAL at 01:59

## 2022-11-19 RX ADMIN — OXYCODONE 5 MG: 5 TABLET ORAL at 04:35

## 2022-11-19 RX ADMIN — OXYCODONE 5 MG: 5 TABLET ORAL at 02:00

## 2022-11-19 RX ADMIN — SODIUM CHLORIDE, POTASSIUM CHLORIDE, SODIUM LACTATE AND CALCIUM CHLORIDE 500 ML: 600; 310; 30; 20 INJECTION, SOLUTION INTRAVENOUS at 04:38

## 2022-11-19 RX ADMIN — CARVEDILOL 25 MG: 25 TABLET, FILM COATED ORAL at 04:36

## 2022-11-19 ASSESSMENT — PAIN DESCRIPTION - DESCRIPTORS
DESCRIPTORS: ACHING
DESCRIPTORS: ACHING;DISCOMFORT;STABBING
DESCRIPTORS: STABBING
DESCRIPTORS: DISCOMFORT;ACHING;STABBING

## 2022-11-19 ASSESSMENT — PAIN DESCRIPTION - PAIN TYPE: TYPE: ACUTE PAIN

## 2022-11-19 ASSESSMENT — PAIN DESCRIPTION - ONSET: ONSET: ON-GOING

## 2022-11-19 ASSESSMENT — PAIN DESCRIPTION - FREQUENCY: FREQUENCY: CONTINUOUS

## 2022-11-19 ASSESSMENT — PAIN DESCRIPTION - ORIENTATION
ORIENTATION: RIGHT;LEFT
ORIENTATION: RIGHT;LEFT;ANTERIOR;MID
ORIENTATION: RIGHT;LEFT;MID;ANTERIOR
ORIENTATION: RIGHT;LEFT

## 2022-11-19 ASSESSMENT — PAIN SCALES - GENERAL
PAINLEVEL_OUTOF10: 8
PAINLEVEL_OUTOF10: 9
PAINLEVEL_OUTOF10: 10
PAINLEVEL_OUTOF10: 9

## 2022-11-19 ASSESSMENT — PAIN DESCRIPTION - LOCATION
LOCATION: LEG
LOCATION: CHEST;FOOT;LEG
LOCATION: CHEST;LEG;FOOT
LOCATION: CHEST;LEG

## 2022-11-19 ASSESSMENT — PAIN - FUNCTIONAL ASSESSMENT
PAIN_FUNCTIONAL_ASSESSMENT: 0-10
PAIN_FUNCTIONAL_ASSESSMENT: ACTIVITIES ARE NOT PREVENTED

## 2022-11-19 NOTE — DISCHARGE INSTRUCTIONS
Testing today for your chest pain did not show any new dangerous cause of your symptoms. It is important that you closely monitor your symptoms, follow-up with your doctors including her primary care doctor and the kidney doctor and get the cardiac MRI that was ordered for your heart. You should return to the emergency department for any worsening symptoms.

## 2022-11-19 NOTE — ED PROVIDER NOTES
[id number]          ATTENDING PHYSICIAN NOTE       Date of evaluation: 11/19/2022      Assessment/ Medical Decion Making     MDM:    ED Course as of 11/19/22 0814   Sat Nov 19, 2022   263 43-year-old male with past medical history inclusive of diabetes, hypertension, depression, recurrent chest pain presenting for evaluation of chest pain. EKG on my review with sinus rhythm, leftward axis, tachycardic rate, no acute ST or T wave changes when compared to prior EKG obtained 11/11/2022, 8 days ago. [MM]   6514 I reviewed his medical record. Patient has reportedly had chest pain ongoing for months and underwent left heart catheterization 6 months ago for evaluation of this pain with mild troponinemia. Left heart catheterization was normal.  He was noted to have mildly decreased ejection fraction was diagnosed with nonischemic cardiomyopathy. During his most recent admission cardiology was consulted and advised outpatient MRI for evaluation of possible cardiac sarcoidosis. [MM]   0121 Does have mild troponin elevation today, consistent with prior. [MM]   0158 Chest x-ray without acute finding, nodularity is known and is part of the reason he is getting evaluated for sarcoidosis as an outpatient. [MM]   0348 Repeat troponin is within normal limits. On my reassessment patient symptomatically improved though not resolved. Declined need for additional analgesia at this time [MM]   0421 Repeat troponin reassuring and repeat EKG unchanged. Patient persistently tachycardic which has been an ongoing issue for him. Also noted to be hypertensive here. Given home meds as well as some IV fluids since he has had improvement with this previously. Serum creatinine noted to be 1.5. Previous baseline was 1.2 however on my review of his medical record he has been 1.3-1.4 on more recent investigations. Has not seen nephrology since July.   Given no significant change from baseline we will proceed with home blood pressure medications including lisinopril. [MM]    On repeat evaluation blood pressure improving as is his heart rate. Symptoms are stable. Will discharge to home with outpatient follow-up with PCP, nephrology, and cardiology for cardiac MRI.  [MM]      ED Course User Index  [MM] MD Robyn Reeves MD  8:14 AM    Clinical Impression     1. Chest pain, unspecified type    2. Tachycardia    3. Chronic kidney disease, unspecified CKD stage        Disposition     DISPOSITION Decision To Discharge 11/19/2022 05:30:28 AM        Chief Complaint     Chest Pain (X 3 weeks, recently admitted, patient reports he came tonight because pain isn't getting any better. ) and Other (Patient reports his neuropathy pain is worsening in both legs. )      History of Present Illness     Shannan Carrasco is a 29 y.o. male with DM, seizure disorder, HTN presenting for evaluation of chest pain. Recent admission for same. State his neuropathy pain in his feet and legs is still out of control and he Is having sharp chest pains. Has this pain daily. Started lyrica, which is a new medication for him. Has not noticed a change. Also takes baby ASA and acetaminophen. Told not to take NSAIDS. CP is intermittent, weather change can worsen this. When neuropathy pain is severe, this makes his chest pain worse. No cough. No fever. Has had some SOB since COVID. Albuterol helps with this. NO change. No vomiting or diarrhea. No constipation. No abd pain. Review of Systems     Pertinent positive and negative findings as documented in the HPI. Otherwise a complete ROS was performed and all other systems were reviewed and were negative. Physical Exam     INITIAL VITALS: BP: (!) 150/120, Temp: 98.7 °F (37.1 °C), Heart Rate: (!) 106, Resp: 18, SpO2: 98 %     Nursing note and vitals reviewed. Physical Exam  Constitutional:       General: He is not in acute distress.   HENT: Head: Normocephalic and atraumatic. Nose: No rhinorrhea. Eyes:      Conjunctiva/sclera: Conjunctivae normal.   Cardiovascular:      Rate and Rhythm: Tachycardia present. Heart sounds: No murmur heard. No gallop. Pulmonary:      Effort: Pulmonary effort is normal. No respiratory distress. Breath sounds: Rales (cant, bilat) present. No wheezing. Abdominal:      General: There is no distension. Tenderness: There is no abdominal tenderness. There is no guarding. Musculoskeletal:         General: No deformity. Cervical back: No rigidity. Right lower leg: No edema. Left lower leg: No edema. Comments: Ambulatory with normal gait   Skin:     General: Skin is warm and dry. Neurological:      Mental Status: He is alert. Mental status is at baseline. Psychiatric:         Thought Content: Thought content normal.       Procedures   Procedures    Past Medical, Surgical, Family, and Social History     He has a past medical history of COVID-19, History of blood transfusion, Hyperlipidemia, and Neuropathy. He has a past surgical history that includes bronchoscopy (06/09/2022); bronchoscopy (06/09/2022); bronchoscopy (06/09/2022); and bronchoscopy (N/A, 06/14/2022). His family history includes Diabetes in his father. He reports that he has never smoked. He has never used smokeless tobacco. He reports current alcohol use. He reports that he does not currently use drugs. Nursing Notes, Past Medical Hx, Past Surgical Hx, Social Hx,Allergies, and Family Hx were reviewed.     Medications     Discharge Medication List as of 11/19/2022  5:51 AM        CONTINUE these medications which have NOT CHANGED    Details   vitamin D (ERGOCALCIFEROL) 1.25 MG (57566 UT) CAPS capsule Take 1 capsule by mouth once a week, Disp-12 capsule, R-1Normal      traZODone (DESYREL) 50 MG tablet Take 1 tablet by mouth nightly as needed for Sleep, Disp-30 tablet, R-1Normal      pregabalin (LYRICA) 75 MG capsule Take 1 capsule by mouth 2 times daily for 30 days. , Disp-60 capsule, R-0Print      DULoxetine (CYMBALTA) 30 MG extended release capsule Take 1 capsule by mouth daily, Disp-30 capsule, R-3Normal      Dulaglutide (TRULICITY) 6.28 CW/0.7KW SOPN Inject 0.75 mg into the skin once a week, Disp-4 Adjustable Dose Pre-filled Pen Syringe, R-0Normal      Insulin Degludec (TRESIBA FLEXTOUCH) 200 UNIT/ML SOPN Inject 50 Units into the skin daily, Disp-6 mL, R-1Normal      metoclopramide (REGLAN) 10 MG tablet Take 1 tablet by mouth 3 times daily (with meals), Disp-90 tablet, R-1Normal      lisinopril (PRINIVIL;ZESTRIL) 20 MG tablet Take 1 tablet by mouth daily, Disp-90 tablet, R-3Normal      Continuous Blood Gluc Sensor (FREESTYLE HOME 2 SENSOR) MISC Use to check blood sugar 4 times per day (before each meal and at bedtime). , Disp-2 each, R-11Normal      itraconazole (SPORANOX) 100 MG capsule Take 200 mg by mouth 2 times dailyHistorical Med      metFORMIN (GLUCOPHAGE) 1000 MG tablet Take 1 tablet by mouth in the morning and 1 tablet before bedtime. , Disp-180 tablet, R-1Normal      empagliflozin (JARDIANCE) 10 MG tablet Take 1 tablet by mouth in the morning., Disp-30 tablet, R-3Normal      dicyclomine (BENTYL) 10 MG capsule Take 1 capsule by mouth 4 times daily (before meals and nightly), Disp-120 capsule, R-1Normal      chlorthalidone (HYGROTON) 25 MG tablet Take 1 tablet by mouth in the morning., Disp-30 tablet, R-3Normal      carvedilol (COREG) 25 MG tablet Take 1 tablet by mouth in the morning and 1 tablet before bedtime. , Disp-60 tablet, R-3Normal      aspirin 81 MG chewable tablet Take 1 tablet by mouth in the morning., Disp-30 tablet, R-3Normal      rosuvastatin (CRESTOR) 40 MG tablet Take 1 tablet by mouth nightly, Disp-30 tablet, R-3Normal      albuterol sulfate  (90 Base) MCG/ACT inhaler Inhale 2 puffs into the lungs every 6 hours as needed for WheezingHistorical Med             Allergies     He is allergic to penicillins. ED Course     Patient was given the following medications:  Orders Placed This Encounter   Medications    oxyCODONE (ROXICODONE) immediate release tablet 5 mg    acetaminophen (TYLENOL) tablet 1,000 mg    DISCONTD: lidocaine 4 % external patch 1 patch    oxyCODONE (ROXICODONE) immediate release tablet 5 mg    DISCONTD: carvedilol (COREG) tablet 25 mg    lisinopril (PRINIVIL;ZESTRIL) tablet 20 mg    lactated ringers bolus    carvedilol (COREG) tablet 25 mg       No results found.     RECENT VITALS:  BP: (!) 153/97,Temp: 98.7 °F (37.1 °C), Heart Rate: (!) 108, Resp: 17, SpO2: 95 %     RADIOLOGY:  XR CHEST (2 VW)   Final Result      Stable nodularity right upper lung without convincing evidence for superimposed active cardiopulmonary process          LABS:   Results for orders placed or performed during the hospital encounter of 11/19/22   CBC with Auto Differential   Result Value Ref Range    WBC 5.8 4.0 - 11.0 K/uL    RBC 5.12 4.20 - 5.90 M/uL    Hemoglobin 13.4 (L) 13.5 - 17.5 g/dL    Hematocrit 40.3 (L) 40.5 - 52.5 %    MCV 78.7 (L) 80.0 - 100.0 fL    MCH 26.1 26.0 - 34.0 pg    MCHC 33.2 31.0 - 36.0 g/dL    RDW 13.1 12.4 - 15.4 %    Platelets 515 740 - 580 K/uL    MPV 8.7 5.0 - 10.5 fL    Neutrophils % 57.0 %    Lymphocytes % 29.0 %    Monocytes % 9.9 %    Eosinophils % 2.7 %    Basophils % 1.4 %    Neutrophils Absolute 3.3 1.7 - 7.7 K/uL    Lymphocytes Absolute 1.7 1.0 - 5.1 K/uL    Monocytes Absolute 0.6 0.0 - 1.3 K/uL    Eosinophils Absolute 0.2 0.0 - 0.6 K/uL    Basophils Absolute 0.1 0.0 - 0.2 K/uL   Comprehensive Metabolic Panel w/ Reflex to MG   Result Value Ref Range    Sodium 137 136 - 145 mmol/L    Potassium reflex Magnesium 4.0 3.5 - 5.1 mmol/L    Chloride 100 99 - 110 mmol/L    CO2 28 21 - 32 mmol/L    Anion Gap 9 3 - 16    Glucose 237 (H) 70 - 99 mg/dL    BUN 13 7 - 20 mg/dL    Creatinine 1.5 (H) 0.9 - 1.3 mg/dL    Est, Glom Filt Rate >60 >60    Calcium 9.1 8.3 - 10.6 mg/dL Total Protein 6.6 6.4 - 8.2 g/dL    Albumin 3.8 3.4 - 5.0 g/dL    Albumin/Globulin Ratio 1.4 1.1 - 2.2    Total Bilirubin <0.2 0.0 - 1.0 mg/dL    Alkaline Phosphatase 99 40 - 129 U/L    ALT 28 10 - 40 U/L    AST 17 15 - 37 U/L   Troponin   Result Value Ref Range    Troponin 0.03 (H) <0.01 ng/mL   Lipase   Result Value Ref Range    Lipase 26.0 13.0 - 60.0 U/L   D-Dimer, Quantitative   Result Value Ref Range    D-Dimer, Quant <0.27 0.00 - 0.60 ug/mL FEU   Troponin   Result Value Ref Range    Troponin 0.01 <0.01 ng/mL   EKG 12 Lead   Result Value Ref Range    Ventricular Rate 107 BPM    Atrial Rate 107 BPM    P-R Interval 178 ms    QRS Duration 82 ms    Q-T Interval 340 ms    QTc Calculation (Bazett) 453 ms    P Axis 66 degrees    R Axis -42 degrees    T Axis 66 degrees    Diagnosis       EKG performed in ER and to be interpreted by ER physician. Confirmed by MD, ER ([id number]),  Tadeo King ([phone]) on 11/19/2022 6:55:46 AM   EKG 12 Lead   Result Value Ref Range    Ventricular Rate 106 BPM    Atrial Rate 106 BPM    P-R Interval 196 ms    QRS Duration 78 ms    Q-T Interval 338 ms    QTc Calculation (Bazett) 448 ms    P Axis 55 degrees    R Axis -35 degrees    T Axis 42 degrees    Diagnosis       EKG performed in ER and to be interpreted by ER physician. Confirmed by MD, ER ([id number]),  Fiordaliza Verma[address] ([phone]) on 11/19/2022 6:55:54 AM       CONSULTS:  None    PATIENT REFERRED TO:  [facility]  [address]  [address]  [phone]    As needed, If symptoms worsen    Your Primary Care Doctor      Please call to schedule a follow-up appointment for a visit within the next 1 week. Please be sure to discuss your emergency department visit and any results of testing.     Your kidney doctor      Please call to schedule a follow up with your doctor about yoru kidneys    DISCHARGE MEDICATIONS:  Discharge Medication List as of 11/19/2022  5:51 AM             Renetta Sheikh MD  11/19/22 7269

## 2022-11-21 ENCOUNTER — CARE COORDINATION (OUTPATIENT)
Dept: CARE COORDINATION | Age: 37
End: 2022-11-21

## 2022-11-21 NOTE — CARE COORDINATION
SW placed call to patient per AC referral. This worker is familiar with patient from previous outreach. Spoke to patient about current needs and he stated he is working on submitting paperwork to enroll in PIPP due to outstanding balance/disconnect notice; he owes about $3k. He called community agencies provided by this worker, but they were only going to be able to provide a couple hundred dollars, therefore disconnect would still occur. Patient stated he is working on gathering paperwork for [place] and does not need assistance with this. He continues to work towards obtaining SSDI. SW discussed enrolling in a managed medicaid plan so that he could have ongoing case management and other supportive services. Patient expressed interest and this worker provided him with Medicaid consumer hotline phone number [phone]. Patient expressed no other needs at this time and he has this worker's phone number should any arise.     Aubrie MCLAUGHLIN, [facility]     [phone]

## 2022-11-22 ENCOUNTER — APPOINTMENT (OUTPATIENT)
Dept: PHYSICAL THERAPY | Age: 37
End: 2022-11-22
Payer: MEDICAID

## 2022-11-23 ENCOUNTER — TELEPHONE (OUTPATIENT)
Dept: PRIMARY CARE CLINIC | Age: 37
End: 2022-11-23

## 2022-11-23 NOTE — TELEPHONE ENCOUNTER
After giving patient his lab results he states that the sleeping medication is not strong enough  Currently taking trazadone and lyrica. They have taken him off of the Gabapentin.     He is aware Ms Shelton Gentile is out of the office until Monday

## 2022-11-28 ENCOUNTER — SCHEDULED TELEPHONE ENCOUNTER (OUTPATIENT)
Dept: PHARMACY | Age: 37
End: 2022-11-28

## 2022-11-28 DIAGNOSIS — E11.65 POORLY CONTROLLED TYPE 2 DIABETES MELLITUS (HCC): Primary | ICD-10-CM

## 2022-11-28 RX ORDER — EMPAGLIFLOZIN AND METFORMIN HYDROCHLORIDE 5; 1000 MG/1; MG/1
1 TABLET ORAL 2 TIMES DAILY WITH MEALS
Qty: 60 TABLET | Refills: 5 | Status: CANCELLED | OUTPATIENT
Start: 2022-11-28

## 2022-11-28 NOTE — PROGRESS NOTES
CLINICAL PHARMACY NOTE--Diabetes    Siva Gonzales is a 29 y.o. male with PMHX significant for CKD, diabetic neuropathy, HTN, HLD, Obesity, and Type 2 Diabetes referred by JONY Paniagua for diabetes counseling and medication management. Interval update: Patient reports ongoing nausea over the past several months (started feb 2022). He is seeing a GI physician (Dr Megha Newman) for gastroparesis. No worsening since start of trulicity, and he would like to continue trulicity. Interested in [place], as he is having issues with libre2 sensor bending. Has not placed karen since last month, but is checking BGs before meals. He is waiting for the overpatches to arrive in mail. He continues to c/o severe neuropathy in LE, below his knees. He is trying to get in to see a neurologist, but they have not returned his calls. Admitted 11/11-11/16/22 for CP, elevated trop, concern for sarcoidosis--scheduled for outpatient MRI 12/7/22. Pt had CHELE likely 2/2 poor intake. Returned to ED 11/19/22 for CP. Pt plans to  and start vit d supplement this week. ASSESSMENT/PLAN:    Type 2 Diabetes  A1c above goal <7%  No Libreview data. FBGs 190-230.  -Medications: improve compliance   Metformin 1 g BID   Jardiance 10 mg QD (pt not interested in switch to synjardy)   Trulicity 6.99 mg every tuesday (started 9/27/22, hesitate to titrate based on gastroparesis)  Increase Tresiba to 60 units daily. If FBG stays > 130 after 3 days, then increase to 65 units. -SMBG: resume karen+ overpatch. Consider dexcom or libre3 if pt continues to have issues. Pt also inquired about insulin pump. Will discuss at future visit if unable to control.   -Labs: none  -Lifestyle: stop drinking juice  -HM: COVID, PNA, and flu vax, eye exam - patient declining all vaccinations   -Other: bring all meds to f/u visits. Call PCP Re: ongoing issues with neuropathy.     2.  Hypertension  BP above goal <140/90   +Albuminuria  Check BP daily at home and bring log to f/u visit. Medications: on ACE + SGLT2     3. Hyperlipidemia  Continue high intensity statin. Labs: repeat FLP ~8 wks to ensure compliance with statin    Health Maintenance Due   Topic Date Due    COVID-19 Vaccine (1) Never done    Varicella vaccine (1 of 2 - 2-dose childhood series) Never done    Diabetic foot exam  Never done    Diabetic retinal exam  Never done    Hepatitis C screen  Never done    Hepatitis B vaccine (1 of 3 - Risk 3-dose series) Never done    Pneumococcal 0-64 years Vaccine (2 - PCV) 03/01/2015    Flu vaccine (1) 08/01/2022       Education provided:  Reviewed A1c and blood sugar goals with patient  Counseled on changes to medication regimen and common side effects  Reviewed karen instruction/application and importance of scanning at least every 8 hours. Discussed symptoms and management of hypoglycemic episodes. Encouraged weight loss and aerobic exercise   Educated on diet   Provided blood pressure log and instructed patient to bring to f/u appointment. Return in about 1 week (around 12/5/2022). Subjective   SUBJECTIVE/OBJECTIVE:    Treatment Adherence:  Diet:  10a- usually skips, eggs/sausage  4p- turkey/chx, salmon, sometimes 1 slice wheat bread, baked or air fried salmon/chicken, brussels, broccoli, corn  Snack/dessert- crackers, fruit/veggie maybe 3 servings of these per day  Drinks- 16 bottles water/d, 1.5c juice per day (\"hit and miss\")  Exercise: no regular exercise due to pain, balance issues; waiting on neuro and PT  Weight trend: decreasing due to PNA and uncontrolled DM  Barriers: impairment:  physical, overwhelmed by complexity of regimen, and time constraints    Type 2 Diabetes Mellitus  Year diagnosed ~2010. Related hospitalizations/ED visits: multiple ED visits for hyperglycemia.   Comorbities: high risk ASCVD, HTN, obesity, and CKD   Considerations:  cost of medication ($0-3), GFR, simplify regimen/med compliance  Current symptoms/problems: neuropathy. Hypoglycemia? no   Eye exam within one year: no    Current diabetes therapy:    Metformin 1 g BID    Tresiba 55 units daily    Jardiance 10 mg QD    Trulicity 7.17 mg weekly   Compliance: forgets at least 2 doses of all meds (including insulin) per week. Doesn't sleep well, needs to get kids ready for bed, then falls asleep and forgets. Pt states he was noncompliant with meds when he lost his job due to COVID PNA. LF for most meds was 7/19/22 for 30 ds, so he is likely missing 50% of the time. He did get chlorthalidone, lisinopril, and metformin from [facility]. Side effects: none  Cost: $3 copay  Previous regimens: Humalog 10 units TIDAC     SMBG:   Libreview     Avg V high High target   9/9-9/22/22 307 77% 17% 6%  11/3-11/16 187 10 44 46%    10/10/22: No Freestyle data. AM readings 180-200s. Later in day 230-300. Had two readings of 88 around lunch time. 257 in office this AM (fasting). 10/24/22: No CGM data. AM readings 170-200 (outliers 99, 115, 105). Noon 200-230. Bedtime: 250-300.  11/2/22: karen sensor error, before meals 134-250, -230  11/28/22: Pt reports FBGs ~195, ~220 around lunch. Hasn't placed karen in 2 weeks, waiting on over patch      Hypertension:    Patient denies chest pain, shortness of breath, headache, and blurred vision. On Ace- lisinopril 20 mg  Medication side effects: no medication side effects noted. Use of agents associated with hypertension: none. Caffeine: none  Home blood pressure monitoring: No.  But has BP cuff    Hyperlipidemia:  On high intensity statin-- No new myalgias or GI upset   On aspirin 81 mg  Cardiovascular risk factors: diabetes mellitus, dyslipidemia, hypertension, male gender, and obesity (BMI >= 30 kg/m2)    The ASCVD Risk score (Cody DK, et al., 2019) failed to calculate for the following reasons:     The 2019 ASCVD risk score is only valid for ages 36 to 78       Objective     Medication list reviewed and up to date. Physical exam     There were no vitals taken for this visit. There is no height or weight on file to calculate BMI. Wt Readings from Last 3 Encounters:   22 248 lb (112.5 kg)   22 248 lb 0.3 oz (112.5 kg)   22 245 lb (111.1 kg)      BP Readings from Last 3 Encounters:   22 (!) 153/97   22 (!) 158/80   22 105/73        Pertinent Labs:  Lab Results   Component Value Date    LABA1C 11.7 2022    LABA1C 11.9 2022    LABA1C 13.2 2022      Lab Results   Component Value Date    MALBCR 620.7 (H) 2022     No results found for: CRCLEARANCE   Lab Results   Component Value Date    LDLCALC 174 (H) 2022    HDL 36 (L) 2022     Lab Results   Component Value Date    CREATININE 1.5 (H) 2022    BUN 13 2022     2022    K 4.0 2022     2022    CO2 28 2022     Lab Results   Component Value Date    AST 17 2022    ALT 28 2022    BILIDIR <0.2 2022    BILITOT <0.2 2022    ALKPHOS 99 2022     No components found for: VITB12  No results found for: TSH, TSHREFLEX              Discussed with patient the Pharmacist Collaborative Practice Agreement. Patient provided verbal and/or electronic (ex. mychart) consent to participate in the collaborative practice agreement between the pharmacist and referred patient. This is in lieu of paper consent due to COVID-19 precautions and the use of remote/virtual visits.      Tess Mcintosh, PharmD, [facility]  [facility]   [address]eri Mak Ph: [phone]  [address] Ph: [phone]  2022 1:06 PM    For Pharmacy Admin Tracking Only    CPA in place:  Yes  Recommendation Provided To: Patient/Caregiver: 3 via Virtual Visit  Intervention Detail: Adherence Monitorin, Dose Adjustment: 1, reason: Therapy Optimization, and Scheduled Appointment  Gap Closed?: No   Intervention Accepted By: Patient/Caregiver: 3  Time Spent (min): 39

## 2022-12-06 ENCOUNTER — OFFICE VISIT (OUTPATIENT)
Dept: PHARMACY | Age: 37
End: 2022-12-06
Payer: MEDICAID

## 2022-12-06 ENCOUNTER — OFFICE VISIT (OUTPATIENT)
Dept: PRIMARY CARE CLINIC | Age: 37
End: 2022-12-06
Payer: MEDICAID

## 2022-12-06 VITALS — HEART RATE: 125 BPM | SYSTOLIC BLOOD PRESSURE: 102 MMHG | DIASTOLIC BLOOD PRESSURE: 84 MMHG

## 2022-12-06 VITALS
OXYGEN SATURATION: 97 % | HEART RATE: 121 BPM | BODY MASS INDEX: 32.19 KG/M2 | TEMPERATURE: 99.1 F | SYSTOLIC BLOOD PRESSURE: 71 MMHG | DIASTOLIC BLOOD PRESSURE: 47 MMHG | WEIGHT: 244 LBS

## 2022-12-06 DIAGNOSIS — G47.00 INSOMNIA, UNSPECIFIED TYPE: ICD-10-CM

## 2022-12-06 DIAGNOSIS — N18.9 CHRONIC KIDNEY DISEASE, UNSPECIFIED CKD STAGE: ICD-10-CM

## 2022-12-06 DIAGNOSIS — E11.65 POORLY CONTROLLED TYPE 2 DIABETES MELLITUS (HCC): Primary | ICD-10-CM

## 2022-12-06 LAB
CHP ED QC CHECK: ABNORMAL
GLUCOSE BLD-MCNC: 342 MG/DL

## 2022-12-06 PROCEDURE — 99213 OFFICE O/P EST LOW 20 MIN: CPT

## 2022-12-06 PROCEDURE — 3078F DIAST BP <80 MM HG: CPT | Performed by: NURSE PRACTITIONER

## 2022-12-06 PROCEDURE — 99214 OFFICE O/P EST MOD 30 MIN: CPT | Performed by: NURSE PRACTITIONER

## 2022-12-06 PROCEDURE — 3074F SYST BP LT 130 MM HG: CPT | Performed by: NURSE PRACTITIONER

## 2022-12-06 PROCEDURE — 3046F HEMOGLOBIN A1C LEVEL >9.0%: CPT | Performed by: NURSE PRACTITIONER

## 2022-12-06 RX ORDER — DULAGLUTIDE 0.75 MG/.5ML
0.75 INJECTION, SOLUTION SUBCUTANEOUS WEEKLY
Qty: 2 ML | Refills: 5 | Status: SHIPPED | OUTPATIENT
Start: 2022-12-06

## 2022-12-06 RX ORDER — TRAZODONE HYDROCHLORIDE 50 MG/1
50-100 TABLET ORAL NIGHTLY PRN
Qty: 60 TABLET | Refills: 2 | Status: SHIPPED | OUTPATIENT
Start: 2022-12-06

## 2022-12-06 RX ORDER — ALBUTEROL SULFATE 90 UG/1
2 AEROSOL, METERED RESPIRATORY (INHALATION) EVERY 6 HOURS PRN
Qty: 18 G | Refills: 1 | Status: SHIPPED | OUTPATIENT
Start: 2022-12-06

## 2022-12-06 NOTE — PROGRESS NOTES
CLINICAL PHARMACY NOTE--Diabetes    Mayur Neff is a 29 y.o. male with PMHX significant for CKD, diabetic neuropathy, HTN, HLD, Obesity, and Type 2 Diabetes referred by JONY Dacosta for diabetes counseling and medication management. Interval update: Patient reports ongoing nausea/decr appetite over the past several months (started feb 2022). He is seeing a GI physician (Dr Lucila Reyes) for gastroparesis. No worsening since start of trulicity, and he would like to continue trulicity. Interested in [place], as he is having issues with libre2 sensor bending. Hieu Bryant today for teaching/assistance with placement. Has not used karen in the past month, but is checking BGs before meals. He is still waiting for the overpatches to arrive in mail. He continues to c/o severe neuropathy in LE, below his knees. He is trying to get in to see a neurologist, but they have not returned his calls. Sees PCP today. Admitted 11/11-11/16/22 for CP, elevated trop, concern for sarcoidosis--scheduled for outpatient MRI 12/7/22. Pt had CHELE likely 2/2 poor intake. Returned to ED 11/19/22 for CP. Pt admits he has only been drinking 3 cups water lately (rather than normal 16 cups). He had juice this AM with his meds (). Reports FBG normally 190s. ASSESSMENT/PLAN:    Type 2 Diabetes  A1c above goal <7%  No Libreview data. FBGs 190-230.  -Medications: improve compliance   Metformin 1 g BID   Jardiance 10 mg QD (hesitate to increase based on poor po and fluid intake and recent CHELE)   Trulicity 4.49 mg every tuesday (hesitate to titrate based on gastroparesis)  Increase Tresiba to 75 units daily. Will consider reintroduction of mealtime insulin at future visit. -SMBG: applied karen+ overpatch sample today in office, counseled on use. Consider dexcom or libre3 if pt continues to have issues. Pt also inquired about insulin pump.  Will discuss at future visit if unable to control.   -Labs: none  -Lifestyle: stop drinking juice, increase water to 8 cups/d  -HM: eye exam - patient declining all vaccinations   -Other: bring all meds to f/u visits. 2.  Hypertension  BP below goal <140/90   +Albuminuria  Medications: on ACE + SGLT2     3. Hyperlipidemia  Continue high intensity statin. Labs: repeat FLP ~8 wks to ensure compliance with statin    Health Maintenance Due   Topic Date Due    COVID-19 Vaccine (1) Never done    Varicella vaccine (1 of 2 - 2-dose childhood series) Never done    Diabetic foot exam  Never done    Diabetic retinal exam  Never done    Hepatitis C screen  Never done    Hepatitis B vaccine (1 of 3 - Risk 3-dose series) Never done    Pneumococcal 0-64 years Vaccine (2 - PCV) 03/01/2015    Flu vaccine (1) 08/01/2022       Education provided:  Reviewed A1c and blood sugar goals with patient  Counseled on changes to medication regimen and common side effects  Reviewed karen instruction/application and importance of scanning at least every 8 hours. Discussed symptoms and management of hypoglycemic episodes. Encouraged weight loss and aerobic exercise   Educated on diet     Return in about 2 weeks (around 12/20/2022). Subjective   SUBJECTIVE/OBJECTIVE:    Treatment Adherence:  Diet:  10a- usually skips, eggs/sausage  4p- turkey/chx, salmon, sometimes 1 slice wheat bread, baked or air fried salmon/chicken, brussels, broccoli, corn  Snack/dessert- crackers, fruit/veggie maybe 3 servings of these per day  Drinks- 3-16 bottles water/d (varies), 1.5c juice per day (\"hit and miss\")  Exercise: no regular exercise due to pain, balance issues; waiting on neuro and PT  Weight trend: decreasing due to PNA and uncontrolled DM  Barriers: impairment:  physical, overwhelmed by complexity of regimen, and time constraints    Type 2 Diabetes Mellitus  Year diagnosed ~2010. Related hospitalizations/ED visits: multiple ED visits for hyperglycemia.   Comorbities: high risk ASCVD, HTN, obesity, and CKD   Considerations:  cost of medication ($0-3), GFR, simplify regimen/med compliance  Current symptoms/problems: neuropathy. Hypoglycemia? no   Eye exam within one year: no    Current diabetes therapy:    Metformin 1 g BID    Tresiba 65 units daily    Jardiance 10 mg QD    Trulicity 6.13 mg weekly   Compliance: forgets 1 dose of all meds (including insulin) per week. Doesn't sleep well, needs to get kids ready for bed, then falls asleep and forgets. Pt states he was noncompliant with meds when he lost his job due to COVID PNA. LF for most meds was 7/19/22 for 30 ds, so he is likely missing 50% of the time. He did get chlorthalidone, lisinopril, and metformin from [facility] pharmacy. Side effects: none  Cost: $3 copay  Previous regimens: Humalog 10 units TIDAC (noncompliant)    SMBG:   Libreview     Avg V high High target   9/9-9/22/22 307 77% 17% 6%  11/3-11/16 187 10 44 46%    10/10/22: No Freestyle data. AM readings 180-200s. Later in day 230-300. Had two readings of 88 around lunch time. 257 in office this AM (fasting). 10/24/22: No CGM data. AM readings 170-200 (outliers 99, 115, 105). Noon 200-230. Bedtime: 250-300.  11/2/22: karen sensor error, before meals 134-250, -230  11/28/22: Pt reports FBGs ~195, ~220 around lunch. Hasn't placed karen in 2 weeks, waiting on over patch  12/6/22: Pt reports FBGs 190s, 230-230 around lunch, no karen, but brought today for teaching/ placement.  this after after juice. Hypertension:    Patient denies chest pain, shortness of breath, headache, and blurred vision. On Ace- lisinopril 20 mg  Medication side effects: no medication side effects noted. Use of agents associated with hypertension: none.    Caffeine: none  Home blood pressure monitoring: No.  But has BP cuff    Hyperlipidemia:  On high intensity statin-- No new myalgias or GI upset   On aspirin 81 mg  Cardiovascular risk factors: diabetes mellitus, dyslipidemia, hypertension, male gender, and obesity (BMI >= 30 kg/m2)    The ASCVD Risk score (Cody DK, et al., 2019) failed to calculate for the following reasons: The 2019 ASCVD risk score is only valid for ages 36 to 78       Objective     Medication list reviewed and up to date. Physical exam     /84 (Site: Left Upper Arm, Position: Sitting, Cuff Size: Large Adult)   Pulse (!) 125    There is no height or weight on file to calculate BMI. Wt Readings from Last 3 Encounters:   11/19/22 248 lb (112.5 kg)   11/16/22 248 lb 0.3 oz (112.5 kg)   11/01/22 245 lb (111.1 kg)      BP Readings from Last 3 Encounters:   12/06/22 102/84   11/19/22 (!) 153/97   11/16/22 (!) 158/80        Pertinent Labs:  Lab Results   Component Value Date    LABA1C 11.7 11/11/2022    LABA1C 11.9 11/02/2022    LABA1C 13.2 08/16/2022      Lab Results   Component Value Date    MALBCR 620.7 (H) 11/02/2022     No results found for: CRCLEARANCE   Lab Results   Component Value Date    LDLCALC 174 (H) 11/02/2022    HDL 36 (L) 11/02/2022     Lab Results   Component Value Date    CREATININE 1.5 (H) 11/19/2022    BUN 13 11/19/2022     11/19/2022    K 4.0 11/19/2022     11/19/2022    CO2 28 11/19/2022     Lab Results   Component Value Date    AST 17 11/19/2022    ALT 28 11/19/2022    BILIDIR <0.2 06/19/2022    BILITOT <0.2 11/19/2022    ALKPHOS 99 11/19/2022     No components found for: VITB12  No results found for: TSH, TSHREFLEX              Discussed with patient the Pharmacist Collaborative Practice Agreement. Patient provided verbal and/or electronic (ex. mychart) consent to participate in the collaborative practice agreement between the pharmacist and referred patient. This is in lieu of paper consent due to COVID-19 precautions and the use of remote/virtual visits.      Radha Miller, PharmD, [facility]  Medication Management Clinic   [facility] Ph: [phone]  [facility] Ph: [phone]  12/6/2022 9:41 AM    For Pharmacy Admin Tracking Only    CPA in place:   Yes  Recommendation Provided To: Patient/Caregiver: 7 via In person  Intervention Detail: Adherence Monitorin, Device Training, Dose Adjustment: 1, reason: Therapy Optimization, Lab(s) Ordered, Patient Access Assistance/Sample Provided, Refill(s) Provided, and Scheduled Appointment  Gap Closed?: No   Intervention Accepted By: Patient/Caregiver: 7  Time Spent (min): 45

## 2022-12-06 NOTE — PATIENT INSTRUCTIONS
[facility], [address], [place] [address]  Ph: [phone]  Fax: [phone]    Sowmya Campbell MD  Interventional Cardiology
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
## 2022-12-07 ENCOUNTER — HOSPITAL ENCOUNTER (OUTPATIENT)
Dept: MRI IMAGING | Age: 37
Discharge: HOME | End: 2022-12-07
Payer: MEDICAID

## 2022-12-07 DIAGNOSIS — R06.02 SHORTNESS OF BREATH: ICD-10-CM

## 2022-12-07 DIAGNOSIS — I42.9 CARDIOMYOPATHY, UNSPECIFIED TYPE (HCC): ICD-10-CM

## 2022-12-07 DIAGNOSIS — R00.0 TACHYCARDIA: ICD-10-CM

## 2022-12-07 DIAGNOSIS — R07.9 CHEST PAIN, UNSPECIFIED TYPE: ICD-10-CM

## 2022-12-07 LAB
GFR SERPL CREATININE-BSD FRML MDRD: >60 ML/MIN/{1.73_M2}
PERFORMED ON: NORMAL
POC CREATININE: 1.1 MG/DL (ref 0.9–1.3)
POC SAMPLE TYPE: NORMAL

## 2022-12-07 PROCEDURE — 75565 CARD MRI VELOC FLOW MAPPING: CPT | Performed by: INTERNAL MEDICINE

## 2022-12-07 PROCEDURE — 6360000004 HC RX CONTRAST MEDICATION: Performed by: STUDENT IN AN ORGANIZED HEALTH CARE EDUCATION/TRAINING PROGRAM

## 2022-12-07 PROCEDURE — 75561 CARDIAC MRI FOR MORPH W/DYE: CPT | Performed by: INTERNAL MEDICINE

## 2022-12-07 PROCEDURE — A9585 GADOBUTROL INJECTION: HCPCS | Performed by: STUDENT IN AN ORGANIZED HEALTH CARE EDUCATION/TRAINING PROGRAM

## 2022-12-07 PROCEDURE — 75561 CARDIAC MRI FOR MORPH W/DYE: CPT

## 2022-12-07 PROCEDURE — 82565 ASSAY OF CREATININE: CPT

## 2022-12-07 RX ADMIN — GADOBUTROL 17 ML: 604.72 INJECTION INTRAVENOUS at 08:41

## 2022-12-14 ASSESSMENT — ENCOUNTER SYMPTOMS: COUGH: 0

## 2022-12-15 ENCOUNTER — TELEPHONE (OUTPATIENT)
Dept: PRIMARY CARE CLINIC | Age: 37
End: 2022-12-15

## 2022-12-15 DIAGNOSIS — R26.89 BALANCE PROBLEMS: ICD-10-CM

## 2022-12-15 DIAGNOSIS — I42.8 NONISCHEMIC CARDIOMYOPATHY (HCC): Primary | ICD-10-CM

## 2022-12-15 DIAGNOSIS — G62.9 NEUROPATHY: Primary | ICD-10-CM

## 2022-12-15 DIAGNOSIS — R94.30 LOW LEFT VENTRICULAR EJECTION FRACTION: ICD-10-CM

## 2022-12-15 NOTE — PROGRESS NOTES
Patient had recent cardiac MRI done that is abnormal. Overall findings suggestive of non-ischemic cardiomyopathy, but cardiac sarcoidosis could not be ruled out. Referral placed for cardiology for further evaluation/management.

## 2022-12-16 ENCOUNTER — CARE COORDINATION (OUTPATIENT)
Dept: CARE COORDINATION | Age: 37
End: 2022-12-16

## 2022-12-16 NOTE — CARE COORDINATION
Ambulatory Care Coordination Note  12/16/2022    ACC: Natalia Shen RN  General Assessment    Do you have any symptoms that are causing concern?: No          Pt continues outreach to resources reviewed w/SW, but did have a question about a program through [employer] re his children (?)   Difficult for this writer to ascertain program to which pt may be referring. He misplaced SW contact information. ACM provided to pt SW number; pt states he will outreach to SW if needed. Offered patient enrollment in the Remote Patient Monitoring (RPM) program for in-home monitoring: Patient declined. Reports he continues to self monitor and follows w/med mgmt clinic @ [facility]. Does not feel participation in RPM for remaining 1-2 mo/ACC is necessary. Reviewed need to schedule future/return visit w/PCP. Pt verbalized understanding of above and agreement with the following  Plan: continue ACC support in surveillance for remaining 30-60 days in current ACC episode; ACM will remain available to help with any newly presenting concerns as needed. Anticipate graduating ACC in next 30-60 days.   Pt agrees to outreach direct to [facility], as needed, between routine follow up outreach initiated by [facility]     Care Coordination Interventions    Referral from Primary Care Provider: Yes  Suggested Interventions and Community Resources  Diabetes Education: Completed (Comment: follows @ [facility] clinic for DM mgmt)  Fall Risk Prevention: Completed  Medi Set or Pill Pack: Completed (Comment: picks up Rx through [address] [facility]  Social Work: Completed (Comment: 10.13.22 interest in applying for [employer])  Specialty Services Referral: Completed (Comment: follows w/Med Mgmt clinic @ [facility])  Other Services: Completed (Comment: follows w/med mgmt clinic for DM mgmt)  Zone Management Tools: Completed (Comment: DM)  Other Services or Interventions: reviewed pt centered goals; self mgmt concepts; community-based resources Goals Addressed                      This Visit's Progress      Community Resource Goal (pt-stated)   On track      I will engage w/Community Health Liaison on [address] team to review needs/barriers and discuss potential community-based resources which may prove helpful. Barriers: impairment:  physical: dizziness/deconditioned, fear of failure, financial, and lack of support  Plan for overcoming my barriers: engage in Care Coordination for added care team support  Confidence: 10/10  Anticipated Goal Completion Date: 4.13.23        Conditions and Symptoms (pt-stated)   On track      I will schedule office visits, as directed by my provider. I will keep my appointment or reschedule if I have to cancel. I will notify my provider of any barriers to my plan of care. I will follow my Zone Management tool to seek urgent or emergent care. I will notify my provider of any symptoms that indicate a worsening of my condition. Barriers: impairment:  physical: deconditioned/chronic condition, fear of failure, and financial  Plan for overcoming my barriers: engage in Care Coordination for added care team support  Confidence: 10/10  Anticipated Goal Completion Date: 4.13.23                Prior to Admission medications    Medication Sig Start Date End Date Taking?  Authorizing Provider   Dulaglutide (TRULICITY) 5.71 IR/7.5PZ SOPN Inject 0.75 mg into the skin once a week 12/6/22   HANS Armijo CNP   traZODone (DESYREL) 50 MG tablet Take 1-2 tablets by mouth nightly as needed for Sleep 12/6/22   HANS rAmijo CNP   albuterol sulfate HFA (PROVENTIL;VENTOLIN;PROAIR) 108 (90 Base) MCG/ACT inhaler Inhale 2 puffs into the lungs every 6 hours as needed for Wheezing or Shortness of Breath 12/6/22   HANS Armijo CNP   vitamin D (ERGOCALCIFEROL) 1.25 MG (20116 UT) CAPS capsule Take 1 capsule by mouth once a week 11/17/22   HANS Armijo CNP   pregabalin (LYRICA) 75 MG capsule Take 1 capsule by mouth 2 times daily for 30 days. 11/15/22 12/15/22  HANS Dodson CNP   DULoxetine (CYMBALTA) 30 MG extended release capsule Take 1 capsule by mouth daily 11/15/22 12/15/22  HANS Dodson CNP   Insulin Degludec (TRESIBA FLEXTOUCH) 200 UNIT/ML SOPN Inject 50 Units into the skin daily  Patient taking differently: Inject 65 Units into the skin daily 10/24/22   HANS Hawk CNP   metoclopramide (REGLAN) 10 MG tablet Take 1 tablet by mouth 3 times daily (with meals)  Patient taking differently: Take 10 mg by mouth 3 times daily as needed 10/11/22   HANS Hawk CNP   lisinopril (PRINIVIL;ZESTRIL) 20 MG tablet Take 1 tablet by mouth daily 9/14/22   Eli Mackey DO   Continuous Blood Gluc Sensor (FREESTYLE HOME 2 SENSOR) MISC Use to check blood sugar 4 times per day (before each meal and at bedtime). 9/13/22   HANS Hawk CNP   itraconazole (SPORANOX) 100 MG capsule Take 200 mg by mouth 2 times daily    Historical Provider, MD   metFORMIN (GLUCOPHAGE) 1000 MG tablet Take 1 tablet by mouth in the morning and 1 tablet before bedtime. 8/16/22   HANS Hawk CNP   empagliflozin (JARDIANCE) 10 MG tablet Take 1 tablet by mouth in the morning. 7/19/22   HANS Hawk CNP   dicyclomine (BENTYL) 10 MG capsule Take 1 capsule by mouth 4 times daily (before meals and nightly) 7/19/22   HANS Hawk CNP   chlorthalidone (HYGROTON) 25 MG tablet Take 1 tablet by mouth in the morning. 7/19/22   HANS Hawk CNP   carvedilol (COREG) 25 MG tablet Take 1 tablet by mouth in the morning and 1 tablet before bedtime. 7/19/22   HANS Hawk CNP   aspirin 81 MG chewable tablet Take 1 tablet by mouth in the morning.  7/19/22   HANS Hawk CNP   rosuvastatin (CRESTOR) 40 MG tablet Take 1 tablet by mouth nightly 7/19/22   Carmenza Reilly, APRN - CNP Future Appointments   Date Time Provider [place]   12/19/2022  9:00 AM [address] [facility]   2/21/2023 10:45 AM Basim Durbin MD AND NEURO Neurology -

## 2022-12-19 ENCOUNTER — OFFICE VISIT (OUTPATIENT)
Dept: PHARMACY | Age: 37
End: 2022-12-19
Payer: MEDICAID

## 2022-12-19 ENCOUNTER — OFFICE VISIT (OUTPATIENT)
Dept: CARDIOLOGY CLINIC | Age: 37
End: 2022-12-19
Payer: MEDICAID

## 2022-12-19 ENCOUNTER — TELEPHONE (OUTPATIENT)
Dept: CARDIOLOGY CLINIC | Age: 37
End: 2022-12-19

## 2022-12-19 VITALS
WEIGHT: 249 LBS | HEART RATE: 118 BPM | SYSTOLIC BLOOD PRESSURE: 136 MMHG | BODY MASS INDEX: 32.85 KG/M2 | DIASTOLIC BLOOD PRESSURE: 100 MMHG

## 2022-12-19 VITALS — DIASTOLIC BLOOD PRESSURE: 93 MMHG | HEART RATE: 130 BPM | SYSTOLIC BLOOD PRESSURE: 133 MMHG

## 2022-12-19 DIAGNOSIS — E11.65 POORLY CONTROLLED TYPE 2 DIABETES MELLITUS (HCC): Primary | ICD-10-CM

## 2022-12-19 DIAGNOSIS — I50.20 HFREF (HEART FAILURE WITH REDUCED EJECTION FRACTION) (HCC): ICD-10-CM

## 2022-12-19 DIAGNOSIS — I10 ESSENTIAL HYPERTENSION: Primary | ICD-10-CM

## 2022-12-19 LAB
ALBUMIN SERPL-MCNC: 4.1 G/DL (ref 3.4–5)
ANION GAP SERPL CALCULATED.3IONS-SCNC: 11 MMOL/L (ref 3–16)
BUN BLDV-MCNC: 19 MG/DL (ref 7–20)
CALCIUM SERPL-MCNC: 9.2 MG/DL (ref 8.3–10.6)
CHLORIDE BLD-SCNC: 101 MMOL/L (ref 99–110)
CHP ED QC CHECK: ABNORMAL
CO2: 29 MMOL/L (ref 21–32)
CREAT SERPL-MCNC: 1.2 MG/DL (ref 0.9–1.3)
GFR SERPL CREATININE-BSD FRML MDRD: >60 ML/MIN/{1.73_M2}
GLUCOSE BLD-MCNC: 151 MG/DL (ref 70–99)
GLUCOSE BLD-MCNC: 268 MG/DL
PHOSPHORUS: 3.9 MG/DL (ref 2.5–4.9)
POTASSIUM SERPL-SCNC: 3.8 MMOL/L (ref 3.5–5.1)
PRO-BNP: <5 PG/ML (ref 0–124)
SODIUM BLD-SCNC: 141 MMOL/L (ref 136–145)

## 2022-12-19 PROCEDURE — 3074F SYST BP LT 130 MM HG: CPT | Performed by: INTERNAL MEDICINE

## 2022-12-19 PROCEDURE — 99212 OFFICE O/P EST SF 10 MIN: CPT

## 2022-12-19 PROCEDURE — 99214 OFFICE O/P EST MOD 30 MIN: CPT | Performed by: INTERNAL MEDICINE

## 2022-12-19 PROCEDURE — 3078F DIAST BP <80 MM HG: CPT | Performed by: INTERNAL MEDICINE

## 2022-12-19 PROCEDURE — 93000 ELECTROCARDIOGRAM COMPLETE: CPT | Performed by: INTERNAL MEDICINE

## 2022-12-19 RX ORDER — INSULIN DEGLUDEC 200 U/ML
75 INJECTION, SOLUTION SUBCUTANEOUS DAILY
Qty: 12 ML | Refills: 3 | Status: SHIPPED | OUTPATIENT
Start: 2022-12-19

## 2022-12-19 ASSESSMENT — ENCOUNTER SYMPTOMS
BLOOD IN STOOL: 0
VOMITING: 0
SHORTNESS OF BREATH: 0
ABDOMINAL PAIN: 0
CHEST TIGHTNESS: 0
COUGH: 0
WHEEZING: 0
EYE DISCHARGE: 0
FACIAL SWELLING: 0
ABDOMINAL DISTENTION: 0
BACK PAIN: 0
COLOR CHANGE: 0

## 2022-12-19 NOTE — TELEPHONE ENCOUNTER
Spoke with patient, PET FDG scheduled for 1/10/23 at 11:30 at [facility]. Nothing to eat or drink 15 hours prior, no carb and high protein diet the day before, no strenuous exercise the day before or the day of the test. Call [phone] to let them know you are diabetic or if need to reschedule.

## 2022-12-19 NOTE — PROGRESS NOTES
CLINICAL PHARMACY NOTE--Diabetes    Sheree Goncalves is a 29 y.o. male with PMHX significant for CKD, diabetic neuropathy, HTN, HLD, Obesity, and Type 2 Diabetes referred by JONY Morrow for diabetes counseling and medication management. Interval update: Patient reports ongoing nausea/decr appetite over the past several months (started feb 2022). Lately, he has been drinking Ensure 2x per day and eating fruit. He drinks about 32 oz/day of water. He is seeing a GI physician (Dr Joseph Champagne) for gastroparesis. No worsening since start of trulicity, and he would like to continue trulicity. Interested in [place], as he is having issues with libre2 sensor bending. Pt given a sample last visit, but he had to take it off the next day 2/2 MRI. Has not replaced karen since 12/7 and has not been SMBG. He plans to  [facility] and place today and will start scanning. He denies any sxs of hypo glycemia and his FBG is elevated today in office (268). There is a reading of 57 on 12/7, but this appears to have been a false pressure low. Pt did not have sxs. He continues to c/o severe neuropathy in LE, below his knees. He is scheduled with neuro in Feb.   Admitted 11/11-11/16/22 for CP, elevated trop, concern for sarcoidosis-- outpatient MRI done 12/7/22. Pt had CHELE likely 2/2 poor intake. Returned to ED 11/19/22 for CP. He is scheduled with cards and nephro this week. ASSESSMENT/PLAN:    Type 2 Diabetes  A1c above goal <7%  No Libreview data. -Improve medication compliance   Metformin 1 g BID   Jardiance 10 mg QD (hesitate to increase based on poor po and fluid intake and recent CHELE)   Trulicity 4.18 mg every tuesday (hesitate to titrate based on gastroparesis)  Tresiba 75 units daily (hesitate to increase without any CGM data). -SMBG: Libre2, Consider dexcom or libre3 if pt continues to have issues.   -Pt also inquired about insulin pump. Will discuss at future visit if unable to control. -Labs: none  -Lifestyle: stop drinking juice, ask cardiology about fluid intake   -HM: eye exam - patient declining all vaccinations   -Other: bring all meds to f/u visits. 2.  Hypertension  BP above goal <140/90   +Albuminuria  Medications: on ACE + SGLT2     3. Hyperlipidemia  Continue high intensity statin. Labs: repeat FLP ~8 wks to ensure compliance with statin    Health Maintenance Due   Topic Date Due    COVID-19 Vaccine (1) Never done    Varicella vaccine (1 of 2 - 2-dose childhood series) Never done    Diabetic foot exam  Never done    Diabetic retinal exam  Never done    Hepatitis C screen  Never done    Hepatitis B vaccine (1 of 3 - Risk 3-dose series) Never done    Pneumococcal 0-64 years Vaccine (2 - PCV) 03/01/2015    Flu vaccine (1) 08/01/2022       Education provided:  Reviewed A1c and blood sugar goals with patient  Counseled on medication regimen and common side effects  Reviewed karen instruction/application and importance of scanning at least every 8 hours. Discussed symptoms and management of hypoglycemic episodes. Encouraged weight loss and aerobic exercise   Educated on diet     Return in about 2 weeks (around 1/2/2023). Subjective   SUBJECTIVE/OBJECTIVE:    Treatment Adherence:  Diet:  10a- usually skips, eggs/sausage  4p- turkey/chx, salmon, sometimes 1 slice wheat bread, baked or air fried salmon/chicken, brussels, broccoli, corn  Snack/dessert- crackers, fruit/veggie maybe 3 servings of these per day  Drinks- 3-16 bottles water/d (varies), 1.5c juice per day (\"hit and miss\")  Exercise: no regular exercise due to pain, balance issues; waiting on neuro and PT  Weight trend: decreasing due to PNA and uncontrolled DM  Barriers: impairment:  physical, overwhelmed by complexity of regimen, and time constraints    Type 2 Diabetes Mellitus  Year diagnosed ~2010. Related hospitalizations/ED visits: multiple ED visits for hyperglycemia.   Comorbities: high risk ASCVD, HTN, obesity, and CKD   Considerations:  cost of medication ($0-3), GFR, simplify regimen/med compliance  Current symptoms/problems: neuropathy. Hypoglycemia? No sxs, possibly one false pressure low, but hasn't been testing   Eye exam within one year: no    Current diabetes therapy:    Metformin 1 g BID    Tresiba 75 units QPM   Jardiance 10 mg QD    Trulicity 7.61 mg weekly   Compliance: forgets 1-2 doses of all meds (including insulin) per week. Doesn't sleep well, needs to get kids ready for bed, then falls asleep and forgets. Pt states he was noncompliant with meds when he lost his job due to COVID PNA. LF for most meds was 7/19/22 for 30 ds, so he is likely missing 50% of the time. He did get chlorthalidone, lisinopril, and metformin from [facility] pharmacy. Side effects: none  Cost: $3 copay  Previous regimens: Humalog 10 units TIDAC (noncompliant)    SMBG:   Libreview     Avg V high High target   9/9-9/22/22 307 77% 17% 6%  11/3-11/16 187 10 44 46%    10/10/22: No Freestyle data. AM readings 180-200s. Later in day 230-300. Had two readings of 88 around lunch time. 257 in office this AM (fasting). 10/24/22: No CGM data. AM readings 170-200 (outliers 99, 115, 105). Noon 200-230. Bedtime: 250-300.  11/2/22: karen sensor error, before meals 134-250, -230  11/28/22: Pt reports FBGs ~195, ~220 around lunch. Hasn't placed karen in 2 weeks, waiting on over patch  12/6/22: Pt reports FBGs 190s, 230-230 around lunch, no karen, but brought today for teaching/ placement.  this after after juice. 12/19/22: no SMBG log    Hypertension:    Patient denies chest pain, shortness of breath, headache, and blurred vision. On Ace- lisinopril 20 mg  Medication side effects: no medication side effects noted. Use of agents associated with hypertension: none.    Caffeine: none  Home blood pressure monitoring: No.  But has BP cuff    Hyperlipidemia:  On high intensity statin-- No new myalgias or GI upset   On aspirin 81 mg  Cardiovascular risk factors: diabetes mellitus, dyslipidemia, hypertension, male gender, and obesity (BMI >= 30 kg/m2)    The ASCVD Risk score (Cody DK, et al., 2019) failed to calculate for the following reasons: The 2019 ASCVD risk score is only valid for ages 36 to 78       Objective     Medication list reviewed and up to date. Physical exam     BP (!) 133/93 (Site: Right Upper Arm, Position: Sitting, Cuff Size: Large Adult)   Pulse (!) 130    There is no height or weight on file to calculate BMI. Wt Readings from Last 3 Encounters:   12/06/22 244 lb (110.7 kg)   11/19/22 248 lb (112.5 kg)   11/16/22 248 lb 0.3 oz (112.5 kg)      BP Readings from Last 3 Encounters:   12/19/22 (!) 133/93   12/06/22 (!) 82/54   12/06/22 102/84        Pertinent Labs:  Lab Results   Component Value Date    LABA1C 11.7 11/11/2022    LABA1C 11.9 11/02/2022    LABA1C 13.2 08/16/2022      Lab Results   Component Value Date    MALBCR 620.7 (H) 11/02/2022     No results found for: CRCLEARANCE   Lab Results   Component Value Date    LDLCALC 174 (H) 11/02/2022    HDL 36 (L) 11/02/2022     Lab Results   Component Value Date    CREATININE 1.1 12/07/2022    BUN 13 11/19/2022     11/19/2022    K 4.0 11/19/2022     11/19/2022    CO2 28 11/19/2022     Lab Results   Component Value Date    AST 17 11/19/2022    ALT 28 11/19/2022    BILIDIR <0.2 06/19/2022    BILITOT <0.2 11/19/2022    ALKPHOS 99 11/19/2022     No components found for: VITB12  No results found for: TSH, TSHREFLEX              Discussed with patient the Pharmacist Collaborative Practice Agreement. Patient provided verbal and/or electronic (ex. mychart) consent to participate in the collaborative practice agreement between the pharmacist and referred patient. This is in lieu of paper consent due to COVID-19 precautions and the use of remote/virtual visits.      Soni Miller, PharmD, BCACP  [facility]   [address] Ph: [phone]  [address] Ph: [phone]  2022 9:37 AM    For Pharmacy Admin Tracking Only    Program: Medication Management  CPA in place:  Yes  Recommendation Provided To: Patient/Caregiver: 2 via In person and Pharmacy: 1  Intervention Detail: Adherence Monitorin, Refill(s) Provided, and Scheduled Appointment  Gap Closed?: No   Intervention Accepted By: Patient/Caregiver: 2 and Pharmacy: 1  Time Spent (min): 30

## 2022-12-19 NOTE — ASSESSMENT & PLAN NOTE
Nonischemic. MRI somewhat suggestive of possible cardiac sarcoidosis. Plan for PET FDG. Continue carvedilol. Discontinue lisinopril, start low-dose Entresto. Continue Jardiance.   Renal panel today along with BNP
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
## 2022-12-21 ENCOUNTER — TELEPHONE (OUTPATIENT)
Dept: CARDIOLOGY CLINIC | Age: 37
End: 2022-12-21

## 2022-12-22 ENCOUNTER — APPOINTMENT (OUTPATIENT)
Dept: GENERAL RADIOLOGY | Age: 37
End: 2022-12-22
Payer: MEDICAID

## 2022-12-22 ENCOUNTER — HOSPITAL ENCOUNTER (EMERGENCY)
Age: 37
Discharge: HOME | End: 2022-12-22
Attending: EMERGENCY MEDICINE
Payer: MEDICAID

## 2022-12-22 VITALS
WEIGHT: 248.9 LBS | OXYGEN SATURATION: 92 % | RESPIRATION RATE: 10 BRPM | BODY MASS INDEX: 32.99 KG/M2 | SYSTOLIC BLOOD PRESSURE: 155 MMHG | HEART RATE: 101 BPM | HEIGHT: 73 IN | TEMPERATURE: 99 F | DIASTOLIC BLOOD PRESSURE: 120 MMHG

## 2022-12-22 DIAGNOSIS — G89.29 CHRONIC CHEST PAIN: Primary | ICD-10-CM

## 2022-12-22 DIAGNOSIS — I10 ELEVATED BLOOD PRESSURE READING WITH DIAGNOSIS OF HYPERTENSION: ICD-10-CM

## 2022-12-22 DIAGNOSIS — Z91.199 MEDICALLY NONCOMPLIANT: ICD-10-CM

## 2022-12-22 DIAGNOSIS — R07.9 CHRONIC CHEST PAIN: Primary | ICD-10-CM

## 2022-12-22 LAB
AMPHETAMINE SCREEN, URINE: ABNORMAL
ANION GAP SERPL CALCULATED.3IONS-SCNC: 13 MMOL/L (ref 3–16)
BACTERIA: NORMAL /HPF
BARBITURATE SCREEN URINE: ABNORMAL
BASOPHILS ABSOLUTE: 0.1 K/UL (ref 0–0.2)
BASOPHILS RELATIVE PERCENT: 1.2 %
BENZODIAZEPINE SCREEN, URINE: ABNORMAL
BILIRUBIN URINE: NEGATIVE
BLOOD, URINE: NEGATIVE
BUN BLDV-MCNC: 8 MG/DL (ref 7–20)
CALCIUM SERPL-MCNC: 9.3 MG/DL (ref 8.3–10.6)
CANNABINOID SCREEN URINE: POSITIVE
CHLORIDE BLD-SCNC: 99 MMOL/L (ref 99–110)
CLARITY: CLEAR
CO2: 25 MMOL/L (ref 21–32)
COCAINE METABOLITE SCREEN URINE: ABNORMAL
COLOR: YELLOW
CREAT SERPL-MCNC: 0.9 MG/DL (ref 0.9–1.3)
D DIMER: 0.37 UG/ML FEU (ref 0–0.6)
EOSINOPHILS ABSOLUTE: 0.1 K/UL (ref 0–0.6)
EOSINOPHILS RELATIVE PERCENT: 2.1 %
EPITHELIAL CELLS, UA: 1 /HPF (ref 0–5)
FENTANYL SCREEN, URINE: ABNORMAL
GFR SERPL CREATININE-BSD FRML MDRD: >60 ML/MIN/{1.73_M2}
GLUCOSE BLD-MCNC: 202 MG/DL (ref 70–99)
GLUCOSE URINE: 100 MG/DL
HCT VFR BLD CALC: 42.7 % (ref 40.5–52.5)
HEMOGLOBIN: 13.6 G/DL (ref 13.5–17.5)
HYALINE CASTS: 4 /LPF (ref 0–8)
KETONES, URINE: NEGATIVE MG/DL
LEUKOCYTE ESTERASE, URINE: NEGATIVE
LIPASE: 20 U/L (ref 13–60)
LYMPHOCYTES ABSOLUTE: 1.2 K/UL (ref 1–5.1)
LYMPHOCYTES RELATIVE PERCENT: 23.7 %
Lab: ABNORMAL
MCH RBC QN AUTO: 25.3 PG (ref 26–34)
MCHC RBC AUTO-ENTMCNC: 31.9 G/DL (ref 31–36)
MCV RBC AUTO: 79.4 FL (ref 80–100)
METHADONE SCREEN, URINE: ABNORMAL
MICROSCOPIC EXAMINATION: YES
MONOCYTES ABSOLUTE: 0.4 K/UL (ref 0–1.3)
MONOCYTES RELATIVE PERCENT: 8.3 %
NEUTROPHILS ABSOLUTE: 3.3 K/UL (ref 1.7–7.7)
NEUTROPHILS RELATIVE PERCENT: 64.7 %
NITRITE, URINE: NEGATIVE
OPIATE SCREEN URINE: ABNORMAL
OXYCODONE URINE: ABNORMAL
PDW BLD-RTO: 13.2 % (ref 12.4–15.4)
PH UA: 7
PH UA: 7 (ref 5–8)
PHENCYCLIDINE SCREEN URINE: ABNORMAL
PLATELET # BLD: 262 K/UL (ref 135–450)
PMV BLD AUTO: 8.6 FL (ref 5–10.5)
POTASSIUM SERPL-SCNC: 3.5 MMOL/L (ref 3.5–5.1)
PRO-BNP: 193 PG/ML (ref 0–124)
PROTEIN UA: >=1000 MG/DL
RAPID INFLUENZA  B AGN: NEGATIVE
RAPID INFLUENZA A AGN: NEGATIVE
RBC # BLD: 5.37 M/UL (ref 4.2–5.9)
RBC UA: 1 /HPF (ref 0–4)
SARS-COV-2, NAAT: NOT DETECTED
SODIUM BLD-SCNC: 137 MMOL/L (ref 136–145)
SPECIFIC GRAVITY UA: 1.02 (ref 1–1.03)
TROPONIN: 0.03 NG/ML
URINE REFLEX TO CULTURE: ABNORMAL
URINE TYPE: ABNORMAL
UROBILINOGEN, URINE: 1 E.U./DL
WBC # BLD: 5.2 K/UL (ref 4–11)
WBC UA: 0 /HPF (ref 0–5)

## 2022-12-22 PROCEDURE — 71046 X-RAY EXAM CHEST 2 VIEWS: CPT

## 2022-12-22 PROCEDURE — 93005 ELECTROCARDIOGRAM TRACING: CPT | Performed by: EMERGENCY MEDICINE

## 2022-12-22 PROCEDURE — 83880 ASSAY OF NATRIURETIC PEPTIDE: CPT

## 2022-12-22 PROCEDURE — 85379 FIBRIN DEGRADATION QUANT: CPT

## 2022-12-22 PROCEDURE — 6360000002 HC RX W HCPCS: Performed by: PHYSICIAN ASSISTANT

## 2022-12-22 PROCEDURE — 83690 ASSAY OF LIPASE: CPT

## 2022-12-22 PROCEDURE — 80307 DRUG TEST PRSMV CHEM ANLYZR: CPT

## 2022-12-22 PROCEDURE — 87635 SARS-COV-2 COVID-19 AMP PRB: CPT

## 2022-12-22 PROCEDURE — 6360000002 HC RX W HCPCS: Performed by: EMERGENCY MEDICINE

## 2022-12-22 PROCEDURE — 99285 EMERGENCY DEPT VISIT HI MDM: CPT

## 2022-12-22 PROCEDURE — 81001 URINALYSIS AUTO W/SCOPE: CPT

## 2022-12-22 PROCEDURE — 80048 BASIC METABOLIC PNL TOTAL CA: CPT

## 2022-12-22 PROCEDURE — 6370000000 HC RX 637 (ALT 250 FOR IP): Performed by: PHYSICIAN ASSISTANT

## 2022-12-22 PROCEDURE — 96374 THER/PROPH/DIAG INJ IV PUSH: CPT

## 2022-12-22 PROCEDURE — 85025 COMPLETE CBC W/AUTO DIFF WBC: CPT

## 2022-12-22 PROCEDURE — 84484 ASSAY OF TROPONIN QUANT: CPT

## 2022-12-22 PROCEDURE — 96375 TX/PRO/DX INJ NEW DRUG ADDON: CPT

## 2022-12-22 PROCEDURE — 87804 INFLUENZA ASSAY W/OPTIC: CPT

## 2022-12-22 RX ORDER — ONDANSETRON 2 MG/ML
8 INJECTION INTRAMUSCULAR; INTRAVENOUS ONCE
Status: COMPLETED | OUTPATIENT
Start: 2022-12-22 | End: 2022-12-22

## 2022-12-22 RX ORDER — HYDROCODONE BITARTRATE AND ACETAMINOPHEN 5; 325 MG/1; MG/1
1 TABLET ORAL EVERY 6 HOURS PRN
Qty: 10 TABLET | Refills: 0 | Status: SHIPPED | OUTPATIENT
Start: 2022-12-22 | End: 2022-12-25

## 2022-12-22 RX ORDER — MORPHINE SULFATE 4 MG/ML
4 INJECTION, SOLUTION INTRAMUSCULAR; INTRAVENOUS ONCE
Status: COMPLETED | OUTPATIENT
Start: 2022-12-22 | End: 2022-12-22

## 2022-12-22 RX ORDER — CHLORTHALIDONE 25 MG/1
25 TABLET ORAL ONCE
Status: COMPLETED | OUTPATIENT
Start: 2022-12-22 | End: 2022-12-22

## 2022-12-22 RX ORDER — CARVEDILOL 12.5 MG/1
25 TABLET ORAL ONCE
Status: COMPLETED | OUTPATIENT
Start: 2022-12-22 | End: 2022-12-22

## 2022-12-22 RX ADMIN — CARVEDILOL 25 MG: 12.5 TABLET, FILM COATED ORAL at 12:29

## 2022-12-22 RX ADMIN — SACUBITRIL AND VALSARTAN 1 TABLET: 24; 26 TABLET, FILM COATED ORAL at 13:16

## 2022-12-22 RX ADMIN — ONDANSETRON 8 MG: 2 INJECTION INTRAMUSCULAR; INTRAVENOUS at 11:31

## 2022-12-22 RX ADMIN — CHLORTHALIDONE 25 MG: 25 TABLET ORAL at 13:16

## 2022-12-22 RX ADMIN — MORPHINE SULFATE 4 MG: 4 INJECTION, SOLUTION INTRAMUSCULAR; INTRAVENOUS at 11:28

## 2022-12-22 ASSESSMENT — PAIN DESCRIPTION - FREQUENCY
FREQUENCY: CONTINUOUS
FREQUENCY: CONTINUOUS

## 2022-12-22 ASSESSMENT — PAIN DESCRIPTION - LOCATION
LOCATION: ABDOMEN;CHEST
LOCATION: ABDOMEN;CHEST

## 2022-12-22 ASSESSMENT — PAIN SCALES - GENERAL
PAINLEVEL_OUTOF10: 9
PAINLEVEL_OUTOF10: 7
PAINLEVEL_OUTOF10: 9
PAINLEVEL_OUTOF10: 5

## 2022-12-22 ASSESSMENT — PAIN DESCRIPTION - DESCRIPTORS: DESCRIPTORS: ACHING

## 2022-12-22 ASSESSMENT — PAIN - FUNCTIONAL ASSESSMENT: PAIN_FUNCTIONAL_ASSESSMENT: 0-10

## 2022-12-22 ASSESSMENT — PAIN DESCRIPTION - PAIN TYPE
TYPE: ACUTE PAIN
TYPE: ACUTE PAIN
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
## 2022-12-22 NOTE — ED TRIAGE NOTES
Chest pain in middle of chest that has gotten worse over the last week. Pt with no cardiac hx. Pt with all over abd pain for the last 3 days. No radiation. Pt with vomiting this morning. Denies diarrhea and pain with urination.

## 2022-12-22 NOTE — ED NOTES
Discharge and education instructions reviewed. Patient verbalized understanding, teach-back successful. Patient denied questions at this time. No acute distress noted. Patient instructed to follow-up as noted - return to emergency department if symptoms worsen. Patient verbalized understanding. Discharged per EDMD with discharge instructions.         Toni Mixon RN  12/22/22 4939

## 2022-12-23 ENCOUNTER — CARE COORDINATION (OUTPATIENT)
Dept: CARE COORDINATION | Age: 37
End: 2022-12-23

## 2022-12-23 NOTE — CARE COORDINATION
Follow up s/p ED visit 12.22.22    Left vm requesting return callback. Provided & repeated direct callback to reach ACM at pt's soonest convenience today - ahead of extended holiday weekend.

## 2022-12-23 NOTE — CARE COORDINATION
Ambulatory Care Coordination Note  12/23/2022    ACC: Yessi Reyes RN    pt returned call reporting he is \"doing fine\"; denies any newly presenting concerns or needs. Denies questions or concerns re DC instructions s/p ED visit 12.22.22. Offered patient enrollment in the Remote Patient Monitoring (RPM) program for in-home monitoring: Patient declined. 12.16.22  Pt is reminded & advised in appropriate use of after hours' provider line over holiday weekend as needed. Plan: pt will self outreach to PCP to schedule ED follow up appt w/PCP  Continue ACC support for health coaching, care collaboration, support w/community resources, and help with any newly presenting concerns as needed. Pt agrees to outreach direct to [facility], as needed, between routine follow up outreach initiated by [facility]     Care Coordination Interventions    Referral from Primary Care Provider: Yes  Suggested Interventions and Community Resources  Diabetes Education: Completed (Comment: follows @ [facility] for DM mgmt)  Fall Risk Prevention: Completed  Medi Set or Pill Pack: Completed (Comment: picks up Rx through [facility])  Social Work: Completed (Comment: 10.13.22 interest in applying for Section [employer])  Specialty Services Referral: Completed (Comment: follows w/Med Mgmt clinic @ [facility])  Other Services: Completed (Comment: follows w/med mgmt clinic for DM mgmt)  Zone Management Tools: Completed (Comment: DM)  Other Services or Interventions: reviewed pt centered goals; self mgmt concepts; community-based resources          Goals Addressed                      This Visit's Progress      Community Resource Goal (pt-stated)   On track      I will engage w/Community Health Liaison on [facility] team to review needs/barriers and discuss potential community-based resources which may prove helpful.     Barriers: impairment:  physical: dizziness/deconditioned, fear of failure, financial, and lack of support  Plan for Letter from [facility] says Sensor for CGM, [facility] overcoming my barriers: engage in Care Coordination for added care team support  Confidence: 10/10  Anticipated Goal Completion Date: 4.13.23        Conditions and Symptoms (pt-stated)   On track      I will schedule office visits, as directed by my provider. I will keep my appointment or reschedule if I have to cancel. I will notify my provider of any barriers to my plan of care. I will follow my Zone Management tool to seek urgent or emergent care. I will notify my provider of any symptoms that indicate a worsening of my condition. Barriers: impairment:  physical: deconditioned/chronic condition, fear of failure, and financial  Plan for overcoming my barriers: engage in Care Coordination for added care team support  Confidence: 10/10  Anticipated Goal Completion Date: 4.13.23                Prior to Admission medications    Medication Sig Start Date End Date Taking? Authorizing Provider   HYDROcodone-acetaminophen (NORCO) 5-325 MG per tablet Take 1 tablet by mouth every 6 hours as needed for Pain for up to 3 days.  Max Daily Amount: 4 tablets 12/22/22 12/25/22  CHARLI Chao   sacubitril-valsartan (ENTRESTO) 24-26 MG per tablet Take 1 tablet by mouth 2 times daily 12/20/22   Nasreen Lindquist MD   Insulin Degludec [facility]) 200 UNIT/ML SOPN Inject 75 Units into the skin daily 12/19/22   Melina Alford APRN - CNP   Dulaglutide (TRULICITY) 1.93 EE/8.9ZT SOPN Inject 0.75 mg into the skin once a week 12/6/22   Melina Alford APRN - CNP   traZODone (DESYREL) 50 MG tablet Take 1-2 tablets by mouth nightly as needed for Sleep 12/6/22   Melina Alford APRN - CNP   albuterol sulfate HFA (PROVENTIL;VENTOLIN;PROAIR) 108 (90 Base) MCG/ACT inhaler Inhale 2 puffs into the lungs every 6 hours as needed for Wheezing or Shortness of Breath 12/6/22   Melina Alford APRN - CNP   vitamin D (ERGOCALCIFEROL) 1.25 MG (27960 UT) CAPS capsule Take 1 capsule by mouth once a week 11/17/22   Christina Ron HANS Sanchez CNP   pregabalin (LYRICA) 75 MG capsule Take 1 capsule by mouth 2 times daily for 30 days. 11/15/22 12/19/22  HANS Cardenas CNP   DULoxetine (CYMBALTA) 30 MG extended release capsule Take 1 capsule by mouth daily 11/15/22 12/19/22  HANS Cardenas CNP   metoclopramide (REGLAN) 10 MG tablet Take 1 tablet by mouth 3 times daily (with meals)  Patient taking differently: Take 10 mg by mouth 3 times daily as needed 10/11/22   HANS Fernnado - CNP   Continuous Blood Gluc Sensor (FREESTYLE HOME 2 SENSOR) MISC Use to check blood sugar 4 times per day (before each meal and at bedtime). 9/13/22   Eusebio Rosario APRN - CNP   itraconazole (SPORANOX) 100 MG capsule Take 200 mg by mouth 2 times daily    Historical Provider, MD   metFORMIN (GLUCOPHAGE) 1000 MG tablet Take 1 tablet by mouth in the morning and 1 tablet before bedtime. 8/16/22   Eusebio Rosario APRN - CNP   empagliflozin (JARDIANCE) 10 MG tablet Take 1 tablet by mouth in the morning. 7/19/22   Eusebio Rosario APRN - CNP   dicyclomine (BENTYL) 10 MG capsule Take 1 capsule by mouth 4 times daily (before meals and nightly) 7/19/22   Wava Marlene APRN - CNP   chlorthalidone (HYGROTON) 25 MG tablet Take 1 tablet by mouth in the morning. 7/19/22   Eusebio Rosario APRN - CNP   carvedilol (COREG) 25 MG tablet Take 1 tablet by mouth in the morning and 1 tablet before bedtime. 7/19/22   Eusebio Rosario APRN - CNP   aspirin 81 MG chewable tablet Take 1 tablet by mouth in the morning.  7/19/22   Wava Marlene APRN - CNP   rosuvastatin (CRESTOR) 40 MG tablet Take 1 tablet by mouth nightly 7/19/22   Eusebio Rosario APRN - CNP       Future Appointments   Date Time Provider [place]   1/3/2023 12:30 PM [address] RW [facility]   1/9/2023 10:15 AM Miguel Muñoz, PT [facility]   1/12/2023  1:15 PM CHRISTA Schuster OP PT [facility]   1/16/2023 10:15 AM Anabela Claros Rajendra Dumont, PT [facility] [place] [facility]   2023 11:15 AM MD Amanda Lock [facility]   2023 10:45 AM Cindy Crane MD AND NEURO Neurology -      Diabetes Assessment    Medic Alert ID: No  Meal Planning: Plate Method, Avoidance of concentrated sweets, Carb counting   How often do you test your blood sugar?: Daily, Meals   Do you have barriers with adherence to non-pharmacologic self-management interventions? (Nutrition/Exercise/Self-Monitoring): No   Have you ever had to go to the ED for symptoms of low blood sugar?: No       No patient-reported symptoms         and   Congestive Heart Failure Assessment    Are you currently restricting fluids?: No Restriction  Do you understand a low sodium diet?: Yes  Do you understand how to read food labels?: Yes  How many restaurant meals do you eat per week?: 0  Do you salt your food before tasting it?: No     No patient-reported symptoms      Symptoms:  None: Yes      Symptom course: stable  Weight trend: stable  Salt intake watch compared to last visit: stable      Heart Failure Education outreach Date/Time: 2022 4:06 PM    Ambulatory Care Manager (ACM) contacted the patient by telephone to perform Ambulatory Care Coordination. Verified name and  with patient as identifiers. Provided introduction to self, and explanation of the Ambulatory Care Manager's role. ACM reviewed that a Health Healthy tips for the Holiday packet has been sent to [employer]. ACM reviewed CHF zones, daily weights, the importance of low sodium diet, and healthy tips packet with the patient. Instructed patient to call their PCP if they have a weight gain of 3 lbs in 2 days or 5 lbs in a week. Patient reminded that there is a physician on call 24 hours a day / 7 days a week should the patient have questions or concerns. The patient verbalized understanding.

## 2022-12-25 LAB
EKG ATRIAL RATE: 116 BPM
EKG DIAGNOSIS: NORMAL
EKG P AXIS: 61 DEGREES
EKG P-R INTERVAL: 196 MS
EKG Q-T INTERVAL: 318 MS
EKG QRS DURATION: 78 MS
EKG QTC CALCULATION (BAZETT): 442 MS
EKG R AXIS: -34 DEGREES
EKG T AXIS: 20 DEGREES
EKG VENTRICULAR RATE: 116 BPM

## 2022-12-26 PROCEDURE — 93010 ELECTROCARDIOGRAM REPORT: CPT | Performed by: INTERNAL MEDICINE

## 2023-01-04 ENCOUNTER — CARE COORDINATION (OUTPATIENT)
Dept: CARE COORDINATION | Age: 38
End: 2023-01-04

## 2023-01-04 NOTE — CARE COORDINATION
Routine follow up outreach for review pt status/progress towards goals. Left vm requesting return callback for early review 2023 - inquired how pt is doing, any newly presenting concerns w/which ACM may assist/provide support. .    Provided & repeated direct callback to reach ACM

## 2023-01-09 ENCOUNTER — HOSPITAL ENCOUNTER (OUTPATIENT)
Dept: PHYSICAL THERAPY | Age: 38
Setting detail: THERAPIES SERIES
Discharge: HOME | End: 2023-01-09
Payer: MEDICAID

## 2023-01-09 PROCEDURE — 97110 THERAPEUTIC EXERCISES: CPT

## 2023-01-09 PROCEDURE — 97530 THERAPEUTIC ACTIVITIES: CPT

## 2023-01-09 PROCEDURE — 97161 PT EVAL LOW COMPLEX 20 MIN: CPT

## 2023-01-09 NOTE — FLOWSHEET NOTE
[address] [facility] [address]  Phone: [phone]   Fax:     [phone]      Date:  2023    Patient Name:  Sheree Goncalves    :  1985  MRN: [id number]    Pertinent Medical History: Additional Pertinent Hx: Neuropathy, DM, Chronic Kidney Disease, HTN    Medical/Treatment Diagnosis Information:  Medical Diagnosis: Neuropathy [G62.9]; Balance problems [R26.89]  Treatment Diagnosis: Gait and balance disturbance , decreased BLE strength, decreased bilateral ankle foot rom, decreased sensation at bilateral feet to light touch    Insurance/Certification information:  PT Insurance Information: [facility] - auth required after 30 visits  Physician Information:  [employer] File*  Plan of care signed (Y/N): sent for cosign     Date of Patient follow up with Physician:      Progress Report: []  Yes  [x]  No     Date Range for reporting period:  Beginnin2023  Ending:     Progress report due (10 Rx/or 30 days whichever is less): 78     Recertification due (POC duration/ or 90 days whichever is less): 23     Visit # Insurance/POC Allowable Auth Needed    Auth after 30 visits [x]Yes    []No       Latex Allergy:  [x]NO      []YES  Preferred Language for Healthcare:   [x]English       []Other:    Functional Scale:      Date assessed: at eval  Test: WOMAC  Score: 78/96 = 81.3% disability    Pain level:  5-10/10     History of Injury: Pt recently in PT this past fall for strengthening and balance. At that time patient had to stop therapy due to being hospitalized with heart issues.    In the fall pt c/o difficulties with balance for 3 years due to diabetic neuropathy, he feels balance, walking and running have all declined over the last 3 years, he no longer runs, reports constant sharp stabbing pain and burning sensation in bilateral feet and lower legs just below knees, also has T&N  hands ,  he can no longer work and is seeking disability, cold weather aggravates pain ,  decreased symptoms sitting down, sleep is disturbed due to symptoms, denies loss of control of bowel/ bladder. X ray from 10/19/22 Orthopedic office visit revealed  a minimally displaced navicular dorsal avulsion fracture. Dr Waldo Tello  from 10/19/22 Plan indicated he will be WBAT, and start aggressive ROM and peroneal strengthening exercise. He can go back to normal activity with no restrictions. Pt reports no changes with the above and that is his walking and balance continue to get worse. Pt reports his endurance with exercises is poor. Can typically only stand for 1-2 mins without pain settling into his feet and leg. Pt denies any history of falls in the past 6 months. SUBJECTIVE:  See eval    OBJECTIVE:  Observation:   Functional Mobility/Transfers: independent but requires use of UEs for sit to stand  Gait: (include devices/WB status) Patient ambulates with straight cane demonstrating decreased elie and step length. Pt puts mod UE pressure through arm to unload legs.    Test measurements:    ROM:  Date              Ankle DF   Eval 1/9/23 L= lacking 10* from neutral  R=lacking 8* from neutral           Strength:  Date Hip flexion Hip abduction Hip Extension Hip IR Hip ER Quad Hamstring Ankle DF Ankle PF   Eval 1/9 4/5 4+/5 3/5   4/5 4/5 4/5 4/5                   RESTRICTIONS/PRECAUTIONS: fall risk    Exercises/Interventions:     Therapeutic Ex (95260)   Min: 15 Reps/Resistance Notes/CUES   Nu step L1 x5 mins    Gastroc Incline add         Seated TRs Add     Seated LAQ add    Seated Tband HS curls Add          Long sitting HS stretch 3x20 sec     Gastroc stretch with strap 3x20 sec          SLR flexion 0# 1x10 L/R    bridge 10x    SL hip abd 0# 1x10 L/R              Manual Intervention (59119)  Min:     Ankle mobs - post glides Add     Ankle DF stretching add         NMR re-education (31700)  Min:  CUES NEEDED             Therapeutic Activity (15918)  Min: 10     Tandem stance  Add     Air Ex:  *NBOS with EC  *alt toe tap   Add  add    Rockerboard s/s Add     BAPS 4-way Add          Pt education X10 mins - fall prevention strategies, role of PT, PT goals    Modalities  Min:     IFC with      CP after exercises     MH after exercises            Other Therapeutic Activities: Pt was educated on PT POC, Diagnosis, Prognosis, pathomechanics as well as frequency and duration of scheduling future physical therapy appointments. Time was also taken on this day to answer all patient questions and participation in PT. Reviewed appointment policy in detail with patient and patient verbalized understanding. Home Exercise Program: Patient was instructed in the following for HEP:   Access Code: [email or web address]  URL: [email or web address]  Date: 01/09/2023  Prepared by: Daily Moreno     Exercises  Active Straight Leg Raise with Quad Set - 1 x daily - 7 x weekly - 2 sets - 10 reps  Supine Bridge - 1 x daily - 7 x weekly - 2 sets - 10 reps  Sidelying Hip Abduction - 1 x daily - 7 x weekly - 2 sets - 10 reps  Long Sitting Calf Stretch with Strap - 1 x daily - 7 x weekly - 3 reps - 20 secs hold  Seated Toe Raise - 1 x daily - 7 x weekly - 3 sets - 10 reps  Long sitting HS stretch - 1 x daily - 7 x weekly - 3 reps - 20 secs hold  Patient verbalized/demonstrated understanding and was issued written handout.      Therapeutic Exercise and NMR EXR  [x] (33038) Provided verbal/tactile cueing for activities related to strengthening, flexibility, endurance, ROM for improvements in LE, proximal hip, and core control with self care, mobility, lifting, ambulation.  [] (53612) Provided verbal/tactile cueing for activities related to improving balance, coordination, kinesthetic sense, posture, motor skill, proprioception  to assist with LE, proximal hip, and core control in self care, mobility, lifting, ambulation and eccentric single leg control. NMR and Therapeutic Activities:    [x] (09071 or 70035) Provided verbal/tactile cueing for activities related to improving balance, coordination, kinesthetic sense, posture, motor skill, proprioception and motor activation to allow for proper function of core, proximal hip and LE with self care and ADLs and functional mobility.   [] (54013) Gait Re-education- Provided training and instruction to the patient for proper LE, core and proximal hip recruitment and positioning and eccentric body weight control with ambulation re-education including up and down stairs     Home Exercise Program:    [x] (12005) Reviewed/Progressed HEP activities related to strengthening, flexibility, endurance, ROM of core, proximal hip and LE for functional self-care, mobility, lifting and ambulation/stair navigation   [] (59992)Reviewed/Progressed HEP activities related to improving balance, coordination, kinesthetic sense, posture, motor skill, proprioception of core, proximal hip and LE for self care, mobility, lifting, and ambulation/stair navigation      Manual Treatments:  PROM / STM / Oscillations-Mobs:  G-I, II, III, IV (PA's, Inf., Post.)  [] (90919) Provided manual therapy to mobilize LE, proximal hip and/or LS spine soft tissue/joints for the purpose of modulating pain, promoting relaxation,  increasing ROM, reducing/eliminating soft tissue swelling/inflammation/restriction, improving soft tissue extensibility and allowing for proper ROM for normal function with self care, mobility, lifting and ambulation.      Charges:  Timed Code Treatment Minutes: 25   Total Treatment Minutes: 45      [x] EVAL (LOW) 02212 (typically 20 minutes face-to-face)  [] EVAL (MOD) 67417 (typically 30 minutes face-to-face)  [] EVAL (HIGH) 82282 (typically 45 minutes face-to-face)  [] RE-EVAL     [x] DV(43574) x     [] Dry needle 1 or 2 Muscles (80771)  [] NMR (20190) x     [] Dry needle 3+ Muscles (30089)  [] Manual ([id number]) x     [] Ultrasound (07826) x  [x] TA (18366) x     [] Mech Traction (76453)  [] ES(attended) (14602)     [] ES (un) (00048):   [] Vasopump (67972) [] Ionto (36778)   [] Other:    GOALS:  Patient stated goal: \"Be able to walk easier\"  [] Progressing: [] Met: [] Not Met: [] Adjusted     Therapist goals for Patient:   Short Term Goals: To be achieved in: 2 weeks  1. Independent in HEP and progression per patient tolerance, in order to prevent re-injury. [] Progressing: [] Met: [] Not Met: [] Adjusted  2. Patient will have a decrease in pain by 20% to facilitate improvement in movement, function, and ADLs as indicated by Functional Deficits. [] Progressing: [] Met: [] Not Met: [] Adjusted     Long Term Goals: To be achieved in: at discharge\"  1. Increase WOMAC functional outcome score  to assist with reaching prior level of function. [] Progressing: [] Met: [] Not Met: [] Adjusted  2. Patient will demonstrate increased B ankle DF PROM to 10* to allow for proper joint functioning as indicated by patients Functional Deficits. [] Progressing: [] Met: [] Not Met: [] Adjusted  3. Patient will demonstrate an increase in B LE Strength to 5/5 to allow for proper functional mobility as indicated by patients Functional Deficits. [] Progressing: [] Met: [] Not Met: [] Adjusted  4. Patient will return to household duties without increased symptoms or restriction. [] Progressing: [] Met: [] Not Met: [] Adjusted  5. Patient will report 50% increased ease with community ambulation. [] Progressing: [] Met: [] Not Met: [] Adjusted         ASSESSMENT:  See eval    Treatment/Activity Tolerance:  [x] Patient tolerated treatment well [] Patient limited by fatique  [] Patient limited by pain  [] Patient limited by other medical complications  [] Other:     Overall Progression Towards Functional goals/ Treatment Progress Update:  [] Patient is progressing as expected towards functional goals listed.     [] Progression is slowed due to complexities/Impairments listed. [] Progression has been slowed due to co-morbidities. [x] Plan just implemented, too soon to assess goals progression <30days   [] Goals require adjustment due to lack of progress  [] Patient is not progressing as expected and requires additional follow up with physician  [] Other    Prognosis for POC: [x] Good [] Fair  [] Poor    Patient requires continued skilled intervention: [x] Yes  [] No        PLAN:   [] Continue per plan of care [] Alter current plan (see comments)  [x] Plan of care initiated [] Hold pending MD visit [] Discharge    Electronically signed by: Paxton Wells, PT , OMT-C,  [id number]      Note: If patient does not return for scheduled/recommended follow up visits, this note will serve as a discharge from care along with the most recent update on progress.

## 2023-01-09 NOTE — PLAN OF CARE
[address] [facility] [address]  Phone: [phone]   Fax:     [phone]                                                     Physical Therapy Certification    Dear Carmita Ram*,    We had the pleasure of evaluating the following patient for physical therapy services at [facility]. A summary of our findings can be found in the initial assessment below. This includes our plan of care. If you have any questions or concerns regarding these findings, please do not hesitate to contact me at the office phone number checked above. Thank you for the referral.       Physician Signature:_______________________________Date:__________________  By signing above (or electronic signature), therapists plan is approved by physician      Patient: Jessica Moser   : 1985   MRN: [id number]  Referring Physician: Carmita Ram*      Evaluation Date: 2023      Medical Diagnosis Information:  Medical Diagnosis: Neuropathy [G62.9]  Balance problems [R26.89]   Treatment Diagnosis: Gait and balance disturbance , decreased BLE strength, decreased bilateral ankle foot rom, decreased sensation at bilateral feet to light touch                                     Insurance information: PT Insurance Information: [employer]      Precautions/ Contra-indications:   Latex Allergy:  [x]NO      []YES  Preferred Language for Healthcare:   [x]English       []other:    C-SSRS Triggered by Intake questionnaire (Past 2 wk assessment ):   [x] No, Questionnaire did not trigger screening.   [] Yes, Patient intake triggered C-SSRS Screening      [] C-SSRS Screening completed  [] PCP notified via Epic     SUBJECTIVE: Patient stated complaint: Pt recently in PT this past fall for strengthening and balance.   At that time patient had to stop therapy due to being hospitalized with heart issues. In the fall pt c/o difficulties with balance for 3 years due to diabetic neuropathy, he feels balance, walking and running have all declined over the last 3 years, he no longer runs, reports constant sharp stabbing pain and burning sensation in bilateral feet and lower legs just below knees, also has T&N  hands ,  he can no longer work and is seeking disability, cold weather aggravates pain ,  decreased symptoms sitting down, sleep is disturbed due to symptoms, denies loss of control of bowel/ bladder. X ray from 10/19/22 Orthopedic office visit revealed  a minimally displaced navicular dorsal avulsion fracture. Dr Queen Mckeon  from 10/19/22 Plan indicated he will be WBAT, and start aggressive ROM and peroneal strengthening exercise. He can go back to normal activity with no restrictions. Pt reports no changes with the above and that is his walking and balance continue to get worse. Pt reports his endurance with exercises is poor. Can typically only stand for 1-2 mins without pain settling into his feet and leg. Pt denies any history of falls in the past 6 months.          Relevant Medical History:Additional Pertinent Hx: Neuropathy, DM, Chronic Kidney Disease, HTN  Functional Outcome Measure: WOMAC - 78/96 = 81.3% disability    Pain Scale: 5-10/10  Easing factors: Sitting down  Provocative factors: prolonged activity, standing, walking     Type: [x]Constant   []Intermittent  []Radiating []Localized []other:     Numbness/Tingling: B feet     Occupation/School: unemployed, filing for disability    Living Status/Prior Level of Function: Independent with ADLs and IADLs, difficulty walking prolonged distances    OBJECTIVE:     Posture: forward flexed posture    Functional Mobility/Transfers: independent but requires use of UEs for sit to stand    Bandages/Dressings/Incisions: NA    Gait: (include devices/WB status) Patient ambulates with straight cane demonstrating decreased elie and step length. Pt puts mod UE pressure through arm to unload legs. ROM LEFT RIGHT   HIP Flex WNL WNL   HIP Abd     HIP Ext     HIP IR WNL WNL   HIP ER WNL WNL   Knee ext     Knee Flex     Ankle PF     Ankle DF Lacking 10 degrees from neutral  Lacking 8 degrees from neutral    Ankle In     Ankle Ev     Strength  LEFT RIGHT   HIP Flexors 4/5 4/5   HIP Abductors 4+/5 4+/5   HIP Ext 3/5 3/5   Hip ER     Knee EXT (quad) 4/5 4/5   Knee Flex (HS) 4/5 4/5   Ankle DF 4/5 4/5   Ankle PF 4/5 4/5   Ankle Inv 4/5 4/5   Ankle EV 4/5 4/5        TUG  30 sec sit to stand 28.4 secs  5 reps         Postural Stability / VSR:  Test Position Time Result   1. Normal Stance EO X20 sec  normal   2. Normal Stance EC X20 sec  Mild sway   3. Staggered Stance EO X20 sec  normal   4. Stagger Stance EC X20 sec  Mild sway   5. Normal Stance on Foam EO X20 sec normal   6. Normal Stance on Foam EC X10 sec  sway   7. Fakuda Stepping test       Reflexes/Sensation:    []Dermatomes/Myotomes intact    []Reflexes equal and normal bilaterally   [x]Other:decreased sensation to light touch below knee's bilateral , deep pressure is intact , dtr's decreased bilateral at quads and achilles   Joint mobility:    []Normal    [x]Hypo -ankles   []Hyper                        [x] Patient history, allergies, meds reviewed. Medical chart reviewed. See intake form. Review Of Systems (ROS):  [x]Performed Review of systems (Integumentary, CardioPulmonary, Neurological) by intake and observation. Intake form has been scanned into medical record. Patient has been instructed to contact their primary care physician regarding ROS issues if not already being addressed at this time.       Co-morbidities/Complexities (which will affect course of rehabilitation):   []None           Arthritic conditions   []Rheumatoid arthritis (M05.9)  []Osteoarthritis (M19.91)   Cardiovascular conditions   []Hypertension (I10)  []Hyperlipidemia (E78.5)  []Angina pectoris (I20)  []Atherosclerosis (I70)  []CVA Musculoskeletal conditions   []Disc pathology   []Congenital spine pathologies   []Prior surgical intervention  []Osteoporosis (M81.8)  []Osteopenia (M85.8)   Endocrine conditions   []Hypothyroid (E03.9)  []Hyperthyroid Gastrointestinal conditions   []Constipation (Q06.06)   Metabolic conditions   []Morbid obesity (E66.01)  [x]Diabetes type 1(E10.65) or 2 (E11.65)   [x]Neuropathy (G60.9)     Pulmonary conditions   []Asthma (J45)  []Coughing   []COPD (J44.9)   Psychological Disorders  [x]Anxiety (F41.9)  [x]Depression (F32.9)   []Other:   []Other:          Barriers to/and or personal factors that will affect rehab potential:              []Age  []Sex    []Smoker              []Motivation/Lack of Motivation                        [x]Co-Morbidities              []Cognitive Function, education/learning barriers              []Environmental, home barriers              []profession/work barriers  []past PT/medical experience  []other:  Justification:     Falls Risk Assessment (30 days):   [] Falls Risk assessed and no intervention required.   [x] Falls Risk assessed and Patient requires intervention due to being higher risk   TUG score (>12s at risk):     [x] Falls education provided        ASSESSMENT:   Functional Impairments:     []Noted lumbar/proximal hip/LE hypomobility   [x]Decreased LE functional ROM   [x]Decreased core/proximal hip strength and neuromuscular control   [x]Decreased LE functional strength   [x]Reduced balance/proprioceptive control   []other:      Functional Activity Limitations (from functional questionnaire and intake)   []Reduced ability to tolerate prolonged functional positions   [x]Reduced ability or difficulty with changes of positions or transfers between positions   []Reduced ability to maintain good posture and demonstrate good body mechanics with sitting, bending, and lifting   [x]Reduced ability to sleep   [] Reduced ability or tolerance with driving and/or computer work   []Reduced ability to perform lifting, carrying tasks   [x]Reduced ability to squat   []Reduced ability to forward bend   [x]Reduced ability to ambulate prolonged functional periods/distances/surfaces   [x]Reduced ability to ascend/descend stairs   [x]Reduced ability to run, hop or jump   []other:     Participation Restrictions   []Reduced participation in self care activities   [x]Reduced participation in home management activities   [x]Reduced participation in work activities   [x]Reduced participation in social activities. []Reduced participation in sport activities. Classification :    []Signs/symptoms consistent with post-surgical status including decreased ROM, strength and function.    []Signs/symptoms consistent with joint sprain/strain   []Signs/symptoms consistent with patella-femoral syndrome   []Signs/symptoms consistent with knee OA/hip OA   []Signs/symptoms consistent with internal derangement of knee/Hip   [x]Signs/symptoms consistent with functional hip weakness/NMR control      []Signs/symptoms consistent with tendinitis/tendinosis    []signs/symptoms consistent with pathology which may benefit from Dry needling      [x]other:  gait and balance disturbances consistent to peripheral neuropathy    Prognosis/Rehab Potential:      []Excellent   [x]Good    []Fair   []Poor    Tolerance of evaluation/treatment:    [x]Excellent   []Good    []Fair   []Poor    Physical Therapy Evaluation Complexity Justification  [x] A history of present problem with:  [] no personal factors and/or comorbidities that impact the plan of care;  [x]1-2 personal factors and/or comorbidities that impact the plan of care  []3 personal factors and/or comorbidities that impact the plan of care  [x] An examination of body systems using standardized tests and measures addressing any of the following: body structures and functions (impairments), activity limitations, and/or participation restrictions;:  [] a total of 1-2 or more elements   [x] a total of 3 or more elements   [] a total of 4 or more elements   [x] A clinical presentation with:  [x] stable and/or uncomplicated characteristics   [] evolving clinical presentation with changing characteristics  [] unstable and unpredictable characteristics;   [x] Clinical decision making of [x] low, [] moderate, [] high complexity using standardized patient assessment instrument and/or measurable assessment of functional outcome. [x] EVAL (LOW) 91287 (typically 20 minutes face-to-face)  [] EVAL (MOD) 97395 (typically 30 minutes face-to-face)  [] EVAL (HIGH) 75316 (typically 45 minutes face-to-face)  [] RE-EVAL     PLAN:  Frequency/Duration:  2 days per week for 4 Weeks:  Interventions:  [x]  Therapeutic exercise including: strength training, ROM, for Lower extremity and core   [x]  NMR activation and proprioception for LE, Glutes and Core   [x]  Manual therapy as indicated for LE, Hip and spine to include: Dry Needling/IASTM, STM, PROM, Gr I-IV mobilizations, manipulation. [x] Modalities as needed that may include: thermal agents, E-stim, Biofeedback, US, iontophoresis as indicated  [x] Patient education on joint protection, postural re-education, activity modification, progression of HEP. HEP instruction:   Access Code: [email or web address]  URL: [email or web address]  Date: 01/09/2023  Prepared by: Su Cardenas    Exercises  Active Straight Leg Raise with Quad Set - 1 x daily - 7 x weekly - 2 sets - 10 reps  Supine Bridge - 1 x daily - 7 x weekly - 2 sets - 10 reps  Sidelying Hip Abduction - 1 x daily - 7 x weekly - 2 sets - 10 reps  Long Sitting Calf Stretch with Strap - 1 x daily - 7 x weekly - 3 reps - 20 secs hold  Seated Toe Raise - 1 x daily - 7 x weekly - 3 sets - 10 reps  Long sitting HS stretch - 1 x daily - 7 x weekly - 3 reps - 20 secs hold  Patient verbalized/demonstrated understanding and was issued written handout.       GOALS:  Patient stated goal: \"Be able to walk easier\"  [] Progressing: [] Met: [] Not Met: [] Adjusted    Therapist goals for Patient:   Short Term Goals: To be achieved in: 2 weeks  1. Independent in HEP and progression per patient tolerance, in order to prevent re-injury. [] Progressing: [] Met: [] Not Met: [] Adjusted  2. Patient will have a decrease in pain by 20% to facilitate improvement in movement, function, and ADLs as indicated by Functional Deficits. [] Progressing: [] Met: [] Not Met: [] Adjusted    Long Term Goals: To be achieved in: at discharge\"  1. Increase WOMAC functional outcome score  to assist with reaching prior level of function. [] Progressing: [] Met: [] Not Met: [] Adjusted  2. Patient will demonstrate increased B ankle DF PROM to 10* to allow for proper joint functioning as indicated by patients Functional Deficits. [] Progressing: [] Met: [] Not Met: [] Adjusted  3. Patient will demonstrate an increase in B LE Strength to 5/5 to allow for proper functional mobility as indicated by patients Functional Deficits. [] Progressing: [] Met: [] Not Met: [] Adjusted  4. Patient will return to household duties without increased symptoms or restriction. [] Progressing: [] Met: [] Not Met: [] Adjusted  5. Patient will report 50% increased ease with community ambulation. [] Progressing: [] Met: [] Not Met: [] Adjusted     Electronically signed by:  Narendra Molina PT , OMT-C,  [id number]    Note: If patient does not return for scheduled/recommended follow up visits, this note will serve as a discharge from care along with the most recent update on progress.

## 2023-01-10 ENCOUNTER — CARE COORDINATION (OUTPATIENT)
Dept: CARE COORDINATION | Age: 38
End: 2023-01-10

## 2023-01-10 ENCOUNTER — TELEPHONE (OUTPATIENT)
Dept: PRIMARY CARE CLINIC | Age: 38
End: 2023-01-10

## 2023-01-10 NOTE — TELEPHONE ENCOUNTER
He is on Lyrica for the neuropathy pain. We have also placed referral to neurology and he has appt next month.

## 2023-01-10 NOTE — TELEPHONE ENCOUNTER
Pt's mother calling wanting to speak with Norm Miranda about pt's neuropathy pain. She says Norm Miranda is aware of it and pt takes no med for  pain.   She is wondering what he can take    She is on the hippa form

## 2023-01-10 NOTE — CARE COORDINATION
Ambulatory Care Coordination Note  1/10/2023    ACC: Wilson Kaur, RN  General Assessment    Do you have any symptoms that are causing concern?: No        Denies any newly presenting symptoms or concerns, adding that he is on his way to PT per OP now. Says  he believes PT is 'going well'. ACM clarified that pt is not driving before proceeding w/ACC review -  Not routinely monitoring blood sugars via Jayesh 2 device, reports he 'needs to get adhesive' for sensor and attributes lack in ability to report numbers for this reason. ACM reviewed risks/dangers in no ability for pt to know what his blood sugar is and barrier created to improve blood sugars by not tracking blood sugars in order to identify potential problem areas, which if once addressed, may noticeably improve overall A1c, reducing potential risks for further health complications. Discussed/encouraged pt in benefits to reduce A1c even by 1%, and discussed envisioning benefits of having a well controlled A1c. Commended pt for making outreach & did quick review of starchy v non-starchy carbs. Pt verbalized understanding and assures he will review CGM supply needs and seek refills from provider for any refill needs. Pt went on to inquire about [employer] for Section 8 housing. ACM offered for referral to . Pt recalls outreach by SW from [facility] earlier in this [facility] episode and states willingness to outreach direct as was agreed when he last talked w/SW. Reviewed/encouraged pt in availability of ACM to further review/health  & support pt in progress towards improving A1c. Quick review of Zone Mgmt guidance- DM, HF, HTN.   Pt verbalized understanding of above and agreement with the following    Plan: pt will self outreach direct to PCP to schedule follow up appt as appropriate  Pt will review Jayesh 2 CGM supplies & request refills according to supply needs  Continue ACC support for health coaching, care collaboration, support w/community resources, and help with any newly presenting concerns as needed. Pt agrees to outreach direct to [employer], as needed, between routine follow up outreach initiated by [employer] patient enrollment in the Remote Patient Monitoring (RPM) program for in-home monitoring: Patient declined. 12.16.22, does not change his mind about RPM at this time. Diabetes Assessment    Medic Alert ID: No  Meal Planning: Plate Method, Avoidance of concentrated sweets, Carb counting   How often do you test your blood sugar?: Daily, Meals (Comment: Jayesh 2 device)   Do you have barriers with adherence to non-pharmacologic self-management interventions?  (Nutrition/Exercise/Self-Monitoring): No   Have you ever had to go to the ED for symptoms of low blood sugar?: No       No patient-reported symptoms   Do you have hyperglycemia symptoms?: No   Do you have hypoglycemia symptoms?: No   Blood Sugar Monitoring Regimen: Morning Fasting, Before Meals, At Bedtime   Blood Sugar Trends: Other (Comment: marginal improvement A1c 11.7 (11.11.22); 13.2 (8.16.22)           Congestive Heart Failure Assessment    Are you currently restricting fluids?: No Restriction  Do you understand a low sodium diet?: Yes  Do you understand how to read food labels?: Yes  How many restaurant meals do you eat per week?: 0  Do you salt your food before tasting it?: No     No patient-reported symptoms      Symptoms:  None: Yes      Symptom course: stable  Weight trend: stable  Salt intake watch compared to last visit: stable         Care Coordination Interventions    Referral from Primary Care Provider: Yes  Suggested Interventions and Community Resources  Diabetes Education: Completed (Comment: follows @ [facility] clinic for [employer])  Fall Risk Prevention: Completed  Medi Set or Pill Pack: Completed (Comment: picks up Rx through [employer])  Social Work: Completed (Comment: 1.10.23 wants to review w/SW re applying for [facility] support; 10.13.22 interest in applying for Section 8)  Specialty Services Referral: Completed (Comment: follows w/Med Mgmt clinic @ [address] [facility])  Other Services: Completed (Comment: follows w/med mgmt clinic for DM mgmt)  Zone Management Tools: Completed (Comment: DM)  Other Services or Interventions: reviewed pt centered goals; self mgmt concepts; community-based resources          Goals Addressed                      This Visit's Progress      Community Resource Goal (pt-stated)   On track      I will engage w/Community Health Liaison on [address] team to review needs/barriers and discuss potential community-based resources which may prove helpful. Barriers: impairment:  physical: dizziness/deconditioned, fear of failure, financial, and lack of support  Plan for overcoming my barriers: engage in Care Coordination for added care team support  Confidence: 10/10  Anticipated Goal Completion Date: 4.13.23        Conditions and Symptoms (pt-stated)   On track      I will schedule office visits, as directed by my provider. I will keep my appointment or reschedule if I have to cancel. I will notify my provider of any barriers to my plan of care. I will follow my Zone Management tool to seek urgent or emergent care. I will notify my provider of any symptoms that indicate a worsening of my condition. Barriers: impairment:  physical: deconditioned/chronic condition, fear of failure, and financial  Plan for overcoming my barriers: engage in Care Coordination for added care team support  Confidence: 10/10  Anticipated Goal Completion Date: 4.13.23                Prior to Admission medications    Medication Sig Start Date End Date Taking?  Authorizing Provider   sacubitril-valsartan (ENTRESTO) 24-26 MG per tablet Take 1 tablet by mouth 2 times daily 12/20/22   Diamante Rodriguez MD   Insulin Degludec [facility]) 200 UNIT/ML SOPN Inject 75 Units into the skin daily 12/19/22   HANS Ferrera - CNP   Dulaglutide (TRULICITY) 8.45 FL/8.6MT SOPN Inject 0.75 mg into the skin once a week 12/6/22   HANS Armstrong CNP   traZODone (DESYREL) 50 MG tablet Take 1-2 tablets by mouth nightly as needed for Sleep 12/6/22   HANS Armstrong CNP   albuterol sulfate HFA (PROVENTIL;VENTOLIN;PROAIR) 108 (90 Base) MCG/ACT inhaler Inhale 2 puffs into the lungs every 6 hours as needed for Wheezing or Shortness of Breath 12/6/22   HANS Armstrong CNP   vitamin D (ERGOCALCIFEROL) 1.25 MG (91742 UT) CAPS capsule Take 1 capsule by mouth once a week 11/17/22   HANS Armstrong CNP   pregabalin (LYRICA) 75 MG capsule Take 1 capsule by mouth 2 times daily for 30 days. 11/15/22 12/19/22  HANS Robledo CNP   DULoxetine (CYMBALTA) 30 MG extended release capsule Take 1 capsule by mouth daily 11/15/22 12/19/22  HANS Robledo CNP   metoclopramide (REGLAN) 10 MG tablet Take 1 tablet by mouth 3 times daily (with meals)  Patient taking differently: Take 10 mg by mouth 3 times daily as needed 10/11/22   HANS Armstrong CNP   Continuous Blood Gluc Sensor (FREESTYLE HOME 2 SENSOR) MISC Use to check blood sugar 4 times per day (before each meal and at bedtime). 9/13/22   HANS Armstrong CNP   itraconazole (SPORANOX) 100 MG capsule Take 200 mg by mouth 2 times daily    Historical Provider, MD   metFORMIN (GLUCOPHAGE) 1000 MG tablet Take 1 tablet by mouth in the morning and 1 tablet before bedtime. 8/16/22   HANS Armstrong CNP   empagliflozin (JARDIANCE) 10 MG tablet Take 1 tablet by mouth in the morning. 7/19/22   HANS Armstrong CNP   dicyclomine (BENTYL) 10 MG capsule Take 1 capsule by mouth 4 times daily (before meals and nightly) 7/19/22   HANS Armstrong CNP   chlorthalidone (HYGROTON) 25 MG tablet Take 1 tablet by mouth in the morning. 7/19/22   HANS Armstrong - CNP   carvedilol (COREG) 25 MG tablet Take 1 tablet by mouth in the morning and 1 tablet before bedtime. 7/19/22   HANS Sellers CNP   aspirin 81 MG chewable tablet Take 1 tablet by mouth in the morning.  7/19/22   HANS Sellers CNP   rosuvastatin (CRESTOR) 40 MG tablet Take 1 tablet by mouth nightly 7/19/22   HANS Sellers CNP       Future Appointments   Date Time Provider [place]   1/11/2023  2:30 PM [address] RW [facility]   1/12/2023  1:15 PM Gilbert Ivone, PT [facility] [place] [facility]   1/16/2023 10:15 AM Gilbert Ivone, PT [facility] [place] [facility]   1/19/2023 12:30 PM Ann Kilgore, PTA [facility] [place] [facility]   1/23/2023 10:15 AM Gilbert Ivone, PT [facility] [place] [facility]   1/26/2023 11:45 AM Gilbert Ivone, PT [facility] [place] [facility]   1/30/2023 10:15 AM Jorge Hatch, PTA [facility] [place] [facility]   2/1/2023 11:15 AM Lorena Beaver MD [facility]   2/2/2023  1:15 PM Gilbert Ivone, PT [facility] [place] [facility]   2/21/2023 10:45 AM Basim Gonzalez MD [facility] Neurology -

## 2023-01-11 ENCOUNTER — TELEPHONE (OUTPATIENT)
Dept: PHARMACY | Age: 38
End: 2023-01-11

## 2023-01-11 NOTE — TELEPHONE ENCOUNTER
Spoke with mom. She states the lyrica was not helping manage his pain. He is currently out of medication. She states the pain was so bad today with chest pains, leg and body pain that she took him to [facility] ER. He is currently there (3:34pm)  they are giving him steroids to see if this helps the pain. Marichuy Sharmaimes (mom) asks that [facility] get set up with a  or . He is having difficulty remembering and managing things. She also requests that, if possible, one of the managing doctors take a look at/take over [facility]. She is worried that his pain is not being managed. She is aware this message will be viewed tomorrow.

## 2023-01-11 NOTE — TELEPHONE ENCOUNTER
Pt LVM to cxl visit today. He is on his way to the ED for CP and leg pain. Pt WCB to r/s when he is d/c.      Arron Sotomayor, PharmD, [facility]  Medication Management Clinic   [facility] Ph: [phone]  [facility] Ph: [phone]  1/11/2023 1:20 PM

## 2023-01-11 NOTE — TELEPHONE ENCOUNTER
PA for Jacqui Jameson completed   Key: [address] Only    Program: Medication Management  CPA in place:  Yes  Recommendation Provided To:  Other: 1  Intervention Detail: Benefit Assistance  Intervention Accepted By: Other: 1  Gap Closed?: No   Time Spent (min): 10

## 2023-01-12 ENCOUNTER — ANCILLARY PROCEDURE (OUTPATIENT)
Dept: EMERGENCY DEPT | Age: 38
End: 2023-01-12
Payer: MEDICAID

## 2023-01-12 ENCOUNTER — HOSPITAL ENCOUNTER (OUTPATIENT)
Dept: PHYSICAL THERAPY | Age: 38
Setting detail: THERAPIES SERIES
Discharge: HOME | End: 2023-01-12
Payer: MEDICAID

## 2023-01-12 ENCOUNTER — APPOINTMENT (OUTPATIENT)
Dept: GENERAL RADIOLOGY | Age: 38
End: 2023-01-12
Payer: MEDICAID

## 2023-01-12 ENCOUNTER — HOSPITAL ENCOUNTER (INPATIENT)
Age: 38
LOS: 3 days | Discharge: HOME | End: 2023-01-15
Attending: EMERGENCY MEDICINE | Admitting: INTERNAL MEDICINE
Payer: MEDICAID

## 2023-01-12 DIAGNOSIS — R07.9 CHEST PAIN, UNSPECIFIED TYPE: ICD-10-CM

## 2023-01-12 DIAGNOSIS — R63.8 UNABLE TO EAT: ICD-10-CM

## 2023-01-12 DIAGNOSIS — G62.9 NEUROPATHY: Primary | ICD-10-CM

## 2023-01-12 DIAGNOSIS — N17.9 AKI (ACUTE KIDNEY INJURY) (HCC): ICD-10-CM

## 2023-01-12 DIAGNOSIS — I95.1 ORTHOSTATIC HYPOTENSION: Primary | ICD-10-CM

## 2023-01-12 DIAGNOSIS — R77.8 ELEVATED TROPONIN: ICD-10-CM

## 2023-01-12 LAB
A/G RATIO: 1.2 (ref 1.1–2.2)
ALBUMIN SERPL-MCNC: 3.7 G/DL (ref 3.4–5)
ALP BLD-CCNC: 61 U/L (ref 40–129)
ALT SERPL-CCNC: 11 U/L (ref 10–40)
ANION GAP SERPL CALCULATED.3IONS-SCNC: 12 MMOL/L (ref 3–16)
AST SERPL-CCNC: 10 U/L (ref 15–37)
BASOPHILS ABSOLUTE: 0.1 K/UL (ref 0–0.2)
BASOPHILS RELATIVE PERCENT: 1.1 %
BILIRUB SERPL-MCNC: <0.2 MG/DL (ref 0–1)
BUN BLDV-MCNC: 23 MG/DL (ref 7–20)
CALCIUM SERPL-MCNC: 9.4 MG/DL (ref 8.3–10.6)
CHLORIDE BLD-SCNC: 102 MMOL/L (ref 99–110)
CO2: 24 MMOL/L (ref 21–32)
CREAT SERPL-MCNC: 1.6 MG/DL (ref 0.9–1.3)
D DIMER: 0.44 UG/ML FEU (ref 0–0.6)
EKG ATRIAL RATE: 97 BPM
EKG DIAGNOSIS: NORMAL
EKG P AXIS: 46 DEGREES
EKG P-R INTERVAL: 196 MS
EKG Q-T INTERVAL: 364 MS
EKG QRS DURATION: 94 MS
EKG QTC CALCULATION (BAZETT): 462 MS
EKG R AXIS: -21 DEGREES
EKG T AXIS: 38 DEGREES
EKG VENTRICULAR RATE: 97 BPM
EOSINOPHILS ABSOLUTE: 0.1 K/UL (ref 0–0.6)
EOSINOPHILS RELATIVE PERCENT: 0.9 %
GFR SERPL CREATININE-BSD FRML MDRD: 57 ML/MIN/{1.73_M2}
GLUCOSE BLD-MCNC: 117 MG/DL (ref 70–99)
GLUCOSE BLD-MCNC: 120 MG/DL
GLUCOSE BLD-MCNC: 120 MG/DL (ref 70–99)
GLUCOSE BLD-MCNC: 129 MG/DL (ref 70–99)
GLUCOSE BLD-MCNC: 154 MG/DL (ref 70–99)
HCT VFR BLD CALC: 40 % (ref 40.5–52.5)
HEMOGLOBIN: 12.7 G/DL (ref 13.5–17.5)
LYMPHOCYTES ABSOLUTE: 2 K/UL (ref 1–5.1)
LYMPHOCYTES RELATIVE PERCENT: 33 %
MAGNESIUM: 2 MG/DL (ref 1.8–2.4)
MCH RBC QN AUTO: 25.4 PG (ref 26–34)
MCHC RBC AUTO-ENTMCNC: 31.8 G/DL (ref 31–36)
MCV RBC AUTO: 80.1 FL (ref 80–100)
MONOCYTES ABSOLUTE: 0.5 K/UL (ref 0–1.3)
MONOCYTES RELATIVE PERCENT: 8.9 %
NEUTROPHILS ABSOLUTE: 3.4 K/UL (ref 1.7–7.7)
NEUTROPHILS RELATIVE PERCENT: 56.1 %
PDW BLD-RTO: 13.2 % (ref 12.4–15.4)
PERFORMED ON: ABNORMAL
PLATELET # BLD: 306 K/UL (ref 135–450)
PMV BLD AUTO: 8.8 FL (ref 5–10.5)
POTASSIUM REFLEX MAGNESIUM: 3.4 MMOL/L (ref 3.5–5.1)
RBC # BLD: 4.99 M/UL (ref 4.2–5.9)
SODIUM BLD-SCNC: 138 MMOL/L (ref 136–145)
TOTAL CK: 137 U/L (ref 39–308)
TOTAL PROTEIN: 6.8 G/DL (ref 6.4–8.2)
TROPONIN: 0.06 NG/ML
WBC # BLD: 6.1 K/UL (ref 4–11)

## 2023-01-12 PROCEDURE — 94760 N-INVAS EAR/PLS OXIMETRY 1: CPT

## 2023-01-12 PROCEDURE — 96360 HYDRATION IV INFUSION INIT: CPT

## 2023-01-12 PROCEDURE — 84484 ASSAY OF TROPONIN QUANT: CPT

## 2023-01-12 PROCEDURE — 82550 ASSAY OF CK (CPK): CPT

## 2023-01-12 PROCEDURE — 83735 ASSAY OF MAGNESIUM: CPT

## 2023-01-12 PROCEDURE — 85025 COMPLETE CBC W/AUTO DIFF WBC: CPT

## 2023-01-12 PROCEDURE — 93005 ELECTROCARDIOGRAM TRACING: CPT | Performed by: NURSE PRACTITIONER

## 2023-01-12 PROCEDURE — 93308 TTE F-UP OR LMTD: CPT

## 2023-01-12 PROCEDURE — 99285 EMERGENCY DEPT VISIT HI MDM: CPT

## 2023-01-12 PROCEDURE — 2580000003 HC RX 258: Performed by: NURSE PRACTITIONER

## 2023-01-12 PROCEDURE — 97530 THERAPEUTIC ACTIVITIES: CPT

## 2023-01-12 PROCEDURE — 6370000000 HC RX 637 (ALT 250 FOR IP): Performed by: INTERNAL MEDICINE

## 2023-01-12 PROCEDURE — 97110 THERAPEUTIC EXERCISES: CPT

## 2023-01-12 PROCEDURE — 96361 HYDRATE IV INFUSION ADD-ON: CPT

## 2023-01-12 PROCEDURE — 1200000000 HC SEMI PRIVATE

## 2023-01-12 PROCEDURE — 85379 FIBRIN DEGRADATION QUANT: CPT

## 2023-01-12 PROCEDURE — 71046 X-RAY EXAM CHEST 2 VIEWS: CPT

## 2023-01-12 PROCEDURE — 6370000000 HC RX 637 (ALT 250 FOR IP): Performed by: NURSE PRACTITIONER

## 2023-01-12 PROCEDURE — 80053 COMPREHEN METABOLIC PANEL: CPT

## 2023-01-12 PROCEDURE — 93010 ELECTROCARDIOGRAM REPORT: CPT | Performed by: INTERNAL MEDICINE

## 2023-01-12 PROCEDURE — 2580000003 HC RX 258: Performed by: INTERNAL MEDICINE

## 2023-01-12 RX ORDER — ACETAMINOPHEN 650 MG/1
650 SUPPOSITORY RECTAL EVERY 6 HOURS PRN
Status: DISCONTINUED | OUTPATIENT
Start: 2023-01-12 | End: 2023-01-15 | Stop reason: HOSPADM

## 2023-01-12 RX ORDER — ONDANSETRON 2 MG/ML
4 INJECTION INTRAMUSCULAR; INTRAVENOUS EVERY 6 HOURS PRN
Status: DISCONTINUED | OUTPATIENT
Start: 2023-01-12 | End: 2023-01-15 | Stop reason: HOSPADM

## 2023-01-12 RX ORDER — TRAZODONE HYDROCHLORIDE 50 MG/1
50 TABLET ORAL NIGHTLY PRN
Status: DISCONTINUED | OUTPATIENT
Start: 2023-01-12 | End: 2023-01-15 | Stop reason: HOSPADM

## 2023-01-12 RX ORDER — ALBUTEROL SULFATE 90 UG/1
2 AEROSOL, METERED RESPIRATORY (INHALATION) EVERY 6 HOURS PRN
Status: DISCONTINUED | OUTPATIENT
Start: 2023-01-12 | End: 2023-01-15 | Stop reason: HOSPADM

## 2023-01-12 RX ORDER — SODIUM CHLORIDE 0.9 % (FLUSH) 0.9 %
10 SYRINGE (ML) INJECTION PRN
Status: DISCONTINUED | OUTPATIENT
Start: 2023-01-12 | End: 2023-01-15 | Stop reason: HOSPADM

## 2023-01-12 RX ORDER — CHLORTHALIDONE 25 MG/1
50 TABLET ORAL DAILY
Status: DISCONTINUED | OUTPATIENT
Start: 2023-01-12 | End: 2023-01-15 | Stop reason: HOSPADM

## 2023-01-12 RX ORDER — POTASSIUM CHLORIDE 20 MEQ/1
40 TABLET, EXTENDED RELEASE ORAL PRN
Status: DISCONTINUED | OUTPATIENT
Start: 2023-01-12 | End: 2023-01-15 | Stop reason: HOSPADM

## 2023-01-12 RX ORDER — POTASSIUM CHLORIDE 7.45 MG/ML
10 INJECTION INTRAVENOUS PRN
Status: DISCONTINUED | OUTPATIENT
Start: 2023-01-12 | End: 2023-01-15 | Stop reason: HOSPADM

## 2023-01-12 RX ORDER — HYDROCODONE BITARTRATE AND ACETAMINOPHEN 5; 325 MG/1; MG/1
1 TABLET ORAL EVERY 6 HOURS PRN
Status: DISCONTINUED | OUTPATIENT
Start: 2023-01-12 | End: 2023-01-14

## 2023-01-12 RX ORDER — CARVEDILOL 25 MG/1
25 TABLET ORAL 2 TIMES DAILY
Status: DISCONTINUED | OUTPATIENT
Start: 2023-01-12 | End: 2023-01-15 | Stop reason: HOSPADM

## 2023-01-12 RX ORDER — 0.9 % SODIUM CHLORIDE 0.9 %
1000 INTRAVENOUS SOLUTION INTRAVENOUS ONCE
Status: COMPLETED | OUTPATIENT
Start: 2023-01-12 | End: 2023-01-12

## 2023-01-12 RX ORDER — ENOXAPARIN SODIUM 100 MG/ML
30 INJECTION SUBCUTANEOUS 2 TIMES DAILY
Status: DISCONTINUED | OUTPATIENT
Start: 2023-01-12 | End: 2023-01-15 | Stop reason: HOSPADM

## 2023-01-12 RX ORDER — MAGNESIUM SULFATE IN WATER 40 MG/ML
2000 INJECTION, SOLUTION INTRAVENOUS PRN
Status: DISCONTINUED | OUTPATIENT
Start: 2023-01-12 | End: 2023-01-15 | Stop reason: HOSPADM

## 2023-01-12 RX ORDER — ASPIRIN 81 MG/1
324 TABLET, CHEWABLE ORAL ONCE
Status: COMPLETED | OUTPATIENT
Start: 2023-01-12 | End: 2023-01-12

## 2023-01-12 RX ORDER — PROMETHAZINE HYDROCHLORIDE 25 MG/1
12.5 TABLET ORAL EVERY 6 HOURS PRN
Status: DISCONTINUED | OUTPATIENT
Start: 2023-01-12 | End: 2023-01-15 | Stop reason: HOSPADM

## 2023-01-12 RX ORDER — ASPIRIN 81 MG/1
81 TABLET, CHEWABLE ORAL DAILY
Status: DISCONTINUED | OUTPATIENT
Start: 2023-01-13 | End: 2023-01-15 | Stop reason: HOSPADM

## 2023-01-12 RX ORDER — DICYCLOMINE HYDROCHLORIDE 10 MG/1
10 CAPSULE ORAL
Status: DISCONTINUED | OUTPATIENT
Start: 2023-01-12 | End: 2023-01-15 | Stop reason: HOSPADM

## 2023-01-12 RX ORDER — SODIUM CHLORIDE 0.9 % (FLUSH) 0.9 %
10 SYRINGE (ML) INJECTION EVERY 12 HOURS SCHEDULED
Status: DISCONTINUED | OUTPATIENT
Start: 2023-01-12 | End: 2023-01-15 | Stop reason: HOSPADM

## 2023-01-12 RX ORDER — PREGABALIN 75 MG/1
75 CAPSULE ORAL 2 TIMES DAILY
Status: DISCONTINUED | OUTPATIENT
Start: 2023-01-12 | End: 2023-01-15 | Stop reason: HOSPADM

## 2023-01-12 RX ORDER — DULOXETIN HYDROCHLORIDE 30 MG/1
30 CAPSULE, DELAYED RELEASE ORAL DAILY
Status: DISCONTINUED | OUTPATIENT
Start: 2023-01-12 | End: 2023-01-15 | Stop reason: HOSPADM

## 2023-01-12 RX ORDER — INSULIN LISPRO 100 [IU]/ML
0-8 INJECTION, SOLUTION INTRAVENOUS; SUBCUTANEOUS
Status: DISCONTINUED | OUTPATIENT
Start: 2023-01-12 | End: 2023-01-15 | Stop reason: HOSPADM

## 2023-01-12 RX ORDER — ACETAMINOPHEN 325 MG/1
650 TABLET ORAL EVERY 6 HOURS PRN
Status: DISCONTINUED | OUTPATIENT
Start: 2023-01-12 | End: 2023-01-15 | Stop reason: HOSPADM

## 2023-01-12 RX ORDER — ROSUVASTATIN CALCIUM 40 MG/1
40 TABLET, COATED ORAL NIGHTLY
Status: DISCONTINUED | OUTPATIENT
Start: 2023-01-12 | End: 2023-01-15 | Stop reason: HOSPADM

## 2023-01-12 RX ORDER — DEXTROSE MONOHYDRATE 100 MG/ML
INJECTION, SOLUTION INTRAVENOUS CONTINUOUS PRN
Status: DISCONTINUED | OUTPATIENT
Start: 2023-01-12 | End: 2023-01-15 | Stop reason: HOSPADM

## 2023-01-12 RX ORDER — ERGOCALCIFEROL 1.25 MG/1
50000 CAPSULE ORAL WEEKLY
Status: DISCONTINUED | OUTPATIENT
Start: 2023-01-16 | End: 2023-01-15 | Stop reason: HOSPADM

## 2023-01-12 RX ORDER — INSULIN LISPRO 100 [IU]/ML
0-4 INJECTION, SOLUTION INTRAVENOUS; SUBCUTANEOUS NIGHTLY
Status: DISCONTINUED | OUTPATIENT
Start: 2023-01-12 | End: 2023-01-15 | Stop reason: HOSPADM

## 2023-01-12 RX ORDER — SODIUM CHLORIDE 9 MG/ML
INJECTION, SOLUTION INTRAVENOUS CONTINUOUS
Status: DISCONTINUED | OUTPATIENT
Start: 2023-01-12 | End: 2023-01-13

## 2023-01-12 RX ORDER — SODIUM CHLORIDE 9 MG/ML
INJECTION, SOLUTION INTRAVENOUS PRN
Status: DISCONTINUED | OUTPATIENT
Start: 2023-01-12 | End: 2023-01-15 | Stop reason: HOSPADM

## 2023-01-12 RX ADMIN — CARVEDILOL 25 MG: 25 TABLET, FILM COATED ORAL at 22:36

## 2023-01-12 RX ADMIN — SODIUM CHLORIDE: 9 INJECTION, SOLUTION INTRAVENOUS at 22:41

## 2023-01-12 RX ADMIN — DICYCLOMINE HYDROCHLORIDE 10 MG: 10 CAPSULE ORAL at 22:36

## 2023-01-12 RX ADMIN — PREGABALIN 75 MG: 75 CAPSULE ORAL at 22:36

## 2023-01-12 RX ADMIN — HYDROCODONE BITARTRATE AND ACETAMINOPHEN 1 TABLET: 5; 325 TABLET ORAL at 22:36

## 2023-01-12 RX ADMIN — TRAZODONE HYDROCHLORIDE 50 MG: 50 TABLET ORAL at 22:55

## 2023-01-12 RX ADMIN — SODIUM CHLORIDE, PRESERVATIVE FREE 10 ML: 5 INJECTION INTRAVENOUS at 21:00

## 2023-01-12 RX ADMIN — MAGNESIUM HYDROXIDE 30 ML: 400 SUSPENSION ORAL at 22:55

## 2023-01-12 RX ADMIN — ASPIRIN 324 MG: 81 TABLET, CHEWABLE ORAL at 14:45

## 2023-01-12 RX ADMIN — SODIUM CHLORIDE 1000 ML: 9 INJECTION, SOLUTION INTRAVENOUS at 16:01

## 2023-01-12 RX ADMIN — SODIUM CHLORIDE 1000 ML: 9 INJECTION, SOLUTION INTRAVENOUS at 17:17

## 2023-01-12 RX ADMIN — ROSUVASTATIN CALCIUM 40 MG: 40 TABLET, FILM COATED ORAL at 22:39

## 2023-01-12 ASSESSMENT — PAIN DESCRIPTION - LOCATION
LOCATION: CHEST
LOCATION: CHEST

## 2023-01-12 ASSESSMENT — HEART SCORE: ECG: 1

## 2023-01-12 ASSESSMENT — PAIN DESCRIPTION - ORIENTATION
ORIENTATION: MID
ORIENTATION: INNER

## 2023-01-12 ASSESSMENT — PAIN - FUNCTIONAL ASSESSMENT: PAIN_FUNCTIONAL_ASSESSMENT: 0-10

## 2023-01-12 ASSESSMENT — PAIN SCALES - GENERAL
PAINLEVEL_OUTOF10: 9
PAINLEVEL_OUTOF10: 9

## 2023-01-12 ASSESSMENT — PAIN DESCRIPTION - DESCRIPTORS: DESCRIPTORS: STABBING;SHARP

## 2023-01-12 NOTE — ED PROVIDER NOTES
[address] [facility]        Pt Name: Troy Rivera  MRN: [id number]  [place] 1985  Date of evaluation: 1/12/2023  Provider: HANS Goldstein CNP  PCP: HANS Davey CNP  Note Started: 2:21 PM EST 1/12/23       I have seen and evaluated this patient with my supervising physician, Dr. Brit Rajput. CHIEF COMPLAINT       For last 5-month history of mid chest pain, presyncope, lightheadedness, dizziness, and shortness of breath    HISTORY OF PRESENT ILLNESS: 1 or more Elements     History from : Patient    Limitations to history : None    Troy Rivera is a 40 y.o. nontoxic, unwell appearing, male with medical history including, but not limited to, diabetes mellitus, histoplasmosis, and neuropathy in no acute distress who presents to the emergency department with an ongoing 4-5-month history of constant mid chest pain, presyncope, shortness of breath, lightheadedness, and dizziness. Patient was seen at the [facility] yesterday for the same. Upon record review patient was diagnosed with dehydration and nonspecific chest pain at the [facility] 2 days ago. Nursing Notes were all reviewed and agreed with or any disagreements were addressed in the HPI. REVIEW OF SYSTEMS :      Review of Systems   Constitutional:  Negative for chills, diaphoresis, fatigue and fever. HENT:  Negative for congestion and sore throat. Eyes:  Negative for pain and visual disturbance. Respiratory:  Positive for shortness of breath. Negative for cough. Cardiovascular:  Positive for chest pain. Negative for leg swelling. Gastrointestinal:  Positive for nausea. Negative for abdominal pain, anal bleeding and vomiting. Genitourinary:  Negative for difficulty urinating, dysuria, frequency and urgency. Musculoskeletal:  Negative for back pain and neck pain. Skin:  Negative for rash and wound.    Neurological: Negative for dizziness, syncope (+ Presyncope) and light-headedness. Positives and Pertinent negatives as per HPI. SURGICAL HISTORY     Past Surgical History:   Procedure Laterality Date    BRONCHOSCOPY  06/09/2022    BRONCHOSCOPY BRUSHINGS performed by Danny Guevara MD at [address]  06/09/2022    BRONCHOSCOPY DIAGNOSTIC OR CELL [address] performed by Danny Guevara MD at [address]  06/09/2022    BRONCHOSCOPY BIOPSY BRONCHUS performed by Danny Guevara MD at [address] N/A 06/14/2022    BRONCHOSCOPY DIAGNOSTIC OR CELL [address] ONLY [facility]       Previous Medications    ALBUTEROL SULFATE HFA (PROVENTIL;VENTOLIN;PROAIR) 108 (90 BASE) MCG/ACT INHALER    Inhale 2 puffs into the lungs every 6 hours as needed for Wheezing or Shortness of Breath    ASPIRIN 81 MG CHEWABLE TABLET    Take 1 tablet by mouth in the morning. CARVEDILOL (COREG) 25 MG TABLET    Take 1 tablet by mouth in the morning and 1 tablet before bedtime. CHLORTHALIDONE (HYGROTON) 25 MG TABLET    Take 1 tablet by mouth in the morning. CONTINUOUS BLOOD GLUC SENSOR (FREESTYLE HOME 2 SENSOR) MISC    Use to check blood sugar 4 times per day (before each meal and at bedtime). DICYCLOMINE (BENTYL) 10 MG CAPSULE    Take 1 capsule by mouth 4 times daily (before meals and nightly)    DULAGLUTIDE (TRULICITY) 7.84 RS/0.5BO SOPN    Inject 0.75 mg into the skin once a week    DULOXETINE (CYMBALTA) 30 MG EXTENDED RELEASE CAPSULE    Take 1 capsule by mouth daily    EMPAGLIFLOZIN (JARDIANCE) 10 MG TABLET    Take 1 tablet by mouth in the morning.     INSULIN DEGLUDEC (TRESIBA FLEXTOUCH) 200 UNIT/ML SOPN    Inject 75 Units into the skin daily    ITRACONAZOLE (SPORANOX) 100 MG CAPSULE    Take 200 mg by mouth 2 times daily    METFORMIN (GLUCOPHAGE) 1000 MG TABLET    Take 1 tablet by mouth in the morning and 1 tablet before bedtime. METOCLOPRAMIDE (REGLAN) 10 MG TABLET    Take 1 tablet by mouth 3 times daily (with meals)    PREGABALIN (LYRICA) 75 MG CAPSULE    Take 1 capsule by mouth 2 times daily for 30 days. ROSUVASTATIN (CRESTOR) 40 MG TABLET    Take 1 tablet by mouth nightly    SACUBITRIL-VALSARTAN (ENTRESTO) 24-26 MG PER TABLET    Take 1 tablet by mouth 2 times daily    TRAZODONE (DESYREL) 50 MG TABLET    Take 1-2 tablets by mouth nightly as needed for Sleep    VITAMIN D (ERGOCALCIFEROL) 1.25 MG (36670 UT) CAPS CAPSULE    Take 1 capsule by mouth once a week       ALLERGIES     Penicillins    FAMILYHISTORY       Family History   Problem Relation Age of Onset    Diabetes Father         SOCIAL HISTORY       Social History     Tobacco Use    Smoking status: Never    Smokeless tobacco: Never   Vaping Use    Vaping Use: Never used   Substance Use Topics    Alcohol use: Yes     Comment: rare    Drug use: Not Currently       SCREENINGS                                     PHYSICAL EXAM  1 or more Elements     Vitals:    01/12/23 2317   BP: (!) 132/90   Pulse: (!) 108   Resp: 16   Temp: 98.1 °F (36.7 °C)   SpO2: 97%       Physical Exam  Vitals and nursing note reviewed. Constitutional:       General: He is not in acute distress. Appearance: Normal appearance. He is ill-appearing. He is not toxic-appearing or diaphoretic. HENT:      Head: Normocephalic and atraumatic. Right Ear: External ear normal.      Left Ear: External ear normal.      Nose: Nose normal.   Eyes:      General:         Right eye: No discharge. Left eye: No discharge. Cardiovascular:      Rate and Rhythm: Regular rhythm. Tachycardia present. Heart sounds: No friction rub. No gallop. Pulmonary:      Effort: Pulmonary effort is normal. No respiratory distress. Breath sounds: No decreased breath sounds, wheezing, rhonchi or rales. Abdominal:      Palpations: Abdomen is soft. Tenderness:  There is no abdominal tenderness. There is no guarding or rebound. Musculoskeletal:         General: Normal range of motion. Cervical back: Normal range of motion and neck supple. Skin:     General: Skin is warm and dry. Capillary Refill: Capillary refill takes less than 2 seconds. Neurological:      Mental Status: He is alert and oriented to person, place, and time. Psychiatric:         Mood and Affect: Mood normal.         Behavior: Behavior normal.         PROCEDURES   Unless otherwise noted below, none     Procedures    CRITICAL CARE TIME (.cctime)   0 minutes    PAST MEDICAL HISTORY     Mr. Jemima Montes has a past medical history of COVID-19, Encounter for imaging to screen for metal prior to MRI (12/07/2022), HFrEF (heart failure with reduced ejection fraction) ([facility] [address] History of blood transfusion, Hyperlipidemia, and Neuropathy. Chronic Conditions affecting Care: None    EMERGENCY DEPARTMENT COURSE and DIFFERENTIAL DIAGNOSIS/MDM:   Vitals:    Vitals:    01/12/23 2317   BP: (!) 132/90   Pulse: (!) 108   Resp: 16   Temp: 98.1 °F (36.7 °C)   SpO2: 97%     Patient presents to the emergency department with chest pain. Alternate diagnoses are less likely based on history and physical. Alternate diagnoses are less likely based on history and physical. Considered myocardial infarction, aortic dissection, pulmonary embolism, tension pneumothorax, endocarditis, GERD, and pneumonia but less likely based on history and physical. Considered MI but no ischemic changes on EKG but there was elevation in the patient's troponin level  0.06. He is typically [email or web address]. There is a bit of renal dysfunction today. Considered aortic dissection but less likely as no radiation of pain into back with ripping/tearing sensation, there are equal radial pulses bilaterally, and there is no midline pulsatile abdominal mass. Considered pulmonary embolism but less likely as no cough or hemoptysis, and no DVT symptoms.  D-dimer negative for elevation. Considered tension pneumothorax but less likely as no unilaterally diminished breath sounds or tracheal deviation. Considered endocarditis but less likely as no fever, murmur, or IV drug use. Considered GERD but less likely as no postprandial epigastric abdominal/throat burning. Considered pneumonia but less likely as no fever, chills, sweats, leukocytosis, cough, and no radiographic evidence of consolidation or infiltrates on imaging-CXR. Also pericardial effusion, atypical myocardial ischemia, UTI, rhabdomyolysis, other      Patient was given the following medications:  Patient medicated here in the emergency department with 2 L normal saline via IV and  mg p.o. Kehinde Sanchez is a 40 y.o. nontoxic, unwell appearing, male with medical history including, but not limited to, diabetes mellitus, histoplasmosis, and neuropathy in no acute distress who presents to the emergency department with an ongoing 4-5-month history of constant mid chest pain, presyncope, shortness of breath, lightheadedness, and dizziness. Patient was seen at the [facility] yesterday for the same. Upon record review patient was diagnosed with dehydration and nonspecific chest pain at the [facility] 2 days ago. We will obtain baseline cardiac work-up including urine reflexive culture, POCT glucose, CK, magnesium, CMP, CBC, troponin, D-dimer, EKG, CXR, and EMD performed a bedside ultrasound. See EMD note for details regarding ultrasound.         Labs reviewed:  I have reviewed and interpreted all of the currently available lab results from this visit:  Results for orders placed or performed during the hospital encounter of 01/12/23   CBC with Auto Differential   Result Value Ref Range    WBC 6.1 4.0 - 11.0 K/uL    RBC 4.99 4.20 - 5.90 M/uL    Hemoglobin 12.7 (L) 13.5 - 17.5 g/dL    Hematocrit 40.0 (L) 40.5 - 52.5 %    MCV 80.1 80.0 - 100.0 fL    MCH 25.4 (L) 26.0 - 34.0 pg    MCHC 31.8 31.0 - 36.0 g/dL RDW 13.2 12.4 - 15.4 %    Platelets 257 198 - 621 K/uL    MPV 8.8 5.0 - 10.5 fL    Neutrophils % 56.1 %    Lymphocytes % 33.0 %    Monocytes % 8.9 %    Eosinophils % 0.9 %    Basophils % 1.1 %    Neutrophils Absolute 3.4 1.7 - 7.7 K/uL    Lymphocytes Absolute 2.0 1.0 - 5.1 K/uL    Monocytes Absolute 0.5 0.0 - 1.3 K/uL    Eosinophils Absolute 0.1 0.0 - 0.6 K/uL    Basophils Absolute 0.1 0.0 - 0.2 K/uL   Comprehensive Metabolic Panel w/ Reflex to MG   Result Value Ref Range    Sodium 138 136 - 145 mmol/L    Potassium reflex Magnesium 3.4 (L) 3.5 - 5.1 mmol/L    Chloride 102 99 - 110 mmol/L    CO2 24 21 - 32 mmol/L    Anion Gap 12 3 - 16    Glucose 129 (H) 70 - 99 mg/dL    BUN 23 (H) 7 - 20 mg/dL    Creatinine 1.6 (H) 0.9 - 1.3 mg/dL    Est, Glom Filt Rate 57 (A) >60    Calcium 9.4 8.3 - 10.6 mg/dL    Total Protein 6.8 6.4 - 8.2 g/dL    Albumin 3.7 3.4 - 5.0 g/dL    Albumin/Globulin Ratio 1.2 1.1 - 2.2    Total Bilirubin <0.2 0.0 - 1.0 mg/dL    Alkaline Phosphatase 61 40 - 129 U/L    ALT 11 10 - 40 U/L    AST 10 (L) 15 - 37 U/L   Troponin   Result Value Ref Range    Troponin 0.06 (H) <0.01 ng/mL   D-Dimer, Quantitative   Result Value Ref Range    D-Dimer, Quant 0.44 0.00 - 0.60 ug/mL FEU   Magnesium   Result Value Ref Range    Magnesium 2.00 1.80 - 2.40 mg/dL   Urinalysis with Reflex to Culture    Specimen: Urine   Result Value Ref Range    Urine Type NotGiven    CK   Result Value Ref Range    Total  39 - 308 U/L   POCT Glucose   Result Value Ref Range    Glucose 120 mg/dL   POCT Glucose   Result Value Ref Range    POC Glucose 117 (H) 70 - 99 mg/dl    Performed on ACCU-CHEK    POCT Glucose   Result Value Ref Range    POC Glucose 120 (H) 70 - 99 mg/dl    Performed on ACCU-CHEK    POCT Glucose   Result Value Ref Range    POC Glucose 154 (H) 70 - 99 mg/dl    Performed on ACCU-CHEK    EKG 12 Lead   Result Value Ref Range    Ventricular Rate 97 BPM    Atrial Rate 97 BPM    P-R Interval 196 ms    QRS Duration 94 ms Q-T Interval 364 ms    QTc Calculation (Bazett) 462 ms    P Axis 46 degrees    R Axis -21 degrees    T Axis 38 degrees    Diagnosis       Normal sinus rhythmSeptal infarct (cited on or before 22-DEC-2022)Abnormal ECGWhen compared with ECG of 22-DEC-2022 10:22,No significant change was foundConfirmed by Sylvain Jacques MD, BALDEMAR ([id number]) on 1/12/2023 4:26:00 PM     Imaging reviewed:  XR CHEST (2 VW)    Result Date: 1/12/2023  No acute cardiopulmonary findings        Work-up reveals:  Urine reflex to culture: Pending  POCT glucose 120  CK: [email or web address]  POCT glucose 117  CBC: Negative for leukocytosis with mild anemia with an H&H of 12.7 and 40.0, MCH reduced to 25.4, otherwise unremarkable  Metabolic panel: [email or web address], hyperglycemia 129 mg/dL, BUN 23, creatinine 1.6, GFR 57, otherwise unremarkable for electrolyte derangement or elevation in LFTs  Troponin: [email or web address]  D-dimer: Richard@[employer]  Magnesium: Eyad@[employer]  POCT US echo cardio transthoracic: As interpreted by EMD, see his note for details  CXR: As interpreted by me and confirmed by radiologist identifying no acute cardiopulmonary findings. EKG: As interpreted by me, see note for details      Is this patient to be included in the SEP-1 Core Measure due to severe sepsis or septic shock? No   Exclusion criteria - the patient is NOT to be included for SEP-1 Core Measure due to: Infection is not suspected    CONSULTS: (Who and What was discussed)  None  Discussion with Other Profesionals : Admitting Team      Social Determinants : None    Records Reviewed : [address] [place] [address] outpatient records    CC/HPI Summary, DDx, ED Course, and Reassessment:     Disposition Considerations (include 1 Tests not done, Shared Decision Making, Pt Expectation of Test or Tx.): Considered CT PE study given the patient's symptoms including chest pain, presyncope, and shortness of breath.   However, patient had a D-dimer without elevation therefore the CT PE study was not obtained. Given that the patient is orthostatic, has chest pain though chronic for the past 4-6 months, and acute kidney injury, elevated troponin, and is unable to tolerate p.o. intake he will be admitted to hospital for further evaluation treatment by the inpatient team.  Patient is agreeable with plan for admission. I am the Primary Clinician of Record. Clinical Impression:    ICD-10-CM    1. Orthostatic hypotension  I95.1       2. Chest pain, unspecified type  R07.9     chronic x 4-6 months      3. CHELE (acute kidney injury) ([facility] [address]  N17.9       4. Elevated troponin  R77.8       5. Unable to eat  R63.8               Disposition:  Admitted      Patient will be admitted to hospital for further evaluation and treatment.        Hospitalist: Dr. Nate Sprague      Discussed patients HPI, ED work-up, results, treatment, and response with my attending physician Dr. Harley Pulido and the Hospitalist -Dr. Nate Sprague who agrees to admit the patient to the hospital.               (Please note that portions of this note were completed with a voice recognition program.  Efforts were made to edit the dictations but occasionally words are mis-transcribed.)    HANS Gonzalez CNP (electronically signed)            HANS Gonzalez CNP  01/13/23 0105

## 2023-01-12 NOTE — ED NOTES
Attempted to call report to 4N. RN in pt room, will try again shortly.      Joe Subramanian RN  01/12/23 7519
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
## 2023-01-12 NOTE — TELEPHONE ENCOUNTER
Dr. Bean Kothari is going to take a look over his chart and we will get back to him/his mother. Unfortunately, there is not more I can add at this time for his pain. As previously stated, he has referral for neurologist to manage neuropathic pain. I can place referral for pain management. Please provide this info. He already has been established with Lebron GLEASON.

## 2023-01-12 NOTE — FLOWSHEET NOTE
[facility] - Outpatient Rehabilitation & Therapy  [address] [place] [address]  Phone: [phone]   Fax:     [phone]      Date:  2023    Patient Name:  Issa Moctezuma    :  1985  MRN: [id number]    Pertinent Medical History: Additional Pertinent Hx: Neuropathy, DM, Chronic Kidney Disease, HTN    Medical/Treatment Diagnosis Information:  Medical Diagnosis: Neuropathy [G62.9]; Balance problems [R26.89]  Treatment Diagnosis: Gait and balance disturbance , decreased BLE strength, decreased bilateral ankle foot rom, decreased sensation at bilateral feet to light touch    Insurance/Certification information:  PT Insurance Information: [employer] - auth required after 30 visits  Physician Information:  Esther Morton N, APR*  Plan of care signed (Y/N): sent for cosign     Date of Patient follow up with Physician:      Progress Report: []  Yes  [x]  No     Date Range for reporting period:  Beginnin2023  Ending:     Progress report due (10 Rx/or 30 days whichever is less): 23     Recertification due (POC duration/ or 90 days whichever is less): 23     Visit # Insurance/POC Allowable Auth Needed    Auth after 30 visits [x]Yes    []No       Latex Allergy:  [x]NO      []YES  Preferred Language for Healthcare:   [x]English       []Other:    Functional Scale:      Date assessed: at eval  Test: WOMAC  Score: 78/96 = 81.3% disability    Pain level:  5-10/10     History of Injury: Pt recently in PT this past fall for strengthening and balance.  At that time patient had to stop therapy due to being hospitalized with heart issues.   In the fall pt c/o difficulties with balance for 3 years due to diabetic neuropathy, he feels balance, walking and running have all declined over the last 3 years, he no longer runs, reports constant sharp stabbing pain and burning sensation in bilateral feet and lower legs just  below knees, also has T&N  hands ,  he can no longer work and is seeking disability, cold weather aggravates pain ,  decreased symptoms sitting down, sleep is disturbed due to symptoms, denies loss of control of bowel/ bladder. X ray from 10/19/22 Orthopedic office visit revealed  a minimally displaced navicular dorsal avulsion fracture. Dr Martinez Andrea  from 10/19/22 Plan indicated he will be WBAT, and start aggressive ROM and peroneal strengthening exercise. He can go back to normal activity with no restrictions. Pt reports no changes with the above and that is his walking and balance continue to get worse. Pt reports his endurance with exercises is poor. Can typically only stand for 1-2 mins without pain settling into his feet and leg. Pt denies any history of falls in the past 6 months. SUBJECTIVE:  1/12: Pt notes having some increased nerve pain from L foot up to his knee after last visit. OBJECTIVE:  Observation:   Functional Mobility/Transfers: independent but requires use of UEs for sit to stand  Gait: (include devices/WB status) Patient ambulates with straight cane demonstrating decreased elie and step length. Pt puts mod UE pressure through arm to unload legs.    Test measurements:    ROM:  Date              Ankle DF   Eval 1/9/23 L= lacking 10* from neutral  R=lacking 8* from neutral           Strength:  Date Hip flexion Hip abduction Hip Extension Hip IR Hip ER Quad Hamstring Ankle DF Ankle PF   Eval 1/9 4/5 4+/5 3/5   4/5 4/5 4/5 4/5                   RESTRICTIONS/PRECAUTIONS: fall risk    Exercises/Interventions:     Therapeutic Ex (24432)   Min: 30 Reps/Resistance Notes/CUES   Nu step L1 x5 mins    Gastroc Incline 3x20 sec          Seated TRs 20x L/R    Seated LAQ 3# 2x10 L/R    Seated Tband HS curls Red 2x10 L/R    Seated toe curls w/towel Add          Long sitting HS stretch 3x20 sec     Gastroc stretch with strap 3x20 sec          SLR flexion 0# 2x10 L/R    bridge 10x    SL hip abd 0# 1x10 L/R              Manual Intervention (49388)  Min:     Ankle mobs - post glides Add     Ankle DF stretching add         NMR re-education (89905)  Min:  CUES NEEDED             Therapeutic Activity (40848)  Min: 10     Tandem stance  X1 min L/R    Air Ex:  *NBOS with EC  *alt toe tap   X1 min  X1 min    Rockerboard s/s X1 min    BAPS 4-way cw/ccw L2 10x each L/R               Modalities  Min:     IFC with      CP after exercises     MH after exercises            Other Therapeutic Activities: Pt was educated on PT POC, Diagnosis, Prognosis, pathomechanics as well as frequency and duration of scheduling future physical therapy appointments. Time was also taken on this day to answer all patient questions and participation in PT. Reviewed appointment policy in detail with patient and patient verbalized understanding. Home Exercise Program: Patient was instructed in the following for HEP:   Access Code: [email or web address]  URL: [employer]/  Date: 01/09/2023  Prepared by: Ann Magallanes     Exercises  Active Straight Leg Raise with Quad Set - 1 x daily - 7 x weekly - 2 sets - 10 reps  Supine Bridge - 1 x daily - 7 x weekly - 2 sets - 10 reps  Sidelying Hip Abduction - 1 x daily - 7 x weekly - 2 sets - 10 reps  Long Sitting Calf Stretch with Strap - 1 x daily - 7 x weekly - 3 reps - 20 secs hold  Seated Toe Raise - 1 x daily - 7 x weekly - 3 sets - 10 reps  Long sitting HS stretch - 1 x daily - 7 x weekly - 3 reps - 20 secs hold  Patient verbalized/demonstrated understanding and was issued written handout.      Therapeutic Exercise and NMR EXR  [x] (63495) Provided verbal/tactile cueing for activities related to strengthening, flexibility, endurance, ROM for improvements in LE, proximal hip, and core control with self care, mobility, lifting, ambulation.  [] (63548) Provided verbal/tactile cueing for activities related to improving balance, coordination, kinesthetic sense, posture, motor skill, proprioception  to assist with LE, proximal hip, and core control in self care, mobility, lifting, ambulation and eccentric single leg control. NMR and Therapeutic Activities:    [x] (33951 or 33620) Provided verbal/tactile cueing for activities related to improving balance, coordination, kinesthetic sense, posture, motor skill, proprioception and motor activation to allow for proper function of core, proximal hip and LE with self care and ADLs and functional mobility.   [] (27903) Gait Re-education- Provided training and instruction to the patient for proper LE, core and proximal hip recruitment and positioning and eccentric body weight control with ambulation re-education including up and down stairs     Home Exercise Program:    [x] (14562) Reviewed/Progressed HEP activities related to strengthening, flexibility, endurance, ROM of core, proximal hip and LE for functional self-care, mobility, lifting and ambulation/stair navigation   [] (18793)Reviewed/Progressed HEP activities related to improving balance, coordination, kinesthetic sense, posture, motor skill, proprioception of core, proximal hip and LE for self care, mobility, lifting, and ambulation/stair navigation      Manual Treatments:  PROM / STM / Oscillations-Mobs:  G-I, II, III, IV (PA's, Inf., Post.)  [] (56666) Provided manual therapy to mobilize LE, proximal hip and/or LS spine soft tissue/joints for the purpose of modulating pain, promoting relaxation,  increasing ROM, reducing/eliminating soft tissue swelling/inflammation/restriction, improving soft tissue extensibility and allowing for proper ROM for normal function with self care, mobility, lifting and ambulation.      Charges:  Timed Code Treatment Minutes: 40   Total Treatment Minutes: 40      [] EVAL (LOW) 40606 (typically 20 minutes face-to-face)  [] EVAL (MOD) 14201 (typically 30 minutes face-to-face)  [] EVAL (HIGH) [id number] (typically 45 minutes face-to-face)  [] RE-EVAL     [x] TL(74776) x  2   [] Dry needle 1 or 2 Muscles (49736)  [] NMR (15451) x     [] Dry needle 3+ Muscles (00063)  [] Manual (37124) x     [] Ultrasound (67367) x  [x] TA (90985) x     [] Mech Traction (94854)  [] ES(attended) (23585)     [] ES (un) (01824):   [] Vasopump (15366) [] Ionto (56368)   [] Other:    GOALS:  Patient stated goal: \"Be able to walk easier\"  [] Progressing: [] Met: [] Not Met: [] Adjusted     Therapist goals for Patient:   Short Term Goals: To be achieved in: 2 weeks  1. Independent in HEP and progression per patient tolerance, in order to prevent re-injury. [] Progressing: [] Met: [] Not Met: [] Adjusted  2. Patient will have a decrease in pain by 20% to facilitate improvement in movement, function, and ADLs as indicated by Functional Deficits. [] Progressing: [] Met: [] Not Met: [] Adjusted     Long Term Goals: To be achieved in: at discharge\"  1. Increase WOMAC functional outcome score  to assist with reaching prior level of function. [] Progressing: [] Met: [] Not Met: [] Adjusted  2. Patient will demonstrate increased B ankle DF PROM to 10* to allow for proper joint functioning as indicated by patients Functional Deficits. [] Progressing: [] Met: [] Not Met: [] Adjusted  3. Patient will demonstrate an increase in B LE Strength to 5/5 to allow for proper functional mobility as indicated by patients Functional Deficits. [] Progressing: [] Met: [] Not Met: [] Adjusted  4. Patient will return to household duties without increased symptoms or restriction. [] Progressing: [] Met: [] Not Met: [] Adjusted  5. Patient will report 50% increased ease with community ambulation. [] Progressing: [] Met: [] Not Met: [] Adjusted         ASSESSMENT:  Patient with fair tolerance to today's session. Notes some mild increase in B LE pain with exercises but calmed down with rest.  Pt fatigues fairly quickly with exercises and would benefit from continued skilled care to work on endurance. Treatment/Activity Tolerance:  [x] Patient tolerated treatment well [] Patient limited by fatique  [] Patient limited by pain  [] Patient limited by other medical complications  [] Other:     Overall Progression Towards Functional goals/ Treatment Progress Update:  [] Patient is progressing as expected towards functional goals listed. [] Progression is slowed due to complexities/Impairments listed. [] Progression has been slowed due to co-morbidities. [x] Plan just implemented, too soon to assess goals progression <30days   [] Goals require adjustment due to lack of progress  [] Patient is not progressing as expected and requires additional follow up with physician  [] Other    Prognosis for POC: [x] Good [] Fair  [] Poor    Patient requires continued skilled intervention: [x] Yes  [] No        PLAN:   [] Continue per plan of care [] Alter current plan (see comments)  [x] Plan of care initiated [] Hold pending MD visit [] Discharge    Electronically signed by: Jesusita Arreola, PT , OMT-C,  [id number]      Note: If patient does not return for scheduled/recommended follow up visits, this note will serve as a discharge from care along with the most recent update on progress.

## 2023-01-12 NOTE — ED NOTES
Call placed to rn to provide report for admission. She is unable to take report at this time. She will call back.      Oscar Soto RN  01/12/23 0867

## 2023-01-12 NOTE — PROGRESS NOTES
Medication Reconciliation    List of medications patient is currently taking is complete. Source of information: 1. Conversation with patient at bedside                                      2. EPIC records      Allergies  Penicillins     Notes regarding home medications:   1. Patient did not receive metformin, Jardiance, duloxetine or Entresto yet today. Took all other morning medications prior to arrival to the ED. 2. Patient has been out of pregabalin for a couple of weeks and is supposed to follow up with a pain management doctor. Appt is January 30th.

## 2023-01-12 NOTE — ED PROVIDER NOTES
I independently evaluated and obtained a history and physical on Lula Andrew. All diagnostic, treatment, and disposition assistants were made to myself in conjunction the advanced practice provider. For further details of this patient's emergency department encounter, please see the advanced practice provider's documentation. History: Patient is a 27-year-old gentleman who has a past medical history of hypertension, diabetes mellitus, neuropathy, hyperlipidemia. He states he has chronic neuropathy. He states that he has been on gabapentin in the past which did not work and then he was placed on Lyrica which is not worked because of the pain he continues to not have an appetite. He states he has been eating or drinking well and when he stands up he feels very dizzy like he might pass out. Physician Exam: Male in no acute distress. His cardiac exam is regular. Breath sounds are equal bilaterally. No rales or rhonchi. Abdomen benign. The Ekg interpreted by me shows  normal sinus rhythm with a rate of 97  Axis is   Left axis deviation  QTc is   462 msec  Intervals and Durations are unremarkable. ST Segments: nonspecific changes  When compared to an ECG performed on December 22 the anterior ST-T wave changes are improved when compared to today. ED Ultrasound Performed by me  POCUS_Cardiac            Exam Information:            Exam type:  Clinically indicated         Indication(s) for Exam:            The exam was performed with the following indications[de-identified]  Chest Pain, Hypotension         Views Obtained & Images Saved for These Views:             The pericardial sac, myocardium, 4 chambers, and IVC were identified using the following views[de-identified]            Parasternal long-axis          Parasternal short-axis         Findings:            Pericardial Effusion:  No pericardial effusion         Interpretation:            Other[de-identified]  No pericardial effusion     Electronically signed by Kylee Villela on Thursday, January 12, 2023 at 4:11 PM    I personally saw the patient and performed a substantive portion of the visit including all aspects of the medical decision making. MDM:     DDX: Atypical presentation myocardial ischemia, dehydration, UTI, urinary obstruction, rhabdo, other. The patient's work-up reveals normal white count. No fever. Mild anemia with a hemoglobin of 12.7 hematocrit of 40. His troponin is elevated at 0.06. His BUN is 23 and his creatinine is 1.6. His GFR 57 which is slightly decreased from normal.  His D-dimer is negative. Chest x-ray shows no acute cardiopulmonary findings. An ultrasound was performed that did not show any evidence of pericardial effusion so I do not suspect tamponade. The patient's blood pressure is improved with IV fluids in ED. Given his decreased renal function he was given IV fluids. He does have a history of heart failure documented in the chart. He has a heart score of 4 given his abnormal troponin and his past history. Feel patient will benefit from further work-up and patient in the hospital.  He reports he supposed to see a pain specialist towards the end this month. May need other consideration for other treatment modalities for his neuropathy until then.             Marika Castaneda MD  01/12/23 2043

## 2023-01-13 ENCOUNTER — TELEPHONE (OUTPATIENT)
Dept: PRIMARY CARE CLINIC | Age: 38
End: 2023-01-13

## 2023-01-13 LAB
ANION GAP SERPL CALCULATED.3IONS-SCNC: 10 MMOL/L (ref 3–16)
BACTERIA: NORMAL /HPF
BASOPHILS ABSOLUTE: 0 K/UL (ref 0–0.2)
BASOPHILS RELATIVE PERCENT: 0.1 %
BILIRUBIN URINE: NEGATIVE
BLOOD, URINE: NEGATIVE
BUN BLDV-MCNC: 22 MG/DL (ref 7–20)
CALCIUM SERPL-MCNC: 8.8 MG/DL (ref 8.3–10.6)
CHLORIDE BLD-SCNC: 105 MMOL/L (ref 99–110)
CLARITY: CLEAR
CO2: 26 MMOL/L (ref 21–32)
COLOR: YELLOW
CREAT SERPL-MCNC: 1.4 MG/DL (ref 0.9–1.3)
EOSINOPHILS ABSOLUTE: 0.1 K/UL (ref 0–0.6)
EOSINOPHILS RELATIVE PERCENT: 2.1 %
EPITHELIAL CELLS, UA: 2 /HPF (ref 0–5)
ESTIMATED AVERAGE GLUCOSE: 191.5 MG/DL
GFR SERPL CREATININE-BSD FRML MDRD: >60 ML/MIN/{1.73_M2}
GLUCOSE BLD-MCNC: 112 MG/DL (ref 70–99)
GLUCOSE BLD-MCNC: 135 MG/DL (ref 70–99)
GLUCOSE BLD-MCNC: 145 MG/DL (ref 70–99)
GLUCOSE BLD-MCNC: 91 MG/DL (ref 70–99)
GLUCOSE BLD-MCNC: 97 MG/DL (ref 70–99)
GLUCOSE URINE: NEGATIVE MG/DL
HBA1C MFR BLD: 8.3 %
HCT VFR BLD CALC: 36.5 % (ref 40.5–52.5)
HEMOGLOBIN: 11.8 G/DL (ref 13.5–17.5)
HYALINE CASTS: 3 /LPF (ref 0–8)
KETONES, URINE: NEGATIVE MG/DL
LEUKOCYTE ESTERASE, URINE: NEGATIVE
LYMPHOCYTES ABSOLUTE: 2.4 K/UL (ref 1–5.1)
LYMPHOCYTES RELATIVE PERCENT: 47.8 %
MAGNESIUM: 1.9 MG/DL (ref 1.8–2.4)
MCH RBC QN AUTO: 25.7 PG (ref 26–34)
MCHC RBC AUTO-ENTMCNC: 32.5 G/DL (ref 31–36)
MCV RBC AUTO: 79 FL (ref 80–100)
MICROSCOPIC EXAMINATION: YES
MONOCYTES ABSOLUTE: 0.4 K/UL (ref 0–1.3)
MONOCYTES RELATIVE PERCENT: 7.3 %
NEUTROPHILS ABSOLUTE: 2.1 K/UL (ref 1.7–7.7)
NEUTROPHILS RELATIVE PERCENT: 42.7 %
NITRITE, URINE: NEGATIVE
PDW BLD-RTO: 13.3 % (ref 12.4–15.4)
PERFORMED ON: ABNORMAL
PERFORMED ON: NORMAL
PH UA: 5 (ref 5–8)
PLATELET # BLD: 289 K/UL (ref 135–450)
PMV BLD AUTO: 8.9 FL (ref 5–10.5)
POTASSIUM REFLEX MAGNESIUM: 3.4 MMOL/L (ref 3.5–5.1)
PROTEIN UA: 100 MG/DL
RBC # BLD: 4.62 M/UL (ref 4.2–5.9)
RBC UA: 0 /HPF (ref 0–4)
SODIUM BLD-SCNC: 141 MMOL/L (ref 136–145)
SPECIFIC GRAVITY UA: 1.01 (ref 1–1.03)
URINE REFLEX TO CULTURE: ABNORMAL
URINE TYPE: ABNORMAL
UROBILINOGEN, URINE: 1 E.U./DL
WBC # BLD: 5 K/UL (ref 4–11)
WBC UA: 1 /HPF (ref 0–5)

## 2023-01-13 PROCEDURE — 6370000000 HC RX 637 (ALT 250 FOR IP): Performed by: INTERNAL MEDICINE

## 2023-01-13 PROCEDURE — 97530 THERAPEUTIC ACTIVITIES: CPT

## 2023-01-13 PROCEDURE — 80048 BASIC METABOLIC PNL TOTAL CA: CPT

## 2023-01-13 PROCEDURE — 83735 ASSAY OF MAGNESIUM: CPT

## 2023-01-13 PROCEDURE — 85025 COMPLETE CBC W/AUTO DIFF WBC: CPT

## 2023-01-13 PROCEDURE — 83036 HEMOGLOBIN GLYCOSYLATED A1C: CPT

## 2023-01-13 PROCEDURE — 81003 URINALYSIS AUTO W/O SCOPE: CPT

## 2023-01-13 PROCEDURE — 1200000000 HC SEMI PRIVATE

## 2023-01-13 PROCEDURE — 2580000003 HC RX 258: Performed by: INTERNAL MEDICINE

## 2023-01-13 PROCEDURE — 94760 N-INVAS EAR/PLS OXIMETRY 1: CPT

## 2023-01-13 PROCEDURE — 6360000002 HC RX W HCPCS: Performed by: NURSE PRACTITIONER

## 2023-01-13 PROCEDURE — 36415 COLL VENOUS BLD VENIPUNCTURE: CPT

## 2023-01-13 PROCEDURE — 6360000002 HC RX W HCPCS: Performed by: INTERNAL MEDICINE

## 2023-01-13 PROCEDURE — 97162 PT EVAL MOD COMPLEX 30 MIN: CPT

## 2023-01-13 PROCEDURE — 97166 OT EVAL MOD COMPLEX 45 MIN: CPT

## 2023-01-13 PROCEDURE — 99222 1ST HOSP IP/OBS MODERATE 55: CPT | Performed by: INTERNAL MEDICINE

## 2023-01-13 PROCEDURE — 81001 URINALYSIS AUTO W/SCOPE: CPT

## 2023-01-13 RX ORDER — DIPHENHYDRAMINE HYDROCHLORIDE 50 MG/ML
25 INJECTION INTRAMUSCULAR; INTRAVENOUS EVERY 6 HOURS PRN
Status: DISCONTINUED | OUTPATIENT
Start: 2023-01-13 | End: 2023-01-15 | Stop reason: HOSPADM

## 2023-01-13 RX ADMIN — ENOXAPARIN SODIUM 30 MG: 100 INJECTION SUBCUTANEOUS at 22:10

## 2023-01-13 RX ADMIN — CARVEDILOL 25 MG: 25 TABLET, FILM COATED ORAL at 09:03

## 2023-01-13 RX ADMIN — DIPHENHYDRAMINE HYDROCHLORIDE 25 MG: 50 INJECTION, SOLUTION INTRAMUSCULAR; INTRAVENOUS at 01:11

## 2023-01-13 RX ADMIN — DICYCLOMINE HYDROCHLORIDE 10 MG: 10 CAPSULE ORAL at 17:33

## 2023-01-13 RX ADMIN — DICYCLOMINE HYDROCHLORIDE 10 MG: 10 CAPSULE ORAL at 07:41

## 2023-01-13 RX ADMIN — DULOXETINE HYDROCHLORIDE 30 MG: 30 CAPSULE, DELAYED RELEASE ORAL at 09:03

## 2023-01-13 RX ADMIN — PREGABALIN 75 MG: 75 CAPSULE ORAL at 22:09

## 2023-01-13 RX ADMIN — DIPHENHYDRAMINE HYDROCHLORIDE 25 MG: 50 INJECTION, SOLUTION INTRAMUSCULAR; INTRAVENOUS at 09:03

## 2023-01-13 RX ADMIN — DIPHENHYDRAMINE HYDROCHLORIDE 25 MG: 50 INJECTION, SOLUTION INTRAMUSCULAR; INTRAVENOUS at 22:10

## 2023-01-13 RX ADMIN — ENOXAPARIN SODIUM 30 MG: 100 INJECTION SUBCUTANEOUS at 01:11

## 2023-01-13 RX ADMIN — ENOXAPARIN SODIUM 30 MG: 100 INJECTION SUBCUTANEOUS at 09:04

## 2023-01-13 RX ADMIN — DICYCLOMINE HYDROCHLORIDE 10 MG: 10 CAPSULE ORAL at 12:18

## 2023-01-13 RX ADMIN — CARVEDILOL 25 MG: 25 TABLET, FILM COATED ORAL at 22:10

## 2023-01-13 RX ADMIN — ASPIRIN 81 MG CHEWABLE TABLET 81 MG: 81 TABLET CHEWABLE at 09:03

## 2023-01-13 RX ADMIN — DICYCLOMINE HYDROCHLORIDE 10 MG: 10 CAPSULE ORAL at 22:09

## 2023-01-13 RX ADMIN — ONDANSETRON 4 MG: 2 INJECTION INTRAMUSCULAR; INTRAVENOUS at 17:33

## 2023-01-13 RX ADMIN — SODIUM CHLORIDE, PRESERVATIVE FREE 10 ML: 5 INJECTION INTRAVENOUS at 09:05

## 2023-01-13 RX ADMIN — ROSUVASTATIN CALCIUM 40 MG: 40 TABLET, FILM COATED ORAL at 22:10

## 2023-01-13 RX ADMIN — HYDROCODONE BITARTRATE AND ACETAMINOPHEN 1 TABLET: 5; 325 TABLET ORAL at 22:08

## 2023-01-13 RX ADMIN — PREGABALIN 75 MG: 75 CAPSULE ORAL at 09:03

## 2023-01-13 ASSESSMENT — ENCOUNTER SYMPTOMS
SHORTNESS OF BREATH: 1
BACK PAIN: 0
COUGH: 0
VOMITING: 0
SORE THROAT: 0
ABDOMINAL PAIN: 0
NAUSEA: 1
ANAL BLEEDING: 0
EYE PAIN: 0

## 2023-01-13 ASSESSMENT — PAIN SCALES - GENERAL
PAINLEVEL_OUTOF10: 8
PAINLEVEL_OUTOF10: 0

## 2023-01-13 ASSESSMENT — PAIN DESCRIPTION - LOCATION: LOCATION: GENERALIZED

## 2023-01-13 ASSESSMENT — PAIN DESCRIPTION - ORIENTATION: ORIENTATION: RIGHT;LEFT;UPPER;LOWER;MID

## 2023-01-13 ASSESSMENT — PAIN - FUNCTIONAL ASSESSMENT: PAIN_FUNCTIONAL_ASSESSMENT: ACTIVITIES ARE NOT PREVENTED

## 2023-01-13 ASSESSMENT — PAIN DESCRIPTION - DESCRIPTORS: DESCRIPTORS: ACHING;DISCOMFORT

## 2023-01-13 NOTE — ED PROVIDER NOTES
EKG Interpretation    Interpreted by emergency department physician  Time performed: 2092  Time read: 3273    Rhythm: Sinus  Ventricular Rate: 97  QRS Axis: -21  Ectopy: None  Conduction: Normal sinus rhythm with LVH by voltage and early repolarization abnormalities with biatrial abnormalities  ST Segments: Consistent with consistent with early repolarization abnormalities  T Waves: Consistent with early repolarization abnormalities  Q Waves: None noted    Other findings: Motion artifact but EKG is readable    Compared to EKG on: 12/22/2022 and appears unchanged    Clinical Impression: Normal sinus rhythm with LVH by voltage with early repolarization abnormalities with biatrial abnormalities. There is motion artifact but EKG is readable.   This is compared to an EKG on 12/22/2022 and appears unchanged    Rivka, [address] DO  01/12/23 5698

## 2023-01-13 NOTE — ACP (ADVANCE CARE PLANNING)
Advance Care Planning     Advance Care Planning Activator (Inpatient)  Conversation Note      Date of ACP Conversation: 1/13/2023     Conversation Conducted with: Patient with Decision Making Capacity    [facility] Decision Maker:     Current Designated Health Care Decision Maker:     Primary Decision Maker: Dick Erasmo Jj - [phone]    Care Preferences    Ventilation: \"If you were in your present state of health and suddenly became very ill and were unable to breathe on your own, what would your preference be about the use of a ventilator (breathing machine) if it were available to you? \"      Would the patient desire the use of ventilator (breathing machine)?: yes    \"If your health worsens and it becomes clear that your chance of recovery is unlikely, what would your preference be about the use of a ventilator (breathing machine) if it were available to you? \"     Would the patient desire the use of ventilator (breathing machine)?: \"Up to my mom\"      Resuscitation  \"CPR works best to restart the heart when there is a sudden event, like a heart attack, in someone who is otherwise healthy. Unfortunately, CPR does not typically restart the heart for people who have serious health conditions or who are very sick. \"    \"In the event your heart stopped as a result of an underlying serious health condition, would you want attempts to be made to restart your heart (answer \"yes\" for attempt to resuscitate) or would you prefer a natural death (answer \"no\" for do not attempt to resuscitate)? \" yes       [] Yes   [x] No   Educated Patient / [place] Kiser regarding differences between Advance Directives and portable DNR orders.     Length of ACP Conversation in minutes:  5    Conversation Outcomes:  [x] ACP discussion completed  [] Existing advance directive reviewed with patient; no changes to patient's previously recorded wishes  [] New Advance Directive completed  [] Portable Do Not Rescitate prepared for Provider review and signature  [] POLST/POST/MOLST/MOST prepared for Provider review and signature      Follow-up plan:    [] Schedule follow-up conversation to continue planning  [] Referred individual to Provider for additional questions/concerns   [] Advised patient/agent/surrogate to review completed ACP document and update if needed with changes in condition, patient preferences or care setting    [x] This note routed to one or more involved healthcare providers    Electronically signed by Matt Linares on 1/13/2023 at 12:33 PM

## 2023-01-13 NOTE — ED NOTES
[place]) sandwich and chips given.       Sumaya Durbin RN  01/12/23 1905

## 2023-01-13 NOTE — PROGRESS NOTES
Patient verbalized that he hadn't had a bowel movement in two weeks during admission, while eating chicken wings ordered from [employer]. RN assessed ABD soft with active bowel sounds, no s/s of pain or discomfort noted. MOM given per PRN order, with trazodone and Norco per request. Patient then c/o of generalized itching  RN made on call aware N.O. given for benadryl 25mg IV. Patient tolerated well. Patient and mother both noted sleeping during hourly rounding, patient appears comfortable with no s/s of pain, distress or itching.

## 2023-01-13 NOTE — TELEPHONE ENCOUNTER
Pt's mother calling to let Sam Helm know pt is currently admitted at [facility] for his heart condition    She wants to know if Sam Helm has any thoughts on what is going on

## 2023-01-13 NOTE — PROGRESS NOTES
4 Eyes Skin Assessment     NAME:  Ender Flores  YOB: 1985  MEDICAL RECORD NUMBER:  [id number]    The patient is being assessed for  Admission    I agree that One RN have performed a thorough Head to Toe Skin Assessment on the patient. ALL assessment sites listed below have been assessed. Areas assessed by both nurses:    Head, Face, Ears, Shoulders, Back, Chest, Arms, Elbows, Hands, Sacrum. Buttock, Coccyx, Ischium, and Legs. Feet and Heels        Does the Patient have a Wound?  No noted wound(s)       Jeff Prevention initiated by RN: Yes   Wound Care Orders initiated by RN: Yes    Pressure Injury (Stage 3,4, Unstageable, DTI, NWPT, and Complex wounds) if present place referral order by RN under : NA    New and Established Ostomies, if present place, referral order under : NA      Nurse 1 eSignature: Electronically signed by Elia Johnston RN on 1/13/23 at 3:27 AM EST    **SHARE this note so that the co-signing nurse is able to place an eSignature**    Nurse 2 eSignature: Electronically signed by Indy Cano RN on 1/13/23 at 4:38 AM EST
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
## 2023-01-13 NOTE — TELEPHONE ENCOUNTER
Patients mother informed. She stated the hospital physician told her that she needed to reach out to patient pcp about care being given to patient in the hospital and the plan for after he is out of the hospital.     I told her I would rout message to PCP and see if she can contact hospital.  I informed her that patient may need to schedule a follow up visit with us to discuss overall health.

## 2023-01-13 NOTE — PROGRESS NOTES
Hospitalist Progress Note      PCP: HANS Ochoa CNP    Date of Admission: 1/12/2023    Chief Complaint:   Chief Complaint   Patient presents with    Shortness of Breath     Chest pain, sob, seen at [facility] for same thing. On going for months       Hospital Course: Patient is a 44-year-old male who presents to the hospital for chest pain, shortness of breath he mentions he has pain all over his body he is not able to sleep. He has been to multiple hospitals with the same complaints, he also mentions his appetite is low he is losing weight, his mother is at the bedside and she mentions he is not able to sleep at night due to pain. Patient otherwise denied fevers and chills. He has history of chronic neuropathy he also has established pain management and he has an appointment on January 30. He feels dizzy when he stands up.         Subjective: reports persistent chest pain       Medications:  Reviewed    Infusion Medications    dextrose      sodium chloride 75 mL/hr at 01/12/23 2241    sodium chloride       Scheduled Medications    aspirin  81 mg Oral Daily    carvedilol  25 mg Oral BID    [Held by provider] chlorthalidone  50 mg Oral Daily    dicyclomine  10 mg Oral 4x Daily AC & HS    DULoxetine  30 mg Oral Daily    pregabalin  75 mg Oral BID    rosuvastatin  40 mg Oral Nightly    [Held by provider] sacubitril-valsartan  1 tablet Oral BID    [START ON 1/16/2023] vitamin D  50,000 Units Oral Weekly    insulin lispro  0-8 Units SubCUTAneous TID WC    insulin lispro  0-4 Units SubCUTAneous Nightly    sodium chloride flush  10 mL IntraVENous 2 times per day    enoxaparin  30 mg SubCUTAneous BID     PRN Meds: diphenhydrAMINE, HYDROcodone 5 mg - acetaminophen, albuterol sulfate HFA, traZODone, glucose, dextrose bolus **OR** dextrose bolus, glucagon (rDNA), dextrose, sodium chloride flush, sodium chloride, potassium chloride **OR** potassium alternative oral replacement **OR** potassium chloride, potassium chloride, magnesium sulfate, promethazine **OR** ondansetron, magnesium hydroxide, acetaminophen **OR** acetaminophen      Intake/Output Summary (Last 24 hours) at 1/13/2023 0810  Last data filed at 1/12/2023 2317  Gross per 24 hour   Intake --   Output 1500 ml   Net -1500 ml       Physical Exam Performed:    BP (!) 133/90   Pulse 99   Temp 98.2 °F (36.8 °C) (Oral)   Resp 16   Ht 6' 1\" (1.854 m)   Wt 231 lb 11.3 oz (105.1 kg)   SpO2 97%   BMI 30.57 kg/m²     General appearance: No apparent distress, appears stated age and cooperative. HEENT: Pupils equal, round, and reactive to light. Conjunctivae/corneas clear. Neck: Supple, with full range of motion. No jugular venous distention. Trachea midline. Respiratory:  Normal respiratory effort. Clear to auscultation, bilaterally without Rales/Wheezes/Rhonchi. Cardiovascular: Regular rate and rhythm with normal S1/S2 without murmurs, rubs or gallops. Abdomen: Soft, non-tender, non-distended with normal bowel sounds. Musculoskeletal: No clubbing, cyanosis or edema bilaterally. Full range of motion without deformity. Skin: Skin color, texture, turgor normal.  No rashes or lesions. Neurologic:  Neurovascularly intact without any focal sensory/motor deficits.  Cranial nerves: II-XII intact, grossly non-focal.  Psychiatric: Alert and oriented, thought content appropriate, normal insight  Capillary Refill: Brisk, 3 seconds, normal   Peripheral Pulses: +2 palpable, equal bilaterally       Labs:   Recent Labs     01/12/23  1448 01/13/23  0358   WBC 6.1 5.0   HGB 12.7* 11.8*   HCT 40.0* 36.5*    289     Recent Labs     01/12/23  1354 01/12/23  1448 01/13/23  0358    138 141   K 4.0 3.4* 3.4*    102 105   CO2 23 24 26   BUN 23* 23* 22*   CREATININE 1.8* 1.6* 1.4*   CALCIUM 9.5 9.4 8.8   PHOS 4.0  --   --      Recent Labs     01/12/23  1448   AST 10*   ALT 11   BILITOT <0.2   ALKPHOS 61     No results for input(s): INR in the last 72 hours.   Recent Labs     01/12/23  1448 01/12/23  1723   CKTOTAL  --  137   TROPONINI 0.06*  --        Urinalysis:      Lab Results   Component Value Date/Time    NITRU Negative 01/13/2023 12:20 AM    WBCUA 1 01/13/2023 12:20 AM    BACTERIA None Seen 01/13/2023 12:20 AM    RBCUA 0 01/13/2023 12:20 AM    BLOODU Negative 01/13/2023 12:20 AM    SPECGRAV 1.010 01/13/2023 12:20 AM    GLUCOSEU Negative 01/13/2023 12:20 AM    GLUCOSEU NEGATIVE 01/02/2011 02:44 PM       Radiology:  POC US [facility]   Final Result      XR CHEST (2 VW)   Final Result   No acute cardiopulmonary findings             IP CONSULT TO HOSPITALIST  IP CONSULT TO CARDIOLOGY  IP CONSULT TO NEPHROLOGY    Assessment/Plan:    Active Hospital Problems    Diagnosis     Chest pain [R07.9]      Priority: Medium       Assessment    Chest pain, elevated troponin, rule out ACS  Chronic heart failure with reduced EF-compensated  Generalized body pain, likely chronic neuropathy  CHELE  Hyperglycemia rule diabetes mellitus  Hypokalemia        Plan    Monitor on cardiac telemetry  Monitor for bleeding  Consult cardiology  PE ruled out with normal D-dimer  Change insulin sliding scale  Gentle IV fluids, consult nephrology, hold nephrotoxic agents  Resume home medications  DVT prophylaxis-Lovenox  Diet: No diet orders on file  Code Status: Prior     PT/OT Eval Status: ordered     Dispo - pending clinical improvement      Montez Lara MD
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
## 2023-01-13 NOTE — TELEPHONE ENCOUNTER
Patient is hospitalized and since we are not currently involved in his treatment plan, it would be inappropriate to comment on the care he is getting. If PCP input is needed, it would be communicated physician to physician. Please advise Mom that any questions regarding the hospitalization are addressed at a hospital follow up visit. Sharon Hidalgo has asked that this be scheduled with me. Please schedule for 40 minute visits moving forward.

## 2023-01-13 NOTE — PROGRESS NOTES
Seen and examined  Noncardiac chest pain   Prior cath normal   Medical therapy for chronic systolic heart failure    Full consult to follow    Alfreda Hurt MD [address], Interventional Cardiology, and Peripheral Vascular [address] [facility]   (O): [phone]  (F): [phone]
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
## 2023-01-13 NOTE — ED NOTES
Report called to ROSA Ibanez. Patient is going to [address] [place] [address]. In house transport requested at this time to take patient upstairs.       Domi Guzman RN  01/12/23 1948

## 2023-01-13 NOTE — ACP (ADVANCE CARE PLANNING)
DISCHARGE PLAN: Pt plans to return home upon d/c. Pts mother or friend will transport him home. Pt will continue with outpt PT/OT at [address]. Referral made to the Under 60 Waiver.   ____________________________________  INITIAL ASSESSMENT  Met w/pt to address barriers to dc. Pts mother was also present for this assessment. Pt was agreeable to his mother remaining for this assessment. HOME: Pt reported living in a single family home with his friend. Pt has two children whom he reports having shared custody with their mother. Pt has 1 JASMINE. Disease Specific: Orthostatic Hypotension    COVID Vaccination: No    DME/O2: TTB, cane. No other DME needs noted at this time. ACTIVE SERVICES: Pt reported having a difficult time managing at home and would like help in the home. SW talked with pt about the under 60 waiver and pt and his mother requested a referral to be initiated. SW faxed a 9879 form to [facility]. Pt stated he is independent with bathing but needs help with mobility, cleaning, laundry, medication management and managing MD visits. TRANSPORTATION: Pt is an active  and stated his mother will transport him home at d/c. PHARMACY: Denies difficulty obtaining/taking meds  Pt has all scripts filled at [facility] on [address]. PCP: HANS Fermin CNP     DEMOGRAPHICS: Verified address/phone number as correct    INSURANCE:  Medicaid  PRESCRIPTION COVERAGE: Medicaid    HD/PD: No    THERAPY RECS    PHYSICAL THERAPY  \"   Discharge Recommendations:  Home with assist PRN, Outpatient PT    Kehinde Sanchez scored a 22/24 on the AM-PAC short mobility form. Current research shows that an AM-PAC score of 18 or greater is typically associated with a discharge to the patient's home setting.  Based on the patient's AM-PAC score and their current functional mobility deficits, it is recommended that the patient have 2-3 sessions per week of Physical Therapy at d/c to increase the patient's independence. At this time, this patient demonstrates the endurance and safety to discharge home with OPPT and a follow up treatment frequency of 2-3x/wk. Please see assessment section for further patient specific details. \"    OCCUPATIONAL THERAPY  \"Discharge Recommendations:  Home with assist PRN  OT Equipment Recommendations  Other: has all needed AE/DME  Romel De La Cruz scored a 22/24 on the AM-PAC ADL Inpatient form. At this time, no further OT is recommended upon discharge due to pt nearing baseline function. Recommend patient returns to prior setting with prior services. \"    Discharge planning team will remain available for needs. Please consult for any specifics not addressed in this note.     LISSETTE Barnes LSW  [phone]  Electronically signed by Markos Porras on 1/13/2023 at 12:32 PM

## 2023-01-13 NOTE — TELEPHONE ENCOUNTER
I would advise her to talk to the physicians that are managing his care in the hospital to get more information.

## 2023-01-14 LAB
ALBUMIN SERPL-MCNC: 3.5 G/DL (ref 3.4–5)
ANION GAP SERPL CALCULATED.3IONS-SCNC: 10 MMOL/L (ref 3–16)
BASOPHILS ABSOLUTE: 0.1 K/UL (ref 0–0.2)
BASOPHILS RELATIVE PERCENT: 1.2 %
BUN BLDV-MCNC: 21 MG/DL (ref 7–20)
CALCIUM SERPL-MCNC: 9 MG/DL (ref 8.3–10.6)
CHLORIDE BLD-SCNC: 104 MMOL/L (ref 99–110)
CO2: 25 MMOL/L (ref 21–32)
CREAT SERPL-MCNC: 1.2 MG/DL (ref 0.9–1.3)
EOSINOPHILS ABSOLUTE: 0.1 K/UL (ref 0–0.6)
EOSINOPHILS RELATIVE PERCENT: 3.5 %
GFR SERPL CREATININE-BSD FRML MDRD: >60 ML/MIN/{1.73_M2}
GLUCOSE BLD-MCNC: 112 MG/DL (ref 70–99)
GLUCOSE BLD-MCNC: 125 MG/DL (ref 70–99)
GLUCOSE BLD-MCNC: 127 MG/DL (ref 70–99)
GLUCOSE BLD-MCNC: 127 MG/DL (ref 70–99)
GLUCOSE BLD-MCNC: 155 MG/DL (ref 70–99)
HCT VFR BLD CALC: 37.8 % (ref 40.5–52.5)
HEMOGLOBIN: 12.1 G/DL (ref 13.5–17.5)
LYMPHOCYTES ABSOLUTE: 1.7 K/UL (ref 1–5.1)
LYMPHOCYTES RELATIVE PERCENT: 41.5 %
MAGNESIUM: 1.9 MG/DL (ref 1.8–2.4)
MCH RBC QN AUTO: 25.5 PG (ref 26–34)
MCHC RBC AUTO-ENTMCNC: 32 G/DL (ref 31–36)
MCV RBC AUTO: 79.7 FL (ref 80–100)
MONOCYTES ABSOLUTE: 0.4 K/UL (ref 0–1.3)
MONOCYTES RELATIVE PERCENT: 8.9 %
NEUTROPHILS ABSOLUTE: 1.9 K/UL (ref 1.7–7.7)
NEUTROPHILS RELATIVE PERCENT: 44.9 %
PDW BLD-RTO: 13.1 % (ref 12.4–15.4)
PERFORMED ON: ABNORMAL
PHOSPHORUS: 3.7 MG/DL (ref 2.5–4.9)
PLATELET # BLD: 254 K/UL (ref 135–450)
PMV BLD AUTO: 8.9 FL (ref 5–10.5)
POTASSIUM SERPL-SCNC: 3.5 MMOL/L (ref 3.5–5.1)
RBC # BLD: 4.74 M/UL (ref 4.2–5.9)
SODIUM BLD-SCNC: 139 MMOL/L (ref 136–145)
URIC ACID, SERUM: 7.8 MG/DL (ref 3.5–7.2)
WBC # BLD: 4.2 K/UL (ref 4–11)

## 2023-01-14 PROCEDURE — 6370000000 HC RX 637 (ALT 250 FOR IP): Performed by: INTERNAL MEDICINE

## 2023-01-14 PROCEDURE — 83735 ASSAY OF MAGNESIUM: CPT

## 2023-01-14 PROCEDURE — 6360000002 HC RX W HCPCS: Performed by: INTERNAL MEDICINE

## 2023-01-14 PROCEDURE — 94760 N-INVAS EAR/PLS OXIMETRY 1: CPT

## 2023-01-14 PROCEDURE — 84550 ASSAY OF BLOOD/URIC ACID: CPT

## 2023-01-14 PROCEDURE — 36415 COLL VENOUS BLD VENIPUNCTURE: CPT

## 2023-01-14 PROCEDURE — 1200000000 HC SEMI PRIVATE

## 2023-01-14 PROCEDURE — 2580000003 HC RX 258: Performed by: INTERNAL MEDICINE

## 2023-01-14 PROCEDURE — 80069 RENAL FUNCTION PANEL: CPT

## 2023-01-14 PROCEDURE — 85025 COMPLETE CBC W/AUTO DIFF WBC: CPT

## 2023-01-14 RX ORDER — TRAMADOL HYDROCHLORIDE 50 MG/1
50 TABLET ORAL EVERY 6 HOURS PRN
Status: DISCONTINUED | OUTPATIENT
Start: 2023-01-14 | End: 2023-01-15 | Stop reason: HOSPADM

## 2023-01-14 RX ORDER — LANOLIN ALCOHOL/MO/W.PET/CERES
6 CREAM (GRAM) TOPICAL NIGHTLY
Status: DISCONTINUED | OUTPATIENT
Start: 2023-01-14 | End: 2023-01-15 | Stop reason: HOSPADM

## 2023-01-14 RX ORDER — KETOROLAC TROMETHAMINE 15 MG/ML
15 INJECTION, SOLUTION INTRAMUSCULAR; INTRAVENOUS ONCE
Status: COMPLETED | OUTPATIENT
Start: 2023-01-14 | End: 2023-01-14

## 2023-01-14 RX ORDER — KETOROLAC TROMETHAMINE 15 MG/ML
15 INJECTION, SOLUTION INTRAMUSCULAR; INTRAVENOUS ONCE
Status: DISCONTINUED | OUTPATIENT
Start: 2023-01-14 | End: 2023-01-14

## 2023-01-14 RX ADMIN — DULOXETINE HYDROCHLORIDE 30 MG: 30 CAPSULE, DELAYED RELEASE ORAL at 08:38

## 2023-01-14 RX ADMIN — DICYCLOMINE HYDROCHLORIDE 10 MG: 10 CAPSULE ORAL at 12:32

## 2023-01-14 RX ADMIN — DICYCLOMINE HYDROCHLORIDE 10 MG: 10 CAPSULE ORAL at 17:22

## 2023-01-14 RX ADMIN — MAGNESIUM HYDROXIDE 30 ML: 400 SUSPENSION ORAL at 18:28

## 2023-01-14 RX ADMIN — CARVEDILOL 25 MG: 25 TABLET, FILM COATED ORAL at 08:38

## 2023-01-14 RX ADMIN — DICYCLOMINE HYDROCHLORIDE 10 MG: 10 CAPSULE ORAL at 06:12

## 2023-01-14 RX ADMIN — TRAMADOL HYDROCHLORIDE 50 MG: 50 TABLET ORAL at 18:28

## 2023-01-14 RX ADMIN — ACETAMINOPHEN 650 MG: 325 TABLET ORAL at 20:09

## 2023-01-14 RX ADMIN — PREGABALIN 75 MG: 75 CAPSULE ORAL at 20:08

## 2023-01-14 RX ADMIN — SODIUM CHLORIDE, PRESERVATIVE FREE 10 ML: 5 INJECTION INTRAVENOUS at 20:09

## 2023-01-14 RX ADMIN — ROSUVASTATIN CALCIUM 40 MG: 40 TABLET, FILM COATED ORAL at 20:09

## 2023-01-14 RX ADMIN — ENOXAPARIN SODIUM 30 MG: 100 INJECTION SUBCUTANEOUS at 20:08

## 2023-01-14 RX ADMIN — KETOROLAC TROMETHAMINE 15 MG: 15 INJECTION, SOLUTION INTRAMUSCULAR; INTRAVENOUS at 14:21

## 2023-01-14 RX ADMIN — Medication 6 MG: at 20:08

## 2023-01-14 RX ADMIN — ASPIRIN 81 MG CHEWABLE TABLET 81 MG: 81 TABLET CHEWABLE at 08:38

## 2023-01-14 RX ADMIN — DICYCLOMINE HYDROCHLORIDE 10 MG: 10 CAPSULE ORAL at 20:08

## 2023-01-14 RX ADMIN — CARVEDILOL 25 MG: 25 TABLET, FILM COATED ORAL at 20:09

## 2023-01-14 RX ADMIN — HYDROCODONE BITARTRATE AND ACETAMINOPHEN 1 TABLET: 5; 325 TABLET ORAL at 08:38

## 2023-01-14 RX ADMIN — TRAZODONE HYDROCHLORIDE 50 MG: 50 TABLET ORAL at 20:08

## 2023-01-14 RX ADMIN — PREGABALIN 75 MG: 75 CAPSULE ORAL at 08:38

## 2023-01-14 RX ADMIN — SODIUM CHLORIDE, PRESERVATIVE FREE 10 ML: 5 INJECTION INTRAVENOUS at 08:42

## 2023-01-14 RX ADMIN — ENOXAPARIN SODIUM 30 MG: 100 INJECTION SUBCUTANEOUS at 08:38

## 2023-01-14 ASSESSMENT — PAIN DESCRIPTION - LOCATION
LOCATION: GENERALIZED

## 2023-01-14 ASSESSMENT — PAIN DESCRIPTION - ONSET: ONSET: ON-GOING

## 2023-01-14 ASSESSMENT — PAIN SCALES - GENERAL
PAINLEVEL_OUTOF10: 6
PAINLEVEL_OUTOF10: 6
PAINLEVEL_OUTOF10: 7
PAINLEVEL_OUTOF10: 7
PAINLEVEL_OUTOF10: 6
PAINLEVEL_OUTOF10: 7
PAINLEVEL_OUTOF10: 7
PAINLEVEL_OUTOF10: 0

## 2023-01-14 ASSESSMENT — PAIN DESCRIPTION - ORIENTATION
ORIENTATION: RIGHT;LEFT
ORIENTATION: RIGHT;LEFT;LOWER;UPPER
ORIENTATION: RIGHT;LEFT;UPPER;LOWER

## 2023-01-14 ASSESSMENT — PAIN DESCRIPTION - DESCRIPTORS
DESCRIPTORS: ACHING;DISCOMFORT
DESCRIPTORS: ACHING

## 2023-01-14 ASSESSMENT — PAIN DESCRIPTION - PAIN TYPE
TYPE: ACUTE PAIN
TYPE: ACUTE PAIN

## 2023-01-14 ASSESSMENT — PAIN DESCRIPTION - FREQUENCY: FREQUENCY: CONTINUOUS
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
## 2023-01-14 NOTE — CARE COORDINATION
Discharge order noted. Chart reviewed. Plan is to return home. Previously denied needs. No additional discharge needs identified at this time.      Electronically signed by Stefania Valentin RN MSN on 1/14/2023 at 11:30 AM

## 2023-01-14 NOTE — DISCHARGE SUMMARY
Hospital Medicine Discharge Summary    Patient ID: Asiya Son      Patient's PCP: Mariluz Lieberman, APRN - CNP    Admit Date: 1/12/2023     Discharge Date:   01/14/23    Admitting Provider: Everette White MD     Discharge Provider: Skylar Hansen MD     Discharge Diagnoses: Active Hospital Problems    Diagnosis     Chest pain [R07.9]      Priority: Medium       The patient was seen and examined on day of discharge and this discharge summary is in conjunction with any daily progress note from day of discharge. Hospital Course:     Non cardiac chest pain   Most likely costochondritis    Incidental troponin elevation due CHELE   Evaluated by cardiology with no recommendations and cleared for discharge    CHELE on admission, back to normal on discharge     Cr of 1.2    CHF wit lof EF   Stable, to resume home regimen, except for diuretics, which will be on hold until next follow op appointment with nephrology       Physical Exam Performed:     BP (!) 148/107   Pulse 98   Temp 98.3 °F (36.8 °C) (Oral)   Resp 18   Ht 6' 1\" (1.854 m)   Wt 235 lb 0.2 oz (106.6 kg)   SpO2 98%   BMI 31.01 kg/m²       General appearance:  No apparent distress, appears stated age and cooperative. HEENT:  Normal cephalic, atraumatic without obvious deformity. Pupils equal, round, and reactive to light. Extra ocular muscles intact. Conjunctivae/corneas clear. Neck: Supple, with full range of motion. No jugular venous distention. Trachea midline. Respiratory:  Normal respiratory effort. Clear to auscultation, bilaterally without Rales/Wheezes/Rhonchi. Cardiovascular:  Regular rate and rhythm with normal S1/S2 without murmurs, rubs or gallops. Abdomen: Soft, non-tender, non-distended with normal bowel sounds. Musculoskeletal:  No clubbing, cyanosis or edema bilaterally. Full range of motion without deformity. Skin: Skin color, texture, turgor normal.  No rashes or lesions.   Neurologic:  Neurovascularly intact without any focal sensory/motor deficits. Cranial nerves: II-XII intact, grossly non-focal.  Psychiatric:  Alert and oriented, thought content appropriate, normal insight  Capillary Refill: Brisk,< 3 seconds   Peripheral Pulses: +2 palpable, equal bilaterally       Labs: For convenience and continuity at follow-up the following most recent labs are provided:      CBC:    Lab Results   Component Value Date/Time    WBC 4.2 01/14/2023 06:03 AM    HGB 12.1 01/14/2023 06:03 AM    HCT 37.8 01/14/2023 06:03 AM     01/14/2023 06:03 AM       Renal:    Lab Results   Component Value Date/Time     01/14/2023 06:03 AM    K 3.5 01/14/2023 06:03 AM    K 3.4 01/13/2023 03:58 AM     01/14/2023 06:03 AM    CO2 25 01/14/2023 06:03 AM    BUN 21 01/14/2023 06:03 AM    CREATININE 1.2 01/14/2023 06:03 AM    CALCIUM 9.0 01/14/2023 06:03 AM    PHOS 3.7 01/14/2023 06:03 AM         Significant Diagnostic Studies    Radiology:   POC US ECHOCARDIO TRANSTHORACIC LTD   Final Result      XR CHEST (2 VW)   Final Result   No acute cardiopulmonary findings                Consults:     IP CONSULT TO HOSPITALIST  IP CONSULT TO CARDIOLOGY  IP CONSULT TO NEPHROLOGY  IP CONSULT TO SOCIAL WORK    Disposition:  home     Condition at Discharge: Stable    Discharge Instructions/Follow-up:  Follow-up with PCP within 7 days from discharge, not doing so could have life-threatening consequences. Take medication as instructed;  return to the emergency department if persistent symptoms, experiencing side effects from medication including nausea vomiting or unable to keep medications down.      Code Status:  Full Code     Activity: activity as tolerated    Diet: cardiac diet      Discharge Medications:     Current Discharge Medication List             Details   sacubitril-valsartan (ENTRESTO) 24-26 MG per tablet Take 1 tablet by mouth 2 times daily  Qty: 180 tablet, Refills: 3      Insulin Degludec (TRESIBA FLEXTOUCH) 200 UNIT/ML SOPN Inject 75 Units into the skin daily  Qty: 12 mL, Refills: 3    Associated Diagnoses: Poorly controlled type 2 diabetes mellitus (HCC)      Dulaglutide (TRULICITY) 8.22 XY/7.2EW SOPN Inject 0.75 mg into the skin once a week  Qty: 2 mL, Refills: 5    Associated Diagnoses: Poorly controlled type 2 diabetes mellitus (HCC)      traZODone (DESYREL) 50 MG tablet Take 1-2 tablets by mouth nightly as needed for Sleep  Qty: 60 tablet, Refills: 2    Associated Diagnoses: Insomnia, unspecified type      albuterol sulfate HFA (PROVENTIL;VENTOLIN;PROAIR) 108 (90 Base) MCG/ACT inhaler Inhale 2 puffs into the lungs every 6 hours as needed for Wheezing or Shortness of Breath  Qty: 18 g, Refills: 1      vitamin D (ERGOCALCIFEROL) 1.25 MG (56711 UT) CAPS capsule Take 1 capsule by mouth once a week  Qty: 12 capsule, Refills: 1    Associated Diagnoses: Vitamin D deficiency      pregabalin (LYRICA) 75 MG capsule Take 1 capsule by mouth 2 times daily for 30 days. Qty: 60 capsule, Refills: 0    Associated Diagnoses: Neuropathy      DULoxetine (CYMBALTA) 30 MG extended release capsule Take 1 capsule by mouth daily  Qty: 30 capsule, Refills: 3      metoclopramide (REGLAN) 10 MG tablet Take 1 tablet by mouth 3 times daily (with meals)  Qty: 90 tablet, Refills: 1    Associated Diagnoses: Poorly controlled type 2 diabetes mellitus ([address] Gastroparesis      Continuous Blood Gluc Sensor (FREESTYLE HOME 2 SENSOR) MISC Use to check blood sugar 4 times per day (before each meal and at bedtime). Qty: 2 each, Refills: 11      metFORMIN (GLUCOPHAGE) 1000 MG tablet Take 1 tablet by mouth in the morning and 1 tablet before bedtime. Qty: 180 tablet, Refills: 1    Associated Diagnoses: Poorly controlled type 2 diabetes mellitus (HCC)      empagliflozin (JARDIANCE) 10 MG tablet Take 1 tablet by mouth in the morning.   Qty: 30 tablet, Refills: 3      dicyclomine (BENTYL) 10 MG capsule Take 1 capsule by mouth 4 times daily (before meals and nightly)  Qty: 120 capsule, Refills: 1      chlorthalidone (HYGROTON) 25 MG tablet Take 1 tablet by mouth in the morning. Qty: 30 tablet, Refills: 3      carvedilol (COREG) 25 MG tablet Take 1 tablet by mouth in the morning and 1 tablet before bedtime. Qty: 60 tablet, Refills: 3      aspirin 81 MG chewable tablet Take 1 tablet by mouth in the morning. Qty: 30 tablet, Refills: 3      rosuvastatin (CRESTOR) 40 MG tablet Take 1 tablet by mouth nightly  Qty: 30 tablet, Refills: 3             Time Spent on discharge: 33 min  in the examination, evaluation, counseling and review of medications and discharge plan. Signed:    Julia Rodriguez MD   1/14/2023      Thank you HANS Montemayor CNP for the opportunity to be involved in this patient's care. If you have any questions or concerns, please feel free to contact me at [phone].

## 2023-01-14 NOTE — PROGRESS NOTES
Notified MD that pt continues to have itchiness and benadryl is not working for him. Pt requested more medications. No new orders. Pt in bed resting, call light in reach. No distress or discomfort noted.

## 2023-01-14 NOTE — PLAN OF CARE
Problem: Discharge Planning  Goal: Discharge to home or other facility with appropriate resources  1/14/2023 0424 by Tracey Wolfe RN  Outcome: Progressing  Flowsheets (Taken 1/13/2023 2208)  Discharge to home or other facility with appropriate resources: Identify barriers to discharge with patient and caregiver  1/13/2023 2016 by Inés Cerrato RN  Outcome: Progressing  Flowsheets (Taken 1/13/2023 0830)  Discharge to home or other facility with appropriate resources:   Identify barriers to discharge with patient and caregiver   Arrange for needed discharge resources and transportation as appropriate   Identify discharge learning needs (meds, wound care, etc)   Continuing to work with patient and health care team on discharge plan. Discharge instructions and medication management will be reviewed prior to discharge. Problem: Pain  Goal: Verbalizes/displays adequate comfort level or baseline comfort level  1/14/2023 0424 by Tracey Wolfe RN  Outcome: Progressing  1/13/2023 2016 by Inés Cerrato RN  Outcome: Progressing   Pt able to express presence/absence of pain and rate pain appropriately using numerical scale. Pain/discomfort being managed with PRN analgesics per MD orders (see MAR). Pain assessed every shift and after interventions.     Problem: Chronic Conditions and Co-morbidities  Goal: Patient's chronic conditions and co-morbidity symptoms are monitored and maintained or improved  1/14/2023 0424 by Tracey Wolfe RN  Outcome: Progressing  Flowsheets (Taken 1/13/2023 2208)  Care Plan - Patient's Chronic Conditions and Co-Morbidity Symptoms are Monitored and Maintained or Improved: Monitor and assess patient's chronic conditions and comorbid symptoms for stability, deterioration, or improvement  1/13/2023 2016 by Inés Cerrato RN  Outcome: Progressing  Flowsheets (Taken 1/13/2023 0830)  Care Plan - Patient's Chronic Conditions and Co-Morbidity Symptoms are Monitored and Maintained or Improved: Monitor and assess patient's chronic conditions and comorbid symptoms for stability, deterioration, or improvement   Collaborate with multidisciplinary team to address chronic and comorbid conditions and prevent exacerbation or deterioration   Update acute care plan with appropriate goals if chronic or comorbid symptoms are exacerbated and prevent overall improvement and discharge     Problem: Cardiovascular - Adult  Goal: Maintains optimal cardiac output and hemodynamic stability  1/14/2023 0424 by Gurmeet Kendrick RN  Outcome: Progressing  Flowsheets (Taken 1/13/2023 2208)  Maintains optimal cardiac output and hemodynamic stability: Monitor blood pressure and heart rate  1/13/2023 2016 by Leonides Kline RN  Outcome: Progressing  Flowsheets (Taken 1/13/2023 0830)  Maintains optimal cardiac output and hemodynamic stability:   Monitor blood pressure and heart rate   Monitor urine output and notify Licensed Independent Practitioner for values outside of normal range  Goal: Absence of cardiac dysrhythmias or at baseline  1/14/2023 0424 by Gurmeet Kendrick RN  Outcome: Progressing  Flowsheets (Taken 1/13/2023 2208)  Absence of cardiac dysrhythmias or at baseline: Monitor cardiac rate and rhythm  1/13/2023 2016 by Leonides Kline RN  Outcome: Progressing  Flowsheets (Taken 1/13/2023 0830)  Absence of cardiac dysrhythmias or at baseline:   Monitor cardiac rate and rhythm   Assess for signs of decreased cardiac output   Administer antiarrhythmia medication and electrolyte replacement as ordered     Problem: Metabolic/Fluid and Electrolytes - Adult  Goal: Electrolytes maintained within normal limits  1/14/2023 0424 by Gurmeet Kendrick RN  Outcome: Progressing  Flowsheets (Taken 1/13/2023 2208)  Electrolytes maintained within normal limits: Monitor labs and assess patient for signs and symptoms of electrolyte imbalances  1/13/2023 2016 by Leonides Kline RN  Outcome: Progressing  Flowsheets (Taken 1/13/2023 0830)  Electrolytes maintained within normal limits:   Monitor labs and assess patient for signs and symptoms of electrolyte imbalances   Administer electrolyte replacement as ordered   Monitor response to electrolyte replacements, including repeat lab results as appropriate   Fluid restriction as ordered  Goal: Hemodynamic stability and optimal renal function maintained  1/14/2023 0424 by Shanita Patel RN  Outcome: Progressing  Flowsheets (Taken 1/13/2023 2208)  Hemodynamic stability and optimal renal function maintained: Monitor labs and assess for signs and symptoms of volume excess or deficit  1/13/2023 2016 by Delsa Mcardle, RN  Outcome: Progressing  Flowsheets (Taken 1/13/2023 0830)  Hemodynamic stability and optimal renal function maintained:   Monitor labs and assess for signs and symptoms of volume excess or deficit   Monitor intake, output and patient weight   Monitor urine specific gravity, serum osmolarity and serum sodium as indicated or ordered   Monitor response to interventions for patient's volume status, including labs, urine output, blood pressure (other measures as available)   Encourage oral intake as appropriate   Instruct patient on fluid and nutrition restrictions as appropriate

## 2023-01-14 NOTE — PLAN OF CARE
Problem: Discharge Planning  Goal: Discharge to home or other facility with appropriate resources  Outcome: Progressing     Problem: Pain  Goal: Verbalizes/displays adequate comfort level or baseline comfort level  Outcome: Progressing     Problem: Chronic Conditions and Co-morbidities  Goal: Patient's chronic conditions and co-morbidity symptoms are monitored and maintained or improved  Outcome: Progressing     Problem: Metabolic/Fluid and Electrolytes - Adult  Goal: Electrolytes maintained within normal limits  Outcome: Progressing  Goal: Hemodynamic stability and optimal renal function maintained  Outcome: Progressing

## 2023-01-14 NOTE — PROGRESS NOTES
Hospitalist Progress Note      PCP: HANS Menendez CNP    Date of Admission: 1/12/2023    Chief Complaint:   Chief Complaint   Patient presents with    Shortness of Breath     Chest pain, sob, seen at [facility] for same thing. On going for months       Hospital Course: Patient is a 44-year-old male who presents to the hospital for chest pain, shortness of breath he mentions he has pain all over his body he is not able to sleep. He has been to multiple hospitals with the same complaints, he also mentions his appetite is low he is losing weight, his mother is at the bedside and she mentions he is not able to sleep at night due to pain. Patient otherwise denied fevers and chills. He has history of chronic neuropathy he also has established pain management and he has an appointment on January 30. He feels dizzy when he stands up. Subjective: I had a long conversation with patient regarding his pain. Yesterday he was evaluated by cardiology and felt his chest pain was noncardiac in nature, which I agree. Patient is tender to palpation. However, patient reports issues with chronic pain. Upon reviewing PDMP records, he last filled [facility] prescription on December 22, 2022, 10 pills. He is already on Cymbalta. Reports he has an appointment to see a pain management doctor at the end of the month. He tells me if he is discharged today he is going to return to the emergency department if he does not get any narcotics.       Medications:  Reviewed    Infusion Medications    dextrose      sodium chloride       Scheduled Medications    melatonin  6 mg Oral Nightly    aspirin  81 mg Oral Daily    carvedilol  25 mg Oral BID    [Held by provider] chlorthalidone  50 mg Oral Daily    dicyclomine  10 mg Oral 4x Daily AC & HS    DULoxetine  30 mg Oral Daily    pregabalin  75 mg Oral BID    rosuvastatin  40 mg Oral Nightly    [Held by provider] sacubitril-valsartan  1 tablet Oral BID    [START ON 1/16/2023] vitamin D  50,000 Units Oral Weekly    insulin lispro  0-8 Units SubCUTAneous TID     insulin lispro  0-4 Units SubCUTAneous Nightly    sodium chloride flush  10 mL IntraVENous 2 times per day    enoxaparin  30 mg SubCUTAneous BID     PRN Meds: diphenhydrAMINE, HYDROcodone 5 mg - acetaminophen, albuterol sulfate HFA, traZODone, glucose, dextrose bolus **OR** dextrose bolus, glucagon (rDNA), dextrose, sodium chloride flush, sodium chloride, potassium chloride **OR** potassium alternative oral replacement **OR** potassium chloride, potassium chloride, magnesium sulfate, promethazine **OR** ondansetron, magnesium hydroxide, acetaminophen **OR** acetaminophen      Intake/Output Summary (Last 24 hours) at 1/14/2023 1427  Last data filed at 1/14/2023 1340  Gross per 24 hour   Intake 356 ml   Output --   Net 356 ml         Physical Exam Performed:    /63   Pulse 98   Temp 98.7 °F (37.1 °C) (Oral)   Resp 18   Ht 6' 1\" (1.854 m)   Wt 235 lb 0.2 oz (106.6 kg)   SpO2 98%   BMI 31.01 kg/m²     General appearance: No apparent distress, appears stated age and cooperative. HEENT: Pupils equal, round, and reactive to light. Conjunctivae/corneas clear. Neck: Supple, with full range of motion. No jugular venous distention. Trachea midline. Respiratory:  Normal respiratory effort. Clear to auscultation, bilaterally without Rales/Wheezes/Rhonchi. Cardiovascular: Regular rate and rhythm with normal S1/S2 without murmurs, rubs or gallops. Abdomen: Soft, non-tender, non-distended with normal bowel sounds. Musculoskeletal: No clubbing, cyanosis or edema bilaterally. Full range of motion without deformity. Skin: Skin color, texture, turgor normal.  No rashes or lesions. Neurologic:  Neurovascularly intact without any focal sensory/motor deficits.  Cranial nerves: II-XII intact, grossly non-focal.  Psychiatric: Alert and oriented, thought content appropriate, normal insight  Capillary Refill: Brisk, 3 seconds, normal Peripheral Pulses: +2 palpable, equal bilaterally       Labs:   Recent Labs     01/12/23  1448 01/13/23  0358 01/14/23  0603   WBC 6.1 5.0 4.2   HGB 12.7* 11.8* 12.1*   HCT 40.0* 36.5* 37.8*    289 254       Recent Labs     01/12/23  1354 01/12/23  1448 01/13/23  0358 01/14/23  0603    138 141 139   K 4.0 3.4* 3.4* 3.5    102 105 104   CO2 23 24 26 25   BUN 23* 23* 22* 21*   CREATININE 1.8* 1.6* 1.4* 1.2   CALCIUM 9.5 9.4 8.8 9.0   PHOS 4.0  --   --  3.7       Recent Labs     01/12/23  1448   AST 10*   ALT 11   BILITOT <0.2   ALKPHOS 61       No results for input(s): INR in the last 72 hours.   Recent Labs     01/12/23  1448 01/12/23  1723   CKTOTAL  --  137   TROPONINI 0.06*  --          Urinalysis:      Lab Results   Component Value Date/Time    NITRU Negative 01/13/2023 12:20 AM    WBCUA 1 01/13/2023 12:20 AM    BACTERIA None Seen 01/13/2023 12:20 AM    RBCUA 0 01/13/2023 12:20 AM    BLOODU Negative 01/13/2023 12:20 AM    SPECGRAV 1.010 01/13/2023 12:20 AM    GLUCOSEU Negative 01/13/2023 12:20 AM    GLUCOSEU NEGATIVE 01/02/2011 02:44 PM       Radiology:  POC US ECHOCARDIO TRANSTHORACIC LTD   Final Result      XR CHEST (2 VW)   Final Result   No acute cardiopulmonary findings             IP CONSULT TO HOSPITALIST  IP CONSULT TO CARDIOLOGY  IP CONSULT TO NEPHROLOGY  IP CONSULT TO SOCIAL WORK    Assessment/Plan:    Active Hospital Problems    Diagnosis     Chest pain [R07.9]      Priority: Medium       Chronic pain disorder   Already on Cymbalta    Patient constantly asking for narcotics    Non cardiac chest pain              Most likely costochondritis               Incidental troponin elevation due CHELE              Evaluated by cardiology with no recommendations and cleared for discharge   We will treat him with one-time dose of ketorolac understanding risk of worsening renal function, this was explained to the patient, muscle relaxant     CHELE on admission, back to normal on discharge Cr of 1.2     CHF wit lof EF              Currently holding diuretics and Entresto, defer to nephrology if this is to be resumed prior to discharge or after discharge       PT/OT Eval Status: ordered     Dispo - pending clinical improvement      Skylar Hansen MD

## 2023-01-14 NOTE — PROGRESS NOTES
[facility] Progress Note           Subjective/   40y.o. year old male who we are seeing in consultation for history of hypertension, diabetes, CKD stage III follows with Dr. Piedra Eddie creatinine 1.2-1.5. Presented to the hospital with complaints of chest pain and generalized pain. .     No acute events in the last 24 hrs  Continues to improve. C/o of pain and needs meds for that but no dyspnea    Objective/     GEN:  Chronically ill, /86   Pulse 99   Temp 98 °F (36.7 °C) (Oral)   Resp 18   Ht 6' 1\" (1.854 m)   Wt 235 lb 0.2 oz (106.6 kg)   SpO2 95%   BMI 31.01 kg/m²   HEENT: EOMI,  Neck: supple, no JVD  CV: S1, S2 ,rrr,  no edema  RESP: CTA B   breathing wnl  ABD:  oft, nt, no hsm  SKIN: warm, no rashes  Neuro: MS 5/5 , No asterixis     Data/  Recent Labs     01/12/23  1448 01/13/23  0358 01/14/23  0603   WBC 6.1 5.0 4.2   HGB 12.7* 11.8* 12.1*   HCT 40.0* 36.5* 37.8*   MCV 80.1 79.0* 79.7*    289 254     Recent Labs     01/12/23  1354 01/12/23  1448 01/12/23  1909 01/13/23  0358 01/14/23  0603    138  --  141 139   K 4.0 3.4*  --  3.4* 3.5    102  --  105 104   CO2 23 24  --  26 25   GLUCOSE 125* 129* 120 91 127*   PHOS 4.0  --   --   --  3.7   MG  --  2.00  --  1.90 1.90   BUN 23* 23*  --  22* 21*   CREATININE 1.8* 1.6*  --  1.4* 1.2   LABGLOM 49* 57*  --  >60 >60         Assessment:  Principal Problem:    Chest pain  Resolved Problems:    * No resolved hospital problems. *        CHELE / CKD - resolved off Entresto and Chlorthalidone  - No further work up warranted  -  avoid Thiazide use  for now  - No imaging needed  - Stop IVF  -Ideally ARB should be resumed 4 to 6 weeks after CHELE but will defer to cardiology for cardiac goal directed therapy with risks v benefit  -F/u with dr cuello in clinic. Pt advised to set up an apt      2. CKD III - baseline reported as 1.2-1.5 mg/dl     3.  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 HTN - Good control   - continue Coreg     6. Pain all over / Chest pain. Plan per Medicine    Thank you for the consultation. Please do not hesitate to call with questions. ______________________________  Tran Mckeon MD  [facility]  [email or web address]  Office: [phone]

## 2023-01-14 NOTE — DISCHARGE INSTR - DIET
Good nutrition is important when healing from an illness, injury, or surgery. Follow any nutrition recommendations given to you during your hospital stay. If you were given an oral nutrition supplement while in the hospital, continue to take this supplement at home. You can take it with meals, in-between meals, and/or before bedtime. These supplements can be purchased at most local grocery stores, pharmacies, and chain [employer]-stores. If you have any questions about your diet or nutrition, call the hospital and ask for the dietitian.     Cardiac diet Patient, Stephanie Diazdevnymarck calling for medication refill. Medication(s) set up as pending orders from medication list.    Caller has been advised that their call does not guarantee an immediate refill. This refill will be reviewed within 72 hours by a qualified provider who will determine whether he or she can refill the medication.    Patient has stress related migraines.      [id number] [address] & [address] [address]  Phone: [phone] Fax: [phone]

## 2023-01-14 NOTE — PLAN OF CARE
Problem: Discharge Planning  Goal: Discharge to home or other facility with appropriate resources  1/14/2023 1042 by Miranda Perez RN  Outcome: Progressing  1/14/2023 0424 by Ricardo Og RN  Outcome: Progressing  Flowsheets (Taken 1/13/2023 2208)  Discharge to home or other facility with appropriate resources: Identify barriers to discharge with patient and caregiver     Problem: Pain  Goal: Verbalizes/displays adequate comfort level or baseline comfort level  1/14/2023 1042 by Miranda Perez RN  Outcome: Progressing  1/14/2023 0424 by Ricardo Og RN  Outcome: Progressing     Problem: Chronic Conditions and Co-morbidities  Goal: Patient's chronic conditions and co-morbidity symptoms are monitored and maintained or improved  1/14/2023 1042 by Miranda Perez RN  Outcome: Progressing  1/14/2023 0424 by Ricardo Og RN  Outcome: Progressing  Flowsheets (Taken 1/13/2023 2208)  Care Plan - Patient's Chronic Conditions and Co-Morbidity Symptoms are Monitored and Maintained or Improved: Monitor and assess patient's chronic conditions and comorbid symptoms for stability, deterioration, or improvement     Problem: Cardiovascular - Adult  Goal: Maintains optimal cardiac output and hemodynamic stability  1/14/2023 1042 by Miranda Perez RN  Outcome: Progressing  1/14/2023 0424 by Rciardo Og RN  Outcome: Progressing  Flowsheets (Taken 1/13/2023 2208)  Maintains optimal cardiac output and hemodynamic stability: Monitor blood pressure and heart rate  Goal: Absence of cardiac dysrhythmias or at baseline  1/14/2023 1042 by Miranda Perez RN  Outcome: Progressing  1/14/2023 0424 by Ricardo Og RN  Outcome: Progressing  Flowsheets (Taken 1/13/2023 2208)  Absence of cardiac dysrhythmias or at baseline: Monitor cardiac rate and rhythm     Problem: Metabolic/Fluid and Electrolytes - Adult  Goal: Electrolytes maintained within normal limits  1/14/2023 1042 by Miranda Perez RN  Outcome: Progressing  1/14/2023 0424 by Lorelei Macario RN  Outcome: Progressing  Flowsheets (Taken 1/13/2023 2208)  Electrolytes maintained within normal limits: Monitor labs and assess patient for signs and symptoms of electrolyte imbalances  Goal: Hemodynamic stability and optimal renal function maintained  1/14/2023 1042 by Cordie Apley, RN  Outcome: Progressing  1/14/2023 0424 by Lorelei Macario RN  Outcome: Progressing  Flowsheets (Taken 1/13/2023 2208)  Hemodynamic stability and optimal renal function maintained: Monitor labs and assess for signs and symptoms of volume excess or deficit     Problem: Safety - Adult  Goal: Free from fall injury  Outcome: Progressing

## 2023-01-15 VITALS
RESPIRATION RATE: 16 BRPM | OXYGEN SATURATION: 98 % | WEIGHT: 235.45 LBS | DIASTOLIC BLOOD PRESSURE: 89 MMHG | HEART RATE: 97 BPM | HEIGHT: 73 IN | TEMPERATURE: 98.1 F | BODY MASS INDEX: 31.21 KG/M2 | SYSTOLIC BLOOD PRESSURE: 128 MMHG

## 2023-01-15 LAB
ALBUMIN SERPL-MCNC: 3.6 G/DL (ref 3.4–5)
ANION GAP SERPL CALCULATED.3IONS-SCNC: 9 MMOL/L (ref 3–16)
BUN BLDV-MCNC: 25 MG/DL (ref 7–20)
CALCIUM SERPL-MCNC: 9.1 MG/DL (ref 8.3–10.6)
CHLORIDE BLD-SCNC: 104 MMOL/L (ref 99–110)
CO2: 29 MMOL/L (ref 21–32)
CREAT SERPL-MCNC: 1.2 MG/DL (ref 0.9–1.3)
GFR SERPL CREATININE-BSD FRML MDRD: >60 ML/MIN/{1.73_M2}
GLUCOSE BLD-MCNC: 102 MG/DL (ref 70–99)
GLUCOSE BLD-MCNC: 118 MG/DL (ref 70–99)
GLUCOSE BLD-MCNC: 165 MG/DL (ref 70–99)
GLUCOSE BLD-MCNC: 96 MG/DL (ref 70–99)
MAGNESIUM: 2 MG/DL (ref 1.8–2.4)
PERFORMED ON: ABNORMAL
PERFORMED ON: ABNORMAL
PERFORMED ON: NORMAL
PHOSPHORUS: 4.2 MG/DL (ref 2.5–4.9)
POTASSIUM SERPL-SCNC: 4 MMOL/L (ref 3.5–5.1)
SODIUM BLD-SCNC: 142 MMOL/L (ref 136–145)
URIC ACID, SERUM: 8.3 MG/DL (ref 3.5–7.2)

## 2023-01-15 PROCEDURE — 94760 N-INVAS EAR/PLS OXIMETRY 1: CPT

## 2023-01-15 PROCEDURE — 6370000000 HC RX 637 (ALT 250 FOR IP): Performed by: INTERNAL MEDICINE

## 2023-01-15 PROCEDURE — 36415 COLL VENOUS BLD VENIPUNCTURE: CPT

## 2023-01-15 PROCEDURE — 2580000003 HC RX 258: Performed by: INTERNAL MEDICINE

## 2023-01-15 PROCEDURE — 80069 RENAL FUNCTION PANEL: CPT

## 2023-01-15 PROCEDURE — 84550 ASSAY OF BLOOD/URIC ACID: CPT

## 2023-01-15 PROCEDURE — 83735 ASSAY OF MAGNESIUM: CPT

## 2023-01-15 PROCEDURE — 6360000002 HC RX W HCPCS: Performed by: INTERNAL MEDICINE

## 2023-01-15 RX ADMIN — TRAMADOL HYDROCHLORIDE 50 MG: 50 TABLET ORAL at 05:09

## 2023-01-15 RX ADMIN — TRAMADOL HYDROCHLORIDE 50 MG: 50 TABLET ORAL at 11:31

## 2023-01-15 RX ADMIN — SODIUM CHLORIDE, PRESERVATIVE FREE 10 ML: 5 INJECTION INTRAVENOUS at 08:43

## 2023-01-15 RX ADMIN — TRAMADOL HYDROCHLORIDE 50 MG: 50 TABLET ORAL at 17:12

## 2023-01-15 RX ADMIN — DULOXETINE HYDROCHLORIDE 30 MG: 30 CAPSULE, DELAYED RELEASE ORAL at 08:34

## 2023-01-15 RX ADMIN — DICYCLOMINE HYDROCHLORIDE 10 MG: 10 CAPSULE ORAL at 05:09

## 2023-01-15 RX ADMIN — ENOXAPARIN SODIUM 30 MG: 100 INJECTION SUBCUTANEOUS at 08:34

## 2023-01-15 RX ADMIN — PREGABALIN 75 MG: 75 CAPSULE ORAL at 08:34

## 2023-01-15 RX ADMIN — DICYCLOMINE HYDROCHLORIDE 10 MG: 10 CAPSULE ORAL at 17:12

## 2023-01-15 RX ADMIN — DICYCLOMINE HYDROCHLORIDE 10 MG: 10 CAPSULE ORAL at 11:31

## 2023-01-15 RX ADMIN — ASPIRIN 81 MG CHEWABLE TABLET 81 MG: 81 TABLET CHEWABLE at 08:34

## 2023-01-15 ASSESSMENT — PAIN DESCRIPTION - DESCRIPTORS
DESCRIPTORS: ACHING
DESCRIPTORS: ACHING

## 2023-01-15 ASSESSMENT — PAIN DESCRIPTION - LOCATION: LOCATION: CHEST

## 2023-01-15 ASSESSMENT — PAIN SCALES - GENERAL
PAINLEVEL_OUTOF10: 0
PAINLEVEL_OUTOF10: 0
PAINLEVEL_OUTOF10: 6
PAINLEVEL_OUTOF10: 6
PAINLEVEL_OUTOF10: 0
PAINLEVEL_OUTOF10: 7
PAINLEVEL_OUTOF10: 0

## 2023-01-15 NOTE — PLAN OF CARE
Problem: Pain  Goal: Verbalizes/displays adequate comfort level or baseline comfort level  Outcome: Not Progressing     Problem: Safety - Adult  Goal: Free from fall injury  1/14/2023 1130 by Caterina Gregory RN  Outcome: Adequate for Discharge  1/14/2023 1042 by Caterina Gregory RN  Outcome: Progressing     Problem: Cardiovascular - Adult  Goal: Maintains optimal cardiac output and hemodynamic stability  1/14/2023 1130 by Caterina Gregory RN  Outcome: Adequate for Discharge  1/14/2023 1042 by Caterina Gregory RN  Outcome: Progressing     Problem: Pain  Goal: Verbalizes/displays adequate comfort level or baseline comfort level  Outcome: Not Progressing

## 2023-01-15 NOTE — PLAN OF CARE
Problem: Pain  Goal: Verbalizes/displays adequate comfort level or baseline comfort level  1/15/2023 0843 by Zachary Gonzalez RN  Outcome: Progressing  1/14/2023 2237 by Tl Givens RN  Outcome: Not Progressing     Problem: Pain  Goal: Verbalizes/displays adequate comfort level or baseline comfort level  1/15/2023 0843 by Zachary Gonzalez RN  Outcome: Progressing  1/14/2023 2237 by Tl Givens RN  Outcome: Not Progressing

## 2023-01-15 NOTE — CARE COORDINATION
SW received phone call from patient today about a likely discharge. He stated that he was nervous because he did not feel as if his pain has been managed properly. He stated that he has a pain management appointment in two weeks and the last time he received a script from his PCP HANS Leyva CNP it was only for a couple of days. SW explained that medicare governs how much any doctor can prescribe and the only providers that fill scripts for pain for 30 days or more that we are aware of include oncologists and pain management specialists. SW advised patient to call his PCP first thing Monday morning and notify that he was in the hospital.     He is asking for a discharge script for melatonin as well because it helps. SW will speak with bedside nurse and pass on this info. When we see a discharge order we will follow back up with patient. Respectfully submitted,    HODAN Parker  [facility]   [phone]    Electronically signed by HODAN Rivers on 1/15/2023 at 10:40 AM

## 2023-01-15 NOTE — CARE COORDINATION
DISCHARGE SUMMARY     DATE OF DISCHARGE: 1/15/2023    DISCHARGE DESTINATION: Home with family    FACILITY: None    TRANSPORTATION: Family    COMMENTS: Patient informed that he was already set up with outpatient therapy at [facility] and will resume care next week. No further needs noted unless indicated by patient, family and/or medical staff. Respectfully submitted,    HODAN Rae  [facility]   [phone]    Electronically signed by HODAN Fragoso on 1/15/2023 at 11:24 AM

## 2023-01-15 NOTE — PROGRESS NOTES
[facility] Progress Note           Subjective/   40y.o. year old male who we are seeing in consultation for history of hypertension, diabetes, CKD stage III follows with Dr. Evan Crain creatinine 1.2-1.5. Presented to the hospital with complaints of chest pain and generalized pain. .     No acute events in the last 24 hrs     C/o of pain and needs meds for that but no dyspnea    Objective/     GEN:  Chronically ill, /79   Pulse 97   Temp 98 °F (36.7 °C) (Oral)   Resp 16   Ht 6' 1\" (1.854 m)   Wt 235 lb 7.2 oz (106.8 kg)   SpO2 98%   BMI 31.06 kg/m²   HEENT: EOMI,  Neck: supple, no JVD  CV: S1, S2 ,rrr,  no edema  RESP: CTA B   breathing wnl  ABD:  oft, nt, no hsm  SKIN: warm, no rashes  Neuro: MS 5/5 , No asterixis     Data/  Recent Labs     01/12/23  1448 01/13/23  0358 01/14/23  0603   WBC 6.1 5.0 4.2   HGB 12.7* 11.8* 12.1*   HCT 40.0* 36.5* 37.8*   MCV 80.1 79.0* 79.7*    289 254       Recent Labs     01/12/23  1354 01/12/23  1448 01/13/23  0358 01/14/23  0603 01/15/23  0610      < > 141 139 142   K 4.0   < > 3.4* 3.5 4.0      < > 105 104 104   CO2 23   < > 26 25 29   GLUCOSE 125*   < > 91 127* 102*   PHOS 4.0  --   --  3.7 4.2   MG  --    < > 1.90 1.90 2.00   BUN 23*   < > 22* 21* 25*   CREATININE 1.8*   < > 1.4* 1.2 1.2   LABGLOM 49*   < > >60 >60 >60    < > = values in this interval not displayed. Assessment:  Principal Problem:    Chest pain  Resolved Problems:    * No resolved hospital problems. *        CHELE / CKD - resolved off Entresto and Chlorthalidone  - No further work up warranted  -  avoid Thiazide use  for now  - No imaging needed  - Stop IVF  -Ideally ARB should be resumed 4 to 6 weeks after CHELE but will defer to cardiology for cardiac goal directed therapy with risks v benefit  -F/u with dr cuello in clinic. Pt advised to set up an apt      2. CKD III - baseline reported as 1.2-1.5 mg/dl     3.  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 HTN - Good control   - continue Coreg     6. Pain all over / Chest pain. Plan per Medicine    Thank you for the consultation. Please do not hesitate to call with questions. ______________________________  Fernando Richter MD  [facility]  [email or web address]  Office: [phone]

## 2023-01-15 NOTE — DISCHARGE SUMMARY
Hospital Medicine Discharge Summary    Patient ID: Ronald Modi      Patient's PCP: Nita Salgado, APRN - CNP    Admit Date: 1/12/2023     Discharge Date:   01/15/23    Admitting Provider: Chantal Cervantes MD     Discharge Provider: Robbi Armendariz MD     Discharge Diagnoses: Active Hospital Problems    Diagnosis     Chest pain [R07.9]      Priority: Medium       The patient was seen and examined on day of discharge and this discharge summary is in conjunction with any daily progress note from day of discharge. Hospital Course:     Chronic pain disorder              Already on Cymbalta                Patient constantly asking for narcotics   Has an appointment to see pain management doctor   No indications for prescribing opioids  at this time; Upon reviewing PDMP records, he last filled [facility] prescription on December 22, 2022, 10 pills. Non cardiac chest pain              Most likely costochondritis               Incidental troponin elevation due CHELE              Evaluated by cardiology with no recommendations and cleared for discharge              We will treat him with one-time dose of ketorolac understanding risk of worsening renal function, this was explained to the patient, muscle relaxant     CHELE on admission, back to normal on discharge               Cr of 1.2     CHF wit lof EF              Currently holding diuretics and Entresto, defer to nephrology if this is to be resumed prior to discharge or after discharge                Physical Exam Performed:     /79   Pulse 97   Temp 98 °F (36.7 °C) (Oral)   Resp 16   Ht 6' 1\" (1.854 m)   Wt 235 lb 7.2 oz (106.8 kg)   SpO2 98%   BMI 31.06 kg/m²       General appearance:  No apparent distress, appears stated age and cooperative. HEENT:  Normal cephalic, atraumatic without obvious deformity. Pupils equal, round, and reactive to light. Extra ocular muscles intact. Conjunctivae/corneas clear.   Neck: Supple, with full range of motion. No jugular venous distention. Trachea midline. Respiratory:  Normal respiratory effort. Clear to auscultation, bilaterally without Rales/Wheezes/Rhonchi. Cardiovascular:  Regular rate and rhythm with normal S1/S2 without murmurs, rubs or gallops. Abdomen: Soft, non-tender, non-distended with normal bowel sounds. Musculoskeletal:  No clubbing, cyanosis or edema bilaterally. Full range of motion without deformity. Skin: Skin color, texture, turgor normal.  No rashes or lesions. Neurologic:  Neurovascularly intact without any focal sensory/motor deficits. Cranial nerves: II-XII intact, grossly non-focal.  Psychiatric:  Alert and oriented, thought content appropriate, normal insight  Capillary Refill: Brisk,< 3 seconds   Peripheral Pulses: +2 palpable, equal bilaterally       Labs: For convenience and continuity at follow-up the following most recent labs are provided:      CBC:    Lab Results   Component Value Date/Time    WBC 4.2 01/14/2023 06:03 AM    HGB 12.1 01/14/2023 06:03 AM    HCT 37.8 01/14/2023 06:03 AM     01/14/2023 06:03 AM       Renal:    Lab Results   Component Value Date/Time     01/15/2023 06:10 AM    K 4.0 01/15/2023 06:10 AM    K 3.4 01/13/2023 03:58 AM     01/15/2023 06:10 AM    CO2 29 01/15/2023 06:10 AM    BUN 25 01/15/2023 06:10 AM    CREATININE 1.2 01/15/2023 06:10 AM    CALCIUM 9.1 01/15/2023 06:10 AM    PHOS 4.2 01/15/2023 06:10 AM         Significant Diagnostic Studies    Radiology:   POC US ECHOCARDIO TRANSTHORACIC LTD   Final Result      XR CHEST (2 VW)   Final Result   No acute cardiopulmonary findings                Consults:     IP CONSULT TO HOSPITALIST  IP CONSULT TO CARDIOLOGY  IP CONSULT TO NEPHROLOGY  IP CONSULT TO SOCIAL WORK    Disposition:  home     Condition at Discharge: Stable    Discharge Instructions/Follow-up:  Follow-up with PCP within 7 days from discharge, not doing so could have life-threatening consequences.    Take medication as instructed;  return to the emergency department if persistent symptoms, experiencing side effects from medication including nausea vomiting or unable to keep medications down. Code Status:  Full Code     Activity: activity as tolerated    Diet: cardiac diet      Discharge Medications:     Current Discharge Medication List             Details   sacubitril-valsartan (ENTRESTO) 24-26 MG per tablet Take 1 tablet by mouth 2 times daily  Qty: 180 tablet, Refills: 3      Insulin Degludec (TRESIBA FLEXTOUCH) 200 UNIT/ML SOPN Inject 75 Units into the skin daily  Qty: 12 mL, Refills: 3    Associated Diagnoses: Poorly controlled type 2 diabetes mellitus (HCC)      Dulaglutide (TRULICITY) 7.64 OO/7.8DQ SOPN Inject 0.75 mg into the skin once a week  Qty: 2 mL, Refills: 5    Associated Diagnoses: Poorly controlled type 2 diabetes mellitus (HCC)      traZODone (DESYREL) 50 MG tablet Take 1-2 tablets by mouth nightly as needed for Sleep  Qty: 60 tablet, Refills: 2    Associated Diagnoses: Insomnia, unspecified type      albuterol sulfate HFA (PROVENTIL;VENTOLIN;PROAIR) 108 (90 Base) MCG/ACT inhaler Inhale 2 puffs into the lungs every 6 hours as needed for Wheezing or Shortness of Breath  Qty: 18 g, Refills: 1      vitamin D (ERGOCALCIFEROL) 1.25 MG (41474 UT) CAPS capsule Take 1 capsule by mouth once a week  Qty: 12 capsule, Refills: 1    Associated Diagnoses: Vitamin D deficiency      pregabalin (LYRICA) 75 MG capsule Take 1 capsule by mouth 2 times daily for 30 days. Qty: 60 capsule, Refills: 0    Associated Diagnoses: Neuropathy      DULoxetine (CYMBALTA) 30 MG extended release capsule Take 1 capsule by mouth daily  Qty: 30 capsule, Refills: 3      metoclopramide (REGLAN) 10 MG tablet Take 1 tablet by mouth 3 times daily (with meals)  Qty: 90 tablet, Refills: 1    Associated Diagnoses: Poorly controlled type 2 diabetes mellitus ([address]  Gastroparesis      Continuous Blood Gluc Sensor (FREESTYLE HOME 2 SENSOR) MISC Use to check blood sugar 4 times per day (before each meal and at bedtime). Qty: 2 each, Refills: 11      metFORMIN (GLUCOPHAGE) 1000 MG tablet Take 1 tablet by mouth in the morning and 1 tablet before bedtime. Qty: 180 tablet, Refills: 1    Associated Diagnoses: Poorly controlled type 2 diabetes mellitus (HCC)      empagliflozin (JARDIANCE) 10 MG tablet Take 1 tablet by mouth in the morning. Qty: 30 tablet, Refills: 3      dicyclomine (BENTYL) 10 MG capsule Take 1 capsule by mouth 4 times daily (before meals and nightly)  Qty: 120 capsule, Refills: 1      carvedilol (COREG) 25 MG tablet Take 1 tablet by mouth in the morning and 1 tablet before bedtime. Qty: 60 tablet, Refills: 3      aspirin 81 MG chewable tablet Take 1 tablet by mouth in the morning. Qty: 30 tablet, Refills: 3      rosuvastatin (CRESTOR) 40 MG tablet Take 1 tablet by mouth nightly  Qty: 30 tablet, Refills: 3             Time Spent on discharge: 33 min  in the examination, evaluation, counseling and review of medications and discharge plan. Signed:    Jessica Crystal MD   1/15/2023      Thank you HANS Bassett CNP for the opportunity to be involved in this patient's care. If you have any questions or concerns, please feel free to contact me at [phone].

## 2023-01-15 NOTE — PLAN OF CARE
Problem: Pain  Goal: Verbalizes/displays adequate comfort level or baseline comfort level  1/15/2023 1617 by Bony Petersen RN  Outcome: Completed  1/15/2023 0843 by Bony Petersen, RN  Outcome: Progressing

## 2023-01-16 ENCOUNTER — TELEPHONE (OUTPATIENT)
Dept: PRIMARY CARE CLINIC | Age: 38
End: 2023-01-16

## 2023-01-16 ENCOUNTER — HOSPITAL ENCOUNTER (OUTPATIENT)
Dept: PHYSICAL THERAPY | Age: 38
Setting detail: THERAPIES SERIES
Discharge: HOME | End: 2023-01-16
Payer: MEDICAID

## 2023-01-16 PROCEDURE — 97110 THERAPEUTIC EXERCISES: CPT

## 2023-01-16 PROCEDURE — 97530 THERAPEUTIC ACTIVITIES: CPT

## 2023-01-16 NOTE — TELEPHONE ENCOUNTER
Appointment made for patient [facility].

## 2023-01-16 NOTE — FLOWSHEET NOTE
[address] [facility] [address]  Phone: [phone]   Fax:     [phone]      Date:  2023    Patient Name:  Gearline Gitelman    :  1985  MRN: [id number]    Pertinent Medical History: Additional Pertinent Hx: Neuropathy, DM, Chronic Kidney Disease, HTN    Medical/Treatment Diagnosis Information:  Medical Diagnosis: Neuropathy [G62.9]; Balance problems [R26.89]  Treatment Diagnosis: Gait and balance disturbance , decreased BLE strength, decreased bilateral ankle foot rom, decreased sensation at bilateral feet to light touch    Insurance/Certification information:  PT Insurance Information: [facility] - auth required after 30 visits  Physician Information:  Bhavani Wilkinson*  Plan of care signed (Y/N): sent for cosign  (received)    Date of Patient follow up with Physician:      Progress Report: []  Yes  [x]  No     Date Range for reporting period:  Beginnin2023  Ending:     Progress report due (10 Rx/or 30 days whichever is less):      Recertification due (POC duration/ or 90 days whichever is less): 23     Visit # Insurance/POC Allowable Auth Needed   3 /8 Auth after 30 visits [x]Yes    []No       Latex Allergy:  [x]NO      []YES  Preferred Language for Healthcare:   [x]English       []Other:    Functional Scale:      Date assessed: at eval  Test: WOMAC  Score: 78/96 = 81.3% disability    Pain level:  5-10/10     History of Injury: Pt recently in PT this past fall for strengthening and balance. At that time patient had to stop therapy due to being hospitalized with heart issues.    In the fall pt c/o difficulties with balance for 3 years due to diabetic neuropathy, he feels balance, walking and running have all declined over the last 3 years, he no longer runs, reports constant sharp stabbing pain and burning sensation in bilateral feet and lower legs just below knees, also has T&N  hands ,  he can no longer work and is seeking disability, cold weather aggravates pain ,  decreased symptoms sitting down, sleep is disturbed due to symptoms, denies loss of control of bowel/ bladder. X ray from 10/19/22 Orthopedic office visit revealed  a minimally displaced navicular dorsal avulsion fracture. Dr Arabella Limon  from 10/19/22 Plan indicated he will be WBAT, and start aggressive ROM and peroneal strengthening exercise. He can go back to normal activity with no restrictions. Pt reports no changes with the above and that is his walking and balance continue to get worse. Pt reports his endurance with exercises is poor. Can typically only stand for 1-2 mins without pain settling into his feet and leg. Pt denies any history of falls in the past 6 months. SUBJECTIVE:  1/12: Pt notes having some increased nerve pain from L foot up to his knee after last visit. 1/16: Pt notes he was in the hospital over the weekend due to chest pain. Pt notes all tests came back normal and he was released. OBJECTIVE:  Observation:   Functional Mobility/Transfers: independent but requires use of UEs for sit to stand  Gait: (include devices/WB status) Patient ambulates with straight cane demonstrating decreased elie and step length. Pt puts mod UE pressure through arm to unload legs.    Test measurements:    ROM:  Date              Ankle DF   Eval 1/9/23 L= lacking 10* from neutral  R=lacking 8* from neutral           Strength:  Date Hip flexion Hip abduction Hip Extension Hip IR Hip ER Quad Hamstring Ankle DF Ankle PF   Eval 1/9 4/5 4+/5 3/5   4/5 4/5 4/5 4/5                   RESTRICTIONS/PRECAUTIONS: fall risk    Exercises/Interventions:     Therapeutic Ex (52675)   Min: 30 Reps/Resistance Notes/CUES   Nu step L1 x5 mins    Gastroc Incline 3x20 sec     Leg Press Add          Seated TRs 20x L/R    Seated LAQ 3# 2x10 L/R    Seated Tband HS curls Red 2x10 L/R    Seated toe curls w/towel 30x each L/R         Long sitting HS stretch 3x20 sec          SLR flexion 0# 2x10 L/R    bridge 10x    SL hip abd 0# 1x10 L/R              Manual Intervention (57797)  Min: 5     Ankle mobs - post glides, talar mobs Grade 2 L/R     Ankle DF stretching X3 mins L/R         NMR re-education (38246)  Min:  CUES NEEDED             Therapeutic Activity (84111)  Min: 10     Tandem stance  X1 min L/R    Air Ex:  *NBOS with EC  *alt toe tap  *march on mat   X1 min  X1 min    Balance on discs X1 min    Rockerboard s/s X1 min    BAPS 4-way cw/ccw L2 10x each L/R          Advanced Gait:  *Lateral gait w/versaloop  *amb with head turns  *box step  *slow march   Add  Add  Add  add    Modalities  Min:     IFC with      CP after exercises     MH after exercises            Other Therapeutic Activities: Pt was educated on PT POC, Diagnosis, Prognosis, pathomechanics as well as frequency and duration of scheduling future physical therapy appointments. Time was also taken on this day to answer all patient questions and participation in PT. Reviewed appointment policy in detail with patient and patient verbalized understanding. Home Exercise Program: Patient was instructed in the following for HEP:   Access Code: [email or web address]  URL: [email or web address]  Date: 01/09/2023  Prepared by: Dionne Hayden     Exercises  Active Straight Leg Raise with Quad Set - 1 x daily - 7 x weekly - 2 sets - 10 reps  Supine Bridge - 1 x daily - 7 x weekly - 2 sets - 10 reps  Sidelying Hip Abduction - 1 x daily - 7 x weekly - 2 sets - 10 reps  Long Sitting Calf Stretch with Strap - 1 x daily - 7 x weekly - 3 reps - 20 secs hold  Seated Toe Raise - 1 x daily - 7 x weekly - 3 sets - 10 reps  Long sitting HS stretch - 1 x daily - 7 x weekly - 3 reps - 20 secs hold  Patient verbalized/demonstrated understanding and was issued written handout.      Therapeutic Exercise and NMR EXR  [x] (34125) Provided verbal/tactile cueing for activities related to strengthening, flexibility, endurance, ROM for improvements in LE, proximal hip, and core control with self care, mobility, lifting, ambulation.  [] (50240) Provided verbal/tactile cueing for activities related to improving balance, coordination, kinesthetic sense, posture, motor skill, proprioception  to assist with LE, proximal hip, and core control in self care, mobility, lifting, ambulation and eccentric single leg control.      NMR and Therapeutic Activities:    [x] (07011 or 98217) Provided verbal/tactile cueing for activities related to improving balance, coordination, kinesthetic sense, posture, motor skill, proprioception and motor activation to allow for proper function of core, proximal hip and LE with self care and ADLs and functional mobility.   [] (94593) Gait Re-education- Provided training and instruction to the patient for proper LE, core and proximal hip recruitment and positioning and eccentric body weight control with ambulation re-education including up and down stairs     Home Exercise Program:    [x] (38503) Reviewed/Progressed HEP activities related to strengthening, flexibility, endurance, ROM of core, proximal hip and LE for functional self-care, mobility, lifting and ambulation/stair navigation   [] (60379)Reviewed/Progressed HEP activities related to improving balance, coordination, kinesthetic sense, posture, motor skill, proprioception of core, proximal hip and LE for self care, mobility, lifting, and ambulation/stair navigation      Manual Treatments:  PROM / STM / Oscillations-Mobs:  G-I, II, III, IV (PA's, Inf., Post.)  [] (30308) Provided manual therapy to mobilize LE, proximal hip and/or LS spine soft tissue/joints for the purpose of modulating pain, promoting relaxation,  increasing ROM, reducing/eliminating soft tissue swelling/inflammation/restriction, improving soft tissue extensibility and allowing for proper ROM for normal function with self care, mobility, lifting and ambulation. Charges:  Timed Code Treatment Minutes: 45   Total Treatment Minutes: 45      [] EVAL (LOW) 52168 (typically 20 minutes face-to-face)  [] EVAL (MOD) 20143 (typically 30 minutes face-to-face)  [] EVAL (HIGH) 30452 (typically 45 minutes face-to-face)  [] RE-EVAL     [x] RI(73527) x  2   [] Dry needle 1 or 2 Muscles (64331)  [] NMR (17831) x     [] Dry needle 3+ Muscles (16091)  [] Manual (10845) x     [] Ultrasound (41208) x  [x] TA (43302) x     [] Mech Traction (00577)  [] ES(attended) (28512)     [] ES (un) (81319):   [] Vasopump (16805) [] Ionto (70482)   [] Other:    GOALS:  Patient stated goal: \"Be able to walk easier\"  [] Progressing: [] Met: [] Not Met: [] Adjusted     Therapist goals for Patient:   Short Term Goals: To be achieved in: 2 weeks  1. Independent in HEP and progression per patient tolerance, in order to prevent re-injury. [] Progressing: [] Met: [] Not Met: [] Adjusted  2. Patient will have a decrease in pain by 20% to facilitate improvement in movement, function, and ADLs as indicated by Functional Deficits. [] Progressing: [] Met: [] Not Met: [] Adjusted     Long Term Goals: To be achieved in: at discharge\"  1. Increase WOMAC functional outcome score  to assist with reaching prior level of function. [] Progressing: [] Met: [] Not Met: [] Adjusted  2. Patient will demonstrate increased B ankle DF PROM to 10* to allow for proper joint functioning as indicated by patients Functional Deficits. [] Progressing: [] Met: [] Not Met: [] Adjusted  3. Patient will demonstrate an increase in B LE Strength to 5/5 to allow for proper functional mobility as indicated by patients Functional Deficits. [] Progressing: [] Met: [] Not Met: [] Adjusted  4. Patient will return to household duties without increased symptoms or restriction. [] Progressing: [] Met: [] Not Met: [] Adjusted  5. Patient will report 50% increased ease with community ambulation.    [] Progressing: [] Met: [] Not Met: [] Adjusted         ASSESSMENT:  Patient with good tolerance to today's session. B ankle, mid foot tightness noted with manual therapy techniques. Pt fatigues fairly quickly with exercises and would benefit from continued skilled care to work on endurance. Treatment/Activity Tolerance:  [x] Patient tolerated treatment well [] Patient limited by fatique  [] Patient limited by pain  [] Patient limited by other medical complications  [] Other:     Overall Progression Towards Functional goals/ Treatment Progress Update:  [] Patient is progressing as expected towards functional goals listed. [] Progression is slowed due to complexities/Impairments listed. [] Progression has been slowed due to co-morbidities. [x] Plan just implemented, too soon to assess goals progression <30days   [] Goals require adjustment due to lack of progress  [] Patient is not progressing as expected and requires additional follow up with physician  [] Other    Prognosis for POC: [x] Good [] Fair  [] Poor    Patient requires continued skilled intervention: [x] Yes  [] No        PLAN:   [x] Continue per plan of care [] Alter current plan (see comments)  [] Plan of care initiated [] Hold pending MD visit [] Discharge    Electronically signed by: Tenzin Thompson PT , OMT-C,  [id number]      Note: If patient does not return for scheduled/recommended follow up visits, this note will serve as a discharge from care along with the most recent update on progress.

## 2023-01-16 NOTE — TELEPHONE ENCOUNTER
Spoke with Ms Juno Stiles, visit was scheduled with Dr Gareth Forman for 1/18/23.  40 minutes given.

## 2023-01-16 NOTE — TELEPHONE ENCOUNTER
[facility] needs a note stating that patient is cleared to resume therapy. He was recently in the hospital and is out now and is the facility now.   Statement can be faxed to Fax [phone]  Please advise

## 2023-01-19 ENCOUNTER — CARE COORDINATION (OUTPATIENT)
Dept: CARE COORDINATION | Age: 38
End: 2023-01-19

## 2023-01-19 ENCOUNTER — HOSPITAL ENCOUNTER (OUTPATIENT)
Dept: PHYSICAL THERAPY | Age: 38
Setting detail: THERAPIES SERIES
Discharge: HOME | End: 2023-01-19
Payer: MEDICAID

## 2023-01-19 NOTE — CARE COORDINATION
Outreach to resume [facility] s/p ED visit 1.18.23  Pt reports he is in ED at present time for concerns of hypotension.  Quickly assured pt [facility] outreach will resume s/p current hosp encounter.  Encouraged pt in getting rest & following recommendations in treatment plan of his current care team @ hosp.    Plan: resume [facility] outreach s/p hospital

## 2023-01-19 NOTE — FLOWSHEET NOTE
[address] [facility] [place].  [address]  Phone: [phone]   Fax:     [phone]    Physical Therapy  Cancellation/No-show Note  Patient Name:  Jamaal Hayes  :  1985   Date:  2023  Cancelled visits to date: 1  No-shows to date: 0    Patient status for today's appointment patient:  [x]  Cancelled  []  Rescheduled appointment  []  No-show     Reason given by patient:  []  Patient ill  []  Conflicting appointment  []  No transportation    []  Conflict with work  [x]  No reason given  []  Other:     Comments:      Phone call information:   []  Phone call made today to patient at _ time at number provided:      []  Patient answered, conversation as follows:    []  Patient did not answer, message left as follows:  [x]  Phone call not made today    Electronically signed by:  Nancy Ruelas PTA

## 2023-01-23 ENCOUNTER — APPOINTMENT (OUTPATIENT)
Dept: PHYSICAL THERAPY | Age: 38
End: 2023-01-23
Payer: MEDICAID

## 2023-01-23 NOTE — FLOWSHEET NOTE
[address] [facility] [address]  Phone: [phone]   Fax:     [phone]    Physical Therapy  Cancellation/No-show Note  Patient Name:  Kacy Sosa  :  1985   Date:  2023  Cancelled visits to date: 2  No-shows to date: 0    Patient status for today's appointment patient:  [x]  Cancelled  []  Rescheduled appointment  []  No-show     Reason given by patient:  []  Patient ill  []  Conflicting appointment  []  No transportation    []  Conflict with work  []  No reason given  [x]  Other:     Comments:  Pt called stating he was currently admitted to the hospital and is to have surgery today. Requested all appointments be removed at this time. States he will call back to reschedule when appropriate. Pt notified that he will need a script from MD stating he is clear to return to PT at that time. Pt voiced understanding.      Phone call information:   []  Phone call made today to patient at _ time at number provided:      []  Patient answered, conversation as follows:    []  Patient did not answer, message left as follows:  []  Phone call not made today    Electronically signed by:  Karen Landaverde , OMT-C,  [id number]

## 2023-01-26 ENCOUNTER — APPOINTMENT (OUTPATIENT)
Dept: PHYSICAL THERAPY | Age: 38
End: 2023-01-26
Payer: MEDICAID

## 2023-01-27 ENCOUNTER — TELEPHONE (OUTPATIENT)
Dept: PRIMARY CARE CLINIC | Age: 38
End: 2023-01-27

## 2023-01-27 NOTE — TELEPHONE ENCOUNTER
Pt's mother calling saying pt alton released from hospital 2 days ago. She says he has been feeling hot & cold since his release.   She is requesting for antibiotics to be called in until he can see an infectious disease dr    She says he was just informed he still has trasmosis and needs to see infectious disease  but has not had time to schedule appt yet    Please let her know what LJ decides

## 2023-01-27 NOTE — TELEPHONE ENCOUNTER
Per Norris Haque CNP  \"Please inform patient/his mother I have reviewed records from  but there is no documentation that he still has an infection. 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 Treatment would need to come from the infectious disease doctor and he should contact their office as I'm not sure what treatment they will recommend. We have also previously recommended he set up a ER follow-up appointment with Dr. Dandre Burrell and set this up as a 40 min visit. Please have them schedule this for next week or at their earliest convenience. \"       - I spoke with patients mom and informed her of message.     - I informed patients mom that Nurse Practitioner Norris Haque does not feel comfortable prescribing medication.     - mother verbalized her concerns and informed me that patient may need to go back to ER if he does not get the antibiotic soon. She gave me the ER number [phone] to call for the diagnosis results. - patient was scheduled for 1/30/23 for follow up.

## 2023-01-30 ENCOUNTER — APPOINTMENT (OUTPATIENT)
Dept: PHYSICAL THERAPY | Age: 38
End: 2023-01-30
Payer: MEDICAID

## 2023-01-30 ENCOUNTER — OFFICE VISIT (OUTPATIENT)
Dept: PRIMARY CARE CLINIC | Age: 38
End: 2023-01-30
Payer: MEDICAID

## 2023-01-30 VITALS
SYSTOLIC BLOOD PRESSURE: 137 MMHG | DIASTOLIC BLOOD PRESSURE: 87 MMHG | TEMPERATURE: 98.8 F | BODY MASS INDEX: 32.85 KG/M2 | HEART RATE: 108 BPM | OXYGEN SATURATION: 100 % | WEIGHT: 249 LBS

## 2023-01-30 DIAGNOSIS — K31.84 GASTROPARESIS: ICD-10-CM

## 2023-01-30 DIAGNOSIS — G62.9 NEUROPATHY: ICD-10-CM

## 2023-01-30 DIAGNOSIS — I10 ESSENTIAL HYPERTENSION: ICD-10-CM

## 2023-01-30 DIAGNOSIS — Z79.4 TYPE 2 DIABETES MELLITUS WITH DIABETIC NEUROPATHY, WITH LONG-TERM CURRENT USE OF INSULIN (HCC): ICD-10-CM

## 2023-01-30 DIAGNOSIS — I50.20 HFREF (HEART FAILURE WITH REDUCED EJECTION FRACTION) (HCC): ICD-10-CM

## 2023-01-30 DIAGNOSIS — E11.40 TYPE 2 DIABETES MELLITUS WITH DIABETIC NEUROPATHY, WITH LONG-TERM CURRENT USE OF INSULIN (HCC): ICD-10-CM

## 2023-01-30 DIAGNOSIS — N18.30 STAGE 3 CHRONIC KIDNEY DISEASE, UNSPECIFIED WHETHER STAGE 3A OR 3B CKD (HCC): ICD-10-CM

## 2023-01-30 DIAGNOSIS — B39.2 HISTOPLASMOSIS PNEUMONIA (HCC): Primary | ICD-10-CM

## 2023-01-30 PROBLEM — R07.9 CHEST PAIN: Status: RESOLVED | Noted: 2022-11-11 | Resolved: 2023-01-30

## 2023-01-30 PROBLEM — E11.65 POORLY CONTROLLED TYPE 2 DIABETES MELLITUS (HCC): Status: RESOLVED | Noted: 2022-05-17 | Resolved: 2023-01-30

## 2023-01-30 PROBLEM — S92.252D: Status: RESOLVED | Noted: 2022-07-09 | Resolved: 2023-01-30

## 2023-01-30 PROBLEM — E11.49 TYPE 2 DIABETES MELLITUS WITH NEUROLOGIC COMPLICATION, WITH LONG-TERM CURRENT USE OF INSULIN (HCC): Status: ACTIVE | Noted: 2022-05-17

## 2023-01-30 PROCEDURE — 99215 OFFICE O/P EST HI 40 MIN: CPT | Performed by: FAMILY MEDICINE

## 2023-01-30 PROCEDURE — 3052F HG A1C>EQUAL 8.0%<EQUAL 9.0%: CPT | Performed by: FAMILY MEDICINE

## 2023-01-30 PROCEDURE — 3074F SYST BP LT 130 MM HG: CPT | Performed by: FAMILY MEDICINE

## 2023-01-30 PROCEDURE — 3078F DIAST BP <80 MM HG: CPT | Performed by: FAMILY MEDICINE

## 2023-01-30 RX ORDER — FLASH GLUCOSE SENSOR
KIT MISCELLANEOUS
Qty: 2 EACH | Refills: 11 | Status: SHIPPED | OUTPATIENT
Start: 2023-01-30

## 2023-01-30 RX ORDER — LACTULOSE 10 G/15ML
10 SOLUTION ORAL DAILY PRN
COMMUNITY
End: 2023-01-30 | Stop reason: SDUPTHER

## 2023-01-30 RX ORDER — ASPIRIN 81 MG/1
81 TABLET, CHEWABLE ORAL DAILY
Qty: 30 TABLET | Refills: 3 | Status: SHIPPED | OUTPATIENT
Start: 2023-01-30

## 2023-01-30 RX ORDER — GLUCOSAMINE HCL/CHONDROITIN SU 500-400 MG
CAPSULE ORAL
Qty: 100 STRIP | Refills: 0 | Status: SHIPPED | OUTPATIENT
Start: 2023-01-30

## 2023-01-30 RX ORDER — METOCLOPRAMIDE 10 MG/1
10 TABLET ORAL 3 TIMES DAILY
COMMUNITY

## 2023-01-30 RX ORDER — LACTULOSE 10 G/15ML
10 SOLUTION ORAL DAILY PRN
Qty: 946 ML | Refills: 1 | Status: SHIPPED | OUTPATIENT
Start: 2023-01-30

## 2023-01-30 RX ORDER — ONDANSETRON 4 MG/1
4 TABLET, FILM COATED ORAL EVERY 8 HOURS PRN
COMMUNITY
Start: 2023-01-25

## 2023-01-30 RX ORDER — PREGABALIN 75 MG/1
75 CAPSULE ORAL 2 TIMES DAILY
Qty: 60 CAPSULE | Refills: 3 | Status: SHIPPED | OUTPATIENT
Start: 2023-01-30 | End: 2023-03-01

## 2023-01-30 RX ORDER — VALSARTAN 40 MG/1
40 TABLET ORAL DAILY
COMMUNITY
Start: 2023-01-25

## 2023-01-30 ASSESSMENT — ENCOUNTER SYMPTOMS
BLOOD IN STOOL: 0
ABDOMINAL DISTENTION: 0
FACIAL SWELLING: 0
CHEST TIGHTNESS: 0
COLOR CHANGE: 0
VOMITING: 0
WHEEZING: 0
ABDOMINAL PAIN: 0
BACK PAIN: 0
EYE DISCHARGE: 0
COUGH: 0
SHORTNESS OF BREATH: 0
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
## 2023-01-31 ENCOUNTER — TELEPHONE (OUTPATIENT)
Dept: PRIMARY CARE CLINIC | Age: 38
End: 2023-01-31

## 2023-01-31 ASSESSMENT — ENCOUNTER SYMPTOMS
BLOOD IN STOOL: 0
SHORTNESS OF BREATH: 0
CONSTIPATION: 1
CHEST TIGHTNESS: 0
COUGH: 0
ABDOMINAL PAIN: 0

## 2023-01-31 NOTE — TELEPHONE ENCOUNTER
Patient was seen yesterday for hospital follow up. Recently had bronchoscopy at [facility] 1/23/23. After discharge, his results came back with + histoplasmosis. He has appt at [facility] on 2/17, but I wanted to see if you could call to request sooner appt if available. I believe the number is [phone]. Also, Dr. Caio Blanton is his endocrinologist. I had to stop his Trulicity at the recommendation of his Gastroenterologist due to the severity of his gastroparesis and this is a side effect of the med. Could you please leave a message with the clinical staff regarding this at Dr. Georgia Alberts? Can give them my cell phone number if any questions.      Dr. Georgia Alberts Phone: [phone]

## 2023-01-31 NOTE — TELEPHONE ENCOUNTER
- called and spoke with a  at [facility] and was able to change patients appointment to 2/7/23 at 10:20 am.    - I also called 's office and spoke with Nurse Deion Moran. I gave Deion Moran the message per Johnna Wood. She states she will route the message to the physician and inform them of what is going on.     - informed patient of appointment change. Patient verbalized understanding and had no questions.
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
## 2023-02-01 ENCOUNTER — OFFICE VISIT (OUTPATIENT)
Dept: CARDIOLOGY CLINIC | Age: 38
End: 2023-02-01
Payer: MEDICAID

## 2023-02-01 VITALS
SYSTOLIC BLOOD PRESSURE: 136 MMHG | BODY MASS INDEX: 32.61 KG/M2 | HEART RATE: 106 BPM | DIASTOLIC BLOOD PRESSURE: 82 MMHG | WEIGHT: 247.2 LBS

## 2023-02-01 DIAGNOSIS — I10 ESSENTIAL HYPERTENSION: ICD-10-CM

## 2023-02-01 DIAGNOSIS — I50.20 HFREF (HEART FAILURE WITH REDUCED EJECTION FRACTION) (HCC): ICD-10-CM

## 2023-02-01 PROCEDURE — 3075F SYST BP GE 130 - 139MM HG: CPT | Performed by: INTERNAL MEDICINE

## 2023-02-01 PROCEDURE — 99214 OFFICE O/P EST MOD 30 MIN: CPT | Performed by: INTERNAL MEDICINE

## 2023-02-01 PROCEDURE — 3079F DIAST BP 80-89 MM HG: CPT | Performed by: INTERNAL MEDICINE

## 2023-02-01 NOTE — ASSESSMENT & PLAN NOTE
Nonischemic. Recovered LV function. Normal PET  Continue carvedilol and valsartan.   Significantly hypotensive and Entresto was discontinued

## 2023-02-03 ENCOUNTER — HOSPITAL ENCOUNTER (EMERGENCY)
Age: 38
Discharge: HOME | End: 2023-02-03
Attending: EMERGENCY MEDICINE
Payer: COMMERCIAL

## 2023-02-03 ENCOUNTER — APPOINTMENT (OUTPATIENT)
Dept: GENERAL RADIOLOGY | Age: 38
End: 2023-02-03
Payer: COMMERCIAL

## 2023-02-03 VITALS
BODY MASS INDEX: 31.45 KG/M2 | SYSTOLIC BLOOD PRESSURE: 92 MMHG | OXYGEN SATURATION: 98 % | HEART RATE: 102 BPM | DIASTOLIC BLOOD PRESSURE: 63 MMHG | HEIGHT: 73 IN | TEMPERATURE: 98.8 F | WEIGHT: 237.3 LBS | RESPIRATION RATE: 18 BRPM

## 2023-02-03 DIAGNOSIS — U07.1 COVID-19: Primary | ICD-10-CM

## 2023-02-03 LAB
ANION GAP SERPL CALCULATED.3IONS-SCNC: 11 MMOL/L (ref 3–16)
BASOPHILS ABSOLUTE: 0.1 K/UL (ref 0–0.2)
BASOPHILS RELATIVE PERCENT: 1.3 %
BUN BLDV-MCNC: 22 MG/DL (ref 7–20)
CALCIUM SERPL-MCNC: 9.8 MG/DL (ref 8.3–10.6)
CHLORIDE BLD-SCNC: 100 MMOL/L (ref 99–110)
CO2: 26 MMOL/L (ref 21–32)
CREAT SERPL-MCNC: 1.4 MG/DL (ref 0.9–1.3)
EKG ATRIAL RATE: 101 BPM
EKG DIAGNOSIS: NORMAL
EKG P AXIS: 59 DEGREES
EKG P-R INTERVAL: 184 MS
EKG Q-T INTERVAL: 342 MS
EKG QRS DURATION: 76 MS
EKG QTC CALCULATION (BAZETT): 443 MS
EKG R AXIS: -38 DEGREES
EKG T AXIS: 19 DEGREES
EKG VENTRICULAR RATE: 101 BPM
EOSINOPHILS ABSOLUTE: 0.1 K/UL (ref 0–0.6)
EOSINOPHILS RELATIVE PERCENT: 2.1 %
GFR SERPL CREATININE-BSD FRML MDRD: >60 ML/MIN/{1.73_M2}
GLUCOSE BLD-MCNC: 112 MG/DL (ref 70–99)
HCT VFR BLD CALC: 38.3 % (ref 40.5–52.5)
HEMOGLOBIN: 12.4 G/DL (ref 13.5–17.5)
LYMPHOCYTES ABSOLUTE: 1.9 K/UL (ref 1–5.1)
LYMPHOCYTES RELATIVE PERCENT: 29.4 %
MCH RBC QN AUTO: 25.8 PG (ref 26–34)
MCHC RBC AUTO-ENTMCNC: 32.4 G/DL (ref 31–36)
MCV RBC AUTO: 79.7 FL (ref 80–100)
MONOCYTES ABSOLUTE: 0.5 K/UL (ref 0–1.3)
MONOCYTES RELATIVE PERCENT: 8 %
NEUTROPHILS ABSOLUTE: 3.8 K/UL (ref 1.7–7.7)
NEUTROPHILS RELATIVE PERCENT: 59.2 %
PDW BLD-RTO: 13.5 % (ref 12.4–15.4)
PLATELET # BLD: 308 K/UL (ref 135–450)
PMV BLD AUTO: 8.3 FL (ref 5–10.5)
POTASSIUM REFLEX MAGNESIUM: 4.3 MMOL/L (ref 3.5–5.1)
RAPID INFLUENZA  B AGN: NEGATIVE
RAPID INFLUENZA A AGN: NEGATIVE
RBC # BLD: 4.81 M/UL (ref 4.2–5.9)
SARS-COV-2, NAAT: DETECTED
SODIUM BLD-SCNC: 137 MMOL/L (ref 136–145)
TROPONIN: 0.07 NG/ML
TROPONIN: 0.07 NG/ML
WBC # BLD: 6.4 K/UL (ref 4–11)

## 2023-02-03 PROCEDURE — 96360 HYDRATION IV INFUSION INIT: CPT

## 2023-02-03 PROCEDURE — 84484 ASSAY OF TROPONIN QUANT: CPT

## 2023-02-03 PROCEDURE — 87635 SARS-COV-2 COVID-19 AMP PRB: CPT

## 2023-02-03 PROCEDURE — 87804 INFLUENZA ASSAY W/OPTIC: CPT

## 2023-02-03 PROCEDURE — 2580000003 HC RX 258: Performed by: STUDENT IN AN ORGANIZED HEALTH CARE EDUCATION/TRAINING PROGRAM

## 2023-02-03 PROCEDURE — 93005 ELECTROCARDIOGRAM TRACING: CPT | Performed by: STUDENT IN AN ORGANIZED HEALTH CARE EDUCATION/TRAINING PROGRAM

## 2023-02-03 PROCEDURE — 71045 X-RAY EXAM CHEST 1 VIEW: CPT

## 2023-02-03 PROCEDURE — 80048 BASIC METABOLIC PNL TOTAL CA: CPT

## 2023-02-03 PROCEDURE — 36415 COLL VENOUS BLD VENIPUNCTURE: CPT

## 2023-02-03 PROCEDURE — 6370000000 HC RX 637 (ALT 250 FOR IP): Performed by: STUDENT IN AN ORGANIZED HEALTH CARE EDUCATION/TRAINING PROGRAM

## 2023-02-03 PROCEDURE — 99285 EMERGENCY DEPT VISIT HI MDM: CPT

## 2023-02-03 PROCEDURE — 71045 X-RAY EXAM CHEST 1 VIEW: CPT | Performed by: RADIOLOGY

## 2023-02-03 PROCEDURE — 85025 COMPLETE CBC W/AUTO DIFF WBC: CPT

## 2023-02-03 RX ORDER — SODIUM CHLORIDE, SODIUM LACTATE, POTASSIUM CHLORIDE, AND CALCIUM CHLORIDE .6; .31; .03; .02 G/100ML; G/100ML; G/100ML; G/100ML
1000 INJECTION, SOLUTION INTRAVENOUS ONCE
Status: COMPLETED | OUTPATIENT
Start: 2023-02-03 | End: 2023-02-03

## 2023-02-03 RX ADMIN — SODIUM CHLORIDE, POTASSIUM CHLORIDE, SODIUM LACTATE AND CALCIUM CHLORIDE 1000 ML: 600; 310; 30; 20 INJECTION, SOLUTION INTRAVENOUS at 03:14

## 2023-02-03 RX ADMIN — IBUPROFEN 600 MG: 400 TABLET ORAL at 03:11

## 2023-02-03 ASSESSMENT — PAIN - FUNCTIONAL ASSESSMENT: PAIN_FUNCTIONAL_ASSESSMENT: 0-10

## 2023-02-03 ASSESSMENT — PAIN DESCRIPTION - DESCRIPTORS: DESCRIPTORS: BURNING

## 2023-02-03 ASSESSMENT — PAIN DESCRIPTION - LOCATION: LOCATION: OTHER (COMMENT)

## 2023-02-03 ASSESSMENT — PAIN SCALES - GENERAL: PAINLEVEL_OUTOF10: 5

## 2023-02-03 NOTE — ED PROVIDER NOTES
[address] RESIDENT NOTE       Date of evaluation: 2/3/2023    Chief Complaint     Generalized Body Aches (Patient today comes into the ED for Body aches, hot/cold chills. Patient is having shortness of breathe when the cold air hits. Patient thinks this is related to his histoplasmosis making the symptoms worse. Patient is AAOx4. Patient is not on any antibiotic course. ) and Chills      History of Present Illness     Shiloh Silva is a 40 y.o. male with a PMHx of histoplasmosis not currently on treatment, type 2 diabetes, stage III chronic kidney disease, seizure disorder, further history as noted below who presents to the emergency department today with concerns for acute onset fevers with associated aches, chills, and alternation between hot and cold sensation. Patient states that he was recently diagnosed with histoplasmosis about 2-1/2 weeks ago, at which time he presented with similar symptoms. He states that he is due to see infectious disease on 2/7/2023 and is not currently being treated for his histoplasmosis. However, over the last 2 days he has developed increasing fevers in addition to chills. He has not been taking any medications for this. He endorses generalized fatigue as well as myalgias and also complains of pain in his chest specifically when he breathes in hot or cold air. Otherwise, he has no significant chest pain, chest tightness, or shortness of breath. He has no new cough. He has had nausea but no vomiting. He denies any diarrhea or constipation. No new edema/swelling. No additional symptoms. Other than that mentioned above, there are no other alleviating or provoking factors associated with the patient's presentation today.     Review of Systems     Review of Systems    Review of systems is positive for fever, aches, chills, nausea, pain with inspiration  Review of systems is negative for diarrhea, constipation, abdominal pain, vomiting  Further review of systems is negative other than that mentioned in the HPI. Past Medical, Surgical, Family, and Social History     He has a past medical history of Chronic kidney disease, Encounter for imaging to screen for metal prior to MRI, Gastroparesis, HFrEF (heart failure with reduced ejection fraction) ([facility] Histoplasmosis, Hyperlipidemia, Hypertension, Neuropathy, and Type 2 diabetes mellitus without complication ([facility] He has a past surgical history that includes bronchoscopy (06/09/2022); bronchoscopy (06/09/2022); bronchoscopy (06/09/2022); and bronchoscopy (N/A, 06/14/2022). His family history includes Diabetes in his father. He reports that he has never smoked. He has never used smokeless tobacco. He reports that he does not currently use alcohol. He reports current drug use. Drug: Marijuana Charli Alvin). Medications     Previous Medications    ALBUTEROL SULFATE HFA (PROVENTIL;VENTOLIN;PROAIR) 108 (90 BASE) MCG/ACT INHALER    Inhale 2 puffs into the lungs every 6 hours as needed for Wheezing or Shortness of Breath    ASPIRIN 81 MG CHEWABLE TABLET    Take 1 tablet by mouth daily    BLOOD GLUCOSE MONITOR STRIPS    Test 4 times a day & as needed for symptoms of irregular blood glucose. Dispense sufficient amount for indicated testing frequency plus additional to accommodate PRN testing needs. Use when CGM is not active. CARVEDILOL (COREG) 25 MG TABLET    Take 1 tablet by mouth in the morning and 1 tablet before bedtime. CONTINUOUS BLOOD GLUC SENSOR (FREESTYLE HOME 2 SENSOR) MISC    Use as directed to monitor blood sugar    DULOXETINE (CYMBALTA) 30 MG EXTENDED RELEASE CAPSULE    Take 1 capsule by mouth daily    EMPAGLIFLOZIN (JARDIANCE) 10 MG TABLET    Take 1 tablet by mouth in the morning.     INSULIN DEGLUDEC (TRESIBA FLEXTOUCH) 200 UNIT/ML SOPN    Inject 75 Units into the skin daily    LACTULOSE (CHRONULAC) 10 GM/15ML SOLUTION    Take 15 mLs by mouth daily as needed (constipation)    METFORMIN (GLUCOPHAGE) 1000 MG TABLET    Take 1 tablet by mouth in the morning and 1 tablet before bedtime. METOCLOPRAMIDE (REGLAN) 10 MG TABLET    Take 10 mg by mouth 3 times daily    ONDANSETRON (ZOFRAN) 4 MG TABLET    Take 4 mg by mouth every 8 hours as needed    PREGABALIN (LYRICA) 75 MG CAPSULE    Take 1 capsule by mouth 2 times daily for 30 days. ROSUVASTATIN (CRESTOR) 40 MG TABLET    Take 1 tablet by mouth nightly    TRAZODONE (DESYREL) 50 MG TABLET    Take 1-2 tablets by mouth nightly as needed for Sleep    VALSARTAN (DIOVAN) 40 MG TABLET    Take 40 mg by mouth daily    VITAMIN D (ERGOCALCIFEROL) 1.25 MG (41933 UT) CAPS CAPSULE    Take 1 capsule by mouth once a week       Allergies     He is allergic to penicillins. Physical Exam     INITIAL VITALS: BP: 100/77, Temp: 98.8 °F (37.1 °C), Heart Rate: (!) 102, Resp: 18, SpO2: 96 %     Gen: NAD, in bed comfortably, non-diaphoretic   Head/Eyes: Atraumatic. PERRL. EOMI bilaterally. No conjunctival injection or scleral icterus   ENT: Neck supple, trachea midline, no LAD. MMM, no posterior oropharyngeal erythema or exudate. External auditory meatus clear without discharge or erythema. No nasal congestion or discharge. Cardiovascular: RRR no murmurs, rubs, or gallops. Pulmonary:Lungs CTAB, no rales, wheezes, or ronchi   Abdominal: Soft, non-tender. No rebound or guarding. Non-peritoneal  MSK: no obvious long bone deformity. Skin:  Warm, dry. No rashes, ecchymosis or cyanosis. Neuro: Alert and oriented x 4. Cranial nerves grossly intact. Moving all extremities equally. Gait narrow and stable, ambulates with a cane at baseline. Extremities:  No peripheral edema. Psych: Euthymic affect. Normal rate and rhythm of speech with full prosody. Linear thought process.      DiagnosticResults     EKG   See ED course     RADIOLOGY:  XR CHEST PORTABLE    (Results Pending)       LABS:   Results for orders placed or performed during the hospital encounter of 02/03/23   COVID-19, Rapid    Specimen: Nasopharyngeal Swab   Result Value Ref Range    SARS-CoV-2, NAAT DETECTED (A) Not Detected   Rapid Flu Swab    Specimen: Nasopharyngeal   Result Value Ref Range    Rapid Influenza A Ag Negative Negative    Rapid Influenza B Ag Negative Negative   BMP w/ Reflex to MG   Result Value Ref Range    Sodium 137 136 - 145 mmol/L    Potassium reflex Magnesium 4.3 3.5 - 5.1 mmol/L    Chloride 100 99 - 110 mmol/L    CO2 26 21 - 32 mmol/L    Anion Gap 11 3 - 16    Glucose 112 (H) 70 - 99 mg/dL    BUN 22 (H) 7 - 20 mg/dL    Creatinine 1.4 (H) 0.9 - 1.3 mg/dL    Est, Glom Filt Rate >60 >60    Calcium 9.8 8.3 - 10.6 mg/dL   CBC with Auto Differential   Result Value Ref Range    WBC 6.4 4.0 - 11.0 K/uL    RBC 4.81 4.20 - 5.90 M/uL    Hemoglobin 12.4 (L) 13.5 - 17.5 g/dL    Hematocrit 38.3 (L) 40.5 - 52.5 %    MCV 79.7 (L) 80.0 - 100.0 fL    MCH 25.8 (L) 26.0 - 34.0 pg    MCHC 32.4 31.0 - 36.0 g/dL    RDW 13.5 12.4 - 15.4 %    Platelets 086 803 - 668 K/uL    MPV 8.3 5.0 - 10.5 fL    Neutrophils % 59.2 %    Lymphocytes % 29.4 %    Monocytes % 8.0 %    Eosinophils % 2.1 %    Basophils % 1.3 %    Neutrophils Absolute 3.8 1.7 - 7.7 K/uL    Lymphocytes Absolute 1.9 1.0 - 5.1 K/uL    Monocytes Absolute 0.5 0.0 - 1.3 K/uL    Eosinophils Absolute 0.1 0.0 - 0.6 K/uL    Basophils Absolute 0.1 0.0 - 0.2 K/uL   Troponin   Result Value Ref Range    Troponin 0.07 (H) <0.01 ng/mL       ED BEDSIDE ULTRASOUND:  None    RECENT VITALS:  BP: 100/77, Temp: 98.8 °F (37.1 °C), Heart Rate: (!) 102,Resp: 18, SpO2: 96 %     Procedures     None    ED Course     Nursing Notes, Past Medical Hx, Past Surgical Hx, Social Hx, Allergies, and Family Hx were reviewed.     The patient was given the followingmedications:  Orders Placed This Encounter   Medications    ibuprofen (ADVIL;MOTRIN) tablet 600 mg    lactated ringers bolus    nirmatrelvir/ritonavir 300/100 (PAXLOVID) 20 x 150 MG & 10 x 100MG TBPK     Sig: Take 3 tablets (two 150 mg nirmatrelvir and one 100 mg ritonavir tablets) by mouth every 12 hours for 5 days. Dispense:  30 tablet     Refill:  0     Order Specific Question:   Does this patient qualify for COVID-19 antIviral therapy based on criteria for treatment? Answer:   Yes       CONSULTS:  None      MEDICAL DECISION MAKING / ASSESSMENT / Jaylon Back is a 40 y.o. [facility] a history of present illness, physical examination, past medical history as noted above who presents to the emergency department today with fevers, aches, chills, and some chest pain with deep inspiration. ED Course as of 02/03/23 0311 Fri Feb 03, 2023   0301 Troponin(!): 0.07  Will repeat. [JF]   0302 Patient is a 73-year-old male with past medical history of recently diagnosed histoplasmosis is presenting to the emergency department today with general body aches, malaise, fevers, aches, chills. On physical examination, the patient is noted to be slightly tachycardic, otherwise hemodynamically stable and afebrile. His physical examination is largely unremarkable as patient does not have significant abdominal tenderness. No evidence of acute volume overload. His lungs are clear to auscultation bilaterally without rales, wheezes, or rhonchi. Initial laboratory evaluation reveals no leukocytosis. Patient does have a baseline anemia. His rapid influenza is negative. COVID pending at this time. Basic metabolic panel is notable for a slight elevation in creatinine to 1.4, however this is not significantly elevated from baseline and does not represent an acute kidney injury at this time. However, the patient will be given a liter of fluids. Further, for symptoms, patient will receive ibuprofen. Otherwise, basic metabolic panel without evidence of acute electrolyte abnormalities. EKG was obtained given history of intermittent chest pain.   EKG overall shows a sinus tachycardia with left axis deviation with a ventricular rate of 101 bpm.  AL interval is within normal limits. QRS is narrow. No QT or QTc prolongation. There is no ST elevation or depression. No AL interval changes. No STEMI. EKG is inconsistent with acute cardiac abnormality including pericarditis/myocarditis. Patient does have a slight elevation in his troponin to 0.07. Repeat troponin will be pending however the patient does have recent baseline of a troponin of 0.06. History and presentation is largely inconsistent with acute pericarditis. No evidence of pericardial tamponade. [JF]   0304 Repeat troponin is pending at this time. [JF]   0305 EKG today largely unchanged from prior EKG 1/15/2023. [JF]   1433 SARS-CoV-2, NAAT(!): DETECTED [JF]   0307 Patient noted to be COVID-19 positive. Given his or he of underlying infectious disease, do believe patient is a candidate for paxlovid. Will prescribe and recommend close follow up wth ID as well as primary care physician. Patient does have follow-up appointment already scheduled with infectious disease on 2/7/2023 which I do believe is appropriate follow-up. [JF]      ED Course User Index  [JF] Roberta Alford MD       Medical Decision Making  Amount and/or Complexity of Data Reviewed  External Data Reviewed: labs and ECG. Labs: ordered. Decision-making details documented in ED Course. Radiology: ordered. ECG/medicine tests: ordered and independent interpretation performed. Decision-making details documented in ED Course. Risk  OTC drugs. Prescription drug management. Decision regarding hospitalization. This patient was also evaluated by the attending physician. All care plans werediscussed and agreed upon. Clinical Impression     1. COVID-19        Disposition     PATIENT REFERRED TO:  No follow-up provider specified.     DISCHARGE MEDICATIONS:  New Prescriptions    NIRMATRELVIR/RITONAVIR 300/100 (PAXLOVID) 20 X 150 MG & 10 X 100MG TBPK    Take 3 tablets (two 150 mg nirmatrelvir and one 100 mg ritonavir tablets) by mouth every 12 hours for 5 days. DISPOSITION    At this time I am going off service and the patient's care has been signed over to my attending, Dr. Konrad Parnell.  Their responsibilities include:     - follow up repeat troponin   - Reassessment   - Disposition        No Suresh MD  02/03/23 5094       No Suresh MD  02/03/23 4523

## 2023-02-03 NOTE — DISCHARGE INSTRUCTIONS
You were seen in the emergency department for fatigue and fevers. Your labs and/or imaging were unremarkable with the exception of:     -Positive COVID-19 result    I recommend that you follow up with [facility] primary care physician in the next week as well as infectious disease for further evaluation and management. I have prescribed you Paxlovid for COVID 19. Please take as prescribed. Please return to the emergency department if you develop a fever, have new or worsening symptoms including headache, abdominal pain, nausea, vomiting, diarrhea, chest pain or chest tightness, feel as if you are going to pass out / pass out, or have any other new or concerning symptoms.

## 2023-02-03 NOTE — ED PROVIDER NOTES
ED Attending Attestation Note     Date of evaluation: 2/3/2023    This patient was seen by the resident. I have seen and examined the patient, agree with the workup, evaluation, management and diagnosis. The care plan has been discussed. I have reviewed the ECG and concur with the resident's interpretation. My assessment reveals a 39 y/o M with malaise.   He has clear lungs to auscultation       Radha Severino MD  02/03/23 9752

## 2023-02-03 NOTE — ED NOTES
Pt being discharged. Went over discharge instructions and all questions answered. Removed PIV. Pt ambulated out of the department with all belongings at bedside and scripts in hand.       Cheryl Woo RN  02/03/23 8347

## 2023-02-06 ENCOUNTER — CARE COORDINATION (OUTPATIENT)
Dept: CARE COORDINATION | Age: 38
End: 2023-02-06

## 2023-02-07 ENCOUNTER — APPOINTMENT (OUTPATIENT)
Dept: GENERAL RADIOLOGY | Age: 38
End: 2023-02-07
Payer: COMMERCIAL

## 2023-02-07 ENCOUNTER — HOSPITAL ENCOUNTER (EMERGENCY)
Age: 38
Discharge: HOME | End: 2023-02-07
Attending: EMERGENCY MEDICINE
Payer: COMMERCIAL

## 2023-02-07 VITALS
DIASTOLIC BLOOD PRESSURE: 65 MMHG | OXYGEN SATURATION: 97 % | SYSTOLIC BLOOD PRESSURE: 98 MMHG | TEMPERATURE: 98.6 F | HEART RATE: 98 BPM | RESPIRATION RATE: 18 BRPM

## 2023-02-07 DIAGNOSIS — U07.1 COVID-19: Primary | ICD-10-CM

## 2023-02-07 LAB
ANION GAP SERPL CALCULATED.3IONS-SCNC: 12 MMOL/L (ref 3–16)
BASOPHILS ABSOLUTE: 0.1 K/UL (ref 0–0.2)
BASOPHILS RELATIVE PERCENT: 1.3 %
BUN BLDV-MCNC: 25 MG/DL (ref 7–20)
CALCIUM SERPL-MCNC: 9.7 MG/DL (ref 8.3–10.6)
CHLORIDE BLD-SCNC: 100 MMOL/L (ref 99–110)
CO2: 23 MMOL/L (ref 21–32)
CREAT SERPL-MCNC: 1.3 MG/DL (ref 0.9–1.3)
EOSINOPHILS ABSOLUTE: 0.1 K/UL (ref 0–0.6)
EOSINOPHILS RELATIVE PERCENT: 2.5 %
GFR SERPL CREATININE-BSD FRML MDRD: >60 ML/MIN/{1.73_M2}
GLUCOSE BLD-MCNC: 216 MG/DL (ref 70–99)
HCT VFR BLD CALC: 37.7 % (ref 40.5–52.5)
HEMOGLOBIN: 12.2 G/DL (ref 13.5–17.5)
LYMPHOCYTES ABSOLUTE: 1.4 K/UL (ref 1–5.1)
LYMPHOCYTES RELATIVE PERCENT: 27.8 %
MCH RBC QN AUTO: 25.9 PG (ref 26–34)
MCHC RBC AUTO-ENTMCNC: 32.5 G/DL (ref 31–36)
MCV RBC AUTO: 79.8 FL (ref 80–100)
MONOCYTES ABSOLUTE: 0.4 K/UL (ref 0–1.3)
MONOCYTES RELATIVE PERCENT: 8.4 %
NEUTROPHILS ABSOLUTE: 3 K/UL (ref 1.7–7.7)
NEUTROPHILS RELATIVE PERCENT: 60 %
PDW BLD-RTO: 13.6 % (ref 12.4–15.4)
PLATELET # BLD: 298 K/UL (ref 135–450)
PMV BLD AUTO: 8.9 FL (ref 5–10.5)
POTASSIUM REFLEX MAGNESIUM: 4.3 MMOL/L (ref 3.5–5.1)
RBC # BLD: 4.73 M/UL (ref 4.2–5.9)
SODIUM BLD-SCNC: 135 MMOL/L (ref 136–145)
WBC # BLD: 4.9 K/UL (ref 4–11)

## 2023-02-07 PROCEDURE — 99284 EMERGENCY DEPT VISIT MOD MDM: CPT

## 2023-02-07 PROCEDURE — 80048 BASIC METABOLIC PNL TOTAL CA: CPT

## 2023-02-07 PROCEDURE — 71046 X-RAY EXAM CHEST 2 VIEWS: CPT

## 2023-02-07 PROCEDURE — 2580000003 HC RX 258: Performed by: EMERGENCY MEDICINE

## 2023-02-07 PROCEDURE — 85025 COMPLETE CBC W/AUTO DIFF WBC: CPT

## 2023-02-07 PROCEDURE — 36415 COLL VENOUS BLD VENIPUNCTURE: CPT

## 2023-02-07 RX ORDER — SODIUM CHLORIDE, SODIUM LACTATE, POTASSIUM CHLORIDE, AND CALCIUM CHLORIDE .6; .31; .03; .02 G/100ML; G/100ML; G/100ML; G/100ML
1000 INJECTION, SOLUTION INTRAVENOUS ONCE
Status: COMPLETED | OUTPATIENT
Start: 2023-02-07 | End: 2023-02-07

## 2023-02-07 RX ORDER — ACETAMINOPHEN 500 MG
500 TABLET ORAL 4 TIMES DAILY PRN
Qty: 120 TABLET | Refills: 0 | Status: SHIPPED | OUTPATIENT
Start: 2023-02-07

## 2023-02-07 RX ORDER — GUAIFENESIN 600 MG/1
600 TABLET, EXTENDED RELEASE ORAL 2 TIMES DAILY
Qty: 30 TABLET | Refills: 0 | Status: SHIPPED | OUTPATIENT
Start: 2023-02-07 | End: 2023-02-22

## 2023-02-07 RX ORDER — ACETAMINOPHEN 325 MG/1
650 TABLET ORAL ONCE
Status: DISCONTINUED | OUTPATIENT
Start: 2023-02-07 | End: 2023-02-07 | Stop reason: HOSPADM

## 2023-02-07 RX ORDER — IBUPROFEN 600 MG/1
600 TABLET ORAL 4 TIMES DAILY PRN
Qty: 40 TABLET | Refills: 0 | Status: SHIPPED | OUTPATIENT
Start: 2023-02-07 | End: 2023-02-07

## 2023-02-07 RX ADMIN — SODIUM CHLORIDE, POTASSIUM CHLORIDE, SODIUM LACTATE AND CALCIUM CHLORIDE 1000 ML: 600; 310; 30; 20 INJECTION, SOLUTION INTRAVENOUS at 13:32

## 2023-02-07 ASSESSMENT — PAIN DESCRIPTION - PAIN TYPE: TYPE: ACUTE PAIN

## 2023-02-07 ASSESSMENT — PAIN DESCRIPTION - FREQUENCY: FREQUENCY: CONTINUOUS

## 2023-02-07 ASSESSMENT — PAIN DESCRIPTION - DESCRIPTORS: DESCRIPTORS: ACHING

## 2023-02-07 ASSESSMENT — PAIN DESCRIPTION - LOCATION: LOCATION: RIB CAGE;HEAD

## 2023-02-07 ASSESSMENT — PAIN - FUNCTIONAL ASSESSMENT: PAIN_FUNCTIONAL_ASSESSMENT: 0-10

## 2023-02-07 ASSESSMENT — PAIN DESCRIPTION - ORIENTATION: ORIENTATION: RIGHT;LEFT

## 2023-02-07 ASSESSMENT — PAIN SCALES - GENERAL: PAINLEVEL_OUTOF10: 6

## 2023-02-07 NOTE — ED PROVIDER NOTES
[address] [address] ENCOUNTER          ATTENDING PHYSICIAN NOTE       Date of evaluation: 2/7/2023    Chief Complaint     Positive For Covid-19 (Pt recently seen and dx with covid. States he was told to come back if symptoms worsen- worsening sob, fatigue, chills)    History of Present Illness     Chance Simms is a 40 y.o. male with a past medical history of diabetes, stage III chronic kidney disease and pulmonary histoplasmosis seen by ID clinic at [facility] today who presents today with body aches fatigue that has continued since being diagnosed with COVID 5 days ago. On day 3 of Paxlovid currently. Endorses some mild shortness of breath with walking but he speaking in full sentences and no dyspnea. Did go to his eye infectious disease clinic appointment today and was prescribed p.o. medication for pulmonary histoplasmosis but did not make any mention of the symptoms currently. Endorses a little bit of diarrhea but no nausea and vomiting. Symptoms began on February 1, diagnosed on February 3 and given Paxlovid treatment  ID clinic suggested:  trial of voriconazole 200mg PO BID    ASSESSMENT / PLAN  (MEDICAL DECISION MAKING)     INITIAL VITALS:  , Temp: 98.6 °F (37 °C), Heart Rate: 98, Resp: 18, SpO2: 97 %      Chance Simms is a 40 y.o. male positive for COVID-19 endorsing body aches. Here very well appearing, ambulating around in NAD, no resp distress. CXR without focal pnuemonia. Not septic in appearance and very well appearing. Labs unremarkable. Discharged in stable condition. Medical Decision Making  Amount and/or Complexity of Data Reviewed  Labs: ordered. Radiology: ordered. Risk  OTC drugs.           Clinical Impression     1. COVID-19        Disposition     PATIENT REFERRED TO:  Bakari Bautista, [address] [address]  [phone]    Schedule an appointment as soon as possible for a visit in 1 week      DISCHARGE MEDICATIONS:  Discharge Medication List as of 2/7/2023  2:56 PM        START taking these medications    Details   guaiFENesin (MUCINEX) 600 MG extended release tablet Take 1 tablet by mouth 2 times daily for 15 days, Disp-30 tablet, R-0Print      ibuprofen (ADVIL;MOTRIN) 600 MG tablet Take 1 tablet by mouth 4 times daily as needed for Pain, Disp-40 tablet, R-0Print             DISPOSITION Decision To Discharge 02/07/2023 02:49:08 PM        Diagnostic Results and Other Data       RADIOLOGY:  XR CHEST (2 VW)   Final Result   1. Right middle lobe atelectasis. LABS:   Results for orders placed or performed during the hospital encounter of 02/07/23   CBC with Auto Differential   Result Value Ref Range    WBC 4.9 4.0 - 11.0 K/uL    RBC 4.73 4.20 - 5.90 M/uL    Hemoglobin 12.2 (L) 13.5 - 17.5 g/dL    Hematocrit 37.7 (L) 40.5 - 52.5 %    MCV 79.8 (L) 80.0 - 100.0 fL    MCH 25.9 (L) 26.0 - 34.0 pg    MCHC 32.5 31.0 - 36.0 g/dL    RDW 13.6 12.4 - 15.4 %    Platelets 577 008 - 229 K/uL    MPV 8.9 5.0 - 10.5 fL    Neutrophils % 60.0 %    Lymphocytes % 27.8 %    Monocytes % 8.4 %    Eosinophils % 2.5 %    Basophils % 1.3 %    Neutrophils Absolute 3.0 1.7 - 7.7 K/uL    Lymphocytes Absolute 1.4 1.0 - 5.1 K/uL    Monocytes Absolute 0.4 0.0 - 1.3 K/uL    Eosinophils Absolute 0.1 0.0 - 0.6 K/uL    Basophils Absolute 0.1 0.0 - 0.2 K/uL   Basic Metabolic Panel w/ Reflex to MG   Result Value Ref Range    Sodium 135 (L) 136 - 145 mmol/L    Potassium reflex Magnesium 4.3 3.5 - 5.1 mmol/L    Chloride 100 99 - 110 mmol/L    CO2 23 21 - 32 mmol/L    Anion Gap 12 3 - 16    Glucose 216 (H) 70 - 99 mg/dL    BUN 25 (H) 7 - 20 mg/dL    Creatinine 1.3 0.9 - 1.3 mg/dL    Est, Glom Filt Rate >60 >60    Calcium 9.7 8.3 - 10.6 mg/dL       EKG     ED BEDSIDE ULTRASOUND:  No results found.     MOST RECENT VITALS:   ,Temp: 98.6 °F (37 °C), Heart Rate: 98, Resp: 18, SpO2: 97 %     Procedures       ED Course     Nursing Notes, Past Medical Hx, Past Surgical Hx, Social Hx,Allergies, and Family Hx were reviewed. The patient was given the following medications:  Orders Placed This Encounter   Medications    lactated ringers bolus    acetaminophen (TYLENOL) tablet 650 mg    DISCONTD: ibuprofen (ADVIL;MOTRIN) 600 MG tablet     Sig: Take 1 tablet by mouth 4 times daily as needed for Pain     Dispense:  40 tablet     Refill:  0    guaiFENesin (MUCINEX) 600 MG extended release tablet     Sig: Take 1 tablet by mouth 2 times daily for 15 days     Dispense:  30 tablet     Refill:  0    acetaminophen (TYLENOL) 500 MG tablet     Sig: Take 1 tablet by mouth 4 times daily as needed for Pain     Dispense:  120 tablet     Refill:  0       CONSULTS:  None    Review of Systems     Review of Systems  A full 10 point review of systems was obtained. Pertinent positives and negatives as documented in the HPI, otherwise all other systems were reviewed and were negative. Past Medical, Surgical, Family, and Social History     He has a past medical history of Chronic kidney disease, Encounter for imaging to screen for metal prior to MRI, Gastroparesis, HFrEF (heart failure with reduced ejection fraction) ([facility] [address] Histoplasmosis, Hyperlipidemia, Hypertension, Neuropathy, and Type 2 diabetes mellitus without complication ([facility] [address] He has a past surgical history that includes bronchoscopy (06/09/2022); bronchoscopy (06/09/2022); bronchoscopy (06/09/2022); and bronchoscopy (N/A, 06/14/2022). His family history includes Diabetes in his father. He reports that he has never smoked. He has never used smokeless tobacco. He reports that he does not currently use alcohol. He reports current drug use. Drug: Marijuana Vani Rivas).     Medications     Discharge Medication List as of 2/7/2023  2:56 PM        CONTINUE these medications which have NOT CHANGED    Details   insulin lispro (HUMALOG) 100 UNIT/ML injection vial Inject into the skin 3 times daily (before meals)Historical Med      nirmatrelvir/ritonavir 300/100 (PAXLOVID) 20 x 150 MG & 10 x 100MG TBPK Take 3 tablets (two 150 mg nirmatrelvir and one 100 mg ritonavir tablets) by mouth every 12 hours for 5 days. , Disp-30 tablet, R-0Print      ondansetron (ZOFRAN) 4 MG tablet Take 4 mg by mouth every 8 hours as neededHistorical Med      valsartan (DIOVAN) 40 MG tablet Take 40 mg by mouth dailyHistorical Med      metoclopramide (REGLAN) 10 MG tablet Take 10 mg by mouth 3 times dailyHistorical Med      pregabalin (LYRICA) 75 MG capsule Take 1 capsule by mouth 2 times daily for 30 days. , Disp-60 capsule, R-3Normal      aspirin 81 MG chewable tablet Take 1 tablet by mouth daily, Disp-30 tablet, R-3Normal      lactulose (CHRONULAC) 10 GM/15ML solution Take 15 mLs by mouth daily as needed (constipation), Disp-946 mL, R-1Normal      Continuous Blood Gluc Sensor (FREESTYLE HOME 2 SENSOR) MISC Use as directed to monitor blood sugar, Disp-2 each, R-11Normal      blood glucose monitor strips Test 4 times a day & as needed for symptoms of irregular blood glucose. Dispense sufficient amount for indicated testing frequency plus additional to accommodate PRN testing needs.  Use when CGM is not active., Disp-100 strip, R-0, Normal      Insulin Degludec (TRESIBA FLEXTOUCH) 200 UNIT/ML SOPN Inject 75 Units into the skin daily, Disp-12 mL, R-3Normal      traZODone (DESYREL) 50 MG tablet Take 1-2 tablets by mouth nightly as needed for Sleep, Disp-60 tablet, R-2Normal      albuterol sulfate HFA (PROVENTIL;VENTOLIN;PROAIR) 108 (90 Base) MCG/ACT inhaler Inhale 2 puffs into the lungs every 6 hours as needed for Wheezing or Shortness of Breath, Disp-18 g, R-1Normal      vitamin D (ERGOCALCIFEROL) 1.25 MG (54254 UT) CAPS capsule Take 1 capsule by mouth once a week, Disp-12 capsule, R-1Normal      DULoxetine (CYMBALTA) 30 MG extended release capsule Take 1 capsule by mouth daily, Disp-30 capsule, R-3Normal      metFORMIN (GLUCOPHAGE) 1000 MG tablet Take 1 tablet by mouth in the morning and 1 tablet before bedtime. , Disp-180 tablet, R-1Normal      empagliflozin (JARDIANCE) 10 MG tablet Take 1 tablet by mouth in the morning., Disp-30 tablet, R-3Normal      carvedilol (COREG) 25 MG tablet Take 1 tablet by mouth in the morning and 1 tablet before bedtime. , Disp-60 tablet, R-3Normal      rosuvastatin (CRESTOR) 40 MG tablet Take 1 tablet by mouth nightly, Disp-30 tablet, R-3Normal             Allergies     He is allergic to penicillins.     Physical Exam     INITIAL VITALS:  , Temp: 98.6 °F (37 °C), Heart Rate: 98, Resp: 18, SpO2: 97 %   General: Well appearing in NAD,   HEENT:  head is atraumatic, sclera are clear, oropharynx is nonerythematous, mucus membranes are moist  Neck: Trachea midline  Chest: Nonlabored respirations, clear to auscultation bilaterally  Cardiovascular: Regular rate and rhythm, 2+ radial pulses bilaterally  Abdominal: Nondistended abdomen, soft, nontender without rebound or guarding  Skin: Warm, dry well perfused, no rashes  Extremities: no obvious deformities, no tenderness to palpation diffusely  Neurologic:  Alert and oriented, speech is clear and intact without dysarthria, gait is intact  Psychologic: appropriate mood and affect                Ananda Rodriguez MD  02/07/23 [id number]

## 2023-02-07 NOTE — CARE COORDINATION
I agree with the [address] to monitor loose BM at home. No need to return to ER for this. Will continue to follow.

## 2023-02-07 NOTE — ED NOTES
Ambulatory sats 96% or above, patient has stead gait with cane which is used at baseline.      Katharine Shukla RN  02/07/23 4045

## 2023-02-08 ENCOUNTER — CARE COORDINATION (OUTPATIENT)
Dept: CARE COORDINATION | Age: 38
End: 2023-02-08

## 2023-02-08 ENCOUNTER — TELEPHONE (OUTPATIENT)
Dept: OTHER | Facility: CLINIC | Age: 38
End: 2023-02-08

## 2023-02-08 NOTE — TELEPHONE ENCOUNTER
R/s for 2/20    Jordy Baca PA approved    Lnua Beavers, PharmD, [facility]  Medication Management Clinic   Sirram Doan Ph: [phone]  Isaiah Kiera Ph: [phone]  2/8/2023 5:33 PM    For Pharmacy Admin Tracking Only    Program: Medication Management  CPA in place:  Yes  Recommendation Provided To: Pharmacy: 1  Intervention Detail: Benefit Assistance  Intervention Accepted By: Pharmacy: 1  Gap Closed?: No   Time Spent (min): 15

## 2023-02-08 NOTE — CARE COORDINATION
Ambulatory Care Coordination  ED Follow up Call    Reason for ED visit:  Covid-19   Status:     slight improvement      Denies experiencing any new onset s/s; cough is only occasional/NP, non-concerning. Did you call your PCP prior to going to the ED? No, but reviewed with his ID provider     Did you receive a discharge instructions from the Emergency Room? Yes  Review of Instructions:     Understands what to report/when to return?:  Yes   Understands discharge instructions?:  Yes   Following discharge instructions?:  Yes   If not why? Are there any new complaints of pain? No  New Pain Meds? No    Constipation prophylaxis needed? N/A    If you have a wound is the dressing clean, dry, and intact? N/A  Understands wound care regimen? N/A    Are there any other complaints/concerns that you wish to tell your provider? No    FU appts/Provider:    Future Appointments   Date Time Provider [place] Riley   2/21/2023 10:45 AM Curtis Mccord MD [facility] Neurology -   8/9/2023  9:30 AM Koki Zavala MD [facility]       New Medications?:   Yes      Medication Reconciliation by phone - Yes  Understands Medications? Yes  Taking Medications? Yes  Can you swallow your pills? Yes    Any further needs in the home i.e. Equipment? No    Link to services in community?:  N/A   Which services:       Diabetes Assessment    Medic Alert ID: No  Meal Planning: Plate Method, Avoidance of concentrated sweets, Carb counting   How often do you test your blood sugar?: Daily, Meals (Comment: Jayesh 2 device)   Do you have barriers with adherence to non-pharmacologic self-management interventions?  (Nutrition/Exercise/Self-Monitoring): No   Have you ever had to go to the ED for symptoms of low blood sugar?: No       No patient-reported symptoms         and   Congestive Heart Failure Assessment    Are you currently restricting fluids?: No Restriction  Do you understand a low sodium diet?: Yes  Do you understand how to read food labels?: Yes  How many restaurant meals do you eat per week?: 0  Do you salt your food before tasting it?: No     No patient-reported symptoms      Symptoms:  None: Yes      Symptom course: stable  Weight trend: stable  Salt intake watch compared to last visit: stable      ACM reviewed DC instructions, emphasized importance/rationale in staying well hydrated. Careful review of s/s to observe, when to notify physician and appropriate use of after hours' provider line as needed    Advised pt in scheduling w/PCP for ED f/u visit in next 7-10 days, when symptoms improve to ascertain pt status at that time; and emphasized in review of symptom mgmt once more. Pt verbalized understanding of above and agreement with the following (which has not changed)  Plan: continue [facility] support for health coaching, care collaboration, support w/community resources, and help with any newly presenting concerns as needed.   Pt agrees to outreach direct to ACM, as needed, between routine follow up outreach initiated by [employer]

## 2023-02-09 ENCOUNTER — TELEPHONE (OUTPATIENT)
Dept: PRIMARY CARE CLINIC | Age: 38
End: 2023-02-09

## 2023-02-09 NOTE — TELEPHONE ENCOUNTER
----- Message from [facility] sent at 2/9/2023  8:58 AM EST -----  Subject: Message to Provider    QUESTIONS  Information for Provider? Patient was diagonised with COVID-19 this past   weekend and was seen in the ER and wanted to speak to his provider about   his symptoms because he doesn't feel like he is getting any better. Please   call the patient to go over symptoms. Thank you.   ---------------------------------------------------------------------------  --------------  Brittany Barahona INFO  [phone]; OK to leave message on voicemail  ---------------------------------------------------------------------------  --------------  SCRIPT ANSWERS  Relationship to Patient? Self  (Patient requests to see provider urgently. )? No  Do you have any questions for your primary care provider that need to be   answered prior to your appointment? Yes  Have you been diagnosed with COVID-19 in the past 10 days?  Yes

## 2023-02-09 NOTE — TELEPHONE ENCOUNTER
I am out of the office, but happy to set up a VV for follow up next week. Symptoms due to COVID can last weeks. So he is well within that timeline. Was evaluated in ER 2 days ago. Can also consider sending [employer] message if he has specific concerns and I can address remotely.

## 2023-02-15 ENCOUNTER — TELEMEDICINE (OUTPATIENT)
Dept: PRIMARY CARE CLINIC | Age: 38
End: 2023-02-15
Payer: COMMERCIAL

## 2023-02-15 DIAGNOSIS — U07.1 COVID-19: Primary | ICD-10-CM

## 2023-02-15 DIAGNOSIS — B39.2 PULMONARY HISTOPLASMOSIS (HCC): ICD-10-CM

## 2023-02-15 DIAGNOSIS — E11.42 TYPE 2 DIABETES MELLITUS WITH DIABETIC POLYNEUROPATHY, WITH LONG-TERM CURRENT USE OF INSULIN (HCC): ICD-10-CM

## 2023-02-15 DIAGNOSIS — Z79.4 TYPE 2 DIABETES MELLITUS WITH DIABETIC POLYNEUROPATHY, WITH LONG-TERM CURRENT USE OF INSULIN (HCC): ICD-10-CM

## 2023-02-15 DIAGNOSIS — B39.2 HISTOPLASMOSIS PNEUMONIA (HCC): ICD-10-CM

## 2023-02-15 PROCEDURE — 99213 OFFICE O/P EST LOW 20 MIN: CPT | Performed by: FAMILY MEDICINE

## 2023-02-15 PROCEDURE — 3052F HG A1C>EQUAL 8.0%<EQUAL 9.0%: CPT | Performed by: FAMILY MEDICINE

## 2023-02-15 RX ORDER — ROSUVASTATIN CALCIUM 40 MG/1
40 TABLET, COATED ORAL NIGHTLY
Qty: 30 TABLET | Refills: 3 | Status: SHIPPED | OUTPATIENT
Start: 2023-02-15

## 2023-02-15 RX ORDER — VORICONAZOLE 200 MG/1
TABLET, FILM COATED ORAL
COMMUNITY
Start: 2023-02-08

## 2023-02-15 SDOH — ECONOMIC STABILITY: INCOME INSECURITY: HOW HARD IS IT FOR YOU TO PAY FOR THE VERY BASICS LIKE FOOD, HOUSING, MEDICAL CARE, AND HEATING?: HARD

## 2023-02-15 SDOH — ECONOMIC STABILITY: TRANSPORTATION INSECURITY
IN THE PAST 12 MONTHS, HAS LACK OF TRANSPORTATION KEPT YOU FROM MEETINGS, WORK, OR FROM GETTING THINGS NEEDED FOR DAILY LIVING?: NO

## 2023-02-15 SDOH — ECONOMIC STABILITY: FOOD INSECURITY: WITHIN THE PAST 12 MONTHS, YOU WORRIED THAT YOUR FOOD WOULD RUN OUT BEFORE YOU GOT MONEY TO BUY MORE.: SOMETIMES TRUE

## 2023-02-15 SDOH — ECONOMIC STABILITY: HOUSING INSECURITY
IN THE LAST 12 MONTHS, WAS THERE A TIME WHEN YOU DID NOT HAVE A STEADY PLACE TO SLEEP OR SLEPT IN A SHELTER (INCLUDING NOW)?: NO

## 2023-02-15 SDOH — ECONOMIC STABILITY: FOOD INSECURITY: WITHIN THE PAST 12 MONTHS, THE FOOD YOU BOUGHT JUST DIDN'T LAST AND YOU DIDN'T HAVE MONEY TO GET MORE.: SOMETIMES TRUE

## 2023-02-17 ENCOUNTER — APPOINTMENT (OUTPATIENT)
Dept: GENERAL RADIOLOGY | Age: 38
End: 2023-02-17
Payer: COMMERCIAL

## 2023-02-17 ENCOUNTER — HOSPITAL ENCOUNTER (EMERGENCY)
Age: 38
Discharge: HOME | End: 2023-02-17
Attending: EMERGENCY MEDICINE
Payer: COMMERCIAL

## 2023-02-17 VITALS
TEMPERATURE: 99.1 F | WEIGHT: 235 LBS | RESPIRATION RATE: 16 BRPM | DIASTOLIC BLOOD PRESSURE: 89 MMHG | HEART RATE: 90 BPM | SYSTOLIC BLOOD PRESSURE: 121 MMHG | HEIGHT: 73 IN | BODY MASS INDEX: 31.14 KG/M2 | OXYGEN SATURATION: 96 %

## 2023-02-17 DIAGNOSIS — E86.0 DEHYDRATION: Primary | ICD-10-CM

## 2023-02-17 LAB
ANION GAP SERPL CALCULATED.3IONS-SCNC: 12 MMOL/L (ref 3–16)
BASOPHILS ABSOLUTE: 0.1 K/UL (ref 0–0.2)
BASOPHILS RELATIVE PERCENT: 1.2 %
BUN BLDV-MCNC: 21 MG/DL (ref 7–20)
CALCIUM SERPL-MCNC: 9.8 MG/DL (ref 8.3–10.6)
CHLORIDE BLD-SCNC: 103 MMOL/L (ref 99–110)
CO2: 23 MMOL/L (ref 21–32)
CREAT SERPL-MCNC: 1.8 MG/DL (ref 0.9–1.3)
EKG ATRIAL RATE: 89 BPM
EKG DIAGNOSIS: NORMAL
EKG P AXIS: 53 DEGREES
EKG P-R INTERVAL: 182 MS
EKG Q-T INTERVAL: 364 MS
EKG QRS DURATION: 80 MS
EKG QTC CALCULATION (BAZETT): 442 MS
EKG R AXIS: -36 DEGREES
EKG T AXIS: 38 DEGREES
EKG VENTRICULAR RATE: 89 BPM
EOSINOPHILS ABSOLUTE: 0.1 K/UL (ref 0–0.6)
EOSINOPHILS RELATIVE PERCENT: 1.5 %
GFR SERPL CREATININE-BSD FRML MDRD: 49 ML/MIN/{1.73_M2}
GLUCOSE BLD-MCNC: 113 MG/DL (ref 70–99)
HCT VFR BLD CALC: 39.7 % (ref 40.5–52.5)
HEMOGLOBIN: 12.8 G/DL (ref 13.5–17.5)
LYMPHOCYTES ABSOLUTE: 1.5 K/UL (ref 1–5.1)
LYMPHOCYTES RELATIVE PERCENT: 29.6 %
MCH RBC QN AUTO: 26.2 PG (ref 26–34)
MCHC RBC AUTO-ENTMCNC: 32.4 G/DL (ref 31–36)
MCV RBC AUTO: 80.9 FL (ref 80–100)
MONOCYTES ABSOLUTE: 0.5 K/UL (ref 0–1.3)
MONOCYTES RELATIVE PERCENT: 8.7 %
NEUTROPHILS ABSOLUTE: 3.1 K/UL (ref 1.7–7.7)
NEUTROPHILS RELATIVE PERCENT: 59 %
PDW BLD-RTO: 13.8 % (ref 12.4–15.4)
PLATELET # BLD: 295 K/UL (ref 135–450)
PMV BLD AUTO: 8.9 FL (ref 5–10.5)
POTASSIUM REFLEX MAGNESIUM: 4.6 MMOL/L (ref 3.5–5.1)
PRO-BNP: 125 PG/ML (ref 0–124)
RBC # BLD: 4.9 M/UL (ref 4.2–5.9)
SODIUM BLD-SCNC: 138 MMOL/L (ref 136–145)
TROPONIN: 0.06 NG/ML
WBC # BLD: 5.2 K/UL (ref 4–11)

## 2023-02-17 PROCEDURE — 6360000002 HC RX W HCPCS: Performed by: EMERGENCY MEDICINE

## 2023-02-17 PROCEDURE — 6370000000 HC RX 637 (ALT 250 FOR IP): Performed by: EMERGENCY MEDICINE

## 2023-02-17 PROCEDURE — 96374 THER/PROPH/DIAG INJ IV PUSH: CPT

## 2023-02-17 PROCEDURE — 2580000003 HC RX 258: Performed by: EMERGENCY MEDICINE

## 2023-02-17 PROCEDURE — 93005 ELECTROCARDIOGRAM TRACING: CPT | Performed by: EMERGENCY MEDICINE

## 2023-02-17 PROCEDURE — 83880 ASSAY OF NATRIURETIC PEPTIDE: CPT

## 2023-02-17 PROCEDURE — 85025 COMPLETE CBC W/AUTO DIFF WBC: CPT

## 2023-02-17 PROCEDURE — 71046 X-RAY EXAM CHEST 2 VIEWS: CPT

## 2023-02-17 PROCEDURE — 99285 EMERGENCY DEPT VISIT HI MDM: CPT

## 2023-02-17 PROCEDURE — 84484 ASSAY OF TROPONIN QUANT: CPT

## 2023-02-17 PROCEDURE — 80048 BASIC METABOLIC PNL TOTAL CA: CPT

## 2023-02-17 RX ORDER — ONDANSETRON 2 MG/ML
4 INJECTION INTRAMUSCULAR; INTRAVENOUS ONCE
Status: COMPLETED | OUTPATIENT
Start: 2023-02-17 | End: 2023-02-17

## 2023-02-17 RX ORDER — 0.9 % SODIUM CHLORIDE 0.9 %
500 INTRAVENOUS SOLUTION INTRAVENOUS ONCE
Status: COMPLETED | OUTPATIENT
Start: 2023-02-17 | End: 2023-02-17

## 2023-02-17 RX ORDER — OXYCODONE HYDROCHLORIDE 5 MG/1
5 TABLET ORAL ONCE
Status: COMPLETED | OUTPATIENT
Start: 2023-02-17 | End: 2023-02-17

## 2023-02-17 RX ORDER — 0.9 % SODIUM CHLORIDE 0.9 %
1000 INTRAVENOUS SOLUTION INTRAVENOUS ONCE
Status: DISCONTINUED | OUTPATIENT
Start: 2023-02-17 | End: 2023-02-17

## 2023-02-17 RX ORDER — ACETAMINOPHEN 500 MG
1000 TABLET ORAL ONCE
Status: COMPLETED | OUTPATIENT
Start: 2023-02-17 | End: 2023-02-17

## 2023-02-17 RX ADMIN — ONDANSETRON 4 MG: 2 INJECTION INTRAMUSCULAR; INTRAVENOUS at 16:45

## 2023-02-17 RX ADMIN — ACETAMINOPHEN 1000 MG: 500 TABLET ORAL at 16:45

## 2023-02-17 RX ADMIN — OXYCODONE HYDROCHLORIDE 5 MG: 5 TABLET ORAL at 16:46

## 2023-02-17 RX ADMIN — SODIUM CHLORIDE 500 ML: 900 INJECTION, SOLUTION INTRAVENOUS at 16:45

## 2023-02-17 ASSESSMENT — ENCOUNTER SYMPTOMS
DIARRHEA: 1
NAUSEA: 1
COUGH: 0
ABDOMINAL PAIN: 0

## 2023-02-17 NOTE — ED PROVIDER NOTES
[address] [facility] [address] ENCOUNTER          ATTENDING PHYSICIAN NOTE       Date of evaluation: 2/17/2023    Chief Complaint     Positive For Covid-19 (2/3/2023)      History of Present Illness     Julee Martinez is a 40 y.o. male who presents with a chief complaint of COVID symptoms. Patient was positive on 2/3. States he continues to have intermittent hot and cold flashes, intermittent lightheadedness, intermittent nausea and diarrhea. Intermittent shortness of breath. States that last time he had COVID he was sick for 2 months. Of note he does have a history of heart failure, follows with cardiology, EF between 35 and 45%. Also a history of histoplasmosis, takes medications, followed by infectious disease, recent bronchoscopy in January revealed ongoing infection despite treatment in 2022. Apoorva Beaver Also history of chronic kidney disease and diabetes. Known gastroparesis. Takes metoclopramide. Patient also with chronic pain diagnosis. Patient states he is very frustrated he continues to have ongoing COVID symptoms. States last time he was here a week ago they gave him fluids and he felt better for a day and then his symptoms come back the next day. He does want to be treated for symptoms again today even if it only helps for today. States he cannot take all of his discomfort any longer. At this current time, patient with no abdominal pain or chest pain. Patient with no fevers. Review of Systems     Review of Systems a complete review of systems was obtained and is negative unless stated otherwise in HPI above. Past Medical, Surgical, Family, and Social History     He has a past medical history of Chronic kidney disease, Encounter for imaging to screen for metal prior to MRI, Gastroparesis, HFrEF (heart failure with reduced ejection fraction) ([facility] [address] Histoplasmosis, Hyperlipidemia, Hypertension, Neuropathy, and Type 2 diabetes mellitus without complication ([facility] [address]   He has a past surgical history that includes bronchoscopy (06/09/2022); bronchoscopy (06/09/2022); bronchoscopy (06/09/2022); and bronchoscopy (N/A, 06/14/2022). His family history includes Diabetes in his father. He reports that he has never smoked. He has never used smokeless tobacco. He reports that he does not currently use alcohol. He reports current drug use. Drug: Marijuana Eugene Kos). Medications     Discharge Medication List as of 2/17/2023  6:24 PM        CONTINUE these medications which have NOT CHANGED    Details   voriconazole (VFEND) 200 MG tablet Historical Med      rosuvastatin (CRESTOR) 40 MG tablet Take 1 tablet by mouth nightly, Disp-30 tablet, R-3Normal      insulin lispro (HUMALOG) 100 UNIT/ML injection vial Inject into the skin 3 times daily (before meals)Historical Med      guaiFENesin (MUCINEX) 600 MG extended release tablet Take 1 tablet by mouth 2 times daily for 15 days, Disp-30 tablet, R-0Print      acetaminophen (TYLENOL) 500 MG tablet Take 1 tablet by mouth 4 times daily as needed for Pain, Disp-120 tablet, R-0Print      valsartan (DIOVAN) 40 MG tablet Take 40 mg by mouth dailyHistorical Med      metoclopramide (REGLAN) 10 MG tablet Take 10 mg by mouth 3 times dailyHistorical Med      pregabalin (LYRICA) 75 MG capsule Take 1 capsule by mouth 2 times daily for 30 days. , Disp-60 capsule, R-3Normal      aspirin 81 MG chewable tablet Take 1 tablet by mouth daily, Disp-30 tablet, R-3Normal      lactulose (CHRONULAC) 10 GM/15ML solution Take 15 mLs by mouth daily as needed (constipation), Disp-946 mL, R-1Normal      Continuous Blood Gluc Sensor (FREESTYLE HOME 2 SENSOR) MISC Use as directed to monitor blood sugar, Disp-2 each, R-11Normal      blood glucose monitor strips Test 4 times a day & as needed for symptoms of irregular blood glucose. Dispense sufficient amount for indicated testing frequency plus additional to accommodate PRN testing needs.  Use when CGM is not active., Disp-100 strip, R-0, Normal      Insulin Degludec (TRESIBA FLEXTOUCH) 200 UNIT/ML SOPN Inject 75 Units into the skin daily, Disp-12 mL, R-3Normal      traZODone (DESYREL) 50 MG tablet Take 1-2 tablets by mouth nightly as needed for Sleep, Disp-60 tablet, R-2Normal      albuterol sulfate HFA (PROVENTIL;VENTOLIN;PROAIR) 108 (90 Base) MCG/ACT inhaler Inhale 2 puffs into the lungs every 6 hours as needed for Wheezing or Shortness of Breath, Disp-18 g, R-1Normal      vitamin D (ERGOCALCIFEROL) 1.25 MG (42538 UT) CAPS capsule Take 1 capsule by mouth once a week, Disp-12 capsule, R-1Normal      DULoxetine (CYMBALTA) 30 MG extended release capsule Take 1 capsule by mouth daily, Disp-30 capsule, R-3Normal      metFORMIN (GLUCOPHAGE) 1000 MG tablet Take 1 tablet by mouth in the morning and 1 tablet before bedtime. , Disp-180 tablet, R-1Normal      empagliflozin (JARDIANCE) 10 MG tablet Take 1 tablet by mouth in the morning., Disp-30 tablet, R-3Normal      carvedilol (COREG) 25 MG tablet Take 1 tablet by mouth in the morning and 1 tablet before bedtime. , Disp-60 tablet, R-3Normal             Allergies     He is allergic to penicillins. Physical Exam     INITIAL VITALS: BP: (!) 136/96, Temp: 99.1 °F (37.3 °C), Heart Rate: (!) 101, Resp: 18, SpO2: 99 %   Physical Exam  Constitutional:       General: He is not in acute distress. Appearance: He is well-developed. He is not diaphoretic. HENT:      Head: Normocephalic and atraumatic. Mouth/Throat:      Pharynx: No oropharyngeal exudate. Eyes:      General:         Right eye: No discharge. Left eye: No discharge. Conjunctiva/sclera: Conjunctivae normal.      Pupils: Pupils are equal, round, and reactive to light. Neck:      Thyroid: No thyromegaly. Vascular: No JVD. Trachea: No tracheal deviation. Cardiovascular:      Rate and Rhythm: Regular rhythm. Tachycardia present. Heart sounds: Normal heart sounds. No murmur heard. No friction rub. No gallop. Pulmonary:      Effort: Pulmonary effort is normal. No respiratory distress. Breath sounds: Normal breath sounds. No stridor. No wheezing or rales. Chest:      Chest wall: No tenderness. Abdominal:      General: Bowel sounds are normal. There is no distension. Palpations: Abdomen is soft. Tenderness: There is no abdominal tenderness. There is no guarding or rebound. Musculoskeletal:         General: No tenderness or deformity. Normal range of motion. Cervical back: Normal range of motion and neck supple. Skin:     General: Skin is warm and dry. Capillary Refill: Capillary refill takes less than 2 seconds. Findings: No erythema or rash. Neurological:      General: No focal deficit present. Mental Status: He is alert and oriented to person, place, and time. Cranial Nerves: No cranial nerve deficit. Motor: No abnormal muscle tone. Coordination: Coordination normal.   Psychiatric:         Behavior: Behavior normal.       Diagnostic Results     EKG   Indication: Shortness of breath. Heart rate 89, intervals norml, left axis deviation. No signs of ST elevation or depression, no T wave inversions. Comparison to prior EKG with no changes. Impression: Sinus rhythm with no active ischemia. RADIOLOGY:  XR CHEST (2 VW)   Final Result      Nodular density in the right upper lobe corresponds to a cluster of pulmonary nodules with tree-in-bud morphology seen on chest CT of 11/14/2022. Linear atelectasis/scarring in the right lung base. Left lung is clear. Normal cardiomediastinal silhouette.           LABS:   Results for orders placed or performed during the hospital encounter of 02/17/23   BMP w/ Reflex to MG   Result Value Ref Range    Sodium 138 136 - 145 mmol/L    Potassium reflex Magnesium 4.6 3.5 - 5.1 mmol/L    Chloride 103 99 - 110 mmol/L    CO2 23 21 - 32 mmol/L    Anion Gap 12 3 - 16    Glucose 113 (H) 70 - 99 mg/dL    BUN 21 (H) 7 - 20 mg/dL    Creatinine 1.8 (H) 0.9 - 1.3 mg/dL    Est, Glom Filt Rate 49 (A) >60    Calcium 9.8 8.3 - 10.6 mg/dL   CBC with Auto Differential   Result Value Ref Range    WBC 5.2 4.0 - 11.0 K/uL    RBC 4.90 4.20 - 5.90 M/uL    Hemoglobin 12.8 (L) 13.5 - 17.5 g/dL    Hematocrit 39.7 (L) 40.5 - 52.5 %    MCV 80.9 80.0 - 100.0 fL    MCH 26.2 26.0 - 34.0 pg    MCHC 32.4 31.0 - 36.0 g/dL    RDW 13.8 12.4 - 15.4 %    Platelets 253 453 - 825 K/uL    MPV 8.9 5.0 - 10.5 fL    Neutrophils % 59.0 %    Lymphocytes % 29.6 %    Monocytes % 8.7 %    Eosinophils % 1.5 %    Basophils % 1.2 %    Neutrophils Absolute 3.1 1.7 - 7.7 K/uL    Lymphocytes Absolute 1.5 1.0 - 5.1 K/uL    Monocytes Absolute 0.5 0.0 - 1.3 K/uL    Eosinophils Absolute 0.1 0.0 - 0.6 K/uL    Basophils Absolute 0.1 0.0 - 0.2 K/uL   Troponin   Result Value Ref Range    Troponin 0.06 (H) <0.01 ng/mL   Brain Natriuretic Peptide   Result Value Ref Range    Pro- (H) 0 - 124 pg/mL       ED BEDSIDE ULTRASOUND:  No results found. RECENT VITALS:  BP: 121/89,Temp: 99.1 °F (37.3 °C), Heart Rate: 90, Resp: 16, SpO2: 96 %     Procedures     none    ED Course     Nursing Notes, Past Medical Hx, Past Surgical Hx, Social Hx,Allergies, and Family Hx were reviewed. patient was given the following medications:  Orders Placed This Encounter   Medications    DISCONTD: 0.9 % sodium chloride bolus    ondansetron (ZOFRAN) injection 4 mg    oxyCODONE (ROXICODONE) immediate release tablet 5 mg    acetaminophen (TYLENOL) tablet 1,000 mg    0.9 % sodium chloride bolus       CONSULTS:  None    MEDICAL DECISIONMAKING / ASSESSMENT / Sergey Kessler is a 40 y.o. male who presents with a chief complaint of continued COVID symptoms. Initial exam reveals a well-appearing male in no acute distress with normal vitals, afebrile. Physical exam remarkable for normal heart and lungs, benign abdomen.     Most of his symptoms sound chronic at this point and are likely from him still recovering from [place]. He does have a history of heart failure though when I did obtain labs to rule out heart failure complications. EKG nonischemic, troponin negative. He had a slight CHELE with creatinine of 1.8 from a baseline of 1.4. Chest x-ray without pneumonia or fluid overload. BNP, troponin unremarkable and at the patient's baseline. Patient was given 500 cc of fluid as well as pain and nausea medications and did have improvement in symptoms after these. On reassessment continues to be well-appearing, was sleeping comfortably and easily arousable. At this time I do not feel patient meets criteria for admission. He does have a primary care doctor to follow-up with as well as a cardiologist to follow-up with. Discharged home in good condition with symptoms consistent with postviral syndrome. .    Medical Decision Making  Problems Addressed:  Dehydration: acute illness or injury    Amount and/or Complexity of Data Reviewed  Labs: ordered. Decision-making details documented in ED Course. Radiology: ordered. Decision-making details documented in ED Course. ECG/medicine tests: ordered and independent interpretation performed. Decision-making details documented in ED Course. Risk  OTC drugs. Prescription drug management. Clinical Impression     1.  Dehydration        Disposition     PATIENT REFERRED TO:  [facility] Emergency Department  [address] [address]  [phone]  In 3 days  As needed, If symptoms worsen    DISCHARGE MEDICATIONS:  Discharge Medication List as of 2/17/2023  6:24 PM          DISPOSITION Decision To Discharge 02/17/2023 06:21:12 PM         Mark Navas MD  02/17/23 8172

## 2023-02-17 NOTE — DISCHARGE INSTRUCTIONS
You were a little dehydrated today. We gave you some fluids. Follow-up with your regular doctor or return for worsening symptoms.
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
## 2023-02-21 ENCOUNTER — APPOINTMENT (OUTPATIENT)
Dept: GENERAL RADIOLOGY | Age: 38
End: 2023-02-21
Payer: COMMERCIAL

## 2023-02-21 ENCOUNTER — TELEPHONE (OUTPATIENT)
Dept: PRIMARY CARE CLINIC | Age: 38
End: 2023-02-21

## 2023-02-21 ENCOUNTER — INITIAL CONSULT (OUTPATIENT)
Dept: NEUROLOGY | Age: 38
End: 2023-02-21

## 2023-02-21 ENCOUNTER — HOSPITAL ENCOUNTER (OUTPATIENT)
Age: 38
Setting detail: OBSERVATION
Discharge: HOME | End: 2023-02-22
Attending: EMERGENCY MEDICINE | Admitting: HOSPITALIST
Payer: COMMERCIAL

## 2023-02-21 VITALS
HEART RATE: 104 BPM | WEIGHT: 245 LBS | HEIGHT: 73 IN | DIASTOLIC BLOOD PRESSURE: 116 MMHG | SYSTOLIC BLOOD PRESSURE: 175 MMHG | BODY MASS INDEX: 32.47 KG/M2 | OXYGEN SATURATION: 98 %

## 2023-02-21 DIAGNOSIS — N18.31 STAGE 3A CHRONIC KIDNEY DISEASE (HCC): ICD-10-CM

## 2023-02-21 DIAGNOSIS — I10 HYPERTENSION, UNSPECIFIED TYPE: ICD-10-CM

## 2023-02-21 DIAGNOSIS — I10 ESSENTIAL HYPERTENSION: ICD-10-CM

## 2023-02-21 DIAGNOSIS — E11.42 TYPE 2 DIABETES MELLITUS WITH DIABETIC POLYNEUROPATHY, WITH LONG-TERM CURRENT USE OF INSULIN (HCC): Primary | ICD-10-CM

## 2023-02-21 DIAGNOSIS — R07.9 CHEST PAIN, UNSPECIFIED TYPE: Primary | ICD-10-CM

## 2023-02-21 DIAGNOSIS — Z79.4 TYPE 2 DIABETES MELLITUS WITH DIABETIC POLYNEUROPATHY, WITH LONG-TERM CURRENT USE OF INSULIN (HCC): Primary | ICD-10-CM

## 2023-02-21 LAB
A/G RATIO: 1.5 (ref 1.1–2.2)
ALBUMIN SERPL-MCNC: 4.2 G/DL (ref 3.4–5)
ALP BLD-CCNC: 87 U/L (ref 40–129)
ALT SERPL-CCNC: 7 U/L (ref 10–40)
ANION GAP SERPL CALCULATED.3IONS-SCNC: 12 MMOL/L (ref 3–16)
AST SERPL-CCNC: 14 U/L (ref 15–37)
BASOPHILS ABSOLUTE: 0.1 K/UL (ref 0–0.2)
BASOPHILS RELATIVE PERCENT: 1.4 %
BILIRUB SERPL-MCNC: <0.2 MG/DL (ref 0–1)
BILIRUBIN URINE: NEGATIVE
BLOOD, URINE: NEGATIVE
BUN BLDV-MCNC: 16 MG/DL (ref 7–20)
CALCIUM SERPL-MCNC: 9.7 MG/DL (ref 8.3–10.6)
CHLORIDE BLD-SCNC: 101 MMOL/L (ref 99–110)
CLARITY: CLEAR
CO2: 23 MMOL/L (ref 21–32)
COLOR: YELLOW
CREAT SERPL-MCNC: 1.2 MG/DL (ref 0.9–1.3)
D DIMER: 0.31 UG/ML FEU (ref 0–0.6)
EKG ATRIAL RATE: 104 BPM
EKG DIAGNOSIS: NORMAL
EKG P AXIS: 76 DEGREES
EKG P-R INTERVAL: 182 MS
EKG Q-T INTERVAL: 342 MS
EKG QRS DURATION: 96 MS
EKG QTC CALCULATION (BAZETT): 449 MS
EKG R AXIS: 115 DEGREES
EKG T AXIS: 19 DEGREES
EKG VENTRICULAR RATE: 104 BPM
EOSINOPHILS ABSOLUTE: 0.1 K/UL (ref 0–0.6)
EOSINOPHILS RELATIVE PERCENT: 2 %
GFR SERPL CREATININE-BSD FRML MDRD: >60 ML/MIN/{1.73_M2}
GLUCOSE BLD-MCNC: 140 MG/DL (ref 70–99)
GLUCOSE BLD-MCNC: 168 MG/DL (ref 70–99)
GLUCOSE URINE: >=1000 MG/DL
HCT VFR BLD CALC: 40.3 % (ref 40.5–52.5)
HEMOGLOBIN: 13.1 G/DL (ref 13.5–17.5)
KETONES, URINE: NEGATIVE MG/DL
LEUKOCYTE ESTERASE, URINE: NEGATIVE
LYMPHOCYTES ABSOLUTE: 1.6 K/UL (ref 1–5.1)
LYMPHOCYTES RELATIVE PERCENT: 28.6 %
MCH RBC QN AUTO: 26.2 PG (ref 26–34)
MCHC RBC AUTO-ENTMCNC: 32.4 G/DL (ref 31–36)
MCV RBC AUTO: 80.7 FL (ref 80–100)
MICROSCOPIC EXAMINATION: YES
MONOCYTES ABSOLUTE: 0.4 K/UL (ref 0–1.3)
MONOCYTES RELATIVE PERCENT: 7.3 %
NEUTROPHILS ABSOLUTE: 3.4 K/UL (ref 1.7–7.7)
NEUTROPHILS RELATIVE PERCENT: 60.7 %
NITRITE, URINE: NEGATIVE
PDW BLD-RTO: 13.3 % (ref 12.4–15.4)
PERFORMED ON: ABNORMAL
PH UA: 6 (ref 5–8)
PLATELET # BLD: 255 K/UL (ref 135–450)
PMV BLD AUTO: 8.4 FL (ref 5–10.5)
POTASSIUM REFLEX MAGNESIUM: 4.4 MMOL/L (ref 3.5–5.1)
PRO-BNP: 116 PG/ML (ref 0–124)
PROTEIN UA: >=300 MG/DL
RBC # BLD: 4.99 M/UL (ref 4.2–5.9)
RBC UA: NORMAL /HPF (ref 0–4)
SODIUM BLD-SCNC: 136 MMOL/L (ref 136–145)
SPECIFIC GRAVITY UA: 1.02 (ref 1–1.03)
TOTAL CK: 151 U/L (ref 39–308)
TOTAL PROTEIN: 7 G/DL (ref 6.4–8.2)
TROPONIN: 0.04 NG/ML
TROPONIN: 0.04 NG/ML
URINE REFLEX TO CULTURE: ABNORMAL
URINE TYPE: ABNORMAL
UROBILINOGEN, URINE: 0.2 E.U./DL
WBC # BLD: 5.6 K/UL (ref 4–11)
WBC UA: NORMAL /HPF (ref 0–5)

## 2023-02-21 PROCEDURE — 85025 COMPLETE CBC W/AUTO DIFF WBC: CPT

## 2023-02-21 PROCEDURE — 93010 ELECTROCARDIOGRAM REPORT: CPT | Performed by: INTERNAL MEDICINE

## 2023-02-21 PROCEDURE — 80053 COMPREHEN METABOLIC PANEL: CPT

## 2023-02-21 PROCEDURE — 6370000000 HC RX 637 (ALT 250 FOR IP): Performed by: PHYSICIAN ASSISTANT

## 2023-02-21 PROCEDURE — 2500000003 HC RX 250 WO HCPCS: Performed by: HOSPITALIST

## 2023-02-21 PROCEDURE — 83880 ASSAY OF NATRIURETIC PEPTIDE: CPT

## 2023-02-21 PROCEDURE — 93005 ELECTROCARDIOGRAM TRACING: CPT | Performed by: PHYSICIAN ASSISTANT

## 2023-02-21 PROCEDURE — 36415 COLL VENOUS BLD VENIPUNCTURE: CPT

## 2023-02-21 PROCEDURE — 6370000000 HC RX 637 (ALT 250 FOR IP): Performed by: HOSPITALIST

## 2023-02-21 PROCEDURE — G0378 HOSPITAL OBSERVATION PER HR: HCPCS

## 2023-02-21 PROCEDURE — 82550 ASSAY OF CK (CPK): CPT

## 2023-02-21 PROCEDURE — 99285 EMERGENCY DEPT VISIT HI MDM: CPT

## 2023-02-21 PROCEDURE — 85379 FIBRIN DEGRADATION QUANT: CPT

## 2023-02-21 PROCEDURE — 84484 ASSAY OF TROPONIN QUANT: CPT

## 2023-02-21 PROCEDURE — 96374 THER/PROPH/DIAG INJ IV PUSH: CPT

## 2023-02-21 PROCEDURE — 81001 URINALYSIS AUTO W/SCOPE: CPT

## 2023-02-21 PROCEDURE — 96376 TX/PRO/DX INJ SAME DRUG ADON: CPT

## 2023-02-21 PROCEDURE — 71045 X-RAY EXAM CHEST 1 VIEW: CPT

## 2023-02-21 RX ORDER — PROMETHAZINE HYDROCHLORIDE 25 MG/1
12.5 TABLET ORAL EVERY 6 HOURS PRN
Status: DISCONTINUED | OUTPATIENT
Start: 2023-02-21 | End: 2023-02-22 | Stop reason: HOSPADM

## 2023-02-21 RX ORDER — ACETAMINOPHEN 325 MG/1
650 TABLET ORAL EVERY 6 HOURS PRN
Status: DISCONTINUED | OUTPATIENT
Start: 2023-02-21 | End: 2023-02-22 | Stop reason: HOSPADM

## 2023-02-21 RX ORDER — VORICONAZOLE 200 MG/1
200 TABLET, FILM COATED ORAL EVERY 12 HOURS SCHEDULED
Status: DISCONTINUED | OUTPATIENT
Start: 2023-02-21 | End: 2023-02-22 | Stop reason: HOSPADM

## 2023-02-21 RX ORDER — INSULIN GLARGINE 100 [IU]/ML
60 INJECTION, SOLUTION SUBCUTANEOUS DAILY
Status: DISCONTINUED | OUTPATIENT
Start: 2023-02-21 | End: 2023-02-22 | Stop reason: HOSPADM

## 2023-02-21 RX ORDER — VALSARTAN 80 MG/1
40 TABLET ORAL DAILY
Status: DISCONTINUED | OUTPATIENT
Start: 2023-02-22 | End: 2023-02-22 | Stop reason: HOSPADM

## 2023-02-21 RX ORDER — ASPIRIN 81 MG/1
324 TABLET, CHEWABLE ORAL ONCE
Status: COMPLETED | OUTPATIENT
Start: 2023-02-21 | End: 2023-02-21

## 2023-02-21 RX ORDER — MAGNESIUM SULFATE IN WATER 40 MG/ML
2000 INJECTION, SOLUTION INTRAVENOUS PRN
Status: DISCONTINUED | OUTPATIENT
Start: 2023-02-21 | End: 2023-02-22 | Stop reason: HOSPADM

## 2023-02-21 RX ORDER — POTASSIUM CHLORIDE 20 MEQ/1
40 TABLET, EXTENDED RELEASE ORAL PRN
Status: DISCONTINUED | OUTPATIENT
Start: 2023-02-21 | End: 2023-02-22 | Stop reason: HOSPADM

## 2023-02-21 RX ORDER — SODIUM CHLORIDE 0.9 % (FLUSH) 0.9 %
10 SYRINGE (ML) INJECTION EVERY 12 HOURS SCHEDULED
Status: DISCONTINUED | OUTPATIENT
Start: 2023-02-21 | End: 2023-02-22 | Stop reason: HOSPADM

## 2023-02-21 RX ORDER — ONDANSETRON 2 MG/ML
4 INJECTION INTRAMUSCULAR; INTRAVENOUS EVERY 6 HOURS PRN
Status: DISCONTINUED | OUTPATIENT
Start: 2023-02-21 | End: 2023-02-22 | Stop reason: HOSPADM

## 2023-02-21 RX ORDER — INSULIN LISPRO 100 [IU]/ML
0-4 INJECTION, SOLUTION INTRAVENOUS; SUBCUTANEOUS NIGHTLY
Status: DISCONTINUED | OUTPATIENT
Start: 2023-02-21 | End: 2023-02-22 | Stop reason: HOSPADM

## 2023-02-21 RX ORDER — METOCLOPRAMIDE 10 MG/1
10 TABLET ORAL 3 TIMES DAILY
Status: DISCONTINUED | OUTPATIENT
Start: 2023-02-21 | End: 2023-02-22 | Stop reason: HOSPADM

## 2023-02-21 RX ORDER — POTASSIUM CHLORIDE 7.45 MG/ML
10 INJECTION INTRAVENOUS PRN
Status: DISCONTINUED | OUTPATIENT
Start: 2023-02-21 | End: 2023-02-22 | Stop reason: HOSPADM

## 2023-02-21 RX ORDER — NITROGLYCERIN 0.4 MG/1
0.4 TABLET SUBLINGUAL EVERY 5 MIN PRN
Status: DISCONTINUED | OUTPATIENT
Start: 2023-02-21 | End: 2023-02-22 | Stop reason: HOSPADM

## 2023-02-21 RX ORDER — LEVALBUTEROL 1.25 MG/.5ML
0.63 SOLUTION, CONCENTRATE RESPIRATORY (INHALATION) EVERY 8 HOURS PRN
Status: DISCONTINUED | OUTPATIENT
Start: 2023-02-21 | End: 2023-02-22 | Stop reason: HOSPADM

## 2023-02-21 RX ORDER — ENOXAPARIN SODIUM 100 MG/ML
30 INJECTION SUBCUTANEOUS 2 TIMES DAILY
Status: DISCONTINUED | OUTPATIENT
Start: 2023-02-22 | End: 2023-02-22 | Stop reason: HOSPADM

## 2023-02-21 RX ORDER — PREGABALIN 75 MG/1
75 CAPSULE ORAL 2 TIMES DAILY
Status: DISCONTINUED | OUTPATIENT
Start: 2023-02-21 | End: 2023-02-22 | Stop reason: HOSPADM

## 2023-02-21 RX ORDER — DULOXETIN HYDROCHLORIDE 60 MG/1
60 CAPSULE, DELAYED RELEASE ORAL DAILY
Status: DISCONTINUED | OUTPATIENT
Start: 2023-02-22 | End: 2023-02-22 | Stop reason: HOSPADM

## 2023-02-21 RX ORDER — TRAZODONE HYDROCHLORIDE 50 MG/1
50 TABLET ORAL NIGHTLY PRN
Status: DISCONTINUED | OUTPATIENT
Start: 2023-02-21 | End: 2023-02-22 | Stop reason: HOSPADM

## 2023-02-21 RX ORDER — SENNA PLUS 8.6 MG/1
1 TABLET ORAL DAILY PRN
Status: DISCONTINUED | OUTPATIENT
Start: 2023-02-21 | End: 2023-02-22 | Stop reason: HOSPADM

## 2023-02-21 RX ORDER — DULOXETIN HYDROCHLORIDE 60 MG/1
60 CAPSULE, DELAYED RELEASE ORAL DAILY
Qty: 30 CAPSULE | Refills: 2 | Status: SHIPPED | OUTPATIENT
Start: 2023-02-21 | End: 2023-05-22

## 2023-02-21 RX ORDER — INSULIN LISPRO 100 [IU]/ML
0.05 INJECTION, SOLUTION INTRAVENOUS; SUBCUTANEOUS
Status: DISCONTINUED | OUTPATIENT
Start: 2023-02-22 | End: 2023-02-22 | Stop reason: HOSPADM

## 2023-02-21 RX ORDER — POLYETHYLENE GLYCOL 3350 17 G/17G
17 POWDER, FOR SOLUTION ORAL DAILY PRN
Status: DISCONTINUED | OUTPATIENT
Start: 2023-02-21 | End: 2023-02-22 | Stop reason: HOSPADM

## 2023-02-21 RX ORDER — ROSUVASTATIN CALCIUM 10 MG/1
40 TABLET, COATED ORAL NIGHTLY
Status: DISCONTINUED | OUTPATIENT
Start: 2023-02-21 | End: 2023-02-22 | Stop reason: HOSPADM

## 2023-02-21 RX ORDER — SODIUM CHLORIDE 0.9 % (FLUSH) 0.9 %
10 SYRINGE (ML) INJECTION PRN
Status: DISCONTINUED | OUTPATIENT
Start: 2023-02-21 | End: 2023-02-22 | Stop reason: HOSPADM

## 2023-02-21 RX ORDER — INSULIN LISPRO 100 [IU]/ML
0-8 INJECTION, SOLUTION INTRAVENOUS; SUBCUTANEOUS
Status: DISCONTINUED | OUTPATIENT
Start: 2023-02-22 | End: 2023-02-22 | Stop reason: HOSPADM

## 2023-02-21 RX ORDER — DEXTROSE MONOHYDRATE 100 MG/ML
INJECTION, SOLUTION INTRAVENOUS CONTINUOUS PRN
Status: DISCONTINUED | OUTPATIENT
Start: 2023-02-21 | End: 2023-02-22 | Stop reason: HOSPADM

## 2023-02-21 RX ORDER — ACETAMINOPHEN 650 MG/1
650 SUPPOSITORY RECTAL EVERY 6 HOURS PRN
Status: DISCONTINUED | OUTPATIENT
Start: 2023-02-21 | End: 2023-02-22 | Stop reason: HOSPADM

## 2023-02-21 RX ORDER — SODIUM CHLORIDE 9 MG/ML
INJECTION, SOLUTION INTRAVENOUS PRN
Status: DISCONTINUED | OUTPATIENT
Start: 2023-02-21 | End: 2023-02-22 | Stop reason: HOSPADM

## 2023-02-21 RX ORDER — CARVEDILOL 25 MG/1
25 TABLET ORAL 2 TIMES DAILY
Status: DISCONTINUED | OUTPATIENT
Start: 2023-02-21 | End: 2023-02-22 | Stop reason: HOSPADM

## 2023-02-21 RX ORDER — ASPIRIN 81 MG/1
81 TABLET ORAL DAILY
Status: DISCONTINUED | OUTPATIENT
Start: 2023-02-21 | End: 2023-02-22 | Stop reason: HOSPADM

## 2023-02-21 RX ORDER — GUAIFENESIN 600 MG/1
600 TABLET, EXTENDED RELEASE ORAL 2 TIMES DAILY
Status: DISCONTINUED | OUTPATIENT
Start: 2023-02-21 | End: 2023-02-22 | Stop reason: HOSPADM

## 2023-02-21 RX ORDER — LABETALOL HYDROCHLORIDE 5 MG/ML
10 INJECTION, SOLUTION INTRAVENOUS EVERY 4 HOURS PRN
Status: DISCONTINUED | OUTPATIENT
Start: 2023-02-21 | End: 2023-02-22 | Stop reason: HOSPADM

## 2023-02-21 RX ADMIN — LABETALOL HYDROCHLORIDE 10 MG: 5 INJECTION INTRAVENOUS at 19:59

## 2023-02-21 RX ADMIN — METOCLOPRAMIDE 10 MG: 10 TABLET ORAL at 21:28

## 2023-02-21 RX ADMIN — INSULIN GLARGINE 60 UNITS: 100 INJECTION, SOLUTION SUBCUTANEOUS at 21:27

## 2023-02-21 RX ADMIN — LABETALOL HYDROCHLORIDE 10 MG: 5 INJECTION INTRAVENOUS at 15:37

## 2023-02-21 RX ADMIN — GUAIFENESIN 600 MG: 600 TABLET, EXTENDED RELEASE ORAL at 21:28

## 2023-02-21 RX ADMIN — ROSUVASTATIN 40 MG: 10 TABLET, FILM COATED ORAL at 21:28

## 2023-02-21 RX ADMIN — PREGABALIN 75 MG: 75 CAPSULE ORAL at 21:40

## 2023-02-21 RX ADMIN — CARVEDILOL 25 MG: 25 TABLET, FILM COATED ORAL at 21:28

## 2023-02-21 RX ADMIN — VORICONAZOLE 200 MG: 200 TABLET ORAL at 21:28

## 2023-02-21 RX ADMIN — ASPIRIN 81 MG 324 MG: 81 TABLET ORAL at 14:00

## 2023-02-21 ASSESSMENT — PAIN DESCRIPTION - FREQUENCY: FREQUENCY: CONTINUOUS

## 2023-02-21 ASSESSMENT — PAIN SCALES - GENERAL: PAINLEVEL_OUTOF10: 5

## 2023-02-21 ASSESSMENT — PAIN DESCRIPTION - PAIN TYPE: TYPE: ACUTE PAIN

## 2023-02-21 ASSESSMENT — PAIN - FUNCTIONAL ASSESSMENT
PAIN_FUNCTIONAL_ASSESSMENT: NONE - DENIES PAIN
PAIN_FUNCTIONAL_ASSESSMENT: 0-10

## 2023-02-21 ASSESSMENT — HEART SCORE: ECG: 1

## 2023-02-21 ASSESSMENT — PAIN DESCRIPTION - LOCATION: LOCATION: CHEST

## 2023-02-21 ASSESSMENT — PAIN DESCRIPTION - ORIENTATION: ORIENTATION: RIGHT

## 2023-02-21 NOTE — ED PROVIDER NOTES
I independently performed a history and physical on Benjamin Foley. I personally saw the patient and performed a substantive portion of the visit including all aspects of the medical decision making. All diagnostic, treatment, and disposition decisions were made by myself in conjunction with the advanced practice provider. I have participated in the medical decision making and directed the treatment plan and disposition of the patient. For further details of [facility] emergency department encounter, please see the advanced practice provider's documentation. CHIEF COMPLAINT  Chief Complaint   Patient presents with    Hypertension     Pt was at a neurology appt today for a check up. Reports when they took his BP it was high. Pt hx of HTN and takes medication. Denies missing any medications. Pt recently at [facility] for dehydration. Briefly, Benjamin Foley is a 40 y.o. male  who presents to the ED complaining of left-sided chest pain and uncontrolled hypertension    FOCUSED PHYSICAL EXAMINATION  BP (!) 126/92   Pulse 98   Temp 98.8 °F (37.1 °C)   Resp 18   Ht 6' 2\" (1.88 m)   Wt 237 lb (107.5 kg)   SpO2 96%   BMI 30.43 kg/m²      Focused physical examination:  General appearance:  Cooperative. No acute distress. Skin:  Warm. Dry. Eye:  Extraocular movements intact. Ears, nose, mouth and throat:  Oral mucosa moist,  Neck:  Trachea midline. Heart:  Regular rate and rhythm  Perfusion:  intact  Respiratory:  Respirations nonlabored. Lungs clear to auscultation bilaterally. Abdominal:   Non distended. Nontender  Neurological:  Alert and oriented x 3. Moves all extremities spontaneously  Musculoskeletal:   Normal ROM, no deformities          Psychiatric:  Normal mood      EKG *Interpreted by me*: Sinus tachycardia occasional PVCs rate of 104 bpm.  Flipped T waves in lead III. Q waves anteriorly. No ST elevation.   No prior change    Differential Diagnosis: ACS, PE, CHF, hypertensive emergency    Patient presents emergency department today with uncontrolled blood pressure and left-sided chest pain. History of known cardiomyopathy for unclear etiology. Question possible sarcoidosis but this felt unlikely from prior MRI. Patient has had prior echocardiograms, cardiac MRI but is had no further ischemic work-up. Found to have elevated troponin here which is chronic though slightly increased from prior. Previously it was thought that this could be due to some underlying kidney disease however his renal function is improved today. Given his elevated troponin with chest pain and known underlying cardiomyopathy plan to admit for further work-up. He has had no evocative testing or angiogram previously. Was given an aspirin here. Blood pressure is improving without further intervention here    History From: Patient         Chronic Conditions: Noted in HPI    CONSULTS: (Who and What was discussed)  IP CONSULT TO CARDIOLOGY        Records Reviewed : Care Everywhere reviewed prior cardiac MRI and cardiac PET scan    Disposition Considerations (Tests not ordered but considered, Shared Decision Making, Pt Expectation of Test or Tx.):     During the patient's ED course, the patient was given:  Medications   aspirin chewable tablet 324 mg (324 mg Oral Given 2/21/23 1400)        CLINICAL IMPRESSION  1. Chest pain, unspecified type    2. Hypertension, unspecified type        DISPOSITION  Admission      This chart was created using Dragon dictation software. Efforts were made by me to ensure accuracy, however some errors may be present due to limitations of this technology.             Shagufta Kline MD  02/21/23 9542

## 2023-02-21 NOTE — ED PROVIDER NOTES
[address] [facility]  ED  EMERGENCY DEPARTMENT ENCOUNTER        Pt Name: Ping Lin  MRN: [id number]  Briannagfsherie 1985  Date of evaluation: 2/21/2023  Provider: Estela He PA-C  PCP: Dae Adkins DO  Note Started: 1:18 PM EST 2/21/23       I have seen and evaluated this patient with my supervising physician Ronel Markham MD.      CHIEF COMPLAINT       Chief Complaint   Patient presents with    Hypertension     Pt was at a neurology appt today for a check up. Reports when they took his BP it was high. Pt hx of HTN and takes medication. Denies missing any medications. Pt recently at [facility] for dehydration. HISTORY OF PRESENT ILLNESS: 1 or more Elements     History From: patient  Limitations to history : None    Ping Lin is a 40 y.o. male who presents to the emergency department after he was sent here from neurology for high blood pressure. He has history of neuropathy and was seen today and states his blood pressure was elevated at 474 systolic and he continued to check it then went to 160 then 170. He was told to follow-up with his family physician but was unable to get a hold of them so he presented to the emergency department. He states he has been having some chest pain, shortness of breath, body aches with mild cough and chills. This been ongoing since earlier this month and he was diagnosed with COVID-19 on 2/3. He has history of hypertension, hyperlipidemia and diabetes. Denies any history of smoking. He has been seen multiple times this month for some dehydration issues and 5 days ago he had a creatinine that was 1.8 he states he feels like he has been having some decreased urination. Denies dysuria, hematuria, diarrhea, bloody stool or black tarry stools. Nursing Notes were all reviewed and agreed with or any disagreements were addressed in the HPI.     REVIEW OF SYSTEMS :      Review of Systems    Positives and Pertinent negatives as per HPI. SURGICAL HISTORY     Past Surgical History:   Procedure Laterality Date    BRONCHOSCOPY  06/09/2022    BRONCHOSCOPY BRUSHINGS performed by Sue Cannon MD at [address]  06/09/2022    BRONCHOSCOPY DIAGNOSTIC OR CELL [address] ONLY performed by Sue Cannon MD at [address]  06/09/2022    BRONCHOSCOPY BIOPSY BRONCHUS performed by Sue Cannon MD at [address] N/A 06/14/2022    BRONCHOSCOPY DIAGNOSTIC OR CELL [address] ONLY performed by Venkata Ventura DO at [facility]. [address]       Previous Medications    ACETAMINOPHEN (TYLENOL) 500 MG TABLET    Take 1 tablet by mouth 4 times daily as needed for Pain    ALBUTEROL SULFATE HFA (PROVENTIL;VENTOLIN;PROAIR) 108 (90 BASE) MCG/ACT INHALER    Inhale 2 puffs into the lungs every 6 hours as needed for Wheezing or Shortness of Breath    ASPIRIN 81 MG CHEWABLE TABLET    Take 1 tablet by mouth daily    BLOOD GLUCOSE MONITOR STRIPS    Test 4 times a day & as needed for symptoms of irregular blood glucose. Dispense sufficient amount for indicated testing frequency plus additional to accommodate PRN testing needs. Use when CGM is not active. CARVEDILOL (COREG) 25 MG TABLET    Take 1 tablet by mouth in the morning and 1 tablet before bedtime. CONTINUOUS BLOOD GLUC SENSOR (FREESTYLE HOME 2 SENSOR) MISC    Use as directed to monitor blood sugar    DULOXETINE (CYMBALTA) 60 MG EXTENDED RELEASE CAPSULE    Take 1 capsule by mouth daily    EMPAGLIFLOZIN (JARDIANCE) 10 MG TABLET    Take 1 tablet by mouth in the morning.     GUAIFENESIN (MUCINEX) 600 MG EXTENDED RELEASE TABLET    Take 1 tablet by mouth 2 times daily for 15 days    INSULIN DEGLUDEC (TRESIBA FLEXTOUCH) 200 UNIT/ML SOPN    Inject 75 Units into the skin daily    INSULIN LISPRO (HUMALOG) 100 UNIT/ML INJECTION VIAL    Inject into the skin 3 times daily (before meals)    LACTULOSE (CHRONULAC) 10 GM/15ML SOLUTION    Take 15 mLs by mouth daily as needed (constipation)    METFORMIN (GLUCOPHAGE) 1000 MG TABLET    Take 1 tablet by mouth in the morning and 1 tablet before bedtime. METOCLOPRAMIDE (REGLAN) 10 MG TABLET    Take 10 mg by mouth 3 times daily    PREGABALIN (LYRICA) 75 MG CAPSULE    Take 1 capsule by mouth 2 times daily for 30 days. ROSUVASTATIN (CRESTOR) 40 MG TABLET    Take 1 tablet by mouth nightly    TRAZODONE (DESYREL) 50 MG TABLET    Take 1-2 tablets by mouth nightly as needed for Sleep    VALSARTAN (DIOVAN) 40 MG TABLET    Take 40 mg by mouth daily    VITAMIN D (ERGOCALCIFEROL) 1.25 MG (34387 UT) CAPS CAPSULE    Take 1 capsule by mouth once a week    VORICONAZOLE (VFEND) 200 MG TABLET           ALLERGIES     Penicillins    FAMILYHISTORY       Family History   Problem Relation Age of Onset    Diabetes Father     High Blood Pressure Father         SOCIAL HISTORY       Social History     Tobacco Use    Smoking status: Never    Smokeless tobacco: Never   Vaping Use    Vaping Use: Never used   Substance Use Topics    Alcohol use: Not Currently     Comment: rare    Drug use: Yes     Types: Marijuana Seabron Barban)     Comment: occ       SCREENINGS        Jannie Coma Scale  Eye Opening: Spontaneous  Best Verbal Response: Oriented  Best Motor Response: Obeys commands  Jannie Coma Scale Score: 15        Heart Score for chest pain patients  History:  Moderately Suspicious  ECG: Non-Specifc repolarization disturbance/LBTB/PM  Patient Age: < 45 years  *Risk factors for Atherosclerotic disease: Hypercholesterolemia, Hypertension, Obesity, Diabetes Mellitus  Risk Factors: > 3 Risk factors or history of atherosclerotic disease*  Troponin: > 3X normal limit  Heart Score Total: 6       CIWA Assessment  BP: (!) 131/103  Heart Rate: 98           PHYSICAL EXAM  1 or more Elements     ED Triage Vitals [02/21/23 1134]   BP Temp Temp src Heart Rate Resp SpO2 Height Weight   (!) 143/101 98.8 °F (37.1 °C) -- 99 20 97 % 6' 2\" (1.88 m) 237 lb (107.5 kg)       Physical Exam  Vitals and nursing note reviewed. Constitutional:       Appearance: He is well-developed. He is not diaphoretic. HENT:      Head: Atraumatic. Nose: Nose normal.   Eyes:      General:         Right eye: No discharge. Left eye: No discharge. Cardiovascular:      Rate and Rhythm: Normal rate and regular rhythm. Heart sounds: No murmur heard. No friction rub. No gallop. Pulmonary:      Effort: Pulmonary effort is normal. No respiratory distress. Breath sounds: No stridor. No wheezing, rhonchi or rales. Comments: Subjective tenderness to palpate over the chest without rash or sign of trauma. Chest:      Chest wall: Tenderness present. Abdominal:      General: Bowel sounds are normal. There is no distension. Palpations: Abdomen is soft. There is no mass. Tenderness: There is no abdominal tenderness. There is no guarding or rebound. Hernia: No hernia is present. Musculoskeletal:         General: No swelling. Normal range of motion. Cervical back: Normal range of motion. Skin:     General: Skin is warm and dry. Findings: No erythema or rash. Neurological:      Mental Status: He is alert and oriented to person, place, and time. Cranial Nerves: No cranial nerve deficit.    Psychiatric:         Behavior: Behavior normal.           DIAGNOSTIC RESULTS   LABS:    Labs Reviewed   CBC WITH AUTO DIFFERENTIAL - Abnormal; Notable for the following components:       Result Value    Hemoglobin 13.1 (*)     Hematocrit 40.3 (*)     All other components within normal limits   COMPREHENSIVE METABOLIC PANEL W/ REFLEX TO MG FOR LOW K - Abnormal; Notable for the following components:    Glucose 168 (*)     ALT 7 (*)     AST 14 (*)     All other components within normal limits   URINALYSIS WITH REFLEX TO CULTURE - Abnormal; Notable for the following components:    Glucose, Ur >=1000 (*)     Protein, UA >=300 (*)     All other components within normal limits   TROPONIN - Abnormal; Notable for the following components:    Troponin 0.04 (*)     All other components within normal limits   CK   MICROSCOPIC URINALYSIS       When ordered only abnormal lab results are displayed. All other labs were within normal range or not returned as of this dictation. EKG: When ordered, EKG's are interpreted by the Emergency Department Physician in the absence of a cardiologist.  Please see their note for interpretation of EKG. RADIOLOGY:   Non-plain film images such as CT, Ultrasound and MRI are read by the radiologist. Plain radiographic images are visualized and preliminarily interpreted by the ED Provider with the below findings:        Interpretation per the Radiologist below, if available at the time of this note:    XR CHEST PORTABLE   Final Result   No acute disease           XR CHEST (2 VW)    Result Date: 2/17/2023  Chest PA and lateral HISTORY: Short of breath     Nodular density in the right upper lobe corresponds to a cluster of pulmonary nodules with tree-in-bud morphology seen on chest CT of 11/14/2022. Linear atelectasis/scarring in the right lung base. Left lung is clear. Normal cardiomediastinal silhouette. XR CHEST PORTABLE    Result Date: 2/21/2023  EXAMINATION: ONE XRAY VIEW OF THE CHEST 2/21/2023 11:52 am COMPARISON: 02/17/2023 HISTORY: ORDERING SYSTEM PROVIDED HISTORY: chest pain TECHNOLOGIST PROVIDED HISTORY: Reason for exam:->chest pain Reason for Exam: CP FINDINGS: Heart size is normal.  Mediastinal contours are normal.  Pulmonary vascularity is normal.  No focal lung consolidation noted     No acute disease       No results found.     PROCEDURES   Unless otherwise noted below, none     Procedures    CRITICAL CARE TIME (.cctime)       PAST MEDICAL HISTORY      has a past medical history of Chronic kidney disease, Encounter for imaging to screen for metal prior to MRI (12/07/2022), Gastroparesis, HFrEF (heart failure with reduced ejection fraction) ([facility] Histoplasmosis, Hyperlipidemia, Hypertension, Neuropathy, and Type 2 diabetes mellitus without complication ([facility] [address] EMERGENCY DEPARTMENT COURSE and DIFFERENTIAL DIAGNOSIS/MDM:   Vitals:    Vitals:    02/21/23 1134 02/21/23 1322 02/21/23 1532   BP: (!) 143/101 (!) 126/92 (!) 131/103   Pulse: 99 98 98   Resp: 20 18 18   Temp: 98.8 °F (37.1 °C)     SpO2: 97% 96% 97%   Weight: 237 lb (107.5 kg)     Height: 6' 2\" (1.88 m)         Patient was given the following medications:  Medications   labetalol (NORMODYNE;TRANDATE) injection 10 mg (10 mg IntraVENous Given 2/21/23 1537)   aspirin chewable tablet 324 mg (324 mg Oral Given 2/21/23 1400)             Is this patient to be included in the SEP-1 Core Measure due to severe sepsis or septic shock? No   Exclusion criteria - the patient is NOT to be included for SEP-1 Core Measure due to: Infection is not suspected    Chronic Conditions affecting care:    has a past medical history of Chronic kidney disease, Encounter for imaging to screen for metal prior to MRI (12/07/2022), Gastroparesis, HFrEF (heart failure with reduced ejection fraction) ([facility] [address] Histoplasmosis, Hyperlipidemia, Hypertension, Neuropathy, and Type 2 diabetes mellitus without complication ([facility] [address] CONSULTS: (Who and What was discussed)  Tolu Caputo -cardiology  2. Dr. Amy Cook -hospitalist    Social Determinants Significantly Affecting Health : None    Records Reviewed (External and Source) reviewing records patient was admitted in November at [facility] and had cardiac MRI that revealed nonischemic and noninvasive cardiomyopathy with concerning findings for possible sarcoidosis and an outpatient PET scan that revealed no evidence of this.   Was then admitted again and reviewing discharge summary from [facility] in January had elevated troponin thought most likely related to CHELE. Has not had angiogram, stress test or any objective testing of the heart. CC/HPI Summary, DDx, ED Course, and Reassessment: Patient presented with elevated troponin. Has hypertension, hyperlipidemia, diabetes and elevated BMI. It sounds like some of his symptoms been ongoing for least multiple weeks and is slightly reproducible with recent COVID-19 but he still having an elevated troponin at 0.04 has not had any ischemic testing done as an outpatient. We did discuss this with cardiology who agrees patient does need ischemic testing done. After discussion with the patient he would like to be admitted to the hospital I believe this is reasonable given he has a heart score of 6. He was given oral aspirin. Low suspicion for pulmonary embolus, aortic dissection, pericarditis, myocarditis or other emergent etiology. He was stable at time of admission. Disposition Considerations (tests considered but not done, Admit vs D/C, Shared Decision Making, Pt Expectation of Test or Tx.):        I am the Primary Clinician of Record. FINAL IMPRESSION      1. Chest pain, unspecified type    2. Hypertension, unspecified type          DISPOSITION/PLAN     DISPOSITION Admitted 02/21/2023 03:01:21 PM      PATIENT REFERRED TO:  No follow-up provider specified.     DISCHARGE MEDICATIONS:  New Prescriptions    No medications on file       DISCONTINUED MEDICATIONS:  Discontinued Medications    No medications on file              (Please note that portions of this note were completed with a voice recognition program.  Efforts were made to edit the dictations but occasionally words are mis-transcribed.)    Court Dalton PA-C (electronically signed)        Court Dalton PA-C  02/21/23 9551

## 2023-02-21 NOTE — TELEPHONE ENCOUNTER
Spoke with Riki Lozano, she states that since they were at the neurologist office already, they decided to go to the ER. They are currently there now. They are at [facility]. His BP is starting to come down.   130/90

## 2023-02-21 NOTE — PROGRESS NOTES
The patient is a 40y.o. years old male who  was referred by Demond Hauser for consultation regarding diabetic polyneuropathy    HPI:  The patient describes history of diabetic neuropathy for at least 3 to 4 years. Degree is moderate but persistent. Description daily and lower extremity and feet numbness, tingling and paresthesia. Can be worse at bedtime. Frequency is daily. No similar symptoms in her arms. No weakness, neck or back pain. No other relieving or aggravating factors. Occasionally unsteady on his feet and he walks with his cane. He is diabetic and his sugar has been waxing and waning. A1c ranging from 8-12. Recent B12 from last year was unremarkable. No family history of neuropathy. He tried gabapentin currently he is on Lyrica 75 mg twice daily. Modest improvement on Lyrica. He does have chronic kidney disease and his last creatinine was elevated at 1.8. He tried Cymbalta only for 2 to 3 weeks. No side effect. He denies recent fever or falling. He is on aspirin. Other review of system was unremarkable. ROS : A 10-14 system review of constitutional, cardiovascular, respiratory, GI, eyes, , ENT, musculoskeletal, endocrine, skin, SHEENT, genitourinary, psychiatric and neurologic systems was obtained and updated today and is unremarkable except as mentioned in my HPI        Exam:   Constitutional:   Vitals:    02/21/23 1036 02/21/23 1055 02/21/23 1112   BP: (!) 163/124 (!) 163/108 (!) 175/116   Site: Right Wrist Right Wrist Right Wrist   Position: Sitting Sitting Sitting   Pulse: (!) 102 (!) 105 (!) 104   SpO2: 98%     Weight: 245 lb (111.1 kg)     Height: 6' 1\" (1.854 m)         General appearance:  Normal development and appear in no acute distress. Mental Status:   Oriented to person, place, problem, and time. Memory: Good immediate recall. Intact remote memory  Normal attention span and concentration.   Language: intact naming, repeating and fluency   Good fund of Knowledge. Aware of current events and vocabulary   Cranial Nerves:   II: Visual fields: Full. Pupils: equal, round, reactive to light  III,IV,VI: Extra Ocular Movements are intact. No nystagmus  V: Facial sensation is intact  VII: Facial strength and movements: intact and symmetric  XII: Tongue movements are normal  Musculoskeletal: 5/5 in all 4 extremities. Tone: Normal tone. Reflexes: Symmetric 2+ in the arms and 2 in his knees and 1 his ankle    Coordination: no pronator drift, no dysmetria with FNF and normal REM  Sensation: normal in his arms and is mild decreased vibration and touch in his distal feet. Gait/Posture: Walks with his cane. Steady    Medical decision making:  I personally reviewed and updated social history, past medical history, medications, allergy, surgical history, and family history as documented in the patient's electronic health records. Labs and/or neuroimaging and other test results reviewed and discussed with the patient. Reviewed notes from other physicians. Provided patient education regarding risk, benefits and treatment options as well as adherence to medication regimen and side effect from these medications. Assessment:     Diagnosis Orders   1. Type 2 diabetes mellitus with diabetic polyneuropathy, with long-term current use of insulin (HCC)  DULoxetine (CYMBALTA) 60 MG extended release capsule      2. Stage 3a chronic kidney disease ([address]        3. Essential hypertension            Diabetic polyneuropathy, not controlled. Like related to poorly controlled diabetes: Lengthy discussion with the patient and his mother regarding diabetic control, risk of polyneuropathy and risk of falling. He needs to follow strict nutritional diet and follow A1c. Goal A1c should be less than 7.   Advised the patient to monitor his creatinine on Lyrica and do not increase the dose beyond 75 mg twice daily  Start Cymbalta 60 mg daily  3-month refill for Cymbalta  Continue aspirin and statin for stroke prevention  Continue current blood pressure medications and blood pressure monitor and control at home    If Cymbalta did not help the patient, he may need to be referred to pain specialist for pain control  Follow-up with neurology if new symptoms arise.

## 2023-02-21 NOTE — TELEPHONE ENCOUNTER
Pt's mother calling saying they are currently at a neuro appt and pt's blood pressure reading is 160/100 x 2 and pt is having dizziness and blurred vision    She says neuro told her to call here to see what Dr PROMISE [facility]. wants him to do

## 2023-02-21 NOTE — H&P
HOSPITALISTS HISTORY AND PHYSICAL    2/21/2023 3:14 PM    Patient Information:  Chuyita Pearce is a 40 y.o. male [id number]  PCP:  Gio Stokes DO (Tel: [phone] )    Chief complaint:    Chief Complaint   Patient presents with    Hypertension     Pt was at a neurology appt today for a check up. Reports when they took his BP it was high. Pt hx of HTN and takes medication. Denies missing any medications. Pt recently at [facility] for dehydration. History of Present Illness:  Sheri Newman is a 40 y.o. male who presented to the ED directly from his neurology appointment earlier today to be evaluated for hypertensive urgency noted at his specialist appointment. Upon further questioning patient reports that he has labile BP despite being compliant with all of his cardiac medications. Patient denies prior MI and does not believe he has never undergone nuclear GXT or angiogram in the past.  Patient does have a history of HFrEF noted on echo performed May 2022. Patient is also currently receiving treatment for pulmonary histoplasmosis with voriconazole per ID. He no longer smokes tobacco.    Upon arrival to the ED patient was markedly hypertensive 143/101 with mild tachypnea. Stat EKG was obtained and notable for sinus tachycardia with PVCs as well as remote inferolateral infarct. CXR negative for acute pulmonary disease. Labs were obtained and essentially unremarkable excluding troponin elevated 0.04 and mild hyperglycemia 168. Patient received full dose ASA per ED provider prior to request for admission    History obtained from patient and review of Epic chart    Old medical records show patient's most recent echo was obtained on 5/18/2022 with the following results:    Summary   Left ventricle size is normal.   Normal left ventricle wall thickness is present.    Global left ventricular function is mildly decreased with ejection fraction   estimated from 40 % to 45 %. Indeterminate diastolic function. The mitral valve leaflets are mildly thickened with normal opening. The right ventricle is normal in size and function. REVIEW OF SYSTEMS:   Constitutional: Negative for fever,chills; positive generalized weakness  ENT: Negative for headache, rhinorrhea, and sore throat. Respiratory: Pulmonary Histamic plasmapheresis confirmed with bronchoscopy last month; positive dyspnea and cough; no longer smokes tobacco   Cardiovascular: Positive atypical chest pain, palpitations; denies peripheral edema despite HFrEF  Gastrointestinal: Negative for N/V/D and abdominal pain; no hematemesis, hematochezia, or melena; no anorexia  Genitourinary: Negative for dysuria, frequency, retention; no incontinence  Hematologic/Lymphatic: Negative for bleeding tendency/excessive bruising  Musculoskeletal: Positive chronic myalgias and arthalgias; able to ambulate without difficulty  Neurologic: Negative for LOC, seizure activity, paresthesias, dysarthria, vertigo, and gait disturbance  Skin: Negative for itching,rash, decubitus  Psychiatric: Negative for depression,anxiety, and agitation; no hallucinations; denies SI/HI  Endocrine: Uncontrolled IDDM    Past Medical History:   has a past medical history of Chronic kidney disease, Encounter for imaging to screen for metal prior to MRI, Gastroparesis, HFrEF (heart failure with reduced ejection fraction) ([facility] [address] Histoplasmosis, Hyperlipidemia, Hypertension, Neuropathy, and Type 2 diabetes mellitus without complication ([facility] [address] Past Surgical History:   has a past surgical history that includes bronchoscopy (06/09/2022); bronchoscopy (06/09/2022); bronchoscopy (06/09/2022); and bronchoscopy (N/A, 06/14/2022). Medications:  No current facility-administered medications on file prior to encounter.      Current Outpatient Medications on File Prior to Encounter   Medication Sig Dispense Refill    DULoxetine (CYMBALTA) 60 MG extended release capsule Take 1 capsule by mouth daily 30 capsule 2    voriconazole (VFEND) 200 MG tablet       rosuvastatin (CRESTOR) 40 MG tablet Take 1 tablet by mouth nightly 30 tablet 3    insulin lispro (HUMALOG) 100 UNIT/ML injection vial Inject into the skin 3 times daily (before meals)      guaiFENesin (MUCINEX) 600 MG extended release tablet Take 1 tablet by mouth 2 times daily for 15 days 30 tablet 0    acetaminophen (TYLENOL) 500 MG tablet Take 1 tablet by mouth 4 times daily as needed for Pain 120 tablet 0    valsartan (DIOVAN) 40 MG tablet Take 40 mg by mouth daily      metoclopramide (REGLAN) 10 MG tablet Take 10 mg by mouth 3 times daily      pregabalin (LYRICA) 75 MG capsule Take 1 capsule by mouth 2 times daily for 30 days. 60 capsule 3    aspirin 81 MG chewable tablet Take 1 tablet by mouth daily 30 tablet 3    lactulose (CHRONULAC) 10 GM/15ML solution Take 15 mLs by mouth daily as needed (constipation) 946 mL 1    Continuous Blood Gluc Sensor (FREESTYLE HOEM 2 SENSOR) MISC Use as directed to monitor blood sugar 2 each 11    blood glucose monitor strips Test 4 times a day & as needed for symptoms of irregular blood glucose. Dispense sufficient amount for indicated testing frequency plus additional to accommodate PRN testing needs. Use when CGM is not active.  100 strip 0    Insulin Degludec (TRESIBA FLEXTOUCH) 200 UNIT/ML SOPN Inject 75 Units into the skin daily (Patient taking differently: Inject 75 Units into the skin daily) 12 mL 3    traZODone (DESYREL) 50 MG tablet Take 1-2 tablets by mouth nightly as needed for Sleep 60 tablet 2    albuterol sulfate HFA (PROVENTIL;VENTOLIN;PROAIR) 108 (90 Base) MCG/ACT inhaler Inhale 2 puffs into the lungs every 6 hours as needed for Wheezing or Shortness of Breath 18 g 1    vitamin D (ERGOCALCIFEROL) 1.25 MG (27252 UT) CAPS capsule Take 1 capsule by mouth once a week (Patient taking differently: Take 50,000 Units by mouth once a week monday) 12 capsule 1    metFORMIN (GLUCOPHAGE) 1000 MG tablet Take 1 tablet by mouth in the morning and 1 tablet before bedtime. (Patient taking differently: Take 500 mg by mouth daily (with breakfast)) 180 tablet 1    empagliflozin (JARDIANCE) 10 MG tablet Take 1 tablet by mouth in the morning. 30 tablet 3    carvedilol (COREG) 25 MG tablet Take 1 tablet by mouth in the morning and 1 tablet before bedtime. 60 tablet 3       Allergies: Allergies   Allergen Reactions    Penicillins Rash        Social History:   reports that he has never smoked. He has never used smokeless tobacco. He reports that he does not currently use alcohol. He reports current drug use. Drug: Marijuana [place]). Family History:  family history includes Diabetes in his father; High Blood Pressure in his father.      Physical Exam:  BP (!) 126/92   Pulse 98   Temp 98.8 °F (37.1 °C)   Resp 18   Ht 6' 2\" (1.88 m)   Wt 237 lb (107.5 kg)   SpO2 96%   BMI 30.43 kg/m²     General appearance: Pleasant overweight adult male resting in bed, NAD  Eyes: Sclera clear without conjunctival injection; PERRLA; EOMI  ENT: Mucous membranes moist without thrush; normal dentition  Neck: Supple without meningismus; no goiter; no carotid bruit bilaterally  Cardiovascular: Regular rhythm without ectopy; bounding S1-S2 with no murmurs; no peripheral edema; no JVD  Respiratory: No tachypnea; CTAB with adequate air exchange, no wheeze, rhonchi or rales; I:E intact  Gastrointestinal: Abdomen soft, non-tender, not distended; bowel sounds normal; no masses/organomegaly appreciated  Musculoskeletal: FROM spine and extremities x4; no gross deformity  Neurology: A&O x3; cranial nerves 2-12 grossly intact; motor 5/5  BUE/BLE; no seizure activity;   Psychiatry: Well-groomed with good eye contact; appropriate affect; no visual/auditory hallucination  Skin: Warm, dry, normal turgor, no rash  PV: 2/4 radial and dorsalis pedis bilaterally; brisk capillary refill    Labs:  CBC:   Lab Results   Component Value Date/Time    WBC 5.6 02/21/2023 11:44 AM    RBC 4.99 02/21/2023 11:44 AM    HGB 13.1 02/21/2023 11:44 AM    HCT 40.3 02/21/2023 11:44 AM    MCV 80.7 02/21/2023 11:44 AM    MCH 26.2 02/21/2023 11:44 AM    MCHC 32.4 02/21/2023 11:44 AM    RDW 13.3 02/21/2023 11:44 AM     02/21/2023 11:44 AM    MPV 8.4 02/21/2023 11:44 AM     BMP:    Lab Results   Component Value Date/Time     02/21/2023 11:44 AM    K 4.4 02/21/2023 11:44 AM     02/21/2023 11:44 AM    CO2 23 02/21/2023 11:44 AM    BUN 16 02/21/2023 11:44 AM    CREATININE 1.2 02/21/2023 11:44 AM    CALCIUM 9.7 02/21/2023 11:44 AM    GFRAA >60 08/25/2022 01:08 PM    GFRAA >60 01/02/2011 02:51 PM    LABGLOM >60 02/21/2023 11:44 AM    GLUCOSE 168 02/21/2023 11:44 AM     XR CHEST PORTABLE   Final Result   No acute disease               EKG: Ventricular Rate 104 P BPM QTc Calculation (Bazett) 449 P ms   Atrial Rate 104 P BPM P Axis 76 P degrees   P-R Interval 182 P ms R Axis 115 P degrees   QRS Duration 96 P ms T Axis 19 P degrees   Q-T Interval 342 P ms Diagnosis Sinus tachycardia with occasional Premature ventricular complexesSeptal infarct         I visualized CXR images and EKG strips personally and agree with documented interpretation    Discussed case  with ED provider    Problem List:  Principal Problem:    Chest pain, moderate coronary artery risk  Active Problems:    Uncontrolled hypertension    Type 2 diabetes mellitus treated with insulin (HCC)    Stage 3 chronic kidney disease (HCC)    Seizure disorder (HCC)    Obesity (BMI 30-39. 9)    HFrEF (heart failure with reduced ejection fraction) ([facility] [address]    Pulmonary histoplasmosis ([facility] [address]  Resolved Problems:    * No resolved hospital problems.  *        Consults:  IP CONSULT TO CARDIOLOGY  IP CONSULT TO HOSPITALIST  IP CONSULT TO INFECTIOUS DISEASES  IP CONSULT TO CARDIOLOGY      Assessment/Plan:     Acute chest pain with troponin elevation  -Admit to telemetry floor with serial cardiac enzymes to be obtained overnight  -ASA administered in ED and to be continued at 81 mg PO QAM  -High dose statin therapy continued overnight; obtain FLP in a.m.  -SL NTG available as needed for recurrent anginal symptoms  -Lexiscan nuclear stress testing scheduled for a.m.  -Consultation placed to cardiologist for further recommendations     Pulmonary histoplasmosis  -Patient admitted to telemetry floor with appropriate isolation measures intact  -Continue home dosage of voriconazole, with ID consult placed  -Continuous pulse oximetry monitoring initiated with PRN supplemental O2   -Encourage aggressive pulmonary toilet including incentive spirometry every 4H while awake  -Xopenex available every 8 hours as needed during stay  -Patient no longer smokes tobacco    Labile BP with HFrEF  -Patient admitted to telemetry floor for continuous monitoring during stay  -EKG obtained in ED reviewed personally and notable for remote septal infarct  -Continue home medication dosage of Coreg, Jardiance, Diovan, EC ASA, and statin therapy   -ECHO scheduled for a.m.; results from May 2022 study reviewed and documented above    Uncontrolled IDDM with acute hyperglycemia  -A1c ordered with results pending at time of dictation  -Home metformin dosage placed on temporary hold  -Continue current dosage of Tresiba once daily  -Humalog scheduled AC/HS in addition to PRN SSI coverage  -Carbohydrate restriction placed on diet    DVT prophylaxis-Lovenox 40 mg subcu daily  Code status-full code  Diet-cardiac 2 g sodium with carb and fluid restriction now than n.p.o. after midnight  IV access-PIV established in ED      Admit as observation. I anticipate hospitalization spanning less than two midnights for investigation and treatment of the above medically necessary diagnoses.       Comment: Please note this report has been produced using speech recognition software and may contain errors related to that system including errors in grammar, punctuation, and spelling, as well as words and phrases that may be inappropriate. If there are any questions or concerns please feel free to contact the dictating provider for clarification.          Roderick Nieto MD    2/21/2023 3:14 PM

## 2023-02-21 NOTE — ED PROVIDER NOTES
Seen and evaluated by ABIGAIL. EKG: Sinus tachycardia with occasional PVC. Rate of 104. Normal axis. Normal intervals and durations. Septal/lateral Q waves noted. EKG unchanged when compared to previous EKG on 2/17/2023.      Dileep Johnson DO  02/21/23 124

## 2023-02-21 NOTE — TELEPHONE ENCOUNTER
1) Confirm if he has taken his blood pressure medication.  - Carvedilol 25 mg BID  - Valsartan 40 mg.     2) If he has taken them today and still elevated, then would recommend scheduling a nurse BP check here.

## 2023-02-22 ENCOUNTER — TELEPHONE (OUTPATIENT)
Dept: PHARMACY | Age: 38
End: 2023-02-22

## 2023-02-22 VITALS
HEART RATE: 96 BPM | HEIGHT: 74 IN | WEIGHT: 237 LBS | DIASTOLIC BLOOD PRESSURE: 94 MMHG | TEMPERATURE: 97.9 F | SYSTOLIC BLOOD PRESSURE: 130 MMHG | OXYGEN SATURATION: 98 % | RESPIRATION RATE: 20 BRPM | BODY MASS INDEX: 30.42 KG/M2

## 2023-02-22 PROBLEM — I10 HYPERTENSION: Status: ACTIVE | Noted: 2023-02-22

## 2023-02-22 LAB
A/G RATIO: 1.3 (ref 1.1–2.2)
ALBUMIN SERPL-MCNC: 3.9 G/DL (ref 3.4–5)
ALP BLD-CCNC: 76 U/L (ref 40–129)
ALT SERPL-CCNC: 11 U/L (ref 10–40)
ANION GAP SERPL CALCULATED.3IONS-SCNC: 9 MMOL/L (ref 3–16)
APTT: 32.2 SEC (ref 23–34.3)
AST SERPL-CCNC: 11 U/L (ref 15–37)
BILIRUB SERPL-MCNC: <0.2 MG/DL (ref 0–1)
BUN BLDV-MCNC: 18 MG/DL (ref 7–20)
CALCIUM SERPL-MCNC: 9.6 MG/DL (ref 8.3–10.6)
CHLORIDE BLD-SCNC: 102 MMOL/L (ref 99–110)
CHOLESTEROL, TOTAL: 247 MG/DL (ref 0–199)
CO2: 25 MMOL/L (ref 21–32)
CREAT SERPL-MCNC: 1.1 MG/DL (ref 0.9–1.3)
ESTIMATED AVERAGE GLUCOSE: 157.1 MG/DL
GFR SERPL CREATININE-BSD FRML MDRD: >60 ML/MIN/{1.73_M2}
GLUCOSE BLD-MCNC: 100 MG/DL (ref 70–99)
GLUCOSE BLD-MCNC: 89 MG/DL (ref 70–99)
GLUCOSE BLD-MCNC: 97 MG/DL (ref 70–99)
HBA1C MFR BLD: 7.1 %
HCT VFR BLD CALC: 39.1 % (ref 40.5–52.5)
HDLC SERPL-MCNC: 35 MG/DL (ref 40–60)
HEMOGLOBIN: 12.7 G/DL (ref 13.5–17.5)
INR BLD: 0.95 (ref 0.87–1.14)
LDL CHOLESTEROL CALCULATED: 168 MG/DL
LV EF: 45 %
LVEF MODALITY: NORMAL
MCH RBC QN AUTO: 26.3 PG (ref 26–34)
MCHC RBC AUTO-ENTMCNC: 32.5 G/DL (ref 31–36)
MCV RBC AUTO: 80.9 FL (ref 80–100)
PDW BLD-RTO: 13.9 % (ref 12.4–15.4)
PERFORMED ON: NORMAL
PERFORMED ON: NORMAL
PLATELET # BLD: 258 K/UL (ref 135–450)
PMV BLD AUTO: 8.3 FL (ref 5–10.5)
POTASSIUM REFLEX MAGNESIUM: 4.1 MMOL/L (ref 3.5–5.1)
PROTHROMBIN TIME: 12.6 SEC (ref 11.7–14.5)
RBC # BLD: 4.84 M/UL (ref 4.2–5.9)
SODIUM BLD-SCNC: 136 MMOL/L (ref 136–145)
TOTAL PROTEIN: 6.9 G/DL (ref 6.4–8.2)
TRIGL SERPL-MCNC: 221 MG/DL (ref 0–150)
TROPONIN: 0.03 NG/ML
VLDLC SERPL CALC-MCNC: 44 MG/DL
WBC # BLD: 4.8 K/UL (ref 4–11)

## 2023-02-22 PROCEDURE — 85730 THROMBOPLASTIN TIME PARTIAL: CPT

## 2023-02-22 PROCEDURE — 36415 COLL VENOUS BLD VENIPUNCTURE: CPT

## 2023-02-22 PROCEDURE — 2580000003 HC RX 258: Performed by: HOSPITALIST

## 2023-02-22 PROCEDURE — 80061 LIPID PANEL: CPT

## 2023-02-22 PROCEDURE — G0378 HOSPITAL OBSERVATION PER HR: HCPCS

## 2023-02-22 PROCEDURE — 85027 COMPLETE CBC AUTOMATED: CPT

## 2023-02-22 PROCEDURE — 93306 TTE W/DOPPLER COMPLETE: CPT

## 2023-02-22 PROCEDURE — 6370000000 HC RX 637 (ALT 250 FOR IP): Performed by: HOSPITALIST

## 2023-02-22 PROCEDURE — 85610 PROTHROMBIN TIME: CPT

## 2023-02-22 PROCEDURE — 99222 1ST HOSP IP/OBS MODERATE 55: CPT | Performed by: INTERNAL MEDICINE

## 2023-02-22 PROCEDURE — 83036 HEMOGLOBIN GLYCOSYLATED A1C: CPT

## 2023-02-22 PROCEDURE — 80053 COMPREHEN METABOLIC PANEL: CPT

## 2023-02-22 RX ADMIN — ASPIRIN 81 MG: 81 TABLET, COATED ORAL at 09:56

## 2023-02-22 RX ADMIN — EMPAGLIFLOZIN 10 MG: 10 TABLET, FILM COATED ORAL at 09:58

## 2023-02-22 RX ADMIN — CARVEDILOL 25 MG: 25 TABLET, FILM COATED ORAL at 11:49

## 2023-02-22 RX ADMIN — VALSARTAN 40 MG: 80 TABLET, FILM COATED ORAL at 09:56

## 2023-02-22 RX ADMIN — GUAIFENESIN 600 MG: 600 TABLET, EXTENDED RELEASE ORAL at 09:56

## 2023-02-22 RX ADMIN — PREGABALIN 75 MG: 75 CAPSULE ORAL at 09:56

## 2023-02-22 RX ADMIN — Medication 10 ML: at 10:01

## 2023-02-22 RX ADMIN — VORICONAZOLE 200 MG: 200 TABLET ORAL at 09:58

## 2023-02-22 RX ADMIN — DULOXETINE HYDROCHLORIDE 60 MG: 60 CAPSULE, DELAYED RELEASE ORAL at 09:56

## 2023-02-22 ASSESSMENT — PAIN DESCRIPTION - DESCRIPTORS: DESCRIPTORS: BURNING;DISCOMFORT

## 2023-02-22 ASSESSMENT — PAIN SCALES - GENERAL: PAINLEVEL_OUTOF10: 4

## 2023-02-22 ASSESSMENT — PAIN DESCRIPTION - PAIN TYPE: TYPE: CHRONIC PAIN

## 2023-02-22 NOTE — CARE COORDINATION
Per chart review and RN no needs on d/c.   Electronically signed by LISSETTE Hugo on 2/22/2023 at 1:31 PM

## 2023-02-22 NOTE — PROGRESS NOTES

## 2023-02-22 NOTE — PROGRESS NOTES
Pt ok for discharge per MD. Discharge instructions given. IV removed. Telemetry monitor removed and [facility] notified. No questions or concerns at this time. Transported by wheelchair to [employer]'s cab with all belongings.

## 2023-02-22 NOTE — RT PROTOCOL NOTE
RT Inhaler-Nebulizer Bronchodilator Protocol Note    There is a bronchodilator order in the chart from a provider indicating to follow the RT Bronchodilator Protocol and there is an Initiate RT Inhaler-Nebulizer Bronchodilator Protocol order as well (see protocol at bottom of note). CXR Findings:  XR CHEST PORTABLE    Result Date: 2/21/2023  No acute disease       The findings from the last RT Protocol Assessment were as follows:   History Pulmonary Disease: None or smoker <15 pack years  Respiratory Pattern: Regular pattern and RR 12-20 bpm  Breath Sounds: Clear breath sounds  Cough: Strong, spontaneous, non-productive  Indication for Bronchodilator Therapy: On home bronchodilators  Bronchodilator Assessment Score: 0    Aerosolized bronchodilator medication orders have been revised according to the RT Inhaler-Nebulizer Bronchodilator Protocol below. Respiratory Therapist to perform RT Therapy Protocol Assessment initially then follow the protocol. Repeat RT Therapy Protocol Assessment PRN for score 0-3 or on second treatment, BID, and PRN for scores above 3. No Indications - adjust the frequency to every 6 hours PRN wheezing or bronchospasm, if no treatments needed after 48 hours then discontinue using Per Protocol order mode. If indication present, adjust the RT bronchodilator orders based on the Bronchodilator Assessment Score as indicated below. Use Inhaler orders unless patient has one or more of the following: on home nebulizer, not able to hold breath for 10 seconds, is not alert and oriented, cannot activate and use MDI correctly, or respiratory rate 25 breaths per minute or more, then use the equivalent nebulizer order(s) with same Frequency and PRN reasons based on the score. If a patient is on this medication at home then do not decrease Frequency below that used at home.     0-3 - enter or revise RT bronchodilator order(s) to equivalent RT Bronchodilator order with Frequency of every 4 hours PRN for wheezing or increased work of breathing using Per Protocol order mode. 4-6 - enter or revise RT Bronchodilator order(s) to two equivalent RT bronchodilator orders with one order with BID Frequency and one order with Frequency of every 4 hours PRN wheezing or increased work of breathing using Per Protocol order mode. 7-10 - enter or revise RT Bronchodilator order(s) to two equivalent RT bronchodilator orders with one order with TID Frequency and one order with Frequency of every 4 hours PRN wheezing or increased work of breathing using Per Protocol order mode. 11-13 - enter or revise RT Bronchodilator order(s) to one equivalent RT bronchodilator order with QID Frequency and an Albuterol order with Frequency of every 4 hours PRN wheezing or increased work of breathing using Per Protocol order mode. Greater than 13 - enter or revise RT Bronchodilator order(s) to one equivalent RT bronchodilator order with every 4 hours Frequency and an Albuterol order with Frequency of every 2 hours PRN wheezing or increased work of breathing using Per Protocol order mode. RT to enter RT Home Evaluation for COPD & MDI Assessment order using Per Protocol order mode.     Electronically signed by Rubio Kiser RCP on 2/22/2023 at 4:03 AM
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
## 2023-02-22 NOTE — CONSULTS
[address] [facility]   Cardiovascular Evaluation    PATIENT: Elisa Galan  DATE: 2023  MRN: [id number]  CSN: [id number]  : 1985    Primary Care Doctor/Referring provider: Rachelle Ward DO, Irlanda Everett MD     Reason for evaluation/Chief complaint:   Hypertension (Pt was at a neurology appt today for a check up. Reports when they took his BP it was high. Pt hx of HTN and takes medication. Denies missing any medications. Pt recently at [facility] for dehydration. )      Subjective:    History of present illness on initial date of evaluation:   Elisa Galan is a 40 y.o. patient who presents for the evaluation of hypertension. The patient is known to our cardiovascular practice and typically follows with our team at the [facility], [facility]..  He was diagnosed with a nonischemic cardiomyopathy in the remote past.  The patient was at his neurologist office yesterday and found to have severe hypertension. He was simply referred to the ER for further evaluation and management. Patient was seen and evaluated within our emergency room and subsequently mated to the hospital for further management. There was concerns of regarding possible acute coronary syndrome and chest pain in the setting of elevated troponin levels. Cardiology is asked see the patient for further recommendations and management. This morning of the opportunity seeing the patient at bedside. He was laying comfortably in bed without any significant complaints. He was having a bedside echocardiogram completed. .        Patient Active Problem List   Diagnosis    Type 2 diabetes mellitus treated with insulin ([facility] [address]    Uncontrolled hypertension    Stage 3 chronic kidney disease (HCC)    Seizure disorder (HCC)    Histoplasmosis pneumonia (HCC)    Obesity (BMI 30-39. 9)    Neuropathy    Chest pain, moderate coronary artery risk    MDD (major depressive disorder), recurrent episode, moderate (HCC)    Anxiety HFrEF (heart failure with reduced ejection fraction) ([facility])    Gastroparesis    Pulmonary histoplasmosis ([facility] [address]         Cardiac Testing: I have reviewed the findings below. EKG:  ECHO:   STRESS TEST:  CATH:  BYPASS:  VASCULAR:    Past Medical History:   has a past medical history of Chronic kidney disease, Encounter for imaging to screen for metal prior to MRI, Gastroparesis, HFrEF (heart failure with reduced ejection fraction) ([facility] [address] Histoplasmosis, Hyperlipidemia, Hypertension, Neuropathy, and Type 2 diabetes mellitus without complication ([facility] [address] Surgical History:   has a past surgical history that includes bronchoscopy (06/09/2022); bronchoscopy (06/09/2022); bronchoscopy (06/09/2022); and bronchoscopy (N/A, 06/14/2022). Social History:   reports that he has never smoked. He has never used smokeless tobacco. He reports that he does not currently use alcohol. He reports current drug use. Drug: Marijuana Se[employer] Barbsad[employer]). Family History:  No evidence for sudden cardiac death or premature CAD    Medications:  Reviewed and are listed in nursing record. and/or listed below  Outpatient Medications:  Prior to Admission medications    Medication Sig Start Date End Date Taking?  Authorizing Provider   DULoxetine (CYMBALTA) 60 MG extended release capsule Take 1 capsule by mouth daily 2/21/23 5/22/23  Kristine Colón MD   voriconazole (VFEND) 200 MG tablet  2/8/23   Historical Provider, MD   rosuvastatin (CRESTOR) 40 MG tablet Take 1 tablet by mouth nightly 2/15/23   Amber Beck DO   insulin lispro (HUMALOG) 100 UNIT/ML injection vial Inject into the skin 3 times daily (before meals)    Historical Provider, MD   guaiFENesin (MUCINEX) 600 MG extended release tablet Take 1 tablet by mouth 2 times daily for 15 days 2/7/23 2/22/23  Trini Madera MD   acetaminophen (TYLENOL) 500 MG tablet Take 1 tablet by mouth 4 times daily as needed for Pain 2/7/23   Trini Madera MD   valsartan (DIOVAN) 40 MG tablet Take 40 mg by mouth daily 1/25/23   Historical Provider, MD   metoclopramide (REGLAN) 10 MG tablet Take 10 mg by mouth 3 times daily    Historical Provider, MD   pregabalin (LYRICA) 75 MG capsule Take 1 capsule by mouth 2 times daily for 30 days. 1/30/23 3/1/23  Sonia Kan DO   aspirin 81 MG chewable tablet Take 1 tablet by mouth daily 1/30/23   Sonia Kan DO   lactulose (CHRONULAC) 10 GM/15ML solution Take 15 mLs by mouth daily as needed (constipation) 1/30/23   Sonia Lin DO   Continuous Blood Gluc Sensor (FREESTYLE HOME 2 SENSOR) MISC Use as directed to monitor blood sugar 1/30/23   Sonia Kan DO   blood glucose monitor strips Test 4 times a day & as needed for symptoms of irregular blood glucose. Dispense sufficient amount for indicated testing frequency plus additional to accommodate PRN testing needs. Use when CGM is not active. 1/30/23   Sonia Kan DO   Insulin Degludec (TRESIBA FLEXTOUCH) 200 UNIT/ML SOPN Inject 75 Units into the skin daily  Patient taking differently: Inject 75 Units into the skin daily 12/19/22   HANS Desouza CNP   traZODone (DESYREL) 50 MG tablet Take 1-2 tablets by mouth nightly as needed for Sleep 12/6/22   HANS Desouza CNP   albuterol sulfate HFA (PROVENTIL;VENTOLIN;PROAIR) 108 (90 Base) MCG/ACT inhaler Inhale 2 puffs into the lungs every 6 hours as needed for Wheezing or Shortness of Breath 12/6/22   HANS Desouza CNP   vitamin D (ERGOCALCIFEROL) 1.25 MG (27957 UT) CAPS capsule Take 1 capsule by mouth once a week  Patient taking differently: Take 50,000 Units by mouth once a week monday 11/17/22   HANS Desouza CNP   metFORMIN (GLUCOPHAGE) 1000 MG tablet Take 1 tablet by mouth in the morning and 1 tablet before bedtime.   Patient taking differently: Take 500 mg by mouth daily (with breakfast) 8/16/22   HANS Desouza CNP   empagliflozin (JARDIANCE) 10 MG tablet Take 1 tablet by mouth in the morning. 7/19/22   Elex Comment, APRN - CNP   carvedilol (COREG) 25 MG tablet Take 1 tablet by mouth in the morning and 1 tablet before bedtime. 7/19/22   Elex Comment, APRN - CNP       In-patient schedule medications:   aspirin  81 mg Oral Daily    carvedilol  25 mg Oral BID    DULoxetine  60 mg Oral Daily    insulin glargine  60 Units SubCUTAneous Daily    metoclopramide  10 mg Oral TID    pregabalin  75 mg Oral BID    rosuvastatin  40 mg Oral Nightly    valsartan  40 mg Oral Daily    voriconazole  200 mg Oral 2 times per day    guaiFENesin  600 mg Oral BID    empagliflozin  10 mg Oral Daily    sodium chloride flush  10 mL IntraVENous 2 times per day    enoxaparin  30 mg SubCUTAneous BID    insulin lispro  0.05 Units/kg SubCUTAneous TID WC    insulin lispro  0-8 Units SubCUTAneous TID WC    insulin lispro  0-4 Units SubCUTAneous Nightly         Infusion Medications:   dextrose      sodium chloride           Allergies:  Penicillins     Review of Systems:   All 14 point review of symptoms completed. Pertinent positives identified in the HPI, all other review of symptoms findings as below.      Review of Systems - History obtained from the patient  General ROS: negative for - chills, fever or night sweats  Psychological ROS: negative for - disorientation or hallucinations  Ophthalmic ROS: negative for - dry eyes, eye pain or loss of vision  ENT ROS: negative for - nasal discharge or sore throat  Allergy and Immunology ROS: negative for - hives or itchy/watery eyes  Hematological and Lymphatic ROS: negative for - jaundice or night sweats  Endocrine ROS: negative for - mood swings or temperature intolerance  Breast ROS: deferred  Respiratory ROS: negative for - hemoptysis or stridor  Gastrointestinal ROS: no abdominal pain, change in bowel habits, or black or bloody stools  Genito-Urinary ROS: no dysuria, trouble voiding, or hematuria  Musculoskeletal ROS: negative for - gait disturbance, joint pain or joint stiffness  Neurological ROS: negative for - seizures or speech problems  Dermatological ROS: negative for - rash or skin lesion changes      Physical Examination:    [unfilled]  BP (!) 131/94   Pulse (!) 102   Temp 99.2 °F (37.3 °C) (Oral)   Resp 20   Ht 6' 2\" (1.88 m)   Wt 237 lb (107.5 kg)   SpO2 98%   BMI 30.43 kg/m²    Weight: 237 lb (107.5 kg)     Wt Readings from Last 3 Encounters:   02/21/23 237 lb (107.5 kg)   02/21/23 245 lb (111.1 kg)   02/17/23 235 lb (106.6 kg)       Intake/Output Summary (Last 24 hours) at 2/22/2023 2252  Last data filed at 2/22/2023 0342  Gross per 24 hour   Intake 240 ml   Output --   Net 240 ml       General Appearance:  Alert, cooperative, no distress, appears stated age   Head:  Normocephalic, without obvious abnormality, atraumatic   Eyes:  PERRL, conjunctiva/corneas clear       Nose: Nares normal, no drainage or sinus tenderness   Throat: Lips, mucosa, and tongue normal   Neck: Supple, symmetrical, trachea midline, no adenopathy, thyroid: not enlarged, symmetric, no tenderness/mass/nodules, no carotid bruit or JVD       Lungs:   Clear to auscultation bilaterally, respirations unlabored   Chest Wall:  No tenderness or deformity   Heart:  Regular rhythm and normal rate; S1, S2 are normal; no murmur noted; no rub or gallop   Abdomen:   Soft, non-tender, bowel sounds active all four quadrants,  no masses, no organomegaly           Extremities: Extremities normal, atraumatic, no cyanosis or edema   Pulses: 2+ and symmetric   Skin: Skin color, texture, turgor normal, no rashes or lesions   Pysch: Normal mood and affect   Neurologic: Normal gross motor and sensory exam.         Labs  Recent Labs     02/21/23  1144 02/22/23  0704   WBC 5.6 4.8   HGB 13.1* 12.7*   HCT 40.3* 39.1*   MCV 80.7 80.9    258     Recent Labs     02/21/23  1144   CREATININE 1.2   BUN 16      K 4.4      CO2 23     Recent Labs     02/22/23  0704   INR 0.95 PROTIME 12.6     Recent Labs     02/21/23  1144 02/21/23  2051 02/21/23  2337   TROPONINI 0.04* 0.04* 0.03*     Invalid input(s): PRO-BNP  No results for input(s): CHOL, LDL, HDL in the last 72 hours. Invalid input(s): TG      Imaging:  I have reviewed the below testing personally and my interpretation is below. EKG:  Baseline artifact   Poor data quality, interpretation may be adversely affected  Suspect limb lead reversal  Sinus tachycardia  Septal infarct (cited on or before 21-FEB-2023) vs lead malposition  Abnormal ECG  When compared with ECG of 17-FEB-2023 16:24,  Previous ECG has undetermined rhythm, needs review  QRS axis Shifted right  Lateral infarct is now Present . .. CXR:      Assessment:  40 y.o. patient with:  Principal Problem:    Chest pain low risk for CAD    Uncontrolled hypertension    Stage 3 chronic kidney disease (HCC)    HFrEF (heart failure with reduced ejection fraction) ([facility] [address]      Problem List Items Addressed This Visit    None  Visit Diagnoses       Chest pain, unspecified type    -  Primary    Hypertension, unspecified type                Plan:  The patient has a known history of nonischemic cardiomyopathy for which he has undergone formal ischemic evaluation with left heart catheterization in the remote past.  His previous coronary angiogram was normal.   ~The patient does not require stress testing or repeat ischemic evaluation at this time  Repeat echocardiogram would be helpful to reassess his left ventricular function. It appears that his symptom complex is likely attributed to severe systemic hypertension and subsequently demand ischemia on the patient's myocardium. Patient has known risk factors that appear to be poorly controlled. Medical Decision Making:   The following items were considered in medical decision making:  Independent review of images  Review / order clinical lab tests  Review / order radiology tests  Decision to obtain old records  Review and summation of old records as accessed through [facility] (a summary of my findings in these old records)      Time Based Itemization  A total of 60 minutes was spent on today's patient encounter. If applicable, non-patient-facing activities:  (X )Preparing to see the patient and reviewing records  (X ) Individual interpretation of results  ( ) Discussion or coordination of care with other health care professionals  ( ) Ordering of unique tests, medications, or procedures  (X ) Documentation within the EHR       All questions and concerns were addressed to the patient/family. Alternatives to my treatment were discussed. The note was completed using EMR. Every effort was made to ensure accuracy; however, inadvertent computerized transcription errors may be present.     Devaughn Silva MD, [address], [place], [place]  [phone] [facility]  [phone] [facility]  2/22/2023  8:24 AM
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
## 2023-02-22 NOTE — PROGRESS NOTES
Hospitalist Progress Note      PCP: Charli Norman DO    Date of Admission: 2/21/2023    Chief Complaint: Chest pain    Hospital Course: Presented with chest pain. Noted to have elevated troponin. At the time of the visit patient no longer has chest pain. Noted personal risk factors of diabetes and hypertension in addition to dyslipidemia. Cardiology has been consulted. Also noted recent histoplasmosis diagnosis. Patient is on voriconazole. Subjective: chest pain at the time of this visit.  No shortness of breath, no nausea or vomiting        Medications:  Reviewed    Infusion Medications    dextrose      sodium chloride       Scheduled Medications    aspirin  81 mg Oral Daily    carvedilol  25 mg Oral BID    DULoxetine  60 mg Oral Daily    insulin glargine  60 Units SubCUTAneous Daily    metoclopramide  10 mg Oral TID    pregabalin  75 mg Oral BID    rosuvastatin  40 mg Oral Nightly    valsartan  40 mg Oral Daily    voriconazole  200 mg Oral 2 times per day    guaiFENesin  600 mg Oral BID    empagliflozin  10 mg Oral Daily    sodium chloride flush  10 mL IntraVENous 2 times per day    enoxaparin  30 mg SubCUTAneous BID    insulin lispro  0.05 Units/kg SubCUTAneous TID WC    insulin lispro  0-8 Units SubCUTAneous TID WC    insulin lispro  0-4 Units SubCUTAneous Nightly     PRN Meds: traZODone, levalbuterol, glucose, dextrose bolus **OR** dextrose bolus, glucagon (rDNA), dextrose, regadenoson, perflutren lipid microspheres, sodium chloride flush, sodium chloride, potassium chloride **OR** potassium alternative oral replacement **OR** potassium chloride, magnesium sulfate, promethazine **OR** ondansetron, acetaminophen **OR** acetaminophen, senna, polyethylene glycol, nitroGLYCERIN, labetalol      Intake/Output Summary (Last 24 hours) at 2/22/2023 0922  Last data filed at 2/22/2023 0342  Gross per 24 hour   Intake 240 ml   Output --   Net 240 ml       Physical Exam Performed:    BP (!) 131/94 Pulse (!) 102   Temp 99.2 °F (37.3 °C) (Oral)   Resp 20   Ht 6' 2\" (1.88 m)   Wt 237 lb (107.5 kg)   SpO2 98%   BMI 30.43 kg/m²     General appearance: No apparent distress, appears stated age and cooperative. HEENT: Pupils equal, round, and reactive to light. Conjunctivae/corneas clear. Neck: Supple, with full range of motion. No jugular venous distention. Trachea midline. Respiratory:  Normal respiratory effort. Clear to auscultation, bilaterally without Rales/Wheezes/Rhonchi. Cardiovascular: Regular rate and rhythm with normal S1/S2 without murmurs, rubs or gallops. Abdomen: Soft, non-tender, non-distended with normal bowel sounds. Musculoskeletal: No clubbing, cyanosis or edema bilaterally. Full range of motion without deformity. Skin: Skin color, texture, turgor normal.  No rashes or lesions. Neurologic:  Neurovascularly intact without any focal sensory/motor deficits.  Cranial nerves: II-XII intact, grossly non-focal.  Psychiatric: Alert and oriented, thought content appropriate, normal insight  Capillary Refill: Brisk, 3 seconds, normal   Peripheral Pulses: +2 palpable, equal bilaterally       Labs:   Recent Labs     02/21/23  1144 02/22/23  0704   WBC 5.6 4.8   HGB 13.1* 12.7*   HCT 40.3* 39.1*    258     Recent Labs     02/21/23  1144 02/22/23  0704    136   K 4.4 4.1    102   CO2 23 25   BUN 16 18   CREATININE 1.2 1.1   CALCIUM 9.7 9.6     Recent Labs     02/21/23  1144 02/22/23  0704   AST 14* 11*   ALT 7* 11   BILITOT <0.2 <0.2   ALKPHOS 87 76     Recent Labs     02/22/23  0704   INR 0.95     Recent Labs     02/21/23  1144 02/21/23 2051 02/21/23  2337   CKTOTAL 151  --   --    TROPONINI 0.04* 0.04* 0.03*       Urinalysis:      Lab Results   Component Value Date/Time    NITRU Negative 02/21/2023 12:10 PM    WBCUA 0-2 02/21/2023 12:10 PM    BACTERIA None Seen 01/13/2023 12:20 AM    RBCUA 0-2 02/21/2023 12:10 PM    BLOODU Negative 02/21/2023 12:10 PM    SPECGRAV 1.025 02/21/2023 12:10 PM    GLUCOSEU >=1000 02/21/2023 12:10 PM    GLUCOSEU NEGATIVE 01/02/2011 02:44 PM       Radiology:  XR CHEST PORTABLE   Final Result   No acute disease         NM Cardiac Stress Test Nuclear Imaging    (Results Pending)       IP CONSULT TO CARDIOLOGY  IP CONSULT TO HOSPITALIST  IP CONSULT TO INFECTIOUS DISEASES  IP CONSULT TO CARDIOLOGY    Assessment/Plan:    Active Hospital Problems    Diagnosis     Pulmonary histoplasmosis ([facility] [B39.2]      Priority: Medium    HFrEF (heart failure with reduced ejection fraction) ([facility] [I50.20]      Priority: Medium    Chest pain, moderate coronary artery risk [R07.9]      Priority: Medium    Obesity (BMI 30-39. 9) [E66.9]      Priority: Medium    Seizure disorder ([facility] [G40.909]      Priority: Medium    Stage 3 chronic kidney disease ([facility] [N18.30]      Priority: Medium    Type 2 diabetes mellitus treated with insulin ([facility] [E11.9, Z79.4]      Priority: Medium    Uncontrolled hypertension [I10]      Priority: Medium     PLAN    Chest pain   Accompanied by elevated troponin  Has past history of systolic CHF  Cardiology consult requested. Echo in progress    Systolic CHF, chronic  Patient appears euvolemic. Continue same management with no changes unless cardiology recommends otherwise    Diabetes mellitus type 2 with hyperglycemia  Continue basal bolus with Lantus and sliding scale. Currently NPO. Monitor blood glucose and adjust accordingly. Hypertension  Last blood pressure acceptable. There are some elevated blood pressure readings since admission. Cardiology to adjust antihypertensives if needed. Histoplasmosis  ID consulted by admitting physician regarding review of management and adjustment if needed    Discussed with the patient.   Questions answered    DVT Prophylaxis: Lovenox  Diet: Diet NPO  Code Status: Full Code  PT/OT Eval Status: Not indicated    Dispo -observation stay pending cardiology recommendations    Appropriate for A1 Discharge Unit: No      Sheri Agudelo MD

## 2023-02-22 NOTE — PROGRESS NOTES
4 Eyes Skin Assessment     NAME:  Lianna Brown  YOB: 1985  MEDICAL RECORD NUMBER:  [id number]    The patient is being assessed for  Admission    I agree that One RN has performed a thorough Head to Toe Skin Assessment on the patient. ALL assessment sites listed below have been assessed. Areas assessed by both nurses:    Head, Face, Ears, Shoulders, Back, Chest, Arms, Elbows, Hands, Sacrum. Buttock, Coccyx, Ischium, and Legs. Feet and Heels        Does the Patient have a Wound?  No noted wound(s)       Jeff Prevention initiated by RN: No   Wound Care Orders initiated by RN: No    Pressure Injury (Stage 3,4, Unstageable, DTI, NWPT, and Complex wounds) if present, place referral order by RN under : No    New and Established Ostomies, if present place, referral order under : No      Nurse 1 eSignature: Electronically signed by Tavo Gomez RN on 2/22/23 at 5:55 AM EST    **SHARE this note so that the co-signing nurse can place an eSignature**    Nurse 2 eSignature: Electronically signed by Eve Roque RN on 2/22/23 at 5:56 AM EST

## 2023-02-22 NOTE — DISCHARGE SUMMARY
Hospital Medicine Discharge Summary    Patient ID: Shannan Carrasco      Patient's PCP: Lashell Rodney DO    Admit Date: 2/21/2023     Discharge Date:   02/22/23      Admitting Provider: Maxim Arroyo MD     Discharge Provider: Carlos Enrique Vila MD     Discharge Diagnoses: Active Hospital Problems    Diagnosis     Pulmonary histoplasmosis ([facility] [B39.2]      Priority: Medium    HFrEF (heart failure with reduced ejection fraction) ([facility]) [I50.20]      Priority: Medium    Chest pain [R07.9]      Priority: Medium    Obesity (BMI 30-39. 9) [E66.9]      Priority: Medium    Seizure disorder ([facility] [G40.909]      Priority: Medium    Stage 3 chronic kidney disease ([facility] [N18.30]      Priority: Medium    Type 2 diabetes mellitus treated with insulin ([facility] [E11.9, Z79.4]      Priority: Medium    Uncontrolled hypertension [I10]      Priority: Medium       The patient was seen and examined on day of discharge and this discharge summary is in conjunction with any daily progress note from day of discharge. Hospital Course:     Presented with chest pain. Noted to have elevated troponin. At the time of the visit patient no longer has chest pain. Noted personal risk factors of diabetes and hypertension in addition to dyslipidemia. Cardiology has been consulted. Also noted recent histoplasmosis diagnosis. Patient is on voriconazole. seen by ID. Nothing else recommended. Cardiology reviewed the echo and recommended compliance with medical treatment. Ischemic evaluation not indicated as recently done. Chest pain most likely due to underlying poorly controlled hypertension with skipped medication doses. Discharged home in stable condition.           Physical Exam Performed:     BP (!) 130/94   Pulse 96   Temp 97.9 °F (36.6 °C) (Oral)   Resp 20   Ht 6' 2\" (1.88 m)   Wt 237 lb (107.5 kg)   SpO2 98%   BMI 30.43 kg/m²       General appearance:  No apparent distress, appears stated age and cooperative. HEENT:  Normal cephalic, atraumatic without obvious deformity. Pupils equal, round, and reactive to light. Extra ocular muscles intact. Conjunctivae/corneas clear. Neck: Supple, with full range of motion. No jugular venous distention. Trachea midline. Respiratory:  Normal respiratory effort. Clear to auscultation, bilaterally without Rales/Wheezes/Rhonchi. Cardiovascular:  Regular rate and rhythm with normal S1/S2 without murmurs, rubs or gallops. Abdomen: Soft, non-tender, non-distended with normal bowel sounds. Musculoskeletal:  No clubbing, cyanosis or edema bilaterally. Full range of motion without deformity. Skin: Skin color, texture, turgor normal.  No rashes or lesions. Neurologic:  Neurovascularly intact without any focal sensory/motor deficits. Cranial nerves: II-XII intact, grossly non-focal.  Psychiatric:  Alert and oriented, thought content appropriate, normal insight  Capillary Refill: Brisk,< 3 seconds   Peripheral Pulses: +2 palpable, equal bilaterally       Labs:  For convenience and continuity at follow-up the following most recent labs are provided:      CBC:    Lab Results   Component Value Date/Time    WBC 4.8 02/22/2023 07:04 AM    HGB 12.7 02/22/2023 07:04 AM    HCT 39.1 02/22/2023 07:04 AM     02/22/2023 07:04 AM       Renal:    Lab Results   Component Value Date/Time     02/22/2023 07:04 AM    K 4.1 02/22/2023 07:04 AM     02/22/2023 07:04 AM    CO2 25 02/22/2023 07:04 AM    BUN 18 02/22/2023 07:04 AM    CREATININE 1.1 02/22/2023 07:04 AM    CALCIUM 9.6 02/22/2023 07:04 AM    PHOS 4.2 01/15/2023 06:10 AM         Significant Diagnostic Studies    Radiology:   XR CHEST PORTABLE   Final Result   No acute disease                Consults:     IP CONSULT TO CARDIOLOGY  IP CONSULT TO HOSPITALIST  IP CONSULT TO INFECTIOUS DISEASES  IP CONSULT TO CARDIOLOGY    Disposition:  Home     Condition at Discharge: Stable    Discharge Instructions/Follow-up:  PCP, cardiology, ID    Code Status:  Full Code     Activity: activity as tolerated    Diet: diabetic diet      Discharge Medications:     Current Discharge Medication List             Details   DULoxetine (CYMBALTA) 60 MG extended release capsule Take 1 capsule by mouth daily  Qty: 30 capsule, Refills: 2    Associated Diagnoses: Type 2 diabetes mellitus with diabetic polyneuropathy, with long-term current use of insulin ([facility])      voriconazole (VFEND) 200 MG tablet       rosuvastatin (CRESTOR) 40 MG tablet Take 1 tablet by mouth nightly  Qty: 30 tablet, Refills: 3      insulin lispro (HUMALOG) 100 UNIT/ML injection vial Inject into the skin 3 times daily (before meals)      guaiFENesin (MUCINEX) 600 MG extended release tablet Take 1 tablet by mouth 2 times daily for 15 days  Qty: 30 tablet, Refills: 0      acetaminophen (TYLENOL) 500 MG tablet Take 1 tablet by mouth 4 times daily as needed for Pain  Qty: 120 tablet, Refills: 0      valsartan (DIOVAN) 40 MG tablet Take 40 mg by mouth daily      metoclopramide (REGLAN) 10 MG tablet Take 10 mg by mouth 3 times daily      pregabalin (LYRICA) 75 MG capsule Take 1 capsule by mouth 2 times daily for 30 days. Qty: 60 capsule, Refills: 3    Associated Diagnoses: Neuropathy      aspirin 81 MG chewable tablet Take 1 tablet by mouth daily  Qty: 30 tablet, Refills: 3    Associated Diagnoses: HFrEF (heart failure with reduced ejection fraction) ([facility])      lactulose (CHRONULAC) 10 GM/15ML solution Take 15 mLs by mouth daily as needed (constipation)  Qty: 946 mL, Refills: 1    Associated Diagnoses: Gastroparesis      Continuous Blood Gluc Sensor (FREESTYLE HOME 2 SENSOR) MISC Use as directed to monitor blood sugar  Qty: 2 each, Refills: 11      blood glucose monitor strips Test 4 times a day & as needed for symptoms of irregular blood glucose. Dispense sufficient amount for indicated testing frequency plus additional to accommodate PRN testing needs. Use when CGM is not active.   Qty: 100 strip, Refills: 0    Comments: Brand per patient preference. May round up to next available package size. Associated Diagnoses: Type 2 diabetes mellitus with diabetic neuropathy, with long-term current use of insulin (HCC)      Insulin Degludec (TRESIBA FLEXTOUCH) 200 UNIT/ML SOPN Inject 75 Units into the skin daily  Qty: 12 mL, Refills: 3    Associated Diagnoses: Poorly controlled type 2 diabetes mellitus (HCC)      traZODone (DESYREL) 50 MG tablet Take 1-2 tablets by mouth nightly as needed for Sleep  Qty: 60 tablet, Refills: 2    Associated Diagnoses: Insomnia, unspecified type      albuterol sulfate HFA (PROVENTIL;VENTOLIN;PROAIR) 108 (90 Base) MCG/ACT inhaler Inhale 2 puffs into the lungs every 6 hours as needed for Wheezing or Shortness of Breath  Qty: 18 g, Refills: 1      vitamin D (ERGOCALCIFEROL) 1.25 MG (41190 UT) CAPS capsule Take 1 capsule by mouth once a week  Qty: 12 capsule, Refills: 1    Associated Diagnoses: Vitamin D deficiency      metFORMIN (GLUCOPHAGE) 1000 MG tablet Take 1 tablet by mouth in the morning and 1 tablet before bedtime. Qty: 180 tablet, Refills: 1    Associated Diagnoses: Poorly controlled type 2 diabetes mellitus (HCC)      empagliflozin (JARDIANCE) 10 MG tablet Take 1 tablet by mouth in the morning. Qty: 30 tablet, Refills: 3      carvedilol (COREG) 25 MG tablet Take 1 tablet by mouth in the morning and 1 tablet before bedtime. Qty: 60 tablet, Refills: 3             Time Spent on discharge: 39 minutes in the examination, evaluation, counseling and review of medications and discharge plan. Signed:    Tristen Jalloh MD   2/22/2023      Thank you [address], DO for the opportunity to be involved in this patient's care. If you have any questions or concerns, please feel free to contact me at [phone].

## 2023-02-23 ENCOUNTER — CARE COORDINATION (OUTPATIENT)
Dept: CASE MANAGEMENT | Age: 38
End: 2023-02-23

## 2023-02-23 NOTE — CARE COORDINATION
[facility] Care Transitions Initial Follow Up Call    Call within 2 business days of discharge: Yes    Patient Current Location: [address] Transition Nurse contacted the patient by telephone to perform post hospital discharge assessment. Verified name and  with patient as identifiers. Provided introduction to self, and explanation of the Care Transition Nurse role. Patient: Sheree Goncalves Patient : 1985   MRN: [id number]  Reason for Admission: chest pain  Discharge Date: 23 RARS: Readmission Risk Score: 28.3      Last Discharge [address]       Date Complaint Diagnosis Description Type Department Provider    23 Hypertension Chest pain, unspecified type . .. ED to Hosp-Admission (Discharged) (ADMITTED) Sari Shaw MD; Raquel Briggs... Was this an external facility discharge? No Discharge Facility:     Challenges to be reviewed by the provider   Additional needs identified to be addressed with provider: Yes  Follow up visit scheduled for 3/15/23, but patient was recently hospitalization and post hospitalization visit not scheduled yet. Please assist with scheduling if needed. Method of communication with provider: chart routing. Patient answered call and verified . Patient agreeable to transition call. Stated he continues to have chest pain that comes and goes. Stated that medications weren't changed during hospitalization and taking as directed. Reviewed post d/c instructions. Patient noted to have f/u with PCP scheduled, but not the post hospitalization visit. CTN routed message to provider to assist with scheduling. Declined assistance from CTN. Denied any acute needs at present time. declined f/u calls. Educated on the use of urgent care or physicians 24 hr access line if assistance is needed after hours. Care Transition Nurse reviewed discharge instructions with patient who verbalized understanding.  The patient was given an opportunity to ask questions and does not have any further questions or concerns at this time. Were discharge instructions available to patient? Yes. Reviewed appropriate site of care based on symptoms and resources available to patient including: PCP  Specialist. The patient agrees to contact the PCP office for questions related to their healthcare. Advance Care Planning:   Does patient have an Advance Directive: not on file. Medication reconciliation was performed with patient, who verbalizes understanding of administration of home medications. Medications reviewed, 1111F entered: yes    Was patient discharged with a pulse oximeter? no    Non-face-to-face services provided:  Obtained and reviewed discharge summary and/or continuity of care documents    Offered patient enrollment in the Remote Patient Monitoring (RPM) program for in-home monitoring: declined    Care Transitions 24 Hour Call    Do you have a copy of your discharge instructions?: Yes  Do you have all of your prescriptions and are they filled?: Yes (Comment: reviewed 1.10.23)  Have you been contacted by a [address]?: No  Have you scheduled your follow up appointment?: Yes  How are you going to get to your appointment?: Car - drive self  Patient DME: Straight cane, Other, Shower chair  Other Patient DME: uses [employer] where available @ [employer]  Do you have support at home?: Alone  Do you feel like you have everything you need to keep you well at home?: Yes  Are you an active caregiver in your home?: No  Care Transitions Interventions         Discussed follow-up appointments. If no appointment was previously scheduled, appointment scheduling offered: Yes. Is follow up appointment scheduled within 7 days of discharge?  No.    Follow Up  Future Appointments   Date Time Provider Castillo Riley   3/15/2023 10:00 AM [address], [facility]   8/9/2023  9:30 AM Annalisa Mcintosh MD [facility] Transition Nurse provided contact information. No further follow-up call indicated based on severity of symptoms and risk factors.   Juwan Thakur RN

## 2023-02-27 NOTE — TELEPHONE ENCOUNTER
R/s 3/7    Kisha Méndez, PharmD, [facility]  Medication Management Clinic   [address] Ph: [phone]  Patria Blood Ph: [phone]  2/27/2023 3:29 PM

## 2023-03-03 ENCOUNTER — CARE COORDINATION (OUTPATIENT)
Dept: CARE COORDINATION | Age: 38
End: 2023-03-03

## 2023-03-03 NOTE — CARE COORDINATION
Ambulatory Care Coordination Note  3/3/2023    Patient Current Location:  Home: [address] [address]     ACM contacted the patient by telephone. Verified name and  with patient as identifiers. Provided introduction to self, and explanation of the ACM role. Challenges to be reviewed by the provider   Additional needs identified to be addressed with provider: No  none               Method of communication with provider: chart routing for provider review of updated [address]    ACM: Kathia Rodriguez RN    s/p CT episode following IP stay -23 for chest pain. Denies any concerning s/s at this time. Verbalizes understanding of self mgmt concepts, Zone Mgmt guidance, availability of after hours' provider line for off-business hours and availability of added support via ACM Mon-Fri 8am-4pm    Offered patient enrollment in the Remote Patient Monitoring (RPM) program for in-home monitoring: Patient declined. 22 and 3.3.23    Pt verbalized understanding of above and agreement with the following  Plan: resume [facility] support for health coaching, care collaboration, support w/community resources, and help with any newly presenting concerns as needed.   Pt agrees to outreach direct to [facility], as needed, between routine follow up outreach initiated by [facility]   Care Coordination Interventions    Referral from Primary Care Provider: Yes  Suggested Interventions and Community Resources  Diabetes Education: Completed (Comment: follows @ [address] [facility] for DM mgmt)  Fall Risk Prevention: Completed  Medi Set or Pill Pack: Completed (Comment: picks up Rx through [employer])  Social Work: Completed (Comment: 1.10.23 wants to review w/SW re applying for [facility] support; 10.13.22 interest in applying for [place] [employer])  Specialty Services Referral: Completed (Comment: follows w/Med Mgmt clinic @ [facility])  Other Services: Completed (Comment: follows w/med mgmt clinic for DM mgmt)  Zone Management Tools: Completed (Comment: DM)  Other Services or Interventions: reviewed pt centered goals; self mgmt concepts; community-based resources          Goals Addressed                      This Visit's Progress      Community Resource Goal (pt-stated)   On track      I will engage w/Community Health Liaison on [address] team to review needs/barriers and discuss potential community-based resources which may prove helpful. Barriers: impairment:  physical: dizziness/deconditioned, fear of failure, financial, and lack of support  Plan for overcoming my barriers: engage in Care Coordination for added care team support  Confidence: 10/10  Anticipated Goal Completion Date: 4.13.23        Conditions and Symptoms (pt-stated)   On track      I will schedule office visits, as directed by my provider. I will keep my appointment or reschedule if I have to cancel. I will notify my provider of any barriers to my plan of care. I will follow my Zone Management tool to seek urgent or emergent care. I will notify my provider of any symptoms that indicate a worsening of my condition.     Barriers: impairment:  physical: deconditioned/chronic condition, fear of failure, and financial  Plan for overcoming my barriers: engage in Care Coordination for added care team support  Confidence: 10/10  Anticipated Goal Completion Date: 4.13.23                Future Appointments   Date Time Provider Castillo Riley   3/7/2023  8:30 AM [address] [facility]   3/15/2023 10:00 AM [address], [facility]   8/9/2023  9:30 AM MD Amanda Miguel

## 2023-03-07 ENCOUNTER — TELEMEDICINE (OUTPATIENT)
Dept: PHARMACY | Age: 38
End: 2023-03-07

## 2023-03-07 DIAGNOSIS — E55.9 VITAMIN D DEFICIENCY: ICD-10-CM

## 2023-03-07 DIAGNOSIS — E11.65 POORLY CONTROLLED TYPE 2 DIABETES MELLITUS (HCC): Primary | ICD-10-CM

## 2023-03-07 RX ORDER — LANOLIN ALCOHOL/MO/W.PET/CERES
3 CREAM (GRAM) TOPICAL DAILY
COMMUNITY

## 2023-03-07 RX ORDER — ERGOCALCIFEROL 1.25 MG/1
50000 CAPSULE ORAL WEEKLY
Qty: 12 CAPSULE | Refills: 0 | Status: SHIPPED | OUTPATIENT
Start: 2023-03-07

## 2023-03-07 RX ORDER — METFORMIN HYDROCHLORIDE 500 MG/1
500 TABLET, EXTENDED RELEASE ORAL
COMMUNITY

## 2023-03-07 RX ORDER — ROSUVASTATIN CALCIUM 40 MG/1
40 TABLET, COATED ORAL NIGHTLY
Qty: 90 TABLET | Refills: 3 | Status: SHIPPED | OUTPATIENT
Start: 2023-03-07

## 2023-03-07 NOTE — PROGRESS NOTES
Disclaimer for Virtual Visits: We want to confirm that, for purposes of billing, this is a virtual visit with your provider for which we will submit a claim for reimbursement with your insurance company. You may be responsible for any copays, coinsurance amounts or other amounts not covered by your insurance company. If you do not accept this, unfortunately we will not be able to schedule a virtual visit with the provider. Do you accept? Yes. CLINICAL PHARMACY NOTE--Diabetes    Renetta Wells is a 40 y.o. male with PMHX significant for CKD, diabetic neuropathy, HTN, HLD, Obesity, and Type 2 Diabetes referred by JONY Fraser for diabetes counseling and medication management. Pt encounter today completed virtual visit using virtual platform. Services were provided through a video synchronous discussion virtually to substitute for in-person clinic visit. Patient and provider were located at their individual locations. Interval update: [facility] [place] ER/hospitalizations since last visit for atypical CP, COVID19, and  CHELE due to hypotension/dehydration. He's had ongoing nausea/decr appetite over the past several months (started feb 2022). This did not improve when endo took him off trulicity and started humalog SSI. He sees multiple specialists-- endo, pulm, ID, GI (Dr Brigette Blanton), neuro. Interested in [place], as he is having issues with libre2 sensor bending. Last worn 2/15/23, but has been SMBG. He denies any sxs of hypoglycemia. He has not been compliant with statin. ASSESSMENT/PLAN:    Type 2 Diabetes  A1c above goal <7%, but significantly improved  Libreview data from 2/15/23  Continue current meds, refilled as requested. Metformin 1 g BID   Jardiance 10 mg QD (monitor closely-- poor po and fluid intake and recent CHELE)   Tresiba 75 units daily    Humalog SSI   -SMBG: libre3   -Pt also inquired about insulin pump.  Will discuss at future visit if unable to control.   -Labs: a1c, FLP, vit D -- in May  -Lifestyle: stop drinking juice, ask cardiology about fluid intake   -HM: eye exam - patient declining all vaccinations     2.  Hypertension  BP goal <130/80   +Albuminuria  Check BP at home  Medications: on ACE + SGLT2     3.  Hyperlipidemia  Continue high intensity statin- improve compliance (has not been taking)  Labs: repeat FLP ~8 wks to ensure compliance with statin    Health Maintenance Due   Topic Date Due    COVID-19 Vaccine (1) Never done    Varicella vaccine (1 of 2 - 2-dose childhood series) Never done    Diabetic foot exam  Never done    Diabetic retinal exam  Never done    Hepatitis C screen  Never done    Hepatitis B vaccine (1 of 3 - Risk 3-dose series) Never done    Pneumococcal 0-64 years Vaccine (2 - PCV) 03/01/2015    Flu vaccine (1) 08/01/2022       Education provided:  Reviewed A1c and blood sugar goals with patient  Counseled on medication regimen and common side effects  Reviewed karen instruction/application and importance of scanning at least every 8 hours.   Discussed symptoms and management of hypoglycemic episodes.  Encouraged weight loss and aerobic exercise   Educated on diet     Return in about 4 weeks (around 4/4/2023).         Subjective   SUBJECTIVE/OBJECTIVE:    Treatment Adherence:  Diet:  10a- usually skips, eggs/sausage  4p- turkey/chx, salmon, sometimes 1 slice wheat bread, baked or air fried salmon/chicken, brussels, broccoli, corn  Snack/dessert- crackers, fruit/veggie maybe 3 servings of these per day  Drinks- 3-16 bottles water/d (varies), gatorade zero only, less juice  Exercise: no regular exercise due to pain, balance issues; waiting on neuro and PT  Weight trend: decreasing due to PNA and uncontrolled DM  Barriers: impairment:  physical, overwhelmed by complexity of regimen, and time constraints    Type 2 Diabetes Mellitus  Year diagnosed ~2010.   Related hospitalizations/ED visits: multiple ED visits for hyperglycemia.  Comorbities: high risk ASCVD,  HTN, obesity, and CKD   Considerations:  cost of medication ($0-3), GFR, simplify regimen/med compliance  Current symptoms/problems: neuropathy. Hypoglycemia? No  Eye exam within one year: no    Current diabetes therapy:    Metformin 1 g BID    Tresiba 75 units QPM   Jardiance 10 mg QD  humalog 10 units TIDAC + SSI (+2 units for every 30 >120)   Compliance: forgets 1-2 doses of all meds (including insulin) per week. Doesn't sleep well, needs to get kids ready for bed, then falls asleep and forgets. Pt states he was noncompliant with meds when he lost his job due to COVID PNA. LF for most meds was 22 for 30 ds, so he is likely missing 50% of the time. He did get chlorthalidone, lisinopril, and metformin from [facility]. Side effects: none  Cost: $3 copay  Previous regimens: trulicity    SMB/8/63 fasting and ppBG lo, 160, 154, 183, 100, 201, 139, 112, 146, 102, 158, 143, 118, 162, 249, 168, 192, 174, 172, 202, 197    Libreview     Avg V high High target   -22 307 77% 17% 6%  11/3- 187 10 44 46%  -2/15/23 131 0 11 89%    Hypertension:    Patient denies chest pain, shortness of breath, headache, and blurred vision. On Ace- lisinopril 20 mg  Medication side effects: no medication side effects noted. Use of agents associated with hypertension: none. Caffeine: none  Home blood pressure monitoring: No.  But has BP cuff    Hyperlipidemia:  On high intensity statin-- No new myalgias or GI upset   On aspirin 81 mg  Cardiovascular risk factors: diabetes mellitus, dyslipidemia, hypertension, male gender, and obesity (BMI >= 30 kg/m2)    The ASCVD Risk score (Cody LOPES, et al., 2019) failed to calculate for the following reasons: The 2019 ASCVD risk score is only valid for ages 36 to 78       Objective     Medication list reviewed and up to date. Physical exam     There were no vitals taken for this visit. There is no height or weight on file to calculate BMI.   Wt Readings from Last 3 Encounters:   23 237 lb (107.5 kg)   23 245 lb (111.1 kg)   23 235 lb (106.6 kg)      BP Readings from Last 3 Encounters:   23 (!) 130/94   23 (!) 175/116   23 121/89        Pertinent Labs:  Lab Results   Component Value Date    LABA1C 7.1 2023    LABA1C 8.3 2023    LABA1C 11.7 2022      Lab Results   Component Value Date    MALBCR 620.7 (H) 2022     No results found for: CRCLEARANCE   Lab Results   Component Value Date    LDLCALC 168 (H) 2023    CHOL 247 (H) 2023    TRIG 221 (H) 2023    HDL 35 (L) 2023     Lab Results   Component Value Date    CREATININE 1.1 2023    BUN 18 2023     2023    K 4.1 2023     2023    CO2 25 2023     Lab Results   Component Value Date    AST 11 (L) 2023    ALT 11 2023    BILIDIR <0.2 2022    BILITOT <0.2 2023    ALKPHOS 76 2023     No components found for: VITB12  No results found for: TSH, TSHREFLEX              Discussed with patient the Pharmacist Collaborative Practice Agreement. Patient provided verbal and/or electronic (ex. mychart) consent to participate in the collaborative practice agreement between the pharmacist and referred patient. This is in lieu of paper consent due to COVID-19 precautions and the use of remote/virtual visits.      Amilcar Cerda, PharmD, [facility]  Medication Management Clinic   Jemima Arias Ph: [phone]  Aura Vinson Ph: [phone]  3/7/2023 1:24 PM    For Pharmacy Admin Tracking Only    Program: Medication Management  CPA in place:  Yes  Recommendation Provided To: Patient/Caregiver: 3 via Virtual Visit  Intervention Detail: Adherence Monitorin, Lab(s) Ordered, and Refill(s) Provided  Intervention Accepted By: Patient/Caregiver: 3  Gap Closed?: No   Time Spent (min): 45

## 2023-03-15 ENCOUNTER — OFFICE VISIT (OUTPATIENT)
Dept: PRIMARY CARE CLINIC | Age: 38
End: 2023-03-15
Payer: COMMERCIAL

## 2023-03-15 VITALS
OXYGEN SATURATION: 100 % | DIASTOLIC BLOOD PRESSURE: 117 MMHG | WEIGHT: 243 LBS | SYSTOLIC BLOOD PRESSURE: 149 MMHG | HEART RATE: 130 BPM | TEMPERATURE: 98.4 F | BODY MASS INDEX: 31.2 KG/M2

## 2023-03-15 DIAGNOSIS — K31.84 GASTROPARESIS: ICD-10-CM

## 2023-03-15 DIAGNOSIS — E11.42 TYPE 2 DIABETES MELLITUS WITH DIABETIC POLYNEUROPATHY, WITH LONG-TERM CURRENT USE OF INSULIN (HCC): ICD-10-CM

## 2023-03-15 DIAGNOSIS — G62.9 NEUROPATHY: ICD-10-CM

## 2023-03-15 DIAGNOSIS — N18.31 STAGE 3A CHRONIC KIDNEY DISEASE (HCC): ICD-10-CM

## 2023-03-15 DIAGNOSIS — B39.2 PULMONARY HISTOPLASMOSIS (HCC): ICD-10-CM

## 2023-03-15 DIAGNOSIS — Z79.4 TYPE 2 DIABETES MELLITUS WITH DIABETIC POLYNEUROPATHY, WITH LONG-TERM CURRENT USE OF INSULIN (HCC): ICD-10-CM

## 2023-03-15 DIAGNOSIS — I50.20 HFREF (HEART FAILURE WITH REDUCED EJECTION FRACTION) (HCC): ICD-10-CM

## 2023-03-15 DIAGNOSIS — I10 UNCONTROLLED HYPERTENSION: Primary | ICD-10-CM

## 2023-03-15 DIAGNOSIS — B37.2 SKIN CANDIDIASIS: ICD-10-CM

## 2023-03-15 PROBLEM — E11.9 TYPE 2 DIABETES MELLITUS TREATED WITH INSULIN (HCC): Status: RESOLVED | Noted: 2022-05-17 | Resolved: 2023-03-15

## 2023-03-15 PROBLEM — R07.9 CHEST PAIN: Status: RESOLVED | Noted: 2022-11-11 | Resolved: 2023-03-15

## 2023-03-15 PROCEDURE — 3051F HG A1C>EQUAL 7.0%<8.0%: CPT | Performed by: FAMILY MEDICINE

## 2023-03-15 PROCEDURE — 3077F SYST BP >= 140 MM HG: CPT | Performed by: FAMILY MEDICINE

## 2023-03-15 PROCEDURE — 3080F DIAST BP >= 90 MM HG: CPT | Performed by: FAMILY MEDICINE

## 2023-03-15 PROCEDURE — 99214 OFFICE O/P EST MOD 30 MIN: CPT | Performed by: FAMILY MEDICINE

## 2023-03-15 RX ORDER — CARVEDILOL 25 MG/1
25 TABLET ORAL 2 TIMES DAILY
Qty: 60 TABLET | Refills: 3 | Status: SHIPPED | OUTPATIENT
Start: 2023-03-15

## 2023-03-15 RX ORDER — VALSARTAN 80 MG/1
80 TABLET ORAL DAILY
Qty: 90 TABLET | Refills: 1 | Status: SHIPPED | OUTPATIENT
Start: 2023-03-15

## 2023-03-15 RX ORDER — NYSTATIN 100000 U/G
CREAM TOPICAL
Qty: 1 EACH | Refills: 0 | Status: SHIPPED | OUTPATIENT
Start: 2023-03-15

## 2023-03-15 ASSESSMENT — PATIENT HEALTH QUESTIONNAIRE - PHQ9
2. FEELING DOWN, DEPRESSED OR HOPELESS: 0
5. POOR APPETITE OR OVEREATING: 1
8. MOVING OR SPEAKING SO SLOWLY THAT OTHER PEOPLE COULD HAVE NOTICED. OR THE OPPOSITE, BEING SO FIGETY OR RESTLESS THAT YOU HAVE BEEN MOVING AROUND A LOT MORE THAN USUAL: 0
7. TROUBLE CONCENTRATING ON THINGS, SUCH AS READING THE NEWSPAPER OR WATCHING TELEVISION: 3
SUM OF ALL RESPONSES TO PHQ QUESTIONS 1-9: 10
SUM OF ALL RESPONSES TO PHQ QUESTIONS 1-9: 10
3. TROUBLE FALLING OR STAYING ASLEEP: 1
SUM OF ALL RESPONSES TO PHQ QUESTIONS 1-9: 10
SUM OF ALL RESPONSES TO PHQ QUESTIONS 1-9: 10
6. FEELING BAD ABOUT YOURSELF - OR THAT YOU ARE A FAILURE OR HAVE LET YOURSELF OR YOUR FAMILY DOWN: 3
9. THOUGHTS THAT YOU WOULD BE BETTER OFF DEAD, OR OF HURTING YOURSELF: 0
SUM OF ALL RESPONSES TO PHQ9 QUESTIONS 1 & 2: 0
1. LITTLE INTEREST OR PLEASURE IN DOING THINGS: 0
4. FEELING TIRED OR HAVING LITTLE ENERGY: 2
10. IF YOU CHECKED OFF ANY PROBLEMS, HOW DIFFICULT HAVE THESE PROBLEMS MADE IT FOR YOU TO DO YOUR WORK, TAKE CARE OF THINGS AT HOME, OR GET ALONG WITH OTHER PEOPLE: 2

## 2023-03-15 ASSESSMENT — ENCOUNTER SYMPTOMS
DIARRHEA: 0
COUGH: 0
SHORTNESS OF BREATH: 0
ABDOMINAL PAIN: 0
NAUSEA: 0
VOMITING: 0

## 2023-03-15 NOTE — LETTER
March 15, 2023       Sheree Goncalves YOB: 1985   [address] [facility] [place] [address] Date of Visit:  3/15/2023       To Whom It May Concern: It is my medical opinion that Sheree Goncalves requires a disability parking placard for the following reasons:  He has limited walking ability due to a neurologic condition. Duration of need: 2 years    If you have any questions or concerns, please don't hesitate to call.     Sincerely,        Nikki, DO
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
## 2023-03-17 NOTE — PROGRESS NOTES
"  Assessment & Plan     Gross hematuria    UA shows large blood.  Awaiting culture.   Plan for CT scan on Monday.  If culture is positive we can cancel this.  If culture negative he will proceed with CT. Tylenol/ibuprofen for pain.  If severe worsening over the weekend he was instructed to go to ER  - UA with Microscopic reflex to Culture - lab collect; Future  - Urine Culture Aerobic Bacterial - lab collect; Future  - CBC with platelets; Future  - Creatinine; Future  - UA with Microscopic reflex to Culture - lab collect  - Urine Culture Aerobic Bacterial - lab collect  - CBC with platelets  - Creatinine    Urinary frequency  - UA with Microscopic reflex to Culture - lab collect; Future  - Urine Culture Aerobic Bacterial - lab collect; Future  - UA with Microscopic reflex to Culture - lab collect  - Urine Culture Aerobic Bacterial - lab collect    Right-sided back pain, unspecified back location, unspecified chronicity    Benign essential hypertension with target blood pressure below 140/90  elevated today, repeat improved somewhat.  Recommend PCP follow up         BMI:   Estimated body mass index is 31.33 kg/m  as calculated from the following:    Height as of this encounter: 1.918 m (6' 3.5\").    Weight as of this encounter: 115.2 kg (254 lb).       Return in about 2 weeks (around 3/31/2023) for follow up if new or worsening symptoms.    VIKTOR Fuentes [facility]    Abrahan   scarlet is a 61 year old presenting for the following health issues:  Testicular Problem (Swelling ) and Urinary Problem      History of Present Illness       Reason for visit:  Prostate urgency ache in testes  Symptom onset:  3-7 days ago    He eats 2-3 servings of fruits and vegetables daily.He consumes 1 sweetened beverage(s) daily.He exercises with enough effort to increase his heart rate 9 or less minutes per day.  He exercises with enough effort to increase his heart rate 3 or less days per week. " Patient alert and oriented x4 admitted to room 4257, assessment completed, Pt denies pain, clear liquid diet. Blood sugar elevated 340 , insulin 2 units administered. Pt requested  assistance due to lack of diabetes supplies. All safety precautions in place and call light within reach. "  He is taking medications regularly.      Joe has a hx of BPH, recently elevated PSA ( 8) and remote hx of nephrolithiasis presents to the clinic with 1 day hx of gross hematuria and urinary urgency.  This began suddenly this morning.  He has been noticing a worsening right sided lower back pain recently, aching in his right testicle. He is not having any fever, n/v/d or abdominal pain.     Review of Systems   Constitutional, HEENT, cardiovascular, pulmonary, GI, , musculoskeletal, neuro, skin, endocrine and psych systems are negative, except as otherwise noted.      Objective    BP (!) 159/81   Pulse 61   Temp 97.3  F (36.3  C) (Temporal)   Ht 1.918 m (6' 3.5\")   Wt 115.2 kg (254 lb)   SpO2 98%   BMI 31.33 kg/m    Body mass index is 31.33 kg/m .  Physical Exam   GENERAL: healthy, alert and no distress  RESP: lungs clear to auscultation - no rales, rhonchi or wheezes  CV: regular rate and rhythm, normal S1 S2, no S3 or S4, no murmur, click or rub, no peripheral edema  ABDOMEN: soft, nontender, no hepatosplenomegaly, no masses and bowel sounds normal, no CVAT  PSYCH: mentation appears normal, affect normal/bright                    "

## 2023-03-28 ENCOUNTER — CARE COORDINATION (OUTPATIENT)
Dept: CARE COORDINATION | Age: 38
End: 2023-03-28

## 2023-03-28 NOTE — CARE COORDINATION
many restaurant meals do you eat per week?: 0  Do you salt your food before tasting it?: No     No patient-reported symptoms      Symptoms:  None: Yes      Symptom course: stable  Weight trend: stable  Salt intake watch compared to last visit: stable        Care Coordination Interventions    Referral from Primary Care Provider: Yes  Suggested Interventions and Community Resources  Diabetes Education: Completed (Comment: follows @ [address] Mgmt clinic for [employer])  Fall Risk Prevention: Completed  Medi Set or Pill Pack: Completed (Comment: picks up Rx through [facility]  Social Work: Completed (Comment: 1.10.23 wants to review w/SW re applying for [facility] support; 10.13.22 interest in applying for [employer])  Specialty Services Referral: Completed (Comment: follows w/Med Mgmt clinic @ [address] [facility])  Other Services: Completed (Comment: follows w/med mgmt clinic for DM mgmt)  Zone Management Tools: Completed (Comment: DM)  Other Services or Interventions: reviewed pt centered goals; self mgmt concepts; community-based resources          Goals Addressed                      This Visit's Progress      COMPLETED: Community Resource Goal (pt-stated)   On track      I will engage w/Community Health Liaison on [address] [place] [address] team to review needs/barriers and discuss potential community-based resources which may prove helpful. Barriers: impairment:  physical: dizziness/deconditioned, fear of failure, financial, and lack of support  Plan for overcoming my barriers: engage in Care Coordination for added care team support  Confidence: 10/10  Anticipated Goal Completion Date: 4.13.23        COMPLETED: Conditions and Symptoms (pt-stated)   On track      I will schedule office visits, as directed by my provider. I will keep my appointment or reschedule if I have to cancel. I will notify my provider of any barriers to my plan of care. I will follow my Zone Management tool to seek urgent or emergent care.   I

## 2023-04-10 ENCOUNTER — APPOINTMENT (OUTPATIENT)
Dept: GENERAL RADIOLOGY | Age: 38
End: 2023-04-10
Payer: MEDICAID

## 2023-04-10 ENCOUNTER — HOSPITAL ENCOUNTER (EMERGENCY)
Age: 38
Discharge: HOME | End: 2023-04-10
Payer: MEDICAID

## 2023-04-10 VITALS
HEART RATE: 67 BPM | DIASTOLIC BLOOD PRESSURE: 89 MMHG | OXYGEN SATURATION: 93 % | SYSTOLIC BLOOD PRESSURE: 133 MMHG | BODY MASS INDEX: 41.75 KG/M2 | WEIGHT: 315 LBS | HEIGHT: 73 IN | RESPIRATION RATE: 16 BRPM | TEMPERATURE: 99.1 F

## 2023-04-10 DIAGNOSIS — M79.10 MYALGIA: Primary | ICD-10-CM

## 2023-04-10 DIAGNOSIS — R68.83 CHILLS: ICD-10-CM

## 2023-04-10 LAB
FLUAV RNA UPPER RESP QL NAA+PROBE: NEGATIVE
FLUBV AG NPH QL: NEGATIVE
SARS-COV-2 RDRP RESP QL NAA+PROBE: NOT DETECTED

## 2023-04-10 PROCEDURE — 71046 X-RAY EXAM CHEST 2 VIEWS: CPT

## 2023-04-10 PROCEDURE — 87804 INFLUENZA ASSAY W/OPTIC: CPT

## 2023-04-10 PROCEDURE — 99284 EMERGENCY DEPT VISIT MOD MDM: CPT

## 2023-04-10 PROCEDURE — 87635 SARS-COV-2 COVID-19 AMP PRB: CPT

## 2023-04-10 ASSESSMENT — PAIN DESCRIPTION - PAIN TYPE: TYPE: CHRONIC PAIN

## 2023-04-10 ASSESSMENT — PAIN - FUNCTIONAL ASSESSMENT: PAIN_FUNCTIONAL_ASSESSMENT: 0-10

## 2023-04-10 ASSESSMENT — PAIN SCALES - GENERAL: PAINLEVEL_OUTOF10: 8

## 2023-04-10 NOTE — ED NOTES
Reviewed discharge instructions, medication reconciliation, and patient education information with patient. Patient stated understanding, and answered questions to satisfaction. Patient verbalized satisfaction of care. PIV removed at this time. Patient ambulated safely to exit with a steady gait in no obvious acute distress. Patient verbalized understanding of returning to ER if symptoms worsen, and to follow up with primary health care provider.       Crystal Austin RN  04/10/23 [id number]

## 2023-04-10 NOTE — ED PROVIDER NOTES
[address] [facility]        Pt Name: Loyda Moncada  MRN: [id number]  [place] 1985  Date of evaluation: 4/10/2023  Provider: CHARLI Rocha  PCP: Brooke Dixon DO  Note Started: 12:11 PM EDT 4/10/23      ABIGAIL. I have evaluated this patient. My supervising physician was available for consultation. CHIEF COMPLAINT       Chief Complaint   Patient presents with    URI     Hot and cold and body aches, x 2 mths       HISTORY OF PRESENT ILLNESS: 1 or more Elements     History From: Patient    Loyda Moncada is a 40 y.o. male who presents for subjective fever and chills and body aches for the past week and a half. No measured fever. He has tried a muscle relaxer which has not helped. Reports these pains causes him difficulty sleeping. He is already on trazodone and melatonin for sleep. Has pulmonary histoplasmosis for which he takes medication for . Unsure the name. Reports dose was recently increased. Has chronic cough,. denies nausea or vomiting. Denies sick contacts    Nursing Notes were reviewed and agreed with or any disagreements were addressed in the HPI. REVIEW OF SYSTEMS :      Review of Systems    Positives and Pertinent negatives as per HPI.      SURGICAL HISTORY     Past Surgical History:   Procedure Laterality Date    BRONCHOSCOPY  06/09/2022    BRONCHOSCOPY BRUSHINGS performed by Paresh Gray MD at [address]  06/09/2022    BRONCHOSCOPY DIAGNOSTIC OR CELL [address] ONLY performed by Paresh Gray MD at [address]  06/09/2022    BRONCHOSCOPY BIOPSY BRONCHUS performed by Paresh Gray MD at [address] N/A 06/14/2022    BRONCHOSCOPY DIAGNOSTIC OR CELL [address] ONLY performed by Gary Lara DO at [facility]. [address]       Discharge Medication List as of 4/10/2023  3:22 PM        CONTINUE

## 2023-04-20 ENCOUNTER — TELEPHONE (OUTPATIENT)
Dept: PRIMARY CARE CLINIC | Age: 38
End: 2023-04-20

## 2023-04-20 ENCOUNTER — APPOINTMENT (OUTPATIENT)
Dept: PHYSICAL THERAPY | Age: 38
End: 2023-04-20
Payer: COMMERCIAL

## 2023-04-20 NOTE — TELEPHONE ENCOUNTER
----- Message from Benton Needle sent at 4/20/2023  3:34 PM EDT -----  Subject: Message to Provider    QUESTIONS  Information for Provider? Law office needs medical records for disabilty. Please contact Roc at [phone]  ---------------------------------------------------------------------------  --------------  CALL BACK INFO  [phone]; OK to leave message on voicemail  ---------------------------------------------------------------------------  --------------  SCRIPT ANSWERS  Relationship to Patient? Covered Entity  Covered Entity Type? Other  Other Covered Entity Type? law office  Representative Name?  Fly Sherwood

## 2023-04-24 ENCOUNTER — HOSPITAL ENCOUNTER (OUTPATIENT)
Dept: PHYSICAL THERAPY | Age: 38
Setting detail: THERAPIES SERIES
Discharge: HOME | End: 2023-04-24
Payer: COMMERCIAL

## 2023-04-24 ENCOUNTER — PATIENT MESSAGE (OUTPATIENT)
Dept: PRIMARY CARE CLINIC | Age: 38
End: 2023-04-24

## 2023-04-24 PROCEDURE — 97110 THERAPEUTIC EXERCISES: CPT

## 2023-04-24 PROCEDURE — 97112 NEUROMUSCULAR REEDUCATION: CPT

## 2023-04-24 NOTE — TELEPHONE ENCOUNTER
From: Nikos Monroy  To: Dr. Gordo Curran: 4/24/2023 12:53 PM EDT  Subject: Medical records     Hi I need my medical records sent to me asap by Kermit Venegas so I can send to my  [email or web address] please and thank you

## 2023-04-24 NOTE — FLOWSHEET NOTE
[address] [facility] [place]  Phone: [phone]   Fax:     [phone]      Date:  2023    Patient Name:  Johann Ayala    :  1985  MRN: [id number]    Pertinent Medical History: Additional Pertinent Hx: Hyperlipidemia, hypertension, diabetes, neuropathy    Medical/Treatment Diagnosis Information:  Medical Diagnosis: Neuropathy [G62.9]  Treatment Diagnosis: Decreased functional mobility, impaired balance    Insurance/Certification information:  PT Insurance Information: Medicaid  Physician Information:  Antonieta Infante*  Plan of care signed (Y/N): sent 04/10/2023, received     Date of Patient follow up with Physician:      Progress Report: []  Yes  [x]  No     Date Range for reporting period:  Beginnin/10/2023  Ending:     Progress report due (10 Rx/or 30 days whichever is less): 93     Recertification due (POC duration/ or 90 days whichever is less): 23     Visit # Insurance/POC Allowable Auth Needed    [place] []Yes    [x]No       Latex Allergy:  [x]NO      []YES  Preferred Language for Healthcare:   [x]English       []Other:    Functional Scale:        Test: [facility]  Date Assessed Score   4/10/23 76/96           Pain level:  6-8/10     History of Injury:  Patient stated complaint: Patient reports that he is having trouble walking/balance. Is having some dizziness, difficulty with lifting weights. Patient reports that he can stand for about 10 minutes before he has to sit down. Patient reports he has been having problem with his balance for about 4-5 years. Reports that balance has been staying about the same, not getting better or worse. Patient has had PT in the past where he was working on balance and stretching, reports he is still trying to stay up on his HEP. Hot water can help to decrease the pain.  Patient does report dizziness with

## 2023-04-27 ENCOUNTER — HOSPITAL ENCOUNTER (OUTPATIENT)
Dept: PHYSICAL THERAPY | Age: 38
Setting detail: THERAPIES SERIES
Discharge: HOME | End: 2023-04-27
Payer: COMMERCIAL

## 2023-04-27 PROCEDURE — 97112 NEUROMUSCULAR REEDUCATION: CPT

## 2023-04-27 PROCEDURE — 97110 THERAPEUTIC EXERCISES: CPT

## 2023-04-27 NOTE — FLOWSHEET NOTE
[address] [facility] [place]. [address]  Phone: [phone]   Fax:     [phone]      Date:  2023    Patient Name:  Shannan Wood    :  1985  MRN: [id number]    Pertinent Medical History: Additional Pertinent Hx: Hyperlipidemia, hypertension, diabetes, neuropathy    Medical/Treatment Diagnosis Information:  Medical Diagnosis: Neuropathy [G62.9]  Treatment Diagnosis: Decreased functional mobility, impaired balance    Insurance/Certification information:  PT Insurance Information: Medicaid  Physician Information:  Jasmin Hauser  Plan of care signed (Y/N): sent 04/10/2023, received     Date of Patient follow up with Physician:      Progress Report: []  Yes  [x]  No     Date Range for reporting period:  Beginnin/10/2023  Ending:     Progress report due (10 Rx/or 30 days whichever is less): 0/34/10     Recertification due (POC duration/ or 90 days whichever is less): 23     Visit # Insurance/POC Allowable Auth Needed   3 /30 [place] []Yes    [x]No       Latex Allergy:  [x]NO      []YES  Preferred Language for Healthcare:   [x]English       []Other:    Functional Scale:        Test: [facility]  Date Assessed Score   4/10/23 76/96           Pain level:  6-8/10     History of Injury:  Patient stated complaint: Patient reports that he is having trouble walking/balance. Is having some dizziness, difficulty with lifting weights. Patient reports that he can stand for about 10 minutes before he has to sit down. Patient reports he has been having problem with his balance for about 4-5 years. Reports that balance has been staying about the same, not getting better or worse. Patient has had PT in the past where he was working on balance and stretching, reports he is still trying to stay up on his HEP. Hot water can help to decrease the pain.  Patient does report dizziness with

## 2023-04-28 ENCOUNTER — TELEPHONE (OUTPATIENT)
Dept: PRIMARY CARE CLINIC | Age: 38
End: 2023-04-28

## 2023-05-01 NOTE — TELEPHONE ENCOUNTER
Called [facility] to see what happened. I spoke with Ash Silva who states the records were not available to them to process. They have requested to have access to the records from 2021 to present. Once they have access they will send them. She states she is expediting the process. I called and let Severiano Burrows with [employer] know what I was told.

## 2023-05-02 ENCOUNTER — TELEPHONE (OUTPATIENT)
Dept: CARDIOLOGY CLINIC | Age: 38
End: 2023-05-02

## 2023-05-02 ENCOUNTER — HOSPITAL ENCOUNTER (OUTPATIENT)
Dept: PHYSICAL THERAPY | Age: 38
Setting detail: THERAPIES SERIES
Discharge: HOME | End: 2023-05-02
Payer: COMMERCIAL

## 2023-05-02 PROCEDURE — 97112 NEUROMUSCULAR REEDUCATION: CPT

## 2023-05-02 PROCEDURE — 97110 THERAPEUTIC EXERCISES: CPT

## 2023-05-02 NOTE — TELEPHONE ENCOUNTER
Dr. Gisela Burks the patient's pulmonologist @  called stating during the patient's office visit with him today 05/02/23, he complained of chest pains. Dr. Amber Cross states the patient has been in and out of the ER for chest pain.  requested that our office reach out to the patient to discuss his symptoms or have him seen in office for a follow up. The patient was last seen on 02/01/23 and has a 6 mo f/u on 08/09/23. I attempted to call the patient to get an update on his symptoms and schedule an office visit LV. The patient can be reached at [phone].

## 2023-05-02 NOTE — FLOWSHEET NOTE
walking for prolonged periods. SUBJECTIVE:    4/24/23: Patient reports that he has been doing his exercises at home, has some muscle soreness. Has also been having some headaches. 4/27/23: Patient reports he has some muscle soreness from last session. Reports he still does not feel confident walking outside on uneven ground. 5/2/23: Patient reports that his legs are still sore. He has not noticed any change in stability/balance since starting therapy. Is still working on exercises at home, does not have any problems with them.      OBJECTIVE:  Observation:   Test measurements:      ROM:  Date Hip Flexion Hip Extension Hip IR Hip ER    L R L R L R L R   Eval 4/10/23                                                 Strength:  Date Hip flexion Hip Abduction Hip Extension Hip IR Hip ER Quad Hamstring    L R L R L R L R L R L R L R   Eval 4/10/23  4+/5 4+/5         3+/5 3+5 4-/5 4-/5                                                         Balance Narrow ADDY Semi-Tandem Tandem SLS L SLS R Airex NBOS Airex Tandem   Eval 4/10/23  30 sec  8 sec 2 sec 8 sec 30 sec 3 sec                                      RESTRICTIONS/PRECAUTIONS: Neuropathy secondary to uncontrolled diabetes    Exercises/Interventions:     Therapeutic Ex (48657)   Min: 30 Reps/Resistance Completed  05/02/2023   Notes/CUES   Nustep Level 4, 5 min x    Leg press 2x15 x 85 lbs   Step-ups x20 bilat x 8 inch   Standing march X2 min     Mini squat 2x15 x    Sidestepping X2 laps  Yellow   Standing hip abd 2x10 x Green   Standing hip ext 2x10 x    LAQ 2x10 bilat x 2 lbs   Hamstring curl 2x10 bilat x Green   SLR 2x10 bilat x No quad lag noted at initiation, minimal quad lag noted at end of reps   Bridges 2x10 x          Manual Intervention (65674)  Min:      Knee mobs/PROM      Tib/Fem Mobs      Patella Mobs      Ankle mobs                  NMR re-education ([id number])  Min: 15   CUES NEEDED   Tandem stance x1 min bilat     Modified SL stance 3x30 sec bilat

## 2023-05-03 NOTE — TELEPHONE ENCOUNTER
Spoke with patient, still having chest pain, on and off with some shortness of breath. Patient has an appointment tomorrow for physical therapy and back pain specialist. Follow up with Dr. Bashir Peralta on Monday. Go to ED if symptoms worsen.

## 2023-05-04 ENCOUNTER — HOSPITAL ENCOUNTER (OUTPATIENT)
Dept: PHYSICAL THERAPY | Age: 38
Setting detail: THERAPIES SERIES
Discharge: HOME | End: 2023-05-04
Payer: COMMERCIAL

## 2023-05-04 ENCOUNTER — OFFICE VISIT (OUTPATIENT)
Dept: PAIN MANAGEMENT | Age: 38
End: 2023-05-04

## 2023-05-04 VITALS
BODY MASS INDEX: 33.93 KG/M2 | TEMPERATURE: 97.8 F | HEART RATE: 125 BPM | SYSTOLIC BLOOD PRESSURE: 136 MMHG | DIASTOLIC BLOOD PRESSURE: 94 MMHG | HEIGHT: 73 IN | WEIGHT: 256 LBS | OXYGEN SATURATION: 99 %

## 2023-05-04 DIAGNOSIS — R20.2 NUMBNESS AND TINGLING IN BOTH HANDS: ICD-10-CM

## 2023-05-04 DIAGNOSIS — Z51.81 ENCOUNTER FOR THERAPEUTIC DRUG MONITORING: ICD-10-CM

## 2023-05-04 DIAGNOSIS — M79.643 PAIN OF HAND, UNSPECIFIED LATERALITY: ICD-10-CM

## 2023-05-04 DIAGNOSIS — E66.9 OBESITY (BMI 30-39.9): ICD-10-CM

## 2023-05-04 DIAGNOSIS — G89.4 CHRONIC PAIN SYNDROME: ICD-10-CM

## 2023-05-04 DIAGNOSIS — F41.9 ANXIETY: ICD-10-CM

## 2023-05-04 DIAGNOSIS — G62.9 NEUROPATHY: ICD-10-CM

## 2023-05-04 DIAGNOSIS — M25.559 HIP PAIN, UNSPECIFIED LATERALITY: ICD-10-CM

## 2023-05-04 DIAGNOSIS — M54.2 NECK PAIN: ICD-10-CM

## 2023-05-04 DIAGNOSIS — F32.A DEPRESSION, UNSPECIFIED DEPRESSION TYPE: ICD-10-CM

## 2023-05-04 DIAGNOSIS — R20.0 NUMBNESS AND TINGLING IN BOTH HANDS: ICD-10-CM

## 2023-05-04 PROCEDURE — 97110 THERAPEUTIC EXERCISES: CPT

## 2023-05-04 PROCEDURE — 97112 NEUROMUSCULAR REEDUCATION: CPT

## 2023-05-04 RX ORDER — HYDROCODONE BITARTRATE AND ACETAMINOPHEN 5; 325 MG/1; MG/1
1 TABLET ORAL EVERY 12 HOURS PRN
Qty: 56 TABLET | Refills: 0 | Status: SHIPPED | OUTPATIENT
Start: 2023-05-04 | End: 2023-06-01

## 2023-05-04 RX ORDER — NALOXONE HYDROCHLORIDE 4 MG/.1ML
SPRAY NASAL
Qty: 1 EACH | Refills: 0 | Status: SHIPPED | OUTPATIENT
Start: 2023-05-04

## 2023-05-04 RX ORDER — DOCUSATE SODIUM 100 MG/1
100 CAPSULE, LIQUID FILLED ORAL 2 TIMES DAILY
Qty: 60 CAPSULE | Refills: 0 | Status: SHIPPED | OUTPATIENT
Start: 2023-05-04 | End: 2023-06-03

## 2023-05-04 RX ORDER — PREGABALIN 100 MG/1
100 CAPSULE ORAL 2 TIMES DAILY
Qty: 60 CAPSULE | Refills: 3 | Status: SHIPPED | OUTPATIENT
Start: 2023-05-04 | End: 2023-06-03

## 2023-05-04 ASSESSMENT — ENCOUNTER SYMPTOMS
SHORTNESS OF BREATH: 0
CHEST TIGHTNESS: 0
EYE DISCHARGE: 0
COLOR CHANGE: 0
BLOOD IN STOOL: 0
VOMITING: 0
WHEEZING: 0
BACK PAIN: 0
ABDOMINAL DISTENTION: 0
COUGH: 0
FACIAL SWELLING: 0
ABDOMINAL PAIN: 0

## 2023-05-04 NOTE — PROGRESS NOTES
depression from opioids and /or combination of opioid and adjuvant medications. Instructions for the use of Narcan was explained to the patient/family   -Reviewed previous imaging studies/blood work  -X-ray of the cervical spine, x-ray of bilateral hips  -Reviewed pain contract with the patient, passing UDS/Pill count is required to maintain on-going opioid therapy. Obtaining opioids outside the pain contract can only be done upon approval/verification with the pain provider in unique circumstances such as surgery. Patient verbalized understanding   -CBT techniques- relaxation therapies such as biofeedback, mindfulness based stress reduction, imagery, cognitive restructuring, problem solving discussed with patient   -He was advised weight reduction, diet changes- 800-1200 giselle diet, diet diary, exercising, nutritional  consult increased physical activity as tolerated   -SOAPP score 2  -ORT score 1 low risk  -PHQ-9 score 9 mild depression  -Return in about 4 weeks (around 6/1/2023). Controlled Substances Monitoring: Periodic Controlled Substance Monitoring: No signs of potential drug abuse or diversion identified.  Rodney Rust, HANS - CNP)

## 2023-05-04 NOTE — FLOWSHEET NOTE
[address] [facility] [address]  Phone: [phone]   Fax:     [phone]      Date:  2023    Patient Name:  Pietro Cason    :  1985  MRN: [id number]    Pertinent Medical History: Additional Pertinent Hx: Hyperlipidemia, hypertension, diabetes, neuropathy    Medical/Treatment Diagnosis Information:  Medical Diagnosis: Neuropathy [G62.9]  Treatment Diagnosis: Decreased functional mobility, impaired balance    Insurance/Certification information:  PT Insurance Information: Medicaid  Physician Information:  Ke Infante*  Plan of care signed (Y/N): sent 04/10/2023, received     Date of Patient follow up with Physician:      Progress Report: []  Yes  [x]  No     Date Range for reporting period:  Beginnin/10/2023  Ending:     Progress report due (10 Rx/or 30 days whichever is less): 3/76/19     Recertification due (POC duration/ or 90 days whichever is less): 23     Visit # Insurance/POC Allowable Auth Needed    [place] []Yes    [x]No       Latex Allergy:  [x]NO      []YES  Preferred Language for Healthcare:   [x]English       []Other:    Functional Scale:        Test: [facility]  Date Assessed Score   4/10/23 76/96           Pain level:  6-8/10     History of Injury:  Patient stated complaint: Patient reports that he is having trouble walking/balance. Is having some dizziness, difficulty with lifting weights. Patient reports that he can stand for about 10 minutes before he has to sit down. Patient reports he has been having problem with his balance for about 4-5 years. Reports that balance has been staying about the same, not getting better or worse. Patient has had PT in the past where he was working on balance and stretching, reports he is still trying to stay up on his HEP. Hot water can help to decrease the pain.  Patient does report dizziness with

## 2023-05-05 ENCOUNTER — TELEPHONE (OUTPATIENT)
Dept: PAIN MANAGEMENT | Age: 38
End: 2023-05-05

## 2023-05-08 ENCOUNTER — OFFICE VISIT (OUTPATIENT)
Dept: CARDIOLOGY CLINIC | Age: 38
End: 2023-05-08
Payer: COMMERCIAL

## 2023-05-08 VITALS — HEART RATE: 86 BPM | SYSTOLIC BLOOD PRESSURE: 120 MMHG | DIASTOLIC BLOOD PRESSURE: 80 MMHG

## 2023-05-08 DIAGNOSIS — B39.2 PULMONARY HISTOPLASMOSIS (HCC): ICD-10-CM

## 2023-05-08 DIAGNOSIS — R07.2 PRECORDIAL PAIN: ICD-10-CM

## 2023-05-08 DIAGNOSIS — I10 UNCONTROLLED HYPERTENSION: ICD-10-CM

## 2023-05-08 DIAGNOSIS — I50.20 HFREF (HEART FAILURE WITH REDUCED EJECTION FRACTION) (HCC): ICD-10-CM

## 2023-05-08 PROCEDURE — 99214 OFFICE O/P EST MOD 30 MIN: CPT | Performed by: INTERNAL MEDICINE

## 2023-05-08 PROCEDURE — 3074F SYST BP LT 130 MM HG: CPT | Performed by: INTERNAL MEDICINE

## 2023-05-08 PROCEDURE — 3079F DIAST BP 80-89 MM HG: CPT | Performed by: INTERNAL MEDICINE

## 2023-05-09 ENCOUNTER — APPOINTMENT (OUTPATIENT)
Dept: PHYSICAL THERAPY | Age: 38
End: 2023-05-09
Payer: COMMERCIAL

## 2023-05-10 ENCOUNTER — OFFICE VISIT (OUTPATIENT)
Dept: NEUROLOGY | Age: 38
End: 2023-05-10
Payer: COMMERCIAL

## 2023-05-10 VITALS
SYSTOLIC BLOOD PRESSURE: 164 MMHG | BODY MASS INDEX: 33.11 KG/M2 | DIASTOLIC BLOOD PRESSURE: 117 MMHG | HEIGHT: 73 IN | HEART RATE: 125 BPM | WEIGHT: 249.8 LBS

## 2023-05-10 DIAGNOSIS — Z79.4 TYPE 2 DIABETES MELLITUS WITH DIABETIC POLYNEUROPATHY, WITH LONG-TERM CURRENT USE OF INSULIN (HCC): Primary | ICD-10-CM

## 2023-05-10 DIAGNOSIS — E11.42 TYPE 2 DIABETES MELLITUS WITH DIABETIC POLYNEUROPATHY, WITH LONG-TERM CURRENT USE OF INSULIN (HCC): Primary | ICD-10-CM

## 2023-05-10 DIAGNOSIS — I10 ESSENTIAL HYPERTENSION: ICD-10-CM

## 2023-05-10 DIAGNOSIS — N18.31 STAGE 3A CHRONIC KIDNEY DISEASE (HCC): ICD-10-CM

## 2023-05-10 PROCEDURE — 99214 OFFICE O/P EST MOD 30 MIN: CPT | Performed by: PSYCHIATRY & NEUROLOGY

## 2023-05-10 PROCEDURE — 3051F HG A1C>EQUAL 7.0%<8.0%: CPT | Performed by: PSYCHIATRY & NEUROLOGY

## 2023-05-10 PROCEDURE — 3080F DIAST BP >= 90 MM HG: CPT | Performed by: PSYCHIATRY & NEUROLOGY

## 2023-05-10 PROCEDURE — 3077F SYST BP >= 140 MM HG: CPT | Performed by: PSYCHIATRY & NEUROLOGY

## 2023-05-10 RX ORDER — DULOXETIN HYDROCHLORIDE 60 MG/1
60 CAPSULE, DELAYED RELEASE ORAL DAILY
Qty: 90 CAPSULE | Refills: 1 | Status: SHIPPED | OUTPATIENT
Start: 2023-05-10 | End: 2023-11-06

## 2023-05-10 NOTE — PROGRESS NOTES
The patient came today for follow up regarding: Polyneuropathy    Since the patient's last visit, he started taking Cymbalta up to 60 mg daily. Feels much better almost 50% less pain in his legs and feet. No side effect of diarrhea. No GI upset. He walks with his cane. No recent falling. Still controlling his blood sugar level which is waxing and waning. He takes Lyrica twice daily. He denies any new symptoms today. He takes aspirin and blood pressure medications. No headache, neck or back pain or falling. Exam:   Constitutional:   Vitals:    05/10/23 1202   BP: (!) 164/117   Site: Right Wrist   Position: Sitting   Pulse: (!) 125   Weight: 249 lb 12.8 oz (113.3 kg)   Height: 6' 1\" (1.854 m)       General appearance:  Normal development and appear in no acute distress. Mental Status:   Oriented to person, place, problem, and time. Memory: Good immediate recall. Intact remote memory  Normal attention span and concentration. Language: intact naming, repeating and fluency   Good fund of Knowledge. Aware of current events and vocabulary   Cranial Nerves:   II: Pupils: equal, round, reactive to light  III,IV,VI: Extra Ocular Movements are intact. No nystagmus  V: Facial sensation is intact   VII: Facial strength and movements: intact and symmetric  XII: Tongue movements are normal  Musculoskeletal: Symmetric 2+ in the arms and 2 in his knees and 1 his ankle    Coordination: no pronator drift, no dysmetria with FNF and normal REM  Sensation: normal in his arms and is mild decreased vibration and touch in his distal feet. Gait/Posture: Walks with his cane.   Steady        ROS : A 10-14 system review of constitutional, cardiovascular, respiratory, GI, eyes, , ENT, musculoskeletal, endocrine, hematological, skin, SHEENT, genitourinary, psychiatric and neurologic systems was obtained and updated today which is unremarkable except as mentioned in my HPI      Medical decision making:  I personally

## 2023-05-11 ENCOUNTER — HOSPITAL ENCOUNTER (OUTPATIENT)
Dept: PHYSICAL THERAPY | Age: 38
Setting detail: THERAPIES SERIES
Discharge: HOME | End: 2023-05-11
Payer: COMMERCIAL

## 2023-05-11 PROCEDURE — 97112 NEUROMUSCULAR REEDUCATION: CPT

## 2023-05-11 PROCEDURE — 97110 THERAPEUTIC EXERCISES: CPT

## 2023-05-11 NOTE — PROGRESS NOTES
complications  [] Other:     Overall Progression Towards Functional goals/ Treatment Progress Update:  [] Patient is progressing as expected towards functional goals listed. [x] Progression is slowed due to complexities/Impairments listed. [x] Progression has been slowed due to co-morbidities. [] Plan just implemented, too soon to assess goals progression <30days   [] Goals require adjustment due to lack of progress  [] Patient is not progressing as expected and requires additional follow up with physician  [] Other    Prognosis for POC: [x] Good [] Fair  [] Poor    Patient requires continued skilled intervention: [x] Yes  [] No        PLAN: 1-2x/week for 6-8 weeks  [x] Continue per plan of care [] Alter current plan (see comments)  [] Plan of care initiated [] Hold pending MD visit [] Discharge    Electronically signed by: Melissa Zuniga, PT, DPT    Note: If patient does not return for scheduled/recommended follow up visits, this note will serve as a discharge from care along with the most recent update on progress.

## 2023-05-15 ENCOUNTER — OFFICE VISIT (OUTPATIENT)
Dept: PHARMACY | Age: 38
End: 2023-05-15
Payer: COMMERCIAL

## 2023-05-15 VITALS — HEART RATE: 118 BPM | DIASTOLIC BLOOD PRESSURE: 108 MMHG | SYSTOLIC BLOOD PRESSURE: 150 MMHG

## 2023-05-15 DIAGNOSIS — I10 UNCONTROLLED HYPERTENSION: ICD-10-CM

## 2023-05-15 DIAGNOSIS — E11.65 POORLY CONTROLLED TYPE 2 DIABETES MELLITUS (HCC): Primary | ICD-10-CM

## 2023-05-15 DIAGNOSIS — E55.9 VITAMIN D DEFICIENCY: ICD-10-CM

## 2023-05-15 DIAGNOSIS — E11.65 POORLY CONTROLLED TYPE 2 DIABETES MELLITUS (HCC): ICD-10-CM

## 2023-05-15 LAB
25(OH)D3 SERPL-MCNC: 33 NG/ML
ANION GAP SERPL CALCULATED.3IONS-SCNC: 14 MMOL/L (ref 3–16)
BUN SERPL-MCNC: 15 MG/DL (ref 7–20)
CALCIUM SERPL-MCNC: 8.9 MG/DL (ref 8.3–10.6)
CHLORIDE SERPL-SCNC: 100 MMOL/L (ref 99–110)
CHOLEST SERPL-MCNC: 205 MG/DL (ref 0–199)
CO2 SERPL-SCNC: 21 MMOL/L (ref 21–32)
CREAT SERPL-MCNC: 1.2 MG/DL (ref 0.9–1.3)
EST. AVERAGE GLUCOSE BLD GHB EST-MCNC: 228.8 MG/DL
GFR SERPLBLD CREATININE-BSD FMLA CKD-EPI: >60 ML/MIN/{1.73_M2}
GLUCOSE SERPL-MCNC: 232 MG/DL (ref 70–99)
HBA1C MFR BLD: 9.6 %
HDLC SERPL-MCNC: 52 MG/DL (ref 40–60)
LDL CHOLESTEROL CALCULATED: 113 MG/DL
POTASSIUM SERPL-SCNC: 4.3 MMOL/L (ref 3.5–5.1)
SODIUM SERPL-SCNC: 135 MMOL/L (ref 136–145)
TRIGL SERPL-MCNC: 201 MG/DL (ref 0–150)
VLDLC SERPL CALC-MCNC: 40 MG/DL

## 2023-05-15 PROCEDURE — 99212 OFFICE O/P EST SF 10 MIN: CPT

## 2023-05-15 RX ORDER — BLOOD-GLUCOSE SENSOR
EACH MISCELLANEOUS
Qty: 2 EACH | Refills: 11 | Status: SHIPPED | OUTPATIENT
Start: 2023-05-15 | End: 2023-05-15

## 2023-05-15 NOTE — PATIENT INSTRUCTIONS
lower Tresiba from 75 units to 68 units at bedtime. Current treatment:   - Tresiba U-200 75 units under the skin daily     - Humalog 8-10 units plus sliding scale  . Sliding scale: 1: 30 above 120 mg/dL.     - Metformin 500 mg BID  - Jardiance 10 mg daily    Scan karen at least every 8 hours

## 2023-05-15 NOTE — PROGRESS NOTES
CLINICAL PHARMACY NOTE--Diabetes    Flakita Mendosa is a 40 y.o. male with PMHX significant for CKD, diabetic neuropathy, HTN, HLD, Obesity, and Type 2 Diabetes referred by JONY Koroma for diabetes counseling and medication management. Interval update: 2 ER visits since last visit for atypical CP and myalgias. He sees multiple specialists-- endo, pulm, ID, GI (Dr Doyle Gonzales), neuro, and recent est care with pain mgmt. Continues to have ongoing nausea/decr appetite (started feb 2022). D/c trulicity and started humalog SSI in Jan 2023 due to gastroparesis. Metformin dose was decreased ? Due to CHELE? End decreased Tresiba from 75 units to 68 units, but he is still taking 75 units. Pt has not been checking BGs over the past 3 months. Typically misses 2 doses of tresiba per week and only take humalog ~once per day. Claims he is taking all pills as directed. Interested in [employer], as he is having issues with libre2 sensor bending, but brought in [employer] today for assistance with application. States he also received a dexcom G7 from pharmacy, but did not bring this in today. Reports low BG 2x in the past 2-3 months. Feels \"Woozy\" often, but this does not correlate with low BG. His chest pain and dizziness has been worked up by cardiology, unclear etiology. Pt did not take any meds (BP meds included) this AM, which explains elevated BP.       ASSESSMENT/PLAN:    Type 2 Diabetes  A1c above goal <7%, but improved at last check (11.7--8.3-->7.1%)  No SMBG, unclear current level of control  Improve med compliance. Provided pillbox. -Metformin 500 mg QD   -Jardiance 10 mg QD (monitor closely-- poor po and fluid intake and recent CHELE)   -Tresiba --decrease to 68 units daily per endo, informed he can keep his current pen out at room temp, so he remembers to take with pills.    -Humalog SSI   -Bring dexcom G7 to next visit for training.  -Pt also inquired about insulin pump.  Will discuss at future

## 2023-05-17 ENCOUNTER — HOSPITAL ENCOUNTER (OUTPATIENT)
Dept: PHYSICAL THERAPY | Age: 38
Setting detail: THERAPIES SERIES
Discharge: HOME | End: 2023-05-17
Payer: COMMERCIAL

## 2023-05-17 PROCEDURE — 97112 NEUROMUSCULAR REEDUCATION: CPT

## 2023-05-17 PROCEDURE — 97110 THERAPEUTIC EXERCISES: CPT

## 2023-05-17 NOTE — FLOWSHEET NOTE
[address] [facility]  Phone: [phone]   Fax:     [phone]      Date:  2023    Patient Name:  Tomas Aviles    :  1985  MRN: [id number]    Pertinent Medical History: Additional Pertinent Hx: Hyperlipidemia, hypertension, diabetes, neuropathy    Medical/Treatment Diagnosis Information:  Medical Diagnosis: Neuropathy [G62.9]  Treatment Diagnosis: Decreased functional mobility, impaired balance    Insurance/Certification information:  PT Insurance Information: Medicaid  Physician Information:  Yvette Infante*  Plan of care signed (Y/N): sent 04/10/2023, received     Date of Patient follow up with Physician:      Progress Report: [x]  Yes  []  No     Date Range for reporting period:  Beginnin/10/2023  Ending:     Progress report due (10 Rx/or 30 days whichever is less): 3/56/23     Recertification due (POC duration/ or 90 days whichever is less): 23     Visit # Insurance/POC Allowable Auth Needed    [place] []Yes    [x]No       Latex Allergy:  [x]NO      []YES  Preferred Language for Healthcare:   [x]English       []Other:    Functional Scale:        Test: [facility]  Date Assessed Score   4/10/23 76/96   23 72/96       Pain level:  6-8/10     History of Injury:  Patient stated complaint: Patient reports that he is having trouble walking/balance. Is having some dizziness, difficulty with lifting weights. Patient reports that he can stand for about 10 minutes before he has to sit down. Patient reports he has been having problem with his balance for about 4-5 years. Reports that balance has been staying about the same, not getting better or worse. Patient has had PT in the past where he was working on balance and stretching, reports he is still trying to stay up on his HEP. Hot water can help to decrease the pain.  Patient does report

## 2023-05-24 ENCOUNTER — HOSPITAL ENCOUNTER (OUTPATIENT)
Dept: PHYSICAL THERAPY | Age: 38
Setting detail: THERAPIES SERIES
Discharge: HOME | End: 2023-05-24
Payer: COMMERCIAL

## 2023-05-24 PROCEDURE — 97110 THERAPEUTIC EXERCISES: CPT

## 2023-05-24 PROCEDURE — 97112 NEUROMUSCULAR REEDUCATION: CPT

## 2023-05-24 NOTE — FLOWSHEET NOTE
[address] [facility] [address] [address]  Phone: [phone]   Fax:     [phone]      Date:  2023    Patient Name:  Crow Ashley    :  1985  MRN: [id number]    Pertinent Medical History: Additional Pertinent Hx: Hyperlipidemia, hypertension, diabetes, neuropathy    Medical/Treatment Diagnosis Information:  Medical Diagnosis: Neuropathy [G62.9]  Treatment Diagnosis: Decreased functional mobility, impaired balance    Insurance/Certification information:  PT Insurance Information: Medicaid  Physician Information:  Callie Infante*  Plan of care signed (Y/N): sent 04/10/2023, received     Date of Patient follow up with Physician:      Progress Report: []  Yes  [x]  No     Date Range for reporting period:  Beginnin/10/2023  Ending:     Progress report due (10 Rx/or 30 days whichever is less): 5/10/23,      Recertification due (POC duration/ or 90 days whichever is less): 23     Visit # Insurance/POC Allowable Auth Needed    [place] []Yes    [x]No       Latex Allergy:  [x]NO      []YES  Preferred Language for Healthcare:   [x]English       []Other:    Functional Scale:        Test: [facility]  Date Assessed Score   4/10/23 76/96   23 72/96       Pain level:  6-8/10     History of Injury:  Patient stated complaint: Patient reports that he is having trouble walking/balance. Is having some dizziness, difficulty with lifting weights. Patient reports that he can stand for about 10 minutes before he has to sit down. Patient reports he has been having problem with his balance for about 4-5 years. Reports that balance has been staying about the same, not getting better or worse. Patient has had PT in the past where he was working on balance and stretching, reports he is still trying to stay up on his HEP. Hot water can help to decrease the pain.  Patient does

## 2023-05-30 ENCOUNTER — TELEPHONE (OUTPATIENT)
Dept: PRIMARY CARE CLINIC | Age: 38
End: 2023-05-30

## 2023-05-30 ENCOUNTER — OFFICE VISIT (OUTPATIENT)
Dept: PHARMACY | Age: 38
End: 2023-05-30
Payer: COMMERCIAL

## 2023-05-30 ENCOUNTER — HOSPITAL ENCOUNTER (OUTPATIENT)
Dept: GENERAL RADIOLOGY | Age: 38
Discharge: HOME | End: 2023-05-30
Payer: COMMERCIAL

## 2023-05-30 ENCOUNTER — HOSPITAL ENCOUNTER (OUTPATIENT)
Age: 38
Discharge: HOME | End: 2023-05-30
Payer: COMMERCIAL

## 2023-05-30 VITALS — DIASTOLIC BLOOD PRESSURE: 108 MMHG | HEART RATE: 104 BPM | SYSTOLIC BLOOD PRESSURE: 148 MMHG

## 2023-05-30 DIAGNOSIS — M54.2 NECK PAIN: ICD-10-CM

## 2023-05-30 DIAGNOSIS — G89.4 CHRONIC PAIN SYNDROME: ICD-10-CM

## 2023-05-30 DIAGNOSIS — M25.559 HIP PAIN, UNSPECIFIED LATERALITY: ICD-10-CM

## 2023-05-30 DIAGNOSIS — I10 UNCONTROLLED HYPERTENSION: ICD-10-CM

## 2023-05-30 DIAGNOSIS — E11.65 POORLY CONTROLLED TYPE 2 DIABETES MELLITUS (HCC): Primary | ICD-10-CM

## 2023-05-30 PROCEDURE — 72050 X-RAY EXAM NECK SPINE 4/5VWS: CPT

## 2023-05-30 PROCEDURE — 99212 OFFICE O/P EST SF 10 MIN: CPT

## 2023-05-30 PROCEDURE — 73521 X-RAY EXAM HIPS BI 2 VIEWS: CPT

## 2023-05-30 NOTE — TELEPHONE ENCOUNTER
Emigdio Rizo called in wanting to know if we can set Nancyannie Dias up with a , he is forgetting a lot of his appointments. no known allergies

## 2023-05-30 NOTE — TELEPHONE ENCOUNTER
Per Chata Mann, there is not a service providing [employer]" to help patients with appointment reminders. Dr. Veronica Gambino, unless you know of a service that provides patients with this, I would recommend the patient to contact their insurance company to see if there is a service they recommend.

## 2023-05-30 NOTE — PROGRESS NOTES
CLINICAL PHARMACY NOTE--Diabetes    Lynn Roque is a 40 y.o. male with PMHX significant for CKD, diabetic neuropathy, HTN, HLD, Obesity, and Type 2 Diabetes referred by JONY Michaels for diabetes counseling and medication management. Interval update:  Continues to have ongoing pain, nausea/decr appetite (started Feb 2022). Multiple ED visits, but none since last visit. He sees multiple specialists-- endo, pulm, ID, GI (Dr Ashvin Mccain), neuro, and recent est care with pain mgmt. D/c trulicity and started humalog SSI in Jan 2023 due to gastroparesis. Metformin dose was decreased (Due to CHELE?). Endo decreased Tresiba from 75 units to 68 units. A1c back up (7.1-->9.6%) due to med noncompliance. Typically misses 2 doses of tresiba per week and only takes humalog ~1-2 times per day. Claims he is taking all pills as directed. Pt had not been checking BGs over the past 3 months, up until he placed a karen ~2 wks ago. Having issues with libre2 sensor bending, brought in [facility] today for assistance with application. No low BG since last visit (applied karen ~2 wks ago). Feels \"Woozy\" often, but this does not correlate with low BG. His chest pain and dizziness has been worked up by cardiology, unclear etiology. Pt did not take any meds (BP meds included) this AM, which explains elevated BP. He is overwhelmed with keeping tracks of appts and phone calls related to his care. ASSESSMENT/PLAN:    Type 2 Diabetes  A1c goal <7% (11.7--8.3-->7.1-->9.6%)  -Improve med compliance. Provided pillbox. -Metformin 500 mg QD   -Jardiance 10 mg QD (monitor closely-- poor po and fluid intake and recent CHELE)   -Tresiba 68 units daily per endo    -Humalog SSI   -Suggested pt enroll with [facility] at [facility] application and training today.    -HM: eye exam - patient declining all vaccinations   -D/w with Dr Randa Berman- pt will be d/c from med mgmt clinic and continue to f/u with endo (next visit

## 2023-05-31 ENCOUNTER — HOSPITAL ENCOUNTER (OUTPATIENT)
Dept: PHYSICAL THERAPY | Age: 38
Setting detail: THERAPIES SERIES
Discharge: HOME | End: 2023-05-31
Payer: COMMERCIAL

## 2023-05-31 PROCEDURE — 97112 NEUROMUSCULAR REEDUCATION: CPT

## 2023-05-31 PROCEDURE — 97110 THERAPEUTIC EXERCISES: CPT

## 2023-05-31 NOTE — TELEPHONE ENCOUNTER
Spoke with Thomas Pulido, asked her to contact the insurance company [employer] to see if they have a  for him. If they do not we can try to reach out to our care manager Brianda HAYES To see if she has any recommendations.

## 2023-05-31 NOTE — FLOWSHEET NOTE
Progressing: [] Met: [] Not Met: [] Adjusted  3. Patient will report the ability to ambulate 30 minutes independently without increase in symptoms in order to promote improved functional mobility and navigation within the community. [x] Progressing: [] Met: [] Not Met: [] Adjusted  4. Patient will demonstrate the ability to maintain SL balance for 15 seconds independently to improve independence and decrease risk of falls. [x] Progressing: [] Met: [] Not Met: [] Adjusted    ASSESSMENT: Patient continues to demonstrate improving lower extremity strength through increased resistance on leg press. Improving balance noted through improved performance on lateral stepping up and over cones, requires only SBA and improved stability noted. Continue to progress as able. Patient would benefit from continued skilled physical therapy to improve balance and lower extremity strength in order to improve tolerance to ADLs and functional activities. Treatment/Activity Tolerance:  [x] Patient tolerated treatment well [] Patient limited by fatique  [] Patient limited by pain  [] Patient limited by other medical complications  [] Other:     Overall Progression Towards Functional goals/ Treatment Progress Update:  [] Patient is progressing as expected towards functional goals listed. [x] Progression is slowed due to complexities/Impairments listed. [x] Progression has been slowed due to co-morbidities.   [] Plan just implemented, too soon to assess goals progression <30days   [] Goals require adjustment due to lack of progress  [] Patient is not progressing as expected and requires additional follow up with physician  [] Other    Prognosis for POC: [x] Good [] Fair  [] Poor    Patient requires continued skilled intervention: [x] Yes  [] No        PLAN: 1-2x/week for 6-8 weeks  [x] Continue per plan of care [] Alter current plan (see comments)  [] Plan of care initiated [] Hold pending MD visit [] Discharge    Electronically

## 2023-06-01 ENCOUNTER — TELEPHONE (OUTPATIENT)
Dept: PRIMARY CARE CLINIC | Age: 38
End: 2023-06-01

## 2023-06-01 ENCOUNTER — OFFICE VISIT (OUTPATIENT)
Dept: PAIN MANAGEMENT | Age: 38
End: 2023-06-01
Payer: COMMERCIAL

## 2023-06-01 ENCOUNTER — OFFICE VISIT (OUTPATIENT)
Dept: PRIMARY CARE CLINIC | Age: 38
End: 2023-06-01
Payer: COMMERCIAL

## 2023-06-01 VITALS
TEMPERATURE: 98.6 F | OXYGEN SATURATION: 96 % | SYSTOLIC BLOOD PRESSURE: 103 MMHG | BODY MASS INDEX: 35.09 KG/M2 | DIASTOLIC BLOOD PRESSURE: 71 MMHG | HEART RATE: 106 BPM | WEIGHT: 266 LBS

## 2023-06-01 VITALS
HEART RATE: 92 BPM | TEMPERATURE: 97.4 F | WEIGHT: 271 LBS | OXYGEN SATURATION: 100 % | DIASTOLIC BLOOD PRESSURE: 94 MMHG | BODY MASS INDEX: 35.92 KG/M2 | HEIGHT: 73 IN | SYSTOLIC BLOOD PRESSURE: 136 MMHG

## 2023-06-01 DIAGNOSIS — F41.1 GENERALIZED ANXIETY DISORDER: Primary | ICD-10-CM

## 2023-06-01 DIAGNOSIS — R20.0 NUMBNESS AND TINGLING IN BOTH HANDS: ICD-10-CM

## 2023-06-01 DIAGNOSIS — M79.643 PAIN OF HAND, UNSPECIFIED LATERALITY: ICD-10-CM

## 2023-06-01 DIAGNOSIS — M54.2 NECK PAIN: ICD-10-CM

## 2023-06-01 DIAGNOSIS — F33.1 MDD (MAJOR DEPRESSIVE DISORDER), RECURRENT EPISODE, MODERATE (HCC): ICD-10-CM

## 2023-06-01 DIAGNOSIS — E66.01 CLASS 2 SEVERE OBESITY DUE TO EXCESS CALORIES WITH SERIOUS COMORBIDITY AND BODY MASS INDEX (BMI) OF 35.0 TO 35.9 IN ADULT (HCC): ICD-10-CM

## 2023-06-01 DIAGNOSIS — F32.A DEPRESSION, UNSPECIFIED DEPRESSION TYPE: ICD-10-CM

## 2023-06-01 DIAGNOSIS — Z51.81 ENCOUNTER FOR THERAPEUTIC DRUG MONITORING: ICD-10-CM

## 2023-06-01 DIAGNOSIS — R20.2 NUMBNESS AND TINGLING IN BOTH HANDS: ICD-10-CM

## 2023-06-01 DIAGNOSIS — E11.42 TYPE 2 DIABETES MELLITUS WITH DIABETIC POLYNEUROPATHY, WITH LONG-TERM CURRENT USE OF INSULIN (HCC): ICD-10-CM

## 2023-06-01 DIAGNOSIS — B39.2 PULMONARY HISTOPLASMOSIS (HCC): ICD-10-CM

## 2023-06-01 DIAGNOSIS — Z79.4 TYPE 2 DIABETES MELLITUS WITH DIABETIC POLYNEUROPATHY, WITH LONG-TERM CURRENT USE OF INSULIN (HCC): ICD-10-CM

## 2023-06-01 DIAGNOSIS — I10 PRIMARY HYPERTENSION: ICD-10-CM

## 2023-06-01 DIAGNOSIS — G89.4 CHRONIC PAIN SYNDROME: ICD-10-CM

## 2023-06-01 DIAGNOSIS — G62.9 NEUROPATHY: ICD-10-CM

## 2023-06-01 DIAGNOSIS — M25.559 HIP PAIN, UNSPECIFIED LATERALITY: ICD-10-CM

## 2023-06-01 DIAGNOSIS — F41.9 ANXIETY: ICD-10-CM

## 2023-06-01 PROBLEM — R07.2 PRECORDIAL PAIN: Status: RESOLVED | Noted: 2023-05-08 | Resolved: 2023-06-01

## 2023-06-01 PROCEDURE — 3074F SYST BP LT 130 MM HG: CPT | Performed by: NURSE PRACTITIONER

## 2023-06-01 PROCEDURE — 3078F DIAST BP <80 MM HG: CPT | Performed by: NURSE PRACTITIONER

## 2023-06-01 PROCEDURE — 3078F DIAST BP <80 MM HG: CPT | Performed by: FAMILY MEDICINE

## 2023-06-01 PROCEDURE — 3074F SYST BP LT 130 MM HG: CPT | Performed by: FAMILY MEDICINE

## 2023-06-01 PROCEDURE — 99214 OFFICE O/P EST MOD 30 MIN: CPT | Performed by: FAMILY MEDICINE

## 2023-06-01 PROCEDURE — 3046F HEMOGLOBIN A1C LEVEL >9.0%: CPT | Performed by: FAMILY MEDICINE

## 2023-06-01 PROCEDURE — 99214 OFFICE O/P EST MOD 30 MIN: CPT | Performed by: NURSE PRACTITIONER

## 2023-06-01 RX ORDER — HYDROCODONE BITARTRATE AND ACETAMINOPHEN 5; 325 MG/1; MG/1
1 TABLET ORAL EVERY 12 HOURS PRN
Qty: 60 TABLET | Refills: 0 | Status: SHIPPED | OUTPATIENT
Start: 2023-06-01 | End: 2023-07-01

## 2023-06-01 RX ORDER — BUSPIRONE HYDROCHLORIDE 5 MG/1
5 TABLET ORAL 2 TIMES DAILY
Qty: 60 TABLET | Refills: 2 | Status: SHIPPED | OUTPATIENT
Start: 2023-06-01 | End: 2023-07-01

## 2023-06-01 ASSESSMENT — ENCOUNTER SYMPTOMS
SHORTNESS OF BREATH: 1
COUGH: 0

## 2023-06-01 NOTE — TELEPHONE ENCOUNTER
Patient was in office today for an appt and asked about a request for medical records that was sent back in February. I had called about the records in April and the record have still not been sent. I call JESSY again, spoke with Alex Chisholm. I explained the situation and she could read the notes from my previous conversation. She states there are no records available and I told her that the patient has has several appts during the time frame that has been requested and I gave Alex Chisholm some of the dates for her to research. She stated she was going to speak with her  to see why the records have not been sent. I requested a call back. Alex Chisholm said I would hear back by tomorrow. I let the patient know what is going on and that I would call them as soon as I received a call from Medical Records. Patient states understanding and looks forward to hearing back.

## 2023-06-01 NOTE — PROGRESS NOTES
Hypertension     Neuropathy     Type 2 diabetes mellitus without complication (HCC)         Social History     Tobacco Use    Smoking status: Never    Smokeless tobacco: Never   Vaping Use    Vaping Use: Never used   Substance Use Topics    Alcohol use: Not Currently     Comment: rare    Drug use: Yes     Types: Marijuana Dixie [place])     Comment: occ        Past Surgical History:   Procedure Laterality Date    BRONCHOSCOPY  06/09/2022    BRONCHOSCOPY BRUSHINGS performed by Sumeet Moore MD at [address]  06/09/2022    BRONCHOSCOPY DIAGNOSTIC OR CELL [address] performed by Sumeet Moore MD at [address]  06/09/2022    BRONCHOSCOPY BIOPSY BRONCHUS performed by Sumeet Moore MD at [address] N/A 06/14/2022    BRONCHOSCOPY DIAGNOSTIC OR CELL [address] ONLY performed by Yiama Dahl DO at [facility] ENDOSCOPY        Allergies   Allergen Reactions    Penicillins Rash        Family History   Problem Relation Age of Onset    Diabetes Father     High Blood Pressure Father         Patient's past medical history, surgical history, family history, medications, and allergies  were all reviewed and updated as appropriate today. Review of Systems   Constitutional:  Negative for fever and unexpected weight change. Respiratory:  Positive for shortness of breath. Negative for cough. Cardiovascular:  Positive for chest pain. Negative for palpitations and leg swelling. Psychiatric/Behavioral:  Positive for dysphoric mood. The patient is nervous/anxious. /71   Pulse (!) 106   Temp 98.6 °F (37 °C)   Wt 266 lb (120.7 kg)   SpO2 96%   BMI 35.09 kg/m²      Physical Exam  Constitutional:       General: He is not in acute distress. Appearance: Normal appearance. Neurological:      Mental Status: He is alert. Psychiatric:         Mood and Affect: Mood is anxious.          Speech: Speech normal.         Behavior: Behavior normal.

## 2023-06-01 NOTE — PROGRESS NOTES
to accommodate PRN testing needs. Use when CGM is not active. 100 strip 0    Insulin Degludec (TRESIBA FLEXTOUCH) 200 UNIT/ML SOPN Inject 75 Units into the skin daily (Patient taking differently: Inject 68 Units into the skin daily) 12 mL 3    traZODone (DESYREL) 50 MG tablet Take 1-2 tablets by mouth nightly as needed for Sleep 60 tablet 2    albuterol sulfate HFA (PROVENTIL;VENTOLIN;PROAIR) 108 (90 Base) MCG/ACT inhaler Inhale 2 puffs into the lungs every 6 hours as needed for Wheezing or Shortness of Breath 18 g 1     No facility-administered medications prior to visit. SOCIAL/FAMILY/PAST MEDICAL HISTORY: Mr. Felix Steinberg, family and past medical history was reviewed. REVIEW OF SYSTEMS:    Respiratory: Negative for apnea, chest tightness and shortness of breath or change in baseline breathing. Gastrointestinal: Negative for nausea, vomiting, abdominal pain, diarrhea, constipation, blood in stool and abdominal distention. PHYSICAL EXAM:   Nursing note and vitals reviewed. BP (!) 136/94   Pulse 92   Temp 97.4 °F (36.3 °C)   Ht 6' 1\" (1.854 m)   Wt 271 lb (122.9 kg)   SpO2 100%   BMI 35.75 kg/m²   Constitutional: He appears well-developed and well-nourished. No acute distress. Skin: Skin is warm and dry, good turgor. No rash noted. He is not diaphoretic. Cardiovascular: Normal rate, regular rhythm, normal heart sounds, and does not have murmur. Pulmonary/Chest: Effort normal. No respiratory distress. He does not have wheezes in the lung fields. He has no rales. Neurological/Psychiatric:He is alert and oriented to person, place, and time. Coordination is  normal.  His mood isAppropriate and affect is Neutral/Euthymic(normal) . Xray of the Cervical spine, Hip pain 5/30/23:  Cervical spine:  No acute osseous abnormality. Bilateral hips and pelvis. No acute osseous abnormality.      Prescription pain medication monitoring:                  MEDD current = 10              ORT

## 2023-06-01 NOTE — TELEPHONE ENCOUNTER
Received call from [facility] with [address] regarding the medical records request.  She explained that the original request was for 1/1/2021 to present. Per HIPAA regulations, the \"Present\" date is the date the patient signed the authorization for release of Medical Records. The date the patient signed the release is 5/12/2022. Patient didn't become a patient with [facility] until 7/19/2022 therefore there were no medical records to send to the requestor. Jeannette Olivera contacted the requestor, Martínez Nova and REJI and they are going to send over a new request with a current date. Once it is received, [address] will process the request right away. Jeannette Olivera also contacted the patient to apologize and let him know what happened. Rod Gladstone to me, to the office as she recognizes this information should have been relayed immediately and will be addressing this accordingly. As promised, I followed up with the patient as well. He states understanding.

## 2023-06-05 DIAGNOSIS — E55.9 VITAMIN D DEFICIENCY: ICD-10-CM

## 2023-06-05 RX ORDER — ERGOCALCIFEROL 1.25 MG/1
CAPSULE ORAL
Qty: 12 CAPSULE | Refills: 0 | Status: SHIPPED | OUTPATIENT
Start: 2023-06-05

## 2023-06-06 DIAGNOSIS — E55.9 VITAMIN D DEFICIENCY: ICD-10-CM

## 2023-06-06 RX ORDER — ERGOCALCIFEROL 1.25 MG/1
CAPSULE ORAL
Qty: 12 CAPSULE | Refills: 0 | OUTPATIENT
Start: 2023-06-06

## 2023-06-20 NOTE — TELEPHONE ENCOUNTER
Pt calling for pen needles for his Humalog and Tresiba to be called in to [address]    Last ov 6-1-23 [facility].  Next ov 7-13-23 [facility].

## 2023-06-21 ENCOUNTER — HOSPITAL ENCOUNTER (OUTPATIENT)
Dept: PHYSICAL THERAPY | Age: 38
Setting detail: THERAPIES SERIES
Discharge: HOME | End: 2023-06-21
Payer: COMMERCIAL

## 2023-06-21 PROCEDURE — 97110 THERAPEUTIC EXERCISES: CPT

## 2023-06-21 PROCEDURE — 97112 NEUROMUSCULAR REEDUCATION: CPT

## 2023-06-21 RX ORDER — PEN NEEDLE, DIABETIC 31 GX5/16"
1 NEEDLE, DISPOSABLE MISCELLANEOUS DAILY
Qty: 100 EACH | Refills: 3 | Status: SHIPPED | OUTPATIENT
Start: 2023-06-21

## 2023-06-21 NOTE — FLOWSHEET NOTE
[address] [facility] [address]  Phone: [phone]   Fax:     [phone]      Date:  2023    Patient Name:  Dannial Rubinstein    :  1985  MRN: [id number]    Pertinent Medical History: Additional Pertinent Hx: Hyperlipidemia, hypertension, diabetes, neuropathy    Medical/Treatment Diagnosis Information:  Medical Diagnosis: Neuropathy [G62.9]  Treatment Diagnosis: Decreased functional mobility, impaired balance    Insurance/Certification information:  PT Insurance Information: Medicaid  Physician Information:  Mago Hauser  Plan of care signed (Y/N): sent 04/10/2023, received     Date of Patient follow up with Physician:      Progress Report: []  Yes  [x]  No     Date Range for reporting period:  Beginnin/10/2023  Ending:     Progress report due (10 Rx/or 30 days whichever is less): 5/10/23, ,     Recertification due (POC duration/ or 90 days whichever is less): 23, 23    Visit # Insurance/POC Allowable Auth Needed    [place] []Yes    [x]No       Latex Allergy:  [x]NO      []YES  Preferred Language for Healthcare:   [x]English       []Other:    Functional Scale:        Test: [facility]  Date Assessed Score   4/10/23 76/96   23 72/96   23 65/96       Pain level:  6-8/10     History of Injury:  Patient stated complaint: Patient reports that he is having trouble walking/balance. Is having some dizziness, difficulty with lifting weights. Patient reports that he can stand for about 10 minutes before he has to sit down. Patient reports he has been having problem with his balance for about 4-5 years. Reports that balance has been staying about the same, not getting better or worse. Patient has had PT in the past where he was working on balance and stretching, reports he is still trying to stay up on his HEP.  Hot water can help to

## 2023-06-26 ENCOUNTER — OFFICE VISIT (OUTPATIENT)
Dept: PAIN MANAGEMENT | Age: 38
End: 2023-06-26
Payer: COMMERCIAL

## 2023-06-26 VITALS
BODY MASS INDEX: 35.25 KG/M2 | HEART RATE: 92 BPM | HEIGHT: 73 IN | WEIGHT: 266 LBS | DIASTOLIC BLOOD PRESSURE: 81 MMHG | TEMPERATURE: 97.3 F | OXYGEN SATURATION: 98 % | SYSTOLIC BLOOD PRESSURE: 118 MMHG

## 2023-06-26 DIAGNOSIS — M79.643 PAIN OF HAND, UNSPECIFIED LATERALITY: ICD-10-CM

## 2023-06-26 DIAGNOSIS — R20.2 NUMBNESS AND TINGLING IN BOTH HANDS: ICD-10-CM

## 2023-06-26 DIAGNOSIS — F41.1 GENERALIZED ANXIETY DISORDER: ICD-10-CM

## 2023-06-26 DIAGNOSIS — F33.1 MDD (MAJOR DEPRESSIVE DISORDER), RECURRENT EPISODE, MODERATE (HCC): ICD-10-CM

## 2023-06-26 DIAGNOSIS — G62.9 NEUROPATHY: ICD-10-CM

## 2023-06-26 DIAGNOSIS — M25.559 HIP PAIN, UNSPECIFIED LATERALITY: ICD-10-CM

## 2023-06-26 DIAGNOSIS — M54.2 NECK PAIN: ICD-10-CM

## 2023-06-26 DIAGNOSIS — G89.4 CHRONIC PAIN SYNDROME: ICD-10-CM

## 2023-06-26 DIAGNOSIS — E66.01 CLASS 2 SEVERE OBESITY DUE TO EXCESS CALORIES WITH SERIOUS COMORBIDITY AND BODY MASS INDEX (BMI) OF 35.0 TO 35.9 IN ADULT (HCC): ICD-10-CM

## 2023-06-26 DIAGNOSIS — R20.0 NUMBNESS AND TINGLING IN BOTH HANDS: ICD-10-CM

## 2023-06-26 PROCEDURE — 3078F DIAST BP <80 MM HG: CPT | Performed by: NURSE PRACTITIONER

## 2023-06-26 PROCEDURE — 99213 OFFICE O/P EST LOW 20 MIN: CPT | Performed by: NURSE PRACTITIONER

## 2023-06-26 PROCEDURE — 3074F SYST BP LT 130 MM HG: CPT | Performed by: NURSE PRACTITIONER

## 2023-06-26 RX ORDER — HYDROCODONE BITARTRATE AND ACETAMINOPHEN 5; 325 MG/1; MG/1
1 TABLET ORAL EVERY 12 HOURS PRN
Qty: 60 TABLET | Refills: 0 | Status: SHIPPED | OUTPATIENT
Start: 2023-06-26 | End: 2023-07-26

## 2023-06-28 ENCOUNTER — HOSPITAL ENCOUNTER (OUTPATIENT)
Dept: PHYSICAL THERAPY | Age: 38
Setting detail: THERAPIES SERIES
Discharge: HOME | End: 2023-06-28
Payer: COMMERCIAL

## 2023-06-28 PROCEDURE — 97112 NEUROMUSCULAR REEDUCATION: CPT

## 2023-06-28 PROCEDURE — 97110 THERAPEUTIC EXERCISES: CPT

## 2023-07-05 ENCOUNTER — HOSPITAL ENCOUNTER (OUTPATIENT)
Dept: PHYSICAL THERAPY | Age: 38
Setting detail: THERAPIES SERIES
Discharge: HOME | End: 2023-07-05
Payer: COMMERCIAL

## 2023-07-05 PROCEDURE — 97112 NEUROMUSCULAR REEDUCATION: CPT

## 2023-07-05 PROCEDURE — 97110 THERAPEUTIC EXERCISES: CPT

## 2023-07-13 ENCOUNTER — OFFICE VISIT (OUTPATIENT)
Dept: PRIMARY CARE CLINIC | Age: 38
End: 2023-07-13
Payer: COMMERCIAL

## 2023-07-13 VITALS
HEART RATE: 122 BPM | BODY MASS INDEX: 34.75 KG/M2 | WEIGHT: 263.4 LBS | TEMPERATURE: 99.1 F | DIASTOLIC BLOOD PRESSURE: 124 MMHG | OXYGEN SATURATION: 97 % | SYSTOLIC BLOOD PRESSURE: 159 MMHG

## 2023-07-13 DIAGNOSIS — F41.1 GENERALIZED ANXIETY DISORDER: Primary | ICD-10-CM

## 2023-07-13 PROCEDURE — 3078F DIAST BP <80 MM HG: CPT | Performed by: FAMILY MEDICINE

## 2023-07-13 PROCEDURE — 3074F SYST BP LT 130 MM HG: CPT | Performed by: FAMILY MEDICINE

## 2023-07-13 PROCEDURE — 99213 OFFICE O/P EST LOW 20 MIN: CPT | Performed by: FAMILY MEDICINE

## 2023-07-13 RX ORDER — HYDROXYZINE 50 MG/1
50 TABLET, FILM COATED ORAL NIGHTLY
Qty: 30 TABLET | Refills: 1 | Status: SHIPPED | OUTPATIENT
Start: 2023-07-13 | End: 2023-09-11

## 2023-07-19 ENCOUNTER — HOSPITAL ENCOUNTER (EMERGENCY)
Age: 38
Discharge: HOME | End: 2023-07-19
Attending: EMERGENCY MEDICINE
Payer: COMMERCIAL

## 2023-07-19 VITALS
BODY MASS INDEX: 34.67 KG/M2 | WEIGHT: 256 LBS | HEIGHT: 72 IN | RESPIRATION RATE: 20 BRPM | DIASTOLIC BLOOD PRESSURE: 76 MMHG | SYSTOLIC BLOOD PRESSURE: 155 MMHG | HEART RATE: 88 BPM | OXYGEN SATURATION: 98 % | TEMPERATURE: 98.3 F

## 2023-07-19 DIAGNOSIS — L29.9 PRURITIC DISORDER: Primary | ICD-10-CM

## 2023-07-19 PROCEDURE — 99283 EMERGENCY DEPT VISIT LOW MDM: CPT

## 2023-07-19 RX ORDER — PREDNISONE 20 MG/1
40 TABLET ORAL DAILY
Qty: 10 TABLET | Refills: 0 | Status: SHIPPED | OUTPATIENT
Start: 2023-07-19 | End: 2023-07-24

## 2023-07-19 ASSESSMENT — PAIN - FUNCTIONAL ASSESSMENT: PAIN_FUNCTIONAL_ASSESSMENT: NONE - DENIES PAIN

## 2023-07-19 NOTE — ED TRIAGE NOTES
36y/o male presents to the ED with pruritis all over x1 month. Pt with decreased sleep r/t itching. -n/v/f/c/d.  No rash

## 2023-07-19 NOTE — DISCHARGE INSTRUCTIONS
Please keep a close eye on your blood sugars while on prednisone as it may cause elevation of sugars and you may need to adjust her insulin dose. If no improvement within 1 week follow-up with your family doctor and dermatology.

## 2023-07-24 ENCOUNTER — OFFICE VISIT (OUTPATIENT)
Dept: PAIN MANAGEMENT | Age: 38
End: 2023-07-24
Payer: COMMERCIAL

## 2023-07-24 VITALS
BODY MASS INDEX: 34.72 KG/M2 | DIASTOLIC BLOOD PRESSURE: 87 MMHG | WEIGHT: 256 LBS | HEART RATE: 106 BPM | SYSTOLIC BLOOD PRESSURE: 118 MMHG

## 2023-07-24 DIAGNOSIS — F41.1 GENERALIZED ANXIETY DISORDER: ICD-10-CM

## 2023-07-24 DIAGNOSIS — G62.9 NEUROPATHY: ICD-10-CM

## 2023-07-24 DIAGNOSIS — R20.2 NUMBNESS AND TINGLING IN BOTH HANDS: ICD-10-CM

## 2023-07-24 DIAGNOSIS — R20.0 NUMBNESS AND TINGLING IN BOTH HANDS: ICD-10-CM

## 2023-07-24 DIAGNOSIS — E66.01 CLASS 2 SEVERE OBESITY DUE TO EXCESS CALORIES WITH SERIOUS COMORBIDITY AND BODY MASS INDEX (BMI) OF 35.0 TO 35.9 IN ADULT (HCC): ICD-10-CM

## 2023-07-24 DIAGNOSIS — M25.559 HIP PAIN, UNSPECIFIED LATERALITY: ICD-10-CM

## 2023-07-24 DIAGNOSIS — M54.2 NECK PAIN: ICD-10-CM

## 2023-07-24 DIAGNOSIS — G89.4 CHRONIC PAIN SYNDROME: ICD-10-CM

## 2023-07-24 DIAGNOSIS — M79.643 PAIN OF HAND, UNSPECIFIED LATERALITY: ICD-10-CM

## 2023-07-24 DIAGNOSIS — F32.A DEPRESSION, UNSPECIFIED DEPRESSION TYPE: ICD-10-CM

## 2023-07-24 PROCEDURE — 3074F SYST BP LT 130 MM HG: CPT | Performed by: NURSE PRACTITIONER

## 2023-07-24 PROCEDURE — 99214 OFFICE O/P EST MOD 30 MIN: CPT | Performed by: NURSE PRACTITIONER

## 2023-07-24 PROCEDURE — 3078F DIAST BP <80 MM HG: CPT | Performed by: NURSE PRACTITIONER

## 2023-07-24 RX ORDER — DOCUSATE SODIUM 100 MG/1
100 CAPSULE, LIQUID FILLED ORAL 2 TIMES DAILY
Qty: 60 CAPSULE | Refills: 0 | Status: SHIPPED | OUTPATIENT
Start: 2023-07-24 | End: 2023-08-23

## 2023-07-24 RX ORDER — HYDROCODONE BITARTRATE AND ACETAMINOPHEN 5; 325 MG/1; MG/1
1 TABLET ORAL EVERY 12 HOURS PRN
Qty: 60 TABLET | Refills: 0 | Status: SHIPPED | OUTPATIENT
Start: 2023-07-24 | End: 2023-08-23

## 2023-07-24 RX ORDER — PREGABALIN 75 MG/1
75 CAPSULE ORAL 2 TIMES DAILY
Qty: 6 CAPSULE | Refills: 0 | Status: SHIPPED | OUTPATIENT
Start: 2023-07-24 | End: 2023-07-27

## 2023-07-24 NOTE — PROGRESS NOTES
Melanie Anton  1985  [id number]    HISTORY OF PRESENT ILLNESS: Mr. Radha Velasco is a 40 y.o. male returns for a follow up visit for pain management  He has a diagnosis of   1. Chronic pain syndrome    2. Neuropathy    3. Neck pain    4. Pain of hand, unspecified laterality    5. Numbness and tingling in both hands    6. Hip pain, unspecified laterality    7. Depression, unspecified depression type    8. Generalized anxiety disorder    9. Class 2 severe obesity due to excess calories with serious comorbidity and body mass index (BMI) of 35.0 to 35.9 in adult [facility])      As per Information Obtained from the PADT (Patient Assessment and Documentation Tool)    He complains of pain in the  Low back, bilateral knees, Bilateral ankles and feet   He rates the pain 8/10 and describes it as sharp, aching, burning. Current treatment regimen has helped relieve about 30% of the pain. He denies any side effects from the current pain regimen. Patient reports that since the last follow up visit the physical functioning is unchanged, family/social relationships are unchanged, mood is unchanged sleep patterns are unchanged, and that the overall functioning is unchanged. Patient denies misusing/abusing his narcotic pain medications or using any illegal drugs. Upon obtaining medical history from Mr. Radha Velasco states that pain is manageable on current pain therapy. Takes the pain medications as prescribed. Patient was treated in the ER for pruritic disorder,  admits to to itching to the skin. He is scheduled to f/u with his PCP. Denies new medications or new soap. Reports this issue has been on off for over a month. Mood/anxiety is stable. Sleep is fair with an average of 5-6 hours. Denies to having issues of constipation. Tolerating activities/house chores with moderate tenderness to the lower back. ALLERGIES: Patients list of allergies were reviewed     MEDICATIONS: Mr. Radha Velasco list of medications were reviewed. His

## 2023-07-28 NOTE — TELEPHONE ENCOUNTER
I agree with the Yg Lopez Impression:  Bell's palsy on the right    Plan:  Medrol Dosepak  He can be discharged home    HPI:  This is a 81-year-old man with a past history of hypertension who presented with weakness of the right side of the face. He had a CT of the brain with CT angiogram, both of which were unremarkable. A follow-up MRI of the brain was also negative. His lab work was in normal range. EKG showed normal sinus rhythm. He reported some retroauricular pain on the right. PMH: Pretension    Social history is positive for tobacco and alcohol use. Medication list was reviewed. Family history is noncontributory. 10 system review of systems is otherwise negative. Physical exam:  Generally: Well-nourished in no acute distress. HEENT: Pupils equal and reactive. Conjunctiva clear. Oropharynx clear. Neck: Supple without lymphadenopathy. Cardiovascular: Regular rate and rhythm. Lungs: Clear to auscultation  Abdomen: Soft and nontender. Skin: No rash  Extremities: No cyanosis clubbing or edema  Neurologic exam:  Alert and oriented x3. Speech is fluent and clear. Follows commands appropriately. Cranial nerves II through XII are intact, other than right facial weakness involving all 3 divisions. .  Motor exam shows good strength bilaterally without fix or drift. Sensation is intact to light touch bilaterally. Cerebellar testing shows normal finger-to-nose movements. No abnormal movements were seen. Reflexes were symmetric and physiologic. Plantar responses are downgoing. Laboratory studies were reviewed. Imaging studies were reviewed. Summary: This is a 81-year-old man who presents with right facial weakness consistent with Bell's palsy. He can be treated with a short course of steroids and should do fine. Thank you for allow me to participate in the care of this patient. I can be reached at [phone].

## 2023-08-15 NOTE — PLAN OF CARE
Received referral from PCP Tori Mathew DO requesting that OP [facility] outreach patient and assess for needs. Per chart review, patient would like to re-establish care with a therapist.    OP SWCM spoke to patient, introduced role and reason for call. Patient reported that she is on the wait list for  psychiatry and would like other counseling resources around the [facility] area that accept her insurance. Patient stated that resources sent to her [facility] is preferred. Patient reported no other needs. Counseling resources were sent to patient via [employer]. OP SWCM encouraged patient to reach out if other needs arise, patient understood and thankful for outreach. improved functional mobility through improved score on WOMAC. Patient demonstrates independence with HEP this date in clinic and should continue to improve with compliance with HEP. Therapy will be placed on hold for 30 days while patient completes HEP. If patient does not call to resume therapy within 30 days, patient will be discharged at that time. Date:  2023    Patient Name:  Melba     :  1985  MRN: [id number]    Pertinent Medical History: Additional Pertinent Hx: Hyperlipidemia, hypertension, diabetes, neuropathy    Medical/Treatment Diagnosis Information:  Medical Diagnosis: Neuropathy [G62.9]  Treatment Diagnosis: Decreased functional mobility, impaired balance    Insurance/Certification information:  PT Insurance Information: Medicaid  Physician Information:  Addie Infante*  Plan of care signed (Y/N): sent 04/10/2023, received     Date of Patient follow up with Physician:      Progress Report: []  Yes  [x]  No     Date Range for reporting period:  Beginnin/10/2023  Ending:     Progress report due (10 Rx/or 30 days whichever is less): 5/10/23, , 17    Recertification due (POC duration/ or 90 days whichever is less): 23, 23    Visit # Insurance/POC Allowable Auth Needed    [place] []Yes    [x]No       Latex Allergy:  [x]NO      []YES  Preferred Language for Healthcare:   [x]English       []Other:    Functional Scale:        Test: [facility]  Date Assessed Score   4/10/23 76/96   23 72/96   23 65/96   23 62/96       Pain level:  5/10     History of Injury:  Patient stated complaint: Patient reports that he is having trouble walking/balance. Is having some dizziness, difficulty with lifting weights. Patient reports that he can stand for about 10 minutes before he has to sit down. Patient reports he has been having problem with his balance for about 4-5 years.  Reports that balance has been staying about the same, not getting

## 2023-08-21 ENCOUNTER — OFFICE VISIT (OUTPATIENT)
Dept: PRIMARY CARE CLINIC | Age: 38
End: 2023-08-21
Payer: COMMERCIAL

## 2023-08-21 ENCOUNTER — OFFICE VISIT (OUTPATIENT)
Dept: PAIN MANAGEMENT | Age: 38
End: 2023-08-21

## 2023-08-21 ENCOUNTER — HOSPITAL ENCOUNTER (EMERGENCY)
Age: 38
Discharge: HOME | End: 2023-08-21
Attending: EMERGENCY MEDICINE
Payer: COMMERCIAL

## 2023-08-21 VITALS
OXYGEN SATURATION: 97 % | TEMPERATURE: 98.8 F | WEIGHT: 257.4 LBS | SYSTOLIC BLOOD PRESSURE: 118 MMHG | HEIGHT: 73 IN | BODY MASS INDEX: 34.11 KG/M2 | HEART RATE: 88 BPM | DIASTOLIC BLOOD PRESSURE: 73 MMHG | RESPIRATION RATE: 16 BRPM

## 2023-08-21 VITALS
SYSTOLIC BLOOD PRESSURE: 112 MMHG | BODY MASS INDEX: 35.24 KG/M2 | HEART RATE: 114 BPM | OXYGEN SATURATION: 97 % | TEMPERATURE: 98.4 F | DIASTOLIC BLOOD PRESSURE: 79 MMHG | WEIGHT: 259.8 LBS

## 2023-08-21 VITALS
SYSTOLIC BLOOD PRESSURE: 155 MMHG | HEART RATE: 116 BPM | DIASTOLIC BLOOD PRESSURE: 110 MMHG | OXYGEN SATURATION: 96 % | TEMPERATURE: 97.5 F

## 2023-08-21 DIAGNOSIS — G62.9 NEUROPATHY: ICD-10-CM

## 2023-08-21 DIAGNOSIS — E11.42 TYPE 2 DIABETES MELLITUS WITH DIABETIC POLYNEUROPATHY, WITH LONG-TERM CURRENT USE OF INSULIN (HCC): Primary | ICD-10-CM

## 2023-08-21 DIAGNOSIS — G89.4 CHRONIC PAIN SYNDROME: ICD-10-CM

## 2023-08-21 DIAGNOSIS — F33.1 MDD (MAJOR DEPRESSIVE DISORDER), RECURRENT EPISODE, MODERATE (HCC): ICD-10-CM

## 2023-08-21 DIAGNOSIS — M25.559 HIP PAIN, UNSPECIFIED LATERALITY: ICD-10-CM

## 2023-08-21 DIAGNOSIS — Z00.00 ANNUAL PHYSICAL EXAM: ICD-10-CM

## 2023-08-21 DIAGNOSIS — M79.643 PAIN OF HAND, UNSPECIFIED LATERALITY: ICD-10-CM

## 2023-08-21 DIAGNOSIS — M54.2 NECK PAIN: ICD-10-CM

## 2023-08-21 DIAGNOSIS — R20.2 NUMBNESS AND TINGLING IN BOTH HANDS: ICD-10-CM

## 2023-08-21 DIAGNOSIS — F41.9 ANXIETY: ICD-10-CM

## 2023-08-21 DIAGNOSIS — Z79.4 TYPE 2 DIABETES MELLITUS WITH DIABETIC POLYNEUROPATHY, WITH LONG-TERM CURRENT USE OF INSULIN (HCC): Primary | ICD-10-CM

## 2023-08-21 DIAGNOSIS — R55 VASOVAGAL NEAR SYNCOPE: Primary | ICD-10-CM

## 2023-08-21 DIAGNOSIS — E66.09 CLASS 1 OBESITY DUE TO EXCESS CALORIES WITH SERIOUS COMORBIDITY AND BODY MASS INDEX (BMI) OF 34.0 TO 34.9 IN ADULT: ICD-10-CM

## 2023-08-21 DIAGNOSIS — R20.0 NUMBNESS AND TINGLING IN BOTH HANDS: ICD-10-CM

## 2023-08-21 LAB
GLUCOSE BLD-MCNC: 280 MG/DL (ref 70–99)
HBA1C MFR BLD: 14 %
PERFORMED ON: ABNORMAL

## 2023-08-21 PROCEDURE — 99283 EMERGENCY DEPT VISIT LOW MDM: CPT

## 2023-08-21 PROCEDURE — 83037 HB GLYCOSYLATED A1C HOME DEV: CPT | Performed by: FAMILY MEDICINE

## 2023-08-21 PROCEDURE — 3078F DIAST BP <80 MM HG: CPT | Performed by: FAMILY MEDICINE

## 2023-08-21 PROCEDURE — 3074F SYST BP LT 130 MM HG: CPT | Performed by: FAMILY MEDICINE

## 2023-08-21 PROCEDURE — 93005 ELECTROCARDIOGRAM TRACING: CPT | Performed by: EMERGENCY MEDICINE

## 2023-08-21 PROCEDURE — 99395 PREV VISIT EST AGE 18-39: CPT | Performed by: FAMILY MEDICINE

## 2023-08-21 RX ORDER — DOCUSATE SODIUM 100 MG/1
100 CAPSULE, LIQUID FILLED ORAL 2 TIMES DAILY
Qty: 60 CAPSULE | Refills: 0 | Status: SHIPPED | OUTPATIENT
Start: 2023-08-21 | End: 2023-09-20

## 2023-08-21 RX ORDER — EMPAGLIFLOZIN 25 MG/1
TABLET, FILM COATED ORAL
COMMUNITY
Start: 2023-08-17

## 2023-08-21 RX ORDER — HYDROCODONE BITARTRATE AND ACETAMINOPHEN 5; 325 MG/1; MG/1
1 TABLET ORAL EVERY 12 HOURS PRN
Qty: 60 TABLET | Refills: 0 | Status: SHIPPED | OUTPATIENT
Start: 2023-08-21 | End: 2023-09-20

## 2023-08-21 RX ORDER — 0.9 % SODIUM CHLORIDE 0.9 %
1000 INTRAVENOUS SOLUTION INTRAVENOUS ONCE
Status: DISCONTINUED | OUTPATIENT
Start: 2023-08-21 | End: 2023-08-21 | Stop reason: HOSPADM

## 2023-08-21 ASSESSMENT — ENCOUNTER SYMPTOMS
ABDOMINAL PAIN: 0
SHORTNESS OF BREATH: 0
BACK PAIN: 0
COUGH: 0
SHORTNESS OF BREATH: 0
VOMITING: 0
NAUSEA: 0
COUGH: 0

## 2023-08-21 NOTE — PATIENT INSTRUCTIONS
[address] [facility] Locations - No appointment necessary. ? indicates the location is open Saturdays in addition to Monday through Friday. Call your preferred location for test preparation, business hours and other information you need. [employer] accepts [employer]. [facility]    ? [place] [id number] [facility]. [facility], [address]    Ph: [address] [place] [facility], [address]    Ph: [phone]   ? [address]    [place], [address] [facility]    Ph: [address], [address]    Ph: [phone] ? [place]   [address]   Ph: [phone]  ? [address] [facility], [address] [facility]    Ph: [place] [address] [facility], [address] [facility]   Ph: [phone]    NORTH    ? [place], [place] [address]    Ph: [phone]  [facility]   [address] [facility], [facility] [facility] [address]   Ph: Abner Horton. [place], [phone] [facility]: [address], [address] [place] [address]    Ph: [address] [facility].  [facility] [place], [place] [address]    Ph: [phone]

## 2023-08-21 NOTE — ED NOTES
Patient states that he feels a little better but a little \"sluggish. \" Using iphone, skin w/d, resp easy.      Meghan Mejia RN  08/21/23 8230

## 2023-08-21 NOTE — ED PROVIDER NOTES
Emergency Department Attending Physician Note  Location: [address] [place] [address]  8/21/2023       Pt Name: Car Moyer  MRN: [id number]  [address] [facility] 1985    Date of evaluation: 8/21/2023  Provider: Tabitha Sinclair DO  PCP: Homar Salomon DO    Note Started: 3:39 PM EDT 8/21/23    CHIEF COMPLAINT  No chief complaint on file. HISTORY OF PRESENT ILLNESS:  History obtained by patient. Limitations to history : None. Car Moyer is a 40 y.o. male with a significant PMHx of anxiety, CKD, high-output heart failure, history of histoplasmosis, type 2 diabetes, and other comorbidities as listed below, who was next-door getting outpatient labs drawn, when he had a near syncopal episode, dizziness, lightheadedness, and a little bit of diaphoresis when he got poked with a needle. He says he started to feel better, but still feeling lightheaded, prompting him to check into the emergency department. Denies any other concerns including chest pain or shortness of breath. This morning he felt completely fine. Tries to stay hydrated, drink half a gallon of water today. Denies any shortness of breath outside of his baseline with his history of histoplasmosis, and no leg swelling. No nausea vomiting or diarrhea. Did not actually syncopized, but wanted to make sure that he was safe to drive home, and his lightheadedness did not fully go away, prompting him, again, to check in. Last ECHO 2/2023:    Summary   Difficult to accurately assess left ventricular systolic function due to   arrhythmia but appears mildly reduced with an ejection fraction of   approximately 45 %. There is hypokinesis of the inferior and inferoseptal walls. Normal left ventricular size with moderate concentric left ventricular   hypertrophy. Left ventricular diastolic filling pressure is elevated.       Nursing Notes were all reviewed and agreed with or any disagreements were addressed in the

## 2023-08-21 NOTE — ED TRIAGE NOTES
Felt lightheaded today after blood draw at our OP lab in [address], decided to sign in. Requests that we check his BS.  Alert and oriented, skin w/d

## 2023-08-21 NOTE — ED NOTES
Patient given d/c instructions with return verbalization including medications. Emphasis on f/u, to return with worsening s/s. Pt will call PCP tomorrow for f/u appt. Emphasis on BS checks tonight, to return with any worsening s/s. Pt ambulated to lobby with steady gait.      Hilary Roberts RN  08/21/23 0058

## 2023-08-21 NOTE — PROGRESS NOTES
Lynn Arnulfo  1985  [id number]    HISTORY OF PRESENT ILLNESS: Mr. Zeke Kaye is a 40 y.o. male returns for a follow up visit for pain management  He has a diagnosis of   1. Chronic pain syndrome    2. Neck pain    3. Pain of hand, unspecified laterality    4. Numbness and tingling in both hands    5. Neuropathy    6. Hip pain, unspecified laterality    7. Anxiety    8. MDD (major depressive disorder), recurrent episode, moderate (HCC)    9. Class 1 obesity due to excess calories with serious comorbidity and body mass index (BMI) of 34.0 to 34.9 in adult      As per Information Obtained from the PADT (Patient Assessment and Documentation Tool)    He complains of pain in the  both upper legs, both knes, both feet  He rates the pain 5/10 and describes it as aching, burning. Current treatment regimen has helped relieve about 40% of the pain. He denies any side effects from the current pain regimen. Patient reports that since the last follow up visit the physical functioning is unchanged, family/social relationships are unchanged, mood is unchanged sleep patterns are worse, and that the overall functioning is unchanged. Patient denies misusing/abusing his narcotic pain medications or using any illegal drugs. Upon obtaining medical history from Mr. Zeke Kaye states that pain is manageable on current pain therapy. Takes the pain medications as prescribed. Patient was frustrated about waiting for an hour for his visit today. Mood/anxiety is stable. Sleep is fair with an average of 5-6 hours. Denies to having issues of constipation. Tolerating activities/house chores with moderate tenderness to the neck    ALLERGIES: Patients list of allergies were reviewed     MEDICATIONS: Mr. Zeke Kaye list of medications were reviewed. His current medications are   Outpatient Medications Prior to Visit   Medication Sig Dispense Refill    hydrOXYzine HCl (ATARAX) 50 MG tablet Take 1 tablet by mouth nightly 30 tablet 1

## 2023-08-21 NOTE — PROGRESS NOTES
Quantiferon, Incubated    Mumps, Measles, Rubella, and Varicella Zoster, IgG    Hepatitis B Surface Antibody    POCT glycosylated hemoglobin (Hb A1C)        Return in about 6 months (around 2/21/2024) for Follow up BP/DMII.          [address], DO

## 2023-08-22 ENCOUNTER — PATIENT MESSAGE (OUTPATIENT)
Dept: PRIMARY CARE CLINIC | Age: 38
End: 2023-08-22

## 2023-08-22 LAB
EKG ATRIAL RATE: 91 BPM
EKG DIAGNOSIS: NORMAL
EKG P AXIS: 59 DEGREES
EKG P-R INTERVAL: 192 MS
EKG Q-T INTERVAL: 372 MS
EKG QRS DURATION: 84 MS
EKG QTC CALCULATION (BAZETT): 457 MS
EKG R AXIS: -40 DEGREES
EKG T AXIS: 11 DEGREES
EKG VENTRICULAR RATE: 91 BPM
HBV SURFACE AB SERPL IA-ACNC: 38.99 MIU/ML
MEV IGG SER QL IA: NORMAL
MUV IGG SER QL IA: NORMAL
RUBV IGG SERPL QL IA: NORMAL
VZV IGG SER QL IA: NORMAL

## 2023-08-24 LAB
GAMMA INTERFERON BACKGROUND BLD IA-ACNC: 0.02 IU/ML
MITOGEN IGNF BCKGRD COR BLD-ACNC: 9.91 IU/ML
QUANTI TB GOLD PLUS: NEGATIVE
QUANTI TB1 MINUS NIL: 0 IU/ML (ref 0–0.34)
QUANTI TB2 MINUS NIL: 0 IU/ML (ref 0–0.34)

## 2023-09-19 ENCOUNTER — TELEPHONE (OUTPATIENT)
Dept: PAIN MANAGEMENT | Age: 38
End: 2023-09-19

## 2023-09-19 NOTE — TELEPHONE ENCOUNTER
LVM for pt to return my call    PLEASE reschedule pt for Friday 9/6/23, due to [address] not being in the office on 9/2/23

## 2023-09-29 ENCOUNTER — TELEPHONE (OUTPATIENT)
Dept: PRIMARY CARE CLINIC | Age: 38
End: 2023-09-29

## 2023-09-29 NOTE — TELEPHONE ENCOUNTER
----- Message from Sanaz Garrett sent at 9/29/2023 12:22 PM EDT -----  Subject: Message to Provider    QUESTIONS  Information for Provider? Pt called Wednesday to ask office to fix   physical. Pt had one completed for school and provider uploaded physical   for in [address] [place] [address]. Pt stated it was not filled out correctly and school will   not accept it. Pt spoke with office yesterday and they need by end of   today 09/29 so he can turn in on Monday morning. Tried to contact office. Please advise   ---------------------------------------------------------------------------  --------------  Keiko LI  [phone]; OK to leave message on voicemail  ---------------------------------------------------------------------------  --------------  SCRIPT ANSWERS  Relationship to Patient?  Self

## 2023-10-02 NOTE — TELEPHONE ENCOUNTER
Spoke with the patient. . immunization has been completed but the form was not signed. Dr [facility]. signed form.   Per patient request form was downloaded to [facility] for patient to  from there

## 2023-10-06 ENCOUNTER — OFFICE VISIT (OUTPATIENT)
Dept: PAIN MANAGEMENT | Age: 38
End: 2023-10-06
Payer: COMMERCIAL

## 2023-10-06 VITALS
OXYGEN SATURATION: 97 % | TEMPERATURE: 97.5 F | HEART RATE: 120 BPM | DIASTOLIC BLOOD PRESSURE: 106 MMHG | SYSTOLIC BLOOD PRESSURE: 160 MMHG

## 2023-10-06 DIAGNOSIS — G89.4 CHRONIC PAIN SYNDROME: ICD-10-CM

## 2023-10-06 DIAGNOSIS — G62.9 NEUROPATHY: ICD-10-CM

## 2023-10-06 DIAGNOSIS — M54.2 NECK PAIN: ICD-10-CM

## 2023-10-06 DIAGNOSIS — R20.0 NUMBNESS AND TINGLING IN BOTH HANDS: ICD-10-CM

## 2023-10-06 DIAGNOSIS — M25.559 HIP PAIN, UNSPECIFIED LATERALITY: ICD-10-CM

## 2023-10-06 DIAGNOSIS — F41.9 ANXIETY: ICD-10-CM

## 2023-10-06 DIAGNOSIS — E66.9 OBESITY (BMI 30-39.9): ICD-10-CM

## 2023-10-06 DIAGNOSIS — R20.2 NUMBNESS AND TINGLING IN BOTH HANDS: ICD-10-CM

## 2023-10-06 DIAGNOSIS — F33.1 MDD (MAJOR DEPRESSIVE DISORDER), RECURRENT EPISODE, MODERATE (HCC): ICD-10-CM

## 2023-10-06 DIAGNOSIS — M79.643 PAIN OF HAND, UNSPECIFIED LATERALITY: ICD-10-CM

## 2023-10-06 PROCEDURE — 3074F SYST BP LT 130 MM HG: CPT | Performed by: NURSE PRACTITIONER

## 2023-10-06 PROCEDURE — 99214 OFFICE O/P EST MOD 30 MIN: CPT | Performed by: NURSE PRACTITIONER

## 2023-10-06 PROCEDURE — 3078F DIAST BP <80 MM HG: CPT | Performed by: NURSE PRACTITIONER

## 2023-10-06 RX ORDER — DOCUSATE SODIUM 100 MG/1
100 CAPSULE, LIQUID FILLED ORAL 2 TIMES DAILY
Qty: 60 CAPSULE | Refills: 0 | Status: SHIPPED | OUTPATIENT
Start: 2023-10-06 | End: 2023-11-05

## 2023-10-06 RX ORDER — HYDROCODONE BITARTRATE AND ACETAMINOPHEN 5; 325 MG/1; MG/1
1 TABLET ORAL EVERY 8 HOURS PRN
Qty: 84 TABLET | Refills: 0 | Status: SHIPPED | OUTPATIENT
Start: 2023-10-06 | End: 2023-11-03

## 2023-10-06 RX ORDER — PREGABALIN 75 MG/1
75 CAPSULE ORAL 2 TIMES DAILY
Qty: 6 CAPSULE | Refills: 0 | Status: SHIPPED | OUTPATIENT
Start: 2023-10-06 | End: 2023-10-09

## 2023-10-06 NOTE — PROGRESS NOTES
was obtained. Goals of current treatment regimen include improvement in pain, restoration of functioning- with focus on improvement in physical performance, general activity, work or disability,emotional distress, health care utilization and  decreased medication consumption. Will continue to monitor progress towards achieving/maintaining therapeutic goals with special emphasis on  1. Improvement in perceived interfernce  of pain with ADL's. Ability to do home exercises independently. Ability to do household chores indoor and/or outdoor work and social and leisure activities. Improve psychosocial and physical functioning. - he is showing progression towards this treatment goal with the current regimen. He was advised against drinking alcohol with the narcotic pain medicines, advised against driving or handling machinery while adjusting the dose of medicines or if having cognitive  issues related to the current medications. Risk of overdose and death, if medicines not taken as prescribed, were also discussed. If the patient develops new symptoms or if the symptoms worsen, the patient should call the office. While transcribing every attempt was made to maintain the accuracy of the note in terms of it's contents,there may have been some errors made inadvertently. Thank you for allowing me to participate in the care of this patient. Radha Radford CNP.     Cc: Barbara Frances DO

## 2023-10-09 DIAGNOSIS — I10 UNCONTROLLED HYPERTENSION: ICD-10-CM

## 2023-10-09 DIAGNOSIS — G89.4 CHRONIC PAIN SYNDROME: ICD-10-CM

## 2023-10-09 RX ORDER — CARVEDILOL 25 MG/1
25 TABLET ORAL 2 TIMES DAILY
Qty: 60 TABLET | Refills: 3 | Status: SHIPPED | OUTPATIENT
Start: 2023-10-09

## 2023-10-11 ENCOUNTER — TELEPHONE (OUTPATIENT)
Dept: ADMINISTRATIVE | Age: 38
End: 2023-10-11

## 2023-10-11 NOTE — TELEPHONE ENCOUNTER
The medication is APPROVED. Letter in media     If this requires a response please respond to the pool ( [id number]). Patient was advised medication was approved.

## 2023-10-11 NOTE — TELEPHONE ENCOUNTER
Submitted PA for HYDROcodone-Acetaminophen 5-325MG tablets  [facility]ia [facility]: [id number] STATUS: PENDING. Follow up done daily; if no response in three days we will refax for status check. If another three days goes by with no response we will call the insurance for status.

## 2023-10-17 ENCOUNTER — HOSPITAL ENCOUNTER (EMERGENCY)
Age: 38
Discharge: HOME | End: 2023-10-17
Attending: EMERGENCY MEDICINE
Payer: COMMERCIAL

## 2023-10-17 VITALS
DIASTOLIC BLOOD PRESSURE: 112 MMHG | HEIGHT: 73 IN | HEART RATE: 114 BPM | TEMPERATURE: 98.5 F | WEIGHT: 265.4 LBS | OXYGEN SATURATION: 97 % | RESPIRATION RATE: 10 BRPM | BODY MASS INDEX: 35.17 KG/M2 | SYSTOLIC BLOOD PRESSURE: 172 MMHG

## 2023-10-17 DIAGNOSIS — H60.392 INFECTIVE OTITIS EXTERNA OF LEFT EAR: Primary | ICD-10-CM

## 2023-10-17 PROCEDURE — 6370000000 HC RX 637 (ALT 250 FOR IP): Performed by: EMERGENCY MEDICINE

## 2023-10-17 PROCEDURE — 99283 EMERGENCY DEPT VISIT LOW MDM: CPT

## 2023-10-17 RX ORDER — OXYCODONE HYDROCHLORIDE AND ACETAMINOPHEN 5; 325 MG/1; MG/1
1-2 TABLET ORAL EVERY 6 HOURS PRN
Qty: 7 TABLET | Refills: 0 | Status: SHIPPED | OUTPATIENT
Start: 2023-10-17 | End: 2023-10-24

## 2023-10-17 RX ORDER — NAPROXEN 500 MG/1
500 TABLET ORAL 2 TIMES DAILY
Qty: 20 TABLET | Refills: 0 | Status: SHIPPED | OUTPATIENT
Start: 2023-10-17 | End: 2023-10-17

## 2023-10-17 RX ORDER — NAPROXEN 250 MG/1
500 TABLET ORAL ONCE
Status: COMPLETED | OUTPATIENT
Start: 2023-10-17 | End: 2023-10-17

## 2023-10-17 RX ADMIN — NAPROXEN 500 MG: 250 TABLET ORAL at 19:33

## 2023-10-17 ASSESSMENT — PAIN DESCRIPTION - LOCATION: LOCATION: EAR;FACE

## 2023-10-17 ASSESSMENT — PAIN SCALES - GENERAL: PAINLEVEL_OUTOF10: 10

## 2023-10-17 ASSESSMENT — PAIN DESCRIPTION - FREQUENCY: FREQUENCY: CONTINUOUS

## 2023-10-17 ASSESSMENT — PAIN DESCRIPTION - PAIN TYPE: TYPE: ACUTE PAIN

## 2023-10-17 ASSESSMENT — PAIN DESCRIPTION - DESCRIPTORS: DESCRIPTORS: ACHING

## 2023-10-17 ASSESSMENT — PAIN DESCRIPTION - ORIENTATION: ORIENTATION: LEFT

## 2023-10-17 ASSESSMENT — PAIN - FUNCTIONAL ASSESSMENT: PAIN_FUNCTIONAL_ASSESSMENT: 0-10

## 2023-10-17 NOTE — ED NOTES
Pt dc/d with instructions and rx' instable condition,ambulatory to lobby. Home per ride.      Sergey Martinez RN  10/17/23 2430

## 2023-10-31 ENCOUNTER — HOSPITAL ENCOUNTER (EMERGENCY)
Age: 38
Discharge: HOME | End: 2023-10-31
Payer: COMMERCIAL

## 2023-10-31 VITALS
DIASTOLIC BLOOD PRESSURE: 107 MMHG | HEIGHT: 73 IN | RESPIRATION RATE: 18 BRPM | WEIGHT: 264.1 LBS | HEART RATE: 99 BPM | SYSTOLIC BLOOD PRESSURE: 156 MMHG | BODY MASS INDEX: 35 KG/M2 | TEMPERATURE: 98.6 F | OXYGEN SATURATION: 96 %

## 2023-10-31 DIAGNOSIS — K11.21 ACUTE PAROTITIS: Primary | ICD-10-CM

## 2023-10-31 PROCEDURE — 99283 EMERGENCY DEPT VISIT LOW MDM: CPT

## 2023-10-31 RX ORDER — DOCUSATE SODIUM 100 MG/1
100 CAPSULE, LIQUID FILLED ORAL 2 TIMES DAILY
Qty: 60 CAPSULE | Refills: 0 | OUTPATIENT
Start: 2023-10-31

## 2023-10-31 RX ORDER — TRAMADOL HYDROCHLORIDE 50 MG/1
50 TABLET ORAL EVERY 6 HOURS PRN
Qty: 12 TABLET | Refills: 0 | Status: SHIPPED | OUTPATIENT
Start: 2023-10-31 | End: 2023-11-03

## 2023-10-31 RX ORDER — CLINDAMYCIN HYDROCHLORIDE 150 MG/1
450 CAPSULE ORAL 4 TIMES DAILY
Qty: 40 CAPSULE | Refills: 0 | Status: SHIPPED | OUTPATIENT
Start: 2023-10-31 | End: 2023-11-10

## 2023-10-31 ASSESSMENT — PAIN SCALES - GENERAL: PAINLEVEL_OUTOF10: 10

## 2023-10-31 ASSESSMENT — PAIN DESCRIPTION - ORIENTATION: ORIENTATION: LEFT

## 2023-10-31 ASSESSMENT — ENCOUNTER SYMPTOMS
GASTROINTESTINAL NEGATIVE: 1
FACIAL SWELLING: 1
RESPIRATORY NEGATIVE: 1

## 2023-10-31 ASSESSMENT — PAIN DESCRIPTION - LOCATION: LOCATION: EAR

## 2023-10-31 ASSESSMENT — PAIN - FUNCTIONAL ASSESSMENT: PAIN_FUNCTIONAL_ASSESSMENT: 0-10

## 2023-10-31 NOTE — DISCHARGE INSTRUCTIONS
You have been prescribed narcotic pain medication for the next 2-3 days. Do not exceed use beyond what has been prescribed. Do not drive or operate machinery while taking this medication. This medication is only to be used for a short term, 2-3 days. Do not take any other sedating substances or drink alcohol while using this medication.

## 2023-10-31 NOTE — ED PROVIDER NOTES
[facility]  eMERGENCY dEPARTMENT eNCOUnter    Pt Name: @@  MRN: [id number]  [id number]  Date of evaluation: 10/31/2023  Provider: HANS Macdonald CNP      Independently seen and evaluated by the advanced practice provider  CHIEF COMPLAINT       Chief Complaint   Patient presents with    Otitis Media     Pt presents with L ear pain. Concerns for an ear infection. Pt feels like pain is radiating into \"crook of neck\". Was just seen on October 17th for same issue          HISTORY OF PRESENT ILLNESS  (Location/Symptom, Timing/Onset, Context/Setting, Quality, Duration, Modifying Factors, Severity.)   Adina Dance is a 40 y.o. male who presents to the emergencydepartment PMHx: Anxiety, CKD, HF, histoplasmosis, DM, HTN    Lives at home  Anticoagulation therapy none  Employed, and going to classes for medical assistant  Social determinants none identified  CODE STATUS full    HPI provided by patient    Patient presented to the emergency department reporting that he has pain on the left side near the ear, left-sided his face near the ear feels swollen and tender. He reports that he was at this emergency department about a week ago and was told he had an ear infection was and started on eardrops and he states that it is nothing gotten better if anything is gotten worse. He states the left portion of his neck is also hurting now. He has not been able to follow-up with his clinic provider, because the clinic is being closed when he is out of work. He had no ear discharge. Denies fever. Denies chills. Denies nausea. And no loss of hearing    Nursing Notes were reviewed and I agree. REVIEW OF SYSTEMS    (2-9 systems for level 4, 10 or more for level 5)     Review of Systems   Constitutional: Negative. HENT:  Positive for ear pain and facial swelling. Negative for ear discharge. Respiratory: Negative. Cardiovascular: Negative. Gastrointestinal: Negative.

## 2023-10-31 NOTE — ED NOTES
Per message below this is a new baby that does not have a chart and has not been seen. Will need to obtain  records & create chart. Per current protocols please assist mother in scheduling the .   Melquiades Machado MA   Pt discharged to home. 2 prescriptions given. Discharge paperwork discussed. All questions and concerns answered at this time. Pt awake and alert. Respirations even and unlabored.       Trina Solis RN  10/31/23 1932

## 2023-11-02 NOTE — TELEPHONE ENCOUNTER
Called and let pt know that her scripts for quetiapine and gabapentin was sent to her pharmacy     Electronically signed by Marisabel Olivares on 11/2/2023 at 4:15 PM Patient no-showed to Medication Management Clinic visit today due to current admission for HTN, CP. Will r/s when pt d/c home.     Lamberto De Luna, PharmD, [facility]  2/22/2023 11:01 AM

## 2023-11-13 ENCOUNTER — OFFICE VISIT (OUTPATIENT)
Dept: NEUROLOGY | Age: 38
End: 2023-11-13
Payer: COMMERCIAL

## 2023-11-13 VITALS
HEART RATE: 121 BPM | WEIGHT: 266 LBS | DIASTOLIC BLOOD PRESSURE: 71 MMHG | BODY MASS INDEX: 35.09 KG/M2 | SYSTOLIC BLOOD PRESSURE: 101 MMHG

## 2023-11-13 DIAGNOSIS — Z79.4 TYPE 2 DIABETES MELLITUS WITH DIABETIC POLYNEUROPATHY, WITH LONG-TERM CURRENT USE OF INSULIN (HCC): Primary | ICD-10-CM

## 2023-11-13 DIAGNOSIS — I10 ESSENTIAL HYPERTENSION: ICD-10-CM

## 2023-11-13 DIAGNOSIS — E11.42 TYPE 2 DIABETES MELLITUS WITH DIABETIC POLYNEUROPATHY, WITH LONG-TERM CURRENT USE OF INSULIN (HCC): Primary | ICD-10-CM

## 2023-11-13 DIAGNOSIS — N18.31 STAGE 3A CHRONIC KIDNEY DISEASE (HCC): ICD-10-CM

## 2023-11-13 PROCEDURE — 3074F SYST BP LT 130 MM HG: CPT | Performed by: PSYCHIATRY & NEUROLOGY

## 2023-11-13 PROCEDURE — 99213 OFFICE O/P EST LOW 20 MIN: CPT | Performed by: PSYCHIATRY & NEUROLOGY

## 2023-11-13 PROCEDURE — 3078F DIAST BP <80 MM HG: CPT | Performed by: PSYCHIATRY & NEUROLOGY

## 2023-11-13 PROCEDURE — 3046F HEMOGLOBIN A1C LEVEL >9.0%: CPT | Performed by: PSYCHIATRY & NEUROLOGY

## 2023-11-13 RX ORDER — DULOXETIN HYDROCHLORIDE 60 MG/1
60 CAPSULE, DELAYED RELEASE ORAL DAILY
Qty: 90 CAPSULE | Refills: 1 | Status: SHIPPED | OUTPATIENT
Start: 2023-11-13 | End: 2024-05-11

## 2023-11-13 NOTE — PROGRESS NOTES
The patient came today for follow up regarding: Polyneuropathy        Since his last visit, he denies any new symptoms or feeling lightheaded and did this morning. He takes Cymbalta 60 mg daily. No side effects. No recent change in medications. He takes Lyrica 100 g twice daily. His sugar has been waxing and waning. No recent fall or injury or fever. Pain is controlled on these medications. Degree is moderate. Description burning sensation in his feet. No bladder or bowel issues. No headache, visual changes. Takes aspirin. Exam:   Constitutional:   Vitals:    11/13/23 1044 11/13/23 1112   BP: 113/77 101/71   Pulse: (!) 108 (!) 121   Weight: 120.7 kg (266 lb)        General appearance:  Normal development and appear in no acute distress. Feels dizzy  Mental Status:   Oriented to person, place, problem, and time. Memory: Good immediate recall. Intact remote memory  Normal attention span and concentration. Language: intact naming, repeating and fluency   Good fund of Knowledge. Aware of current events and vocabulary   Cranial Nerves:   II: Pupils: equal, round, reactive to light  III,IV,VI: Extra Ocular Movements are intact. No nystagmus  V: Facial sensation is intact   VII: Facial strength and movements: intact and symmetric  XII: Tongue movements are normal  Musculoskeletal: Symmetric 2+ in the arms and 2 in his knees and 1 his ankle    Coordination: no pronator drift, no dysmetria with FNF and normal REM  Sensation: normal in his arms and is mild decreased vibration and touch in his distal feet. Gait/Posture: Steady today.         ROS : A 10-14 system review of constitutional, cardiovascular, respiratory, GI, eyes, , ENT, musculoskeletal, endocrine, hematological, skin, SHEENT, genitourinary, psychiatric and neurologic systems was obtained and updated today which is unremarkable except as mentioned in my HPI      Medical decision making:  I personally reviewed and updated social history, past

## 2023-11-15 ENCOUNTER — OFFICE VISIT (OUTPATIENT)
Dept: PRIMARY CARE CLINIC | Age: 38
End: 2023-11-15
Payer: COMMERCIAL

## 2023-11-15 VITALS
BODY MASS INDEX: 33.96 KG/M2 | WEIGHT: 257.4 LBS | DIASTOLIC BLOOD PRESSURE: 82 MMHG | HEART RATE: 118 BPM | TEMPERATURE: 98.4 F | SYSTOLIC BLOOD PRESSURE: 118 MMHG | OXYGEN SATURATION: 96 %

## 2023-11-15 DIAGNOSIS — Z79.4 TYPE 2 DIABETES MELLITUS WITH DIABETIC POLYNEUROPATHY, WITH LONG-TERM CURRENT USE OF INSULIN (HCC): Primary | ICD-10-CM

## 2023-11-15 DIAGNOSIS — F43.21 ADJUSTMENT DISORDER WITH DEPRESSED MOOD: ICD-10-CM

## 2023-11-15 DIAGNOSIS — I10 ESSENTIAL HYPERTENSION: ICD-10-CM

## 2023-11-15 DIAGNOSIS — G62.9 NEUROPATHY: ICD-10-CM

## 2023-11-15 DIAGNOSIS — N18.31 STAGE 3A CHRONIC KIDNEY DISEASE (HCC): ICD-10-CM

## 2023-11-15 DIAGNOSIS — M26.609 TMJ (TEMPOROMANDIBULAR JOINT DISORDER): ICD-10-CM

## 2023-11-15 DIAGNOSIS — E11.42 TYPE 2 DIABETES MELLITUS WITH DIABETIC POLYNEUROPATHY, WITH LONG-TERM CURRENT USE OF INSULIN (HCC): Primary | ICD-10-CM

## 2023-11-15 DIAGNOSIS — Z02.1 PRE-EMPLOYMENT DRUG SCREENING: ICD-10-CM

## 2023-11-15 PROBLEM — B39.2 PULMONARY HISTOPLASMOSIS (HCC): Status: RESOLVED | Noted: 2023-02-07 | Resolved: 2023-11-15

## 2023-11-15 PROCEDURE — 3074F SYST BP LT 130 MM HG: CPT | Performed by: FAMILY MEDICINE

## 2023-11-15 PROCEDURE — 3046F HEMOGLOBIN A1C LEVEL >9.0%: CPT | Performed by: FAMILY MEDICINE

## 2023-11-15 PROCEDURE — 3078F DIAST BP <80 MM HG: CPT | Performed by: FAMILY MEDICINE

## 2023-11-15 PROCEDURE — 99214 OFFICE O/P EST MOD 30 MIN: CPT | Performed by: FAMILY MEDICINE

## 2023-11-15 RX ORDER — VALSARTAN 160 MG/1
160 TABLET ORAL DAILY
COMMUNITY
Start: 2023-10-09

## 2023-11-15 NOTE — PROGRESS NOTES
[facility]. PRIMARY CARE  [address] [place] [facility]"" [address]  Dept: [phone]  Dept Fax: [phone]     11/15/2023      [place]   1985     Chief Complaint   Patient presents with    Follow-up       HPI    Pt comes in today for UDS. 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 C/o left sided jaw pain. Went to ER x 2. Initially diagnosed with Left otitis externa. Given rx abx drops. No improvement. Back to ER on 10/31/23. Dx that time with Parotitis. Rx clindamycin 450 mg TID x 10 days which he completed without any improvement. TMJ feels sore. Has trouble opening his jaw completely. Pain with chewing. No fever or recent illness. DMII - brittle. Following with Endo. Last a1c = 14.0%. Admits he does not consistently take his insulin or any of his medications. Admits he has been very depressed. Recent disability claim was denied. 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 He physically struggles to meet the demands of work but is scared he won't be able to pay his bills if he has to quit. Polyneuropathy - Following with Neuro. On Lyrica and Cymbalta. Chronic pain - Following with PM.     CKDIII - Following with Nephrology. H/o histoplasmosis - Following with Pulm/ID. No data recorded     Prior to Visit Medications    Medication Sig Taking?  Authorizing Provider   valsartan (DIOVAN) 160 MG tablet Take 1 tablet by mouth daily Yes Geena Vogel MD   DULoxetine (CYMBALTA) 60 MG extended release capsule Take 1 capsule by mouth daily Yes Ale Asif MD   carvedilol (COREG) 25 MG tablet TAKE 1 TABLET BY MOUTH TWICE DAILY Yes Sonia Kan,    JARDIANCE 25 MG tablet  Yes Geena Vogel MD   vitamin D (ERGOCALCIFEROL) 1.25 MG (76855 UT) CAPS capsule TAKE 1 CAPSULE BY MOUTH 1 TIME A WEEK Yes Danish Kan,

## 2023-11-16 LAB
AMPHETAMINES UR QL SCN>1000 NG/ML: NORMAL
BARBITURATES UR QL SCN>200 NG/ML: NORMAL
BENZODIAZ UR QL SCN>200 NG/ML: NORMAL
CANNABINOIDS UR QL SCN>50 NG/ML: NORMAL
COCAINE UR QL SCN: NORMAL
DRUG SCREEN COMMENT UR-IMP: NORMAL
FENTANYL SCREEN, URINE: NORMAL
METHADONE UR QL SCN>300 NG/ML: NORMAL
OPIATES UR QL SCN>300 NG/ML: NORMAL
OXYCODONE UR QL SCN: NORMAL
PCP UR QL SCN>25 NG/ML: NORMAL
PH UR STRIP: 5 [PH]

## 2023-11-16 RX ORDER — CYCLOBENZAPRINE HCL 5 MG
5 TABLET ORAL NIGHTLY PRN
Qty: 10 TABLET | Refills: 0 | Status: SHIPPED | OUTPATIENT
Start: 2023-11-16 | End: 2023-11-26

## 2023-11-16 NOTE — RESULT ENCOUNTER NOTE
Your recent test results are all normal. Please don't hesitate to contact the office with any additional questions/concerns - Dr. Beal [place]

## 2023-11-20 ASSESSMENT — ENCOUNTER SYMPTOMS
SORE THROAT: 0
TROUBLE SWALLOWING: 0
FACIAL SWELLING: 0
SINUS PAIN: 0
VOICE CHANGE: 0

## 2023-12-04 ENCOUNTER — TELEMEDICINE (OUTPATIENT)
Age: 38
End: 2023-12-04
Payer: COMMERCIAL

## 2023-12-04 DIAGNOSIS — F33.1 MDD (MAJOR DEPRESSIVE DISORDER), RECURRENT EPISODE, MODERATE (HCC): Primary | ICD-10-CM

## 2023-12-04 PROCEDURE — 90791 PSYCH DIAGNOSTIC EVALUATION: CPT | Performed by: PSYCHOLOGIST

## 2023-12-04 NOTE — PROGRESS NOTES
use    Nicotine:  no  Caffeine: limited    He has had difficulty with housing stability, currently staying with mom. He has children (10 and 15 yo) - worries about providing for them, has difficult relationship with their mother. Finds work very difficult to manage demands of working/school with multiple health concerns. He reports history of learning disability - states he learns best with hands on demonstrations, \"in the moment\" reminders,\" and simpler instructions. Primary social supports are limited - mom, but worries about asking her for assistance with medication mgmt as she has her own health concerns.      O:  MSE:  Appearance: good hygiene     Attitude: cooperative    Consciousness: alert  Orientation: oriented to person, place, time, general circumstance    Memory: recent and remote memory intact    Attention/Concentration: intact during session    Psychomotor Activity: normal  Eye Contact: normal  Speech: normal rate and volume, well-articulated  Language: within normal limits  Mood: depressed    Affect: dysphoric and constricted    Perception: within normal limits  Thought Content: within normal limits   Thought Process: logical, coherent    Fund of Knowledge: within normal limits  Insight: fair    Judgment: intact    Ability to understand instructions: Yes  Ability to respond meaningfully: Yes  Morbid Ideation: no     Suicide Assessment: no suicidal ideation, plan, or intent    Homicidal Ideation: no    History:  Social History:   Social History     Socioeconomic History    Marital status: Single     Spouse name: Not on file    Number of children: Not on file    Years of education: Not on file    Highest education level: Not on file   Occupational History    Not on file   Tobacco Use    Smoking status: Never    Smokeless tobacco: Never   Vaping Use    Vaping Use: Never used   Substance and Sexual Activity    Alcohol use: Not Currently     Comment: rare    Drug use: Not Currently     Types: Marijuana

## 2023-12-11 ENCOUNTER — APPOINTMENT (OUTPATIENT)
Dept: CT IMAGING | Age: 38
DRG: 420 | End: 2023-12-11
Payer: COMMERCIAL

## 2023-12-11 ENCOUNTER — HOSPITAL ENCOUNTER (INPATIENT)
Age: 38
LOS: 3 days | Discharge: HOME | DRG: 420 | End: 2023-12-14
Attending: EMERGENCY MEDICINE | Admitting: STUDENT IN AN ORGANIZED HEALTH CARE EDUCATION/TRAINING PROGRAM
Payer: COMMERCIAL

## 2023-12-11 DIAGNOSIS — R51.9 LEFT-SIDED FACE PAIN: Primary | ICD-10-CM

## 2023-12-11 DIAGNOSIS — E11.65 POORLY CONTROLLED TYPE 2 DIABETES MELLITUS (HCC): ICD-10-CM

## 2023-12-11 DIAGNOSIS — E86.0 DEHYDRATION: ICD-10-CM

## 2023-12-11 DIAGNOSIS — R73.9 HYPERGLYCEMIA: ICD-10-CM

## 2023-12-11 DIAGNOSIS — N17.9 ACUTE RENAL INJURY (HCC): ICD-10-CM

## 2023-12-11 PROBLEM — E11.00 HYPEROSMOLAR HYPERGLYCEMIC STATE (HHS) (HCC): Status: ACTIVE | Noted: 2023-12-11

## 2023-12-11 PROBLEM — E11.00 DIABETIC HYPEROSMOLAR NON-KETOTIC STATE (HCC): Status: ACTIVE | Noted: 2023-12-11

## 2023-12-11 LAB
ALBUMIN SERPL-MCNC: 3.8 G/DL (ref 3.4–5)
ALBUMIN/GLOB SERPL: 1.4 {RATIO} (ref 1.1–2.2)
ALP SERPL-CCNC: 87 U/L (ref 40–129)
ALT SERPL-CCNC: 17 U/L (ref 10–40)
ANION GAP SERPL CALCULATED.3IONS-SCNC: 10 MMOL/L (ref 3–16)
ANION GAP SERPL CALCULATED.3IONS-SCNC: 11 MMOL/L (ref 3–16)
ANION GAP SERPL CALCULATED.3IONS-SCNC: 11 MMOL/L (ref 3–16)
AST SERPL-CCNC: 13 U/L (ref 15–37)
BASE EXCESS BLDV CALC-SCNC: -2.3 MMOL/L (ref -3–3)
BASOPHILS # BLD: 0.1 K/UL (ref 0–0.2)
BASOPHILS NFR BLD: 2 %
BETA-HYDROXYBUTYRATE: 0.27 MMOL/L (ref 0–0.27)
BILIRUB SERPL-MCNC: 0.3 MG/DL (ref 0–1)
BUN SERPL-MCNC: 20 MG/DL (ref 7–20)
BUN SERPL-MCNC: 23 MG/DL (ref 7–20)
BUN SERPL-MCNC: 24 MG/DL (ref 7–20)
CALCIUM SERPL-MCNC: 8.6 MG/DL (ref 8.3–10.6)
CALCIUM SERPL-MCNC: 9.5 MG/DL (ref 8.3–10.6)
CALCIUM SERPL-MCNC: 9.5 MG/DL (ref 8.3–10.6)
CHLORIDE SERPL-SCNC: 92 MMOL/L (ref 99–110)
CHLORIDE SERPL-SCNC: 98 MMOL/L (ref 99–110)
CHLORIDE SERPL-SCNC: 99 MMOL/L (ref 99–110)
CO2 BLDV-SCNC: 23 MMOL/L
CO2 SERPL-SCNC: 22 MMOL/L (ref 21–32)
CO2 SERPL-SCNC: 22 MMOL/L (ref 21–32)
CO2 SERPL-SCNC: 23 MMOL/L (ref 21–32)
CREAT SERPL-MCNC: 1.5 MG/DL (ref 0.9–1.3)
CREAT SERPL-MCNC: 1.7 MG/DL (ref 0.9–1.3)
CREAT SERPL-MCNC: 1.7 MG/DL (ref 0.9–1.3)
DEPRECATED RDW RBC AUTO: 12.7 % (ref 12.4–15.4)
EOSINOPHIL # BLD: 0.1 K/UL (ref 0–0.6)
EOSINOPHIL NFR BLD: 1.3 %
GFR SERPLBLD CREATININE-BSD FMLA CKD-EPI: 53 ML/MIN/{1.73_M2}
GFR SERPLBLD CREATININE-BSD FMLA CKD-EPI: 53 ML/MIN/{1.73_M2}
GFR SERPLBLD CREATININE-BSD FMLA CKD-EPI: >60 ML/MIN/{1.73_M2}
GLUCOSE BLD-MCNC: 207 MG/DL (ref 70–99)
GLUCOSE BLD-MCNC: 325 MG/DL (ref 70–99)
GLUCOSE BLD-MCNC: 347 MG/DL (ref 70–99)
GLUCOSE BLD-MCNC: 350 MG/DL (ref 70–99)
GLUCOSE SERPL-MCNC: 299 MG/DL (ref 70–99)
GLUCOSE SERPL-MCNC: 367 MG/DL (ref 70–99)
GLUCOSE SERPL-MCNC: 605 MG/DL (ref 70–99)
HCO3 BLDV-SCNC: 23.4 MMOL/L (ref 23–29)
HCT VFR BLD AUTO: 42.1 % (ref 40.5–52.5)
HGB BLD-MCNC: 13.9 G/DL (ref 13.5–17.5)
LYMPHOCYTES # BLD: 1.4 K/UL (ref 1–5.1)
LYMPHOCYTES NFR BLD: 25.4 %
MAGNESIUM SERPL-MCNC: 2.1 MG/DL (ref 1.8–2.4)
MAGNESIUM SERPL-MCNC: 2.1 MG/DL (ref 1.8–2.4)
MAGNESIUM SERPL-MCNC: 2.2 MG/DL (ref 1.8–2.4)
MCH RBC QN AUTO: 25.8 PG (ref 26–34)
MCHC RBC AUTO-ENTMCNC: 33.1 G/DL (ref 31–36)
MCV RBC AUTO: 78.1 FL (ref 80–100)
MONOCYTES # BLD: 0.5 K/UL (ref 0–1.3)
MONOCYTES NFR BLD: 8.4 %
NEUTROPHILS # BLD: 3.5 K/UL (ref 1.7–7.7)
NEUTROPHILS NFR BLD: 62.9 %
O2 THERAPY: ABNORMAL
PCO2 BLDV: 43 MMHG (ref 40–50)
PERFORMED ON: ABNORMAL
PH BLDV: 7.34 [PH] (ref 7.35–7.45)
PHOSPHATE SERPL-MCNC: 3.9 MG/DL (ref 2.5–4.9)
PHOSPHATE SERPL-MCNC: 3.9 MG/DL (ref 2.5–4.9)
PHOSPHATE SERPL-MCNC: 4.2 MG/DL (ref 2.5–4.9)
PLATELET # BLD AUTO: 230 K/UL (ref 135–450)
PMV BLD AUTO: 9.3 FL (ref 5–10.5)
PO2 BLDV: 45.8 MMHG (ref 25–40)
POTASSIUM SERPL-SCNC: 3.9 MMOL/L (ref 3.5–5.1)
POTASSIUM SERPL-SCNC: 4.3 MMOL/L (ref 3.5–5.1)
POTASSIUM SERPL-SCNC: 4.3 MMOL/L (ref 3.5–5.1)
PROT SERPL-MCNC: 6.6 G/DL (ref 6.4–8.2)
RBC # BLD AUTO: 5.4 M/UL (ref 4.2–5.9)
SAO2 % BLDV: 79 %
SODIUM SERPL-SCNC: 124 MMOL/L (ref 136–145)
SODIUM SERPL-SCNC: 132 MMOL/L (ref 136–145)
SODIUM SERPL-SCNC: 132 MMOL/L (ref 136–145)
WBC # BLD AUTO: 5.6 K/UL (ref 4–11)

## 2023-12-11 PROCEDURE — 6360000004 HC RX CONTRAST MEDICATION: Performed by: EMERGENCY MEDICINE

## 2023-12-11 PROCEDURE — 36415 COLL VENOUS BLD VENIPUNCTURE: CPT

## 2023-12-11 PROCEDURE — 96374 THER/PROPH/DIAG INJ IV PUSH: CPT

## 2023-12-11 PROCEDURE — 2580000003 HC RX 258: Performed by: EMERGENCY MEDICINE

## 2023-12-11 PROCEDURE — 83036 HEMOGLOBIN GLYCOSYLATED A1C: CPT

## 2023-12-11 PROCEDURE — 96372 THER/PROPH/DIAG INJ SC/IM: CPT

## 2023-12-11 PROCEDURE — 6370000000 HC RX 637 (ALT 250 FOR IP)

## 2023-12-11 PROCEDURE — 83735 ASSAY OF MAGNESIUM: CPT

## 2023-12-11 PROCEDURE — 84100 ASSAY OF PHOSPHORUS: CPT

## 2023-12-11 PROCEDURE — 96361 HYDRATE IV INFUSION ADD-ON: CPT

## 2023-12-11 PROCEDURE — 1200000000 HC SEMI PRIVATE

## 2023-12-11 PROCEDURE — 70450 CT HEAD/BRAIN W/O DYE: CPT

## 2023-12-11 PROCEDURE — 82010 KETONE BODYS QUAN: CPT

## 2023-12-11 PROCEDURE — 70491 CT SOFT TISSUE NECK W/DYE: CPT

## 2023-12-11 PROCEDURE — 80053 COMPREHEN METABOLIC PANEL: CPT

## 2023-12-11 PROCEDURE — 82803 BLOOD GASES ANY COMBINATION: CPT

## 2023-12-11 PROCEDURE — 85025 COMPLETE CBC W/AUTO DIFF WBC: CPT

## 2023-12-11 PROCEDURE — 6370000000 HC RX 637 (ALT 250 FOR IP): Performed by: EMERGENCY MEDICINE

## 2023-12-11 PROCEDURE — 6370000000 HC RX 637 (ALT 250 FOR IP): Performed by: INTERNAL MEDICINE

## 2023-12-11 PROCEDURE — 99285 EMERGENCY DEPT VISIT HI MDM: CPT

## 2023-12-11 PROCEDURE — 2580000003 HC RX 258: Performed by: INTERNAL MEDICINE

## 2023-12-11 PROCEDURE — 6370000000 HC RX 637 (ALT 250 FOR IP): Performed by: NURSE PRACTITIONER

## 2023-12-11 PROCEDURE — 6360000002 HC RX W HCPCS: Performed by: INTERNAL MEDICINE

## 2023-12-11 RX ORDER — 0.9 % SODIUM CHLORIDE 0.9 %
15 INTRAVENOUS SOLUTION INTRAVENOUS ONCE
Status: DISCONTINUED | OUTPATIENT
Start: 2023-12-11 | End: 2023-12-12

## 2023-12-11 RX ORDER — SODIUM CHLORIDE 9 MG/ML
INJECTION, SOLUTION INTRAVENOUS CONTINUOUS
Status: DISCONTINUED | OUTPATIENT
Start: 2023-12-11 | End: 2023-12-12

## 2023-12-11 RX ORDER — GLUCAGON 1 MG/ML
1 KIT INJECTION PRN
Status: DISCONTINUED | OUTPATIENT
Start: 2023-12-11 | End: 2023-12-14 | Stop reason: HOSPADM

## 2023-12-11 RX ORDER — ONDANSETRON 2 MG/ML
4 INJECTION INTRAMUSCULAR; INTRAVENOUS EVERY 6 HOURS PRN
Status: DISCONTINUED | OUTPATIENT
Start: 2023-12-11 | End: 2023-12-14 | Stop reason: HOSPADM

## 2023-12-11 RX ORDER — OXYCODONE HYDROCHLORIDE 5 MG/1
5 TABLET ORAL ONCE
Status: COMPLETED | OUTPATIENT
Start: 2023-12-12 | End: 2023-12-11

## 2023-12-11 RX ORDER — ENOXAPARIN SODIUM 100 MG/ML
30 INJECTION SUBCUTANEOUS 2 TIMES DAILY
Status: DISCONTINUED | OUTPATIENT
Start: 2023-12-11 | End: 2023-12-14 | Stop reason: HOSPADM

## 2023-12-11 RX ORDER — ASPIRIN 81 MG/1
81 TABLET, CHEWABLE ORAL DAILY
Status: DISCONTINUED | OUTPATIENT
Start: 2023-12-11 | End: 2023-12-14 | Stop reason: HOSPADM

## 2023-12-11 RX ORDER — ALBUTEROL SULFATE 2.5 MG/3ML
2.5 SOLUTION RESPIRATORY (INHALATION) EVERY 6 HOURS PRN
Status: DISCONTINUED | OUTPATIENT
Start: 2023-12-11 | End: 2023-12-14 | Stop reason: HOSPADM

## 2023-12-11 RX ORDER — ACETAMINOPHEN 500 MG
500 TABLET ORAL 4 TIMES DAILY PRN
Status: DISCONTINUED | OUTPATIENT
Start: 2023-12-11 | End: 2023-12-11 | Stop reason: SDUPTHER

## 2023-12-11 RX ORDER — ACETAMINOPHEN 325 MG/1
650 TABLET ORAL EVERY 6 HOURS PRN
Status: DISCONTINUED | OUTPATIENT
Start: 2023-12-11 | End: 2023-12-14 | Stop reason: HOSPADM

## 2023-12-11 RX ORDER — ACETAMINOPHEN 650 MG/1
650 SUPPOSITORY RECTAL EVERY 6 HOURS PRN
Status: DISCONTINUED | OUTPATIENT
Start: 2023-12-11 | End: 2023-12-14 | Stop reason: HOSPADM

## 2023-12-11 RX ORDER — MAGNESIUM SULFATE IN WATER 40 MG/ML
2000 INJECTION, SOLUTION INTRAVENOUS PRN
Status: DISCONTINUED | OUTPATIENT
Start: 2023-12-11 | End: 2023-12-11 | Stop reason: SDUPTHER

## 2023-12-11 RX ORDER — ONDANSETRON 4 MG/1
4 TABLET, ORALLY DISINTEGRATING ORAL EVERY 8 HOURS PRN
Status: DISCONTINUED | OUTPATIENT
Start: 2023-12-11 | End: 2023-12-14 | Stop reason: HOSPADM

## 2023-12-11 RX ORDER — 0.9 % SODIUM CHLORIDE 0.9 %
2000 INTRAVENOUS SOLUTION INTRAVENOUS ONCE
Status: DISCONTINUED | OUTPATIENT
Start: 2023-12-11 | End: 2023-12-11

## 2023-12-11 RX ORDER — SODIUM CHLORIDE 0.9 % (FLUSH) 0.9 %
5-40 SYRINGE (ML) INJECTION EVERY 12 HOURS SCHEDULED
Status: DISCONTINUED | OUTPATIENT
Start: 2023-12-11 | End: 2023-12-14 | Stop reason: HOSPADM

## 2023-12-11 RX ORDER — DEXTROSE MONOHYDRATE, SODIUM CHLORIDE, AND POTASSIUM CHLORIDE 50; 1.49; 4.5 G/1000ML; G/1000ML; G/1000ML
INJECTION, SOLUTION INTRAVENOUS CONTINUOUS PRN
Status: DISCONTINUED | OUTPATIENT
Start: 2023-12-11 | End: 2023-12-11 | Stop reason: ALTCHOICE

## 2023-12-11 RX ORDER — INSULIN LISPRO 100 [IU]/ML
0-8 INJECTION, SOLUTION INTRAVENOUS; SUBCUTANEOUS
Status: DISCONTINUED | OUTPATIENT
Start: 2023-12-11 | End: 2023-12-14 | Stop reason: HOSPADM

## 2023-12-11 RX ORDER — INSULIN LISPRO 100 [IU]/ML
0-4 INJECTION, SOLUTION INTRAVENOUS; SUBCUTANEOUS NIGHTLY
Status: DISCONTINUED | OUTPATIENT
Start: 2023-12-11 | End: 2023-12-14 | Stop reason: HOSPADM

## 2023-12-11 RX ORDER — INSULIN GLARGINE 100 [IU]/ML
0.25 INJECTION, SOLUTION SUBCUTANEOUS NIGHTLY
Status: DISCONTINUED | OUTPATIENT
Start: 2023-12-11 | End: 2023-12-14 | Stop reason: HOSPADM

## 2023-12-11 RX ORDER — POLYETHYLENE GLYCOL 3350 17 G/17G
17 POWDER, FOR SOLUTION ORAL DAILY PRN
Status: DISCONTINUED | OUTPATIENT
Start: 2023-12-11 | End: 2023-12-14 | Stop reason: HOSPADM

## 2023-12-11 RX ORDER — POTASSIUM CHLORIDE 7.45 MG/ML
10 INJECTION INTRAVENOUS PRN
Status: DISCONTINUED | OUTPATIENT
Start: 2023-12-11 | End: 2023-12-14 | Stop reason: HOSPADM

## 2023-12-11 RX ORDER — SUMATRIPTAN 6 MG/.5ML
6 INJECTION, SOLUTION SUBCUTANEOUS ONCE
Status: COMPLETED | OUTPATIENT
Start: 2023-12-11 | End: 2023-12-11

## 2023-12-11 RX ORDER — POTASSIUM CHLORIDE 7.45 MG/ML
10 INJECTION INTRAVENOUS PRN
Status: DISCONTINUED | OUTPATIENT
Start: 2023-12-11 | End: 2023-12-11 | Stop reason: ALTCHOICE

## 2023-12-11 RX ORDER — DEXTROSE MONOHYDRATE 100 MG/ML
INJECTION, SOLUTION INTRAVENOUS CONTINUOUS PRN
Status: DISCONTINUED | OUTPATIENT
Start: 2023-12-11 | End: 2023-12-14 | Stop reason: HOSPADM

## 2023-12-11 RX ORDER — SODIUM CHLORIDE 9 MG/ML
INJECTION, SOLUTION INTRAVENOUS PRN
Status: DISCONTINUED | OUTPATIENT
Start: 2023-12-11 | End: 2023-12-14 | Stop reason: HOSPADM

## 2023-12-11 RX ORDER — SODIUM CHLORIDE 9 MG/ML
INJECTION, SOLUTION INTRAVENOUS CONTINUOUS
Status: DISCONTINUED | OUTPATIENT
Start: 2023-12-11 | End: 2023-12-13

## 2023-12-11 RX ORDER — DULOXETIN HYDROCHLORIDE 60 MG/1
60 CAPSULE, DELAYED RELEASE ORAL DAILY
Status: DISCONTINUED | OUTPATIENT
Start: 2023-12-11 | End: 2023-12-14 | Stop reason: HOSPADM

## 2023-12-11 RX ORDER — POTASSIUM CHLORIDE 20 MEQ/1
40 TABLET, EXTENDED RELEASE ORAL PRN
Status: DISCONTINUED | OUTPATIENT
Start: 2023-12-11 | End: 2023-12-14 | Stop reason: HOSPADM

## 2023-12-11 RX ORDER — SODIUM CHLORIDE 0.9 % (FLUSH) 0.9 %
5-40 SYRINGE (ML) INJECTION PRN
Status: DISCONTINUED | OUTPATIENT
Start: 2023-12-11 | End: 2023-12-14 | Stop reason: HOSPADM

## 2023-12-11 RX ORDER — MAGNESIUM SULFATE IN WATER 40 MG/ML
2000 INJECTION, SOLUTION INTRAVENOUS PRN
Status: DISCONTINUED | OUTPATIENT
Start: 2023-12-11 | End: 2023-12-14 | Stop reason: HOSPADM

## 2023-12-11 RX ORDER — CYCLOBENZAPRINE HCL 10 MG
10 TABLET ORAL ONCE
Status: COMPLETED | OUTPATIENT
Start: 2023-12-11 | End: 2023-12-11

## 2023-12-11 RX ORDER — CARVEDILOL 25 MG/1
25 TABLET ORAL 2 TIMES DAILY WITH MEALS
Status: DISCONTINUED | OUTPATIENT
Start: 2023-12-11 | End: 2023-12-14 | Stop reason: HOSPADM

## 2023-12-11 RX ORDER — 0.9 % SODIUM CHLORIDE 0.9 %
1000 INTRAVENOUS SOLUTION INTRAVENOUS ONCE
Status: COMPLETED | OUTPATIENT
Start: 2023-12-11 | End: 2023-12-11

## 2023-12-11 RX ADMIN — SODIUM CHLORIDE: 9 INJECTION, SOLUTION INTRAVENOUS at 23:34

## 2023-12-11 RX ADMIN — DULOXETINE HYDROCHLORIDE 60 MG: 60 CAPSULE, DELAYED RELEASE ORAL at 14:29

## 2023-12-11 RX ADMIN — INSULIN HUMAN 12 UNITS: 100 INJECTION, SOLUTION PARENTERAL at 08:00

## 2023-12-11 RX ADMIN — ASPIRIN 81 MG: 81 TABLET, CHEWABLE ORAL at 14:29

## 2023-12-11 RX ADMIN — SODIUM CHLORIDE: 9 INJECTION, SOLUTION INTRAVENOUS at 14:36

## 2023-12-11 RX ADMIN — SODIUM CHLORIDE 2000 ML: 9 INJECTION, SOLUTION INTRAVENOUS at 08:05

## 2023-12-11 RX ADMIN — INSULIN LISPRO 4 UNITS: 100 INJECTION, SOLUTION INTRAVENOUS; SUBCUTANEOUS at 20:57

## 2023-12-11 RX ADMIN — SODIUM CHLORIDE, PRESERVATIVE FREE 10 ML: 5 INJECTION INTRAVENOUS at 20:57

## 2023-12-11 RX ADMIN — ACETAMINOPHEN 650 MG: 325 TABLET ORAL at 20:57

## 2023-12-11 RX ADMIN — CYCLOBENZAPRINE 10 MG: 10 TABLET, FILM COATED ORAL at 10:13

## 2023-12-11 RX ADMIN — IOPAMIDOL 75 ML: 755 INJECTION, SOLUTION INTRAVENOUS at 07:46

## 2023-12-11 RX ADMIN — SODIUM CHLORIDE 1000 ML: 9 INJECTION, SOLUTION INTRAVENOUS at 06:58

## 2023-12-11 RX ADMIN — SUMATRIPTAN 6 MG: 6 INJECTION, SOLUTION SUBCUTANEOUS at 07:00

## 2023-12-11 RX ADMIN — INSULIN LISPRO 6 UNITS: 100 INJECTION, SOLUTION INTRAVENOUS; SUBCUTANEOUS at 17:40

## 2023-12-11 RX ADMIN — ENOXAPARIN SODIUM 30 MG: 100 INJECTION SUBCUTANEOUS at 20:57

## 2023-12-11 RX ADMIN — CARVEDILOL 25 MG: 25 TABLET, FILM COATED ORAL at 17:40

## 2023-12-11 RX ADMIN — ENOXAPARIN SODIUM 30 MG: 100 INJECTION SUBCUTANEOUS at 14:28

## 2023-12-11 RX ADMIN — INSULIN GLARGINE 30 UNITS: 100 INJECTION, SOLUTION SUBCUTANEOUS at 20:57

## 2023-12-11 RX ADMIN — OXYCODONE HYDROCHLORIDE 5 MG: 5 TABLET ORAL at 23:58

## 2023-12-11 RX ADMIN — INSULIN LISPRO 6 UNITS: 100 INJECTION, SOLUTION INTRAVENOUS; SUBCUTANEOUS at 14:28

## 2023-12-11 ASSESSMENT — PAIN DESCRIPTION - LOCATION: LOCATION: HEAD

## 2023-12-11 ASSESSMENT — PAIN DESCRIPTION - DESCRIPTORS: DESCRIPTORS: ACHING

## 2023-12-11 ASSESSMENT — LIFESTYLE VARIABLES
HOW OFTEN DO YOU HAVE A DRINK CONTAINING ALCOHOL: NEVER
HOW MANY STANDARD DRINKS CONTAINING ALCOHOL DO YOU HAVE ON A TYPICAL DAY: PATIENT DOES NOT DRINK

## 2023-12-11 ASSESSMENT — PAIN SCALES - GENERAL
PAINLEVEL_OUTOF10: 6
PAINLEVEL_OUTOF10: 3
PAINLEVEL_OUTOF10: 10
PAINLEVEL_OUTOF10: 3
PAINLEVEL_OUTOF10: 3

## 2023-12-11 ASSESSMENT — PAIN - FUNCTIONAL ASSESSMENT
PAIN_FUNCTIONAL_ASSESSMENT: ACTIVITIES ARE NOT PREVENTED
PAIN_FUNCTIONAL_ASSESSMENT: 0-10

## 2023-12-11 ASSESSMENT — PAIN DESCRIPTION - FREQUENCY: FREQUENCY: INTERMITTENT

## 2023-12-11 ASSESSMENT — PAIN DESCRIPTION - ONSET: ONSET: PROGRESSIVE

## 2023-12-11 ASSESSMENT — PAIN DESCRIPTION - ORIENTATION: ORIENTATION: ANTERIOR

## 2023-12-11 ASSESSMENT — PAIN DESCRIPTION - PAIN TYPE: TYPE: ACUTE PAIN

## 2023-12-11 NOTE — ED TRIAGE NOTES
Pt c/o left sided dental/jaw/ear pain. States he was told it was a swollen gland when seen here, and was told it was ear infection at another ER.  Pt states he isn't sure where the pain is coming from exactly but c/o pain in that area with some swelling noted

## 2023-12-11 NOTE — DISCHARGE INSTRUCTIONS
MetFORMIN is contraindicated within 48 hours of IV contrast administration in selected patients ( eGFR below 30, CHELE or stage 4 or 5 CKD, or undergoing a renal arterial cath study or has had one in the last 48 hours).

## 2023-12-11 NOTE — H&P
V2.0  History and Physical      Name:  James Wolfe /Age/Sex: 1985  (40 y.o. male)   MRN & CSN:  [id number] & [id number] Encounter Date/Time: 2023 5:51 PM EST   Location:  [id number] PCP: Eleazar Motta, [address] [place] [address] Day: 1    Assessment and Plan:   James Wolfe is a 40 y.o. male with a pmh of diabetes mellitus who presents with Hyperosmolar hyperglycemic state (HHS) [facility]    Hospital Problems             Last Modified POA    * (Principal) Hyperosmolar hyperglycemic state (HHS) ([address]) 2023 Yes    Diabetic hyperosmolar non-ketotic state ([address]) 2023 Yes       Assessment plan:  Nonketotic hyperosmolar state-improved after fluids and insulin. Not in DKA. Continue IV fluids and high sliding scale for now. Hold Trulicity. Hold metformin. Start Lantus. Check hemoglobin A1c. Left facial pain-unclear etiology. CT neck was negative. Could have TMJ. Acute renal failure-likely prerenal.  Continue IV fluid hydration. Check urine sodium and creatinine. Renal dose adjust meds    Disposition:   Current Living situation: Home  Expected Disposition: Home  Estimated D/C: 2023    Diet ADULT DIET; Regular; 3 carb choices (45 gm/meal)   DVT Prophylaxis [x] Lovenox, []  Heparin, [] SCDs, [] Ambulation,  [] Eliquis, [] Xarelto, [] Coumadin   Code Status Full Code   Surrogate Decision Maker/ POA      Personally reviewed Lab Studies and Imaging     Discussed management of the case with emergency room who recommended patient to the hospital      Imaging that was interpreted personally includes CT neck and results noted below        History from:     patient    History of Present Illness:     Chief Complaint:   James Wolfe is a 40 y.o. male with pmh of diabetes mellitus type 2 who presents with hyperglycemia. Patient reports fatigue and jaw pain. Reported sharp left-sided facial pain. Difficult to open and close jaw. No fevers or chills. Seen at [facility].

## 2023-12-12 LAB
ANION GAP SERPL CALCULATED.3IONS-SCNC: 10 MMOL/L (ref 3–16)
ANION GAP SERPL CALCULATED.3IONS-SCNC: 7 MMOL/L (ref 3–16)
ANION GAP SERPL CALCULATED.3IONS-SCNC: 7 MMOL/L (ref 3–16)
BUN SERPL-MCNC: 16 MG/DL (ref 7–20)
BUN SERPL-MCNC: 16 MG/DL (ref 7–20)
BUN SERPL-MCNC: 18 MG/DL (ref 7–20)
CALCIUM SERPL-MCNC: 8.4 MG/DL (ref 8.3–10.6)
CALCIUM SERPL-MCNC: 8.4 MG/DL (ref 8.3–10.6)
CALCIUM SERPL-MCNC: 8.6 MG/DL (ref 8.3–10.6)
CHLORIDE SERPL-SCNC: 101 MMOL/L (ref 99–110)
CHLORIDE SERPL-SCNC: 103 MMOL/L (ref 99–110)
CHLORIDE SERPL-SCNC: 103 MMOL/L (ref 99–110)
CO2 SERPL-SCNC: 24 MMOL/L (ref 21–32)
CO2 SERPL-SCNC: 24 MMOL/L (ref 21–32)
CO2 SERPL-SCNC: 26 MMOL/L (ref 21–32)
CREAT SERPL-MCNC: 1.2 MG/DL (ref 0.9–1.3)
CREAT SERPL-MCNC: 1.3 MG/DL (ref 0.9–1.3)
CREAT SERPL-MCNC: 1.3 MG/DL (ref 0.9–1.3)
GFR SERPLBLD CREATININE-BSD FMLA CKD-EPI: >60 ML/MIN/{1.73_M2}
GLUCOSE BLD-MCNC: 220 MG/DL (ref 70–99)
GLUCOSE BLD-MCNC: 220 MG/DL (ref 70–99)
GLUCOSE BLD-MCNC: 222 MG/DL (ref 70–99)
GLUCOSE BLD-MCNC: 230 MG/DL (ref 70–99)
GLUCOSE SERPL-MCNC: 237 MG/DL (ref 70–99)
GLUCOSE SERPL-MCNC: 272 MG/DL (ref 70–99)
GLUCOSE SERPL-MCNC: 297 MG/DL (ref 70–99)
MAGNESIUM SERPL-MCNC: 2 MG/DL (ref 1.8–2.4)
MAGNESIUM SERPL-MCNC: 2.1 MG/DL (ref 1.8–2.4)
MAGNESIUM SERPL-MCNC: 2.1 MG/DL (ref 1.8–2.4)
PERFORMED ON: ABNORMAL
PHOSPHATE SERPL-MCNC: 3.1 MG/DL (ref 2.5–4.9)
PHOSPHATE SERPL-MCNC: 3.6 MG/DL (ref 2.5–4.9)
PHOSPHATE SERPL-MCNC: 3.7 MG/DL (ref 2.5–4.9)
POTASSIUM SERPL-SCNC: 4.1 MMOL/L (ref 3.5–5.1)
POTASSIUM SERPL-SCNC: 4.5 MMOL/L (ref 3.5–5.1)
POTASSIUM SERPL-SCNC: 4.5 MMOL/L (ref 3.5–5.1)
SODIUM SERPL-SCNC: 134 MMOL/L (ref 136–145)
SODIUM SERPL-SCNC: 135 MMOL/L (ref 136–145)
SODIUM SERPL-SCNC: 136 MMOL/L (ref 136–145)

## 2023-12-12 PROCEDURE — 80048 BASIC METABOLIC PNL TOTAL CA: CPT

## 2023-12-12 PROCEDURE — 6370000000 HC RX 637 (ALT 250 FOR IP): Performed by: INTERNAL MEDICINE

## 2023-12-12 PROCEDURE — 1200000000 HC SEMI PRIVATE

## 2023-12-12 PROCEDURE — 36415 COLL VENOUS BLD VENIPUNCTURE: CPT

## 2023-12-12 PROCEDURE — 84100 ASSAY OF PHOSPHORUS: CPT

## 2023-12-12 PROCEDURE — 6360000002 HC RX W HCPCS: Performed by: INTERNAL MEDICINE

## 2023-12-12 PROCEDURE — 83735 ASSAY OF MAGNESIUM: CPT

## 2023-12-12 PROCEDURE — 2580000003 HC RX 258: Performed by: INTERNAL MEDICINE

## 2023-12-12 RX ORDER — VALSARTAN 160 MG/1
160 TABLET ORAL DAILY
Status: DISCONTINUED | OUTPATIENT
Start: 2023-12-12 | End: 2023-12-14 | Stop reason: HOSPADM

## 2023-12-12 RX ORDER — ROSUVASTATIN CALCIUM 20 MG/1
40 TABLET, COATED ORAL NIGHTLY
Status: DISCONTINUED | OUTPATIENT
Start: 2023-12-12 | End: 2023-12-14 | Stop reason: HOSPADM

## 2023-12-12 RX ADMIN — INSULIN LISPRO 2 UNITS: 100 INJECTION, SOLUTION INTRAVENOUS; SUBCUTANEOUS at 17:20

## 2023-12-12 RX ADMIN — CARVEDILOL 25 MG: 25 TABLET, FILM COATED ORAL at 17:20

## 2023-12-12 RX ADMIN — SODIUM CHLORIDE: 9 INJECTION, SOLUTION INTRAVENOUS at 23:08

## 2023-12-12 RX ADMIN — ENOXAPARIN SODIUM 30 MG: 100 INJECTION SUBCUTANEOUS at 08:57

## 2023-12-12 RX ADMIN — ENOXAPARIN SODIUM 30 MG: 100 INJECTION SUBCUTANEOUS at 20:30

## 2023-12-12 RX ADMIN — SODIUM CHLORIDE: 9 INJECTION, SOLUTION INTRAVENOUS at 09:02

## 2023-12-12 RX ADMIN — INSULIN LISPRO 2 UNITS: 100 INJECTION, SOLUTION INTRAVENOUS; SUBCUTANEOUS at 13:00

## 2023-12-12 RX ADMIN — VALSARTAN 160 MG: 160 TABLET, FILM COATED ORAL at 13:00

## 2023-12-12 RX ADMIN — INSULIN LISPRO 2 UNITS: 100 INJECTION, SOLUTION INTRAVENOUS; SUBCUTANEOUS at 08:57

## 2023-12-12 RX ADMIN — DULOXETINE HYDROCHLORIDE 60 MG: 60 CAPSULE, DELAYED RELEASE ORAL at 08:57

## 2023-12-12 RX ADMIN — ROSUVASTATIN CALCIUM 40 MG: 20 TABLET, COATED ORAL at 21:28

## 2023-12-12 RX ADMIN — ASPIRIN 81 MG: 81 TABLET, CHEWABLE ORAL at 08:57

## 2023-12-12 RX ADMIN — CARVEDILOL 25 MG: 25 TABLET, FILM COATED ORAL at 08:57

## 2023-12-12 RX ADMIN — INSULIN GLARGINE 30 UNITS: 100 INJECTION, SOLUTION SUBCUTANEOUS at 20:29

## 2023-12-12 RX ADMIN — ACETAMINOPHEN 650 MG: 325 TABLET ORAL at 23:12

## 2023-12-12 ASSESSMENT — PAIN DESCRIPTION - LOCATION
LOCATION: HEAD

## 2023-12-12 ASSESSMENT — PAIN DESCRIPTION - FREQUENCY: FREQUENCY: INTERMITTENT

## 2023-12-12 ASSESSMENT — PAIN DESCRIPTION - PAIN TYPE: TYPE: ACUTE PAIN

## 2023-12-12 ASSESSMENT — PAIN - FUNCTIONAL ASSESSMENT: PAIN_FUNCTIONAL_ASSESSMENT: ACTIVITIES ARE NOT PREVENTED

## 2023-12-12 ASSESSMENT — PAIN SCALES - GENERAL
PAINLEVEL_OUTOF10: 3
PAINLEVEL_OUTOF10: 4
PAINLEVEL_OUTOF10: 3
PAINLEVEL_OUTOF10: 0
PAINLEVEL_OUTOF10: 3

## 2023-12-12 ASSESSMENT — PAIN DESCRIPTION - ORIENTATION
ORIENTATION: ANTERIOR;LEFT
ORIENTATION: ANTERIOR
ORIENTATION: ANTERIOR

## 2023-12-12 ASSESSMENT — PAIN DESCRIPTION - DESCRIPTORS
DESCRIPTORS: ACHING

## 2023-12-12 ASSESSMENT — PAIN DESCRIPTION - ONSET: ONSET: GRADUAL

## 2023-12-12 NOTE — CARE COORDINATION
CM  following for  d/c planning:     Patient  from  home  Indep at  baseline, with  Mobility  and  ADL's  no current  services  or  DME. Plans to return home at  d/c  . Once  medically cleared       - Pt is not at high risk for readmission, has not recently been admitted, and there is no active consult for Case Management services. -  will sign off on this pt at this time. If needs arise, please consult Case Management services. -  Risk of Readmission Score  14   %    Electronically signed by Arnulfo Owens RN on 12/12/2023 at 5:42 PM       Arnulfo Owens RN Case Manager  [facility]  [address]   [facility] [address]  [phone]  Fax [phone]

## 2023-12-12 NOTE — PLAN OF CARE
Problem: Discharge Planning  Goal: Discharge to home or other facility with appropriate resources  Outcome: Progressing  Flowsheets (Taken 12/11/2023 2000)  Discharge to home or other facility with appropriate resources: Identify barriers to discharge with patient and caregiver     Problem: Pain  Goal: Verbalizes/displays adequate comfort level or baseline comfort level  Outcome: Progressing     Problem: Risk for Elopement  Goal: Patient will not exit the unit/facility without proper excort  Outcome: Progressing  Flowsheets (Taken 12/11/2023 2000)  Nursing Interventions for Elopement Risk: Assist with personal care needs such as toileting, eating, dressing, as needed to reduce the risk of wandering     Problem: Safety - Adult  Goal: Free from fall injury  Outcome: Progressing     Problem: Chronic Conditions and Co-morbidities  Goal: Patient's chronic conditions and co-morbidity symptoms are monitored and maintained or improved  Outcome: Progressing  Flowsheets (Taken 12/11/2023 2000)  Care Plan - Patient's Chronic Conditions and Co-Morbidity Symptoms are Monitored and Maintained or Improved: Monitor and assess patient's chronic conditions and comorbid symptoms for stability, deterioration, or improvement

## 2023-12-13 ENCOUNTER — APPOINTMENT (OUTPATIENT)
Dept: CT IMAGING | Age: 38
DRG: 420 | End: 2023-12-13
Payer: COMMERCIAL

## 2023-12-13 LAB
ANION GAP SERPL CALCULATED.3IONS-SCNC: 5 MMOL/L (ref 3–16)
ANION GAP SERPL CALCULATED.3IONS-SCNC: 6 MMOL/L (ref 3–16)
ANION GAP SERPL CALCULATED.3IONS-SCNC: 7 MMOL/L (ref 3–16)
BUN SERPL-MCNC: 12 MG/DL (ref 7–20)
BUN SERPL-MCNC: 13 MG/DL (ref 7–20)
BUN SERPL-MCNC: 13 MG/DL (ref 7–20)
CALCIUM SERPL-MCNC: 8.3 MG/DL (ref 8.3–10.6)
CALCIUM SERPL-MCNC: 8.6 MG/DL (ref 8.3–10.6)
CALCIUM SERPL-MCNC: 8.9 MG/DL (ref 8.3–10.6)
CHLORIDE SERPL-SCNC: 101 MMOL/L (ref 99–110)
CHLORIDE SERPL-SCNC: 105 MMOL/L (ref 99–110)
CHLORIDE SERPL-SCNC: 106 MMOL/L (ref 99–110)
CO2 SERPL-SCNC: 25 MMOL/L (ref 21–32)
CO2 SERPL-SCNC: 26 MMOL/L (ref 21–32)
CO2 SERPL-SCNC: 27 MMOL/L (ref 21–32)
CREAT SERPL-MCNC: 1.1 MG/DL (ref 0.9–1.3)
CREAT SERPL-MCNC: 1.2 MG/DL (ref 0.9–1.3)
CREAT SERPL-MCNC: 1.2 MG/DL (ref 0.9–1.3)
CRP SERPL-MCNC: <3 MG/L (ref 0–5.1)
D DIMER: <0.27 UG/ML FEU (ref 0–0.6)
ERYTHROCYTE [SEDIMENTATION RATE] IN BLOOD BY WESTERGREN METHOD: 16 MM/HR (ref 0–15)
EST. AVERAGE GLUCOSE BLD GHB EST-MCNC: 446.9 MG/DL
GFR SERPLBLD CREATININE-BSD FMLA CKD-EPI: >60 ML/MIN/{1.73_M2}
GLUCOSE BLD-MCNC: 177 MG/DL (ref 70–99)
GLUCOSE BLD-MCNC: 266 MG/DL (ref 70–99)
GLUCOSE BLD-MCNC: 284 MG/DL (ref 70–99)
GLUCOSE BLD-MCNC: 286 MG/DL (ref 70–99)
GLUCOSE SERPL-MCNC: 192 MG/DL (ref 70–99)
GLUCOSE SERPL-MCNC: 242 MG/DL (ref 70–99)
GLUCOSE SERPL-MCNC: 254 MG/DL (ref 70–99)
HBA1C MFR BLD: 17.2 %
PERFORMED ON: ABNORMAL
POTASSIUM SERPL-SCNC: 4 MMOL/L (ref 3.5–5.1)
POTASSIUM SERPL-SCNC: 4.1 MMOL/L (ref 3.5–5.1)
POTASSIUM SERPL-SCNC: 4.2 MMOL/L (ref 3.5–5.1)
SODIUM SERPL-SCNC: 134 MMOL/L (ref 136–145)
SODIUM SERPL-SCNC: 137 MMOL/L (ref 136–145)
SODIUM SERPL-SCNC: 137 MMOL/L (ref 136–145)

## 2023-12-13 PROCEDURE — 36415 COLL VENOUS BLD VENIPUNCTURE: CPT

## 2023-12-13 PROCEDURE — 6370000000 HC RX 637 (ALT 250 FOR IP): Performed by: INTERNAL MEDICINE

## 2023-12-13 PROCEDURE — 86140 C-REACTIVE PROTEIN: CPT

## 2023-12-13 PROCEDURE — 6360000004 HC RX CONTRAST MEDICATION: Performed by: INTERNAL MEDICINE

## 2023-12-13 PROCEDURE — 85652 RBC SED RATE AUTOMATED: CPT

## 2023-12-13 PROCEDURE — 80048 BASIC METABOLIC PNL TOTAL CA: CPT

## 2023-12-13 PROCEDURE — 1200000000 HC SEMI PRIVATE

## 2023-12-13 PROCEDURE — 70496 CT ANGIOGRAPHY HEAD: CPT

## 2023-12-13 PROCEDURE — 85379 FIBRIN DEGRADATION QUANT: CPT

## 2023-12-13 PROCEDURE — 6360000002 HC RX W HCPCS: Performed by: INTERNAL MEDICINE

## 2023-12-13 RX ORDER — LANOLIN ALCOHOL/MO/W.PET/CERES
3 CREAM (GRAM) TOPICAL DAILY
Status: DISCONTINUED | OUTPATIENT
Start: 2023-12-13 | End: 2023-12-14 | Stop reason: HOSPADM

## 2023-12-13 RX ORDER — AMLODIPINE BESYLATE 5 MG/1
5 TABLET ORAL DAILY
Status: DISCONTINUED | OUTPATIENT
Start: 2023-12-13 | End: 2023-12-14 | Stop reason: HOSPADM

## 2023-12-13 RX ADMIN — INSULIN LISPRO 4 UNITS: 100 INJECTION, SOLUTION INTRAVENOUS; SUBCUTANEOUS at 13:09

## 2023-12-13 RX ADMIN — CARVEDILOL 25 MG: 25 TABLET, FILM COATED ORAL at 08:51

## 2023-12-13 RX ADMIN — IOPAMIDOL 75 ML: 755 INJECTION, SOLUTION INTRAVENOUS at 14:59

## 2023-12-13 RX ADMIN — ENOXAPARIN SODIUM 30 MG: 100 INJECTION SUBCUTANEOUS at 20:20

## 2023-12-13 RX ADMIN — VALSARTAN 160 MG: 160 TABLET, FILM COATED ORAL at 08:51

## 2023-12-13 RX ADMIN — ACETAMINOPHEN 650 MG: 325 TABLET ORAL at 20:20

## 2023-12-13 RX ADMIN — ACETAMINOPHEN 650 MG: 325 TABLET ORAL at 06:43

## 2023-12-13 RX ADMIN — Medication 3 MG: at 22:40

## 2023-12-13 RX ADMIN — INSULIN LISPRO 4 UNITS: 100 INJECTION, SOLUTION INTRAVENOUS; SUBCUTANEOUS at 18:20

## 2023-12-13 RX ADMIN — CARVEDILOL 25 MG: 25 TABLET, FILM COATED ORAL at 16:22

## 2023-12-13 RX ADMIN — ROSUVASTATIN CALCIUM 40 MG: 20 TABLET, COATED ORAL at 20:20

## 2023-12-13 RX ADMIN — ASPIRIN 81 MG: 81 TABLET, CHEWABLE ORAL at 08:51

## 2023-12-13 RX ADMIN — INSULIN GLARGINE 30 UNITS: 100 INJECTION, SOLUTION SUBCUTANEOUS at 20:20

## 2023-12-13 RX ADMIN — ENOXAPARIN SODIUM 30 MG: 100 INJECTION SUBCUTANEOUS at 08:51

## 2023-12-13 RX ADMIN — DULOXETINE HYDROCHLORIDE 60 MG: 60 CAPSULE, DELAYED RELEASE ORAL at 08:51

## 2023-12-13 RX ADMIN — AMLODIPINE BESYLATE 5 MG: 5 TABLET ORAL at 16:22

## 2023-12-13 ASSESSMENT — PAIN DESCRIPTION - ORIENTATION
ORIENTATION: LEFT
ORIENTATION: ANTERIOR;LEFT

## 2023-12-13 ASSESSMENT — PAIN DESCRIPTION - ONSET: ONSET: GRADUAL

## 2023-12-13 ASSESSMENT — PAIN SCALES - GENERAL
PAINLEVEL_OUTOF10: 8
PAINLEVEL_OUTOF10: 8

## 2023-12-13 ASSESSMENT — PAIN DESCRIPTION - FREQUENCY: FREQUENCY: INTERMITTENT

## 2023-12-13 ASSESSMENT — PAIN DESCRIPTION - LOCATION
LOCATION: HEAD
LOCATION: HEAD

## 2023-12-13 ASSESSMENT — PAIN DESCRIPTION - DESCRIPTORS
DESCRIPTORS: ACHING
DESCRIPTORS: ACHING

## 2023-12-13 ASSESSMENT — PAIN DESCRIPTION - PAIN TYPE: TYPE: ACUTE PAIN

## 2023-12-13 NOTE — PLAN OF CARE
Problem: Pain  Goal: Verbalizes/displays adequate comfort level or baseline comfort level  12/13/2023 0748 by Anabella Tobin RN  Outcome: Progressing  Flowsheets (Taken 12/13/2023 7398)  Verbalizes/displays adequate comfort level or baseline comfort level:   Encourage patient to monitor pain and request assistance   Assess pain using appropriate pain scale   Administer analgesics based on type and severity of pain and evaluate response  Note: Pain level assessed and PRN medication made available when needed for breakthrough pain. Patient's pain rated a 0/10 at this time. 12/12/2023 2208 by Ora Weiss RN  Outcome: Progressing  Flowsheets (Taken 12/12/2023 2208)  Verbalizes/displays adequate comfort level or baseline comfort level:   Encourage patient to monitor pain and request assistance   Administer analgesics based on type and severity of pain and evaluate response   Consider cultural and social influences on pain and pain management   Assess pain using appropriate pain scale   Implement non-pharmacological measures as appropriate and evaluate response   Notify Licensed Independent Practitioner if interventions unsuccessful or patient reports new pain  Note: Denies pain at this time. Problem: Safety - Adult  Goal: Free from fall injury  Outcome: Progressing  Flowsheets (Taken 12/13/2023 0748)  Free From Fall Injury:   Instruct family/caregiver on patient safety   Based on caregiver fall risk screen, instruct family/caregiver to ask for assistance with transferring infant if caregiver noted to have fall risk factors  Note: Patient A/O x4 and independent in the room. Patient does have continuous IV therapy and educated on calling for assistance when needed for ambulation with IV pump and pole.

## 2023-12-13 NOTE — CARE COORDINATION
Chart review day 2- pt from home, independent. Anticipating no needs for dc to home. DKA, monitoring blood sugar. Will follow for potential DCP needs.

## 2023-12-13 NOTE — PLAN OF CARE
Problem: Pain  Goal: Verbalizes/displays adequate comfort level or baseline comfort level  Outcome: Progressing  Flowsheets (Taken 12/12/2023 2208)  Verbalizes/displays adequate comfort level or baseline comfort level:   Encourage patient to monitor pain and request assistance   Administer analgesics based on type and severity of pain and evaluate response   Consider cultural and social influences on pain and pain management   Assess pain using appropriate pain scale   Implement non-pharmacological measures as appropriate and evaluate response   Notify Licensed Independent Practitioner if interventions unsuccessful or patient reports new pain  Note: Denies pain at this time. Problem: Chronic Conditions and Co-morbidities  Goal: Patient's chronic conditions and co-morbidity symptoms are monitored and maintained or improved  Flowsheets (Taken 12/12/2023 2208)  Care Plan - Patient's Chronic Conditions and Co-Morbidity Symptoms are Monitored and Maintained or Improved:   Monitor and assess patient's chronic conditions and comorbid symptoms for stability, deterioration, or improvement   Collaborate with multidisciplinary team to address chronic and comorbid conditions and prevent exacerbation or deterioration   Update acute care plan with appropriate goals if chronic or comorbid symptoms are exacerbated and prevent overall improvement and discharge  Note: Continue to monitor blood sugar and gave insulin as ordered. Plan of care ongoing.

## 2023-12-14 VITALS
WEIGHT: 274.25 LBS | DIASTOLIC BLOOD PRESSURE: 95 MMHG | OXYGEN SATURATION: 97 % | SYSTOLIC BLOOD PRESSURE: 138 MMHG | RESPIRATION RATE: 18 BRPM | HEIGHT: 73 IN | HEART RATE: 86 BPM | BODY MASS INDEX: 36.35 KG/M2 | TEMPERATURE: 97.9 F

## 2023-12-14 LAB
ANION GAP SERPL CALCULATED.3IONS-SCNC: 5 MMOL/L (ref 3–16)
BUN SERPL-MCNC: 10 MG/DL (ref 7–20)
CALCIUM SERPL-MCNC: 8.8 MG/DL (ref 8.3–10.6)
CHLORIDE SERPL-SCNC: 104 MMOL/L (ref 99–110)
CO2 SERPL-SCNC: 26 MMOL/L (ref 21–32)
CREAT SERPL-MCNC: 1.1 MG/DL (ref 0.9–1.3)
GFR SERPLBLD CREATININE-BSD FMLA CKD-EPI: >60 ML/MIN/{1.73_M2}
GLUCOSE BLD-MCNC: 127 MG/DL (ref 70–99)
GLUCOSE BLD-MCNC: 152 MG/DL (ref 70–99)
GLUCOSE BLD-MCNC: 192 MG/DL (ref 70–99)
GLUCOSE BLD-MCNC: 194 MG/DL (ref 70–99)
GLUCOSE SERPL-MCNC: 147 MG/DL (ref 70–99)
PERFORMED ON: ABNORMAL
POTASSIUM SERPL-SCNC: 3.8 MMOL/L (ref 3.5–5.1)
SODIUM SERPL-SCNC: 135 MMOL/L (ref 136–145)

## 2023-12-14 PROCEDURE — 6360000002 HC RX W HCPCS: Performed by: INTERNAL MEDICINE

## 2023-12-14 PROCEDURE — 80048 BASIC METABOLIC PNL TOTAL CA: CPT

## 2023-12-14 PROCEDURE — 6370000000 HC RX 637 (ALT 250 FOR IP): Performed by: INTERNAL MEDICINE

## 2023-12-14 PROCEDURE — 2580000003 HC RX 258: Performed by: INTERNAL MEDICINE

## 2023-12-14 PROCEDURE — 36415 COLL VENOUS BLD VENIPUNCTURE: CPT

## 2023-12-14 RX ORDER — AMLODIPINE BESYLATE 5 MG/1
5 TABLET ORAL DAILY
Qty: 30 TABLET | Refills: 3 | Status: SHIPPED | OUTPATIENT
Start: 2023-12-15

## 2023-12-14 RX ORDER — INSULIN DEGLUDEC 200 U/ML
40 INJECTION, SOLUTION SUBCUTANEOUS
Qty: 12 ML | Refills: 3
Start: 2023-12-14

## 2023-12-14 RX ADMIN — SODIUM CHLORIDE, PRESERVATIVE FREE 10 ML: 5 INJECTION INTRAVENOUS at 08:30

## 2023-12-14 RX ADMIN — DULOXETINE HYDROCHLORIDE 60 MG: 60 CAPSULE, DELAYED RELEASE ORAL at 08:30

## 2023-12-14 RX ADMIN — ASPIRIN 81 MG: 81 TABLET, CHEWABLE ORAL at 08:30

## 2023-12-14 RX ADMIN — CARVEDILOL 25 MG: 25 TABLET, FILM COATED ORAL at 08:30

## 2023-12-14 RX ADMIN — ENOXAPARIN SODIUM 30 MG: 100 INJECTION SUBCUTANEOUS at 08:29

## 2023-12-14 RX ADMIN — VALSARTAN 160 MG: 160 TABLET, FILM COATED ORAL at 08:30

## 2023-12-14 RX ADMIN — AMLODIPINE BESYLATE 5 MG: 5 TABLET ORAL at 08:30

## 2023-12-14 NOTE — CARE COORDINATION
Case Management Assessment            Discharge Note                    Date / Time of Note: 12/14/2023 10:57 AM                  Discharge Note Completed by: Viry Silvestre RN    Patient Name: Melanie Anton   YOB: 1985  Diagnosis: Dehydration [E86.0]  Hyperglycemia [R73.9]  Left-sided face pain [R51.9]  Acute renal injury ([address]) [N17.9]  Hyperosmolar hyperglycemic state (HHS) ([address]) [E11.00]  Diabetic hyperosmolar non-ketotic state ([address]) [E11.00]   Date / Time: 12/11/2023  5:48 AM    Current PCP: Brigida Muniz DO  Clinic patient: No    Hospitalization in the last 30 days: No       Advance Directives:  Code Status: Full Code  [place] DNR form completed and on chart: Not Indicated    Financial:  Payor: Bartolome Benavidez / Plan: Bartolome Benavidez / Product Type: *No Product type* /      Pharmacy:    [address] [place], [place] [id number] [facility] [phone]  [facility] [address]  Phone: [phone] Fax: [phone]      Assistance purchasing medications?: Potential Assistance Purchasing Medications: No  Assistance provided by Case Management: None at this time    Does patient want to participate in local refill/ meds to beds program?:      Meds To Beds General Rules:  1. Can ONLY be done Monday- Friday between 8:30am-5pm  2. Prescription(s) must be in pharmacy by 3pm to be filled same day  3. Copy of patient's insurance/ prescription drug card and patient face sheet must be sent along with the prescription(s)  4. Cost of Rx cannot be added to hospital bill. If financial assistance is needed, please contact unit  or ;  or  CANNOT provide pharmacy voucher for patients co-pays  5.  Patients can then  the prescription on their way out of the hospital at discharge, or pharmacy can deliver to the bedside if staff is available. (payment due at time of

## 2023-12-14 NOTE — PLAN OF CARE
Problem: Discharge Planning  Goal: Discharge to home or other facility with appropriate resources  12/14/2023 0214 by Jasmina Curry RN  Outcome: Progressing     Problem: Pain  Goal: Verbalizes/displays adequate comfort level or baseline comfort level  12/14/2023 0735 by Honorio Tillman RN  Outcome: Progressing  Flowsheets (Taken 12/14/2023 0735)  Verbalizes/displays adequate comfort level or baseline comfort level:   Encourage patient to monitor pain and request assistance   Assess pain using appropriate pain scale   Administer analgesics based on type and severity of pain and evaluate response  Note: Pain level assessed and PRN medication made available when needed for breakthrough pain. Pain rated a 0/10 this morning. 12/14/2023 0214 by Jasmina Curry RN  Outcome: Progressing     Problem: Risk for Elopement  Goal: Patient will not exit the unit/facility without proper excort  12/14/2023 0214 by Jasmina Curry RN  Outcome: Progressing     Problem: Safety - Adult  Goal: Free from fall injury  12/14/2023 0735 by Honorio Tillman RN  Outcome: Progressing  Flowsheets (Taken 12/14/2023 0735)  Free From Fall Injury:   Mohinder Arboleda family/caregiver on patient safety   Based on caregiver fall risk screen, instruct family/caregiver to ask for assistance with transferring infant if caregiver noted to have fall risk factors  Note: Patient is A/O x4 and independent in the room. Patient educated on utilizing call light when needed. Room is free of clutter at this time.   12/14/2023 0214 by Jasmina Curry RN  Outcome: Progressing     Problem: Chronic Conditions and Co-morbidities  Goal: Patient's chronic conditions and co-morbidity symptoms are monitored and maintained or improved  12/14/2023 0214 by Jasmina Curry RN  Outcome: Progressing

## 2023-12-14 NOTE — PLAN OF CARE
Problem: Discharge Planning  Goal: Discharge to home or other facility with appropriate resources  Outcome: Progressing   Note: Patient to discharge home    Problem: Pain  Goal: Verbalizes/displays adequate comfort level or baseline comfort level  Outcome: Progressing   Note: Pain adequately controlled    Problem: Risk for Elopement  Goal: Patient will not exit the unit/facility without proper excort  Outcome: Progressing   Note: Patient agreeable to current plan of care and treatment plan.     Problem: Safety - Adult  Goal: Free from fall injury  Outcome: Progressing   Note: Free from falls    Problem: Chronic Conditions and Co-morbidities  Goal: Patient's chronic conditions and co-morbidity symptoms are monitored and maintained or improved  Outcome: Progressing   Note: Co morbidities controlled and maintained

## 2023-12-14 NOTE — PROGRESS NOTES
/105 map 120, HR 95 sinus on tele. Denies pain currently; no c/o any at this time. Dr Conor Carrillo notified via perfect serve.
/105 map 123 (L), 133/88 map 103 (R), HR 96 sinus on tele. C/o headache 3/10 and tooth/left jaw pain and swelling d/t tooth fx. received tylenol at 9pm, states some alleviation of pain. Kwame Dimas notified via perfect serve, see N.O. for one time oxycodone 5mg PO. Will cont to monitor.
Patient admitted to room 6323 from [facility] ER. A/OX4. Ambulates with steady gait. Vitals stable. Call light education provided, call light in reach. Non slip socks given to patient for safety.
Patient had elevated BP throughout day, sugars better under control. No c/o pain.
Patient to discharge home with family. IV removed per protocol. Education and care plan completed per protocol. Belongings returned to patient. AVS printed and discussed with patient. Medication sent to outside pharmacy for pick-up. Patient to follow-up with PCP within 1-2 weeks of discharge. Education provided on Diabetes and the importance of checking blood glucose levels. . Patient stated understanding. Patient left unit in good and stable condition and got a Lyft to take him to his car.
Per [employer] approved formulary policy. SGLT2's are only formulary with the indication of CKD or CHF therefore:    Please note that the  Empagliflozin [place]) is non-formulary with indications of type 2 diabetes and has been discontinued while inpatient. If you feel the patient needs to continue their home therapy during the inpatient stay, the patient may bring their medication bottle for verification and administration pursuant to our home medication use policy. Please contact the pharmacy with any questions or concerns. Thank you.   Ramos Orr, [facility], [facility]/PharmD 12/13/2023 2:37 PM
Transfer Acceptance Note     Discussed with Dr. Teresa Casarez at [facility] ED. Pt presented to ED with 1 month of L facial pain. CT head and soft tissue/neck without acute findings, however labs were notable for glucose 605, VBG pH 7.34. Anion gap 10. Bicarb 22. No leukocytosis. CHELE with cr of 1.7. Pt has been noncompliant with his insulin. Started on insulin gtt in ED. Will repeat BMP and add BHB. Requested PCU bed, can likely transition from insulin gtt to subq insulin if pt remains without anion gap or acidosis. Please contact on-call hospitalist when patient arrives. 1499 update: repeat blood sugar is 207. Upon further review of MAR it appears the insulin gtt was not started, just given 12u of regular insulin at 0800. [facility]      Taqueria Gay MD
V2.0    [facility]      Name:  Gi Carlton /Age/Sex: 1985  (40 y.o. male)   MRN & CSN:  [id number] & [id number] Encounter Date/Time: 2023 5:01 PM EST   Location:  99/7444- PCP: Tomas Up DO     Attending:Filemon Hamilton MD       Hospital Day: 3    Assessment and Recommendations   Assessment plan:  Hyperglycemia, due to poorly controlled type 2 diabetes with neuropathy, long-term use of insulin at home, patient not in DKA, was started on IV fluids, continue to hold metformin, Trulicity. Insulin high risk medication. Started on 30 of Lantus and sliding scale insulin. Insulin doses being adjusted hypoglycemia protocol in place A1c was checked and it was 17.5  left facial pain-unclear etiology. Possibly TMJ. CRP is negative, ESR 16.  CT venous did not show evidence of venous thrombosis. Patient is to follow with primary care physician and dentist for TMJ pain. Acute renal failure on CKD stage 3a-likely prerenal.  Creatinine improved with IV fluids, off IV fluids as patient hypertensive. Hypertensive urgency -continued on carvedilol 25 mg twice daily as well as Diovan home dose. Blood pressure still uncontrolled. IV fluids have been stopped. Will add amlodipine 5 mg once daily. Chronic systolic heart failure, most recent EF 45%, Normal ischemic work-up in the past, follows with Dr. Stuart Severino  Hyperlipidemia, acquired    HX of Thyroid enlargement    Hx of parotid gland enlargement    Hx of Pulmonary Histoplasmosis/Adenopathy (followed by  Pulmonary Guru Tobin MD) -history of admission at [facility] in 2022 had bronchoscopy with multiple organism growth and concern for histoplasma, positive fungal antibodies as well as beta D glucan, treated from 2022 to 2022 for histoplasmosis.   He was then admitted at [facility] ER in 2023 for chest pain/orthostatic, interventional pulmonology saw the patient for adenopathy on CT imaging, biopsy
mg, PRN  ondansetron, 4 mg, Q8H PRN   Or  ondansetron, 4 mg, Q6H PRN  polyethylene glycol, 17 g, Daily PRN  acetaminophen, 650 mg, Q6H PRN   Or  acetaminophen, 650 mg, Q6H PRN  glucose, 4 tablet, PRN  dextrose bolus, 125 mL, PRN   Or  dextrose bolus, 250 mL, PRN  glucagon (rDNA), 1 mg, PRN  dextrose, , Continuous PRN        Labs and Imaging   CT Head W/O Contrast    Result Date: 12/11/2023  Reason: Persistent headache, neck pain CT of the head without contrast TECHNIQUE: Collimated helical images were made from the skull base to the brain convexity without intravenous contrast. CT performed on the current scanner with low-dose x-ray techniques. FINDINGS: The visualized paranasal sinuses, mastoid air cells demonstrate no air-fluid levels. Mucosal thickening identified leftward maxillary sinus. The gray-white junction appears intact, bilaterally. No extra-axial fluid collections. No midline shift. No evidence for acute intracranial process. No bleed or shift CT of the neck with contrast TECHNIQUE: Collimated gloves were made from the skull base through the thoracic inlet after 75 mL of Isovue-370 intravenous contrast. CT performed on the current scanner low-dose x-ray techniques FINDINGS: Small lymph nodes identified bilateral submandibular regions. The tongue base is unremarkable. The true cords are symmetric. Within the right axilla, a 7 mm lymph node identified. Within the right upper lobe, a scattered bronchocentric nodular opacities identified, largest of these measures 7.4 mm. Within the right hilum, on the limited views of the right hilar region, evidence of a 1.5 cm lymph node. When compared to a prior CT performed May 29, 2022, dense opacity was noted in this region with right hilar lymphadenopathy. When compared to prior CT November 14, 2022, this nodularity when measured at similar areas has a stable appearance with decreased right hilar lymphadenopathy on the limited views acquired. IMPRESSION: 1.
IMPRESSION: 1. Unremarkable contrast-enhanced CT of the neck without acute process identified 2. Stable nodularity right upper lobe of the lung with decreased right hilar lymphadenopathy comparison to prior examinations performed at [address] [place] [address]    Result Date: 12/11/2023  Reason: Persistent headache, neck pain CT of the head without contrast TECHNIQUE: Collimated helical images were made from the skull base to the brain convexity without intravenous contrast. CT performed on the current scanner with low-dose x-ray techniques. FINDINGS: The visualized paranasal sinuses, mastoid air cells demonstrate no air-fluid levels. Mucosal thickening identified leftward maxillary sinus. The gray-white junction appears intact, bilaterally. No extra-axial fluid collections. No midline shift. No evidence for acute intracranial process. No bleed or shift CT of the neck with contrast TECHNIQUE: Collimated gloves were made from the skull base through the thoracic inlet after 75 mL of Isovue-370 intravenous contrast. CT performed on the current scanner low-dose x-ray techniques FINDINGS: Small lymph nodes identified bilateral submandibular regions. The tongue base is unremarkable. The true cords are symmetric. Within the right axilla, a 7 mm lymph node identified. Within the right upper lobe, a scattered bronchocentric nodular opacities identified, largest of these measures 7.4 mm. Within the right hilum, on the limited views of the right hilar region, evidence of a 1.5 cm lymph node. When compared to a prior CT performed May 29, 2022, dense opacity was noted in this region with right hilar lymphadenopathy. When compared to prior CT November 14, 2022, this nodularity when measured at similar areas has a stable appearance with decreased right hilar lymphadenopathy on the limited views acquired. IMPRESSION: 1. Unremarkable contrast-enhanced CT of the neck without acute process identified 2.  Stable

## 2024-01-02 ENCOUNTER — TELEPHONE (OUTPATIENT)
Dept: PHARMACY | Age: 39
End: 2024-01-02

## 2024-01-02 NOTE — TELEPHONE ENCOUNTER
Received new referral to [facility] from Dr. PIERRE Kan for diabetes. Patient previously seen in clinic, but was d/c when he est care with endo (to minimize extra HC visits). Call placed to patient to schedule initial visit. Left message with instructions for patient to call the clinic back.     Alicia Oliveira, PharmD, BCACP  Ph: [phone]

## 2024-01-04 ENCOUNTER — HOSPITAL ENCOUNTER (EMERGENCY)
Age: 39
Discharge: HOME | End: 2024-01-04
Attending: EMERGENCY MEDICINE
Payer: COMMERCIAL

## 2024-01-04 VITALS
RESPIRATION RATE: 18 BRPM | DIASTOLIC BLOOD PRESSURE: 109 MMHG | BODY MASS INDEX: 35.94 KG/M2 | OXYGEN SATURATION: 98 % | SYSTOLIC BLOOD PRESSURE: 145 MMHG | TEMPERATURE: 97.6 F | WEIGHT: 271.2 LBS | HEIGHT: 73 IN | HEART RATE: 104 BPM

## 2024-01-04 DIAGNOSIS — M54.50 ACUTE BILATERAL LOW BACK PAIN WITHOUT SCIATICA: Primary | ICD-10-CM

## 2024-01-04 PROCEDURE — 6370000000 HC RX 637 (ALT 250 FOR IP): Performed by: EMERGENCY MEDICINE

## 2024-01-04 PROCEDURE — 99284 EMERGENCY DEPT VISIT MOD MDM: CPT

## 2024-01-04 PROCEDURE — 96372 THER/PROPH/DIAG INJ SC/IM: CPT

## 2024-01-04 PROCEDURE — 6360000002 HC RX W HCPCS: Performed by: EMERGENCY MEDICINE

## 2024-01-04 RX ORDER — NAPROXEN 500 MG/1
500 TABLET ORAL 2 TIMES DAILY PRN
Qty: 20 TABLET | Refills: 0 | Status: SHIPPED | OUTPATIENT
Start: 2024-01-04 | End: 2024-01-14

## 2024-01-04 RX ORDER — KETOROLAC TROMETHAMINE 30 MG/ML
30 INJECTION, SOLUTION INTRAMUSCULAR; INTRAVENOUS ONCE
Status: COMPLETED | OUTPATIENT
Start: 2024-01-04 | End: 2024-01-04

## 2024-01-04 RX ORDER — METHOCARBAMOL 500 MG/1
750 TABLET, FILM COATED ORAL ONCE
Status: COMPLETED | OUTPATIENT
Start: 2024-01-04 | End: 2024-01-04

## 2024-01-04 RX ORDER — METHOCARBAMOL 500 MG/1
500 TABLET, FILM COATED ORAL 4 TIMES DAILY PRN
Qty: 20 TABLET | Refills: 0 | Status: SHIPPED | OUTPATIENT
Start: 2024-01-04 | End: 2024-01-09

## 2024-01-04 RX ADMIN — METHOCARBAMOL 750 MG: 500 TABLET ORAL at 09:06

## 2024-01-04 RX ADMIN — KETOROLAC TROMETHAMINE 30 MG: 30 INJECTION, SOLUTION INTRAMUSCULAR; INTRAVENOUS at 09:06

## 2024-01-04 ASSESSMENT — PAIN DESCRIPTION - FREQUENCY: FREQUENCY: CONTINUOUS

## 2024-01-04 ASSESSMENT — PAIN DESCRIPTION - DESCRIPTORS: DESCRIPTORS: SHARP;DULL;ACHING

## 2024-01-04 ASSESSMENT — PAIN DESCRIPTION - ORIENTATION: ORIENTATION: LOWER

## 2024-01-04 ASSESSMENT — PAIN DESCRIPTION - ONSET: ONSET: ON-GOING

## 2024-01-04 ASSESSMENT — PAIN SCALES - GENERAL: PAINLEVEL_OUTOF10: 9

## 2024-01-04 ASSESSMENT — PAIN DESCRIPTION - PAIN TYPE: TYPE: ACUTE PAIN

## 2024-01-04 ASSESSMENT — PAIN DESCRIPTION - LOCATION: LOCATION: BACK

## 2024-01-04 ASSESSMENT — PAIN - FUNCTIONAL ASSESSMENT
PAIN_FUNCTIONAL_ASSESSMENT: 0-10
PAIN_FUNCTIONAL_ASSESSMENT: ACTIVITIES ARE NOT PREVENTED

## 2024-01-04 NOTE — ED PROVIDER NOTES
CC:   Chief Complaint   Patient presents with    Back Pain     Lower unknown injury         HPI:  Issa is a 38 y.o. male who presents to the emergency department with complaints of mid to lower back pain.  He has had symptoms for 3 to 4 days.  His job does entail frequent pushing and pulling.  He does not recall any distinct injury.  He denies any pain radiating down to his feet.  No bowel or bladder incontinence.  No urinary symptoms.  No fever or chills.  No abdominal pain no chest pain.  No history of chronic back pain.  History obtained via the patient    External records reviewed    ROS:  All Pertinent ROS Negative Unless otherwise stated within HPI.    VITALS:  Vitals:    01/04/24 0838   BP: (!) 145/109   Pulse: (!) 116   Resp: 16   Temp: 97.6 °F (36.4 °C)   SpO2: 99%        PHYSICAL EXAM:      Vital signs reviewed  General:  Patient appeared in no distress  Vitals:  Vital signs reviewed, see nurses notes  Neck:  No JVD, or lymphadenopathy.  Cardiovascular:  Regular rhythm, Normal sounds and absence of murmurs, rubs or gallops.  Lungs:  Clear to auscultation without rales, ronchi, or wheezing.  Abdomen:  Soft, unremarkable and without evidence of organomegally, masses, or abdominal aortic enlargement, No guarding.  Extremities:  Non-edematous and Normal distal pulses.  Neuro:  Nonfocal and Normal motor and sensation.   Back exam: Minimal tenderness to palpation over the lumbar paraspinal musculature.  Negative straight leg raise.  Complains of pain with movement, sitting up for exam.  No difficulty ambulating.  No foot drop.  Neuromotor exam is intact.  LABS/IMAGING:  Reviewed  No orders to display        Labs Reviewed - No data to display     MEDICATIONS ADMINISTERED:  Medications   ketorolac (TORADOL) injection 30 mg (30 mg IntraMUSCular Given 1/4/24 0906)   methocarbamol (ROBAXIN) tablet 750 mg (750 mg Oral Given 1/4/24 0906)       Labs reviewed    Imaging Independently interpreted by me-concur with

## 2024-01-10 ENCOUNTER — TELEMEDICINE (OUTPATIENT)
Age: 39
End: 2024-01-10
Payer: COMMERCIAL

## 2024-01-10 DIAGNOSIS — F33.1 MDD (MAJOR DEPRESSIVE DISORDER), RECURRENT EPISODE, MODERATE (HCC): Primary | ICD-10-CM

## 2024-01-10 PROCEDURE — 90832 PSYTX W PT 30 MINUTES: CPT | Performed by: PSYCHOLOGIST

## 2024-01-10 NOTE — PATIENT INSTRUCTIONS
Attention Diversion for Chronic Pain    Whether or not you focus on your pain (or pay attention to it) has a lot to do with the amount of pain you experience or perceive.  There are several things you should know about “attention”.    Attention is voluntary.  This means that you are capable of directing your attention away from your pain.    You can only fully pay attention to one thing at a time.  This means that if you focus your attention on your pain, your pain may seem much more intense than when you focus on your attention on something else.    You must actively shift your attention away from the pain.  This means that unless you actively work to do so, it will be difficult (if not impossible) to shift your attention away from pain.    There are many ways to divert your attention away from your pain.  Some chronic pain sufferers do best by getting involved in an activity (for example, doing household chores, reading, or going shopping).  Engaging in an activity that is enjoyable and engaging will be the most effective attention diversion.  With practice, these interesting activities and mental distracters can become effective methods of attention diversion.    No matter what strategy you choose, directing your attention to something other than your pain is likely to help make the pain more bearable.  But be patient.  Pain is a strong, attention-grabbing thing, therefore it will take some practice to develop your attention diversion skills.  Similar to other pain management skills, even once you have developed the skill, your mind will still wander back to the pain.  When this happens, just refocus on the activity you are trying to focus on.    Next is a list of mental activities that can be employed as attention diversion techniques.     Types of Mental Activities    Focusing your attention outside yourself  Counting floor/ceiling tiles  Examining the construction of a piece of furniture  Counting the number

## 2024-01-10 NOTE — PROGRESS NOTES
Behavioral Health Consultation  Sharon Spain, Ph.D.  Clinical Psychologist  1/10/2024  8:30 AM  Name: Issa Moctezuma  YOB: 1985  PCP: Sonia Kan,      TELEHEALTH VISIT -- Audio/Visual (During COVID-19 public health emergency)  Time spent with Issa: 30 minutes  This is his third [facility] appointment.  Reason for Consult: Depression     S:  Issa is a 38 y.o. male seen for [facility] follow up visit addressing MDD recurrent moderate.  He continues to experience depressed mood, exacerbated by recent back pain (ED on 1/4/23) as well as thinking about his kids and status of SS application (unclear).  He was able to visit his kids over the holiday.  Returned to work Monday, was out since previous Thursday, still experiencing significant back pain.  Discussed attention diversion strategies for pain to reduce amount of pain he experiences - discussed examples.     He continues to struggle with taking medications as prescribed.  Explored whether mom could be a natural support for better adherence - she does call some to remind him to take his medication - he has hesitated to ask for more consistent assistance as he worries she will forget her own needs if she focuses on him.  He has two pillboxes (AM and PM) - often forgets a third dose of TID meds.  His work shift and sleep schedule impacts whether he feels comfortable taking it as well.      He would like to get his legs moving more, walking or even jogging - loves going to the basketball court or gym.  He sees resuming PT may be a realistic path to this goal and plans to inquire at next PC visit.  Discussed watching others play basketball as a diversion technique.  States that does not feel safe.      Patient was re-referred to Erin Oliveira, was in hospital around the time he got call, plans to call back.      O:  MSE:  Appearance: disheveled  Attitude: cooperative, pleasant  Consciousness: alert  Orientation: oriented to person,  Calm

## 2024-01-17 NOTE — TELEPHONE ENCOUNTER
Scheduled 1/22    Erin Oliveira, HerberD, BCACP  [facility]   [facility] Ph: [phone]  [facility] Charlie Ph: [phone]  1/17/2024 1:11 PM

## 2024-01-18 ENCOUNTER — OFFICE VISIT (OUTPATIENT)
Dept: PRIMARY CARE CLINIC | Age: 39
End: 2024-01-18
Payer: COMMERCIAL

## 2024-01-18 VITALS — HEART RATE: 112 BPM | SYSTOLIC BLOOD PRESSURE: 150 MMHG | DIASTOLIC BLOOD PRESSURE: 97 MMHG | OXYGEN SATURATION: 94 %

## 2024-01-18 DIAGNOSIS — I50.20 HFREF (HEART FAILURE WITH REDUCED EJECTION FRACTION) (HCC): ICD-10-CM

## 2024-01-18 DIAGNOSIS — E11.42 TYPE 2 DIABETES MELLITUS WITH DIABETIC POLYNEUROPATHY, WITH LONG-TERM CURRENT USE OF INSULIN (HCC): Primary | ICD-10-CM

## 2024-01-18 DIAGNOSIS — G89.4 CHRONIC PAIN SYNDROME: ICD-10-CM

## 2024-01-18 DIAGNOSIS — Z91.199 PATIENT NON-COMPLIANT, REFUSED INTERVENTION OR SUPPORT: ICD-10-CM

## 2024-01-18 DIAGNOSIS — N18.31 STAGE 3A CHRONIC KIDNEY DISEASE (HCC): ICD-10-CM

## 2024-01-18 DIAGNOSIS — I10 ESSENTIAL HYPERTENSION: ICD-10-CM

## 2024-01-18 DIAGNOSIS — Z79.4 TYPE 2 DIABETES MELLITUS WITH DIABETIC POLYNEUROPATHY, WITH LONG-TERM CURRENT USE OF INSULIN (HCC): Primary | ICD-10-CM

## 2024-01-18 DIAGNOSIS — I10 PRIMARY HYPERTENSION: ICD-10-CM

## 2024-01-18 DIAGNOSIS — F41.1 GENERALIZED ANXIETY DISORDER: ICD-10-CM

## 2024-01-18 LAB
CHP ED QC CHECK: NORMAL
GLUCOSE BLD-MCNC: 214 MG/DL

## 2024-01-18 PROCEDURE — 3080F DIAST BP >= 90 MM HG: CPT | Performed by: FAMILY MEDICINE

## 2024-01-18 PROCEDURE — 99214 OFFICE O/P EST MOD 30 MIN: CPT | Performed by: FAMILY MEDICINE

## 2024-01-18 PROCEDURE — 3077F SYST BP >= 140 MM HG: CPT | Performed by: FAMILY MEDICINE

## 2024-01-18 PROCEDURE — 82962 GLUCOSE BLOOD TEST: CPT | Performed by: FAMILY MEDICINE
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
## 2024-01-22 ENCOUNTER — OFFICE VISIT (OUTPATIENT)
Dept: PHARMACY | Age: 39
End: 2024-01-22
Payer: COMMERCIAL

## 2024-01-22 VITALS — SYSTOLIC BLOOD PRESSURE: 148 MMHG | DIASTOLIC BLOOD PRESSURE: 107 MMHG | HEART RATE: 119 BPM

## 2024-01-22 DIAGNOSIS — E11.42 TYPE 2 DIABETES MELLITUS WITH DIABETIC POLYNEUROPATHY, WITH LONG-TERM CURRENT USE OF INSULIN (HCC): Primary | ICD-10-CM

## 2024-01-22 DIAGNOSIS — Z79.4 TYPE 2 DIABETES MELLITUS WITH DIABETIC POLYNEUROPATHY, WITH LONG-TERM CURRENT USE OF INSULIN (HCC): Primary | ICD-10-CM

## 2024-01-22 PROCEDURE — 99213 OFFICE O/P EST LOW 20 MIN: CPT

## 2024-01-22 NOTE — PROGRESS NOTES
CLINICAL PHARMACY NOTE--Diabetes    Issa Moctezuma is a 38 y.o. male with PMHX significant for CKD, diabetic neuropathy, HTN, HLD, Obesity, and Type 2 Diabetes referred by Sonia Kan for diabetes counseling and medication management. Pt previously seen at clinic until 5/2023 but was then following only with endocrinologist until now.    Interval update:    Pt referred back to clinic after d/c in May. Patient reports he has been dealing with depression which is making it difficult to stay on top of his medications and appointments. Only sees endo every 3-6 months (last visit 11/28/23) and feels more frequent visits will hold him accountable. Missing Tresiba 3x per week, usually takes meal time insulin but only eats once per day and sometimes forgets ~3 times per week also. Rarely checks BG, has Dexcom but has not used since June. Hospitalized multiple times this past year, BG elevated > 1000. Working 3rd shift, finds it difficult to keep on top of taking medications. Usually takes meds after work then goes to bed, sometimes forgets to take, especially if he doesn't eat. Expressed interest in pillpack pharmacy for his medications, or a larger pillbox so he doesn't have to fill it every week. Meal is usually a sandwich or frozen dinner, snacks at work ([employer]) because there are more options like soup, peanut butter crackers, fruit cups, candy, 0 sugar soda. Difficult to exercise because of neuropathy. Reports reaction to insulin (body cramps up 3-4x per week when taking insulin).        ASSESSMENT/PLAN:    Type 2 Diabetes  A1c well above goal <7% (11.7--8.3-->7.1-->9.6-->14-->17.2% on 12/11)  -Biggest struggle is med compliance. Provided pillbox, discussed strategies for remembering medications.   -Depression likely playing a large role in poor diabetes management, seeing psychologist.  -Hold off on adjusting insulin, work on improving compliance  -Cont metformin 500 mg BID, Jardiance 10 mg QD

## 2024-01-22 NOTE — PATIENT INSTRUCTIONS
Call your insurance to see what physical therapy is in network for your insurance plan.     Set alarm for 10a and 10p    Humalog 14 units plus sliding scale    Try skin tac

## 2024-01-23 PROBLEM — E11.00 HYPEROSMOLAR HYPERGLYCEMIC STATE (HHS) (HCC): Status: RESOLVED | Noted: 2023-12-11 | Resolved: 2024-01-23

## 2024-01-23 PROBLEM — N18.30 STAGE 3 CHRONIC KIDNEY DISEASE (HCC): Status: RESOLVED | Noted: 2022-05-18 | Resolved: 2024-01-23

## 2024-01-23 PROBLEM — I10 HYPERTENSION: Status: RESOLVED | Noted: 2023-02-22 | Resolved: 2024-01-23

## 2024-01-23 PROBLEM — E11.00 DIABETIC HYPEROSMOLAR NON-KETOTIC STATE (HCC): Status: RESOLVED | Noted: 2023-12-11 | Resolved: 2024-01-23

## 2024-01-23 ASSESSMENT — ENCOUNTER SYMPTOMS
COUGH: 0
SHORTNESS OF BREATH: 0
ABDOMINAL PAIN: 0

## 2024-01-25 ENCOUNTER — TELEMEDICINE (OUTPATIENT)
Age: 39
End: 2024-01-25
Payer: COMMERCIAL

## 2024-01-25 DIAGNOSIS — F33.1 MDD (MAJOR DEPRESSIVE DISORDER), RECURRENT EPISODE, MODERATE (HCC): Primary | ICD-10-CM

## 2024-01-25 PROCEDURE — 90832 PSYTX W PT 30 MINUTES: CPT | Performed by: PSYCHOLOGIST

## 2024-01-25 NOTE — PROGRESS NOTES
Behavioral Health Consultation  Sharon Spain, Ph.D.  Clinical Psychologist  1/25/2024  9:00 AM  Name: Issa Moctezuma  YOB: 1985  PCP: Sonia Kan,      TELEHEALTH VISIT -- Audio/Visual (During COVID-19 public health emergency)  Time spent with Issa: 30 minutes  This is his fourth [facility] appointment.  Reason for Consult: Depression     S:  Issa is a 38 y.o. male seen for [facility] follow up visit addressing MDD recurrent moderate.  Describes continued depressed mood; similar between visits.  No hospitalizations between visits.  He is pleased to re-establish with clinical pharmacist, Erin Oliveira - reviewed recommendations and patient and his understanding.  He describes improved adherence to insulin, upset stomach which he attributes to this adjustment.  Adherence with other medications has not yet changed - has not set up pill box after that visit but willing to do so tomorrow on his day off work.  Describes watching basketball in his time off; usually in his room alone.  He has not heard from  but believes he should hear in the next 30 days, plans to stop by to check on the status in person this week.      Continued discussing his goal of increased movement - reviewed potential mood benefit.  He is hoping to pursue physical therapy as a way of increasing movement.  He mentions he's thought about getting a [facility] membership to have gentle options like the pool.  Helped patient find information on financial assistance at the [facility] for him to consider.      O:  MSE:  Appearance: disheveled  Attitude: cooperative, pleasant  Consciousness: alert  Orientation: oriented to person, place, time, general circumstance  Memory: recent and remote memory intact  Attention/Concentration: intact during session  Psychomotor Activity: normal  Eye Contact:  variable  Speech: normal rate and volume   Mood: depressed  Affect: dysphoric  Perception: within normal limits  Thought Content: within

## 2024-02-04 ENCOUNTER — HOSPITAL ENCOUNTER (EMERGENCY)
Age: 39
Discharge: HOME | End: 2024-02-04
Attending: EMERGENCY MEDICINE
Payer: COMMERCIAL

## 2024-02-04 VITALS
RESPIRATION RATE: 12 BRPM | SYSTOLIC BLOOD PRESSURE: 156 MMHG | DIASTOLIC BLOOD PRESSURE: 109 MMHG | TEMPERATURE: 98 F | HEART RATE: 89 BPM | BODY MASS INDEX: 36.83 KG/M2 | HEIGHT: 73 IN | WEIGHT: 277.9 LBS | OXYGEN SATURATION: 100 %

## 2024-02-04 DIAGNOSIS — K08.89 ODONTALGIA: Primary | ICD-10-CM

## 2024-02-04 PROCEDURE — 6370000000 HC RX 637 (ALT 250 FOR IP): Performed by: EMERGENCY MEDICINE

## 2024-02-04 PROCEDURE — 99283 EMERGENCY DEPT VISIT LOW MDM: CPT

## 2024-02-04 RX ORDER — CLINDAMYCIN HYDROCHLORIDE 300 MG/1
300 CAPSULE ORAL 4 TIMES DAILY
Qty: 40 CAPSULE | Refills: 0 | Status: SHIPPED | OUTPATIENT
Start: 2024-02-04 | End: 2024-02-14

## 2024-02-04 RX ORDER — NAPROXEN 250 MG/1
500 TABLET ORAL ONCE
Status: COMPLETED | OUTPATIENT
Start: 2024-02-04 | End: 2024-02-04

## 2024-02-04 RX ORDER — OXYCODONE HYDROCHLORIDE AND ACETAMINOPHEN 5; 325 MG/1; MG/1
1-2 TABLET ORAL EVERY 6 HOURS PRN
Qty: 7 TABLET | Refills: 0 | Status: SHIPPED | OUTPATIENT
Start: 2024-02-04 | End: 2024-02-11

## 2024-02-04 RX ORDER — NAPROXEN 500 MG/1
500 TABLET ORAL 2 TIMES DAILY
Qty: 20 TABLET | Refills: 0 | Status: SHIPPED | OUTPATIENT
Start: 2024-02-04 | End: 2024-02-14

## 2024-02-04 RX ORDER — CLINDAMYCIN HYDROCHLORIDE 300 MG/1
300 CAPSULE ORAL ONCE
Status: COMPLETED | OUTPATIENT
Start: 2024-02-04 | End: 2024-02-04

## 2024-02-04 RX ADMIN — CLINDAMYCIN HYDROCHLORIDE 300 MG: 300 CAPSULE ORAL at 22:21

## 2024-02-04 RX ADMIN — NAPROXEN 500 MG: 250 TABLET ORAL at 22:21

## 2024-02-04 ASSESSMENT — PAIN SCALES - GENERAL: PAINLEVEL_OUTOF10: 8

## 2024-02-04 ASSESSMENT — PAIN DESCRIPTION - DESCRIPTORS: DESCRIPTORS: ACHING;SORE

## 2024-02-04 ASSESSMENT — PAIN DESCRIPTION - LOCATION: LOCATION: MOUTH

## 2024-02-04 ASSESSMENT — PAIN DESCRIPTION - FREQUENCY: FREQUENCY: CONTINUOUS

## 2024-02-04 ASSESSMENT — PAIN DESCRIPTION - PAIN TYPE: TYPE: ACUTE PAIN

## 2024-02-05 NOTE — ED PROVIDER NOTES
(ERGOCALCIFEROL) 1.25 MG (10022 UT) CAPS CAPSULE    TAKE 1 CAPSULE BY MOUTH 1 TIME A WEEK       ALLERGIES     Penicillins    FAMILY HISTORY       Family History   Problem Relation Age of Onset    Diabetes Father     High Blood Pressure Father           SOCIAL HISTORY       Social History     Socioeconomic History    Marital status: Single   Tobacco Use    Smoking status: Never    Smokeless tobacco: Never   Vaping Use    Vaping Use: Never used   Substance and Sexual Activity    Alcohol use: Not Currently     Comment: rare    Drug use: Not Currently     Types: Marijuana (Weed)     Comment: occ    Sexual activity: Yes     Partners: Female     Social Determinants of Health     Financial Resource Strain: High Risk (7/19/2022)    Overall Financial Resource Strain (CARDIA)     Difficulty of Paying Living Expenses: Hard   Food Insecurity: Food Insecurity Present (12/11/2023)    Hunger Vital Sign     Worried About Running Out of Food in the Last Year: Sometimes true     Ran Out of Food in the Last Year: Sometimes true   Transportation Needs: No Transportation Needs (12/11/2023)    PRAPARE - Transportation     Lack of Transportation (Medical): No     Lack of Transportation (Non-Medical): No   Housing Stability: Unknown (12/11/2023)    Housing Stability Vital Sign     Unable to Pay for Housing in the Last Year: Patient refused     Number of Places Lived in the Last Year: 1     Unstable Housing in the Last Year: No       SCREENINGS    [place] Coma Scale  Eye Opening: Spontaneous  Best Verbal Response: Oriented  Best Motor Response: Obeys commands  [place] Coma Scale Score: 15 @FLOW([id number])@      PHYSICAL EXAM    (up to 7 for level 4, 8 or more for level 5)     ED Triage Vitals [02/04/24 2204]   BP Temp Temp Source Pulse Respirations SpO2 Height Weight - Scale   (!) 156/109 98 °F (36.7 °C) Oral (!) 118 12 100 % 1.854 m (6' 1\") 126.1 kg (277 lb 14.4 oz)       Physical Exam      General Appearance:  Alert, cooperative, no

## 2024-02-05 NOTE — DISCHARGE INSTRUCTIONS
Discharge home  Follow-up with your family doctor  Follow-up with dental clinic  Clindamycin  Naprosyn  Percocet for breakthrough pain

## 2024-02-06 ENCOUNTER — OFFICE VISIT (OUTPATIENT)
Dept: PHARMACY | Age: 39
End: 2024-02-06
Payer: COMMERCIAL

## 2024-02-06 VITALS — HEART RATE: 121 BPM | SYSTOLIC BLOOD PRESSURE: 134 MMHG | DIASTOLIC BLOOD PRESSURE: 96 MMHG

## 2024-02-06 DIAGNOSIS — E11.42 TYPE 2 DIABETES MELLITUS WITH DIABETIC POLYNEUROPATHY, WITH LONG-TERM CURRENT USE OF INSULIN (HCC): Primary | ICD-10-CM

## 2024-02-06 DIAGNOSIS — Z79.4 TYPE 2 DIABETES MELLITUS WITH DIABETIC POLYNEUROPATHY, WITH LONG-TERM CURRENT USE OF INSULIN (HCC): Primary | ICD-10-CM

## 2024-02-06 DIAGNOSIS — Z79.4 TYPE 2 DIABETES MELLITUS WITH DIABETIC POLYNEUROPATHY, WITH LONG-TERM CURRENT USE OF INSULIN (HCC): ICD-10-CM

## 2024-02-06 DIAGNOSIS — E55.9 VITAMIN D DEFICIENCY: ICD-10-CM

## 2024-02-06 DIAGNOSIS — E11.42 TYPE 2 DIABETES MELLITUS WITH DIABETIC POLYNEUROPATHY, WITH LONG-TERM CURRENT USE OF INSULIN (HCC): ICD-10-CM

## 2024-02-06 PROCEDURE — 99212 OFFICE O/P EST SF 10 MIN: CPT

## 2024-02-06 RX ORDER — PEN NEEDLE, DIABETIC 30 GX5/16"
1 NEEDLE, DISPOSABLE MISCELLANEOUS 4 TIMES DAILY
Qty: 300 EACH | Refills: 3 | Status: SHIPPED | OUTPATIENT
Start: 2024-02-06

## 2024-02-06 RX ORDER — UBIQUINOL 100 MG
CAPSULE ORAL
Qty: 400 EACH | Refills: 3 | Status: SHIPPED | OUTPATIENT
Start: 2024-02-06

## 2024-02-06 RX ORDER — INSULIN DEGLUDEC 200 U/ML
100 INJECTION, SOLUTION SUBCUTANEOUS DAILY
Qty: 45 ML | Refills: 1 | Status: SHIPPED | OUTPATIENT
Start: 2024-02-06

## 2024-02-06 NOTE — PATIENT INSTRUCTIONS
Check Jardiance strength when you get home. Let me know what you are taking.    Increase tresiba to 85 units daily  After 4 days, if fasting blood sugar remains above 150, then increase to 95 units daily  After 4 days, if fasting blood sugar remains above 150, then increase to 100 units daily.

## 2024-02-06 NOTE — PROGRESS NOTES
CLINICAL PHARMACY NOTE--Diabetes    Issa Moctezuma is a 38 y.o. male with PMHX significant for CKD, diabetic neuropathy, HTN, HLD, Obesity, and Type 2 Diabetes referred by Sonia Kan for diabetes counseling and medication management. Pt previously seen at clinic until 5/2023 but was then following only with endocrinologist until now.    Interval update:  Pt referred back to clinic after d/c in May. Patient reports he has been dealing with depression which is making it difficult to stay on top of his medications and appointments. Only sees endo every 3-6 months (last visit 11/28/23) and feels more frequent visits will hold him accountable. Working 3rd shift, finds it difficult to keep on top of taking medications. Usually takes meds after work then goes to bed, sometimes forgets to take, especially if he doesn't eat. Expressed interest in pillpack pharmacy for his medications, or a larger pillbox so he doesn't have to fill it every week. Meal is usually a sandwich or frozen dinner, snacks at work ([employer]) because there are more options like soup, peanut butter crackers, fruit cups, candy, 0 sugar soda. Difficult to exercise because of neuropathy. Ran out of tresiba 2 days ago- needs refill. Otherwise taking more consistently since last visit. Taking humalog 1-2 times per day if he eats. Appetite has been poor. Just resumed clindamycin yesterday. 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 Will cut back on work hours to 2 hours per shift. He started using Dexcom which helps hold him accountable.      ASSESSMENT/PLAN:    Type 2 Diabetes  A1c well above goal <7% (11.7--8.3-->7.1-->9.6-->14-->17.2% on 12/11)  -Med noncompliance improving. Provided pillbox, discussed strategies for remembering medications.   -Depression likely playing a large role in poor diabetes management, seeing psychologist.  -Cont metformin 500 mg BID  -Cont Jardiance 10 mg QD (monitor closely-- poor po and fluid intake, hx

## 2024-02-07 LAB
CREAT UR-MCNC: 177.4 MG/DL (ref 39–259)
MICROALBUMIN UR DL<=1MG/L-MCNC: 608.2 MG/DL
MICROALBUMIN/CREAT UR: 3428.4 MG/G (ref 0–30)

## 2024-02-07 RX ORDER — ERGOCALCIFEROL 1.25 MG/1
CAPSULE ORAL
Qty: 12 CAPSULE | Refills: 0 | Status: SHIPPED | OUTPATIENT
Start: 2024-02-07

## 2024-02-08 ENCOUNTER — TELEMEDICINE (OUTPATIENT)
Age: 39
End: 2024-02-08
Payer: COMMERCIAL

## 2024-02-08 DIAGNOSIS — F33.1 MDD (MAJOR DEPRESSIVE DISORDER), RECURRENT EPISODE, MODERATE (HCC): Primary | ICD-10-CM

## 2024-02-08 PROCEDURE — 90832 PSYTX W PT 30 MINUTES: CPT | Performed by: PSYCHOLOGIST

## 2024-02-08 NOTE — PROGRESS NOTES
Behavioral Health Consultation  Sharon Spain, Ph.D.  Clinical Psychologist  2/8/2024  9:00 AM  Name: Issa Moctezuma  YOB: 1985  PCP: Sonia Kan,      TELEHEALTH VISIT -- Audio/Visual (During COVID-19 public health emergency)  Time spent with Issa: 30 minutes  This is his fifth [facility] appointment.  Reason for Consult: Depression and Anxiety     S:  Issa is a 38 y.o. male seen for [facility] follow up visit addressing MDD recurrent moderate.  Patient describes continued depressed mood, ongoing challenges with work stating they have not altered his hours following FMLA paperwork from PCP.  Working night shift and daughter has been missing her bus recently so sleep is broken up when he needs to go take her to school.  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  Positively, he reports medication adherence has improved incrementally since resuming services with clinical pharmacist.  He feels like he is adjusting to taking insulin more often, less stomach upset.     Focus on identifying changes that may reduce depressed and symptoms, engage in problem solving related to current stressors impacting mood.  He is receptive to call HR today about reduction in work hours while waiting to hear about other position.  He remains interested in beginning exercise at [facility] [employer] [employer] but does not have printer for financial aid application-writer will print and mail to patient's home.  He is currently eating one meal and and a snack (peanut butter crackers or grapes) on workdays.  Inquires about Meals on Wheels which he thought one of his care team members mentioned

## 2024-02-12 ENCOUNTER — TELEPHONE (OUTPATIENT)
Dept: ADMINISTRATIVE | Age: 39
End: 2024-02-12

## 2024-02-12 NOTE — TELEPHONE ENCOUNTER
Submitted PA for Tresiba FlexTouch (insulin degludec injection) 200 Units/mL solution  Via CMM Key: [id number] STATUS: PENDING.    Follow up done daily; if no decision with in three days we will refax.  If another three days goes by with no decision will call the insurance for status.

## 2024-02-13 NOTE — TELEPHONE ENCOUNTER
Please file an urgent appeal:    Dexcom G7 data is attached to show that he is slowly improving/working on compliance (please include this in appeal). He just recently [facility] to work on better diabetes control. His GMI is 11.9% per Dexcom Clarity report. This correlates well with what his A1c would be. A decrease of 17%-->11.9% is significant progress. He just had an A1c checked in Dec 2023, so we will not repeat a1c for another 3 months.    Difficulty with consistent timing of his injections due to his work shifts is one very important reason why he should be approved for Tresiba given the long half-life. If we were to switch to another long -acting insulin, he would need to split his doses for optimal control. This would be even more difficult for patient to manage with his work schedule and would increase chances for noncompliance and decrease any chances for good BG control.     In the past when patient was taking Tresiba, his A1c dereased from (11.7-->8.3-->7.1%).      Please include A1c results from 5/18/22- current.    Erin Oliveira, PharmD, BCACP  [facility]   [facility] Ph: [phone]  2/13/2024 10:17 AM

## 2024-02-13 NOTE — TELEPHONE ENCOUNTER
DENIAL for Tresiba FlexTouch (insulin degludec injection) 200 Units/mL solution; letter attached.    Called [facility] about the denial and spoke to Ravi Rodrigues said PA was denied because patient was non-compliant with taking Tresiba resulting in his hospitalization due to poorly controlled blood sugar, verified by A1C of 17.2.  Phone call reference number: [id number]    If this requires a response please respond to the pool ( P [facility] MEDICATION PRE-AUTH).      Thank you please advise patient.

## 2024-02-14 NOTE — TELEPHONE ENCOUNTER
I called Sp and spoke to Brinda.  Brinda is faxing me an appeal form since the denial letter do not include on.

## 2024-02-14 NOTE — TELEPHONE ENCOUNTER
Sp faxed the attached appeal form.  Unfortunately, Medicaid is requiring patient to sign and date it before the appeal can be submitted.    Please respond to the pool ( P [facility] MEDICATION PRE-AUTH).      Thank you!

## 2024-02-15 NOTE — TELEPHONE ENCOUNTER
Patient has an appointment on 2.21.24.  due to transportation issues will hold the form until appointment  Form given to Katalina to have signed at appointment

## 2024-02-19 ENCOUNTER — TELEPHONE (OUTPATIENT)
Dept: PRIMARY CARE CLINIC | Age: 39
End: 2024-02-19

## 2024-02-19 ENCOUNTER — HOSPITAL ENCOUNTER (EMERGENCY)
Age: 39
Discharge: HOME | End: 2024-02-19
Attending: EMERGENCY MEDICINE
Payer: COMMERCIAL

## 2024-02-19 ENCOUNTER — APPOINTMENT (OUTPATIENT)
Dept: GENERAL RADIOLOGY | Age: 39
End: 2024-02-19
Payer: COMMERCIAL

## 2024-02-19 VITALS
HEIGHT: 73 IN | RESPIRATION RATE: 12 BRPM | BODY MASS INDEX: 35.8 KG/M2 | DIASTOLIC BLOOD PRESSURE: 117 MMHG | SYSTOLIC BLOOD PRESSURE: 167 MMHG | TEMPERATURE: 98.3 F | HEART RATE: 93 BPM | OXYGEN SATURATION: 98 % | WEIGHT: 270.1 LBS

## 2024-02-19 DIAGNOSIS — J10.1 INFLUENZA A: Primary | ICD-10-CM

## 2024-02-19 PROCEDURE — 6370000000 HC RX 637 (ALT 250 FOR IP): Performed by: EMERGENCY MEDICINE

## 2024-02-19 PROCEDURE — 99283 EMERGENCY DEPT VISIT LOW MDM: CPT

## 2024-02-19 PROCEDURE — 71045 X-RAY EXAM CHEST 1 VIEW: CPT

## 2024-02-19 RX ORDER — ACETAMINOPHEN 325 MG/1
650 TABLET ORAL EVERY 6 HOURS PRN
Qty: 120 TABLET | Refills: 3 | Status: SHIPPED | OUTPATIENT
Start: 2024-02-19

## 2024-02-19 RX ORDER — ACETAMINOPHEN 325 MG/1
650 TABLET ORAL ONCE
Status: COMPLETED | OUTPATIENT
Start: 2024-02-19 | End: 2024-02-19

## 2024-02-19 RX ADMIN — ACETAMINOPHEN 650 MG: 325 TABLET ORAL at 12:24

## 2024-02-19 NOTE — TELEPHONE ENCOUNTER
----- Message from Danyell Patel sent at 2/19/2024 12:41 PM EST -----  Subject: Message to Provider    QUESTIONS  Information for Provider? Patient currently has the flu & wants to know if   he should reschedule his appt 2/21/2024 @ 10 AM. Symptoms; body aches,   cold chills, cough, clogged ears been going on since 2/15. Went to  on   2/18. Please call & advise?   ---------------------------------------------------------------------------  --------------  CALL BACK INFO  [phone]; OK to leave message on voicemail  ---------------------------------------------------------------------------  --------------  SCRIPT ANSWERS  Relationship to Patient? Self   Home

## 2024-02-19 NOTE — DISCHARGE INSTRUCTIONS
Please take your medications as prescribed.  If your symptoms worsen despite treatment please come back to the ER for repeat evaluation.  Your chest x-ray here today did not show any obvious pneumonia.
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
## 2024-02-26 ENCOUNTER — OFFICE VISIT (OUTPATIENT)
Dept: PRIMARY CARE CLINIC | Age: 39
End: 2024-02-26
Payer: COMMERCIAL

## 2024-02-26 VITALS
OXYGEN SATURATION: 100 % | SYSTOLIC BLOOD PRESSURE: 155 MMHG | BODY MASS INDEX: 35.52 KG/M2 | DIASTOLIC BLOOD PRESSURE: 113 MMHG | HEART RATE: 121 BPM | WEIGHT: 269.2 LBS

## 2024-02-26 DIAGNOSIS — F41.1 GENERALIZED ANXIETY DISORDER: ICD-10-CM

## 2024-02-26 DIAGNOSIS — I10 ESSENTIAL HYPERTENSION: ICD-10-CM

## 2024-02-26 DIAGNOSIS — J10.1 INFLUENZA A: Primary | ICD-10-CM

## 2024-02-26 DIAGNOSIS — Z91.148 NON COMPLIANCE W MEDICATION REGIMEN: ICD-10-CM

## 2024-02-26 DIAGNOSIS — N18.31 STAGE 3A CHRONIC KIDNEY DISEASE (HCC): ICD-10-CM

## 2024-02-26 DIAGNOSIS — Z79.4 TYPE 2 DIABETES MELLITUS WITH HYPERGLYCEMIA, WITH LONG-TERM CURRENT USE OF INSULIN (HCC): ICD-10-CM

## 2024-02-26 DIAGNOSIS — G62.9 NEUROPATHY: ICD-10-CM

## 2024-02-26 DIAGNOSIS — E11.65 TYPE 2 DIABETES MELLITUS WITH HYPERGLYCEMIA, WITH LONG-TERM CURRENT USE OF INSULIN (HCC): ICD-10-CM

## 2024-02-26 PROBLEM — F41.9 ANXIETY: Status: RESOLVED | Noted: 2022-11-15 | Resolved: 2024-02-26

## 2024-02-26 PROCEDURE — 3077F SYST BP >= 140 MM HG: CPT | Performed by: FAMILY MEDICINE

## 2024-02-26 PROCEDURE — 99214 OFFICE O/P EST MOD 30 MIN: CPT | Performed by: FAMILY MEDICINE

## 2024-02-26 PROCEDURE — 3080F DIAST BP >= 90 MM HG: CPT | Performed by: FAMILY MEDICINE

## 2024-02-26 ASSESSMENT — PATIENT HEALTH QUESTIONNAIRE - PHQ9
SUM OF ALL RESPONSES TO PHQ9 QUESTIONS 1 & 2: 2
SUM OF ALL RESPONSES TO PHQ QUESTIONS 1-9: 6
3. TROUBLE FALLING OR STAYING ASLEEP: 1
SUM OF ALL RESPONSES TO PHQ QUESTIONS 1-9: 6
SUM OF ALL RESPONSES TO PHQ QUESTIONS 1-9: 6
2. FEELING DOWN, DEPRESSED OR HOPELESS: 1
7. TROUBLE CONCENTRATING ON THINGS, SUCH AS READING THE NEWSPAPER OR WATCHING TELEVISION: 1
4. FEELING TIRED OR HAVING LITTLE ENERGY: 1
9. THOUGHTS THAT YOU WOULD BE BETTER OFF DEAD, OR OF HURTING YOURSELF: 0
6. FEELING BAD ABOUT YOURSELF - OR THAT YOU ARE A FAILURE OR HAVE LET YOURSELF OR YOUR FAMILY DOWN: 0
5. POOR APPETITE OR OVEREATING: 1
1. LITTLE INTEREST OR PLEASURE IN DOING THINGS: 1
SUM OF ALL RESPONSES TO PHQ QUESTIONS 1-9: 6
8. MOVING OR SPEAKING SO SLOWLY THAT OTHER PEOPLE COULD HAVE NOTICED. OR THE OPPOSITE, BEING SO FIGETY OR RESTLESS THAT YOU HAVE BEEN MOVING AROUND A LOT MORE THAN USUAL: 0

## 2024-02-26 NOTE — PROGRESS NOTES
t    [facility]  [facility]  [address] [place] [id number]  Dept: [phone]  Dept Fax: [phone]     2/26/2024      Issajennifer Moctezuma   1985     Chief Complaint   Patient presents with    Follow-up       HPI    Pt comes in today for ER follow up.     Seen on 2/19/24 in ER and tested positive for Influenza A. Notes he is feeling mostly back to normal. No cough or shortness of breath.     Notes he recently had to resign from his job. Was told they did not have the accommodations he was requesting. Currently not working.     Following with Clinical Psych for depression/adjustment disorder. This has been helpful.     DMII - severely uncontrolled. Significant non-compliance. He did re-establish care with Clinical Pharmacy on 2/6/24. Endocrinologist is managing his diabetes. Has appt 3/28/24. Per last note:    -Cont metformin 500 mg BID  -Cont Jardiance 10 mg QD (monitor closely-- poor po and fluid intake, hx CHELE)  -Cont Humalog SSI 14 units + 1 unit for every 30 above 120 (per endo)  -Increase Tresiba to 85 units daily. If FBG remains >150 after 4 days, then increase to 95 units daily.  If FBG remains >150 after 4 days, then increase to 100 units daily.     Notes he has been non-compliant since recent illness and has not been using his Dexcom. Planning to start this again.     Hemoglobin A1C   Date Value Ref Range Status   12/11/2023 17.2 See comment % Final     Comment:     Comment:  Diagnosis of Diabetes: > or = 6.5%  Increased risk of diabetes (Prediabetes): 5.7-6.4%  Glycemic Control: Nonpregnant Adults: <7.0%                    Pregnant: <6.0%       08/21/2023 14.0 % Final   05/15/2023 9.6 See comment % Final     Comment:     Comment:  Diagnosis of Diabetes: > or = 6.5%  Increased risk of diabetes (Prediabetes): 5.7-6.4%  Glycemic Control: Nonpregnant Adults: <7.0%                    Pregnant: <6.0%       02/22/2023 7.1 See comment % Final     Comment:

## 2024-02-27 ASSESSMENT — ENCOUNTER SYMPTOMS
WHEEZING: 0
COUGH: 1
SHORTNESS OF BREATH: 0

## 2024-02-29 ENCOUNTER — OFFICE VISIT (OUTPATIENT)
Dept: PHARMACY | Age: 39
End: 2024-02-29
Payer: COMMERCIAL

## 2024-02-29 ENCOUNTER — TELEMEDICINE (OUTPATIENT)
Age: 39
End: 2024-02-29
Payer: COMMERCIAL

## 2024-02-29 VITALS — SYSTOLIC BLOOD PRESSURE: 170 MMHG | DIASTOLIC BLOOD PRESSURE: 110 MMHG | HEART RATE: 115 BPM

## 2024-02-29 DIAGNOSIS — Z79.4 TYPE 2 DIABETES MELLITUS WITH DIABETIC POLYNEUROPATHY, WITH LONG-TERM CURRENT USE OF INSULIN (HCC): ICD-10-CM

## 2024-02-29 DIAGNOSIS — Z79.4 TYPE 2 DIABETES MELLITUS WITH DIABETIC POLYNEUROPATHY, WITH LONG-TERM CURRENT USE OF INSULIN (HCC): Primary | ICD-10-CM

## 2024-02-29 DIAGNOSIS — F33.1 MDD (MAJOR DEPRESSIVE DISORDER), RECURRENT EPISODE, MODERATE (HCC): Primary | ICD-10-CM

## 2024-02-29 DIAGNOSIS — E11.42 TYPE 2 DIABETES MELLITUS WITH DIABETIC POLYNEUROPATHY, WITH LONG-TERM CURRENT USE OF INSULIN (HCC): ICD-10-CM

## 2024-02-29 DIAGNOSIS — E11.42 TYPE 2 DIABETES MELLITUS WITH DIABETIC POLYNEUROPATHY, WITH LONG-TERM CURRENT USE OF INSULIN (HCC): Primary | ICD-10-CM

## 2024-02-29 PROCEDURE — 90832 PSYTX W PT 30 MINUTES: CPT | Performed by: PSYCHOLOGIST

## 2024-02-29 PROCEDURE — 99213 OFFICE O/P EST LOW 20 MIN: CPT

## 2024-02-29 RX ORDER — ACYCLOVIR 400 MG/1
TABLET ORAL
Qty: 9 EACH | Refills: 3 | Status: SHIPPED | OUTPATIENT
Start: 2024-02-29

## 2024-02-29 RX ORDER — INSULIN DEGLUDEC 200 U/ML
INJECTION, SOLUTION SUBCUTANEOUS
Qty: 18 ML | Refills: 0 | OUTPATIENT
Start: 2024-02-29

## 2024-02-29 RX ORDER — INSULIN DEGLUDEC 200 U/ML
100 INJECTION, SOLUTION SUBCUTANEOUS DAILY
Qty: 18 ML | Refills: 0 | Status: SHIPPED | OUTPATIENT
Start: 2024-02-29

## 2024-02-29 NOTE — PROGRESS NOTES
CLINICAL PHARMACY NOTE--Diabetes    Issa Moctezuma is a 38 y.o. male with PMHX significant for CKD, diabetic neuropathy, HTN, HLD, Obesity, and Type 2 Diabetes referred by Sonia Kan for diabetes counseling and medication management. Pt previously seen at clinic until 5/2023 but was then following only with endocrinologist until now.    Interval update:  Pt referred back to clinic after d/c in May- this is 3rd visit. Patient has been dealing with depression which is making it difficult to stay on top of his medications and appointments. Only sees endo every 3-6 months (last visit 11/28/23) and feels more frequent visits will hold him accountable. Working 3rd shift, finds it difficult to keep on top of taking medications. Usually takes meds after work then goes to bed, sometimes forgets to take, especially if he doesn't eat. Expressed interest in pillpack pharmacy for his medications, or a larger pillbox so he doesn't have to fill it every week. Meal is usually a sandwich or frozen dinner, snacks at work ([employer]) because there are more options like soup, peanut butter crackers, fruit cups, candy, 0 sugar soda. Difficult to exercise because of neuropathy.   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 He was in ED for influenza A and has been noncompliant with all meds since then. Is not wearing dexcom. Ran out of tresiba - insurance denied PA due to noncompliance.  Taking humalog ~18 units 1-3 times per day if he eats. Appetite has been poor.       ASSESSMENT/PLAN:    Type 2 Diabetes  A1c well above goal <7% (11.7--8.3-->7.1-->9.6-->14-->17.2% on 12/11)- due March 11  -Poor med adherence   -Provided pillbox, discussed strategies for remembering medications.   -Depression likely playing a large role in poor diabetes management, seeing psychologist.  -Cont metformin 500 mg BID  -Cont Jardiance 10 mg QD (monitor closely-- poor po and fluid intake, hx

## 2024-02-29 NOTE — PROGRESS NOTES
Behavioral Health Consultation  Sharon Spain, Ph.D.  Clinical Psychologist  2/29/2024  9:00 AM  Name: Issa Moctezuma  YOB: 1985  PCP: Sonia Kan,      TELEHEALTH VISIT -- Audio/Visual (During COVID-19 public health emergency)  Time spent with Issa: 30 minutes  This is his sixth [facility] appointment.  Reason for Consult: Depression     S:  Issa is a 38 y.o. male seen for [facility] follow up visit addressing MDD recurrent moderate.  Describes depressed mood, worse between visits.  He recently had Flu A and had increased difficulty taking his medications consistently during that illness.  He was also \"forced to resign\" on 2/8, does not have income.  He is trying to clarify whether SSI or SSDI was denied;  says he talked to  office and it does not show as denied yet in their system (said under review) so cannot file an appeal yet since it is not formally denied.  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  He expressed his interest and turned in information to be considered for job there.  He reports current plans are to focus on studying for exam and improving med adherence - plans to  meds dicussed with Erin Oliveira today.      Discussed financial concerns.  He does not have supports he thinks can help him financially in the interim, anticipates he'll be behind on bills but hoping to make it to next employment opportunity without losing housing or utilities.  He is working on trying to get food stamps again.  Provided information on case mgmt resources for additional supports related to utilities, food.       Explored factors that could benefit his mood and feel manageable at this time - discussed benefit of both enjoyable and productive activities as well as light exercise if tolerated - encouraged patient to consider ways to spend time with loved

## 2024-03-01 NOTE — TELEPHONE ENCOUNTER
APPROVAL for Tresiba FlexTouch (insulin degludec injection) 200 Units/mL solution 02/29/24-02/27/25; letter attached.    If this requires a response please respond to the pool ( P [facility] MEDICATION PRE-AUTH).      Thank you please advise patient.

## 2024-03-07 ENCOUNTER — TELEMEDICINE (OUTPATIENT)
Age: 39
End: 2024-03-07

## 2024-03-07 DIAGNOSIS — F33.1 MDD (MAJOR DEPRESSIVE DISORDER), RECURRENT EPISODE, MODERATE (HCC): Primary | ICD-10-CM

## 2024-03-07 NOTE — PROGRESS NOTES
Behavioral Health Consultation  Sharon Spain, Ph.D.  Clinical Psychologist  3/7/2024  9:00 AM  Name: Issa Moctezuma  YOB: 1985  PCP: Sonia Kan,      TELEHEALTH VISIT -- Audio/Visual (During COVID-19 public health emergency)  Time spent with Issa: 30 minutes  This is his seventh [facility] appointment.  Reason for Consult: Depression     S:  Issa is a 38 y.o. male seen for [facility] follow up visit addressing MDD recurrent moderate.  He describes continued depressed mood.  Yesterday was his uncles birthday, uncle passed away 2 years ago.  He went to get his oil changed but kept to himself most of the day.  He's feeling a little better today.  He has been trying to do small exercises in his home to get more movement - going from sitting to standing, squatting, stretching his legs, trying to walk up a hill he often has to walk up \"sideways.\"  He has been talking to brother by video chat, brother lives in [place], he's been encouraging to patient, telling him to \"try to stay focused and everyone goes through hard times\" and encouraging him to engage in more movement.      He reports improvement in medication adherence between visits - in the last week estimates missing only 1 day of medications, a notable improvement - the 1 day he missed was related to unusual sleep pattern and not having something to eat with his medications.      Discussed financial strain.  He has not taken form in for food stamps, plans to and asserts he has food at home at this time.  Reminded patient of case mgmt resources to discuss resources for food and other needs while unemployed.  He has the contact info.      O:  MSE:  Appearance: adequate hygiene  Attitude: cooperative, pleasant  Consciousness: alert  Orientation: oriented to person, place, time, general circumstance  Memory: recent and remote memory intact  Attention/Concentration: intact during session  Psychomotor Activity: normal  Eye Contact:

## 2024-03-12 ENCOUNTER — OFFICE VISIT (OUTPATIENT)
Dept: PHARMACY | Age: 39
End: 2024-03-12

## 2024-03-12 VITALS — DIASTOLIC BLOOD PRESSURE: 68 MMHG | HEART RATE: 108 BPM | SYSTOLIC BLOOD PRESSURE: 99 MMHG

## 2024-03-12 DIAGNOSIS — Z79.4 TYPE 2 DIABETES MELLITUS WITH DIABETIC POLYNEUROPATHY, WITH LONG-TERM CURRENT USE OF INSULIN (HCC): ICD-10-CM

## 2024-03-12 DIAGNOSIS — E11.42 TYPE 2 DIABETES MELLITUS WITH DIABETIC POLYNEUROPATHY, WITH LONG-TERM CURRENT USE OF INSULIN (HCC): ICD-10-CM

## 2024-03-12 DIAGNOSIS — Z79.4 TYPE 2 DIABETES MELLITUS WITH DIABETIC POLYNEUROPATHY, WITH LONG-TERM CURRENT USE OF INSULIN (HCC): Primary | ICD-10-CM

## 2024-03-12 DIAGNOSIS — E11.42 TYPE 2 DIABETES MELLITUS WITH DIABETIC POLYNEUROPATHY, WITH LONG-TERM CURRENT USE OF INSULIN (HCC): Primary | ICD-10-CM

## 2024-03-12 NOTE — PROGRESS NOTES
CLINICAL PHARMACY NOTE--Diabetes    Issa Moctezuma is a 38 y.o. male with PMHX significant for CKD, diabetic neuropathy, HTN, HLD, Obesity, and Type 2 Diabetes referred by Sonia Kan for diabetes counseling and medication management. Pt previously seen at clinic until 5/2023 but was then following only with endocrinologist until now.    Interval update:  Pt referred back to clinic after d/c in May- this is 4th visit. Patient has been dealing with depression which is making it difficult to stay on top of his medications and appointments. Only sees endo every 3-6 months (last visit 11/28/23) and feels more frequent visits will hold him accountable. Was working 3rd shift which led to difficult with med adherence, but no longer working.  Expressed interest in pillpack pharmacy for his medications, or a larger pillbox so he doesn't have to fill it every week. Sister filled mediset this week for the next 2 weeks. Appetite has been poor. Ran out of tresiba, but resumed 3/5/24. BG now is 141- drank milk this AM.        ASSESSMENT/PLAN:    Type 2 Diabetes  A1c well above goal <7% (11.7--8.3-->7.1-->9.6-->14-->17.2% on 12/11)- repeat today  -Poor med adherence   -Provided pillbox, discussed strategies for remembering medications.   -Depression likely playing a large role in poor diabetes management, seeing psychologist.  -Cont metformin 500 mg BID  -Cont Jardiance 10 mg QD (monitor closely-- poor po and fluid intake, hx CHELE)  -Cont Humalog SSI 14 units + 1 unit for every 30 above 120 (per endo)  -Increase Tresiba to 100 units daily. Call pharmacy to have it processed through insurance (PA approved)   -bring pillbox and med next visit to assist with filling   -Dexcom G7  -HM: eye exam - patient declined all vaccinations in past  -Labs: last UACR per care everywhere 2.63 on 8/16/23    2.  Hypertension  BP above goal <130/80 2/2 poor med adherence  Has albuminuria  Start taking meds as directed and checking BP

## 2024-03-12 NOTE — PATIENT INSTRUCTIONS
-Increase Tresiba to 100 units daily.     Bring mediset and pill bottles to next visit.     Call pharmacy / speak with someone to fill tresiba again-- should be covered by insurance now

## 2024-03-13 LAB
EST. AVERAGE GLUCOSE BLD GHB EST-MCNC: 358 MG/DL
HBA1C MFR BLD: 14.1 %

## 2024-03-18 ENCOUNTER — TELEMEDICINE (OUTPATIENT)
Age: 39
End: 2024-03-18
Payer: COMMERCIAL

## 2024-03-18 DIAGNOSIS — F33.1 MDD (MAJOR DEPRESSIVE DISORDER), RECURRENT EPISODE, MODERATE (HCC): Primary | ICD-10-CM

## 2024-03-18 PROCEDURE — 90832 PSYTX W PT 30 MINUTES: CPT | Performed by: PSYCHOLOGIST

## 2024-03-18 NOTE — PROGRESS NOTES
Behavioral Health Consultation  Sharon Spain, Ph.D.  Clinical Psychologist  3/18/2024  1:00 PM  Name: Issa Moctezuma  YOB: 1985  PCP: Sonia Kan,      TELEHEALTH VISIT -- Audio/Visual (During COVID-19 public health emergency)  Time spent with Issa: 30 minutes  This is his eighth [facility] appointment.  Reason for Consult: Depression     S:  Issa is a 38 y.o. male seen for [facility] follow up visit addressing MDD recurrent moderate.  He reports continued depressed mood, some slight improvement in depression symptoms between visits.  Reports improving medication adherence, believes he missed 1 dose of antidepressant in the last week.  Focused on setting goals toward reduced isolation, continued movement, and increased productive activity.   He has continued to try to engage in more movement - small exercises in his home, practicing going from sitting to standing, squatting, stretching his legs, some short walks - he is unsure if this is causing pain he's having at night (in ankle, shins, leg) but motivated to continue.  Encouraged patient to continue incremental progress toward more movement as tolerated according to any guidance by his medical providers.  He states plans to take OTC NSAID if needed; encouraged discussing with PCP for guidance if needed.  Issa remains fairly isolated from others, stays in his room much of the day.  Sees mom at home daily/briefly, declines when mom offers to watch something together.  He talks to brother on the phone and brother is encouraging.  Saw his kids last week and has occasional video chats with the them (10, 15 yo) which is brief.  He is hesitant to increase social interactions but receptive to try spending more time outside of his room in the common spaces of the house or ideally on the porch.  Discussed potential benefit of going to a park; mom has been interested in going as well.     He has having difficulty studying on his own.

## 2024-03-19 ENCOUNTER — COMMUNITY OUTREACH (OUTPATIENT)
Dept: OTHER | Age: 39
End: 2024-03-19

## 2024-03-19 NOTE — PROGRESS NOTES
Pt was referred to [facility] by Sharon Spain, PhD.  Pt currently lives with his mother, and recently lost his job.  He is in the process of attempting to have his [employer] reinstated.  He has medical coverage through Medicaid.   Pt would ultimately like to find an apartment and other forms of assistance including transportation, that would allow him to become more independent.    SW suggested that Pt reach out to his Medicaid provider to see if a  can be assigned to assist with some of these needs.  SW asked that Pt call SW back regarding outcome.  If Medicaid is not a viable option for support, SW will provide community resources that Pt can explore.  SW will await call back from Pt.

## 2024-03-20 ENCOUNTER — COMMUNITY OUTREACH (OUTPATIENT)
Dept: OTHER | Age: 39
End: 2024-03-20

## 2024-03-20 NOTE — PROGRESS NOTES
SW attempted to reach Pt in follow up to 3/19 conversation.  SW left a voice message providing Pt with additional resource information regarding Health Care Access Now (HCAN).  HCAN provides adult care coordination for Medicaid recipients.  They may be able to offer hands on support to Pt related to his housing, transportation and employment needs.  [facility] provided the contact information and encouraged Pt to reach out.  [facility] will attempt to  follow up with Pt next weed to see if he was able to connect with either Medicaid or HCAN care coordination.

## 2024-03-26 ENCOUNTER — OFFICE VISIT (OUTPATIENT)
Dept: PHARMACY | Age: 39
End: 2024-03-26
Payer: COMMERCIAL

## 2024-03-26 ENCOUNTER — CLINICAL DOCUMENTATION (OUTPATIENT)
Dept: OTHER | Age: 39
End: 2024-03-26

## 2024-03-26 ENCOUNTER — OFFICE VISIT (OUTPATIENT)
Dept: PRIMARY CARE CLINIC | Age: 39
End: 2024-03-26
Payer: COMMERCIAL

## 2024-03-26 VITALS
BODY MASS INDEX: 34.96 KG/M2 | OXYGEN SATURATION: 99 % | SYSTOLIC BLOOD PRESSURE: 92 MMHG | DIASTOLIC BLOOD PRESSURE: 62 MMHG | WEIGHT: 265 LBS | HEART RATE: 89 BPM

## 2024-03-26 VITALS — DIASTOLIC BLOOD PRESSURE: 74 MMHG | SYSTOLIC BLOOD PRESSURE: 103 MMHG | HEART RATE: 90 BPM

## 2024-03-26 DIAGNOSIS — E11.42 TYPE 2 DIABETES MELLITUS WITH DIABETIC POLYNEUROPATHY, WITH LONG-TERM CURRENT USE OF INSULIN (HCC): Primary | ICD-10-CM

## 2024-03-26 DIAGNOSIS — I10 ESSENTIAL HYPERTENSION: ICD-10-CM

## 2024-03-26 DIAGNOSIS — Z79.4 TYPE 2 DIABETES MELLITUS WITH DIABETIC POLYNEUROPATHY, WITH LONG-TERM CURRENT USE OF INSULIN (HCC): Primary | ICD-10-CM

## 2024-03-26 DIAGNOSIS — N18.31 STAGE 3A CHRONIC KIDNEY DISEASE (HCC): ICD-10-CM

## 2024-03-26 PROCEDURE — 99212 OFFICE O/P EST SF 10 MIN: CPT

## 2024-03-26 PROCEDURE — 3078F DIAST BP <80 MM HG: CPT | Performed by: FAMILY MEDICINE

## 2024-03-26 PROCEDURE — 3074F SYST BP LT 130 MM HG: CPT | Performed by: FAMILY MEDICINE

## 2024-03-26 PROCEDURE — 3046F HEMOGLOBIN A1C LEVEL >9.0%: CPT | Performed by: FAMILY MEDICINE

## 2024-03-26 PROCEDURE — 99214 OFFICE O/P EST MOD 30 MIN: CPT | Performed by: FAMILY MEDICINE

## 2024-03-26 RX ORDER — METFORMIN HYDROCHLORIDE 500 MG/1
500 TABLET, EXTENDED RELEASE ORAL 2 TIMES DAILY WITH MEALS
Qty: 180 TABLET | Refills: 1 | Status: SHIPPED | OUTPATIENT
Start: 2024-03-26

## 2024-03-26 RX ORDER — DOCUSATE SODIUM 100 MG/1
100 CAPSULE, LIQUID FILLED ORAL 2 TIMES DAILY
COMMUNITY

## 2024-03-26 SDOH — ECONOMIC STABILITY: INCOME INSECURITY: HOW HARD IS IT FOR YOU TO PAY FOR THE VERY BASICS LIKE FOOD, HOUSING, MEDICAL CARE, AND HEATING?: VERY HARD

## 2024-03-26 SDOH — ECONOMIC STABILITY: FOOD INSECURITY: WITHIN THE PAST 12 MONTHS, YOU WORRIED THAT YOUR FOOD WOULD RUN OUT BEFORE YOU GOT MONEY TO BUY MORE.: OFTEN TRUE

## 2024-03-26 SDOH — ECONOMIC STABILITY: FOOD INSECURITY: WITHIN THE PAST 12 MONTHS, THE FOOD YOU BOUGHT JUST DIDN'T LAST AND YOU DIDN'T HAVE MONEY TO GET MORE.: SOMETIMES TRUE

## 2024-03-26 SDOH — ECONOMIC STABILITY: TRANSPORTATION INSECURITY
IN THE PAST 12 MONTHS, HAS LACK OF TRANSPORTATION KEPT YOU FROM MEETINGS, WORK, OR FROM GETTING THINGS NEEDED FOR DAILY LIVING?: YES

## 2024-03-26 NOTE — PROGRESS NOTES
current mgmt.   - Keep specialist follow ups as scheduled.   - DM mgmt per Endo. Improving with better compliance.     New Prescriptions    No medications on file   These medications were prescribed by a provider not a part of this encounter    METFORMIN (GLUCOPHAGE-XR) 500 MG EXTENDED RELEASE TABLET    Take 1 tablet by mouth 2 times daily (with meals)        No orders of the defined types were placed in this encounter.       Return in about 3 months (around 6/26/2024) for Follow up DMII.         Sonia Kan, DO

## 2024-03-26 NOTE — PROGRESS NOTES
LOLITA-ALYX attempted again to reach Pt by phone in follow up to last week's conversation.  ALYX reiterated that [employer] (HCAN) may be able to offer assistance to Pt with housing and some of his other social needs.  ALYX provided the phone number to Pt and also encouraged Pt to call SW if he has questions or updates regarding resources provided.

## 2024-03-26 NOTE — PROGRESS NOTES
when he lost his job due to COVID PNA.     Side effects: none  Cost: $3 copay  Previous regimens: trulicity-- Stopped by PCP due to GI recommendation for gastroparesis.    SMBG:   Dexcom Clarity   1/24-2/6 2/7-2/13 2/26-3/10   Avg 348  365  348  VH 89  97  91  HI 9  1  7  TIR 2  2  2  L 0  0  0      Hypertension:    Patient states he has chest pain all day- neuro, cardio, ID are all aware.  On Ace- lisinopril 20 mg  Medication side effects: no medication side effects noted.    Use of agents associated with hypertension: none.   Caffeine: none  Home blood pressure monitoring: yes, daily  ~130-140/     Hyperlipidemia:  On high intensity statin-- No new myalgias or GI upset   On aspirin 81 mg  Cardiovascular risk factors: diabetes mellitus, dyslipidemia, hypertension, male gender, and obesity (BMI >= 30 kg/m2)    The ASCVD Risk score (Cody LOPES, et al., 2019) failed to calculate for the following reasons:    The 2019 ASCVD risk score is only valid for ages 40 to 79       Objective     Medication list reviewed and up to date.    Physical exam     /74 (Site: Right Upper Arm, Position: Sitting, Cuff Size: Large Adult)   Pulse 90    There is no height or weight on file to calculate BMI.  Wt Readings from Last 3 Encounters:   03/26/24 120.2 kg (265 lb)   02/26/24 122.1 kg (269 lb 3.2 oz)   02/19/24 122.5 kg (270 lb 1.6 oz)      BP Readings from Last 3 Encounters:   03/26/24 92/62   03/26/24 103/74   03/12/24 99/68        Pertinent Labs:  Lab Results   Component Value Date    LABA1C 14.1 03/12/2024    LABA1C 17.2 12/11/2023    LABA1C 14.0 08/21/2023      Lab Results   Component Value Date    MALBCR 3428.4 (H) 02/06/2024     No results found for: \"CRCLEARANCE\"   Lab Results   Component Value Date    LDLCALC 113 (H) 05/15/2023    CHOL 247 (H) 02/22/2023    TRIG 221 (H) 02/22/2023    HDL 52 05/15/2023     Lab Results   Component Value Date    CREATININE 1.1 12/14/2023    BUN 10 12/14/2023     (L) 12/14/2023

## 2024-03-26 NOTE — PATIENT INSTRUCTIONS
texts and we do not reply or monitor texts.   [employer]  What they offer: $1 for $1 matching for families receiving SNAP/food stamps when spent on “healthy” food.  The match money can be used to purchase fruits and vegetables so adds more healthy food choices for SNAP beneficiaries.   Contact: https://[employer]/locations/   SNAP (formerly [employer])   What they offer: SNAP is used like cash to buy eligible food items from authorized retailers.  Apply for benefits online: https://[employer].[place].gov/ofam/foodstamps.stm     Apply for benefits by phone or in-person by visiting your local Job and Family Services. Locate your [facility]’s Job and family services by searching the directory at [email or web address]_Directory.stm           Meals on Wheels   What they offer: Meals on Wheels is a program that delivers meals to individuals who have no reliable means for maintaining a healthy diet.       To Apply:   Ages 18-59:  Apply online: [email or web address]  Apply by phone: [phone]    Ages 60+:   [place] on Aging: [phone]      [employer]  What they offer: Serves neighbors in [facility] through its network of Conferences (groups of volunteers based at a [employer]) for assistance with needs such as food, clothing, furniture, rent, utilities, or beds.  Website: [email or web address]/get-help/   Phone: 509.619.1903

## 2024-03-27 ASSESSMENT — ENCOUNTER SYMPTOMS
VOMITING: 0
SHORTNESS OF BREATH: 0
ABDOMINAL PAIN: 0
NAUSEA: 0
COUGH: 0
DIARRHEA: 0

## 2024-04-04 ENCOUNTER — TELEPHONE (OUTPATIENT)
Dept: PSYCHIATRY | Age: 39
End: 2024-04-04

## 2024-04-09 ENCOUNTER — OFFICE VISIT (OUTPATIENT)
Dept: PHARMACY | Age: 39
End: 2024-04-09
Payer: MEDICAID

## 2024-04-09 VITALS — SYSTOLIC BLOOD PRESSURE: 118 MMHG | DIASTOLIC BLOOD PRESSURE: 82 MMHG | HEART RATE: 101 BPM

## 2024-04-09 DIAGNOSIS — E11.42 TYPE 2 DIABETES MELLITUS WITH DIABETIC POLYNEUROPATHY, WITH LONG-TERM CURRENT USE OF INSULIN (HCC): Primary | ICD-10-CM

## 2024-04-09 DIAGNOSIS — Z79.4 TYPE 2 DIABETES MELLITUS WITH DIABETIC POLYNEUROPATHY, WITH LONG-TERM CURRENT USE OF INSULIN (HCC): Primary | ICD-10-CM

## 2024-04-09 LAB
CHP ED QC CHECK: ABNORMAL
GLUCOSE BLD-MCNC: 143 MG/DL

## 2024-04-09 PROCEDURE — 99212 OFFICE O/P EST SF 10 MIN: CPT

## 2024-04-09 NOTE — PROGRESS NOTES
CLINICAL PHARMACY NOTE--Diabetes    Issa Moctezuma is a 38 y.o. male with PMHX significant for depression, CKD3a, diabetic neuropathy, HTN, HLD, Obesity, and Type 2 Diabetes referred by Sonia Kan for diabetes counseling and medication management. Pt previously seen at clinic until 5/2023 but was then following only with endocrinologist until now.    Interval update:  Pt presents for assistance with filling mediset and dexcom placement. Tresiba was decreased from 100 units to 75 units nightly per endo due to lows throughout the night. Patient doesn't eat consistent meals and Humalog/sliding scale doses change daily. He is not taking Humalog/SSI as prescribed. Mentioned he occasionally misses Humalog 14 unit dose for meals and uses SSI more frequently. Pill compliance has improved, patient has missed 1 days worth of medications within the past week.     ASSESSMENT/PLAN:    Type 2 Diabetes  A1c above goal <7% (11.7--8.3-->7.1-->9.6-->14-->17.2-->14.1% on 3/12/24).  today at clinic.   -Poor med adherence; filled 2 wk mediset for pt.   -Discussed strategies for remembering medications. Encouraged pt to take PM meds with dinner to avoid missing bedtime meds.   -Sees endo every 3-6 months (last visit 3/28/24) and feels more frequent visits will hold him accountable.  -Depression likely playing a large role in poor diabetes management, seeing psychologist.  -Cont metformin 500 mg BID  -Cont Jardiance 10 mg QD (monitor closely-- poor po and fluid intake, hx CHELE)  -Cont Humalog SSI 14 units + 1 unit for every 30 above 120 (per endo)  -Cont Tresiba 75 units daily  -Dexcom G7- average glucose levels have improved   -HM: eye exam due this month, patient declined all vaccinations in past  -Labs: A1c and FLP in June 2.  Hypertension  BP at goal <130/80   Has albuminuria: ~3500 mg/g in March  On ACE + SGLT2- continue current dose per nephro    3.  Hyperlipidemia  Ldl 238 on 11/18/23  Continue high intensity

## 2024-04-09 NOTE — PATIENT INSTRUCTIONS
ADD DULOXETINE TO [facility] FOR REFILLS -  BEFORE NEXT VISIT    BEFORE MEALS (15 MIN BEFORE)  - Humalog 14 units plus sliding scale  Blood sugars Added insulin   BG < 120 --------> + 0   -150 --------> +1   -180 --------> +2    - 210 --------> +3   - 240 --------> +4    - 270 --------> +5    -300 --------> +6    - 330 --------> +7    - 360 --------> +8   - 390 --------> +9   BG > 391 --------> +10     AT BEDTIME   < 180 ----0  181-240---> 1   241-300 ----> 2  301-360--->3  > 361----> 4

## 2024-04-23 ENCOUNTER — OFFICE VISIT (OUTPATIENT)
Dept: PHARMACY | Age: 39
End: 2024-04-23
Payer: MEDICAID

## 2024-04-23 VITALS — SYSTOLIC BLOOD PRESSURE: 126 MMHG | DIASTOLIC BLOOD PRESSURE: 88 MMHG | HEART RATE: 94 BPM

## 2024-04-23 DIAGNOSIS — Z79.4 TYPE 2 DIABETES MELLITUS WITH DIABETIC POLYNEUROPATHY, WITH LONG-TERM CURRENT USE OF INSULIN (HCC): Primary | ICD-10-CM

## 2024-04-23 DIAGNOSIS — E11.42 TYPE 2 DIABETES MELLITUS WITH DIABETIC POLYNEUROPATHY, WITH LONG-TERM CURRENT USE OF INSULIN (HCC): Primary | ICD-10-CM

## 2024-04-23 LAB
CHP ED QC CHECK: ABNORMAL
GLUCOSE BLD-MCNC: 146 MG/DL

## 2024-04-23 PROCEDURE — 99212 OFFICE O/P EST SF 10 MIN: CPT

## 2024-04-23 NOTE — PROGRESS NOTES
(269 lb 3.2 oz)   24 122.5 kg (270 lb 1.6 oz)      BP Readings from Last 3 Encounters:   24 126/88   24 118/82   24 92/62        Pertinent Labs:  Lab Results   Component Value Date    LABA1C 14.1 2024    LABA1C 17.2 2023    LABA1C 14.0 2023      Lab Results   Component Value Date    MALBCR 3428.4 (H) 2024     No results found for: \"CRCLEARANCE\"   Lab Results   Component Value Date    LDLCALC 113 (H) 05/15/2023    CHOL 247 (H) 2023    TRIG 221 (H) 2023    HDL 52 05/15/2023     Lab Results   Component Value Date    CREATININE 1.67 (H) 2024    BUN 24 2024     2024    K 4.1 2024     2024    CO2 25 2024     Lab Results   Component Value Date    AST 13 (L) 2023    ALT 17 2023    BILIDIR <0.2 2022    BILITOT 0.3 2023    ALKPHOS 87 2023     No components found for: \"VITB12\"  No results found for: \"TSH\", \"TSHREFLEX\"          Discussed with patient the Pharmacist Collaborative Practice Agreement.  Patient provided verbal and/or electronic (ex. mychart) consent to participate in the collaborative practice agreement between the pharmacist and referred patient. This is in lieu of paper consent due to COVID-19 precautions and the use of remote/virtual visits.     Ainsley Joe PharmD  PGY1 Resident   Ext. [phone]  2024 11:34 AM    For Pharmacy Admin Tracking Only    Program: Medication Management  CPA in place:  No  Recommendation Provided To: Patient/Caregiver: 2 via In person  Intervention Detail: Adherence Monitorin and Dose Adjustment: 1, reason: Therapy Optimization  Intervention Accepted By: Patient/Caregiver: 2  Gap Closed?: No   Time Spent (min): 60

## 2024-04-23 NOTE — PATIENT INSTRUCTIONS
Switch tresiba to morning- take when you brush your teeth.    Humalog sliding scale BEFORE meals 14 units + 1 unit for every 20 above 120   Before meals  BG < 120 --> 14 units  -140 --> 15 units  - 160--> 16 units  - 180 --> 17 units  - 200 -->18 units  -220 --> 19 units  -240 --> 20 units  -260 --> 21 units  - 280 --> 22 units  -300 -- > 23 units  >300 --> 24 units

## 2024-05-07 ENCOUNTER — OFFICE VISIT (OUTPATIENT)
Dept: PHARMACY | Age: 39
End: 2024-05-07
Payer: MEDICAID

## 2024-05-07 VITALS — HEART RATE: 94 BPM | SYSTOLIC BLOOD PRESSURE: 104 MMHG | DIASTOLIC BLOOD PRESSURE: 70 MMHG

## 2024-05-07 DIAGNOSIS — Z79.4 TYPE 2 DIABETES MELLITUS WITH DIABETIC POLYNEUROPATHY, WITH LONG-TERM CURRENT USE OF INSULIN (HCC): Primary | ICD-10-CM

## 2024-05-07 DIAGNOSIS — E11.42 TYPE 2 DIABETES MELLITUS WITH DIABETIC POLYNEUROPATHY, WITH LONG-TERM CURRENT USE OF INSULIN (HCC): Primary | ICD-10-CM

## 2024-05-07 PROCEDURE — 99213 OFFICE O/P EST LOW 20 MIN: CPT

## 2024-05-07 NOTE — PROGRESS NOTES
CLINICAL PHARMACY NOTE--Diabetes    Issa Moctezuma is a 38 y.o. male with PMHX significant for depression, CKD3a, diabetic neuropathy, HTN, HLD, Obesity, and Type 2 Diabetes referred by Sonia Kan for diabetes counseling and medication management. Pt previously seen at clinic until 5/2023 but was then following only with endocrinologist until now.    Interval update:  Presents with assistance for dexcom placement and filling mediset. Patient still misses 1-2 days worth of oral medications/insulin per week. He takes ~2 doses of humalog per day on avg (usually 24 units). He increased dose as instructed last visit, but did not see big impact on BG. Started taking before meals instead of after.  Recent meals include: greens with turkey tails, baked chicken, mets w/bread, sandwiches. A lot of snacking. Trying to incorporate activity in normal routine. Would like to start the gym.     ASSESSMENT/PLAN:    Type 2 Diabetes  A1c above goal <7% (11.7--8.3-->7.1-->9.6-->14-->17.2-->14.1% on 3/12/24).   -Dexcom G7 clarity report reviewed  -Improve med adherence  -Assisted with filling mediset  -Discussed strategies for remembering medications. Encouraged pt to take PM meds with dinner to avoid missing bedtime meds.   -Depression likely playing a large role in poor diabetes management, seeing psychologist.  -Cont metformin 500 mg BID  -Cont Jardiance 10 mg QD (monitor closely-- poor po and fluid intake, hx CHELE)  -Increase tresiba to 84 units QAM- take when you brush your teeth.  -Cont Humalog SSI 14 units + 1 unit for every 20 above 120   Humalog sliding scale BEFORE meals 14 units + 1 unit for every 20 above 120   Before meals  BG < 120 --> 14 units  -140 --> 15 units  - 160--> 16 units  - 180 --> 17 units  - 200 -->18 units  -220 --> 19 units  -240 --> 20 units  -260 --> 21 units  - 280 --> 22 units  -300 -- > 23 units  >300 --> 24 units  -HM: eye exam due,

## 2024-05-07 NOTE — PATIENT INSTRUCTIONS
Increase Tresiba to 84 units    Humalog sliding scale BEFORE meals 14 units + 1 unit for every 20 above 120   Before meals  BG < 120 --> 14 units  -140 --> 15 units  - 160--> 16 units  - 180 --> 17 units  - 200 -->18 units  -220 --> 19 units  -240 --> 20 units  -260 --> 21 units  - 280 --> 22 units  -300 -- > 23 units  >300 --> 24 units

## 2024-05-13 ENCOUNTER — OFFICE VISIT (OUTPATIENT)
Dept: CARDIOLOGY CLINIC | Age: 39
End: 2024-05-13
Payer: MEDICAID

## 2024-05-13 ENCOUNTER — OFFICE VISIT (OUTPATIENT)
Dept: NEUROLOGY | Age: 39
End: 2024-05-13
Payer: MEDICAID

## 2024-05-13 VITALS
DIASTOLIC BLOOD PRESSURE: 82 MMHG | HEART RATE: 104 BPM | WEIGHT: 285 LBS | BODY MASS INDEX: 37.6 KG/M2 | SYSTOLIC BLOOD PRESSURE: 130 MMHG

## 2024-05-13 VITALS
HEART RATE: 100 BPM | SYSTOLIC BLOOD PRESSURE: 167 MMHG | BODY MASS INDEX: 34.96 KG/M2 | DIASTOLIC BLOOD PRESSURE: 112 MMHG | WEIGHT: 265 LBS

## 2024-05-13 DIAGNOSIS — I10 ESSENTIAL HYPERTENSION: ICD-10-CM

## 2024-05-13 DIAGNOSIS — E66.9 OBESITY (BMI 30-39.9): ICD-10-CM

## 2024-05-13 DIAGNOSIS — Z79.4 TYPE 2 DIABETES MELLITUS WITH DIABETIC POLYNEUROPATHY, WITH LONG-TERM CURRENT USE OF INSULIN (HCC): Primary | ICD-10-CM

## 2024-05-13 DIAGNOSIS — N18.31 STAGE 3A CHRONIC KIDNEY DISEASE (HCC): ICD-10-CM

## 2024-05-13 DIAGNOSIS — I50.20 HFREF (HEART FAILURE WITH REDUCED EJECTION FRACTION) (HCC): Primary | ICD-10-CM

## 2024-05-13 DIAGNOSIS — E11.42 TYPE 2 DIABETES MELLITUS WITH DIABETIC POLYNEUROPATHY, WITH LONG-TERM CURRENT USE OF INSULIN (HCC): Primary | ICD-10-CM

## 2024-05-13 PROCEDURE — 3075F SYST BP GE 130 - 139MM HG: CPT | Performed by: INTERNAL MEDICINE

## 2024-05-13 PROCEDURE — 3077F SYST BP >= 140 MM HG: CPT

## 2024-05-13 PROCEDURE — 99214 OFFICE O/P EST MOD 30 MIN: CPT | Performed by: INTERNAL MEDICINE

## 2024-05-13 PROCEDURE — 3079F DIAST BP 80-89 MM HG: CPT | Performed by: INTERNAL MEDICINE

## 2024-05-13 PROCEDURE — G8427 DOCREV CUR MEDS BY ELIG CLIN: HCPCS | Performed by: INTERNAL MEDICINE

## 2024-05-13 PROCEDURE — 1036F TOBACCO NON-USER: CPT

## 2024-05-13 PROCEDURE — 3046F HEMOGLOBIN A1C LEVEL >9.0%: CPT

## 2024-05-13 PROCEDURE — 99214 OFFICE O/P EST MOD 30 MIN: CPT

## 2024-05-13 PROCEDURE — 2022F DILAT RTA XM EVC RTNOPTHY: CPT

## 2024-05-13 PROCEDURE — 93000 ELECTROCARDIOGRAM COMPLETE: CPT | Performed by: INTERNAL MEDICINE

## 2024-05-13 PROCEDURE — 1036F TOBACCO NON-USER: CPT | Performed by: INTERNAL MEDICINE

## 2024-05-13 PROCEDURE — G8427 DOCREV CUR MEDS BY ELIG CLIN: HCPCS

## 2024-05-13 PROCEDURE — G8417 CALC BMI ABV UP PARAM F/U: HCPCS | Performed by: INTERNAL MEDICINE

## 2024-05-13 PROCEDURE — 3080F DIAST BP >= 90 MM HG: CPT

## 2024-05-13 PROCEDURE — G8417 CALC BMI ABV UP PARAM F/U: HCPCS

## 2024-05-13 RX ORDER — DULOXETIN HYDROCHLORIDE 60 MG/1
60 CAPSULE, DELAYED RELEASE ORAL DAILY
Qty: 90 CAPSULE | Refills: 1 | Status: SHIPPED | OUTPATIENT
Start: 2024-05-13 | End: 2024-11-09

## 2024-05-13 ASSESSMENT — ENCOUNTER SYMPTOMS
WHEEZING: 0
ABDOMINAL PAIN: 0
EYE DISCHARGE: 0
SHORTNESS OF BREATH: 0
COUGH: 0
VOMITING: 0
BACK PAIN: 0
ABDOMINAL DISTENTION: 0
BLOOD IN STOOL: 0
COLOR CHANGE: 0

## 2024-05-13 NOTE — ASSESSMENT & PLAN NOTE
Recovered LV function.  Echo this year with normal EF.  Currently Jardiance, losartan, carvedilol.

## 2024-05-13 NOTE — PROGRESS NOTES
The patient came today for follow up regarding: Polyneuropathy    Since the patient's last visit patient reports no worsening of his neuropathy.  Continues to have pain in bilateral feet, worse at night.  He is currently taking Cymbalta 60 mg daily.  No medication side effects.  His last A1c was 14.1 (March 24).  He continues to take Lyrica 100 mg twice daily.  Reviewed recent renal function which shows elevated creatinine.      Exam:   Constitutional:   Vitals:    05/13/24 1200 05/13/24 1210   BP: (!) 146/100 (!) 167/112   Pulse: 100    Weight: 120.2 kg (265 lb)        General appearance:  Normal development and appear in no acute distress.   Mental Status:   Oriented to person, place, problem, and time.    Memory: Good immediate recall.  Intact remote memory  Normal attention span and concentration.  Language: intact naming, repeating and fluency   Good fund of Knowledge. Aware of current events and vocabulary   Cranial Nerves:   II: Pupils: equal, round, reactive to light  III,IV,VI: Extra Ocular Movements are intact. No nystagmus  V: Facial sensation is intact   VII: Facial strength and movements: intact and symmetric  XII: Tongue movements are normal  Musculoskeletal: 5/5 in all 4 extremities.   Tone: Normal tone.   Coordination: no tremors or drift  DTR: symmetric 2 in UL and LL  Sensation: normal to all modalities in both arms and decreased to touch and vibration in bilateral feet, distal  Gait/Posture: steady gait     ROS : A 10-14 system review of constitutional, cardiovascular, respiratory, GI, eyes, , ENT, musculoskeletal, endocrine, hematological, skin, SHEENT, genitourinary, psychiatric and neurologic systems was obtained and updated today which is unremarkable except as mentioned in my HPI      Medical decision making:    A. Problems (any 1)    High:    [] Acute/Chronic Illness/injury posing threat to life or bodily function:    [] Severe exacerbation of chronic illness:      Moderate:    [x]     1

## 2024-05-13 NOTE — PATIENT INSTRUCTIONS

## 2024-05-13 NOTE — PROGRESS NOTES
were reviewed.       All questions and concerns were addressed to the patient/family. Alternatives to my treatment were discussed. The note was completed using EMR. Every effort wasmade to ensure accuracy; however, inadvertent computerized transcription errors may be present.    Thank you for allowing me to participate in thejudy or Issa Shirley MD, FACC, [facility]  [facility]  [facility]

## 2024-05-27 ENCOUNTER — HOSPITAL ENCOUNTER (INPATIENT)
Age: 39
LOS: 10 days | Discharge: NURSING, REHAB OR LONG-TERM CARE | DRG: 710 | End: 2024-06-07
Attending: EMERGENCY MEDICINE | Admitting: STUDENT IN AN ORGANIZED HEALTH CARE EDUCATION/TRAINING PROGRAM
Payer: MEDICAID

## 2024-05-27 ENCOUNTER — APPOINTMENT (OUTPATIENT)
Dept: GENERAL RADIOLOGY | Age: 39
DRG: 710 | End: 2024-05-27
Payer: MEDICAID

## 2024-05-27 DIAGNOSIS — B34.9 VIRAL ILLNESS: Primary | ICD-10-CM

## 2024-05-27 DIAGNOSIS — T14.8XXA WOUND INFECTION: ICD-10-CM

## 2024-05-27 DIAGNOSIS — L08.9 WOUND INFECTION: ICD-10-CM

## 2024-05-27 DIAGNOSIS — Z79.4 TYPE 2 DIABETES MELLITUS WITH DIABETIC POLYNEUROPATHY, WITH LONG-TERM CURRENT USE OF INSULIN (HCC): ICD-10-CM

## 2024-05-27 DIAGNOSIS — M79.89 NECROTIZING SOFT TISSUE INFECTION: ICD-10-CM

## 2024-05-27 DIAGNOSIS — M72.6 NECROTIZING FASCIITIS (HCC): ICD-10-CM

## 2024-05-27 DIAGNOSIS — N17.9 AKI (ACUTE KIDNEY INJURY) (HCC): ICD-10-CM

## 2024-05-27 DIAGNOSIS — R79.89 ELEVATED TROPONIN: ICD-10-CM

## 2024-05-27 DIAGNOSIS — E11.42 TYPE 2 DIABETES MELLITUS WITH DIABETIC POLYNEUROPATHY, WITH LONG-TERM CURRENT USE OF INSULIN (HCC): ICD-10-CM

## 2024-05-27 LAB
ANION GAP SERPL CALCULATED.3IONS-SCNC: 12 MMOL/L (ref 3–16)
BASE EXCESS BLDV CALC-SCNC: 0.6 MMOL/L (ref -2–3)
BASOPHILS # BLD: 0.1 K/UL (ref 0–0.2)
BASOPHILS NFR BLD: 1.1 %
BUN SERPL-MCNC: 21 MG/DL (ref 7–20)
CALCIUM SERPL-MCNC: 9 MG/DL (ref 8.3–10.6)
CHLORIDE SERPL-SCNC: 95 MMOL/L (ref 99–110)
CO2 BLDV-SCNC: 28 MMOL/L
CO2 SERPL-SCNC: 23 MMOL/L (ref 21–32)
COHGB MFR BLDV: 1.6 % (ref 0–1.5)
CREAT SERPL-MCNC: 2.2 MG/DL (ref 0.9–1.3)
DEPRECATED RDW RBC AUTO: 13.2 % (ref 12.4–15.4)
DO-HGB MFR BLDV: 37.4 %
EOSINOPHIL # BLD: 0 K/UL (ref 0–0.6)
EOSINOPHIL NFR BLD: 0.4 %
FLUAV RNA RESP QL NAA+PROBE: NOT DETECTED
FLUBV RNA RESP QL NAA+PROBE: NOT DETECTED
GFR SERPLBLD CREATININE-BSD FMLA CKD-EPI: 38 ML/MIN/{1.73_M2}
GLUCOSE BLD-MCNC: 302 MG/DL (ref 70–99)
GLUCOSE SERPL-MCNC: 316 MG/DL (ref 70–99)
HCO3 BLDV-SCNC: 26.1 MMOL/L (ref 24–28)
HCT VFR BLD AUTO: 43.6 % (ref 40.5–52.5)
HGB BLD-MCNC: 14.1 G/DL (ref 13.5–17.5)
LYMPHOCYTES # BLD: 1.4 K/UL (ref 1–5.1)
LYMPHOCYTES NFR BLD: 11.9 %
MCH RBC QN AUTO: 25.8 PG (ref 26–34)
MCHC RBC AUTO-ENTMCNC: 32.2 G/DL (ref 31–36)
MCV RBC AUTO: 79.9 FL (ref 80–100)
METHGB MFR BLDV: 0.2 % (ref 0–1.5)
MONOCYTES # BLD: 0.9 K/UL (ref 0–1.3)
MONOCYTES NFR BLD: 8.2 %
NEUTROPHILS # BLD: 9 K/UL (ref 1.7–7.7)
NEUTROPHILS NFR BLD: 78.4 %
NT-PROBNP SERPL-MCNC: 430 PG/ML (ref 0–124)
PCO2 BLDV: 44.1 MMHG (ref 41–51)
PERFORMED ON: ABNORMAL
PH BLDV: 7.38 [PH] (ref 7.35–7.45)
PLATELET # BLD AUTO: 219 K/UL (ref 135–450)
PMV BLD AUTO: 9.6 FL (ref 5–10.5)
PO2 BLDV: 32.4 MMHG (ref 25–40)
POTASSIUM SERPL-SCNC: 4.4 MMOL/L (ref 3.5–5.1)
RBC # BLD AUTO: 5.46 M/UL (ref 4.2–5.9)
SAO2 % BLDV: 62 %
SARS-COV-2 RNA RESP QL NAA+PROBE: NOT DETECTED
SODIUM SERPL-SCNC: 130 MMOL/L (ref 136–145)
TROPONIN, HIGH SENSITIVITY: 147 NG/L (ref 0–22)
TROPONIN, HIGH SENSITIVITY: 157 NG/L (ref 0–22)
WBC # BLD AUTO: 11.5 K/UL (ref 4–11)

## 2024-05-27 PROCEDURE — 99285 EMERGENCY DEPT VISIT HI MDM: CPT

## 2024-05-27 PROCEDURE — 2580000003 HC RX 258: Performed by: STUDENT IN AN ORGANIZED HEALTH CARE EDUCATION/TRAINING PROGRAM

## 2024-05-27 PROCEDURE — 96361 HYDRATE IV INFUSION ADD-ON: CPT

## 2024-05-27 PROCEDURE — 93005 ELECTROCARDIOGRAM TRACING: CPT | Performed by: STUDENT IN AN ORGANIZED HEALTH CARE EDUCATION/TRAINING PROGRAM

## 2024-05-27 PROCEDURE — 85025 COMPLETE CBC W/AUTO DIFF WBC: CPT

## 2024-05-27 PROCEDURE — 84484 ASSAY OF TROPONIN QUANT: CPT

## 2024-05-27 PROCEDURE — 82803 BLOOD GASES ANY COMBINATION: CPT

## 2024-05-27 PROCEDURE — 6370000000 HC RX 637 (ALT 250 FOR IP): Performed by: STUDENT IN AN ORGANIZED HEALTH CARE EDUCATION/TRAINING PROGRAM

## 2024-05-27 PROCEDURE — 71046 X-RAY EXAM CHEST 2 VIEWS: CPT

## 2024-05-27 PROCEDURE — 87636 SARSCOV2 & INF A&B AMP PRB: CPT

## 2024-05-27 PROCEDURE — 83880 ASSAY OF NATRIURETIC PEPTIDE: CPT

## 2024-05-27 PROCEDURE — G0378 HOSPITAL OBSERVATION PER HR: HCPCS

## 2024-05-27 PROCEDURE — 80048 BASIC METABOLIC PNL TOTAL CA: CPT

## 2024-05-27 RX ORDER — INSULIN LISPRO 100 [IU]/ML
0-4 INJECTION, SOLUTION INTRAVENOUS; SUBCUTANEOUS NIGHTLY
Status: DISCONTINUED | OUTPATIENT
Start: 2024-05-27 | End: 2024-06-02

## 2024-05-27 RX ORDER — ACETAMINOPHEN 650 MG/1
650 SUPPOSITORY RECTAL EVERY 6 HOURS PRN
Status: DISCONTINUED | OUTPATIENT
Start: 2024-05-27 | End: 2024-05-28

## 2024-05-27 RX ORDER — SODIUM CHLORIDE, SODIUM LACTATE, POTASSIUM CHLORIDE, AND CALCIUM CHLORIDE .6; .31; .03; .02 G/100ML; G/100ML; G/100ML; G/100ML
500 INJECTION, SOLUTION INTRAVENOUS ONCE
Status: COMPLETED | OUTPATIENT
Start: 2024-05-27 | End: 2024-05-27

## 2024-05-27 RX ORDER — DEXTROSE MONOHYDRATE 100 MG/ML
INJECTION, SOLUTION INTRAVENOUS CONTINUOUS PRN
Status: DISCONTINUED | OUTPATIENT
Start: 2024-05-27 | End: 2024-06-07 | Stop reason: HOSPADM

## 2024-05-27 RX ORDER — INSULIN LISPRO 100 [IU]/ML
0-8 INJECTION, SOLUTION INTRAVENOUS; SUBCUTANEOUS
Status: DISCONTINUED | OUTPATIENT
Start: 2024-05-28 | End: 2024-06-02

## 2024-05-27 RX ORDER — ROSUVASTATIN CALCIUM 20 MG/1
40 TABLET, COATED ORAL EVERY MORNING
Status: DISCONTINUED | OUTPATIENT
Start: 2024-05-28 | End: 2024-06-07 | Stop reason: HOSPADM

## 2024-05-27 RX ORDER — ONDANSETRON 2 MG/ML
4 INJECTION INTRAMUSCULAR; INTRAVENOUS EVERY 6 HOURS PRN
Status: DISCONTINUED | OUTPATIENT
Start: 2024-05-27 | End: 2024-06-07 | Stop reason: HOSPADM

## 2024-05-27 RX ORDER — ONDANSETRON 4 MG/1
4 TABLET, ORALLY DISINTEGRATING ORAL EVERY 8 HOURS PRN
Status: DISCONTINUED | OUTPATIENT
Start: 2024-05-27 | End: 2024-06-07 | Stop reason: HOSPADM

## 2024-05-27 RX ORDER — MAGNESIUM SULFATE IN WATER 40 MG/ML
2000 INJECTION, SOLUTION INTRAVENOUS PRN
Status: DISCONTINUED | OUTPATIENT
Start: 2024-05-27 | End: 2024-06-03

## 2024-05-27 RX ORDER — AMLODIPINE BESYLATE 5 MG/1
5 TABLET ORAL DAILY
Status: DISCONTINUED | OUTPATIENT
Start: 2024-05-28 | End: 2024-06-07 | Stop reason: HOSPADM

## 2024-05-27 RX ORDER — DULOXETIN HYDROCHLORIDE 60 MG/1
60 CAPSULE, DELAYED RELEASE ORAL DAILY
Status: DISCONTINUED | OUTPATIENT
Start: 2024-05-28 | End: 2024-06-07 | Stop reason: HOSPADM

## 2024-05-27 RX ORDER — LANOLIN ALCOHOL/MO/W.PET/CERES
3 CREAM (GRAM) TOPICAL NIGHTLY PRN
Status: DISCONTINUED | OUTPATIENT
Start: 2024-05-27 | End: 2024-06-07 | Stop reason: HOSPADM

## 2024-05-27 RX ORDER — GLUCAGON 1 MG/ML
1 KIT INJECTION PRN
Status: DISCONTINUED | OUTPATIENT
Start: 2024-05-27 | End: 2024-06-07 | Stop reason: HOSPADM

## 2024-05-27 RX ORDER — SODIUM CHLORIDE 9 MG/ML
INJECTION, SOLUTION INTRAVENOUS PRN
Status: DISCONTINUED | OUTPATIENT
Start: 2024-05-27 | End: 2024-06-07 | Stop reason: HOSPADM

## 2024-05-27 RX ORDER — DOCUSATE SODIUM 100 MG/1
100 CAPSULE, LIQUID FILLED ORAL 2 TIMES DAILY
Status: DISCONTINUED | OUTPATIENT
Start: 2024-05-27 | End: 2024-06-07 | Stop reason: HOSPADM

## 2024-05-27 RX ORDER — ACETAMINOPHEN 325 MG/1
650 TABLET ORAL EVERY 6 HOURS PRN
Status: DISCONTINUED | OUTPATIENT
Start: 2024-05-27 | End: 2024-05-27

## 2024-05-27 RX ORDER — ENOXAPARIN SODIUM 100 MG/ML
30 INJECTION SUBCUTANEOUS 2 TIMES DAILY
Status: DISCONTINUED | OUTPATIENT
Start: 2024-05-27 | End: 2024-06-04

## 2024-05-27 RX ORDER — ALBUTEROL SULFATE 2.5 MG/3ML
2.5 SOLUTION RESPIRATORY (INHALATION) EVERY 6 HOURS PRN
Status: DISCONTINUED | OUTPATIENT
Start: 2024-05-27 | End: 2024-06-07 | Stop reason: HOSPADM

## 2024-05-27 RX ORDER — SODIUM CHLORIDE 0.9 % (FLUSH) 0.9 %
5-40 SYRINGE (ML) INJECTION PRN
Status: DISCONTINUED | OUTPATIENT
Start: 2024-05-27 | End: 2024-06-07 | Stop reason: HOSPADM

## 2024-05-27 RX ORDER — INSULIN LISPRO 100 [IU]/ML
14 INJECTION, SOLUTION INTRAVENOUS; SUBCUTANEOUS
Status: DISCONTINUED | OUTPATIENT
Start: 2024-05-27 | End: 2024-06-03

## 2024-05-27 RX ORDER — ASPIRIN 81 MG/1
81 TABLET, CHEWABLE ORAL DAILY
Status: DISCONTINUED | OUTPATIENT
Start: 2024-05-28 | End: 2024-05-30

## 2024-05-27 RX ORDER — POLYETHYLENE GLYCOL 3350 17 G/17G
17 POWDER, FOR SOLUTION ORAL DAILY PRN
Status: DISCONTINUED | OUTPATIENT
Start: 2024-05-27 | End: 2024-06-06

## 2024-05-27 RX ORDER — POTASSIUM CHLORIDE 7.45 MG/ML
10 INJECTION INTRAVENOUS PRN
Status: DISCONTINUED | OUTPATIENT
Start: 2024-05-27 | End: 2024-06-03

## 2024-05-27 RX ORDER — POTASSIUM CHLORIDE 20 MEQ/1
40 TABLET, EXTENDED RELEASE ORAL PRN
Status: DISCONTINUED | OUTPATIENT
Start: 2024-05-27 | End: 2024-06-03

## 2024-05-27 RX ORDER — ACETAMINOPHEN 325 MG/1
650 TABLET ORAL EVERY 6 HOURS PRN
Status: DISCONTINUED | OUTPATIENT
Start: 2024-05-27 | End: 2024-05-28

## 2024-05-27 RX ORDER — ASPIRIN 81 MG/1
324 TABLET, CHEWABLE ORAL ONCE
Status: COMPLETED | OUTPATIENT
Start: 2024-05-27 | End: 2024-05-27

## 2024-05-27 RX ORDER — CARVEDILOL 25 MG/1
25 TABLET ORAL 2 TIMES DAILY
Status: DISCONTINUED | OUTPATIENT
Start: 2024-05-27 | End: 2024-06-07 | Stop reason: HOSPADM

## 2024-05-27 RX ORDER — SODIUM CHLORIDE 0.9 % (FLUSH) 0.9 %
5-40 SYRINGE (ML) INJECTION EVERY 12 HOURS SCHEDULED
Status: DISCONTINUED | OUTPATIENT
Start: 2024-05-27 | End: 2024-06-07 | Stop reason: HOSPADM

## 2024-05-27 RX ORDER — SODIUM CHLORIDE, SODIUM LACTATE, POTASSIUM CHLORIDE, AND CALCIUM CHLORIDE .6; .31; .03; .02 G/100ML; G/100ML; G/100ML; G/100ML
500 INJECTION, SOLUTION INTRAVENOUS ONCE
Status: DISCONTINUED | OUTPATIENT
Start: 2024-05-27 | End: 2024-05-27

## 2024-05-27 RX ADMIN — SODIUM CHLORIDE, POTASSIUM CHLORIDE, SODIUM LACTATE AND CALCIUM CHLORIDE 500 ML: 600; 310; 30; 20 INJECTION, SOLUTION INTRAVENOUS at 19:36

## 2024-05-27 RX ADMIN — ASPIRIN 324 MG: 81 TABLET, CHEWABLE ORAL at 20:33

## 2024-05-27 NOTE — ED NOTES
Pt to ED multiple c/o of chills/bodyaches blurry vision cough some difficulty breathing since yesterday, states hasn't been able to have a bowel movement the past two days, recently started going back to the gym unsure if he is overworking himself from that or what may be causing this, hx Type II DM, denies any other c/o at this time. AAOx4, NAD, resp e/u on RA, bed locked lowest position, rails up x2, call light within reach, on BP cuff Pulse ox and Cardiac monitor, pt HTN and Tachycardic on arrival.     Filerman, Brandon, RN  05/27/24 1939

## 2024-05-27 NOTE — ED PROVIDER NOTES
THE [facility]  EMERGENCY DEPARTMENT ENCOUNTER          EM RESIDENT NOTE       Date of evaluation: 5/27/2024    Chief Complaint     Chills, Generalized Body Aches, and Cough (Pt coming from home for body aches and cough for 2 days with blurred vision)      History of Present Illness     Issa Moctezuma is a 38 y.o. male with past medical history of hypertension, heart failure (most recent EF 65%), DM 2, CKD 3 who presents to the ED with chief complaint of generalized bodyaches, cough, shortness of breath.  Patient reports that he started having generalized bodyaches, chills and fatigue on Saturday and on Sunday he also developed some shortness of breath as well as a nonproductive cough.  Patient further reports feeling warm but denies any measured fever.  He has also had poor p.o. intake, noting the sensation of nausea without any vomiting.  He has not had any changes in bowel movements, urinary symptoms.  He does report some substernal chest discomfort that is nonradiating and feels like a pinching sensation.  He denies any leg swelling, orthopnea.  No recent travel or sick contacts.  Patient notes that he is not taking his medications for diabetes as prescribed however his sugars have been running high at home.  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    MEDICAL DECISION MAKING / ASSESSMENT / PLAN     INITIAL VITALS: BP: (!) 158/114, Temp: 98.6 °F (37 °C), Pulse: (!) 126, Respirations: 20, SpO2: 93 %    Issa Moctezuma is a 38 y.o. male with past medical history as noted above who is presenting with viral-like symptoms, noted to be mildly tachycardic on presentation but otherwise hemodynamically stable.  EKG obtained on presentation shows sinus tachycardia with left anterior fascicular block unchanged from prior on 5/13.  Patient saturating well on room air and with overall reassuring

## 2024-05-27 NOTE — ED PROVIDER NOTES
ED Attending Attestation Note     Date of evaluation: 5/27/2024    This patient was seen by the resident.  I have seen and examined the patient, agree with the workup, evaluation, management and diagnosis. The care plan has been discussed.      I have reviewed the ECG and concur with the resident's interpretation.      My assessment reveals a 38-year-old male presenting to the emergency department the complaint of chills blurry vision.  On examination the patient is notably tachycardic with a rate in the 120s lungs are clear anteriorly abdomen is soft without any tenderness rebound or guarding     Aren Delgadillo MD  05/27/24 1936

## 2024-05-28 ENCOUNTER — TELEPHONE (OUTPATIENT)
Dept: PHARMACY | Age: 39
End: 2024-05-28

## 2024-05-28 PROBLEM — N17.9 AKI (ACUTE KIDNEY INJURY) (HCC): Status: ACTIVE | Noted: 2024-05-28

## 2024-05-28 LAB
AMORPH SED URNS QL MICRO: ABNORMAL /HPF
ANION GAP SERPL CALCULATED.3IONS-SCNC: 15 MMOL/L (ref 3–16)
BASOPHILS # BLD: 0.1 K/UL (ref 0–0.2)
BASOPHILS NFR BLD: 0.8 %
BILIRUB UR QL STRIP.AUTO: NEGATIVE
BUN SERPL-MCNC: 22 MG/DL (ref 7–20)
CALCIUM SERPL-MCNC: 9 MG/DL (ref 8.3–10.6)
CHLORIDE SERPL-SCNC: 96 MMOL/L (ref 99–110)
CK SERPL-CCNC: 207 U/L (ref 39–308)
CLARITY UR: CLEAR
CO2 SERPL-SCNC: 20 MMOL/L (ref 21–32)
COARSE GRAN CASTS #/AREA URNS LPF: ABNORMAL /LPF (ref 0–2)
COLOR UR: YELLOW
CREAT SERPL-MCNC: 2.2 MG/DL (ref 0.9–1.3)
DEPRECATED RDW RBC AUTO: 12.8 % (ref 12.4–15.4)
EKG ATRIAL RATE: 123 BPM
EKG DIAGNOSIS: NORMAL
EKG P AXIS: 56 DEGREES
EKG P-R INTERVAL: 176 MS
EKG Q-T INTERVAL: 278 MS
EKG QRS DURATION: 80 MS
EKG QTC CALCULATION (BAZETT): 398 MS
EKG R AXIS: -47 DEGREES
EKG T AXIS: 52 DEGREES
EKG VENTRICULAR RATE: 123 BPM
EOSINOPHIL # BLD: 0.1 K/UL (ref 0–0.6)
EOSINOPHIL NFR BLD: 0.4 %
EPI CELLS #/AREA URNS HPF: ABNORMAL /HPF (ref 0–5)
GFR SERPLBLD CREATININE-BSD FMLA CKD-EPI: 38 ML/MIN/{1.73_M2}
GLUCOSE BLD-MCNC: 149 MG/DL (ref 70–99)
GLUCOSE BLD-MCNC: 171 MG/DL (ref 70–99)
GLUCOSE BLD-MCNC: 182 MG/DL (ref 70–99)
GLUCOSE BLD-MCNC: 226 MG/DL (ref 70–99)
GLUCOSE BLD-MCNC: 250 MG/DL (ref 70–99)
GLUCOSE SERPL-MCNC: 270 MG/DL (ref 70–99)
GLUCOSE UR STRIP.AUTO-MCNC: NEGATIVE MG/DL
HCT VFR BLD AUTO: 42.3 % (ref 40.5–52.5)
HGB BLD-MCNC: 14 G/DL (ref 13.5–17.5)
HGB UR QL STRIP.AUTO: ABNORMAL
KETONES UR STRIP.AUTO-MCNC: 40 MG/DL
LEUKOCYTE ESTERASE UR QL STRIP.AUTO: NEGATIVE
LYMPHOCYTES # BLD: 1.4 K/UL (ref 1–5.1)
LYMPHOCYTES NFR BLD: 11 %
MCH RBC QN AUTO: 26.3 PG (ref 26–34)
MCHC RBC AUTO-ENTMCNC: 33.1 G/DL (ref 31–36)
MCV RBC AUTO: 79.4 FL (ref 80–100)
MONOCYTES # BLD: 1 K/UL (ref 0–1.3)
MONOCYTES NFR BLD: 8.2 %
NEUTROPHILS # BLD: 9.9 K/UL (ref 1.7–7.7)
NEUTROPHILS NFR BLD: 79.6 %
NITRITE UR QL STRIP.AUTO: NEGATIVE
PERFORMED ON: ABNORMAL
PH UR STRIP.AUTO: 6 [PH] (ref 5–8)
PLATELET # BLD AUTO: 228 K/UL (ref 135–450)
PMV BLD AUTO: 9.3 FL (ref 5–10.5)
POTASSIUM SERPL-SCNC: 4.3 MMOL/L (ref 3.5–5.1)
PROCALCITONIN SERPL IA-MCNC: 0.6 NG/ML (ref 0–0.15)
PROT UR STRIP.AUTO-MCNC: >=300 MG/DL
RBC # BLD AUTO: 5.32 M/UL (ref 4.2–5.9)
RBC #/AREA URNS HPF: ABNORMAL /HPF (ref 0–4)
SODIUM SERPL-SCNC: 131 MMOL/L (ref 136–145)
SP GR UR STRIP.AUTO: 1.02 (ref 1–1.03)
UA COMPLETE W REFLEX CULTURE PNL UR: ABNORMAL
UA DIPSTICK W REFLEX MICRO PNL UR: YES
URN SPEC COLLECT METH UR: ABNORMAL
UROBILINOGEN UR STRIP-ACNC: 1 E.U./DL
WBC # BLD AUTO: 12.5 K/UL (ref 4–11)
WBC #/AREA URNS HPF: ABNORMAL /HPF (ref 0–5)

## 2024-05-28 PROCEDURE — 80048 BASIC METABOLIC PNL TOTAL CA: CPT

## 2024-05-28 PROCEDURE — 2580000003 HC RX 258: Performed by: STUDENT IN AN ORGANIZED HEALTH CARE EDUCATION/TRAINING PROGRAM

## 2024-05-28 PROCEDURE — 96361 HYDRATE IV INFUSION ADD-ON: CPT

## 2024-05-28 PROCEDURE — 6370000000 HC RX 637 (ALT 250 FOR IP): Performed by: STUDENT IN AN ORGANIZED HEALTH CARE EDUCATION/TRAINING PROGRAM

## 2024-05-28 PROCEDURE — 1200000000 HC SEMI PRIVATE

## 2024-05-28 PROCEDURE — 94761 N-INVAS EAR/PLS OXIMETRY MLT: CPT

## 2024-05-28 PROCEDURE — 84145 PROCALCITONIN (PCT): CPT

## 2024-05-28 PROCEDURE — 6360000002 HC RX W HCPCS: Performed by: STUDENT IN AN ORGANIZED HEALTH CARE EDUCATION/TRAINING PROGRAM

## 2024-05-28 PROCEDURE — 85025 COMPLETE CBC W/AUTO DIFF WBC: CPT

## 2024-05-28 PROCEDURE — 96375 TX/PRO/DX INJ NEW DRUG ADDON: CPT

## 2024-05-28 PROCEDURE — 96372 THER/PROPH/DIAG INJ SC/IM: CPT

## 2024-05-28 PROCEDURE — 87040 BLOOD CULTURE FOR BACTERIA: CPT

## 2024-05-28 PROCEDURE — 81001 URINALYSIS AUTO W/SCOPE: CPT

## 2024-05-28 PROCEDURE — 36415 COLL VENOUS BLD VENIPUNCTURE: CPT

## 2024-05-28 PROCEDURE — 82550 ASSAY OF CK (CPK): CPT

## 2024-05-28 PROCEDURE — 96374 THER/PROPH/DIAG INJ IV PUSH: CPT

## 2024-05-28 RX ORDER — ACETAMINOPHEN 650 MG/1
650 SUPPOSITORY RECTAL EVERY 4 HOURS PRN
Status: DISCONTINUED | OUTPATIENT
Start: 2024-05-28 | End: 2024-05-30

## 2024-05-28 RX ORDER — 0.9 % SODIUM CHLORIDE 0.9 %
1000 INTRAVENOUS SOLUTION INTRAVENOUS ONCE
Status: COMPLETED | OUTPATIENT
Start: 2024-05-28 | End: 2024-05-28

## 2024-05-28 RX ORDER — PROCHLORPERAZINE EDISYLATE 5 MG/ML
10 INJECTION INTRAMUSCULAR; INTRAVENOUS EVERY 6 HOURS PRN
Status: DISCONTINUED | OUTPATIENT
Start: 2024-05-28 | End: 2024-06-07 | Stop reason: HOSPADM

## 2024-05-28 RX ORDER — SODIUM CHLORIDE 9 MG/ML
INJECTION, SOLUTION INTRAVENOUS CONTINUOUS
Status: ACTIVE | OUTPATIENT
Start: 2024-05-28 | End: 2024-05-28

## 2024-05-28 RX ORDER — ACETAMINOPHEN 325 MG/1
650 TABLET ORAL EVERY 4 HOURS PRN
Status: DISCONTINUED | OUTPATIENT
Start: 2024-05-28 | End: 2024-05-28

## 2024-05-28 RX ORDER — ACETAMINOPHEN 325 MG/1
650 TABLET ORAL EVERY 4 HOURS PRN
Status: DISCONTINUED | OUTPATIENT
Start: 2024-05-28 | End: 2024-05-30

## 2024-05-28 RX ORDER — INSULIN GLARGINE 100 [IU]/ML
60 INJECTION, SOLUTION SUBCUTANEOUS DAILY
Status: DISCONTINUED | OUTPATIENT
Start: 2024-05-28 | End: 2024-06-07 | Stop reason: HOSPADM

## 2024-05-28 RX ORDER — SODIUM CHLORIDE, SODIUM LACTATE, POTASSIUM CHLORIDE, AND CALCIUM CHLORIDE .6; .31; .03; .02 G/100ML; G/100ML; G/100ML; G/100ML
1000 INJECTION, SOLUTION INTRAVENOUS ONCE
Status: COMPLETED | OUTPATIENT
Start: 2024-05-28 | End: 2024-05-28

## 2024-05-28 RX ORDER — ACETAMINOPHEN 650 MG/1
650 SUPPOSITORY RECTAL EVERY 6 HOURS PRN
Status: DISCONTINUED | OUTPATIENT
Start: 2024-05-28 | End: 2024-05-28

## 2024-05-28 RX ADMIN — ASPIRIN 81 MG: 81 TABLET, CHEWABLE ORAL at 08:59

## 2024-05-28 RX ADMIN — DULOXETINE HYDROCHLORIDE 60 MG: 60 CAPSULE, DELAYED RELEASE ORAL at 08:59

## 2024-05-28 RX ADMIN — SODIUM CHLORIDE 1000 ML: 9 INJECTION, SOLUTION INTRAVENOUS at 03:57

## 2024-05-28 RX ADMIN — INSULIN LISPRO 4 UNITS: 100 INJECTION, SOLUTION INTRAVENOUS; SUBCUTANEOUS at 08:59

## 2024-05-28 RX ADMIN — SODIUM CHLORIDE, PRESERVATIVE FREE 10 ML: 5 INJECTION INTRAVENOUS at 09:03

## 2024-05-28 RX ADMIN — PROCHLORPERAZINE EDISYLATE 10 MG: 5 INJECTION INTRAMUSCULAR; INTRAVENOUS at 20:12

## 2024-05-28 RX ADMIN — ENOXAPARIN SODIUM 30 MG: 100 INJECTION SUBCUTANEOUS at 01:09

## 2024-05-28 RX ADMIN — ACETAMINOPHEN 650 MG: 325 TABLET ORAL at 06:24

## 2024-05-28 RX ADMIN — SODIUM CHLORIDE: 9 INJECTION, SOLUTION INTRAVENOUS at 03:53

## 2024-05-28 RX ADMIN — INSULIN GLARGINE 60 UNITS: 100 INJECTION, SOLUTION SUBCUTANEOUS at 09:00

## 2024-05-28 RX ADMIN — ROSUVASTATIN CALCIUM 40 MG: 20 TABLET, COATED ORAL at 08:59

## 2024-05-28 RX ADMIN — SODIUM CHLORIDE, POTASSIUM CHLORIDE, SODIUM LACTATE AND CALCIUM CHLORIDE 1000 ML: 600; 310; 30; 20 INJECTION, SOLUTION INTRAVENOUS at 17:03

## 2024-05-28 RX ADMIN — INSULIN LISPRO 14 UNITS: 100 INJECTION, SOLUTION INTRAVENOUS; SUBCUTANEOUS at 09:01

## 2024-05-28 RX ADMIN — ENOXAPARIN SODIUM 30 MG: 100 INJECTION SUBCUTANEOUS at 21:58

## 2024-05-28 RX ADMIN — ENOXAPARIN SODIUM 30 MG: 100 INJECTION SUBCUTANEOUS at 09:02

## 2024-05-28 RX ADMIN — ACETAMINOPHEN 650 MG: 325 TABLET ORAL at 17:03

## 2024-05-28 RX ADMIN — ACETAMINOPHEN 650 MG: 325 TABLET ORAL at 12:35

## 2024-05-28 RX ADMIN — ONDANSETRON 4 MG: 2 INJECTION INTRAMUSCULAR; INTRAVENOUS at 12:35

## 2024-05-28 RX ADMIN — CARVEDILOL 25 MG: 25 TABLET, FILM COATED ORAL at 08:59

## 2024-05-28 RX ADMIN — DOCUSATE SODIUM 100 MG: 100 CAPSULE, LIQUID FILLED ORAL at 21:59

## 2024-05-28 RX ADMIN — SODIUM CHLORIDE, PRESERVATIVE FREE 10 ML: 5 INJECTION INTRAVENOUS at 03:27

## 2024-05-28 RX ADMIN — AMLODIPINE BESYLATE 5 MG: 5 TABLET ORAL at 08:59

## 2024-05-28 RX ADMIN — ACETAMINOPHEN 650 MG: 325 TABLET ORAL at 01:09

## 2024-05-28 RX ADMIN — DOCUSATE SODIUM 100 MG: 100 CAPSULE, LIQUID FILLED ORAL at 08:59

## 2024-05-28 RX ADMIN — CARVEDILOL 25 MG: 25 TABLET, FILM COATED ORAL at 21:59

## 2024-05-28 ASSESSMENT — PAIN SCALES - WONG BAKER: WONGBAKER_NUMERICALRESPONSE: HURTS EVEN MORE

## 2024-05-28 ASSESSMENT — PAIN DESCRIPTION - DIRECTION: RADIATING_TOWARDS: EYE

## 2024-05-28 ASSESSMENT — PAIN SCALES - GENERAL
PAINLEVEL_OUTOF10: 6
PAINLEVEL_OUTOF10: 8
PAINLEVEL_OUTOF10: 6
PAINLEVEL_OUTOF10: 7
PAINLEVEL_OUTOF10: 5
PAINLEVEL_OUTOF10: 4

## 2024-05-28 ASSESSMENT — PAIN DESCRIPTION - DESCRIPTORS
DESCRIPTORS: ACHING
DESCRIPTORS: BURNING
DESCRIPTORS: ACHING
DESCRIPTORS: ACHING

## 2024-05-28 ASSESSMENT — PAIN DESCRIPTION - ORIENTATION
ORIENTATION: MID
ORIENTATION: RIGHT;LEFT

## 2024-05-28 ASSESSMENT — PAIN - FUNCTIONAL ASSESSMENT
PAIN_FUNCTIONAL_ASSESSMENT: PREVENTS OR INTERFERES SOME ACTIVE ACTIVITIES AND ADLS

## 2024-05-28 ASSESSMENT — PAIN DESCRIPTION - ONSET
ONSET: SUDDEN
ONSET: ON-GOING
ONSET: GRADUAL

## 2024-05-28 ASSESSMENT — PAIN DESCRIPTION - PAIN TYPE
TYPE: ACUTE PAIN
TYPE: ACUTE PAIN
TYPE: CHRONIC PAIN

## 2024-05-28 ASSESSMENT — PAIN DESCRIPTION - FREQUENCY
FREQUENCY: INTERMITTENT
FREQUENCY: CONTINUOUS
FREQUENCY: INTERMITTENT

## 2024-05-28 ASSESSMENT — PAIN DESCRIPTION - LOCATION
LOCATION: GENERALIZED
LOCATION: LEG
LOCATION: GENERALIZED
LOCATION: HEAD

## 2024-05-28 ASSESSMENT — LIFESTYLE VARIABLES: HOW OFTEN DO YOU HAVE A DRINK CONTAINING ALCOHOL: NEVER

## 2024-05-28 NOTE — PROGRESS NOTES
Spoke with MD, advised to give pt supportive care at this time, will give next dose of tylenol when due.  Electronically signed by Mireya Biggs RN on 5/28/2024 at 4:13 PM

## 2024-05-28 NOTE — H&P
Hospital Medicine History & Physical      Date of Admission: 5/27/2024    Date of Service:  Pt seen/examined on 5/27/2024    []Admitted to Inpatient with expected LOS greater than two midnights due to medical therapy.  [x]Placed in Observation status.    Chief Admission Complaint: Fatigue, body aches    Presenting Admission History:      38 y.o. male who presented to [facility] with fatigue, body aches.  PMHx significant for CKD, diabetes, HF recovered EF, pulmonary histoplasmosis. Prior to his symptoms starting he had recently started going to the gym and had 2 days of intense workout sessions.  States that for the past several days he has been having generalized bodyaches, chills, and fatigue.   He has also been having a nonproductive cough.  Describes subjective fevers and chills as well as poor p.o. intake and nausea.  Has not had a bowel movement in 2 to 3 days.  States he has not been taking his medications as prescribed due to not feeling well.  Claims he drinks about 8 bottles of water per day.  Occasionally smokes marijuana, no alcohol, no drug use.  Assessment/Plan:      Current Principal Problem:  Viral syndrome    Fatigue and bodyaches  CHELE on CKD  Obesity, BMI 36.7  Diabetes  HFpEF, currently well compensated    Plan:  IV fluids, supportive care  Will obtain CK level to rule out rhabdomyolysis given CHELE and bodyaches with recent intense workout  Resume home antihypertensives, hold valsartan and Jardiance in the setting of CHELE    Update: During my initial evaluation patient had been afebrile however after admission was completed patient spiked a fever.  Will obtain blood cultures and UA given fevers and tachycardia.  Will likely need another 24 hours of being afebrile prior to discharge.      Discussed management and the need for Hospitalization of the patient w/ the Emergency Department Provider.    CXR: I have reviewed the CXR with the following interpretation: No acute process  EKG:  I have

## 2024-05-28 NOTE — ED NOTES
Pt requesting pain medication for generalized body aches, will review orders     Filerman, Brandon, RN  05/28/24 0117
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
## 2024-05-28 NOTE — PROGRESS NOTES
4 Eyes Skin Assessment     NAME:  Issa Moctezuma  YOB: 1985  MEDICAL RECORD NUMBER:  [id number]    The patient is being assessed for  Admission    I agree that at least one RN has performed a thorough Head to Toe Skin Assessment on the patient. ALL assessment sites listed below have been assessed.      Areas assessed by both nurses:    Head, Face, Ears, Shoulders, Back, Chest, Arms, Elbows, Hands, Sacrum. Buttock, Coccyx, Ischium, Legs. Feet and Heels, and Under Medical Devices         Does the Patient have a Wound? No noted wound(s)       Jeff Prevention initiated by RN: Yes  Wound Care Orders initiated by RN: No    Pressure Injury (Stage 3,4, Unstageable, DTI, NWPT, and Complex wounds) if present, place Wound referral order by RN under : No    New Ostomies, if present place, Ostomy referral order under : No     Nurse 1 eSignature: Electronically signed by Vanessa Gardiner RN on 5/28/24 at 3:25 AM EDT    **SHARE this note so that the co-signing nurse can place an eSignature**    Nurse 2 eSignature: Electronically signed by Haider Naqvi RN on 5/28/24 at 5:35 AM EDT

## 2024-05-28 NOTE — PROGRESS NOTES
Pt stated he is having moderate neuropathic leg pain, pt stated he takes Lyrica at home to alleviate pain. No Lyrica ordered for this pt at this time. Pt also stated he has a severe H/A and feels as if room is spinning and that he is gasping for air each time he wakes up from a nap. Pt stated he is also SOB at rest. MD Александр Handley notified. Awaiting response.

## 2024-05-28 NOTE — PLAN OF CARE
Problem: Discharge Planning  Goal: Discharge to home or other facility with appropriate resources  Outcome: Progressing   Pt plan to d/c home  Problem: Safety - Adult  Goal: Free from fall injury  5/28/2024 1922 by Mireya Biggs RN  Outcome: Progressing  5/28/2024 0546 by Vanessa Gardiner RN  Outcome: Progressing   Pt free of falls during this shift  Problem: ABCDS Injury Assessment  Goal: Absence of physical injury  5/28/2024 0546 by Vanessa Gardiner RN  Outcome: Progressing   Pt in bed, independent. Call button within reach  Problem: Skin/Tissue Integrity  Goal: Absence of new skin breakdown  Description: 1.  Monitor for areas of redness and/or skin breakdown  2.  Assess vascular access sites hourly  3.  Every 4-6 hours minimum:  Change oxygen saturation probe site  4.  Every 4-6 hours:  If on nasal continuous positive airway pressure, respiratory therapy assess nares and determine need for appliance change or resting period.  Outcome: Progressing   Pt noted no new skin conditions during this shift    Problem: Pain  Goal: Verbalizes/displays adequate comfort level or baseline comfort level  5/28/2024 1926 by Mireya Biggs RN  Outcome: Not Progressing  5/28/2024 0546 by Vanessa Gardiner RN  Outcome: Progressing   Pt noted gabino leg pain not relieved by tylMD robert made aware.

## 2024-05-28 NOTE — TELEPHONE ENCOUNTER
Pt LVM to cancel diabetes visit today, as he is currently admitted to the hospital. Will r/s upon d/c.    Erin Oliveira, PharmD, BCACP  [facility]   [facility] Amanda Ph: [phone]  [facility] Charlie Ph: [phone]  5/28/2024 8:34 AM

## 2024-05-28 NOTE — ED NOTES
How does patient ambulate?   []Low Fall Risk (ambulates by themselves without support)  []Stand by assist   []Contact Guard   []Front wheel walker  []Wheelchair   [x]Steady  []Bed bound  []History of Lower Extremity Amputation  []Unknown, did not assess in the emergency department   How does patient take pills?  [x]Whole with Water  []Crushed in applesauce  []Crushed in pudding  []Other  []Unknown no oral medications were given in the ED  Is patient alert?   [x]Alert  []Drowsy but responds to voice  []Doesn't respond to voice but responds to painful stimuli  []Unresponsive  Is patient oriented?   [x]To person  [x]To place  [x]To time  [x]To situation  []Confused  []Agitated  []Follows commands  If patient is disoriented or from a Skill Nursing Facility has family been notified of admission?   []Yes   [x]No  Patient belongings?   []Cell phone  []Wallet   []Dentures  []Clothing  Any specific patient or family belongings/needs/dynamics?   -  Miscellaneous comments/pending orders?  -     If there are any additional questions please reach out to the Emergency Department.            Filerman, Brandon, RN  05/27/24 3913

## 2024-05-28 NOTE — CARE COORDINATION
CM  following for d/c planning;    Patient  indep  a  baseline  , no current  services or  DME .    Plans to return home at  d/c.    Admitted  with Viral Syndrome:  BC  pending  R/O sepsis   Per  MD  in IDR rounds  anticipate  d/c  home  Wed.    Cm  cont to follow should  dc  needs arise.     Electronically signed by Judith Cancino RN on 5/28/2024 at 6:05 PM       Judith Cancino RN Case Manager  [facility] [address]  [place] [id number] [phone]  Fax [phone]

## 2024-05-28 NOTE — ED NOTES
Spoke with MartitaRN on 6th floor requesting about 5 more minutes then will be ready for pt to be brought up to room 6310     Filerman, Brandon, RN  05/28/24 8619

## 2024-05-28 NOTE — ED NOTES
Head of bed lowered and warm blankets provided per request, pt denies any other needs or c/o at this time.     Filerman, Brandon, RN  05/28/24 0118

## 2024-05-28 NOTE — PLAN OF CARE
D; Arrived from ED per stretcher to 6373 around 0240. C/o generalized aching, but running fever and stated has been working out at Gym. Oriented to room and surroundings. NP was notified about temp 101.6 and bolus of NS x1 and IV flds 2 125ml/hr ordered along with BC x2 obtained per lab.  Slept some overnight.  A: Cont to monitor during hourly rounds    Problem: Safety - Adult  Goal: Free from fall injury  Outcome: Progressing     Problem: ABCDS Injury Assessment  Goal: Absence of physical injury  Outcome: Progressing     Problem: Pain  Goal: Verbalizes/displays adequate comfort level or baseline comfort level  Outcome: Progressing

## 2024-05-29 ENCOUNTER — ANESTHESIA (OUTPATIENT)
Dept: OPERATING ROOM | Age: 39
End: 2024-05-29
Payer: MEDICAID

## 2024-05-29 ENCOUNTER — APPOINTMENT (OUTPATIENT)
Dept: CT IMAGING | Age: 39
DRG: 710 | End: 2024-05-29
Payer: MEDICAID

## 2024-05-29 ENCOUNTER — ANESTHESIA EVENT (OUTPATIENT)
Dept: OPERATING ROOM | Age: 39
End: 2024-05-29
Payer: MEDICAID

## 2024-05-29 PROBLEM — B34.9 VIRAL SYNDROME: Status: RESOLVED | Noted: 2024-05-27 | Resolved: 2024-05-29

## 2024-05-29 PROBLEM — M79.89 NECROTIZING SOFT TISSUE INFECTION: Status: ACTIVE | Noted: 2024-05-29

## 2024-05-29 LAB
ABO + RH BLD: NORMAL
ALBUMIN SERPL-MCNC: 2.9 G/DL (ref 3.4–5)
ANION GAP SERPL CALCULATED.3IONS-SCNC: 13 MMOL/L (ref 3–16)
ANION GAP SERPL CALCULATED.3IONS-SCNC: 9 MMOL/L (ref 3–16)
BASOPHILS # BLD: 0.2 K/UL (ref 0–0.2)
BASOPHILS NFR BLD: 1.4 %
BLD GP AB SCN SERPL QL: NORMAL
BUN SERPL-MCNC: 17 MG/DL (ref 7–20)
BUN SERPL-MCNC: 18 MG/DL (ref 7–20)
CALCIUM SERPL-MCNC: 8.4 MG/DL (ref 8.3–10.6)
CALCIUM SERPL-MCNC: 8.5 MG/DL (ref 8.3–10.6)
CHLORIDE SERPL-SCNC: 100 MMOL/L (ref 99–110)
CHLORIDE SERPL-SCNC: 101 MMOL/L (ref 99–110)
CO2 SERPL-SCNC: 20 MMOL/L (ref 21–32)
CO2 SERPL-SCNC: 23 MMOL/L (ref 21–32)
CREAT SERPL-MCNC: 1.8 MG/DL (ref 0.9–1.3)
CREAT SERPL-MCNC: 2 MG/DL (ref 0.9–1.3)
CRP SERPL-MCNC: 226.3 MG/L (ref 0–5.1)
DEPRECATED RDW RBC AUTO: 12.9 % (ref 12.4–15.4)
EOSINOPHIL # BLD: 0 K/UL (ref 0–0.6)
EOSINOPHIL NFR BLD: 0.4 %
ERYTHROCYTE [SEDIMENTATION RATE] IN BLOOD BY WESTERGREN METHOD: 105 MM/HR (ref 0–15)
GFR SERPLBLD CREATININE-BSD FMLA CKD-EPI: 43 ML/MIN/{1.73_M2}
GFR SERPLBLD CREATININE-BSD FMLA CKD-EPI: 49 ML/MIN/{1.73_M2}
GLUCOSE BLD-MCNC: 116 MG/DL (ref 70–99)
GLUCOSE BLD-MCNC: 119 MG/DL (ref 70–99)
GLUCOSE BLD-MCNC: 131 MG/DL (ref 70–99)
GLUCOSE BLD-MCNC: 192 MG/DL (ref 70–99)
GLUCOSE BLD-MCNC: 226 MG/DL (ref 70–99)
GLUCOSE SERPL-MCNC: 118 MG/DL (ref 70–99)
GLUCOSE SERPL-MCNC: 213 MG/DL (ref 70–99)
HCT VFR BLD AUTO: 36.4 % (ref 40.5–52.5)
HGB BLD-MCNC: 11.7 G/DL (ref 13.5–17.5)
INR PPP: 1.15 (ref 0.85–1.15)
LACTATE BLDV-SCNC: 1.4 MMOL/L (ref 0.4–2)
LYMPHOCYTES # BLD: 0.7 K/UL (ref 1–5.1)
LYMPHOCYTES NFR BLD: 5.9 %
MAGNESIUM SERPL-MCNC: 2.2 MG/DL (ref 1.8–2.4)
MCH RBC QN AUTO: 25.6 PG (ref 26–34)
MCHC RBC AUTO-ENTMCNC: 32.2 G/DL (ref 31–36)
MCV RBC AUTO: 79.3 FL (ref 80–100)
MONOCYTES # BLD: 1.2 K/UL (ref 0–1.3)
MONOCYTES NFR BLD: 9.2 %
NEUTROPHILS # BLD: 10.5 K/UL (ref 1.7–7.7)
NEUTROPHILS NFR BLD: 83.1 %
PERFORMED ON: ABNORMAL
PHOSPHATE SERPL-MCNC: 2.1 MG/DL (ref 2.5–4.9)
PLATELET # BLD AUTO: 205 K/UL (ref 135–450)
PMV BLD AUTO: 9.5 FL (ref 5–10.5)
POTASSIUM SERPL-SCNC: 4 MMOL/L (ref 3.5–5.1)
POTASSIUM SERPL-SCNC: 4.1 MMOL/L (ref 3.5–5.1)
PROTHROMBIN TIME: 14.9 SEC (ref 11.9–14.9)
RBC # BLD AUTO: 4.59 M/UL (ref 4.2–5.9)
SODIUM SERPL-SCNC: 132 MMOL/L (ref 136–145)
SODIUM SERPL-SCNC: 134 MMOL/L (ref 136–145)
WBC # BLD AUTO: 12.7 K/UL (ref 4–11)

## 2024-05-29 PROCEDURE — 3700000000 HC ANESTHESIA ATTENDED CARE: Performed by: SURGERY

## 2024-05-29 PROCEDURE — 85610 PROTHROMBIN TIME: CPT

## 2024-05-29 PROCEDURE — 1200000000 HC SEMI PRIVATE

## 2024-05-29 PROCEDURE — 11006 DBRDMT SKIN XTRNL GENT PER: CPT | Performed by: SURGERY

## 2024-05-29 PROCEDURE — 87070 CULTURE OTHR SPECIMN AEROBIC: CPT

## 2024-05-29 PROCEDURE — 7100000000 HC PACU RECOVERY - FIRST 15 MIN: Performed by: SURGERY

## 2024-05-29 PROCEDURE — 6360000002 HC RX W HCPCS: Performed by: STUDENT IN AN ORGANIZED HEALTH CARE EDUCATION/TRAINING PROGRAM

## 2024-05-29 PROCEDURE — 36415 COLL VENOUS BLD VENIPUNCTURE: CPT

## 2024-05-29 PROCEDURE — 6360000002 HC RX W HCPCS: Performed by: ANESTHESIOLOGY

## 2024-05-29 PROCEDURE — 83036 HEMOGLOBIN GLYCOSYLATED A1C: CPT

## 2024-05-29 PROCEDURE — 3600000013 HC SURGERY LEVEL 3 ADDTL 15MIN: Performed by: SURGERY

## 2024-05-29 PROCEDURE — 2580000003 HC RX 258: Performed by: SURGERY

## 2024-05-29 PROCEDURE — 86900 BLOOD TYPING SEROLOGIC ABO: CPT

## 2024-05-29 PROCEDURE — 87075 CULTR BACTERIA EXCEPT BLOOD: CPT

## 2024-05-29 PROCEDURE — 87185 SC STD ENZYME DETCJ PER NZM: CPT

## 2024-05-29 PROCEDURE — 85025 COMPLETE CBC W/AUTO DIFF WBC: CPT

## 2024-05-29 PROCEDURE — 6360000004 HC RX CONTRAST MEDICATION: Performed by: STUDENT IN AN ORGANIZED HEALTH CARE EDUCATION/TRAINING PROGRAM

## 2024-05-29 PROCEDURE — 3600000003 HC SURGERY LEVEL 3 BASE: Performed by: SURGERY

## 2024-05-29 PROCEDURE — 86038 ANTINUCLEAR ANTIBODIES: CPT

## 2024-05-29 PROCEDURE — 86901 BLOOD TYPING SEROLOGIC RH(D): CPT

## 2024-05-29 PROCEDURE — 2580000003 HC RX 258: Performed by: STUDENT IN AN ORGANIZED HEALTH CARE EDUCATION/TRAINING PROGRAM

## 2024-05-29 PROCEDURE — 2709999900 HC NON-CHARGEABLE SUPPLY: Performed by: SURGERY

## 2024-05-29 PROCEDURE — 2500000003 HC RX 250 WO HCPCS: Performed by: ANESTHESIOLOGY

## 2024-05-29 PROCEDURE — 87076 CULTURE ANAEROBE IDENT EACH: CPT

## 2024-05-29 PROCEDURE — 83605 ASSAY OF LACTIC ACID: CPT

## 2024-05-29 PROCEDURE — 86140 C-REACTIVE PROTEIN: CPT

## 2024-05-29 PROCEDURE — 87186 SC STD MICRODIL/AGAR DIL: CPT

## 2024-05-29 PROCEDURE — 3700000001 HC ADD 15 MINUTES (ANESTHESIA): Performed by: SURGERY

## 2024-05-29 PROCEDURE — 0KBM0ZZ EXCISION OF PERINEUM MUSCLE, OPEN APPROACH: ICD-10-PCS | Performed by: SURGERY

## 2024-05-29 PROCEDURE — 87077 CULTURE AEROBIC IDENTIFY: CPT

## 2024-05-29 PROCEDURE — 83735 ASSAY OF MAGNESIUM: CPT

## 2024-05-29 PROCEDURE — 87205 SMEAR GRAM STAIN: CPT

## 2024-05-29 PROCEDURE — 86664 EPSTEIN-BARR NUCLEAR ANTIGEN: CPT

## 2024-05-29 PROCEDURE — 86850 RBC ANTIBODY SCREEN: CPT

## 2024-05-29 PROCEDURE — 99255 IP/OBS CONSLTJ NEW/EST HI 80: CPT | Performed by: INTERNAL MEDICINE

## 2024-05-29 PROCEDURE — 7100000001 HC PACU RECOVERY - ADDTL 15 MIN: Performed by: SURGERY

## 2024-05-29 PROCEDURE — 2580000003 HC RX 258: Performed by: ANESTHESIOLOGY

## 2024-05-29 PROCEDURE — 71260 CT THORAX DX C+: CPT

## 2024-05-29 PROCEDURE — 85652 RBC SED RATE AUTOMATED: CPT

## 2024-05-29 PROCEDURE — 99223 1ST HOSP IP/OBS HIGH 75: CPT | Performed by: SURGERY

## 2024-05-29 PROCEDURE — 80069 RENAL FUNCTION PANEL: CPT

## 2024-05-29 PROCEDURE — A4217 STERILE WATER/SALINE, 500 ML: HCPCS | Performed by: SURGERY

## 2024-05-29 PROCEDURE — 86665 EPSTEIN-BARR CAPSID VCA: CPT

## 2024-05-29 PROCEDURE — 6370000000 HC RX 637 (ALT 250 FOR IP): Performed by: STUDENT IN AN ORGANIZED HEALTH CARE EDUCATION/TRAINING PROGRAM

## 2024-05-29 PROCEDURE — 86431 RHEUMATOID FACTOR QUANT: CPT

## 2024-05-29 PROCEDURE — 88304 TISSUE EXAM BY PATHOLOGIST: CPT

## 2024-05-29 PROCEDURE — 73701 CT LOWER EXTREMITY W/DYE: CPT

## 2024-05-29 PROCEDURE — 86663 EPSTEIN-BARR ANTIBODY: CPT

## 2024-05-29 RX ORDER — SODIUM CHLORIDE, SODIUM LACTATE, POTASSIUM CHLORIDE, CALCIUM CHLORIDE 600; 310; 30; 20 MG/100ML; MG/100ML; MG/100ML; MG/100ML
INJECTION, SOLUTION INTRAVENOUS CONTINUOUS
Status: DISCONTINUED | OUTPATIENT
Start: 2024-05-29 | End: 2024-06-03

## 2024-05-29 RX ORDER — LABETALOL HYDROCHLORIDE 5 MG/ML
10 INJECTION, SOLUTION INTRAVENOUS
Status: DISCONTINUED | OUTPATIENT
Start: 2024-05-29 | End: 2024-05-30 | Stop reason: HOSPADM

## 2024-05-29 RX ORDER — PROPOFOL 10 MG/ML
INJECTION, EMULSION INTRAVENOUS PRN
Status: DISCONTINUED | OUTPATIENT
Start: 2024-05-29 | End: 2024-05-29 | Stop reason: SDUPTHER

## 2024-05-29 RX ORDER — NALOXONE HYDROCHLORIDE 0.4 MG/ML
INJECTION, SOLUTION INTRAMUSCULAR; INTRAVENOUS; SUBCUTANEOUS PRN
Status: DISCONTINUED | OUTPATIENT
Start: 2024-05-29 | End: 2024-05-30 | Stop reason: HOSPADM

## 2024-05-29 RX ORDER — PROCHLORPERAZINE EDISYLATE 5 MG/ML
5 INJECTION INTRAMUSCULAR; INTRAVENOUS
Status: DISCONTINUED | OUTPATIENT
Start: 2024-05-29 | End: 2024-05-30 | Stop reason: HOSPADM

## 2024-05-29 RX ORDER — HYDROMORPHONE HYDROCHLORIDE 1 MG/ML
0.5 INJECTION, SOLUTION INTRAMUSCULAR; INTRAVENOUS; SUBCUTANEOUS EVERY 5 MIN PRN
Status: DISCONTINUED | OUTPATIENT
Start: 2024-05-29 | End: 2024-05-30 | Stop reason: HOSPADM

## 2024-05-29 RX ORDER — LIDOCAINE HYDROCHLORIDE 20 MG/ML
INJECTION, SOLUTION INTRAVENOUS PRN
Status: DISCONTINUED | OUTPATIENT
Start: 2024-05-29 | End: 2024-05-29 | Stop reason: SDUPTHER

## 2024-05-29 RX ORDER — OXYCODONE HYDROCHLORIDE 5 MG/1
5 TABLET ORAL
Status: DISCONTINUED | OUTPATIENT
Start: 2024-05-29 | End: 2024-05-30 | Stop reason: HOSPADM

## 2024-05-29 RX ORDER — SODIUM CHLORIDE 0.9 % (FLUSH) 0.9 %
5-40 SYRINGE (ML) INJECTION PRN
Status: DISCONTINUED | OUTPATIENT
Start: 2024-05-29 | End: 2024-05-30 | Stop reason: HOSPADM

## 2024-05-29 RX ORDER — SODIUM CHLORIDE 9 MG/ML
INJECTION, SOLUTION INTRAVENOUS CONTINUOUS PRN
Status: DISCONTINUED | OUTPATIENT
Start: 2024-05-29 | End: 2024-05-29 | Stop reason: SDUPTHER

## 2024-05-29 RX ORDER — SUCCINYLCHOLINE/SOD CL,ISO/PF 200MG/10ML
SYRINGE (ML) INTRAVENOUS PRN
Status: DISCONTINUED | OUTPATIENT
Start: 2024-05-29 | End: 2024-05-29 | Stop reason: SDUPTHER

## 2024-05-29 RX ORDER — SODIUM CHLORIDE 9 MG/ML
INJECTION, SOLUTION INTRAVENOUS PRN
Status: DISCONTINUED | OUTPATIENT
Start: 2024-05-29 | End: 2024-05-30 | Stop reason: HOSPADM

## 2024-05-29 RX ORDER — SODIUM CHLORIDE 0.9 % (FLUSH) 0.9 %
5-40 SYRINGE (ML) INJECTION EVERY 12 HOURS SCHEDULED
Status: DISCONTINUED | OUTPATIENT
Start: 2024-05-29 | End: 2024-05-30 | Stop reason: HOSPADM

## 2024-05-29 RX ORDER — CLINDAMYCIN PHOSPHATE 900 MG/50ML
900 INJECTION, SOLUTION INTRAVENOUS EVERY 8 HOURS
Status: DISCONTINUED | OUTPATIENT
Start: 2024-05-29 | End: 2024-05-30

## 2024-05-29 RX ORDER — MEPERIDINE HYDROCHLORIDE 25 MG/ML
12.5 INJECTION INTRAMUSCULAR; INTRAVENOUS; SUBCUTANEOUS EVERY 5 MIN PRN
Status: DISCONTINUED | OUTPATIENT
Start: 2024-05-29 | End: 2024-05-30 | Stop reason: HOSPADM

## 2024-05-29 RX ORDER — HYDRALAZINE HYDROCHLORIDE 20 MG/ML
10 INJECTION INTRAMUSCULAR; INTRAVENOUS
Status: DISCONTINUED | OUTPATIENT
Start: 2024-05-29 | End: 2024-05-30 | Stop reason: HOSPADM

## 2024-05-29 RX ORDER — ROCURONIUM BROMIDE 10 MG/ML
INJECTION, SOLUTION INTRAVENOUS PRN
Status: DISCONTINUED | OUTPATIENT
Start: 2024-05-29 | End: 2024-05-29 | Stop reason: SDUPTHER

## 2024-05-29 RX ORDER — ONDANSETRON 2 MG/ML
INJECTION INTRAMUSCULAR; INTRAVENOUS PRN
Status: DISCONTINUED | OUTPATIENT
Start: 2024-05-29 | End: 2024-05-29 | Stop reason: SDUPTHER

## 2024-05-29 RX ORDER — ONDANSETRON 2 MG/ML
4 INJECTION INTRAMUSCULAR; INTRAVENOUS
Status: DISCONTINUED | OUTPATIENT
Start: 2024-05-29 | End: 2024-05-30 | Stop reason: HOSPADM

## 2024-05-29 RX ORDER — FENTANYL CITRATE 50 UG/ML
INJECTION, SOLUTION INTRAMUSCULAR; INTRAVENOUS PRN
Status: DISCONTINUED | OUTPATIENT
Start: 2024-05-29 | End: 2024-05-29 | Stop reason: SDUPTHER

## 2024-05-29 RX ORDER — MAGNESIUM HYDROXIDE 1200 MG/15ML
LIQUID ORAL CONTINUOUS PRN
Status: DISCONTINUED | OUTPATIENT
Start: 2024-05-29 | End: 2024-05-29 | Stop reason: HOSPADM

## 2024-05-29 RX ADMIN — FENTANYL CITRATE 100 MCG: 50 INJECTION, SOLUTION INTRAMUSCULAR; INTRAVENOUS at 22:34

## 2024-05-29 RX ADMIN — CARVEDILOL 25 MG: 25 TABLET, FILM COATED ORAL at 09:18

## 2024-05-29 RX ADMIN — ONDANSETRON 4 MG: 2 INJECTION INTRAMUSCULAR; INTRAVENOUS at 23:17

## 2024-05-29 RX ADMIN — PROPOFOL 200 MG: 10 INJECTION, EMULSION INTRAVENOUS at 22:34

## 2024-05-29 RX ADMIN — SODIUM CHLORIDE: 9 INJECTION, SOLUTION INTRAVENOUS at 22:23

## 2024-05-29 RX ADMIN — VANCOMYCIN HYDROCHLORIDE 2500 MG: 10 INJECTION, POWDER, LYOPHILIZED, FOR SOLUTION INTRAVENOUS at 19:05

## 2024-05-29 RX ADMIN — ROSUVASTATIN CALCIUM 40 MG: 20 TABLET, COATED ORAL at 09:18

## 2024-05-29 RX ADMIN — INSULIN LISPRO 14 UNITS: 100 INJECTION, SOLUTION INTRAVENOUS; SUBCUTANEOUS at 13:21

## 2024-05-29 RX ADMIN — CLINDAMYCIN PHOSPHATE 900 MG: 900 INJECTION, SOLUTION INTRAVENOUS at 21:39

## 2024-05-29 RX ADMIN — PROCHLORPERAZINE EDISYLATE 10 MG: 5 INJECTION INTRAMUSCULAR; INTRAVENOUS at 09:18

## 2024-05-29 RX ADMIN — DOCUSATE SODIUM 100 MG: 100 CAPSULE, LIQUID FILLED ORAL at 09:18

## 2024-05-29 RX ADMIN — SUGAMMADEX 200 MG: 100 INJECTION, SOLUTION INTRAVENOUS at 23:24

## 2024-05-29 RX ADMIN — Medication 200 MG: at 22:34

## 2024-05-29 RX ADMIN — ROCURONIUM BROMIDE 10 MG: 10 INJECTION, SOLUTION INTRAVENOUS at 22:34

## 2024-05-29 RX ADMIN — ACETAMINOPHEN 650 MG: 325 TABLET ORAL at 09:18

## 2024-05-29 RX ADMIN — ACETAMINOPHEN 650 MG: 325 TABLET ORAL at 18:44

## 2024-05-29 RX ADMIN — ACETAMINOPHEN 650 MG: 325 TABLET ORAL at 00:41

## 2024-05-29 RX ADMIN — DULOXETINE HYDROCHLORIDE 60 MG: 60 CAPSULE, DELAYED RELEASE ORAL at 09:18

## 2024-05-29 RX ADMIN — SODIUM CHLORIDE, PRESERVATIVE FREE 10 ML: 5 INJECTION INTRAVENOUS at 09:19

## 2024-05-29 RX ADMIN — INSULIN GLARGINE 60 UNITS: 100 INJECTION, SOLUTION SUBCUTANEOUS at 09:20

## 2024-05-29 RX ADMIN — DOCUSATE SODIUM 100 MG: 100 CAPSULE, LIQUID FILLED ORAL at 21:41

## 2024-05-29 RX ADMIN — CEFEPIME HYDROCHLORIDE 2000 MG: 2 INJECTION, POWDER, FOR SOLUTION INTRAVENOUS at 21:35

## 2024-05-29 RX ADMIN — SODIUM CHLORIDE, POTASSIUM CHLORIDE, SODIUM LACTATE AND CALCIUM CHLORIDE: 600; 310; 30; 20 INJECTION, SOLUTION INTRAVENOUS at 18:20

## 2024-05-29 RX ADMIN — AMLODIPINE BESYLATE 5 MG: 5 TABLET ORAL at 09:20

## 2024-05-29 RX ADMIN — ASPIRIN 81 MG: 81 TABLET, CHEWABLE ORAL at 09:18

## 2024-05-29 RX ADMIN — ENOXAPARIN SODIUM 30 MG: 100 INJECTION SUBCUTANEOUS at 09:18

## 2024-05-29 RX ADMIN — INSULIN LISPRO 2 UNITS: 100 INJECTION, SOLUTION INTRAVENOUS; SUBCUTANEOUS at 13:22

## 2024-05-29 RX ADMIN — ENOXAPARIN SODIUM 30 MG: 100 INJECTION SUBCUTANEOUS at 21:41

## 2024-05-29 RX ADMIN — SODIUM CHLORIDE: 9 INJECTION, SOLUTION INTRAVENOUS at 23:20

## 2024-05-29 RX ADMIN — IOPAMIDOL 75 ML: 755 INJECTION, SOLUTION INTRAVENOUS at 14:18

## 2024-05-29 RX ADMIN — LIDOCAINE HYDROCHLORIDE 100 MG: 20 INJECTION, SOLUTION INTRAVENOUS at 22:34

## 2024-05-29 NOTE — PROGRESS NOTES
[facility] -  Clinical Pharmacy Note    Vancomycin - Management by Pharmacy    Consult Date(s): 5/29/24  Consulting Provider(s):     Assessment / Plan  SSTI- Vancomycin  Concurrent Antimicrobials: Cefepime  Day of Vanc Therapy / Ordered Duration: 7 days  Current Dosing Method: Bayesian-Guided AUC Dosing  Therapeutic Goal: -600 mg/L*hr  Current Dose / Plan:   2500 mg x once, followed by 1000 mg every 12 hours.  Estimated AUC of 504mg/L.hr   Estimated trough of 16.6mg/L   Will continue to monitor clinical condition and make adjustments to regimen as appropriate.    Thank you for consulting pharmacy,    Chelsea Gutierres, PharmD  Main Pharmacy: [id number]        Interval update:  therapy initiation     Subjective/Objective:   Issa Moctezuma is a 38 y.o. male with a PMHx significant for CKD who presents with Viral syndrome.     Pharmacy is consulted to dose vancomycin.    Ht Readings from Last 1 Encounters:   05/28/24 1.854 m (6' 1\")     Wt Readings from Last 1 Encounters:   05/28/24 126.2 kg (278 lb 4.8 oz)     Current & Prior Antimicrobial Regimen(s):  Cefepime     Vancomycin Level(s) / Doses:    Date Time Dose Type of Level / Level Interpretation                 Note: Serum levels collected for AUC-based dosing may be high if collected in close proximity to the dose administered. This is not necessarily indicative of toxicity.    Cultures & Sensitivities:    Date Site Micro Susceptibility / Result                 Recent Labs     05/27/24  1926 05/28/24  0342 05/29/24  1122   CREATININE 2.2* 2.2* 1.8*   BUN 21* 22* 18   WBC 11.5* 12.5*  --        Estimated Creatinine Clearance: 78 mL/min (A) (based on SCr of 1.8 mg/dL (H)).    Additional Lab Values / Findings of Note:    Recent Labs     05/28/24  0342   PROCAL 0.60*

## 2024-05-29 NOTE — PROGRESS NOTES
V2.0    [facility] Progress Note      Name:  Issa Moctezuma /Age/Sex: 1985  (38 y.o. male)   MRN & CSN:  [id number] & [id number] Encounter Date/Time: 2024 8:45 PM EDT   Location:  6322/6322-01 PCP: Sonia Kan DO     Attending:Александр Handley MD       Hospital Day: 2    Assessment and Recommendations   Issa Moctezuma is a 38 y.o. male with pmh of CKD who presents with Viral syndrome    #Viral Syndrome, sepsis rule-out  #CHELE on CKD  - Continue IVF  - Trend BMP  - Monitor blood cultures    #Type II DM  - insulin, titrate as needed      Diet ADULT DIET; Regular; 4 carb choices (60 gm/meal)   DVT Prophylaxis [] Lovenox, []  Heparin, [] SCDs, [] Ambulation,  [] Eliquis, [] Xarelto  [] Coumadin   Code Status Full Code   Disposition From: Home  Expected Disposition: Home  Estimated Date of Discharge: 1-2 days  Patient requires continued admission due to renal function monitoring   Surrogate Decision Maker/ POFRANKIE Rivas, parent     Personally reviewed Lab Studies and Imaging       Subjective:     Chief Complaint: Myalgias    Issa Moctezuma is a 38 y.o. male with CKD who presented with fever and myalgias consistent with viral syndrome. Has mild CHELE as well and admitted for further care.    No acute changes reported overnight. Still intermittently febrile and mildly tachycardic otherwise vitals stable. Feels about the same today.      Review of Systems:      Pertinent positives and negatives discussed in HPI    Objective:     Intake/Output Summary (Last 24 hours) at 2024  Last data filed at 2024 0509  Gross per 24 hour   Intake 1715 ml   Output --   Net 1715 ml      Vitals:   Vitals:    24 1229 24 1251 24 1551 24   BP: 112/73  139/85 (!) 157/100   Pulse: (!) 105  97 (!) 107   Resp:    Temp: 100.4 °F (38 °C)  100 °F (37.8 °C)    TempSrc: Oral  Oral    SpO2: 92% 93% 94% 97%   Weight:       Height:             Physical Exam:   11.5* 12.5*   HGB 14.1 14.0    228     BMP:    Recent Labs     05/27/24  1926 05/28/24  0342   * 131*   K 4.4 4.3   CL 95* 96*   CO2 23 20*   BUN 21* 22*   CREATININE 2.2* 2.2*   GLUCOSE 316* 270*     Hepatic: No results for input(s): \"AST\", \"ALT\", \"BILITOT\", \"ALKPHOS\" in the last 72 hours.    Invalid input(s): \"ALB\"  Lipids:   Lab Results   Component Value Date/Time    CHOL 247 02/22/2023 07:04 AM    HDL 52 05/15/2023 09:14 AM    TRIG 221 02/22/2023 07:04 AM     Hemoglobin A1C:   Lab Results   Component Value Date/Time    LABA1C 14.1 03/12/2024 11:35 AM     TSH: No results found for: \"TSH\"  Troponin: No results found for: \"TROPONINT\"  Lactic Acid: No results for input(s): \"LACTA\" in the last 72 hours.  BNP:   Recent Labs     05/27/24 1926   PROBNP 430*     UA:  Lab Results   Component Value Date/Time    NITRU Negative 05/28/2024 06:45 AM    COLORU Yellow 05/28/2024 06:45 AM    PHUR 6.0 05/28/2024 06:45 AM    PHUR 5.0 11/15/2023 03:52 PM    WBCUA 3-5 05/28/2024 06:45 AM    RBCUA None seen 05/28/2024 06:45 AM    RBCUA 1+ (0.06-0.1 mg/dL) 05/20/2024 02:29 PM    MUCUS PRESENT 05/20/2024 02:29 PM    BACTERIA SEE NOTES 05/20/2024 02:29 PM    BACTERIA None Seen 01/13/2023 12:20 AM    CLARITYU Clear 05/28/2024 06:45 AM    LEUKOCYTESUR Negative 05/28/2024 06:45 AM    UROBILINOGEN 1.0 05/28/2024 06:45 AM    BILIRUBINUR Negative 05/28/2024 06:45 AM    BLOODU SMALL 05/28/2024 06:45 AM    GLUCOSEU Negative 05/28/2024 06:45 AM    GLUCOSEU NEGATIVE 01/02/2011 02:44 PM    KETUA 40 05/28/2024 06:45 AM    AMORPHOUS 3+ 05/28/2024 06:45 AM     Urine Cultures: No results found for: \"LABURIN\"  Blood Cultures:   Lab Results   Component Value Date/Time    BC No Growth after 4 days of incubation. 11/11/2022 01:00 PM     Lab Results   Component Value Date/Time    BLOODCULT2 No Growth after 4 days of incubation. 11/11/2022 06:29 PM     Organism:   Lab Results   Component Value Date/Time    ORG Streptococcus parasanguinis

## 2024-05-29 NOTE — ANESTHESIA PRE PROCEDURE
Never   • Smokeless tobacco: Never   Substance Use Topics   • Alcohol use: Not Currently     Comment: rare                                Counseling given: Not Answered      Vital Signs (Current):   Vitals:    05/29/24 0038 05/29/24 0405 05/29/24 0842 05/29/24 1134   BP: (!) 175/105 108/67 (!) 144/82 108/65   Pulse: (!) 116 (!) 105 (!) 111 96   Resp: 20 18 20 20   Temp: (!) 101 °F (38.3 °C) 100 °F (37.8 °C) (!) 103.1 °F (39.5 °C) 99.9 °F (37.7 °C)   TempSrc: Oral Oral Oral Oral   SpO2: 94% 94% 95% 96%   Weight:       Height:                                                  BP Readings from Last 3 Encounters:   05/29/24 108/65   05/13/24 130/82   05/13/24 (!) 167/112       NPO Status:                                                                                 BMI:   Wt Readings from Last 3 Encounters:   05/28/24 126.2 kg (278 lb 4.8 oz)   05/13/24 129.3 kg (285 lb)   05/13/24 120.2 kg (265 lb)     Body mass index is 36.72 kg/m².    CBC:   Lab Results   Component Value Date/Time    WBC 12.7 05/29/2024 05:57 PM    RBC 4.59 05/29/2024 05:57 PM    HGB 11.7 05/29/2024 05:57 PM    HCT 36.4 05/29/2024 05:57 PM    MCV 79.3 05/29/2024 05:57 PM    RDW 12.9 05/29/2024 05:57 PM     05/29/2024 05:57 PM       CMP:   Lab Results   Component Value Date/Time     05/29/2024 05:57 PM    K 4.0 05/29/2024 05:57 PM    K 4.1 05/29/2024 11:22 AM     05/29/2024 05:57 PM    CO2 23 05/29/2024 05:57 PM    BUN 17 05/29/2024 05:57 PM    CREATININE 2.0 05/29/2024 05:57 PM    GFRAA >60 08/25/2022 01:08 PM    GFRAA >60 01/02/2011 02:51 PM    AGRATIO 1.4 12/11/2023 06:52 AM    LABGLOM 43 05/29/2024 05:57 PM    LABGLOM >60 12/14/2023 08:27 AM    GLUCOSE 118 05/29/2024 05:57 PM    CALCIUM 8.4 05/29/2024 05:57 PM    BILITOT 0.5 05/20/2024 02:29 PM    ALKPHOS 82 05/20/2024 02:29 PM    AST 22 05/20/2024 02:29 PM    ALT 17 05/20/2024 02:29 PM       POC Tests:   Recent Labs     05/29/24  1700   POCGLU 119*       Coags:   Lab Results

## 2024-05-29 NOTE — PROGRESS NOTES
PRN  prochlorperazine, 10 mg, Q6H PRN  albuterol, 2.5 mg, Q6H PRN  melatonin, 3 mg, Nightly PRN  sodium chloride flush, 5-40 mL, PRN  sodium chloride, , PRN  potassium chloride, 40 mEq, PRN   Or  potassium alternative oral replacement, 40 mEq, PRN   Or  potassium chloride, 10 mEq, PRN  magnesium sulfate, 2,000 mg, PRN  ondansetron, 4 mg, Q8H PRN   Or  ondansetron, 4 mg, Q6H PRN  polyethylene glycol, 17 g, Daily PRN  glucose, 4 tablet, PRN  dextrose bolus, 125 mL, PRN   Or  dextrose bolus, 250 mL, PRN  glucagon (rDNA), 1 mg, PRN  dextrose, , Continuous PRN        Labs and Imaging   XR CHEST (2 VW)    Result Date: 5/27/2024  PA AND LATERAL CHEST: HISTORY: Pain. COMPARISON:  2/19/2024. FINDINGS: The bony structures and soft tissues are unremarkable. The heart and pulmonary vasculature are within normal limits. The lungs are well-expanded bilaterally and are clear.     No acute cardiopulmonary findings.  Electronically signed by Kecia Reid MD      CBC:   Recent Labs     05/27/24  1926 05/28/24  0342   WBC 11.5* 12.5*   HGB 14.1 14.0    228     BMP:    Recent Labs     05/27/24 1926 05/28/24  0342   * 131*   K 4.4 4.3   CL 95* 96*   CO2 23 20*   BUN 21* 22*   CREATININE 2.2* 2.2*   GLUCOSE 316* 270*     Hepatic: No results for input(s): \"AST\", \"ALT\", \"BILITOT\", \"ALKPHOS\" in the last 72 hours.    Invalid input(s): \"ALB\"  Lipids:   Lab Results   Component Value Date/Time    CHOL 247 02/22/2023 07:04 AM    HDL 52 05/15/2023 09:14 AM    TRIG 221 02/22/2023 07:04 AM     Hemoglobin A1C:   Lab Results   Component Value Date/Time    LABA1C 14.1 03/12/2024 11:35 AM     TSH: No results found for: \"TSH\"  Troponin: No results found for: \"TROPONINT\"  Lactic Acid: No results for input(s): \"LACTA\" in the last 72 hours.  BNP:   Recent Labs     05/27/24 1926   PROBNP 430*     UA:  Lab Results   Component Value Date/Time    NITRU Negative 05/28/2024 06:45 AM    COLORU Yellow 05/28/2024 06:45 AM    PHUR 6.0 05/28/2024  06:45 AM    PHUR 5.0 11/15/2023 03:52 PM    WBCUA 3-5 05/28/2024 06:45 AM    RBCUA None seen 05/28/2024 06:45 AM    RBCUA 1+ (0.06-0.1 mg/dL) 05/20/2024 02:29 PM    MUCUS PRESENT 05/20/2024 02:29 PM    BACTERIA SEE NOTES 05/20/2024 02:29 PM    BACTERIA None Seen 01/13/2023 12:20 AM    CLARITYU Clear 05/28/2024 06:45 AM    LEUKOCYTESUR Negative 05/28/2024 06:45 AM    UROBILINOGEN 1.0 05/28/2024 06:45 AM    BILIRUBINUR Negative 05/28/2024 06:45 AM    BLOODU SMALL 05/28/2024 06:45 AM    GLUCOSEU Negative 05/28/2024 06:45 AM    GLUCOSEU NEGATIVE 01/02/2011 02:44 PM    KETUA 40 05/28/2024 06:45 AM    AMORPHOUS 3+ 05/28/2024 06:45 AM     Urine Cultures: No results found for: \"LABURIN\"  Blood Cultures:   Lab Results   Component Value Date/Time    BC  05/28/2024 03:42 AM     No Growth to date.  Any change in status will be called.     Lab Results   Component Value Date/Time    BLOODCULT2  05/28/2024 03:49 AM     No Growth to date.  Any change in status will be called.     Organism:   Lab Results   Component Value Date/Time    ORG Streptococcus parasanguinis 06/09/2022 01:35 PM    ORG Prevotella melaninogenica 06/09/2022 01:35 PM         Electronically signed by Александр Handley MD on 5/29/2024 at 9:25 AM

## 2024-05-29 NOTE — PERIOP NOTE
PRE-OP NOTE  Department of Surgery      Chief Complaint or Reason for Surgery: necrotizing soft tissue infection    Procedure: left thigh debridement  Expected time: 24    Plan  1. Diet: NPO at now; last meal 1:30 pm   2. IVF:   3. Antibiotics: cefepime, clinda, vanc  4. Labs to be drawn: stat labs including Type and Screen, INR  5. Anesthesia: to see patient  6. Consent: in chart  7. Pulmonary: CXR: see CT chest   8. Cardiac: EK/27  9. Pregnancy test: wilian Ferreira MD  24  5:52 PM     Patient/Caregiver provided printed discharge information.
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
## 2024-05-30 PROBLEM — B34.9 VIRAL ILLNESS: Status: ACTIVE | Noted: 2024-05-30

## 2024-05-30 LAB
ALBUMIN SERPL-MCNC: 2.7 G/DL (ref 3.4–5)
ANION GAP SERPL CALCULATED.3IONS-SCNC: 11 MMOL/L (ref 3–16)
BASOPHILS # BLD: 0.1 K/UL (ref 0–0.2)
BASOPHILS NFR BLD: 0.8 %
BUN SERPL-MCNC: 20 MG/DL (ref 7–20)
CALCIUM SERPL-MCNC: 7.8 MG/DL (ref 8.3–10.6)
CHLORIDE SERPL-SCNC: 104 MMOL/L (ref 99–110)
CO2 SERPL-SCNC: 21 MMOL/L (ref 21–32)
CREAT SERPL-MCNC: 1.9 MG/DL (ref 0.9–1.3)
DEPRECATED RDW RBC AUTO: 12.8 % (ref 12.4–15.4)
EOSINOPHIL # BLD: 0 K/UL (ref 0–0.6)
EOSINOPHIL NFR BLD: 0.1 %
EST. AVERAGE GLUCOSE BLD GHB EST-MCNC: 294.8 MG/DL
GFR SERPLBLD CREATININE-BSD FMLA CKD-EPI: 46 ML/MIN/{1.73_M2}
GLUCOSE BLD-MCNC: 125 MG/DL (ref 70–99)
GLUCOSE BLD-MCNC: 141 MG/DL (ref 70–99)
GLUCOSE BLD-MCNC: 146 MG/DL (ref 70–99)
GLUCOSE SERPL-MCNC: 162 MG/DL (ref 70–99)
HBA1C MFR BLD: 11.9 %
HCT VFR BLD AUTO: 31.1 % (ref 40.5–52.5)
HGB BLD-MCNC: 10.1 G/DL (ref 13.5–17.5)
INR PPP: 1.24 (ref 0.85–1.15)
LYMPHOCYTES # BLD: 0.7 K/UL (ref 1–5.1)
LYMPHOCYTES NFR BLD: 6.1 %
MAGNESIUM SERPL-MCNC: 2.2 MG/DL (ref 1.8–2.4)
MCH RBC QN AUTO: 25.7 PG (ref 26–34)
MCHC RBC AUTO-ENTMCNC: 32.4 G/DL (ref 31–36)
MCV RBC AUTO: 79.3 FL (ref 80–100)
MONOCYTES # BLD: 1.2 K/UL (ref 0–1.3)
MONOCYTES NFR BLD: 10 %
NEUTROPHILS # BLD: 9.7 K/UL (ref 1.7–7.7)
NEUTROPHILS NFR BLD: 83 %
PERFORMED ON: ABNORMAL
PHOSPHATE SERPL-MCNC: 3.1 MG/DL (ref 2.5–4.9)
PLATELET # BLD AUTO: 200 K/UL (ref 135–450)
PMV BLD AUTO: 9.2 FL (ref 5–10.5)
POTASSIUM SERPL-SCNC: 4.3 MMOL/L (ref 3.5–5.1)
PROTHROMBIN TIME: 15.8 SEC (ref 11.9–14.9)
RBC # BLD AUTO: 3.92 M/UL (ref 4.2–5.9)
SODIUM SERPL-SCNC: 136 MMOL/L (ref 136–145)
WBC # BLD AUTO: 11.7 K/UL (ref 4–11)

## 2024-05-30 PROCEDURE — 6360000002 HC RX W HCPCS: Performed by: STUDENT IN AN ORGANIZED HEALTH CARE EDUCATION/TRAINING PROGRAM

## 2024-05-30 PROCEDURE — 6370000000 HC RX 637 (ALT 250 FOR IP): Performed by: STUDENT IN AN ORGANIZED HEALTH CARE EDUCATION/TRAINING PROGRAM

## 2024-05-30 PROCEDURE — 99231 SBSQ HOSP IP/OBS SF/LOW 25: CPT | Performed by: SURGERY

## 2024-05-30 PROCEDURE — 6370000000 HC RX 637 (ALT 250 FOR IP)

## 2024-05-30 PROCEDURE — 36415 COLL VENOUS BLD VENIPUNCTURE: CPT

## 2024-05-30 PROCEDURE — 83735 ASSAY OF MAGNESIUM: CPT

## 2024-05-30 PROCEDURE — 2700000000 HC OXYGEN THERAPY PER DAY

## 2024-05-30 PROCEDURE — 85610 PROTHROMBIN TIME: CPT

## 2024-05-30 PROCEDURE — 99233 SBSQ HOSP IP/OBS HIGH 50: CPT | Performed by: INTERNAL MEDICINE

## 2024-05-30 PROCEDURE — 2580000003 HC RX 258: Performed by: STUDENT IN AN ORGANIZED HEALTH CARE EDUCATION/TRAINING PROGRAM

## 2024-05-30 PROCEDURE — 80069 RENAL FUNCTION PANEL: CPT

## 2024-05-30 PROCEDURE — 94761 N-INVAS EAR/PLS OXIMETRY MLT: CPT

## 2024-05-30 PROCEDURE — 2060000000 HC ICU INTERMEDIATE R&B

## 2024-05-30 PROCEDURE — 85025 COMPLETE CBC W/AUTO DIFF WBC: CPT

## 2024-05-30 PROCEDURE — 87641 MR-STAPH DNA AMP PROBE: CPT

## 2024-05-30 RX ORDER — ACETAMINOPHEN 325 MG/1
650 TABLET ORAL EVERY 6 HOURS SCHEDULED
Status: DISCONTINUED | OUTPATIENT
Start: 2024-05-30 | End: 2024-06-03

## 2024-05-30 RX ORDER — OXYCODONE HYDROCHLORIDE 5 MG/1
5 TABLET ORAL EVERY 4 HOURS PRN
Status: DISCONTINUED | OUTPATIENT
Start: 2024-05-30 | End: 2024-06-07 | Stop reason: HOSPADM

## 2024-05-30 RX ORDER — OXYCODONE HYDROCHLORIDE 5 MG/1
10 TABLET ORAL EVERY 4 HOURS PRN
Status: DISCONTINUED | OUTPATIENT
Start: 2024-05-30 | End: 2024-06-07 | Stop reason: HOSPADM

## 2024-05-30 RX ORDER — HYDROMORPHONE HYDROCHLORIDE 1 MG/ML
0.25 INJECTION, SOLUTION INTRAMUSCULAR; INTRAVENOUS; SUBCUTANEOUS
Status: DISCONTINUED | OUTPATIENT
Start: 2024-05-30 | End: 2024-06-07 | Stop reason: HOSPADM

## 2024-05-30 RX ORDER — HYDROMORPHONE HYDROCHLORIDE 1 MG/ML
0.5 INJECTION, SOLUTION INTRAMUSCULAR; INTRAVENOUS; SUBCUTANEOUS
Status: DISCONTINUED | OUTPATIENT
Start: 2024-05-30 | End: 2024-06-07 | Stop reason: ALTCHOICE

## 2024-05-30 RX ADMIN — DULOXETINE HYDROCHLORIDE 60 MG: 60 CAPSULE, DELAYED RELEASE ORAL at 10:00

## 2024-05-30 RX ADMIN — HYDROMORPHONE HYDROCHLORIDE 0.5 MG: 1 INJECTION, SOLUTION INTRAMUSCULAR; INTRAVENOUS; SUBCUTANEOUS at 21:45

## 2024-05-30 RX ADMIN — VANCOMYCIN HYDROCHLORIDE 1000 MG: 1 INJECTION, POWDER, LYOPHILIZED, FOR SOLUTION INTRAVENOUS at 06:37

## 2024-05-30 RX ADMIN — SODIUM CHLORIDE, POTASSIUM CHLORIDE, SODIUM LACTATE AND CALCIUM CHLORIDE: 600; 310; 30; 20 INJECTION, SOLUTION INTRAVENOUS at 16:02

## 2024-05-30 RX ADMIN — CEFEPIME 2000 MG: 2 INJECTION, POWDER, FOR SOLUTION INTRAVENOUS at 17:51

## 2024-05-30 RX ADMIN — SODIUM CHLORIDE, PRESERVATIVE FREE 5 ML: 5 INJECTION INTRAVENOUS at 10:02

## 2024-05-30 RX ADMIN — CLINDAMYCIN PHOSPHATE 900 MG: 900 INJECTION, SOLUTION INTRAVENOUS at 10:59

## 2024-05-30 RX ADMIN — CARVEDILOL 25 MG: 25 TABLET, FILM COATED ORAL at 20:52

## 2024-05-30 RX ADMIN — AMLODIPINE BESYLATE 5 MG: 5 TABLET ORAL at 10:00

## 2024-05-30 RX ADMIN — VANCOMYCIN HYDROCHLORIDE 1000 MG: 1 INJECTION, POWDER, LYOPHILIZED, FOR SOLUTION INTRAVENOUS at 20:52

## 2024-05-30 RX ADMIN — OXYCODONE 10 MG: 5 TABLET ORAL at 06:41

## 2024-05-30 RX ADMIN — DOCUSATE SODIUM 100 MG: 100 CAPSULE, LIQUID FILLED ORAL at 10:00

## 2024-05-30 RX ADMIN — ACETAMINOPHEN 650 MG: 325 TABLET ORAL at 14:17

## 2024-05-30 RX ADMIN — CEFEPIME 2000 MG: 2 INJECTION, POWDER, FOR SOLUTION INTRAVENOUS at 02:54

## 2024-05-30 RX ADMIN — CEFEPIME 2000 MG: 2 INJECTION, POWDER, FOR SOLUTION INTRAVENOUS at 11:01

## 2024-05-30 RX ADMIN — ACETAMINOPHEN 650 MG: 325 TABLET ORAL at 20:51

## 2024-05-30 RX ADMIN — ENOXAPARIN SODIUM 30 MG: 100 INJECTION SUBCUTANEOUS at 10:00

## 2024-05-30 RX ADMIN — ACETAMINOPHEN 650 MG: 325 TABLET ORAL at 06:42

## 2024-05-30 RX ADMIN — OXYCODONE 5 MG: 5 TABLET ORAL at 16:05

## 2024-05-30 RX ADMIN — CARVEDILOL 25 MG: 25 TABLET, FILM COATED ORAL at 10:00

## 2024-05-30 RX ADMIN — ROSUVASTATIN CALCIUM 40 MG: 20 TABLET, COATED ORAL at 10:00

## 2024-05-30 RX ADMIN — ENOXAPARIN SODIUM 30 MG: 100 INJECTION SUBCUTANEOUS at 20:52

## 2024-05-30 RX ADMIN — INSULIN GLARGINE 60 UNITS: 100 INJECTION, SOLUTION SUBCUTANEOUS at 09:59

## 2024-05-30 RX ADMIN — CLINDAMYCIN PHOSPHATE 900 MG: 900 INJECTION, SOLUTION INTRAVENOUS at 02:51

## 2024-05-30 ASSESSMENT — PAIN DESCRIPTION - ORIENTATION
ORIENTATION: LEFT;INNER
ORIENTATION: LEFT

## 2024-05-30 ASSESSMENT — PAIN DESCRIPTION - DESCRIPTORS
DESCRIPTORS: DISCOMFORT;ACHING;STABBING
DESCRIPTORS: ACHING;DISCOMFORT;THROBBING
DESCRIPTORS: ACHING;DISCOMFORT;THROBBING
DESCRIPTORS: ACHING;DISCOMFORT

## 2024-05-30 ASSESSMENT — PAIN DESCRIPTION - FREQUENCY: FREQUENCY: CONTINUOUS

## 2024-05-30 ASSESSMENT — PAIN SCALES - GENERAL
PAINLEVEL_OUTOF10: 8
PAINLEVEL_OUTOF10: 8
PAINLEVEL_OUTOF10: 6
PAINLEVEL_OUTOF10: 4
PAINLEVEL_OUTOF10: 4
PAINLEVEL_OUTOF10: 6
PAINLEVEL_OUTOF10: 6
PAINLEVEL_OUTOF10: 9
PAINLEVEL_OUTOF10: 5
PAINLEVEL_OUTOF10: 6

## 2024-05-30 ASSESSMENT — PAIN DESCRIPTION - PAIN TYPE: TYPE: SURGICAL PAIN

## 2024-05-30 ASSESSMENT — PAIN DESCRIPTION - LOCATION
LOCATION: LEG
LOCATION: LEG;HIP;GROIN
LOCATION: LEG;GROIN
LOCATION: HIP;GROIN;LEG

## 2024-05-30 ASSESSMENT — PAIN DESCRIPTION - ONSET: ONSET: ON-GOING

## 2024-05-30 ASSESSMENT — PAIN - FUNCTIONAL ASSESSMENT
PAIN_FUNCTIONAL_ASSESSMENT: PREVENTS OR INTERFERES SOME ACTIVE ACTIVITIES AND ADLS
PAIN_FUNCTIONAL_ASSESSMENT: PREVENTS OR INTERFERES WITH MANY ACTIVE NOT PASSIVE ACTIVITIES
PAIN_FUNCTIONAL_ASSESSMENT: PREVENTS OR INTERFERES SOME ACTIVE ACTIVITIES AND ADLS

## 2024-05-30 NOTE — OP NOTE
Operative Note      Patient: Issa Moctezuma  YOB: 1985  MRN: [id number]    Date of Procedure: 5/29/2024    Pre-Op Diagnosis Codes:     * Necrotizing fasciitis (HCC) [M72.6]    Post-Op Diagnosis: Same       Procedure(s):  LEFT THIGH DEBRIDEMENT    Surgeon(s):  Herb Abdalla MD    Assistant:   Resident: Doreen Ferreira MD; Semaj Love MD    Anesthesia: General    Estimated Blood Loss (mL): less than 100     Complications: None    Specimens:   ID Type Source Tests Collected by Time Destination   1 : left thigh wound culture Specimen Leg CULTURE, SURGICAL Herb Abdalla MD 5/29/2024 2313        Implants:  * No implants in log *      Drains: * No LDAs found *    Findings:  Infection Present At Time Of Surgery (PATOS) (choose all levels that have infection present):  - Deep Infection (muscle/fascia) present as evidenced by pus and fluid consistent with infection  Other Findings: Left superomedial thigh necrotizing soft tissue infection with gray dishwater drainage and purulent material, sharp excisional debridement including subcutaneous tissue, fascia, and muscle; final measurements 25x9cm, 4cm deep    Indication: Issa Moctezuma is a 38 y.o. male with history of poorly-controlled diabetes mellitus who was admitted to the [facility] on 5/27/2024 with generalized malaise, fatigue, and fevers.  He was diagnosed with a necrotizing soft tissue infection of the left thigh on 5/29 that required emergent surgical debridement.  Discussion had with the patient regarding the risks, benefits, and alternatives of the operation.  Opportunity was given to ask any and all questions, which were answered satisfactorily.      Detailed Description of Procedure:   The patient was brought to the operating room and laid supine on the OR table.  General anesthesia was induced, which the patient tolerated without immediate complication.  Prophylactic antibiotics were ensured to be administered, and the patient  was prepped and draped in the usual sterile fashion.  Prior to the procedure, a time-out was called to identify and confirm that key information related to the operation was correct.    The patient's legs were externally rotated to better expose the infected tissue.  A large elliptical incision was made overlying the indurated and diseased skin of the left superior medial thigh using a #15 scalpel.  Sharp dissection and electrocautery were used to carry the dissection deeper.  This was continued until the fascia was reached, which appeared to be viable and free from infection.  However, multiple pockets of necrotic tissue and purulence were encountered during the original dissection throughout the subcutaneous fat and soft tissue.  Culture swabs were obtained and sent for aerobic and anaerobic culture.  Further debridement was carried out medially and superiorly until all remaining tissue appeared viable, signified by ongoing bleeding.  Meticulous hemostasis was then ensured at this time, including tying off a couple of small veins with 3-0 silk.  The wound was then irrigated with 1 L of normal saline using a pulse lavage.  Hemostasis was then reinsured, and our resection was deemed to be satisfactory in removing all diseased tissue.  The wound was then packed with Betadine soaked Kerlix gauze and then dressed with ABD pads, Kerlix gauze, and Ace bandaging.    The patient was woken from anesthesia and transferred to PACU in stable condition, having tolerated the operation without immediate complication.  Surgical counts for instruments, needles, and sponges were confirmed to be correct.    Dr. Herb Abdalla was present and scrubbed for the entirety of the operation.    Electronically signed by Semaj Love MD on 05/31/24 at 2:55 PM

## 2024-05-30 NOTE — PROGRESS NOTES
V2.0    [facility] Progress Note      Name:  Issa Moctezuma /Age/Sex: 1985  (38 y.o. male)   MRN & CSN:  [id number] & [id number] Encounter Date/Time: 2024 8:45 PM EDT   Location:  63/6322- PCP: Sonia Kan DO     Attending:Александр Handley MD       Hospital Day: 4    Assessment and Recommendations   Issa Moctezuma is a 38 y.o. male with pmh of CKD who presents with Necrotizing soft tissue infection    #Necrotizing SSTI of L thigh  --patient on admission was febrile but otherwise had symptoms more consistent with viral syndrome (e.g. myalgias) without otherwise localizing signs of focal bacterial infection. Procal only mildly elevated at 0.6 and BCx NGTD. Initial plan to monitor off abx as no convincing source of bacterial infection  --On  reported new pain/swelling in L medial thigh near groin, CT obtained with subcutaneous gas in that region  -Surgery consulted  --POD#1 emergent L thigh debridement  -ID consulted  -Continue empiric vanc/cefepime/clindamycin for now while awaiting surgical cultures    #Type II DM, uncontrolled  - A1c from 2024 ~14%  - continue glargine insulin and SSI, titrate as needed    #CHELE on CKD, improving  - Continue IVF  - Trend BMP    Diet ADULT DIET; Regular; 3 carb choices (45 gm/meal)  ADULT ORAL NUTRITION SUPPLEMENT; Lunch, Dinner; Wound Healing Oral Supplement   DVT Prophylaxis [] Lovenox, []  Heparin, [] SCDs, [] Ambulation,  [] Eliquis, [] Xarelto  [] Coumadin   Code Status Full Code   Disposition From: Home  Expected Disposition: Home  Estimated Date of Discharge: 2 days  Patient requires continued admission due to IV abx   Surrogate Decision Maker/ POA  Katharine Rivas, parent     Personally reviewed Lab Studies and Imaging       Subjective:     Chief Complaint: Myalgias    Issa Moctezuma is a 38 y.o. male with CKD who presented with fever and myalgias consistent with viral syndrome. Has mild CHELE as well and admitted for further       Infusions:    lactated ringers IV soln 150 mL/hr at 05/29/24 1820    sodium chloride      dextrose       PRN Meds: HYDROmorphone, 0.25 mg, Q3H PRN   Or  HYDROmorphone, 0.5 mg, Q3H PRN  oxyCODONE, 5 mg, Q4H PRN   Or  oxyCODONE, 10 mg, Q4H PRN  prochlorperazine, 10 mg, Q6H PRN  albuterol, 2.5 mg, Q6H PRN  melatonin, 3 mg, Nightly PRN  sodium chloride flush, 5-40 mL, PRN  sodium chloride, , PRN  potassium chloride, 40 mEq, PRN   Or  potassium alternative oral replacement, 40 mEq, PRN   Or  potassium chloride, 10 mEq, PRN  magnesium sulfate, 2,000 mg, PRN  ondansetron, 4 mg, Q8H PRN   Or  ondansetron, 4 mg, Q6H PRN  polyethylene glycol, 17 g, Daily PRN  glucose, 4 tablet, PRN  dextrose bolus, 125 mL, PRN   Or  dextrose bolus, 250 mL, PRN  glucagon (rDNA), 1 mg, PRN  dextrose, , Continuous PRN        Labs and Imaging   XR CHEST (2 VW)    Result Date: 5/27/2024  PA AND LATERAL CHEST: HISTORY: Pain. COMPARISON:  2/19/2024. FINDINGS: The bony structures and soft tissues are unremarkable. The heart and pulmonary vasculature are within normal limits. The lungs are well-expanded bilaterally and are clear.     No acute cardiopulmonary findings.  Electronically signed by Kecia Reid MD      CBC:   Recent Labs     05/28/24  0342 05/29/24  1757 05/30/24  0516   WBC 12.5* 12.7* 11.7*   HGB 14.0 11.7* 10.1*    205 200     BMP:    Recent Labs     05/29/24  1122 05/29/24  1757 05/30/24  0516   * 132* 136   K 4.1 4.0 4.3    100 104   CO2 20* 23 21   BUN 18 17 20   CREATININE 1.8* 2.0* 1.9*   GLUCOSE 213* 118* 162*     Hepatic: No results for input(s): \"AST\", \"ALT\", \"BILITOT\", \"ALKPHOS\" in the last 72 hours.    Invalid input(s): \"ALB\"  Lipids:   Lab Results   Component Value Date/Time    CHOL 247 02/22/2023 07:04 AM    HDL 52 05/15/2023 09:14 AM    TRIG 221 02/22/2023 07:04 AM     Hemoglobin A1C:   Lab Results   Component Value Date/Time    LABA1C 11.9 05/29/2024 05:57 PM     TSH: No results found for:

## 2024-05-30 NOTE — PROGRESS NOTES
ID Follow-up NOTE    CC:   Body ache, chills, left thigh/groin  Antibiotics: Vancomycin, clindamycin, cefepime    Admit Date: 5/27/2024  Hospital Day: 4    Subjective:     Patient states feeling better after OR yesterday, plan for possible VAC placement tomorrow.  Denies any fevers or chills overnight      Objective:     Patient Vitals for the past 8 hrs:   BP Temp Temp src Pulse Resp SpO2   05/30/24 1604 139/78 99.3 °F (37.4 °C) Oral 97 18 97 %   05/30/24 1307 123/86 98.6 °F (37 °C) Oral 95 16 98 %   05/30/24 0847 129/83 99 °F (37.2 °C) Oral 94 18 97 %     I/O last 3 completed shifts:  In: 1880 [P.O.:480; I.V.:1400]  Out: -   I/O this shift:  In: 480 [P.O.:480]  Out: 650 [Urine:650]    EXAM:  GENERAL: No apparent distress.    HEENT: Membranes moist, no oral lesion  NECK:  Supple, no lymphadenopathy  LUNGS: Clear b/l, no rales, no dullness  CARDIAC: RRR, no murmur appreciated  ABD:  + BS, soft / NT  EXT:  No rash, no edema, no lesions, left thigh with wet-to-dry dressing, improved pain and no evidence of residual necrotic tissue  NEURO: No focal neurologic findings  PSYCH: Orientation, sensorium, mood normal  LINES:  Peripheral iv       Data Review:  Lab Results   Component Value Date    WBC 11.7 (H) 05/30/2024    HGB 10.1 (L) 05/30/2024    HCT 31.1 (L) 05/30/2024    MCV 79.3 (L) 05/30/2024     05/30/2024     Lab Results   Component Value Date    CREATININE 1.9 (H) 05/30/2024    BUN 20 05/30/2024     05/30/2024    K 4.3 05/30/2024     05/30/2024    CO2 21 05/30/2024       Hepatic Function Panel:   Lab Results   Component Value Date/Time    ALKPHOS 82 05/20/2024 02:29 PM    ALT 17 05/20/2024 02:29 PM    AST 22 05/20/2024 02:29 PM    BILITOT 0.5 05/20/2024 02:29 PM    BILIDIR <0.2 06/19/2022 08:06 AM    IBILI see below 06/19/2022 08:06 AM       MICRO:  OR culture from leg 5/29: No growth to date, Gram stain with 4+ gram variable cocci and 3+ gram-negative rods  Blood cultures x 2 5/28: No growth to

## 2024-05-30 NOTE — ANESTHESIA POSTPROCEDURE EVALUATION
Department of Anesthesiology  Postprocedure Note    Patient: Issa Moctezuma  MRN: [id number]  YOB: 1985  Date of evaluation: 5/29/2024    Procedure Summary       Date: 05/29/24 Room / Location: [address]    Anesthesia Start: 2223 Anesthesia Stop: 2340    Procedure: LEFT THIGH DEBRIDEMENT (Left) Diagnosis:       Necrotizing fasciitis (HCC)      (Necrotizing fasciitis (HCC) [M72.6])    Surgeons: Herb Abdalla MD Responsible Provider: Jignesh Camacho MD    Anesthesia Type: general ASA Status: 3 - Emergent            Anesthesia Type: No value filed.    Parker Phase I: Parker Score: 7    Parker Phase II:      Anesthesia Post Evaluation    Patient location during evaluation: PACU  Patient participation: complete - patient participated  Level of consciousness: awake and alert  Airway patency: patent  Nausea & Vomiting: no nausea and no vomiting  Cardiovascular status: hemodynamically stable  Respiratory status: acceptable  Hydration status: euvolemic  Multimodal analgesia pain management approach  Pain management: satisfactory to patient    No notable events documented.
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
## 2024-05-30 NOTE — PROGRESS NOTES
PACU Transfer to Floor Note    Procedure(s):  LEFT THIGH DEBRIDEMENT    Current Allergies: Penicillins    Pt meets criteria as per Katia Score and ASPAN Standards to transfer to next phase of care.     Recent Labs     05/29/24  2150 05/29/24  2343   POCGLU 116* 131*       Vitals:    05/30/24 0022   BP:    Pulse: 94   Resp:    Temp:    SpO2:      Vitals within 20% of pt's admission vitals as per KATIA SCORE    SpO2: 98 %    O2 Flow Rate (L/min): 3 L/min      Intake/Output Summary (Last 24 hours) at 5/30/2024 0023  Last data filed at 5/29/2024 2340  Gross per 24 hour   Intake 1880 ml   Output --   Net 1880 ml       Pain assessment:  not receiving treatment    Pain Level: 6    Patient was assessed for alterations to skin integrity. There were not alterations observed.    Is patient incontinent: no    Handoff report given at bedside.   Family updated and directed to pt room      5/30/2024 12:23 AM

## 2024-05-30 NOTE — PROGRESS NOTES
General Surgery   Daily Progress Note  Patient: Issa Moctezuma      CC: necrotizing fascitis     SUBJECTIVE:   Seen for postoperative check. Pain moderately well controlled. Mild postoperative nausea, no vomiting. Has appetite. Has not voided. Mild amount of bleeding from skin during dressing change.     ROS:   A 14 point review of systems was conducted, significant findings as noted above. All other systems negative.    OBJECTIVE:    PHYSICAL EXAM:    Vitals:    05/30/24 0022 05/30/24 0037 05/30/24 0046 05/30/24 0255   BP:  103/65  123/82   Pulse: 94 94 95 95   Resp:  16 16 16   Temp:  99.6 °F (37.6 °C)  99.3 °F (37.4 °C)   TempSrc:  Oral  Oral   SpO2:  99% 98% 96%   Weight:       Height:           General appearance: Alert, no acute distress  Eyes: No scleral icterus, EOM grossly intact  Neck: Trachea midline, no JVD  Chest/Lungs: Normal effort with no accessory muscle use on RA  Cardiovascular: RRR, well-perfused  Abdomen: Obese, non-tender, no rebound, guarding, or rigidity.  Skin: Warm and dry, no rashes  Extremities: L thigh wound with no signs of necrotic tissue. Hemostatic after silver nitrate.   Neuro: A&Ox3, no focal deficits, sensation intact    LABS:   Recent Labs     05/28/24  0342 05/29/24  1757   WBC 12.5* 12.7*   HGB 14.0 11.7*   HCT 42.3 36.4*   MCV 79.4* 79.3*    205        Recent Labs     05/29/24  1122 05/29/24  1757   * 132*   K 4.1 4.0    100   CO2 20* 23   PHOS  --  2.1*   BUN 18 17   CREATININE 1.8* 2.0*      No results for input(s): \"AST\", \"ALT\", \"BILIDIR\", \"BILITOT\", \"ALKPHOS\" in the last 72 hours.    Invalid input(s): \"ALB\"   No results for input(s): \"LIPASE\", \"AMYLASE\" in the last 72 hours.     Recent Labs     05/29/24  1757   INR 1.15        Recent Labs     05/28/24  0342   CKTOTAL 207         ASSESSMENT & PLAN:   This is a 38 y.o. male with hx CKD, HFrEF, DM, w/ nec fas L medial thigh, s/p debridement 5/29    -Pain control: tylenol, oxy vs dilaudid prn   -Dressing

## 2024-05-30 NOTE — PROGRESS NOTES
Physician Progress Note      PATIENT:               CASSANDRA SCOTT  CSN #:                  [id number]  :                       1985  ADMIT DATE:       2024 6:54 PM  DISCH DATE:  RESPONDING  PROVIDER #:        Александр Handley MD          QUERY TEXT:    Pt admitted with viral syndrome and noted to have necrotizing fasciitis . Pt   noted to have elevated WBC, elevated temp, elevated pulse. If possible, please   document in the progress notes and discharge summary if you are evaluating   and /or treating any of the following:      The medical record reflects the following:  Risk Factors: Necrotizing fasciitis, viral syndrome.  Clinical Indicators: WBC in ED 11.5 increased to 12.5 on  @ 0342, lactic   acid 1.4 on , Procal 0.6,  on , temp on  101.6 and maxed to   103/1 on , p 137-94, R 28-16, H&P CHELE on CKD  Treatment: I&D, labs, IV antibiotic, 1L IVF bolus.    Thank you,  Subha Hernandez BSN, RN, [facility] [phone]  Options provided:  -- Sepsis with associated acute organ dysfunction of CHELE, present on admission  -- Sepsis, present on admission  -- Necrotizing fasciitis without Sepsis  -- Other - I will add my own diagnosis  -- Disagree - Not applicable / Not valid  -- Disagree - Clinically unable to determine / Unknown  -- Refer to Clinical Documentation Reviewer    PROVIDER RESPONSE TEXT:    This patient has sepsis with associated acute organ dysfunction of CHELE which   was present on admission.    Query created by: Subha Hernandez on 2024 1:23 PM      Electronically signed by:  Александр Handley MD 2024 3:10 PM

## 2024-05-30 NOTE — PLAN OF CARE
Problem: Discharge Planning  Goal: Discharge to home or other facility with appropriate resources  Outcome: Progressing     Problem: Safety - Adult  Goal: Free from fall injury  Outcome: Progressing  Note: Call light and bedside table within reach. Patient uses call light appropriately. Free from falls at this time.     Problem: ABCDS Injury Assessment  Goal: Absence of physical injury  Outcome: Progressing     Problem: Pain  Goal: Verbalizes/displays adequate comfort level or baseline comfort level  Outcome: Progressing     Problem: Chronic Conditions and Co-morbidities  Goal: Patient's chronic conditions and co-morbidity symptoms are monitored and maintained or improved  Outcome: Progressing     Problem: Skin/Tissue Integrity  Goal: Absence of new skin breakdown  Description: 1.  Monitor for areas of redness and/or skin breakdown  2.  Assess vascular access sites hourly  3.  Every 4-6 hours minimum:  Change oxygen saturation probe site  4.  Every 4-6 hours:  If on nasal continuous positive airway pressure, respiratory therapy assess nares and determine need for appliance change or resting period.  Outcome: Not Progressing  Note: Patient has open surgical wound to left inner thigh. Tx in place.

## 2024-05-30 NOTE — PROGRESS NOTES
[facility] -  Clinical Pharmacy Note    Vancomycin - Management by Pharmacy    Consult Date(s): 5/29/24  Consulting Provider(s): Dr. Love    Assessment / Plan  1)  Left medial thigh necrotizing fasciitis - Vancomycin  Concurrent Antimicrobials: Cefepime, Clindamycin  Day of Vanc Therapy / Ordered Duration: day 2 of 7  Current Dosing Method: Bayesian-Guided AUC Dosing  Therapeutic Goal: -600 mg/L*hr  Current Dose / Plan:   Pt with slight CHELE on CKD.  Per outpt nephrology notes, baseline SCr ~1.5-1.7.  SCr up to 2.0 last evening; now 1.9 today.  Continue 1000mg IV q12h for now since SCr is near baseline.   Regimen predicts an AUC = 537 with trough = 17.9 mcg/mL.  Random level is ordered for tomorrow AM to further evaluate above regimen.  Pt is at high risk for accumulation given CKD and BMI > 30 - will monitor closely.  Will continue to monitor clinical condition and make adjustments to regimen as appropriate.    Please call with questions--  Thanks--  Liv Joshi, PharmD, BCPS, BCGP  [id number] ([facility])   5/30/2024 10:37 AM      Interval update:  Surgical culture in process.  Per surgery, may need wound vac tomorrow.  ID following.  Glucoses well controlled since surgery last evening (116-162).    Subjective/Objective:   Issa Moctezuma is a 38 y.o. male with a PMHx significant for anxiety, CKD, gastroparesis, DM 2, HTN< HLD, HFpEF, and neuropathy who is admitted with left medial thigh necrotizing fasciitis.  Pt is s/p left thigh debridement (done 5/29).     Pharmacy is consulted to dose Vancomycin.    Ht Readings from Last 1 Encounters:   05/28/24 1.854 m (6' 1\")     Wt Readings from Last 1 Encounters:   05/28/24 126.2 kg (278 lb 4.8 oz)     Current & Prior Antimicrobial Regimen(s):  Cefepime (5/29-current)  Clindamycin (5/29-current)  Vancomycin - Pharmacy to dose  2500mg IV x1 5/29 19:00  1000mg IV q12h (5/30-current)    Vancomycin Level(s) / Doses:    Date Time Dose Type of Level /

## 2024-05-30 NOTE — PROGRESS NOTES
Patient arrived from OR to PACU # 13 s/p  LEFT THIGH DEBRIDEMENT (Left) per . Attached to PACU monitoring device report received from anesthesia  who stated B/p was soft intraoperatively.. Patient arrived drowsy but wakeful to name B/P 102/74 IVF's infusing, afebrile. Left thigh dressing from groin to below knee CDI. Continue to monitor B/P

## 2024-05-30 NOTE — BRIEF OP NOTE
Brief Postoperative Note      Patient: Issa Moctezuma  YOB: 1985  MRN: [id number]    Date of Procedure: 5/29/2024    Pre-Op Diagnosis Codes:     * Necrotizing fasciitis (HCC) [M72.6]    Post-Op Diagnosis: Same       Procedure(s):  LEFT THIGH DEBRIDEMENT    Surgeon(s):  Herb Abdalla MD    Assistant:  Resident: Doreen Ferreira MD; Semaj Love MD    Anesthesia: General    Estimated Blood Loss (mL): less than 100     Complications: None    Specimens:   ID Type Source Tests Collected by Time Destination   1 : left thigh wound culture Specimen Leg CULTURE, SURGICAL Herb Abdalla MD 5/29/2024 2313        Implants:  * No implants in log *      Drains: * No LDAs found *    Findings:  Infection Present At Time Of Surgery (PATOS) (choose all levels that have infection present):  - Deep Infection (muscle/fascia) present as evidenced by pus and fluid consistent with infection  Other Findings: Left superomedial thigh necrotizing soft tissue infection with gray dishwater drainage and purulent material, sharp excisional debridement including subcutaneous tissue, fascia, and muscle; final measurements 25x9cm, 4cm deep    Electronically signed by Semaj Love MD on 5/29/2024 at 11:25 PM

## 2024-05-30 NOTE — PROGRESS NOTES
Patient awake asking many questions about surgery and did they call his mom. Reports pain tolerable currently last B/P 103/70, HR in 90's. IVF's infusing @ 150 ml/hr.

## 2024-05-30 NOTE — CONSULTS
Comprehensive Nutrition Assessment    RECOMMENDATIONS:  PO Diet: CC3 diet, monitor and encourage intake  ONS: Continue Yassine wound supplement BID  Nutrition Education: No recommendation at this time     NUTRITION ASSESSMENT:   Nutritional summary & status: Consult for wounds. Patient admitted with body aches and SOB, viral infection r/t necrotizing fasciitis of L thigh wound. Pt is s/p debridement in OR 5/29. Per MD note possible wound vac placement tomorrow. Fluids running, CC3 diet ordered as well as wound healing ONS BID. Sleeping soundly at visit, did not disturb. RD will f/u for full interview and NFPA.   Admission // PMH: Viral Syndrome \\ CKD, CHF, DM, HTN    MALNUTRITION ASSESSMENT  Context of Malnutrition: Acute Illness   Malnutrition Status: Insufficient data    NUTRITION DIAGNOSIS   Increased nutrient needs related to catabolic illness as evidenced by wounds    Nutrition Monitoring and Evaluation:   Food/Nutrient Intake Outcomes:  Food and Nutrient Intake, Supplement Intake  Physical Signs/Symptoms Outcomes:  Biochemical Data, Nutrition Focused Physical Findings, Skin     OBJECTIVE DATA: Significant to nutrition assessment  Nutrition Related Findings: , A1C 11.9, +BM 5/28, LR at 150  Wounds: Open Wounds  Nutrition Goals: Meet at least 75% of estimated needs, by next RD assessment     CURRENT NUTRITION THERAPIES  ADULT DIET; Regular; 3 carb choices (45 gm/meal)  ADULT ORAL NUTRITION SUPPLEMENT; Lunch, Dinner; Wound Healing Oral Supplement  PO Intake: % (x2 meals)   Additional Sources of Calories/IVF:  LR @150 ml/hr    COMPARATIVE STANDARDS  Energy (kcal):  6400-3247 (15-18 kcal/kg)     Protein (g):  124-149 (1.5-1.8 gm/kg IBW)       Fluid (ml/day):  2000 ml    ANTHROPOMETRICS  Current Height: 185.4 cm (6' 1\")  Current Weight - Scale: 126.2 kg (278 lb 4.8 oz)    Admission weight: 124.7 kg (275 lb)    The patient will be monitored per nutrition standards of care. Consult dietitian if additional  nutrition interventions are needed prior to RD reassessment.     Isidra Santamaria RD  [place]:  [phone]  Office:  [phone]

## 2024-05-31 ENCOUNTER — ANESTHESIA EVENT (OUTPATIENT)
Dept: OPERATING ROOM | Age: 39
End: 2024-05-31
Payer: MEDICAID

## 2024-05-31 ENCOUNTER — ANESTHESIA (OUTPATIENT)
Dept: OPERATING ROOM | Age: 39
End: 2024-05-31
Payer: MEDICAID

## 2024-05-31 LAB
ALBUMIN SERPL-MCNC: 2.8 G/DL (ref 3.4–5)
ANA SER QL IA: NEGATIVE
ANION GAP SERPL CALCULATED.3IONS-SCNC: 13 MMOL/L (ref 3–16)
BASOPHILS # BLD: 0.2 K/UL (ref 0–0.2)
BASOPHILS NFR BLD: 1.6 %
BUN SERPL-MCNC: 19 MG/DL (ref 7–20)
CALCIUM SERPL-MCNC: 8.6 MG/DL (ref 8.3–10.6)
CHLORIDE SERPL-SCNC: 104 MMOL/L (ref 99–110)
CO2 SERPL-SCNC: 21 MMOL/L (ref 21–32)
CREAT SERPL-MCNC: 1.8 MG/DL (ref 0.9–1.3)
DEPRECATED RDW RBC AUTO: 13.3 % (ref 12.4–15.4)
EBV EA-D IGG SER-ACNC: <5 U/ML (ref 0–10.9)
EBV NA IGG SER IA-ACNC: >600 U/ML (ref 0–21.9)
EBV VCA IGG SER IA-ACNC: 79.7 U/ML (ref 0–21.9)
EBV VCA IGM SER IA-ACNC: <10 U/ML (ref 0–43.9)
EOSINOPHIL # BLD: 0.1 K/UL (ref 0–0.6)
EOSINOPHIL NFR BLD: 0.8 %
GFR SERPLBLD CREATININE-BSD FMLA CKD-EPI: 49 ML/MIN/{1.73_M2}
GLUCOSE BLD-MCNC: 101 MG/DL (ref 70–99)
GLUCOSE BLD-MCNC: 107 MG/DL (ref 70–99)
GLUCOSE BLD-MCNC: 206 MG/DL (ref 70–99)
GLUCOSE BLD-MCNC: 91 MG/DL (ref 70–99)
GLUCOSE BLD-MCNC: 93 MG/DL (ref 70–99)
GLUCOSE SERPL-MCNC: 113 MG/DL (ref 70–99)
HCT VFR BLD AUTO: 34.7 % (ref 40.5–52.5)
HGB BLD-MCNC: 11.2 G/DL (ref 13.5–17.5)
LYMPHOCYTES # BLD: 0.8 K/UL (ref 1–5.1)
LYMPHOCYTES NFR BLD: 6.5 %
MAGNESIUM SERPL-MCNC: 2.5 MG/DL (ref 1.8–2.4)
MCH RBC QN AUTO: 25.5 PG (ref 26–34)
MCHC RBC AUTO-ENTMCNC: 32.3 G/DL (ref 31–36)
MCV RBC AUTO: 78.9 FL (ref 80–100)
MONOCYTES # BLD: 1.1 K/UL (ref 0–1.3)
MONOCYTES NFR BLD: 8.8 %
MRSA DNA SPEC QL NAA+PROBE: NORMAL
NEUTROPHILS # BLD: 10.4 K/UL (ref 1.7–7.7)
NEUTROPHILS NFR BLD: 82.3 %
PERFORMED ON: ABNORMAL
PERFORMED ON: NORMAL
PERFORMED ON: NORMAL
PHOSPHATE SERPL-MCNC: 2.3 MG/DL (ref 2.5–4.9)
PLATELET # BLD AUTO: 252 K/UL (ref 135–450)
PMV BLD AUTO: 9.1 FL (ref 5–10.5)
POTASSIUM SERPL-SCNC: 4.1 MMOL/L (ref 3.5–5.1)
RBC # BLD AUTO: 4.4 M/UL (ref 4.2–5.9)
RHEUMATOID FACT SER IA-ACNC: 22 IU/ML
SODIUM SERPL-SCNC: 138 MMOL/L (ref 136–145)
VANCOMYCIN SERPL-MCNC: 17 UG/ML
WBC # BLD AUTO: 12.6 K/UL (ref 4–11)

## 2024-05-31 PROCEDURE — 6360000002 HC RX W HCPCS: Performed by: NURSE PRACTITIONER

## 2024-05-31 PROCEDURE — 85025 COMPLETE CBC W/AUTO DIFF WBC: CPT

## 2024-05-31 PROCEDURE — 2580000003 HC RX 258

## 2024-05-31 PROCEDURE — 11006 DBRDMT SKIN XTRNL GENT PER: CPT | Performed by: SURGERY

## 2024-05-31 PROCEDURE — 3700000000 HC ANESTHESIA ATTENDED CARE: Performed by: SURGERY

## 2024-05-31 PROCEDURE — 6360000002 HC RX W HCPCS

## 2024-05-31 PROCEDURE — 99233 SBSQ HOSP IP/OBS HIGH 50: CPT | Performed by: INTERNAL MEDICINE

## 2024-05-31 PROCEDURE — 2580000003 HC RX 258: Performed by: STUDENT IN AN ORGANIZED HEALTH CARE EDUCATION/TRAINING PROGRAM

## 2024-05-31 PROCEDURE — A4217 STERILE WATER/SALINE, 500 ML: HCPCS | Performed by: SURGERY

## 2024-05-31 PROCEDURE — 6360000002 HC RX W HCPCS: Performed by: STUDENT IN AN ORGANIZED HEALTH CARE EDUCATION/TRAINING PROGRAM

## 2024-05-31 PROCEDURE — 80202 ASSAY OF VANCOMYCIN: CPT

## 2024-05-31 PROCEDURE — 7100000001 HC PACU RECOVERY - ADDTL 15 MIN: Performed by: SURGERY

## 2024-05-31 PROCEDURE — 88304 TISSUE EXAM BY PATHOLOGIST: CPT

## 2024-05-31 PROCEDURE — 6370000000 HC RX 637 (ALT 250 FOR IP)

## 2024-05-31 PROCEDURE — 3600000013 HC SURGERY LEVEL 3 ADDTL 15MIN: Performed by: SURGERY

## 2024-05-31 PROCEDURE — 3600000003 HC SURGERY LEVEL 3 BASE: Performed by: SURGERY

## 2024-05-31 PROCEDURE — 3700000001 HC ADD 15 MINUTES (ANESTHESIA): Performed by: SURGERY

## 2024-05-31 PROCEDURE — 0KBM0ZZ EXCISION OF PERINEUM MUSCLE, OPEN APPROACH: ICD-10-PCS | Performed by: SURGERY

## 2024-05-31 PROCEDURE — 2500000003 HC RX 250 WO HCPCS: Performed by: NURSE ANESTHETIST, CERTIFIED REGISTERED

## 2024-05-31 PROCEDURE — 36415 COLL VENOUS BLD VENIPUNCTURE: CPT

## 2024-05-31 PROCEDURE — 2709999900 HC NON-CHARGEABLE SUPPLY: Performed by: SURGERY

## 2024-05-31 PROCEDURE — 1200000000 HC SEMI PRIVATE

## 2024-05-31 PROCEDURE — 7100000000 HC PACU RECOVERY - FIRST 15 MIN: Performed by: SURGERY

## 2024-05-31 PROCEDURE — 80069 RENAL FUNCTION PANEL: CPT

## 2024-05-31 PROCEDURE — 83735 ASSAY OF MAGNESIUM: CPT

## 2024-05-31 PROCEDURE — 2580000003 HC RX 258: Performed by: SURGERY

## 2024-05-31 PROCEDURE — 2500000003 HC RX 250 WO HCPCS

## 2024-05-31 RX ORDER — MAGNESIUM HYDROXIDE 1200 MG/15ML
LIQUID ORAL CONTINUOUS PRN
Status: COMPLETED | OUTPATIENT
Start: 2024-05-31 | End: 2024-05-31

## 2024-05-31 RX ORDER — FENTANYL CITRATE 50 UG/ML
INJECTION, SOLUTION INTRAMUSCULAR; INTRAVENOUS PRN
Status: DISCONTINUED | OUTPATIENT
Start: 2024-05-31 | End: 2024-05-31 | Stop reason: SDUPTHER

## 2024-05-31 RX ORDER — FENTANYL CITRATE 50 UG/ML
25 INJECTION, SOLUTION INTRAMUSCULAR; INTRAVENOUS EVERY 5 MIN PRN
Status: DISCONTINUED | OUTPATIENT
Start: 2024-05-31 | End: 2024-05-31 | Stop reason: HOSPADM

## 2024-05-31 RX ORDER — NALOXONE HYDROCHLORIDE 0.4 MG/ML
INJECTION, SOLUTION INTRAMUSCULAR; INTRAVENOUS; SUBCUTANEOUS PRN
Status: DISCONTINUED | OUTPATIENT
Start: 2024-05-31 | End: 2024-05-31 | Stop reason: HOSPADM

## 2024-05-31 RX ORDER — MEPERIDINE HYDROCHLORIDE 25 MG/ML
12.5 INJECTION INTRAMUSCULAR; INTRAVENOUS; SUBCUTANEOUS EVERY 5 MIN PRN
Status: DISCONTINUED | OUTPATIENT
Start: 2024-05-31 | End: 2024-05-31 | Stop reason: HOSPADM

## 2024-05-31 RX ORDER — ROCURONIUM BROMIDE 10 MG/ML
INJECTION, SOLUTION INTRAVENOUS PRN
Status: DISCONTINUED | OUTPATIENT
Start: 2024-05-31 | End: 2024-05-31 | Stop reason: SDUPTHER

## 2024-05-31 RX ORDER — MIDAZOLAM HYDROCHLORIDE 1 MG/ML
INJECTION INTRAMUSCULAR; INTRAVENOUS PRN
Status: DISCONTINUED | OUTPATIENT
Start: 2024-05-31 | End: 2024-05-31 | Stop reason: SDUPTHER

## 2024-05-31 RX ORDER — SODIUM CHLORIDE 0.9 % (FLUSH) 0.9 %
5-40 SYRINGE (ML) INJECTION PRN
Status: DISCONTINUED | OUTPATIENT
Start: 2024-05-31 | End: 2024-05-31 | Stop reason: HOSPADM

## 2024-05-31 RX ORDER — HYDROMORPHONE HYDROCHLORIDE 1 MG/ML
1 INJECTION, SOLUTION INTRAMUSCULAR; INTRAVENOUS; SUBCUTANEOUS ONCE
Status: COMPLETED | OUTPATIENT
Start: 2024-05-31 | End: 2024-05-31

## 2024-05-31 RX ORDER — LIDOCAINE HYDROCHLORIDE 20 MG/ML
INJECTION, SOLUTION INTRAVENOUS PRN
Status: DISCONTINUED | OUTPATIENT
Start: 2024-05-31 | End: 2024-05-31 | Stop reason: SDUPTHER

## 2024-05-31 RX ORDER — PROPOFOL 10 MG/ML
INJECTION, EMULSION INTRAVENOUS PRN
Status: DISCONTINUED | OUTPATIENT
Start: 2024-05-31 | End: 2024-05-31 | Stop reason: SDUPTHER

## 2024-05-31 RX ORDER — IPRATROPIUM BROMIDE AND ALBUTEROL SULFATE 2.5; .5 MG/3ML; MG/3ML
1 SOLUTION RESPIRATORY (INHALATION)
Status: DISCONTINUED | OUTPATIENT
Start: 2024-05-31 | End: 2024-05-31 | Stop reason: HOSPADM

## 2024-05-31 RX ORDER — LORAZEPAM 2 MG/ML
0.5 INJECTION INTRAMUSCULAR
Status: DISCONTINUED | OUTPATIENT
Start: 2024-05-31 | End: 2024-05-31 | Stop reason: HOSPADM

## 2024-05-31 RX ORDER — HYDROMORPHONE HYDROCHLORIDE 1 MG/ML
0.5 INJECTION, SOLUTION INTRAMUSCULAR; INTRAVENOUS; SUBCUTANEOUS EVERY 5 MIN PRN
Status: DISCONTINUED | OUTPATIENT
Start: 2024-05-31 | End: 2024-05-31 | Stop reason: HOSPADM

## 2024-05-31 RX ORDER — PHENYLEPHRINE HYDROCHLORIDE 10 MG/ML
INJECTION INTRAVENOUS PRN
Status: DISCONTINUED | OUTPATIENT
Start: 2024-05-31 | End: 2024-05-31 | Stop reason: SDUPTHER

## 2024-05-31 RX ORDER — ONDANSETRON 2 MG/ML
4 INJECTION INTRAMUSCULAR; INTRAVENOUS
Status: DISCONTINUED | OUTPATIENT
Start: 2024-05-31 | End: 2024-05-31 | Stop reason: HOSPADM

## 2024-05-31 RX ORDER — SODIUM CHLORIDE 9 MG/ML
INJECTION, SOLUTION INTRAVENOUS PRN
Status: DISCONTINUED | OUTPATIENT
Start: 2024-05-31 | End: 2024-05-31 | Stop reason: HOSPADM

## 2024-05-31 RX ORDER — DEXAMETHASONE SODIUM PHOSPHATE 4 MG/ML
INJECTION, SOLUTION INTRA-ARTICULAR; INTRALESIONAL; INTRAMUSCULAR; INTRAVENOUS; SOFT TISSUE PRN
Status: DISCONTINUED | OUTPATIENT
Start: 2024-05-31 | End: 2024-05-31 | Stop reason: SDUPTHER

## 2024-05-31 RX ORDER — SODIUM CHLORIDE, SODIUM LACTATE, POTASSIUM CHLORIDE, CALCIUM CHLORIDE 600; 310; 30; 20 MG/100ML; MG/100ML; MG/100ML; MG/100ML
INJECTION, SOLUTION INTRAVENOUS CONTINUOUS PRN
Status: DISCONTINUED | OUTPATIENT
Start: 2024-05-31 | End: 2024-05-31 | Stop reason: SDUPTHER

## 2024-05-31 RX ORDER — DIPHENHYDRAMINE HYDROCHLORIDE 50 MG/ML
12.5 INJECTION INTRAMUSCULAR; INTRAVENOUS
Status: DISCONTINUED | OUTPATIENT
Start: 2024-05-31 | End: 2024-05-31 | Stop reason: HOSPADM

## 2024-05-31 RX ORDER — PROCHLORPERAZINE EDISYLATE 5 MG/ML
5 INJECTION INTRAMUSCULAR; INTRAVENOUS
Status: DISCONTINUED | OUTPATIENT
Start: 2024-05-31 | End: 2024-05-31 | Stop reason: HOSPADM

## 2024-05-31 RX ORDER — SUCCINYLCHOLINE/SOD CL,ISO/PF 200MG/10ML
SYRINGE (ML) INTRAVENOUS PRN
Status: DISCONTINUED | OUTPATIENT
Start: 2024-05-31 | End: 2024-05-31 | Stop reason: SDUPTHER

## 2024-05-31 RX ORDER — SODIUM CHLORIDE 0.9 % (FLUSH) 0.9 %
5-40 SYRINGE (ML) INJECTION EVERY 12 HOURS SCHEDULED
Status: DISCONTINUED | OUTPATIENT
Start: 2024-05-31 | End: 2024-05-31 | Stop reason: HOSPADM

## 2024-05-31 RX ORDER — LABETALOL HYDROCHLORIDE 5 MG/ML
10 INJECTION, SOLUTION INTRAVENOUS
Status: DISCONTINUED | OUTPATIENT
Start: 2024-05-31 | End: 2024-05-31 | Stop reason: HOSPADM

## 2024-05-31 RX ORDER — ONDANSETRON 2 MG/ML
INJECTION INTRAMUSCULAR; INTRAVENOUS PRN
Status: DISCONTINUED | OUTPATIENT
Start: 2024-05-31 | End: 2024-05-31 | Stop reason: SDUPTHER

## 2024-05-31 RX ADMIN — SODIUM CHLORIDE, PRESERVATIVE FREE 10 ML: 5 INJECTION INTRAVENOUS at 20:41

## 2024-05-31 RX ADMIN — LIDOCAINE HYDROCHLORIDE 100 MG: 20 INJECTION, SOLUTION INTRAVENOUS at 14:08

## 2024-05-31 RX ADMIN — PROPOFOL 150 MG: 10 INJECTION, EMULSION INTRAVENOUS at 14:10

## 2024-05-31 RX ADMIN — MIDAZOLAM HYDROCHLORIDE 2 MG: 2 INJECTION, SOLUTION INTRAMUSCULAR; INTRAVENOUS at 14:00

## 2024-05-31 RX ADMIN — CEFEPIME 2000 MG: 2 INJECTION, POWDER, FOR SOLUTION INTRAVENOUS at 02:26

## 2024-05-31 RX ADMIN — OXYCODONE 10 MG: 5 TABLET ORAL at 16:57

## 2024-05-31 RX ADMIN — CARVEDILOL 25 MG: 25 TABLET, FILM COATED ORAL at 12:14

## 2024-05-31 RX ADMIN — CEFEPIME 2000 MG: 2 INJECTION, POWDER, FOR SOLUTION INTRAVENOUS at 18:14

## 2024-05-31 RX ADMIN — DEXAMETHASONE SODIUM PHOSPHATE 4 MG: 4 INJECTION INTRA-ARTICULAR; INTRALESIONAL; INTRAMUSCULAR; INTRAVENOUS; SOFT TISSUE at 14:17

## 2024-05-31 RX ADMIN — ACETAMINOPHEN 650 MG: 325 TABLET ORAL at 16:59

## 2024-05-31 RX ADMIN — PHENYLEPHRINE HYDROCHLORIDE 100 MCG: 10 INJECTION, SOLUTION INTRAVENOUS at 14:45

## 2024-05-31 RX ADMIN — PHENYLEPHRINE HYDROCHLORIDE 200 MCG: 10 INJECTION, SOLUTION INTRAVENOUS at 14:36

## 2024-05-31 RX ADMIN — ENOXAPARIN SODIUM 30 MG: 100 INJECTION SUBCUTANEOUS at 20:34

## 2024-05-31 RX ADMIN — SUGAMMADEX 400 MG: 100 INJECTION, SOLUTION INTRAVENOUS at 14:47

## 2024-05-31 RX ADMIN — VANCOMYCIN HYDROCHLORIDE 1000 MG: 1 INJECTION, POWDER, LYOPHILIZED, FOR SOLUTION INTRAVENOUS at 20:32

## 2024-05-31 RX ADMIN — ONDANSETRON 4 MG: 2 INJECTION INTRAMUSCULAR; INTRAVENOUS at 14:20

## 2024-05-31 RX ADMIN — ACETAMINOPHEN 650 MG: 325 TABLET ORAL at 12:13

## 2024-05-31 RX ADMIN — FENTANYL CITRATE 100 MCG: 50 INJECTION, SOLUTION INTRAMUSCULAR; INTRAVENOUS at 14:09

## 2024-05-31 RX ADMIN — CEFEPIME 2000 MG: 2 INJECTION, POWDER, FOR SOLUTION INTRAVENOUS at 10:48

## 2024-05-31 RX ADMIN — SODIUM CHLORIDE, SODIUM LACTATE, POTASSIUM CHLORIDE, AND CALCIUM CHLORIDE: .6; .31; .03; .02 INJECTION, SOLUTION INTRAVENOUS at 14:00

## 2024-05-31 RX ADMIN — PHENYLEPHRINE HYDROCHLORIDE 100 MCG: 10 INJECTION, SOLUTION INTRAVENOUS at 14:21

## 2024-05-31 RX ADMIN — HYDROMORPHONE HYDROCHLORIDE 1 MG: 1 INJECTION, SOLUTION INTRAMUSCULAR; INTRAVENOUS; SUBCUTANEOUS at 07:40

## 2024-05-31 RX ADMIN — PHENYLEPHRINE HYDROCHLORIDE 200 MCG: 10 INJECTION, SOLUTION INTRAVENOUS at 14:24

## 2024-05-31 RX ADMIN — ROCURONIUM BROMIDE 50 MG: 10 INJECTION, SOLUTION INTRAVENOUS at 14:19

## 2024-05-31 RX ADMIN — Medication 160 MG: at 14:11

## 2024-05-31 RX ADMIN — CARVEDILOL 25 MG: 25 TABLET, FILM COATED ORAL at 20:34

## 2024-05-31 RX ADMIN — SODIUM CHLORIDE, POTASSIUM CHLORIDE, SODIUM LACTATE AND CALCIUM CHLORIDE: 600; 310; 30; 20 INJECTION, SOLUTION INTRAVENOUS at 16:31

## 2024-05-31 RX ADMIN — SODIUM CHLORIDE: 9 INJECTION, SOLUTION INTRAVENOUS at 20:31

## 2024-05-31 RX ADMIN — OXYCODONE 5 MG: 5 TABLET ORAL at 12:13

## 2024-05-31 RX ADMIN — DOCUSATE SODIUM 100 MG: 100 CAPSULE, LIQUID FILLED ORAL at 20:34

## 2024-05-31 RX ADMIN — SODIUM CHLORIDE, PRESERVATIVE FREE 5 ML: 5 INJECTION INTRAVENOUS at 12:07

## 2024-05-31 RX ADMIN — VANCOMYCIN HYDROCHLORIDE 1000 MG: 1 INJECTION, POWDER, LYOPHILIZED, FOR SOLUTION INTRAVENOUS at 08:29

## 2024-05-31 RX ADMIN — PHENYLEPHRINE HYDROCHLORIDE 200 MCG: 10 INJECTION, SOLUTION INTRAVENOUS at 14:31

## 2024-05-31 RX ADMIN — AMLODIPINE BESYLATE 5 MG: 5 TABLET ORAL at 12:14

## 2024-05-31 ASSESSMENT — PAIN DESCRIPTION - DESCRIPTORS
DESCRIPTORS: STABBING
DESCRIPTORS: STABBING;DISCOMFORT

## 2024-05-31 ASSESSMENT — PAIN - FUNCTIONAL ASSESSMENT
PAIN_FUNCTIONAL_ASSESSMENT: PREVENTS OR INTERFERES WITH MANY ACTIVE NOT PASSIVE ACTIVITIES
PAIN_FUNCTIONAL_ASSESSMENT: PREVENTS OR INTERFERES WITH MANY ACTIVE NOT PASSIVE ACTIVITIES

## 2024-05-31 ASSESSMENT — PAIN SCALES - GENERAL
PAINLEVEL_OUTOF10: 7
PAINLEVEL_OUTOF10: 2
PAINLEVEL_OUTOF10: 5
PAINLEVEL_OUTOF10: 7
PAINLEVEL_OUTOF10: 3
PAINLEVEL_OUTOF10: 5
PAINLEVEL_OUTOF10: 3
PAINLEVEL_OUTOF10: 7

## 2024-05-31 ASSESSMENT — PAIN DESCRIPTION - ORIENTATION
ORIENTATION: LEFT;INNER
ORIENTATION: LEFT

## 2024-05-31 ASSESSMENT — PAIN DESCRIPTION - LOCATION
LOCATION: LEG;INCISION
LOCATION: LEG;INCISION

## 2024-05-31 NOTE — PROGRESS NOTES
General Surgery   Daily Progress Note  Patient: Issa Moctezuma      CC: necrotizing fascitis     SUBJECTIVE:   Tolerated dressing change last night without issues. Pain well controlled. Afebrile. Tolerating diet without nausea or vomiting.     ROS:   A 14 point review of systems was conducted, significant findings as noted above. All other systems negative.    OBJECTIVE:    PHYSICAL EXAM:    Vitals:    05/30/24 1604 05/30/24 1635 05/30/24 2058 05/31/24 0323   BP: 139/78  133/76 131/77   Pulse: 97  95 91   Resp: 18 18 16    Temp: 99.3 °F (37.4 °C)  98.9 °F (37.2 °C) 98.9 °F (37.2 °C)   TempSrc: Oral  Oral Oral   SpO2: 97%      Weight:       Height:           General appearance: Alert, no acute distress  Eyes: No scleral icterus, EOM grossly intact  Neck: Trachea midline, no JVD  Chest/Lungs: Normal effort with no accessory muscle use on RA  Cardiovascular: RRR, well-perfused  Abdomen: Obese, non-tender, no rebound, guarding, or rigidity.  Skin: Warm and dry, no rashes  Extremities: L thigh wound with no signs of necrotic tissue.   Neuro: A&Ox3, no focal deficits, sensation intact    LABS:   Recent Labs     05/30/24  0516 05/31/24  0412   WBC 11.7* 12.6*   HGB 10.1* 11.2*   HCT 31.1* 34.7*   MCV 79.3* 78.9*    252          Recent Labs     05/30/24  0516 05/31/24  0412    138   K 4.3 4.1    104   CO2 21 21   PHOS 3.1 2.3*   BUN 20 19   CREATININE 1.9* 1.8*        No results for input(s): \"AST\", \"ALT\", \"BILIDIR\", \"BILITOT\", \"ALKPHOS\" in the last 72 hours.    Invalid input(s): \"ALB\"   No results for input(s): \"LIPASE\", \"AMYLASE\" in the last 72 hours.     Recent Labs     05/29/24  1757 05/30/24  0515   INR 1.15 1.24*          No results for input(s): \"CKTOTAL\", \"CKMB\", \"CKMBINDEX\", \"TROPONINI\" in the last 72 hours.        ASSESSMENT & PLAN:   This is a 38 y.o. male with hx CKD, HFrEF, DM, w/ nec fas L medial thigh, s/p debridement 5/29    -Pain control: tylenol, oxy vs dilaudid prn   -Dressing to be

## 2024-05-31 NOTE — ANESTHESIA PRE PROCEDURE
Department of Anesthesiology  Preprocedure Note       Name:  Issa Moctezuma   Age:  38 y.o.  :  1985                                          MRN:  [id number]         Date:  2024      Surgeon: Surgeon(s):  Herb Abdalla MD    Procedure: Procedure(s):  LEFT GROIN WOUND WASH OUT AND DEBRIDEMENT    Medications prior to admission:   Prior to Admission medications    Medication Sig Start Date End Date Taking? Authorizing Provider   DULoxetine (CYMBALTA) 60 MG extended release capsule Take 1 capsule by mouth daily 24  GEORGINA Melendez, APRN - CNP   docusate sodium (COLACE) 100 MG capsule Take 1 capsule by mouth 2 times daily    ProviderGeena MD   metFORMIN (GLUCOPHAGE-XR) 500 MG extended release tablet Take 1 tablet by mouth 2 times daily (with meals) 3/26/24   Sonia Kan DO   Continuous Blood Gluc Sensor (DEXCOM G7 SENSOR) MISC Replace every 10 days 24   Sonia Kan DO   Insulin Degludec (TRESIBA FLEXTOUCH) 200 UNIT/ML SOPN Inject 100 Units into the skin daily 24   Sonia Kan DO   acetaminophen (AMINOFEN) 325 MG tablet Take 2 tablets by mouth every 6 hours as needed for Pain or Fever 24   Emeka Leone MD   vitamin D (ERGOCALCIFEROL) 1.25 MG (87404 UT) CAPS capsule TAKE 1 CAPSULE BY MOUTH 1 TIME A WEEK 24   Sonia Kan DO   Insulin Degludec (TRESIBA FLEXTOUCH) 200 UNIT/ML SOPN Inject 100 Units into the skin daily  Patient taking differently: Inject 75 Units into the skin daily 24   Sonia Kan DO   Insulin Pen Needle (PEN NEEDLES 3/16\") 31G X 5 MM MISC 1 each by Does not apply route 4 times daily 24   Sonia Kan DO   Alcohol Swabs (ALCOHOL PREP) 70 % PADS Use 4 times daily prior to insulin injection 24   Sonia Kan DO   empagliflozin (JARDIANCE) 10 MG tablet Take 1 tablet by mouth every morning    Provider, MD Geena   naproxen

## 2024-05-31 NOTE — PROGRESS NOTES
Patient refused AM medications d/t being NPO. Lunchtime vitals obtained and BP elevated. MD notified. Patient agreed to take BP meds now d/t elevated BP. Patient off the floor for surgery at this time.

## 2024-05-31 NOTE — PROGRESS NOTES
PACU Transfer to Floor Note    Procedure(s):  LEFT GROIN WOUND WASH OUT AND DEBRIDEMENT    Current Allergies: Penicillins    Pt meets criteria as per Katia Score and ASPAN Standards to transfer to next phase of care.     Recent Labs     05/31/24  1138 05/31/24  1511   POCGLU 101* 93       Vitals:    05/31/24 1545   BP: 133/86   Pulse: 98   Resp: 20   Temp: 98.3 °F (36.8 °C)   SpO2: 94%     Vitals within 20% of pt's admission vitals as per KATIA SCORE    SpO2: 94 %    O2 Flow Rate (L/min): 4 L/min      Intake/Output Summary (Last 24 hours) at 5/31/2024 1559  Last data filed at 5/31/2024 1455  Gross per 24 hour   Intake 1380 ml   Output 1455 ml   Net -75 ml       Pain assessment:  not receiving treatment    Pain Level: 5    Patient was assessed for alterations to skin integrity. There were not alterations observed.    Is patient incontinent: no    Handoff report given at bedside.   Family updated and directed to pt room      5/31/2024 3:59 PM   Session ID: [id number]  Language: Czech   ID: #[id number]   Name: Deidre

## 2024-05-31 NOTE — PLAN OF CARE
Problem: Discharge Planning  Goal: Discharge to home or other facility with appropriate resources  Outcome: Not Progressing  Note: Patient WBC count elevated and needing surgery again. Patient will return home at discharge.      Problem: Skin/Tissue Integrity  Goal: Absence of new skin breakdown  Description: 1.  Monitor for areas of redness and/or skin breakdown  2.  Assess vascular access sites hourly  3.  Every 4-6 hours minimum:  Change oxygen saturation probe site  4.  Every 4-6 hours:  If on nasal continuous positive airway pressure, respiratory therapy assess nares and determine need for appliance change or resting period.  Outcome: Not Progressing  Note: Patient having surgery on previous incision to left thigh.        Problem: Safety - Adult  Goal: Free from fall injury  5/31/2024 1405 by Abena Polanco LPN  Outcome: Progressing  5/31/2024 0347 by Haider Naqvi, RN  Outcome: Progressing     Problem: ABCDS Injury Assessment  Goal: Absence of physical injury  Outcome: Progressing     Problem: Pain  Goal: Verbalizes/displays adequate comfort level or baseline comfort level  Outcome: Progressing     Problem: Chronic Conditions and Co-morbidities  Goal: Patient's chronic conditions and co-morbidity symptoms are monitored and maintained or improved  Outcome: Progressing

## 2024-05-31 NOTE — CARE COORDINATION
CM continues to follow for DC planning and needs. Patient to return to OR today, possible wound vac placement. CM spoke with surgery NP this AM, wound vac paperwork on chart, unsure if patient will need wound vac at DC or if they will try and close wound prior to DC. NP reports they should know more of a plan after today's procedure.     ID following, awaiting cultures, unclear if need for IV abx at DC or if will be able to do PO abx. Awaiting final cultures and recs.    CM will continue to follow for DC planning and needs. Wound vac paperwork on paper chart if patient would need wound vac at DC.    Thank you,  Tapan BRANDTN, RN,   [phone]

## 2024-05-31 NOTE — PROGRESS NOTES
ID Follow-up NOTE    CC:   Body ache, chills, left thigh/groin  Antibiotics: Vancomycin, cefepime    Admit Date: 5/27/2024  Hospital Day: 5    Subjective:     Patient states feeling better, having thigh pain,  plan for possible debridement VAC placement today.  Temp this morning 100.       Objective:     Patient Vitals for the past 8 hrs:   BP Temp Temp src Pulse Resp SpO2   05/31/24 0754 (!) 145/76 100 °F (37.8 °C) Oral 99 18 97 %   05/31/24 0740 -- -- -- -- 18 --   05/31/24 0323 131/77 98.9 °F (37.2 °C) Oral 91 -- --       I/O last 3 completed shifts:  In: 2360 [P.O.:960; I.V.:1400]  Out: 1300 [Urine:1300]  No intake/output data recorded.    EXAM:  GENERAL: No apparent distress.    HEENT: Membranes moist, no oral lesion  NECK:  Supple, no lymphadenopathy  LUNGS: Clear b/l, no rales, no dullness  CARDIAC: RRR, no murmur appreciated  ABD:  + BS, soft / NT  EXT:  No rash, no edema, no lesions, left thigh with wet-to-dry dressing, increased induration on the medial aspect of the wound  NEURO: No focal neurologic findings  PSYCH: Orientation, sensorium, mood normal  LINES:  Peripheral iv       Data Review:  Lab Results   Component Value Date    WBC 12.6 (H) 05/31/2024    HGB 11.2 (L) 05/31/2024    HCT 34.7 (L) 05/31/2024    MCV 78.9 (L) 05/31/2024     05/31/2024     Lab Results   Component Value Date    CREATININE 1.8 (H) 05/31/2024    BUN 19 05/31/2024     05/31/2024    K 4.1 05/31/2024     05/31/2024    CO2 21 05/31/2024       Hepatic Function Panel:   Lab Results   Component Value Date/Time    ALKPHOS 82 05/20/2024 02:29 PM    ALT 17 05/20/2024 02:29 PM    AST 22 05/20/2024 02:29 PM    BILITOT 0.5 05/20/2024 02:29 PM    BILIDIR <0.2 06/19/2022 08:06 AM    IBILI see below 06/19/2022 08:06 AM       MICRO:  OR culture from leg 5/29: No growth to date, Gram stain with 4+ gram variable cocci and 3+ gram-negative rods  Blood cultures x 2 5/28: No growth to date      IMAGING:I have independently reviewed  Neuropathy    MDD (major depressive disorder), recurrent episode, moderate (HCC)    HFrEF (heart failure with reduced ejection fraction) ([facility])    Gastroparesis    Generalized anxiety disorder    Stage 3a chronic kidney disease (HCC)    Essential hypertension    CHELE (acute kidney injury) ([facility])    Necrotizing soft tissue infection    Viral illness          Plan:   38-year-old -American man with history of diabetes, obesity, HTN, CHF, pulmonary histoplasmosis, CKD that presented on 5/27 with left thigh pain and drainage.    Left leg soft tissue infection, necrotizing:  -Patient presented with fever and leukocytosis.  Has severe pain.  ESR/CRP on presentation was 105/226.3 respectively  -Imaging with gas present with subcutaneous fat stranding  -Patient was evaluated by general surgery and is status post left medial thigh debridement on 5/29.  OR cultures pending however Gram stain with gram variable cocci and gram-negative rods.  -Patient currently with wet-to-dry dressing, wound eval today showing increased induration on the medial aspect of the wound, general surgery to take patient for further debridement and possible VAC placement today.  -With source control, we stopped clindamycin 5/30.  Await cultures from the OR.  Blood cultures negative to date  -Continue vancomycin and cefepime pending OR cultures.  -Close monitoring for vancomycin and dosing per vancomycin levels, keep goal trough 15-20 to prevent toxicities.  -Continue monitoring wound, repeated debridement as needed. Will follow for final surgical plan.     CKD:  - renally dose adjust antibiotics as needed    DM2:  -A1c on this admission 11.9.  Continue good glycemic control to aid in healing and prevention of further infections.    Medical Decision Making:  The following items were considered in medical decision making:  Discussion of patient care with other providers  Reviewed clinical lab tests  Reviewed radiology tests  Reviewed other diagnostic

## 2024-05-31 NOTE — PROGRESS NOTES
Patient arrived from OR to PACU # 2 s/p LEFT GROIN WOUND WASH OUT AND DEBRIDEMENT (Left: Groin) per . Attached to PACU monitoring device report received from CRNA who reported low B/P x 1 which was treated intraoperatively. VSS on arrival patient awake alert denied pain when asked.

## 2024-05-31 NOTE — ANESTHESIA POSTPROCEDURE EVALUATION
Department of Anesthesiology  Postprocedure Note    Patient: Issa Moctezuma  MRN: [id number]  YOB: 1985  Date of evaluation: 5/31/2024    Procedure Summary       Date: 05/31/24 Room / Location: [address]    Anesthesia Start: 1400 Anesthesia Stop: 1505    Procedure: LEFT GROIN WOUND WASH OUT AND DEBRIDEMENT (Left: Groin) Diagnosis:       Wound infection      (Wound infection [T14.8XXA, L08.9])    Surgeons: Herb Abdalla MD Responsible Provider: Isaías Erazo MD    Anesthesia Type: general ASA Status: 3            Anesthesia Type: No value filed.    Parker Phase I: Parker Score: 10    Parker Phase II:      Anesthesia Post Evaluation    Patient location during evaluation: PACU  Patient participation: complete - patient participated  Level of consciousness: awake and alert  Pain score: 5  Airway patency: patent  Nausea & Vomiting: no nausea and no vomiting  Cardiovascular status: blood pressure returned to baseline  Respiratory status: acceptable  Hydration status: euvolemic    No notable events documented.

## 2024-05-31 NOTE — BRIEF OP NOTE
Brief Postoperative Note      Patient: Issa Moctezuma  YOB: 1985  MRN: [id number]    Date of Procedure: 5/31/2024    Pre-Op Diagnosis Codes:     * Wound infection [T14.8XXA, L08.9]    Post-Op Diagnosis: Same       Procedure(s):  LEFT GROIN WOUND WASH OUT AND DEBRIDEMENT    Surgeon(s):  Herb Abdalla MD    Assistant:  Resident: Doreen Ferreira MD    Anesthesia: General    Estimated Blood Loss (mL): Minimal    Complications: None    Specimens:   ID Type Source Tests Collected by Time Destination   A : Skin Specimen Groin SURGICAL PATHOLOGY Herb Abdalla MD 5/31/2024 1438        Implants:  * No implants in log *      Drains: * No LDAs found *    Findings:  Infection Present At Time Of Surgery (PATOS) (choose all levels that have infection present):  - Deep Infection (muscle/fascia) present as evidenced by fluid consistent with infection  Other Findings: left groin wound wash out and debridement  33x9x4 cm    Electronically signed by Doreen Ferreira MD on 5/31/2024 at 2:55 PM

## 2024-05-31 NOTE — PROGRESS NOTES
[facility] -  Clinical Pharmacy Note    Vancomycin - Management by Pharmacy    Consult Date(s): 5/29/24  Consulting Provider(s): Dr. Love    Assessment / Plan  1)  Left medial thigh necrotizing fasciitis - Vancomycin  Concurrent Antimicrobials: Cefepime  Day of Vanc Therapy / Ordered Duration: day 3 of 7  Current Dosing Method: Bayesian-Guided AUC Dosing  Therapeutic Goal: -600 mg/L*hr  Current Dose / Plan:   Pt with slight CHELE on CKD.  Per outpt nephrology notes, baseline SCr ~1.5-1.7.    SCr 1.8 this AM (down from 2.0 2 days ago).  UOP inconsistently documented  Currently on 1000mg IV q12h.  Random level this AM = 17 mcg/mL.  Calculated AUC = 531 with trough = 17.7 mcg/mL.  Will continue same regimen for now.  Will f/u SCr in AM tomorrow to determine timing of next level (tomorrow vs Sunday).  Pt is at high risk for accumulation given CKD and BMI > 30 - will monitor closely.  Will continue to monitor clinical condition and make adjustments to regimen as appropriate.    Please call with questions--  Thanks--  Liv Joshi, PharmD, BCPS, BCGP  [id number] (hospital [facility])   5/31/2024 8:40 AM      Interval update:  Surgical culture in process.  Clindamycin stopped yesterday by ID.  Surgery planning to take pt back to OR today for repeat washout.  Glucoses remain < 180.    Subjective/Objective:   Issa Moctezuma is a 38 y.o. male with a PMHx significant for anxiety, CKD, gastroparesis, DM 2, HTN< HLD, HFpEF, and neuropathy who is admitted with left medial thigh necrotizing fasciitis.  Pt is s/p left thigh debridement (done 5/29).     Pharmacy is consulted to dose Vancomycin.    Ht Readings from Last 1 Encounters:   05/28/24 1.854 m (6' 1\")     Wt Readings from Last 1 Encounters:   05/28/24 126.2 kg (278 lb 4.8 oz)     Current & Prior Antimicrobial Regimen(s):  Cefepime (5/29-current)  (5/29-5/30)  Vancomycin - Pharmacy to dose  2500mg IV x1 5/29 19:00  1000mg IV q12h (5/30-current)    Vancomycin

## 2024-05-31 NOTE — PROGRESS NOTES
PRE-OP NOTE  Department of Surgery    Procedure: Left groin wound washout and debridement  Expected time: after 1:30    Interval progression: Patient with increased white count despite white count and abx. Wound eval demonstrated induration in medial aspect of wound and will need further evaluation and debridement.    Plan  1. Diet: NPO now  2. IVF: On  per primary team  3. Antibiotics: cefepime and vanc per ID  4. Labs to be drawn: Type and Screen, INR  5. Anesthesia: to see patient  6. Consent: To obtain  7. Pulmonary: CXR: N/A  8. Cardiac: EKG: N/A      Howard Solo DO  05/31/24  7:58 AM

## 2024-05-31 NOTE — PROGRESS NOTES
PRN  oxyCODONE, 5 mg, Q4H PRN   Or  oxyCODONE, 10 mg, Q4H PRN  prochlorperazine, 10 mg, Q6H PRN  albuterol, 2.5 mg, Q6H PRN  melatonin, 3 mg, Nightly PRN  sodium chloride flush, 5-40 mL, PRN  sodium chloride, , PRN  potassium chloride, 40 mEq, PRN   Or  potassium alternative oral replacement, 40 mEq, PRN   Or  potassium chloride, 10 mEq, PRN  magnesium sulfate, 2,000 mg, PRN  ondansetron, 4 mg, Q8H PRN   Or  ondansetron, 4 mg, Q6H PRN  polyethylene glycol, 17 g, Daily PRN  glucose, 4 tablet, PRN  dextrose bolus, 125 mL, PRN   Or  dextrose bolus, 250 mL, PRN  glucagon (rDNA), 1 mg, PRN  dextrose, , Continuous PRN        Labs and Imaging   XR CHEST (2 VW)    Result Date: 5/27/2024  PA AND LATERAL CHEST: HISTORY: Pain. COMPARISON:  2/19/2024. FINDINGS: The bony structures and soft tissues are unremarkable. The heart and pulmonary vasculature are within normal limits. The lungs are well-expanded bilaterally and are clear.     No acute cardiopulmonary findings.  Electronically signed by Kecia Reid MD      CBC:   Recent Labs     05/29/24  1757 05/30/24  0516 05/31/24  0412   WBC 12.7* 11.7* 12.6*   HGB 11.7* 10.1* 11.2*    200 252     BMP:    Recent Labs     05/29/24  1757 05/30/24  0516 05/31/24  0412   * 136 138   K 4.0 4.3 4.1    104 104   CO2 23 21 21   BUN 17 20 19   CREATININE 2.0* 1.9* 1.8*   GLUCOSE 118* 162* 113*     Hepatic: No results for input(s): \"AST\", \"ALT\", \"BILITOT\", \"ALKPHOS\" in the last 72 hours.    Invalid input(s): \"ALB\"  Lipids:   Lab Results   Component Value Date/Time    CHOL 247 02/22/2023 07:04 AM    HDL 52 05/15/2023 09:14 AM    TRIG 221 02/22/2023 07:04 AM     Hemoglobin A1C:   Lab Results   Component Value Date/Time    LABA1C 11.9 05/29/2024 05:57 PM     TSH: No results found for: \"TSH\"  Troponin: No results found for: \"TROPONINT\"  Lactic Acid:   Recent Labs     05/29/24  1757   LACTA 1.4     BNP:   No results for input(s): \"PROBNP\" in the last 72 hours.    UA:  Lab  Results   Component Value Date/Time    NITRU Negative 05/28/2024 06:45 AM    COLORU Yellow 05/28/2024 06:45 AM    PHUR 6.0 05/28/2024 06:45 AM    PHUR 5.0 11/15/2023 03:52 PM    WBCUA 3-5 05/28/2024 06:45 AM    RBCUA None seen 05/28/2024 06:45 AM    RBCUA 1+ (0.06-0.1 mg/dL) 05/20/2024 02:29 PM    MUCUS PRESENT 05/20/2024 02:29 PM    BACTERIA SEE NOTES 05/20/2024 02:29 PM    BACTERIA None Seen 01/13/2023 12:20 AM    CLARITYU Clear 05/28/2024 06:45 AM    LEUKOCYTESUR Negative 05/28/2024 06:45 AM    UROBILINOGEN 1.0 05/28/2024 06:45 AM    BILIRUBINUR Negative 05/28/2024 06:45 AM    BLOODU SMALL 05/28/2024 06:45 AM    GLUCOSEU Negative 05/28/2024 06:45 AM    GLUCOSEU NEGATIVE 01/02/2011 02:44 PM    KETUA 40 05/28/2024 06:45 AM    AMORPHOUS 3+ 05/28/2024 06:45 AM     Urine Cultures: No results found for: \"LABURIN\"  Blood Cultures:   Lab Results   Component Value Date/Time    BC  05/28/2024 03:42 AM     No Growth to date.  Any change in status will be called.     Lab Results   Component Value Date/Time    BLOODCULT2  05/28/2024 03:49 AM     No Growth to date.  Any change in status will be called.     Organism:   Lab Results   Component Value Date/Time    ORG Streptococcus parasanguinis 06/09/2022 01:35 PM    ORG Prevotella melaninogenica 06/09/2022 01:35 PM         Electronically signed by Александр Handley MD on 5/31/2024 at 9:43 AM

## 2024-05-31 NOTE — ANESTHESIA PRE PROCEDURE
Department of Anesthesiology  Preprocedure Note       Name:  Issa Moctezuma   Age:  38 y.o.  :  1985                                          MRN:  [id number]         Date:  2024      Surgeon: Surgeon(s):  Herb Abdalla MD    Procedure: Procedure(s):  LEFT GROIN WOUND WASH OUT AND DEBRIDEMENT    Medications prior to admission:   Prior to Admission medications    Medication Sig Start Date End Date Taking? Authorizing Provider   DULoxetine (CYMBALTA) 60 MG extended release capsule Take 1 capsule by mouth daily 24  GEORGINA Melendez, APRN - CNP   docusate sodium (COLACE) 100 MG capsule Take 1 capsule by mouth 2 times daily    ProviderGeena MD   metFORMIN (GLUCOPHAGE-XR) 500 MG extended release tablet Take 1 tablet by mouth 2 times daily (with meals) 3/26/24   Sonia Kan DO   Continuous Blood Gluc Sensor (DEXCOM G7 SENSOR) MISC Replace every 10 days 24   Sonia Kan DO   Insulin Degludec (TRESIBA FLEXTOUCH) 200 UNIT/ML SOPN Inject 100 Units into the skin daily 24   Sonia Kan DO   acetaminophen (AMINOFEN) 325 MG tablet Take 2 tablets by mouth every 6 hours as needed for Pain or Fever 24   Emeka Leone MD   vitamin D (ERGOCALCIFEROL) 1.25 MG (90964 UT) CAPS capsule TAKE 1 CAPSULE BY MOUTH 1 TIME A WEEK 24   Sonia Kan DO   Insulin Degludec (TRESIBA FLEXTOUCH) 200 UNIT/ML SOPN Inject 100 Units into the skin daily  Patient taking differently: Inject 75 Units into the skin daily 24   Sonia Kan DO   Insulin Pen Needle (PEN NEEDLES 3/16\") 31G X 5 MM MISC 1 each by Does not apply route 4 times daily 24   Sonia Kan DO   Alcohol Swabs (ALCOHOL PREP) 70 % PADS Use 4 times daily prior to insulin injection 24   Sonia Kan DO   empagliflozin (JARDIANCE) 10 MG tablet Take 1 tablet by mouth every morning    Provider, MD Geena   naproxen

## 2024-06-01 LAB
ALBUMIN SERPL-MCNC: 2.3 G/DL (ref 3.4–5)
ANION GAP SERPL CALCULATED.3IONS-SCNC: 12 MMOL/L (ref 3–16)
BACTERIA BLD CULT ORG #2: NORMAL
BACTERIA BLD CULT: NORMAL
BACTERIA SPEC AEROBE CULT: ABNORMAL
BACTERIA SPEC ANAEROBE CULT: ABNORMAL
BASOPHILS # BLD: 0 K/UL (ref 0–0.2)
BASOPHILS # BLD: 0.2 K/UL (ref 0–0.2)
BASOPHILS NFR BLD: 0.3 %
BASOPHILS NFR BLD: 1.5 %
BUN SERPL-MCNC: 20 MG/DL (ref 7–20)
CALCIUM SERPL-MCNC: 8.1 MG/DL (ref 8.3–10.6)
CHLORIDE SERPL-SCNC: 103 MMOL/L (ref 99–110)
CO2 SERPL-SCNC: 20 MMOL/L (ref 21–32)
CREAT SERPL-MCNC: 1.6 MG/DL (ref 0.9–1.3)
DEPRECATED RDW RBC AUTO: 13.4 % (ref 12.4–15.4)
DEPRECATED RDW RBC AUTO: 13.5 % (ref 12.4–15.4)
EOSINOPHIL # BLD: 0 K/UL (ref 0–0.6)
EOSINOPHIL # BLD: 0.1 K/UL (ref 0–0.6)
EOSINOPHIL NFR BLD: 0 %
EOSINOPHIL NFR BLD: 1 %
GFR SERPLBLD CREATININE-BSD FMLA CKD-EPI: 56 ML/MIN/{1.73_M2}
GLUCOSE BLD-MCNC: 121 MG/DL (ref 70–99)
GLUCOSE BLD-MCNC: 136 MG/DL (ref 70–99)
GLUCOSE BLD-MCNC: 188 MG/DL (ref 70–99)
GLUCOSE BLD-MCNC: 246 MG/DL (ref 70–99)
GLUCOSE SERPL-MCNC: 197 MG/DL (ref 70–99)
GRAM STN SPEC: ABNORMAL
HCT VFR BLD AUTO: 27.5 % (ref 40.5–52.5)
HCT VFR BLD AUTO: 28 % (ref 40.5–52.5)
HGB BLD-MCNC: 9 G/DL (ref 13.5–17.5)
HGB BLD-MCNC: 9.1 G/DL (ref 13.5–17.5)
LYMPHOCYTES # BLD: 0.7 K/UL (ref 1–5.1)
LYMPHOCYTES # BLD: 1.1 K/UL (ref 1–5.1)
LYMPHOCYTES NFR BLD: 6.1 %
LYMPHOCYTES NFR BLD: 9.5 %
MAGNESIUM SERPL-MCNC: 1.9 MG/DL (ref 1.8–2.4)
MCH RBC QN AUTO: 25.2 PG (ref 26–34)
MCH RBC QN AUTO: 26.1 PG (ref 26–34)
MCHC RBC AUTO-ENTMCNC: 32.2 G/DL (ref 31–36)
MCHC RBC AUTO-ENTMCNC: 33.1 G/DL (ref 31–36)
MCV RBC AUTO: 78.4 FL (ref 80–100)
MCV RBC AUTO: 78.8 FL (ref 80–100)
MONOCYTES # BLD: 1 K/UL (ref 0–1.3)
MONOCYTES # BLD: 1.2 K/UL (ref 0–1.3)
MONOCYTES NFR BLD: 10.5 %
MONOCYTES NFR BLD: 8 %
NEUTROPHILS # BLD: 10.5 K/UL (ref 1.7–7.7)
NEUTROPHILS # BLD: 9.2 K/UL (ref 1.7–7.7)
NEUTROPHILS NFR BLD: 77.5 %
NEUTROPHILS NFR BLD: 85.6 %
ORGANISM: ABNORMAL
PERFORMED ON: ABNORMAL
PHOSPHATE SERPL-MCNC: 2.7 MG/DL (ref 2.5–4.9)
PLATELET # BLD AUTO: 284 K/UL (ref 135–450)
PLATELET # BLD AUTO: 292 K/UL (ref 135–450)
PMV BLD AUTO: 8.3 FL (ref 5–10.5)
PMV BLD AUTO: 8.6 FL (ref 5–10.5)
POTASSIUM SERPL-SCNC: 4.5 MMOL/L (ref 3.5–5.1)
RBC # BLD AUTO: 3.48 M/UL (ref 4.2–5.9)
RBC # BLD AUTO: 3.57 M/UL (ref 4.2–5.9)
SODIUM SERPL-SCNC: 135 MMOL/L (ref 136–145)
WBC # BLD AUTO: 11.8 K/UL (ref 4–11)
WBC # BLD AUTO: 12.2 K/UL (ref 4–11)

## 2024-06-01 PROCEDURE — 6360000002 HC RX W HCPCS

## 2024-06-01 PROCEDURE — 2580000003 HC RX 258

## 2024-06-01 PROCEDURE — 85025 COMPLETE CBC W/AUTO DIFF WBC: CPT

## 2024-06-01 PROCEDURE — 80069 RENAL FUNCTION PANEL: CPT

## 2024-06-01 PROCEDURE — 6370000000 HC RX 637 (ALT 250 FOR IP)

## 2024-06-01 PROCEDURE — 83735 ASSAY OF MAGNESIUM: CPT

## 2024-06-01 PROCEDURE — 1200000000 HC SEMI PRIVATE

## 2024-06-01 PROCEDURE — 99231 SBSQ HOSP IP/OBS SF/LOW 25: CPT | Performed by: SURGERY

## 2024-06-01 PROCEDURE — 36415 COLL VENOUS BLD VENIPUNCTURE: CPT

## 2024-06-01 RX ORDER — MORPHINE SULFATE 4 MG/ML
INJECTION, SOLUTION INTRAMUSCULAR; INTRAVENOUS
Status: COMPLETED
Start: 2024-06-01 | End: 2024-06-01

## 2024-06-01 RX ORDER — MORPHINE SULFATE 2 MG/ML
4 INJECTION, SOLUTION INTRAMUSCULAR; INTRAVENOUS ONCE
Status: DISCONTINUED | OUTPATIENT
Start: 2024-06-01 | End: 2024-06-07 | Stop reason: HOSPADM

## 2024-06-01 RX ORDER — DIPHENHYDRAMINE HYDROCHLORIDE 50 MG/ML
25 INJECTION INTRAMUSCULAR; INTRAVENOUS ONCE
Status: DISCONTINUED | OUTPATIENT
Start: 2024-06-01 | End: 2024-06-07 | Stop reason: HOSPADM

## 2024-06-01 RX ORDER — MORPHINE SULFATE 2 MG/ML
1 INJECTION, SOLUTION INTRAMUSCULAR; INTRAVENOUS ONCE
Status: DISCONTINUED | OUTPATIENT
Start: 2024-06-01 | End: 2024-06-07 | Stop reason: HOSPADM

## 2024-06-01 RX ADMIN — INSULIN LISPRO 2 UNITS: 100 INJECTION, SOLUTION INTRAVENOUS; SUBCUTANEOUS at 09:38

## 2024-06-01 RX ADMIN — VANCOMYCIN HYDROCHLORIDE 1000 MG: 1 INJECTION, POWDER, LYOPHILIZED, FOR SOLUTION INTRAVENOUS at 20:38

## 2024-06-01 RX ADMIN — CARVEDILOL 25 MG: 25 TABLET, FILM COATED ORAL at 20:39

## 2024-06-01 RX ADMIN — INSULIN LISPRO 14 UNITS: 100 INJECTION, SOLUTION INTRAVENOUS; SUBCUTANEOUS at 17:41

## 2024-06-01 RX ADMIN — CARVEDILOL 25 MG: 25 TABLET, FILM COATED ORAL at 09:36

## 2024-06-01 RX ADMIN — INSULIN LISPRO 14 UNITS: 100 INJECTION, SOLUTION INTRAVENOUS; SUBCUTANEOUS at 09:38

## 2024-06-01 RX ADMIN — OXYCODONE 10 MG: 5 TABLET ORAL at 06:19

## 2024-06-01 RX ADMIN — ACETAMINOPHEN 650 MG: 325 TABLET ORAL at 22:56

## 2024-06-01 RX ADMIN — AMLODIPINE BESYLATE 5 MG: 5 TABLET ORAL at 09:36

## 2024-06-01 RX ADMIN — CEFEPIME 2000 MG: 2 INJECTION, POWDER, FOR SOLUTION INTRAVENOUS at 09:46

## 2024-06-01 RX ADMIN — ACETAMINOPHEN 650 MG: 325 TABLET ORAL at 13:45

## 2024-06-01 RX ADMIN — SODIUM CHLORIDE, PRESERVATIVE FREE 10 ML: 5 INJECTION INTRAVENOUS at 20:35

## 2024-06-01 RX ADMIN — ROSUVASTATIN CALCIUM 40 MG: 20 TABLET, COATED ORAL at 09:36

## 2024-06-01 RX ADMIN — ACETAMINOPHEN 650 MG: 325 TABLET ORAL at 00:39

## 2024-06-01 RX ADMIN — INSULIN LISPRO 14 UNITS: 100 INJECTION, SOLUTION INTRAVENOUS; SUBCUTANEOUS at 13:46

## 2024-06-01 RX ADMIN — Medication: at 12:48

## 2024-06-01 RX ADMIN — INSULIN GLARGINE 60 UNITS: 100 INJECTION, SOLUTION SUBCUTANEOUS at 09:36

## 2024-06-01 RX ADMIN — CEFEPIME 2000 MG: 2 INJECTION, POWDER, FOR SOLUTION INTRAVENOUS at 02:21

## 2024-06-01 RX ADMIN — DOCUSATE SODIUM 100 MG: 100 CAPSULE, LIQUID FILLED ORAL at 09:36

## 2024-06-01 RX ADMIN — OXYCODONE 10 MG: 5 TABLET ORAL at 20:39

## 2024-06-01 RX ADMIN — VANCOMYCIN HYDROCHLORIDE 1000 MG: 1 INJECTION, POWDER, LYOPHILIZED, FOR SOLUTION INTRAVENOUS at 09:51

## 2024-06-01 RX ADMIN — SODIUM CHLORIDE, POTASSIUM CHLORIDE, SODIUM LACTATE AND CALCIUM CHLORIDE: 600; 310; 30; 20 INJECTION, SOLUTION INTRAVENOUS at 01:39

## 2024-06-01 RX ADMIN — DOCUSATE SODIUM 100 MG: 100 CAPSULE, LIQUID FILLED ORAL at 20:39

## 2024-06-01 RX ADMIN — CEFEPIME 2000 MG: 2 INJECTION, POWDER, FOR SOLUTION INTRAVENOUS at 17:49

## 2024-06-01 RX ADMIN — ACETAMINOPHEN 650 MG: 325 TABLET ORAL at 06:20

## 2024-06-01 RX ADMIN — ACETAMINOPHEN 650 MG: 325 TABLET ORAL at 17:42

## 2024-06-01 RX ADMIN — MORPHINE SULFATE 4 MG: 4 INJECTION INTRAVENOUS at 12:04

## 2024-06-01 RX ADMIN — ENOXAPARIN SODIUM 30 MG: 100 INJECTION SUBCUTANEOUS at 09:36

## 2024-06-01 RX ADMIN — DULOXETINE HYDROCHLORIDE 60 MG: 60 CAPSULE, DELAYED RELEASE ORAL at 09:36

## 2024-06-01 RX ADMIN — MORPHINE SULFATE: 4 INJECTION, SOLUTION INTRAMUSCULAR; INTRAVENOUS at 12:04

## 2024-06-01 ASSESSMENT — PAIN SCALES - GENERAL
PAINLEVEL_OUTOF10: 9
PAINLEVEL_OUTOF10: 0
PAINLEVEL_OUTOF10: 7
PAINLEVEL_OUTOF10: 0
PAINLEVEL_OUTOF10: 7
PAINLEVEL_OUTOF10: 7
PAINLEVEL_OUTOF10: 5
PAINLEVEL_OUTOF10: 5
PAINLEVEL_OUTOF10: 7
PAINLEVEL_OUTOF10: 6

## 2024-06-01 ASSESSMENT — PAIN DESCRIPTION - ONSET
ONSET: GRADUAL
ONSET: ON-GOING

## 2024-06-01 ASSESSMENT — PAIN DESCRIPTION - LOCATION
LOCATION: LEG;HIP;GROIN
LOCATION: GROIN
LOCATION: LEG;HIP;GROIN
LOCATION: GROIN
LOCATION: GENERALIZED
LOCATION: LEG;HIP;GROIN
LOCATION: LEG;GROIN

## 2024-06-01 ASSESSMENT — PAIN DESCRIPTION - PAIN TYPE
TYPE: SURGICAL PAIN
TYPE: ACUTE PAIN

## 2024-06-01 ASSESSMENT — PAIN DESCRIPTION - FREQUENCY
FREQUENCY: INTERMITTENT

## 2024-06-01 ASSESSMENT — PAIN DESCRIPTION - DESCRIPTORS
DESCRIPTORS: DISCOMFORT
DESCRIPTORS: ACHING;DISCOMFORT;THROBBING
DESCRIPTORS: PRESSURE;ACHING;DISCOMFORT
DESCRIPTORS: DISCOMFORT
DESCRIPTORS: DISCOMFORT
DESCRIPTORS: ACHING;DISCOMFORT
DESCRIPTORS: ACHING;DISCOMFORT;THROBBING

## 2024-06-01 ASSESSMENT — PAIN DESCRIPTION - ORIENTATION
ORIENTATION: LEFT

## 2024-06-01 ASSESSMENT — PAIN - FUNCTIONAL ASSESSMENT
PAIN_FUNCTIONAL_ASSESSMENT: PREVENTS OR INTERFERES SOME ACTIVE ACTIVITIES AND ADLS

## 2024-06-01 NOTE — PLAN OF CARE
Problem: Safety - Adult  Goal: Free from fall injury  Outcome: Progressing  Note:  Remains free from falls, bed in low position, call light in reach, ambulates independently.     Problem: Pain  Goal: Verbalizes/displays adequate comfort level or baseline comfort level  Outcome: Progressing  Note:  Scheduled Tylenol per order given, denies further need of additional PRN medication, will continue to monitor.     Problem: Cardiovascular - Adult  Goal: Absence of cardiac dysrhythmias or at baseline  Outcome: Progressing  Note:  Sinus Rhythm with First Degree AVB rate 100 at 0045.

## 2024-06-01 NOTE — PROGRESS NOTES
Department of Surgery:  Post-op Note    CC: necrotizing fascitis    Procedure(s) Performed: left groin washout and debridement    Subjective:   Pain is controlled, denies nausea or vomiting. Voiding.    Objective:  Anesthesia type: General      I/O    Intra op    Post op     Fluids  1400 mL 600 mL     EBL Minimal mL 0 mL     Urine 0 mL 600 mL     Exam:  Vitals:    05/31/24 1634 05/31/24 1727 05/31/24 2034 05/31/24 2251   BP: 134/84  139/82 (!) 149/94   Pulse: 94  92 92   Resp: 18 18 18 16   Temp: 99.6 °F (37.6 °C)  98.7 °F (37.1 °C) 98.5 °F (36.9 °C)   TempSrc: Oral  Oral Oral   SpO2: 96%  95% 96%   Weight:       Height:           General appearance: Alert, no acute distress  Eyes: No scleral icterus, EOM grossly intact  Neck: Trachea midline, no JVD  Chest/Lungs: Normal effort with no accessory muscle use on RA  Cardiovascular: RRR, well-perfused  Abdomen: Obese, non-tender, no rebound, guarding, or rigidity.  Skin: Warm and dry, no rashes  Extremities: L thigh wound dressing C/D/I with no strikethrough  Neuro: A&Ox3, no focal deficits, sensation intact    Assessment and Plan  This is a 38 y.o. male with hx CKD, HFrEF, DM, w/ nec fas L medial thigh, s/p debridement 5/29 and s/p debridement on 5/31    -Pain control: tylenol, oxy vs dilaudid prn   - Dressing change in AM  Diet: Reg  -OOB, ambulate QID, IS 10x/ hr  -DVT ppx: lovenox  -strict BG control   - Medical management per primary      Buzz Hoover DO  PGY-1, General Surgery Resident  06/01/24 1:59 AM  [phone]

## 2024-06-01 NOTE — PROGRESS NOTES
V2.0    [facility] Progress Note      Name:  Issa Moctezuma /Age/Sex: 1985  (38 y.o. male)   MRN & CSN:  [id number] & [id number] Encounter Date/Time: 2024 8:45 PM EDT   Location:  6322/6322-01 PCP: Sonia Kan DO     Attending:Александр Handley MD       Hospital Day: 6    Assessment and Recommendations   Issa Moctezuma is a 38 y.o. male with pmh of CKD who presents with Necrotizing soft tissue infection    #Necrotizing SSTI of L thigh  --patient on admission was febrile but otherwise had symptoms more consistent with viral syndrome (e.g. myalgias) without otherwise localizing signs of focal bacterial infection. Procal only mildly elevated at 0.6 and BCx NGTD. Initial plan to monitor off abx as no convincing source of bacterial infection  --On  reported new pain/swelling in L medial thigh near groin, CT obtained with subcutaneous gas in that region  -Surgery consulted  --POD#3 emergent L thigh debridement  --POD#1 repeat L thigh debridement/washout  -ID consulted  -Continue empiric vanc/cefepime  -Following surgical cultures: so far growing E. Coli, Klebsiella, and Beta strep. Sensitivities pending    #Type II DM, uncontrolled  - A1c from 2024 ~14%  - Repeat A1c this admission ~11%  - continue glargine insulin and SSI, titrate as needed    #CHELE on CKD, improving  - Continue IVF  - Trend BMP    Diet ADULT DIET; Regular; 4 carb choices (60 gm/meal)  ADULT ORAL NUTRITION SUPPLEMENT; Breakfast, Lunch, Dinner; Wound Healing Oral Supplement   DVT Prophylaxis [] Lovenox, []  Heparin, [] SCDs, [] Ambulation,  [] Eliquis, [] Xarelto  [] Coumadin   Code Status Full Code   Disposition From: Home  Expected Disposition: Home  Estimated Date of Discharge: 2+ days  Patient requires continued admission due to IV abx   Surrogate Decision Maker/ MADAN Rivas, parent     Personally reviewed Lab Studies and Imaging       Subjective:     Chief Complaint: Myalgias    Issa Moctezuma is a 38 y.o.  male with CKD who presented with fever and myalgias consistent with viral syndrome. Has mild CHELE as well and admitted for further care.    No acute clinical events reported overnight. Vitals have been stable. Did have rapid response called as her had increased bleeding from his surgical site requiring additional suturing from Gen Surg, now improved.    Review of Systems:      Pertinent positives and negatives discussed in HPI    Objective:     Intake/Output Summary (Last 24 hours) at 6/1/2024 0911  Last data filed at 6/1/2024 0627  Gross per 24 hour   Intake 1020 ml   Output 3005 ml   Net -1985 ml        Vitals:   Vitals:    05/31/24 1727 05/31/24 2034 05/31/24 2251 06/01/24 0615   BP:  139/82 (!) 149/94 (!) 155/97   Pulse:  92 92 98   Resp: 18 18 16 18   Temp:  98.7 °F (37.1 °C) 98.5 °F (36.9 °C) 99 °F (37.2 °C)   TempSrc:  Oral Oral Oral   SpO2:  95% 96% 97%   Weight:       Height:             Physical Exam:      General: Sitting up in bed, alert, non-toxic appearing  Eyes: EOMI  ENT: neck supple  Cardiovascular: Regular rate.  Respiratory: Clear to auscultation  Gastrointestinal: Soft, non tender  Genitourinary: no lesions noted on penis or scrotum  Musculoskeletal: No edema  Skin: L thigh with dressing around surgical incision appears clean with some sanguineous drainage  Neuro: Alert.  Psych: Mood appropriate.         Medications:   Medications:    acetaminophen  650 mg Oral 4 times per day    cefepime  2,000 mg IntraVENous Q8H    vancomycin  1,000 mg IntraVENous Q12H    insulin glargine  60 Units SubCUTAneous Daily    amLODIPine  5 mg Oral Daily    carvedilol  25 mg Oral BID    docusate sodium  100 mg Oral BID    DULoxetine  60 mg Oral Daily    insulin lispro  14 Units SubCUTAneous TID AC    rosuvastatin  40 mg Oral QAM    sodium chloride flush  5-40 mL IntraVENous 2 times per day    enoxaparin  30 mg SubCUTAneous BID    insulin lispro  0-8 Units SubCUTAneous TID WC    insulin lispro  0-4 Units SubCUTAneous

## 2024-06-01 NOTE — SIGNIFICANT EVENT
Rapid response called around 11:40 a.m. due to patient being found with pool of blood in his bed between his legs.  Of note, he is s/p  debridement 5/29 and s/p second look 5/31 due to nec fasc.  Hgb this a.m. was 9.2 from 11.1 yesterday.  Patient's vitals were blood pressure of 154/89 with pulse of 102, of note his pulse this hospital  stay has been in the high 90's    Surgery was already at bedside by the time ICU team arrived, they were assessing source of bleed, they tried chemical hemostasis, but that didn't work so they needed to place stitch.   Before both procedures morphine was ordered to help with the pain.  Due to patient being stable ICU team left surgery to suture source of bleed.    STAT CBC came back at 9.0     Leo Lawson MD  Internal Medicine PGY-1

## 2024-06-01 NOTE — PROGRESS NOTES
[facility] -  Clinical Pharmacy Note    Vancomycin - Management by Pharmacy    Consult Date(s): 5/29/24  Consulting Provider(s): Dr. Love    Assessment / Plan  1)  Left medial thigh necrotizing fasciitis - Vancomycin  Concurrent Antimicrobials: Cefepime  Day of Vanc Therapy / Ordered Duration: day 4 of 7  Current Dosing Method: Bayesian-Guided AUC Dosing  Therapeutic Goal: -600 mg/L*hr  Current Dose / Plan:   Pt with slight CHELE on CKD.  Per outpt nephrology notes, baseline SCr ~1.5-1.7.    SCr 1.6 this AM (down from 2.0 3 days ago).  UOP inconsistently documented but reportedly at 1 mL/kg/hr which is adequate  Currently on 1000mg IV q12h.  Calculated AUC = 485 with trough = 15.7 mcg/mL.  Will continue same regimen for now.  Level tomorrow AM  Pt is at high risk for accumulation given CKD and BMI > 30 - will monitor closely.  Will continue to monitor clinical condition and make adjustments to regimen as appropriate.    Rui Mathis, PharmD  PGY1 Pharmacy Resident  Phone: [phone]  Main Pharmacy: [id number]  6/1/2024 8:06 AM      Interval update:  Surgical culture growing K pneumoniae, E coli, Group F Strep. Washout per surgery yesterday. Pain controlled with no nausea or vomiting. ID following.    Subjective/Objective:   Issa Moctezuma is a 38 y.o. male with a PMHx significant for anxiety, CKD, gastroparesis, DM 2, HTN< HLD, HFpEF, and neuropathy who is admitted with left medial thigh necrotizing fasciitis.  Pt is s/p left thigh debridement (done 5/29).     Pharmacy is consulted to dose Vancomycin.    Ht Readings from Last 1 Encounters:   05/28/24 1.854 m (6' 1\")     Wt Readings from Last 1 Encounters:   05/28/24 126.2 kg (278 lb 4.8 oz)     Current & Prior Antimicrobial Regimen(s):  Cefepime (5/29-current)  (5/29-5/30)  Vancomycin - Pharmacy to dose  2500mg IV x1 5/29 19:00  1000mg IV q12h (5/30-current)    Vancomycin Level(s) / Doses:    Date Time Dose Type of Level / Level Interpretation   5/31 04:12

## 2024-06-01 NOTE — PROGRESS NOTES
General Surgery   Daily Progress Note  Patient: Issa Moctezuma      CC: necrotizing fascitis     SUBJECTIVE:   NAEO. Pain well controlled. No nausea or vomiting.     ROS:   A 14 point review of systems was conducted, significant findings as noted above. All other systems negative.    OBJECTIVE:    PHYSICAL EXAM:    Vitals:    05/31/24 1727 05/31/24 2034 05/31/24 2251 06/01/24 0615   BP:  139/82 (!) 149/94 (!) 155/97   Pulse:  92 92 98   Resp: 18 18 16 18   Temp:  98.7 °F (37.1 °C) 98.5 °F (36.9 °C) 99 °F (37.2 °C)   TempSrc:  Oral Oral Oral   SpO2:  95% 96% 97%   Weight:       Height:           General appearance: Alert, no acute distress  Eyes: No scleral icterus, EOM grossly intact  Neck: Trachea midline, no JVD  Chest/Lungs: Normal effort with no accessory muscle use on RA  Cardiovascular: RRR, well-perfused  Abdomen: Obese, non-tender, no rebound, guarding, or rigidity.  Skin: Warm and dry, no rashes  Extremities: L thigh wound base clear. No signs of necrotizing tissue  Neuro: A&Ox3, no focal deficits, sensation intact    LABS:   Recent Labs     05/31/24  0412 06/01/24  0442   WBC 12.6* 12.2*   HGB 11.2* 9.1*   HCT 34.7* 27.5*   MCV 78.9* 78.8*    284          Recent Labs     05/31/24  0412 06/01/24  0442    135*   K 4.1 4.5    103   CO2 21 20*   PHOS 2.3* 2.7   BUN 19 20   CREATININE 1.8* 1.6*        No results for input(s): \"AST\", \"ALT\", \"BILIDIR\", \"BILITOT\", \"ALKPHOS\" in the last 72 hours.    Invalid input(s): \"ALB\"   No results for input(s): \"LIPASE\", \"AMYLASE\" in the last 72 hours.     Recent Labs     05/29/24  1757 05/30/24  0515   INR 1.15 1.24*       ASSESSMENT & PLAN:   This is a 38 y.o. male with hx CKD, HFrEF, DM, w/ nec fas L medial thigh, s/p debridement 5/29 and s/p second look 5/31 1 Day Post-Op      -Pain control: tylenol, oxy vs dilaudid prn   -Will assess for irrigating VAC vs continued wet to dry   Diet: Reg  -OOB, ambulate QID, IS 10x/ hr  -DVT ppx: lovenox  -strict BG  control     Db Moreno DO  PGY1, General Surgery  06/01/24   6:47 AM       I have seen, examined, and reviewed the patients chart. I agree with the residents assessment and have made appropriate changes.    Herb Abdalla

## 2024-06-01 NOTE — OP NOTE
Operative Note      Patient: Issa Moctezuma  YOB: 1985  MRN: [id number]    Date of Procedure: 5/31/2024    Pre-Op Diagnosis Codes:     * Wound infection [T14.8XXA, L08.9]    Post-Op Diagnosis: Same       Procedure(s):  LEFT GROIN WOUND WASH OUT AND DEBRIDEMENT    Surgeon(s):  Herb Abdalla MD    Assistant:   Resident: Doreen Ferreira MD    Anesthesia: General    Estimated Blood Loss (mL): less than 50     Complications: None    Specimens:   ID Type Source Tests Collected by Time Destination   A : Skin Specimen Groin SURGICAL PATHOLOGY Herb Abdalla MD 5/31/2024 1438        Implants:  * No implants in log *      Drains: * No LDAs found *    Findings:  Infection Present At Time Of Surgery (PATOS) (choose all levels that have infection present):  -deep infection (muscle/fascia) as evidenced by indurated nonviable tissue  Other Findings: inferior aspect of wound requiring additional debridement    Detailed Description of Procedure:   The patient was brought to the operating room and laid supine on the OR table.  General anesthesia was induced, which the patient tolerated without immediate complication.  Patient on scheduled antibiotics. The patient was prepped and draped in the usual sterile fashion.  Prior to the procedure, a time-out was called to identify and confirm that key information related to the operation was correct.     The patient's legs were externally rotated to better expose the infected tissue.  The inferior and lateral portions of the wound were excised to include the indurated and diseased skin using a #10 scalpel.  Sharp dissection and electrocautery were used to carry the dissection deeper.  This was continued until healthy tissue was reached as evidenced by bleeding, which extended down to muscle.  Meticulous hemostasis was then ensured at this time, including tying off a couple of small veins with 3-0 silk and use of electrocautery.  The wound was then irrigated with 1 L of  normal saline using a pulse lavage.  Hemostasis was then reinsured, and our resection was deemed to be satisfactory in removing all diseased tissue.  The wound was then packed with saline soaked Kerlix gauze and then dressed with ABD pads and tape.     The patient was woken from anesthesia and transferred to PACU in stable condition, having tolerated the operation without immediate complication.  Surgical counts for instruments, needles, and sponges were confirmed to be correct.     Dr. Herb Abdalla was present and scrubbed for the entirety of the operation.    Electronically signed by Doreen Ferreira MD on 5/31/2024 at 8:56 PM

## 2024-06-02 ENCOUNTER — APPOINTMENT (OUTPATIENT)
Dept: GENERAL RADIOLOGY | Age: 39
DRG: 710 | End: 2024-06-02
Payer: MEDICAID

## 2024-06-02 LAB
ALBUMIN SERPL-MCNC: 2.3 G/DL (ref 3.4–5)
ANION GAP SERPL CALCULATED.3IONS-SCNC: 10 MMOL/L (ref 3–16)
ANISOCYTOSIS BLD QL SMEAR: ABNORMAL
BASOPHILS # BLD: 0.1 K/UL (ref 0–0.2)
BASOPHILS NFR BLD: 1 %
BUN SERPL-MCNC: 28 MG/DL (ref 7–20)
CALCIUM SERPL-MCNC: 8.4 MG/DL (ref 8.3–10.6)
CHLORIDE SERPL-SCNC: 105 MMOL/L (ref 99–110)
CO2 SERPL-SCNC: 23 MMOL/L (ref 21–32)
CREAT SERPL-MCNC: 1.6 MG/DL (ref 0.9–1.3)
DEPRECATED RDW RBC AUTO: 13.3 % (ref 12.4–15.4)
EOSINOPHIL # BLD: 0.1 K/UL (ref 0–0.6)
EOSINOPHIL NFR BLD: 1 %
GFR SERPLBLD CREATININE-BSD FMLA CKD-EPI: 56 ML/MIN/{1.73_M2}
GLUCOSE BLD-MCNC: 162 MG/DL (ref 70–99)
GLUCOSE BLD-MCNC: 219 MG/DL (ref 70–99)
GLUCOSE BLD-MCNC: 228 MG/DL (ref 70–99)
GLUCOSE BLD-MCNC: 48 MG/DL (ref 70–99)
GLUCOSE BLD-MCNC: 75 MG/DL (ref 70–99)
GLUCOSE BLD-MCNC: 99 MG/DL (ref 70–99)
GLUCOSE SERPL-MCNC: 223 MG/DL (ref 70–99)
HCT VFR BLD AUTO: 26.6 % (ref 40.5–52.5)
HGB BLD-MCNC: 8.8 G/DL (ref 13.5–17.5)
LYMPHOCYTES # BLD: 1.2 K/UL (ref 1–5.1)
LYMPHOCYTES NFR BLD: 11 %
MAGNESIUM SERPL-MCNC: 2 MG/DL (ref 1.8–2.4)
MCH RBC QN AUTO: 26 PG (ref 26–34)
MCHC RBC AUTO-ENTMCNC: 33 G/DL (ref 31–36)
MCV RBC AUTO: 78.8 FL (ref 80–100)
MICROCYTES BLD QL SMEAR: ABNORMAL
MONOCYTES # BLD: 1.2 K/UL (ref 0–1.3)
MONOCYTES NFR BLD: 11 %
MYELOCYTES NFR BLD MANUAL: 1 %
NEUTROPHILS # BLD: 8.4 K/UL (ref 1.7–7.7)
NEUTROPHILS NFR BLD: 75 %
OVALOCYTES BLD QL SMEAR: ABNORMAL
PERFORMED ON: ABNORMAL
PERFORMED ON: NORMAL
PERFORMED ON: NORMAL
PHOSPHATE SERPL-MCNC: 2.4 MG/DL (ref 2.5–4.9)
PLATELET # BLD AUTO: 338 K/UL (ref 135–450)
PLATELET BLD QL SMEAR: ADEQUATE
PMV BLD AUTO: 8.3 FL (ref 5–10.5)
POTASSIUM SERPL-SCNC: 4.2 MMOL/L (ref 3.5–5.1)
RBC # BLD AUTO: 3.38 M/UL (ref 4.2–5.9)
SLIDE REVIEW: ABNORMAL
SODIUM SERPL-SCNC: 138 MMOL/L (ref 136–145)
VANCOMYCIN SERPL-MCNC: 16 UG/ML
WBC # BLD AUTO: 11.1 K/UL (ref 4–11)

## 2024-06-02 PROCEDURE — 36415 COLL VENOUS BLD VENIPUNCTURE: CPT

## 2024-06-02 PROCEDURE — 83735 ASSAY OF MAGNESIUM: CPT

## 2024-06-02 PROCEDURE — 6370000000 HC RX 637 (ALT 250 FOR IP)

## 2024-06-02 PROCEDURE — 80202 ASSAY OF VANCOMYCIN: CPT

## 2024-06-02 PROCEDURE — 6360000002 HC RX W HCPCS

## 2024-06-02 PROCEDURE — 71045 X-RAY EXAM CHEST 1 VIEW: CPT

## 2024-06-02 PROCEDURE — 85025 COMPLETE CBC W/AUTO DIFF WBC: CPT

## 2024-06-02 PROCEDURE — 2580000003 HC RX 258

## 2024-06-02 PROCEDURE — 99231 SBSQ HOSP IP/OBS SF/LOW 25: CPT | Performed by: SURGERY

## 2024-06-02 PROCEDURE — 1200000000 HC SEMI PRIVATE

## 2024-06-02 PROCEDURE — 80069 RENAL FUNCTION PANEL: CPT

## 2024-06-02 RX ORDER — INSULIN LISPRO 100 [IU]/ML
0-4 INJECTION, SOLUTION INTRAVENOUS; SUBCUTANEOUS NIGHTLY
Status: DISCONTINUED | OUTPATIENT
Start: 2024-06-02 | End: 2024-06-04

## 2024-06-02 RX ORDER — INSULIN LISPRO 100 [IU]/ML
0-16 INJECTION, SOLUTION INTRAVENOUS; SUBCUTANEOUS
Status: DISCONTINUED | OUTPATIENT
Start: 2024-06-02 | End: 2024-06-04

## 2024-06-02 RX ADMIN — ACETAMINOPHEN 650 MG: 325 TABLET ORAL at 12:46

## 2024-06-02 RX ADMIN — CARVEDILOL 25 MG: 25 TABLET, FILM COATED ORAL at 08:42

## 2024-06-02 RX ADMIN — POLYETHYLENE GLYCOL 3350 17 G: 17 POWDER, FOR SOLUTION ORAL at 08:42

## 2024-06-02 RX ADMIN — DEXTROSE MONOHYDRATE 250 ML: 100 INJECTION, SOLUTION INTRAVENOUS at 15:18

## 2024-06-02 RX ADMIN — ACETAMINOPHEN 650 MG: 325 TABLET ORAL at 06:02

## 2024-06-02 RX ADMIN — VANCOMYCIN HYDROCHLORIDE 1000 MG: 1 INJECTION, POWDER, LYOPHILIZED, FOR SOLUTION INTRAVENOUS at 08:49

## 2024-06-02 RX ADMIN — CEFEPIME 2000 MG: 2 INJECTION, POWDER, FOR SOLUTION INTRAVENOUS at 18:39

## 2024-06-02 RX ADMIN — SODIUM CHLORIDE, PRESERVATIVE FREE 10 ML: 5 INJECTION INTRAVENOUS at 08:43

## 2024-06-02 RX ADMIN — VANCOMYCIN HYDROCHLORIDE 1000 MG: 1 INJECTION, POWDER, LYOPHILIZED, FOR SOLUTION INTRAVENOUS at 22:11

## 2024-06-02 RX ADMIN — INSULIN LISPRO 14 UNITS: 100 INJECTION, SOLUTION INTRAVENOUS; SUBCUTANEOUS at 12:48

## 2024-06-02 RX ADMIN — CEFEPIME 2000 MG: 2 INJECTION, POWDER, FOR SOLUTION INTRAVENOUS at 02:03

## 2024-06-02 RX ADMIN — INSULIN GLARGINE 60 UNITS: 100 INJECTION, SOLUTION SUBCUTANEOUS at 08:43

## 2024-06-02 RX ADMIN — DULOXETINE HYDROCHLORIDE 60 MG: 60 CAPSULE, DELAYED RELEASE ORAL at 08:41

## 2024-06-02 RX ADMIN — OXYCODONE 5 MG: 5 TABLET ORAL at 04:04

## 2024-06-02 RX ADMIN — CEFEPIME 2000 MG: 2 INJECTION, POWDER, FOR SOLUTION INTRAVENOUS at 10:33

## 2024-06-02 RX ADMIN — CARVEDILOL 25 MG: 25 TABLET, FILM COATED ORAL at 22:07

## 2024-06-02 RX ADMIN — DOCUSATE SODIUM 100 MG: 100 CAPSULE, LIQUID FILLED ORAL at 08:41

## 2024-06-02 RX ADMIN — PROCHLORPERAZINE EDISYLATE 10 MG: 5 INJECTION INTRAMUSCULAR; INTRAVENOUS at 12:46

## 2024-06-02 RX ADMIN — ROSUVASTATIN CALCIUM 40 MG: 20 TABLET, COATED ORAL at 08:41

## 2024-06-02 RX ADMIN — INSULIN LISPRO 2 UNITS: 100 INJECTION, SOLUTION INTRAVENOUS; SUBCUTANEOUS at 08:43

## 2024-06-02 RX ADMIN — ACETAMINOPHEN 650 MG: 325 TABLET ORAL at 22:07

## 2024-06-02 RX ADMIN — ACETAMINOPHEN 650 MG: 325 TABLET ORAL at 18:40

## 2024-06-02 RX ADMIN — HYDROMORPHONE HYDROCHLORIDE 0.5 MG: 1 INJECTION, SOLUTION INTRAMUSCULAR; INTRAVENOUS; SUBCUTANEOUS at 06:01

## 2024-06-02 RX ADMIN — AMLODIPINE BESYLATE 5 MG: 5 TABLET ORAL at 08:41

## 2024-06-02 RX ADMIN — INSULIN LISPRO 14 UNITS: 100 INJECTION, SOLUTION INTRAVENOUS; SUBCUTANEOUS at 08:42

## 2024-06-02 RX ADMIN — HYDROMORPHONE HYDROCHLORIDE 0.5 MG: 1 INJECTION, SOLUTION INTRAMUSCULAR; INTRAVENOUS; SUBCUTANEOUS at 22:40

## 2024-06-02 RX ADMIN — DOCUSATE SODIUM 100 MG: 100 CAPSULE, LIQUID FILLED ORAL at 22:07

## 2024-06-02 ASSESSMENT — PAIN DESCRIPTION - DESCRIPTORS
DESCRIPTORS: DISCOMFORT
DESCRIPTORS: ACHING;DISCOMFORT;THROBBING
DESCRIPTORS: ACHING;DISCOMFORT
DESCRIPTORS: ACHING

## 2024-06-02 ASSESSMENT — PAIN DESCRIPTION - LOCATION
LOCATION: GROIN
LOCATION: LEG;HIP;GROIN
LOCATION: GENERALIZED
LOCATION: LEG;HIP;GROIN

## 2024-06-02 ASSESSMENT — PAIN SCALES - GENERAL
PAINLEVEL_OUTOF10: 5
PAINLEVEL_OUTOF10: 7
PAINLEVEL_OUTOF10: 5
PAINLEVEL_OUTOF10: 4
PAINLEVEL_OUTOF10: 4
PAINLEVEL_OUTOF10: 8
PAINLEVEL_OUTOF10: 0

## 2024-06-02 ASSESSMENT — PAIN DESCRIPTION - ORIENTATION
ORIENTATION: LEFT
ORIENTATION: OTHER (COMMENT)
ORIENTATION: LEFT
ORIENTATION: LEFT

## 2024-06-02 ASSESSMENT — PAIN - FUNCTIONAL ASSESSMENT
PAIN_FUNCTIONAL_ASSESSMENT: ACTIVITIES ARE NOT PREVENTED
PAIN_FUNCTIONAL_ASSESSMENT: PREVENTS OR INTERFERES SOME ACTIVE ACTIVITIES AND ADLS

## 2024-06-02 NOTE — PROGRESS NOTES
General Surgery   Daily Progress Note  Patient: Issa Moctezuma      CC: necrotizing fascitis     SUBJECTIVE:   Rapid called yesterday for soaked [place] with blood. Dressing was taken down. Interrupted vicryl sutures placed for hemostasis. No repeat bleeding after. Pain controlled. Tolerating diet.     ROS:   A 14 point review of systems was conducted, significant findings as noted above. All other systems negative.    OBJECTIVE:    PHYSICAL EXAM:    Vitals:    06/01/24 2154 06/01/24 2258 06/02/24 0401 06/02/24 0601   BP: (!) 145/93 (!) 151/89 119/80    Pulse: 96 97 97    Resp: 20 16 16 16   Temp: 98.6 °F (37 °C) 98.3 °F (36.8 °C) 98.7 °F (37.1 °C)    TempSrc: Oral Oral Oral    SpO2: 96% 97% 95%    Weight:       Height:           General appearance: Alert, no acute distress  Eyes: No scleral icterus, EOM grossly intact  Neck: Trachea midline, no JVD  Chest/Lungs: Normal effort with no accessory muscle use on RA  Cardiovascular: RRR, well-perfused  Abdomen: Obese, non-tender, no rebound, guarding, or rigidity.  Skin: Warm and dry, no rashes  Extremities: L thigh wound base clear. No signs of necrotizing tissue. Surgicel at base.   Neuro: A&Ox3, no focal deficits, sensation intact    LABS:   Recent Labs     06/01/24  0442 06/01/24  1330   WBC 12.2* 11.8*   HGB 9.1* 9.0*   HCT 27.5* 28.0*   MCV 78.8* 78.4*    292          Recent Labs     05/31/24  0412 06/01/24  0442    135*   K 4.1 4.5    103   CO2 21 20*   PHOS 2.3* 2.7   BUN 19 20   CREATININE 1.8* 1.6*       ASSESSMENT & PLAN:   This is a 38 y.o. male with hx CKD, HFrEF, DM, w/ nec fas L medial thigh, s/p debridement 5/29 and s/p second look 5/31 2 Days Post-Op    -Pain control: tylenol, oxy vs dilaudid prn   -Will assess for irrigating VAC today  Diet: Reg  -OOB, ambulate QID, IS 10x/ hr  -DVT ppx: lovenox  -strict BG control -- recommend high dose sliding scale     Db Moreno DO  PGY1, General Surgery  06/02/24   6:37 AM      I have

## 2024-06-02 NOTE — PLAN OF CARE
Problem: Cardiovascular - Adult  Goal: Absence of cardiac dysrhythmias or at baseline  Outcome: Progressing  Flowsheets (Taken 6/2/2024 0211)  Absence of cardiac dysrhythmias or at baseline:   Monitor cardiac rate and rhythm   Assess for signs of decreased cardiac output   Administer antiarrhythmia medication and electrolyte replacement as ordered     Problem: Chronic Conditions and Co-morbidities  Goal: Patient's chronic conditions and co-morbidity symptoms are monitored and maintained or improved  Outcome: Progressing  Flowsheets (Taken 6/2/2024 0211)  Care Plan - Patient's Chronic Conditions and Co-Morbidity Symptoms are Monitored and Maintained or Improved: Monitor and assess patient's chronic conditions and comorbid symptoms for stability, deterioration, or improvement     Problem: Skin/Tissue Integrity  Goal: Absence of new skin breakdown  Description: 1.  Monitor for areas of redness and/or skin breakdown  2.  Assess vascular access sites hourly  3.  Every 4-6 hours minimum:  Change oxygen saturation probe site  4.  Every 4-6 hours:  If on nasal continuous positive airway pressure, respiratory therapy assess nares and determine need for appliance change or resting period.  Outcome: Progressing     Problem: Safety - Adult  Goal: Free from fall injury  Outcome: Progressing  Flowsheets (Taken 6/2/2024 0211)  Free From Fall Injury: Instruct family/caregiver on patient safety     Problem: Pain  Goal: Verbalizes/displays adequate comfort level or baseline comfort level  Outcome: Progressing  Flowsheets (Taken 6/2/2024 0211)  Verbalizes/displays adequate comfort level or baseline comfort level:   Encourage patient to monitor pain and request assistance   Assess pain using appropriate pain scale   Administer analgesics based on type and severity of pain and evaluate response   Implement non-pharmacological measures as appropriate and evaluate response   Notify Licensed Independent Practitioner if interventions

## 2024-06-02 NOTE — PROGRESS NOTES
Date/Time    CHOL 247 02/22/2023 07:04 AM    HDL 52 05/15/2023 09:14 AM    TRIG 221 02/22/2023 07:04 AM     Hemoglobin A1C:   Lab Results   Component Value Date/Time    LABA1C 11.9 05/29/2024 05:57 PM     TSH: No results found for: \"TSH\"  Troponin: No results found for: \"TROPONINT\"  Lactic Acid:   No results for input(s): \"LACTA\" in the last 72 hours.    BNP:   No results for input(s): \"PROBNP\" in the last 72 hours.    UA:  Lab Results   Component Value Date/Time    NITRU Negative 05/28/2024 06:45 AM    COLORU Yellow 05/28/2024 06:45 AM    PHUR 6.0 05/28/2024 06:45 AM    PHUR 5.0 11/15/2023 03:52 PM    WBCUA 3-5 05/28/2024 06:45 AM    RBCUA None seen 05/28/2024 06:45 AM    RBCUA 1+ (0.06-0.1 mg/dL) 05/20/2024 02:29 PM    MUCUS PRESENT 05/20/2024 02:29 PM    BACTERIA SEE NOTES 05/20/2024 02:29 PM    BACTERIA None Seen 01/13/2023 12:20 AM    CLARITYU Clear 05/28/2024 06:45 AM    LEUKOCYTESUR Negative 05/28/2024 06:45 AM    UROBILINOGEN 1.0 05/28/2024 06:45 AM    BILIRUBINUR Negative 05/28/2024 06:45 AM    BLOODU SMALL 05/28/2024 06:45 AM    GLUCOSEU Negative 05/28/2024 06:45 AM    GLUCOSEU NEGATIVE 01/02/2011 02:44 PM    KETUA 40 05/28/2024 06:45 AM    AMORPHOUS 3+ 05/28/2024 06:45 AM     Urine Cultures: No results found for: \"LABURIN\"  Blood Cultures:   Lab Results   Component Value Date/Time    BC No Growth after 4 days of incubation. 05/28/2024 03:42 AM     Lab Results   Component Value Date/Time    BLOODCULT2 No Growth after 4 days of incubation. 05/28/2024 03:49 AM     Organism:   Lab Results   Component Value Date/Time    ORG Klebsiella pneumoniae 05/29/2024 11:13 PM    ORG Escherichia coli 05/29/2024 11:13 PM    ORG Beta Strep Group F 05/29/2024 11:13 PM    ORG Prevotella bivia 05/29/2024 11:13 PM         Electronically signed by Александр Handley MD on 6/2/2024 at 10:16 AM

## 2024-06-02 NOTE — PROGRESS NOTES
[facility] -  Clinical Pharmacy Note    Vancomycin - Management by Pharmacy    Consult Date(s): 5/29/24  Consulting Provider(s): Dr. Love    Assessment / Plan  1)  Left medial thigh necrotizing fasciitis - Vancomycin  Concurrent Antimicrobials: Cefepime  Day of Vanc Therapy / Ordered Duration: day 5 of 7  Current Dosing Method: Bayesian-Guided AUC Dosing  Therapeutic Goal: -600 mg/L*hr  Current Dose / Plan:   Pt with slight CHELE on CKD.  Per outpt nephrology notes, baseline SCr ~1.5-1.7.    SCr 1.6 this AM (down from 2.0 a few days ago).  UOP inconsistently documented, decreased from 1 mL/kg/min to 0.3 today.  7.5h level today = 16 mg/L  Currently on 1000mg IV q12h.  Calculated AUC = 466 with trough = 14.9 mcg/mL.  Will continue same regimen for now.  Pt is at high risk for accumulation given CKD and BMI > 30 - will monitor closely.  F/u level likely required only if extending therapy beyond the 7 days ordered  Will continue to monitor clinical condition and make adjustments to regimen as appropriate.    Rui Mathis, PharmD  PGY1 Pharmacy Resident  Phone: [phone]  Main Pharmacy: [id number]  6/2/2024 9:25 AM      Interval update:  Surgical culture growing K pneumoniae, E coli, Group F Strep, Prevotella. ID following. Rapid called yesterday for soaked dakota with blood. Dressing was taken down. Interrupted vicryl sutures placed for hemostasis. No repeat bleeding after. Pain controlled. Tolerating diet.     Subjective/Objective:   Issa Moctezuma is a 38 y.o. male with a PMHx significant for anxiety, CKD, gastroparesis, DM 2, HTN< HLD, HFpEF, and neuropathy who is admitted with left medial thigh necrotizing fasciitis.  Pt is s/p left thigh debridement (done 5/29).     Pharmacy is consulted to dose Vancomycin.    Ht Readings from Last 1 Encounters:   05/28/24 1.854 m (6' 1\")     Wt Readings from Last 1 Encounters:   06/01/24 127.5 kg (281 lb 1.4 oz)     Current & Prior Antimicrobial Regimen(s):  Cefepime  (5/29-current)  (5/29-5/30)  Vancomycin - Pharmacy to dose  2500mg IV x1 5/29 19:00  1000mg IV q12h (5/30-current)    Vancomycin Level(s) / Doses:    Date Time Dose Type of Level / Level Interpretation   5/31 04:12 1000mg q12h Random = 17 mcg/mL Drawn ~7.5h after 3rd total dose  AUC = 531; ssTr = 17.7  Continue same regimen   6/2 0401 1000 mg q12h Random = 16 mg/L 7.5h level, continue     Note: Serum levels collected for AUC-based dosing may be high if collected in close proximity to the dose administered. This is not necessarily indicative of toxicity.    Cultures & Sensitivities:    Date Site Micro Susceptibility / Result   5/27 COVID-19  Influenza A/B Not detected  Not detected    5/28 Blood x2 No growth to date    5/29 Surgical tissue K pneumoniae  E coli  Group F Strep  Prevotella bivia R - ampicillin only  Pending  No further workup  No further workup     Recent Labs     05/31/24  0412 06/01/24  0442 06/01/24  1330 06/02/24  0401   CREATININE 1.8* 1.6*  --  1.6*   BUN 19 20  --  28*   WBC 12.6* 12.2* 11.8* 11.1*     Estimated Creatinine Clearance: 88 mL/min (A) (based on SCr of 1.6 mg/dL (H)).    Additional Lab Values / Findings of Note:    No results for input(s): \"PROCAL\" in the last 72 hours.

## 2024-06-03 LAB
ALBUMIN SERPL-MCNC: 2.5 G/DL (ref 3.4–5)
ANION GAP SERPL CALCULATED.3IONS-SCNC: 9 MMOL/L (ref 3–16)
BASOPHILS # BLD: 0 K/UL (ref 0–0.2)
BASOPHILS NFR BLD: 0 %
BUN SERPL-MCNC: 18 MG/DL (ref 7–20)
CALCIUM SERPL-MCNC: 8.6 MG/DL (ref 8.3–10.6)
CHLORIDE SERPL-SCNC: 102 MMOL/L (ref 99–110)
CO2 SERPL-SCNC: 27 MMOL/L (ref 21–32)
CREAT SERPL-MCNC: 1.5 MG/DL (ref 0.9–1.3)
DEPRECATED RDW RBC AUTO: 13.2 % (ref 12.4–15.4)
EOSINOPHIL # BLD: 0.2 K/UL (ref 0–0.6)
EOSINOPHIL NFR BLD: 2 %
GFR SERPLBLD CREATININE-BSD FMLA CKD-EPI: 61 ML/MIN/{1.73_M2}
GLUCOSE BLD-MCNC: 134 MG/DL (ref 70–99)
GLUCOSE BLD-MCNC: 183 MG/DL (ref 70–99)
GLUCOSE BLD-MCNC: 209 MG/DL (ref 70–99)
GLUCOSE BLD-MCNC: 91 MG/DL (ref 70–99)
GLUCOSE SERPL-MCNC: 177 MG/DL (ref 70–99)
HCT VFR BLD AUTO: 26.5 % (ref 40.5–52.5)
HGB BLD-MCNC: 8.6 G/DL (ref 13.5–17.5)
HYPOCHROMIA BLD QL SMEAR: ABNORMAL
LYMPHOCYTES # BLD: 1.4 K/UL (ref 1–5.1)
LYMPHOCYTES NFR BLD: 12 %
MAGNESIUM SERPL-MCNC: 1.9 MG/DL (ref 1.8–2.4)
MCH RBC QN AUTO: 25.4 PG (ref 26–34)
MCHC RBC AUTO-ENTMCNC: 32.5 G/DL (ref 31–36)
MCV RBC AUTO: 78.1 FL (ref 80–100)
METAMYELOCYTES NFR BLD MANUAL: 2 %
MICROCYTES BLD QL SMEAR: ABNORMAL
MONOCYTES # BLD: 0.4 K/UL (ref 0–1.3)
MONOCYTES NFR BLD: 3 %
NEUTROPHILS # BLD: 9.8 K/UL (ref 1.7–7.7)
NEUTROPHILS NFR BLD: 81 %
PERFORMED ON: ABNORMAL
PERFORMED ON: NORMAL
PHOSPHATE SERPL-MCNC: 2.8 MG/DL (ref 2.5–4.9)
PLATELET # BLD AUTO: 403 K/UL (ref 135–450)
PMV BLD AUTO: 7.5 FL (ref 5–10.5)
POTASSIUM SERPL-SCNC: 3.6 MMOL/L (ref 3.5–5.1)
RBC # BLD AUTO: 3.39 M/UL (ref 4.2–5.9)
SODIUM SERPL-SCNC: 138 MMOL/L (ref 136–145)
WBC # BLD AUTO: 11.8 K/UL (ref 4–11)

## 2024-06-03 PROCEDURE — 02HV33Z INSERTION OF INFUSION DEVICE INTO SUPERIOR VENA CAVA, PERCUTANEOUS APPROACH: ICD-10-PCS | Performed by: STUDENT IN AN ORGANIZED HEALTH CARE EDUCATION/TRAINING PROGRAM

## 2024-06-03 PROCEDURE — C1751 CATH, INF, PER/CENT/MIDLINE: HCPCS

## 2024-06-03 PROCEDURE — 2580000003 HC RX 258: Performed by: STUDENT IN AN ORGANIZED HEALTH CARE EDUCATION/TRAINING PROGRAM

## 2024-06-03 PROCEDURE — 6370000000 HC RX 637 (ALT 250 FOR IP): Performed by: INTERNAL MEDICINE

## 2024-06-03 PROCEDURE — 6370000000 HC RX 637 (ALT 250 FOR IP)

## 2024-06-03 PROCEDURE — 36569 INSJ PICC 5 YR+ W/O IMAGING: CPT

## 2024-06-03 PROCEDURE — 2580000003 HC RX 258

## 2024-06-03 PROCEDURE — 99233 SBSQ HOSP IP/OBS HIGH 50: CPT | Performed by: INTERNAL MEDICINE

## 2024-06-03 PROCEDURE — 6370000000 HC RX 637 (ALT 250 FOR IP): Performed by: STUDENT IN AN ORGANIZED HEALTH CARE EDUCATION/TRAINING PROGRAM

## 2024-06-03 PROCEDURE — 6360000002 HC RX W HCPCS

## 2024-06-03 PROCEDURE — 1200000000 HC SEMI PRIVATE

## 2024-06-03 PROCEDURE — 83735 ASSAY OF MAGNESIUM: CPT

## 2024-06-03 PROCEDURE — 99231 SBSQ HOSP IP/OBS SF/LOW 25: CPT | Performed by: SURGERY

## 2024-06-03 PROCEDURE — 36415 COLL VENOUS BLD VENIPUNCTURE: CPT

## 2024-06-03 PROCEDURE — 80069 RENAL FUNCTION PANEL: CPT

## 2024-06-03 PROCEDURE — 2500000003 HC RX 250 WO HCPCS: Performed by: STUDENT IN AN ORGANIZED HEALTH CARE EDUCATION/TRAINING PROGRAM

## 2024-06-03 PROCEDURE — 85025 COMPLETE CBC W/AUTO DIFF WBC: CPT

## 2024-06-03 RX ORDER — VALSARTAN 80 MG/1
80 TABLET ORAL EVERY EVENING
Status: DISCONTINUED | OUTPATIENT
Start: 2024-06-03 | End: 2024-06-07 | Stop reason: HOSPADM

## 2024-06-03 RX ORDER — METRONIDAZOLE 500 MG/1
500 TABLET ORAL EVERY 8 HOURS SCHEDULED
Status: DISCONTINUED | OUTPATIENT
Start: 2024-06-03 | End: 2024-06-04

## 2024-06-03 RX ORDER — SODIUM CHLORIDE 9 MG/ML
25 INJECTION, SOLUTION INTRAVENOUS PRN
Status: DISCONTINUED | OUTPATIENT
Start: 2024-06-03 | End: 2024-06-07 | Stop reason: HOSPADM

## 2024-06-03 RX ORDER — SODIUM CHLORIDE 0.9 % (FLUSH) 0.9 %
5-40 SYRINGE (ML) INJECTION PRN
Status: DISCONTINUED | OUTPATIENT
Start: 2024-06-03 | End: 2024-06-07 | Stop reason: HOSPADM

## 2024-06-03 RX ORDER — ACETAMINOPHEN 500 MG
1000 TABLET ORAL EVERY 8 HOURS SCHEDULED
Status: DISCONTINUED | OUTPATIENT
Start: 2024-06-03 | End: 2024-06-07 | Stop reason: HOSPADM

## 2024-06-03 RX ORDER — LIDOCAINE HYDROCHLORIDE 10 MG/ML
5 INJECTION, SOLUTION EPIDURAL; INFILTRATION; INTRACAUDAL; PERINEURAL ONCE
Status: COMPLETED | OUTPATIENT
Start: 2024-06-03 | End: 2024-06-03

## 2024-06-03 RX ORDER — SODIUM CHLORIDE 0.9 % (FLUSH) 0.9 %
5-40 SYRINGE (ML) INJECTION EVERY 12 HOURS SCHEDULED
Status: DISCONTINUED | OUTPATIENT
Start: 2024-06-03 | End: 2024-06-07 | Stop reason: HOSPADM

## 2024-06-03 RX ORDER — INSULIN LISPRO 100 [IU]/ML
17 INJECTION, SOLUTION INTRAVENOUS; SUBCUTANEOUS
Status: DISCONTINUED | OUTPATIENT
Start: 2024-06-03 | End: 2024-06-04

## 2024-06-03 RX ADMIN — CEFEPIME 2000 MG: 2 INJECTION, POWDER, FOR SOLUTION INTRAVENOUS at 18:44

## 2024-06-03 RX ADMIN — CARVEDILOL 25 MG: 25 TABLET, FILM COATED ORAL at 21:19

## 2024-06-03 RX ADMIN — DOCUSATE SODIUM 100 MG: 100 CAPSULE, LIQUID FILLED ORAL at 21:20

## 2024-06-03 RX ADMIN — ACETAMINOPHEN 650 MG: 325 TABLET ORAL at 09:37

## 2024-06-03 RX ADMIN — INSULIN LISPRO 14 UNITS: 100 INJECTION, SOLUTION INTRAVENOUS; SUBCUTANEOUS at 09:40

## 2024-06-03 RX ADMIN — DULOXETINE HYDROCHLORIDE 60 MG: 60 CAPSULE, DELAYED RELEASE ORAL at 09:37

## 2024-06-03 RX ADMIN — MAGNESIUM HYDROXIDE 30 ML: 400 SUSPENSION ORAL at 16:31

## 2024-06-03 RX ADMIN — METRONIDAZOLE 500 MG: 500 TABLET ORAL at 21:20

## 2024-06-03 RX ADMIN — SODIUM CHLORIDE, POTASSIUM CHLORIDE, SODIUM LACTATE AND CALCIUM CHLORIDE: 600; 310; 30; 20 INJECTION, SOLUTION INTRAVENOUS at 03:49

## 2024-06-03 RX ADMIN — METRONIDAZOLE 500 MG: 500 TABLET ORAL at 14:45

## 2024-06-03 RX ADMIN — HYDROMORPHONE HYDROCHLORIDE 0.5 MG: 1 INJECTION, SOLUTION INTRAMUSCULAR; INTRAVENOUS; SUBCUTANEOUS at 12:10

## 2024-06-03 RX ADMIN — SODIUM CHLORIDE, PRESERVATIVE FREE 10 ML: 5 INJECTION INTRAVENOUS at 14:45

## 2024-06-03 RX ADMIN — LIDOCAINE HYDROCHLORIDE ANHYDROUS 5 ML: 10 INJECTION, SOLUTION INFILTRATION at 15:24

## 2024-06-03 RX ADMIN — ROSUVASTATIN CALCIUM 40 MG: 20 TABLET, COATED ORAL at 09:37

## 2024-06-03 RX ADMIN — SODIUM CHLORIDE: 9 INJECTION, SOLUTION INTRAVENOUS at 18:44

## 2024-06-03 RX ADMIN — INSULIN LISPRO 17 UNITS: 100 INJECTION, SOLUTION INTRAVENOUS; SUBCUTANEOUS at 14:45

## 2024-06-03 RX ADMIN — VALSARTAN 80 MG: 80 TABLET, FILM COATED ORAL at 18:35

## 2024-06-03 RX ADMIN — SODIUM CHLORIDE, POTASSIUM CHLORIDE, SODIUM LACTATE AND CALCIUM CHLORIDE: 600; 310; 30; 20 INJECTION, SOLUTION INTRAVENOUS at 12:51

## 2024-06-03 RX ADMIN — AMLODIPINE BESYLATE 5 MG: 5 TABLET ORAL at 09:37

## 2024-06-03 RX ADMIN — OXYCODONE 10 MG: 5 TABLET ORAL at 12:51

## 2024-06-03 RX ADMIN — DOCUSATE SODIUM 100 MG: 100 CAPSULE, LIQUID FILLED ORAL at 09:37

## 2024-06-03 RX ADMIN — SODIUM CHLORIDE: 9 INJECTION, SOLUTION INTRAVENOUS at 12:12

## 2024-06-03 RX ADMIN — INSULIN GLARGINE 60 UNITS: 100 INJECTION, SOLUTION SUBCUTANEOUS at 09:36

## 2024-06-03 RX ADMIN — CEFEPIME 2000 MG: 2 INJECTION, POWDER, FOR SOLUTION INTRAVENOUS at 12:13

## 2024-06-03 RX ADMIN — INSULIN LISPRO 4 UNITS: 100 INJECTION, SOLUTION INTRAVENOUS; SUBCUTANEOUS at 09:37

## 2024-06-03 RX ADMIN — CARVEDILOL 25 MG: 25 TABLET, FILM COATED ORAL at 09:37

## 2024-06-03 RX ADMIN — ACETAMINOPHEN 1000 MG: 500 TABLET ORAL at 21:19

## 2024-06-03 RX ADMIN — CEFEPIME 2000 MG: 2 INJECTION, POWDER, FOR SOLUTION INTRAVENOUS at 03:43

## 2024-06-03 ASSESSMENT — PAIN DESCRIPTION - ORIENTATION
ORIENTATION: LEFT

## 2024-06-03 ASSESSMENT — PAIN DESCRIPTION - DESCRIPTORS
DESCRIPTORS: ACHING;DISCOMFORT

## 2024-06-03 ASSESSMENT — PAIN SCALES - GENERAL
PAINLEVEL_OUTOF10: 0
PAINLEVEL_OUTOF10: 10
PAINLEVEL_OUTOF10: 4
PAINLEVEL_OUTOF10: 7
PAINLEVEL_OUTOF10: 10
PAINLEVEL_OUTOF10: 10
PAINLEVEL_OUTOF10: 4

## 2024-06-03 ASSESSMENT — PAIN DESCRIPTION - LOCATION
LOCATION: GROIN

## 2024-06-03 ASSESSMENT — PAIN SCALES - WONG BAKER: WONGBAKER_NUMERICALRESPONSE: NO HURT

## 2024-06-03 NOTE — CONSULTS
Patient of [facility], Dr Hester. Will forward the consult to them     Thanks for the consult

## 2024-06-03 NOTE — CARE COORDINATION
CM continues to follow for DC planning and needs, CM spoke with patient and his mother Katharine today, they are interested in a short rehab stay at DC, mother is interested in possibly [facility] in [place], it would be close to patients mothers home, where patient has been staying.     Instill wound vac placed today, unclear at this time if patient will have instill vac, regular vac or no vac at DC at this time. Will have IV abx at DC, which referral was given to [facility] for benefit check and patient is covered at 100% but will need a form signed for medicaid per option care, if patient does end up going home at DC.    CM will make referral to [facility] for clinical review, awaiting final ID and surgical plan prior to DC. Patient will need pre-cert for rehab, will need PT/OT evals, which CM will request orders via perfectserve from attending MD.    Wound vac paperwork faxed to [facility] for review and home vac set up in case patient does choose to return home, but patient currently in agreement with plans for rehab for strengthening and medical monitoring prior to returning home with his mother.    CM left SNF list and IPR list at bedside with patient for further review.    CM will continue to follow and assist with DC planning and needs.    Thank you,  Tapan LUCAS, RN,   [phone]

## 2024-06-03 NOTE — PROGRESS NOTES
present with subcutaneous fat stranding  -Patient was evaluated by general surgery and is status post left medial thigh debridement on 5/29.  OR cultures showing mixed gram-negative's, Klebsiella and E. coli along with anaerobes and strep.  MRSA nares negative  -Patient's status post takeback to the OR for debridement again on 5/31.  -Patient currently with wet-to-dry dressing,  possible VAC placement today per General surgery.  -With source control, we stopped clindamycin 5/30.  Blood cultures negative to date  -Currently on vancomycin and cefepime.  With MRSA nares negative, will stop vancomycin today.   -Continue cefepime and add metronidazole given the OR culture results.  -Continue monitoring wound, repeated debridement as needed.  Irrigating wound VAC placed today, 6/3  -Will plan for a course of IV antibiotic coverage given depth of infection.  Plan for 2 weeks, to assess for improvement with VAC. Plan PICC placement. See MINOO    CKD:  - renally dose adjust antibiotics as needed    DM2:  -A1c on this admission 11.9.  Continue good glycemic control to aid in healing and prevention of further infections.    INFUSION ORDERS:  - Drug: IV cefepime 2 g every 8 hours and oral metronidazole 500 mg 3 times daily  - Planned End date: 6/17/2024  - Diagnosis: Necrotizing fasciitis of the left leg, mixed gram-negative and anaerobic infection  - Has received test dose in hospital  - Routine   - Check CBC w diff, CMP, ESR, CRP every Mon or Tue - FAX result to [phone]  - Call with antibiotic / infusion issues, [phone]  - Call with any change in status, transfer in or out of a facility or to hospital; This MINOO is only valid for current disposition - [phone].    - No f/u in outpatient ID office necessary      Medical Decision Making:  The following items were considered in medical decision making:  Discussion of patient care with other providers  Reviewed clinical lab tests  Reviewed radiology tests  Reviewed  other diagnostic tests/interventions  Independent review of radiologic images  Microbiology cultures and other micro tests reviewed      Time spent in management of patient, coordination of care and discussion of OPAT, PICC care and education 55 mins.      Discussed with patient, SW and primary team, Dr. Sorenson.  Thank you for this consult, we will sign off.  Please contact us for any further questions or concerns.    Hernandez Rae MD

## 2024-06-03 NOTE — CONSULTS
Vancomycin has been discontinued. Pharmacy will sign off consult. If medication dosing is resumed, please re-consult pharmacy.    Please call with any questions.  Herber RoseD, BCPS  Main pharmacy: [id number]  6/3/2024 10:30 AM

## 2024-06-03 NOTE — CONSULTS
was negative except for: Constitutional: positive for fevers  Gastrointestinal: positive for constipation  Musculoskeletal: positive for myalgias and left thigh pain      Physical Exam:    VITALS:  BP (!) 160/107   Pulse 98   Temp 98.9 °F (37.2 °C) (Oral)   Resp 18   Ht 1.854 m (6' 1\")   Wt 127.5 kg (281 lb 1.4 oz)   SpO2 96%   BMI 37.08 kg/m²   TEMPERATURE:  Current - Temp: 98.9 °F (37.2 °C); Max - Temp  Av °F (37.2 °C)  Min: 98.4 °F (36.9 °C)  Max: 99.7 °F (37.6 °C)  RESPIRATIONS RANGE: Resp  Av.3  Min: 18  Max: 29  PULSE RANGE: Pulse  Av.1  Min: 90  Max: 107  BLOOD PRESSURE RANGE:  Systolic (24hrs), Av , Min:124 , Max:173  ; Diastolic (24hrs), Av, Min:79, Max:115   PULSE OXIMETRY RANGE: SpO2  Av.9 %  Min: 93 %  Max: 99 %  24HR INTAKE/OUTPUT:    Intake/Output Summary (Last 24 hours) at 6/3/2024 1322  Last data filed at 6/3/2024 1222  Gross per 24 hour   Intake --   Output 3100 ml   Net -3100 ml       Constitutional:  NAD, large man  HEENT:  no oral lesions  Neck: prominent EJ, no JVD  Lungs:  clear  Cardiovascular:  RRR, no murmur or rub  Abd:  soft, NT, BS+, no HSM  Ext:  No edema; left thigh VAC  Skin:  No rash  Neuro: non-focal        DATA:    CBC:   Lab Results   Component Value Date/Time    WBC 11.8 2024 11:19 AM    RBC 3.39 2024 11:19 AM    HGB 8.6 2024 11:19 AM    HCT 26.5 2024 11:19 AM    MCV 78.1 2024 11:19 AM    MCH 25.4 2024 11:19 AM    MCHC 32.5 2024 11:19 AM    RDW 13.2 2024 11:19 AM     2024 11:19 AM    MPV 7.5 2024 11:19 AM     CMP:    Lab Results   Component Value Date/Time     2024 11:19 AM    K 3.6 2024 11:19 AM    K 4.1 2024 11:22 AM     2024 11:19 AM    CO2 27 2024 11:19 AM    BUN 18 2024 11:19 AM    CREATININE 1.5 2024 11:19 AM    GFRAA >60 2022 01:08 PM    GFRAA >60 2011 02:51 PM    AGRATIO 1.4 2023 06:52 AM    LABGLOM  61 06/03/2024 11:19 AM    LABGLOM >60 12/14/2023 08:27 AM    GLUCOSE 177 06/03/2024 11:19 AM    CALCIUM 8.6 06/03/2024 11:19 AM    BILITOT 0.5 05/20/2024 02:29 PM    ALKPHOS 82 05/20/2024 02:29 PM    AST 22 05/20/2024 02:29 PM    ALT 17 05/20/2024 02:29 PM     Phosphorus:    Lab Results   Component Value Date/Time    PHOS 2.8 06/03/2024 11:19 AM     PT/INR:    Lab Results   Component Value Date/Time    PROTIME 15.8 05/30/2024 05:15 AM    INR 1.24 05/30/2024 05:15 AM       IMPRESSION:      Acute kidney injury:  on presentation  Due to acute illness, infection  Resolved  CKD stage 3a  Baseline Cr in mid to upper 1 range  Felt to be DN  In baseline  Patient is right handed - protect veins left arm  Can have right arm PICC  Proteinuria   UPC 5.38  Past kidney biopsy at age 19 but results not currently available  Felt to be DN  Normally on Jardiance and valsartan but these agents currently on hold  Necrotizing infection left thigh  Post debridement  Cultures with E coli, Klebsiella, and beta strep.  On IV cefepime for 2 weeks  On oral Flagyl  ID following  Can have PICC  DM type 2  Control has historically been poor  Anemia:  multifactorial  Check iron for future reference: would not give IV iron now  Osteodystrophy:  phos controlled  HTN  BP elevated  Stop IVF  Restart valsartan lower dose    Plan:  May have PICC right arm preferred  Stop IVF  Start lower dose valsartan in evening  Monitor function  Check iron: annot have IV iron acutely    Zach Rosa MD

## 2024-06-03 NOTE — PROGRESS NOTES
Order for PICC noted, pt with CKD and follows nephrology op, d/w Dr Rae regarding extended access and with pt creat elevations, would prefer clearance for any upper arm lines.  Pt RN aware.

## 2024-06-03 NOTE — PROGRESS NOTES
General Surgery   Daily Progress Note  Patient: Issa Moctezuma      CC: necrotizing fascitis     SUBJECTIVE:   No acute events. Pain is controlled. Tolerating diet. Lost IV access.     ROS:   A 14 point review of systems was conducted, significant findings as noted above. All other systems negative.    OBJECTIVE:    PHYSICAL EXAM:    Vitals:    06/02/24 1642 06/02/24 2205 06/03/24 0110 06/03/24 0341   BP: (!) 150/99 (!) 165/107 (!) 159/102 (!) 151/95   Pulse: 93 (!) 104 (!) 107 98   Resp: 18 18 18 18   Temp: 98.4 °F (36.9 °C) 98.9 °F (37.2 °C) 99.7 °F (37.6 °C) 99.3 °F (37.4 °C)   TempSrc: Oral Oral Oral Oral   SpO2: 94% 97% 99% 96%   Weight:       Height:           General appearance: Alert, no acute distress  Eyes: No scleral icterus, EOM grossly intact  Neck: Trachea midline, no JVD  Chest/Lungs: Normal effort with no accessory muscle use on RA  Cardiovascular: RRR, well-perfused  Abdomen: Obese, non-tender, no rebound, guarding, or rigidity.  Skin: Warm and dry, no rashes  Extremities: L thigh wound base clear. No signs of necrotizing tissue.   Neuro: A&Ox3, no focal deficits, sensation intact    LABS:   Recent Labs     06/01/24  1330 06/02/24  0401   WBC 11.8* 11.1*   HGB 9.0* 8.8*   HCT 28.0* 26.6*   MCV 78.4* 78.8*    338          Recent Labs     06/01/24  0442 06/02/24  0401   * 138   K 4.5 4.2    105   CO2 20* 23   PHOS 2.7 2.4*   BUN 20 28*   CREATININE 1.6* 1.6*       ASSESSMENT & PLAN:   This is a 38 y.o. male with hx CKD, HFrEF, DM, w/ nec fas L medial thigh, s/p debridement 5/29 and s/p second look 5/31 3 Days Post-Op    -Pain control: tylenol, oxy vs dilaudid prn   -irrigating VAC today  Diet: Reg  -OOB, ambulate QID, IS 10x/ hr  -DVT ppx: lovenox  -strict BG control -- recommend high dose sliding scale     Buzz Hoover DO  PGY-1, General Surgery Resident  06/03/24 6:30 AM  [phone]    I have seen, examined, and reviewed the patients chart. I agree with the residents assessment

## 2024-06-03 NOTE — PROGRESS NOTES
Wound vac change:    Pt premedicated with .5 mg dialudid    Wound vac placed to L groin. Wound bed with some fibrinous tissue. No odor or exudate. Surrounding skin clipped with electric clipper. Foam placed to wound bed and track pad to L thigh. Placed to -125 mm hg. Wound irrigated with 40 cc saline, q2hrs with 10 hr dwell. This amount may need to be increased after pt up and moving an seal continues to hold        Hieu Elias CNP  6/3/2024  1:12 PM  randi

## 2024-06-03 NOTE — PROGRESS NOTES
V2.0    [facility] Progress Note      Name:  Issa Moctezuma /Age/Sex: 1985  (38 y.o. male)   MRN & CSN:  [id number] & [id number] Encounter Date/Time: 6/3/2024 8:45 PM EDT   Location:  [facility]/6322- PCP: Sonia Kan DO     Attending:Megan Sorenson MD       Hospital Day: 8    Assessment and Recommendations   Issa Moctezuma is a 38 y.o. male with pmh of CKD who presents with Necrotizing soft tissue infection    #Necrotizing SSTI of L thigh  --patient on admission was febrile but otherwise had symptoms more consistent with viral syndrome (e.g. myalgias) without otherwise localizing signs of focal bacterial infection. Procal only mildly elevated at 0.6 and BCx NGTD.  MRSA swab negative.  Initial plan to monitor off abx as no convincing source of bacterial infection  --On  reported new pain/swelling in L medial thigh near groin, CT obtained with subcutaneous gas in that region  -Surgery consulted  -- 2024 emergent L thigh debridement  -- 2024 repeat L thigh debridement/washout  -Discussed with surgery resident Dr. Hoover, plan for wound VAC placement  -ID consulted  -empiric vanc day 5, DC  -IV cefepime day 6  -2024 surgical culture positive for: E. Coli, Klebsiella, and Beta strep.  Provotella bivia  -ID recommends 2 weeks of IV cefepime and p.o. Flagyl (needs Rx for p.o. Flagyl on discharge)  -Nephrology consult for PICC line, discussed with Dr. Moreno     #Type II DM, uncontrolled  - A1c from 2024 ~14%  - Repeat A1c this admission ~11%  - continue glargine insulin, scheduled lispro, and SSI, titrate as needed, increase SSI to high dose algorithm due to hyperglycemia,    #CHELE on CKD, improving  - Continue IVF  - Trend BMP  -Nephrology consult for PICC line placement    Constipation  5 days since last BM  Started on scheduled and as needed laxation therapy    CODE STATUS, full code  Verified with the patient at bedside  POA is patient's mother Katharine Rhodes  PT OT evaluation

## 2024-06-03 NOTE — DISCHARGE INSTRUCTIONS
Infection with Good Hand Hygiene  A PICC can let germs into your body. This can lead to serious and sometimes deadly infections. To prevent infection, it’s very important that you, your caregivers and others around you use good hand hygiene. This means washing your hands well with soap and water, and cleaning them with alcohol based hand gel as directed. Never touch the PICC or dressing without first using one of the methods.   To wash your hands with soap and water:  . Wet your hands with warm water. (Avoid hot water, which can cause skin irritation when you wash your hands often).  . Apply enough soap to cover the entire surface of your hands, including fingers.  . Rub your hands together vigorously for at least 15 seconds. Make sure to rub the front and back of each hand up to the wrist, your fingers and fingernails, between the fingers, and each thumb.  . Rinse your hands with warm water  . Dry your hands completely with a new paper towel. Don’t use a cloth towel or other reusable towel. These can harbor germs.  . Use the paper towel to turn off the faucet, then throw it away. If you are in a bathroom, also use a paper towel to open the door instead of touching the handle.  When you don’t have access to soap and water: Use alcohol-based hand gel. The gel should have at least 60% alcohol. Follow the instructions on the package. Your healthcare team can answer any questions you have about when to use hand gel, or when it’s better to wash with soap and water.    When to Call Your Healthcare Provider  Call your provider right away if you have any of the following:  . Pain or burning in your shoulder, chest, back, arm, or leg  . Fever of 100.4 F or higher  . Chills  . Signs of infection at the catheter site (pain, redness, drainage, burning, or stinging  . Coughing, wheezing, or shortness of breath  . A racing or irregular heartbeat  . Muscle stiffness or trouble moving  . Tightness in your arm, above the catheter  site  . Gurgling noises coming from the catheter  . The catheter falls out, breaks, cracks, leaks, or has other damage       References:  http://www.[place].org/articles/healthsheets/2584          Extra Heart Failure Education/ Tools/ Resources:     https://HexAirbot.BomTrip.com/publication/?d=924181   --- this is American Heart Association interactive Healthier Living with Heart Failure guidebook.  Please click hyperlink or copy / paste link into search bar. The QR Code is also available below. Use your mouse to scroll through the pages.  Lots of information about weight monitoring, diet tips, activity, meds, etc    Heart Failure Tools and Resources QR Code is below. It includes multiple resources to include symptom tracker, med tracker, further HF info, and access to a HF Support Network online Community    HF [facility]  -- this is a free smart phone jeannine available for iPhone and Android download.  Use your phone to track sodium / fluid intake, zone tool symptom tracking, weights, medications, etc. Click on this hyperlink  HF [facility]   for QR code for easy download or the link is also found in the below HF Tools and Resources.      DASH (Dietary Approach to Stop Hypertension) diet --  https://www.nhlbi.nih.gov/education/dash-eating-plan -- this diet is a flexible eating plan that promotes heart healthy eating style.  Click on hyperlink or copy / paste link into search bar.  Lots of low sodium recipes and tips.    https://www.Matchbin.BomTrip.com/recipes  -- more free recipes

## 2024-06-03 NOTE — PLAN OF CARE
Pt lost IV access overnight. Typically needs US guided placement, no one trained in US available at this time. MD Hamilton notified via perfect serve of loss of access, surgery also aware.     Problem: Discharge Planning  Goal: Discharge to home or other facility with appropriate resources  Outcome: Progressing     Problem: Safety - Adult  Goal: Free from fall injury  Outcome: Progressing     Problem: ABCDS Injury Assessment  Goal: Absence of physical injury  Outcome: Progressing     Problem: Skin/Tissue Integrity  Goal: Absence of new skin breakdown  Description: 1.  Monitor for areas of redness and/or skin breakdown  2.  Assess vascular access sites hourly  3.  Every 4-6 hours minimum:  Change oxygen saturation probe site  4.  Every 4-6 hours:  If on nasal continuous positive airway pressure, respiratory therapy assess nares and determine need for appliance change or resting period.  Outcome: Progressing     Problem: Pain  Goal: Verbalizes/displays adequate comfort level or baseline comfort level  Outcome: Progressing     Problem: Chronic Conditions and Co-morbidities  Goal: Patient's chronic conditions and co-morbidity symptoms are monitored and maintained or improved  Outcome: Progressing     Problem: Cardiovascular - Adult  Goal: Absence of cardiac dysrhythmias or at baseline  Outcome: Progressing

## 2024-06-03 NOTE — DISCHARGE INSTR - COC
Continuity of Care Form    Patient Name: Issa Moctezuma   :  1985  MRN:  [id number]    Admit date:  2024  Discharge date:  2024      Code Status Order: Full Code   Advance Directives:   Advance Care Flowsheet Documentation       Date/Time Healthcare Directive Type of Healthcare Directive Copy in Chart Healthcare Agent Appointed Healthcare Agent's Name Healthcare Agent's Phone Number    [phone] No, patient does not have an advance directive for healthcare treatment -- -- -- -- --            Admitting Physician:  Nellie Marie MD  PCP: Sonia Kan DO    Discharging Nurse: Alexa Matute  Discharging Hospital Unit/Room#: 6322/6322-01  Discharging Unit Phone Number: [phone]      Emergency Contact:   Extended Emergency Contact Information  Primary Emergency Contact: LUCY ALVARADO  Home Phone: [phone]  Work Phone: [phone]  Mobile Phone: [phone]  Relation: Parent  Preferred language: English   needed? No    Past Surgical History:  Past Surgical History:   Procedure Laterality Date    BRONCHOSCOPY  2022    BRONCHOSCOPY BRUSHINGS performed by Rui Santana MD at [facility] ENDOSCOPY    BRONCHOSCOPY  2022    BRONCHOSCOPY DIAGNOSTIC OR CELL WASH ONLY performed by Rui Santana MD at [facility] ENDOSCOPY    BRONCHOSCOPY  2022    BRONCHOSCOPY BIOPSY BRONCHUS performed by Rui Santana MD at [facility] ENDOSCOPY    BRONCHOSCOPY N/A 2022    BRONCHOSCOPY DIAGNOSTIC OR CELL WASH ONLY performed by Sonido Méndez DO at [facility] ENDOSCOPY    LEG SURGERY Left 2024    LEFT THIGH DEBRIDEMENT performed by Herb Abdalla MD at [facility] OR    LEG SURGERY Left 2024    LEFT GROIN WOUND WASH OUT AND DEBRIDEMENT performed by Herb Abdalla MD at [facility] OR       Immunization History:   Immunization History   Administered Date(s) Administered    COVID-19, MODERNA Bivalent, (age 12y+), IM, 50 mcg/0.5 mL 08/15/2023    Influenza Virus Vaccine

## 2024-06-04 ENCOUNTER — ANESTHESIA (OUTPATIENT)
Dept: OPERATING ROOM | Age: 39
End: 2024-06-04
Payer: MEDICAID

## 2024-06-04 ENCOUNTER — ANESTHESIA EVENT (OUTPATIENT)
Dept: OPERATING ROOM | Age: 39
End: 2024-06-04
Payer: MEDICAID

## 2024-06-04 LAB
ANION GAP SERPL CALCULATED.3IONS-SCNC: 5 MMOL/L (ref 3–16)
ANION GAP SERPL CALCULATED.3IONS-SCNC: 8 MMOL/L (ref 3–16)
BUN SERPL-MCNC: 19 MG/DL (ref 7–20)
BUN SERPL-MCNC: 20 MG/DL (ref 7–20)
CALCIUM SERPL-MCNC: 8.2 MG/DL (ref 8.3–10.6)
CALCIUM SERPL-MCNC: 8.6 MG/DL (ref 8.3–10.6)
CHLORIDE SERPL-SCNC: 102 MMOL/L (ref 99–110)
CHLORIDE SERPL-SCNC: 102 MMOL/L (ref 99–110)
CO2 SERPL-SCNC: 28 MMOL/L (ref 21–32)
CO2 SERPL-SCNC: 29 MMOL/L (ref 21–32)
CREAT SERPL-MCNC: 1.5 MG/DL (ref 0.9–1.3)
CREAT SERPL-MCNC: 1.5 MG/DL (ref 0.9–1.3)
DEPRECATED RDW RBC AUTO: 13 % (ref 12.4–15.4)
DEPRECATED RDW RBC AUTO: 13.3 % (ref 12.4–15.4)
GFR SERPLBLD CREATININE-BSD FMLA CKD-EPI: 61 ML/MIN/{1.73_M2}
GFR SERPLBLD CREATININE-BSD FMLA CKD-EPI: 61 ML/MIN/{1.73_M2}
GLUCOSE BLD-MCNC: 207 MG/DL (ref 70–99)
GLUCOSE BLD-MCNC: 250 MG/DL (ref 70–99)
GLUCOSE BLD-MCNC: 255 MG/DL (ref 70–99)
GLUCOSE BLD-MCNC: 258 MG/DL (ref 70–99)
GLUCOSE BLD-MCNC: 274 MG/DL (ref 70–99)
GLUCOSE BLD-MCNC: 275 MG/DL (ref 70–99)
GLUCOSE SERPL-MCNC: 176 MG/DL (ref 70–99)
GLUCOSE SERPL-MCNC: 184 MG/DL (ref 70–99)
HCT VFR BLD AUTO: 23.4 % (ref 40.5–52.5)
HCT VFR BLD AUTO: 24.3 % (ref 40.5–52.5)
HGB BLD-MCNC: 7.6 G/DL (ref 13.5–17.5)
HGB BLD-MCNC: 7.9 G/DL (ref 13.5–17.5)
MCH RBC QN AUTO: 25.5 PG (ref 26–34)
MCH RBC QN AUTO: 25.6 PG (ref 26–34)
MCHC RBC AUTO-ENTMCNC: 32.6 G/DL (ref 31–36)
MCHC RBC AUTO-ENTMCNC: 32.6 G/DL (ref 31–36)
MCV RBC AUTO: 78.2 FL (ref 80–100)
MCV RBC AUTO: 78.4 FL (ref 80–100)
PERFORMED ON: ABNORMAL
PLATELET # BLD AUTO: 399 K/UL (ref 135–450)
PLATELET # BLD AUTO: 423 K/UL (ref 135–450)
PMV BLD AUTO: 7.1 FL (ref 5–10.5)
PMV BLD AUTO: 7.5 FL (ref 5–10.5)
POTASSIUM SERPL-SCNC: 3.6 MMOL/L (ref 3.5–5.1)
POTASSIUM SERPL-SCNC: 3.8 MMOL/L (ref 3.5–5.1)
RBC # BLD AUTO: 2.99 M/UL (ref 4.2–5.9)
RBC # BLD AUTO: 3.11 M/UL (ref 4.2–5.9)
SODIUM SERPL-SCNC: 136 MMOL/L (ref 136–145)
SODIUM SERPL-SCNC: 138 MMOL/L (ref 136–145)
WBC # BLD AUTO: 11.3 K/UL (ref 4–11)
WBC # BLD AUTO: 11.9 K/UL (ref 4–11)

## 2024-06-04 PROCEDURE — 3700000001 HC ADD 15 MINUTES (ANESTHESIA): Performed by: SURGERY

## 2024-06-04 PROCEDURE — 6360000002 HC RX W HCPCS

## 2024-06-04 PROCEDURE — 2500000003 HC RX 250 WO HCPCS

## 2024-06-04 PROCEDURE — 6370000000 HC RX 637 (ALT 250 FOR IP): Performed by: INTERNAL MEDICINE

## 2024-06-04 PROCEDURE — 1200000000 HC SEMI PRIVATE

## 2024-06-04 PROCEDURE — 2580000003 HC RX 258: Performed by: STUDENT IN AN ORGANIZED HEALTH CARE EDUCATION/TRAINING PROGRAM

## 2024-06-04 PROCEDURE — 2580000003 HC RX 258

## 2024-06-04 PROCEDURE — 80048 BASIC METABOLIC PNL TOTAL CA: CPT

## 2024-06-04 PROCEDURE — 6370000000 HC RX 637 (ALT 250 FOR IP): Performed by: STUDENT IN AN ORGANIZED HEALTH CARE EDUCATION/TRAINING PROGRAM

## 2024-06-04 PROCEDURE — 99232 SBSQ HOSP IP/OBS MODERATE 35: CPT | Performed by: INTERNAL MEDICINE

## 2024-06-04 PROCEDURE — A4217 STERILE WATER/SALINE, 500 ML: HCPCS | Performed by: SURGERY

## 2024-06-04 PROCEDURE — 85027 COMPLETE CBC AUTOMATED: CPT

## 2024-06-04 PROCEDURE — 2709999900 HC NON-CHARGEABLE SUPPLY: Performed by: SURGERY

## 2024-06-04 PROCEDURE — 6370000000 HC RX 637 (ALT 250 FOR IP)

## 2024-06-04 PROCEDURE — 7100000001 HC PACU RECOVERY - ADDTL 15 MIN: Performed by: SURGERY

## 2024-06-04 PROCEDURE — 3600000004 HC SURGERY LEVEL 4 BASE: Performed by: SURGERY

## 2024-06-04 PROCEDURE — 2580000003 HC RX 258: Performed by: SURGERY

## 2024-06-04 PROCEDURE — 2W57X6Z REMOVAL OF PRESSURE DRESSING ON LEFT INGUINAL REGION: ICD-10-PCS

## 2024-06-04 PROCEDURE — 3600000014 HC SURGERY LEVEL 4 ADDTL 15MIN: Performed by: SURGERY

## 2024-06-04 PROCEDURE — 36415 COLL VENOUS BLD VENIPUNCTURE: CPT

## 2024-06-04 PROCEDURE — 3700000000 HC ANESTHESIA ATTENDED CARE: Performed by: SURGERY

## 2024-06-04 PROCEDURE — 7100000000 HC PACU RECOVERY - FIRST 15 MIN: Performed by: SURGERY

## 2024-06-04 RX ORDER — MAGNESIUM HYDROXIDE 1200 MG/15ML
LIQUID ORAL CONTINUOUS PRN
Status: COMPLETED | OUTPATIENT
Start: 2024-06-04 | End: 2024-06-04

## 2024-06-04 RX ORDER — NALOXONE HYDROCHLORIDE 0.4 MG/ML
INJECTION, SOLUTION INTRAMUSCULAR; INTRAVENOUS; SUBCUTANEOUS PRN
Status: DISCONTINUED | OUTPATIENT
Start: 2024-06-04 | End: 2024-06-04 | Stop reason: HOSPADM

## 2024-06-04 RX ORDER — HYDRALAZINE HYDROCHLORIDE 20 MG/ML
10 INJECTION INTRAMUSCULAR; INTRAVENOUS
Status: DISCONTINUED | OUTPATIENT
Start: 2024-06-04 | End: 2024-06-04 | Stop reason: HOSPADM

## 2024-06-04 RX ORDER — SUCCINYLCHOLINE/SOD CL,ISO/PF 200MG/10ML
SYRINGE (ML) INTRAVENOUS PRN
Status: DISCONTINUED | OUTPATIENT
Start: 2024-06-04 | End: 2024-06-04 | Stop reason: SDUPTHER

## 2024-06-04 RX ORDER — HYDROMORPHONE HYDROCHLORIDE 1 MG/ML
0.5 INJECTION, SOLUTION INTRAMUSCULAR; INTRAVENOUS; SUBCUTANEOUS EVERY 5 MIN PRN
Status: DISCONTINUED | OUTPATIENT
Start: 2024-06-04 | End: 2024-06-04 | Stop reason: HOSPADM

## 2024-06-04 RX ORDER — ENOXAPARIN SODIUM 100 MG/ML
30 INJECTION SUBCUTANEOUS 2 TIMES DAILY
Status: DISCONTINUED | OUTPATIENT
Start: 2024-06-05 | End: 2024-06-07 | Stop reason: HOSPADM

## 2024-06-04 RX ORDER — ROCURONIUM BROMIDE 10 MG/ML
INJECTION, SOLUTION INTRAVENOUS PRN
Status: DISCONTINUED | OUTPATIENT
Start: 2024-06-04 | End: 2024-06-04 | Stop reason: SDUPTHER

## 2024-06-04 RX ORDER — FENTANYL CITRATE 50 UG/ML
25 INJECTION, SOLUTION INTRAMUSCULAR; INTRAVENOUS EVERY 5 MIN PRN
Status: DISCONTINUED | OUTPATIENT
Start: 2024-06-04 | End: 2024-06-04 | Stop reason: HOSPADM

## 2024-06-04 RX ORDER — MIDAZOLAM HYDROCHLORIDE 1 MG/ML
INJECTION INTRAMUSCULAR; INTRAVENOUS PRN
Status: DISCONTINUED | OUTPATIENT
Start: 2024-06-04 | End: 2024-06-04 | Stop reason: SDUPTHER

## 2024-06-04 RX ORDER — DEXAMETHASONE SODIUM PHOSPHATE 4 MG/ML
INJECTION, SOLUTION INTRA-ARTICULAR; INTRALESIONAL; INTRAMUSCULAR; INTRAVENOUS; SOFT TISSUE PRN
Status: DISCONTINUED | OUTPATIENT
Start: 2024-06-04 | End: 2024-06-04 | Stop reason: SDUPTHER

## 2024-06-04 RX ORDER — ONDANSETRON 2 MG/ML
INJECTION INTRAMUSCULAR; INTRAVENOUS PRN
Status: DISCONTINUED | OUTPATIENT
Start: 2024-06-04 | End: 2024-06-04 | Stop reason: SDUPTHER

## 2024-06-04 RX ORDER — FENTANYL CITRATE 50 UG/ML
INJECTION, SOLUTION INTRAMUSCULAR; INTRAVENOUS PRN
Status: DISCONTINUED | OUTPATIENT
Start: 2024-06-04 | End: 2024-06-04 | Stop reason: SDUPTHER

## 2024-06-04 RX ORDER — SODIUM CHLORIDE 0.9 % (FLUSH) 0.9 %
5-40 SYRINGE (ML) INJECTION EVERY 12 HOURS SCHEDULED
Status: DISCONTINUED | OUTPATIENT
Start: 2024-06-04 | End: 2024-06-04 | Stop reason: HOSPADM

## 2024-06-04 RX ORDER — ONDANSETRON 2 MG/ML
4 INJECTION INTRAMUSCULAR; INTRAVENOUS
Status: DISCONTINUED | OUTPATIENT
Start: 2024-06-04 | End: 2024-06-04 | Stop reason: HOSPADM

## 2024-06-04 RX ORDER — LIDOCAINE HYDROCHLORIDE 20 MG/ML
INJECTION, SOLUTION INTRAVENOUS PRN
Status: DISCONTINUED | OUTPATIENT
Start: 2024-06-04 | End: 2024-06-04 | Stop reason: SDUPTHER

## 2024-06-04 RX ORDER — POLYETHYLENE GLYCOL 3350 17 G/17G
17 POWDER, FOR SOLUTION ORAL ONCE
Status: COMPLETED | OUTPATIENT
Start: 2024-06-04 | End: 2024-06-04

## 2024-06-04 RX ORDER — IPRATROPIUM BROMIDE AND ALBUTEROL SULFATE 2.5; .5 MG/3ML; MG/3ML
1 SOLUTION RESPIRATORY (INHALATION)
Status: DISCONTINUED | OUTPATIENT
Start: 2024-06-04 | End: 2024-06-04 | Stop reason: HOSPADM

## 2024-06-04 RX ORDER — OXYCODONE HYDROCHLORIDE 5 MG/1
10 TABLET ORAL PRN
Status: DISCONTINUED | OUTPATIENT
Start: 2024-06-04 | End: 2024-06-04 | Stop reason: HOSPADM

## 2024-06-04 RX ORDER — LABETALOL HYDROCHLORIDE 5 MG/ML
10 INJECTION, SOLUTION INTRAVENOUS
Status: DISCONTINUED | OUTPATIENT
Start: 2024-06-04 | End: 2024-06-04 | Stop reason: HOSPADM

## 2024-06-04 RX ORDER — MEPERIDINE HYDROCHLORIDE 25 MG/ML
12.5 INJECTION INTRAMUSCULAR; INTRAVENOUS; SUBCUTANEOUS EVERY 5 MIN PRN
Status: DISCONTINUED | OUTPATIENT
Start: 2024-06-04 | End: 2024-06-04 | Stop reason: HOSPADM

## 2024-06-04 RX ORDER — PROCHLORPERAZINE EDISYLATE 5 MG/ML
5 INJECTION INTRAMUSCULAR; INTRAVENOUS
Status: DISCONTINUED | OUTPATIENT
Start: 2024-06-04 | End: 2024-06-04 | Stop reason: HOSPADM

## 2024-06-04 RX ORDER — ACETAMINOPHEN 325 MG/1
650 TABLET ORAL
Status: DISCONTINUED | OUTPATIENT
Start: 2024-06-04 | End: 2024-06-04 | Stop reason: HOSPADM

## 2024-06-04 RX ORDER — HYDROMORPHONE HYDROCHLORIDE 1 MG/ML
0.5 INJECTION, SOLUTION INTRAMUSCULAR; INTRAVENOUS; SUBCUTANEOUS ONCE
Status: DISCONTINUED | OUTPATIENT
Start: 2024-06-04 | End: 2024-06-07 | Stop reason: ALTCHOICE

## 2024-06-04 RX ORDER — HYDROMORPHONE HYDROCHLORIDE 2 MG/ML
INJECTION, SOLUTION INTRAMUSCULAR; INTRAVENOUS; SUBCUTANEOUS PRN
Status: DISCONTINUED | OUTPATIENT
Start: 2024-06-04 | End: 2024-06-04 | Stop reason: SDUPTHER

## 2024-06-04 RX ORDER — SODIUM CHLORIDE 0.9 % (FLUSH) 0.9 %
5-40 SYRINGE (ML) INJECTION PRN
Status: DISCONTINUED | OUTPATIENT
Start: 2024-06-04 | End: 2024-06-04 | Stop reason: HOSPADM

## 2024-06-04 RX ORDER — PHENYLEPHRINE HYDROCHLORIDE 10 MG/ML
INJECTION INTRAVENOUS PRN
Status: DISCONTINUED | OUTPATIENT
Start: 2024-06-04 | End: 2024-06-04 | Stop reason: SDUPTHER

## 2024-06-04 RX ORDER — PROPOFOL 10 MG/ML
INJECTION, EMULSION INTRAVENOUS PRN
Status: DISCONTINUED | OUTPATIENT
Start: 2024-06-04 | End: 2024-06-04 | Stop reason: SDUPTHER

## 2024-06-04 RX ORDER — SODIUM CHLORIDE, SODIUM LACTATE, POTASSIUM CHLORIDE, CALCIUM CHLORIDE 600; 310; 30; 20 MG/100ML; MG/100ML; MG/100ML; MG/100ML
INJECTION, SOLUTION INTRAVENOUS CONTINUOUS
Status: DISCONTINUED | OUTPATIENT
Start: 2024-06-04 | End: 2024-06-04

## 2024-06-04 RX ORDER — SODIUM CHLORIDE 9 MG/ML
INJECTION, SOLUTION INTRAVENOUS PRN
Status: DISCONTINUED | OUTPATIENT
Start: 2024-06-04 | End: 2024-06-04 | Stop reason: HOSPADM

## 2024-06-04 RX ORDER — OXYCODONE HYDROCHLORIDE 5 MG/1
5 TABLET ORAL PRN
Status: DISCONTINUED | OUTPATIENT
Start: 2024-06-04 | End: 2024-06-04 | Stop reason: HOSPADM

## 2024-06-04 RX ADMIN — DOCUSATE SODIUM 100 MG: 100 CAPSULE, LIQUID FILLED ORAL at 21:18

## 2024-06-04 RX ADMIN — SUGAMMADEX 100 MG: 100 INJECTION, SOLUTION INTRAVENOUS at 11:36

## 2024-06-04 RX ADMIN — CARVEDILOL 25 MG: 25 TABLET, FILM COATED ORAL at 21:18

## 2024-06-04 RX ADMIN — FENTANYL CITRATE 50 MCG: 50 INJECTION, SOLUTION INTRAMUSCULAR; INTRAVENOUS at 10:42

## 2024-06-04 RX ADMIN — POLYETHYLENE GLYCOL 3350 17 G: 17 POWDER, FOR SOLUTION ORAL at 18:04

## 2024-06-04 RX ADMIN — SODIUM CHLORIDE, POTASSIUM CHLORIDE, SODIUM LACTATE AND CALCIUM CHLORIDE: 600; 310; 30; 20 INJECTION, SOLUTION INTRAVENOUS at 14:11

## 2024-06-04 RX ADMIN — Medication 200 MG: at 10:51

## 2024-06-04 RX ADMIN — ROCURONIUM BROMIDE 5 MG: 10 INJECTION, SOLUTION INTRAVENOUS at 10:51

## 2024-06-04 RX ADMIN — ROSUVASTATIN CALCIUM 40 MG: 20 TABLET, COATED ORAL at 09:21

## 2024-06-04 RX ADMIN — CEFEPIME 2000 MG: 2 INJECTION, POWDER, FOR SOLUTION INTRAVENOUS at 09:26

## 2024-06-04 RX ADMIN — DEXAMETHASONE SODIUM PHOSPHATE 8 MG: 4 INJECTION INTRA-ARTICULAR; INTRALESIONAL; INTRAMUSCULAR; INTRAVENOUS; SOFT TISSUE at 10:58

## 2024-06-04 RX ADMIN — ACETAMINOPHEN 1000 MG: 500 TABLET ORAL at 21:18

## 2024-06-04 RX ADMIN — ROCURONIUM BROMIDE 45 MG: 10 INJECTION, SOLUTION INTRAVENOUS at 10:54

## 2024-06-04 RX ADMIN — ONDANSETRON 4 MG: 2 INJECTION INTRAMUSCULAR; INTRAVENOUS at 10:59

## 2024-06-04 RX ADMIN — INSULIN LISPRO 17 UNITS: 100 INJECTION, SOLUTION INTRAVENOUS; SUBCUTANEOUS at 16:40

## 2024-06-04 RX ADMIN — CEFEPIME 2000 MG: 2 INJECTION, POWDER, FOR SOLUTION INTRAVENOUS at 02:00

## 2024-06-04 RX ADMIN — METRONIDAZOLE 500 MG: 500 TABLET ORAL at 14:49

## 2024-06-04 RX ADMIN — HYDROMORPHONE HYDROCHLORIDE 0.5 MG: 2 INJECTION, SOLUTION INTRAMUSCULAR; INTRAVENOUS; SUBCUTANEOUS at 11:32

## 2024-06-04 RX ADMIN — DULOXETINE HYDROCHLORIDE 60 MG: 60 CAPSULE, DELAYED RELEASE ORAL at 09:21

## 2024-06-04 RX ADMIN — SODIUM CHLORIDE, POTASSIUM CHLORIDE, SODIUM LACTATE AND CALCIUM CHLORIDE: 600; 310; 30; 20 INJECTION, SOLUTION INTRAVENOUS at 08:24

## 2024-06-04 RX ADMIN — CARVEDILOL 25 MG: 25 TABLET, FILM COATED ORAL at 09:21

## 2024-06-04 RX ADMIN — PHENYLEPHRINE HYDROCHLORIDE 200 MCG: 10 INJECTION, SOLUTION INTRAVENOUS at 11:20

## 2024-06-04 RX ADMIN — ACETAMINOPHEN 1000 MG: 500 TABLET ORAL at 14:49

## 2024-06-04 RX ADMIN — HYDROMORPHONE HYDROCHLORIDE 0.5 MG: 1 INJECTION, SOLUTION INTRAMUSCULAR; INTRAVENOUS; SUBCUTANEOUS at 05:02

## 2024-06-04 RX ADMIN — CEFEPIME 2000 MG: 2 INJECTION, POWDER, FOR SOLUTION INTRAVENOUS at 18:12

## 2024-06-04 RX ADMIN — AMLODIPINE BESYLATE 5 MG: 5 TABLET ORAL at 09:21

## 2024-06-04 RX ADMIN — MIDAZOLAM HYDROCHLORIDE 2 MG: 2 INJECTION, SOLUTION INTRAMUSCULAR; INTRAVENOUS at 10:42

## 2024-06-04 RX ADMIN — INSULIN LISPRO 8 UNITS: 100 INJECTION, SOLUTION INTRAVENOUS; SUBCUTANEOUS at 18:05

## 2024-06-04 RX ADMIN — FENTANYL CITRATE 50 MCG: 50 INJECTION, SOLUTION INTRAMUSCULAR; INTRAVENOUS at 11:28

## 2024-06-04 RX ADMIN — PHENYLEPHRINE HYDROCHLORIDE 150 MCG: 10 INJECTION, SOLUTION INTRAVENOUS at 11:08

## 2024-06-04 RX ADMIN — SODIUM CHLORIDE, POTASSIUM CHLORIDE, SODIUM LACTATE AND CALCIUM CHLORIDE: 600; 310; 30; 20 INJECTION, SOLUTION INTRAVENOUS at 11:42

## 2024-06-04 RX ADMIN — METRONIDAZOLE 500 MG: 500 TABLET ORAL at 06:57

## 2024-06-04 RX ADMIN — INSULIN GLARGINE 60 UNITS: 100 INJECTION, SOLUTION SUBCUTANEOUS at 15:12

## 2024-06-04 RX ADMIN — OXYCODONE 10 MG: 5 TABLET ORAL at 04:33

## 2024-06-04 RX ADMIN — PHENYLEPHRINE HYDROCHLORIDE 200 MCG: 10 INJECTION, SOLUTION INTRAVENOUS at 11:14

## 2024-06-04 RX ADMIN — INSULIN HUMAN 12 UNITS: 100 INJECTION, SOLUTION PARENTERAL at 21:18

## 2024-06-04 RX ADMIN — DEXMEDETOMIDINE HYDROCHLORIDE 6 MCG: 100 INJECTION, SOLUTION INTRAVENOUS at 11:27

## 2024-06-04 RX ADMIN — PHENYLEPHRINE HYDROCHLORIDE 200 MCG: 10 INJECTION, SOLUTION INTRAVENOUS at 11:40

## 2024-06-04 RX ADMIN — PROPOFOL 140 MG: 10 INJECTION, EMULSION INTRAVENOUS at 10:51

## 2024-06-04 RX ADMIN — PHENYLEPHRINE HYDROCHLORIDE 200 MCG: 10 INJECTION, SOLUTION INTRAVENOUS at 11:16

## 2024-06-04 RX ADMIN — LIDOCAINE HYDROCHLORIDE 100 MG: 20 INJECTION, SOLUTION INTRAVENOUS at 10:51

## 2024-06-04 RX ADMIN — ACETAMINOPHEN 1000 MG: 500 TABLET ORAL at 06:56

## 2024-06-04 RX ADMIN — SODIUM CHLORIDE, PRESERVATIVE FREE 10 ML: 5 INJECTION INTRAVENOUS at 21:22

## 2024-06-04 RX ADMIN — SODIUM CHLORIDE, PRESERVATIVE FREE 10 ML: 5 INJECTION INTRAVENOUS at 21:21

## 2024-06-04 RX ADMIN — VALSARTAN 80 MG: 80 TABLET, FILM COATED ORAL at 18:04

## 2024-06-04 RX ADMIN — DEXMEDETOMIDINE HYDROCHLORIDE 4 MCG: 100 INJECTION, SOLUTION INTRAVENOUS at 11:00

## 2024-06-04 RX ADMIN — SUGAMMADEX 100 MG: 100 INJECTION, SOLUTION INTRAVENOUS at 11:27

## 2024-06-04 ASSESSMENT — PAIN SCALES - GENERAL
PAINLEVEL_OUTOF10: 8
PAINLEVEL_OUTOF10: 5
PAINLEVEL_OUTOF10: 0
PAINLEVEL_OUTOF10: 7
PAINLEVEL_OUTOF10: 8
PAINLEVEL_OUTOF10: 0

## 2024-06-04 ASSESSMENT — PAIN DESCRIPTION - DESCRIPTORS
DESCRIPTORS: ACHING;THROBBING
DESCRIPTORS: ACHING

## 2024-06-04 ASSESSMENT — PAIN DESCRIPTION - LOCATION
LOCATION: LEG
LOCATION: LEG

## 2024-06-04 ASSESSMENT — PAIN - FUNCTIONAL ASSESSMENT
PAIN_FUNCTIONAL_ASSESSMENT: PREVENTS OR INTERFERES SOME ACTIVE ACTIVITIES AND ADLS
PAIN_FUNCTIONAL_ASSESSMENT: 0-10

## 2024-06-04 ASSESSMENT — PAIN DESCRIPTION - ORIENTATION
ORIENTATION: LEFT
ORIENTATION: LEFT

## 2024-06-04 NOTE — PROGRESS NOTES
PACU Transfer Note    Vitals:    06/04/24 1230   BP: 116/85   Pulse: 90   Resp: 18   Temp: 97.2 °F (36.2 °C)   SpO2: 96%       In: 1060 [P.O.:60; I.V.:1000]  Out: -     Pain assessment:  none  Pain Level: 0    Report given to Receiving unit RN.    6/4/2024 12:54 PM

## 2024-06-04 NOTE — FLOWSHEET NOTE
Pt tolerating IV atb well with no s/s of adverse effects. Picc line dressing remains intact and occlusive. Pt has Veraflo woundvac therapy to left groin/thigh at 125mm/hg with noted bag of D5W as instill solution nearly finished. Dressing to wound vac c/d/I and harish air leaks and no kinks noted. Pt has stable blood sugar at this time and declined bedtime snack offered.Call light at reach. Pt is urinating well per urinal at the bedside. VSS and afebrile.

## 2024-06-04 NOTE — PROGRESS NOTES
Called to bedside and spoke to pt and mother. Explained the results of surgery and the plan of care for his wound. Discussed the wound vac and how it will be changed. We also discussed the need to close BG control and the importance to wound healing.     Hieu Elias CNP  6/4/2024  2:44 PM  perfectserve

## 2024-06-04 NOTE — CARE COORDINATION
Kelly from [facility] called and they have approval for wound vac. Call Kelly with any questions: [phone]. CM will continue to follow patient until discharge. Electronically signed by Teodora Austin RN on 6/4/2024 at 1:11 PM

## 2024-06-04 NOTE — PROGRESS NOTES
General Surgery  Post-operative Note    POST-OP DIAGNOSIS:  NSTI of left groin       PROCEDURE(S):  Left groin wound wash out and debridement, wound vac placement     SUBJECTIVE:   Pain is controlled, denies nausea or vomiting. Voiding. Tolerating diet. Vac is working appropriately.     OBJECTIVE:    Physical Exam:  Vitals:   Vitals:    06/04/24 1230 06/04/24 1319 06/04/24 1630 06/04/24 1804   BP: 116/85 128/80 133/88 133/88   Pulse: 90 91 99    Resp: 18 18 18    Temp: 97.2 °F (36.2 °C) 98.3 °F (36.8 °C) 98.9 °F (37.2 °C)    TempSrc: Temporal Oral Oral    SpO2: 96% 98% 91%    Weight:       Height:           General appearance: Alert, no acute distress  Eyes: No scleral icterus, EOM grossly intact  Neck: Trachea midline, no JVD  Chest/Lungs: Normal effort with no accessory muscle use on RA  Cardiovascular: RRR, well-perfused  Abdomen: Obese, soft, non-distended  Skin: Warm and dry  Extremities: L groin with wound vac in place, good seal    Neuro: A&Ox3, no focal deficits, sensation intact    ASSESSMENT/PLAN:  This is a 38 y.o. year old male status post debridement 5/29, second look 5/31, third look 6/4 with wound vac placement secondary to NSTI of left groin.     -Pain control: tylenol, cymbalta, oxy vs dilaudid prn   - Diet: Reg (carb control), diabetic oral supplements  -OOB, ambulate QID, IS 10x/ hr  -DVT ppx: lovenox  -strict BG control   - Wound Vac instilling NS only    Buzz Hoover DO  PGY-1, General Surgery Resident  06/04/24 7:10 PM  [phone]

## 2024-06-04 NOTE — PROGRESS NOTES
Nephrology Progress Note  [phone]  [phone]   [employer]          CC:  We are following this patient for CKD    past medical history of CKD stage 3a and proteinuria followed by my partner, Frank Hester. The patient also has  DM type 2 with complications, pulmonary histoplasmosis, HTN, and chronic diastolic HF. The patient presented with febrile illness initially thought to be a viral syndrome but he developed a necrotizing abscess in his left thigh near the inguinal crease. He had surgical debridement on  with a re-look washout on 24. His course was complicated by some bleeding from the wound that was closed with suture. He also had CHELE with creatinine of 2.2 on admission. The CHELE resolved with IVF.     Intra-operative cultures grew E coli, Klebsiella, and beta strep. He is on therapy with oral Flagyl and IV cefepime, the latter needed for 2 weeks. He requires a PICC and we are asked to clear for this..       SUBJECTIVE:    Developed some induration medial aspect of thigh on left  Temp still 99 and WBC increased  Plans for repeat debridement of thigh today    Had R arm PICC  Feels OK    No dyspnea, CP. No cough or wheezing. No N/V, abd pain, or diarrhea. No F/C.      No visitors      Physical Exam:    VITALS:  /88   Pulse 92   Temp 99.3 °F (37.4 °C) (Oral)   Resp 16   Ht 1.854 m (6' 1\")   Wt 127.5 kg (281 lb 1.4 oz)   SpO2 98%   BMI 37.08 kg/m²   TEMPERATURE:  Current - Temp: 99.3 °F (37.4 °C); Max - Temp  Av.1 °F (37.3 °C)  Min: 98.9 °F (37.2 °C)  Max: 99.3 °F (37.4 °C)  PULSE RANGE: Pulse  Av.6  Min: 92  Max: 101  BLOOD PRESSURE RANGE:  Systolic (24hrs), Av , Min:128 , Max:168  ; Diastolic (24hrs), Av, Min:82, Max:115   PULSE OXIMETRY RANGE: SpO2  Av.3 %  Min: 94 %  Max: 98 %  24HR INTAKE/OUTPUT:    Intake/Output Summary (Last 24 hours) at 2024 0922  Last data filed at 2024 0715  Gross per 24 hour   Intake 540 ml   Output 4600 ml   Net -4060 ml

## 2024-06-04 NOTE — PROGRESS NOTES
PRE-OP NOTE  Department of Surgery    Procedure: Left groin wound washout and debridement with wound vac placement   Expected time: 6/4 add on     Interval progression: Patient with persistent white count despite abx and wound care. Wound eval demonstrated induration in medial aspect of wound and will need further evaluation and debridement.    Plan  1. Diet: NPO this morning, did not have breakfast   2. IVF: On   3. Antibiotics: cefepime  4. Labs to be drawn: Type and Screen, INR  5. Anesthesia: to see patient  6. Consent: To obtain  7. Pulmonary: CXR: N/A  8. Cardiac: EKG: N/A      Margarita Espinoza MD  06/04/24  7:36 AM

## 2024-06-04 NOTE — ANESTHESIA PRE PROCEDURE
Department of Anesthesiology  Preprocedure Note       Name:  Issa Moctezuma   Age:  38 y.o.  :  1985                                          MRN:  [id number]         Date:  2024      Surgeon: Surgeon(s):  Herb Abdalla MD    Procedure: Procedure(s):  LEFT GROIN WOUND WASH OUT AND DEBRIDEMENT WITH WOUND VAC APPLICATION    Medications prior to admission:   Prior to Admission medications    Medication Sig Start Date End Date Taking? Authorizing Provider   DULoxetine (CYMBALTA) 60 MG extended release capsule Take 1 capsule by mouth daily 24  GEORGINA Melendez, APRN - CNP   docusate sodium (COLACE) 100 MG capsule Take 1 capsule by mouth 2 times daily    Provider, MD Geena   metFORMIN (GLUCOPHAGE-XR) 500 MG extended release tablet Take 1 tablet by mouth 2 times daily (with meals) 3/26/24   Sonia Kan DO   Continuous Blood Gluc Sensor (DEXCOM G7 SENSOR) MISC Replace every 10 days 24   Sonia Kan DO   Insulin Degludec (TRESIBA FLEXTOUCH) 200 UNIT/ML SOPN Inject 100 Units into the skin daily 24   Sonia Kan DO   acetaminophen (AMINOFEN) 325 MG tablet Take 2 tablets by mouth every 6 hours as needed for Pain or Fever 24   Emeka Leone MD   vitamin D (ERGOCALCIFEROL) 1.25 MG (71606 UT) CAPS capsule TAKE 1 CAPSULE BY MOUTH 1 TIME A WEEK 24   Sonia Kan DO   Insulin Degludec (TRESIBA FLEXTOUCH) 200 UNIT/ML SOPN Inject 100 Units into the skin daily  Patient taking differently: Inject 75 Units into the skin daily 24   Sonia Kan DO   Insulin Pen Needle (PEN NEEDLES 3/16\") 31G X 5 MM MISC 1 each by Does not apply route 4 times daily 24   Sonia Kan DO   Alcohol Swabs (ALCOHOL PREP) 70 % PADS Use 4 times daily prior to insulin injection 24   Sonia Kan DO   empagliflozin (JARDIANCE) 10 MG tablet Take 1 tablet by mouth every morning    Provider,

## 2024-06-04 NOTE — PROGRESS NOTES
V2.0    [facility] Progress Note      Name:  Issa Moctezuma /Age/Sex: 1985  (38 y.o. male)   MRN & CSN:  [id number] & [id number] Encounter Date/Time: 2024 8:45 PM EDT   Location:  6322/6322-01 PCP: Sonia Kan DO     Attending:Megan Sorenson MD       Hospital Day: 9    Assessment and Recommendations   Issa Moctezuma is a 38 y.o. male with CKD who presented with fever and myalgias consistent with viral syndrome. Had mild CHELE as well and admitted for further care.  Found to have concern for necrotizing fasciitis of the left thigh    #Necrotizing SSTI of L thigh  --patient on admission was febrile but otherwise had symptoms more consistent with viral syndrome (e.g. myalgias) without otherwise localizing signs of focal bacterial infection. Procal only mildly elevated at 0.6 and BCx NGTD.  MRSA swab negative.  Initial plan to monitor off abx as no convincing source of bacterial infection  --On  reported new pain/swelling in L medial thigh near groin, CT obtained with subcutaneous gas in that region  -Surgery consulted  -- 2024 emergent L thigh debridement  -- 2024 repeat L thigh debridement/washout  -2024 wound VAC placed by ID  -ID consulted  -empiric vanc day 5, DC  -2024 surgical culture positive for: E. Coli, Klebsiella, and Beta strep.  Provotella bivia  -Drug: IV cefepime 2 g every 8 hours and oral metronidazole 500 mg 3 times daily  - Planned End date: 2024  - Diagnosis: Necrotizing fasciitis of the left leg, mixed gram-negative and anaerobic infection  - Has received test dose in hospital  - Routine   - Check CBC w diff, CMP, ESR, CRP every Mon or Tue - FAX result to [phone]  - Call with antibiotic / infusion issues, [phone]  - Call with any change in status, transfer in or out of a facility or to hospital; This MINOO is only valid for current disposition - [phone].    - No f/u in outpatient ID office necessary    #Type II DM,  lungs are well-expanded bilaterally and are clear.     No acute cardiopulmonary findings.  Electronically signed by Kecia Reid MD      CBC:   Recent Labs     06/03/24  1119 06/04/24  0556 06/04/24  0816   WBC 11.8* 11.9* 11.3*   HGB 8.6* 7.9* 7.6*    423 399     BMP:    Recent Labs     06/03/24  1119 06/04/24  0556 06/04/24  0816    138 136   K 3.6 3.6 3.8    102 102   CO2 27 28 29   BUN 18 20 19   CREATININE 1.5* 1.5* 1.5*   GLUCOSE 177* 176* 184*     Hepatic: No results for input(s): \"AST\", \"ALT\", \"BILITOT\", \"ALKPHOS\" in the last 72 hours.    Invalid input(s): \"ALB\"  Lipids:   Lab Results   Component Value Date/Time    CHOL 247 02/22/2023 07:04 AM    HDL 52 05/15/2023 09:14 AM    TRIG 221 02/22/2023 07:04 AM     Hemoglobin A1C:   Lab Results   Component Value Date/Time    LABA1C 11.9 05/29/2024 05:57 PM     TSH: No results found for: \"TSH\"  Troponin: No results found for: \"TROPONINT\"  Lactic Acid:   No results for input(s): \"LACTA\" in the last 72 hours.    BNP:   No results for input(s): \"PROBNP\" in the last 72 hours.    UA:  Lab Results   Component Value Date/Time    NITRU Negative 05/28/2024 06:45 AM    COLORU Yellow 05/28/2024 06:45 AM    PHUR 6.0 05/28/2024 06:45 AM    PHUR 5.0 11/15/2023 03:52 PM    WBCUA 3-5 05/28/2024 06:45 AM    RBCUA None seen 05/28/2024 06:45 AM    RBCUA 1+ (0.06-0.1 mg/dL) 05/20/2024 02:29 PM    MUCUS PRESENT 05/20/2024 02:29 PM    BACTERIA SEE NOTES 05/20/2024 02:29 PM    BACTERIA None Seen 01/13/2023 12:20 AM    CLARITYU Clear 05/28/2024 06:45 AM    LEUKOCYTESUR Negative 05/28/2024 06:45 AM    UROBILINOGEN 1.0 05/28/2024 06:45 AM    BILIRUBINUR Negative 05/28/2024 06:45 AM    BLOODU SMALL 05/28/2024 06:45 AM    GLUCOSEU Negative 05/28/2024 06:45 AM    GLUCOSEU NEGATIVE 01/02/2011 02:44 PM    KETUA 40 05/28/2024 06:45 AM    AMORPHOUS 3+ 05/28/2024 06:45 AM     Urine Cultures: No results found for: \"LABURIN\"  Blood Cultures:   Lab Results   Component Value

## 2024-06-04 NOTE — PROGRESS NOTES
Received perfect serve from Rn with report that instillation bag is empty. RN asked if she should replace bag with additional dextrose content.     Patient seen and examined at bedside. Wound vac intact without leaks or alerts; however instillation bag noted to be dextrose containing. Given that it was unclear when bag was hung, decision made to take wound vac down to inspect wound.         Wet to dry dressing applied    Doreen Ferreira MD  PGY1, General Surgery  06/04/24   5:13 AM   [facility]  [phone]

## 2024-06-04 NOTE — CARE COORDINATION
Perfectserved surgery for PT/ OT orders. Electronically signed by Teodora Austin RN on 6/4/2024 at 1:59 PM

## 2024-06-04 NOTE — ANESTHESIA POSTPROCEDURE EVALUATION
Department of Anesthesiology  Postprocedure Note    Patient: Issa Moctezuma  MRN: [id number]  YOB: 1985  Date of evaluation: 6/4/2024    Procedure Summary       Date: 06/04/24 Room / Location: [place] [address] / [facility]    Anesthesia Start: 1044 Anesthesia Stop: 1158    Procedure: LEFT GROIN WOUND WASH OUT AND DEBRIDEMENT WITH WOUND VAC APPLICATION (Left: Groin) Diagnosis:       Groin abscess      (Groin abscess [L02.214])    Surgeons: Herb Abdalla MD Responsible Provider: Lashawn Bah DO    Anesthesia Type: general ASA Status: 4 - Emergent            Anesthesia Type: No value filed.    Parker Phase I: Parker Score: 7    Parker Phase II:      Anesthesia Post Evaluation    Patient location during evaluation: PACU  Patient participation: complete - patient participated  Level of consciousness: awake and alert  Airway patency: patent  Nausea & Vomiting: no nausea and no vomiting  Cardiovascular status: blood pressure returned to baseline  Respiratory status: acceptable  Hydration status: euvolemic  Multimodal analgesia pain management approach  Pain management: adequate    No notable events documented.

## 2024-06-04 NOTE — BRIEF OP NOTE
Brief Postoperative Note      Patient: Issa Moctezuma  YOB: 1985  MRN: [id number]    Date of Procedure: 6/4/2024    Pre-Op Diagnosis Codes:     * Groin abscess [L02.214]    Post-Op Diagnosis: Same       Procedure(s):  LEFT GROIN WOUND WASH OUT AND DEBRIDEMENT WITH WOUND VAC APPLICATION    Surgeon(s):  Herb Abdalla MD    Assistant:  Resident: Db Moreno DO; Howard Solo DO    Anesthesia: General    Estimated Blood Loss (mL): Minimal    Complications: None    Specimens:   * No specimens in log *    Implants:  * No implants in log *      Drains:   Negative Pressure Wound Therapy Leg Left;Upper;Anterior (Active)       [REMOVED] Negative Pressure Wound Therapy Groin Left;Upper;Anterior (Removed)   Wound Type Surgical 06/03/24 2225   Unit Type Veraflo Woundvac 06/03/24 2225   Target Pressure (mmHg) 125 06/03/24 2225   Irrigation Solution Other (comment) 06/03/24 2225   Drainage Amount Moderate 06/03/24 2225   Drainage Description Serosanguinous 06/03/24 2225   Output (ml) 50 ml 06/03/24 2225       Findings:  Infection Present At Time Of Surgery (PATOS) (choose all levels that have infection present):  - Deep Infection (muscle/fascia) present as evidenced by pus  Other Findings: further debridement of left groin with irrigating wound vac applied    Electronically signed by Howard Solo DO on 6/4/2024 at 11:48 AM

## 2024-06-04 NOTE — PROGRESS NOTES
General Surgery   Daily Progress Note  Patient: Issa Moctezuma      CC: necrotizing fascitis     SUBJECTIVE:   Wound VAC taken down overnight 2/2 dextrose infusion being hung as irrigating solution instead of NS. Pain stable this morning. No nausea or vomiting.     ROS:   A 14 point review of systems was conducted, significant findings as noted above. All other systems negative.    OBJECTIVE:    PHYSICAL EXAM:    Vitals:    06/03/24 1835 06/03/24 2115 06/04/24 0015 06/04/24 0430   BP: 131/82 134/84 128/82 (!) 143/87   Pulse:  98 97 95   Resp:  17 18 20   Temp:  99.1 °F (37.3 °C) 98.9 °F (37.2 °C) 99.2 °F (37.3 °C)   TempSrc:  Oral Oral    SpO2:  95% 96% 94%   Weight:       Height:           General appearance: Alert, no acute distress  Eyes: No scleral icterus, EOM grossly intact  Neck: Trachea midline, no JVD  Chest/Lungs: Normal effort with no accessory muscle use on RA  Cardiovascular: RRR, well-perfused  Abdomen: Obese, non-tender, no rebound, guarding, or rigidity.  Skin: Warm and dry, no rashes  Extremities: L thigh wound base clear. No signs of necrotizing tissue.   Neuro: A&Ox3, no focal deficits, sensation intact    LABS:   Recent Labs     06/02/24  0401 06/03/24  1119   WBC 11.1* 11.8*   HGB 8.8* 8.6*   HCT 26.6* 26.5*   MCV 78.8* 78.1*    403          Recent Labs     06/02/24  0401 06/03/24  1119    138   K 4.2 3.6    102   CO2 23 27   PHOS 2.4* 2.8   BUN 28* 18   CREATININE 1.6* 1.5*       ASSESSMENT & PLAN:   This is a 38 y.o. male with hx CKD, HFrEF, DM, w/ nec fas L medial thigh, s/p debridement 5/29 and s/p second look 5/31 4 Days Post-Op    -Pain control: tylenol, oxy vs dilaudid prn   - Will replace VAC today  Diet: Reg  -OOB, ambulate QID, IS 10x/ hr  -DVT ppx: lovenox  -strict BG control -- recommend high dose sliding scale     Db Moreno DO  PGY1, General Surgery  06/04/24   6:12 AM   [facility]  [phone]

## 2024-06-04 NOTE — PROGRESS NOTES
ID Follow-up NOTE    CC:   Body ache, chills, left thigh/groin  Antibiotics: Vancomycin, cefepime    Admit Date: 5/27/2024  Hospital Day: 9    Subjective:     Patient was taken back to the OR today for repeat debridement as he had continued necrotic tissue at the periphery of the wound.  Denies any fevers, pain is managed  Objective:     Patient Vitals for the past 8 hrs:   BP Temp Temp src Pulse Resp SpO2   06/04/24 1319 128/80 98.3 °F (36.8 °C) Oral 91 18 98 %   06/04/24 1230 116/85 97.2 °F (36.2 °C) Temporal 90 18 96 %   06/04/24 1215 102/80 -- -- 90 12 98 %   06/04/24 1213 102/74 97.2 °F (36.2 °C) Temporal 90 28 96 %   06/04/24 1205 110/85 -- -- 90 16 99 %   06/04/24 1200 110/77 -- -- 86 17 90 %   06/04/24 1155 -- 97.2 °F (36.2 °C) -- -- -- --   06/04/24 0954 (!) 136/93 98.9 °F (37.2 °C) Oral 91 19 92 %   06/04/24 0826 134/88 99.3 °F (37.4 °C) Oral 92 16 98 %       I/O last 3 completed shifts:  In: 480 [P.O.:480]  Out: 5700 [Urine:5650; Drains:50]  I/O this shift:  In: 1060 [P.O.:60; I.V.:1000]  Out: -     EXAM:  GENERAL: No apparent distress.    HEENT: Membranes moist, no oral lesion  NECK:  Supple, no lymphadenopathy  LUNGS: Clear b/l, no rales, no dullness  CARDIAC: RRR, no murmur appreciated  ABD:  + BS, soft / NT  EXT:  No rash, no edema, no lesions, left thigh with irrigating wound VAC in place   NEURO: No focal neurologic findings  PSYCH: Orientation, sensorium, mood normal  LINES:  Peripheral iv       Data Review:  Lab Results   Component Value Date    WBC 11.3 (H) 06/04/2024    HGB 7.6 (L) 06/04/2024    HCT 23.4 (L) 06/04/2024    MCV 78.4 (L) 06/04/2024     06/04/2024     Lab Results   Component Value Date    CREATININE 1.5 (H) 06/04/2024    BUN 19 06/04/2024     06/04/2024    K 3.8 06/04/2024     06/04/2024    CO2 29 06/04/2024       Hepatic Function Panel:   Lab Results   Component Value Date/Time    ALKPHOS 82 05/20/2024 02:29 PM    ALT 17 05/20/2024 02:29 PM    AST 22 05/20/2024  Essential hypertension    CHELE (acute kidney injury) (HCC)    Necrotizing soft tissue infection    Viral illness          Plan:   38-year-old -American man with history of diabetes, obesity, HTN, CHF, pulmonary histoplasmosis, CKD that presented on 5/27 with left thigh pain and drainage.    Left leg soft tissue infection, necrotizing:  -Patient presented with fever and leukocytosis.  Has severe pain.  ESR/CRP on presentation was 105/226.3 respectively  -Imaging with gas present with subcutaneous fat stranding  -Patient was evaluated by general surgery and is status post left medial thigh debridement on 5/29.  OR cultures showing mixed gram-negative's, Klebsiella and E. coli along with anaerobes and strep.  MRSA nares negative  -Patient's status post takeback to the OR for debridement again on 5/31 and 6/4  -Patient currently with irrigating wound VAC in place.  -With source control, we stopped clindamycin 5/30.  Blood cultures negative to date  -With MRSA nares negative, stopped vancomycin.  -Continue cefepime and metronidazole.   -Continue monitoring wound, repeated debridement as needed.  Irrigating wound VAC placed today, 6/4  -Will plan for a course of IV antibiotic coverage given depth of infection.  Plan for 2 weeks, to assess for improvement with VAC. Plan PICC placement. See MINOO    CKD:  - renally dose adjust antibiotics as needed    DM2:  -A1c on this admission 11.9.  Continue good glycemic control to aid in healing and prevention of further infections.    INFUSION ORDERS:  - Drug: IV cefepime 2 g every 8 hours and oral metronidazole 500 mg 3 times daily  - Planned End date: 6/17/2024  - Diagnosis: Necrotizing fasciitis of the left leg, mixed gram-negative and anaerobic infection  - Has received test dose in hospital  - Routine   - Check CBC w diff, CMP, ESR, CRP every Mon or Tue - FAX result to [phone]  - Call with antibiotic / infusion issues, [phone]  - Call with any change in

## 2024-06-04 NOTE — FLOWSHEET NOTE
Martita RN called me to find out if instillation bag needed to be changed because the woundvac was beeping , since bag was empty.  I informed her that there were no current order on the chart about changing or replacing instillation bag because surgery team placed the woundvac and no nursing communication orders noted at all as to changing or replacing instillation bag. Martita stated that t she would notify the surgery team to see what the would like done.

## 2024-06-05 LAB
ANION GAP SERPL CALCULATED.3IONS-SCNC: 8 MMOL/L (ref 3–16)
BUN SERPL-MCNC: 22 MG/DL (ref 7–20)
CALCIUM SERPL-MCNC: 8.6 MG/DL (ref 8.3–10.6)
CHLORIDE SERPL-SCNC: 101 MMOL/L (ref 99–110)
CO2 SERPL-SCNC: 30 MMOL/L (ref 21–32)
CREAT SERPL-MCNC: 1.5 MG/DL (ref 0.9–1.3)
DEPRECATED RDW RBC AUTO: 13.1 % (ref 12.4–15.4)
GFR SERPLBLD CREATININE-BSD FMLA CKD-EPI: 61 ML/MIN/{1.73_M2}
GLUCOSE BLD-MCNC: 186 MG/DL (ref 70–99)
GLUCOSE BLD-MCNC: 189 MG/DL (ref 70–99)
GLUCOSE BLD-MCNC: 195 MG/DL (ref 70–99)
GLUCOSE BLD-MCNC: 205 MG/DL (ref 70–99)
GLUCOSE BLD-MCNC: 215 MG/DL (ref 70–99)
GLUCOSE BLD-MCNC: 220 MG/DL (ref 70–99)
GLUCOSE BLD-MCNC: 249 MG/DL (ref 70–99)
GLUCOSE BLD-MCNC: 256 MG/DL (ref 70–99)
GLUCOSE SERPL-MCNC: 209 MG/DL (ref 70–99)
HCT VFR BLD AUTO: 24.6 % (ref 40.5–52.5)
HGB BLD-MCNC: 8 G/DL (ref 13.5–17.5)
MCH RBC QN AUTO: 25.5 PG (ref 26–34)
MCHC RBC AUTO-ENTMCNC: 32.4 G/DL (ref 31–36)
MCV RBC AUTO: 78.8 FL (ref 80–100)
PERFORMED ON: ABNORMAL
PLATELET # BLD AUTO: 435 K/UL (ref 135–450)
PMV BLD AUTO: 7.5 FL (ref 5–10.5)
POTASSIUM SERPL-SCNC: 3.9 MMOL/L (ref 3.5–5.1)
RBC # BLD AUTO: 3.12 M/UL (ref 4.2–5.9)
SODIUM SERPL-SCNC: 139 MMOL/L (ref 136–145)
WBC # BLD AUTO: 16.5 K/UL (ref 4–11)

## 2024-06-05 PROCEDURE — 97162 PT EVAL MOD COMPLEX 30 MIN: CPT

## 2024-06-05 PROCEDURE — 6370000000 HC RX 637 (ALT 250 FOR IP): Performed by: STUDENT IN AN ORGANIZED HEALTH CARE EDUCATION/TRAINING PROGRAM

## 2024-06-05 PROCEDURE — 2580000003 HC RX 258: Performed by: STUDENT IN AN ORGANIZED HEALTH CARE EDUCATION/TRAINING PROGRAM

## 2024-06-05 PROCEDURE — 97530 THERAPEUTIC ACTIVITIES: CPT

## 2024-06-05 PROCEDURE — 6360000002 HC RX W HCPCS: Performed by: STUDENT IN AN ORGANIZED HEALTH CARE EDUCATION/TRAINING PROGRAM

## 2024-06-05 PROCEDURE — 97535 SELF CARE MNGMENT TRAINING: CPT

## 2024-06-05 PROCEDURE — 85027 COMPLETE CBC AUTOMATED: CPT

## 2024-06-05 PROCEDURE — 97116 GAIT TRAINING THERAPY: CPT

## 2024-06-05 PROCEDURE — 97166 OT EVAL MOD COMPLEX 45 MIN: CPT

## 2024-06-05 PROCEDURE — 80048 BASIC METABOLIC PNL TOTAL CA: CPT

## 2024-06-05 PROCEDURE — 1200000000 HC SEMI PRIVATE

## 2024-06-05 RX ORDER — INSULIN LISPRO 100 [IU]/ML
17 INJECTION, SOLUTION INTRAVENOUS; SUBCUTANEOUS
Status: DISCONTINUED | OUTPATIENT
Start: 2024-06-05 | End: 2024-06-06

## 2024-06-05 RX ADMIN — CEFEPIME 2000 MG: 2 INJECTION, POWDER, FOR SOLUTION INTRAVENOUS at 18:04

## 2024-06-05 RX ADMIN — INSULIN LISPRO 17 UNITS: 100 INJECTION, SOLUTION INTRAVENOUS; SUBCUTANEOUS at 13:48

## 2024-06-05 RX ADMIN — VALSARTAN 80 MG: 80 TABLET, FILM COATED ORAL at 17:41

## 2024-06-05 RX ADMIN — INSULIN HUMAN 5 UNITS: 100 INJECTION, SOLUTION PARENTERAL at 21:34

## 2024-06-05 RX ADMIN — DULOXETINE HYDROCHLORIDE 60 MG: 60 CAPSULE, DELAYED RELEASE ORAL at 09:34

## 2024-06-05 RX ADMIN — ROSUVASTATIN CALCIUM 40 MG: 20 TABLET, COATED ORAL at 09:33

## 2024-06-05 RX ADMIN — INSULIN HUMAN 9 UNITS: 100 INJECTION, SOLUTION PARENTERAL at 01:01

## 2024-06-05 RX ADMIN — ACETAMINOPHEN 1000 MG: 500 TABLET ORAL at 05:32

## 2024-06-05 RX ADMIN — ENOXAPARIN SODIUM 30 MG: 100 INJECTION SUBCUTANEOUS at 21:34

## 2024-06-05 RX ADMIN — SODIUM CHLORIDE, PRESERVATIVE FREE 10 ML: 5 INJECTION INTRAVENOUS at 09:49

## 2024-06-05 RX ADMIN — CEFEPIME 2000 MG: 2 INJECTION, POWDER, FOR SOLUTION INTRAVENOUS at 09:55

## 2024-06-05 RX ADMIN — SODIUM CHLORIDE, PRESERVATIVE FREE 10 ML: 5 INJECTION INTRAVENOUS at 09:50

## 2024-06-05 RX ADMIN — CARVEDILOL 25 MG: 25 TABLET, FILM COATED ORAL at 21:34

## 2024-06-05 RX ADMIN — ACETAMINOPHEN 1000 MG: 500 TABLET ORAL at 21:34

## 2024-06-05 RX ADMIN — ACETAMINOPHEN 1000 MG: 500 TABLET ORAL at 13:43

## 2024-06-05 RX ADMIN — CEFEPIME 2000 MG: 2 INJECTION, POWDER, FOR SOLUTION INTRAVENOUS at 02:06

## 2024-06-05 RX ADMIN — INSULIN HUMAN 12 UNITS: 100 INJECTION, SOLUTION PARENTERAL at 05:32

## 2024-06-05 RX ADMIN — INSULIN HUMAN 5 UNITS: 100 INJECTION, SOLUTION PARENTERAL at 17:42

## 2024-06-05 RX ADMIN — INSULIN GLARGINE 60 UNITS: 100 INJECTION, SOLUTION SUBCUTANEOUS at 09:33

## 2024-06-05 RX ADMIN — INSULIN HUMAN 5 UNITS: 100 INJECTION, SOLUTION PARENTERAL at 09:48

## 2024-06-05 RX ADMIN — AMLODIPINE BESYLATE 5 MG: 5 TABLET ORAL at 09:35

## 2024-06-05 RX ADMIN — INSULIN LISPRO 17 UNITS: 100 INJECTION, SOLUTION INTRAVENOUS; SUBCUTANEOUS at 17:58

## 2024-06-05 RX ADMIN — ENOXAPARIN SODIUM 30 MG: 100 INJECTION SUBCUTANEOUS at 09:33

## 2024-06-05 RX ADMIN — SODIUM CHLORIDE: 9 INJECTION, SOLUTION INTRAVENOUS at 02:05

## 2024-06-05 RX ADMIN — CARVEDILOL 25 MG: 25 TABLET, FILM COATED ORAL at 09:34

## 2024-06-05 RX ADMIN — INSULIN HUMAN 9 UNITS: 100 INJECTION, SOLUTION PARENTERAL at 13:44

## 2024-06-05 RX ADMIN — SODIUM CHLORIDE, PRESERVATIVE FREE 10 ML: 5 INJECTION INTRAVENOUS at 21:35

## 2024-06-05 RX ADMIN — DOCUSATE SODIUM 100 MG: 100 CAPSULE, LIQUID FILLED ORAL at 09:34

## 2024-06-05 RX ADMIN — DOCUSATE SODIUM 100 MG: 100 CAPSULE, LIQUID FILLED ORAL at 21:34

## 2024-06-05 ASSESSMENT — PAIN DESCRIPTION - FREQUENCY
FREQUENCY: CONTINUOUS

## 2024-06-05 ASSESSMENT — PAIN DESCRIPTION - ORIENTATION
ORIENTATION: LEFT

## 2024-06-05 ASSESSMENT — PAIN DESCRIPTION - LOCATION
LOCATION: GROIN

## 2024-06-05 ASSESSMENT — PAIN - FUNCTIONAL ASSESSMENT
PAIN_FUNCTIONAL_ASSESSMENT: PREVENTS OR INTERFERES SOME ACTIVE ACTIVITIES AND ADLS
PAIN_FUNCTIONAL_ASSESSMENT: ACTIVITIES ARE NOT PREVENTED
PAIN_FUNCTIONAL_ASSESSMENT: PREVENTS OR INTERFERES SOME ACTIVE ACTIVITIES AND ADLS

## 2024-06-05 ASSESSMENT — PAIN SCALES - GENERAL
PAINLEVEL_OUTOF10: 6
PAINLEVEL_OUTOF10: 5
PAINLEVEL_OUTOF10: 3

## 2024-06-05 ASSESSMENT — PAIN DESCRIPTION - DESCRIPTORS
DESCRIPTORS: ACHING
DESCRIPTORS: STABBING
DESCRIPTORS: ACHING

## 2024-06-05 ASSESSMENT — PAIN DESCRIPTION - ONSET
ONSET: ON-GOING

## 2024-06-05 ASSESSMENT — PAIN DESCRIPTION - PAIN TYPE
TYPE: SURGICAL PAIN

## 2024-06-05 NOTE — PROGRESS NOTES
General Surgery   Daily Progress Note  Patient: Issa Moctezuma      CC: necrotizing fascitis     SUBJECTIVE:   Pain well controlled. No nausea or vomiting.      ROS:   A 14 point review of systems was conducted, significant findings as noted above. All other systems negative.    OBJECTIVE:    PHYSICAL EXAM:    Vitals:    06/04/24 1804 06/04/24 1948 06/04/24 2348 06/05/24 0410   BP: 133/88 126/69 136/81 135/85   Pulse:  (!) 102 98 97   Resp:  19 18 19   Temp:  98.3 °F (36.8 °C) 98.4 °F (36.9 °C) 98.2 °F (36.8 °C)   TempSrc:  Oral Oral Oral   SpO2:  93% 97% 96%   Weight:       Height:           General appearance: Alert, no acute distress  Eyes: No scleral icterus, EOM grossly intact  Neck: Trachea midline, no JVD  Chest/Lungs: Normal effort with no accessory muscle use on RA  Cardiovascular: RRR, well-perfused  Abdomen: Obese, non-tender, no rebound, guarding, or rigidity.  Skin: Warm and dry, no rashes  Extremities: Wound VAC In place with good seal  Neuro: A&Ox3, no focal deficits, sensation intact    LABS:   Recent Labs     06/04/24  0556 06/04/24  0816   WBC 11.9* 11.3*   HGB 7.9* 7.6*   HCT 24.3* 23.4*   MCV 78.2* 78.4*    399          Recent Labs     06/03/24  1119 06/04/24  0556 06/04/24  0816    138 136   K 3.6 3.6 3.8    102 102   CO2 27 28 29   PHOS 2.8  --   --    BUN 18 20 19   CREATININE 1.5* 1.5* 1.5*       ASSESSMENT & PLAN:   This is a 38 y.o. male with hx CKD, HFrEF, DM, w/ nec fas L medial thigh, s/p debridement 5/29 and s/p second look 5/31 and s/p third look 6/4/ 1 Day Post-Op    -Pain control: tylenol, oxy vs dilaudid prn   - Plan for VAC change Friday   Diet: Reg  -OOB, ambulate QID, IS 10x/ hr  -DVT ppx: lovenox  -strict BG control -- recommend high dose sliding scale     Db Dhanireddy, DO  PGY1, General Surgery  06/05/24   6:10 AM   [facility]  [phone]

## 2024-06-05 NOTE — PLAN OF CARE
Problem: Discharge Planning  Goal: Discharge to home or other facility with appropriate resources  Outcome: Progressing     Problem: Safety - Adult  Goal: Free from fall injury  Outcome: Progressing  Note: Pt remains free of fall. Bed alarm on, bed lowest position, grippers socks on, and call light within reach.      Problem: ABCDS Injury Assessment  Goal: Absence of physical injury  Outcome: Progressing  Flowsheets (Taken 6/4/2024 2224 by Bartolome Queen RN)  Absence of Physical Injury: Implement safety measures based on patient assessment     Problem: Skin/Tissue Integrity  Goal: Absence of new skin breakdown  Description: 1.  Monitor for areas of redness and/or skin breakdown  2.  Assess vascular access sites hourly  3.  Every 4-6 hours minimum:  Change oxygen saturation probe site  4.  Every 4-6 hours:  If on nasal continuous positive airway pressure, respiratory therapy assess nares and determine need for appliance change or resting period.  Outcome: Progressing     Problem: Pain  Goal: Verbalizes/displays adequate comfort level or baseline comfort level  Outcome: Progressing  Flowsheets (Taken 6/4/2024 2224 by Bartolome Queen RN)  Verbalizes/displays adequate comfort level or baseline comfort level:   Encourage patient to monitor pain and request assistance   Assess pain using appropriate pain scale   Administer analgesics based on type and severity of pain and evaluate response   Implement non-pharmacological measures as appropriate and evaluate response   Consider cultural and social influences on pain and pain management   Notify Licensed Independent Practitioner if interventions unsuccessful or patient reports new pain  Note: Pt stated pain 6 out of 10. Did not want PRN pain medication. Implemented non-pharmacological measures to provide comfort.      Problem: Chronic Conditions and Co-morbidities  Goal: Patient's chronic conditions and co-morbidity symptoms are monitored and  maintained or improved  Outcome: Progressing  Flowsheets (Taken 6/2/2024 0211 by Esperanza Rahman, RN)  Care Plan - Patient's Chronic Conditions and Co-Morbidity Symptoms are Monitored and Maintained or Improved: Monitor and assess patient's chronic conditions and comorbid symptoms for stability, deterioration, or improvement     Problem: Cardiovascular - Adult  Goal: Absence of cardiac dysrhythmias or at baseline  Outcome: Progressing  Flowsheets (Taken 6/2/2024 0211 by Esperanza Rahman, RN)  Absence of cardiac dysrhythmias or at baseline:   Monitor cardiac rate and rhythm   Assess for signs of decreased cardiac output   Administer antiarrhythmia medication and electrolyte replacement as ordered

## 2024-06-05 NOTE — PLAN OF CARE
Problem: Safety - Adult  Goal: Free from fall injury  Outcome: Progressing  Flowsheets (Taken 6/4/2024 2224)  Free From Fall Injury: Based on caregiver fall risk screen, instruct family/caregiver to ask for assistance with transferring infant if caregiver noted to have fall risk factors     Problem: ABCDS Injury Assessment  Goal: Absence of physical injury  Outcome: Progressing  Flowsheets (Taken 6/4/2024 2224)  Absence of Physical Injury: Implement safety measures based on patient assessment     Problem: Skin/Tissue Integrity  Goal: Absence of new skin breakdown  Description: 1.  Monitor for areas of redness and/or skin breakdown  2.  Assess vascular access sites hourly  3.  Every 4-6 hours minimum:  Change oxygen saturation probe site  4.  Every 4-6 hours:  If on nasal continuous positive airway pressure, respiratory therapy assess nares and determine need for appliance change or resting period.  Outcome: Progressing     Problem: Pain  Goal: Verbalizes/displays adequate comfort level or baseline comfort level  Outcome: Progressing  Flowsheets (Taken 6/4/2024 2224)  Verbalizes/displays adequate comfort level or baseline comfort level:   Encourage patient to monitor pain and request assistance   Assess pain using appropriate pain scale   Administer analgesics based on type and severity of pain and evaluate response   Implement non-pharmacological measures as appropriate and evaluate response   Consider cultural and social influences on pain and pain management   Notify Licensed Independent Practitioner if interventions unsuccessful or patient reports new pain     Problem: Chronic Conditions and Co-morbidities  Goal: Patient's chronic conditions and co-morbidity symptoms are monitored and maintained or improved  Outcome: Progressing     Problem: Discharge Planning  Goal: Discharge to home or other facility with appropriate resources  Outcome: Progressing

## 2024-06-05 NOTE — PROGRESS NOTES
Occupational Therapy  Facility/Department: [address]  Occupational Therapy Initial Assessment and Treatment      Name: Issa Moctezuma  : 1985  MRN: [id number]  Date of Service: 2024    Discharge Recommendations:  Subacute/Skilled Nursing Facility  OT Equipment Recommendations  Equipment Needed:  (defer recommendations)       Patient Diagnosis(es): The primary encounter diagnosis was Viral illness. Diagnoses of CHELE (acute kidney injury) (HCC), Elevated troponin, Necrotizing fasciitis (HCC), Wound infection, and Necrotizing soft tissue infection were also pertinent to this visit.      Treatment Diagnosis: impaired ADLs/transfers      Assessment   Performance deficits / Impairments: Decreased functional mobility ;Decreased ADL status;Decreased balance  Assessment: Presenting w/ c/o fever and myalgias. Found to have necrotizing region in groin. Pt has been to surgery several times for wound management. Pt is currently requiring Min/ModA for functional transfers, Pennie for mobility using RW, and MaxA for LB dressing/toileting. Pt is from home w/ mother. Mother works and pt does not have reliable assist. Recommend SNF at discharge to address wound care, antibiotics, and continued IP OT to address the above functional deficits to return to PLOF.  Continue per POC.  Treatment Diagnosis: impaired ADLs/transfers  Prognosis: Good  Decision Making: Medium Complexity  REQUIRES OT FOLLOW-UP: Yes  Activity Tolerance  Activity Tolerance: Patient Tolerated treatment well;Patient limited by pain        Plan   Occupational Therapy Plan  Times Per Week: 2-5  Current Treatment Recommendations: Strengthening, Balance training, Functional mobility training, Endurance training, Safety education & training, Equipment evaluation, education, & procurement, Self-Care / ADL     Restrictions  Position Activity Restriction  Other position/activity restrictions: Up as tolerated    Subjective   General  Chart Reviewed:  functional transfer/mobility, ADL, pt education                                                    AM-PAC - ADL  AM-PAC Daily Activity - Inpatient   How much help is needed for putting on and taking off regular lower body clothing?: A Lot  How much help is needed for bathing (which includes washing, rinsing, drying)?: A Lot  How much help is needed for toileting (which includes using toilet, bedpan, or urinal)?: A Lot  How much help is needed for putting on and taking off regular upper body clothing?: A Little  How much help is needed for taking care of personal grooming?: A Little  How much help for eating meals?: A Little  AM-[facility] Inpatient Daily Activity Raw Score: 15  AM-PAC Inpatient ADL T-Scale Score : 34.69  ADL Inpatient CMS 0-100% Score: 56.46  ADL Inpatient CMS G-Code Modifier : CK           Goals  Short Term Goals  Time Frame for Short Term Goals: Discharge  Short Term Goal 1: 3in1 transfer w/ SBA  Short Term Goal 2: LB dressing using AE w/ SBA  Short Term Goal 3: increase functional standing tolerance to 5 minutes  Patient Goals   Patient goals : to regain independent status       Therapy Time   Individual Concurrent Group Co-treatment   Time In 1128         Time Out 1223         Minutes 55          Timed Code Treatment Minutes:   40    Total Treatment Minutes:  55         Jenna Garcia OTR/L #[id number]

## 2024-06-05 NOTE — PROGRESS NOTES
Nephrology Progress Note  [phone]  [phone]   [email or web address]          CC:  We are following this patient for CKD    past medical history of CKD stage 3a and proteinuria followed by my partner, Frank Hester. The patient also has  DM type 2 with complications, pulmonary histoplasmosis, HTN, and chronic diastolic HF. The patient presented with febrile illness initially thought to be a viral syndrome but he developed a necrotizing abscess in his left thigh near the inguinal crease. He had surgical debridement on  with a re-look washout on 24. His course was complicated by some bleeding from the wound that was closed with suture. He also had CHELE with creatinine of 2.2 on admission. The CHELE resolved with IVF.     Intra-operative cultures grew E coli, Klebsiella, and beta strep. He is on therapy with oral Flagyl and IV cefepime, the latter needed for 2 weeks. He requires a PICC and we are asked to clear for this..       SUBJECTIVE:    Repeat debridement on  finding pus in deeper recesses    Has some thigh pain  VAC in place    No dyspnea, CP. No cough or wheezing. No N/V, abd pain, or diarrhea. No F/C.      No visitors      Physical Exam:    VITALS:  /85   Pulse 97   Temp 98.2 °F (36.8 °C) (Oral)   Resp 19   Ht 1.854 m (6' 1\")   Wt 127.5 kg (281 lb 1.4 oz)   SpO2 96%   BMI 37.08 kg/m²   TEMPERATURE:  Current - Temp: 98.2 °F (36.8 °C); Max - Temp  Av.1 °F (36.7 °C)  Min: 97.2 °F (36.2 °C)  Max: 98.9 °F (37.2 °C)  PULSE RANGE: Pulse  Av.1  Min: 86  Max: 102  BLOOD PRESSURE RANGE:  Systolic (24hrs), Av , Min:102 , Max:136   ; Diastolic (24hrs), Av, Min:69, Max:93  PULSE OXIMETRY RANGE: SpO2  Av.1 %  Min: 90 %  Max: 99 %  24HR INTAKE/OUTPUT:    Intake/Output Summary (Last 24 hours) at 2024 0850  Last data filed at 2024 2348  Gross per 24 hour   Intake 1020 ml   Output 2050 ml   Net -1030 ml         Constitutional:  NAD, large man  HEENT:  no oral lesions  Neck:  Statement Selected

## 2024-06-05 NOTE — PROGRESS NOTES
Comprehensive Nutrition Assessment    RECOMMENDATIONS:  PO Diet: CC3 diet, monitor and encourage intake  ONS: Continue Yassine wound supplement BID + Glucerna 1x/day  Nutrition Education: Education completed     NUTRITION ASSESSMENT:   Nutritional summary & status: Follow up. Repeat debridement on 6/4. PICC for IV abx. Variable PO intake. -250s on 3cc diet. High dose SSI implemented. High protein, CHO controlled ONS ordered TID. Will re- add wound healing ONS, lost when diet advanced from NPO. Discussed importance of BG control r/t infection. Provided handout & verbal instruction detailing sources of CHO and MyPlate Method. Pt expressed understanding.     Admission // PMH: Viral Syndrome \\ CKD, CHF, DM, HTN    MALNUTRITION ASSESSMENT  Context of Malnutrition: Acute Illness   Malnutrition Status: At risk for malnutrition (Comment)  Findings of the 6 clinical characteristics of malnutrition (Minimum of 2 out of 6 clinical characteristics is required to make the diagnosis of moderate or severe Protein Calorie Malnutrition based on AND/ASPEN Guidelines):  Energy Intake:  Mild decrease in energy intake (Comment)  Weight Loss:  No significant weight loss     Body Fat Loss:  No significant body fat loss     Muscle Mass Loss:  No significant muscle mass loss    Fluid Accumulation:  No significant fluid accumulation     Strength:  Not Performed      NUTRITION DIAGNOSIS   Increased nutrient needs related to catabolic illness as evidenced by wounds    Nutrition Monitoring and Evaluation:   Food/Nutrient Intake Outcomes:  Supplement Intake, Food and Nutrient Intake  Physical Signs/Symptoms Outcomes:  Biochemical Data, Skin, Weight, Nutrition Focused Physical Findings     OBJECTIVE DATA: Significant to nutrition assessment  Nutrition Related Findings: -3.6 L, Cr 1.5, BG >180, BM 6/3  Wounds: Open Wounds  Nutrition Goals: Meet at least 75% of estimated needs, by next RD assessment     CURRENT NUTRITION THERAPIES  ADULT

## 2024-06-05 NOTE — PROGRESS NOTES
Physical Therapy  Facility/Department: [address]  Physical Therapy Initial Assessment    Name: Issa Moctezuma  : 1985  MRN: [id number]  Date of Service: 2024    Discharge Recommendations:  Subacute/Skilled Nursing Facility   PT Equipment Recommendations  Equipment Needed: No  Other: defer      Patient Diagnosis(es): The primary encounter diagnosis was Viral illness. Diagnoses of CHELE (acute kidney injury) (HCC), Elevated troponin, Necrotizing fasciitis (HCC), Wound infection, and Necrotizing soft tissue infection were also pertinent to this visit.  Past Medical History:  has a past medical history of Anxiety, Chronic kidney disease, CRI (chronic renal insufficiency), Diabetic neuropathy (HCC), Encounter for imaging to screen for metal prior to MRI, Gastroparesis, HFrEF (heart failure with reduced ejection fraction) (HCC), Histoplasmosis, Hyperlipidemia, Hypertension, Neuropathy, and Type 2 diabetes mellitus without complication (HCC).  Past Surgical History:  has a past surgical history that includes bronchoscopy (2022); bronchoscopy (2022); bronchoscopy (2022); bronchoscopy (N/A, 2022); Leg Surgery (Left, 2024); Leg Surgery (Left, 2024); and Groin Surgery (Left, 2024).    Assessment   Body Structures, Functions, Activity Limitations Requiring Skilled Therapeutic Intervention: Decreased functional mobility ;Decreased safe awareness;Decreased endurance;Decreased balance;Decreased strength;Increased pain  Assessment: Pt is a 37 yo male admitted for groin infection s/p LEFT GROIN WOUND WASH OUT AND DEBRIDEMENT WITH WOUND VAC APPLICATION from home with mother and IND at baseline using SPC out in Counts include [facility] beds at the [facility] 2/2 baseline balance deficits and peripheral neuropathy. pt well below IND baseline limited by pain, endurance, and strength deficits after multiple days in bed. pt requiring min A for bed mobility, min-mod A for transfers at , and min A for ambulating  Inpatient   How much help is needed turning from your back to your side while in a flat bed without using bedrails?: A Little  How much help is needed moving from lying on your back to sitting on the side of a flat bed without using bedrails?: A Little  How much help is needed moving to and from a bed to a chair?: A Little  How much help is needed standing up from a chair using your arms?: A Lot  How much help is needed walking in hospital room?: A Little  How much help is needed climbing 3-5 steps with a railing?: Total  AM-PAC Inpatient Mobility Raw Score : 15  AM-PAC Inpatient T-Scale Score : 39.45  Mobility Inpatient CMS 0-100% Score: 57.7  Mobility Inpatient CMS G-Code Modifier : CK         Tinneti Score       Goals  Short Term Goals  Time Frame for Short Term Goals: by dc  Short Term Goal 1: pt will perform bed mobility mod I  Short Term Goal 2: pt will perform functional transfers with LRAD and mod I  Short Term Goal 3: pt will ambulate 150' with LRAD and mod I  Patient Goals   Patient Goals : to go home       Education  Patient Education  Education Given To: Patient  Education Provided: Role of Therapy;Plan of Care;Transfer Training;Equipment  Education Method: Demonstration;Verbal  Barriers to Learning: None  Education Outcome: Verbalized understanding      Therapy Time   Individual Concurrent Group Co-treatment   Time In 1129         Time Out 1223         Minutes 54             Timed Code Treatment Minutes:  39 min    Total Treatment Minutes:  54 min      Deirdre Miller, PT

## 2024-06-05 NOTE — PROGRESS NOTES
[place]2.0    [facility] [place]      Name:  Issa Moctezuma /Age/Sex: 1985  (38 y.o. male)   MRN & CSN:  [id number] & [id number] Encounter Date/Time: 2024 8:45 PM EDT   Location:  6322/6322-01 PCP: Sonia Kan DO     Attending:Megan Sorenson MD       Hospital Day: 10    Assessment and Recommendations   Issa Moctezuma is a 38 y.o. male with CKD who presented with fever and myalgias consistent with viral syndrome. Had mild CHELE as well and admitted for further care.  Found to have concern for necrotizing fasciitis of the left thigh    #Necrotizing SSTI of L thigh  --patient on admission was febrile but otherwise had symptoms more consistent with viral syndrome (e.g. myalgias) without otherwise localizing signs of focal bacterial infection. Procal only mildly elevated at 0.6 and BCx NGTD.  MRSA swab negative.  Initial plan to monitor off abx as no convincing source of bacterial infection  --On  reported new pain/swelling in L medial thigh near groin, CT obtained with subcutaneous gas in that region  -Surgery consulted  -- 2024 emergent L thigh debridement  -- 2024 repeat L thigh debridement/washout  -2024 wound VAC placed by ID  -ID consulted  -empiric vanc day 5, DC  -2024 surgical culture positive for: E. Coli, Klebsiella, and Beta strep.  Provotella bivia  -Drug: IV cefepime 2 g every 8 hours and oral metronidazole 500 mg 3 times daily  - Planned End date: 2024  - Diagnosis: Necrotizing fasciitis of the left leg, mixed gram-negative and anaerobic infection  - Has received test dose in hospital  - Routine   - Check CBC w diff, CMP, ESR, CRP every Mon or Tue - FAX result to [phone]  - Call with antibiotic / infusion issues, [phone]  - Call with any change in status, transfer in or out of a facility or to hospital; This MINOO is only valid for current disposition - [phone].    - No f/u in outpatient ID office necessary    #Type II DM,

## 2024-06-05 NOTE — CARE COORDINATION
Awaiting precert from [facility] after submission of PT/OT notes from today. Electronically signed by Teodora Austin RN on 6/5/2024 at 3:14 PM

## 2024-06-05 NOTE — PROGRESS NOTES
06/05/24 1510   Encounter Summary   Encounter Overview/Reason Initial Encounter   Service Provided For Patient   [facility]   Support System Family members;Children;Friends/neighbors   Last Encounter  06/05/24  ([facility]-follow up)   Complexity of Encounter Low   Begin Time 1500   End Time  1511   Total Time Calculated 11 min   Assessment/Intervention/Outcome   Assessment Calm;Coping;Powerlessness   Intervention Active listening;Discussed meaning/purpose;Discussed relationship with God;Discussed illness injury and it’s impact   Outcome Comfort;Connection/Belonging;Engaged in conversation;Expressed feelings, needs, and concerns;Receptive   Plan and Referrals   Plan/Referrals Continue to visit, (comment)     Student  Desire Martinez

## 2024-06-06 ENCOUNTER — APPOINTMENT (OUTPATIENT)
Dept: GENERAL RADIOLOGY | Age: 39
DRG: 710 | End: 2024-06-06
Payer: MEDICAID

## 2024-06-06 PROBLEM — B34.9 VIRAL ILLNESS: Status: RESOLVED | Noted: 2024-05-30 | Resolved: 2024-06-06

## 2024-06-06 PROBLEM — N17.9 AKI (ACUTE KIDNEY INJURY) (HCC): Status: RESOLVED | Noted: 2024-05-28 | Resolved: 2024-06-06

## 2024-06-06 LAB
ABO + RH BLD: NORMAL
ANION GAP SERPL CALCULATED.3IONS-SCNC: 9 MMOL/L (ref 3–16)
BLD GP AB SCN SERPL QL: NORMAL
BUN SERPL-MCNC: 18 MG/DL (ref 7–20)
CALCIUM SERPL-MCNC: 8.4 MG/DL (ref 8.3–10.6)
CHLORIDE SERPL-SCNC: 103 MMOL/L (ref 99–110)
CO2 SERPL-SCNC: 29 MMOL/L (ref 21–32)
CREAT SERPL-MCNC: 1.4 MG/DL (ref 0.9–1.3)
DEPRECATED RDW RBC AUTO: 13.3 % (ref 12.4–15.4)
FERRITIN SERPL IA-MCNC: 642.6 NG/ML (ref 30–400)
GFR SERPLBLD CREATININE-BSD FMLA CKD-EPI: 66 ML/MIN/{1.73_M2}
GLUCOSE BLD-MCNC: 120 MG/DL (ref 70–99)
GLUCOSE BLD-MCNC: 125 MG/DL (ref 70–99)
GLUCOSE BLD-MCNC: 136 MG/DL (ref 70–99)
GLUCOSE BLD-MCNC: 143 MG/DL (ref 70–99)
GLUCOSE BLD-MCNC: 145 MG/DL (ref 70–99)
GLUCOSE BLD-MCNC: 169 MG/DL (ref 70–99)
GLUCOSE BLD-MCNC: 200 MG/DL (ref 70–99)
GLUCOSE BLD-MCNC: 65 MG/DL (ref 70–99)
GLUCOSE BLD-MCNC: 72 MG/DL (ref 70–99)
GLUCOSE BLD-MCNC: 85 MG/DL (ref 70–99)
GLUCOSE SERPL-MCNC: 121 MG/DL (ref 70–99)
HCT VFR BLD AUTO: 22.9 % (ref 40.5–52.5)
HGB BLD-MCNC: 7.6 G/DL (ref 13.5–17.5)
INR PPP: 1.04 (ref 0.85–1.15)
IRON SATN MFR SERPL: 34 % (ref 20–50)
IRON SERPL-MCNC: 52 UG/DL (ref 59–158)
MAGNESIUM SERPL-MCNC: 2 MG/DL (ref 1.8–2.4)
MCH RBC QN AUTO: 26.2 PG (ref 26–34)
MCHC RBC AUTO-ENTMCNC: 33.4 G/DL (ref 31–36)
MCV RBC AUTO: 78.5 FL (ref 80–100)
PERFORMED ON: ABNORMAL
PERFORMED ON: NORMAL
PERFORMED ON: NORMAL
PLATELET # BLD AUTO: 444 K/UL (ref 135–450)
PMV BLD AUTO: 6.7 FL (ref 5–10.5)
POTASSIUM SERPL-SCNC: 3.4 MMOL/L (ref 3.5–5.1)
PROTHROMBIN TIME: 13.9 SEC (ref 11.9–14.9)
RBC # BLD AUTO: 2.92 M/UL (ref 4.2–5.9)
SODIUM SERPL-SCNC: 141 MMOL/L (ref 136–145)
TIBC SERPL-MCNC: 155 UG/DL (ref 260–445)
WBC # BLD AUTO: 13.2 K/UL (ref 4–11)

## 2024-06-06 PROCEDURE — 6370000000 HC RX 637 (ALT 250 FOR IP): Performed by: STUDENT IN AN ORGANIZED HEALTH CARE EDUCATION/TRAINING PROGRAM

## 2024-06-06 PROCEDURE — 83550 IRON BINDING TEST: CPT

## 2024-06-06 PROCEDURE — 85027 COMPLETE CBC AUTOMATED: CPT

## 2024-06-06 PROCEDURE — 86850 RBC ANTIBODY SCREEN: CPT

## 2024-06-06 PROCEDURE — 2580000003 HC RX 258: Performed by: STUDENT IN AN ORGANIZED HEALTH CARE EDUCATION/TRAINING PROGRAM

## 2024-06-06 PROCEDURE — 86900 BLOOD TYPING SEROLOGIC ABO: CPT

## 2024-06-06 PROCEDURE — 85610 PROTHROMBIN TIME: CPT

## 2024-06-06 PROCEDURE — 6360000002 HC RX W HCPCS: Performed by: STUDENT IN AN ORGANIZED HEALTH CARE EDUCATION/TRAINING PROGRAM

## 2024-06-06 PROCEDURE — 86901 BLOOD TYPING SEROLOGIC RH(D): CPT

## 2024-06-06 PROCEDURE — 83735 ASSAY OF MAGNESIUM: CPT

## 2024-06-06 PROCEDURE — 1200000000 HC SEMI PRIVATE

## 2024-06-06 PROCEDURE — 80048 BASIC METABOLIC PNL TOTAL CA: CPT

## 2024-06-06 PROCEDURE — 83540 ASSAY OF IRON: CPT

## 2024-06-06 PROCEDURE — 99232 SBSQ HOSP IP/OBS MODERATE 35: CPT | Performed by: INTERNAL MEDICINE

## 2024-06-06 PROCEDURE — 82728 ASSAY OF FERRITIN: CPT

## 2024-06-06 PROCEDURE — 74019 RADEX ABDOMEN 2 VIEWS: CPT

## 2024-06-06 RX ORDER — INSULIN LISPRO 100 [IU]/ML
12 INJECTION, SOLUTION INTRAVENOUS; SUBCUTANEOUS
Status: DISCONTINUED | OUTPATIENT
Start: 2024-06-06 | End: 2024-06-07 | Stop reason: HOSPADM

## 2024-06-06 RX ORDER — POTASSIUM CHLORIDE 20 MEQ/1
40 TABLET, EXTENDED RELEASE ORAL ONCE
Status: COMPLETED | OUTPATIENT
Start: 2024-06-06 | End: 2024-06-06

## 2024-06-06 RX ORDER — POLYETHYLENE GLYCOL 3350 17 G/17G
17 POWDER, FOR SOLUTION ORAL DAILY
Status: DISCONTINUED | OUTPATIENT
Start: 2024-06-06 | End: 2024-06-07 | Stop reason: HOSPADM

## 2024-06-06 RX ORDER — INSULIN DEGLUDEC 200 U/ML
75 INJECTION, SOLUTION SUBCUTANEOUS DAILY
Qty: 5 ADJUSTABLE DOSE PRE-FILLED PEN SYRINGE | Refills: 3 | Status: SHIPPED | OUTPATIENT
Start: 2024-06-06 | End: 2024-06-06

## 2024-06-06 RX ORDER — METRONIDAZOLE 500 MG/1
500 TABLET ORAL EVERY 8 HOURS SCHEDULED
Qty: 33 TABLET | Refills: 0 | Status: SHIPPED | OUTPATIENT
Start: 2024-06-06 | End: 2024-06-17

## 2024-06-06 RX ORDER — INSULIN DEGLUDEC 200 U/ML
60 INJECTION, SOLUTION SUBCUTANEOUS DAILY
Qty: 5 ADJUSTABLE DOSE PRE-FILLED PEN SYRINGE | Refills: 3 | Status: SHIPPED | OUTPATIENT
Start: 2024-06-06

## 2024-06-06 RX ORDER — ACETAMINOPHEN 500 MG
1000 TABLET ORAL EVERY 8 HOURS SCHEDULED
Qty: 180 TABLET | Refills: 0 | Status: SHIPPED | OUTPATIENT
Start: 2024-06-06 | End: 2024-07-06

## 2024-06-06 RX ORDER — METRONIDAZOLE 500 MG/1
500 TABLET ORAL EVERY 8 HOURS SCHEDULED
Status: DISCONTINUED | OUTPATIENT
Start: 2024-06-06 | End: 2024-06-07 | Stop reason: HOSPADM

## 2024-06-06 RX ORDER — OXYCODONE HYDROCHLORIDE 5 MG/1
5 TABLET ORAL EVERY 8 HOURS PRN
Qty: 9 TABLET | Refills: 0 | Status: SHIPPED | OUTPATIENT
Start: 2024-06-06 | End: 2024-06-09

## 2024-06-06 RX ADMIN — ROSUVASTATIN CALCIUM 40 MG: 20 TABLET, COATED ORAL at 09:29

## 2024-06-06 RX ADMIN — DULOXETINE HYDROCHLORIDE 60 MG: 60 CAPSULE, DELAYED RELEASE ORAL at 09:29

## 2024-06-06 RX ADMIN — ENOXAPARIN SODIUM 30 MG: 100 INJECTION SUBCUTANEOUS at 09:27

## 2024-06-06 RX ADMIN — SODIUM CHLORIDE, PRESERVATIVE FREE 10 ML: 5 INJECTION INTRAVENOUS at 09:36

## 2024-06-06 RX ADMIN — INSULIN LISPRO 17 UNITS: 100 INJECTION, SOLUTION INTRAVENOUS; SUBCUTANEOUS at 09:28

## 2024-06-06 RX ADMIN — CEFEPIME 2000 MG: 2 INJECTION, POWDER, FOR SOLUTION INTRAVENOUS at 18:16

## 2024-06-06 RX ADMIN — CARVEDILOL 25 MG: 25 TABLET, FILM COATED ORAL at 09:28

## 2024-06-06 RX ADMIN — ACETAMINOPHEN 1000 MG: 500 TABLET ORAL at 20:09

## 2024-06-06 RX ADMIN — CARVEDILOL 25 MG: 25 TABLET, FILM COATED ORAL at 20:10

## 2024-06-06 RX ADMIN — SODIUM CHLORIDE, PRESERVATIVE FREE 5 ML: 5 INJECTION INTRAVENOUS at 09:36

## 2024-06-06 RX ADMIN — METRONIDAZOLE 500 MG: 500 TABLET ORAL at 14:32

## 2024-06-06 RX ADMIN — ENOXAPARIN SODIUM 30 MG: 100 INJECTION SUBCUTANEOUS at 20:08

## 2024-06-06 RX ADMIN — METRONIDAZOLE 500 MG: 500 TABLET ORAL at 09:33

## 2024-06-06 RX ADMIN — INSULIN HUMAN 5 UNITS: 100 INJECTION, SOLUTION PARENTERAL at 20:09

## 2024-06-06 RX ADMIN — INSULIN HUMAN 5 UNITS: 100 INJECTION, SOLUTION PARENTERAL at 18:40

## 2024-06-06 RX ADMIN — INSULIN HUMAN 2 UNITS: 100 INJECTION, SOLUTION PARENTERAL at 05:27

## 2024-06-06 RX ADMIN — ACETAMINOPHEN 1000 MG: 500 TABLET ORAL at 14:32

## 2024-06-06 RX ADMIN — INSULIN HUMAN 2 UNITS: 100 INJECTION, SOLUTION PARENTERAL at 02:06

## 2024-06-06 RX ADMIN — CEFEPIME 2000 MG: 2 INJECTION, POWDER, FOR SOLUTION INTRAVENOUS at 02:05

## 2024-06-06 RX ADMIN — CEFEPIME 2000 MG: 2 INJECTION, POWDER, FOR SOLUTION INTRAVENOUS at 09:45

## 2024-06-06 RX ADMIN — DOCUSATE SODIUM 100 MG: 100 CAPSULE, LIQUID FILLED ORAL at 09:29

## 2024-06-06 RX ADMIN — ACETAMINOPHEN 1000 MG: 500 TABLET ORAL at 05:25

## 2024-06-06 RX ADMIN — INSULIN GLARGINE 60 UNITS: 100 INJECTION, SOLUTION SUBCUTANEOUS at 09:27

## 2024-06-06 RX ADMIN — DOCUSATE SODIUM 100 MG: 100 CAPSULE, LIQUID FILLED ORAL at 20:11

## 2024-06-06 RX ADMIN — AMLODIPINE BESYLATE 5 MG: 5 TABLET ORAL at 09:29

## 2024-06-06 RX ADMIN — METRONIDAZOLE 500 MG: 500 TABLET ORAL at 21:30

## 2024-06-06 RX ADMIN — INSULIN LISPRO 12 UNITS: 100 INJECTION, SOLUTION INTRAVENOUS; SUBCUTANEOUS at 18:40

## 2024-06-06 RX ADMIN — POTASSIUM CHLORIDE 40 MEQ: 1500 TABLET, EXTENDED RELEASE ORAL at 09:29

## 2024-06-06 RX ADMIN — MINERAL OIL 330 ML: 1000 SOLUTION ORAL at 17:10

## 2024-06-06 RX ADMIN — VALSARTAN 80 MG: 80 TABLET, FILM COATED ORAL at 18:40

## 2024-06-06 ASSESSMENT — PAIN SCALES - GENERAL
PAINLEVEL_OUTOF10: 5
PAINLEVEL_OUTOF10: 5
PAINLEVEL_OUTOF10: 4
PAINLEVEL_OUTOF10: 5

## 2024-06-06 ASSESSMENT — PAIN DESCRIPTION - DESCRIPTORS
DESCRIPTORS: ACHING
DESCRIPTORS: ACHING
DESCRIPTORS: ACHING;STABBING
DESCRIPTORS: ACHING
DESCRIPTORS: ACHING;DISCOMFORT
DESCRIPTORS: STABBING

## 2024-06-06 ASSESSMENT — PAIN DESCRIPTION - ORIENTATION
ORIENTATION: LEFT

## 2024-06-06 ASSESSMENT — PAIN - FUNCTIONAL ASSESSMENT
PAIN_FUNCTIONAL_ASSESSMENT: PREVENTS OR INTERFERES SOME ACTIVE ACTIVITIES AND ADLS
PAIN_FUNCTIONAL_ASSESSMENT: ACTIVITIES ARE NOT PREVENTED
PAIN_FUNCTIONAL_ASSESSMENT: ACTIVITIES ARE NOT PREVENTED
PAIN_FUNCTIONAL_ASSESSMENT: PREVENTS OR INTERFERES SOME ACTIVE ACTIVITIES AND ADLS

## 2024-06-06 ASSESSMENT — PAIN DESCRIPTION - LOCATION
LOCATION: GROIN

## 2024-06-06 ASSESSMENT — PAIN DESCRIPTION - PAIN TYPE
TYPE: SURGICAL PAIN

## 2024-06-06 ASSESSMENT — PAIN DESCRIPTION - FREQUENCY
FREQUENCY: CONTINUOUS

## 2024-06-06 ASSESSMENT — PAIN DESCRIPTION - ONSET
ONSET: ON-GOING

## 2024-06-06 NOTE — CARE COORDINATION
VICTORINO received call from Александр with [employer], he reports that [facility] is being denied and per denial, insurance feels LTACH is more appropriate for patient at [place]. VICTORINO asked [facility] to review clinical to see if patient meets criteria for LTACH, he does per Swetha in admissions.    CM spoke with patient about LTACH level of care, he and his mother are both in agreement with LTACH, agreeable to [facility] location for LTACH. VICTORINO asked Swetha to start pre-cert for LTACH level of care and to provide reference #. Pre-cert reference #: [id number]    CM spoke at length with patients mother, who does not feel patient is ready for DC, she has questions about blood sugar management, surgical plans and general plan of medical care moving forward. VICTORINO has spoken with nursing, attending MD and surgery NP Hieu, all have been asked to reach out to patients mother for further update. Patient to have wound vac change 06/07/24 with surgery, they will have more of a plan for any more surgical interventions once they can re-assess the wound 06/07/24 .    Patient in agreement with LTACH at [place], aware that he could go to [facility] after LTACH if appropriate, Александр in admissions with [facility] made aware and will follow along with patient after DC at [facility].    VICTORINO will continue to follow for DC planning and needs.    Thank you,  Tapan BRANDTN, RN,   [phone]

## 2024-06-06 NOTE — PROGRESS NOTES
ID Follow-up NOTE    CC:   Body ache, chills, left thigh/groin  Antibiotics: Vancomycin, cefepime    Admit Date: 5/27/2024  Hospital Day: 11    Subjective:     Patient denies any fevers, pain is managed. Vac remains in place, planned change tomorrow.     Objective:     Patient Vitals for the past 8 hrs:   BP Temp Temp src Pulse Resp SpO2   06/06/24 1200 (!) 147/88 98.8 °F (37.1 °C) Oral 98 19 97 %   06/06/24 0928 (!) 136/99 98.8 °F (37.1 °C) Oral 91 19 96 %   06/06/24 0543 (!) 147/91 98.9 °F (37.2 °C) Oral 90 18 94 %       I/O last 3 completed shifts:  In: 956 [P.O.:936; I.V.:20]  Out: 2050 [Urine:2050]  I/O this shift:  In: 222 [P.O.:222]  Out: 1250 [Urine:1250]    EXAM:  GENERAL: No apparent distress.    HEENT: Membranes moist, no oral lesion  NECK:  Supple, no lymphadenopathy  LUNGS: Clear b/l, no rales, no dullness  CARDIAC: RRR, no murmur appreciated  ABD:  + BS, soft / NT  EXT:  No rash, no edema, no lesions, left thigh with irrigating wound VAC in place   NEURO: No focal neurologic findings  PSYCH: Orientation, sensorium, mood normal  LINES:  Peripheral iv, PICC line in right arm       Data Review:  Lab Results   Component Value Date    WBC 13.2 (H) 06/06/2024    HGB 7.6 (L) 06/06/2024    HCT 22.9 (L) 06/06/2024    MCV 78.5 (L) 06/06/2024     06/06/2024     Lab Results   Component Value Date    CREATININE 1.4 (H) 06/06/2024    BUN 18 06/06/2024     06/06/2024    K 3.4 (L) 06/06/2024     06/06/2024    CO2 29 06/06/2024       Hepatic Function Panel:   Lab Results   Component Value Date/Time    ALKPHOS 82 05/20/2024 02:29 PM    ALT 17 05/20/2024 02:29 PM    AST 22 05/20/2024 02:29 PM    BILITOT 0.5 05/20/2024 02:29 PM    BILIDIR <0.2 06/19/2022 08:06 AM    IBILI see below 06/19/2022 08:06 AM       MICRO:  OR culture from leg 5/29: Growth of Klebsiella pneumoniae, E. coli, group F strep and Prevotella bivia  Klebsiella pneumoniae (1)      Antibiotic Interpretation Microscan  Method Status

## 2024-06-06 NOTE — DISCHARGE SUMMARY
V2.0  Discharge Summary    Name:  Issa Moctezuma /Age/Sex: 1985 (38 y.o. male)   Admit Date: 2024  Discharge Date: 24    MRN & CSN:  [id number] & [id number] Encounter Date and Time 24 8:36 AM EDT    Attending:  Megan Sorenson MD Discharging Provider: Megan Sorenson MD       Hospital Course:     Brief HPI: Issa Moctezuma is a 38 y.o. male with CKD who presented with fever and myalgias consistent with viral syndrome. Had mild CHELE as well and admitted for further care. Found to have concern for necrotizing fasciitis of the left thigh     Brief Problem Based Course:   #Necrotizing SSTI of L thigh  --patient on admission was febrile but otherwise had symptoms more consistent with viral syndrome (e.g. myalgias) without otherwise localizing signs of focal bacterial infection. Procal only mildly elevated at 0.6 and BCx NGTD.  MRSA swab negative.  Initial plan to monitor off abx as no convincing source of bacterial infection  --On  reported new pain/swelling in L medial thigh near groin, CT obtained with subcutaneous gas in that region  -Surgery consulted  -- 2024 emergent L thigh debridement  -- 2024 repeat L thigh debridement/washout  -2024 wound VAC placed by surgery, management per surgery   -ID consulted  -empiric vanc day 5, DC  -2024 surgical culture positive for: E. Coli, Klebsiella, and Beta strep.  Provotella bivia  -Drug: IV cefepime 2 g every 8 hours and oral metronidazole 500 mg 3 times daily  - Planned End date: 2024  - Diagnosis: Necrotizing fasciitis of the left leg, mixed gram-negative and anaerobic infection  - Has received test dose in hospital  - Routine   - Check CBC w diff, CMP, ESR, CRP every Mon or Tue - FAX result to [phone]  - Call with antibiotic / infusion issues, [phone]  - Call with any change in status, transfer in or out of a facility or to hospital; This MINOO is only valid for current disposition - [phone].    - No  capsule by mouth daily     Jardiance 10 MG tablet  Generic drug: empagliflozin     melatonin 3 MG Tabs tablet     metFORMIN 500 MG extended release tablet  Commonly known as: GLUCOPHAGE-XR  Take 1 tablet by mouth 2 times daily (with meals)     naloxone 4 MG/0.1ML Liqd nasal spray  Commonly known as: Narcan  Use as directed     Pen Needles 3/16\" 31G X 5 MM Misc  1 each by Does not apply route 4 times daily     valsartan 160 MG tablet  Commonly known as: DIOVAN     vitamin D 1.25 MG (83599 UT) Caps capsule  Commonly known as: ERGOCALCIFEROL  TAKE 1 CAPSULE BY MOUTH 1 TIME A WEEK            STOP taking these medications      naproxen 500 MG tablet  Commonly known as: Naprosyn     pregabalin 100 MG capsule  Commonly known as: Lyrica               Where to Get Your Medications        These medications were sent to [id number] - [place], [place] - [address] - P [phone] - F [phone] [address] [facility] [address]      Phone: [phone]   acetaminophen 500 MG tablet  insulin lispro 100 UNIT/ML injection vial  metroNIDAZOLE 500 MG tablet  Tresiba FlexTouch 200 UNIT/ML Sopn       You can get these medications from any pharmacy    Bring a paper prescription for each of these medications  oxyCODONE 5 MG immediate release tablet        Objective Findings at Discharge:   BP (!) 147/91   Pulse 90   Temp 98.9 °F (37.2 °C) (Oral)   Resp 18   Ht 1.854 m (6' 1\")   Wt 127.5 kg (281 lb 1.4 oz)   SpO2 94%   BMI 37.08 kg/m²       Physical Exam:   General: No distress, alert and oriented x3,  Cardiac: S1 plus S2 regular rate and rhythm, no murmurs rubs or gallops  Respiratory: No wheeze or crackles appreciated, equal air entry bilaterally  GI/: Abdomen soft, nontender, obese, bowel sounds audible  Skin: No rashes, wound VAC in the left groin  MSK: Peripheral pulses intact        Labs and Imaging   XR CHEST PORTABLE    Result Date: 6/2/2024  EXAM: XR CHEST PORTABLE INDICATION: 37

## 2024-06-06 NOTE — PROGRESS NOTES
Pt BS at lunch was 85 but it drastically dropped to 65 when he went to lay back in bed. Given 4 oz of orange juice and rechecked BS; 72.

## 2024-06-06 NOTE — PROGRESS NOTES
General Surgery   Daily Progress Note  Patient: Issa Moctezuma      CC: necrotizing fascitis     SUBJECTIVE:   Pain continues to be controlled. No nausea or vomiting.     ROS:   A 14 point review of systems was conducted, significant findings as noted above. All other systems negative.    OBJECTIVE:    PHYSICAL EXAM:    Vitals:    06/05/24 1741 06/05/24 2131 06/06/24 0030 06/06/24 0543   BP: 130/75 (!) 146/78 (!) 146/90 (!) 147/91   Pulse:  96 95 90   Resp:  18 20 18   Temp:  98.7 °F (37.1 °C) 99 °F (37.2 °C) 98.9 °F (37.2 °C)   TempSrc:  Oral Oral Oral   SpO2:  98% 95% 94%   Weight:       Height:           General appearance: Alert, no acute distress  Eyes: No scleral icterus, EOM grossly intact  Neck: Trachea midline, no JVD  Chest/Lungs: Normal effort with no accessory muscle use on RA  Cardiovascular: RRR, well-perfused  Abdomen: Obese, non-tender, no rebound, guarding, or rigidity.  Skin: Warm and dry, no rashes  Extremities: Wound VAC In place with good seal  Neuro: A&Ox3, no focal deficits, sensation intact    LABS:   Recent Labs     06/05/24  0615 06/06/24  0601   WBC 16.5* 13.2*   HGB 8.0* 7.6*   HCT 24.6* 22.9*   MCV 78.8* 78.5*    444          Recent Labs     06/03/24  1119 06/04/24  0556 06/04/24  0816 06/05/24  0615      < > 136 139   K 3.6   < > 3.8 3.9      < > 102 101   CO2 27   < > 29 30   PHOS 2.8  --   --   --    BUN 18   < > 19 22*   CREATININE 1.5*   < > 1.5* 1.5*    < > = values in this interval not displayed.       ASSESSMENT & PLAN:   This is a 38 y.o. male with hx CKD, HFrEF, DM, w/ nec fas L medial thigh, s/p debridement 5/29 and s/p second look 5/31 and s/p third look 6/4/ 2 Days Post-Op    -Pain control: tylenol, oxy vs dilaudid prn   - Plan for VAC change Friday   Diet: Reg  -OOB, ambulate QID, IS 10x/ hr  -DVT ppx: lovenox  -strict BG control -- recommend high dose sliding scale     Db Moreno DO  PGY1, General Surgery  06/06/24   6:08 AM

## 2024-06-06 NOTE — PLAN OF CARE
Problem: Discharge Planning  Goal: Discharge to home or other facility with appropriate resources  6/6/2024 0243 by Felipa Woodall RN  Outcome: Progressing  Flowsheets (Taken 6/6/2024 0243)  Discharge to home or other facility with appropriate resources:   Identify barriers to discharge with patient and caregiver   Arrange for needed discharge resources and transportation as appropriate   Identify discharge learning needs (meds, wound care, etc)     Problem: Safety - Adult  Goal: Free from fall injury  6/6/2024 0243 by Felipa Woodall RN  Outcome: Progressing  Flowsheets (Taken 6/6/2024 0243)  Free From Fall Injury:   Instruct family/caregiver on patient safety   Based on caregiver fall risk screen, instruct family/caregiver to ask for assistance with transferring infant if caregiver noted to have fall risk factors     Problem: ABCDS Injury Assessment  Goal: Absence of physical injury  6/6/2024 0243 by Felipa Woodall RN  Outcome: Progressing  Flowsheets (Taken 6/6/2024 0243)  Absence of Physical Injury: Implement safety measures based on patient assessment     Problem: Skin/Tissue Integrity  Goal: Absence of new skin breakdown  Description: 1.  Monitor for areas of redness and/or skin breakdown  2.  Assess vascular access sites hourly  3.  Every 4-6 hours minimum:  Change oxygen saturation probe site  4.  Every 4-6 hours:  If on nasal continuous positive airway pressure, respiratory therapy assess nares and determine need for appliance change or resting period.  6/6/2024 0243 by Felipa Woodall RN  Outcome: Progressing     Problem: Pain  Goal: Verbalizes/displays adequate comfort level or baseline comfort level  6/6/2024 0243 by Felipa Woodall RN  Outcome: Progressing  Flowsheets (Taken 6/6/2024 0243)  Verbalizes/displays adequate comfort level or baseline comfort level:   Encourage patient to monitor pain and request assistance   Administer analgesics based on type and severity of pain and evaluate response   Assess pain using

## 2024-06-06 NOTE — PROGRESS NOTES
Nephrology Progress Note  [phone]  [phone]   [email or web address]          CC:  We are following this patient for CKD    past medical history of CKD stage 3a and proteinuria followed by my partner, Frank Hester. The patient also has  DM type 2 with complications, pulmonary histoplasmosis, HTN, and chronic diastolic HF. The patient presented with febrile illness initially thought to be a viral syndrome but he developed a necrotizing abscess in his left thigh near the inguinal crease. He had surgical debridement on  with a re-look washout on 24. His course was complicated by some bleeding from the wound that was closed with suture. He also had CHELE with creatinine of 2.2 on admission. The CHELE resolved with IVF.     Intra-operative cultures grew E coli, Klebsiella, and beta strep. He is on therapy with oral Flagyl and IV cefepime, the latter needed for 2 weeks. He requires a PICC and we are asked to clear for this..       SUBJECTIVE:    Repeat debridement on  finding pus in deeper recesses    BS dropped today  He feels unwell with this  Taking additional PO now    No dyspnea, CP. No cough or wheezing. No N/V, abd pain, or diarrhea. No F/C.      No visitors      Physical Exam:    VITALS:  BP (!) 147/88   Pulse 98   Temp 98.8 °F (37.1 °C) (Oral)   Resp 19   Ht 1.854 m (6' 1\")   Wt 127.5 kg (281 lb 1.4 oz)   SpO2 97%   BMI 37.08 kg/m²   TEMPERATURE:  Current - Temp: 98.8 °F (37.1 °C); Max - Temp  Av.7 °F (37.1 °C)  Min: 98 °F (36.7 °C)  Max: 99 °F (37.2 °C)  PULSE RANGE: Pulse  Av.9  Min: 90  Max: 98  BLOOD PRESSURE RANGE:  Systolic (24hrs), Av , Min:117 , Max:147   ; Diastolic (24hrs), Av, Min:75, Max:99  PULSE OXIMETRY RANGE: SpO2  Av %  Min: 94 %  Max: 98 %  24HR INTAKE/OUTPUT:    Intake/Output Summary (Last 24 hours) at 2024 1232  Last data filed at 2024 0942  Gross per 24 hour   Intake 816 ml   Output 1650 ml   Net -834 ml         Constitutional:  NAD, large

## 2024-06-06 NOTE — PLAN OF CARE
Problem: Discharge Planning  Goal: Discharge to home or other facility with appropriate resources  6/6/2024 1050 by Alexa Matute RN  Outcome: Progressing     Problem: Safety - Adult  Goal: Free from fall injury  6/6/2024 1050 by Alexa Matute RN  Outcome: Progressing  Note: Pt remains free of fall. Bed alarm on, bed lowest position, grippers socks on and call light within reach.      Problem: ABCDS Injury Assessment  Goal: Absence of physical injury  6/6/2024 1050 by Alexa Matute RN  Outcome: Progressing  Flowsheets (Taken 6/6/2024 0243 by Felipa Woodall, RN)  Absence of Physical Injury: Implement safety measures based on patient assessment     Problem: Skin/Tissue Integrity  Goal: Absence of new skin breakdown  Description: 1.  Monitor for areas of redness and/or skin breakdown  2.  Assess vascular access sites hourly  3.  Every 4-6 hours minimum:  Change oxygen saturation probe site  4.  Every 4-6 hours:  If on nasal continuous positive airway pressure, respiratory therapy assess nares and determine need for appliance change or resting period.  6/6/2024 1050 by Alexa Matute RN  Outcome: Progressing     Problem: Pain  Goal: Verbalizes/displays adequate comfort level or baseline comfort level  6/6/2024 1050 by Alexa Matute RN  Outcome: Progressing  Flowsheets (Taken 6/6/2024 0243 by Felipa Woodall, RN)  Verbalizes/displays adequate comfort level or baseline comfort level:   Encourage patient to monitor pain and request assistance   Administer analgesics based on type and severity of pain and evaluate response   Assess pain using appropriate pain scale   Implement non-pharmacological measures as appropriate and evaluate response  Note: Pt rated pain 5 out of 10. Did not request PRN pain medications. Implemented non-pharmacological measures to provide comfort.       Problem: Chronic Conditions and Co-morbidities  Goal: Patient's chronic conditions and co-morbidity symptoms are monitored and maintained or

## 2024-06-07 VITALS
SYSTOLIC BLOOD PRESSURE: 154 MMHG | BODY MASS INDEX: 37.25 KG/M2 | HEIGHT: 73 IN | WEIGHT: 281.09 LBS | DIASTOLIC BLOOD PRESSURE: 93 MMHG | TEMPERATURE: 99.1 F | HEART RATE: 98 BPM | OXYGEN SATURATION: 99 % | RESPIRATION RATE: 19 BRPM

## 2024-06-07 LAB
ANION GAP SERPL CALCULATED.3IONS-SCNC: 6 MMOL/L (ref 3–16)
BUN SERPL-MCNC: 13 MG/DL (ref 7–20)
CALCIUM SERPL-MCNC: 8.5 MG/DL (ref 8.3–10.6)
CHLORIDE SERPL-SCNC: 103 MMOL/L (ref 99–110)
CO2 SERPL-SCNC: 30 MMOL/L (ref 21–32)
CREAT SERPL-MCNC: 1.3 MG/DL (ref 0.9–1.3)
DEPRECATED RDW RBC AUTO: 13.3 % (ref 12.4–15.4)
GFR SERPLBLD CREATININE-BSD FMLA CKD-EPI: 72 ML/MIN/{1.73_M2}
GLUCOSE BLD-MCNC: 105 MG/DL (ref 70–99)
GLUCOSE BLD-MCNC: 108 MG/DL (ref 70–99)
GLUCOSE BLD-MCNC: 127 MG/DL (ref 70–99)
GLUCOSE BLD-MCNC: 129 MG/DL (ref 70–99)
GLUCOSE BLD-MCNC: 161 MG/DL (ref 70–99)
GLUCOSE BLD-MCNC: 68 MG/DL (ref 70–99)
GLUCOSE BLD-MCNC: 72 MG/DL (ref 70–99)
GLUCOSE SERPL-MCNC: 109 MG/DL (ref 70–99)
HCT VFR BLD AUTO: 23.8 % (ref 40.5–52.5)
HGB BLD-MCNC: 7.7 G/DL (ref 13.5–17.5)
MCH RBC QN AUTO: 25.6 PG (ref 26–34)
MCHC RBC AUTO-ENTMCNC: 32.4 G/DL (ref 31–36)
MCV RBC AUTO: 79 FL (ref 80–100)
PERFORMED ON: ABNORMAL
PERFORMED ON: NORMAL
PLATELET # BLD AUTO: 461 K/UL (ref 135–450)
PMV BLD AUTO: 6.8 FL (ref 5–10.5)
POTASSIUM SERPL-SCNC: 3.6 MMOL/L (ref 3.5–5.1)
RBC # BLD AUTO: 3.02 M/UL (ref 4.2–5.9)
SODIUM SERPL-SCNC: 139 MMOL/L (ref 136–145)
WBC # BLD AUTO: 12.9 K/UL (ref 4–11)

## 2024-06-07 PROCEDURE — 2580000003 HC RX 258: Performed by: STUDENT IN AN ORGANIZED HEALTH CARE EDUCATION/TRAINING PROGRAM

## 2024-06-07 PROCEDURE — 6360000002 HC RX W HCPCS: Performed by: STUDENT IN AN ORGANIZED HEALTH CARE EDUCATION/TRAINING PROGRAM

## 2024-06-07 PROCEDURE — 6370000000 HC RX 637 (ALT 250 FOR IP): Performed by: STUDENT IN AN ORGANIZED HEALTH CARE EDUCATION/TRAINING PROGRAM

## 2024-06-07 PROCEDURE — 80048 BASIC METABOLIC PNL TOTAL CA: CPT

## 2024-06-07 PROCEDURE — 85027 COMPLETE CBC AUTOMATED: CPT

## 2024-06-07 RX ADMIN — ACETAMINOPHEN 1000 MG: 500 TABLET ORAL at 06:10

## 2024-06-07 RX ADMIN — HYDROMORPHONE HYDROCHLORIDE 0.25 MG: 1 INJECTION, SOLUTION INTRAMUSCULAR; INTRAVENOUS; SUBCUTANEOUS at 10:34

## 2024-06-07 RX ADMIN — INSULIN HUMAN 5 UNITS: 100 INJECTION, SOLUTION PARENTERAL at 10:05

## 2024-06-07 RX ADMIN — ROSUVASTATIN CALCIUM 40 MG: 20 TABLET, COATED ORAL at 09:35

## 2024-06-07 RX ADMIN — CEFEPIME 2000 MG: 2 INJECTION, POWDER, FOR SOLUTION INTRAVENOUS at 02:05

## 2024-06-07 RX ADMIN — CEFEPIME 2000 MG: 2 INJECTION, POWDER, FOR SOLUTION INTRAVENOUS at 09:58

## 2024-06-07 RX ADMIN — DULOXETINE HYDROCHLORIDE 60 MG: 60 CAPSULE, DELAYED RELEASE ORAL at 09:36

## 2024-06-07 RX ADMIN — INSULIN GLARGINE 60 UNITS: 100 INJECTION, SOLUTION SUBCUTANEOUS at 09:37

## 2024-06-07 RX ADMIN — SODIUM CHLORIDE, PRESERVATIVE FREE 10 ML: 5 INJECTION INTRAVENOUS at 10:04

## 2024-06-07 RX ADMIN — METRONIDAZOLE 500 MG: 500 TABLET ORAL at 06:10

## 2024-06-07 RX ADMIN — SODIUM CHLORIDE, PRESERVATIVE FREE 10 ML: 5 INJECTION INTRAVENOUS at 11:18

## 2024-06-07 RX ADMIN — AMLODIPINE BESYLATE 5 MG: 5 TABLET ORAL at 11:13

## 2024-06-07 RX ADMIN — DOCUSATE SODIUM 100 MG: 100 CAPSULE, LIQUID FILLED ORAL at 09:36

## 2024-06-07 RX ADMIN — INSULIN LISPRO 12 UNITS: 100 INJECTION, SOLUTION INTRAVENOUS; SUBCUTANEOUS at 09:37

## 2024-06-07 RX ADMIN — ACETAMINOPHEN 1000 MG: 500 TABLET ORAL at 13:33

## 2024-06-07 RX ADMIN — INSULIN HUMAN 2 UNITS: 100 INJECTION, SOLUTION PARENTERAL at 06:10

## 2024-06-07 RX ADMIN — CARVEDILOL 25 MG: 25 TABLET, FILM COATED ORAL at 09:36

## 2024-06-07 RX ADMIN — ENOXAPARIN SODIUM 30 MG: 100 INJECTION SUBCUTANEOUS at 11:12

## 2024-06-07 RX ADMIN — METRONIDAZOLE 500 MG: 500 TABLET ORAL at 13:34

## 2024-06-07 ASSESSMENT — PAIN DESCRIPTION - DESCRIPTORS
DESCRIPTORS: ACHING
DESCRIPTORS: ACHING
DESCRIPTORS: STABBING
DESCRIPTORS: ACHING

## 2024-06-07 ASSESSMENT — PAIN SCALES - GENERAL
PAINLEVEL_OUTOF10: 5

## 2024-06-07 ASSESSMENT — PAIN DESCRIPTION - ORIENTATION
ORIENTATION: LEFT

## 2024-06-07 ASSESSMENT — PAIN DESCRIPTION - LOCATION
LOCATION: GROIN

## 2024-06-07 ASSESSMENT — PAIN - FUNCTIONAL ASSESSMENT: PAIN_FUNCTIONAL_ASSESSMENT: ACTIVITIES ARE NOT PREVENTED

## 2024-06-07 NOTE — PROGRESS NOTES
General Surgery   Daily Progress Note  Patient: Issa Moctezuma      CC: necrotizing fascitis     SUBJECTIVE:   NAEO. No complaints overnight. No nausea or vomiting.     ROS:   A 14 point review of systems was conducted, significant findings as noted above. All other systems negative.    OBJECTIVE:    PHYSICAL EXAM:    Vitals:    06/06/24 1840 06/06/24 1952 06/07/24 0151 06/07/24 0556   BP: 111/70 114/81 (!) 147/92 (!) 144/95   Pulse:  98 96 98   Resp:  20 16 16   Temp:  99.4 °F (37.4 °C) 99.2 °F (37.3 °C) 99.2 °F (37.3 °C)   TempSrc:  Oral Oral Oral   SpO2:  98% 96% 95%   Weight:       Height:           General appearance: Alert, no acute distress  Eyes: No scleral icterus, EOM grossly intact  Neck: Trachea midline, no JVD  Chest/Lungs: Normal effort with no accessory muscle use on RA  Cardiovascular: RRR, well-perfused  Abdomen: Obese, non-tender, no rebound, guarding, or rigidity.  Skin: Warm and dry, no rashes  Extremities: Wound VAC In place with good seal  Neuro: A&Ox3, no focal deficits, sensation intact    LABS:   Recent Labs     06/05/24  0615 06/06/24  0601   WBC 16.5* 13.2*   HGB 8.0* 7.6*   HCT 24.6* 22.9*   MCV 78.8* 78.5*    444          Recent Labs     06/05/24  0615 06/06/24  0601    141   K 3.9 3.4*    103   CO2 30 29   BUN 22* 18   CREATININE 1.5* 1.4*       ASSESSMENT & PLAN:   This is a 38 y.o. male with hx CKD, HFrEF, DM, w/ nec fas L medial thigh, s/p debridement 5/29 and s/p second look 5/31 and s/p third look 6/4/ 3 Days Post-Op    -Pain control: tylenol, oxy vs dilaudid prn   - Plan for VAC change today, will transition to regular today.   Diet: Reg  -OOB, ambulate QID, IS 10x/ hr  -DVT ppx: lovenox  -strict BG control -- recommend high dose sliding scale   - Pt cannot dispo prior to VAC change today    Db Moreno DO  PGY1, General Surgery  06/07/24   6:13 AM   [facility]  [phone]

## 2024-06-07 NOTE — PROGRESS NOTES
V2.0    [facility] [place]      Name:  Issa Moctezuma /Age/Sex: 1985  (38 y.o. male)   MRN & CSN:  [id number] & [id number] Encounter Date/Time: 2024 8:45 PM EDT   Location:  6322/6322-01 PCP: Sonia Kan DO     Attending:Megan Sorenson MD       Hospital Day: 12    Assessment and Recommendations   Issa Moctezuma is a 38 y.o. male with CKD who presented with fever and myalgias consistent with viral syndrome. Had mild CHELE as well and admitted for further care.  Found to have concern for necrotizing fasciitis of the left thigh    #Necrotizing SSTI of L thigh  --patient on admission was febrile but otherwise had symptoms more consistent with viral syndrome (e.g. myalgias) without otherwise localizing signs of focal bacterial infection. Procal only mildly elevated at 0.6 and BCx NGTD.  MRSA swab negative.  Initial plan to monitor off abx as no convincing source of bacterial infection  --On  reported new pain/swelling in L medial thigh near groin, CT obtained with subcutaneous gas in that region  -Surgery consulted  -- 2024 emergent L thigh debridement  -- 2024 repeat L thigh debridement/washout  -2024 wound VAC placed by ID  -ID consulted  -empiric vanc day 5, DC  -2024 surgical culture positive for: E. Coli, Klebsiella, and Beta strep.  Provotella bivia  -Drug: IV cefepime 2 g every 8 hours and oral metronidazole 500 mg 3 times daily  - Planned End date: 2024  - Diagnosis: Necrotizing fasciitis of the left leg, mixed gram-negative and anaerobic infection  - Has received test dose in hospital  - Routine   - Check CBC w diff, CMP, ESR, CRP every Mon or Tue - FAX result to [phone]  - Call with antibiotic / infusion issues, [phone]  - Call with any change in status, transfer in or out of a facility or to hospital; This MINOO is only valid for current disposition - [phone].    - No f/u in outpatient ID office necessary  -Initial plan was for  06:45 AM    UROBILINOGEN 1.0 05/28/2024 06:45 AM    BILIRUBINUR Negative 05/28/2024 06:45 AM    BLOODU SMALL 05/28/2024 06:45 AM    GLUCOSEU Negative 05/28/2024 06:45 AM    GLUCOSEU NEGATIVE 01/02/2011 02:44 PM    KETUA 40 05/28/2024 06:45 AM    AMORPHOUS 3+ 05/28/2024 06:45 AM     Urine Cultures: No results found for: \"LABURIN\"  Blood Cultures:   Lab Results   Component Value Date/Time    BC No Growth after 4 days of incubation. 05/28/2024 03:42 AM     Lab Results   Component Value Date/Time    BLOODCULT2 No Growth after 4 days of incubation. 05/28/2024 03:49 AM     Organism:   Lab Results   Component Value Date/Time    ORG Klebsiella pneumoniae 05/29/2024 11:13 PM    ORG Escherichia coli 05/29/2024 11:13 PM    ORG Beta Strep Group F 05/29/2024 11:13 PM    ORG Prevotella bivia 05/29/2024 11:13 PM     Time spent: More than 35 minutes were spent in taking care of the patient more than half this time was spent in talking to the patient's family and answering questions.    Electronically signed by Megan Sorenson MD on 6/7/2024 at 11:31 AM

## 2024-06-07 NOTE — PROGRESS NOTES
Pt discharged to [facility]. Report given to Arnav. PICC and R forearm IV stayed in pt per nurse order. Discharge paperwork given to transport. Belongings packed and sent with patient.

## 2024-06-07 NOTE — CARE COORDINATION
Case Management Assessment            Discharge Note                    Date / Time of Note: 6/7/2024 12:23 PM                  Discharge Note Completed by: Tapan Reyna RN    Patient Name: Issa Moctezuma   YOB: 1985  Diagnosis: Viral syndrome [B34.9]  Elevated troponin [R79.89]  Viral illness [B34.9]  CHELE (acute kidney injury) (HCC) [N17.9]   Date / Time: 5/27/2024  6:54 PM    Current PCP: Sonia Kan,   Clinic patient: No    Hospitalization in the last 30 days: No       Advance Directives:  Code Status: Full Code  [place] DNR form completed and on chart: No, Not Indicated    Financial:  Payor: Space[employer] PL / Plan: [employer] PLAN [place] / Product Type: *No Product type* /      Pharmacy:    [id number] [place], [place] - [address] [facility] P [phone] - F [phone] [address] [place] [id number]  Phone: [phone] Fax: [phone]      Assistance purchasing medications?: Potential Assistance Purchasing Medications: No  Assistance provided by Case Management: None at this time    Does patient want to participate in local refill/ meds to beds program?: No    Meds To Beds General Rules:  1. Can ONLY be done Monday- Friday between 8:30am-5pm  2. Prescription(s) must be in pharmacy by 3pm to be filled same day  3.Copy of patient's insurance/ prescription drug card and patient face sheet must be sent along with the prescription(s)  4. Cost of Rx cannot be added to hospital bill. If financial assistance is needed, please contact unit  or ;  or  CANNOT provide pharmacy voucher for patients co-pays  5. Patients can then  the prescription on their way out of the hospital at discharge, or pharmacy can deliver to the bedside if staff is available. (payment due at time of pick-up or delivery - cash, check, or card accepted)     Able to afford home medications/  Date/Time    COVID19 NOT DETECTED 05/27/2024 07:56 PM    COVID19 Not Detected 06/20/2022 10:50 AM       The Plan for Transition of Care is related to the following treatment goals of Viral syndrome [B34.9]  Elevated troponin [R79.89]  Viral illness [B34.9]  CHELE (acute kidney injury) (HCC) [N17.9]    The Patient and/or patient representative Issa and his family were provided with a choice of provider and agrees with the discharge plan Yes    Freedom of choice list was provided with basic dialogue that supports the patient's individualized plan of care/goals and shares the quality data associated with the providers. Yes    Care Transitions patient: No    Tapan Reyna RN  [facility]  Case Management Department  Ph: [phone]  Fax: [phone]

## 2024-06-07 NOTE — PLAN OF CARE
Problem: Discharge Planning  Goal: Discharge to home or other facility with appropriate resources  6/7/2024 1435 by Alexa Matute RN  Outcome: Progressing  6/7/2024 0353 by Felipa Woodall RN  Outcome: Progressing  Flowsheets (Taken 6/7/2024 0353)  Discharge to home or other facility with appropriate resources:   Identify barriers to discharge with patient and caregiver   Arrange for needed discharge resources and transportation as appropriate   Identify discharge learning needs (meds, wound care, etc)     Problem: Safety - Adult  Goal: Free from fall injury  6/7/2024 1435 by Alexa Matute RN  Outcome: Progressing  Note: Pt remains free of fall. Bed alarm on, bed lowest position, and call light within reach.      Problem: ABCDS Injury Assessment  Goal: Absence of physical injury  6/7/2024 1435 by Alexa Matute RN  Outcome: Progressing  Flowsheets (Taken 6/7/2024 0353 by Felipa Woodall RN)  Absence of Physical Injury: Implement safety measures based on patient assessment     Problem: Skin/Tissue Integrity  Goal: Absence of new skin breakdown  Description: 1.  Monitor for areas of redness and/or skin breakdown  2.  Assess vascular access sites hourly  3.  Every 4-6 hours minimum:  Change oxygen saturation probe site  4.  Every 4-6 hours:  If on nasal continuous positive airway pressure, respiratory therapy assess nares and determine need for appliance change or resting period.  6/7/2024 1435 by Alexa Matute RN  Outcome: Progressing     Problem: Pain  Goal: Verbalizes/displays adequate comfort level or baseline comfort level  6/7/2024 1435 by Alexa Matute RN  Outcome: Progressing  Flowsheets (Taken 6/7/2024 0353 by Felipa Woodall RN)  Verbalizes/displays adequate comfort level or baseline comfort level:   Encourage patient to monitor pain and request assistance   Assess pain using appropriate pain scale   Administer analgesics based on type and severity of pain and evaluate response   Implement non-pharmacological measures  as appropriate and evaluate response     Problem: Chronic Conditions and Co-morbidities  Goal: Patient's chronic conditions and co-morbidity symptoms are monitored and maintained or improved  6/7/2024 1435 by Alexa Matute, RN  Outcome: Progressing  Flowsheets (Taken 6/7/2024 0353 by Felipa Woodall, RN)  Care Plan - Patient's Chronic Conditions and Co-Morbidity Symptoms are Monitored and Maintained or Improved:   Monitor and assess patient's chronic conditions and comorbid symptoms for stability, deterioration, or improvement   Collaborate with multidisciplinary team to address chronic and comorbid conditions and prevent exacerbation or deterioration   Update acute care plan with appropriate goals if chronic or comorbid symptoms are exacerbated and prevent overall improvement and discharge

## 2024-06-07 NOTE — PROGRESS NOTES
06/07/24 1330   Encounter Summary   Encounter Overview/Reason Follow-up   Service Provided For Patient   Support System Family members   Last Encounter  06/07/24   Complexity of Encounter Low   Begin Time 1326   End Time  1331   Total Time Calculated 5 min   Assessment/Intervention/Outcome   Assessment Calm;Passive;Hopeful   Intervention Active listening;Discussed illness injury and it’s impact   Outcome Connection/Belonging;Optimistic   Plan and Referrals   Plan/Referrals Continue to visit, (comment)     Student  Desire Martinez

## 2024-06-07 NOTE — PLAN OF CARE
Problem: Discharge Planning  Goal: Discharge to home or other facility with appropriate resources  Outcome: Progressing  Flowsheets (Taken 6/7/2024 0353)  Discharge to home or other facility with appropriate resources:   Identify barriers to discharge with patient and caregiver   Arrange for needed discharge resources and transportation as appropriate   Identify discharge learning needs (meds, wound care, etc)     Problem: Safety - Adult  Goal: Free from fall injury  Outcome: Progressing  Flowsheets (Taken 6/7/2024 0353)  Free From Fall Injury:   Instruct family/caregiver on patient safety   Based on caregiver fall risk screen, instruct family/caregiver to ask for assistance with transferring infant if caregiver noted to have fall risk factors     Problem: Skin/Tissue Integrity  Goal: Absence of new skin breakdown  Description: 1.  Monitor for areas of redness and/or skin breakdown  2.  Assess vascular access sites hourly  3.  Every 4-6 hours minimum:  Change oxygen saturation probe site  4.  Every 4-6 hours:  If on nasal continuous positive airway pressure, respiratory therapy assess nares and determine need for appliance change or resting period.  Outcome: Progressing     Problem: Pain  Goal: Verbalizes/displays adequate comfort level or baseline comfort level  Outcome: Progressing  Flowsheets (Taken 6/7/2024 0353)  Verbalizes/displays adequate comfort level or baseline comfort level:   Encourage patient to monitor pain and request assistance   Assess pain using appropriate pain scale   Administer analgesics based on type and severity of pain and evaluate response   Implement non-pharmacological measures as appropriate and evaluate response  Note: Pt. C/O 5/10 pain in his left groin. Which was relieved by routine Tylenol. Plan of care ongoing.     Problem: Chronic Conditions and Co-morbidities  Goal: Patient's chronic conditions and co-morbidity symptoms are monitored and maintained or improved  Outcome:

## 2024-06-07 NOTE — DISCHARGE SUMMARY
V2.0  Discharge Summary    Name:  Issa Moctezuma /Age/Sex: 1985 (38 y.o. male)   Admit Date: 2024  Discharge Date: 24    MRN & CSN:  [id number] & [id number] Encounter Date and Time 24 8:36 AM EDT    Attending:  Megan Sorenson MD Discharging Provider: Megan Sorenson MD       Hospital Course:     Brief HPI: Issa Moctezuma is a 38 y.o. male with CKD who presented with fever and myalgias consistent with viral syndrome. Had mild CHELE as well and admitted for further care. Found to have concern for necrotizing fasciitis of the left thigh     Brief Problem Based Course:   #Necrotizing SSTI of L thigh  --patient on admission was febrile but otherwise had symptoms more consistent with viral syndrome (e.g. myalgias) without otherwise localizing signs of focal bacterial infection. Procal only mildly elevated at 0.6 and BCx NGTD.  MRSA swab negative.  Initial plan to monitor off abx as no convincing source of bacterial infection  --On  reported new pain/swelling in L medial thigh near groin, CT obtained with subcutaneous gas in that region  -Surgery consulted  -- 2024 emergent L thigh debridement  -- 2024 repeat L thigh debridement/washout  -2024 wound VAC placed by surgery, management per surgery   -2024 wound VAC removed in anticipation of discharge to LTAC with plans to reapply wound VAC at LTAC  -ID following  -empiric vanc day 5, DC  -2024 surgical culture positive for: E. Coli, Klebsiella, and Beta strep.  Provotella bivia  -Drug: IV cefepime 2 g every 8 hours and oral metronidazole 500 mg 3 times daily  - Planned End date: 2024  - Diagnosis: Necrotizing fasciitis of the left leg, mixed gram-negative and anaerobic infection  - Has received test dose in hospital  - Routine   - Check CBC w diff, CMP, ESR, CRP every Mon or Tue - FAX result to [phone]  - Call with antibiotic / infusion issues, [phone]  - Call with any change in status, transfer

## 2024-06-07 NOTE — PROGRESS NOTES
Called report [facility] ([facility] and spoke to Arnav. Stated to leave in PICC and R forearm IV.

## 2024-06-07 NOTE — PROGRESS NOTES
General surgery interval summary     Wound vac removed in anticipation of possible discharge to facility. Wound inspected and noted to have healthy wound base, no concerning discharge (picture below). WTD dressing with saline placed in anticipation of discharge today. LTAC to reapply wound vac dressing. MINOO updated with black foam wound vac instructions. Plan of care discussed with patient and his mother via telephone. Ok for discharge to facility per surgery perspective.     Margarita Espinoza MD  PGY3, General Surgery  06/07/24  10:54 AM  [facility]  [phone]

## 2024-06-07 NOTE — CARE COORDINATION
CM received call from [facility] with [facility], they have received pre-cert for admission. VICTORINO has sent perfect serve to attending MD and surgery NP Hieu with update. Patient to have wound vac change and surgery to evaluate wound this AM and make determination regarding further intervention.    Thank you,  Tapan BRANDTN, RN,   [phone]

## 2024-06-14 NOTE — OP NOTE
Operative Note      Patient: Issa Moctezuma  YOB: 1985  MRN: [id number]    Date of Procedure: 6/4/2024    Pre-Op Diagnosis Codes:     * Groin abscess [L02.214]    Post-Op Diagnosis: Same       Procedure(s):  LEFT GROIN WOUND WASH OUT AND DEBRIDEMENT WITH WOUND VAC APPLICATION    Surgeon(s):  Herb Abdalla MD    Assistant:   Resident: Db Moreno DO; Howard Solo DO    Anesthesia: General    Estimated Blood Loss (mL): Minimal    Complications: None    Specimens:   * No specimens in log *    Implants:  * No implants in log *      Drains:   Negative Pressure Wound Therapy Leg Left;Upper;Anterior (Active)   Output (ml) 1000 ml 06/06/24 2230       [REMOVED] Negative Pressure Wound Therapy Groin Left;Upper;Anterior (Removed)   Wound Type Surgical 06/04/24 1155   Unit Type Veraflo woundtherapy 06/04/24 1155   Target Pressure (mmHg) 125 06/04/24 1155   Irrigation Solution Other (comment) 06/03/24 2225   Drainage Amount Moderate 06/03/24 2225   Drainage Description Serosanguinous 06/03/24 2225   Output (ml) 50 ml 06/03/24 2225       Findings:  Infection Present At Time Of Surgery (PATOS) (choose all levels that have infection present):  - Deep Infection (muscle/fascia) present as evidenced by abscess and pus  Other Findings: Wound with induration on medial aspect     Detailed Description of Procedure:   Procedure: Sharp excisional debridement of abdominal wound. Patient had previous debridements for necrotizing infection of his left groin. Patient had increased white count and needed further debridement.    After informed consent was obtained, pt was prepped and draped in the usual sterile fashion. # 10 scalpel and bovi electrocautery was was used to debride necrotic tissue from medial aspect of wound previously debrided wound -- down to and including the subcutaneous tissue. Hemostasis was achieved. Wound irrigated with pulse lavage. Irrigating wound vac was then applied. Patient

## 2024-06-17 NOTE — TELEPHONE ENCOUNTER
Appears still admitted per CE. No d/c summary.     Erin Oliveira, PharmD, BCACP  [facility]   [facility] Amanda Ph: [phone]  [facility] Charlie Ph: [phone]  6/17/2024 12:12 PM

## 2024-07-01 NOTE — TELEPHONE ENCOUNTER
Spoke with pt. Pt remains admitted to [facility] as of 7/1.    Erin Oliveira, PharmD, BCACP  [facility]   [facility] Amanda Ph: [phone]  [facility] Charlie Ph: [phone]  7/1/2024 11:29 AM

## 2024-07-24 NOTE — TELEPHONE ENCOUNTER
LVM with pt to follow up.     Erin Oliveira, PharmD, BCACP  [facility]   [facility] Amanda Ph: [phone]  [facility] Charlie Ph: [phone]  7/24/2024 10:34 AM

## 2024-08-28 LAB
ESTIMATED AVERAGE GLUCOSE: 255
HBA1C MFR BLD: 10.5 %

## 2024-10-03 ENCOUNTER — HOSPITAL ENCOUNTER (EMERGENCY)
Age: 39
Discharge: HOME | End: 2024-10-03
Attending: EMERGENCY MEDICINE
Payer: MEDICAID

## 2024-10-03 VITALS
OXYGEN SATURATION: 95 % | HEART RATE: 99 BPM | TEMPERATURE: 98.2 F | DIASTOLIC BLOOD PRESSURE: 120 MMHG | SYSTOLIC BLOOD PRESSURE: 178 MMHG | RESPIRATION RATE: 16 BRPM | WEIGHT: 278 LBS | BODY MASS INDEX: 36.68 KG/M2

## 2024-10-03 DIAGNOSIS — R73.9 HYPERGLYCEMIA: ICD-10-CM

## 2024-10-03 DIAGNOSIS — L29.9 PRURITIC DISORDER: Primary | ICD-10-CM

## 2024-10-03 LAB
ALBUMIN SERPL-MCNC: 3.6 G/DL (ref 3.4–5)
ALBUMIN/GLOB SERPL: 1.2 {RATIO} (ref 1.1–2.2)
ALP SERPL-CCNC: 100 U/L (ref 40–129)
ALT SERPL-CCNC: 18 U/L (ref 10–40)
ANION GAP SERPL CALCULATED.3IONS-SCNC: 12 MMOL/L (ref 3–16)
AST SERPL-CCNC: 13 U/L (ref 15–37)
BILIRUB SERPL-MCNC: <0.2 MG/DL (ref 0–1)
BUN SERPL-MCNC: 22 MG/DL (ref 7–20)
CALCIUM SERPL-MCNC: 9.4 MG/DL (ref 8.3–10.6)
CHLORIDE SERPL-SCNC: 94 MMOL/L (ref 99–110)
CO2 SERPL-SCNC: 22 MMOL/L (ref 21–32)
CREAT SERPL-MCNC: 1.6 MG/DL (ref 0.9–1.3)
GFR SERPLBLD CREATININE-BSD FMLA CKD-EPI: 56 ML/MIN/{1.73_M2}
GLUCOSE BLD-MCNC: 264 MG/DL (ref 70–99)
GLUCOSE BLD-MCNC: 426 MG/DL (ref 70–99)
GLUCOSE SERPL-MCNC: 533 MG/DL (ref 70–99)
MAGNESIUM SERPL-MCNC: 2.1 MG/DL (ref 1.8–2.4)
PERFORMED ON: ABNORMAL
PERFORMED ON: ABNORMAL
POTASSIUM SERPL-SCNC: 4.4 MMOL/L (ref 3.5–5.1)
PROT SERPL-MCNC: 6.7 G/DL (ref 6.4–8.2)
SODIUM SERPL-SCNC: 128 MMOL/L (ref 136–145)

## 2024-10-03 PROCEDURE — 96361 HYDRATE IV INFUSION ADD-ON: CPT

## 2024-10-03 PROCEDURE — 6370000000 HC RX 637 (ALT 250 FOR IP): Performed by: EMERGENCY MEDICINE

## 2024-10-03 PROCEDURE — 99284 EMERGENCY DEPT VISIT MOD MDM: CPT

## 2024-10-03 PROCEDURE — 83735 ASSAY OF MAGNESIUM: CPT

## 2024-10-03 PROCEDURE — 96374 THER/PROPH/DIAG INJ IV PUSH: CPT

## 2024-10-03 PROCEDURE — 6360000002 HC RX W HCPCS: Performed by: EMERGENCY MEDICINE

## 2024-10-03 PROCEDURE — 2580000003 HC RX 258: Performed by: EMERGENCY MEDICINE

## 2024-10-03 PROCEDURE — 80053 COMPREHEN METABOLIC PANEL: CPT

## 2024-10-03 PROCEDURE — 96372 THER/PROPH/DIAG INJ SC/IM: CPT

## 2024-10-03 RX ORDER — 0.9 % SODIUM CHLORIDE 0.9 %
1000 INTRAVENOUS SOLUTION INTRAVENOUS ONCE
Status: COMPLETED | OUTPATIENT
Start: 2024-10-03 | End: 2024-10-03

## 2024-10-03 RX ORDER — AMLODIPINE BESYLATE 5 MG/1
5 TABLET ORAL ONCE
Status: COMPLETED | OUTPATIENT
Start: 2024-10-03 | End: 2024-10-03

## 2024-10-03 RX ORDER — HYDROXYZINE HYDROCHLORIDE 50 MG/ML
25 INJECTION, SOLUTION INTRAMUSCULAR ONCE
Status: COMPLETED | OUTPATIENT
Start: 2024-10-03 | End: 2024-10-03

## 2024-10-03 RX ORDER — DIPHENHYDRAMINE HYDROCHLORIDE 50 MG/ML
12.5 INJECTION INTRAMUSCULAR; INTRAVENOUS ONCE
Status: COMPLETED | OUTPATIENT
Start: 2024-10-03 | End: 2024-10-03

## 2024-10-03 RX ADMIN — INSULIN HUMAN 14 UNITS: 100 INJECTION, SOLUTION PARENTERAL at 04:38

## 2024-10-03 RX ADMIN — INSULIN HUMAN 6 UNITS: 100 INJECTION, SOLUTION PARENTERAL at 06:14

## 2024-10-03 RX ADMIN — DIPHENHYDRAMINE HYDROCHLORIDE 12.5 MG: 50 INJECTION INTRAMUSCULAR; INTRAVENOUS at 06:14

## 2024-10-03 RX ADMIN — AMLODIPINE BESYLATE 5 MG: 5 TABLET ORAL at 04:02

## 2024-10-03 RX ADMIN — SODIUM CHLORIDE 1000 ML: 9 INJECTION, SOLUTION INTRAVENOUS at 06:13

## 2024-10-03 RX ADMIN — HYDROXYZINE HYDROCHLORIDE 25 MG: 50 INJECTION, SOLUTION INTRAMUSCULAR at 03:56

## 2024-10-03 ASSESSMENT — PAIN DESCRIPTION - DESCRIPTORS: DESCRIPTORS: ITCHING

## 2024-10-03 ASSESSMENT — PAIN - FUNCTIONAL ASSESSMENT
PAIN_FUNCTIONAL_ASSESSMENT: NONE - DENIES PAIN
PAIN_FUNCTIONAL_ASSESSMENT: 0-10

## 2024-10-03 ASSESSMENT — PAIN SCALES - GENERAL: PAINLEVEL_OUTOF10: 10

## 2024-10-03 ASSESSMENT — PAIN DESCRIPTION - LOCATION: LOCATION: GENERALIZED

## 2024-10-03 NOTE — ED NOTES
IVFs started; 2nd Dose of Insulin given SubQ.   Patient positioned for comfort and heated blanket provided and lights dimmed.   Updated on POC; Verbalized understanding.
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
## 2024-10-03 NOTE — ED NOTES
Patient given  work note, discharge instructions verbal and written, patient verbalized understanding.  Alert/oriented X4, Clear speech.  Patient exhibits no distress, ambulates with steady gait per self leaving unit, no further request.
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
## 2024-10-03 NOTE — DISCHARGE INSTRUCTIONS
Take your medications as previously prescribed.   Benadryl 25 mg every six hours as needed for itching.   Return if any further problems or concerns.

## 2024-11-14 ENCOUNTER — OFFICE VISIT (OUTPATIENT)
Dept: NEUROLOGY | Age: 39
End: 2024-11-14
Payer: MEDICAID

## 2024-11-14 VITALS
HEIGHT: 73 IN | HEART RATE: 108 BPM | BODY MASS INDEX: 36.84 KG/M2 | SYSTOLIC BLOOD PRESSURE: 136 MMHG | DIASTOLIC BLOOD PRESSURE: 86 MMHG | WEIGHT: 278 LBS

## 2024-11-14 DIAGNOSIS — E11.42 TYPE 2 DIABETES MELLITUS WITH DIABETIC POLYNEUROPATHY, WITH LONG-TERM CURRENT USE OF INSULIN (HCC): Primary | ICD-10-CM

## 2024-11-14 DIAGNOSIS — Z79.4 TYPE 2 DIABETES MELLITUS WITH DIABETIC POLYNEUROPATHY, WITH LONG-TERM CURRENT USE OF INSULIN (HCC): Primary | ICD-10-CM

## 2024-11-14 PROCEDURE — G8417 CALC BMI ABV UP PARAM F/U: HCPCS | Performed by: PSYCHIATRY & NEUROLOGY

## 2024-11-14 PROCEDURE — 3075F SYST BP GE 130 - 139MM HG: CPT | Performed by: PSYCHIATRY & NEUROLOGY

## 2024-11-14 PROCEDURE — G8427 DOCREV CUR MEDS BY ELIG CLIN: HCPCS | Performed by: PSYCHIATRY & NEUROLOGY

## 2024-11-14 PROCEDURE — 1036F TOBACCO NON-USER: CPT | Performed by: PSYCHIATRY & NEUROLOGY

## 2024-11-14 PROCEDURE — 2022F DILAT RTA XM EVC RTNOPTHY: CPT | Performed by: PSYCHIATRY & NEUROLOGY

## 2024-11-14 PROCEDURE — 3046F HEMOGLOBIN A1C LEVEL >9.0%: CPT | Performed by: PSYCHIATRY & NEUROLOGY

## 2024-11-14 PROCEDURE — 3079F DIAST BP 80-89 MM HG: CPT | Performed by: PSYCHIATRY & NEUROLOGY

## 2024-11-14 PROCEDURE — 99213 OFFICE O/P EST LOW 20 MIN: CPT | Performed by: PSYCHIATRY & NEUROLOGY

## 2024-11-14 PROCEDURE — G8484 FLU IMMUNIZE NO ADMIN: HCPCS | Performed by: PSYCHIATRY & NEUROLOGY

## 2024-11-14 RX ORDER — DULOXETIN HYDROCHLORIDE 60 MG/1
60 CAPSULE, DELAYED RELEASE ORAL DAILY
Qty: 90 CAPSULE | Refills: 1 | Status: SHIPPED | OUTPATIENT
Start: 2024-11-14 | End: 2025-05-13

## 2024-11-14 NOTE — PROGRESS NOTES
The patient came today for follow up regarding: Polyneuropathy        The patient denies any new symptoms today.  On the same dose of Cymbalta 60 mg daily.  No side effects.  Recently admitted to the hospital for abdominal surgery.  Last A1c was 12.  The same chronic distal dysesthesia in his feet.  Degree is moderate.  Patient is persistent.  No fall or injury.    Exam:   Constitutional:   Vitals:    11/14/24 1206   BP: 136/86   Pulse: (!) 108   Weight: 126.1 kg (278 lb)   Height: 1.854 m (6' 1\")       General appearance:  Normal development and appear in no acute distress.   Mental Status:   Oriented to person, place, problem, and time.    Memory: Good immediate recall.  Intact remote memory  Normal attention span and concentration.  Language: intact naming, repeating and fluency   Good fund of Knowledge. Aware of current events and vocabulary   Cranial Nerves:   II: Pupils: equal, round, reactive to light  III,IV,VI: Extra Ocular Movements are intact. No nystagmus  V: Facial sensation is intact   VII: Facial strength and movements: intact and symmetric  XII: Tongue movements are normal  Musculoskeletal: 5/5 in all 4 extremities.   Tone: Normal tone.   Coordination: no tremors or drift  DTR: symmetric 2 in UL and 1+ in his legs  Sensation: normal to all modalities in both arms and decreased to touch and vibration in bilateral feet, distal. No changes  Gait/Posture: steady gait     ROS : A 10-14 system review of constitutional, cardiovascular, respiratory, GI, eyes, , ENT, musculoskeletal, endocrine, hematological, skin, SHEENT, genitourinary, psychiatric and neurologic systems was obtained and updated today which is unremarkable except as mentioned in my HPI      Medical decision making:    A. Problems (any 1)    High:    [] Acute/Chronic Illness/injury posing threat to life or bodily function:    [] Severe exacerbation of chronic illness:      Moderate:    []     1 or more chronic illness with exacerbation,

## 2024-11-14 NOTE — PATIENT INSTRUCTIONS

## 2025-01-27 ENCOUNTER — COMMUNITY OUTREACH (OUTPATIENT)
Dept: PRIMARY CARE CLINIC | Age: 40
End: 2025-01-27

## 2025-03-05 ENCOUNTER — TELEPHONE (OUTPATIENT)
Dept: PRIMARY CARE CLINIC | Age: 40
End: 2025-03-05

## 2025-03-05 NOTE — TELEPHONE ENCOUNTER
Called and left message for patient to call office back to schedule follow up or to let us know he sees a different pcp.

## 2025-03-05 NOTE — TELEPHONE ENCOUNTER
Patient is overdue for follow up. Unresponsive to mychart messages and telephone calls. Phone is not in service. Please contact next of kin listed to verify if patient can be scheduled for follow up visit or if is established elsewhere.

## 2025-03-05 NOTE — TELEPHONE ENCOUNTER
Called and spoke with Katharine ( patients mother) she gave me a new number to call for Issa. [phone].

## 2025-07-08 ENCOUNTER — HOSPITAL ENCOUNTER (INPATIENT)
Age: 40
LOS: 2 days | Discharge: HOME | DRG: 199 | End: 2025-07-10
Attending: STUDENT IN AN ORGANIZED HEALTH CARE EDUCATION/TRAINING PROGRAM | Admitting: INTERNAL MEDICINE
Payer: MEDICAID

## 2025-07-08 DIAGNOSIS — I50.20 HFREF (HEART FAILURE WITH REDUCED EJECTION FRACTION) (HCC): ICD-10-CM

## 2025-07-08 DIAGNOSIS — E11.40 TYPE 2 DIABETES MELLITUS WITH DIABETIC NEUROPATHY, WITH LONG-TERM CURRENT USE OF INSULIN (HCC): ICD-10-CM

## 2025-07-08 DIAGNOSIS — I16.1 HYPERTENSIVE EMERGENCY: Primary | ICD-10-CM

## 2025-07-08 DIAGNOSIS — G62.9 NEUROPATHY: ICD-10-CM

## 2025-07-08 DIAGNOSIS — Z79.4 TYPE 2 DIABETES MELLITUS WITH DIABETIC NEUROPATHY, WITH LONG-TERM CURRENT USE OF INSULIN (HCC): ICD-10-CM

## 2025-07-08 DIAGNOSIS — I49.3 VENTRICULAR ECTOPIC BEATS: ICD-10-CM

## 2025-07-08 DIAGNOSIS — R06.02 SHORTNESS OF BREATH: ICD-10-CM

## 2025-07-08 PROBLEM — I16.9 HYPERTENSIVE CRISIS: Status: ACTIVE | Noted: 2025-07-08

## 2025-07-08 LAB
ALBUMIN SERPL-MCNC: 3.4 G/DL (ref 3.4–5)
ALBUMIN/GLOB SERPL: 1.2 {RATIO} (ref 1.1–2.2)
ALP SERPL-CCNC: 104 U/L (ref 40–129)
ALT SERPL-CCNC: 15 U/L (ref 10–40)
ANION GAP SERPL CALCULATED.3IONS-SCNC: 10 MMOL/L (ref 3–16)
AST SERPL-CCNC: 17 U/L (ref 15–37)
BASE EXCESS BLDV CALC-SCNC: -1.5 MMOL/L (ref -3–3)
BASOPHILS # BLD: 0.1 K/UL (ref 0–0.2)
BASOPHILS NFR BLD: 1.1 %
BILIRUB SERPL-MCNC: <0.2 MG/DL (ref 0–1)
BUN SERPL-MCNC: 27 MG/DL (ref 7–20)
CALCIUM SERPL-MCNC: 8.9 MG/DL (ref 8.3–10.6)
CHLORIDE SERPL-SCNC: 96 MMOL/L (ref 99–110)
CO2 BLDV-SCNC: 23 MMOL/L
CO2 SERPL-SCNC: 22 MMOL/L (ref 21–32)
CREAT SERPL-MCNC: 1.9 MG/DL (ref 0.9–1.3)
DEPRECATED RDW RBC AUTO: 12.9 % (ref 12.4–15.4)
EKG ATRIAL RATE: 102 BPM
EKG DIAGNOSIS: NORMAL
EKG P AXIS: 56 DEGREES
EKG P-R INTERVAL: 202 MS
EKG Q-T INTERVAL: 348 MS
EKG QRS DURATION: 92 MS
EKG QTC CALCULATION (BAZETT): 453 MS
EKG R AXIS: -48 DEGREES
EKG T AXIS: 35 DEGREES
EKG VENTRICULAR RATE: 102 BPM
EOSINOPHIL # BLD: 0.1 K/UL (ref 0–0.6)
EOSINOPHIL NFR BLD: 1.8 %
GFR SERPLBLD CREATININE-BSD FMLA CKD-EPI: 46 ML/MIN/{1.73_M2}
GLUCOSE BLD-MCNC: 281 MG/DL (ref 70–99)
GLUCOSE BLD-MCNC: 391 MG/DL (ref 70–99)
GLUCOSE SERPL-MCNC: 473 MG/DL (ref 70–99)
HCO3 BLDV-SCNC: 24.1 MMOL/L (ref 23–29)
HCT VFR BLD AUTO: 40.4 % (ref 40.5–52.5)
HGB BLD-MCNC: 12.9 G/DL (ref 13.5–17.5)
LYMPHOCYTES # BLD: 1.4 K/UL (ref 1–5.1)
LYMPHOCYTES NFR BLD: 24 %
MCH RBC QN AUTO: 25.3 PG (ref 26–34)
MCHC RBC AUTO-ENTMCNC: 32 G/DL (ref 31–36)
MCV RBC AUTO: 79.1 FL (ref 80–100)
MONOCYTES # BLD: 0.5 K/UL (ref 0–1.3)
MONOCYTES NFR BLD: 7.8 %
NEUTROPHILS # BLD: 3.8 K/UL (ref 1.7–7.7)
NEUTROPHILS NFR BLD: 65.3 %
O2 THERAPY: ABNORMAL
PCO2 BLDV: 43.3 MMHG (ref 40–50)
PERFORMED ON: ABNORMAL
PERFORMED ON: ABNORMAL
PH BLDV: 7.35 [PH] (ref 7.35–7.45)
PLATELET # BLD AUTO: 220 K/UL (ref 135–450)
PMV BLD AUTO: 9.2 FL (ref 5–10.5)
PO2 BLDV: 122.5 MMHG (ref 25–40)
POTASSIUM SERPL-SCNC: 4.3 MMOL/L (ref 3.5–5.1)
PROT SERPL-MCNC: 6.3 G/DL (ref 6.4–8.2)
RBC # BLD AUTO: 5.11 M/UL (ref 4.2–5.9)
SAO2 % BLDV: 99 %
SODIUM SERPL-SCNC: 128 MMOL/L (ref 136–145)
TROPONIN, HIGH SENSITIVITY: 166 NG/L (ref 0–22)
TROPONIN, HIGH SENSITIVITY: 175 NG/L (ref 0–22)
WBC # BLD AUTO: 5.8 K/UL (ref 4–11)

## 2025-07-08 PROCEDURE — 6370000000 HC RX 637 (ALT 250 FOR IP): Performed by: STUDENT IN AN ORGANIZED HEALTH CARE EDUCATION/TRAINING PROGRAM

## 2025-07-08 PROCEDURE — 6360000002 HC RX W HCPCS: Performed by: STUDENT IN AN ORGANIZED HEALTH CARE EDUCATION/TRAINING PROGRAM

## 2025-07-08 PROCEDURE — 82803 BLOOD GASES ANY COMBINATION: CPT

## 2025-07-08 PROCEDURE — 2060000000 HC ICU INTERMEDIATE R&B

## 2025-07-08 PROCEDURE — 96374 THER/PROPH/DIAG INJ IV PUSH: CPT

## 2025-07-08 PROCEDURE — 6360000002 HC RX W HCPCS: Performed by: INTERNAL MEDICINE

## 2025-07-08 PROCEDURE — 2580000003 HC RX 258: Performed by: INTERNAL MEDICINE

## 2025-07-08 PROCEDURE — 6370000000 HC RX 637 (ALT 250 FOR IP): Performed by: INTERNAL MEDICINE

## 2025-07-08 PROCEDURE — 2580000003 HC RX 258: Performed by: STUDENT IN AN ORGANIZED HEALTH CARE EDUCATION/TRAINING PROGRAM

## 2025-07-08 PROCEDURE — 80053 COMPREHEN METABOLIC PANEL: CPT

## 2025-07-08 PROCEDURE — 85025 COMPLETE CBC W/AUTO DIFF WBC: CPT

## 2025-07-08 PROCEDURE — 96375 TX/PRO/DX INJ NEW DRUG ADDON: CPT

## 2025-07-08 PROCEDURE — 93005 ELECTROCARDIOGRAM TRACING: CPT | Performed by: STUDENT IN AN ORGANIZED HEALTH CARE EDUCATION/TRAINING PROGRAM

## 2025-07-08 PROCEDURE — 84484 ASSAY OF TROPONIN QUANT: CPT

## 2025-07-08 PROCEDURE — 99285 EMERGENCY DEPT VISIT HI MDM: CPT

## 2025-07-08 PROCEDURE — 93010 ELECTROCARDIOGRAM REPORT: CPT | Performed by: INTERNAL MEDICINE

## 2025-07-08 RX ORDER — SODIUM CHLORIDE 0.9 % (FLUSH) 0.9 %
5-40 SYRINGE (ML) INJECTION EVERY 12 HOURS SCHEDULED
Status: DISCONTINUED | OUTPATIENT
Start: 2025-07-08 | End: 2025-07-10 | Stop reason: HOSPADM

## 2025-07-08 RX ORDER — DEXTROSE MONOHYDRATE 100 MG/ML
INJECTION, SOLUTION INTRAVENOUS CONTINUOUS PRN
Status: DISCONTINUED | OUTPATIENT
Start: 2025-07-08 | End: 2025-07-10 | Stop reason: HOSPADM

## 2025-07-08 RX ORDER — SODIUM CHLORIDE 0.9 % (FLUSH) 0.9 %
5-40 SYRINGE (ML) INJECTION PRN
Status: DISCONTINUED | OUTPATIENT
Start: 2025-07-08 | End: 2025-07-10 | Stop reason: HOSPADM

## 2025-07-08 RX ORDER — AMLODIPINE BESYLATE 5 MG/1
5 TABLET ORAL ONCE
Status: COMPLETED | OUTPATIENT
Start: 2025-07-08 | End: 2025-07-08

## 2025-07-08 RX ORDER — ASPIRIN 81 MG/1
324 TABLET, CHEWABLE ORAL ONCE
Status: COMPLETED | OUTPATIENT
Start: 2025-07-08 | End: 2025-07-08

## 2025-07-08 RX ORDER — VALSARTAN 80 MG/1
160 TABLET ORAL EVERY MORNING
Status: DISCONTINUED | OUTPATIENT
Start: 2025-07-09 | End: 2025-07-10 | Stop reason: HOSPADM

## 2025-07-08 RX ORDER — VALSARTAN 160 MG/1
160 TABLET ORAL ONCE
Status: DISCONTINUED | OUTPATIENT
Start: 2025-07-08 | End: 2025-07-08

## 2025-07-08 RX ORDER — ASPIRIN 81 MG/1
81 TABLET, CHEWABLE ORAL DAILY
Status: DISCONTINUED | OUTPATIENT
Start: 2025-07-09 | End: 2025-07-08 | Stop reason: SDUPTHER

## 2025-07-08 RX ORDER — SODIUM CHLORIDE 9 MG/ML
INJECTION, SOLUTION INTRAVENOUS PRN
Status: DISCONTINUED | OUTPATIENT
Start: 2025-07-08 | End: 2025-07-10 | Stop reason: HOSPADM

## 2025-07-08 RX ORDER — NICOTINE POLACRILEX 4 MG
15 LOZENGE BUCCAL PRN
Status: ON HOLD | COMMUNITY
Start: 2024-07-03 | End: 2025-07-10 | Stop reason: HOSPADM

## 2025-07-08 RX ORDER — DULOXETIN HYDROCHLORIDE 60 MG/1
60 CAPSULE, DELAYED RELEASE ORAL DAILY
Status: DISCONTINUED | OUTPATIENT
Start: 2025-07-09 | End: 2025-07-08

## 2025-07-08 RX ORDER — ONDANSETRON 2 MG/ML
4 INJECTION INTRAMUSCULAR; INTRAVENOUS EVERY 6 HOURS PRN
Status: DISCONTINUED | OUTPATIENT
Start: 2025-07-08 | End: 2025-07-10 | Stop reason: HOSPADM

## 2025-07-08 RX ORDER — ACETAMINOPHEN 325 MG/1
650 TABLET ORAL EVERY 6 HOURS PRN
Status: DISCONTINUED | OUTPATIENT
Start: 2025-07-08 | End: 2025-07-10 | Stop reason: HOSPADM

## 2025-07-08 RX ORDER — ACETAMINOPHEN 650 MG/1
650 SUPPOSITORY RECTAL EVERY 6 HOURS PRN
Status: DISCONTINUED | OUTPATIENT
Start: 2025-07-08 | End: 2025-07-10 | Stop reason: HOSPADM

## 2025-07-08 RX ORDER — POTASSIUM CHLORIDE 1500 MG/1
40 TABLET, EXTENDED RELEASE ORAL PRN
Status: DISCONTINUED | OUTPATIENT
Start: 2025-07-08 | End: 2025-07-10 | Stop reason: HOSPADM

## 2025-07-08 RX ORDER — METOPROLOL TARTRATE 25 MG/1
50 TABLET, FILM COATED ORAL ONCE
Status: COMPLETED | OUTPATIENT
Start: 2025-07-08 | End: 2025-07-08

## 2025-07-08 RX ORDER — SEMAGLUTIDE 0.68 MG/ML
0.25 INJECTION, SOLUTION SUBCUTANEOUS WEEKLY
Status: ON HOLD | COMMUNITY
Start: 2024-09-01 | End: 2025-07-10

## 2025-07-08 RX ORDER — INSULIN LISPRO 100 [IU]/ML
0-8 INJECTION, SOLUTION INTRAVENOUS; SUBCUTANEOUS
Status: DISCONTINUED | OUTPATIENT
Start: 2025-07-08 | End: 2025-07-10 | Stop reason: HOSPADM

## 2025-07-08 RX ORDER — ONDANSETRON 4 MG/1
4 TABLET, ORALLY DISINTEGRATING ORAL EVERY 8 HOURS PRN
Status: DISCONTINUED | OUTPATIENT
Start: 2025-07-08 | End: 2025-07-10 | Stop reason: HOSPADM

## 2025-07-08 RX ORDER — LABETALOL HYDROCHLORIDE 5 MG/ML
10 INJECTION, SOLUTION INTRAVENOUS ONCE
Status: COMPLETED | OUTPATIENT
Start: 2025-07-08 | End: 2025-07-08

## 2025-07-08 RX ORDER — INSULIN GLARGINE-YFGN 100 [IU]/ML
45 INJECTION, SOLUTION SUBCUTANEOUS
Status: ON HOLD | COMMUNITY
Start: 2024-07-04 | End: 2025-07-09 | Stop reason: CLARIF

## 2025-07-08 RX ORDER — CARVEDILOL 25 MG/1
25 TABLET ORAL ONCE
Status: DISCONTINUED | OUTPATIENT
Start: 2025-07-08 | End: 2025-07-08

## 2025-07-08 RX ORDER — POLYETHYLENE GLYCOL 3350 17 G/17G
17 POWDER, FOR SOLUTION ORAL DAILY PRN
Status: DISCONTINUED | OUTPATIENT
Start: 2025-07-08 | End: 2025-07-10 | Stop reason: HOSPADM

## 2025-07-08 RX ORDER — SODIUM CHLORIDE 9 MG/ML
INJECTION, SOLUTION INTRAVENOUS CONTINUOUS
Status: ACTIVE | OUTPATIENT
Start: 2025-07-08 | End: 2025-07-09

## 2025-07-08 RX ORDER — ATORVASTATIN CALCIUM 40 MG/1
40 TABLET, FILM COATED ORAL NIGHTLY
Status: DISCONTINUED | OUTPATIENT
Start: 2025-07-08 | End: 2025-07-08 | Stop reason: SDUPTHER

## 2025-07-08 RX ORDER — MAGNESIUM SULFATE IN WATER 40 MG/ML
2000 INJECTION, SOLUTION INTRAVENOUS PRN
Status: DISCONTINUED | OUTPATIENT
Start: 2025-07-08 | End: 2025-07-10 | Stop reason: HOSPADM

## 2025-07-08 RX ORDER — GLUCAGON 1 MG/ML
1 KIT INJECTION PRN
Status: DISCONTINUED | OUTPATIENT
Start: 2025-07-08 | End: 2025-07-10 | Stop reason: HOSPADM

## 2025-07-08 RX ORDER — METOCLOPRAMIDE HYDROCHLORIDE 5 MG/ML
10 INJECTION INTRAMUSCULAR; INTRAVENOUS ONCE
Status: COMPLETED | OUTPATIENT
Start: 2025-07-08 | End: 2025-07-08

## 2025-07-08 RX ORDER — NITROGLYCERIN 0.4 MG/1
0.4 TABLET SUBLINGUAL EVERY 5 MIN PRN
Status: DISCONTINUED | OUTPATIENT
Start: 2025-07-08 | End: 2025-07-10 | Stop reason: HOSPADM

## 2025-07-08 RX ORDER — ROSUVASTATIN CALCIUM 20 MG/1
40 TABLET, COATED ORAL NIGHTLY
Status: DISCONTINUED | OUTPATIENT
Start: 2025-07-08 | End: 2025-07-10 | Stop reason: HOSPADM

## 2025-07-08 RX ORDER — POTASSIUM CHLORIDE 7.45 MG/ML
10 INJECTION INTRAVENOUS PRN
Status: DISCONTINUED | OUTPATIENT
Start: 2025-07-08 | End: 2025-07-10 | Stop reason: HOSPADM

## 2025-07-08 RX ORDER — INSULIN LISPRO 100 [IU]/ML
8 INJECTION, SOLUTION INTRAVENOUS; SUBCUTANEOUS
Status: DISCONTINUED | OUTPATIENT
Start: 2025-07-09 | End: 2025-07-10 | Stop reason: HOSPADM

## 2025-07-08 RX ORDER — CARVEDILOL 25 MG/1
25 TABLET ORAL 2 TIMES DAILY
Status: DISCONTINUED | OUTPATIENT
Start: 2025-07-08 | End: 2025-07-09

## 2025-07-08 RX ORDER — AMLODIPINE BESYLATE 10 MG/1
10 TABLET ORAL DAILY
Status: DISCONTINUED | OUTPATIENT
Start: 2025-07-08 | End: 2025-07-09

## 2025-07-08 RX ORDER — SODIUM CHLORIDE, SODIUM LACTATE, POTASSIUM CHLORIDE, AND CALCIUM CHLORIDE .6; .31; .03; .02 G/100ML; G/100ML; G/100ML; G/100ML
1000 INJECTION, SOLUTION INTRAVENOUS ONCE
Status: COMPLETED | OUTPATIENT
Start: 2025-07-08 | End: 2025-07-08

## 2025-07-08 RX ORDER — INSULIN GLARGINE 100 [IU]/ML
60 INJECTION, SOLUTION SUBCUTANEOUS NIGHTLY
Status: DISCONTINUED | OUTPATIENT
Start: 2025-07-08 | End: 2025-07-09

## 2025-07-08 RX ORDER — ENOXAPARIN SODIUM 100 MG/ML
30 INJECTION SUBCUTANEOUS 2 TIMES DAILY
Status: DISCONTINUED | OUTPATIENT
Start: 2025-07-08 | End: 2025-07-10 | Stop reason: HOSPADM

## 2025-07-08 RX ORDER — ALBUTEROL SULFATE 0.83 MG/ML
2.5 SOLUTION RESPIRATORY (INHALATION) EVERY 4 HOURS PRN
Status: DISCONTINUED | OUTPATIENT
Start: 2025-07-08 | End: 2025-07-10 | Stop reason: HOSPADM

## 2025-07-08 RX ORDER — ASPIRIN 81 MG/1
81 TABLET, CHEWABLE ORAL DAILY
Status: DISCONTINUED | OUTPATIENT
Start: 2025-07-09 | End: 2025-07-10 | Stop reason: HOSPADM

## 2025-07-08 RX ORDER — LABETALOL HYDROCHLORIDE 5 MG/ML
20 INJECTION, SOLUTION INTRAVENOUS ONCE
Status: COMPLETED | OUTPATIENT
Start: 2025-07-08 | End: 2025-07-08

## 2025-07-08 RX ORDER — ZINC SULFATE 50(220)MG
220 CAPSULE ORAL DAILY
Status: ON HOLD | COMMUNITY
Start: 2024-07-04 | End: 2025-07-09 | Stop reason: CLARIF

## 2025-07-08 RX ADMIN — ROSUVASTATIN CALCIUM 40 MG: 20 TABLET, FILM COATED ORAL at 22:54

## 2025-07-08 RX ADMIN — INSULIN LISPRO 8 UNITS: 100 INJECTION, SOLUTION INTRAVENOUS; SUBCUTANEOUS at 23:18

## 2025-07-08 RX ADMIN — SODIUM CHLORIDE, SODIUM LACTATE, POTASSIUM CHLORIDE, AND CALCIUM CHLORIDE 1000 ML: 600; 310; 30; 20 INJECTION, SOLUTION INTRAVENOUS at 09:48

## 2025-07-08 RX ADMIN — AMLODIPINE BESYLATE 10 MG: 10 TABLET ORAL at 22:55

## 2025-07-08 RX ADMIN — METOPROLOL TARTRATE 50 MG: 25 TABLET, FILM COATED ORAL at 11:09

## 2025-07-08 RX ADMIN — SODIUM CHLORIDE: 9 INJECTION, SOLUTION INTRAVENOUS at 23:15

## 2025-07-08 RX ADMIN — CARVEDILOL 25 MG: 25 TABLET, FILM COATED ORAL at 22:55

## 2025-07-08 RX ADMIN — ENOXAPARIN SODIUM 30 MG: 100 INJECTION SUBCUTANEOUS at 22:58

## 2025-07-08 RX ADMIN — NITROGLYCERIN 0.4 MG: 0.4 TABLET SUBLINGUAL at 11:08

## 2025-07-08 RX ADMIN — METOCLOPRAMIDE HYDROCHLORIDE 10 MG: 5 INJECTION INTRAMUSCULAR; INTRAVENOUS at 13:08

## 2025-07-08 RX ADMIN — LABETALOL HYDROCHLORIDE 10 MG: 5 INJECTION, SOLUTION INTRAVENOUS at 13:08

## 2025-07-08 RX ADMIN — LABETALOL HYDROCHLORIDE 20 MG: 5 INJECTION, SOLUTION INTRAVENOUS at 15:05

## 2025-07-08 RX ADMIN — ASPIRIN 324 MG: 81 TABLET, CHEWABLE ORAL at 13:07

## 2025-07-08 RX ADMIN — AMLODIPINE BESYLATE 5 MG: 5 TABLET ORAL at 11:09

## 2025-07-08 ASSESSMENT — PAIN - FUNCTIONAL ASSESSMENT: PAIN_FUNCTIONAL_ASSESSMENT: NONE - DENIES PAIN
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
## 2025-07-09 ENCOUNTER — APPOINTMENT (OUTPATIENT)
Age: 40
DRG: 199 | End: 2025-07-09
Attending: INTERNAL MEDICINE
Payer: MEDICAID

## 2025-07-09 DIAGNOSIS — R94.31 ABNORMAL ECG: ICD-10-CM

## 2025-07-09 DIAGNOSIS — I50.20 HFREF (HEART FAILURE WITH REDUCED EJECTION FRACTION) (HCC): ICD-10-CM

## 2025-07-09 DIAGNOSIS — I10 ESSENTIAL HYPERTENSION: ICD-10-CM

## 2025-07-09 DIAGNOSIS — R79.89 ELEVATED TROPONIN: Primary | ICD-10-CM

## 2025-07-09 PROBLEM — E78.2 MIXED HYPERLIPIDEMIA: Status: ACTIVE | Noted: 2025-07-09

## 2025-07-09 PROBLEM — I16.1 HYPERTENSIVE EMERGENCY: Status: ACTIVE | Noted: 2025-07-08

## 2025-07-09 LAB
ANION GAP SERPL CALCULATED.3IONS-SCNC: 9 MMOL/L (ref 3–16)
BUN SERPL-MCNC: 22 MG/DL (ref 7–20)
CALCIUM SERPL-MCNC: 8.5 MG/DL (ref 8.3–10.6)
CHLORIDE SERPL-SCNC: 105 MMOL/L (ref 99–110)
CO2 SERPL-SCNC: 24 MMOL/L (ref 21–32)
CREAT SERPL-MCNC: 1.4 MG/DL (ref 0.9–1.3)
DEPRECATED RDW RBC AUTO: 12.9 % (ref 12.4–15.4)
ECHO AO ASC DIAM: 3.4 CM
ECHO AO ASCENDING AORTA INDEX: 1.4 CM/M2
ECHO AO ROOT DIAM: 4 CM
ECHO AO ROOT INDEX: 1.65 CM/M2
ECHO AV AREA PEAK VELOCITY: 2.8 CM2
ECHO AV AREA/BSA PEAK VELOCITY: 1.2 CM2/M2
ECHO AV PEAK GRADIENT: 5 MMHG
ECHO AV PEAK VELOCITY: 1.1 M/S
ECHO AV VELOCITY RATIO: 0.82
ECHO BSA: 2.47 M2
ECHO IVC EXP: 1.5 CM
ECHO LA AREA 2C: 21.2 CM2
ECHO LA AREA 4C: 25.2 CM2
ECHO LA MAJOR AXIS: 7.5 CM
ECHO LA MINOR AXIS: 6.4 CM
ECHO LA VOL BP: 68 ML (ref 18–58)
ECHO LA VOL MOD A2C: 58 ML (ref 18–58)
ECHO LA VOL MOD A4C: 68 ML (ref 18–58)
ECHO LA VOL/BSA BIPLANE: 28 ML/M2 (ref 16–34)
ECHO LA VOLUME INDEX MOD A2C: 24 ML/M2 (ref 16–34)
ECHO LA VOLUME INDEX MOD A4C: 28 ML/M2 (ref 16–34)
ECHO LV E' LATERAL VELOCITY: 6.85 CM/S
ECHO LV E' SEPTAL VELOCITY: 8.05 CM/S
ECHO LV EDV 3D: 206 ML
ECHO LV EDV INDEX 3D: 85 ML/M2
ECHO LV EF PHYSICIAN: 55 %
ECHO LV ESV 3D: 102 ML
ECHO LV ESV INDEX 3D: 42 ML/M2
ECHO LV FRACTIONAL SHORTENING: 24 % (ref 28–44)
ECHO LV GLOBAL LONGITUDINAL STRAIN (GLS): -12.6 %
ECHO LV GLOBAL LONGITUDINAL STRAIN (GLS): -12.9 %
ECHO LV GLOBAL LONGITUDINAL STRAIN (GLS): -13.5 %
ECHO LV GLOBAL LONGITUDINAL STRAIN (GLS): -15.1 %
ECHO LV INTERNAL DIMENSION DIASTOLE INDEX: 2.07 CM/M2
ECHO LV INTERNAL DIMENSION DIASTOLIC: 5 CM (ref 4.2–5.9)
ECHO LV INTERNAL DIMENSION SYSTOLIC INDEX: 1.57 CM/M2
ECHO LV INTERNAL DIMENSION SYSTOLIC: 3.8 CM
ECHO LV IVSD: 1.2 CM (ref 0.6–1)
ECHO LV MASS 2D: 220.3 G (ref 88–224)
ECHO LV MASS 3D INDEX: 85.1 G/M2
ECHO LV MASS 3D: 206 G
ECHO LV MASS INDEX 2D: 91 G/M2 (ref 49–115)
ECHO LV POSTERIOR WALL DIASTOLIC: 1.1 CM (ref 0.6–1)
ECHO LV RELATIVE WALL THICKNESS RATIO: 0.44
ECHO LVOT AREA: 3.5 CM2
ECHO LVOT DIAM: 2.1 CM
ECHO LVOT MEAN GRADIENT: 1 MMHG
ECHO LVOT PEAK GRADIENT: 3 MMHG
ECHO LVOT PEAK VELOCITY: 0.9 M/S
ECHO LVOT STROKE VOLUME INDEX: 19 ML/M2
ECHO LVOT SV: 46 ML
ECHO LVOT VTI: 13.3 CM
ECHO MV A VELOCITY: 0.5 M/S
ECHO MV E DECELERATION TIME (DT): 192 MS
ECHO MV E VELOCITY: 0.9 M/S
ECHO MV E/A RATIO: 1.8
ECHO MV E/E' LATERAL: 13.14
ECHO MV E/E' RATIO (AVERAGED): 12.16
ECHO MV E/E' SEPTAL: 11.18
ECHO PV MAX VELOCITY: 0.6 M/S
ECHO PV PEAK GRADIENT: 1 MMHG
ECHO RA AREA 4C: 16.4 CM2
ECHO RA END SYSTOLIC VOLUME APICAL 4 CHAMBER INDEX BSA: 19 ML/M2
ECHO RA VOLUME: 45 ML
ECHO RV BASAL DIMENSION: 3.9 CM
ECHO RV FREE WALL PEAK S': 13.3 CM/S
ECHO RV MID DIMENSION: 3.3 CM
ECHO RV TAPSE: 2.1 CM (ref 1.7–?)
EST. AVERAGE GLUCOSE BLD GHB EST-MCNC: 369.5 MG/DL
GFR SERPLBLD CREATININE-BSD FMLA CKD-EPI: 66 ML/MIN/{1.73_M2}
GLUCOSE BLD-MCNC: 108 MG/DL (ref 70–99)
GLUCOSE BLD-MCNC: 167 MG/DL (ref 70–99)
GLUCOSE BLD-MCNC: 174 MG/DL (ref 70–99)
GLUCOSE BLD-MCNC: 204 MG/DL (ref 70–99)
GLUCOSE BLD-MCNC: 236 MG/DL (ref 70–99)
GLUCOSE SERPL-MCNC: 200 MG/DL (ref 70–99)
HBA1C MFR BLD: 14.5 %
HCT VFR BLD AUTO: 37.2 % (ref 40.5–52.5)
HGB BLD-MCNC: 12.3 G/DL (ref 13.5–17.5)
MCH RBC QN AUTO: 25.8 PG (ref 26–34)
MCHC RBC AUTO-ENTMCNC: 33.2 G/DL (ref 31–36)
MCV RBC AUTO: 77.7 FL (ref 80–100)
PERFORMED ON: ABNORMAL
PLATELET # BLD AUTO: 214 K/UL (ref 135–450)
PMV BLD AUTO: 8.9 FL (ref 5–10.5)
POTASSIUM SERPL-SCNC: 3.6 MMOL/L (ref 3.5–5.1)
RBC # BLD AUTO: 4.78 M/UL (ref 4.2–5.9)
SODIUM SERPL-SCNC: 138 MMOL/L (ref 136–145)
SODIUM UR-SCNC: 69 MMOL/L
TROPONIN, HIGH SENSITIVITY: 157 NG/L (ref 0–22)
WBC # BLD AUTO: 5.8 K/UL (ref 4–11)

## 2025-07-09 PROCEDURE — 93356 MYOCRD STRAIN IMG SPCKL TRCK: CPT | Performed by: INTERNAL MEDICINE

## 2025-07-09 PROCEDURE — 76376 3D RENDER W/INTRP POSTPROCES: CPT | Performed by: INTERNAL MEDICINE

## 2025-07-09 PROCEDURE — 2060000000 HC ICU INTERMEDIATE R&B

## 2025-07-09 PROCEDURE — 99221 1ST HOSP IP/OBS SF/LOW 40: CPT | Performed by: INTERNAL MEDICINE

## 2025-07-09 PROCEDURE — 93306 TTE W/DOPPLER COMPLETE: CPT | Performed by: INTERNAL MEDICINE

## 2025-07-09 PROCEDURE — 83036 HEMOGLOBIN GLYCOSYLATED A1C: CPT

## 2025-07-09 PROCEDURE — 6370000000 HC RX 637 (ALT 250 FOR IP): Performed by: INTERNAL MEDICINE

## 2025-07-09 PROCEDURE — 6360000002 HC RX W HCPCS: Performed by: INTERNAL MEDICINE

## 2025-07-09 PROCEDURE — 82570 ASSAY OF URINE CREATININE: CPT

## 2025-07-09 PROCEDURE — 93356 MYOCRD STRAIN IMG SPCKL TRCK: CPT

## 2025-07-09 PROCEDURE — 82043 UR ALBUMIN QUANTITATIVE: CPT

## 2025-07-09 PROCEDURE — 36415 COLL VENOUS BLD VENIPUNCTURE: CPT

## 2025-07-09 PROCEDURE — 80048 BASIC METABOLIC PNL TOTAL CA: CPT

## 2025-07-09 PROCEDURE — 2500000003 HC RX 250 WO HCPCS: Performed by: INTERNAL MEDICINE

## 2025-07-09 PROCEDURE — 84300 ASSAY OF URINE SODIUM: CPT

## 2025-07-09 PROCEDURE — 6370000000 HC RX 637 (ALT 250 FOR IP): Performed by: NURSE PRACTITIONER

## 2025-07-09 PROCEDURE — 84484 ASSAY OF TROPONIN QUANT: CPT

## 2025-07-09 PROCEDURE — 2580000003 HC RX 258: Performed by: INTERNAL MEDICINE

## 2025-07-09 PROCEDURE — 85027 COMPLETE CBC AUTOMATED: CPT

## 2025-07-09 RX ORDER — DICYCLOMINE HYDROCHLORIDE 10 MG/1
10 CAPSULE ORAL 4 TIMES DAILY PRN
Status: ON HOLD | COMMUNITY
End: 2025-07-10

## 2025-07-09 RX ORDER — SODIUM CHLORIDE 0.9 % (FLUSH) 0.9 %
5-40 SYRINGE (ML) INJECTION EVERY 12 HOURS SCHEDULED
Status: CANCELLED | OUTPATIENT
Start: 2025-07-09

## 2025-07-09 RX ORDER — NITROGLYCERIN 0.4 MG/1
0.8 TABLET SUBLINGUAL
Status: CANCELLED | OUTPATIENT
Start: 2025-07-09

## 2025-07-09 RX ORDER — NITROGLYCERIN 0.4 MG/1
0.4 TABLET SUBLINGUAL
Status: CANCELLED | OUTPATIENT
Start: 2025-07-09

## 2025-07-09 RX ORDER — PREGABALIN 100 MG/1
100 CAPSULE ORAL 2 TIMES DAILY
Status: ON HOLD | COMMUNITY
End: 2025-07-10

## 2025-07-09 RX ORDER — METOPROLOL TARTRATE 50 MG
TABLET ORAL
Qty: 3 TABLET | Refills: 0 | Status: SHIPPED | OUTPATIENT
Start: 2025-07-09 | End: 2025-07-09 | Stop reason: CLARIF

## 2025-07-09 RX ORDER — NITROGLYCERIN 0.4 MG/1
0.8 TABLET SUBLINGUAL
Status: DISCONTINUED | OUTPATIENT
Start: 2025-07-09 | End: 2025-07-10 | Stop reason: HOSPADM

## 2025-07-09 RX ORDER — INSULIN GLARGINE 100 [IU]/ML
45 INJECTION, SOLUTION SUBCUTANEOUS NIGHTLY
Status: DISCONTINUED | OUTPATIENT
Start: 2025-07-09 | End: 2025-07-10 | Stop reason: HOSPADM

## 2025-07-09 RX ORDER — NITROGLYCERIN 0.4 MG/1
0.4 TABLET SUBLINGUAL
Status: DISCONTINUED | OUTPATIENT
Start: 2025-07-09 | End: 2025-07-10 | Stop reason: HOSPADM

## 2025-07-09 RX ORDER — METOPROLOL TARTRATE 1 MG/ML
5 INJECTION, SOLUTION INTRAVENOUS EVERY 5 MIN PRN
Status: COMPLETED | OUTPATIENT
Start: 2025-07-09 | End: 2025-07-09

## 2025-07-09 RX ORDER — SODIUM CHLORIDE 9 MG/ML
INJECTION, SOLUTION INTRAVENOUS PRN
Status: CANCELLED | OUTPATIENT
Start: 2025-07-09

## 2025-07-09 RX ORDER — SODIUM CHLORIDE 9 MG/ML
INJECTION, SOLUTION INTRAVENOUS PRN
Status: DISCONTINUED | OUTPATIENT
Start: 2025-07-09 | End: 2025-07-10 | Stop reason: HOSPADM

## 2025-07-09 RX ORDER — METOPROLOL TARTRATE 1 MG/ML
5 INJECTION, SOLUTION INTRAVENOUS EVERY 5 MIN PRN
Status: CANCELLED | OUTPATIENT
Start: 2025-07-09

## 2025-07-09 RX ORDER — ROSUVASTATIN CALCIUM 40 MG/1
40 TABLET, COATED ORAL EVERY MORNING
Status: ON HOLD | COMMUNITY
End: 2025-07-10

## 2025-07-09 RX ORDER — SODIUM CHLORIDE 0.9 % (FLUSH) 0.9 %
5-40 SYRINGE (ML) INJECTION PRN
Status: CANCELLED | OUTPATIENT
Start: 2025-07-09

## 2025-07-09 RX ORDER — SODIUM CHLORIDE 0.9 % (FLUSH) 0.9 %
5-40 SYRINGE (ML) INJECTION PRN
Status: DISCONTINUED | OUTPATIENT
Start: 2025-07-09 | End: 2025-07-10 | Stop reason: HOSPADM

## 2025-07-09 RX ORDER — SODIUM CHLORIDE 0.9 % (FLUSH) 0.9 %
5-40 SYRINGE (ML) INJECTION EVERY 12 HOURS SCHEDULED
Status: DISCONTINUED | OUTPATIENT
Start: 2025-07-09 | End: 2025-07-10 | Stop reason: HOSPADM

## 2025-07-09 RX ORDER — METOPROLOL SUCCINATE 25 MG/1
25 TABLET, EXTENDED RELEASE ORAL NIGHTLY
Status: DISCONTINUED | OUTPATIENT
Start: 2025-07-09 | End: 2025-07-10 | Stop reason: HOSPADM

## 2025-07-09 RX ORDER — INSULIN LISPRO 100 [IU]/ML
14 INJECTION, SOLUTION INTRAVENOUS; SUBCUTANEOUS
COMMUNITY

## 2025-07-09 RX ADMIN — INSULIN GLARGINE 45 UNITS: 100 INJECTION, SOLUTION SUBCUTANEOUS at 21:13

## 2025-07-09 RX ADMIN — SODIUM CHLORIDE: 9 INJECTION, SOLUTION INTRAVENOUS at 09:29

## 2025-07-09 RX ADMIN — METOROPROLOL TARTRATE 5 MG: 5 INJECTION, SOLUTION INTRAVENOUS at 09:55

## 2025-07-09 RX ADMIN — ROSUVASTATIN CALCIUM 40 MG: 20 TABLET, FILM COATED ORAL at 21:11

## 2025-07-09 RX ADMIN — ENOXAPARIN SODIUM 30 MG: 100 INJECTION SUBCUTANEOUS at 08:45

## 2025-07-09 RX ADMIN — INSULIN LISPRO 8 UNITS: 100 INJECTION, SOLUTION INTRAVENOUS; SUBCUTANEOUS at 16:56

## 2025-07-09 RX ADMIN — ENOXAPARIN SODIUM 30 MG: 100 INJECTION SUBCUTANEOUS at 21:12

## 2025-07-09 RX ADMIN — INSULIN LISPRO 8 UNITS: 100 INJECTION, SOLUTION INTRAVENOUS; SUBCUTANEOUS at 12:36

## 2025-07-09 RX ADMIN — INSULIN LISPRO 2 UNITS: 100 INJECTION, SOLUTION INTRAVENOUS; SUBCUTANEOUS at 21:17

## 2025-07-09 RX ADMIN — METOROPROLOL TARTRATE 5 MG: 5 INJECTION, SOLUTION INTRAVENOUS at 10:04

## 2025-07-09 RX ADMIN — INSULIN LISPRO 8 UNITS: 100 INJECTION, SOLUTION INTRAVENOUS; SUBCUTANEOUS at 08:45

## 2025-07-09 RX ADMIN — METOPROLOL SUCCINATE 25 MG: 25 TABLET, EXTENDED RELEASE ORAL at 22:11

## 2025-07-09 RX ADMIN — SODIUM CHLORIDE, PRESERVATIVE FREE 10 ML: 5 INJECTION INTRAVENOUS at 09:47

## 2025-07-09 RX ADMIN — AMLODIPINE BESYLATE 10 MG: 10 TABLET ORAL at 08:45

## 2025-07-09 RX ADMIN — INSULIN GLARGINE 45 UNITS: 100 INJECTION, SOLUTION SUBCUTANEOUS at 00:48

## 2025-07-09 RX ADMIN — METOROPROLOL TARTRATE 5 MG: 5 INJECTION, SOLUTION INTRAVENOUS at 09:30

## 2025-07-09 RX ADMIN — ASPIRIN 81 MG: 81 TABLET, CHEWABLE ORAL at 08:45

## 2025-07-09 RX ADMIN — CARVEDILOL 25 MG: 25 TABLET, FILM COATED ORAL at 21:10

## 2025-07-09 RX ADMIN — METOROPROLOL TARTRATE 5 MG: 5 INJECTION, SOLUTION INTRAVENOUS at 09:46

## 2025-07-09 RX ADMIN — CARVEDILOL 25 MG: 25 TABLET, FILM COATED ORAL at 08:45

## 2025-07-09 NOTE — ASSESSMENT & PLAN NOTE
Blood pressure is under better control, currently on Coreg 25 mg twice daily, and amlodipine 10 mg daily.  The constipation is likely triggered by amlodipine, although his blood pressure is under good control with amlodipine but because of the issues with noncompliance due to issue of constipation,  can switch amlodipine to other antihypertensive medications.  Does take valsartan 160 mg daily at home, which was put on hold likely due to elevated creatinine, now creatinine is about the baseline, can resume valsartan at home dose and if blood pressure is still not under good control then can increase valsartan or add non-calcium channel blockers antihypertensives.

## 2025-07-09 NOTE — PROGRESS NOTES
Patient admitted to [facility] via wheelchair from [facility] for shortness of breath.      Patient and parent oriented to patient room including call light and bed controls.  Admission assessment completed - see admission flowsheet documentation.  Patient is a low fall risk. Safety measures instituted per policy.    Psychosocial Distress screening completed, with a score of 4 or greater in Financial category. The patient has requested a consult regarding their cause of distress.  Referral has not been placed to Social and Financial Services.      Patient and family oriented to unit policies and procedures including: pain management practices, unit safety precautions, family rapid response, q4h vital signs and assessments, daily 4am lab draws, weekly chest x-rays, daily chlorhexidine bathing, standing transfusion orders, and routine central line care.  Also discussed use of call light and how to get in touch with nursing staff.  Stressed the importance of calling out immediately for any changes in condition including but not limited to: pain, chills, fever, nausea, vomiting, diarrhea, chest pain, sob/bermudez, assistance with toileting, bleeding, or any other symptoms that are out of the ordinary for the patient.  Patient verbalizes understanding of all instructions and will call for assistance as needed.

## 2025-07-09 NOTE — DISCHARGE INSTRUCTIONS
[facility] authority:  Phone: [phone]     [facility] Job and family services:   Phone:  [phone]        Extra Heart Failure Education/ Tools/ Resources:     https://Pix4D.fabrik/publication/?v=175103   --- this is American Heart Association interactive Healthier Living with Heart Failure guidebook.  Please click hyperlink or copy / paste link into search bar. The QR Code is also available below. Use your mouse to scroll through the pages.  Lots of information about weight monitoring, diet tips, activity, meds, etc    Heart Failure Tools and Resources QR Code is below. It includes multiple resources to include symptom tracker, med tracker, further HF info, and access to a HF Support Network online Community    HF [place] Jeannine  -- this is a free smart phone jeannine available for iPhone and Android download.  Use your phone to track sodium / fluid intake, zone tool symptom tracking, weights, medications, etc. Click on this hyperlink  HF [place] Jeannine   for QR code for easy download or the link is also found in the below HF Tools and Resources.      DASH (Dietary Approach to Stop Hypertension) diet --  https://www.nhlbi.nih.gov/education/dash-eating-plan -- this diet is a flexible eating plan that promotes heart healthy eating style.  Click on hyperlink or copy / paste link into search bar.  Lots of low sodium recipes and tips.    https://www.[employer]/recipes  -- more free recipes        Follow up with your family doctor regarding referral to Care Transition program with [facility]    Patient instructions for CAM monitor:     You will need to wear monitor for 2 Days.  Return the monitor to the  office on following date.    Remove date:  07/12/2025 4 pm      [facility] [place] [facility]  [address]   [address]  [place], [place] [address]  [phone]         Make sure to save the box as you will place the monitor back in box to return it to the office.       After removing monitor,

## 2025-07-09 NOTE — H&P
Hospital Medicine History & Physical      Date of Admission: 7/8/2025    Date of Service:  Pt seen/examined on 07/08/25     [x]Admitted to Inpatient with expected LOS greater than two midnights due to medical therapy.  []Placed in Observation status.    Chief Admission Complaint: Shortness of breath    Presenting Admission History:      39 y.o. male with PMHx significant for essential hypertension, diabetes mellitus, CKD stage III, who presented to ED with a complaint of shortness of breath.  Patient started having shortness of breath over the last 24 hours.  In ED patient was found to have significant elevated blood pressure with initial blood pressure reading of 182/118.  Patient admits being not fully compliant with his medication including his antihypertensive regimen which he misses taking more than 3 times a week.  Patient denies any chest pain diaphoresis nausea vomiting or lightheadedness.  Patient was noted to have elevated troponin at 175 with repeat level of 166.  Patient also was noted to have elevated blood glucose of 463 without evidence of DKA.  Patient is currently being admitted under inpatient status for further management and evaluation.      ROS:     Review of 10 systems is negative except what is outlined in HPI.     Assessment:  Hypertensive urgency, improving.  Shortness of breath.  Elevated troponin, likely type II MI due to above.  Acute kidney injury on CKD stage IIIa.  Type 2 diabetes mellitus with hyperglycemia, no DKA.  Diabetic neuropathy  Hyponatremia, secondary to hyper glycemia.  Hyperlipidemia    Plan:    Patient is admitted under inpatient status.  Obtain echocardiogram.  Cardiology consultation for evaluation of elevated troponin.  Hold off on heparin drip as troponin is trending down and no chest pain at this time.  Resume home antihypertensive regimen.  Patient admits to noncompliance with his medications.  Compliance with medication strongly advised and discussed in detail

## 2025-07-09 NOTE — CONSULTS
Clinical Pharmacy Consult Note  Medication History     Admit Date: 7/8/2025    Pharmacy consulted to verify home medication list by Dr. Espinosa-Felipa Kramer, APRN - [facility].    List of current medications patient is taking is complete. Home Medication list in Epic updated to reflect changes noted below.    Source of information: Dispense Report, office visit note from [facility] dated 9/11/24, and talked to patient at bedside.    Patient's home pharmacy:   [id number] [place], [place] - [address] [phone] - F [phone] [address] [address]  Phone: [phone] Fax: [phone]      Changes made to medication list:   Medications removed: (include reason, ex: therapy completed, patient no longer taking, etc.)  Tylenol 1000 mg q8h (not on Dispense Report)  Docusate 100 mg BID (patient said he \"doesn't take like supposed to\").  Jardiance (Stopped for now due to instructions by cardiologist. He said, \"it's on hold\".)  Semglee (insulin glargine - he's now on Tresiba instead).  Zinc sulfate (patient denies taking).  Adalat CC 60 mg daily. (Wasn't on med rec but was on med list from outpatient note. Patient says it was changed to Norvasc instead.)   Medications added:   Dicyclomine (on med list from dr's office. Patient acknowledges taking this.)  Medication doses adjusted:   Humalog from 12 units TID +SS to 14 units TID +SS.  Metformin  mg BID changed to regular metformin 500 mg BID. (Confirmed with patient.)  Pregabalin 100 mg BID.   On med list from outpatient note. Patient confirmed taking this. Also on Dispense Report, but from 2 years ago.  Tresiba from 60 units daily to 80 units daily.   Dispense Report, outpatient note, and patient himself confirm current dose = 80 units daily.  Asked patient if he takes this dose in the morning or night. He says he typically takes in the morning, but will take at night if he forgot the morning dose.    Current Outpatient

## 2025-07-09 NOTE — CONSULTS
Domestic Abuse - Signs and Symptoms: Not on file     Verbal Abuse: Denies     HRSN Domestic Abuse - Reported To: Not on file   Housing Stability: Low Risk  (7/8/2025)    Housing Stability Vital Sign     Unable to Pay for Housing in the Last Year: No     Number of Times Moved in the Last Year: 0     Homeless in the Last Year: No       Family History:       Problem Relation Age of Onset    Diabetes Father     High Blood Pressure Father        REVIEW OF SYSTEMS:      10 pt ROS done, relevant features as in [place], rest negative       PHYSICAL EXAM:    Vitals:    /86   Pulse 84   Temp 98 °F (36.7 °C) (Oral)   Resp 16   Ht 1.854 m (6' 1\")   Wt 119.5 kg (263 lb 6.4 oz)   SpO2 100%   BMI 34.75 kg/m²   I/O last 3 completed shifts:  In: 775 [I.V.:775]  Out: 1000 [Urine:1000]  I/O this shift:  In: -   Out: 450 [Urine:450]    Physical Exam:  Gen:  alert, oriented x 3, overweight  PERRL , EOM +  Pallor +, No icterus  JVP not raised   CV: RRR no murmur or rub .  Lungs:B/ L air entry, Normal breath sounds   Abd: soft, bowel sounds + , No organomegaly   Ext: No edema, no cyanosis  Skin: Warm.  No rash  Neuro: nonfocal.      DATA:    CBC with Differential:    Lab Results   Component Value Date/Time    WBC 5.8 07/09/2025 05:13 AM    RBC 4.78 07/09/2025 05:13 AM    HGB 12.3 07/09/2025 05:13 AM    HCT 37.2 07/09/2025 05:13 AM     07/09/2025 05:13 AM    MCV 77.7 07/09/2025 05:13 AM    MCH 25.8 07/09/2025 05:13 AM    MCHC 33.2 07/09/2025 05:13 AM    RDW 12.9 07/09/2025 05:13 AM    METASPCT 2 06/03/2024 11:19 AM    LYMPHOPCT 24.0 07/08/2025 09:50 AM    MONOPCT 7.8 07/08/2025 09:50 AM    MYELOPCT 1 06/02/2024 04:01 AM    EOSPCT 1.8 07/08/2025 09:50 AM    BASOPCT 1.1 07/08/2025 09:50 AM    MONOSABS 0.5 07/08/2025 09:50 AM    LYMPHSABS 1.4 07/08/2025 09:50 AM    EOSABS 0.1 07/08/2025 09:50 AM    BASOSABS 0.1 07/08/2025 09:50 AM     CMP:    Lab Results   Component Value Date/Time     07/09/2025 05:13 AM    K 3.6  07/09/2025 05:13 AM     07/09/2025 05:13 AM    CO2 24 07/09/2025 05:13 AM    BUN 22 07/09/2025 05:13 AM    CREATININE 1.4 07/09/2025 05:13 AM    GFRAA >60 08/25/2022 01:08 PM    GFRAA >60 01/02/2011 02:51 PM    AGRATIO 1.2 07/08/2025 09:50 AM    LABGLOM 66 07/09/2025 05:13 AM    LABGLOM >60 12/14/2023 08:27 AM    GLUCOSE 200 07/09/2025 05:13 AM    CALCIUM 8.5 07/09/2025 05:13 AM    BILITOT <0.2 07/08/2025 09:50 AM    ALKPHOS 104 07/08/2025 09:50 AM    AST 17 07/08/2025 09:50 AM    ALT 15 07/08/2025 09:50 AM     Magnesium:    Lab Results   Component Value Date/Time    MG 2.10 10/03/2024 03:52 AM     Phosphorus:    Lab Results   Component Value Date/Time    PHOS 2.8 06/03/2024 11:19 AM     Uric Acid:    Lab Results   Component Value Date/Time    URICACID 7.2 03/26/2024 12:14 PM     U/A:    Lab Results   Component Value Date/Time    COLORU Yellow 05/28/2024 06:45 AM    PROTEINU >=300 05/28/2024 06:45 AM    PHUR 6.0 05/28/2024 06:45 AM    PHUR 5.0 11/15/2023 03:52 PM    WBCUA 3-5 05/28/2024 06:45 AM    RBCUA None seen 05/28/2024 06:45 AM    RBCUA 1+ (0.06-0.1 mg/dL) 05/20/2024 02:29 PM    MUCUS PRESENT 05/20/2024 02:29 PM    BACTERIA SEE NOTES 05/20/2024 02:29 PM    BACTERIA None Seen 01/13/2023 12:20 AM    CLARITYU Clear 05/28/2024 06:45 AM    LEUKOCYTESUR Negative 05/28/2024 06:45 AM    UROBILINOGEN 1.0 05/28/2024 06:45 AM    BILIRUBINUR Negative 05/28/2024 06:45 AM    BLOODU SMALL 05/28/2024 06:45 AM    GLUCOSEU Negative 05/28/2024 06:45 AM    GLUCOSEU NEGATIVE 01/02/2011 02:44 PM    AMORPHOUS 3+ 05/28/2024 06:45 AM           IMPRESSION/RECOMMENDATIONS:      #1: Chronic kidney disease stage II   Creatinine at baseline   Has proteinuria, has had a kidney biopsy for the, likely diabetic nephropathy and/or hypertensive nephrosclerosis  #2: Hypertension with urgency   Salt restriction   Medications  #3: T2DM   Per primary team  #4: Chest discomfort: Question NSTEMI, undergoing testing    Thank you for allowing me  to participate in the care of this patient.  I will continue to follow along.  Please call with questions.    Robert Keen MD, MD  7/9/2025  [facility]

## 2025-07-09 NOTE — CONSULTS
[facility]  Cardiology Inpatient Consult Service                                                                                          Pt Name: Issa Moctezuma  Age: 39 y.o.  Sex: male  : 1985  Location: [facility]/3527-    Referring Physician: Laurie Najera MD      Reason for Consult:       Reason for Consultation/Chief Complaint: Elevated troponin      HPI:      Issa Moctezuma is a 39 y.o. male with a past medical history of nonischemic cardiomyopathy with recovered EF, hyperlipidemia, chronic kidney disease, hypertension, who presented to the hospital with shortness of breath.  Patient reports that he was working outside and felt hot and short of breath.  Reports he is not compliant with his medication because of the constipation.  After stopping his medications his constipation improved and now he has been taking medications intermittently.  When he came to the ER the blood pressure was uncontrolled and was also found to have elevated troponin and creatinine.  High sensitive troponin came down from 175 to 157.  EKG reviewed which showed sinus rhythm with poor R wave progression and nonspecific ST-T wave changes.  Creatinine improved from 1.9 to 1.4 with hydration, 1.4 is about his baseline. Patient had a workup for his cardiomyopathy in  with angiogram  and was found to have no obstructive CAD, after that underwent cardiac MRI which was suggestive of nonischemic cardiomyopathy but concern of cardiac sarcoidosis which led to PET scan testing, and on PET scan there was no evidence of active cardiac sarcoidosis.      Histories     Past Medical History:   has a past medical history of Anxiety, Chronic kidney disease, CRI (chronic renal insufficiency), Diabetic neuropathy ([facility]), Encounter for imaging to screen for metal prior to MRI, Gastroparesis, HFrEF (heart failure with reduced ejection fraction) ([facility]), Histoplasmosis, Hyperlipidemia, Hypertension, Neuropathy, and  studies, cardiac studies including ECG's and telemetry, current and old medical records.       Isac Multani MD, [facility]  Interventional Cardiology   [facility]      NOTE: This report was transcribed using voice recognition software. Every effort was made to ensure accuracy, however, inadvertent computerized transcription errors may be present.

## 2025-07-09 NOTE — PLAN OF CARE
Problem: Chronic Conditions and Co-morbidities  Goal: Patient's chronic conditions and co-morbidity symptoms are monitored and maintained or improved  Outcome: Progressing     Problem: ABCDS Injury Assessment  Goal: Absence of physical injury  Outcome: Progressing     Problem: Cardiovascular - Adult  Goal: Maintains optimal cardiac output and hemodynamic stability  Outcome: Progressing     Problem: Skin/Tissue Integrity - Adult  Goal: Skin integrity remains intact  Outcome: Progressing     Problem: Metabolic/Fluid and Electrolytes - Adult  Goal: Glucose maintained within prescribed range  Outcome: Progressing

## 2025-07-09 NOTE — PROGRESS NOTES
4 Eyes Admission Assessment     I agree as the admission nurse that 2 RN's have performed a thorough Head to Toe Skin Assessment on the patient. ALL assessment sites listed below have been assessed on admission.       Areas assessed by both nurses: Ernst  [x]   Head, Face, and Ears   [x]   Shoulders, Back, and Chest  [x]   Arms, Elbows, and Hands   [x]   Coccyx, Sacrum, and Ischium  [x]   Legs, Feet, and Heels        Does the Patient have Skin Breakdown?  No         Jeff Prevention initiated:  NA   Wound Care Orders initiated:  NA      [facility] nurse consulted for Pressure Injury (Stage 3,4, Unstageable, DTI, NWPT, and Complex wounds) or Jeff score 18 or lower:  NA      Nurse 1 eSignature: Electronically signed by Addie Miller RN on 7/9/25 at 2:08 AM EDT    **SHARE this note so that the co-signing nurse is able to place an eSignature**    Nurse 2 eSignature: {Esignature:[id number]}

## 2025-07-09 NOTE — CARE COORDINATION
8:31am: Case Management Assessment  Initial Evaluation    Date/Time of Evaluation: 7/9/2025 8:31 AM  Assessment Completed by: HODAN Ford   for [facility] ([facility])  [place]: [phone]    If patient is discharged prior to next notation, then this note serves as note for discharge by case management.    Patient Name: Issa Moctezuma                   YOB: 1985  Diagnosis: Hypertensive crisis [I16.9]  Hypertensive emergency [I16.1]                   Date / Time: 7/8/2025  9:18 AM    Patient Admission Status: Inpatient   Readmission Risk (Low < 19, Mod (19-27), High > 27): Readmission Risk Score: 11.5    Current PCP: Sonia Kan, DO  PCP verified by CM? Yes    Chart Reviewed: Yes      History Provided by: Patient  Patient Orientation: Alert and Oriented    Patient Cognition: Alert    Hospitalization in the last 30 days (Readmission):  No    If yes, Readmission Assessment in CM Navigator will be completed.    Advance Directives:      Code Status: Full Code   Patient's Primary Decision Maker is: Legal Next of Kin (he is aware mom and dad are legal next of kin. his children are 16 and 12)    Primary Decision Maker: LUCY ALVARADO - Parent - [phone]    Discharge Planning:    Patient lives with: Parent Type of Home: House  Primary Care Giver: Self  Patient Support Systems include: Parent   Current Financial resources: Other (Comment) ([employer])  Current community resources: None  Current services prior to admission: Durable Medical Equipment            Current DME: Cane            Type of Home Care services:  None    ADLS  Prior functional level: Independent in ADLs/IADLs  Current functional level: Independent in ADLs/IADLs    PT AM-PAC:   /24  OT AM-PAC:   /24    Family can provide assistance at DC: Yes  Would you like Case Management to discuss the discharge plan with any other family members/significant  others, and if so, who? Yes  Plans to Return to Present Housing: Yes  Other Identified Issues/Barriers to RETURNING to current housing: n/a  Potential Assistance needed at discharge: N/A            Potential DME:    Patient expects to discharge to: House  Plan for transportation at discharge:      Financial    Payor: [employer] [facility] PL / Plan: [place] / Product Type: *No Product type* /     Does insurance require precert for SNF: Yes    Potential assistance Purchasing Medications: No  Meds-to-Beds request:        [id number] [place], [place] - [address]  - P [phone] - F [phone] [address] [facility] [address]  Phone: [phone] Fax: [phone]      Notes:    Factors facilitating achievement of predicted outcomes: Family support, Motivated, Cooperative, Pleasant, and Good insight into deficits    Barriers to discharge: Medical complications    Additional Case Management Notes: Chart review completed. Spoke with pt at bedside. Pt stated he lives at home with his mom. He is independent with his ADL's. He drives and works. He has a cane. He will return home when able and will have a ride home. He denied needs when discharged. He wants information for housing which was placed on AVS.     100% on room air per vitals in epic. SW signing off. Please consult if needs arise    The Plan for Transition of Care is related to the following treatment goals of Hypertensive crisis [I16.9]  Hypertensive emergency [I16.1]    Fariba MCLAUGHLIN, HODAN   for [facility] ([facility])  [place] Mobile: [phone]

## 2025-07-09 NOTE — ASSESSMENT & PLAN NOTE
Patient had cardiac catheterization in 2022, had nonischemic cardiomyopathy at that time.  The elevated troponin is likely due to hypertension and type II MI, but need to rule out obstructive CAD.  Creatine is 1.4 and at baseline, Will proceed with a CTA coronaries for further evaluation.

## 2025-07-09 NOTE — PROGRESS NOTES
Spiritual Health History and Assessment/Progress Note  [facility]    Spiritual/Emotional Needs, Loneliness/Social Isolation,  ,  ,      Name: Issa Moctezuma MRN: [id number]    Age: 39 y.o.     Sex: male   Language: English   Oriental orthodox: Holiness   Hypertensive emergency     Date: 7/9/2025            Total Time Calculated: (P) 38 min              Spiritual Assessment began in [address]        Referral/Consult From: (P) Multi-disciplinary team   Encounter Overview/Reason: Spiritual/Emotional Needs, Loneliness/Social Isolation  Service Provided For: Patient    Leida, Belief, Meaning:   Patient identifies as spiritual and has beliefs or practices that help with coping during difficult times  Family/Friends No family/friends present      Importance and Influence:  Patient has spiritual/personal beliefs that influence decisions regarding their health  Family/Friends No family/friends present    Community:  Patient feels well-supported. Support system includes: Parent/s, Children, Friends, and Extended family  Family/Friends No family/friends present    Assessment and Plan of Care:     Patient Interventions include: Facilitated expression of thoughts and feelings, Explored spiritual coping/struggle/distress, Engaged in theological reflection, and Affirmed coping skills/support systems  Family/Friends Interventions include: No family/friends present    Patient Plan of Care: Other: Patient would appreciate continued support  Family/Friends Plan of Care: No family/friends present    Electronically signed by Zay Rios,  Intern on 7/9/2025 at 3:04 PM

## 2025-07-09 NOTE — PLAN OF CARE
Problem: Chronic Conditions and Co-morbidities  Goal: Patient's chronic conditions and co-morbidity symptoms are monitored and maintained or improved  Outcome: Progressing     Problem: Metabolic/Fluid and Electrolytes - Adult  Goal: Glucose maintained within prescribed range  Outcome: Progressing

## 2025-07-10 ENCOUNTER — APPOINTMENT (OUTPATIENT)
Dept: NUCLEAR MEDICINE | Age: 40
DRG: 199 | End: 2025-07-10
Attending: INTERNAL MEDICINE
Payer: MEDICAID

## 2025-07-10 ENCOUNTER — HOSPITAL ENCOUNTER (INPATIENT)
Age: 40
Discharge: HOME | DRG: 199 | End: 2025-07-12
Attending: INTERNAL MEDICINE
Payer: MEDICAID

## 2025-07-10 VITALS
OXYGEN SATURATION: 100 % | SYSTOLIC BLOOD PRESSURE: 140 MMHG | TEMPERATURE: 98.5 F | WEIGHT: 265 LBS | BODY MASS INDEX: 35.12 KG/M2 | HEART RATE: 86 BPM | DIASTOLIC BLOOD PRESSURE: 107 MMHG | HEIGHT: 73 IN | RESPIRATION RATE: 16 BRPM

## 2025-07-10 PROBLEM — I47.29 NSVT (NONSUSTAINED VENTRICULAR TACHYCARDIA) (HCC): Status: ACTIVE | Noted: 2025-07-10

## 2025-07-10 LAB
ALBUMIN SERPL-MCNC: 2.9 G/DL (ref 3.4–5)
ANION GAP SERPL CALCULATED.3IONS-SCNC: 7 MMOL/L (ref 3–16)
BUN SERPL-MCNC: 18 MG/DL (ref 7–20)
CALCIUM SERPL-MCNC: 8.2 MG/DL (ref 8.3–10.6)
CHLORIDE SERPL-SCNC: 106 MMOL/L (ref 99–110)
CO2 SERPL-SCNC: 22 MMOL/L (ref 21–32)
CREAT SERPL-MCNC: 1.4 MG/DL (ref 0.9–1.3)
CREAT UR-MCNC: 194 MG/DL (ref 39–259)
CREAT UR-MCNC: 194 MG/DL (ref 39–259)
ECHO BSA: 2.47 M2
GFR SERPLBLD CREATININE-BSD FMLA CKD-EPI: 66 ML/MIN/{1.73_M2}
GLUCOSE BLD-MCNC: 108 MG/DL (ref 70–99)
GLUCOSE BLD-MCNC: 119 MG/DL (ref 70–99)
GLUCOSE BLD-MCNC: 126 MG/DL (ref 70–99)
GLUCOSE SERPL-MCNC: 127 MG/DL (ref 70–99)
MICROALBUMIN UR DL<=1MG/L-MCNC: 395 MG/DL
MICROALBUMIN/CREAT UR: 2036.1 MG/G (ref 0–30)
NUC STRESS EJECTION FRACTION: 34 %
NUC STRESS LV EDV: 193 ML (ref 67–155)
NUC STRESS LV ESV: 128 ML (ref 22–58)
NUC STRESS LV MASS: 196 G
NUC STRESS LV STROKE VOLUME: 65 ML
PERFORMED ON: ABNORMAL
PHOSPHATE SERPL-MCNC: 2.9 MG/DL (ref 2.5–4.9)
POTASSIUM SERPL-SCNC: 3.6 MMOL/L (ref 3.5–5.1)
SODIUM SERPL-SCNC: 135 MMOL/L (ref 136–145)
STRESS BASELINE DIAS BP: 103 MMHG
STRESS BASELINE HR: 80 BPM
STRESS BASELINE SYS BP: 129 MMHG
STRESS ESTIMATED WORKLOAD: 1 METS
STRESS EXERCISE DUR MIN: 1 MIN
STRESS PEAK DIAS BP: 103 MMHG
STRESS PEAK SYS BP: 136 MMHG
STRESS PERCENT HR ACHIEVED: 50 %
STRESS POST PEAK HR: 91 BPM
STRESS RATE PRESSURE PRODUCT: ABNORMAL BPM*MMHG
STRESS TARGET HR: 182 BPM
TID: 1.09

## 2025-07-10 PROCEDURE — 36415 COLL VENOUS BLD VENIPUNCTURE: CPT

## 2025-07-10 PROCEDURE — 6370000000 HC RX 637 (ALT 250 FOR IP): Performed by: INTERNAL MEDICINE

## 2025-07-10 PROCEDURE — 2500000003 HC RX 250 WO HCPCS: Performed by: INTERNAL MEDICINE

## 2025-07-10 PROCEDURE — A9502 TC99M TETROFOSMIN: HCPCS | Performed by: INTERNAL MEDICINE

## 2025-07-10 PROCEDURE — 99232 SBSQ HOSP IP/OBS MODERATE 35: CPT | Performed by: INTERNAL MEDICINE

## 2025-07-10 PROCEDURE — 78452 HT MUSCLE IMAGE SPECT MULT: CPT

## 2025-07-10 PROCEDURE — 3430000000 HC RX DIAGNOSTIC RADIOPHARMACEUTICAL: Performed by: INTERNAL MEDICINE

## 2025-07-10 PROCEDURE — 80069 RENAL FUNCTION PANEL: CPT

## 2025-07-10 PROCEDURE — 93016 CV STRESS TEST SUPVJ ONLY: CPT | Performed by: INTERNAL MEDICINE

## 2025-07-10 PROCEDURE — 93017 CV STRESS TEST TRACING ONLY: CPT

## 2025-07-10 PROCEDURE — 93018 CV STRESS TEST I&R ONLY: CPT | Performed by: INTERNAL MEDICINE

## 2025-07-10 PROCEDURE — 6360000002 HC RX W HCPCS: Performed by: INTERNAL MEDICINE

## 2025-07-10 PROCEDURE — 78452 HT MUSCLE IMAGE SPECT MULT: CPT | Performed by: INTERNAL MEDICINE

## 2025-07-10 RX ORDER — SEMAGLUTIDE 0.68 MG/ML
0.25 INJECTION, SOLUTION SUBCUTANEOUS WEEKLY
Qty: 3 ML | Refills: 2 | Status: SHIPPED | OUTPATIENT
Start: 2025-07-10 | End: 2025-07-15 | Stop reason: ALTCHOICE

## 2025-07-10 RX ORDER — ASPIRIN 81 MG/1
81 TABLET, CHEWABLE ORAL DAILY
Qty: 30 TABLET | Refills: 3 | Status: SHIPPED | OUTPATIENT
Start: 2025-07-10

## 2025-07-10 RX ORDER — METOPROLOL TARTRATE 50 MG
50 TABLET ORAL PRN
Status: COMPLETED | OUTPATIENT
Start: 2025-07-10 | End: 2025-07-10

## 2025-07-10 RX ORDER — METOPROLOL TARTRATE 1 MG/ML
5 INJECTION, SOLUTION INTRAVENOUS EVERY 5 MIN PRN
Status: DISCONTINUED | OUTPATIENT
Start: 2025-07-10 | End: 2025-07-10 | Stop reason: HOSPADM

## 2025-07-10 RX ORDER — NITROGLYCERIN 0.4 MG/1
0.4 TABLET SUBLINGUAL PRN
Status: DISCONTINUED | OUTPATIENT
Start: 2025-07-10 | End: 2025-07-10 | Stop reason: HOSPADM

## 2025-07-10 RX ORDER — SODIUM CHLORIDE 0.9 % (FLUSH) 0.9 %
5-40 SYRINGE (ML) INJECTION EVERY 12 HOURS SCHEDULED
Status: DISCONTINUED | OUTPATIENT
Start: 2025-07-10 | End: 2025-07-10 | Stop reason: HOSPADM

## 2025-07-10 RX ORDER — ALBUTEROL SULFATE 90 UG/1
2 INHALANT RESPIRATORY (INHALATION) EVERY 6 HOURS PRN
Qty: 18 G | Refills: 2 | Status: SHIPPED | OUTPATIENT
Start: 2025-07-10

## 2025-07-10 RX ORDER — SODIUM CHLORIDE 0.9 % (FLUSH) 0.9 %
5-40 SYRINGE (ML) INJECTION PRN
Status: DISCONTINUED | OUTPATIENT
Start: 2025-07-10 | End: 2025-07-10 | Stop reason: HOSPADM

## 2025-07-10 RX ORDER — ROSUVASTATIN CALCIUM 40 MG/1
40 TABLET, COATED ORAL EVERY MORNING
Qty: 30 TABLET | Refills: 3 | Status: SHIPPED | OUTPATIENT
Start: 2025-07-10

## 2025-07-10 RX ORDER — SODIUM CHLORIDE 9 MG/ML
INJECTION, SOLUTION INTRAVENOUS PRN
Status: DISCONTINUED | OUTPATIENT
Start: 2025-07-10 | End: 2025-07-10 | Stop reason: HOSPADM

## 2025-07-10 RX ORDER — VALSARTAN 160 MG/1
160 TABLET ORAL EVERY MORNING
Qty: 30 TABLET | Refills: 3 | Status: SHIPPED | OUTPATIENT
Start: 2025-07-10

## 2025-07-10 RX ORDER — REGADENOSON 0.08 MG/ML
0.4 INJECTION, SOLUTION INTRAVENOUS
Status: COMPLETED | OUTPATIENT
Start: 2025-07-10 | End: 2025-07-10

## 2025-07-10 RX ORDER — METOPROLOL TARTRATE 50 MG
150 TABLET ORAL PRN
Status: COMPLETED | OUTPATIENT
Start: 2025-07-10 | End: 2025-07-10

## 2025-07-10 RX ORDER — DICYCLOMINE HYDROCHLORIDE 10 MG/1
10 CAPSULE ORAL 4 TIMES DAILY PRN
Qty: 120 CAPSULE | Refills: 1 | Status: SHIPPED | OUTPATIENT
Start: 2025-07-10

## 2025-07-10 RX ORDER — GLUCOSAMINE HCL/CHONDROITIN SU 500-400 MG
CAPSULE ORAL
Qty: 100 STRIP | Refills: 2 | Status: SHIPPED | OUTPATIENT
Start: 2025-07-10

## 2025-07-10 RX ORDER — METOPROLOL TARTRATE 50 MG
100 TABLET ORAL PRN
Status: COMPLETED | OUTPATIENT
Start: 2025-07-10 | End: 2025-07-10

## 2025-07-10 RX ORDER — NITROGLYCERIN 0.4 MG/1
0.8 TABLET SUBLINGUAL PRN
Status: DISCONTINUED | OUTPATIENT
Start: 2025-07-10 | End: 2025-07-10 | Stop reason: HOSPADM

## 2025-07-10 RX ORDER — METOPROLOL SUCCINATE 50 MG/1
50 TABLET, EXTENDED RELEASE ORAL NIGHTLY
Qty: 30 TABLET | Refills: 3 | Status: SHIPPED | OUTPATIENT
Start: 2025-07-10

## 2025-07-10 RX ORDER — PREGABALIN 100 MG/1
100 CAPSULE ORAL 2 TIMES DAILY
Qty: 60 CAPSULE | Refills: 0 | Status: SHIPPED | OUTPATIENT
Start: 2025-07-10 | End: 2025-08-09

## 2025-07-10 RX ADMIN — METOPROLOL TARTRATE 150 MG: 50 TABLET, FILM COATED ORAL at 08:12

## 2025-07-10 RX ADMIN — ASPIRIN 81 MG: 81 TABLET, CHEWABLE ORAL at 08:13

## 2025-07-10 RX ADMIN — SODIUM CHLORIDE, PRESERVATIVE FREE 10 ML: 5 INJECTION INTRAVENOUS at 08:13

## 2025-07-10 RX ADMIN — TETROFOSMIN 10.56 MILLICURIE: 1.38 INJECTION, POWDER, LYOPHILIZED, FOR SOLUTION INTRAVENOUS at 14:01

## 2025-07-10 RX ADMIN — INSULIN LISPRO 8 UNITS: 100 INJECTION, SOLUTION INTRAVENOUS; SUBCUTANEOUS at 16:55

## 2025-07-10 RX ADMIN — ENOXAPARIN SODIUM 30 MG: 100 INJECTION SUBCUTANEOUS at 08:13

## 2025-07-10 RX ADMIN — TETROFOSMIN 31.7 MILLICURIE: 1.38 INJECTION, POWDER, LYOPHILIZED, FOR SOLUTION INTRAVENOUS at 15:00

## 2025-07-10 RX ADMIN — REGADENOSON 0.4 MG: 0.08 INJECTION, SOLUTION INTRAVENOUS at 15:00

## 2025-07-10 NOTE — PROGRESS NOTES
[facility]  Cardiology Inpatient Consult Service  Daily Progress Note        Admit Date:  7/8/2025    Referring Physician: Laurie Najera MD    Reason for Consultation/Chief Complaint: Elevated troponin, uncontrolled hypertension    Subjective:   Interval history:  No acute events overnight.  CTA was not completed because of the tachycardia.  Echocardiogram showed normal EF with trivial aortic regurgitation.  Cardiac telemetry showed 8 beats of monomorphic nonsustained VT.      Medications:  Prior to Admission medications    Medication Sig Start Date End Date Taking? Authorizing Provider   Insulin Degludec 200 UNIT/ML SOPN Inject 80 Units into the skin daily   Yes Geena Vogel MD   insulin lispro, 1 Unit Dial, (HUMALOG KWIKPEN) 100 UNIT/ML SOPN Inject 14 Units into the skin 3 times daily (with meals) Plus sliding scale +1 unit for every blood sugar 30 > 120.  Limit of 70 units per day.   Yes Geena Vogel MD   metFORMIN (GLUCOPHAGE) 500 MG tablet Take 1 tablet by mouth 2 times daily (with meals)   Yes Geena Vogel MD   rosuvastatin (CRESTOR) 40 MG tablet Take 1 tablet by mouth every morning   Yes Geena Vogel MD   dicyclomine (BENTYL) 10 MG capsule Take 1 capsule by mouth 4 times daily as needed (intestinal cramping)   Yes Geena Vogel MD   pregabalin (LYRICA) 100 MG capsule Take 1 capsule by mouth 2 times daily. Max Daily Amount: 200 mg   Yes Geena Vogel MD   vitamin D (ERGOCALCIFEROL) 1.25 MG (76653 UT) CAPS capsule TAKE 1 CAPSULE BY MOUTH 1 TIME A WEEK 2/7/24  Yes Sonia Kan, DO   Insulin Pen Needle (PEN NEEDLES 3/16\") 31G X 5 MM MISC 1 each by Does not apply route 4 times daily 2/6/24  Yes Sonia Kan, DO   amLODIPine (NORVASC) 5 MG tablet Take 1 tablet by mouth daily 12/15/23  Yes Haile Hamilton MD   valsartan (DIOVAN) 160 MG tablet Take 1 tablet by mouth every morning 10/9/23  Yes Lela

## 2025-07-10 NOTE — PROGRESS NOTES
Discharge instructions reviewed with pt. Discussed HTN/CHF management, importance of daily weights, low NA/fluid restriction diet, follow up care, medication management, and s/s of complications to report and when to seek emergency services. Pt verbalizes understanding. Discharge instructions given to pt.

## 2025-07-10 NOTE — DISCHARGE SUMMARY
Hospital Discharge Summary    Patient's PCP: Sonia Kan DO  Admit Date: 7/8/2025   Discharge Date: 7/10/2025    Admitting Physician: Dr. Renetta Kowalski MD  Discharge Physician: Dr. Laurie Najera MD   Consults:     HPI: Very pleasant 38 yo male w hx of dm and htn who presented feeling ill with fatigue and periodic shortness of breath.  The shortness of breath worsened with his fatigue mostly after coreg.  The patient also had hx of constipation he attributes to norvasc.  Patient states he periodically takes his medications bc of the side effects.  I adjusted his betsa blocker to toprol xl at night 50 mg.  He is on valsartan he has ckd he needs better bp and sugar control.  He is asked to take his meds he is going to monitor bp and sugars closely at home.  Discussed with mother.  Asked to monitor for recurrence of symptoms or newsymptoms including but not limited to chest pain shortness of breath nausea vomiting fevers or chills and seek immediate medical attention or call 911.   I also discussed getting a sleep study as he may have other components that are contributing to high bp.    Discharge Diagnoses:   Patient Active Problem List   Diagnosis    Type 2 diabetes mellitus with diabetic polyneuropathy, with long-term current use of insulin (HCC)    Elevated troponin    Chronic kidney disease    Obesity (BMI 30-39.9)    Neuropathy    MDD (major depressive disorder), recurrent episode, moderate ([facility])    HFrEF (heart failure with reduced ejection fraction) ([facility])    Gastroparesis    Generalized anxiety disorder    Stage 3a chronic kidney disease (HCC)    Essential hypertension    Necrotizing soft tissue infection    Hypertensive emergency    Mixed hyperlipidemia    NSVT (nonsustained ventricular tachycardia) ([facility])       Physical Exam: BP (!) 140/107   Pulse 86   Temp 98.5 °F (36.9 °C) (Oral)   Resp 16   Ht 1.854 m (6' 1\")   Wt 120.2 kg (265 lb)   SpO2 100%   BMI 34.96 kg/m²     Recent Labs

## 2025-07-10 NOTE — PROGRESS NOTES
Pt with past hx of HF.  Lowest EF 30 now recovered to 50%.   Previously followed with  Dr Shirley. He was last seen in office May 2024.   Pt currently being treated for hypertensive crisis after pt reports taking home meds inconsistently.   HF education material has been added to chart.   HF careplan is current.   Daily wts / I&O are in place.   Cardiology follow up arranged for 7/17 with [facility] NP.

## 2025-07-10 NOTE — PROGRESS NOTES
Pt seen and examined  Doing ok feeling better  Denies nausea or vomiting no light headedness or dizziness.    Vitals:    07/09/25 2059   BP: (!) 148/107   Pulse: 88   Resp: 22   Temp: 98 °F (36.7 °C)   SpO2:      Vitals:    07/09/25 2059   BP: (!) 148/107   Pulse: 88   Resp: 22   Temp: 98 °F (36.7 °C)   SpO2:      Gen: pleasant alert oriented  Neck: no jvd  Chest: clear to auscultation bl  Cvs: s2s1 no murmurs  Abd: soft nd nt bs normal active  Ext: no edema positive pulses.      Bun 22 and creatinine is 1.4  h/h is 12.3 / 37.2    38 yo admitted with dehydration volume depletion and acute renal failure.  Patient has prior hx of non ischemic cardiomyopathy admited with dehydartion and acute renal failure.  Patient was on metformin at home for diabetes sugar.  Issue w compliance are side effects - most are seemingly from ca channel blocker and the fatigue weakness likely from beta blockade.  Discussed toprol xl at night, will give small dose this evening, monitor and adjust tomorrow prior to dc. Discussed sleep study with patient at length - interestingly he does not have lvh on his echo

## 2025-07-10 NOTE — ASSESSMENT & PLAN NOTE
On Coreg 25 mg twice daily, echocardiogram showed normal EF, ischemic eval as above.      Discussed with Dr. Najera.

## 2025-07-10 NOTE — PLAN OF CARE
Problem: Chronic Conditions and Co-morbidities  Goal: Patient's chronic conditions and co-morbidity symptoms are monitored and maintained or improved  7/10/2025 0306 by Addie Miller RN  Outcome: Progressing  7/9/2025 1758 by Georgina Mendenhall RN  Outcome: Progressing     Problem: ABCDS Injury Assessment  Goal: Absence of physical injury  Outcome: Progressing     Problem: Cardiovascular - Adult  Goal: Maintains optimal cardiac output and hemodynamic stability  Outcome: Progressing     Problem: Skin/Tissue Integrity - Adult  Goal: Skin integrity remains intact  Outcome: Progressing     Problem: Metabolic/Fluid and Electrolytes - Adult  Goal: Glucose maintained within prescribed range  7/10/2025 0306 by Addie Miller RN  Outcome: Progressing  7/9/2025 1758 by Georgina Mendenhall RN  Outcome: Progressing

## 2025-07-10 NOTE — ASSESSMENT & PLAN NOTE
Patient had cardiac catheterization in 2022, had nonischemic cardiomyopathy at that time.  The elevated troponin is likely due to hypertension and type II MI, but need to rule out obstructive CAD.  Does have 8 beat run of NSVT overnight, will rule out obstructive  CAD, heart rate is running high likely due to rebound from not taking home  beta-blocker, will check if the nuclear stress test can be completed today.

## 2025-07-10 NOTE — CARE COORDINATION
Addendum at 12:33pm: After speaking with RN, spoke with pt and his mother on his phone this AM per his request. Pt's mother stated she wanted someone in the hospital that can apply him for social security disability. Explained that the hospital doesn't have anyone to apply him for this as this is something he has to do on his own. She stated he has in the past and was denied. Again explained the hospital doesn't have anyone to apply for him. She stated she wants referrals to Social Workers or Case Management that can assist. Explained will look to determine if pt has a  with his insurance and provided resources/information.     Pt stated that he has insurance through his employer of [employer] as writer explained he has [employer] Managed medicaid listed in [employer]. Policy ID: [id number] and Group Number: [id number].    Reviewed chart which indicated pt had Ambulatory Care Coordination with [facility] in 2023. Referral called to [facility] Care Transitions Coordinator with [facility] at [phone]. She stated she review records and email her supervisor to confirmed insurance is still accepted for the program. She is aware of anticipated discharge today and will put pt on her list for follow up tomorrow.     Updated pt at bedside on the above. He is aware to follow up with his PCP if he doesn't hear anything from the transitions coordinator as they can also refer him. Discussed other agencies ([facility] ([facility] and [facility]) but they focus more on mental health and alcohol/drug case management. He declined additional referrals and stated he didn't need other information. He said he will have a ride home    Georgina LEE updated and confirmed pt is alert and oriented.     Discharge noted. SW continues to not follow. Number placed on AVS for [facility] job and family services.     7:28am: Chart review completed. Pt will return home with his mother when able with no needs from SW.

## 2025-07-10 NOTE — PLAN OF CARE
Problem: Chronic Conditions and Co-morbidities  Goal: Patient's chronic conditions and co-morbidity symptoms are monitored and maintained or improved  Outcome: Adequate for Discharge     Problem: ABCDS Injury Assessment  Goal: Absence of physical injury  Outcome: Adequate for Discharge     Problem: Cardiovascular - Adult  Goal: Maintains optimal cardiac output and hemodynamic stability  Outcome: Adequate for Discharge  Goal: Absence of cardiac dysrhythmias or at baseline  Outcome: Adequate for Discharge     Problem: Skin/Tissue Integrity - Adult  Goal: Skin integrity remains intact  Outcome: Adequate for Discharge     Problem: Metabolic/Fluid and Electrolytes - Adult  Goal: Glucose maintained within prescribed range  Outcome: Adequate for Discharge  Goal: Electrolytes maintained within normal limits  Outcome: Adequate for Discharge  Goal: Hemodynamic stability and optimal renal function maintained  Outcome: Adequate for Discharge     Problem: Respiratory - Adult  Goal: Achieves optimal ventilation and oxygenation  Outcome: Adequate for Discharge

## 2025-07-10 NOTE — PROGRESS NOTES
[facility]   Nephrology progress Note  [phone]  [phone]   [place]         Reason for Consult:  CKD -patient of Dr. Frank Hester    The patient is a 39 y.o. male with significant past medical history of T2DM, hypertension, hyperlipidemia HfrEF , pulmonary histoplasmosis, who presents to the ED with shortness of breath or 24-hour duration.  He did have some chest discomfort with this.  There was no cough no phlegm no hemoptysis no fever.  His blood pressure in the ED was 1 8218.  His troponin level was 175 with a repeat of 166.  Blood sugar was also elevated at 463    He has had diabetes mellitus for about 14 years, he is unsure whether he has diabetic retinopathy.  Prior to this at age 19 or so he had a biopsy at [facility] for proteinuria  He has had high blood pressure for 16 years  He was treated for pulmonary histoplasmosis with itraconazole  He admits not to being very compliant with either his diet or his medications  He denies any dysuria or gross hematuria  He is not taking NSAIDs  There is no diarrhea nausea vomiting    It seems he was due to have CT a cardiac today however his heart rate is in the 80s hence it could not be done  He says he is due to have a stress test tomorrow      Interval  History:    7/10/25: going for NM stress test         PHYSICAL EXAM:    Vitals:    /82   Pulse 78   Temp 98 °F (36.7 °C) (Oral)   Resp 14   Ht 1.854 m (6' 1\")   Wt 119.5 kg (263 lb 6.4 oz)   SpO2 100%   BMI 34.75 kg/m²   I/O last 3 completed shifts:  In: 2497 [I.V.:2497]  Out: 2325 [Urine:2325]  No intake/output data recorded.    Physical Exam:  Gen:  alert, oriented x 3, overweight  PERRL , EOM +  Pallor +, No icterus  JVP not raised   CV: RRR no murmur or rub .  Lungs:B/ L air entry, Normal breath sounds   Abd: soft, bowel sounds + , No organomegaly   Ext: No edema, no cyanosis  Skin: Warm.  No rash  Neuro: nonfocal.      DATA:    CBC with Differential:

## 2025-07-10 NOTE — ASSESSMENT & PLAN NOTE
Likely triggered by noncompliance with medications, blood pressure is under better control with Coreg 25 mg twice daily and valsartan 160 mg daily.  Counseled about compliance with the medications.      Orders from past 72 hours:    Echo (TTE) complete (PRN contrast/bubble/strain/3D); Standing

## 2025-07-11 ENCOUNTER — CARE COORDINATION (OUTPATIENT)
Dept: CASE MANAGEMENT | Age: 40
End: 2025-07-11

## 2025-07-11 NOTE — CARE COORDINATION
Care Transitions Note    Initial Call - Call within 2 business days of discharge: Yes    Attempted to reach patient for transitions of care follow up. Unable to reach patient.    Outreach Attempts:   1ST CTC attempt to reach Pt regarding recent hospital discharge.  CTC left voice recording with call back number requesting a call back. Will attempt to reach patient again.    Patient: Issa Moctezuma    Patient : 1985   MRN: [id number]     Reason for Admission: Hypertensive emergency, Elevated troponin, NSVT  Discharge Date: 7/10/25    RURS: Readmission Risk Score: 11.6    Last Discharge Facility       Date Complaint Diagnosis Description Type Department Provider    25 Shortness of Breath Hypertensive emergency ... ED to Hosp-Admission (Discharged) (ADMITTED) [facility] Laurie Najera MD; Cachorro Maynard,...            Was this an external facility discharge? No    Follow Up Appointment:   Patient does not have a follow up appointment scheduled at time of call. Unable to reach patient  Future Appointments         Provider Specialty Dept Phone    2025 1:30 PM Jasmyn Elias APRN - CNP Cardiology [phone]    9/10/2025 11:45 AM Kev Henson MD Cardiology [phone]            Plan for follow-up on next business day.      Thank You,    Maritza Lopez RN  Care Transition Coordinator  Contact Number:[phone]

## 2025-07-14 ENCOUNTER — CARE COORDINATION (OUTPATIENT)
Dept: CASE MANAGEMENT | Age: 40
End: 2025-07-14

## 2025-07-14 NOTE — CARE COORDINATION
Care Transitions Note    Initial Call - Call within 2 business days of discharge: Yes    Attempted to reach patient for transitions of care follow up. Unable to reach patient.    Outreach Attempts:   2ND CTC attempt to reach Pt regarding recent hospital discharge.  CTC left voice recording with call back number requesting a call back.  CTN will close out CTN episode at this time.    Patient: Issa Moctezuma    Patient : 1985   MRN: [id number]     Reason for Admission: Hypertensive emergency, Elevated troponin, NSVT   Discharge Date: 7/10/25    RURS: Readmission Risk Score: 11.6    Last Discharge Facility       Date Complaint Diagnosis Description Type Department Provider    25 Shortness of Breath Hypertensive emergency ... ED to Hosp-Admission (Discharged) (ADMITTED) Laurie Manzo MD; Cachorro Maynard,...            Was this an external facility discharge? No    Follow Up Appointment:   Patient has hospital follow up appointment scheduled within 7 days of discharge.    Future Appointments         Provider Specialty Dept Phone    7/15/2025 8:40 AM Sonia Kan DO Primary Care [phone]    2025 1:30 PM Jasmyn Elias APRN - CNP Cardiology [phone]    9/10/2025 11:45 AM Kev Henson MD Cardiology [phone]            No further follow-up call indicated     Thank You,    Maritza Lopez RN  Care Transition Coordinator  Contact Number:[phone]

## 2025-07-15 ENCOUNTER — OFFICE VISIT (OUTPATIENT)
Dept: PRIMARY CARE CLINIC | Age: 40
End: 2025-07-15
Payer: MEDICAID

## 2025-07-15 VITALS
WEIGHT: 261.2 LBS | HEART RATE: 80 BPM | DIASTOLIC BLOOD PRESSURE: 87 MMHG | OXYGEN SATURATION: 98 % | BODY MASS INDEX: 34.46 KG/M2 | SYSTOLIC BLOOD PRESSURE: 130 MMHG

## 2025-07-15 DIAGNOSIS — I10 ESSENTIAL HYPERTENSION: ICD-10-CM

## 2025-07-15 DIAGNOSIS — N18.31 STAGE 3A CHRONIC KIDNEY DISEASE (HCC): ICD-10-CM

## 2025-07-15 DIAGNOSIS — E78.2 MIXED HYPERLIPIDEMIA: ICD-10-CM

## 2025-07-15 DIAGNOSIS — E11.42 TYPE 2 DIABETES MELLITUS WITH DIABETIC POLYNEUROPATHY, WITH LONG-TERM CURRENT USE OF INSULIN (HCC): Primary | ICD-10-CM

## 2025-07-15 DIAGNOSIS — I50.20 HFREF (HEART FAILURE WITH REDUCED EJECTION FRACTION) (HCC): ICD-10-CM

## 2025-07-15 DIAGNOSIS — Z79.4 TYPE 2 DIABETES MELLITUS WITH DIABETIC POLYNEUROPATHY, WITH LONG-TERM CURRENT USE OF INSULIN (HCC): Primary | ICD-10-CM

## 2025-07-15 PROBLEM — M79.89 NECROTIZING SOFT TISSUE INFECTION: Status: RESOLVED | Noted: 2024-05-29 | Resolved: 2025-07-15

## 2025-07-15 PROBLEM — I16.1 HYPERTENSIVE EMERGENCY: Status: RESOLVED | Noted: 2025-07-08 | Resolved: 2025-07-15

## 2025-07-15 PROBLEM — R79.89 ELEVATED TROPONIN: Status: RESOLVED | Noted: 2022-05-17 | Resolved: 2025-07-15

## 2025-07-15 PROBLEM — N18.9 CHRONIC KIDNEY DISEASE: Status: RESOLVED | Noted: 2022-05-18 | Resolved: 2025-07-15

## 2025-07-15 PROBLEM — I47.29 NSVT (NONSUSTAINED VENTRICULAR TACHYCARDIA) (HCC): Status: RESOLVED | Noted: 2025-07-10 | Resolved: 2025-07-15

## 2025-07-15 LAB
ANION GAP SERPL CALCULATED.3IONS-SCNC: 6 MMOL/L (ref 4–16)
BUN BLDV-MCNC: 30 MG/DL (ref 8–26)
CALCIUM SERPL-MCNC: 9.2 MG/DL (ref 8.5–10.4)
CHLORIDE BLD-SCNC: 106 MMOL/L (ref 98–111)
CHOLESTEROL, TOTAL: 258 MG/DL
CO2: 27 MMOL/L (ref 21–31)
CREAT SERPL-MCNC: 1.74 MG/DL (ref 0.7–1.3)
EGFR (CKD-EPI): 51 ML/MIN/1.73 M2
GLUCOSE BLD-MCNC: 198 MG/DL (ref 70–99)
HDLC SERPL-MCNC: 50 MG/DL
LDL CHOLESTEROL: 176 MG/DL
NONHDLC SERPL-MCNC: 208 MG/DL
POTASSIUM SERPL-SCNC: 4.5 MMOL/L (ref 3.6–5.1)
SODIUM BLD-SCNC: 139 MMOL/L (ref 135–145)
TRIGL SERPL-MCNC: 161 MG/DL

## 2025-07-15 PROCEDURE — 1111F DSCHRG MED/CURRENT MED MERGE: CPT | Performed by: FAMILY MEDICINE

## 2025-07-15 PROCEDURE — 3075F SYST BP GE 130 - 139MM HG: CPT | Performed by: FAMILY MEDICINE

## 2025-07-15 PROCEDURE — G8417 CALC BMI ABV UP PARAM F/U: HCPCS | Performed by: FAMILY MEDICINE

## 2025-07-15 PROCEDURE — 2022F DILAT RTA XM EVC RTNOPTHY: CPT | Performed by: FAMILY MEDICINE

## 2025-07-15 PROCEDURE — 3079F DIAST BP 80-89 MM HG: CPT | Performed by: FAMILY MEDICINE

## 2025-07-15 PROCEDURE — 3046F HEMOGLOBIN A1C LEVEL >9.0%: CPT | Performed by: FAMILY MEDICINE

## 2025-07-15 PROCEDURE — 99215 OFFICE O/P EST HI 40 MIN: CPT | Performed by: FAMILY MEDICINE

## 2025-07-15 PROCEDURE — G8427 DOCREV CUR MEDS BY ELIG CLIN: HCPCS | Performed by: FAMILY MEDICINE

## 2025-07-15 PROCEDURE — 1036F TOBACCO NON-USER: CPT | Performed by: FAMILY MEDICINE

## 2025-07-15 RX ORDER — ACYCLOVIR 400 MG/1
TABLET ORAL
Qty: 9 EACH | Refills: 3 | Status: SHIPPED | OUTPATIENT
Start: 2025-07-15

## 2025-07-15 RX ORDER — ACYCLOVIR 400 MG/1
TABLET ORAL
Qty: 1 EACH | Refills: 0 | Status: SHIPPED | COMMUNITY
Start: 2025-07-15

## 2025-07-15 ASSESSMENT — PATIENT HEALTH QUESTIONNAIRE - PHQ9
8. MOVING OR SPEAKING SO SLOWLY THAT OTHER PEOPLE COULD HAVE NOTICED. OR THE OPPOSITE, BEING SO FIGETY OR RESTLESS THAT YOU HAVE BEEN MOVING AROUND A LOT MORE THAN USUAL: SEVERAL DAYS
SUM OF ALL RESPONSES TO PHQ QUESTIONS 1-9: 12
2. FEELING DOWN, DEPRESSED OR HOPELESS: SEVERAL DAYS
9. THOUGHTS THAT YOU WOULD BE BETTER OFF DEAD, OR OF HURTING YOURSELF: NOT AT ALL
7. TROUBLE CONCENTRATING ON THINGS, SUCH AS READING THE NEWSPAPER OR WATCHING TELEVISION: SEVERAL DAYS
SUM OF ALL RESPONSES TO PHQ QUESTIONS 1-9: 12
6. FEELING BAD ABOUT YOURSELF - OR THAT YOU ARE A FAILURE OR HAVE LET YOURSELF OR YOUR FAMILY DOWN: NOT AT ALL
1. LITTLE INTEREST OR PLEASURE IN DOING THINGS: SEVERAL DAYS
10. IF YOU CHECKED OFF ANY PROBLEMS, HOW DIFFICULT HAVE THESE PROBLEMS MADE IT FOR YOU TO DO YOUR WORK, TAKE CARE OF THINGS AT HOME, OR GET ALONG WITH OTHER PEOPLE: SOMEWHAT DIFFICULT
SUM OF ALL RESPONSES TO PHQ QUESTIONS 1-9: 12
3. TROUBLE FALLING OR STAYING ASLEEP: NEARLY EVERY DAY
5. POOR APPETITE OR OVEREATING: MORE THAN HALF THE DAYS
4. FEELING TIRED OR HAVING LITTLE ENERGY: NEARLY EVERY DAY
SUM OF ALL RESPONSES TO PHQ QUESTIONS 1-9: 12

## 2025-07-15 ASSESSMENT — ENCOUNTER SYMPTOMS
CONSTIPATION: 1
ABDOMINAL PAIN: 0
COUGH: 0
SHORTNESS OF BREATH: 1

## 2025-07-15 NOTE — PROGRESS NOTES
needs follow up with Nephrology ASAP.     New Prescriptions    CONTINUOUS GLUCOSE SENSOR (DEXCOM G7 SENSOR) MISC    Use as directed (sample)        Orders Placed This Encounter   Procedures    Basic Metabolic Panel    Lipid, Fasting    Basic Metabolic Panel    Lipid Panel    [facility] Referral to Clinical Pharmacy [facility] in about 2 months (around 9/15/2025) for Follow up DMII/HTN.    Total time spent with patient including direct consultation, evaluation, review of medical records and documentation = 40     Sonia Kna DO

## 2025-07-16 NOTE — PROGRESS NOTES
Physician Progress Note      PATIENT:               CASSANDRA SCOTT  CSN #:                  [id number]  :                       1985  ADMIT DATE:       2025 9:18 AM  DISCH DATE:        7/10/2025 6:17 PM  RESPONDING  PROVIDER #:        Laurie Najera MD          QUERY TEXT:    Dear Dr. Najera,    Type 2 MI is documented in the medical record per Cardio consult note dated   . Please clarify the status of this condition:    The clinical indicators include:  38 yo m presents with fatigue and periodic sob  -DM 2 HTN  GLU  -/118 Troponin 175  166   -noted documentation per H/P dated  of, ' Patient admits being not fully   compliant with his medication including his antihypertensive regimen which he   misses taking more than 3 times a week'.  -noted documentation per cardio consult note dated  of, 'elevated troponin   is likely due to hypertension and type II MI'.  -Cardio consult, Neph consult, adjusted BB, counseled patient on taking   medication as directed, ECHO      Thank you, in advance,  Monae Coronado RN BSN CRCR  Clinical   [email or web address]  Options provided:  -- Patient has elevated troponin due to associated with hypertensive crisis   and type 2 MI  -- type 2 MI was ruled out after study, Please include corresponding diagnosis   for patient?s clinical picture and treatment.  -- Other - I will add my own diagnosis  -- Disagree - Not applicable / Not valid  -- Disagree - Clinically unable to determine / Unknown  -- Refer to Clinical Documentation Reviewer    PROVIDER RESPONSE TEXT:    The patient has elevated troponin due to or associated with hypertensive   crisis and type 2 MI    Query created by: Monae Coronado on 2025 9:34 AM      Electronically signed by:  Laurie Najera MD 2025 2:05 PM

## 2025-07-17 ENCOUNTER — TELEPHONE (OUTPATIENT)
Age: 40
End: 2025-07-17

## 2025-07-17 ENCOUNTER — OFFICE VISIT (OUTPATIENT)
Dept: CARDIOLOGY CLINIC | Age: 40
End: 2025-07-17
Payer: MEDICAID

## 2025-07-17 VITALS
BODY MASS INDEX: 35.52 KG/M2 | SYSTOLIC BLOOD PRESSURE: 128 MMHG | HEART RATE: 100 BPM | DIASTOLIC BLOOD PRESSURE: 72 MMHG | HEIGHT: 73 IN | WEIGHT: 268 LBS

## 2025-07-17 DIAGNOSIS — G47.30 SLEEP APNEA, UNSPECIFIED TYPE: Primary | ICD-10-CM

## 2025-07-17 DIAGNOSIS — R00.0 TACHYCARDIA: ICD-10-CM

## 2025-07-17 DIAGNOSIS — E78.2 MIXED HYPERLIPIDEMIA: ICD-10-CM

## 2025-07-17 DIAGNOSIS — R06.83 SNORING: ICD-10-CM

## 2025-07-17 DIAGNOSIS — I47.29 NSVT (NONSUSTAINED VENTRICULAR TACHYCARDIA) (HCC): ICD-10-CM

## 2025-07-17 DIAGNOSIS — I1A.0 RESISTANT HYPERTENSION: ICD-10-CM

## 2025-07-17 PROCEDURE — 1036F TOBACCO NON-USER: CPT | Performed by: NURSE PRACTITIONER

## 2025-07-17 PROCEDURE — 3078F DIAST BP <80 MM HG: CPT | Performed by: NURSE PRACTITIONER

## 2025-07-17 PROCEDURE — 3074F SYST BP LT 130 MM HG: CPT | Performed by: NURSE PRACTITIONER

## 2025-07-17 PROCEDURE — 1111F DSCHRG MED/CURRENT MED MERGE: CPT | Performed by: NURSE PRACTITIONER

## 2025-07-17 PROCEDURE — G8417 CALC BMI ABV UP PARAM F/U: HCPCS | Performed by: NURSE PRACTITIONER

## 2025-07-17 PROCEDURE — G8427 DOCREV CUR MEDS BY ELIG CLIN: HCPCS | Performed by: NURSE PRACTITIONER

## 2025-07-17 PROCEDURE — 99215 OFFICE O/P EST HI 40 MIN: CPT | Performed by: NURSE PRACTITIONER
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
## 2025-07-17 NOTE — TELEPHONE ENCOUNTER
Issa Moctezuma was contacted to schedule an appointment with our clinical pharmacy team per referral on 07/17/25 and was not able to be reached.

## 2025-07-22 ENCOUNTER — TELEPHONE (OUTPATIENT)
Dept: PHARMACY | Facility: CLINIC | Age: 40
End: 2025-07-22

## 2025-07-29 ENCOUNTER — PHARMACY VISIT (OUTPATIENT)
Dept: PHARMACY | Age: 40
End: 2025-07-29
Payer: COMMERCIAL

## 2025-07-29 VITALS — BODY MASS INDEX: 35.89 KG/M2 | SYSTOLIC BLOOD PRESSURE: 140 MMHG | WEIGHT: 272 LBS | DIASTOLIC BLOOD PRESSURE: 93 MMHG

## 2025-07-29 DIAGNOSIS — Z79.4 TYPE 2 DIABETES MELLITUS WITH DIABETIC POLYNEUROPATHY, WITH LONG-TERM CURRENT USE OF INSULIN (HCC): Primary | ICD-10-CM

## 2025-07-29 DIAGNOSIS — I10 ESSENTIAL HYPERTENSION: ICD-10-CM

## 2025-07-29 DIAGNOSIS — E11.42 TYPE 2 DIABETES MELLITUS WITH DIABETIC POLYNEUROPATHY, WITH LONG-TERM CURRENT USE OF INSULIN (HCC): Primary | ICD-10-CM

## 2025-07-29 PROCEDURE — 99213 OFFICE O/P EST LOW 20 MIN: CPT

## 2025-08-05 RX ORDER — AVOBENZONE, HOMOSALATE, OCTISALATE, OCTOCRYLENE 30; 40; 45; 26 MG/ML; MG/ML; MG/ML; MG/ML
CREAM TOPICAL
Qty: 100 EACH | Refills: 11 | Status: SHIPPED | OUTPATIENT
Start: 2025-08-05

## 2025-08-05 RX ORDER — BLOOD-GLUCOSE METER
1 KIT MISCELLANEOUS DAILY
Qty: 1 KIT | Refills: 0 | Status: SHIPPED | OUTPATIENT
Start: 2025-08-05

## 2025-08-05 RX ORDER — GLUCOSAMINE HCL/CHONDROITIN SU 500-400 MG
CAPSULE ORAL
Qty: 100 STRIP | Refills: 11 | Status: SHIPPED | OUTPATIENT
Start: 2025-08-05

## 2025-08-05 RX ORDER — PEN NEEDLE, DIABETIC 31 GX5/16"
1 NEEDLE, DISPOSABLE MISCELLANEOUS 4 TIMES DAILY
Qty: 100 EACH | Refills: 11 | Status: SHIPPED | OUTPATIENT
Start: 2025-08-05

## 2025-08-07 ENCOUNTER — TELEPHONE (OUTPATIENT)
Dept: PHARMACY | Age: 40
End: 2025-08-07

## 2025-08-07 DIAGNOSIS — Z79.4 TYPE 2 DIABETES MELLITUS WITH DIABETIC POLYNEUROPATHY, WITH LONG-TERM CURRENT USE OF INSULIN (HCC): Primary | ICD-10-CM

## 2025-08-07 DIAGNOSIS — E11.42 TYPE 2 DIABETES MELLITUS WITH DIABETIC POLYNEUROPATHY, WITH LONG-TERM CURRENT USE OF INSULIN (HCC): Primary | ICD-10-CM

## 2025-08-07 RX ORDER — INSULIN DEGLUDEC 200 U/ML
80 INJECTION, SOLUTION SUBCUTANEOUS DAILY
Qty: 15 ML | Refills: 2 | Status: SHIPPED | OUTPATIENT
Start: 2025-08-07 | End: 2025-08-11 | Stop reason: SDUPTHER

## 2025-08-11 ENCOUNTER — PHARMACY VISIT (OUTPATIENT)
Dept: PHARMACY | Age: 40
End: 2025-08-11
Payer: COMMERCIAL

## 2025-08-11 VITALS — HEART RATE: 120 BPM | SYSTOLIC BLOOD PRESSURE: 145 MMHG | DIASTOLIC BLOOD PRESSURE: 94 MMHG

## 2025-08-11 DIAGNOSIS — I10 ESSENTIAL HYPERTENSION: Primary | ICD-10-CM

## 2025-08-11 DIAGNOSIS — E11.42 TYPE 2 DIABETES MELLITUS WITH DIABETIC POLYNEUROPATHY, WITH LONG-TERM CURRENT USE OF INSULIN (HCC): ICD-10-CM

## 2025-08-11 DIAGNOSIS — Z79.4 TYPE 2 DIABETES MELLITUS WITH DIABETIC POLYNEUROPATHY, WITH LONG-TERM CURRENT USE OF INSULIN (HCC): ICD-10-CM

## 2025-08-11 PROCEDURE — 99213 OFFICE O/P EST LOW 20 MIN: CPT

## 2025-08-11 RX ORDER — INSULIN DEGLUDEC 200 U/ML
100 INJECTION, SOLUTION SUBCUTANEOUS DAILY
Qty: 15 ML | Refills: 2 | Status: SHIPPED | OUTPATIENT
Start: 2025-08-11

## 2025-09-05 ENCOUNTER — OFFICE VISIT (OUTPATIENT)
Dept: CARDIOLOGY CLINIC | Age: 40
End: 2025-09-05
Payer: MEDICAID

## 2025-09-05 VITALS
BODY MASS INDEX: 35.94 KG/M2 | SYSTOLIC BLOOD PRESSURE: 158 MMHG | WEIGHT: 272.4 LBS | HEART RATE: 103 BPM | DIASTOLIC BLOOD PRESSURE: 110 MMHG

## 2025-09-05 DIAGNOSIS — E78.2 MIXED HYPERLIPIDEMIA: ICD-10-CM

## 2025-09-05 DIAGNOSIS — R06.02 SOB (SHORTNESS OF BREATH): ICD-10-CM

## 2025-09-05 DIAGNOSIS — I47.29 NSVT (NONSUSTAINED VENTRICULAR TACHYCARDIA) (HCC): ICD-10-CM

## 2025-09-05 DIAGNOSIS — I1A.0 RESISTANT HYPERTENSION: Primary | ICD-10-CM

## 2025-09-05 DIAGNOSIS — R06.83 SNORING: ICD-10-CM

## 2025-09-05 DIAGNOSIS — I50.20 HFREF (HEART FAILURE WITH REDUCED EJECTION FRACTION) (HCC): ICD-10-CM

## 2025-09-05 DIAGNOSIS — R00.0 TACHYCARDIA: ICD-10-CM

## 2025-09-05 PROCEDURE — G8428 CUR MEDS NOT DOCUMENT: HCPCS | Performed by: NURSE PRACTITIONER

## 2025-09-05 PROCEDURE — 3080F DIAST BP >= 90 MM HG: CPT | Performed by: NURSE PRACTITIONER

## 2025-09-05 PROCEDURE — 1036F TOBACCO NON-USER: CPT | Performed by: NURSE PRACTITIONER

## 2025-09-05 PROCEDURE — 99214 OFFICE O/P EST MOD 30 MIN: CPT | Performed by: NURSE PRACTITIONER

## 2025-09-05 PROCEDURE — 3077F SYST BP >= 140 MM HG: CPT | Performed by: NURSE PRACTITIONER

## 2025-09-05 PROCEDURE — G8417 CALC BMI ABV UP PARAM F/U: HCPCS | Performed by: NURSE PRACTITIONER

## 2025-09-05 RX ORDER — ROSUVASTATIN CALCIUM 40 MG/1
40 TABLET, COATED ORAL EVERY MORNING
Qty: 90 TABLET | Refills: 3 | Status: SHIPPED | OUTPATIENT
Start: 2025-09-05

## 2025-09-05 RX ORDER — VALSARTAN 160 MG/1
160 TABLET ORAL EVERY MORNING
Qty: 90 TABLET | Refills: 3 | Status: SHIPPED | OUTPATIENT
Start: 2025-09-05

## 2025-09-05 RX ORDER — HYDRALAZINE HYDROCHLORIDE 50 MG/1
50 TABLET, FILM COATED ORAL 2 TIMES DAILY
Qty: 180 TABLET | Refills: 3 | Status: SHIPPED | OUTPATIENT
Start: 2025-09-05

## 2025-09-05 RX ORDER — FUROSEMIDE 20 MG/1
20 TABLET ORAL DAILY
Qty: 90 TABLET | Refills: 3 | Status: SHIPPED | OUTPATIENT
Start: 2025-09-05

## 2025-09-05 RX ORDER — ASPIRIN 81 MG/1
81 TABLET, CHEWABLE ORAL DAILY
Qty: 90 TABLET | Refills: 3 | Status: SHIPPED | OUTPATIENT
Start: 2025-09-05

## 2025-09-05 RX ORDER — METOPROLOL SUCCINATE 50 MG/1
50 TABLET, EXTENDED RELEASE ORAL NIGHTLY
Qty: 30 TABLET | Refills: 3 | Status: SHIPPED | OUTPATIENT
Start: 2025-09-05

## (undated) DEVICE — SYRINGE MEDICAL 3ML CLEAR PLASTIC STANDARD NON CONTROL LUERLOCK TIP DISPOSABLE

## (undated) DEVICE — SYRINGE MED 10ML POLYPR LUERSLIP TIP FLAT TOP W/O SFTY DISP

## (undated) DEVICE — BLADE ES ELASTOMERIC COAT INSUL DURABLE BEND UPTO 90DEG

## (undated) DEVICE — FORCEPS BX L100CM JAW DIA1.8MM CHN 2MM PULM W/ NDL DISP

## (undated) DEVICE — 3M™ TEGADERM™ TRANSPARENT FILM DRESSING FRAME STYLE, 1626W, 4 IN X 4-3/4 IN (10 CM X 12 CM), 50/CT 4CT/CASE: Brand: 3M™ TEGADERM™

## (undated) DEVICE — CASSETTE THER 38 MM W/ INSTILLATION TBNG VAC VERALINK

## (undated) DEVICE — SOLUTION SURG PREP 26 CC PURPREP

## (undated) DEVICE — 4-PORT MANIFOLD: Brand: NEPTUNE 2

## (undated) DEVICE — CLEANER,CAUTERY TIP,2X2",STERILE: Brand: MEDLINE

## (undated) DEVICE — BRUSH CYTO L140CM DIA1.5MM WRK CHN 2MM BRIST SHTH RNG HNDL

## (undated) DEVICE — KIT DRESSING KIT VERAFLO CLEANSE CHOICE COMPLETE DERMATAC LG

## (undated) DEVICE — SHEET, T, LAPAROTOMY, STERILE: Brand: MEDLINE

## (undated) DEVICE — MASK, AEROSOL, ELONGATED, ADULT: Brand: MEDLINE

## (undated) DEVICE — APPLIER LIG CLP M L11IN TI STR RNG HNDL FOR 20 CLP DISP

## (undated) DEVICE — TRAP,MUCUS SPECIMEN, 80CC: Brand: MEDLINE

## (undated) DEVICE — SUTURE MONOCRYL + SZ 4-0 L27IN ABSRB UD L19MM PS-2 3/8 CIR MCP426H

## (undated) DEVICE — SOLUTION IV 1000ML 0.9% SOD CHL

## (undated) DEVICE — NEBULIZER,KIT,T-MOUTHPIECE,6"RESER,7'TUB: Brand: MEDLINE

## (undated) DEVICE — TRAP FLUID

## (undated) DEVICE — MAJ-1414 SINGLE USE ADPATER BIOPSY VALV: Brand: SINGLE USE ADAPTOR BIOPSY VALVE

## (undated) DEVICE — CANISTER NEG PRSS 1000ML W/ GEL INFOVAC

## (undated) DEVICE — GARMENT,MEDLINE,DVT,INT,CALF,MED, GEN2: Brand: MEDLINE

## (undated) DEVICE — SINGLE USE SUCTION VALVE MAJ-209: Brand: SINGLE USE SUCTION VALVE (STERILE)

## (undated) DEVICE — STAPLER SKIN H3.9MM WIRE DIA0.58MM CRWN 6.9MM 35 STPL ROT

## (undated) DEVICE — TOWEL,STOP FLAG GOLD N-W: Brand: MEDLINE

## (undated) DEVICE — 60 ML SYRINGE REGULAR TIP: Brand: MONOJECT

## (undated) DEVICE — SPONGE GZ W4XL8IN COT WVN 12 PLY

## (undated) DEVICE — SINGLE USE BIOPSY VALVE MAJ-210: Brand: SINGLE USE BIOPSY VALVE (STERILE)

## (undated) DEVICE — SPONGE,LAP,18"X18",DLX,XR,ST,5/PK,40/PK: Brand: MEDLINE

## (undated) DEVICE — NEEDLE HYPO 16GA L1.5IN PUR POLYPR HUB S STL REG BVL STR

## (undated) DEVICE — NEPTUNE E-SEP SMOKE EVACUATION PENCIL, COATED, 70MM BLADE, PUSH BUTTON SWITCH: Brand: NEPTUNE E-SEP

## (undated) DEVICE — GOWN,SIRUS,POLYRNF,BRTHSLV,LG,30/CS: Brand: MEDLINE

## (undated) DEVICE — GENERAL: Brand: MEDLINE INDUSTRIES, INC.

## (undated) DEVICE — GLOVE SURG SZ 7 L12IN FNGR THK75MIL WHT LTX POLYMER BEAD